# Patient Record
Sex: FEMALE | Race: WHITE
[De-identification: names, ages, dates, MRNs, and addresses within clinical notes are randomized per-mention and may not be internally consistent; named-entity substitution may affect disease eponyms.]

---

## 2017-07-10 ENCOUNTER — HOSPITAL ENCOUNTER (OUTPATIENT)
Dept: HOSPITAL 75 - PREOP | Age: 72
LOS: 1 days | End: 2017-07-11
Attending: SURGERY
Payer: MEDICARE

## 2017-07-10 VITALS — BODY MASS INDEX: 22.55 KG/M2 | WEIGHT: 121 LBS | HEIGHT: 61.25 IN

## 2017-07-13 ENCOUNTER — HOSPITAL ENCOUNTER (OUTPATIENT)
Dept: HOSPITAL 75 - ENDO | Age: 72
Discharge: HOME | End: 2017-07-13
Attending: SURGERY
Payer: MEDICARE

## 2017-07-13 VITALS — BODY MASS INDEX: 22.55 KG/M2 | WEIGHT: 121 LBS | HEIGHT: 61.25 IN

## 2017-07-13 VITALS — DIASTOLIC BLOOD PRESSURE: 86 MMHG | SYSTOLIC BLOOD PRESSURE: 119 MMHG

## 2017-07-13 VITALS — SYSTOLIC BLOOD PRESSURE: 118 MMHG | DIASTOLIC BLOOD PRESSURE: 67 MMHG

## 2017-07-13 VITALS — DIASTOLIC BLOOD PRESSURE: 68 MMHG | SYSTOLIC BLOOD PRESSURE: 121 MMHG

## 2017-07-13 VITALS — SYSTOLIC BLOOD PRESSURE: 119 MMHG | DIASTOLIC BLOOD PRESSURE: 86 MMHG

## 2017-07-13 DIAGNOSIS — K21.9: ICD-10-CM

## 2017-07-13 DIAGNOSIS — R19.5: Primary | ICD-10-CM

## 2017-07-13 DIAGNOSIS — K64.8: ICD-10-CM

## 2017-07-13 DIAGNOSIS — E78.00: ICD-10-CM

## 2017-07-13 DIAGNOSIS — K57.30: ICD-10-CM

## 2017-07-13 DIAGNOSIS — K20.9: ICD-10-CM

## 2017-07-13 DIAGNOSIS — K44.9: ICD-10-CM

## 2017-07-13 DIAGNOSIS — Z80.0: ICD-10-CM

## 2017-07-13 DIAGNOSIS — I10: ICD-10-CM

## 2017-07-13 RX ADMIN — MIDAZOLAM HYDROCHLORIDE PRN MG: 1 INJECTION, SOLUTION INTRAMUSCULAR; INTRAVENOUS at 10:20

## 2017-07-13 RX ADMIN — MIDAZOLAM HYDROCHLORIDE PRN MG: 1 INJECTION, SOLUTION INTRAMUSCULAR; INTRAVENOUS at 10:13

## 2017-07-13 RX ADMIN — MIDAZOLAM HYDROCHLORIDE PRN MG: 1 INJECTION, SOLUTION INTRAMUSCULAR; INTRAVENOUS at 10:23

## 2017-07-13 RX ADMIN — FENTANYL CITRATE PRN MCG: 50 INJECTION, SOLUTION INTRAMUSCULAR; INTRAVENOUS at 10:14

## 2017-07-13 RX ADMIN — FENTANYL CITRATE PRN MCG: 50 INJECTION, SOLUTION INTRAMUSCULAR; INTRAVENOUS at 10:11

## 2017-07-13 RX ADMIN — MIDAZOLAM HYDROCHLORIDE PRN MG: 1 INJECTION, SOLUTION INTRAMUSCULAR; INTRAVENOUS at 10:16

## 2017-07-13 RX ADMIN — MIDAZOLAM HYDROCHLORIDE PRN MG: 1 INJECTION, SOLUTION INTRAMUSCULAR; INTRAVENOUS at 10:10

## 2017-07-13 RX ADMIN — FENTANYL CITRATE PRN MCG: 50 INJECTION, SOLUTION INTRAMUSCULAR; INTRAVENOUS at 10:21

## 2017-07-13 RX ADMIN — FENTANYL CITRATE PRN MCG: 50 INJECTION, SOLUTION INTRAMUSCULAR; INTRAVENOUS at 10:25

## 2017-07-13 NOTE — XMS REPORT
MU2 Ambulatory Summary

 Created on: 2015



Milady Crespo

External Reference #: [id number]

: 1945

Sex: Female



Demographics







 Address  [address]

[place]  [address]

 

 Home Phone  [phone]

 

 Preferred Language  English

 

 Marital Status  

 

 Adventist Affiliation  Unknown

 

 Race  White

 

 Ethnic Group  Not  or 





Author







 Author  Doris Noriega

 

 Organization  [facility]

 

 Address  [address]

[place], [place]  [id number]



 

 Phone  [phone]







Care Team Providers







 Care Team Member Name  Role  Phone

 

 Doris Noriega  PCP  [phone]







Allergies and Adverse Reactions







 Name  Reaction  Notes

 

 NO KNOWN DRUG ALLERGIES      







Plan of Treatment







 Planned Activity  Comments  Planned Date  Planned Time  Plan/Goal

 

 LIPID PANEL     8/15/2012  12:00 AM   

 

 COMPREHEN METABOLIC PANEL     6/10/2013  12:00 AM   

 

 LIPID PANEL     6/10/2013  12:00 AM   

 

 ASSAY THYROID STIM HORMONE     6/10/2013  12:00 AM   

 

 COMPLETE CBC W/AUTO DIFF WBC     6/10/2013  12:00 AM   

 

 COMPREHEN METABOLIC PANEL     2012  12:00 AM   

 

 LIPID PANEL     2012  12:00 AM   

 

 ASSAY THYROID STIM HORMONE     2012  12:00 AM   

 

 COMPLETE CBC W/AUTO DIFF WBC     2012  12:00 AM   

 

 COMPLETE CBC W/AUTO DIFF WBC     2015  12:00 AM   

 

 COMPREHEN METABOLIC PANEL     2015  12:00 AM   

 

 LIPID PANEL     2015  12:00 AM   

 

 ASSAY THYROID STIM HORMONE     2015  12:00 AM   







Medications







 Active 

 

 Name  Start Date  Estimated Completion Date  SIG  Comments

 

 Tetracaine Lollipops Lollipop  2015     Use as directed   

 

 amlodipine oral tablet 5 mg  2015  TAKE ONE TABLET BY MOUTH 
EVERY DAY   

 

 atenolol oral tablet 50 mg  2015  take 1 tablet (50 mg) by oral 
route once daily   

 

 levothyroxine oral tablet 50 mcg  2015  TAKE ONE TABLET BY MOUTH 
EVERY DAY   

 

 loratadine oral tablet 10 mg  2015  TAKE ONE TABLET BY MOUTH 
EVERY DAY   

 

 Diovan Oral tablet 160 mg  2015  2/15/2016  TAKE ONE TABLET BY MOUTH 
EVERY DAY for 90 days   

 

 clorazepate dipotassium oral tablet 7.5 mg  2015  take 1/2 to1 
tablet PO up to three times per day for situational anxiety   









  

 

 Name  Start Date  Expiration Date  SIG  Comments

 

 Prednisone Oral Tablet 20 mg  2011  take 2 tablets by oral 
route daily for 5 days   

 

 calcium carbonate-vitamin D2 Oral tablet 600-125 mg-unit  8/15/2012  2012
  take 2 tablets by oral route daily   

 

 Omega 3 Fish Oil Oral capsule,delayed release(DR/EC) 900-1,400 mg  8/15/2012  9
/  take 1 capsule by oral route daily   

 

 multivitamin Oral tablet  8/15/2012  2012  take 1 tablet by oral route 
daily   

 

 triamcinolone acetonide topical cream 0.1 %  2013  10/7/2013  APPLY A 
THIN LAYER TO THE AFFECTED AREA(S) BY TOPICAL ROUTE TWICE A DAY   

 

 famotidine Oral tablet 20 mg  2014  take 1 tablet (20 mg) by 
oral route 2 times per day   

 

 Plavix Oral tablet 75 mg  2014  take 1 tablet (75 mg) by oral 
route once daily   

 

 amoxicillin oral capsule 500 mg  2014  take 1 capsule (500 mg) 
by oral route 3 times per day for 10 days   

 

 Lipitor Oral Tablet 20 mg  2014  take 1 tablet (20 mg) by oral 
route once daily   

 

 Zithromax Z-Tab oral tablet 250 mg  10/2/2014  10/7/2014  take 2 tablets (500 
mg) by oral route once daily for 1 day then 1 tablet (250 mg) by oral route 
once daily for 4 days   

 

 acyclovir oral tablet 800 mg  10/5/2014  10/12/2014  take 1 tablet (800 mg) by 
oral route 5 times per day for 7 days   

 

 gabapentin oral capsule 300 mg  10/9/2014  2014  take 1 capsule (300 mg) 
by oral route 3 times per day for 14 days   

 

 Carafate oral suspension 100 mg/mL  2014  take 10 milliliters 
(1 gram) by oral route 4 times per day on an empty stomach 1 hour before meals 
and at bedtime for 4 weeks   









 Discontinued 

 

 Name  Start Date  Discontinued Date  SIG  Comments

 

 Lipitor Oral Tablet 40 mg     2009 TAB DAILY   

 

 Ramipril Oral Capsule 5 mg     2009  take 1 capsule (5 mg) by oral route 
once daily   

 

 Lovastatin Oral Tablet 20 mg  2009  take 1 tablet (20 mg) by 
oral route once daily with evening meal   

 

 Propranolol Oral Tablet 20 mg  2010  take 1 tablet by oral 
route 2 times a day   

 

 Fosamax Oral Tablet 70 mg  2010  take 1 tablet (70 mg) by oral 
route once weekly in the morning, at least 30 minutes before the first food, 
beverage, or medication of the day   

 

 Lisinopril Oral Tablet 40 mg  2010  4/10/2011  take 2 tablets by oral 
route daily   

 

 Zoloft Oral Tablet 50 mg  2011  take 1 tablet (50 mg) by oral 
route once daily   

 

 Promethazine Oral Tablet 25 mg  2011  take 1 tablet by oral 
route every 6 hours as needed   

 

 Diovan HCT Oral Tablet 160-12.5 mg  2011  take 1 tablet by oral 
route once daily for 30 days   

 

 Diflucan Oral Tablet 150 mg     2012  take 1 tablet (150 mg) by oral 
route once for 1 day   

 

 Diflucan Oral Tablet 150 mg  2012  8/15/2012  take 1 tablet (150 mg) by 
oral route once   

 

 Vitamin B-12 Oral tablet 1,000 mcg  8/15/2012  2014  take 1 tablet by 
oral route daily   

 

 Premarin Vaginal Cream 0.625 mg/gram  10/29/2012  6/10/2013  use 1 gram daily 
for a week then 1 gram twice a week   

 

 Medrol (Tab) Oral tablets,dose pack 4 mg  2013  6/10/2013  take as 
directed   

 

 aspirin Oral tablet 325 mg  2014  take 1 tablet (325 mg) by 
oral route once daily   

 

 Medrol (Tab) oral tablets,dose pack 4 mg  2014  10/2/2014  take as 
directed   







Problem List







 Description  Status  Onset

 

 Hyperlipidemia  Active   

 

 acid reflux  Active   

 

 Hypertension  Active   

 

 hypothyroid  Active   







Vital Signs







 Date  Time  BP-Sys(mm[Hg]  BP-Morena(mm[Hg])  HR(bpm)  RR(rpm)  Temp  WT  HT  HC  
BMI  BSA  BMI Percentile  O2 Sat(%)

 

 2015  9:05:00 AM  110 mmHg  75 mmHg  85 bpm  16 rpm  96.7 F  144.375 lbs  
61 in     27.28 kg/m2  1.68 m2     99 %

 

 2015  1:05:00 PM  108 mmHg  62 mmHg  78 bpm  18 rpm  96.9 F  142.375 lbs  
61 in     26.9012 kg/m  1.6672 m     100 %

 

 2014  9:31:00 AM  126 mmHg  66 mmHg  79 bpm  18 rpm  98.7 F  149 lbs  61 
in     28.15 kg/m2  1.71 m2     98 %

 

 10/6/2014  9:02:00 AM  110 mmHg  74 mmHg  94 bpm  18 rpm  98.1 F  145.4 lbs  
61 in     27.4728 kg/m  1.6848 m     97 %

 

 10/2/2014  9:14:00 AM  124 mmHg  74 mmHg  79 bpm  18 rpm  98 F  147.25 lbs  61 
in     27.82 kg/m2  1.70 m2     98 %

 

 2014  9:22:00 AM  124 mmHg  76 mmHg  89 bpm  18 rpm  98.1 F  148 lbs  61 
in     27.9641 kg/m  1.6998 m     100 %

 

 2014  8:27:00 AM  118 mmHg  65 mmHg  74 bpm  16 rpm  97.7 F  148 lbs  61 
in     27.96 kg/m2  1.70 m2     99 %

 

 2014  9:48:00 AM  125 mmHg  70 mmHg  93 bpm  18 rpm  96.7 F  156 lbs  61 
in     29.4756 kg/m  1.7451 m     100 %

 

 2013  8:35:00 AM  122 mmHg  74 mmHg  84 bpm  16 rpm  97.9 F  155.375 lbs 
                99 %

 

 6/10/2013  8:30:00 AM  130 mmHg  82 mmHg  79 bpm  16 rpm  97.9 F  161 lbs     
            98 %

 

 2013  8:50:00 AM  124 mmHg  68 mmHg  66 bpm  18 rpm  97.6 F  157.5 lbs  
61 in     29.76 kg/m2  1.75 m2      

 

 2012  1:46:00 PM  120 mmHg  72 mmHg  75 bpm  16 rpm  97.6 F  156.125 lbs
                 98 %

 

 12/10/2012  8:32:00 AM  122 mmHg  82 mmHg  70 bpm  16 rpm  97.3 F  157 lbs    
             99 %

 

 8/15/2012  9:00:00 AM  130 mmHg  76 mmHg  86 bpm  16 rpm  97.4 F  159 lbs     
            100 %

 

 2012  11:02:00 AM  128 mmHg  64 mmHg  66 bpm  18 rpm  97.4 F  162.125 lbs
  61 in     30.63 kg/m2  1.78 m2      

 

 2012  8:45:00 AM  130 mmHg  70 mmHg  82 bpm  16 rpm  98.2 F  160.125 lbs 
                100 %

 

 2/15/2012  9:29:00 AM  122 mmHg  80 mmHg  86 bpm  16 rpm  97.4 F  159.375 lbs  
61 in     30.11 kg/m2  1.76 m2     99 %

 

 2012  9:41:00 AM  132 mmHg  74 mmHg  66 bpm  18 rpm  98.4 F  160.375 lbs  
61 in     30.3023 kg/m  1.7694 m      

 

 2011  10:08:00 AM  134 mmHg  82 mmHg  80 bpm  16 rpm  98 F  160 lbs  61 
in     30.23 kg/m2  1.77 m2     99 %

 

 2011  3:56:00 PM  130 mmHg  80 mmHg                              

 

 2011  8:55:00 AM  130 mmHg  74 mmHg  88 bpm  16 rpm  98.2 F  158.25 lbs  
               98 %

 

 2011  8:54:00 AM  136 mmHg  82 mmHg  102 bpm  16 rpm  98.1 F  153.5 lbs   
              99 %

 

 2011  8:49:00 AM  122 mmHg  88 mmHg  88 bpm  16 rpm  98.6 F  152.25 lbs  
               99 %

 

 2011  10:02:00 AM  140 mmHg  82 mmHg  96 bpm  20 rpm  98.8 F             
       100 %

 

 2011  9:14:00 AM  156 mmHg  98 mmHg  110 bpm  24 rpm  98.5 F  153 lbs    
             100 %

 

 2011  8:50:00 AM  160 mmHg  84 mmHg  100 bpm  16 rpm  98.7 F  155 lbs    
             100 %

 

 2011  1:25:00 PM  152 mmHg  100 mmHg  82 bpm  16 rpm  97.2 F  156.375 lbs 
                100 %

 

 2011  9:55:00 AM  144 mmHg  90 mmHg                              

 

 2010  9:24:00 AM  138 mmHg  84 mmHg                              

 

 2010  8:58:00 AM  160 mmHg  94 mmHg                              

 

 2010  8:33:00 AM  142 mmHg  90 mmHg  100 bpm  16 rpm  98.1 F  158.375 
lbs                  

 

 2010  9:24:00 AM  160 mmHg  102 mmHg  88 bpm  18 rpm  99 F  157.125 lbs  
62 in     28.7382 kg/m  1.7657 m      

 

 3/19/2010  11:01:00 AM  140 mmHg  90 mmHg                              

 

 2009  10:08:00 AM  142 mmHg  86 mmHg  72 bpm  16 rpm  98.1 F  154.125 
lbs  61 in     29.1214 kg/m  1.7346 m      







Social History







 Name  Description  Comments

 

       

 

 Children      

 

 lives alone      

 

 Supervisor      

 

 Tobacco  Never smoker   







History of Procedures







 Date Ordered  Description  Order Status

 

 2011 12:00 AM  COMPREHEN METABOLIC PANEL  Reviewed

 

 2011 12:00 AM  ASSAY THYROID STIM HORMONE  Reviewed

 

 2011 12:00 AM  COMPREHEN METABOLIC PANEL  Reviewed

 

 2011 12:00 AM  LIPID PANEL  Reviewed

 

 2011 12:00 AM  ASSAY THYROID STIM HORMONE  Reviewed

 

 2011 12:00 AM  COMPLETE CBC W/AUTO DIFF WBC  Reviewed

 

 2011 12:00 AM  COMPREHEN METABOLIC PANEL  Reviewed

 

 2011 12:00 AM  COMPREHEN METABOLIC PANEL  Reviewed

 

 2011 12:00 AM  LIPID PANEL  Reviewed

 

 2011 12:00 AM  ASSAY THYROID STIM HORMONE  Reviewed

 

 2010 11:24 AM  NO CHARGE OV  Reviewed

 

 2010 11:26 AM  NO CHARGE OV  Reviewed

 

 2011 12:00 AM  COMPREHEN METABOLIC PANEL  Reviewed

 

 2011 12:00 AM  LIPID PANEL  Reviewed

 

 2011 12:00 AM  ASSAY THYROID STIM HORMONE  Reviewed

 

 2011 12:00 AM  COMPLETE CBC W/AUTO DIFF WBC  Reviewed

 

 2012 12:00 AM  TISSUE EXAM FOR FUNGI  Returned

 

 2012 12:00 AM  SMEAR WET MOUNT SALINE/INK  Returned

 

 2/15/2012 12:00 AM  THER/PROPH/DIAG INJ SC/IM  Reviewed

 

 8/15/2012 12:00 AM  COMPREHEN METABOLIC PANEL  Reviewed

 

 8/15/2012 12:00 AM  ASSAY THYROID STIM HORMONE  Reviewed

 

 8/15/2012 12:00 AM  COMPLETE CBC W/AUTO DIFF WBC  Returned

 

 12/10/2012 12:00 AM  IMMUNIZATION ADMIN  Reviewed

 

 2012 12:00 AM  TRICHOMONAS ASSAY W/OPTIC  Returned

 

 2013 12:00 AM  THER/PROPH/DIAG INJ SC/IM  Reviewed

 

 2013 12:00 AM  COMPREHEN METABOLIC PANEL  Returned

 

 2013 12:00 AM  LIPID PANEL  Returned

 

 2013 12:00 AM  COMPLETE CBC W/AUTO DIFF WBC  Returned

 

 2013 12:00 AM  ASSAY THYROID STIM HORMONE  Returned

 

 6/10/2013 12:00 AM  MAMMOGRAM SCREENING  Returned

 

 6/10/2013 12:00 AM  CYTOPATH C/V MANUAL  Returned

 

 12/10/2013 12:00 AM  LIPID PANEL  Returned

 

 12/10/2013 12:00 AM  ASSAY THYROID STIM HORMONE  Returned

 

 12/10/2013 12:00 AM  COMPLETE CBC W/AUTO DIFF WBC  Returned

 

 12/10/2013 12:00 AM  COMPREHEN METABOLIC PANEL  Returned

 

 2010 12:00 AM  CYTOPATH C/V MANUAL  Reviewed

 

 2010 12:00 AM  SMEAR WET MOUNT SALINE/INK  Reviewed

 

 2010 12:00 AM  LIPID PANEL  Reviewed

 

 2010 12:00 AM  ASSAY THYROID STIM HORMONE  Reviewed

 

 2010 12:00 AM  COMPLETE CBC W/AUTO DIFF WBC  Reviewed

 

 2010 12:00 AM  COMPREHEN METABOLIC PANEL  Reviewed

 

 2010 8:48 AM  Blood Pressure Check-no charge  Reviewed

 

 2010 12:00 AM  Blood Pressure Check-no charge  Reviewed

 

 2014 12:00 AM  METABOLIC PANEL TOTAL CA  Reviewed

 

 2014 12:00 AM  ASSAY THYROID STIM HORMONE  Reviewed

 

 2014 12:00 AM  ASSAY OF FREE THYROXINE  Reviewed

 

 2014 12:00 AM  MAMMOGRAM SCREENING  Returned

 

 2014 12:00 AM  CT BONE DENSITY AXIAL  Returned

 

 2014 12:00 AM  COMPLETE CBC W/AUTO DIFF WBC  Returned

 

 2014 12:00 AM  COMPREHEN METABOLIC PANEL  Returned

 

 2014 12:00 AM  LIPID PANEL  Returned

 

 2014 12:00 AM  ASSAY THYROID STIM HORMONE  Returned

 

 10/20/2010 12:00 AM  IMMUNIZATION ADMIN  Reviewed

 

 10/20/2010 12:00 AM  FLU VACCINE 3 YRS & > IM  Reviewed

 

 2010 12:00 AM  COMPREHEN METABOLIC PANEL  Reviewed

 

 2010 12:00 AM  LIPID PANEL  Reviewed

 

 2010 12:00 AM  ASSAY THYROID STIM HORMONE  Reviewed

 

 2010 12:00 AM  COMPLETE CBC W/AUTO DIFF WBC  Reviewed

 

 2010 12:00 AM  Blood Pressure Check-no charge  Reviewed

 

 10/2/2014 12:00 AM  THER/PROPH/DIAG INJ SC/IM  Reviewed

 

 2014 12:00 AM  COMPLETE CBC W/AUTO DIFF WBC  Returned

 

 2014 12:00 AM  COMPREHEN METABOLIC PANEL  Returned

 

 2014 12:00 AM  LIPID PANEL  Returned

 

 2014 12:00 AM  ASSAY THYROID STIM HORMONE  Returned

 

 2015 12:00 AM  THER/PROPH/DIAG INJ SC/IM  Reviewed

 

 2011 12:00 AM  COMPREHEN METABOLIC PANEL  Reviewed

 

 2011 12:00 AM  LIPID PANEL  Reviewed

 

 2011 12:00 AM  ASSAY THYROID STIM HORMONE  Reviewed

 

 2011 12:00 AM  COMPLETE CBC W/AUTO DIFF WBC  Reviewed

 

 2011 12:00 AM  METABOLIC PANEL TOTAL CA  Reviewed

 

 2011 12:00 AM  COMPREHEN METABOLIC PANEL  Reviewed

 

 2011 12:00 AM  ASSAY THYROID STIM HORMONE  Reviewed

 

 2011 12:00 AM  COMPLETE CBC W/AUTO DIFF WBC  Reviewed

 

 2011 12:00 AM  ASSAY OF LIPASE  Reviewed

 

 2011 12:00 AM  THER/PROPH/DIAG INJ SC/IM  Reviewed

 

 2011 12:00 AM  METABOLIC PANEL TOTAL CA  Reviewed

 

 2011 12:00 AM  THER/PROPH/DIAG INJ SC/IM  Reviewed

 

 2011 12:00 AM  METABOLIC PANEL TOTAL CA  Reviewed

 

 2011 12:00 AM  ASSAY THYROID STIM HORMONE  Reviewed

 

 2011 12:00 AM  THER/PROPH/DIAG INJ SC/IM  Reviewed

 

 2011 12:00 AM  ASSAY OF SERUM SODIUM  Reviewed

 

 2011 12:00 AM  ASSAY OF SERUM SODIUM  Reviewed

 

 2011 12:00 AM  CYTOPATH C/V MANUAL  Reviewed

 

 2011 12:00 AM  ASSAY THYROID STIM HORMONE  Reviewed







Results Summary







 Data and Description  Results

 

 2010 9:25 AM  Colonoscopy-Women and Men over 50 Abnormal Mammogram -Women 
over 40 Declined Pap Smear Declined 

 

 2010 10:41 AM  WET PREP NO TRICHOMONADS SEEN 

 

 2010 8:15 AM  TRIGLYCERIDES 174.0 mg/dLCHOLESTEROL 175.0 mg/dLHDL 58.0 mg/
dLLDL (CALC) 82.0 mg/dLTSH 0.790 uIU/mLGLUCOSE 95.0 mg/dLSODIUM 136.0 mmol/
LPOTASSIUM 4.50 mmol/LCHLORIDE 99.0 mmol/LCO2 26.0 mmol/LBUN 12.0 mg/
dLCREATININE 0.80 mg/dLSGOT/AST 32.0 IU/LSGPT/ALT 33.0 IU/LALK PHOS 103.0 IU/
LTOTAL PROTEIN 7.60 g/dLALBUMIN 4.60 g/dLTOTAL BILI 0.60 mg/dLCALCIUM 9.80 mg/
dLeGFR >60 mL/min/1.73 m2WBC 5.3 RBC 4.13 HGB 13.10 g/dLHCT 39.20 %MCV 95.0 
fLMCH 31.70 pgMCHC 33.40 g/dLRDW CV 12.70 %MPV 9.80 fLPLT 257 %NEUT 57.90 %%
LYMP 26.50 %%MONO 11.60 %%EOS 3.20 %%BASO 0.80 %#NEUT 3.04 #LYMP 1.39 #MONO 
0.61 #EOS 0.17 #BASO 0.04 

 

 12/15/2010 8:30 AM  TRIGLYCERIDES 157.0 mg/dLCHOLESTEROL 163.0 mg/dLHDL 56.0 mg
/dLLDL (CALC) 76.0 mg/dLTSH 0.650 uIU/mLWBC 6.5 RBC 4.25 HGB 13.60 g/dLHCT 
40.70 %MCV 96.0 fLMCH 32.0 pgMCHC 33.40 g/dLRDW CV 12.60 %MPV 9.90 fLPLT 313 %
NEUT 61.80 %%LYMP 26.20 %%MONO 8.30 %%EOS 3.10 %%BASO 0.60 %#NEUT 4.00 #LYMP 
1.70 #MONO 0.54 #EOS 0.20 #BASO 0.04 GLUCOSE 97.0 mg/dLSODIUM 136.0 mmol/
LPOTASSIUM 4.40 mmol/LCHLORIDE 101.0 mmol/LCO2 26.0 mmol/LBUN 10.0 mg/
dLCREATININE 0.80 mg/dLSGOT/AST 31.0 IU/LSGPT/ALT 34.0 IU/LALK PHOS 92.0 IU/
LTOTAL PROTEIN 8.10 g/dLALBUMIN 4.60 g/dLTOTAL BILI 0.40 mg/dLCALCIUM 10.0 mg/
dLeGFR >60 mL/min/1.73 m2

 

 2011 9:45 AM  GLUCOSE 91.0 mg/dLSODIUM 133.0 mmol/LPOTASSIUM 4.40 mmol/
LCHLORIDE 97.0 mmol/LCO2 24.0 mmol/LBUN 9.0 mg/dLCREATININE 0.70 mg/dLCALCIUM 
10.0 mg/dLeGFR >60 mL/min/1.73 m2

 

 2011 10:25 AM  LIPASE 29.0 U/LTSH 0.10 uIU/mLGLUCOSE 115.0 mg/dLSODIUM 
127.0 mmol/LPOTASSIUM 5.30 mmol/LCHLORIDE 93.0 mmol/LCO2 18.0 mmol/LBUN 9.0 mg/
dLCREATININE 0.70 mg/dLSGOT/AST 62.0 IU/LSGPT/ALT 46.0 IU/LALK PHOS 100.0 IU/
LTOTAL PROTEIN 8.60 g/dLALBUMIN 4.90 g/dLTOTAL BILI 0.60 mg/dLCALCIUM 9.90 mg/
dLeGFR >60 mL/min/1.73 m2WBC 6.9 RBC 4.48 HGB 14.40 g/dLHCT 40.90 %MCV 91.0 
fLMCH 32.10 pgMCHC 35.20 g/dLRDW CV 12.50 %MPV 9.30 fLPLT 260 %NEUT 74.90 %%
LYMP 15.10 %%MONO 9.40 %%EOS 0.30 %%BASO 0.30 %#NEUT 5.15 #LYMP 1.04 #MONO 0.65 
#EOS 0.02 #BASO 0.02 

 

 2011 9:07 AM  GLUCOSE 92.0 mg/dLSODIUM 131.0 mmol/LPOTASSIUM 4.20 mmol/
LCHLORIDE 99.0 mmol/LCO2 22.0 mmol/LBUN 9.0 mg/dLCREATININE 0.70 mg/dLCALCIUM 
9.20 mg/dLeGFR >60 mL/min/1.73 m2

 

 2011 11:06 AM  TSH 0.510 uIU/mLGLUCOSE 99.0 mg/dLSODIUM 132.0 mmol/
LPOTASSIUM 3.50 mmol/LCHLORIDE 95.0 mmol/LCO2 23.0 mmol/LBUN 9.0 mg/
dLCREATININE 0.80 mg/dLCALCIUM 10.40 mg/dLeGFR >60 mL/min/1.73 m2

 

 2011 9:45 AM  SODIUM 132.0 mmol/L

 

 2011 9:27 AM  SODIUM 137.0 mmol/L

 

 2011 8:35 AM  TRIGLYCERIDES 114.0 mg/dLCHOLESTEROL 145.0 mg/dLHDL 56.0 mg/
dLLDL (CALC) 66.0 mg/dLGLUCOSE 105.0 mg/dLSODIUM 138.0 mmol/LPOTASSIUM 4.40 mmol
/LCHLORIDE 103.0 mmol/LCO2 25.0 mmol/LBUN 8.0 mg/dLCREATININE 0.70 mg/dLSGOT/
AST 36.0 IU/LSGPT/ALT 38.0 IU/LALK PHOS 103.0 IU/LTOTAL PROTEIN 7.40 g/
dLALBUMIN 4.30 g/dLTOTAL BILI 0.30 mg/dLCALCIUM 9.20 mg/dLeGFR >60 mL/min/1.73 
m2WBC 5.1 RBC 3.76 HGB 12.0 g/dLHCT 36.10 %MCV 96.0 fLMCH 31.90 pgMCHC 33.20 g/
dLRDW CV 13.10 %MPV 9.40 fLPLT 249 %NEUT 60.40 %%LYMP 25.90 %%MONO 8.90 %%EOS 
4.0 %%BASO 0.80 %#NEUT 3.06 #LYMP 1.31 #MONO 0.45 #EOS 0.20 #BASO 0.04 

 

 2011 9:55 AM  TSH 1.070 uIU/mL

 

 2011 8:55 AM  GLUCOSE 102.0 mg/dLSODIUM 135.0 mmol/LPOTASSIUM 4.20 mmol/
LCHLORIDE 102.0 mmol/LCO2 24.0 mmol/LBUN 15.0 mg/dLCREATININE 0.80 mg/dLSGOT/
AST 30.0 IU/LSGPT/ALT 35.0 IU/LALK PHOS 119.0 IU/LTOTAL PROTEIN 7.50 g/
dLALBUMIN 4.30 g/dLTOTAL BILI 0.30 mg/dLCALCIUM 9.20 mg/dLeGFR >60 mL/min/1.73 
m2TSH 1.130 uIU/mL

 

 2011 9:50 AM  GLUCOSE 85.0 mg/dLSODIUM 133.0 mmol/LPOTASSIUM 4.20 mmol/
LCHLORIDE 101.0 mmol/LCO2 21.0 mmol/LBUN 13.0 mg/dLCREATININE 0.80 mg/dLSGOT/
AST 36.0 IU/LSGPT/ALT 36.0 IU/LALK PHOS 109.0 IU/LTOTAL PROTEIN 7.40 g/
dLALBUMIN 4.20 g/dLTOTAL BILI 0.50 mg/dLCALCIUM 9.40 mg/dLeGFR >60 mL/min/1.73 
m2

 

 2011 8:55 AM  GLUCOSE 98.0 mg/dLSODIUM 137.0 mmol/LPOTASSIUM 3.90 mmol/
LCHLORIDE 103.0 mmol/LCO2 25.0 mmol/LBUN 14.0 mg/dLCREATININE 0.70 mg/dLSGOT/
AST 33.0 IU/LSGPT/ALT 36.0 IU/LALK PHOS 111.0 IU/LTOTAL PROTEIN 8.30 g/
dLALBUMIN 4.40 g/dLTOTAL BILI 0.50 mg/dLCALCIUM 9.40 mg/dLeGFR >60 mL/min/1.73 
[id number] 109.0 mg/dLCHOLESTEROL 153.0 mg/dLHDL 54.0 mg/dLLDL (CALC) 77.0 
mg/dLTSH 1.330 uIU/mL

 

 2011 10:17 AM  Colonoscopy-Women and Men over 50 Declined Mammogram -
Women over 40 Normal Pap Smear Negative 

 

 2012 10:33 AM  WET PREP NO TRICH SEEN CLUE CELLS NONE SEEN 

 

 2012 8:45 AM  WBC 5.2 RBC 3.96 HGB 12.70 g/dLHCT 37.80 %MCV 96.0 fLMCH 
32.10 pgMCHC 33.60 g/dLRDW CV 13.30 %MPV 9.70 fLPLT 297 %NEUT 57.70 %%LYMP 
28.50 %%MONO 10.30 %%EOS 2.50 %%BASO 1.0 %#NEUT 3.02 #LYMP 1.49 #MONO 0.54 #EOS 
0.13 #BASO 0.05 GLUCOSE 97.0 mg/dLSODIUM 137.0 mmol/LPOTASSIUM 4.40 mmol/
LCHLORIDE 101.0 mmol/LCO2 23.0 mmol/LBUN 17.0 mg/dLCREATININE 0.80 mg/dLSGOT/
AST 30.0 IU/LSGPT/ALT 33.0 IU/LALK PHOS 95.0 IU/LTOTAL PROTEIN 7.40 g/dLALBUMIN 
4.40 g/dLTOTAL BILI 0.60 mg/dLCALCIUM 9.70 mg/dLeGFR 60 TRIGLYCERIDES 130.0 mg/
dLCHOLESTEROL 157.0 mg/dLHDL 56.0 mg/dLLDL (CALC) 75.0 mg/dLTSH 0.940 uIU/mL

 

 8/15/2012 4:02 PM  WET PREP NO TRICH SEEN CLUE CELLS NONE SEEN 

 

 2012 8:45 AM  WBC 5.1 RBC 3.91 HGB 12.50 g/dLHCT 36.30 %MCV 93.0 fLMCH 
32.0 pgMCHC 34.40 g/dLRDW CV 12.60 %MPV 9.30 fLPLT 244 %NEUT 57.60 %%LYMP 27.50 
%%MONO 9.10 %%EOS 5.0 %%BASO 0.80 %#NEUT 2.91 #LYMP 1.39 #MONO 0.46 #EOS 0.25 #
BASO 0.04 TSH 1.390 uIU/mLTRIGLYCERIDES 154.0 mg/dLCHOLESTEROL 147.0 mg/dLHDL 
45.0 mg/dLLDL (CALC) 71.0 mg/dLGLUCOSE 101.0 mg/dLSODIUM 134.0 mmol/LPOTASSIUM 
4.30 mmol/LCHLORIDE 102.0 mmol/LCO2 23.0 mmol/LBUN 11.0 mg/dLCREATININE 0.80 mg/
dLSGOT/AST 34.0 IU/LSGPT/ALT 38.0 IU/LALK PHOS 104.0 IU/LTOTAL PROTEIN 7.60 g/
dLALBUMIN 4.40 g/dLTOTAL BILI 0.50 mg/dLCALCIUM 9.40 mg/dLeGFR 60 

 

 12/10/2012 8:34 AM  Colonoscopy-Women and Men over 50 Declined Mammogram -
Women over 40 Declined Pap Smear Declined 

 

 2012 5:02 PM  WET PREP NO TRICH SEEN CLUE CELLS NONE SEEN 

 

 2013 8:25 AM  WBC 4.2 RBC 3.96 HGB 12.90 g/dLHCT 37.0 %MCV 93.0 fLMCH 
32.60 pgMCHC 34.90 g/dLRDW CV 12.60 %MPV 9.70 fLPLT 254 %NEUT 54.40 %%LYMP 
30.40 %%MONO 10.80 %%EOS 3.40 %%BASO 1.0 %#NEUT 2.26 #LYMP 1.26 #MONO 0.45 #EOS 
0.14 #BASO 0.04 TSH 1.230 uIU/mLGLUCOSE 94.0 mg/dLSODIUM 136.0 mmol/LPOTASSIUM 
4.30 mmol/LCHLORIDE 99.0 mmol/LCO2 25.0 mmol/LBUN 10.0 mg/dLCREATININE 0.80 mg/
dLSGOT/AST 36.0 IU/LSGPT/ALT 36.0 IU/LALK PHOS 84.0 IU/LTOTAL PROTEIN 7.90 g/
dLALBUMIN 4.40 g/dLTOTAL BILI 0.70 mg/dLCALCIUM 9.80 mg/dLeGFR 60 TRIGLYCERIDES 
122.0 mg/dLCHOLESTEROL 141.0 mg/dLHDL 47.0 mg/dLLDL (CALC) 70.0 mg/dL

 

 6/10/2013 3:52 PM  WET PREP NO TRICH SEEN CLUE CELLS NONE SEEN 

 

 2013 8:45 AM  WBC 5.3 RBC 4.01 HGB 13.0 g/dLHCT 37.60 %MCV 94.0 fLMCH 
32.40 pgMCHC 34.60 g/dLRDW CV 12.50 %MPV 9.40 fLPLT 248 %NEUT 58.70 %%LYMP 
27.80 %%MONO 9.80 %%EOS 2.80 %%BASO 0.90 %#NEUT 3.12 #LYMP 1.48 #MONO 0.52 #EOS 
0.15 #BASO 0.05 TSH 1.570 uIU/mLGLUCOSE 94.0 mg/dLSODIUM 134.0 mmol/LPOTASSIUM 
4.10 mmol/LCHLORIDE 101.0 mmol/LCO2 23.0 mmol/LBUN 11.0 mg/dLCREATININE 0.80 mg/
dLSGOT/AST 41.0 IU/LSGPT/ALT 44.0 IU/LALK PHOS 109.0 IU/LTOTAL PROTEIN 7.90 g/
dLALBUMIN 4.70 g/dLTOTAL BILI 0.80 mg/dLCALCIUM 10.40 mg/dLeGFR >60 mL/min/1.73 
[id number] 163.0 mg/dLCHOLESTEROL 138.0 mg/dLHDL 49.0 mg/dLLDL (CALC) 56.0 
mg/dL

 

 2014 8:58 AM  GLUCOSE 86.0 mg/dLSODIUM 134.0 mmol/LPOTASSIUM 4.20 mmol/
LCHLORIDE 99.0 mmol/LCO2 25.0 mmol/LBUN 14.0 mg/dLCREATININE 0.80 mg/dLCALCIUM 
10.10 mg/dLeGFR >60 mL/min/1.73 m2TSH 1.20 uIU/mL

 

 2014 9:50 AM  WBC 5.7 RBC 4.03 HGB 12.90 g/dLHCT 37.90 %MCV 94.0 fLMCH 
32.0 pgMCHC 34.0 g/dLRDW CV 12.70 %MPV 9.70 fLPLT 292 %NEUT 62.70 %%LYMP 21.80 %
%MONO 11.0 %%EOS 3.40 %%BASO 1.10 %#NEUT 3.55 #LYMP 1.23 #MONO 0.62 #EOS 0.19 #
BASO 0.06 GLUCOSE 98.0 mg/dLSODIUM 135.0 mmol/LPOTASSIUM 4.30 mmol/LCHLORIDE 
102.0 mmol/LCO2 22.0 mmol/LBUN 10.0 mg/dLCREATININE 0.80 mg/dLSGOT/AST 34.0 IU/
LSGPT/ALT 32.0 IU/LALK PHOS 95.0 IU/LTOTAL PROTEIN 7.70 g/dLALBUMIN 4.30 g/
dLTOTAL BILI 0.80 mg/dLCALCIUM 9.10 mg/dLeGFR 60 TRIGLYCERIDES 108.0 mg/
dLCHOLESTEROL 146.0 mg/dLHDL 56.0 mg/dLLDL (CALC) 68.0 mg/dLTSH 0.90 uIU/mL

 

 2014 8:40 AM  WBC 4.4 RBC 4.13 HGB 13.20 g/dLHCT 38.90 %MCV 94.0 fLMCH 
32.0 pgMCHC 33.90 g/dLRDW CV 13.50 %MPV 9.80 fLPLT 279 %NEUT 63.80 %%LYMP 24.60 
%%MONO 9.30 %%EOS 1.60 %%BASO 0.70 %#NEUT 2.80 #LYMP 1.08 #MONO 0.41 #EOS 0.07 #
BASO 0.03 GLUCOSE 96.0 mg/dLSODIUM 136.0 mmol/LPOTASSIUM 4.10 mmol/LCHLORIDE 
99.0 mmol/LCO2 23.0 mmol/LBUN 8.0 mg/dLCREATININE 0.80 mg/dLSGOT/AST 31.0 IU/
LSGPT/ALT 27.0 IU/LALK PHOS 85.0 IU/LTOTAL PROTEIN 7.60 g/dLALBUMIN 4.40 g/
dLTOTAL BILI 0.80 mg/dLCALCIUM 10.20 mg/dLeGFR 60 TRIGLYCERIDES 61.0 mg/
dLCHOLESTEROL 148.0 mg/dLHDL 71.0 mg/dLLDL (CALC) 65.0 mg/dLTSH 0.990 uIU/mL







History Of Immunizations







 Name  Date Admin  Mfg Name  Mfg Code  Trade Name  Lot#  Route  Inj  Vis Given  
Vis Pub  CVX

 

 Influenza  10/20/2010  [employer] pasteur  [employer]  Fluzone  [id number]  Intramuscular  
Left Arm  10/20/2010  2010  999

 

 Influenza  10/28/2011  [employer] paste[employer]  [employer]  Fluzone  [id number]  Intramuscular  
Left Arm  10/28/2011  2011  141

 

 Influenza  10/29/2012  [employer] pasteur  [employer]  Fluzone  [id number]  Intramuscular  
Left Deltoid  10/29/2012  2012  141

 

 Pneumococcal  12/10/2012  [employer]  MSD  Pneumovax 23  [id number]  
Intramuscular  Left Gluteous Medius  12/10/2012  2010  33

 

 Influenza  10/28/2013  [employer] pasteur  [employer]  Fluzone > 3 Years  [id number]  
Intramuscular  Right Deltoid  10/28/2013  2013  141







History of Past Illness







 Name  Date of Onset  Comments

 

 Benign Essential Hypertension  Dec 14 2009 10:10AM   

 

 Hyperlipidemia  Dec 14 2009 10:10AM   

 

 Hypothyroidism, Acquired  Dec 14 2009 10:10AM   

 

 hypothyroid      

 

 Hypertension      

 

 Hyperlipidemia      

 

 acid reflux      

 

 Hypertension, Benign Essential  2010  9:41AM   

 

 Hypertension, Benign Essential  2010 12:53PM   

 

 Routine gynecological examination  May  5 2010  9:28AM   

 

 Hypertension  May  5 2010  9:28AM   

 

 Hyperlipidemia, unspecified  May  5 2010  9:28AM   

 

 Hypothyroidism, Acquired  May  5 2010  9:28AM   

 

 Vulvovaginitis  May  5 2010  9:28AM   

 

 Rhinitis, Allergic  May  5 2010  9:28AM   

 

 Hypertension, Benign Essential  May 19 2010  8:48AM   

 

 Hypertension, Benign Essential  May 25 2010  9:39AM   

 

 Flu  Oct 20 2010 12:01PM   

 

 Essential Hypertension  2010  8:34AM   

 

 Hyperlipidemia  2010  8:34AM   

 

 Gastroesophageal Reflux  2010  8:34AM   

 

 Hypothyroidism, Acquired  2010  8:34AM   

 

 Hypertension, Benign Essential  2010  9:22AM   

 

 Hypertension, Benign Essential  Dec 15 2010  9:07AM   

 

 Essential Hypertension  2011  1:31PM   

 

 Hyperlipidemia  2011  1:31PM   

 

 Gastroesophageal Reflux  2011  1:31PM   

 

 Hypothyroidism, Acquired  2011  1:31PM   

 

 Essential Hypertension  2011  8:51AM   

 

 Hyperlipidemia  2011  8:51AM   

 

 Gastroesophageal Reflux  2011  8:51AM   

 

 Hypothyroidism, Acquired  2011  8:51AM   

 

 Essential Hypertension  2011  9:13AM   

 

 Hyperlipidemia  2011  9:13AM   

 

 Gastroesophageal Reflux  2011  9:13AM   

 

 Hypothyroidism, Acquired  2011  9:13AM   

 

 Nausea With Vomiting  2011  1:18PM   

 

 Hyponatremia  2011  8:46AM   

 

 Nausea  2011  9:13AM   

 

 Hypothyroidism  2011 11:10AM   

 

 Hyponatremia  2011 11:10AM   

 

 Essential Hypertension  2011 10:05AM   

 

 Hyperlipidemia  2011 10:05AM   

 

 Gastroesophageal Reflux  2011 10:05AM   

 

 Nausea  2011 10:05AM   

 

 Hypothyroidism, Acquired  2011 10:05AM   

 

 Hyponatremia  May  6 2011 11:34AM   

 

 Essential Hypertension  May 16 2011  8:50AM   

 

 Hyperlipidemia  May 16 2011  8:50AM   

 

 Gastroesophageal Reflux  May 16 2011  8:50AM   

 

 Nausea  May 16 2011  8:50AM   

 

 Hypothyroidism, Acquired  May 16 2011  8:50AM   

 

 Hyponatremia  May 16 2011  8:50AM   

 

 Routine gynecological examination  2011  8:54AM   

 

 Hypertension  2011  8:54AM   

 

 Hyperlipidemia, unspecified  2011  8:54AM   

 

 Hypothyroidism, Acquired  2011  8:54AM   

 

 Rhinitis, Allergic  2011  8:54AM   

 

 Hypothyroidism  Aug 29 2011 12:37PM   

 

 Hyponatremia  Aug 29 2011 12:37PM   

 

 Essential Hypertension  Sep 12 2011  8:53AM   

 

 Hyperlipidemia  Sep 12 2011  8:53AM   

 

 Gastroesophageal Reflux  Sep 12 2011  8:53AM   

 

 Hypothyroidism, Acquired  Sep 12 2011  8:53AM   

 

 Hyponatremia  Sep 12 2011  8:53AM   

 

 Hypertension  Sep 29 2011  9:41AM   

 

 Hyperlipidemia  Dec  8 2011  8:31AM   

 

 Hypothyroidism  Dec  8 2011  8:31AM   

 

 Hypertension  Dec  8 2011  8:31AM   

 

 Flu  Dec  9 2011 10:02AM   

 

 Essential Hypertension  Dec 13 2011 10:13AM   

 

 Hyperlipidemia  Dec 13 2011 10:13AM   

 

 Gastroesophageal Reflux  Dec 13 2011 10:13AM   

 

 Hypothyroidism, Acquired  Dec 13 2011 10:13AM   

 

 Hyponatremia  Dec 13 2011 10:13AM   

 

 Vaginal Discharge  2012  9:43AM   

 

 Rhinitis, Allergic  Feb 15 2012  9:31AM   

 

 Eustachian Tube Dysfunction  Feb 15 2012  9:31AM   

 

 Pharyngitis, Acute  Feb 15 2012  9:31AM   

 

 Routine gynecological examination  2012  8:48AM   

 

 Hypertension  2012  8:48AM   

 

 Hyperlipidemia, unspecified  2012  8:48AM   

 

 Hypothyroidism, Acquired  2012  8:48AM   

 

 Rhinitis, Allergic  2012  8:48AM   

 

 Candidiasis, Vulvovaginal  2012 11:04AM   

 

 Essential Hypertension  Aug 15 2012  9:02AM   

 

 Hyperlipidemia  Aug 15 2012  9:02AM   

 

 Gastroesophageal Reflux  Aug 15 2012  9:02AM   

 

 Hypothyroidism, Acquired  Aug 15 2012  9:02AM   

 

 Hyponatremia  Aug 15 2012  9:02AM   

 

 Atrophic Vaginitis, Postmenopausal  Aug 15 2012  9:02AM   

 

 Flu  Oct 29 2012  9:24AM   

 

 Essential Hypertension  Dec 10 2012  8:35AM   

 

 Hyperlipidemia  Dec 10 2012  8:35AM   

 

 Gastroesophageal Reflux  Dec 10 2012  8:35AM   

 

 Hypothyroidism, Acquired  Dec 10 2012  8:35AM   

 

 Hyponatremia  Dec 10 2012  8:35AM   

 

 Atrophic Vaginitis, Postmenopausal  Dec 10 2012  8:35AM   

 

 Pneumococcus  Dec 10 2012  2:07PM   

 

 Vaginal Discharge  Dec 20 2012  1:50PM   

 

 Essential Hypertension  Dec 20 2012  1:50PM   

 

 Hyperlipidemia  Dec 20 2012  1:50PM   

 

 Gastroesophageal Reflux  Dec 20 2012  1:50PM   

 

 Hypothyroidism, Acquired  Dec 20 2012  1:50PM   

 

 Atrophic Vaginitis, Postmenopausal  Dec 20 2012  1:50PM   

 

 Upper Respiratory Infections  2013  8:52AM   

 

 Hyperlipidemia  2013  8:21AM   

 

 Hypertension  2013  8:21AM   

 

 Hypothyroidism  2013  8:21AM   

 

 Screening Mammogram  Beto 10 2013  8:45AM   

 

 Routine gynecological examination  Beto 10 2013  8:34AM   

 

 Hypertension  Beto 10 2013  8:34AM   

 

 Hyperlipidemia, unspecified  Beto 10 2013  8:34AM   

 

 Hypothyroidism, Acquired  Beto 10 2013  8:34AM   

 

 Rhinitis, Allergic  Beto 10 2013  8:34AM   

 

 Flu  Oct 28 2013  8:52AM   

 

 Essential Hypertension  Dec  9 2013  8:37AM   

 

 Hyperlipidemia  Dec  9 2013  8:37AM   

 

 Gastroesophageal Reflux  Dec  9 2013  8:37AM   

 

 Hypothyroidism, Acquired  Dec  9 2013  8:37AM   

 

 Atrophic Vaginitis, Postmenopausal  Dec  9 2013  8:37AM   

 

 Hypertension  2014  9:56AM   

 

 Hyperlipidemia  2014  9:56AM   

 

 Hypothyroidism  2014  9:56AM   

 

 Screening Mammogram  2014  8:32AM   

 

 Osteopenia  2014  8:32AM   

 

 Hypertension  2014  8:35AM   

 

 Hypothyroidism, Acquired  2014  8:35AM   

 

 Hyperlipidemia  2014  8:35AM   

 

 Upper Respiratory Infections  2014  9:28AM   

 

 Sinusitis, Acute  Oct  2 2014  9:17AM   

 

 Herpes Zoster  Oct  5 2014  1:44PM   

 

 Vesicular Eruption  Oct  5 2014  1:44PM   

 

 Hypertension  2014  8:18AM   

 

 Hyperlipidemia  2014  8:18AM   

 

 hypothyroid  2014  8:18AM   

 

 Situational anxiety  Dec 20 2014  9:33AM   

 

 GERD (gastroesophageal reflux disease)  Dec 20 2014  9:33AM   

 

 Nausea  Dec 20 2014  9:33AM   

 

 Abdominal Pain, Epigastric  Dec 20 2014  9:33AM   

 

 Hyperlipidemia  Oct  6 2014  9:03AM   

 

 acid reflux  Oct  6 2014  9:03AM   

 

 hypothyroid  Oct  6 2014  9:03AM   

 

 Shingles  Oct  6 2014  9:03AM   

 

 Pharyngitis  2015  1:09PM   

 

 Bronchitis  2015  9:10AM   

 

 Hyperlipidemia  2015  9:10AM   

 

 acid reflux  2015  9:10AM   

 

 hypothyroid  2015  9:10AM   

 

 Hyperlipidemia  2015  8:18AM   

 

 Hypertension  2015  8:18AM   

 

 hypothyroid  2015  8:18AM   







Payers







 Insurance Name  Company Name  Plan Name  Plan Number  Policy Number  Policy 
Group Number  Start Date

 

    Medicare Part B  [employer] [place]     [id number]     N/A

 

    [facility]     [id number]     2009

 

    Medicare Part A  Medicare Part A     [id number]     N/A







History of Encounters







 Visit Date  Visit Type  Provider

 

 2015  Office visit  Doris Noriega MD

 

 2015  Office visit  Prince Noe APRN

 

 2014  Office visit  Anyi Herrera APRN

 

 10/27/2014  Voided  Doris Norieag MD

 

 10/6/2014  Office visit  Doris Noriega MD

 

 10/5/2014  Office visit  Anyi Herrera APRN

 

 10/2/2014  Office visit  Corrine Bay APRN

 

 2014  Office visit  Kassie CHARLES Rigoberto APRN

 

 2014  Office visit  Doris Noriega MD

 

 2014  Office visit  Doris Noriega MD

 

 2013  Office visit  Dee Colindres MD

 

 10/28/2013  Nurse visit  Dee Colindres MD

 

 6/10/2013  Office visit  Dee Colindres MD

 

 2013  Office visit  Corrine Bay APRN

 

 2012  Office visit  Dee Colindres MD

 

 12/10/2012  Office visit  Dee Colindres MD

 

 10/29/2012  Nurse visit  Dee Colindres MD

 

 8/15/2012  Office visit  Dee Colindres MD

 

 2012  Office visit  Corrine Bay APRN

 

 2012  Office visit  Dee Colindres MD

 

 2/15/2012  Office visit  Dee Colindres MD

 

 2012  Office visit  Corrine Bay APRN

 

 2011  Office visit  Dee Colindres MD

 

 10/28/2011  Nurse visit  Shad Nolasco MD

 

 2011  Office visit  Dee Colindres MD

 

 2011  Office visit  Dee Colindres MD

 

 2011  Office visit  Dee Colindres MD

 

 2011  Office visit  Dee Colindres MD

 

 2011  Office visit  Dee Colindres MD

 

 2011  Office visit  Dee Colindres MD

 

 4/10/2011  [facility]  RICKEY Toussaint MD

 

 4/10/2011  [facility]  KHRIS Granados MD

 

 2011  Office visit  RICKEY Toussaint MD

 

 2011  Voided  Fide Castellanos MD

 

 2011  [facility]  iFde Castellanos MD

 

 2011  Office visit  Dee Colindres MD

 

 12/15/2010  Nurse visit  Dee Colindres MD

 

 2010  Office visit  Dee Colindres MD

 

 10/20/2010  Nurse visit  Stephenie PERAZA

 

 2010  Nurse visit  Dee Colindres MD

 

 2010  Nurse visit  Dee Colindres MD

 

 2010  Office visit  Dee Colindres MD

 

 3/19/2010  Voided  Stephenie PERAZA

 

 2010  Nurse visit  Stephenie PERAZA

 

 2010  Nurse visit  Stephenie PERAZA

 

 2010  Nurse visit  Stephenie PERAZA

 

 2009  Office visit  Stephenie PERAZA

 

 10/20/2009  Nurse visit  Stephenie PERAZA

## 2017-07-13 NOTE — XMS REPORT
MU2 Ambulatory Summary

 Created on: 2015



Milady Crespo

External Reference #: [id number]

: 1945

Sex: Female



Demographics







 Address  [address]

[place]  [address]

 

 Home Phone  [phone]

 

 Preferred Language  English

 

 Marital Status  

 

 Sabianist Affiliation  Unknown

 

 Race  White

 

 Ethnic Group  Not  or 





Author







 Author  Doris Noriega

 

 Organization  [facility]

 

 Address  [address]

[place], [place]  [id number]



 

 Phone  [phone]







Care Team Providers







 Care Team Member Name  Role  Phone

 

 Doris Noriega  PCP  [phone]







Allergies and Adverse Reactions







 Name  Reaction  Notes

 

 NO KNOWN DRUG ALLERGIES      







Plan of Treatment







 Planned Activity  Comments  Planned Date  Planned Time  Plan/Goal

 

 COMPLETE CBC W/AUTO DIFF WBC     2015  12:00 AM   

 

 COMPREHEN METABOLIC PANEL     2015  12:00 AM   

 

 LIPID PANEL     2015  12:00 AM   

 

 ASSAY THYROID STIM HORMONE     2015  12:00 AM   

 

 LIPID PANEL     8/15/2012  12:00 AM   

 

 COMPREHEN METABOLIC PANEL     6/10/2013  12:00 AM   

 

 LIPID PANEL     6/10/2013  12:00 AM   

 

 ASSAY THYROID STIM HORMONE     6/10/2013  12:00 AM   

 

 COMPLETE CBC W/AUTO DIFF WBC     6/10/2013  12:00 AM   

 

 COMPREHEN METABOLIC PANEL     2012  12:00 AM   

 

 LIPID PANEL     2012  12:00 AM   

 

 ASSAY THYROID STIM HORMONE     2012  12:00 AM   

 

 COMPLETE CBC W/AUTO DIFF WBC     2012  12:00 AM   

 

 MAMMOGRAM SCREENING     2015  12:00 AM   







Medications







 Active 

 

 Name  Start Date  Estimated Completion Date  SIG  Comments

 

 amlodipine 5 mg oral tablet  2015  TAKE ONE TABLET BY MOUTH 
EVERY DAY   

 

 atenolol 50 mg oral tablet  2015  take 1 tablet (50 mg) by oral 
route once daily   

 

 levothyroxine 50 mcg oral tablet  2015  TAKE ONE TABLET BY MOUTH 
EVERY DAY   

 

 Diovan 160 mg oral tablet  2015  2/15/2016  TAKE ONE TABLET BY MOUTH 
EVERY DAY for 90 days   

 

 loratadine 10 mg oral tablet  2015  12/15/2016  TAKE 1/2 TABLET BY MOUTH 
EVERY DAY IN THE EVENING   

 

 aspirin 81 mg oral tablet,delayed release (DR/EC)        take 1 tablet (81 mg) 
by oral route once daily   

 

 Vitamin D3 1,000 unit oral capsule        take 1 capsule by oral route daily   

 

 famotidine 20 mg oral tablet  2015     TAKE ONE TABLET BY MOUTH TWICE 
DAILY   









  

 

 Name  Start Date  Expiration Date  SIG  Comments

 

 prednisone 20 mg oral tablet  2011  take 2 tablets by oral 
route daily for 5 days   

 

 calcium carbonate-vitamin D2 600-125 mg-unit oral tablet  8/15/2012  2012
  take 2 tablets by oral route daily   

 

 [place] 3 Fish Oil 900-1,400 mg oral capsule,delayed release(DR/EC)  8/15/2012  9
/  take 1 capsule by oral route daily   

 

 multivitamin Oral tablet  8/15/2012  2012  take 1 tablet by oral route 
daily   

 

 triamcinolone acetonide 0.1 % topical cream  2013  10/7/2013  APPLY A 
THIN LAYER TO THE AFFECTED AREA(S) BY TOPICAL ROUTE TWICE A DAY   

 

 famotidine 20 mg oral tablet  2014  take 1 tablet (20 mg) by 
oral route 2 times per day   

 

 Plavix 75 mg oral tablet  2014  take 1 tablet (75 mg) by oral 
route once daily   

 

 amoxicillin 500 mg oral capsule  2014  take 1 capsule (500 mg) 
by oral route 3 times per day for 10 days   

 

 Lipitor 20 mg oral tablet  2014  take 1 tablet (20 mg) by oral 
route once daily   

 

 Zithromax Z-Tab 250 mg oral tablet  10/2/2014  10/7/2014  take 2 tablets (500 
mg) by oral route once daily for 1 day then 1 tablet (250 mg) by oral route 
once daily for 4 days   

 

 acyclovir 800 mg oral tablet  10/5/2014  10/12/2014  take 1 tablet (800 mg) by 
oral route 5 times per day for 7 days   

 

 gabapentin 300 mg oral capsule  10/9/2014  2014  take 1 capsule (300 mg) 
by oral route 3 times per day for 14 days   

 

 Carafate 100 mg/mL oral suspension  2014  take 10 milliliters 
(1 gram) by oral route 4 times per day on an empty stomach 1 hour before meals 
and at bedtime for 4 weeks   

 

 triamcinolone acetonide 0.1 % topical cream  2015  apply a thin 
layer to the affected area(s) by topical route 2 times per day for 14 days   

 

 clorazepate dipotassium 7.5 mg oral tablet  2015  take 1/2 to1 
tablet PO up to three times per day for situational anxiety   

 

 fluconazole 150 mg oral tablet  2015  take 1 tablet (150 mg) 
by oral route once for 1 day   









 Discontinued 

 

 Name  Start Date  Discontinued Date  SIG  Comments

 

 Lipitor 40 mg oral tablet     2009  1/2 TAB DAILY   

 

 ramipril 5 mg oral capsule     2009  take 1 capsule (5 mg) by oral route 
once daily   

 

 lovastatin 20 mg oral tablet  2009  take 1 tablet (20 mg) by 
oral route once daily with evening meal   

 

 propranolol 20 mg oral tablet  2010  take 1 tablet by oral 
route 2 times a day   

 

 Fosamax 70 mg oral tablet  2010  take 1 tablet (70 mg) by oral 
route once weekly in the morning, at least 30 minutes before the first food, 
beverage, or medication of the day   

 

 lisinopril 40 mg oral tablet  2010  4/10/2011  take 2 tablets by oral 
route daily   

 

 Zoloft 50 mg oral tablet  2011  take 1 tablet (50 mg) by oral 
route once daily   

 

 promethazine 25 mg oral tablet  2011  take 1 tablet by oral 
route every 6 hours as needed   

 

 Diovan -12.5 mg oral tablet  2011  take 1 tablet by oral 
route once daily for 30 days   

 

 Diflucan 150 mg oral tablet     2012  take 1 tablet (150 mg) by oral 
route once for 1 day   

 

 Diflucan 150 mg oral tablet  2012  8/15/2012  take 1 tablet (150 mg) by 
oral route once   

 

 Vitamin B-12 1,000 mcg oral tablet  8/15/2012  2014  take 1 tablet by 
oral route daily   

 

 Premarin 0.625 mg/gram vaginal cream  10/29/2012  6/10/2013  use 1 gram daily 
for a week then 1 gram twice a week   

 

 Medrol (Tab) 4 mg oral tablets,dose pack  2013  6/10/2013  take as 
directed   

 

 aspirin 325 mg oral tablet  2014  take 1 tablet (325 mg) by 
oral route once daily   

 

 Medrol (Tab) 4 mg oral tablets,dose pack  2014  10/2/2014  take as 
directed   

 

 Tetracaine Lollipops Lollipop  2015  Use as directed   







Problem List







 Description  Status  Onset

 

 Hyperlipidemia  Active   

 

 acid reflux  Active   

 

 Hypertension  Active   

 

 hypothyroid  Active   







Vital Signs







 Date  Time  BP-Sys(mm[Hg]  BP-Morena(mm[Hg])  HR(bpm)  RR(rpm)  Temp  WT  HT  HC  
BMI  BSA  BMI Percentile  O2 Sat(%)

 

 2015  9:33:00 AM  120 mmHg  68 mmHg  73 bpm     98.7 F  144.375 lbs  61 
in     27.28 kg/m2  1.68 m2     99 %

 

 2015  9:05:00 AM  110 mmHg  75 mmHg  85 bpm  16 rpm  96.7 F  144.375 lbs  
61 in     27.2791 kg/m  1.6788 m     99 %

 

 2015  1:05:00 PM  108 mmHg  62 mmHg  78 bpm  18 rpm  96.9 F  142.375 lbs  
61 in     26.90 kg/m2  1.67 m2     100 %

 

 2014  9:31:00 AM  126 mmHg  66 mmHg  79 bpm  18 rpm  98.7 F  149 lbs  61 
in     28.153 kg/m  1.7055 m     98 %

 

 10/6/2014  9:02:00 AM  110 mmHg  74 mmHg  94 bpm  18 rpm  98.1 F  145.4 lbs  
61 in     27.47 kg/m2  1.68 m2     97 %

 

 10/2/2014  9:14:00 AM  124 mmHg  74 mmHg  79 bpm  18 rpm  98 F  147.25 lbs  61 
in     27.8224 kg/m  1.6955 m     98 %

 

 2014  9:22:00 AM  124 mmHg  76 mmHg  89 bpm  18 rpm  98.1 F  148 lbs  61 
in     27.96 kg/m2  1.70 m2     100 %

 

 2014  8:27:00 AM  118 mmHg  65 mmHg  74 bpm  16 rpm  97.7 F  148 lbs  61 
in     27.9641 kg/m  1.6998 m     99 %

 

 2014  9:48:00 AM  125 mmHg  70 mmHg  93 bpm  18 rpm  96.7 F  156 lbs  61 
in     29.48 kg/m2  1.75 m2     100 %

 

 2013  8:35:00 AM  122 mmHg  74 mmHg  84 bpm  16 rpm  97.9 F  155.375 lbs 
                99 %

 

 6/10/2013  8:30:00 AM  130 mmHg  82 mmHg  79 bpm  16 rpm  97.9 F  161 lbs     
            98 %

 

 2013  8:50:00 AM  124 mmHg  68 mmHg  66 bpm  18 rpm  97.6 F  157.5 lbs  
61 in     29.7591 kg/m  1.7535 m      

 

 2012  1:46:00 PM  120 mmHg  72 mmHg  75 bpm  16 rpm  97.6 F  156.125 lbs
                 98 %

 

 12/10/2012  8:32:00 AM  122 mmHg  82 mmHg  70 bpm  16 rpm  97.3 F  157 lbs    
             99 %

 

 8/15/2012  9:00:00 AM  130 mmHg  76 mmHg  86 bpm  16 rpm  97.4 F  159 lbs     
            100 %

 

 2012  11:02:00 AM  128 mmHg  64 mmHg  66 bpm  18 rpm  97.4 F  162.125 lbs
  61 in     30.6329 kg/m  1.7791 m      

 

 2012  8:45:00 AM  130 mmHg  70 mmHg  82 bpm  16 rpm  98.2 F  160.125 lbs 
                100 %

 

 2/15/2012  9:29:00 AM  122 mmHg  80 mmHg  86 bpm  16 rpm  97.4 F  159.375 lbs  
61 in     30.1133 kg/m  1.7639 m     99 %

 

 2012  9:41:00 AM  132 mmHg  74 mmHg  66 bpm  18 rpm  98.4 F  160.375 lbs  
61 in     30.30 kg/m2  1.77 m2      

 

 2011  10:08:00 AM  134 mmHg  82 mmHg  80 bpm  16 rpm  98 F  160 lbs  61 
in     30.2314 kg/m  1.7674 m     99 %

 

 2011  3:56:00 PM  130 mmHg  80 mmHg                              

 

 2011  8:55:00 AM  130 mmHg  74 mmHg  88 bpm  16 rpm  98.2 F  158.25 lbs  
               98 %

 

 2011  8:54:00 AM  136 mmHg  82 mmHg  102 bpm  16 rpm  98.1 F  153.5 lbs   
              99 %

 

 2011  8:49:00 AM  122 mmHg  88 mmHg  88 bpm  16 rpm  98.6 F  152.25 lbs  
               99 %

 

 2011  10:02:00 AM  140 mmHg  82 mmHg  96 bpm  20 rpm  98.8 F             
       100 %

 

 2011  9:14:00 AM  156 mmHg  98 mmHg  110 bpm  24 rpm  98.5 F  153 lbs    
             100 %

 

 2011  8:50:00 AM  160 mmHg  84 mmHg  100 bpm  16 rpm  98.7 F  155 lbs    
             100 %

 

 2011  1:25:00 PM  152 mmHg  100 mmHg  82 bpm  16 rpm  97.2 F  156.375 lbs 
                100 %

 

 2011  9:55:00 AM  144 mmHg  90 mmHg                              

 

 2010  9:24:00 AM  138 mmHg  84 mmHg                              

 

 2010  8:58:00 AM  160 mmHg  94 mmHg                              

 

 2010  8:33:00 AM  142 mmHg  90 mmHg  100 bpm  16 rpm  98.1 F  158.375 
lbs                  

 

 2010  9:24:00 AM  160 mmHg  102 mmHg  88 bpm  18 rpm  99 F  157.125 lbs  
62 in     28.74 kg/m2  1.77 m2      

 

 3/19/2010  11:01:00 AM  140 mmHg  90 mmHg                              

 

 2009  10:08:00 AM  142 mmHg  86 mmHg  72 bpm  16 rpm  98.1 F  154.125 
lbs  61 in     29.12 kg/m2  1.73 m2      







Social History







 Name  Description  Comments

 

       

 

 Children      

 

 lives alone      

 

 Supervisor      

 

 Tobacco  Never smoker   







History of Procedures







 Date Ordered  Description  Order Status

 

 2011 12:00 AM  COMPREHEN METABOLIC PANEL  Reviewed

 

 2011 12:00 AM  ASSAY THYROID STIM HORMONE  Reviewed

 

 2011 12:00 AM  COMPREHEN METABOLIC PANEL  Reviewed

 

 2011 12:00 AM  LIPID PANEL  Reviewed

 

 2011 12:00 AM  ASSAY THYROID STIM HORMONE  Reviewed

 

 2011 12:00 AM  COMPLETE CBC W/AUTO DIFF WBC  Reviewed

 

 2011 12:00 AM  COMPREHEN METABOLIC PANEL  Reviewed

 

 2011 12:00 AM  COMPREHEN METABOLIC PANEL  Reviewed

 

 2011 12:00 AM  LIPID PANEL  Reviewed

 

 2011 12:00 AM  ASSAY THYROID STIM HORMONE  Reviewed

 

 10/28/2011 12:00 AM  ***Immunization administration, Medicare flu  Reviewed

 

 10/28/2011 12:00 AM  Fluzone ** MEDICARE Only **  Reviewed

 

 2010 11:24 AM  NO CHARGE OV  Reviewed

 

 2010 11:26 AM  NO CHARGE OV  Reviewed

 

 2011 12:00 AM  COMPREHEN METABOLIC PANEL  Reviewed

 

 2011 12:00 AM  LIPID PANEL  Reviewed

 

 2011 12:00 AM  ASSAY THYROID STIM HORMONE  Reviewed

 

 2011 12:00 AM  COMPLETE CBC W/AUTO DIFF WBC  Reviewed

 

 2011 12:00 AM  Wrist Support  Reviewed

 

 2012 12:00 AM  TISSUE EXAM FOR FUNGI  Returned

 

 2012 12:00 AM  SMEAR WET MOUNT SALINE/INK  Returned

 

 2/15/2012 12:00 AM  THER/PROPH/DIAG INJ SC/IM  Reviewed

 

 2/15/2012 12:00 AM  Bicillin CR [facility]DC#[id number][facility]  Reviewed

 

 2/15/2012 12:00 AM  Depo-Medrol 40 mg NDC#0191613849  Reviewed

 

 8/15/2012 12:00 AM  COMPREHEN METABOLIC PANEL  Reviewed

 

 8/15/2012 12:00 AM  ASSAY THYROID STIM HORMONE  Reviewed

 

 8/15/2012 12:00 AM  COMPLETE CBC W/AUTO DIFF WBC  Returned

 

 8/15/2012 12:00 AM  Wrist Support  Reviewed

 

 10/29/2012 12:00 AM  Flu Injection 3 Years And Above NDC# [address]-58  [facility]  
Reviewed

 

 12/10/2012 12:00 AM  IMMUNIZATION ADMIN  Reviewed

 

 12/10/2012 12:00 AM  Pneumovax Injection - [facility]  Reviewed

 

 2012 12:00 AM  TRICHOMONAS ASSAY W/OPTIC  Returned

 

 2013 12:00 AM  THER/PROPH/DIAG INJ SC/IM  Reviewed

 

 2013 12:00 AM  Bicillin CR, 1.2 million units NDC# 57964-873-25  Reviewed

 

 2013 12:00 AM  COMPREHEN METABOLIC PANEL  Returned

 

 2013 12:00 AM  LIPID PANEL  Returned

 

 2013 12:00 AM  COMPLETE CBC W/AUTO DIFF WBC  Returned

 

 2013 12:00 AM  ASSAY THYROID STIM HORMONE  Returned

 

 6/10/2013 12:00 AM  MAMMOGRAM SCREENING  Returned

 

 6/10/2013 12:00 AM  CYTOPATH C/V MANUAL  Returned

 

 12/10/2013 12:00 AM  LIPID PANEL  Returned

 

 12/10/2013 12:00 AM  ASSAY THYROID STIM HORMONE  Returned

 

 12/10/2013 12:00 AM  COMPLETE CBC W/AUTO DIFF WBC  Returned

 

 12/10/2013 12:00 AM  COMPREHEN METABOLIC PANEL  Returned

 

 2010 12:00 AM  CYTOPATH C/V MANUAL  Reviewed

 

 2010 12:00 AM  SMEAR WET [place] SALINE/INK  Reviewed

 

 2010 12:00 AM  LIPID PANEL  Reviewed

 

 2010 12:00 AM  ASSAY THYROID STIM HORMONE  Reviewed

 

 2010 12:00 AM  COMPLETE CBC W/AUTO DIFF WBC  Reviewed

 

 2010 12:00 AM  COMPREHEN METABOLIC PANEL  Reviewed

 

 10/28/2013 12:00 AM  Flu Injection 3 Years And Above NDC# [address]-85  [facility]  
Reviewed

 

 2010 8:48 AM  Blood Pressure Check-no charge  Reviewed

 

 2010 12:00 AM  Blood Pressure Check-no charge  Reviewed

 

 2014 12:00 AM  METABOLIC PANEL TOTAL CA  Reviewed

 

 2014 12:00 AM  ASSAY THYROID STIM HORMONE  Reviewed

 

 2014 12:00 AM  ASSAY OF FREE THYROXINE  Reviewed

 

 2014 12:00 AM  MAMMOGRAM SCREENING  Returned

 

 2014 12:00 AM  CT BONE DENSITY AXIAL  Returned

 

 2014 12:00 AM  COMPLETE CBC W/AUTO DIFF WBC  Returned

 

 2014 12:00 AM  COMPREHEN METABOLIC PANEL  Returned

 

 2014 12:00 AM  LIPID PANEL  Returned

 

 2014 12:00 AM  ASSAY THYROID STIM HORMONE  Returned

 

 10/20/2010 12:00 AM  IMMUNIZATION ADMIN  Reviewed

 

 10/20/2010 12:00 AM  FLU VACCINE 3 YRS & > IM  Reviewed

 

 2010 12:00 AM  COMPREHEN METABOLIC PANEL  Reviewed

 

 2010 12:00 AM  LIPID PANEL  Reviewed

 

 2010 12:00 AM  ASSAY THYROID STIM HORMONE  Reviewed

 

 2010 12:00 AM  COMPLETE CBC W/AUTO DIFF WBC  Reviewed

 

 2010 12:00 AM  Blood Pressure Check-no charge  Reviewed

 

 10/2/2014 12:00 AM  THER/PROPH/DIAG INJ SC/IM  Reviewed

 

 10/2/2014 12:00 AM  Kenalog, Per 10 Mg NDC#5586-4995-26  Reviewed

 

 2014 12:00 AM  COMPLETE CBC W/AUTO DIFF WBC  Returned

 

 2014 12:00 AM  COMPREHEN METABOLIC PANEL  Returned

 

 2014 12:00 AM  LIPID PANEL  Returned

 

 2014 12:00 AM  ASSAY THYROID STIM HORMONE  Returned

 

 2015 12:00 AM  Rocephin 1 gram NDC#9795-3647-20  Reviewed

 

 2015 12:00 AM  THER/PROPH/DIAG INJ SC/IM  Reviewed

 

 2011 12:00 AM  COMPREHEN METABOLIC PANEL  Reviewed

 

 2011 12:00 AM  LIPID PANEL  Reviewed

 

 2011 12:00 AM  ASSAY THYROID STIM HORMONE  Reviewed

 

 2011 12:00 AM  COMPLETE CBC W/AUTO DIFF WBC  Reviewed

 

 2011 12:00 AM  METABOLIC PANEL TOTAL CA  Reviewed

 

 2011 12:00 AM  COMPREHEN METABOLIC PANEL  Reviewed

 

 2011 12:00 AM  ASSAY THYROID STIM HORMONE  Reviewed

 

 2011 12:00 AM  COMPLETE CBC W/AUTO DIFF WBC  Reviewed

 

 2011 12:00 AM  ASSAY OF LIPASE  Reviewed

 

 2011 12:00 AM  THER/PROPH/DIAG INJ SC/IM  Reviewed

 

 2011 12:00 AM  Phenergan 50 Mg Im  Bernadine  Reviewed

 

 2011 12:00 AM  METABOLIC PANEL TOTAL CA  Reviewed

 

 2011 12:00 AM  THER/PROPH/DIAG INJ SC/IM  Reviewed

 

 2011 12:00 AM  Phenergan 25 Mg Im  Bernadine  Reviewed

 

 2011 12:00 AM  METABOLIC PANEL TOTAL CA  Reviewed

 

 2011 12:00 AM  ASSAY THYROID STIM HORMONE  Reviewed

 

 2011 12:00 AM  THER/PROPH/DIAG INJ SC/IM  Reviewed

 

 2011 12:00 AM  Phenergan 50 Mg Im  Bernadine  Reviewed

 

 2011 12:00 AM  ASSAY OF SERUM SODIUM  Reviewed

 

 2011 12:00 AM  ASSAY OF SERUM SODIUM  Reviewed

 

 2011 12:00 AM  CYTOPATH C/V MANUAL  Reviewed

 

 2011 12:00 AM  ASSAY THYROID STIM HORMONE  Reviewed

 

 2015 12:00 AM  COMPLETE CBC W/AUTO DIFF WBC  Returned

 

 2015 12:00 AM  COMPREHEN METABOLIC PANEL  Returned

 

 2015 12:00 AM  LIPID PANEL  Returned

 

 2015 12:00 AM  ASSAY THYROID STIM HORMONE  Returned

 

 2015 12:00 AM  MAMMOGRAM SCREENING  Returned

 

 2015 12:00 AM  CYTOPATH TBS C/V MANUAL  Returned







Results Summary







 Data and Description  Results

 

 2010 9:25 AM  Colonoscopy-Women and Men over 50 Abnormal Mammogram -Women 
over 40 Declined Pap Smear Declined 

 

 2010 10:41 AM  WET PREP NO TRICHOMONADS SEEN 

 

 2010 8:15 AM  TRIGLYCERIDES 174.0 mg/dLCHOLESTEROL 175.0 mg/dLHDL 58.0 mg/
dLLDL (CALC) 82.0 mg/dLTSH 0.790 uIU/mLGLUCOSE 95.0 mg/dLSODIUM 136.0 mmol/
LPOTASSIUM 4.50 mmol/LCHLORIDE 99.0 mmol/LCO2 26.0 mmol/LBUN 12.0 mg/
dLCREATININE 0.80 mg/dLSGOT/AST 32.0 IU/LSGPT/ALT 33.0 IU/LALK PHOS 103.0 IU/
LTOTAL PROTEIN 7.60 g/dLALBUMIN 4.60 g/dLTOTAL BILI 0.60 mg/dLCALCIUM 9.80 mg/
dLeGFR >60 mL/min/1.73 m2WBC 5.3 RBC 4.13 HGB 13.10 g/dLHCT 39.20 %MCV 95.0 
fLMCH 31.70 pgMCHC 33.40 g/dLRDW CV 12.70 %MPV 9.80 fLPLT 257 %NEUT 57.90 %%
LYMP 26.50 %%MONO 11.60 %%EOS 3.20 %%BASO 0.80 %#NEUT 3.04 #LYMP 1.39 #MONO 
0.61 #EOS 0.17 #BASO 0.04 

 

 12/15/2010 8:30 AM  TRIGLYCERIDES 157.0 mg/dLCHOLESTEROL 163.0 mg/dLHDL 56.0 mg
/dLLDL (CALC) 76.0 mg/dLTSH 0.650 uIU/mLWBC 6.5 RBC 4.25 HGB 13.60 g/dLHCT 
40.70 %MCV 96.0 fLMCH 32.0 pgMCHC 33.40 g/dLRDW CV 12.60 %MPV 9.90 fLPLT 313 %
NEUT 61.80 %%LYMP 26.20 %%MONO 8.30 %%EOS 3.10 %%BASO 0.60 %#NEUT 4.00 #LYMP 
1.70 #MONO 0.54 #EOS 0.20 #BASO 0.04 GLUCOSE 97.0 mg/dLSODIUM 136.0 mmol/
LPOTASSIUM 4.40 mmol/LCHLORIDE 101.0 mmol/LCO2 26.0 mmol/LBUN 10.0 mg/
dLCREATININE 0.80 mg/dLSGOT/AST 31.0 IU/LSGPT/ALT 34.0 IU/LALK PHOS 92.0 IU/
LTOTAL PROTEIN 8.10 g/dLALBUMIN 4.60 g/dLTOTAL BILI 0.40 mg/dLCALCIUM 10.0 mg/
dLeGFR >60 mL/min/1.73 m2

 

 2011 9:45 AM  GLUCOSE 91.0 mg/dLSODIUM 133.0 mmol/LPOTASSIUM 4.40 mmol/
LCHLORIDE 97.0 mmol/LCO2 24.0 mmol/LBUN 9.0 mg/dLCREATININE 0.70 mg/dLCALCIUM 
10.0 mg/dLeGFR >60 mL/min/1.73 m2

 

 2011 10:25 AM  LIPASE 29.0 U/LTSH 0.10 uIU/mLGLUCOSE 115.0 mg/dLSODIUM 
127.0 mmol/LPOTASSIUM 5.30 mmol/LCHLORIDE 93.0 mmol/LCO2 18.0 mmol/LBUN 9.0 mg/
dLCREATININE 0.70 mg/dLSGOT/AST 62.0 IU/LSGPT/ALT 46.0 IU/LALK PHOS 100.0 IU/
LTOTAL PROTEIN 8.60 g/dLALBUMIN 4.90 g/dLTOTAL BILI 0.60 mg/dLCALCIUM 9.90 mg/
dLeGFR >60 mL/min/1.73 m2WBC 6.9 RBC 4.48 HGB 14.40 g/dLHCT 40.90 %MCV 91.0 
fLMCH 32.10 pgMCHC 35.20 g/dLRDW CV 12.50 %MPV 9.30 fLPLT 260 %NEUT 74.90 %%
LYMP 15.10 %%MONO 9.40 %%EOS 0.30 %%BASO 0.30 %#NEUT 5.15 #LYMP 1.04 #MONO 0.65 
#EOS 0.02 #BASO 0.02 

 

 2011 9:07 AM  GLUCOSE 92.0 mg/dLSODIUM 131.0 mmol/LPOTASSIUM 4.20 mmol/
LCHLORIDE 99.0 mmol/LCO2 22.0 mmol/LBUN 9.0 mg/dLCREATININE 0.70 mg/dLCALCIUM 
9.20 mg/dLeGFR >60 mL/min/1.73 m2

 

 2011 11:06 AM  TSH 0.510 uIU/mLGLUCOSE 99.0 mg/dLSODIUM 132.0 mmol/
LPOTASSIUM 3.50 mmol/LCHLORIDE 95.0 mmol/LCO2 23.0 mmol/LBUN 9.0 mg/
dLCREATININE 0.80 mg/dLCALCIUM 10.40 mg/dLeGFR >60 mL/min/1.73 m2

 

 2011 9:45 AM  SODIUM 132.0 mmol/L

 

 2011 9:27 AM  SODIUM 137.0 mmol/L

 

 2011 8:35 AM  TRIGLYCERIDES 114.0 mg/dLCHOLESTEROL 145.0 mg/dLHDL 56.0 mg/
dLLDL (CALC) 66.0 mg/dLGLUCOSE 105.0 mg/dLSODIUM 138.0 mmol/LPOTASSIUM 4.40 mmol
/LCHLORIDE 103.0 mmol/LCO2 25.0 mmol/LBUN 8.0 mg/dLCREATININE 0.70 mg/dLSGOT/
AST 36.0 IU/LSGPT/ALT 38.0 IU/LALK PHOS 103.0 IU/LTOTAL PROTEIN 7.40 g/
dLALBUMIN 4.30 g/dLTOTAL BILI 0.30 mg/dLCALCIUM 9.20 mg/dLeGFR >60 mL/min/1.73 
m2WBC 5.1 RBC 3.76 HGB 12.0 g/dLHCT 36.10 %MCV 96.0 fLMCH 31.90 pgMCHC 33.20 g/
dLRDW CV 13.10 %MPV 9.40 fLPLT 249 %NEUT 60.40 %%LYMP 25.90 %%MONO 8.90 %%EOS 
4.0 %%BASO 0.80 %#NEUT 3.06 #LYMP 1.31 #MONO 0.45 #EOS 0.20 #BASO 0.04 

 

 2011 9:55 AM  TSH 1.070 uIU/mL

 

 2011 8:55 AM  GLUCOSE 102.0 mg/dLSODIUM 135.0 mmol/LPOTASSIUM 4.20 mmol/
LCHLORIDE 102.0 mmol/LCO2 24.0 mmol/LBUN 15.0 mg/dLCREATININE 0.80 mg/dLSGOT/
AST 30.0 IU/LSGPT/ALT 35.0 IU/LALK PHOS 119.0 IU/LTOTAL PROTEIN 7.50 g/
dLALBUMIN 4.30 g/dLTOTAL BILI 0.30 mg/dLCALCIUM 9.20 mg/dLeGFR >60 mL/min/1.73 
m2TSH 1.130 uIU/mL

 

 2011 9:50 AM  GLUCOSE 85.0 mg/dLSODIUM 133.0 mmol/LPOTASSIUM 4.20 mmol/
LCHLORIDE 101.0 mmol/LCO2 21.0 mmol/LBUN 13.0 mg/dLCREATININE 0.80 mg/dLSGOT/
AST 36.0 IU/LSGPT/ALT 36.0 IU/LALK PHOS 109.0 IU/LTOTAL PROTEIN 7.40 g/
dLALBUMIN 4.20 g/dLTOTAL BILI 0.50 mg/dLCALCIUM 9.40 mg/dLeGFR >60 mL/min/1.73 
m2

 

 2011 8:55 AM  GLUCOSE 98.0 mg/dLSODIUM 137.0 mmol/LPOTASSIUM 3.90 mmol/
LCHLORIDE 103.0 mmol/LCO2 25.0 mmol/LBUN 14.0 mg/dLCREATININE 0.70 mg/dLSGOT/
AST 33.0 IU/LSGPT/ALT 36.0 IU/LALK PHOS 111.0 IU/LTOTAL PROTEIN 8.30 g/
dLALBUMIN 4.40 g/dLTOTAL BILI 0.50 mg/dLCALCIUM 9.40 mg/dLeGFR >60 mL/min/1.73 
[id number] 109.0 mg/dLCHOLESTEROL 153.0 mg/dLHDL 54.0 mg/dLLDL (CALC) 77.0 
mg/dLTSH 1.330 uIU/mL

 

 2011 10:17 AM  Colonoscopy-Women and Men over 50 Declined Mammogram -
Women over 40 Normal Pap Smear Negative 

 

 2012 10:33 AM  WET PREP NO TRICH SEEN CLUE CELLS NONE SEEN 

 

 2012 8:45 AM  WBC 5.2 RBC 3.96 HGB 12.70 g/dLHCT 37.80 %MCV 96.0 fLMCH 
32.10 pgMCHC 33.60 g/dLRDW CV 13.30 %MPV 9.70 fLPLT 297 %NEUT 57.70 %%LYMP 
28.50 %%MONO 10.30 %%EOS 2.50 %%BASO 1.0 %#NEUT 3.02 #LYMP 1.49 #MONO 0.54 #EOS 
0.13 #BASO 0.05 GLUCOSE 97.0 mg/dLSODIUM 137.0 mmol/LPOTASSIUM 4.40 mmol/
LCHLORIDE 101.0 mmol/LCO2 23.0 mmol/LBUN 17.0 mg/dLCREATININE 0.80 mg/dLSGOT/
AST 30.0 IU/LSGPT/ALT 33.0 IU/LALK PHOS 95.0 IU/LTOTAL PROTEIN 7.40 g/dLALBUMIN 
4.40 g/dLTOTAL BILI 0.60 mg/dLCALCIUM 9.70 mg/dLeGFR 60 TRIGLYCERIDES 130.0 mg/
dLCHOLESTEROL 157.0 mg/dLHDL 56.0 mg/dLLDL (CALC) 75.0 mg/dLTSH 0.940 uIU/mL

 

 8/15/2012 4:02 PM  WET PREP NO TRICH SEEN CLUE CELLS NONE SEEN 

 

 2012 8:45 AM  WBC 5.1 RBC 3.91 HGB 12.50 g/dLHCT 36.30 %MCV 93.0 fLMCH 
32.0 pgMCHC 34.40 g/dLRDW CV 12.60 %MPV 9.30 fLPLT 244 %NEUT 57.60 %%LYMP 27.50 
%%MONO 9.10 %%EOS 5.0 %%BASO 0.80 %#NEUT 2.91 #LYMP 1.39 #MONO 0.46 #EOS 0.25 #
BASO 0.04 TSH 1.390 uIU/mLTRIGLYCERIDES 154.0 mg/dLCHOLESTEROL 147.0 mg/dLHDL 
45.0 mg/dLLDL (CALC) 71.0 mg/dLGLUCOSE 101.0 mg/dLSODIUM 134.0 mmol/LPOTASSIUM 
4.30 mmol/LCHLORIDE 102.0 mmol/LCO2 23.0 mmol/LBUN 11.0 mg/dLCREATININE 0.80 mg/
dLSGOT/AST 34.0 IU/LSGPT/ALT 38.0 IU/LALK PHOS 104.0 IU/LTOTAL PROTEIN 7.60 g/
dLALBUMIN 4.40 g/dLTOTAL BILI 0.50 mg/dLCALCIUM 9.40 mg/dLeGFR 60 

 

 12/10/2012 8:34 AM  Colonoscopy-Women and Men over 50 Declined Mammogram -
Women over 40 Declined Pap Smear Declined 

 

 2012 5:02 PM  WET PREP NO TRICH SEEN CLUE CELLS NONE SEEN 

 

 2013 8:25 AM  WBC 4.2 RBC 3.96 HGB 12.90 g/dLHCT 37.0 %MCV 93.0 fLMCH 
32.60 pgMCHC 34.90 g/dLRDW CV 12.60 %MPV 9.70 fLPLT 254 %NEUT 54.40 %%LYMP 
30.40 %%MONO 10.80 %%EOS 3.40 %%BASO 1.0 %#NEUT 2.26 #LYMP 1.26 #MONO 0.45 #EOS 
0.14 #BASO 0.04 TSH 1.230 uIU/mLGLUCOSE 94.0 mg/dLSODIUM 136.0 mmol/LPOTASSIUM 
4.30 mmol/LCHLORIDE 99.0 mmol/LCO2 25.0 mmol/LBUN 10.0 mg/dLCREATININE 0.80 mg/
dLSGOT/AST 36.0 IU/LSGPT/ALT 36.0 IU/LALK PHOS 84.0 IU/LTOTAL PROTEIN 7.90 g/
dLALBUMIN 4.40 g/dLTOTAL BILI 0.70 mg/dLCALCIUM 9.80 mg/dLeGFR 60 TRIGLYCERIDES 
122.0 mg/dLCHOLESTEROL 141.0 mg/dLHDL 47.0 mg/dLLDL (CALC) 70.0 mg/dL

 

 6/10/2013 3:52 PM  WET PREP NO TRICH SEEN CLUE CELLS NONE SEEN 

 

 2013 8:45 AM  WBC 5.3 RBC 4.01 HGB 13.0 g/dLHCT 37.60 %MCV 94.0 fLMCH 
32.40 pgMCHC 34.60 g/dLRDW CV 12.50 %MPV 9.40 fLPLT 248 %NEUT 58.70 %%LYMP 
27.80 %%MONO 9.80 %%EOS 2.80 %%BASO 0.90 %#NEUT 3.12 #LYMP 1.48 #MONO 0.52 #EOS 
0.15 #BASO 0.05 TSH 1.570 uIU/mLGLUCOSE 94.0 mg/dLSODIUM 134.0 mmol/LPOTASSIUM 
4.10 mmol/LCHLORIDE 101.0 mmol/LCO2 23.0 mmol/LBUN 11.0 mg/dLCREATININE 0.80 mg/
dLSGOT/AST 41.0 IU/LSGPT/ALT 44.0 IU/LALK PHOS 109.0 IU/LTOTAL PROTEIN 7.90 g/
dLALBUMIN 4.70 g/dLTOTAL BILI 0.80 mg/dLCALCIUM 10.40 mg/dLeGFR >60 mL/min/1.73 
[id number] 163.0 mg/dLCHOLESTEROL 138.0 mg/dLHDL 49.0 mg/dLLDL (CALC) 56.0 
mg/dL

 

 2014 8:58 AM  GLUCOSE 86.0 mg/dLSODIUM 134.0 mmol/LPOTASSIUM 4.20 mmol/
LCHLORIDE 99.0 mmol/LCO2 25.0 mmol/LBUN 14.0 mg/dLCREATININE 0.80 mg/dLCALCIUM 
10.10 mg/dLeGFR >60 mL/min/1.73 m2TSH 1.20 uIU/mL

 

 2014 9:50 AM  WBC 5.7 RBC 4.03 HGB 12.90 g/dLHCT 37.90 %MCV 94.0 fLMCH 
32.0 pgMCHC 34.0 g/dLRDW CV 12.70 %MPV 9.70 fLPLT 292 %NEUT 62.70 %%LYMP 21.80 %
%MONO 11.0 %%EOS 3.40 %%BASO 1.10 %#NEUT 3.55 #LYMP 1.23 #MONO 0.62 #EOS 0.19 #
BASO 0.06 GLUCOSE 98.0 mg/dLSODIUM 135.0 mmol/LPOTASSIUM 4.30 mmol/LCHLORIDE 
102.0 mmol/LCO2 22.0 mmol/LBUN 10.0 mg/dLCREATININE 0.80 mg/dLSGOT/AST 34.0 IU/
LSGPT/ALT 32.0 IU/LALK PHOS 95.0 IU/LTOTAL PROTEIN 7.70 g/dLALBUMIN 4.30 g/
dLTOTAL BILI 0.80 mg/dLCALCIUM 9.10 mg/dLeGFR 60 TRIGLYCERIDES 108.0 mg/
dLCHOLESTEROL 146.0 mg/dLHDL 56.0 mg/dLLDL (CALC) 68.0 mg/dLTSH 0.90 uIU/mL

 

 2014 8:40 AM  WBC 4.4 RBC 4.13 HGB 13.20 g/dLHCT 38.90 %MCV 94.0 fLMCH 
32.0 pgMCHC 33.90 g/dLRDW CV 13.50 %MPV 9.80 fLPLT 279 %NEUT 63.80 %%LYMP 24.60 
%%MONO 9.30 %%EOS 1.60 %%BASO 0.70 %#NEUT 2.80 #LYMP 1.08 #MONO 0.41 #EOS 0.07 #
BASO 0.03 GLUCOSE 96.0 mg/dLSODIUM 136.0 mmol/LPOTASSIUM 4.10 mmol/LCHLORIDE 
99.0 mmol/LCO2 23.0 mmol/LBUN 8.0 mg/dLCREATININE 0.80 mg/dLSGOT/AST 31.0 IU/
LSGPT/ALT 27.0 IU/LALK PHOS 85.0 IU/LTOTAL PROTEIN 7.60 g/dLALBUMIN 4.40 g/
dLTOTAL BILI 0.80 mg/dLCALCIUM 10.20 mg/dLeGFR 60 TRIGLYCERIDES 61.0 mg/
dLCHOLESTEROL 148.0 mg/dLHDL 71.0 mg/dLLDL (CALC) 65.0 mg/dLTSH 0.990 uIU/mL

 

 2015 8:32 AM  WBC 4.8 RBC 3.98 HGB 12.70 g/dLHCT 37.30 %MCV 94.0 fLMCH 
31.90 pgMCHC 34.0 g/dLRDW CV 12.70 %MPV 9.50 fLPLT 270 %NEUT 57.30 %%LYMP 25.0 %
%MONO 13.0 %%EOS 3.60 %%BASO 1.10 %#NEUT 2.73 #LYMP 1.19 #MONO 0.62 #EOS 0.17 #
BASO 0.05 TSH 0.640 uIU/mLGLUCOSE 90.0 mg/dLSODIUM 136.0 mmol/LPOTASSIUM 4.0 
mmol/LCHLORIDE 101.0 mmol/LCO2 24.0 mmol/LBUN 10.0 mg/dLCREATININE 0.80 mg/
dLSGOT/AST 34.0 IU/LSGPT/ALT 32.0 IU/LALK PHOS 92.0 IU/LTOTAL PROTEIN 7.50 g/
dLALBUMIN 4.40 g/dLTOTAL BILI 0.70 mg/dLCALCIUM 9.50 mg/dLeGFR >60 mL/min/1.73 
[id number] 107.0 mg/dLCHOLESTEROL 138.0 mg/dLHDL 58.0 mg/dLLDL (CALC) 59.0 
mg/dL







History Of Immunizations







 Name  Date Admin  Mfg Name  Mfg Code  Trade Name  Lot#  Route  Inj  Vis Given  
Vis Pub  CVX

 

 Influenza  10/20/2010  [employer] pasteur  [employer]  Fluzone  [id number]  Intramuscular  
Left Arm  10/20/2010  2010  999

 

 Influenza  10/28/2011  [employer] pasteur  [employer]  Fluzone  [id number]  Intramuscular  
Left Arm  10/28/2011  2011  141

 

 Influenza  10/29/2012  [employer] pasteur  [employer]  Fluzone  [id number]  Intramuscular  
Left Deltoid  10/29/2012  2012  141

 

 Pneumococcal  12/10/2012  [employer]  MSD  Pneumovax 23  [id number]  
Intramuscular  Left Gluteous Medius  12/10/2012  2010  33

 

 Influenza  10/28/2013  [employer]ofi pasteur  PMC  Fluzone > 3 Years  [id number]  
Intramuscular  Right Deltoid  10/28/2013  2013  141

 

 Pneumococcal  2015  Not Entered  NE  Not Entered     Not Entered  Not 
Entered  2015  999

 

 Influenza  2015  Not Entered  NE  Not Entered     Not Entered  Not Entered
  2015  141







History of Past Illness







 Name  Date of Onset  Comments

 

 Benign Essential Hypertension  Dec 14 2009 10:10AM   

 

 Hyperlipidemia  Dec 14 2009 10:10AM   

 

 Hypothyroidism, Acquired  Dec 14 2009 10:10AM   

 

 hypothyroid      

 

 Hypertension      

 

 Hyperlipidemia      

 

 acid reflux      

 

 Hypertension, Benign Essential  2010  9:41AM   

 

 Hypertension, Benign Essential  2010 12:53PM   

 

 Routine gynecological examination  May  5 2010  9:28AM   

 

 Hypertension  May  5 2010  9:28AM   

 

 Hyperlipidemia, unspecified  May  5 2010  9:28AM   

 

 Hypothyroidism, Acquired  May  5 2010  9:28AM   

 

 Vulvovaginitis  May  5 2010  9:28AM   

 

 Rhinitis, Allergic  May  5 2010  9:28AM   

 

 Hypertension, Benign Essential  May 19 2010  8:48AM   

 

 Hypertension, Benign Essential  May 25 2010  9:39AM   

 

 Flu  Oct 20 2010 12:01PM   

 

 Essential Hypertension  2010  8:34AM   

 

 Hyperlipidemia  2010  8:34AM   

 

 Gastroesophageal Reflux  2010  8:34AM   

 

 Hypothyroidism, Acquired  2010  8:34AM   

 

 Hypertension, Benign Essential  2010  9:22AM   

 

 Hypertension, Benign Essential  Dec 15 2010  9:07AM   

 

 Essential Hypertension  2011  1:31PM   

 

 Hyperlipidemia  2011  1:31PM   

 

 Gastroesophageal Reflux  2011  1:31PM   

 

 Hypothyroidism, Acquired  2011  1:31PM   

 

 Essential Hypertension  2011  8:51AM   

 

 Hyperlipidemia  2011  8:51AM   

 

 Gastroesophageal Reflux  2011  8:51AM   

 

 Hypothyroidism, Acquired  2011  8:51AM   

 

 Essential Hypertension  2011  9:13AM   

 

 Hyperlipidemia  2011  9:13AM   

 

 Gastroesophageal Reflux  2011  9:13AM   

 

 Hypothyroidism, Acquired  2011  9:13AM   

 

 Nausea With Vomiting  2011  1:18PM   

 

 Hyponatremia  2011  8:46AM   

 

 Nausea  2011  9:13AM   

 

 Hypothyroidism  2011 11:10AM   

 

 Hyponatremia  2011 11:10AM   

 

 Essential Hypertension  2011 10:05AM   

 

 Hyperlipidemia  2011 10:05AM   

 

 Gastroesophageal Reflux  2011 10:05AM   

 

 Nausea  2011 10:05AM   

 

 Hypothyroidism, Acquired  2011 10:05AM   

 

 Hyponatremia  May  6 2011 11:34AM   

 

 Essential Hypertension  May 16 2011  8:50AM   

 

 Hyperlipidemia  May 16 2011  8:50AM   

 

 Gastroesophageal Reflux  May 16 2011  8:50AM   

 

 Nausea  May 16 2011  8:50AM   

 

 Hypothyroidism, Acquired  May 16 2011  8:50AM   

 

 Hyponatremia  May 16 2011  8:50AM   

 

 Routine gynecological examination  2011  8:54AM   

 

 Hypertension  2011  8:54AM   

 

 Hyperlipidemia, unspecified  2011  8:54AM   

 

 Hypothyroidism, Acquired  2011  8:54AM   

 

 Rhinitis, Allergic  2011  8:54AM   

 

 Hypothyroidism  Aug 29 2011 12:37PM   

 

 Hyponatremia  Aug 29 2011 12:37PM   

 

 Essential Hypertension  Sep 12 2011  8:53AM   

 

 Hyperlipidemia  Sep 12 2011  8:53AM   

 

 Gastroesophageal Reflux  Sep 12 2011  8:53AM   

 

 Hypothyroidism, Acquired  Sep 12 2011  8:53AM   

 

 Hyponatremia  Sep 12 2011  8:53AM   

 

 Hypertension  Sep 29 2011  9:41AM   

 

 Hyperlipidemia  Dec  8 2011  8:31AM   

 

 Hypothyroidism  Dec  8 2011  8:31AM   

 

 Hypertension  Dec  8 2011  8:31AM   

 

 Flu  Dec  9 2011 10:02AM   

 

 Essential Hypertension  Dec 13 2011 10:13AM   

 

 Hyperlipidemia  Dec 13 2011 10:13AM   

 

 Gastroesophageal Reflux  Dec 13 2011 10:13AM   

 

 Hypothyroidism, Acquired  Dec 13 2011 10:13AM   

 

 Hyponatremia  Dec 13 2011 10:13AM   

 

 Vaginal Discharge  2012  9:43AM   

 

 Rhinitis, Allergic  Feb 15 2012  9:31AM   

 

 Eustachian Tube Dysfunction  Feb 15 2012  9:31AM   

 

 Pharyngitis, Acute  Feb 15 2012  9:31AM   

 

 Routine gynecological examination  2012  8:48AM   

 

 Hypertension  2012  8:48AM   

 

 Hyperlipidemia, unspecified  2012  8:48AM   

 

 Hypothyroidism, Acquired  2012  8:48AM   

 

 Rhinitis, Allergic  2012  8:48AM   

 

 Candidiasis, Vulvovaginal  2012 11:04AM   

 

 Essential Hypertension  Aug 15 2012  9:02AM   

 

 Hyperlipidemia  Aug 15 2012  9:02AM   

 

 Gastroesophageal Reflux  Aug 15 2012  9:02AM   

 

 Hypothyroidism, Acquired  Aug 15 2012  9:02AM   

 

 Hyponatremia  Aug 15 2012  9:02AM   

 

 Atrophic Vaginitis, Postmenopausal  Aug 15 2012  9:02AM   

 

 Flu  Oct 29 2012  9:24AM   

 

 Essential Hypertension  Dec 10 2012  8:35AM   

 

 Hyperlipidemia  Dec 10 2012  8:35AM   

 

 Gastroesophageal Reflux  Dec 10 2012  8:35AM   

 

 Hypothyroidism, Acquired  Dec 10 2012  8:35AM   

 

 Hyponatremia  Dec 10 2012  8:35AM   

 

 Atrophic Vaginitis, Postmenopausal  Dec 10 2012  8:35AM   

 

 Pneumococcus  Dec 10 2012  2:07PM   

 

 Vaginal Discharge  Dec 20 2012  1:50PM   

 

 Essential Hypertension  Dec 20 2012  1:50PM   

 

 Hyperlipidemia  Dec 20 2012  1:50PM   

 

 Gastroesophageal Reflux  Dec 20 2012  1:50PM   

 

 Hypothyroidism, Acquired  Dec 20 2012  1:50PM   

 

 Atrophic Vaginitis, Postmenopausal  Dec 20 2012  1:50PM   

 

 Upper Respiratory Infections  2013  8:52AM   

 

 Hyperlipidemia  2013  8:21AM   

 

 Hypertension  2013  8:21AM   

 

 Hypothyroidism  2013  8:21AM   

 

 Screening Mammogram  Beto 10 2013  8:45AM   

 

 Routine gynecological examination  Beto 10 2013  8:34AM   

 

 Hypertension  Beto 10 2013  8:34AM   

 

 Hyperlipidemia, unspecified  Beto 10 2013  8:34AM   

 

 Hypothyroidism, Acquired  Beto 10 2013  8:34AM   

 

 Rhinitis, Allergic  Beto 10 2013  8:34AM   

 

 Flu  Oct 28 2013  8:52AM   

 

 Essential Hypertension  Dec  9 2013  8:37AM   

 

 Hyperlipidemia  Dec  9 2013  8:37AM   

 

 Gastroesophageal Reflux  Dec  9 2013  8:37AM   

 

 Hypothyroidism, Acquired  Dec  9 2013  8:37AM   

 

 Atrophic Vaginitis, Postmenopausal  Dec  9 2013  8:37AM   

 

 Hypertension  2014  9:56AM   

 

 Hyperlipidemia  2014  9:56AM   

 

 Hypothyroidism  2014  9:56AM   

 

 Screening Mammogram  2014  8:32AM   

 

 Osteopenia  2014  8:32AM   

 

 Hypertension  2014  8:35AM   

 

 Hypothyroidism, Acquired  2014  8:35AM   

 

 Hyperlipidemia  2014  8:35AM   

 

 Upper Respiratory Infections  2014  9:28AM   

 

 Sinusitis, Acute  Oct  2 2014  9:17AM   

 

 Herpes Zoster  Oct  5 2014  1:44PM   

 

 Vesicular Eruption  Oct  5 2014  1:44PM   

 

 Hypertension  2014  8:18AM   

 

 Hyperlipidemia  2014  8:18AM   

 

 hypothyroid  2014  8:18AM   

 

 Situational anxiety  Dec 20 2014  9:33AM   

 

 GERD (gastroesophageal reflux disease)  Dec 20 2014  9:33AM   

 

 Nausea  Dec 20 2014  9:33AM   

 

 Abdominal Pain, Epigastric  Dec 20 2014  9:33AM   

 

 Hyperlipidemia  Oct  6 2014  9:03AM   

 

 acid reflux  Oct  6 2014  9:03AM   

 

 hypothyroid  Oct  6 2014  9:03AM   

 

 Shingles  Oct  6 2014  9:03AM   

 

 Pharyngitis  2015  1:09PM   

 

 Bronchitis  2015  9:10AM   

 

 Hyperlipidemia  2015  9:10AM   

 

 acid reflux  2015  9:10AM   

 

 hypothyroid  2015  9:10AM   

 

 Hyperlipidemia  2015  8:18AM   

 

 Hypertension  2015  8:18AM   

 

 hypothyroid  2015  8:18AM   

 

 Screening Mammogram  2015 11:43AM   

 

 Medicare Annual Pap Q 2 years  2015  9:36AM   

 

 Screening Mammogram  2015  9:36AM   

 

 Candidiasis of female genitalia  2015  9:36AM   

 

 Hypertension  2015 11:34AM   

 

 Hyperlipidemia, unspecified  2015 11:34AM   

 

 Hypothyroidism, Acquired  2015 11:34AM   







Payers







 Insurance Name  Company Name  Plan Name  Plan Number  Policy Number  Policy 
Group Number  Start Date

 

    Medicare Part A  Medicare Part A     [id number]     N/A

 

    [facility]     [id number]     2009

 

    Medicare Part B  [place]     [id number]     N/A

 

    Medicare Part A  Medicare P A - Preventive     [id number]     N/A







History of Encounters







 Visit Date  Visit Type  Provider

 

 2015  Office visit  Doris Noriega MD

 

 2015  Office visit  Doris Noriega MD

 

 2015  Office visit  Prince Noe APRN

 

 2014  Office visit  Anyi Herrera APRN

 

 10/27/2014  Voided  Doris Noriega MD

 

 10/6/2014  Office visit  Doris Noriega MD

 

 10/5/2014  Office visit  Anyi Herrera APRN

 

 10/2/2014  Office visit  Corrine Bay APRN

 

 2014  Office visit  Kassie Franklin APRN

 

 2014  Office visit  Doris Noriega MD

 

 2014  Office visit  Doris Noriega MD

 

 2013  Office visit  Dee Colindres MD

 

 10/28/2013  Nurse visit  Dee Colindres MD

 

 6/10/2013  Office visit  Dee Colindres MD

 

 2013  Office visit  Corrine Bay APRN

 

 2012  Office visit  Dee Colindres MD

 

 12/10/2012  Office visit  Dee Colindres MD

 

 10/29/2012  Nurse visit  Dee Colindres MD

 

 8/15/2012  Office visit  Dee Colindres MD

 

 2012  Office visit  Corrine Bay APRN

 

 2012  Office visit  Dee Colindres MD

 

 2/15/2012  Office visit  Dee Colindres MD

 

 2012  Office visit  Corrine Bay APRN

 

 2011  Office visit  Dee Colindres MD

 

 10/28/2011  Nurse visit  Shad Nolasco MD

 

 2011  Office visit  Dee Colindres MD

 

 2011  Office visit  Dee Colindres MD

 

 2011  Office visit  Dee Colindres MD

 

 2011  Office visit  Dee Colindres MD

 

 2011  Office visit  Dee Colindres MD

 

 2011  Office visit  Dee Colindres MD

 

 4/10/2011  Hospital  KHRIS Granados MD

 

 4/10/2011  Hospital  RICKEY Toussaint MD

 

 2011  Office visit  RICKEY Toussaint MD

 

 2011  [facility]  Fide Castellanos MD

 

 2011  Voided  Fide Castellanos MD

 

 2011  Office visit  Dee Colindres MD

 

 12/15/2010  Nurse visit  Dee Colindres MD

 

 2010  Office visit  Dee Colindres MD

 

 10/20/2010  Nurse visit  Stephenie PERAZA

 

 2010  Nurse visit  Dee Colindres MD

 

 2010  Nurse visit  Dee Colindres MD

 

 2010  Office visit  Dee Colindres MD

 

 3/19/2010  Voided  Stephenie Kay PA

 

 2010  Nurse visit  Stepheine PERAZA

 

 2010  Nurse visit  Stephenie PERAZA

 

 2010  Nurse visit  Stephenie PERAZA

 

 2009  Office visit  Stephenie PERAZA

 

 10/20/2009  Nurse visit  Stephenie Kay PA

## 2017-07-13 NOTE — DISCHARGE INST-SIMPLE/STANDARD
Discharge Inst-Standard


Discharge Medications


New, Converted or Re-Newed RX:  Other





Patient Instructions/Follow Up


Plan of Care/Instructions/FU:  


please schedule a gallbladder ultrasound as an outpatient


Activity as Tolerated:  Yes


Discharge Diet:  No Restrictions











FANG JOHNSON MD Jul 13, 2017 10:47 am

## 2017-07-13 NOTE — XMS REPORT
MU2 Ambulatory Summary

 Created on: 2016



Milady Crespo

External Reference #: [id number]

: 1945

Sex: Female



Demographics







 Address  [address]

[place]  [address]

 

 Home Phone  [phone]

 

 Preferred Language  English

 

 Marital Status  

 

 Denominational Affiliation  Unknown

 

 Race  White

 

 Ethnic Group  Not  or 





Author







 Author  Doris Noriega

 

 Organization  [facility]

 

 Address  [address]

[place], [place]  [id number]



 

 Phone  [phone]







Care Team Providers







 Care Team Member Name  Role  Phone

 

 Doris Noriega  PCP  [phone]







Allergies and Adverse Reactions







 Name  Reaction  Notes

 

 NO KNOWN DRUG ALLERGIES      







Plan of Treatment







 Planned Activity  Comments  Planned Date  Planned Time  Plan/Goal

 

 DXA BONE DENSITY AXIAL     2016  12:00 AM   

 

 COMPLETE CBC W/AUTO DIFF WBC     2016  12:00 AM   

 

 COMPREHEN METABOLIC PANEL     2016  12:00 AM   

 

 VITAMIN D 25 HYDROXY     2016  12:00 AM   

 

 LIPID PANEL     8/15/2012  12:00 AM   

 

 COMPREHEN METABOLIC PANEL     6/10/2013  12:00 AM   

 

 LIPID PANEL     6/10/2013  12:00 AM   

 

 ASSAY THYROID STIM HORMONE     6/10/2013  12:00 AM   

 

 COMPLETE CBC W/AUTO DIFF WBC     6/10/2013  12:00 AM   

 

 COMPREHEN METABOLIC PANEL     2012  12:00 AM   

 

 LIPID PANEL     2012  12:00 AM   

 

 ASSAY THYROID STIM HORMONE     2012  12:00 AM   

 

 COMPLETE CBC W/AUTO DIFF WBC     2012  12:00 AM   

 

 MAMMOGRAM SCREENING     2015  12:00 AM   







Medications







 Active 

 

 Name  Start Date  Estimated Completion Date  SIG  Comments

 

 loratadine 10 mg oral tablet  2015  12/15/2016  TAKE 1/2 TABLET BY MOUTH 
EVERY DAY IN THE EVENING   

 

 aspirin 81 mg oral tablet,delayed release (DR/EC)        take 1 tablet (81 mg) 
by oral route once daily   

 

 Vitamin D3 1,000 unit oral capsule        take 1 capsule by oral route daily   

 

 famotidine 20 mg oral tablet  2015     TAKE ONE TABLET BY MOUTH TWICE 
DAILY   

 

 valsartan 160 mg oral tablet  2016     TAKE ONE TABLET BY MOUTH ONCE 
DAILY   

 

 amlodipine 5 mg oral tablet  2016     TAKE ONE TABLET BY MOUTH ONCE DAILY
   

 

 Allergy Relief (loratadine) 10 mg oral tablet  2016     TAKE ONE TABLET 
BY MOUTH ONCE DAILY   

 

 triamcinolone acetonide 0.1 % topical cream  3/14/2016     APPLY A THIN LAYER 
OF CREAM EXTERNALLY TO AFFECTED AREA TWICE DAILY FOR 14 DAYS   

 

 atenolol 50 mg oral tablet  2016  3/30/2017  take 1 tablet (50 mg) by oral 
route once daily   

 

 levothyroxine 50 mcg oral tablet  2016     TAKE ONE TABLET BY MOUTH ONCE 
DAILY   

 

 amlodipine 5 mg oral tablet  2016     TAKE ONE TABLET BY MOUTH ONCE DAILY
   

 

 Allergy Relief (loratadine) 10 mg oral tablet  2016     TAKE ONE TABLET 
BY MOUTH ONCE DAILY   

 

 valsartan 160 mg oral tablet  2016     TAKE ONE TABLET BY MOUTH ONCE 
DAILY   









  

 

 Name  Start Date  Expiration Date  SIG  Comments

 

 prednisone 20 mg oral tablet  2011  take 2 tablets by oral 
route daily for 5 days   

 

 calcium carbonate-vitamin D2 600-125 mg-unit oral tablet  8/15/2012  2012
  take 2 tablets by oral route daily   

 

 [place] 3 Fish Oil 900-1,400 mg oral capsule,delayed release(DR/EC)  8/15/2012  9
/  take 1 capsule by oral route daily   

 

 multivitamin Oral tablet  8/15/2012  2012  take 1 tablet by oral route 
daily   

 

 triamcinolone acetonide 0.1 % topical cream  2013  10/7/2013  APPLY A 
THIN LAYER TO THE AFFECTED AREA(S) BY TOPICAL ROUTE TWICE A DAY   

 

 famotidine 20 mg oral tablet  2014  take 1 tablet (20 mg) by 
oral route 2 times per day   

 

 Plavix 75 mg oral tablet  2014  take 1 tablet (75 mg) by oral 
route once daily   

 

 amoxicillin 500 mg oral capsule  2014  take 1 capsule (500 mg) 
by oral route 3 times per day for 10 days   

 

 Lipitor 20 mg oral tablet  2014  take 1 tablet (20 mg) by oral 
route once daily   

 

 Zithromax Z-Tab 250 mg oral tablet  10/2/2014  10/7/2014  take 2 tablets (500 
mg) by oral route once daily for 1 day then 1 tablet (250 mg) by oral route 
once daily for 4 days   

 

 acyclovir 800 mg oral tablet  10/5/2014  10/12/2014  take 1 tablet (800 mg) by 
oral route 5 times per day for 7 days   

 

 gabapentin 300 mg oral capsule  10/9/2014  2014  take 1 capsule (300 mg) 
by oral route 3 times per day for 14 days   

 

 Carafate 100 mg/mL oral suspension  2014  take 10 milliliters 
(1 gram) by oral route 4 times per day on an empty stomach 1 hour before meals 
and at bedtime for 4 weeks   

 

 amlodipine 5 mg oral tablet  2015  TAKE ONE TABLET BY MOUTH 
EVERY DAY   

 

 levothyroxine 50 mcg oral tablet  2015  TAKE ONE TABLET BY MOUTH 
EVERY DAY   

 

 Diovan 160 mg oral tablet  2015  2/15/2016  TAKE ONE TABLET BY MOUTH 
EVERY DAY for 90 days   

 

 triamcinolone acetonide 0.1 % topical cream  2015  apply a thin 
layer to the affected area(s) by topical route 2 times per day for 14 days   

 

 fluconazole 150 mg oral tablet  2015  take 1 tablet (150 mg) 
by oral route once for 1 day   

 

 clorazepate dipotassium 7.5 mg oral tablet  3/14/2016  2016  take 1/2 to1 
tablet PO up to three times per day for situational anxiety   









 Discontinued 

 

 Name  Start Date  Discontinued Date  SIG  Comments

 

 Lipitor 40 mg oral tablet     2009 TAB DAILY   

 

 ramipril 5 mg oral capsule     2009  take 1 capsule (5 mg) by oral route 
once daily   

 

 lovastatin 20 mg oral tablet  2009  take 1 tablet (20 mg) by 
oral route once daily with evening meal   

 

 propranolol 20 mg oral tablet  2010  take 1 tablet by oral 
route 2 times a day   

 

 Fosamax 70 mg oral tablet  2010  take 1 tablet (70 mg) by oral 
route once weekly in the morning, at least 30 minutes before the first food, 
beverage, or medication of the day   

 

 lisinopril 40 mg oral tablet  2010  4/10/2011  take 2 tablets by oral 
route daily   

 

 Zoloft 50 mg oral tablet  2011  take 1 tablet (50 mg) by oral 
route once daily   

 

 promethazine 25 mg oral tablet  2011  take 1 tablet by oral 
route every 6 hours as needed   

 

 Diovan -12.5 mg oral tablet  2011  take 1 tablet by oral 
route once daily for 30 days   

 

 Diflucan 150 mg oral tablet     2012  take 1 tablet (150 mg) by oral 
route once for 1 day   

 

 Diflucan 150 mg oral tablet  2012  8/15/2012  take 1 tablet (150 mg) by 
oral route once   

 

 Vitamin B-12 1,000 mcg oral tablet  8/15/2012  2014  take 1 tablet by 
oral route daily   

 

 Premarin 0.625 mg/gram vaginal cream  10/29/2012  6/10/2013  use 1 gram daily 
for a week then 1 gram twice a week   

 

 Medrol (Tab) 4 mg oral tablets,dose pack  2013  6/10/2013  take as 
directed   

 

 aspirin 325 mg oral tablet  2014  take 1 tablet (325 mg) by 
oral route once daily   

 

 Medrol (Tab) 4 mg oral tablets,dose pack  2014  10/2/2014  take as 
directed   

 

 Tetracaine Lollipops Lollipop  2015  Use as directed   







Problem List







 Description  Status  Onset

 

 Hyperlipidemia  Active   

 

 acid reflux  Active   

 

 Hypertension  Active   

 

 hypothyroid  Active   







Vital Signs







 Date  Time  BP-Sys(mm[Hg]  BP-Morena(mm[Hg])  HR(bpm)  RR(rpm)  Temp  WT  HT  HC  
BMI  BSA  BMI Percentile  O2 Sat(%)

 

 2016  8:29:00 AM  122 mmHg  70 mmHg  72 bpm  16 rpm  97.8 F  137.125 lbs  
61 in     25.91 kg/m2  1.64 m2     100 %

 

 2015  9:33:00 AM  120 mmHg  68 mmHg  73 bpm     98.7 F  144.375 lbs  61 
in     27.2791 kg/m  1.6788 m     99 %

 

 2015  9:05:00 AM  110 mmHg  75 mmHg  85 bpm  16 rpm  96.7 F  144.375 lbs  
61 in     27.28 kg/m2  1.68 m2     99 %

 

 2015  1:05:00 PM  108 mmHg  62 mmHg  78 bpm  18 rpm  96.9 F  142.375 lbs  
61 in     26.9012 kg/m  1.6672 m     100 %

 

 2014  9:31:00 AM  126 mmHg  66 mmHg  79 bpm  18 rpm  98.7 F  149 lbs  61 
in     28.15 kg/m2  1.71 m2     98 %

 

 10/6/2014  9:02:00 AM  110 mmHg  74 mmHg  94 bpm  18 rpm  98.1 F  145.4 lbs  
61 in     27.4728 kg/m  1.6848 m     97 %

 

 10/2/2014  9:14:00 AM  124 mmHg  74 mmHg  79 bpm  18 rpm  98 F  147.25 lbs  61 
in     27.82 kg/m2  1.70 m2     98 %

 

 2014  9:22:00 AM  124 mmHg  76 mmHg  89 bpm  18 rpm  98.1 F  148 lbs  61 
in     27.9641 kg/m  1.6998 m     100 %

 

 2014  8:27:00 AM  118 mmHg  65 mmHg  74 bpm  16 rpm  97.7 F  148 lbs  61 
in     27.96 kg/m2  1.70 m2     99 %

 

 2014  9:48:00 AM  125 mmHg  70 mmHg  93 bpm  18 rpm  96.7 F  156 lbs  61 
in     29.4756 kg/m  1.7451 m     100 %

 

 2013  8:35:00 AM  122 mmHg  74 mmHg  84 bpm  16 rpm  97.9 F  155.375 lbs 
                99 %

 

 6/10/2013  8:30:00 AM  130 mmHg  82 mmHg  79 bpm  16 rpm  97.9 F  161 lbs     
            98 %

 

 2013  8:50:00 AM  124 mmHg  68 mmHg  66 bpm  18 rpm  97.6 F  157.5 lbs  
61 in     29.76 kg/m2  1.75 m2      

 

 2012  1:46:00 PM  120 mmHg  72 mmHg  75 bpm  16 rpm  97.6 F  156.125 lbs
                 98 %

 

 12/10/2012  8:32:00 AM  122 mmHg  82 mmHg  70 bpm  16 rpm  97.3 F  157 lbs    
             99 %

 

 8/15/2012  9:00:00 AM  130 mmHg  76 mmHg  86 bpm  16 rpm  97.4 F  159 lbs     
            100 %

 

 2012  11:02:00 AM  128 mmHg  64 mmHg  66 bpm  18 rpm  97.4 F  162.125 lbs
  61 in     30.63 kg/m2  1.78 m2      

 

 2012  8:45:00 AM  130 mmHg  70 mmHg  82 bpm  16 rpm  98.2 F  160.125 lbs 
                100 %

 

 2/15/2012  9:29:00 AM  122 mmHg  80 mmHg  86 bpm  16 rpm  97.4 F  159.375 lbs  
61 in     30.11 kg/m2  1.76 m2     99 %

 

 2012  9:41:00 AM  132 mmHg  74 mmHg  66 bpm  18 rpm  98.4 F  160.375 lbs  
61 in     30.3023 kg/m  1.7694 m      

 

 2011  10:08:00 AM  134 mmHg  82 mmHg  80 bpm  16 rpm  98 F  160 lbs  61 
in     30.23 kg/m2  1.77 m2     99 %

 

 2011  3:56:00 PM  130 mmHg  80 mmHg                              

 

 2011  8:55:00 AM  130 mmHg  74 mmHg  88 bpm  16 rpm  98.2 F  158.25 lbs  
               98 %

 

 2011  8:54:00 AM  136 mmHg  82 mmHg  102 bpm  16 rpm  98.1 F  153.5 lbs   
              99 %

 

 2011  8:49:00 AM  122 mmHg  88 mmHg  88 bpm  16 rpm  98.6 F  152.25 lbs  
               99 %

 

 2011  10:02:00 AM  140 mmHg  82 mmHg  96 bpm  20 rpm  98.8 F             
       100 %

 

 2011  9:14:00 AM  156 mmHg  98 mmHg  110 bpm  24 rpm  98.5 F  153 lbs    
             100 %

 

 2011  8:50:00 AM  160 mmHg  84 mmHg  100 bpm  16 rpm  98.7 F  155 lbs    
             100 %

 

 2011  1:25:00 PM  152 mmHg  100 mmHg  82 bpm  16 rpm  97.2 F  156.375 lbs 
                100 %

 

 2011  9:55:00 AM  144 mmHg  90 mmHg                              

 

 2010  9:24:00 AM  138 mmHg  84 mmHg                              

 

 2010  8:58:00 AM  160 mmHg  94 mmHg                              

 

 2010  8:33:00 AM  142 mmHg  90 mmHg  100 bpm  16 rpm  98.1 F  158.375 
lbs                  

 

 2010  9:24:00 AM  160 mmHg  102 mmHg  88 bpm  18 rpm  99 F  157.125 lbs  
62 in     28.7382 kg/m  1.7657 m      

 

 3/19/2010  11:01:00 AM  140 mmHg  90 mmHg                              

 

 2009  10:08:00 AM  142 mmHg  86 mmHg  72 bpm  16 rpm  98.1 F  154.125 
lbs  61 in     29.1214 kg/m  1.7346 m      







Social History







 Name  Description  Comments

 

       

 

 Children      

 

 lives alone      

 

 Supervisor      

 

 Tobacco  Never smoker   







History of Procedures







 Date Ordered  Description  Order Status

 

 2011 12:00 AM  COMPREHEN METABOLIC PANEL  Reviewed

 

 2011 12:00 AM  ASSAY THYROID STIM HORMONE  Reviewed

 

 2011 12:00 AM  COMPREHEN METABOLIC PANEL  Reviewed

 

 2011 12:00 AM  LIPID PANEL  Reviewed

 

 2011 12:00 AM  ASSAY THYROID STIM HORMONE  Reviewed

 

 2011 12:00 AM  COMPLETE CBC W/AUTO DIFF WBC  Reviewed

 

 2015 12:00 AM  COMPLETE CBC W/AUTO DIFF WBC  Returned

 

 2015 12:00 AM  COMPREHEN METABOLIC PANEL  Returned

 

 2015 12:00 AM  LIPID PANEL  Returned

 

 2015 12:00 AM  ASSAY THYROID STIM HORMONE  Returned

 

 2011 12:00 AM  COMPREHEN METABOLIC PANEL  Reviewed

 

 2011 12:00 AM  COMPREHEN METABOLIC PANEL  Reviewed

 

 2011 12:00 AM  LIPID PANEL  Reviewed

 

 2011 12:00 AM  ASSAY THYROID STIM HORMONE  Reviewed

 

 10/28/2011 12:00 AM  ***Immunization administration, Medicare flu  Reviewed

 

 10/28/2011 12:00 AM  Fluzone ** MEDICARE Only **  Reviewed

 

 2010 11:24 AM  NO CHARGE OV  Reviewed

 

 2010 11:26 AM  NO CHARGE OV  Reviewed

 

 2011 12:00 AM  COMPREHEN METABOLIC PANEL  Reviewed

 

 2011 12:00 AM  LIPID PANEL  Reviewed

 

 2011 12:00 AM  ASSAY THYROID STIM HORMONE  Reviewed

 

 2011 12:00 AM  COMPLETE CBC W/AUTO DIFF WBC  Reviewed

 

 2011 12:00 AM  Wrist Support  Reviewed

 

 2016 12:00 AM  TETANUS VACCINE IM  Reviewed

 

 2016 12:00 AM  HEP B VACC ADULT 3 DOSE IM  Reviewed

 

 2012 12:00 AM  TISSUE EXAM FOR FUNGI  Returned

 

 2012 12:00 AM  SMEAR WET MOUNT SALINE/INK  Returned

 

 2/15/2012 12:00 AM  THER/PROPH/DIAG INJ SC/IM  Reviewed

 

 2/15/2012 12:00 AM  Bicillin CR [facility]76293697[facility]  Reviewed

 

 2/15/2012 12:00 AM  Depo-Medrol 40 mg NDC#4038408331  Reviewed

 

 8/15/2012 12:00 AM  COMPREHEN METABOLIC PANEL  Reviewed

 

 8/15/2012 12:00 AM  ASSAY THYROID STIM HORMONE  Reviewed

 

 8/15/2012 12:00 AM  COMPLETE CBC W/AUTO DIFF WBC  Returned

 

 8/15/2012 12:00 AM  Wrist Support  Reviewed

 

 10/29/2012 12:00 AM  Flu Injection 3 Years And Above NDC# [address]-14  [facility]  
Reviewed

 

 12/10/2012 12:00 AM  IMMUNIZATION ADMIN  Reviewed

 

 12/10/2012 12:00 AM  Pneumovax Injection - [facility]  Reviewed

 

 2012 12:00 AM  TRICHOMONAS ASSAY W/OPTIC  Returned

 

 2013 12:00 AM  THER/PROPH/DIAG INJ SC/IM  Reviewed

 

 2013 12:00 AM  Bicillin CR, 1.2 million units NDC# 56915-895-87  Reviewed

 

 2013 12:00 AM  COMPREHEN METABOLIC PANEL  Returned

 

 2013 12:00 AM  LIPID PANEL  Returned

 

 2013 12:00 AM  COMPLETE CBC W/AUTO DIFF WBC  Returned

 

 2013 12:00 AM  ASSAY THYROID STIM HORMONE  Returned

 

 6/10/2013 12:00 AM  MAMMOGRAM SCREENING  Returned

 

 6/10/2013 12:00 AM  CYTOPATH C/V MANUAL  Returned

 

 12/10/2013 12:00 AM  LIPID PANEL  Returned

 

 12/10/2013 12:00 AM  ASSAY THYROID STIM HORMONE  Returned

 

 12/10/2013 12:00 AM  COMPLETE CBC W/AUTO DIFF WBC  Returned

 

 12/10/2013 12:00 AM  COMPREHEN METABOLIC PANEL  Returned

 

 2010 12:00 AM  CYTOPATH C/V MANUAL  Reviewed

 

 2010 12:00 AM  SMEAR WET MOUNT SALINE/INK  Reviewed

 

 2010 12:00 AM  LIPID PANEL  Reviewed

 

 2010 12:00 AM  ASSAY THYROID STIM HORMONE  Reviewed

 

 2010 12:00 AM  COMPLETE CBC W/AUTO DIFF WBC  Reviewed

 

 2010 12:00 AM  COMPREHEN METABOLIC PANEL  Reviewed

 

 10/28/2013 12:00 AM  Flu Injection 3 Years And Above NDC# [address]-45  [facility]  
Reviewed

 

 2010 8:48 AM  Blood Pressure Check-no charge  Reviewed

 

 2010 12:00 AM  Blood Pressure Check-no charge  Reviewed

 

 2014 12:00 AM  METABOLIC PANEL TOTAL CA  Reviewed

 

 2014 12:00 AM  ASSAY THYROID STIM HORMONE  Reviewed

 

 2014 12:00 AM  ASSAY OF FREE THYROXINE  Reviewed

 

 2014 12:00 AM  MAMMOGRAM SCREENING  Returned

 

 2014 12:00 AM  CT BONE DENSITY AXIAL  Returned

 

 2014 12:00 AM  COMPLETE CBC W/AUTO DIFF WBC  Returned

 

 2014 12:00 AM  COMPREHEN METABOLIC PANEL  Returned

 

 2014 12:00 AM  LIPID PANEL  Returned

 

 2014 12:00 AM  ASSAY THYROID STIM HORMONE  Returned

 

 10/20/2010 12:00 AM  IMMUNIZATION ADMIN  Reviewed

 

 10/20/2010 12:00 AM  FLU VACCINE 3 YRS & > IM  Reviewed

 

 2010 12:00 AM  COMPREHEN METABOLIC PANEL  Reviewed

 

 2010 12:00 AM  LIPID PANEL  Reviewed

 

 2010 12:00 AM  ASSAY THYROID STIM HORMONE  Reviewed

 

 2010 12:00 AM  COMPLETE CBC W/AUTO DIFF WBC  Reviewed

 

 2010 12:00 AM  Blood Pressure Check-no charge  Reviewed

 

 10/2/2014 12:00 AM  THER/PROPH/DIAG INJ SC/IM  Reviewed

 

 10/2/2014 12:00 AM  Kenalog, Per 10 Mg NDC#3512-8970-21  Reviewed

 

 2014 12:00 AM  COMPLETE CBC W/AUTO DIFF WBC  Returned

 

 2014 12:00 AM  COMPREHEN METABOLIC PANEL  Returned

 

 2014 12:00 AM  LIPID PANEL  Returned

 

 2014 12:00 AM  ASSAY THYROID STIM HORMONE  Returned

 

 2015 12:00 AM  Rocephin 1 gram NDC#6215-9813-60  Reviewed

 

 2015 12:00 AM  THER/PROPH/DIAG INJ SC/IM  Reviewed

 

 2011 12:00 AM  COMPREHEN METABOLIC PANEL  Reviewed

 

 2011 12:00 AM  LIPID PANEL  Reviewed

 

 2011 12:00 AM  ASSAY THYROID STIM HORMONE  Reviewed

 

 2011 12:00 AM  COMPLETE CBC W/AUTO DIFF WBC  Reviewed

 

 2011 12:00 AM  METABOLIC PANEL TOTAL CA  Reviewed

 

 2011 12:00 AM  COMPREHEN METABOLIC PANEL  Reviewed

 

 2011 12:00 AM  ASSAY THYROID STIM HORMONE  Reviewed

 

 2011 12:00 AM  COMPLETE CBC W/AUTO DIFF WBC  Reviewed

 

 2011 12:00 AM  ASSAY OF LIPASE  Reviewed

 

 2011 12:00 AM  THER/PROPH/DIAG INJ SC/IM  Reviewed

 

 2011 12:00 AM  Phenergan 50 Mg Im  Bernadine  Reviewed

 

 2011 12:00 AM  METABOLIC PANEL TOTAL CA  Reviewed

 

 2011 12:00 AM  THER/PROPH/DIAG INJ SC/IM  Reviewed

 

 2011 12:00 AM  Phenergan 25 Mg Im  Bernadine  Reviewed

 

 2011 12:00 AM  METABOLIC PANEL TOTAL CA  Reviewed

 

 2011 12:00 AM  ASSAY THYROID STIM HORMONE  Reviewed

 

 2011 12:00 AM  THER/PROPH/DIAG INJ SC/IM  Reviewed

 

 2011 12:00 AM  Phenergan 50 Mg Im  Bernadine  Reviewed

 

 2011 12:00 AM  ASSAY OF SERUM SODIUM  Reviewed

 

 2011 12:00 AM  ASSAY OF SERUM SODIUM  Reviewed

 

 2011 12:00 AM  CYTOPATH C/V MANUAL  Reviewed

 

 2011 12:00 AM  ASSAY THYROID STIM HORMONE  Reviewed

 

 2015 12:00 AM  COMPLETE CBC W/AUTO DIFF WBC  Returned

 

 2015 12:00 AM  COMPREHEN METABOLIC PANEL  Returned

 

 2015 12:00 AM  LIPID PANEL  Returned

 

 2015 12:00 AM  ASSAY THYROID STIM HORMONE  Returned

 

 2015 12:00 AM  MAMMOGRAM SCREENING  Returned

 

 2015 12:00 AM  CYTOPATH TBS C/V MANUAL  Returned







Results Summary







 Data and Description  Results

 

 2010 9:25 AM  Colonoscopy-Women and Men over 50 Abnormal Mammogram -Women 
over 40 Declined Pap Smear Declined 

 

 2010 10:41 AM  WET PREP NO TRICHOMONADS SEEN 

 

 2010 8:15 AM  TRIGLYCERIDES 174.0 mg/dLCHOLESTEROL 175.0 mg/dLHDL 58.0 mg/
dLLDL (CALC) 82.0 mg/dLTSH 0.790 uIU/mLGLUCOSE 95.0 mg/dLSODIUM 136.0 mmol/
LPOTASSIUM 4.50 mmol/LCHLORIDE 99.0 mmol/LCO2 26.0 mmol/LBUN 12.0 mg/
dLCREATININE 0.80 mg/dLSGOT/AST 32.0 IU/LSGPT/ALT 33.0 IU/LALK PHOS 103.0 IU/
LTOTAL PROTEIN 7.60 g/dLALBUMIN 4.60 g/dLTOTAL BILI 0.60 mg/dLCALCIUM 9.80 mg/
dLeGFR >60 mL/min/1.73 m2WBC 5.3 RBC 4.13 HGB 13.10 g/dLHCT 39.20 %MCV 95.0 
fLMCH 31.70 pgMCHC 33.40 g/dLRDW CV 12.70 %MPV 9.80 fLPLT 257 %NEUT 57.90 %%
LYMP 26.50 %%MONO 11.60 %%EOS 3.20 %%BASO 0.80 %#NEUT 3.04 #LYMP 1.39 #MONO 
0.61 #EOS 0.17 #BASO 0.04 

 

 12/15/2010 8:30 AM  TRIGLYCERIDES 157.0 mg/dLCHOLESTEROL 163.0 mg/dLHDL 56.0 mg
/dLLDL (CALC) 76.0 mg/dLTSH 0.650 uIU/mLWBC 6.5 RBC 4.25 HGB 13.60 g/dLHCT 
40.70 %MCV 96.0 fLMCH 32.0 pgMCHC 33.40 g/dLRDW CV 12.60 %MPV 9.90 fLPLT 313 %
NEUT 61.80 %%LYMP 26.20 %%MONO 8.30 %%EOS 3.10 %%BASO 0.60 %#NEUT 4.00 #LYMP 
1.70 #MONO 0.54 #EOS 0.20 #BASO 0.04 GLUCOSE 97.0 mg/dLSODIUM 136.0 mmol/
LPOTASSIUM 4.40 mmol/LCHLORIDE 101.0 mmol/LCO2 26.0 mmol/LBUN 10.0 mg/
dLCREATININE 0.80 mg/dLSGOT/AST 31.0 IU/LSGPT/ALT 34.0 IU/LALK PHOS 92.0 IU/
LTOTAL PROTEIN 8.10 g/dLALBUMIN 4.60 g/dLTOTAL BILI 0.40 mg/dLCALCIUM 10.0 mg/
dLeGFR >60 mL/min/1.73 m2

 

 2011 9:45 AM  GLUCOSE 91.0 mg/dLSODIUM 133.0 mmol/LPOTASSIUM 4.40 mmol/
LCHLORIDE 97.0 mmol/LCO2 24.0 mmol/LBUN 9.0 mg/dLCREATININE 0.70 mg/dLCALCIUM 
10.0 mg/dLeGFR >60 mL/min/1.73 m2

 

 2011 10:25 AM  LIPASE 29.0 U/LTSH 0.10 uIU/mLGLUCOSE 115.0 mg/dLSODIUM 
127.0 mmol/LPOTASSIUM 5.30 mmol/LCHLORIDE 93.0 mmol/LCO2 18.0 mmol/LBUN 9.0 mg/
dLCREATININE 0.70 mg/dLSGOT/AST 62.0 IU/LSGPT/ALT 46.0 IU/LALK PHOS 100.0 IU/
LTOTAL PROTEIN 8.60 g/dLALBUMIN 4.90 g/dLTOTAL BILI 0.60 mg/dLCALCIUM 9.90 mg/
dLeGFR >60 mL/min/1.73 m2WBC 6.9 RBC 4.48 HGB 14.40 g/dLHCT 40.90 %MCV 91.0 
fLMCH 32.10 pgMCHC 35.20 g/dLRDW CV 12.50 %MPV 9.30 fLPLT 260 %NEUT 74.90 %%
LYMP 15.10 %%MONO 9.40 %%EOS 0.30 %%BASO 0.30 %#NEUT 5.15 #LYMP 1.04 #MONO 0.65 
#EOS 0.02 #BASO 0.02 

 

 2011 9:07 AM  GLUCOSE 92.0 mg/dLSODIUM 131.0 mmol/LPOTASSIUM 4.20 mmol/
LCHLORIDE 99.0 mmol/LCO2 22.0 mmol/LBUN 9.0 mg/dLCREATININE 0.70 mg/dLCALCIUM 
9.20 mg/dLeGFR >60 mL/min/1.73 m2

 

 2011 11:06 AM  TSH 0.510 uIU/mLGLUCOSE 99.0 mg/dLSODIUM 132.0 mmol/
LPOTASSIUM 3.50 mmol/LCHLORIDE 95.0 mmol/LCO2 23.0 mmol/LBUN 9.0 mg/
dLCREATININE 0.80 mg/dLCALCIUM 10.40 mg/dLeGFR >60 mL/min/1.73 m2

 

 2011 9:45 AM  SODIUM 132.0 mmol/L

 

 2011 9:27 AM  SODIUM 137.0 mmol/L

 

 2011 8:35 AM  TRIGLYCERIDES 114.0 mg/dLCHOLESTEROL 145.0 mg/dLHDL 56.0 mg/
dLLDL (CALC) 66.0 mg/dLGLUCOSE 105.0 mg/dLSODIUM 138.0 mmol/LPOTASSIUM 4.40 mmol
/LCHLORIDE 103.0 mmol/LCO2 25.0 mmol/LBUN 8.0 mg/dLCREATININE 0.70 mg/dLSGOT/
AST 36.0 IU/LSGPT/ALT 38.0 IU/LALK PHOS 103.0 IU/LTOTAL PROTEIN 7.40 g/
dLALBUMIN 4.30 g/dLTOTAL BILI 0.30 mg/dLCALCIUM 9.20 mg/dLeGFR >60 mL/min/1.73 
m2WBC 5.1 RBC 3.76 HGB 12.0 g/dLHCT 36.10 %MCV 96.0 fLMCH 31.90 pgMCHC 33.20 g/
dLRDW CV 13.10 %MPV 9.40 fLPLT 249 %NEUT 60.40 %%LYMP 25.90 %%MONO 8.90 %%EOS 
4.0 %%BASO 0.80 %#NEUT 3.06 #LYMP 1.31 #MONO 0.45 #EOS 0.20 #BASO 0.04 

 

 2011 9:55 AM  TSH 1.070 uIU/mL

 

 2011 8:55 AM  GLUCOSE 102.0 mg/dLSODIUM 135.0 mmol/LPOTASSIUM 4.20 mmol/
LCHLORIDE 102.0 mmol/LCO2 24.0 mmol/LBUN 15.0 mg/dLCREATININE 0.80 mg/dLSGOT/
AST 30.0 IU/LSGPT/ALT 35.0 IU/LALK PHOS 119.0 IU/LTOTAL PROTEIN 7.50 g/
dLALBUMIN 4.30 g/dLTOTAL BILI 0.30 mg/dLCALCIUM 9.20 mg/dLeGFR >60 mL/min/1.73 
m2TSH 1.130 uIU/mL

 

 2011 9:50 AM  GLUCOSE 85.0 mg/dLSODIUM 133.0 mmol/LPOTASSIUM 4.20 mmol/
LCHLORIDE 101.0 mmol/LCO2 21.0 mmol/LBUN 13.0 mg/dLCREATININE 0.80 mg/dLSGOT/
AST 36.0 IU/LSGPT/ALT 36.0 IU/LALK PHOS 109.0 IU/LTOTAL PROTEIN 7.40 g/
dLALBUMIN 4.20 g/dLTOTAL BILI 0.50 mg/dLCALCIUM 9.40 mg/dLeGFR >60 mL/min/1.73 
m2

 

 2011 8:55 AM  GLUCOSE 98.0 mg/dLSODIUM 137.0 mmol/LPOTASSIUM 3.90 mmol/
LCHLORIDE 103.0 mmol/LCO2 25.0 mmol/LBUN 14.0 mg/dLCREATININE 0.70 mg/dLSGOT/
AST 33.0 IU/LSGPT/ALT 36.0 IU/LALK PHOS 111.0 IU/LTOTAL PROTEIN 8.30 g/
dLALBUMIN 4.40 g/dLTOTAL BILI 0.50 mg/dLCALCIUM 9.40 mg/dLeGFR >60 mL/min/1.73 
[id number] 109.0 mg/dLCHOLESTEROL 153.0 mg/dLHDL 54.0 mg/dLLDL (CALC) 77.0 
mg/dLTSH 1.330 uIU/mL

 

 2011 10:17 AM  Colonoscopy-Women and Men over 50 Declined Mammogram -
Women over 40 Normal Pap Smear Negative 

 

 2012 10:33 AM  WET PREP NO TRICH SEEN CLUE CELLS NONE SEEN 

 

 2012 8:45 AM  WBC 5.2 RBC 3.96 HGB 12.70 g/dLHCT 37.80 %MCV 96.0 fLMCH 
32.10 pgMCHC 33.60 g/dLRDW CV 13.30 %MPV 9.70 fLPLT 297 %NEUT 57.70 %%LYMP 
28.50 %%MONO 10.30 %%EOS 2.50 %%BASO 1.0 %#NEUT 3.02 #LYMP 1.49 #MONO 0.54 #EOS 
0.13 #BASO 0.05 GLUCOSE 97.0 mg/dLSODIUM 137.0 mmol/LPOTASSIUM 4.40 mmol/
LCHLORIDE 101.0 mmol/LCO2 23.0 mmol/LBUN 17.0 mg/dLCREATININE 0.80 mg/dLSGOT/
AST 30.0 IU/LSGPT/ALT 33.0 IU/LALK PHOS 95.0 IU/LTOTAL PROTEIN 7.40 g/dLALBUMIN 
4.40 g/dLTOTAL BILI 0.60 mg/dLCALCIUM 9.70 mg/dLeGFR 60 TRIGLYCERIDES 130.0 mg/
dLCHOLESTEROL 157.0 mg/dLHDL 56.0 mg/dLLDL (CALC) 75.0 mg/dLTSH 0.940 uIU/mL

 

 8/15/2012 4:02 PM  WET PREP NO TRICH SEEN CLUE CELLS NONE SEEN 

 

 2012 8:45 AM  WBC 5.1 RBC 3.91 HGB 12.50 g/dLHCT 36.30 %MCV 93.0 fLMCH 
32.0 pgMCHC 34.40 g/dLRDW CV 12.60 %MPV 9.30 fLPLT 244 %NEUT 57.60 %%LYMP 27.50 
%%MONO 9.10 %%EOS 5.0 %%BASO 0.80 %#NEUT 2.91 #LYMP 1.39 #MONO 0.46 #EOS 0.25 #
BASO 0.04 TSH 1.390 uIU/mLTRIGLYCERIDES 154.0 mg/dLCHOLESTEROL 147.0 mg/dLHDL 
45.0 mg/dLLDL (CALC) 71.0 mg/dLGLUCOSE 101.0 mg/dLSODIUM 134.0 mmol/LPOTASSIUM 
4.30 mmol/LCHLORIDE 102.0 mmol/LCO2 23.0 mmol/LBUN 11.0 mg/dLCREATININE 0.80 mg/
dLSGOT/AST 34.0 IU/LSGPT/ALT 38.0 IU/LALK PHOS 104.0 IU/LTOTAL PROTEIN 7.60 g/
dLALBUMIN 4.40 g/dLTOTAL BILI 0.50 mg/dLCALCIUM 9.40 mg/dLeGFR 60 

 

 12/10/2012 8:34 AM  Colonoscopy-Women and Men over 50 Declined Mammogram -
Women over 40 Declined Pap Smear Declined 

 

 2012 5:02 PM  WET PREP NO TRICH SEEN CLUE CELLS NONE SEEN 

 

 2013 8:25 AM  WBC 4.2 RBC 3.96 HGB 12.90 g/dLHCT 37.0 %MCV 93.0 fLMCH 
32.60 pgMCHC 34.90 g/dLRDW CV 12.60 %MPV 9.70 fLPLT 254 %NEUT 54.40 %%LYMP 
30.40 %%MONO 10.80 %%EOS 3.40 %%BASO 1.0 %#NEUT 2.26 #LYMP 1.26 #MONO 0.45 #EOS 
0.14 #BASO 0.04 TSH 1.230 uIU/mLGLUCOSE 94.0 mg/dLSODIUM 136.0 mmol/LPOTASSIUM 
4.30 mmol/LCHLORIDE 99.0 mmol/LCO2 25.0 mmol/LBUN 10.0 mg/dLCREATININE 0.80 mg/
dLSGOT/AST 36.0 IU/LSGPT/ALT 36.0 IU/LALK PHOS 84.0 IU/LTOTAL PROTEIN 7.90 g/
dLALBUMIN 4.40 g/dLTOTAL BILI 0.70 mg/dLCALCIUM 9.80 mg/dLeGFR 60 TRIGLYCERIDES 
122.0 mg/dLCHOLESTEROL 141.0 mg/dLHDL 47.0 mg/dLLDL (CALC) 70.0 mg/dL

 

 6/10/2013 3:52 PM  WET PREP NO TRICH SEEN CLUE CELLS NONE SEEN 

 

 2013 8:45 AM  WBC 5.3 RBC 4.01 HGB 13.0 g/dLHCT 37.60 %MCV 94.0 fLMCH 
32.40 pgMCHC 34.60 g/dLRDW CV 12.50 %MPV 9.40 fLPLT 248 %NEUT 58.70 %%LYMP 
27.80 %%MONO 9.80 %%EOS 2.80 %%BASO 0.90 %#NEUT 3.12 #LYMP 1.48 #MONO 0.52 #EOS 
0.15 #BASO 0.05 TSH 1.570 uIU/mLGLUCOSE 94.0 mg/dLSODIUM 134.0 mmol/LPOTASSIUM 
4.10 mmol/LCHLORIDE 101.0 mmol/LCO2 23.0 mmol/LBUN 11.0 mg/dLCREATININE 0.80 mg/
dLSGOT/AST 41.0 IU/LSGPT/ALT 44.0 IU/LALK PHOS 109.0 IU/LTOTAL PROTEIN 7.90 g/
dLALBUMIN 4.70 g/dLTOTAL BILI 0.80 mg/dLCALCIUM 10.40 mg/dLeGFR >60 mL/min/1.73 
[id number] 163.0 mg/dLCHOLESTEROL 138.0 mg/dLHDL 49.0 mg/dLLDL (CALC) 56.0 
mg/dL

 

 2014 8:58 AM  GLUCOSE 86.0 mg/dLSODIUM 134.0 mmol/LPOTASSIUM 4.20 mmol/
LCHLORIDE 99.0 mmol/LCO2 25.0 mmol/LBUN 14.0 mg/dLCREATININE 0.80 mg/dLCALCIUM 
10.10 mg/dLeGFR >60 mL/min/1.73 m2TSH 1.20 uIU/mL

 

 2014 9:50 AM  WBC 5.7 RBC 4.03 HGB 12.90 g/dLHCT 37.90 %MCV 94.0 fLMCH 
32.0 pgMCHC 34.0 g/dLRDW CV 12.70 %MPV 9.70 fLPLT 292 %NEUT 62.70 %%LYMP 21.80 %
%MONO 11.0 %%EOS 3.40 %%BASO 1.10 %#NEUT 3.55 #LYMP 1.23 #MONO 0.62 #EOS 0.19 #
BASO 0.06 GLUCOSE 98.0 mg/dLSODIUM 135.0 mmol/LPOTASSIUM 4.30 mmol/LCHLORIDE 
102.0 mmol/LCO2 22.0 mmol/LBUN 10.0 mg/dLCREATININE 0.80 mg/dLSGOT/AST 34.0 IU/
LSGPT/ALT 32.0 IU/LALK PHOS 95.0 IU/LTOTAL PROTEIN 7.70 g/dLALBUMIN 4.30 g/
dLTOTAL BILI 0.80 mg/dLCALCIUM 9.10 mg/dLeGFR 60 TRIGLYCERIDES 108.0 mg/
dLCHOLESTEROL 146.0 mg/dLHDL 56.0 mg/dLLDL (CALC) 68.0 mg/dLTSH 0.90 uIU/mL

 

 2014 8:40 AM  WBC 4.4 RBC 4.13 HGB 13.20 g/dLHCT 38.90 %MCV 94.0 fLMCH 
32.0 pgMCHC 33.90 g/dLRDW CV 13.50 %MPV 9.80 fLPLT 279 %NEUT 63.80 %%LYMP 24.60 
%%MONO 9.30 %%EOS 1.60 %%BASO 0.70 %#NEUT 2.80 #LYMP 1.08 #MONO 0.41 #EOS 0.07 #
BASO 0.03 GLUCOSE 96.0 mg/dLSODIUM 136.0 mmol/LPOTASSIUM 4.10 mmol/LCHLORIDE 
99.0 mmol/LCO2 23.0 mmol/LBUN 8.0 mg/dLCREATININE 0.80 mg/dLSGOT/AST 31.0 IU/
LSGPT/ALT 27.0 IU/LALK PHOS 85.0 IU/LTOTAL PROTEIN 7.60 g/dLALBUMIN 4.40 g/
dLTOTAL BILI 0.80 mg/dLCALCIUM 10.20 mg/dLeGFR 60 TRIGLYCERIDES 61.0 mg/
dLCHOLESTEROL 148.0 mg/dLHDL 71.0 mg/dLLDL (CALC) 65.0 mg/dLTSH 0.990 uIU/mL

 

 2015 8:32 AM  WBC 4.8 RBC 3.98 HGB 12.70 g/dLHCT 37.30 %MCV 94.0 fLMCH 
31.90 pgMCHC 34.0 g/dLRDW CV 12.70 %MPV 9.50 fLPLT 270 %NEUT 57.30 %%LYMP 25.0 %
%MONO 13.0 %%EOS 3.60 %%BASO 1.10 %#NEUT 2.73 #LYMP 1.19 #MONO 0.62 #EOS 0.17 #
BASO 0.05 TSH 0.640 uIU/mLGLUCOSE 90.0 mg/dLSODIUM 136.0 mmol/LPOTASSIUM 4.0 
mmol/LCHLORIDE 101.0 mmol/LCO2 24.0 mmol/LBUN 10.0 mg/dLCREATININE 0.80 mg/
dLSGOT/AST 34.0 IU/LSGPT/ALT 32.0 IU/LALK PHOS 92.0 IU/LTOTAL PROTEIN 7.50 g/
dLALBUMIN 4.40 g/dLTOTAL BILI 0.70 mg/dLCALCIUM 9.50 mg/dLeGFR >60 mL/min/1.73 
[id number] 107.0 mg/dLCHOLESTEROL 138.0 mg/dLHDL 58.0 mg/dLLDL (CALC) 59.0 
mg/dL

 

 2015 8:31 AM  WBC 4.6 RBC 3.97 HGB 12.90 g/dLHCT 38.0 %MCV 96.0 fLMCH 
32.50 pgMCHC 33.90 g/dLRDW CV 12.60 %MPV 9.0 fLPLT 248 %NEUT 61.0 %%LYMP 24.70 %
%MONO 10.20 %%EOS 3.0 %%BASO 1.10 %#NEUT 2.82 #LYMP 1.14 #MONO 0.47 #EOS 0.14 #
BASO 0.05 TRIGLYCERIDES 105.0 mg/dLCHOLESTEROL 141.0 mg/dLHDL 58.0 mg/dLLDL (
CALC) 62.0 mg/dLGLUCOSE 93.0 mg/dLSODIUM 136.0 mmol/LPOTASSIUM 4.10 mmol/
LCHLORIDE 101.0 mmol/LCO2 25.0 mmol/LBUN 9.0 mg/dLCREATININE 0.80 mg/dLSGOT/AST 
32.0 IU/LSGPT/ALT 28.0 IU/LALK PHOS 95.0 IU/LTOTAL PROTEIN 7.30 g/dLALBUMIN 
4.50 g/dLTOTAL BILI 0.80 mg/dLCALCIUM 9.70 mg/dLeGFR >60 mL/min/1.73mTSH 1.10 
uIU/mL







History Of Immunizations







 Name  Date Admin  Mfg Name  Mfg Code  Trade Name  Lot#  Route  Inj  Vis Given  
Vis Pub  CVX

 

 Influenza  10/20/2010  [employer] pasteur  [employer]  Fluzone  [id number]  Intramuscular  
Left Arm  10/20/2010  2010  999

 

 Influenza  10/28/2011  [employer] pasteur  [employer]  Fluzone  [id number]  Intramuscular  
Left Arm  10/28/2011  2011  141

 

 Influenza  10/29/2012  sanofi pasteur  [employer]  Fluzone  [id number]  Intramuscular  
Left Deltoid  10/29/2012  2012  141

 

 X  12/10/2012  [employer]  MSD  Pneumovax 23  [id number]  Intramuscular  
Left Gluteous Medius  12/10/2012  2010  33

 

 Influenza  10/28/2013  sanofi pasteur  [employer]  Fluzone > 3 Years  [id number]  
Intramuscular  Right Deltoid  10/28/2013  2013  141

 

 X  9/2/2015  Not Entered  NE  Prevnar 13     Not Entered  Not Entered  1  109

 

 Influenza  2015  Not Entered  NE  Not Entered     Not Entered  Not Entered
  2015  141

 

 Zostavax  2012  Not Entered  NE  Not Entered     Not Entered  Not 
Entered  1  121

 

 Tdap  2012  Not Entered  NE  Not Entered     Not Entered  Not Entered  1  115







History of Past Illness







 Name  Date of Onset  Comments

 

 Benign Essential Hypertension  Dec 14 2009 10:10AM   

 

 Hyperlipidemia  Dec 14 2009 10:10AM   

 

 Hypothyroidism, Acquired  Dec 14 2009 10:10AM   

 

 hypothyroid      

 

 Hypertension      

 

 Hyperlipidemia      

 

 acid reflux      

 

 Hypertension, Benign Essential  2010  9:41AM   

 

 Hypertension, Benign Essential  2010 12:53PM   

 

 Routine gynecological examination  May  5 2010  9:28AM   

 

 Hypertension  May  5 2010  9:28AM   

 

 Hyperlipidemia, unspecified  May  5 2010  9:28AM   

 

 Hypothyroidism, Acquired  May  5 2010  9:28AM   

 

 Vulvovaginitis  May  5 2010  9:28AM   

 

 Rhinitis, Allergic  May  5 2010  9:28AM   

 

 Hypertension, Benign Essential  May 19 2010  8:48AM   

 

 Hypertension, Benign Essential  May 25 2010  9:39AM   

 

 Flu  Oct 20 2010 12:01PM   

 

 Essential Hypertension  2010  8:34AM   

 

 Hyperlipidemia  2010  8:34AM   

 

 Gastroesophageal Reflux  2010  8:34AM   

 

 Hypothyroidism, Acquired  2010  8:34AM   

 

 Hypertension, Benign Essential  2010  9:22AM   

 

 Hypertension, Benign Essential  Dec 15 2010  9:07AM   

 

 Essential Hypertension  2011  1:31PM   

 

 Hyperlipidemia  2011  1:31PM   

 

 Gastroesophageal Reflux  2011  1:31PM   

 

 Hypothyroidism, Acquired  2011  1:31PM   

 

 Essential Hypertension  2011  8:51AM   

 

 Hyperlipidemia  2011  8:51AM   

 

 Gastroesophageal Reflux  2011  8:51AM   

 

 Hypothyroidism, Acquired  2011  8:51AM   

 

 Essential Hypertension  2011  9:13AM   

 

 Hyperlipidemia  2011  9:13AM   

 

 Gastroesophageal Reflux  2011  9:13AM   

 

 Hypothyroidism, Acquired  2011  9:13AM   

 

 Nausea With Vomiting  2011  1:18PM   

 

 Hyponatremia  2011  8:46AM   

 

 Nausea  2011  9:13AM   

 

 Hypothyroidism  2011 11:10AM   

 

 Hyponatremia  2011 11:10AM   

 

 Essential Hypertension  2011 10:05AM   

 

 Hyperlipidemia  2011 10:05AM   

 

 Gastroesophageal Reflux  2011 10:05AM   

 

 Nausea  2011 10:05AM   

 

 Hypothyroidism, Acquired  2011 10:05AM   

 

 Hyponatremia  May  6 2011 11:34AM   

 

 Essential Hypertension  May 16 2011  8:50AM   

 

 Hyperlipidemia  May 16 2011  8:50AM   

 

 Gastroesophageal Reflux  May 16 2011  8:50AM   

 

 Nausea  May 16 2011  8:50AM   

 

 Hypothyroidism, Acquired  May 16 2011  8:50AM   

 

 Hyponatremia  May 16 2011  8:50AM   

 

 Routine gynecological examination  2011  8:54AM   

 

 Hypertension  2011  8:54AM   

 

 Hyperlipidemia, unspecified  2011  8:54AM   

 

 Hypothyroidism, Acquired  2011  8:54AM   

 

 Rhinitis, Allergic  2011  8:54AM   

 

 Hypothyroidism  Aug 29 2011 12:37PM   

 

 Hyponatremia  Aug 29 2011 12:37PM   

 

 Essential Hypertension  Sep 12 2011  8:53AM   

 

 Hyperlipidemia  Sep 12 2011  8:53AM   

 

 Gastroesophageal Reflux  Sep 12 2011  8:53AM   

 

 Hypothyroidism, Acquired  Sep 12 2011  8:53AM   

 

 Hyponatremia  Sep 12 2011  8:53AM   

 

 Hypertension  Sep 29 2011  9:41AM   

 

 Hyperlipidemia  Dec  8 2011  8:31AM   

 

 Hypothyroidism  Dec  8 2011  8:31AM   

 

 Hypertension  Dec  8 2011  8:31AM   

 

 Flu  Dec  9 2011 10:02AM   

 

 Essential Hypertension  Dec 13 2011 10:13AM   

 

 Hyperlipidemia  Dec 13 2011 10:13AM   

 

 Gastroesophageal Reflux  Dec 13 2011 10:13AM   

 

 Hypothyroidism, Acquired  Dec 13 2011 10:13AM   

 

 Hyponatremia  Dec 13 2011 10:13AM   

 

 Vaginal Discharge  2012  9:43AM   

 

 Rhinitis, Allergic  Feb 15 2012  9:31AM   

 

 Eustachian Tube Dysfunction  Feb 15 2012  9:31AM   

 

 Pharyngitis, Acute  Feb 15 2012  9:31AM   

 

 Routine gynecological examination  2012  8:48AM   

 

 Hypertension  2012  8:48AM   

 

 Hyperlipidemia, unspecified  2012  8:48AM   

 

 Hypothyroidism, Acquired  2012  8:48AM   

 

 Rhinitis, Allergic  2012  8:48AM   

 

 Candidiasis, Vulvovaginal  2012 11:04AM   

 

 Essential Hypertension  Aug 15 2012  9:02AM   

 

 Hyperlipidemia  Aug 15 2012  9:02AM   

 

 Gastroesophageal Reflux  Aug 15 2012  9:02AM   

 

 Hypothyroidism, Acquired  Aug 15 2012  9:02AM   

 

 Hyponatremia  Aug 15 2012  9:02AM   

 

 Atrophic Vaginitis, Postmenopausal  Aug 15 2012  9:02AM   

 

 Flu  Oct 29 2012  9:24AM   

 

 Essential Hypertension  Dec 10 2012  8:35AM   

 

 Hyperlipidemia  Dec 10 2012  8:35AM   

 

 Gastroesophageal Reflux  Dec 10 2012  8:35AM   

 

 Hypothyroidism, Acquired  Dec 10 2012  8:35AM   

 

 Hyponatremia  Dec 10 2012  8:35AM   

 

 Atrophic Vaginitis, Postmenopausal  Dec 10 2012  8:35AM   

 

 Pneumococcus  Dec 10 2012  2:07PM   

 

 Vaginal Discharge  Dec 20 2012  1:50PM   

 

 Essential Hypertension  Dec 20 2012  1:50PM   

 

 Hyperlipidemia  Dec 20 2012  1:50PM   

 

 Gastroesophageal Reflux  Dec 20 2012  1:50PM   

 

 Hypothyroidism, Acquired  Dec 20 2012  1:50PM   

 

 Atrophic Vaginitis, Postmenopausal  Dec 20 2012  1:50PM   

 

 Upper Respiratory Infections  2013  8:52AM   

 

 Hyperlipidemia  2013  8:21AM   

 

 Hypertension  2013  8:21AM   

 

 Hypothyroidism  2013  8:21AM   

 

 Screening Mammogram  Beto 10 2013  8:45AM   

 

 Routine gynecological examination  Beto 10 2013  8:34AM   

 

 Hypertension  Beto 10 2013  8:34AM   

 

 Hyperlipidemia, unspecified  Beto 10 2013  8:34AM   

 

 Hypothyroidism, Acquired  Beto 10 2013  8:34AM   

 

 Rhinitis, Allergic  Beto 10 2013  8:34AM   

 

 Flu  Oct 28 2013  8:52AM   

 

 Essential Hypertension  Dec  9 2013  8:37AM   

 

 Hyperlipidemia  Dec  9 2013  8:37AM   

 

 Gastroesophageal Reflux  Dec  9 2013  8:37AM   

 

 Hypothyroidism, Acquired  Dec  9 2013  8:37AM   

 

 Atrophic Vaginitis, Postmenopausal  Dec  9 2013  8:37AM   

 

 Hypertension  2014  9:56AM   

 

 Hyperlipidemia  2014  9:56AM   

 

 Hypothyroidism  2014  9:56AM   

 

 Screening Mammogram  2014  8:32AM   

 

 Osteopenia  2014  8:32AM   

 

 Hypertension  2014  8:35AM   

 

 Hypothyroidism, Acquired  2014  8:35AM   

 

 Hyperlipidemia  2014  8:35AM   

 

 Upper Respiratory Infections  2014  9:28AM   

 

 Sinusitis, Acute  Oct  2 2014  9:17AM   

 

 Herpes Zoster  Oct  5 2014  1:44PM   

 

 Vesicular Eruption  Oct  5 2014  1:44PM   

 

 Hypertension  2014  8:18AM   

 

 Hyperlipidemia  2014  8:18AM   

 

 hypothyroid  2014  8:18AM   

 

 Situational anxiety  Dec 20 2014  9:33AM   

 

 GERD (gastroesophageal reflux disease)  Dec 20 2014  9:33AM   

 

 Nausea  Dec 20 2014  9:33AM   

 

 Abdominal Pain, Epigastric  Dec 20 2014  9:33AM   

 

 Hyperlipidemia  Oct  6 2014  9:03AM   

 

 acid reflux  Oct  6 2014  9:03AM   

 

 hypothyroid  Oct  6 2014  9:03AM   

 

 Shingles  Oct  6 2014  9:03AM   

 

 Pharyngitis  2015  1:09PM   

 

 Bronchitis  2015  9:10AM   

 

 Hyperlipidemia  2015  9:10AM   

 

 acid reflux  2015  9:10AM   

 

 hypothyroid  2015  9:10AM   

 

 Hyperlipidemia  2015  8:18AM   

 

 Hypertension  2015  8:18AM   

 

 hypothyroid  2015  8:18AM   

 

 Screening Mammogram  2015 11:43AM   

 

 Medicare Annual Pap Q 2 years  2015  9:36AM   

 

 Screening Mammogram  2015  9:36AM   

 

 Candidiasis of female genitalia  2015  9:36AM   

 

 Hypertension  2015 11:34AM   

 

 Hyperlipidemia, unspecified  2015 11:34AM   

 

 Hypothyroidism, Acquired  2015 11:34AM   

 

 Osteopenia  2016  8:41AM   

 

 Encounter for screening mammogram for breast cancer  2016  8:41AM   

 

 Hypertension  2016  8:34AM   

 

 Lymphedema  2016  8:34AM   

 

 Hyperlipidemia  2016  8:34AM   

 

 hypothyroid  2016  8:34AM   

 

 HEP B  2016  9:13AM   







Payers







 Insurance Name  Company Name  Plan Name  Plan Number  Policy Number  Policy 
Group Number  Start Date

 

    Medicare Part A  Medicare RHC     [id number]     N/A

 

    [employer]  [facility]     [id number]     2009

 

    Medicare Part B  [place]     [id number]     N/A

 

    Medicare Part A  Medicare Part A     [id number]     N/A

 

    Medicare Part A  Medicare P A - Preventive     [id number]     N/A

 

    Medicare Part A  Medicare - Lab/Xray     [id number]     N/A







History of Encounters







 Visit Date  Visit Type  Provider

 

 2016  Office visit  Doris Noriega MD

 

 2015  Office visit  Doris Noriega MD

 

 2015  Office visit  Doris Noriega MD

 

 2015  Office visit  Prince Noe APRN

 

 2014  Office visit  Anyi Herrera APRN

 

 10/27/2014  Voided  Doris Noriega MD

 

 10/6/2014  Office visit  Doris Noriega MD

 

 10/5/2014  Office visit  Anyi Herrera APRN

 

 10/2/2014  Office visit   

 

 10/2/2014  Office visit  Corrine Bay APRN

 

 2014  Office visit  Kassie Franklin APRN

 

 2014  Office visit  Doris Noriega MD

 

 2014  Office visit  Doris Noriega MD

 

 2013  Office visit  Dee Colindres MD

 

 10/28/2013  Nurse visit  Dee Colindres MD

 

 6/10/2013  Office visit  Dee Colindres MD

 

 2013  Office visit  Corrine Bay APRN

 

 2012  Office visit  Dee Colindres MD

 

 12/10/2012  Office visit  Dee Colindres MD

 

 10/29/2012  Nurse visit  Dee Colindres MD

 

 8/15/2012  Office visit  Dee Colindres MD

 

 2012  Office visit  Corrine Bay APRN

 

 2012  Office visit  Dee Colindres MD

 

 2/15/2012  Office visit  Dee Colindres MD

 

 2012  Office visit  Corrine Bay APRN

 

 2011  Office visit  Dee Colindres MD

 

 10/28/2011  Nurse visit  Shad Nolasco MD

 

 2011  Office visit  Dee Colindres MD

 

 2011  Office visit  Dee Colindres MD

 

 2011  Office visit  Dee Colindres MD

 

 2011  Office visit  Dee Colindres MD

 

 2011  Office visit  Dee Colindres MD

 

 2011  Office visit  Dee Colindres MD

 

 4/10/2011  [facility]  KHRIS Granados MD

 

 4/10/2011  [facility]  RICKEY Toussaint MD

 

 2011  Office visit  RICKEY Toussaint MD

 

 2011  [facility]  Fide Castellanos MD

 

 2011  Voided  Fide Castellanos MD

 

 2011  Office visit  Dee Colindres MD

 

 12/15/2010  Nurse visit  Dee Colindres MD

 

 2010  Office visit  Dee Colindres MD

 

 10/20/2010  Nurse visit  Stephenie PERAZA

 

 2010  Nurse visit  Dee Colindres MD

 

 2010  Nurse visit  Dee Colindres MD

 

 2010  Office visit  Dee Colindres MD

 

 3/19/2010  Voided  Stephenie Kay PA

 

 2010  Nurse visit  Stephenie PERAZA

 

 2010  Nurse visit  Stephenie PERAZA

 

 2010  Nurse visit  Stephenie PERAZA

 

 2009  Office visit  Stephenie PERAZA

 

 10/20/2009  Nurse visit  Stephenie Kay PA

## 2017-07-13 NOTE — XMS REPORT
MU2 Ambulatory Summary

 Created on: 2017



Milady Crespo

External Reference #: [id number]

: 1945

Sex: Female



Demographics







 Address  [address]

[place]  [address]

 

 Home Phone  [phone]

 

 Preferred Language  English

 

 Marital Status  

 

 Christian Affiliation  Unknown

 

 Race  White

 

 Ethnic Group  Not  or 





Author







 Author  Doris Noriega

 

 Organization  [facility]

 

 Address  [address]

[place], [place]  [id number]



 

 Phone  [phone]







Care Team Providers







 Care Team Member Name  Role  Phone

 

 Doris Noriega  PCP  [phone]

 

 Doris Noriega  PreferredProvider  [phone]







Allergies and Adverse Reactions







 Name  Reaction  Notes

 

 NO KNOWN DRUG ALLERGIES      







Plan of Treatment







 Planned Activity  Comments  Planned Date  Planned Time  Plan/Goal

 

 Complete blood count (CBC) with differential count     2016  12:00 AM   

 

 Comprehensive metabolic panel     2016  12:00 AM   

 

 VITAMIN D (25 HYDROXY)     2016  12:00 AM   

 

 Lipid panel (total cholesterol, lipoproteins, HDL, triglycerides)     8/15/
2012  12:00 AM   

 

 Comprehensive Metabolic Panel     6/10/2013  12:00 AM   

 

 Lipid panel (total cholesterol, lipoproteins, HDL, triglycerides)     6/10/
2013  12:00 AM   

 

 Thyroid stimulating hormone (TSH)     6/10/2013  12:00 AM   

 

 CBC (automated H&H, platelets, WBC and automated differential)     6/10/2013  
12:00 AM   

 

 Comprehensive Metabolic Panel     2012  12:00 AM   

 

 Lipid panel (total cholesterol, lipoproteins, HDL, triglycerides)     2012  12:00 AM   

 

 Thyroid stimulating hormone (TSH)     2012  12:00 AM   

 

 CBC (automated H&H, platelets, WBC and automated differential)     2012  
12:00 AM   

 

 Screening mammography, bilateral     2015  12:00 AM   







Medications







 Active 

 

 Name  Start Date  Estimated Completion Date  SIG  Comments

 

 aspirin 81 mg oral tablet,delayed release (DR/EC)        take 1 tablet (81 mg) 
by oral route once daily   

 

 Vitamin D3 1,000 unit oral capsule        take 1 capsule by oral route daily   

 

 famotidine 20 mg oral tablet  2015     TAKE ONE TABLET BY MOUTH TWICE 
DAILY   

 

 valsartan 160 mg oral tablet  2016     TAKE ONE TABLET BY MOUTH ONCE 
DAILY   

 

 amlodipine 5 mg oral tablet  2016     TAKE ONE TABLET BY MOUTH ONCE DAILY
   

 

 Allergy Relief (loratadine) 10 mg oral tablet  2016     TAKE ONE TABLET 
BY MOUTH ONCE DAILY   

 

 triamcinolone acetonide 0.1 % topical cream  3/14/2016     APPLY A THIN LAYER 
OF CREAM EXTERNALLY TO AFFECTED AREA TWICE DAILY FOR 14 DAYS   

 

 amlodipine 5 mg oral tablet  2016     TAKE ONE TABLET BY MOUTH ONCE DAILY
   

 

 Allergy Relief (loratadine) 10 mg oral tablet  2016     TAKE ONE TABLET 
BY MOUTH ONCE DAILY   

 

 valsartan 160 mg oral tablet  2016     TAKE ONE TABLET BY MOUTH ONCE 
DAILY   

 

 pantoprazole 40 mg oral tablet,delayed release (DR/EC)  10/13/2016  10/8/2017  
take 1 tablet (40 mg) by oral route once daily for 90 days   

 

 amlodipine 5 mg oral tablet  10/13/2016  10/8/2017  TAKE ONE TABLET BY MOUTH 
ONCE DAILY for 90 days   

 

 atenolol 50 mg oral tablet  10/13/2016  10/8/2017  take 1 tablet (50 mg) by 
oral route once daily   

 

 levothyroxine 50 mcg oral tablet  10/13/2016  10/8/2017  TAKE ONE TABLET BY 
MOUTH ONCE DAILY for 90 days   

 

 Lipitor 20 mg oral tablet  10/13/2016  2018  take 1 tablet (20 mg) by oral 
route once daily   

 

 loratadine 10 mg oral tablet  10/13/2016  10/8/2017  TAKE 1 TABLET BY MOUTH 
EVERY DAY IN THE EVENING   

 

 Plavix 75 mg oral tablet  10/13/2016  2018  take 1 tablet (75 mg) by oral 
route once daily   

 

 valsartan 160 mg oral tablet  10/13/2016  10/8/2017  TAKE ONE TABLET BY MOUTH 
ONCE DAILY for 90 days   

 

 Zofran (as hydrochloride) 4 mg oral tablet  2016     take 1 tablet by 
oral route daily as needed for 14 days   

 

 Zofran (as hydrochloride) 4 mg oral tablet  2017     take 1 tablet by oral 
route daily as needed for 14 days   

 

 Zofran (as hydrochloride) 4 mg oral tablet  2017     take 1 tablet by oral 
route daily as needed for 14 days   

 

 Zofran (as hydrochloride) 4 mg oral tablet  2017     take 1 tablet by 
oral route daily as needed for 14 days   

 

 clorazepate dipotassium 7.5 mg oral tablet  3/20/2017  2017  take 1/2 to1 
tablet PO up to three times per day for situational anxiety   









  

 

 Name  Start Date  Expiration Date  SIG  Comments

 

 prednisone 20 mg oral tablet  2011  take 2 tablets by oral 
route daily for 5 days   

 

 calcium carbonate-vitamin D2 600-125 mg-unit oral tablet  8/15/2012  2012
  take 2 tablets by oral route daily   

 

 [place] 3 Fish Oil 900-1,400 mg oral capsule,delayed release(DR/EC)  8/15/2012  9
/  take 1 capsule by oral route daily   

 

 multivitamin Oral tablet  8/15/2012  2012  take 1 tablet by oral route 
daily   

 

 triamcinolone acetonide 0.1 % topical cream  2013  10/7/2013  APPLY A 
THIN LAYER TO THE AFFECTED AREA(S) BY TOPICAL ROUTE TWICE A DAY   

 

 famotidine 20 mg oral tablet  2014  take 1 tablet (20 mg) by 
oral route 2 times per day   

 

 amoxicillin 500 mg oral capsule  2014  take 1 capsule (500 mg) 
by oral route 3 times per day for 10 days   

 

 Zithromax Z-Tab 250 mg oral tablet  10/2/2014  10/7/2014  take 2 tablets (500 
mg) by oral route once daily for 1 day then 1 tablet (250 mg) by oral route 
once daily for 4 days   

 

 acyclovir 800 mg oral tablet  10/5/2014  10/12/2014  take 1 tablet (800 mg) by 
oral route 5 times per day for 7 days   

 

 gabapentin 300 mg oral capsule  10/9/2014  2014  take 1 capsule (300 mg) 
by oral route 3 times per day for 14 days   

 

 Carafate 100 mg/mL oral suspension  2014  take 10 milliliters 
(1 gram) by oral route 4 times per day on an empty stomach 1 hour before meals 
and at bedtime for 4 weeks   

 

 amlodipine 5 mg oral tablet  2015  TAKE ONE TABLET BY MOUTH 
EVERY DAY   

 

 levothyroxine 50 mcg oral tablet  2015  TAKE ONE TABLET BY MOUTH 
EVERY DAY   

 

 Diovan 160 mg oral tablet  2015  2/15/2016  TAKE ONE TABLET BY MOUTH 
EVERY DAY for 90 days   

 

 triamcinolone acetonide 0.1 % topical cream  2015  apply a thin 
layer to the affected area(s) by topical route 2 times per day for 14 days   

 

 fluconazole 150 mg oral tablet  2015  take 1 tablet (150 mg) 
by oral route once for 1 day   

 

 Zofran (as hydrochloride) 4 mg oral tablet  10/13/2016  10/27/2016  take 1 
tablet by oral route daily as needed for 14 days   









 Discontinued 

 

 Name  Start Date  Discontinued Date  SIG  Comments

 

 Lipitor 40 mg oral tablet     2009  12 TAB DAILY   

 

 ramipril 5 mg oral capsule     2009  take 1 capsule (5 mg) by oral route 
once daily   

 

 lovastatin 20 mg oral tablet  2009  take 1 tablet (20 mg) by 
oral route once daily with evening meal   

 

 propranolol 20 mg oral tablet  2010  take 1 tablet by oral 
route 2 times a day   

 

 Fosamax 70 mg oral tablet  2010  take 1 tablet (70 mg) by oral 
route once weekly in the morning, at least 30 minutes before the first food, 
beverage, or medication of the day   

 

 lisinopril 40 mg oral tablet  2010  4/10/2011  take 2 tablets by oral 
route daily   

 

 Zoloft 50 mg oral tablet  2011  take 1 tablet (50 mg) by oral 
route once daily   

 

 promethazine 25 mg oral tablet  2011  take 1 tablet by oral 
route every 6 hours as needed   

 

 Diovan -12.5 mg oral tablet  2011  take 1 tablet by oral 
route once daily for 30 days   

 

 Diflucan 150 mg oral tablet     2012  take 1 tablet (150 mg) by oral 
route once for 1 day   

 

 Diflucan 150 mg oral tablet  2012  8/15/2012  take 1 tablet (150 mg) by 
oral route once   

 

 Vitamin B-12 1,000 mcg oral tablet  8/15/2012  2014  take 1 tablet by 
oral route daily   

 

 Premarin 0.625 mg/gram vaginal cream  10/29/2012  6/10/2013  use 1 gram daily 
for a week then 1 gram twice a week   

 

 Medrol (Tab) 4 mg oral tablets,dose pack  2013  6/10/2013  take as 
directed   

 

 aspirin 325 mg oral tablet  2014  take 1 tablet (325 mg) by 
oral route once daily   

 

 Medrol (Tab) 4 mg oral tablets,dose pack  2014  10/2/2014  take as 
directed   

 

 Tetracaine Lollipops Lollipop  2015  Use as directed   

 

 Zoloft 50 mg oral tablet  2016  take 1 tablet (50 mg) by oral 
route once daily for 90 days   

 

 Zoloft 50 mg oral tablet  2016  take 1 tablet (50 mg) by oral 
route once daily for 90 days  Pt refuses to take...







Problem List







 Description  Status  Onset

 

 Hyperlipidemia  Active   

 

 acid reflux  Active   

 

 Hypertension  Active   

 

 hypothyroid  Active   







Vital Signs







 Date  Time  BP-Sys(mm[Hg]  BP-Morena(mm[Hg])  HR(bpm)  RR(rpm)  Temp  WT  HT  HC  
BMI  BSA  BMI Percentile  O2 Sat(%)

 

 10/13/2016  2:26:00 PM  128 mmHg  78 mmHg  77 bpm  16 rpm  98.4 F  139.25 lbs  
61 in     26.31 kg/m2  1.65 m2     100 %

 

 2016  8:29:00 AM  122 mmHg  70 mmHg  72 bpm  16 rpm  97.8 F  137.125 lbs  
61 in     25.9093 kg/m  1.6361 m     100 %

 

 2015  9:33:00 AM  120 mmHg  68 mmHg  73 bpm     98.7 F  144.375 lbs  61 
in     27.28 kg/m2  1.68 m2     99 %

 

 2015  9:05:00 AM  110 mmHg  75 mmHg  85 bpm  16 rpm  96.7 F  144.375 lbs  
61 in     27.2791 kg/m  1.6788 m     99 %

 

 2015  1:05:00 PM  108 mmHg  62 mmHg  78 bpm  18 rpm  96.9 F  142.375 lbs  
61 in     26.90 kg/m2  1.67 m2     100 %

 

 2014  9:31:00 AM  126 mmHg  66 mmHg  79 bpm  18 rpm  98.7 F  149 lbs  61 
in     28.153 kg/m  1.7055 m     98 %

 

 10/6/2014  9:02:00 AM  110 mmHg  74 mmHg  94 bpm  18 rpm  98.1 F  145.4 lbs  
61 in     27.47 kg/m2  1.68 m2     97 %

 

 10/2/2014  9:14:00 AM  124 mmHg  74 mmHg  79 bpm  18 rpm  98 F  147.25 lbs  61 
in     27.8224 kg/m  1.6955 m     98 %

 

 2014  9:22:00 AM  124 mmHg  76 mmHg  89 bpm  18 rpm  98.1 F  148 lbs  61 
in     27.96 kg/m2  1.70 m2     100 %

 

 2014  8:27:00 AM  118 mmHg  65 mmHg  74 bpm  16 rpm  97.7 F  148 lbs  61 
in     27.9641 kg/m  1.6998 m     99 %

 

 2014  9:48:00 AM  125 mmHg  70 mmHg  93 bpm  18 rpm  96.7 F  156 lbs  61 
in     29.48 kg/m2  1.75 m2     100 %

 

 2013  8:35:00 AM  122 mmHg  74 mmHg  84 bpm  16 rpm  97.9 F  155.375 lbs 
                99 %

 

 6/10/2013  8:30:00 AM  130 mmHg  82 mmHg  79 bpm  16 rpm  97.9 F  161 lbs     
            98 %

 

 2013  8:50:00 AM  124 mmHg  68 mmHg  66 bpm  18 rpm  97.6 F  157.5 lbs  
61 in     29.7591 kg/m  1.7535 m      

 

 2012  1:46:00 PM  120 mmHg  72 mmHg  75 bpm  16 rpm  97.6 F  156.125 lbs
                 98 %

 

 12/10/2012  8:32:00 AM  122 mmHg  82 mmHg  70 bpm  16 rpm  97.3 F  157 lbs    
             99 %

 

 8/15/2012  9:00:00 AM  130 mmHg  76 mmHg  86 bpm  16 rpm  97.4 F  159 lbs     
            100 %

 

 2012  11:02:00 AM  128 mmHg  64 mmHg  66 bpm  18 rpm  97.4 F  162.125 lbs
  61 in     30.6329 kg/m  1.7791 m      

 

 2012  8:45:00 AM  130 mmHg  70 mmHg  82 bpm  16 rpm  98.2 F  160.125 lbs 
                100 %

 

 2/15/2012  9:29:00 AM  122 mmHg  80 mmHg  86 bpm  16 rpm  97.4 F  159.375 lbs  
61 in     30.1133 kg/m  1.7639 m     99 %

 

 2012  9:41:00 AM  132 mmHg  74 mmHg  66 bpm  18 rpm  98.4 F  160.375 lbs  
61 in     30.30 kg/m2  1.77 m2      

 

 2011  10:08:00 AM  134 mmHg  82 mmHg  80 bpm  16 rpm  98 F  160 lbs  61 
in     30.2314 kg/m  1.7674 m     99 %

 

 2011  3:56:00 PM  130 mmHg  80 mmHg                              

 

 2011  8:55:00 AM  130 mmHg  74 mmHg  88 bpm  16 rpm  98.2 F  158.25 lbs  
               98 %

 

 2011  8:54:00 AM  136 mmHg  82 mmHg  102 bpm  16 rpm  98.1 F  153.5 lbs   
              99 %

 

 2011  8:49:00 AM  122 mmHg  88 mmHg  88 bpm  16 rpm  98.6 F  152.25 lbs  
               99 %

 

 2011  10:02:00 AM  140 mmHg  82 mmHg  96 bpm  20 rpm  98.8 F             
       100 %

 

 2011  9:14:00 AM  156 mmHg  98 mmHg  110 bpm  24 rpm  98.5 F  153 lbs    
             100 %

 

 2011  8:50:00 AM  160 mmHg  84 mmHg  100 bpm  16 rpm  98.7 F  155 lbs    
             100 %

 

 2011  1:25:00 PM  152 mmHg  100 mmHg  82 bpm  16 rpm  97.2 F  156.375 lbs 
                100 %

 

 2011  9:55:00 AM  144 mmHg  90 mmHg                              

 

 2010  9:24:00 AM  138 mmHg  84 mmHg                              

 

 2010  8:58:00 AM  160 mmHg  94 mmHg                              

 

 2010  8:33:00 AM  142 mmHg  90 mmHg  100 bpm  16 rpm  98.1 F  158.375 
lbs                  

 

 2010  9:24:00 AM  160 mmHg  102 mmHg  88 bpm  18 rpm  99 F  157.125 lbs  
62 in     28.74 kg/m2  1.7657 m      

 

 3/19/2010  11:01:00 AM  140 mmHg  90 mmHg                              

 

 2009  10:08:00 AM  142 mmHg  86 mmHg  72 bpm  16 rpm  98.1 F  154.125 
lbs  61 in     29.12 kg/m2  1.73 m2      







Social History







 Name  Description  Comments

 

       

 

 Children      

 

 lives alone      

 

 Supervisor      

 

 Tobacco  Never smoker   







History of Procedures







 Date Ordered  Description  Order Status

 

 2011 12:00 AM  COMPREHEN METABOLIC PANEL  Reviewed

 

 2011 12:00 AM  ASSAY THYROID STIM HORMONE  Reviewed

 

 2011 12:00 AM  COMPREHEN METABOLIC PANEL  Reviewed

 

 2011 12:00 AM  LIPID PANEL  Reviewed

 

 2011 12:00 AM  ASSAY THYROID STIM HORMONE  Reviewed

 

 2011 12:00 AM  COMPLETE CBC W/AUTO DIFF WBC  Reviewed

 

 2015 12:00 AM  COMPLETE CBC W/AUTO DIFF WBC  Reviewed

 

 2015 12:00 AM  COMPREHEN METABOLIC PANEL  Reviewed

 

 2015 12:00 AM  LIPID PANEL  Reviewed

 

 2015 12:00 AM  ASSAY THYROID STIM HORMONE  Reviewed

 

 2011 12:00 AM  COMPREHEN METABOLIC PANEL  Reviewed

 

 2011 12:00 AM  COMPREHEN METABOLIC PANEL  Reviewed

 

 2011 12:00 AM  LIPID PANEL  Reviewed

 

 2011 12:00 AM  ASSAY THYROID STIM HORMONE  Reviewed

 

 10/28/2011 12:00 AM  ***Immunization administration, Medicare flu  Reviewed

 

 10/28/2011 12:00 AM  Fluzone ** MEDICARE Only **  Reviewed

 

 2010 11:24 AM  NO CHARGE OV  Reviewed

 

 2010 11:26 AM  NO CHARGE OV  Reviewed

 

 2011 12:00 AM  COMPREHEN METABOLIC PANEL  Reviewed

 

 2011 12:00 AM  LIPID PANEL  Reviewed

 

 2011 12:00 AM  ASSAY THYROID STIM HORMONE  Reviewed

 

 2011 12:00 AM  COMPLETE CBC W/AUTO DIFF WBC  Reviewed

 

 2011 12:00 AM  Wrist Support  Reviewed

 

 2016 12:00 AM  Screening mammography, bilateral  Reviewed

 

 2016 12:00 AM  DXA BONE DENSITY AXIAL  Returned

 

 2016 12:00 AM  TETANUS VACCINE IM  Reviewed

 

 2016 12:00 AM  HEP B VACC ADULT 3 DOSE IM  Reviewed

 

 2012 12:00 AM  TISSUE EXAM FOR FUNGI  Reviewed

 

 2012 12:00 AM  SMEAR WET MOUNT SALINE/INK  Reviewed

 

 2016 12:00 AM  TETANUS VACCINE IM  Reviewed

 

 2016 12:00 AM  HEP B VACC ADULT 3 DOSE IM  Reviewed

 

 2/15/2012 12:00 AM  THER/PROPH/DIAG INJ SC/IM  Reviewed

 

 2/15/2012 12:00 AM  Bicillin CR [facility]48499471[facility]  Reviewed

 

 2/15/2012 12:00 AM  Depo-Medrol 40 mg NDC#0171337932  Reviewed

 

 10/13/2016 12:00 AM  COMPLETE CBC W/AUTO DIFF WBC  Returned

 

 10/13/2016 12:00 AM  COMPREHEN METABOLIC PANEL  Returned

 

 10/13/2016 12:00 AM  ASSAY THYROID STIM HORMONE  Returned

 

 10/13/2016 12:00 AM  LIPID PANEL  Returned

 

 2016 12:00 AM  TETANUS VACCINE IM  Reviewed

 

 2016 12:00 AM  HEP B VACC ADULT 3 DOSE IM  Reviewed

 

 8/15/2012 12:00 AM  COMPREHEN METABOLIC PANEL  Reviewed

 

 8/15/2012 12:00 AM  ASSAY THYROID STIM HORMONE  Reviewed

 

 8/15/2012 12:00 AM  COMPLETE CBC W/AUTO DIFF WBC  Reviewed

 

 8/15/2012 12:00 AM  Wrist Support  Reviewed

 

 10/29/2012 12:00 AM  Flu Injection 3 Years And Above NDC# [address]-91  [facility]  
Reviewed

 

 12/10/2012 12:00 AM  IMMUNIZATION ADMIN  Reviewed

 

 12/10/2012 12:00 AM  Pneumovax Injection - [facility]  Reviewed

 

 2012 12:00 AM  TRICHOMONAS ASSAY W/OPTIC  Reviewed

 

 2013 12:00 AM  THER/PROPH/DIAG INJ SC/IM  Reviewed

 

 2013 12:00 AM  Bicillin CR, 1.2 million units NDC# 57843-018-49  Reviewed

 

 2013 12:00 AM  COMPREHEN METABOLIC PANEL  Reviewed

 

 2013 12:00 AM  LIPID PANEL  Reviewed

 

 2013 12:00 AM  COMPLETE CBC W/AUTO DIFF WBC  Reviewed

 

 2013 12:00 AM  ASSAY THYROID STIM HORMONE  Reviewed

 

 6/10/2013 12:00 AM  MAMMOGRAM SCREENING  Reviewed

 

 6/10/2013 12:00 AM  CYTOPATH C/V MANUAL  Reviewed

 

 12/10/2013 12:00 AM  LIPID PANEL  Reviewed

 

 12/10/2013 12:00 AM  ASSAY THYROID STIM HORMONE  Reviewed

 

 12/10/2013 12:00 AM  COMPLETE CBC W/AUTO DIFF WBC  Reviewed

 

 12/10/2013 12:00 AM  COMPREHEN METABOLIC PANEL  Reviewed

 

 2010 12:00 AM  CYTOPATH C/V MANUAL  Reviewed

 

 2010 12:00 AM  SMEAR WET MOUNT SALINE/INK  Reviewed

 

 2010 12:00 AM  LIPID PANEL  Reviewed

 

 2010 12:00 AM  ASSAY THYROID STIM HORMONE  Reviewed

 

 2010 12:00 AM  COMPLETE CBC W/AUTO DIFF WBC  Reviewed

 

 2010 12:00 AM  COMPREHEN METABOLIC PANEL  Reviewed

 

 10/28/2013 12:00 AM  Flu Injection 3 Years And Above NDC# [address]-99  [facility]  
Reviewed

 

 2010 8:48 AM  Blood Pressure Check-no charge  Reviewed

 

 2010 12:00 AM  Blood Pressure Check-no charge  Reviewed

 

 2014 12:00 AM  METABOLIC PANEL TOTAL CA  Reviewed

 

 2014 12:00 AM  ASSAY THYROID STIM HORMONE  Reviewed

 

 2014 12:00 AM  ASSAY OF FREE THYROXINE  Reviewed

 

 2014 12:00 AM  MAMMOGRAM SCREENING  Reviewed

 

 2014 12:00 AM  CT BONE DENSITY AXIAL  Reviewed

 

 2014 12:00 AM  COMPLETE CBC W/AUTO DIFF WBC  Reviewed

 

 2014 12:00 AM  COMPREHEN METABOLIC PANEL  Reviewed

 

 2014 12:00 AM  LIPID PANEL  Reviewed

 

 2014 12:00 AM  ASSAY THYROID STIM HORMONE  Reviewed

 

 10/20/2010 12:00 AM  IMMUNIZATION ADMIN  Reviewed

 

 10/20/2010 12:00 AM  FLU VACCINE 3 YRS & > IM  Reviewed

 

 2010 12:00 AM  COMPREHEN METABOLIC PANEL  Reviewed

 

 2010 12:00 AM  LIPID PANEL  Reviewed

 

 2010 12:00 AM  ASSAY THYROID STIM HORMONE  Reviewed

 

 2010 12:00 AM  COMPLETE CBC W/AUTO DIFF WBC  Reviewed

 

 2010 12:00 AM  Blood Pressure Check-no charge  Reviewed

 

 10/2/2014 12:00 AM  THER/PROPH/DIAG INJ SC/IM  Reviewed

 

 10/2/2014 12:00 AM  Kenalog, Per 10 Mg NDC#9341-7804-17  Reviewed

 

 2014 12:00 AM  COMPLETE CBC W/AUTO DIFF WBC  Reviewed

 

 2014 12:00 AM  COMPREHEN METABOLIC PANEL  Reviewed

 

 2014 12:00 AM  LIPID PANEL  Reviewed

 

 2014 12:00 AM  ASSAY THYROID STIM HORMONE  Reviewed

 

 2015 12:00 AM  Rocephin 1 gram NDC#4265-8624-80  Reviewed

 

 2015 12:00 AM  THER/PROPH/DIAG INJ SC/IM  Reviewed

 

 2011 12:00 AM  COMPREHEN METABOLIC PANEL  Reviewed

 

 2011 12:00 AM  LIPID PANEL  Reviewed

 

 2011 12:00 AM  ASSAY THYROID STIM HORMONE  Reviewed

 

 2011 12:00 AM  COMPLETE CBC W/AUTO DIFF WBC  Reviewed

 

 2011 12:00 AM  METABOLIC PANEL TOTAL CA  Reviewed

 

 2011 12:00 AM  COMPREHEN METABOLIC PANEL  Reviewed

 

 2011 12:00 AM  ASSAY THYROID STIM HORMONE  Reviewed

 

 2011 12:00 AM  COMPLETE CBC W/AUTO DIFF WBC  Reviewed

 

 2011 12:00 AM  ASSAY OF LIPASE  Reviewed

 

 2011 12:00 AM  THER/PROPH/DIAG INJ SC/IM  Reviewed

 

 2011 12:00 AM  Phenergan 50 Mg Im  Bernadine  Reviewed

 

 2011 12:00 AM  METABOLIC PANEL TOTAL CA  Reviewed

 

 2011 12:00 AM  THER/PROPH/DIAG INJ SC/IM  Reviewed

 

 2011 12:00 AM  Phenergan 25 Mg Im  Bernadine  Reviewed

 

 2011 12:00 AM  METABOLIC PANEL TOTAL CA  Reviewed

 

 2011 12:00 AM  ASSAY THYROID STIM HORMONE  Reviewed

 

 2011 12:00 AM  THER/PROPH/DIAG INJ SC/IM  Reviewed

 

 2011 12:00 AM  Phenergan 50 Mg Im  Bernadine  Reviewed

 

 2011 12:00 AM  ASSAY OF SERUM SODIUM  Reviewed

 

 2011 12:00 AM  ASSAY OF SERUM SODIUM  Reviewed

 

 2011 12:00 AM  CYTOPATH C/V MANUAL  Reviewed

 

 2011 12:00 AM  ASSAY THYROID STIM HORMONE  Reviewed

 

 2015 12:00 AM  COMPLETE CBC W/AUTO DIFF WBC  Reviewed

 

 2015 12:00 AM  COMPREHEN METABOLIC PANEL  Reviewed

 

 2015 12:00 AM  LIPID PANEL  Reviewed

 

 2015 12:00 AM  ASSAY THYROID STIM HORMONE  Reviewed

 

 2015 12:00 AM  MAMMOGRAM SCREENING  Reviewed

 

 2015 12:00 AM  CYTOPATH TBS C/V MANUAL  Reviewed







Results Summary







 Data and Description  Results

 

 2010 9:25 AM  Colonoscopy-Women and Men over 50 Abnormal Mammogram -Women 
over 40 Declined Pap Smear Declined 

 

 2010 10:41 AM  WET PREP NO TRICHOMONADS SEEN  No  Few 

 

 2010 8:15 AM  TRIGLYCERIDES 174.0 mg/dLCHOLESTEROL 175.0 mg/dLHDL 58.0 mg/
dLTOT CHOL/HDL 3.0 LDL (CALC) 82.0 mg/dLTSH 0.790 uIU/mLGLUCOSE 95.0 mg/
dLSODIUM 136.0 mmol/LPOTASSIUM 4.50 mmol/LCHLORIDE 99.0 mmol/LCO2 26.0 mmol/
LBUN 12.0 mg/dLCREATININE 0.80 mg/dLSGOT/AST 32.0 IU/LSGPT/ALT 33.0 IU/LALK 
PHOS 103.0 IU/LTOTAL PROTEIN 7.60 g/dLALBUMIN 4.60 g/dLTOTAL BILI 0.60 mg/
dLCALCIUM 9.80 mg/dLAGE 64 GFR NonAA 72 GFR AA 87 eGFR >60 mL/min/1.73 m2eGFR AA
* >60 WBC 5.3 RBC 4.13 HGB 13.10 g/dLHCT 39.20 %MCV 95.0 fLMCH 31.70 pgMCHC 
33.40 g/dLRDW SD 44 RDW CV 12.70 %MPV 9.80 fLPLT 257 NRBC# 0.00 NRBC% 0.0 %NEUT 
57.90 %%LYMP 26.50 %%MONO 11.60 %%EOS 3.20 %%BASO 0.80 %#NEUT 3.04 #LYMP 1.39 #
MONO 0.61 #EOS 0.17 #BASO 0.04 MANUAL DIFF NOT IND 

 

 12/15/2010 8:30 AM  TRIGLYCERIDES 157.0 mg/dLCHOLESTEROL 163.0 mg/dLHDL 56.0 mg
/dLTOT CHOL/HDL 2.9 LDL (CALC) 76.0 mg/dLTSH 0.650 uIU/mLWBC 6.5 RBC 4.25 HGB 
13.60 g/dLHCT 40.70 %MCV 96.0 fLMCH 32.0 pgMCHC 33.40 g/dLRDW SD 44 RDW CV 
12.60 %MPV 9.90 fLPLT 313 NRBC# 0.00 NRBC% 0.0 %NEUT 61.80 %%LYMP 26.20 %%MONO 
8.30 %%EOS 3.10 %%BASO 0.60 %#NEUT 4.00 #LYMP 1.70 #MONO 0.54 #EOS 0.20 #BASO 
0.04 MANUAL DIFF NOT IND GLUCOSE 97.0 mg/dLSODIUM 136.0 mmol/LPOTASSIUM 4.40 
mmol/LCHLORIDE 101.0 mmol/LCO2 26.0 mmol/LBUN 10.0 mg/dLCREATININE 0.80 mg/
dLSGOT/AST 31.0 IU/LSGPT/ALT 34.0 IU/LALK PHOS 92.0 IU/LTOTAL PROTEIN 8.10 g/
dLALBUMIN 4.60 g/dLTOTAL BILI 0.40 mg/dLCALCIUM 10.0 mg/dLAGE 65 GFR NonAA 72 
GFR AA 87 eGFR >60 mL/min/1.73 m2eGFR AA* >60 

 

 2011 9:45 AM  GLUCOSE 91.0 mg/dLSODIUM 133.0 mmol/LPOTASSIUM 4.40 mmol/
LCHLORIDE 97.0 mmol/LCO2 24.0 mmol/LBUN 9.0 mg/dLCREATININE 0.70 mg/dLCALCIUM 
10.0 mg/dLAGE 65 GFR NonAA 84 GFR  eGFR >60 mL/min/1.73 m2eGFR AA* >60 

 

 2011 10:25 AM  LIPASE 29.0 U/LTSH 0.10 uIU/mLGLUCOSE 115.0 mg/dLSODIUM 
127.0 mmol/LPOTASSIUM 5.30 mmol/LCHLORIDE 93.0 mmol/LCO2 18.0 mmol/LBUN 9.0 mg/
dLCREATININE 0.70 mg/dLSGOT/AST 62.0 IU/LSGPT/ALT 46.0 IU/LALK PHOS 100.0 IU/
LTOTAL PROTEIN 8.60 g/dLALBUMIN 4.90 g/dLTOTAL BILI 0.60 mg/dLCALCIUM 9.90 mg/
dLAGE 65 GFR NonAA 84 GFR  eGFR >60 mL/min/1.73 m2eGFR AA* >60 WBC 6.9 
RBC 4.48 HGB 14.40 g/dLHCT 40.90 %MCV 91.0 fLMCH 32.10 pgMCHC 35.20 g/dLRDW SD 
41 RDW CV 12.50 %MPV 9.30 fLPLT 260 NRBC# 0.00 NRBC% 0.0 %NEUT 74.90 %%LYMP 
15.10 %%MONO 9.40 %%EOS 0.30 %%BASO 0.30 %#NEUT 5.15 #LYMP 1.04 #MONO 0.65 #EOS 
0.02 #BASO 0.02 MANUAL DIFF NOT IND 

 

 2011 9:07 AM  GLUCOSE 92.0 mg/dLSODIUM 131.0 mmol/LPOTASSIUM 4.20 mmol/
LCHLORIDE 99.0 mmol/LCO2 22.0 mmol/LBUN 9.0 mg/dLCREATININE 0.70 mg/dLCALCIUM 
9.20 mg/dLAGE 65 GFR NonAA 84 GFR  eGFR >60 mL/min/1.73 m2eGFR AA* >60 

 

 2011 11:06 AM  TSH 0.510 uIU/mLGLUCOSE 99.0 mg/dLSODIUM 132.0 mmol/
LPOTASSIUM 3.50 mmol/LCHLORIDE 95.0 mmol/LCO2 23.0 mmol/LBUN 9.0 mg/
dLCREATININE 0.80 mg/dLCALCIUM 10.40 mg/dLAGE 65 GFR NonAA 72 GFR AA 87 eGFR >
60 mL/min/1.73 m2eGFR AA* >60 

 

 2011 9:45 AM  SODIUM 132.0 mmol/L

 

 2011 9:27 AM  SODIUM 137.0 mmol/L

 

 2011 8:35 AM  TRIGLYCERIDES 114.0 mg/dLCHOLESTEROL 145.0 mg/dLHDL 56.0 mg/
dLTOT CHOL/HDL 2.6 LDL (CALC) 66.0 mg/dLGLUCOSE 105.0 mg/dLSODIUM 138.0 mmol/
LPOTASSIUM 4.40 mmol/LCHLORIDE 103.0 mmol/LCO2 25.0 mmol/LBUN 8.0 mg/
dLCREATININE 0.70 mg/dLSGOT/AST 36.0 IU/LSGPT/ALT 38.0 IU/LALK PHOS 103.0 IU/
LTOTAL PROTEIN 7.40 g/dLALBUMIN 4.30 g/dLTOTAL BILI 0.30 mg/dLCALCIUM 9.20 mg/
dLAGE 65 GFR NonAA 84 GFR  eGFR >60 mL/min/1.73 m2eGFR AA* >60 WBC 5.1 
RBC 3.76 HGB 12.0 g/dLHCT 36.10 %MCV 96.0 fLMCH 31.90 pgMCHC 33.20 g/dLRDW SD 
46 RDW CV 13.10 %MPV 9.40 fLPLT 249 NRBC# 0.00 NRBC% 0.0 %NEUT 60.40 %%LYMP 
25.90 %%MONO 8.90 %%EOS 4.0 %%BASO 0.80 %#NEUT 3.06 #LYMP 1.31 #MONO 0.45 #EOS 
0.20 #BASO 0.04 MANUAL DIFF NOT IND 

 

 2011 9:55 AM  TSH 1.070 uIU/mL

 

 2011 8:55 AM  GLUCOSE 102.0 mg/dLSODIUM 135.0 mmol/LPOTASSIUM 4.20 mmol/
LCHLORIDE 102.0 mmol/LCO2 24.0 mmol/LBUN 15.0 mg/dLCREATININE 0.80 mg/dLSGOT/
AST 30.0 IU/LSGPT/ALT 35.0 IU/LALK PHOS 119.0 IU/LTOTAL PROTEIN 7.50 g/
dLALBUMIN 4.30 g/dLTOTAL BILI 0.30 mg/dLCALCIUM 9.20 mg/dLAGE 65 GFR NonAA 72 
GFR AA 88 eGFR >60 mL/min/1.73 m2eGFR AA* >60 TSH 1.130 uIU/mL

 

 2011 9:50 AM  GLUCOSE 85.0 mg/dLSODIUM 133.0 mmol/LPOTASSIUM 4.20 mmol/
LCHLORIDE 101.0 mmol/LCO2 21.0 mmol/LBUN 13.0 mg/dLCREATININE 0.80 mg/dLSGOT/
AST 36.0 IU/LSGPT/ALT 36.0 IU/LALK PHOS 109.0 IU/LTOTAL PROTEIN 7.40 g/
dLALBUMIN 4.20 g/dLTOTAL BILI 0.50 mg/dLCALCIUM 9.40 mg/dLAGE 65 GFR NonAA 72 
GFR AA 87 eGFR >60 mL/min/1.73 m2eGFR AA* >60 

 

 2011 8:55 AM  GLUCOSE 98.0 mg/dLSODIUM 137.0 mmol/LPOTASSIUM 3.90 mmol/
LCHLORIDE 103.0 mmol/LCO2 25.0 mmol/LBUN 14.0 mg/dLCREATININE 0.70 mg/dLSGOT/
AST 33.0 IU/LSGPT/ALT 36.0 IU/LALK PHOS 111.0 IU/LTOTAL PROTEIN 8.30 g/
dLALBUMIN 4.40 g/dLTOTAL BILI 0.50 mg/dLCALCIUM 9.40 mg/dLAGE 66 GFR NonAA 84 
GFR  eGFR >60 mL/min/1.73 m2eGFR AA* >60 TRIGLYCERIDES 109.0 mg/
dLCHOLESTEROL 153.0 mg/dLHDL 54.0 mg/dLTOT CHOL/HDL 2.8 LDL (CALC) 77.0 mg/
dLTSH 1.330 uIU/mL

 

 2011 10:17 AM  Colonoscopy-Women and Men over 50 Declined Mammogram -
Women over 40 Normal Pap Smear Negative 

 

 2012 10:33 AM  WET PREP NO TRICH SEEN CLUE CELLS NONE SEEN 

 

 2012 8:45 AM  WBC 5.2 RBC 3.96 HGB 12.70 g/dLHCT 37.80 %MCV 96.0 fLMCH 
32.10 pgMCHC 33.60 g/dLRDW SD 46 RDW CV 13.30 %MPV 9.70 fLPLT 297 NRBC# 0.00 
NRBC% 0.0 %NEUT 57.70 %%LYMP 28.50 %%MONO 10.30 %%EOS 2.50 %%BASO 1.0 %#NEUT 
3.02 #LYMP 1.49 #MONO 0.54 #EOS 0.13 #BASO 0.05 MANUAL DIFF NOT IND GLUCOSE 
97.0 mg/dLSODIUM 137.0 mmol/LPOTASSIUM 4.40 mmol/LCHLORIDE 101.0 mmol/LCO2 23.0 
mmol/LBUN 17.0 mg/dLCREATININE 0.80 mg/dLSGOT/AST 30.0 IU/LSGPT/ALT 33.0 IU/
LALK PHOS 95.0 IU/LTOTAL PROTEIN 7.40 g/dLALBUMIN 4.40 g/dLTOTAL BILI 0.60 mg/
dLCALCIUM 9.70 mg/dLAGE 66 GFR NonAA 72 GFR AA 87 eGFR 60 eGFR AA* 60 
TRIGLYCERIDES 130.0 mg/dLCHOLESTEROL 157.0 mg/dLHDL 56.0 mg/dLTOT CHOL/HDL 2.8 
LDL (CALC) 75.0 mg/dLTSH 0.940 uIU/mL

 

 8/15/2012 4:02 PM  WET PREP NO TRICH SEEN CLUE CELLS NONE SEEN 

 

 2012 8:45 AM  WBC 5.1 RBC 3.91 HGB 12.50 g/dLHCT 36.30 %MCV 93.0 fLMCH 
32.0 pgMCHC 34.40 g/dLRDW SD 43 RDW CV 12.60 %MPV 9.30 fLPLT 244 NRBC# 0.00 NRBC
% 0.0 %NEUT 57.60 %%LYMP 27.50 %%MONO 9.10 %%EOS 5.0 %%BASO 0.80 %#NEUT 2.91 #
LYMP 1.39 #MONO 0.46 #EOS 0.25 #BASO 0.04 MANUAL DIFF NOT IND TSH 1.390 uIU/
mLTRIGLYCERIDES 154.0 mg/dLCHOLESTEROL 147.0 mg/dLHDL 45.0 mg/dLTOT CHOL/HDL 
3.3 LDL (CALC) 71.0 mg/dLGLUCOSE 101.0 mg/dLSODIUM 134.0 mmol/LPOTASSIUM 4.30 
mmol/LCHLORIDE 102.0 mmol/LCO2 23.0 mmol/LBUN 11.0 mg/dLCREATININE 0.80 mg/
dLSGOT/AST 34.0 IU/LSGPT/ALT 38.0 IU/LALK PHOS 104.0 IU/LTOTAL PROTEIN 7.60 g/
dLALBUMIN 4.40 g/dLTOTAL BILI 0.50 mg/dLCALCIUM 9.40 mg/dLAGE 67 GFR NonAA 72 
GFR AA 87 eGFR 60 eGFR AA* 60 

 

 12/10/2012 8:34 AM  Colonoscopy-Women and Men over 50 Declined Mammogram -
Women over 40 Declined Pap Smear Declined 

 

 2012 5:02 PM  WET PREP NO TRICH SEEN CLUE CELLS NONE SEEN 

 

 2013 8:25 AM  WBC 4.2 RBC 3.96 HGB 12.90 g/dLHCT 37.0 %MCV 93.0 fLMCH 
32.60 pgMCHC 34.90 g/dLRDW SD 43 RDW CV 12.60 %MPV 9.70 fLPLT 254 NRBC# 0.00 
NRBC% 0.0 %NEUT 54.40 %%LYMP 30.40 %%MONO 10.80 %%EOS 3.40 %%BASO 1.0 %#NEUT 
2.26 #LYMP 1.26 #MONO 0.45 #EOS 0.14 #BASO 0.04 MANUAL DIFF NOT IND TSH 1.230 
uIU/mLGLUCOSE 94.0 mg/dLSODIUM 136.0 mmol/LPOTASSIUM 4.30 mmol/LCHLORIDE 99.0 
mmol/LCO2 25.0 mmol/LBUN 10.0 mg/dLCREATININE 0.80 mg/dLSGOT/AST 36.0 IU/LSGPT/
ALT 36.0 IU/LALK PHOS 84.0 IU/LTOTAL PROTEIN 7.90 g/dLALBUMIN 4.40 g/dLTOTAL 
BILI 0.70 mg/dLCALCIUM 9.80 mg/dLAGE 67 GFR NonAA 72 GFR AA 87 eGFR 60 eGFR AA* 
60 TRIGLYCERIDES 122.0 mg/dLCHOLESTEROL 141.0 mg/dLHDL 47.0 mg/dLTOT CHOL/HDL 
3.0 LDL (CALC) 70.0 mg/dL

 

 6/10/2013 3:52 PM  WET PREP NO TRICH SEEN CLUE CELLS NONE SEEN 

 

 2013 8:45 AM  WBC 5.3 RBC 4.01 HGB 13.0 g/dLHCT 37.60 %MCV 94.0 fLMCH 
32.40 pgMCHC 34.60 g/dLRDW SD 43 RDW CV 12.50 %MPV 9.40 fLPLT 248 NRBC# 0.00 
NRBC% 0.0 %NEUT 58.70 %%LYMP 27.80 %%MONO 9.80 %%EOS 2.80 %%BASO 0.90 %#NEUT 
3.12 #LYMP 1.48 #MONO 0.52 #EOS 0.15 #BASO 0.05 MANUAL DIFF NOT IND TSH 1.570 
uIU/mLGLUCOSE 94.0 mg/dLSODIUM 134.0 mmol/LPOTASSIUM 4.10 mmol/LCHLORIDE 101.0 
mmol/LCO2 23.0 mmol/LBUN 11.0 mg/dLCREATININE 0.80 mg/dLSGOT/AST 41.0 IU/LSGPT/
ALT 44.0 IU/LALK PHOS 109.0 IU/LTOTAL PROTEIN 7.90 g/dLALBUMIN 4.70 g/dLTOTAL 
BILI 0.80 mg/dLCALCIUM 10.40 mg/dLAGE 68 GFR NonAA 72 GFR AA 87 eGFR >60 mL/min/
1.73 m2eGFR AA* >60 TRIGLYCERIDES 163.0 mg/dLCHOLESTEROL 138.0 mg/dLHDL 49.0 mg/
dLTOT CHOL/HDL 2.8 LDL (CALC) 56.0 mg/dL

 

 2014 8:58 AM  GLUCOSE 86.0 mg/dLSODIUM 134.0 mmol/LPOTASSIUM 4.20 mmol/
LCHLORIDE 99.0 mmol/LCO2 25.0 mmol/LBUN 14.0 mg/dLCREATININE 0.80 mg/dLCALCIUM 
10.10 mg/dLAGE 68 GFR NonAA 72 GFR AA 87 eGFR >60 mL/min/1.73 m2eGFR AA* >60 
FREE T4 1.18 TSH 1.20 uIU/mL

 

 2014 9:50 AM  WBC 5.7 RBC 4.03 HGB 12.90 g/dLHCT 37.90 %MCV 94.0 fLMCH 
32.0 pgMCHC 34.0 g/dLRDW SD 44 RDW CV 12.70 %MPV 9.70 fLPLT 292 NRBC# 0.00 NRBC
% 0.0 %NEUT 62.70 %%LYMP 21.80 %%MONO 11.0 %%EOS 3.40 %%BASO 1.10 %#NEUT 3.55 #
LYMP 1.23 #MONO 0.62 #EOS 0.19 #BASO 0.06 MANUAL DIFF NOT IND GLUCOSE 98.0 mg/
dLSODIUM 135.0 mmol/LPOTASSIUM 4.30 mmol/LCHLORIDE 102.0 mmol/LCO2 22.0 mmol/
LBUN 10.0 mg/dLCREATININE 0.80 mg/dLSGOT/AST 34.0 IU/LSGPT/ALT 32.0 IU/LALK 
PHOS 95.0 IU/LTOTAL PROTEIN 7.70 g/dLALBUMIN 4.30 g/dLTOTAL BILI 0.80 mg/
dLCALCIUM 9.10 mg/dLAGE 68 GFR NonAA 72 GFR AA 87 eGFR 60 eGFR AA* 60 
TRIGLYCERIDES 108.0 mg/dLCHOLESTEROL 146.0 mg/dLHDL 56.0 mg/dLTOT CHOL/HDL 2.6 
LDL (CALC) 68.0 mg/dLTSH 0.90 uIU/mL

 

 2014 8:40 AM  WBC 4.4 RBC 4.13 HGB 13.20 g/dLHCT 38.90 %MCV 94.0 fLMCH 
32.0 pgMCHC 33.90 g/dLRDW SD 47 RDW CV 13.50 %MPV 9.80 fLPLT 279 NRBC# 0.00 NRBC
% 0.0 %NEUT 63.80 %%LYMP 24.60 %%MONO 9.30 %%EOS 1.60 %%BASO 0.70 %#NEUT 2.80 #
LYMP 1.08 #MONO 0.41 #EOS 0.07 #BASO 0.03 MANUAL DIFF NOT IND GLUCOSE 96.0 mg/
dLSODIUM 136.0 mmol/LPOTASSIUM 4.10 mmol/LCHLORIDE 99.0 mmol/LCO2 23.0 mmol/
LBUN 8.0 mg/dLCREATININE 0.80 mg/dLSGOT/AST 31.0 IU/LSGPT/ALT 27.0 IU/LALK PHOS 
85.0 IU/LTOTAL PROTEIN 7.60 g/dLALBUMIN 4.40 g/dLTOTAL BILI 0.80 mg/dLCALCIUM 
10.20 mg/dLAGE 69 GFR NonAA 71 GFR AA 86 eGFR 60 eGFR AA* 60 TRIGLYCERIDES 61.0 
mg/dLCHOLESTEROL 148.0 mg/dLHDL 71.0 mg/dLTOT CHOL/HDL 2.1 LDL (CALC) 65.0 mg/
dLTSH 0.990 uIU/mL

 

 2015 8:32 AM  WBC 4.8 RBC 3.98 HGB 12.70 g/dLHCT 37.30 %MCV 94.0 fLMCH 
31.90 pgMCHC 34.0 g/dLRDW SD 43 RDW CV 12.70 %MPV 9.50 fLPLT 270 NRBC# 0.00 NRBC
% 0.0 %NEUT 57.30 %%LYMP 25.0 %%MONO 13.0 %%EOS 3.60 %%BASO 1.10 %#NEUT 2.73 #
LYMP 1.19 #MONO 0.62 #EOS 0.17 #BASO 0.05 MANUAL DIFF NOT IND TSH 0.640 uIU/
mLGLUCOSE 90.0 mg/dLSODIUM 136.0 mmol/LPOTASSIUM 4.0 mmol/LCHLORIDE 101.0 mmol/
LCO2 24.0 mmol/LBUN 10.0 mg/dLCREATININE 0.80 mg/dLSGOT/AST 34.0 IU/LSGPT/ALT 
32.0 IU/LALK PHOS 92.0 IU/LTOTAL PROTEIN 7.50 g/dLALBUMIN 4.40 g/dLTOTAL BILI 
0.70 mg/dLCALCIUM 9.50 mg/dLAGE 69 GFR NonAA 71 GFR AA 86 eGFR >60 mL/min/1.73 
m2eGFR AA* >60 TRIGLYCERIDES 107.0 mg/dLCHOLESTEROL 138.0 mg/dLHDL 58.0 mg/
dLTOT CHOL/HDL 2.4 LDL (CALC) 59.0 mg/dL

 

 2015 8:31 AM  WBC 4.6 RBC 3.97 HGB 12.90 g/dLHCT 38.0 %MCV 96.0 fLMCH 
32.50 pgMCHC 33.90 g/dLRDW SD 44 RDW CV 12.60 %MPV 9.0 fLPLT 248 NRBC# 0.00 NRBC
% 0.0 %NEUT 61.0 %%LYMP 24.70 %%MONO 10.20 %%EOS 3.0 %%BASO 1.10 %#NEUT 2.82 #
LYMP 1.14 #MONO 0.47 #EOS 0.14 #BASO 0.05 MANUAL DIFF NOT IND TRIGLYCERIDES 
105.0 mg/dLCHOLESTEROL 141.0 mg/dLHDL 58.0 mg/dLTOT CHOL/HDL 2.4 LDL (CALC) 
62.0 mg/dLGLUCOSE 93.0 mg/dLSODIUM 136.0 mmol/LPOTASSIUM 4.10 mmol/LCHLORIDE 
101.0 mmol/LCO2 25.0 mmol/LBUN 9.0 mg/dLCREATININE 0.80 mg/dLSGOT/AST 32.0 IU/
LSGPT/ALT 28.0 IU/LALK PHOS 95.0 IU/LTOTAL PROTEIN 7.30 g/dLALBUMIN 4.50 g/
dLTOTAL BILI 0.80 mg/dLCALCIUM 9.70 mg/dLAGE 70 GFR NonAA 71 GFR AA 86 eGFR >60 
mL/min/1.73meGFR AA* >60 TSH 1.10 uIU/mL

 

 2016 8:25 AM  TSH 0.70 uIU/mLTRIGLYCERIDES 85.0 mg/dLCHOLESTEROL 166.0 mg
/dLHDL 74.0 mg/dLTOT CHOL/HDL 2.2 LDL (CALC) 75.0 mg/dLGLUCOSE 96.0 mg/dLSODIUM 
136.0 mmol/LPOTASSIUM 4.0 mmol/LCHLORIDE 100.0 mmol/LCO2 24.0 mmol/LBUN 11.0 mg/
dLCREATININE 0.80 mg/dLSGOT/AST 34.0 IU/LSGPT/ALT 24.0 IU/LALK PHOS 98.0 IU/
LTOTAL PROTEIN 7.60 g/dLALBUMIN 4.40 g/dLTOTAL BILI 0.80 mg/dLCALCIUM 9.70 mg/
dLAGE 71 GFR NonAA 71 GFR AA 86 eGFR >60 mL/min/1.73meGFR AA* >60 WBC 5.9 RBC 
4.00 HGB 12.80 g/dLHCT 38.0 %MCV 95.0 fLMCH 32.0 pgMCHC 33.70 g/dLRDW SD 43 RDW 
CV 12.20 %MPV 9.30 fLPLT 272 NRBC# 0.00 NRBC% 0.0 %NEUT 73.90 %%LYMP 15.60 %%
MONO 7.80 %%EOS 1.70 %%BASO 0.70 %#NEUT 4.35 #LYMP 0.92 #MONO 0.46 #EOS 0.10 #
BASO 0.04 MANUAL DIFF NOT IND 







History Of Immunizations







 Name  Date Admin  Mfg Name  Mfg Code  Trade Name  Lot#  Route  Inj  Vis Given  
Vis Pub  CVX

 

 Influenza  10/20/2010  [employer]of[employer] pasteur  [facility]  Fluzone  [id number]  Intramuscular  
Left Arm  10/20/2010  2010  999

 

 Influenza  10/28/2011  [employer] pasteur  [employer]  Fluzone  [id number]  Intramuscular  
Left Arm  10/28/2011  2011  141

 

 Influenza  10/29/2012  sanofi pasteur  [employer]  Fluzone  [id number]  Intramuscular  
Left Deltoid  10/29/2012  2012  141

 

 X  12/10/2012  [employer]  MSD  Pneumovax 23  [id number]  Intramuscular  
Left Gluteous Medius  12/10/2012  2010  33

 

 Influenza  10/28/2013  sanofi pasteur  [facility]  Fluzone > 3 Years  [id number]  
Intramuscular  Right Deltoid  10/28/2013  2013  141

 

 X  9/2/2015  Not Entered  NE  Prevnar 13     Not Entered  Not Entered  1  109

 

 Influenza  2015  Not Entered  NE  Not Entered     Not Entered  Not Entered
  2015  141

 

 HepB Adult  2016  [employer]  MSD  Recombivax Adult  [id number]  Not 
Entered  Left Deltoid  2016  43

 

 HepB Adult  2016  [employer]  MSD  Recombivax Adult  [id number]  
Intramuscular  Right Deltoid  2016  43

 

 Zostavax  2012  Not Entered  NE  Not Entered     Not Entered  Not 
Entered  1  121

 

 Tdap  2012  Not Entered  NE  Not Entered     Not Entered  Not Entered  1  115

 

 Influenza  10/13/2016  Not Entered  NE  Fluzone High-Dose     Intramuscular  
Left Arm  1  141

 

 HepB Adult  2016  [employer]  MSD  Recombivax Adult  [id number]  
Intramuscular  Right Deltoid  2016  43







History of Past Illness







 Name  Date of Onset  Comments

 

 Benign Essential Hypertension  Dec 14 2009 10:10AM   

 

 Hyperlipidemia  Dec 14 2009 10:10AM   

 

 Hypothyroidism, Acquired  Dec 14 2009 10:10AM   

 

 hypothyroid      

 

 Hypertension      

 

 Hyperlipidemia      

 

 acid reflux      

 

 Hypertension, Benign Essential  2010  9:41AM   

 

 Hypertension, Benign Essential  2010 12:53PM   

 

 Routine gynecological examination  May  5 2010  9:28AM   

 

 Hypertension  May  5 2010  9:28AM   

 

 Hyperlipidemia, unspecified  May  5 2010  9:28AM   

 

 Hypothyroidism, Acquired  May  5 2010  9:28AM   

 

 Vulvovaginitis  May  5 2010  9:28AM   

 

 Rhinitis, Allergic  May  5 2010  9:28AM   

 

 Hypertension, Benign Essential  May 19 2010  8:48AM   

 

 Hypertension, Benign Essential  May 25 2010  9:39AM   

 

 Flu  Oct 20 2010 12:01PM   

 

 Essential Hypertension  2010  8:34AM   

 

 Hyperlipidemia  2010  8:34AM   

 

 Gastroesophageal Reflux  2010  8:34AM   

 

 Hypothyroidism, Acquired  2010  8:34AM   

 

 Hypertension, Benign Essential  2010  9:22AM   

 

 Hypertension, Benign Essential  Dec 15 2010  9:07AM   

 

 Essential Hypertension  2011  1:31PM   

 

 Hyperlipidemia  2011  1:31PM   

 

 Gastroesophageal Reflux  2011  1:31PM   

 

 Hypothyroidism, Acquired  2011  1:31PM   

 

 Essential Hypertension  2011  8:51AM   

 

 Hyperlipidemia  2011  8:51AM   

 

 Gastroesophageal Reflux  2011  8:51AM   

 

 Hypothyroidism, Acquired  2011  8:51AM   

 

 Essential Hypertension  2011  9:13AM   

 

 Hyperlipidemia  2011  9:13AM   

 

 Gastroesophageal Reflux  2011  9:13AM   

 

 Hypothyroidism, Acquired  2011  9:13AM   

 

 Nausea With Vomiting  2011  1:18PM   

 

 Hyponatremia  2011  8:46AM   

 

 Nausea  2011  9:13AM   

 

 Hypothyroidism  2011 11:10AM   

 

 Hyponatremia  2011 11:10AM   

 

 Essential Hypertension  2011 10:05AM   

 

 Hyperlipidemia  2011 10:05AM   

 

 Gastroesophageal Reflux  2011 10:05AM   

 

 Nausea  2011 10:05AM   

 

 Hypothyroidism, Acquired  2011 10:05AM   

 

 Hyponatremia  May  6 2011 11:34AM   

 

 Essential Hypertension  May 16 2011  8:50AM   

 

 Hyperlipidemia  May 16 2011  8:50AM   

 

 Gastroesophageal Reflux  May 16 2011  8:50AM   

 

 Nausea  May 16 2011  8:50AM   

 

 Hypothyroidism, Acquired  May 16 2011  8:50AM   

 

 Hyponatremia  May 16 2011  8:50AM   

 

 Routine gynecological examination  2011  8:54AM   

 

 Hypertension  2011  8:54AM   

 

 Hyperlipidemia, unspecified  2011  8:54AM   

 

 Hypothyroidism, Acquired  2011  8:54AM   

 

 Rhinitis, Allergic  2011  8:54AM   

 

 Hypothyroidism  Aug 29 2011 12:37PM   

 

 Hyponatremia  Aug 29 2011 12:37PM   

 

 Essential Hypertension  Sep 12 2011  8:53AM   

 

 Hyperlipidemia  Sep 12 2011  8:53AM   

 

 Gastroesophageal Reflux  Sep 12 2011  8:53AM   

 

 Hypothyroidism, Acquired  Sep 12 2011  8:53AM   

 

 Hyponatremia  Sep 12 2011  8:53AM   

 

 Hypertension  Sep 29 2011  9:41AM   

 

 Hyperlipidemia  Dec  8 2011  8:31AM   

 

 Hypothyroidism  Dec  8 2011  8:31AM   

 

 Hypertension  Dec  8 2011  8:31AM   

 

 Flu  Dec  9 2011 10:02AM   

 

 Essential Hypertension  Dec 13 2011 10:13AM   

 

 Hyperlipidemia  Dec 13 2011 10:13AM   

 

 Gastroesophageal Reflux  Dec 13 2011 10:13AM   

 

 Hypothyroidism, Acquired  Dec 13 2011 10:13AM   

 

 Hyponatremia  Dec 13 2011 10:13AM   

 

 Vaginal Discharge  2012  9:43AM   

 

 Rhinitis, Allergic  Feb 15 2012  9:31AM   

 

 Eustachian Tube Dysfunction  Feb 15 2012  9:31AM   

 

 Pharyngitis, Acute  Feb 15 2012  9:31AM   

 

 Routine gynecological examination  2012  8:48AM   

 

 Hypertension  2012  8:48AM   

 

 Hyperlipidemia, unspecified  2012  8:48AM   

 

 Hypothyroidism, Acquired  2012  8:48AM   

 

 Rhinitis, Allergic  2012  8:48AM   

 

 Candidiasis, Vulvovaginal  2012 11:04AM   

 

 Essential Hypertension  Aug 15 2012  9:02AM   

 

 Hyperlipidemia  Aug 15 2012  9:02AM   

 

 Gastroesophageal Reflux  Aug 15 2012  9:02AM   

 

 Hypothyroidism, Acquired  Aug 15 2012  9:02AM   

 

 Hyponatremia  Aug 15 2012  9:02AM   

 

 Atrophic Vaginitis, Postmenopausal  Aug 15 2012  9:02AM   

 

 Flu  Oct 29 2012  9:24AM   

 

 Essential Hypertension  Dec 10 2012  8:35AM   

 

 Hyperlipidemia  Dec 10 2012  8:35AM   

 

 Gastroesophageal Reflux  Dec 10 2012  8:35AM   

 

 Hypothyroidism, Acquired  Dec 10 2012  8:35AM   

 

 Hyponatremia  Dec 10 2012  8:35AM   

 

 Atrophic Vaginitis, Postmenopausal  Dec 10 2012  8:35AM   

 

 Pneumococcus  Dec 10 2012  2:07PM   

 

 Vaginal Discharge  Dec 20 2012  1:50PM   

 

 Essential Hypertension  Dec 20 2012  1:50PM   

 

 Hyperlipidemia  Dec 20 2012  1:50PM   

 

 Gastroesophageal Reflux  Dec 20 2012  1:50PM   

 

 Hypothyroidism, Acquired  Dec 20 2012  1:50PM   

 

 Atrophic Vaginitis, Postmenopausal  Dec 20 2012  1:50PM   

 

 Upper Respiratory Infections  2013  8:52AM   

 

 Hyperlipidemia  2013  8:21AM   

 

 Hypertension  2013  8:21AM   

 

 Hypothyroidism  2013  8:21AM   

 

 Screening Mammogram  Beto 10 2013  8:45AM   

 

 Routine gynecological examination  Beto 10 2013  8:34AM   

 

 Hypertension  Beto 10 2013  8:34AM   

 

 Hyperlipidemia, unspecified  Beto 10 2013  8:34AM   

 

 Hypothyroidism, Acquired  Beto 10 2013  8:34AM   

 

 Rhinitis, Allergic  Beto 10 2013  8:34AM   

 

 Flu  Oct 28 2013  8:52AM   

 

 Essential Hypertension  Dec  9 2013  8:37AM   

 

 Hyperlipidemia  Dec  9 2013  8:37AM   

 

 Gastroesophageal Reflux  Dec  9 2013  8:37AM   

 

 Hypothyroidism, Acquired  Dec  9 2013  8:37AM   

 

 Atrophic Vaginitis, Postmenopausal  Dec  9 2013  8:37AM   

 

 Hypertension  2014  9:56AM   

 

 Hyperlipidemia  2014  9:56AM   

 

 Hypothyroidism  2014  9:56AM   

 

 Screening Mammogram  2014  8:32AM   

 

 Osteopenia  2014  8:32AM   

 

 Hypertension  2014  8:35AM   

 

 Hypothyroidism, Acquired  2014  8:35AM   

 

 Hyperlipidemia  2014  8:35AM   

 

 Upper Respiratory Infections  2014  9:28AM   

 

 Sinusitis, Acute  Oct  2 2014  9:17AM   

 

 Herpes Zoster  Oct  5 2014  1:44PM   

 

 Vesicular Eruption  Oct  5 2014  1:44PM   

 

 Hypertension  2014  8:18AM   

 

 Hyperlipidemia  2014  8:18AM   

 

 hypothyroid  2014  8:18AM   

 

 Situational anxiety  Dec 20 2014  9:33AM   

 

 GERD (gastroesophageal reflux disease)  Dec 20 2014  9:33AM   

 

 Nausea  Dec 20 2014  9:33AM   

 

 Abdominal Pain, Epigastric  Dec 20 2014  9:33AM   

 

 Hyperlipidemia  Oct  6 2014  9:03AM   

 

 acid reflux  Oct  6 2014  9:03AM   

 

 hypothyroid  Oct  6 2014  9:03AM   

 

 Shingles  Oct  6 2014  9:03AM   

 

 Pharyngitis  2015  1:09PM   

 

 Bronchitis  2015  9:10AM   

 

 Hyperlipidemia  2015  9:10AM   

 

 acid reflux  2015  9:10AM   

 

 hypothyroid  2015  9:10AM   

 

 Hyperlipidemia  2015  8:18AM   

 

 Hypertension  2015  8:18AM   

 

 hypothyroid  2015  8:18AM   

 

 Screening Mammogram  2015 11:43AM   

 

 Medicare Annual Pap Q 2 years  2015  9:36AM   

 

 Screening Mammogram  2015  9:36AM   

 

 Candidiasis of female genitalia  2015  9:36AM   

 

 Hypertension  2015 11:34AM   

 

 Hyperlipidemia, unspecified  2015 11:34AM   

 

 Hypothyroidism, Acquired  2015 11:34AM   

 

 Osteopenia  2016  8:41AM   

 

 Encounter for screening mammogram for breast cancer  2016  8:41AM   

 

 Hypertension  2016  8:34AM   

 

 Lymphedema  2016  8:34AM   

 

 Hyperlipidemia  2016  8:34AM   

 

 hypothyroid  2016  8:34AM   

 

 HEP B  2016  9:13AM   

 

 HEP B  2016  8:52AM   

 

 Acid Reflux  2016  8:34AM   

 

 History of acute gastritis  Oct 13 2016  2:30PM   

 

 Anxiety as acute reaction to exceptional stress  Oct 13 2016  2:30PM   

 

 HEP B  Dec 29 2016  8:42AM   







Payers







 Insurance Name  Company Name  Plan Name  Plan Number  Policy Number  Policy 
Group Number  Start Date

 

    Medicare RHC Medicare RHC     [id number]     N/A

 

    [id number]     2009

 

    Medicare Part B  [place] [place]     [id number]     N/A

 

    Medicare Part A  Medicare Part A     [id number]     N/A

 

    Medicare Part A  [employer]are P [employer] - Preventive     [id number]     N/A

 

    Medicare Part A  Medicare - Lab/Xray     [id number]     N/A







History of Encounters







 Visit Date  Visit Type  Provider

 

 2016  Nurse visit  Doris Noriega MD

 

 10/13/2016  Office visit  Doris Noriega MD

 

 2016  Nurse visit  Doris Noriega MD

 

 2016  Office visit  Doris Noriega MD

 

 2015  Office visit  Doris Noriega MD

 

 2015  Office visit  Doris Noriega MD

 

 2015  Office visit  Prince Noe APRN

 

 2014  Office visit  Anyi Herrera APRN

 

 10/27/2014  Voided  Doris Noriega MD

 

 10/6/2014  Office visit  Doris Noriega MD

 

 10/5/2014  Office visit  Anyi Herrera APRN

 

 10/2/2014  Office visit   

 

 10/2/2014  Office visit  Corrine Bay APRN

 

 2014  Office visit  Kassie Franklin APRN

 

 2014  Office visit  Doris Noriega MD

 

 2014  Office visit  Doris Noriega MD

 

 2013  Office visit  Dee Colindres MD

 

 10/28/2013  Nurse visit  Dee Colindres MD

 

 6/10/2013  Office visit  Dee Colindres MD

 

 2013  Office visit  Corrine Bay APRN

 

 2012  Office visit  Dee Colindres MD

 

 12/10/2012  Office visit  Dee Colindres MD

 

 10/29/2012  Nurse visit  Dee Colindres MD

 

 8/15/2012  Office visit  Dee Colindres MD

 

 2012  Office visit  Corrine Bay APRN

 

 2012  Office visit  Dee Colindres MD

 

 2/15/2012  Office visit  Dee Colindres MD

 

 2012  Office visit  Corrine Bay APRN

 

 2011  Office visit  Dee Colindres MD

 

 10/28/2011  Nurse visit  Shad Nolasco MD

 

 2011  Office visit  Dee Colindres MD

 

 2011  Office visit  Dee Colindres MD

 

 2011  Office visit  Dee Colindres MD

 

 2011  Office visit  Dee Colindres MD

 

 2011  Office visit  Dee Colindres MD

 

 2011  Office visit  Dee Colindres MD

 

 4/10/2011  Hospital  KHRIS Granados MD

 

 4/10/2011  Hospital  RICKEY Toussaint MD

 

 2011  Office visit  RICKEY Toussaint MD

 

 2011  [facility]  Fide Castellanos MD

 

 2011  Voided  Fide Castellanos MD

 

 2011  Office visit  Dee Colindres MD

 

 12/15/2010  Nurse visit  Dee Colindres MD

 

 2010  Office visit  Dee Colindres MD

 

 10/20/2010  Nurse visit  Stephenie PERAZA

 

 2010  Nurse visit  Dee Colindres MD

 

 2010  Nurse visit  Dee Colindres MD

 

 2010  Office visit  Dee Colindres MD

 

 3/19/2010  Voided  Stephenie Kay PA

 

 2010  Nurse visit  Stephenie PERAZA

 

 2010  Nurse visit  Stephenie PERAZA

 

 2010  Nurse visit  Stephenie PERAZA

 

 2009  Office visit  Stephenie PERAZA

 

 10/20/2009  Nurse visit  Stephenie Kay PA

## 2017-07-13 NOTE — XMS REPORT
MU2 Ambulatory Summary

 Created on: 2017



Milady Crespo

External Reference #: [id number]

: 1945

Sex: Female



Demographics







 Address  [address]

[place]  [address]

 

 Home Phone  [phone]

 

 Preferred Language  English

 

 Marital Status  

 

 Moravian Affiliation  Unknown

 

 Race  White

 

 Ethnic Group  Not  or 





Author







 Prince Linder

 

 [facility]

 

 Address  [address]

[place], [place]  [id number]



 

 Phone  [phone]







Care Team Providers







 Care Team Member Name  Role  Phone

 

 Prince Noe  PCP  [phone]

 

 BerthaemilieDoris  PreferredProvider  [phone]







Allergies and Adverse Reactions







 Name  Reaction  Notes

 

 NO KNOWN DRUG ALLERGIES      







Plan of Treatment







 Planned Activity  Comments  Planned Date  Planned Time  Plan/Goal

 

 Complete blood count (CBC) with differential count     2016  12:00 AM   

 

 Comprehensive metabolic panel     2016  12:00 AM   

 

 VITAMIN D (25 HYDROXY)     2016  12:00 AM   

 

 Lipid panel (total cholesterol, lipoproteins, HDL, triglycerides)     8/15/
2012  12:00 AM   

 

 Comprehensive Metabolic Panel     6/10/2013  12:00 AM   

 

 Lipid panel (total cholesterol, lipoproteins, HDL, triglycerides)     6/10/
2013  12:00 AM   

 

 Thyroid stimulating hormone (TSH)     6/10/2013  12:00 AM   

 

 CBC (automated H&H, platelets, WBC and automated differential)     6/10/2013  
12:00 AM   

 

 Comprehensive Metabolic Panel     2012  12:00 AM   

 

 Lipid panel (total cholesterol, lipoproteins, HDL, triglycerides)     2012  12:00 AM   

 

 Thyroid stimulating hormone (TSH)     2012  12:00 AM   

 

 CBC (automated H&H, platelets, WBC and automated differential)     2012  
12:00 AM   

 

 Screening mammography, bilateral     2015  12:00 AM   







Medications







 Active 

 

 Name  Start Date  Estimated Completion Date  SIG  Comments

 

 aspirin 81 mg oral tablet,delayed release (DR/EC)        take 1 tablet (81 mg) 
by oral route once daily   

 

 Vitamin D3 1,000 unit oral capsule        take 1 capsule by oral route daily   

 

 famotidine 20 mg oral tablet  2015     TAKE ONE TABLET BY MOUTH TWICE 
DAILY   

 

 valsartan 160 mg oral tablet  2016     TAKE ONE TABLET BY MOUTH ONCE 
DAILY   

 

 amlodipine 5 mg oral tablet  2016     TAKE ONE TABLET BY MOUTH ONCE DAILY
   

 

 Allergy Relief (loratadine) 10 mg oral tablet  2016     TAKE ONE TABLET 
BY MOUTH ONCE DAILY   

 

 amlodipine 5 mg oral tablet  2016     TAKE ONE TABLET BY MOUTH ONCE DAILY
   

 

 Allergy Relief (loratadine) 10 mg oral tablet  2016     TAKE ONE TABLET 
BY MOUTH ONCE DAILY   

 

 valsartan 160 mg oral tablet  2016     TAKE ONE TABLET BY MOUTH ONCE 
DAILY   

 

 pantoprazole 40 mg oral tablet,delayed release (DR/EC)  10/13/2016  10/8/2017  
take 1 tablet (40 mg) by oral route once daily for 90 days   

 

 amlodipine 5 mg oral tablet  10/13/2016  10/8/2017  TAKE ONE TABLET BY MOUTH 
ONCE DAILY for 90 days   

 

 atenolol 50 mg oral tablet  10/13/2016  10/8/2017  take 1 tablet (50 mg) by 
oral route once daily   

 

 levothyroxine 50 mcg oral tablet  10/13/2016  10/8/2017  TAKE ONE TABLET BY 
MOUTH ONCE DAILY for 90 days   

 

 Lipitor 20 mg oral tablet  10/13/2016  2018  take 1 tablet (20 mg) by oral 
route once daily   

 

 Plavix 75 mg oral tablet  10/13/2016  2018  take 1 tablet (75 mg) by oral 
route once daily   

 

 valsartan 160 mg oral tablet  10/13/2016  10/8/2017  TAKE ONE TABLET BY MOUTH 
ONCE DAILY for 90 days   

 

 Zofran (as hydrochloride) 4 mg oral tablet  2016     take 1 tablet by 
oral route daily as needed for 14 days   

 

 Zofran (as hydrochloride) 4 mg oral tablet  2017     take 1 tablet by oral 
route daily as needed for 14 days   

 

 Zofran (as hydrochloride) 4 mg oral tablet  2017     take 1 tablet by oral 
route daily as needed for 14 days   

 

 Zofran (as hydrochloride) 4 mg oral tablet  2017     take 1 tablet by 
oral route daily as needed for 14 days   

 

 Zofran (as hydrochloride) 4 mg oral tablet  2017     take 1 tablet by 
oral route daily as needed for 14 days   

 

 Lexapro 10 mg oral tablet  2017  take 1 tablet (10 mg) by oral 
route once daily for 90 days   

 

 clorazepate dipotassium 7.5 mg oral tablet  2017  take 1/2 to1 
tablet PO up to three times per day for situational anxiety   

 

 triamcinolone acetonide 0.1 % topical cream  2017     APPLY A THIN LAYER 
OF CREAM EXTERNALLY TO AFFECTED AREA TWICE DAILY FOR 14 DAYS   

 

 ondansetron 4 mg oral tablet,disintegrating  2017     dissolve  1 tablet 
by oral route every 8 hours as needed   

 

 loratadine 10 mg oral tablet  2017  TAKE 1 TABLET BY MOUTH 
EVERY DAY IN THE EVENING   

 

 triamcinolone acetonide 0.1 % topical cream  2017     APPLY  CREAM 
EXTERNALLY TO AFFECTED AREA TWICE DAILY FOR 14 DAYS   

 

 EQ LORATADINE 10MG  TABS  2017     TAKE ONE TABLET BY MOUTH ONCE DAILY   









  

 

 Name  Start Date  Expiration Date  SIG  Comments

 

 prednisone 20 mg oral tablet  2011  take 2 tablets by oral 
route daily for 5 days   

 

 calcium carbonate-vitamin D2 600-125 mg-unit oral tablet  8/15/2012  2012
  take 2 tablets by oral route daily   

 

 [place] 3 Fish Oil 900-1,400 mg oral capsule,delayed release(DR/EC)  8/15/2012  9
/  take 1 capsule by oral route daily   

 

 multivitamin Oral tablet  8/15/2012  2012  take 1 tablet by oral route 
daily   

 

 triamcinolone acetonide 0.1 % topical cream  2013  10/7/2013  APPLY A 
THIN LAYER TO THE AFFECTED AREA(S) BY TOPICAL ROUTE TWICE A DAY   

 

 famotidine 20 mg oral tablet  2014  take 1 tablet (20 mg) by 
oral route 2 times per day   

 

 amoxicillin 500 mg oral capsule  2014  take 1 capsule (500 mg) 
by oral route 3 times per day for 10 days   

 

 Zithromax Z-Tab 250 mg oral tablet  10/2/2014  10/7/2014  take 2 tablets (500 
mg) by oral route once daily for 1 day then 1 tablet (250 mg) by oral route 
once daily for 4 days   

 

 acyclovir 800 mg oral tablet  10/5/2014  10/12/2014  take 1 tablet (800 mg) by 
oral route 5 times per day for 7 days   

 

 gabapentin 300 mg oral capsule  10/9/2014  2014  take 1 capsule (300 mg) 
by oral route 3 times per day for 14 days   

 

 Carafate 100 mg/mL oral suspension  2014  take 10 milliliters 
(1 gram) by oral route 4 times per day on an empty stomach 1 hour before meals 
and at bedtime for 4 weeks   

 

 amlodipine 5 mg oral tablet  2015  TAKE ONE TABLET BY MOUTH 
EVERY DAY   

 

 levothyroxine 50 mcg oral tablet  2015  TAKE ONE TABLET BY MOUTH 
EVERY DAY   

 

 Diovan 160 mg oral tablet  2015  2/15/2016  TAKE ONE TABLET BY MOUTH 
EVERY DAY for 90 days   

 

 triamcinolone acetonide 0.1 % topical cream  2015  apply a thin 
layer to the affected area(s) by topical route 2 times per day for 14 days   

 

 fluconazole 150 mg oral tablet  2015  take 1 tablet (150 mg) 
by oral route once for 1 day   

 

 Zofran (as hydrochloride) 4 mg oral tablet  10/13/2016  10/27/2016  take 1 
tablet by oral route daily as needed for 14 days   

 

 Zofran (as hydrochloride) 4 mg oral tablet  2017     take 1 tablet by 
oral route daily as needed for 14 days   









 Discontinued 

 

 Name  Start Date  Discontinued Date  SIG  Comments

 

 Lipitor 40 mg oral tablet     2009 TAB DAILY   

 

 ramipril 5 mg oral capsule     2009  take 1 capsule (5 mg) by oral route 
once daily   

 

 lovastatin 20 mg oral tablet  2009  take 1 tablet (20 mg) by 
oral route once daily with evening meal   

 

 propranolol 20 mg oral tablet  2010  take 1 tablet by oral 
route 2 times a day   

 

 Fosamax 70 mg oral tablet  2010  take 1 tablet (70 mg) by oral 
route once weekly in the morning, at least 30 minutes before the first food, 
beverage, or medication of the day   

 

 lisinopril 40 mg oral tablet  2010  4/10/2011  take 2 tablets by oral 
route daily   

 

 Zoloft 50 mg oral tablet  2011  take 1 tablet (50 mg) by oral 
route once daily   

 

 promethazine 25 mg oral tablet  2011  take 1 tablet by oral 
route every 6 hours as needed   

 

 Diovan -12.5 mg oral tablet  2011  take 1 tablet by oral 
route once daily for 30 days   

 

 Diflucan 150 mg oral tablet     2012  take 1 tablet (150 mg) by oral 
route once for 1 day   

 

 Diflucan 150 mg oral tablet  2012  8/15/2012  take 1 tablet (150 mg) by 
oral route once   

 

 Vitamin B-12 1,000 mcg oral tablet  8/15/2012  2014  take 1 tablet by 
oral route daily   

 

 Premarin 0.625 mg/gram vaginal cream  10/29/2012  6/10/2013  use 1 gram daily 
for a week then 1 gram twice a week   

 

 Medrol (Tab) 4 mg oral tablets,dose pack  2013  6/10/2013  take as 
directed   

 

 aspirin 325 mg oral tablet  2014  take 1 tablet (325 mg) by 
oral route once daily   

 

 Medrol (Tab) 4 mg oral tablets,dose pack  2014  10/2/2014  take as 
directed   

 

 Tetracaine Lollipops Lollipop  2015  Use as directed   

 

 Zoloft 50 mg oral tablet  2016  take 1 tablet (50 mg) by oral 
route once daily for 90 days   

 

 Zoloft 50 mg oral tablet  2016  take 1 tablet (50 mg) by oral 
route once daily for 90 days  Pt refuses to take...







Problem List







 Description  Status  Onset

 

 Hyperlipidemia  Active   

 

 acid reflux  Active   

 

 Hypertension  Active   

 

 hypothyroid  Active   







Vital Signs







 Date  Time  BP-Sys(mm[Hg]  BP-Morena(mm[Hg])  HR(bpm)  RR(rpm)  Temp  WT  HT  HC  
BMI  BSA  BMI Percentile  O2 Sat(%)

 

 2017  8:22:00 AM  118 mmHg  78 mmHg  62 bpm  18 rpm  97.5 F  129.125 lbs  
61 in     24.40 kg/m2  1.59 m2     100 %

 

 10/13/2016  2:26:00 PM  128 mmHg  78 mmHg  77 bpm  16 rpm  98.4 F  139.25 lbs  
61 in     26.3108 kg/m  1.6488 m     100 %

 

 2016  8:29:00 AM  122 mmHg  70 mmHg  72 bpm  16 rpm  97.8 F  137.125 lbs  
61 in     25.91 kg/m2  1.64 m2     100 %

 

 2015  9:33:00 AM  120 mmHg  68 mmHg  73 bpm     98.7 F  144.375 lbs  61 
in     27.2791 kg/m  1.6788 m     99 %

 

 2015  9:05:00 AM  110 mmHg  75 mmHg  85 bpm  16 rpm  96.7 F  144.375 lbs  
61 in     27.28 kg/m2  1.68 m2     99 %

 

 2015  1:05:00 PM  108 mmHg  62 mmHg  78 bpm  18 rpm  96.9 F  142.375 lbs  
61 in     26.9012 kg/m  1.6672 m     100 %

 

 2014  9:31:00 AM  126 mmHg  66 mmHg  79 bpm  18 rpm  98.7 F  149 lbs  61 
in     28.15 kg/m2  1.71 m2     98 %

 

 10/6/2014  9:02:00 AM  110 mmHg  74 mmHg  94 bpm  18 rpm  98.1 F  145.4 lbs  
61 in     27.4728 kg/m  1.6848 m     97 %

 

 10/2/2014  9:14:00 AM  124 mmHg  74 mmHg  79 bpm  18 rpm  98 F  147.25 lbs  61 
in     27.82 kg/m2  1.70 m2     98 %

 

 2014  9:22:00 AM  124 mmHg  76 mmHg  89 bpm  18 rpm  98.1 F  148 lbs  61 
in     27.9641 kg/m  1.6998 m     100 %

 

 2014  8:27:00 AM  118 mmHg  65 mmHg  74 bpm  16 rpm  97.7 F  148 lbs  61 
in     27.96 kg/m2  1.70 m2     99 %

 

 2014  9:48:00 AM  125 mmHg  70 mmHg  93 bpm  18 rpm  96.7 F  156 lbs  61 
in     29.4756 kg/m  1.7451 m     100 %

 

 2013  8:35:00 AM  122 mmHg  74 mmHg  84 bpm  16 rpm  97.9 F  155.375 lbs 
                99 %

 

 6/10/2013  8:30:00 AM  130 mmHg  82 mmHg  79 bpm  16 rpm  97.9 F  161 lbs     
            98 %

 

 2013  8:50:00 AM  124 mmHg  68 mmHg  66 bpm  18 rpm  97.6 F  157.5 lbs  
61 in     29.76 kg/m2  1.75 m2      

 

 2012  1:46:00 PM  120 mmHg  72 mmHg  75 bpm  16 rpm  97.6 F  156.125 lbs
                 98 %

 

 12/10/2012  8:32:00 AM  122 mmHg  82 mmHg  70 bpm  16 rpm  97.3 F  157 lbs    
             99 %

 

 8/15/2012  9:00:00 AM  130 mmHg  76 mmHg  86 bpm  16 rpm  97.4 F  159 lbs     
            100 %

 

 2012  11:02:00 AM  128 mmHg  64 mmHg  66 bpm  18 rpm  97.4 F  162.125 lbs
  61 in     30.63 kg/m2  1.78 m2      

 

 2012  8:45:00 AM  130 mmHg  70 mmHg  82 bpm  16 rpm  98.2 F  160.125 lbs 
                100 %

 

 2/15/2012  9:29:00 AM  122 mmHg  80 mmHg  86 bpm  16 rpm  97.4 F  159.375 lbs  
61 in     30.11 kg/m2  1.76 m2     99 %

 

 2012  9:41:00 AM  132 mmHg  74 mmHg  66 bpm  18 rpm  98.4 F  160.375 lbs  
61 in     30.3023 kg/m  1.7694 m      

 

 2011  10:08:00 AM  134 mmHg  82 mmHg  80 bpm  16 rpm  98 F  160 lbs  61 
in     30.23 kg/m2  1.77 m2     99 %

 

 2011  3:56:00 PM  130 mmHg  80 mmHg                              

 

 2011  8:55:00 AM  130 mmHg  74 mmHg  88 bpm  16 rpm  98.2 F  158.25 lbs  
               98 %

 

 2011  8:54:00 AM  136 mmHg  82 mmHg  102 bpm  16 rpm  98.1 F  153.5 lbs   
              99 %

 

 2011  8:49:00 AM  122 mmHg  88 mmHg  88 bpm  16 rpm  98.6 F  152.25 lbs  
               99 %

 

 2011  10:02:00 AM  140 mmHg  82 mmHg  96 bpm  20 rpm  98.8 F             
       100 %

 

 2011  9:14:00 AM  156 mmHg  98 mmHg  110 bpm  24 rpm  98.5 F  153 lbs    
             100 %

 

 2011  8:50:00 AM  160 mmHg  84 mmHg  100 bpm  16 rpm  98.7 F  155 lbs    
             100 %

 

 2011  1:25:00 PM  152 mmHg  100 mmHg  82 bpm  16 rpm  97.2 F  156.375 lbs 
                100 %

 

 2011  9:55:00 AM  144 mmHg  90 mmHg                              

 

 2010  9:24:00 AM  138 mmHg  84 mmHg                              

 

 2010  8:58:00 AM  160 mmHg  94 mmHg                              

 

 2010  8:33:00 AM  142 mmHg  90 mmHg  100 bpm  16 rpm  98.1 F  158.375 
lbs                  

 

 2010  9:24:00 AM  160 mmHg  102 mmHg  88 bpm  18 rpm  99 F  157.125 lbs  
62 in     28.7382 kg/m  1.7657 m      

 

 3/19/2010  11:01:00 AM  140 mmHg  90 mmHg                              

 

 2009  10:08:00 AM  142 mmHg  86 mmHg  72 bpm  16 rpm  98.1 F  154.125 
lbs  61 in     29.1214 kg/m  1.73 m2      







Social History







 Name  Description  Comments

 

       

 

 Children      

 

 lives alone      

 

 Supervisor      

 

 Tobacco  Never smoker   







History of Procedures







 Date Ordered  Description  Order Status

 

 2011 12:00 AM  COMPREHEN METABOLIC PANEL  Reviewed

 

 2011 12:00 AM  ASSAY THYROID STIM HORMONE  Reviewed

 

 2011 12:00 AM  COMPREHEN METABOLIC PANEL  Reviewed

 

 2011 12:00 AM  LIPID PANEL  Reviewed

 

 2011 12:00 AM  ASSAY THYROID STIM HORMONE  Reviewed

 

 2011 12:00 AM  COMPLETE CBC W/AUTO DIFF WBC  Reviewed

 

 2015 12:00 AM  COMPLETE CBC W/AUTO DIFF WBC  Reviewed

 

 2015 12:00 AM  COMPREHEN METABOLIC PANEL  Reviewed

 

 2015 12:00 AM  LIPID PANEL  Reviewed

 

 2015 12:00 AM  ASSAY THYROID STIM HORMONE  Reviewed

 

 2011 12:00 AM  COMPREHEN METABOLIC PANEL  Reviewed

 

 2011 12:00 AM  COMPREHEN METABOLIC PANEL  Reviewed

 

 2011 12:00 AM  LIPID PANEL  Reviewed

 

 2011 12:00 AM  ASSAY THYROID STIM HORMONE  Reviewed

 

 10/28/2011 12:00 AM  ***Immunization administration, Medicare flu  Reviewed

 

 10/28/2011 12:00 AM  Fluzone ** MEDICARE Only **  Reviewed

 

 2010 11:24 AM  NO CHARGE OV  Reviewed

 

 2010 11:26 AM  NO CHARGE OV  Reviewed

 

 2011 12:00 AM  COMPREHEN METABOLIC PANEL  Reviewed

 

 2011 12:00 AM  LIPID PANEL  Reviewed

 

 2011 12:00 AM  ASSAY THYROID STIM HORMONE  Reviewed

 

 2011 12:00 AM  COMPLETE CBC W/AUTO DIFF WBC  Reviewed

 

 2011 12:00 AM  Wrist Support  Reviewed

 

 2016 12:00 AM  Screening mammography, bilateral  Reviewed

 

 2016 12:00 AM  DXA BONE DENSITY AXIAL  Returned

 

 2016 12:00 AM  TETANUS VACCINE IM  Reviewed

 

 2016 12:00 AM  HEP B VACC ADULT 3 DOSE IM  Reviewed

 

 2012 12:00 AM  TISSUE EXAM FOR FUNGI  Reviewed

 

 2012 12:00 AM  SMEAR WET MOUNT SALINE/INK  Reviewed

 

 2016 12:00 AM  TETANUS VACCINE IM  Reviewed

 

 2016 12:00 AM  HEP B VACC ADULT 3 DOSE IM  Reviewed

 

 2/15/2012 12:00 AM  THER/PROPH/DIAG INJ SC/IM  Reviewed

 

 2/15/2012 12:00 AM  Bicillin CR [facility]88380905040[facility]  Reviewed

 

 2/15/2012 12:00 AM  Depo-Medrol 40 mg NDC#8383115524  Reviewed

 

 10/13/2016 12:00 AM  COMPLETE CBC W/AUTO DIFF WBC  Returned

 

 10/13/2016 12:00 AM  COMPREHEN METABOLIC PANEL  Returned

 

 10/13/2016 12:00 AM  ASSAY THYROID STIM HORMONE  Returned

 

 10/13/2016 12:00 AM  LIPID PANEL  Returned

 

 2016 12:00 AM  TETANUS VACCINE IM  Reviewed

 

 2016 12:00 AM  HEP B VACC ADULT 3 DOSE IM  Reviewed

 

 8/15/2012 12:00 AM  COMPREHEN METABOLIC PANEL  Reviewed

 

 8/15/2012 12:00 AM  ASSAY THYROID STIM HORMONE  Reviewed

 

 8/15/2012 12:00 AM  COMPLETE CBC W/AUTO DIFF WBC  Reviewed

 

 8/15/2012 12:00 AM  Wrist Support  Reviewed

 

 10/29/2012 12:00 AM  Flu Injection 3 Years And Above NDC# [address]-35  [facility]  
Reviewed

 

 12/10/2012 12:00 AM  IMMUNIZATION ADMIN  Reviewed

 

 12/10/2012 12:00 AM  Pneumovax Injection - [facility]  Reviewed

 

 2012 12:00 AM  TRICHOMONAS ASSAY W/OPTIC  Reviewed

 

 2013 12:00 AM  THER/PROPH/DIAG INJ SC/IM  Reviewed

 

 2013 12:00 AM  Bicillin CR, 1.2 million units NDC# 12172-724-86  Reviewed

 

 2013 12:00 AM  COMPREHEN METABOLIC PANEL  Reviewed

 

 2013 12:00 AM  LIPID PANEL  Reviewed

 

 2013 12:00 AM  COMPLETE CBC W/AUTO DIFF WBC  Reviewed

 

 2013 12:00 AM  ASSAY THYROID STIM HORMONE  Reviewed

 

 6/10/2013 12:00 AM  MAMMOGRAM SCREENING  Reviewed

 

 6/10/2013 12:00 AM  CYTOPATH C/V MANUAL  Reviewed

 

 12/10/2013 12:00 AM  LIPID PANEL  Reviewed

 

 12/10/2013 12:00 AM  ASSAY THYROID STIM HORMONE  Reviewed

 

 12/10/2013 12:00 AM  COMPLETE CBC W/AUTO DIFF WBC  Reviewed

 

 12/10/2013 12:00 AM  COMPREHEN METABOLIC PANEL  Reviewed

 

 2010 12:00 AM  CYTOPATH C/V MANUAL  Reviewed

 

 2010 12:00 AM  SMEAR WET MOUNT SALINE/INK  Reviewed

 

 2010 12:00 AM  LIPID PANEL  Reviewed

 

 2010 12:00 AM  ASSAY THYROID STIM HORMONE  Reviewed

 

 2010 12:00 AM  COMPLETE CBC W/AUTO DIFF WBC  Reviewed

 

 2010 12:00 AM  COMPREHEN METABOLIC PANEL  Reviewed

 

 10/28/2013 12:00 AM  Flu Injection 3 Years And Above NDC# [address]-58  [facility]  
Reviewed

 

 2010 8:48 AM  Blood Pressure Check-no charge  Reviewed

 

 2010 12:00 AM  Blood Pressure Check-no charge  Reviewed

 

 2014 12:00 AM  METABOLIC PANEL TOTAL CA  Reviewed

 

 2014 12:00 AM  ASSAY THYROID STIM HORMONE  Reviewed

 

 2014 12:00 AM  ASSAY OF FREE THYROXINE  Reviewed

 

 2014 12:00 AM  MAMMOGRAM SCREENING  Reviewed

 

 2014 12:00 AM  CT BONE DENSITY AXIAL  Reviewed

 

 2014 12:00 AM  COMPLETE CBC W/AUTO DIFF WBC  Reviewed

 

 2014 12:00 AM  COMPREHEN METABOLIC PANEL  Reviewed

 

 2014 12:00 AM  LIPID PANEL  Reviewed

 

 2014 12:00 AM  ASSAY THYROID STIM HORMONE  Reviewed

 

 10/20/2010 12:00 AM  IMMUNIZATION ADMIN  Reviewed

 

 10/20/2010 12:00 AM  FLU VACCINE 3 YRS & > IM  Reviewed

 

 2010 12:00 AM  COMPREHEN METABOLIC PANEL  Reviewed

 

 2010 12:00 AM  LIPID PANEL  Reviewed

 

 2010 12:00 AM  ASSAY THYROID STIM HORMONE  Reviewed

 

 2010 12:00 AM  COMPLETE CBC W/AUTO DIFF WBC  Reviewed

 

 2010 12:00 AM  Blood Pressure Check-no charge  Reviewed

 

 10/2/2014 12:00 AM  THER/PROPH/DIAG INJ SC/IM  Reviewed

 

 10/2/2014 12:00 AM  Kenalog, Per 10 Mg NDC#4015-2039-75  Reviewed

 

 2014 12:00 AM  COMPLETE CBC W/AUTO DIFF WBC  Reviewed

 

 2014 12:00 AM  COMPREHEN METABOLIC PANEL  Reviewed

 

 2014 12:00 AM  LIPID PANEL  Reviewed

 

 2014 12:00 AM  ASSAY THYROID STIM HORMONE  Reviewed

 

 2015 12:00 AM  Rocephin 1 gram NDC#4821-1853-76  Reviewed

 

 2015 12:00 AM  THER/PROPH/DIAG INJ SC/IM  Reviewed

 

 2011 12:00 AM  COMPREHEN METABOLIC PANEL  Reviewed

 

 2011 12:00 AM  LIPID PANEL  Reviewed

 

 2011 12:00 AM  ASSAY THYROID STIM HORMONE  Reviewed

 

 2011 12:00 AM  COMPLETE CBC W/AUTO DIFF WBC  Reviewed

 

 2011 12:00 AM  METABOLIC PANEL TOTAL CA  Reviewed

 

 2011 12:00 AM  COMPREHEN METABOLIC PANEL  Reviewed

 

 2011 12:00 AM  ASSAY THYROID STIM HORMONE  Reviewed

 

 2011 12:00 AM  COMPLETE CBC W/AUTO DIFF WBC  Reviewed

 

 2011 12:00 AM  ASSAY OF LIPASE  Reviewed

 

 2011 12:00 AM  THER/PROPH/DIAG INJ SC/IM  Reviewed

 

 2011 12:00 AM  Phenergan 50 Mg Im  Bernadine  Reviewed

 

 2011 12:00 AM  METABOLIC PANEL TOTAL CA  Reviewed

 

 2011 12:00 AM  THER/PROPH/DIAG INJ SC/IM  Reviewed

 

 2011 12:00 AM  Phenergan 25 Mg Im  Bernadine  Reviewed

 

 2011 12:00 AM  METABOLIC PANEL TOTAL CA  Reviewed

 

 2011 12:00 AM  ASSAY THYROID STIM HORMONE  Reviewed

 

 2011 12:00 AM  THER/PROPH/DIAG INJ SC/IM  Reviewed

 

 2011 12:00 AM  Phenergan 50 Mg Im  Bernadine  Reviewed

 

 2011 12:00 AM  ASSAY OF SERUM SODIUM  Reviewed

 

 2011 12:00 AM  ASSAY OF SERUM SODIUM  Reviewed

 

 2011 12:00 AM  CYTOPATH C/V MANUAL  Reviewed

 

 2011 12:00 AM  ASSAY THYROID STIM HORMONE  Reviewed

 

 2015 12:00 AM  COMPLETE CBC W/AUTO DIFF WBC  Reviewed

 

 2015 12:00 AM  COMPREHEN METABOLIC PANEL  Reviewed

 

 2015 12:00 AM  LIPID PANEL  Reviewed

 

 2015 12:00 AM  ASSAY THYROID STIM HORMONE  Reviewed

 

 2015 12:00 AM  MAMMOGRAM SCREENING  Reviewed

 

 2015 12:00 AM  CYTOPATH TBS C/V MANUAL  Reviewed







Results Summary







 Date and Description  Results

 

 2010 9:25 AM  Colonoscopy-Women and Men over 50 Abnormal Mammogram -Women 
over 40 Declined Pap Smear Declined 

 

 2010 10:41 AM  WET PREP NO TRICHOMONADS SEEN  No  Few 

 

 2010 8:15 AM  TRIGLYCERIDES 174.0 mg/dLCHOLESTEROL 175.0 mg/dLHDL 58.0 mg/
dLTOT CHOL/HDL 3.0 LDL (CALC) 82.0 mg/dLTSH 0.790 uIU/mLGLUCOSE 95.0 mg/
dLSODIUM 136.0 mmol/LPOTASSIUM 4.50 mmol/LCHLORIDE 99.0 mmol/LCO2 26.0 mmol/
LBUN 12.0 mg/dLCREATININE 0.80 mg/dLSGOT/AST 32.0 IU/LSGPT/ALT 33.0 IU/LALK 
PHOS 103.0 IU/LTOTAL PROTEIN 7.60 g/dLALBUMIN 4.60 g/dLTOTAL BILI 0.60 mg/
dLCALCIUM 9.80 mg/dLAGE 64 GFR NonAA 72 GFR AA 87 eGFR >60 mL/min/1.73 m2eGFR AA
* >60 WBC 5.3 RBC 4.13 HGB 13.10 g/dLHCT 39.20 %MCV 95.0 fLMCH 31.70 pgMCHC 
33.40 g/dLRDW SD 44 RDW CV 12.70 %MPV 9.80 fLPLT 257 NRBC# 0.00 NRBC% 0.0 %NEUT 
57.90 %%LYMP 26.50 %%MONO 11.60 %%EOS 3.20 %%BASO 0.80 %#NEUT 3.04 #LYMP 1.39 #
MONO 0.61 #EOS 0.17 #BASO 0.04 MANUAL DIFF NOT IND 

 

 12/15/2010 8:30 AM  TRIGLYCERIDES 157.0 mg/dLCHOLESTEROL 163.0 mg/dLHDL 56.0 mg
/dLTOT CHOL/HDL 2.9 LDL (CALC) 76.0 mg/dLTSH 0.650 uIU/mLWBC 6.5 RBC 4.25 HGB 
13.60 g/dLHCT 40.70 %MCV 96.0 fLMCH 32.0 pgMCHC 33.40 g/dLRDW SD 44 RDW CV 
12.60 %MPV 9.90 fLPLT 313 NRBC# 0.00 NRBC% 0.0 %NEUT 61.80 %%LYMP 26.20 %%MONO 
8.30 %%EOS 3.10 %%BASO 0.60 %#NEUT 4.00 #LYMP 1.70 #MONO 0.54 #EOS 0.20 #BASO 
0.04 MANUAL DIFF NOT IND GLUCOSE 97.0 mg/dLSODIUM 136.0 mmol/LPOTASSIUM 4.40 
mmol/LCHLORIDE 101.0 mmol/LCO2 26.0 mmol/LBUN 10.0 mg/dLCREATININE 0.80 mg/
dLSGOT/AST 31.0 IU/LSGPT/ALT 34.0 IU/LALK PHOS 92.0 IU/LTOTAL PROTEIN 8.10 g/
dLALBUMIN 4.60 g/dLTOTAL BILI 0.40 mg/dLCALCIUM 10.0 mg/dLAGE 65 GFR NonAA 72 
GFR AA 87 eGFR >60 mL/min/1.73 m2eGFR AA* >60 

 

 2011 9:45 AM  GLUCOSE 91.0 mg/dLSODIUM 133.0 mmol/LPOTASSIUM 4.40 mmol/
LCHLORIDE 97.0 mmol/LCO2 24.0 mmol/LBUN 9.0 mg/dLCREATININE 0.70 mg/dLCALCIUM 
10.0 mg/dLAGE 65 GFR NonAA 84 GFR  eGFR >60 mL/min/1.73 m2eGFR AA* >60 

 

 2011 10:25 AM  LIPASE 29.0 U/LTSH 0.10 uIU/mLGLUCOSE 115.0 mg/dLSODIUM 
127.0 mmol/LPOTASSIUM 5.30 mmol/LCHLORIDE 93.0 mmol/LCO2 18.0 mmol/LBUN 9.0 mg/
dLCREATININE 0.70 mg/dLSGOT/AST 62.0 IU/LSGPT/ALT 46.0 IU/LALK PHOS 100.0 IU/
LTOTAL PROTEIN 8.60 g/dLALBUMIN 4.90 g/dLTOTAL BILI 0.60 mg/dLCALCIUM 9.90 mg/
dLAGE 65 GFR NonAA 84 GFR  eGFR >60 mL/min/1.73 m2eGFR AA* >60 WBC 6.9 
RBC 4.48 HGB 14.40 g/dLHCT 40.90 %MCV 91.0 fLMCH 32.10 pgMCHC 35.20 g/dLRDW SD 
41 RDW CV 12.50 %MPV 9.30 fLPLT 260 NRBC# 0.00 NRBC% 0.0 %NEUT 74.90 %%LYMP 
15.10 %%MONO 9.40 %%EOS 0.30 %%BASO 0.30 %#NEUT 5.15 #LYMP 1.04 #MONO 0.65 #EOS 
0.02 #BASO 0.02 MANUAL DIFF NOT IND 

 

 2011 9:07 AM  GLUCOSE 92.0 mg/dLSODIUM 131.0 mmol/LPOTASSIUM 4.20 mmol/
LCHLORIDE 99.0 mmol/LCO2 22.0 mmol/LBUN 9.0 mg/dLCREATININE 0.70 mg/dLCALCIUM 
9.20 mg/dLAGE 65 GFR NonAA 84 GFR  eGFR >60 mL/min/1.73 m2eGFR AA* >60 

 

 2011 11:06 AM  TSH 0.510 uIU/mLGLUCOSE 99.0 mg/dLSODIUM 132.0 mmol/
LPOTASSIUM 3.50 mmol/LCHLORIDE 95.0 mmol/LCO2 23.0 mmol/LBUN 9.0 mg/
dLCREATININE 0.80 mg/dLCALCIUM 10.40 mg/dLAGE 65 GFR NonAA 72 GFR AA 87 eGFR >
60 mL/min/1.73 m2eGFR AA* >60 

 

 2011 9:45 AM  SODIUM 132.0 mmol/L

 

 2011 9:27 AM  SODIUM 137.0 mmol/L

 

 2011 8:35 AM  TRIGLYCERIDES 114.0 mg/dLCHOLESTEROL 145.0 mg/dLHDL 56.0 mg/
dLTOT CHOL/HDL 2.6 LDL (CALC) 66.0 mg/dLGLUCOSE 105.0 mg/dLSODIUM 138.0 mmol/
LPOTASSIUM 4.40 mmol/LCHLORIDE 103.0 mmol/LCO2 25.0 mmol/LBUN 8.0 mg/
dLCREATININE 0.70 mg/dLSGOT/AST 36.0 IU/LSGPT/ALT 38.0 IU/LALK PHOS 103.0 IU/
LTOTAL PROTEIN 7.40 g/dLALBUMIN 4.30 g/dLTOTAL BILI 0.30 mg/dLCALCIUM 9.20 mg/
dLAGE 65 GFR NonAA 84 GFR  eGFR >60 mL/min/1.73 m2eGFR AA* >60 WBC 5.1 
RBC 3.76 HGB 12.0 g/dLHCT 36.10 %MCV 96.0 fLMCH 31.90 pgMCHC 33.20 g/dLRDW SD 
46 RDW CV 13.10 %MPV 9.40 fLPLT 249 NRBC# 0.00 NRBC% 0.0 %NEUT 60.40 %%LYMP 
25.90 %%MONO 8.90 %%EOS 4.0 %%BASO 0.80 %#NEUT 3.06 #LYMP 1.31 #MONO 0.45 #EOS 
0.20 #BASO 0.04 MANUAL DIFF NOT IND 

 

 2011 9:55 AM  TSH 1.070 uIU/mL

 

 2011 8:55 AM  GLUCOSE 102.0 mg/dLSODIUM 135.0 mmol/LPOTASSIUM 4.20 mmol/
LCHLORIDE 102.0 mmol/LCO2 24.0 mmol/LBUN 15.0 mg/dLCREATININE 0.80 mg/dLSGOT/
AST 30.0 IU/LSGPT/ALT 35.0 IU/LALK PHOS 119.0 IU/LTOTAL PROTEIN 7.50 g/
dLALBUMIN 4.30 g/dLTOTAL BILI 0.30 mg/dLCALCIUM 9.20 mg/dLAGE 65 GFR NonAA 72 
GFR AA 87 eGFR >60 mL/min/1.73 m2eGFR AA* >60 TSH 1.130 uIU/mL

 

 2011 9:50 AM  GLUCOSE 85.0 mg/dLSODIUM 133.0 mmol/LPOTASSIUM 4.20 mmol/
LCHLORIDE 101.0 mmol/LCO2 21.0 mmol/LBUN 13.0 mg/dLCREATININE 0.80 mg/dLSGOT/
AST 36.0 IU/LSGPT/ALT 36.0 IU/LALK PHOS 109.0 IU/LTOTAL PROTEIN 7.40 g/
dLALBUMIN 4.20 g/dLTOTAL BILI 0.50 mg/dLCALCIUM 9.40 mg/dLAGE 65 GFR NonAA 72 
GFR AA 87 eGFR >60 mL/min/1.73 m2eGFR AA* >60 

 

 2011 8:55 AM  GLUCOSE 98.0 mg/dLSODIUM 137.0 mmol/LPOTASSIUM 3.90 mmol/
LCHLORIDE 103.0 mmol/LCO2 25.0 mmol/LBUN 14.0 mg/dLCREATININE 0.70 mg/dLSGOT/
AST 33.0 IU/LSGPT/ALT 36.0 IU/LALK PHOS 111.0 IU/LTOTAL PROTEIN 8.30 g/
dLALBUMIN 4.40 g/dLTOTAL BILI 0.50 mg/dLCALCIUM 9.40 mg/dLAGE 66 GFR NonAA 84 
GFR  eGFR >60 mL/min/1.73 m2eGFR AA* >60 TRIGLYCERIDES 109.0 mg/
dLCHOLESTEROL 153.0 mg/dLHDL 54.0 mg/dLTOT CHOL/HDL 2.8 LDL (CALC) 77.0 mg/
dLTSH 1.330 uIU/mL

 

 2011 10:17 AM  Colonoscopy-Women and Men over 50 Declined Mammogram -
Women over 40 Normal Pap Smear Negative 

 

 2012 10:33 AM  WET PREP NO TRICH SEEN CLUE CELLS NONE SEEN 

 

 2012 8:45 AM  WBC 5.2 RBC 3.96 HGB 12.70 g/dLHCT 37.80 %MCV 96.0 fLMCH 
32.10 pgMCHC 33.60 g/dLRDW SD 46 RDW CV 13.30 %MPV 9.70 fLPLT 297 NRBC# 0.00 
NRBC% 0.0 %NEUT 57.70 %%LYMP 28.50 %%MONO 10.30 %%EOS 2.50 %%BASO 1.0 %#NEUT 
3.02 #LYMP 1.49 #MONO 0.54 #EOS 0.13 #BASO 0.05 MANUAL DIFF NOT IND GLUCOSE 
97.0 mg/dLSODIUM 137.0 mmol/LPOTASSIUM 4.40 mmol/LCHLORIDE 101.0 mmol/LCO2 23.0 
mmol/LBUN 17.0 mg/dLCREATININE 0.80 mg/dLSGOT/AST 30.0 IU/LSGPT/ALT 33.0 IU/
LALK PHOS 95.0 IU/LTOTAL PROTEIN 7.40 g/dLALBUMIN 4.40 g/dLTOTAL BILI 0.60 mg/
dLCALCIUM 9.70 mg/dLAGE 66 GFR NonAA 72 GFR AA 87 eGFR 60 eGFR AA* 60 
TRIGLYCERIDES 130.0 mg/dLCHOLESTEROL 157.0 mg/dLHDL 56.0 mg/dLTOT CHOL/HDL 2.8 
LDL (CALC) 75.0 mg/dLTSH 0.940 uIU/mL

 

 8/15/2012 4:02 PM  WET PREP NO TRICH SEEN CLUE CELLS NONE SEEN 

 

 2012 8:45 AM  WBC 5.1 RBC 3.91 HGB 12.50 g/dLHCT 36.30 %MCV 93.0 fLMCH 
32.0 pgMCHC 34.40 g/dLRDW SD 43 RDW CV 12.60 %MPV 9.30 fLPLT 244 NRBC# 0.00 NRBC
% 0.0 %NEUT 57.60 %%LYMP 27.50 %%MONO 9.10 %%EOS 5.0 %%BASO 0.80 %#NEUT 2.91 #
LYMP 1.39 #MONO 0.46 #EOS 0.25 #BASO 0.04 MANUAL DIFF NOT IND TSH 1.390 uIU/
mLTRIGLYCERIDES 154.0 mg/dLCHOLESTEROL 147.0 mg/dLHDL 45.0 mg/dLTOT CHOL/HDL 
3.3 LDL (CALC) 71.0 mg/dLGLUCOSE 101.0 mg/dLSODIUM 134.0 mmol/LPOTASSIUM 4.30 
mmol/LCHLORIDE 102.0 mmol/LCO2 23.0 mmol/LBUN 11.0 mg/dLCREATININE 0.80 mg/
dLSGOT/AST 34.0 IU/LSGPT/ALT 38.0 IU/LALK PHOS 104.0 IU/LTOTAL PROTEIN 7.60 g/
dLALBUMIN 4.40 g/dLTOTAL BILI 0.50 mg/dLCALCIUM 9.40 mg/dLAGE 67 GFR NonAA 72 
GFR AA 87 eGFR 60 eGFR AA* 60 

 

 12/10/2012 8:34 AM  Colonoscopy-Women and Men over 50 Declined Mammogram -
Women over 40 Declined Pap Smear Declined 

 

 2012 5:02 PM  WET PREP NO TRICH SEEN CLUE CELLS NONE SEEN 

 

 2013 8:25 AM  WBC 4.2 RBC 3.96 HGB 12.90 g/dLHCT 37.0 %MCV 93.0 fLMCH 
32.60 pgMCHC 34.90 g/dLRDW SD 43 RDW CV 12.60 %MPV 9.70 fLPLT 254 NRBC# 0.00 
NRBC% 0.0 %NEUT 54.40 %%LYMP 30.40 %%MONO 10.80 %%EOS 3.40 %%BASO 1.0 %#NEUT 
2.26 #LYMP 1.26 #MONO 0.45 #EOS 0.14 #BASO 0.04 MANUAL DIFF NOT IND TSH 1.230 
uIU/mLGLUCOSE 94.0 mg/dLSODIUM 136.0 mmol/LPOTASSIUM 4.30 mmol/LCHLORIDE 99.0 
mmol/LCO2 25.0 mmol/LBUN 10.0 mg/dLCREATININE 0.80 mg/dLSGOT/AST 36.0 IU/LSGPT/
ALT 36.0 IU/LALK PHOS 84.0 IU/LTOTAL PROTEIN 7.90 g/dLALBUMIN 4.40 g/dLTOTAL 
BILI 0.70 mg/dLCALCIUM 9.80 mg/dLAGE 67 GFR NonAA 72 GFR AA 87 eGFR 60 eGFR AA* 
60 TRIGLYCERIDES 122.0 mg/dLCHOLESTEROL 141.0 mg/dLHDL 47.0 mg/dLTOT CHOL/HDL 
3.0 LDL (CALC) 70.0 mg/dL

 

 6/10/2013 3:52 PM  WET PREP NO TRICH SEEN CLUE CELLS NONE SEEN 

 

 2013 8:45 AM  WBC 5.3 RBC 4.01 HGB 13.0 g/dLHCT 37.60 %MCV 94.0 fLMCH 
32.40 pgMCHC 34.60 g/dLRDW SD 43 RDW CV 12.50 %MPV 9.40 fLPLT 248 NRBC# 0.00 
NRBC% 0.0 %NEUT 58.70 %%LYMP 27.80 %%MONO 9.80 %%EOS 2.80 %%BASO 0.90 %#NEUT 
3.12 #LYMP 1.48 #MONO 0.52 #EOS 0.15 #BASO 0.05 MANUAL DIFF NOT IND TSH 1.570 
uIU/mLGLUCOSE 94.0 mg/dLSODIUM 134.0 mmol/LPOTASSIUM 4.10 mmol/LCHLORIDE 101.0 
mmol/LCO2 23.0 mmol/LBUN 11.0 mg/dLCREATININE 0.80 mg/dLSGOT/AST 41.0 IU/LSGPT/
ALT 44.0 IU/LALK PHOS 109.0 IU/LTOTAL PROTEIN 7.90 g/dLALBUMIN 4.70 g/dLTOTAL 
BILI 0.80 mg/dLCALCIUM 10.40 mg/dLAGE 68 GFR NonAA 72 GFR AA 87 eGFR >60 mL/min/
1.73 m2eGFR AA* >60 TRIGLYCERIDES 163.0 mg/dLCHOLESTEROL 138.0 mg/dLHDL 49.0 mg/
dLTOT CHOL/HDL 2.8 LDL (CALC) 56.0 mg/dL

 

 2014 8:58 AM  GLUCOSE 86.0 mg/dLSODIUM 134.0 mmol/LPOTASSIUM 4.20 mmol/
LCHLORIDE 99.0 mmol/LCO2 25.0 mmol/LBUN 14.0 mg/dLCREATININE 0.80 mg/dLCALCIUM 
10.10 mg/dLAGE 68 GFR NonAA 72 GFR AA 87 eGFR >60 mL/min/1.73 m2eGFR AA* >60 
FREE T4 1.18 TSH 1.20 uIU/mL

 

 2014 9:50 AM  WBC 5.7 RBC 4.03 HGB 12.90 g/dLHCT 37.90 %MCV 94.0 fLMCH 
32.0 pgMCHC 34.0 g/dLRDW SD 44 RDW CV 12.70 %MPV 9.70 fLPLT 292 NRBC# 0.00 NRBC
% 0.0 %NEUT 62.70 %%LYMP 21.80 %%MONO 11.0 %%EOS 3.40 %%BASO 1.10 %#NEUT 3.55 #
LYMP 1.23 #MONO 0.62 #EOS 0.19 #BASO 0.06 MANUAL DIFF NOT IND GLUCOSE 98.0 mg/
dLSODIUM 135.0 mmol/LPOTASSIUM 4.30 mmol/LCHLORIDE 102.0 mmol/LCO2 22.0 mmol/
LBUN 10.0 mg/dLCREATININE 0.80 mg/dLSGOT/AST 34.0 IU/LSGPT/ALT 32.0 IU/LALK 
PHOS 95.0 IU/LTOTAL PROTEIN 7.70 g/dLALBUMIN 4.30 g/dLTOTAL BILI 0.80 mg/
dLCALCIUM 9.10 mg/dLAGE 68 GFR NonAA 72 GFR AA 87 eGFR 60 eGFR AA* 60 
TRIGLYCERIDES 108.0 mg/dLCHOLESTEROL 146.0 mg/dLHDL 56.0 mg/dLTOT CHOL/HDL 2.6 
LDL (CALC) 68.0 mg/dLTSH 0.90 uIU/mL

 

 2014 8:40 AM  WBC 4.4 RBC 4.13 HGB 13.20 g/dLHCT 38.90 %MCV 94.0 fLMCH 
32.0 pgMCHC 33.90 g/dLRDW SD 47 RDW CV 13.50 %MPV 9.80 fLPLT 279 NRBC# 0.00 NRBC
% 0.0 %NEUT 63.80 %%LYMP 24.60 %%MONO 9.30 %%EOS 1.60 %%BASO 0.70 %#NEUT 2.80 #
LYMP 1.08 #MONO 0.41 #EOS 0.07 #BASO 0.03 MANUAL DIFF NOT IND GLUCOSE 96.0 mg/
dLSODIUM 136.0 mmol/LPOTASSIUM 4.10 mmol/LCHLORIDE 99.0 mmol/LCO2 23.0 mmol/
LBUN 8.0 mg/dLCREATININE 0.80 mg/dLSGOT/AST 31.0 IU/LSGPT/ALT 27.0 IU/LALK PHOS 
85.0 IU/LTOTAL PROTEIN 7.60 g/dLALBUMIN 4.40 g/dLTOTAL BILI 0.80 mg/dLCALCIUM 
10.20 mg/dLAGE 69 GFR NonAA 71 GFR AA 86 eGFR 60 eGFR AA* 60 TRIGLYCERIDES 61.0 
mg/dLCHOLESTEROL 148.0 mg/dLHDL 71.0 mg/dLTOT CHOL/HDL 2.1 LDL (CALC) 65.0 mg/
dLTSH 0.990 uIU/mL

 

 2015 8:32 AM  WBC 4.8 RBC 3.98 HGB 12.70 g/dLHCT 37.30 %MCV 94.0 fLMCH 
31.90 pgMCHC 34.0 g/dLRDW SD 43 RDW CV 12.70 %MPV 9.50 fLPLT 270 NRBC# 0.00 NRBC
% 0.0 %NEUT 57.30 %%LYMP 25.0 %%MONO 13.0 %%EOS 3.60 %%BASO 1.10 %#NEUT 2.73 #
LYMP 1.19 #MONO 0.62 #EOS 0.17 #BASO 0.05 MANUAL DIFF NOT IND TSH 0.640 uIU/
mLGLUCOSE 90.0 mg/dLSODIUM 136.0 mmol/LPOTASSIUM 4.0 mmol/LCHLORIDE 101.0 mmol/
LCO2 24.0 mmol/LBUN 10.0 mg/dLCREATININE 0.80 mg/dLSGOT/AST 34.0 IU/LSGPT/ALT 
32.0 IU/LALK PHOS 92.0 IU/LTOTAL PROTEIN 7.50 g/dLALBUMIN 4.40 g/dLTOTAL BILI 
0.70 mg/dLCALCIUM 9.50 mg/dLAGE 69 GFR NonAA 71 GFR AA 86 eGFR >60 mL/min/1.73 
m2eGFR AA* >60 TRIGLYCERIDES 107.0 mg/dLCHOLESTEROL 138.0 mg/dLHDL 58.0 mg/
dLTOT CHOL/HDL 2.4 LDL (CALC) 59.0 mg/dL

 

 2015 8:31 AM  WBC 4.6 RBC 3.97 HGB 12.90 g/dLHCT 38.0 %MCV 96.0 [facility] 
32.50 pgHC 33.90 g/dLRDW SD 44 RDW CV 12.60 %MPV 9.0 fLPLT 248 NRBC# 0.00 NRBC
% 0.0 %NEUT 61.0 %%LYMP 24.70 %%MONO 10.20 %%EOS 3.0 %%BASO 1.10 %#NEUT 2.82 #
LYMP 1.14 #MONO 0.47 #EOS 0.14 #BASO 0.05 MANUAL DIFF NOT IND TRIGLYCERIDES 
105.0 mg/dLCHOLESTEROL 141.0 mg/dLHDL 58.0 mg/dLTOT CHOL/HDL 2.4 LDL (CALC) 
62.0 mg/dLGLUCOSE 93.0 mg/dLSODIUM 136.0 mmol/LPOTASSIUM 4.10 mmol/LCHLORIDE 
101.0 mmol/LCO2 25.0 mmol/LBUN 9.0 mg/dLCREATININE 0.80 mg/dLSGOT/AST 32.0 IU/
LSGPT/ALT 28.0 IU/LALK PHOS 95.0 IU/LTOTAL PROTEIN 7.30 g/dLALBUMIN 4.50 g/
dLTOTAL BILI 0.80 mg/dLCALCIUM 9.70 mg/dLAGE 70 GFR NonAA 71 GFR AA 86 eGFR >60 
mL/min/1.73meGFR AA* >60 TSH 1.10 uIU/mL

 

 2016 8:25 AM  TSH 0.70 uIU/mLTRIGLYCERIDES 85.0 mg/dLCHOLESTEROL 166.0 mg
/dLHDL 74.0 mg/dLTOT CHOL/HDL 2.2 LDL (CALC) 75.0 mg/dLGLUCOSE 96.0 mg/dLSODIUM 
136.0 mmol/LPOTASSIUM 4.0 mmol/LCHLORIDE 100.0 mmol/LCO2 24.0 mmol/LBUN 11.0 mg/
dLCREATININE 0.80 mg/dLSGOT/AST 34.0 IU/LSGPT/ALT 24.0 IU/LALK PHOS 98.0 IU/
LTOTAL PROTEIN 7.60 g/dLALBUMIN 4.40 g/dLTOTAL BILI 0.80 mg/dLCALCIUM 9.70 mg/
dLAGE 71 GFR NonAA 71 GFR AA 86 eGFR >60 mL/min/1.73meGFR AA* >60 WBC 5.9 RBC 
4.00 HGB 12.80 g/dLHCT 38.0 %MCV 95.0 fLMCH 32.0 pgMCHC 33.70 g/dLRDW SD 43 RDW 
CV 12.20 %MPV 9.30 fLPLT 272 NRBC# 0.00 NRBC% 0.0 %NEUT 73.90 %%LYMP 15.60 %%
MONO 7.80 %%EOS 1.70 %%BASO 0.70 %#NEUT 4.35 #LYMP 0.92 #MONO 0.46 #EOS 0.10 #
BASO 0.04 MANUAL DIFF NOT IND 







History Of Immunizations







 Name  Date Admin  Mfg Name  Mfg Code  Trade Name  Lot#  Route  Inj  Vis Given  
Vis Pub  CVX

 

 Influenza  10/20/2010  [employer]of[employer] pasteur  [facility]  Fluzone  [id number]  Intramuscular  
Left Arm  10/20/2010  2010  999

 

 Influenza  10/28/2011  [employer] pasteur  [employer]  Fluzone  [id number]  Intramuscular  
Left Arm  10/28/2011  2011  141

 

 Influenza  10/29/2012  sanofi pasteur  [facility]  Fluzone  [id number]  Intramuscular  
Left Deltoid  10/29/2012  2012  141

 

 X  12/10/2012  [employer]  MSD  Pneumovax 23  [id number]  Intramuscular  
Left Gluteous Medius  12/10/2012  2010  33

 

 Influenza  10/28/2013  sanofi pasteur  [facility]  Fluzone > 3 Years  [id number]  
Intramuscular  Right Deltoid  10/28/2013  2013  141

 

 X  9/2/2015  Not Entered  NE  Prevnar 13     Not Entered  Not Entered  1  109

 

 Influenza  2015  Not Entered  NE  Not Entered     Not Entered  Not Entered
  2015  141

 

 HepB Adult  2016  [employer]  MSD  Recombivax Adult  [id number]  Not 
Entered  Left Deltoid  2016  43

 

 HepB Adult  2016  [employer]  MSD  Recombivax Adult  [id number]  
Intramuscular  Right Deltoid  2016  43

 

 Zostavax  2012  Not Entered  NE  Not Entered     Not Entered  Not 
Entered  1  121

 

 Tdap  2012  Not Entered  NE  Not Entered     Not Entered  Not Entered  1  115

 

 Influenza  10/13/2016  Not Entered  NE  Fluzone High-Dose     Intramuscular  
Left Arm  1  141

 

 HepB Adult  2016  [employer]  MSD  Recombivax Adult  [id number]  
Intramuscular  Right Deltoid  2016  43







History of Past Illness







 Name  Date of Onset  Comments

 

 Benign Essential Hypertension  Dec 14 2009 10:10AM   

 

 Hyperlipidemia  Dec 14 2009 10:10AM   

 

 Hypothyroidism, Acquired  Dec 14 2009 10:10AM   

 

 hypothyroid      

 

 Hypertension      

 

 Hyperlipidemia      

 

 acid reflux      

 

 Hypertension, Benign Essential  2010  9:41AM   

 

 Hypertension, Benign Essential  2010 12:53PM   

 

 Routine gynecological examination  May  5 2010  9:28AM   

 

 Hypertension  May  5 2010  9:28AM   

 

 Hyperlipidemia, unspecified  May  5 2010  9:28AM   

 

 Hypothyroidism, Acquired  May  5 2010  9:28AM   

 

 Vulvovaginitis  May  5 2010  9:28AM   

 

 Rhinitis, Allergic  May  5 2010  9:28AM   

 

 Hypertension, Benign Essential  May 19 2010  8:48AM   

 

 Hypertension, Benign Essential  May 25 2010  9:39AM   

 

 Flu  Oct 20 2010 12:01PM   

 

 Essential Hypertension  2010  8:34AM   

 

 Hyperlipidemia  2010  8:34AM   

 

 Gastroesophageal Reflux  2010  8:34AM   

 

 Hypothyroidism, Acquired  2010  8:34AM   

 

 Hypertension, Benign Essential  2010  9:22AM   

 

 Hypertension, Benign Essential  Dec 15 2010  9:07AM   

 

 Essential Hypertension  2011  1:31PM   

 

 Hyperlipidemia  2011  1:31PM   

 

 Gastroesophageal Reflux  2011  1:31PM   

 

 Hypothyroidism, Acquired  2011  1:31PM   

 

 Essential Hypertension  2011  8:51AM   

 

 Hyperlipidemia  2011  8:51AM   

 

 Gastroesophageal Reflux  2011  8:51AM   

 

 Hypothyroidism, Acquired  2011  8:51AM   

 

 Essential Hypertension  2011  9:13AM   

 

 Hyperlipidemia  2011  9:13AM   

 

 Gastroesophageal Reflux  2011  9:13AM   

 

 Hypothyroidism, Acquired  2011  9:13AM   

 

 Nausea With Vomiting  2011  1:18PM   

 

 Hyponatremia  2011  8:46AM   

 

 Nausea  2011  9:13AM   

 

 Hypothyroidism  2011 11:10AM   

 

 Hyponatremia  2011 11:10AM   

 

 Essential Hypertension  2011 10:05AM   

 

 Hyperlipidemia  2011 10:05AM   

 

 Gastroesophageal Reflux  2011 10:05AM   

 

 Nausea  2011 10:05AM   

 

 Hypothyroidism, Acquired  2011 10:05AM   

 

 Hyponatremia  May  6 2011 11:34AM   

 

 Essential Hypertension  May 16 2011  8:50AM   

 

 Hyperlipidemia  May 16 2011  8:50AM   

 

 Gastroesophageal Reflux  May 16 2011  8:50AM   

 

 Nausea  May 16 2011  8:50AM   

 

 Hypothyroidism, Acquired  May 16 2011  8:50AM   

 

 Hyponatremia  May 16 2011  8:50AM   

 

 Routine gynecological examination  2011  8:54AM   

 

 Hypertension  2011  8:54AM   

 

 Hyperlipidemia, unspecified  2011  8:54AM   

 

 Hypothyroidism, Acquired  2011  8:54AM   

 

 Rhinitis, Allergic  2011  8:54AM   

 

 Hypothyroidism  Aug 29 2011 12:37PM   

 

 Hyponatremia  Aug 29 2011 12:37PM   

 

 Essential Hypertension  Sep 12 2011  8:53AM   

 

 Hyperlipidemia  Sep 12 2011  8:53AM   

 

 Gastroesophageal Reflux  Sep 12 2011  8:53AM   

 

 Hypothyroidism, Acquired  Sep 12 2011  8:53AM   

 

 Hyponatremia  Sep 12 2011  8:53AM   

 

 Hypertension  Sep 29 2011  9:41AM   

 

 Hyperlipidemia  Dec  8 2011  8:31AM   

 

 Hypothyroidism  Dec  8 2011  8:31AM   

 

 Hypertension  Dec  8 2011  8:31AM   

 

 Flu  Dec  9 2011 10:02AM   

 

 Essential Hypertension  Dec 13 2011 10:13AM   

 

 Hyperlipidemia  Dec 13 2011 10:13AM   

 

 Gastroesophageal Reflux  Dec 13 2011 10:13AM   

 

 Hypothyroidism, Acquired  Dec 13 2011 10:13AM   

 

 Hyponatremia  Dec 13 2011 10:13AM   

 

 Vaginal Discharge  2012  9:43AM   

 

 Rhinitis, Allergic  Feb 15 2012  9:31AM   

 

 Eustachian Tube Dysfunction  Feb 15 2012  9:31AM   

 

 Pharyngitis, Acute  Feb 15 2012  9:31AM   

 

 Routine gynecological examination  2012  8:48AM   

 

 Hypertension  2012  8:48AM   

 

 Hyperlipidemia, unspecified  2012  8:48AM   

 

 Hypothyroidism, Acquired  2012  8:48AM   

 

 Rhinitis, Allergic  2012  8:48AM   

 

 Candidiasis, Vulvovaginal  2012 11:04AM   

 

 Essential Hypertension  Aug 15 2012  9:02AM   

 

 Hyperlipidemia  Aug 15 2012  9:02AM   

 

 Gastroesophageal Reflux  Aug 15 2012  9:02AM   

 

 Hypothyroidism, Acquired  Aug 15 2012  9:02AM   

 

 Hyponatremia  Aug 15 2012  9:02AM   

 

 Atrophic Vaginitis, Postmenopausal  Aug 15 2012  9:02AM   

 

 Flu  Oct 29 2012  9:24AM   

 

 Essential Hypertension  Dec 10 2012  8:35AM   

 

 Hyperlipidemia  Dec 10 2012  8:35AM   

 

 Gastroesophageal Reflux  Dec 10 2012  8:35AM   

 

 Hypothyroidism, Acquired  Dec 10 2012  8:35AM   

 

 Hyponatremia  Dec 10 2012  8:35AM   

 

 Atrophic Vaginitis, Postmenopausal  Dec 10 2012  8:35AM   

 

 Pneumococcus  Dec 10 2012  2:07PM   

 

 Vaginal Discharge  Dec 20 2012  1:50PM   

 

 Essential Hypertension  Dec 20 2012  1:50PM   

 

 Hyperlipidemia  Dec 20 2012  1:50PM   

 

 Gastroesophageal Reflux  Dec 20 2012  1:50PM   

 

 Hypothyroidism, Acquired  Dec 20 2012  1:50PM   

 

 Atrophic Vaginitis, Postmenopausal  Dec 20 2012  1:50PM   

 

 Upper Respiratory Infections  2013  8:52AM   

 

 Hyperlipidemia  2013  8:21AM   

 

 Hypertension  2013  8:21AM   

 

 Hypothyroidism  2013  8:21AM   

 

 Screening Mammogram  Beto 10 2013  8:45AM   

 

 Routine gynecological examination  Beto 10 2013  8:34AM   

 

 Hypertension  Beto 10 2013  8:34AM   

 

 Hyperlipidemia, unspecified  Beto 10 2013  8:34AM   

 

 Hypothyroidism, Acquired  Beto 10 2013  8:34AM   

 

 Rhinitis, Allergic  Beto 10 2013  8:34AM   

 

 Flu  Oct 28 2013  8:52AM   

 

 Essential Hypertension  Dec  9 2013  8:37AM   

 

 Hyperlipidemia  Dec  9 2013  8:37AM   

 

 Gastroesophageal Reflux  Dec  9 2013  8:37AM   

 

 Hypothyroidism, Acquired  Dec  9 2013  8:37AM   

 

 Atrophic Vaginitis, Postmenopausal  Dec  9 2013  8:37AM   

 

 Hypertension  2014  9:56AM   

 

 Hyperlipidemia  2014  9:56AM   

 

 Hypothyroidism  2014  9:56AM   

 

 Screening Mammogram  2014  8:32AM   

 

 Osteopenia  2014  8:32AM   

 

 Hypertension  2014  8:35AM   

 

 Hypothyroidism, Acquired  2014  8:35AM   

 

 Hyperlipidemia  2014  8:35AM   

 

 Upper Respiratory Infections  2014  9:28AM   

 

 Sinusitis, Acute  Oct  2 2014  9:17AM   

 

 Herpes Zoster  Oct  5 2014  1:44PM   

 

 Vesicular Eruption  Oct  5 2014  1:44PM   

 

 Hypertension  2014  8:18AM   

 

 Hyperlipidemia  2014  8:18AM   

 

 hypothyroid  2014  8:18AM   

 

 Situational anxiety  Dec 20 2014  9:33AM   

 

 GERD (gastroesophageal reflux disease)  Dec 20 2014  9:33AM   

 

 Nausea  Dec 20 2014  9:33AM   

 

 Abdominal Pain, Epigastric  Dec 20 2014  9:33AM   

 

 Hyperlipidemia  Oct  6 2014  9:03AM   

 

 acid reflux  Oct  6 2014  9:03AM   

 

 hypothyroid  Oct  6 2014  9:03AM   

 

 Shingles  Oct  6 2014  9:03AM   

 

 Pharyngitis  2015  1:09PM   

 

 Bronchitis  2015  9:10AM   

 

 Hyperlipidemia  2015  9:10AM   

 

 acid reflux  2015  9:10AM   

 

 hypothyroid  2015  9:10AM   

 

 Hyperlipidemia  2015  8:18AM   

 

 Hypertension  2015  8:18AM   

 

 hypothyroid  2015  8:18AM   

 

 Screening Mammogram  2015 11:43AM   

 

 Medicare Annual Pap Q 2 years  2015  9:36AM   

 

 Screening Mammogram  2015  9:36AM   

 

 Candidiasis of female genitalia  2015  9:36AM   

 

 Hypertension  2015 11:34AM   

 

 Hyperlipidemia, unspecified  2015 11:34AM   

 

 Hypothyroidism, Acquired  2015 11:34AM   

 

 Osteopenia  2016  8:41AM   

 

 Encounter for screening mammogram for breast cancer  2016  8:41AM   

 

 Hypertension  2016  8:34AM   

 

 Lymphedema  2016  8:34AM   

 

 Hyperlipidemia  2016  8:34AM   

 

 hypothyroid  2016  8:34AM   

 

 HEP B  2016  9:13AM   

 

 HEP B  2016  8:52AM   

 

 Acid Reflux  2016  8:34AM   

 

 History of acute gastritis  Oct 13 2016  2:30PM   

 

 Anxiety as acute reaction to exceptional stress  Oct 13 2016  2:30PM   

 

 HEP B  Dec 29 2016  8:42AM   

 

 Caregiver stress syndrome  May 26 2017  8:28AM   

 

 Anxiety  May 26 2017  8:28AM   







Payers







 Insurance Name  Company Name  Plan Name  Plan Number  Policy Number  Policy 
Group Number  Start Date

 

    Medicare RHC  Medicare RHC     [id number]     N/A

 

    BCBS  [employer] [facility]     [id number]     2009

 

    Medicare Part B  [place]ic[place]     [id number]     N/A

 

    Medicare Part A  Medicare Part A     [id number]     N/A

 

    Medicare Part A  [employer] P [employer] - Preventive     [id number]     N/A

 

    Medicare Part A  Medicare - Lab/Xray     [id number]     N/A







History of Encounters







 Visit Date  Visit Type  Provider

 

 2017  Office visit  Prince Noe APRN

 

 2016  Nurse visit  Doris Noriega MD

 

 10/13/2016  Office visit  Doris Noriega MD

 

 2016  Nurse visit  Doris Noriega MD

 

 2016  Office visit  Doris Noriega MD

 

 2015  Office visit  Doirs Noriega MD

 

 2015  Office visit  Doris Noriega MD

 

 2015  Office visit  Prince Noe APRN

 

 2014  Office visit  Anyi Herrera APRN

 

 10/27/2014  Voided  Doris Noriega MD

 

 10/6/2014  Office visit  Doris Noriega MD

 

 10/5/2014  Office visit  Anyi Herrera APRN

 

 10/2/2014  Office visit   

 

 10/2/2014  Office visit  Corrine Bay APRN

 

 2014  Office visit  Kassie Franklin APRN

 

 2014  Office visit  Doris Noriega MD

 

 2014  Office visit  Doris Norigea MD

 

 2013  Office visit  Dee Colindres MD

 

 10/28/2013  Nurse visit  Dee Colindres MD

 

 6/10/2013  Office visit  Dee Colindres MD

 

 2013  Office visit  Corrine Bay APRN

 

 2012  Office visit  Dee Colindres MD

 

 12/10/2012  Office visit  Dee Colindres MD

 

 10/29/2012  Nurse visit  Dee Colindres MD

 

 8/15/2012  Office visit  Dee Colindres MD

 

 2012  Office visit  Corrine Bay APRN

 

 2012  Office visit  Dee Colindres MD

 

 2/15/2012  Office visit  Dee Colindres MD

 

 2012  Office visit  Corrine Bay APRN

 

 2011  Office visit  Dee Colindres MD

 

 10/28/2011  Nurse visit  Shad Nolasco MD

 

 2011  Office visit  Dee Colindres MD

 

 2011  Office visit  Dee Colindres MD

 

 2011  Office visit  Dee Colindres MD

 

 2011  Office visit  Dee Colindres MD

 

 2011  Office visit  Dee Colindres MD

 

 2011  Office visit  Dee Colindres MD

 

 4/10/2011  [facility]  KHRIS Granados MD

 

 4/10/2011  Hospital  RICKEY Toussaint MD

 

 2011  Office visit  RICKEY Toussaint MD

 

 2011  [facility]  Fide Castellanos MD

 

 2011  Voided  Fide Castellanos MD

 

 2011  Office visit  Dee Colindres MD

 

 12/15/2010  Nurse visit  Dee Colindres MD

 

 2010  Office visit  Dee Colindres MD

 

 10/20/2010  Nurse visit  Stephenie PERAZA

 

 2010  Nurse visit  Dee Colindres MD

 

 2010  Nurse visit  Dee Colindres MD

 

 2010  Office visit  Dee Colindres MD

 

 3/19/2010  Voided  Stephenie Kay PA

 

 2010  Nurse visit  Stephenie PERAZA

 

 2010  Nurse visit  Stephenie PERAZA

 

 2010  Nurse visit  Stephenie PERAZA

 

 2009  Office visit  Stephenie PERAZA

 

 10/20/2009  Nurse visit  Stephenie Kay PA

## 2017-07-13 NOTE — XMS REPORT
MU2 Ambulatory Summary

 Created on: 2016



Milady Crespo

External Reference #: [id number]

: 1945

Sex: Female



Demographics







 Address  [address]

[place]  [address]

 

 Home Phone  [phone]

 

 Preferred Language  English

 

 Marital Status  

 

 Taoism Affiliation  Unknown

 

 Race  White

 

 Ethnic Group  Not  or 





Author







 Author  Doris Noriega

 

 Organization  [facility]

 

 Address  [address]

[place], [place]  [id number]



 

 Phone  [phone]







Care Team Providers







 Care Team Member Name  Role  Phone

 

 Doris Noriega  PCP  [phone]







Allergies and Adverse Reactions







 Name  Reaction  Notes

 

 NO KNOWN DRUG ALLERGIES      







Plan of Treatment







 Planned Activity  Comments  Planned Date  Planned Time  Plan/Goal

 

 DXA BONE DENSITY AXIAL     2016  12:00 AM   

 

 COMPLETE CBC W/AUTO DIFF WBC     2016  12:00 AM   

 

 COMPREHEN METABOLIC PANEL     2016  12:00 AM   

 

 VITAMIN D 25 HYDROXY     2016  12:00 AM   

 

 LIPID PANEL     8/15/2012  12:00 AM   

 

 COMPREHEN METABOLIC PANEL     6/10/2013  12:00 AM   

 

 LIPID PANEL     6/10/2013  12:00 AM   

 

 ASSAY THYROID STIM HORMONE     6/10/2013  12:00 AM   

 

 COMPLETE CBC W/AUTO DIFF WBC     6/10/2013  12:00 AM   

 

 COMPREHEN METABOLIC PANEL     2012  12:00 AM   

 

 LIPID PANEL     2012  12:00 AM   

 

 ASSAY THYROID STIM HORMONE     2012  12:00 AM   

 

 COMPLETE CBC W/AUTO DIFF WBC     2012  12:00 AM   

 

 MAMMOGRAM SCREENING     2015  12:00 AM   







Medications







 Active 

 

 Name  Start Date  Estimated Completion Date  SIG  Comments

 

 loratadine 10 mg oral tablet  2015  12/15/2016  TAKE 1/2 TABLET BY MOUTH 
EVERY DAY IN THE EVENING   

 

 aspirin 81 mg oral tablet,delayed release (DR/EC)        take 1 tablet (81 mg) 
by oral route once daily   

 

 Vitamin D3 1,000 unit oral capsule        take 1 capsule by oral route daily   

 

 famotidine 20 mg oral tablet  2015     TAKE ONE TABLET BY MOUTH TWICE 
DAILY   

 

 valsartan 160 mg oral tablet  2016     TAKE ONE TABLET BY MOUTH ONCE 
DAILY   

 

 amlodipine 5 mg oral tablet  2016     TAKE ONE TABLET BY MOUTH ONCE DAILY
   

 

 Allergy Relief (loratadine) 10 mg oral tablet  2016     TAKE ONE TABLET 
BY MOUTH ONCE DAILY   

 

 triamcinolone acetonide 0.1 % topical cream  3/14/2016     APPLY A THIN LAYER 
OF CREAM EXTERNALLY TO AFFECTED AREA TWICE DAILY FOR 14 DAYS   

 

 atenolol 50 mg oral tablet  2016  3/30/2017  take 1 tablet (50 mg) by oral 
route once daily   

 

 levothyroxine 50 mcg oral tablet  2016     TAKE ONE TABLET BY MOUTH ONCE 
DAILY   

 

 amlodipine 5 mg oral tablet  2016     TAKE ONE TABLET BY MOUTH ONCE DAILY
   

 

 Allergy Relief (loratadine) 10 mg oral tablet  2016     TAKE ONE TABLET 
BY MOUTH ONCE DAILY   

 

 valsartan 160 mg oral tablet  2016     TAKE ONE TABLET BY MOUTH ONCE 
DAILY   









  

 

 Name  Start Date  Expiration Date  SIG  Comments

 

 prednisone 20 mg oral tablet  2011  take 2 tablets by oral 
route daily for 5 days   

 

 calcium carbonate-vitamin D2 600-125 mg-unit oral tablet  8/15/2012  2012
  take 2 tablets by oral route daily   

 

 [place] 3 Fish Oil 900-1,400 mg oral capsule,delayed release(DR/EC)  8/15/2012  9
/  take 1 capsule by oral route daily   

 

 multivitamin Oral tablet  8/15/2012  2012  take 1 tablet by oral route 
daily   

 

 triamcinolone acetonide 0.1 % topical cream  2013  10/7/2013  APPLY A 
THIN LAYER TO THE AFFECTED AREA(S) BY TOPICAL ROUTE TWICE A DAY   

 

 famotidine 20 mg oral tablet  2014  take 1 tablet (20 mg) by 
oral route 2 times per day   

 

 Plavix 75 mg oral tablet  2014  take 1 tablet (75 mg) by oral 
route once daily   

 

 amoxicillin 500 mg oral capsule  2014  take 1 capsule (500 mg) 
by oral route 3 times per day for 10 days   

 

 Lipitor 20 mg oral tablet  2014  take 1 tablet (20 mg) by oral 
route once daily   

 

 Zithromax Z-Tab 250 mg oral tablet  10/2/2014  10/7/2014  take 2 tablets (500 
mg) by oral route once daily for 1 day then 1 tablet (250 mg) by oral route 
once daily for 4 days   

 

 acyclovir 800 mg oral tablet  10/5/2014  10/12/2014  take 1 tablet (800 mg) by 
oral route 5 times per day for 7 days   

 

 gabapentin 300 mg oral capsule  10/9/2014  2014  take 1 capsule (300 mg) 
by oral route 3 times per day for 14 days   

 

 Carafate 100 mg/mL oral suspension  2014  take 10 milliliters 
(1 gram) by oral route 4 times per day on an empty stomach 1 hour before meals 
and at bedtime for 4 weeks   

 

 amlodipine 5 mg oral tablet  2015  TAKE ONE TABLET BY MOUTH 
EVERY DAY   

 

 levothyroxine 50 mcg oral tablet  2015  TAKE ONE TABLET BY MOUTH 
EVERY DAY   

 

 Diovan 160 mg oral tablet  2015  2/15/2016  TAKE ONE TABLET BY MOUTH 
EVERY DAY for 90 days   

 

 triamcinolone acetonide 0.1 % topical cream  2015  apply a thin 
layer to the affected area(s) by topical route 2 times per day for 14 days   

 

 fluconazole 150 mg oral tablet  2015  take 1 tablet (150 mg) 
by oral route once for 1 day   

 

 clorazepate dipotassium 7.5 mg oral tablet  3/14/2016  2016  take 1/2 to1 
tablet PO up to three times per day for situational anxiety   









 Discontinued 

 

 Name  Start Date  Discontinued Date  SIG  Comments

 

 Lipitor 40 mg oral tablet     2009 TAB DAILY   

 

 ramipril 5 mg oral capsule     2009  take 1 capsule (5 mg) by oral route 
once daily   

 

 lovastatin 20 mg oral tablet  2009  take 1 tablet (20 mg) by 
oral route once daily with evening meal   

 

 propranolol 20 mg oral tablet  2010  take 1 tablet by oral 
route 2 times a day   

 

 Fosamax 70 mg oral tablet  2010  take 1 tablet (70 mg) by oral 
route once weekly in the morning, at least 30 minutes before the first food, 
beverage, or medication of the day   

 

 lisinopril 40 mg oral tablet  2010  4/10/2011  take 2 tablets by oral 
route daily   

 

 Zoloft 50 mg oral tablet  2011  take 1 tablet (50 mg) by oral 
route once daily   

 

 promethazine 25 mg oral tablet  2011  take 1 tablet by oral 
route every 6 hours as needed   

 

 Diovan -12.5 mg oral tablet  2011  take 1 tablet by oral 
route once daily for 30 days   

 

 Diflucan 150 mg oral tablet     2012  take 1 tablet (150 mg) by oral 
route once for 1 day   

 

 Diflucan 150 mg oral tablet  2012  8/15/2012  take 1 tablet (150 mg) by 
oral route once   

 

 Vitamin B-12 1,000 mcg oral tablet  8/15/2012  2014  take 1 tablet by 
oral route daily   

 

 Premarin 0.625 mg/gram vaginal cream  10/29/2012  6/10/2013  use 1 gram daily 
for a week then 1 gram twice a week   

 

 Medrol (Tab) 4 mg oral tablets,dose pack  2013  6/10/2013  take as 
directed   

 

 aspirin 325 mg oral tablet  2014  take 1 tablet (325 mg) by 
oral route once daily   

 

 Medrol (Tab) 4 mg oral tablets,dose pack  2014  10/2/2014  take as 
directed   

 

 Tetracaine Lollipops Lollipop  2015  Use as directed   







Problem List







 Description  Status  Onset

 

 Hyperlipidemia  Active   

 

 acid reflux  Active   

 

 Hypertension  Active   

 

 hypothyroid  Active   







Vital Signs







 Date  Time  BP-Sys(mm[Hg]  BP-Morena(mm[Hg])  HR(bpm)  RR(rpm)  Temp  WT  HT  HC  
BMI  BSA  BMI Percentile  O2 Sat(%)

 

 2016  8:29:00 AM  122 mmHg  70 mmHg  72 bpm  16 rpm  97.8 F  137.125 lbs  
61 in     25.91 kg/m2  1.64 m2     100 %

 

 2015  9:33:00 AM  120 mmHg  68 mmHg  73 bpm     98.7 F  144.375 lbs  61 
in     27.2791 kg/m  1.6788 m     99 %

 

 2015  9:05:00 AM  110 mmHg  75 mmHg  85 bpm  16 rpm  96.7 F  144.375 lbs  
61 in     27.28 kg/m2  1.68 m2     99 %

 

 2015  1:05:00 PM  108 mmHg  62 mmHg  78 bpm  18 rpm  96.9 F  142.375 lbs  
61 in     26.9012 kg/m  1.6672 m     100 %

 

 2014  9:31:00 AM  126 mmHg  66 mmHg  79 bpm  18 rpm  98.7 F  149 lbs  61 
in     28.15 kg/m2  1.71 m2     98 %

 

 10/6/2014  9:02:00 AM  110 mmHg  74 mmHg  94 bpm  18 rpm  98.1 F  145.4 lbs  
61 in     27.4728 kg/m  1.6848 m     97 %

 

 10/2/2014  9:14:00 AM  124 mmHg  74 mmHg  79 bpm  18 rpm  98 F  147.25 lbs  61 
in     27.82 kg/m2  1.70 m2     98 %

 

 2014  9:22:00 AM  124 mmHg  76 mmHg  89 bpm  18 rpm  98.1 F  148 lbs  61 
in     27.9641 kg/m  1.6998 m     100 %

 

 2014  8:27:00 AM  118 mmHg  65 mmHg  74 bpm  16 rpm  97.7 F  148 lbs  61 
in     27.96 kg/m2  1.70 m2     99 %

 

 2014  9:48:00 AM  125 mmHg  70 mmHg  93 bpm  18 rpm  96.7 F  156 lbs  61 
in     29.4756 kg/m  1.7451 m     100 %

 

 2013  8:35:00 AM  122 mmHg  74 mmHg  84 bpm  16 rpm  97.9 F  155.375 lbs 
                99 %

 

 6/10/2013  8:30:00 AM  130 mmHg  82 mmHg  79 bpm  16 rpm  97.9 F  161 lbs     
            98 %

 

 2013  8:50:00 AM  124 mmHg  68 mmHg  66 bpm  18 rpm  97.6 F  157.5 lbs  
61 in     29.76 kg/m2  1.75 m2      

 

 2012  1:46:00 PM  120 mmHg  72 mmHg  75 bpm  16 rpm  97.6 F  156.125 lbs
                 98 %

 

 12/10/2012  8:32:00 AM  122 mmHg  82 mmHg  70 bpm  16 rpm  97.3 F  157 lbs    
             99 %

 

 8/15/2012  9:00:00 AM  130 mmHg  76 mmHg  86 bpm  16 rpm  97.4 F  159 lbs     
            100 %

 

 2012  11:02:00 AM  128 mmHg  64 mmHg  66 bpm  18 rpm  97.4 F  162.125 lbs
  61 in     30.63 kg/m2  1.78 m2      

 

 2012  8:45:00 AM  130 mmHg  70 mmHg  82 bpm  16 rpm  98.2 F  160.125 lbs 
                100 %

 

 2/15/2012  9:29:00 AM  122 mmHg  80 mmHg  86 bpm  16 rpm  97.4 F  159.375 lbs  
61 in     30.11 kg/m2  1.76 m2     99 %

 

 2012  9:41:00 AM  132 mmHg  74 mmHg  66 bpm  18 rpm  98.4 F  160.375 lbs  
61 in     30.3023 kg/m  1.7694 m      

 

 2011  10:08:00 AM  134 mmHg  82 mmHg  80 bpm  16 rpm  98 F  160 lbs  61 
in     30.23 kg/m2  1.77 m2     99 %

 

 2011  3:56:00 PM  130 mmHg  80 mmHg                              

 

 2011  8:55:00 AM  130 mmHg  74 mmHg  88 bpm  16 rpm  98.2 F  158.25 lbs  
               98 %

 

 2011  8:54:00 AM  136 mmHg  82 mmHg  102 bpm  16 rpm  98.1 F  153.5 lbs   
              99 %

 

 2011  8:49:00 AM  122 mmHg  88 mmHg  88 bpm  16 rpm  98.6 F  152.25 lbs  
               99 %

 

 2011  10:02:00 AM  140 mmHg  82 mmHg  96 bpm  20 rpm  98.8 F             
       100 %

 

 2011  9:14:00 AM  156 mmHg  98 mmHg  110 bpm  24 rpm  98.5 F  153 lbs    
             100 %

 

 2011  8:50:00 AM  160 mmHg  84 mmHg  100 bpm  16 rpm  98.7 F  155 lbs    
             100 %

 

 2011  1:25:00 PM  152 mmHg  100 mmHg  82 bpm  16 rpm  97.2 F  156.375 lbs 
                100 %

 

 2011  9:55:00 AM  144 mmHg  90 mmHg                              

 

 2010  9:24:00 AM  138 mmHg  84 mmHg                              

 

 2010  8:58:00 AM  160 mmHg  94 mmHg                              

 

 2010  8:33:00 AM  142 mmHg  90 mmHg  100 bpm  16 rpm  98.1 F  158.375 
lbs                  

 

 2010  9:24:00 AM  160 mmHg  102 mmHg  88 bpm  18 rpm  99 F  157.125 lbs  
62 in     28.7382 kg/m  1.7657 m      

 

 3/19/2010  11:01:00 AM  140 mmHg  90 mmHg                              

 

 2009  10:08:00 AM  142 mmHg  86 mmHg  72 bpm  16 rpm  98.1 F  154.125 
lbs  61 in     29.1214 kg/m  1.7346 m      







Social History







 Name  Description  Comments

 

       

 

 Children      

 

 lives alone      

 

 Supervisor      

 

 Tobacco  Never smoker   







History of Procedures







 Date Ordered  Description  Order Status

 

 2011 12:00 AM  COMPREHEN METABOLIC PANEL  Reviewed

 

 2011 12:00 AM  ASSAY THYROID STIM HORMONE  Reviewed

 

 2011 12:00 AM  COMPREHEN METABOLIC PANEL  Reviewed

 

 2011 12:00 AM  LIPID PANEL  Reviewed

 

 2011 12:00 AM  ASSAY THYROID STIM HORMONE  Reviewed

 

 2011 12:00 AM  COMPLETE CBC W/AUTO DIFF WBC  Reviewed

 

 2015 12:00 AM  COMPLETE CBC W/AUTO DIFF WBC  Returned

 

 2015 12:00 AM  COMPREHEN METABOLIC PANEL  Returned

 

 2015 12:00 AM  LIPID PANEL  Returned

 

 2015 12:00 AM  ASSAY THYROID STIM HORMONE  Returned

 

 2011 12:00 AM  COMPREHEN METABOLIC PANEL  Reviewed

 

 2011 12:00 AM  COMPREHEN METABOLIC PANEL  Reviewed

 

 2011 12:00 AM  LIPID PANEL  Reviewed

 

 2011 12:00 AM  ASSAY THYROID STIM HORMONE  Reviewed

 

 10/28/2011 12:00 AM  ***Immunization administration, Medicare flu  Reviewed

 

 10/28/2011 12:00 AM  Fluzone ** MEDICARE Only **  Reviewed

 

 2010 11:24 AM  NO CHARGE OV  Reviewed

 

 2010 11:26 AM  NO CHARGE OV  Reviewed

 

 2011 12:00 AM  COMPREHEN METABOLIC PANEL  Reviewed

 

 2011 12:00 AM  LIPID PANEL  Reviewed

 

 2011 12:00 AM  ASSAY THYROID STIM HORMONE  Reviewed

 

 2011 12:00 AM  COMPLETE CBC W/AUTO DIFF WBC  Reviewed

 

 2011 12:00 AM  Wrist Support  Reviewed

 

 2016 12:00 AM  TETANUS VACCINE IM  Reviewed

 

 2016 12:00 AM  IM ADMIN 1ST/ONLY COMPONENT  Reviewed

 

 2016 12:00 AM  HEPB VACC ILL PAT 3 DOSE IM  Reviewed

 

 2016 12:00 AM  HEPB VACC ILL PAT 4 DOSE IM  Reviewed

 

 2016 12:00 AM  HEPB VACC PED/ADOL 3 DOSE IM  Reviewed

 

 2012 12:00 AM  TISSUE EXAM FOR FUNGI  Returned

 

 2012 12:00 AM  SMEAR WET MOUNT SALINE/INK  Returned

 

 2/15/2012 12:00 AM  THER/PROPH/DIAG INJ SC/IM  Reviewed

 

 2/15/2012 12:00 AM  Bicillin CR [facility]8041[facility]  Reviewed

 

 2/15/2012 12:00 AM  Depo-Medrol 40 mg NDC#8884827425  Reviewed

 

 8/15/2012 12:00 AM  COMPREHEN METABOLIC PANEL  Reviewed

 

 8/15/2012 12:00 AM  ASSAY THYROID STIM HORMONE  Reviewed

 

 8/15/2012 12:00 AM  COMPLETE CBC W/AUTO DIFF WBC  Returned

 

 8/15/2012 12:00 AM  Wrist Support  Reviewed

 

 10/29/2012 12:00 AM  Flu Injection 3 Years And Above NDC# [address]-69  [facility]  
Reviewed

 

 12/10/2012 12:00 AM  IMMUNIZATION ADMIN  Reviewed

 

 12/10/2012 12:00 AM  Pneumovax Injection - [facility]  Reviewed

 

 2012 12:00 AM  TRICHOMONAS ASSAY W/OPTIC  Returned

 

 2013 12:00 AM  THER/PROPH/DIAG INJ SC/IM  Reviewed

 

 2013 12:00 AM  Bicillin CR, 1.2 million units NDC# 47782-754-76  Reviewed

 

 2013 12:00 AM  COMPREHEN METABOLIC PANEL  Returned

 

 2013 12:00 AM  LIPID PANEL  Returned

 

 2013 12:00 AM  COMPLETE CBC W/AUTO DIFF WBC  Returned

 

 2013 12:00 AM  ASSAY THYROID STIM HORMONE  Returned

 

 6/10/2013 12:00 AM  MAMMOGRAM SCREENING  Returned

 

 6/10/2013 12:00 AM  CYTOPATH C/V MANUAL  Returned

 

 12/10/2013 12:00 AM  LIPID PANEL  Returned

 

 12/10/2013 12:00 AM  ASSAY THYROID STIM HORMONE  Returned

 

 12/10/2013 12:00 AM  COMPLETE CBC W/AUTO DIFF WBC  Returned

 

 12/10/2013 12:00 AM  COMPREHEN METABOLIC PANEL  Returned

 

 2010 12:00 AM  CYTOPATH C/V MANUAL  Reviewed

 

 2010 12:00 AM  SMEAR WET [place] SALINE/INK  Reviewed

 

 2010 12:00 AM  LIPID PANEL  Reviewed

 

 2010 12:00 AM  ASSAY THYROID STIM HORMONE  Reviewed

 

 2010 12:00 AM  COMPLETE CBC W/AUTO DIFF WBC  Reviewed

 

 2010 12:00 AM  COMPREHEN METABOLIC PANEL  Reviewed

 

 10/28/2013 12:00 AM  Flu Injection 3 Years And Above NDC# [address]-86  [facility]  
Reviewed

 

 2010 8:48 AM  Blood Pressure Check-no charge  Reviewed

 

 2010 12:00 AM  Blood Pressure Check-no charge  Reviewed

 

 2014 12:00 AM  METABOLIC PANEL TOTAL CA  Reviewed

 

 2014 12:00 AM  ASSAY THYROID STIM HORMONE  Reviewed

 

 2014 12:00 AM  ASSAY OF FREE THYROXINE  Reviewed

 

 2014 12:00 AM  MAMMOGRAM SCREENING  Returned

 

 2014 12:00 AM  CT BONE DENSITY AXIAL  Returned

 

 2014 12:00 AM  COMPLETE CBC W/AUTO DIFF WBC  Returned

 

 2014 12:00 AM  COMPREHEN METABOLIC PANEL  Returned

 

 2014 12:00 AM  LIPID PANEL  Returned

 

 2014 12:00 AM  ASSAY THYROID STIM HORMONE  Returned

 

 10/20/2010 12:00 AM  IMMUNIZATION ADMIN  Reviewed

 

 10/20/2010 12:00 AM  FLU VACCINE 3 YRS & > IM  Reviewed

 

 2010 12:00 AM  COMPREHEN METABOLIC PANEL  Reviewed

 

 2010 12:00 AM  LIPID PANEL  Reviewed

 

 2010 12:00 AM  ASSAY THYROID STIM HORMONE  Reviewed

 

 2010 12:00 AM  COMPLETE CBC W/AUTO DIFF WBC  Reviewed

 

 2010 12:00 AM  Blood Pressure Check-no charge  Reviewed

 

 10/2/2014 12:00 AM  THER/PROPH/DIAG INJ SC/IM  Reviewed

 

 10/2/2014 12:00 AM  Kenalog, Per 10 Mg NDC#9731-9760-20  Reviewed

 

 2014 12:00 AM  COMPLETE CBC W/AUTO DIFF WBC  Returned

 

 2014 12:00 AM  COMPREHEN METABOLIC PANEL  Returned

 

 2014 12:00 AM  LIPID PANEL  Returned

 

 2014 12:00 AM  ASSAY THYROID STIM HORMONE  Returned

 

 2015 12:00 AM  Rocephin 1 gram NDC#4901-9853-46  Reviewed

 

 2015 12:00 AM  THER/PROPH/DIAG INJ SC/IM  Reviewed

 

 2011 12:00 AM  COMPREHEN METABOLIC PANEL  Reviewed

 

 2011 12:00 AM  LIPID PANEL  Reviewed

 

 2011 12:00 AM  ASSAY THYROID STIM HORMONE  Reviewed

 

 2011 12:00 AM  COMPLETE CBC W/AUTO DIFF WBC  Reviewed

 

 2011 12:00 AM  METABOLIC PANEL TOTAL CA  Reviewed

 

 2011 12:00 AM  COMPREHEN METABOLIC PANEL  Reviewed

 

 2011 12:00 AM  ASSAY THYROID STIM HORMONE  Reviewed

 

 2011 12:00 AM  COMPLETE CBC W/AUTO DIFF WBC  Reviewed

 

 2011 12:00 AM  ASSAY OF LIPASE  Reviewed

 

 2011 12:00 AM  THER/PROPH/DIAG INJ SC/IM  Reviewed

 

 2011 12:00 AM  Phenergan 50 Mg Im  Bernadine  Reviewed

 

 2011 12:00 AM  METABOLIC PANEL TOTAL CA  Reviewed

 

 2011 12:00 AM  THER/PROPH/DIAG INJ SC/IM  Reviewed

 

 2011 12:00 AM  Phenergan 25 Mg Im  Bernadine  Reviewed

 

 2011 12:00 AM  METABOLIC PANEL TOTAL CA  Reviewed

 

 2011 12:00 AM  ASSAY THYROID STIM HORMONE  Reviewed

 

 2011 12:00 AM  THER/PROPH/DIAG INJ SC/IM  Reviewed

 

 2011 12:00 AM  Phenergan 50 Mg Im  Bernadine  Reviewed

 

 2011 12:00 AM  ASSAY OF SERUM SODIUM  Reviewed

 

 2011 12:00 AM  ASSAY OF SERUM SODIUM  Reviewed

 

 2011 12:00 AM  CYTOPATH C/V MANUAL  Reviewed

 

 2011 12:00 AM  ASSAY THYROID STIM HORMONE  Reviewed

 

 2015 12:00 AM  COMPLETE CBC W/AUTO DIFF WBC  Returned

 

 2015 12:00 AM  COMPREHEN METABOLIC PANEL  Returned

 

 2015 12:00 AM  LIPID PANEL  Returned

 

 2015 12:00 AM  ASSAY THYROID STIM HORMONE  Returned

 

 2015 12:00 AM  MAMMOGRAM SCREENING  Returned

 

 2015 12:00 AM  CYTOPATH TBS C/V MANUAL  Returned







Results Summary







 Data and Description  Results

 

 2010 9:25 AM  Colonoscopy-Women and Men over 50 Abnormal Mammogram -Women 
over 40 Declined Pap Smear Declined 

 

 2010 10:41 AM  WET PREP NO TRICHOMONADS SEEN 

 

 2010 8:15 AM  TRIGLYCERIDES 174.0 mg/dLCHOLESTEROL 175.0 mg/dLHDL 58.0 mg/
dLLDL (CALC) 82.0 mg/dLTSH 0.790 uIU/mLGLUCOSE 95.0 mg/dLSODIUM 136.0 mmol/
LPOTASSIUM 4.50 mmol/LCHLORIDE 99.0 mmol/LCO2 26.0 mmol/LBUN 12.0 mg/
dLCREATININE 0.80 mg/dLSGOT/AST 32.0 IU/LSGPT/ALT 33.0 IU/LALK PHOS 103.0 IU/
LTOTAL PROTEIN 7.60 g/dLALBUMIN 4.60 g/dLTOTAL BILI 0.60 mg/dLCALCIUM 9.80 mg/
dLeGFR >60 mL/min/1.73 m2WBC 5.3 RBC 4.13 HGB 13.10 g/dLHCT 39.20 %MCV 95.0 
fLMCH 31.70 pgMCHC 33.40 g/dLRDW CV 12.70 %MPV 9.80 fLPLT 257 %NEUT 57.90 %%
LYMP 26.50 %%MONO 11.60 %%EOS 3.20 %%BASO 0.80 %#NEUT 3.04 #LYMP 1.39 #MONO 
0.61 #EOS 0.17 #BASO 0.04 

 

 12/15/2010 8:30 AM  TRIGLYCERIDES 157.0 mg/dLCHOLESTEROL 163.0 mg/dLHDL 56.0 mg
/dLLDL (CALC) 76.0 mg/dLTSH 0.650 uIU/mLWBC 6.5 RBC 4.25 HGB 13.60 g/dLHCT 
40.70 %MCV 96.0 fLMCH 32.0 pgMCHC 33.40 g/dLRDW CV 12.60 %MPV 9.90 fLPLT 313 %
NEUT 61.80 %%LYMP 26.20 %%MONO 8.30 %%EOS 3.10 %%BASO 0.60 %#NEUT 4.00 #LYMP 
1.70 #MONO 0.54 #EOS 0.20 #BASO 0.04 GLUCOSE 97.0 mg/dLSODIUM 136.0 mmol/
LPOTASSIUM 4.40 mmol/LCHLORIDE 101.0 mmol/LCO2 26.0 mmol/LBUN 10.0 mg/
dLCREATININE 0.80 mg/dLSGOT/AST 31.0 IU/LSGPT/ALT 34.0 IU/LALK PHOS 92.0 IU/
LTOTAL PROTEIN 8.10 g/dLALBUMIN 4.60 g/dLTOTAL BILI 0.40 mg/dLCALCIUM 10.0 mg/
dLeGFR >60 mL/min/1.73 m2

 

 2011 9:45 AM  GLUCOSE 91.0 mg/dLSODIUM 133.0 mmol/LPOTASSIUM 4.40 mmol/
LCHLORIDE 97.0 mmol/LCO2 24.0 mmol/LBUN 9.0 mg/dLCREATININE 0.70 mg/dLCALCIUM 
10.0 mg/dLeGFR >60 mL/min/1.73 m2

 

 2011 10:25 AM  LIPASE 29.0 U/LTSH 0.10 uIU/mLGLUCOSE 115.0 mg/dLSODIUM 
127.0 mmol/LPOTASSIUM 5.30 mmol/LCHLORIDE 93.0 mmol/LCO2 18.0 mmol/LBUN 9.0 mg/
dLCREATININE 0.70 mg/dLSGOT/AST 62.0 IU/LSGPT/ALT 46.0 IU/LALK PHOS 100.0 IU/
LTOTAL PROTEIN 8.60 g/dLALBUMIN 4.90 g/dLTOTAL BILI 0.60 mg/dLCALCIUM 9.90 mg/
dLeGFR >60 mL/min/1.73 m2WBC 6.9 RBC 4.48 HGB 14.40 g/dLHCT 40.90 %MCV 91.0 
fLMCH 32.10 pgMCHC 35.20 g/dLRDW CV 12.50 %MPV 9.30 fLPLT 260 %NEUT 74.90 %%
LYMP 15.10 %%MONO 9.40 %%EOS 0.30 %%BASO 0.30 %#NEUT 5.15 #LYMP 1.04 #MONO 0.65 
#EOS 0.02 #BASO 0.02 

 

 2011 9:07 AM  GLUCOSE 92.0 mg/dLSODIUM 131.0 mmol/LPOTASSIUM 4.20 mmol/
LCHLORIDE 99.0 mmol/LCO2 22.0 mmol/LBUN 9.0 mg/dLCREATININE 0.70 mg/dLCALCIUM 
9.20 mg/dLeGFR >60 mL/min/1.73 m2

 

 2011 11:06 AM  TSH 0.510 uIU/mLGLUCOSE 99.0 mg/dLSODIUM 132.0 mmol/
LPOTASSIUM 3.50 mmol/LCHLORIDE 95.0 mmol/LCO2 23.0 mmol/LBUN 9.0 mg/
dLCREATININE 0.80 mg/dLCALCIUM 10.40 mg/dLeGFR >60 mL/min/1.73 m2

 

 2011 9:45 AM  SODIUM 132.0 mmol/L

 

 2011 9:27 AM  SODIUM 137.0 mmol/L

 

 2011 8:35 AM  TRIGLYCERIDES 114.0 mg/dLCHOLESTEROL 145.0 mg/dLHDL 56.0 mg/
dLLDL (CALC) 66.0 mg/dLGLUCOSE 105.0 mg/dLSODIUM 138.0 mmol/LPOTASSIUM 4.40 mmol
/LCHLORIDE 103.0 mmol/LCO2 25.0 mmol/LBUN 8.0 mg/dLCREATININE 0.70 mg/dLSGOT/
AST 36.0 IU/LSGPT/ALT 38.0 IU/LALK PHOS 103.0 IU/LTOTAL PROTEIN 7.40 g/
dLALBUMIN 4.30 g/dLTOTAL BILI 0.30 mg/dLCALCIUM 9.20 mg/dLeGFR >60 mL/min/1.73 
m2WBC 5.1 RBC 3.76 HGB 12.0 g/dLHCT 36.10 %MCV 96.0 fLMCH 31.90 pgMCHC 33.20 g/
dLRDW CV 13.10 %MPV 9.40 fLPLT 249 %NEUT 60.40 %%LYMP 25.90 %%MONO 8.90 %%EOS 
4.0 %%BASO 0.80 %#NEUT 3.06 #LYMP 1.31 #MONO 0.45 #EOS 0.20 #BASO 0.04 

 

 2011 9:55 AM  TSH 1.070 uIU/mL

 

 2011 8:55 AM  GLUCOSE 102.0 mg/dLSODIUM 135.0 mmol/LPOTASSIUM 4.20 mmol/
LCHLORIDE 102.0 mmol/LCO2 24.0 mmol/LBUN 15.0 mg/dLCREATININE 0.80 mg/dLSGOT/
AST 30.0 IU/LSGPT/ALT 35.0 IU/LALK PHOS 119.0 IU/LTOTAL PROTEIN 7.50 g/
dLALBUMIN 4.30 g/dLTOTAL BILI 0.30 mg/dLCALCIUM 9.20 mg/dLeGFR >60 mL/min/1.73 
m2TSH 1.130 uIU/mL

 

 2011 9:50 AM  GLUCOSE 85.0 mg/dLSODIUM 133.0 mmol/LPOTASSIUM 4.20 mmol/
LCHLORIDE 101.0 mmol/LCO2 21.0 mmol/LBUN 13.0 mg/dLCREATININE 0.80 mg/dLSGOT/
AST 36.0 IU/LSGPT/ALT 36.0 IU/LALK PHOS 109.0 IU/LTOTAL PROTEIN 7.40 g/
dLALBUMIN 4.20 g/dLTOTAL BILI 0.50 mg/dLCALCIUM 9.40 mg/dLeGFR >60 mL/min/1.73 
m2

 

 2011 8:55 AM  GLUCOSE 98.0 mg/dLSODIUM 137.0 mmol/LPOTASSIUM 3.90 mmol/
LCHLORIDE 103.0 mmol/LCO2 25.0 mmol/LBUN 14.0 mg/dLCREATININE 0.70 mg/dLSGOT/
AST 33.0 IU/LSGPT/ALT 36.0 IU/LALK PHOS 111.0 IU/LTOTAL PROTEIN 8.30 g/
dLALBUMIN 4.40 g/dLTOTAL BILI 0.50 mg/dLCALCIUM 9.40 mg/dLeGFR >60 mL/min/1.73 
[id number] 109.0 mg/dLCHOLESTEROL 153.0 mg/dLHDL 54.0 mg/dLLDL (CALC) 77.0 
mg/dLTSH 1.330 uIU/mL

 

 2011 10:17 AM  Colonoscopy-Women and Men over 50 Declined Mammogram -
Women over 40 Normal Pap Smear Negative 

 

 2012 10:33 AM  WET PREP NO TRICH SEEN CLUE CELLS NONE SEEN 

 

 2012 8:45 AM  WBC 5.2 RBC 3.96 HGB 12.70 g/dLHCT 37.80 %MCV 96.0 fLMCH 
32.10 pgMCHC 33.60 g/dLRDW CV 13.30 %MPV 9.70 fLPLT 297 %NEUT 57.70 %%LYMP 
28.50 %%MONO 10.30 %%EOS 2.50 %%BASO 1.0 %#NEUT 3.02 #LYMP 1.49 #MONO 0.54 #EOS 
0.13 #BASO 0.05 GLUCOSE 97.0 mg/dLSODIUM 137.0 mmol/LPOTASSIUM 4.40 mmol/
LCHLORIDE 101.0 mmol/LCO2 23.0 mmol/LBUN 17.0 mg/dLCREATININE 0.80 mg/dLSGOT/
AST 30.0 IU/LSGPT/ALT 33.0 IU/LALK PHOS 95.0 IU/LTOTAL PROTEIN 7.40 g/dLALBUMIN 
4.40 g/dLTOTAL BILI 0.60 mg/dLCALCIUM 9.70 mg/dLeGFR 60 TRIGLYCERIDES 130.0 mg/
dLCHOLESTEROL 157.0 mg/dLHDL 56.0 mg/dLLDL (CALC) 75.0 mg/dLTSH 0.940 uIU/mL

 

 8/15/2012 4:02 PM  WET PREP NO TRICH SEEN CLUE CELLS NONE SEEN 

 

 2012 8:45 AM  WBC 5.1 RBC 3.91 HGB 12.50 g/dLHCT 36.30 %MCV 93.0 fLMCH 
32.0 pgMCHC 34.40 g/dLRDW CV 12.60 %MPV 9.30 fLPLT 244 %NEUT 57.60 %%LYMP 27.50 
%%MONO 9.10 %%EOS 5.0 %%BASO 0.80 %#NEUT 2.91 #LYMP 1.39 #MONO 0.46 #EOS 0.25 #
BASO 0.04 TSH 1.390 uIU/mLTRIGLYCERIDES 154.0 mg/dLCHOLESTEROL 147.0 mg/dLHDL 
45.0 mg/dLLDL (CALC) 71.0 mg/dLGLUCOSE 101.0 mg/dLSODIUM 134.0 mmol/LPOTASSIUM 
4.30 mmol/LCHLORIDE 102.0 mmol/LCO2 23.0 mmol/LBUN 11.0 mg/dLCREATININE 0.80 mg/
dLSGOT/AST 34.0 IU/LSGPT/ALT 38.0 IU/LALK PHOS 104.0 IU/LTOTAL PROTEIN 7.60 g/
dLALBUMIN 4.40 g/dLTOTAL BILI 0.50 mg/dLCALCIUM 9.40 mg/dLeGFR 60 

 

 12/10/2012 8:34 AM  Colonoscopy-Women and Men over 50 Declined Mammogram -
Women over 40 Declined Pap Smear Declined 

 

 2012 5:02 PM  WET PREP NO TRICH SEEN CLUE CELLS NONE SEEN 

 

 2013 8:25 AM  WBC 4.2 RBC 3.96 HGB 12.90 g/dLHCT 37.0 %MCV 93.0 fLMCH 
32.60 pgMCHC 34.90 g/dLRDW CV 12.60 %MPV 9.70 fLPLT 254 %NEUT 54.40 %%LYMP 
30.40 %%MONO 10.80 %%EOS 3.40 %%BASO 1.0 %#NEUT 2.26 #LYMP 1.26 #MONO 0.45 #EOS 
0.14 #BASO 0.04 TSH 1.230 uIU/mLGLUCOSE 94.0 mg/dLSODIUM 136.0 mmol/LPOTASSIUM 
4.30 mmol/LCHLORIDE 99.0 mmol/LCO2 25.0 mmol/LBUN 10.0 mg/dLCREATININE 0.80 mg/
dLSGOT/AST 36.0 IU/LSGPT/ALT 36.0 IU/LALK PHOS 84.0 IU/LTOTAL PROTEIN 7.90 g/
dLALBUMIN 4.40 g/dLTOTAL BILI 0.70 mg/dLCALCIUM 9.80 mg/dLeGFR 60 TRIGLYCERIDES 
122.0 mg/dLCHOLESTEROL 141.0 mg/dLHDL 47.0 mg/dLLDL (CALC) 70.0 mg/dL

 

 6/10/2013 3:52 PM  WET PREP NO TRICH SEEN CLUE CELLS NONE SEEN 

 

 2013 8:45 AM  WBC 5.3 RBC 4.01 HGB 13.0 g/dLHCT 37.60 %MCV 94.0 fLMCH 
32.40 pgMCHC 34.60 g/dLRDW CV 12.50 %MPV 9.40 fLPLT 248 %NEUT 58.70 %%LYMP 
27.80 %%MONO 9.80 %%EOS 2.80 %%BASO 0.90 %#NEUT 3.12 #LYMP 1.48 #MONO 0.52 #EOS 
0.15 #BASO 0.05 TSH 1.570 uIU/mLGLUCOSE 94.0 mg/dLSODIUM 134.0 mmol/LPOTASSIUM 
4.10 mmol/LCHLORIDE 101.0 mmol/LCO2 23.0 mmol/LBUN 11.0 mg/dLCREATININE 0.80 mg/
dLSGOT/AST 41.0 IU/LSGPT/ALT 44.0 IU/LALK PHOS 109.0 IU/LTOTAL PROTEIN 7.90 g/
dLALBUMIN 4.70 g/dLTOTAL BILI 0.80 mg/dLCALCIUM 10.40 mg/dLeGFR >60 mL/min/1.73 
[id number] 163.0 mg/dLCHOLESTEROL 138.0 mg/dLHDL 49.0 mg/dLLDL (CALC) 56.0 
mg/dL

 

 2014 8:58 AM  GLUCOSE 86.0 mg/dLSODIUM 134.0 mmol/LPOTASSIUM 4.20 mmol/
LCHLORIDE 99.0 mmol/LCO2 25.0 mmol/LBUN 14.0 mg/dLCREATININE 0.80 mg/dLCALCIUM 
10.10 mg/dLeGFR >60 mL/min/1.73 m2TSH 1.20 uIU/mL

 

 2014 9:50 AM  WBC 5.7 RBC 4.03 HGB 12.90 g/dLHCT 37.90 %MCV 94.0 fLMCH 
32.0 pgMCHC 34.0 g/dLRDW CV 12.70 %MPV 9.70 fLPLT 292 %NEUT 62.70 %%LYMP 21.80 %
%MONO 11.0 %%EOS 3.40 %%BASO 1.10 %#NEUT 3.55 #LYMP 1.23 #MONO 0.62 #EOS 0.19 #
BASO 0.06 GLUCOSE 98.0 mg/dLSODIUM 135.0 mmol/LPOTASSIUM 4.30 mmol/LCHLORIDE 
102.0 mmol/LCO2 22.0 mmol/LBUN 10.0 mg/dLCREATININE 0.80 mg/dLSGOT/AST 34.0 IU/
LSGPT/ALT 32.0 IU/LALK PHOS 95.0 IU/LTOTAL PROTEIN 7.70 g/dLALBUMIN 4.30 g/
dLTOTAL BILI 0.80 mg/dLCALCIUM 9.10 mg/dLeGFR 60 TRIGLYCERIDES 108.0 mg/
dLCHOLESTEROL 146.0 mg/dLHDL 56.0 mg/dLLDL (CALC) 68.0 mg/dLTSH 0.90 uIU/mL

 

 2014 8:40 AM  WBC 4.4 RBC 4.13 HGB 13.20 g/dLHCT 38.90 %MCV 94.0 fLMCH 
32.0 pgMCHC 33.90 g/dLRDW CV 13.50 %MPV 9.80 fLPLT 279 %NEUT 63.80 %%LYMP 24.60 
%%MONO 9.30 %%EOS 1.60 %%BASO 0.70 %#NEUT 2.80 #LYMP 1.08 #MONO 0.41 #EOS 0.07 #
BASO 0.03 GLUCOSE 96.0 mg/dLSODIUM 136.0 mmol/LPOTASSIUM 4.10 mmol/LCHLORIDE 
99.0 mmol/LCO2 23.0 mmol/LBUN 8.0 mg/dLCREATININE 0.80 mg/dLSGOT/AST 31.0 IU/
LSGPT/ALT 27.0 IU/LALK PHOS 85.0 IU/LTOTAL PROTEIN 7.60 g/dLALBUMIN 4.40 g/
dLTOTAL BILI 0.80 mg/dLCALCIUM 10.20 mg/dLeGFR 60 TRIGLYCERIDES 61.0 mg/
dLCHOLESTEROL 148.0 mg/dLHDL 71.0 mg/dLLDL (CALC) 65.0 mg/dLTSH 0.990 uIU/mL

 

 2015 8:32 AM  WBC 4.8 RBC 3.98 HGB 12.70 g/dLHCT 37.30 %MCV 94.0 [facility] 
31.90 pgHC 34.0 g/dLRDW CV 12.70 %MPV 9.50 fLPLT 270 %NEUT 57.30 %%LYMP 25.0 %
%MONO 13.0 %%EOS 3.60 %%BASO 1.10 %#NEUT 2.73 #LYMP 1.19 #MONO 0.62 #EOS 0.17 #
BASO 0.05 TSH 0.640 uIU/mLGLUCOSE 90.0 mg/dLSODIUM 136.0 mmol/LPOTASSIUM 4.0 
mmol/LCHLORIDE 101.0 mmol/LCO2 24.0 mmol/LBUN 10.0 mg/dLCREATININE 0.80 mg/
dLSGOT/AST 34.0 IU/LSGPT/ALT 32.0 IU/LALK PHOS 92.0 IU/LTOTAL PROTEIN 7.50 g/
dLALBUMIN 4.40 g/dLTOTAL BILI 0.70 mg/dLCALCIUM 9.50 mg/dLeGFR >60 mL/min/1.73 
[id number] 107.0 mg/dLCHOLESTEROL 138.0 mg/dLHDL 58.0 mg/dLLDL (CALC) 59.0 
mg/dL

 

 2015 8:31 AM  WBC 4.6 RBC 3.97 HGB 12.90 g/dLHCT 38.0 %MCV 96.0 fLMCH 
32.50 pgMCHC 33.90 g/dLRDW CV 12.60 %MPV 9.0 fLPLT 248 %NEUT 61.0 %%LYMP 24.70 %
%MONO 10.20 %%EOS 3.0 %%BASO 1.10 %#NEUT 2.82 #LYMP 1.14 #MONO 0.47 #EOS 0.14 #
BASO 0.05 TRIGLYCERIDES 105.0 mg/dLCHOLESTEROL 141.0 mg/dLHDL 58.0 mg/dLLDL (
CALC) 62.0 mg/dLGLUCOSE 93.0 mg/dLSODIUM 136.0 mmol/LPOTASSIUM 4.10 mmol/
LCHLORIDE 101.0 mmol/LCO2 25.0 mmol/LBUN 9.0 mg/dLCREATININE 0.80 mg/dLSGOT/AST 
32.0 IU/LSGPT/ALT 28.0 IU/LALK PHOS 95.0 IU/LTOTAL PROTEIN 7.30 g/dLALBUMIN 
4.50 g/dLTOTAL BILI 0.80 mg/dLCALCIUM 9.70 mg/dLeGFR >60 mL/min/1.73mTSH 1.10 
uIU/mL







History Of Immunizations







 Name  Date Admin  Mfg Name  Mfg Code  Trade Name  Lot#  Route  Inj  Vis Given  
Vis Pub  CVX

 

 Influenza  10/20/2010  [employer] pasteur  [employer]  Fluzone  [id number]  Intramuscular  
Left Arm  10/20/2010  2010  999

 

 Influenza  10/28/2011  [employer] pasteur  [employer]  Fluzone  [id number]  Intramuscular  
Left Arm  10/28/2011  2011  141

 

 Influenza  10/29/2012  sanofi pasteur  [employer]  Fluzone  [id number]  Intramuscular  
Left Deltoid  10/29/2012  2012  141

 

 X  12/10/2012  [employer]  MSD  Pneumovax 23  [id number]  Intramuscular  
Left Gluteous Medius  12/10/2012  2010  33

 

 Influenza  10/28/2013  sanofi pasteur  [employer]  Fluzone > 3 Years  [id number]  
Intramuscular  Right Deltoid  10/28/2013  2013  141

 

 X  9/2/2015  Not Entered  NE  Prevnar 13     Not Entered  Not Entered  1  109

 

 Influenza  2015  Not Entered  NE  Not Entered     Not Entered  Not Entered
  2015  141







History of Past Illness







 Name  Date of Onset  Comments

 

 Benign Essential Hypertension  Dec 14 2009 10:10AM   

 

 Hyperlipidemia  Dec 14 2009 10:10AM   

 

 Hypothyroidism, Acquired  Dec 14 2009 10:10AM   

 

 hypothyroid      

 

 Hypertension      

 

 Hyperlipidemia      

 

 acid reflux      

 

 Hypertension, Benign Essential  2010  9:41AM   

 

 Hypertension, Benign Essential  2010 12:53PM   

 

 Routine gynecological examination  May  5 2010  9:28AM   

 

 Hypertension  May  5 2010  9:28AM   

 

 Hyperlipidemia, unspecified  May  5 2010  9:28AM   

 

 Hypothyroidism, Acquired  May  5 2010  9:28AM   

 

 Vulvovaginitis  May  5 2010  9:28AM   

 

 Rhinitis, Allergic  May  5 2010  9:28AM   

 

 Hypertension, Benign Essential  May 19 2010  8:48AM   

 

 Hypertension, Benign Essential  May 25 2010  9:39AM   

 

 Flu  Oct 20 2010 12:01PM   

 

 Essential Hypertension  2010  8:34AM   

 

 Hyperlipidemia  2010  8:34AM   

 

 Gastroesophageal Reflux  2010  8:34AM   

 

 Hypothyroidism, Acquired  2010  8:34AM   

 

 Hypertension, Benign Essential  2010  9:22AM   

 

 Hypertension, Benign Essential  Dec 15 2010  9:07AM   

 

 Essential Hypertension  2011  1:31PM   

 

 Hyperlipidemia  2011  1:31PM   

 

 Gastroesophageal Reflux  2011  1:31PM   

 

 Hypothyroidism, Acquired  2011  1:31PM   

 

 Essential Hypertension  2011  8:51AM   

 

 Hyperlipidemia  2011  8:51AM   

 

 Gastroesophageal Reflux  2011  8:51AM   

 

 Hypothyroidism, Acquired  2011  8:51AM   

 

 Essential Hypertension  2011  9:13AM   

 

 Hyperlipidemia  2011  9:13AM   

 

 Gastroesophageal Reflux  2011  9:13AM   

 

 Hypothyroidism, Acquired  2011  9:13AM   

 

 Nausea With Vomiting  2011  1:18PM   

 

 Hyponatremia  2011  8:46AM   

 

 Nausea  2011  9:13AM   

 

 Hypothyroidism  2011 11:10AM   

 

 Hyponatremia  2011 11:10AM   

 

 Essential Hypertension  2011 10:05AM   

 

 Hyperlipidemia  2011 10:05AM   

 

 Gastroesophageal Reflux  2011 10:05AM   

 

 Nausea  2011 10:05AM   

 

 Hypothyroidism, Acquired  2011 10:05AM   

 

 Hyponatremia  May  6 2011 11:34AM   

 

 Essential Hypertension  May 16 2011  8:50AM   

 

 Hyperlipidemia  May 16 2011  8:50AM   

 

 Gastroesophageal Reflux  May 16 2011  8:50AM   

 

 Nausea  May 16 2011  8:50AM   

 

 Hypothyroidism, Acquired  May 16 2011  8:50AM   

 

 Hyponatremia  May 16 2011  8:50AM   

 

 Routine gynecological examination  2011  8:54AM   

 

 Hypertension  2011  8:54AM   

 

 Hyperlipidemia, unspecified  2011  8:54AM   

 

 Hypothyroidism, Acquired  2011  8:54AM   

 

 Rhinitis, Allergic  2011  8:54AM   

 

 Hypothyroidism  Aug 29 2011 12:37PM   

 

 Hyponatremia  Aug 29 2011 12:37PM   

 

 Essential Hypertension  Sep 12 2011  8:53AM   

 

 Hyperlipidemia  Sep 12 2011  8:53AM   

 

 Gastroesophageal Reflux  Sep 12 2011  8:53AM   

 

 Hypothyroidism, Acquired  Sep 12 2011  8:53AM   

 

 Hyponatremia  Sep 12 2011  8:53AM   

 

 Hypertension  Sep 29 2011  9:41AM   

 

 Hyperlipidemia  Dec  8 2011  8:31AM   

 

 Hypothyroidism  Dec  8 2011  8:31AM   

 

 Hypertension  Dec  8 2011  8:31AM   

 

 Flu  Dec  9 2011 10:02AM   

 

 Essential Hypertension  Dec 13 2011 10:13AM   

 

 Hyperlipidemia  Dec 13 2011 10:13AM   

 

 Gastroesophageal Reflux  Dec 13 2011 10:13AM   

 

 Hypothyroidism, Acquired  Dec 13 2011 10:13AM   

 

 Hyponatremia  Dec 13 2011 10:13AM   

 

 Vaginal Discharge  2012  9:43AM   

 

 Rhinitis, Allergic  Feb 15 2012  9:31AM   

 

 Eustachian Tube Dysfunction  Feb 15 2012  9:31AM   

 

 Pharyngitis, Acute  Feb 15 2012  9:31AM   

 

 Routine gynecological examination  2012  8:48AM   

 

 Hypertension  2012  8:48AM   

 

 Hyperlipidemia, unspecified  2012  8:48AM   

 

 Hypothyroidism, Acquired  2012  8:48AM   

 

 Rhinitis, Allergic  2012  8:48AM   

 

 Candidiasis, Vulvovaginal  2012 11:04AM   

 

 Essential Hypertension  Aug 15 2012  9:02AM   

 

 Hyperlipidemia  Aug 15 2012  9:02AM   

 

 Gastroesophageal Reflux  Aug 15 2012  9:02AM   

 

 Hypothyroidism, Acquired  Aug 15 2012  9:02AM   

 

 Hyponatremia  Aug 15 2012  9:02AM   

 

 Atrophic Vaginitis, Postmenopausal  Aug 15 2012  9:02AM   

 

 Flu  Oct 29 2012  9:24AM   

 

 Essential Hypertension  Dec 10 2012  8:35AM   

 

 Hyperlipidemia  Dec 10 2012  8:35AM   

 

 Gastroesophageal Reflux  Dec 10 2012  8:35AM   

 

 Hypothyroidism, Acquired  Dec 10 2012  8:35AM   

 

 Hyponatremia  Dec 10 2012  8:35AM   

 

 Atrophic Vaginitis, Postmenopausal  Dec 10 2012  8:35AM   

 

 Pneumococcus  Dec 10 2012  2:07PM   

 

 Vaginal Discharge  Dec 20 2012  1:50PM   

 

 Essential Hypertension  Dec 20 2012  1:50PM   

 

 Hyperlipidemia  Dec 20 2012  1:50PM   

 

 Gastroesophageal Reflux  Dec 20 2012  1:50PM   

 

 Hypothyroidism, Acquired  Dec 20 2012  1:50PM   

 

 Atrophic Vaginitis, Postmenopausal  Dec 20 2012  1:50PM   

 

 Upper Respiratory Infections  2013  8:52AM   

 

 Hyperlipidemia  2013  8:21AM   

 

 Hypertension  2013  8:21AM   

 

 Hypothyroidism  2013  8:21AM   

 

 Screening Mammogram  Beto 10 2013  8:45AM   

 

 Routine gynecological examination  Beto 10 2013  8:34AM   

 

 Hypertension  Beto 10 2013  8:34AM   

 

 Hyperlipidemia, unspecified  Beto 10 2013  8:34AM   

 

 Hypothyroidism, Acquired  Beto 10 2013  8:34AM   

 

 Rhinitis, Allergic  Beto 10 2013  8:34AM   

 

 Flu  Oct 28 2013  8:52AM   

 

 Essential Hypertension  Dec  9 2013  8:37AM   

 

 Hyperlipidemia  Dec  9 2013  8:37AM   

 

 Gastroesophageal Reflux  Dec  9 2013  8:37AM   

 

 Hypothyroidism, Acquired  Dec  9 2013  8:37AM   

 

 Atrophic Vaginitis, Postmenopausal  Dec  9 2013  8:37AM   

 

 Hypertension  2014  9:56AM   

 

 Hyperlipidemia  2014  9:56AM   

 

 Hypothyroidism  2014  9:56AM   

 

 Screening Mammogram  2014  8:32AM   

 

 Osteopenia  2014  8:32AM   

 

 Hypertension  2014  8:35AM   

 

 Hypothyroidism, Acquired  2014  8:35AM   

 

 Hyperlipidemia  2014  8:35AM   

 

 Upper Respiratory Infections  2014  9:28AM   

 

 Sinusitis, Acute  Oct  2 2014  9:17AM   

 

 Herpes Zoster  Oct  5 2014  1:44PM   

 

 Vesicular Eruption  Oct  5 2014  1:44PM   

 

 Hypertension  2014  8:18AM   

 

 Hyperlipidemia  2014  8:18AM   

 

 hypothyroid  2014  8:18AM   

 

 Situational anxiety  Dec 20 2014  9:33AM   

 

 GERD (gastroesophageal reflux disease)  Dec 20 2014  9:33AM   

 

 Nausea  Dec 20 2014  9:33AM   

 

 Abdominal Pain, Epigastric  Dec 20 2014  9:33AM   

 

 Hyperlipidemia  Oct  6 2014  9:03AM   

 

 acid reflux  Oct  6 2014  9:03AM   

 

 hypothyroid  Oct  6 2014  9:03AM   

 

 Shingles  Oct  6 2014  9:03AM   

 

 Pharyngitis  2015  1:09PM   

 

 Bronchitis  2015  9:10AM   

 

 Hyperlipidemia  2015  9:10AM   

 

 acid reflux  2015  9:10AM   

 

 hypothyroid  2015  9:10AM   

 

 Hyperlipidemia  2015  8:18AM   

 

 Hypertension  2015  8:18AM   

 

 hypothyroid  2015  8:18AM   

 

 Screening Mammogram  2015 11:43AM   

 

 Medicare Annual Pap Q 2 years  2015  9:36AM   

 

 Screening Mammogram  2015  9:36AM   

 

 Candidiasis of female genitalia  2015  9:36AM   

 

 Hypertension  2015 11:34AM   

 

 Hyperlipidemia, unspecified  2015 11:34AM   

 

 Hypothyroidism, Acquired  2015 11:34AM   

 

 Osteopenia  2016  8:41AM   

 

 Encounter for screening mammogram for breast cancer  2016  8:41AM   

 

 Hypertension  2016  8:34AM   

 

 Lymphedema  2016  8:34AM   

 

 Hyperlipidemia  2016  8:34AM   

 

 hypothyroid  2016  8:34AM   

 

 HEP B  2016  9:13AM   







Payers







 Insurance Name  Company Name  Plan Name  Plan Number  Policy Number  Policy 
Group Number  Start Date

 

    Medicare Part A  Medicare RHC     [id number]     N/A

 

    [employer]  [facility] [facility]     [id number]     2009

 

    Medicare Part B  [place]ic[place]     [id number]     N/A

 

    Medicare Part A  Medicare Part A     [id number]     N/A

 

    Medicare Part A  Medicare P A - Preventive     [id number]     N/A

 

    Medicare Part A  Medicare - Lab/Xray     [id number]     N/A







History of Encounters







 Visit Date  Visit Type  Provider

 

 2016  Office visit   

 

 2016  Office visit   

 

 2016  Office visit   

 

 2016  Office visit  Doris Noriega MD

 

 2015  Office visit  Doris Noriega MD

 

 2015  Office visit  Doris Noriega MD

 

 2015  Office visit  Prince Noe APRN

 

 2014  Office visit  Anyi Herrera APRN

 

 10/27/2014  Voided  Doris Noriega MD

 

 10/6/2014  Office visit  Doris Noriega MD

 

 10/5/2014  Office visit  Anyi Herrera APRN

 

 10/2/2014  Office visit   

 

 10/2/2014  Office visit  Corrine Bay APRN

 

 2014  Office visit  Kassie Franklin APRN

 

 2014  Office visit  Doris Noriega MD

 

 2014  Office visit  Doris Noriega MD

 

 2013  Office visit  Dee Colindres MD

 

 10/28/2013  Nurse visit  Dee Coilndres MD

 

 6/10/2013  Office visit  Dee Colindres MD

 

 2013  Office visit  Corrine Bay APRN

 

 2012  Office visit  Dee Colindres MD

 

 12/10/2012  Office visit  Dee Colindres MD

 

 10/29/2012  Nurse visit  Dee Colindres MD

 

 8/15/2012  Office visit  Dee Colindres MD

 

 2012  Office visit  Corrine Bay APRN

 

 2012  Office visit  Dee Colindres MD

 

 2/15/2012  Office visit  Dee Colindres MD

 

 2012  Office visit  Corrine Bay APRN

 

 2011  Office visit  Dee Colindres MD

 

 10/28/2011  Nurse visit  Shad Nolasco MD

 

 2011  Office visit  Dee Colindres MD

 

 2011  Office visit  Dee Colindres MD

 

 2011  Office visit  Dee Colindres MD

 

 2011  Office visit  Dee Colindres MD

 

 2011  Office visit  Dee Colindres MD

 

 2011  Office visit  Dee Colindres MD

 

 4/10/2011  [facility]  KHRIS Granados MD

 

 4/10/2011  [facility]  RICKEY Toussaint MD

 

 2011  Office visit  RICKEY Toussaint MD

 

 2011  [facility]  Fide Castlelanos MD

 

 2011  Voided  Fide Castellanos MD

 

 2011  Office visit  Dee Colindres MD

 

 12/15/2010  Nurse visit  Dee Colindres MD

 

 2010  Office visit  Dee Colindres MD

 

 10/20/2010  Nurse visit  Stephenie PERAZA

 

 2010  Nurse visit  Dee Colindres MD

 

 2010  Nurse visit  Dee Colindres MD

 

 2010  Office visit  Dee Colindres MD

 

 3/19/2010  Voided  Stephenie Kay PA

 

 2010  Nurse visit  Stephenie PERAZA

 

 2010  Nurse visit  Stephenie PERAZA

 

 2010  Nurse visit  Stephenie PERAZA

 

 2009  Office visit  Stephenie PERAZA

 

 10/20/2009  Nurse visit  Stephenie Kay PA

## 2017-07-13 NOTE — XMS REPORT
MU2 Ambulatory Summary

 Created on: 2015



Milady Crespo

External Reference #: [id number]

: 1945

Sex: Female



Demographics







 Address  [address]

[place]  [address]

 

 Home Phone  [phone]

 

 Preferred Language  English

 

 Marital Status  

 

 [employer] Affiliation  Unknown

 

 Race  White

 

 Ethnic Group  Not  or 





Author







 Author  Doris Noriega

 

 Organization  [facility]

 

 Address  [address]

[place], [place]  [id number]



 

 Phone  [phone]







Care Team Providers







 Care Team Member Name  Role  Phone

 

 Doris Noriega  PCP  [phone]







Allergies and Adverse Reactions







 Name  Reaction  Notes

 

 NO KNOWN DRUG ALLERGIES      







Plan of Treatment







 Planned Activity  Comments  Planned Date  Planned Time  Plan/Goal

 

 LIPID PANEL     8/15/2012  12:00 AM   

 

 COMPREHEN METABOLIC PANEL     6/10/2013  12:00 AM   

 

 LIPID PANEL     6/10/2013  12:00 AM   

 

 ASSAY THYROID STIM HORMONE     6/10/2013  12:00 AM   

 

 COMPLETE CBC W/AUTO DIFF WBC     6/10/2013  12:00 AM   

 

 COMPREHEN METABOLIC PANEL     2012  12:00 AM   

 

 LIPID PANEL     2012  12:00 AM   

 

 ASSAY THYROID STIM HORMONE     2012  12:00 AM   

 

 COMPLETE CBC W/AUTO DIFF WBC     2012  12:00 AM   

 

 CYTOPATH TBS C/V MANUAL     2015  12:00 AM   

 

 MAMMOGRAM SCREENING     2015  12:00 AM   







Medications







 Active 

 

 Name  Start Date  Estimated Completion Date  SIG  Comments

 

 amlodipine oral tablet 5 mg  2015  TAKE ONE TABLET BY MOUTH 
EVERY DAY   

 

 atenolol oral tablet 50 mg  2015  take 1 tablet (50 mg) by oral 
route once daily   

 

 levothyroxine oral tablet 50 mcg  2015  TAKE ONE TABLET BY MOUTH 
EVERY DAY   

 

 Diovan Oral tablet 160 mg  2015  2/15/2016  TAKE ONE TABLET BY MOUTH 
EVERY DAY for 90 days   

 

 loratadine oral tablet 10 mg  2015  12/15/2016  TAKE 1/2 TABLET BY MOUTH 
EVERY DAY IN THE EVENING   

 

 aspirin oral tablet,delayed release (DR/EC) 81 mg        take 1 tablet (81 mg) 
by oral route once daily   

 

 Vitamin D3 oral capsule 1,000 unit        take 1 capsule by oral route daily   

 

 triamcinolone acetonide topical cream 0.1 %  2015  apply a thin 
layer to the affected area(s) by topical route 2 times per day for 14 days   

 

 clorazepate dipotassium oral tablet 7.5 mg  2015  take 1/2 to1 
tablet PO up to three times per day for situational anxiety   









  

 

 Name  Start Date  Expiration Date  SIG  Comments

 

 Prednisone Oral Tablet 20 mg  2011  take 2 tablets by oral 
route daily for 5 days   

 

 calcium carbonate-vitamin D2 Oral tablet 600-125 mg-unit  8/15/2012  2012
  take 2 tablets by oral route daily   

 

 Omega 3 Fish Oil Oral capsule,delayed release(DR/EC) 900-1,400 mg  8/15/2012  9
/  take 1 capsule by oral route daily   

 

 multivitamin Oral tablet  8/15/2012  2012  take 1 tablet by oral route 
daily   

 

 triamcinolone acetonide topical cream 0.1 %  2013  10/7/2013  APPLY A 
THIN LAYER TO THE AFFECTED AREA(S) BY TOPICAL ROUTE TWICE A DAY   

 

 famotidine Oral tablet 20 mg  2014  take 1 tablet (20 mg) by 
oral route 2 times per day   

 

 Plavix Oral tablet 75 mg  2014  take 1 tablet (75 mg) by oral 
route once daily   

 

 amoxicillin oral capsule 500 mg  2014  take 1 capsule (500 mg) 
by oral route 3 times per day for 10 days   

 

 Lipitor Oral Tablet 20 mg  2014  take 1 tablet (20 mg) by oral 
route once daily   

 

 Zithromax Z-Tab oral tablet 250 mg  10/2/2014  10/7/2014  take 2 tablets (500 
mg) by oral route once daily for 1 day then 1 tablet (250 mg) by oral route 
once daily for 4 days   

 

 acyclovir oral tablet 800 mg  10/5/2014  10/12/2014  take 1 tablet (800 mg) by 
oral route 5 times per day for 7 days   

 

 gabapentin oral capsule 300 mg  10/9/2014  2014  take 1 capsule (300 mg) 
by oral route 3 times per day for 14 days   

 

 Carafate oral suspension 100 mg/mL  2014  take 10 milliliters 
(1 gram) by oral route 4 times per day on an empty stomach 1 hour before meals 
and at bedtime for 4 weeks   

 

 fluconazole oral tablet 150 mg  2015  take 1 tablet (150 mg) 
by oral route once for 1 day   









 Discontinued 

 

 Name  Start Date  Discontinued Date  SIG  Comments

 

 Lipitor Oral Tablet 40 mg     2009  1/2 TAB DAILY   

 

 Ramipril Oral Capsule 5 mg     2009  take 1 capsule (5 mg) by oral route 
once daily   

 

 Lovastatin Oral Tablet 20 mg  2009  take 1 tablet (20 mg) by 
oral route once daily with evening meal   

 

 Propranolol Oral Tablet 20 mg  2010  take 1 tablet by oral 
route 2 times a day   

 

 Fosamax Oral Tablet 70 mg  2010  take 1 tablet (70 mg) by oral 
route once weekly in the morning, at least 30 minutes before the first food, 
beverage, or medication of the day   

 

 Lisinopril Oral Tablet 40 mg  2010  4/10/2011  take 2 tablets by oral 
route daily   

 

 Zoloft Oral Tablet 50 mg  2011  take 1 tablet (50 mg) by oral 
route once daily   

 

 Promethazine Oral Tablet 25 mg  2011  take 1 tablet by oral 
route every 6 hours as needed   

 

 Diovan HCT Oral Tablet 160-12.5 mg  2011  take 1 tablet by oral 
route once daily for 30 days   

 

 Diflucan Oral Tablet 150 mg     2012  take 1 tablet (150 mg) by oral 
route once for 1 day   

 

 Diflucan Oral Tablet 150 mg  2012  8/15/2012  take 1 tablet (150 mg) by 
oral route once   

 

 Vitamin B-12 Oral tablet 1,000 mcg  8/15/2012  2014  take 1 tablet by 
oral route daily   

 

 Premarin Vaginal Cream 0.625 mg/gram  10/29/2012  6/10/2013  use 1 gram daily 
for a week then 1 gram twice a week   

 

 Medrol (Tab) Oral tablets,dose pack 4 mg  2013  6/10/2013  take as 
directed   

 

 aspirin Oral tablet 325 mg  2014  take 1 tablet (325 mg) by 
oral route once daily   

 

 Medrol (Tab) oral tablets,dose pack 4 mg  2014  10/2/2014  take as 
directed   

 

 Tetracaine Lollipops Lollipop  2015  Use as directed   







Problem List







 Description  Status  Onset

 

 Hyperlipidemia  Active   

 

 acid reflux  Active   

 

 Hypertension  Active   

 

 hypothyroid  Active   







Vital Signs







 Date  Time  BP-Sys(mm[Hg]  BP-Morena(mm[Hg])  HR(bpm)  RR(rpm)  Temp  WT  HT  HC  
BMI  BSA  BMI Percentile  O2 Sat(%)

 

 2015  9:33:00 AM  120 mmHg  68 mmHg  73 bpm     98.7 F  144.375 lbs  61 
in     27.28 kg/m2  1.68 m2     99 %

 

 2015  9:05:00 AM  110 mmHg  75 mmHg  85 bpm  16 rpm  96.7 F  144.375 lbs  
61 in     27.2791 kg/m  1.6788 m     99 %

 

 2015  1:05:00 PM  108 mmHg  62 mmHg  78 bpm  18 rpm  96.9 F  142.375 lbs  
61 in     26.90 kg/m2  1.67 m2     100 %

 

 2014  9:31:00 AM  126 mmHg  66 mmHg  79 bpm  18 rpm  98.7 F  149 lbs  61 
in     28.153 kg/m  1.7055 m     98 %

 

 10/6/2014  9:02:00 AM  110 mmHg  74 mmHg  94 bpm  18 rpm  98.1 F  145.4 lbs  
61 in     27.47 kg/m2  1.68 m2     97 %

 

 10/2/2014  9:14:00 AM  124 mmHg  74 mmHg  79 bpm  18 rpm  98 F  147.25 lbs  61 
in     27.8224 kg/m  1.6955 m     98 %

 

 2014  9:22:00 AM  124 mmHg  76 mmHg  89 bpm  18 rpm  98.1 F  148 lbs  61 
in     27.96 kg/m2  1.70 m2     100 %

 

 2014  8:27:00 AM  118 mmHg  65 mmHg  74 bpm  16 rpm  97.7 F  148 lbs  61 
in     27.9641 kg/m  1.6998 m     99 %

 

 2014  9:48:00 AM  125 mmHg  70 mmHg  93 bpm  18 rpm  96.7 F  156 lbs  61 
in     29.48 kg/m2  1.75 m2     100 %

 

 2013  8:35:00 AM  122 mmHg  74 mmHg  84 bpm  16 rpm  97.9 F  155.375 lbs 
                99 %

 

 6/10/2013  8:30:00 AM  130 mmHg  82 mmHg  79 bpm  16 rpm  97.9 F  161 lbs     
            98 %

 

 2013  8:50:00 AM  124 mmHg  68 mmHg  66 bpm  18 rpm  97.6 F  157.5 lbs  
61 in     29.7591 kg/m  1.7535 m      

 

 2012  1:46:00 PM  120 mmHg  72 mmHg  75 bpm  16 rpm  97.6 F  156.125 lbs
                 98 %

 

 12/10/2012  8:32:00 AM  122 mmHg  82 mmHg  70 bpm  16 rpm  97.3 F  157 lbs    
             99 %

 

 8/15/2012  9:00:00 AM  130 mmHg  76 mmHg  86 bpm  16 rpm  97.4 F  159 lbs     
            100 %

 

 2012  11:02:00 AM  128 mmHg  64 mmHg  66 bpm  18 rpm  97.4 F  162.125 lbs
  61 in     30.6329 kg/m  1.7791 m      

 

 2012  8:45:00 AM  130 mmHg  70 mmHg  82 bpm  16 rpm  98.2 F  160.125 lbs 
                100 %

 

 2/15/2012  9:29:00 AM  122 mmHg  80 mmHg  86 bpm  16 rpm  97.4 F  159.375 lbs  
61 in     30.1133 kg/m  1.7639 m     99 %

 

 2012  9:41:00 AM  132 mmHg  74 mmHg  66 bpm  18 rpm  98.4 F  160.375 lbs  
61 in     30.30 kg/m2  1.77 m2      

 

 2011  10:08:00 AM  134 mmHg  82 mmHg  80 bpm  16 rpm  98 F  160 lbs  61 
in     30.2314 kg/m  1.7674 m     99 %

 

 2011  3:56:00 PM  130 mmHg  80 mmHg                              

 

 2011  8:55:00 AM  130 mmHg  74 mmHg  88 bpm  16 rpm  98.2 F  158.25 lbs  
               98 %

 

 2011  8:54:00 AM  136 mmHg  82 mmHg  102 bpm  16 rpm  98.1 F  153.5 lbs   
              99 %

 

 2011  8:49:00 AM  122 mmHg  88 mmHg  88 bpm  16 rpm  98.6 F  152.25 lbs  
               99 %

 

 2011  10:02:00 AM  140 mmHg  82 mmHg  96 bpm  20 rpm  98.8 F             
       100 %

 

 2011  9:14:00 AM  156 mmHg  98 mmHg  110 bpm  24 rpm  98.5 F  153 lbs    
             100 %

 

 2011  8:50:00 AM  160 mmHg  84 mmHg  100 bpm  16 rpm  98.7 F  155 lbs    
             100 %

 

 2011  1:25:00 PM  152 mmHg  100 mmHg  82 bpm  16 rpm  97.2 F  156.375 lbs 
                100 %

 

 2011  9:55:00 AM  144 mmHg  90 mmHg                              

 

 2010  9:24:00 AM  138 mmHg  84 mmHg                              

 

 2010  8:58:00 AM  160 mmHg  94 mmHg                              

 

 2010  8:33:00 AM  142 mmHg  90 mmHg  100 bpm  16 rpm  98.1 F  158.375 
lbs                  

 

 2010  9:24:00 AM  160 mmHg  102 mmHg  88 bpm  18 rpm  99 F  157.125 lbs  
62 in     28.74 kg/m2  1.77 m2      

 

 3/19/2010  11:01:00 AM  140 mmHg  90 mmHg                              

 

 2009  10:08:00 AM  142 mmHg  86 mmHg  72 bpm  16 rpm  98.1 F  154.125 
lbs  61 in     29.12 kg/m2  1.73 m2      







Social History







 Name  Description  Comments

 

       

 

 Children      

 

 lives alone      

 

 Supervisor      

 

 Tobacco  Never smoker   







History of Procedures







 Date Ordered  Description  Order Status

 

 2011 12:00 AM  COMPREHEN METABOLIC PANEL  Reviewed

 

 2011 12:00 AM  ASSAY THYROID STIM HORMONE  Reviewed

 

 2011 12:00 AM  COMPREHEN METABOLIC PANEL  Reviewed

 

 2011 12:00 AM  LIPID PANEL  Reviewed

 

 2011 12:00 AM  ASSAY THYROID STIM HORMONE  Reviewed

 

 2011 12:00 AM  COMPLETE CBC W/AUTO DIFF WBC  Reviewed

 

 2011 12:00 AM  COMPREHEN METABOLIC PANEL  Reviewed

 

 2011 12:00 AM  COMPREHEN METABOLIC PANEL  Reviewed

 

 2011 12:00 AM  LIPID PANEL  Reviewed

 

 2011 12:00 AM  ASSAY THYROID STIM HORMONE  Reviewed

 

 2010 11:24 AM  NO CHARGE OV  Reviewed

 

 2010 11:26 AM  NO CHARGE OV  Reviewed

 

 2011 12:00 AM  COMPREHEN METABOLIC PANEL  Reviewed

 

 2011 12:00 AM  LIPID PANEL  Reviewed

 

 2011 12:00 AM  ASSAY THYROID STIM HORMONE  Reviewed

 

 2011 12:00 AM  COMPLETE CBC W/AUTO DIFF WBC  Reviewed

 

 2012 12:00 AM  TISSUE EXAM FOR FUNGI  Returned

 

 2012 12:00 AM  SMEAR WET MOUNT SALINE/INK  Returned

 

 2/15/2012 12:00 AM  THER/PROPH/DIAG INJ SC/IM  Reviewed

 

 8/15/2012 12:00 AM  COMPREHEN METABOLIC PANEL  Reviewed

 

 8/15/2012 12:00 AM  ASSAY THYROID STIM HORMONE  Reviewed

 

 8/15/2012 12:00 AM  COMPLETE CBC W/AUTO DIFF WBC  Returned

 

 12/10/2012 12:00 AM  IMMUNIZATION ADMIN  Reviewed

 

 2012 12:00 AM  TRICHOMONAS ASSAY W/OPTIC  Returned

 

 2013 12:00 AM  THER/PROPH/DIAG INJ SC/IM  Reviewed

 

 2013 12:00 AM  COMPREHEN METABOLIC PANEL  Returned

 

 2013 12:00 AM  LIPID PANEL  Returned

 

 2013 12:00 AM  COMPLETE CBC W/AUTO DIFF WBC  Returned

 

 2013 12:00 AM  ASSAY THYROID STIM HORMONE  Returned

 

 6/10/2013 12:00 AM  MAMMOGRAM SCREENING  Returned

 

 6/10/2013 12:00 AM  CYTOPATH C/V MANUAL  Returned

 

 12/10/2013 12:00 AM  LIPID PANEL  Returned

 

 12/10/2013 12:00 AM  ASSAY THYROID STIM HORMONE  Returned

 

 12/10/2013 12:00 AM  COMPLETE CBC W/AUTO DIFF WBC  Returned

 

 12/10/2013 12:00 AM  COMPREHEN METABOLIC PANEL  Returned

 

 2010 12:00 AM  CYTOPATH C/V MANUAL  Reviewed

 

 2010 12:00 AM  SMEAR WET MOUNT SALINE/INK  Reviewed

 

 2010 12:00 AM  LIPID PANEL  Reviewed

 

 2010 12:00 AM  ASSAY THYROID STIM HORMONE  Reviewed

 

 2010 12:00 AM  COMPLETE CBC W/AUTO DIFF WBC  Reviewed

 

 2010 12:00 AM  COMPREHEN METABOLIC PANEL  Reviewed

 

 2010 8:48 AM  Blood Pressure Check-no charge  Reviewed

 

 2010 12:00 AM  Blood Pressure Check-no charge  Reviewed

 

 2014 12:00 AM  METABOLIC PANEL TOTAL CA  Reviewed

 

 2014 12:00 AM  ASSAY THYROID STIM HORMONE  Reviewed

 

 2014 12:00 AM  ASSAY OF FREE THYROXINE  Reviewed

 

 2014 12:00 AM  MAMMOGRAM SCREENING  Returned

 

 2014 12:00 AM  CT BONE DENSITY AXIAL  Returned

 

 2014 12:00 AM  COMPLETE CBC W/AUTO DIFF WBC  Returned

 

 2014 12:00 AM  COMPREHEN METABOLIC PANEL  Returned

 

 2014 12:00 AM  LIPID PANEL  Returned

 

 2014 12:00 AM  ASSAY THYROID STIM HORMONE  Returned

 

 10/20/2010 12:00 AM  IMMUNIZATION ADMIN  Reviewed

 

 10/20/2010 12:00 AM  FLU VACCINE 3 YRS & > IM  Reviewed

 

 2010 12:00 AM  COMPREHEN METABOLIC PANEL  Reviewed

 

 2010 12:00 AM  LIPID PANEL  Reviewed

 

 2010 12:00 AM  ASSAY THYROID STIM HORMONE  Reviewed

 

 2010 12:00 AM  COMPLETE CBC W/AUTO DIFF WBC  Reviewed

 

 2010 12:00 AM  Blood Pressure Check-no charge  Reviewed

 

 10/2/2014 12:00 AM  THER/PROPH/DIAG INJ SC/IM  Reviewed

 

 2014 12:00 AM  COMPLETE CBC W/AUTO DIFF WBC  Returned

 

 2014 12:00 AM  COMPREHEN METABOLIC PANEL  Returned

 

 2014 12:00 AM  LIPID PANEL  Returned

 

 2014 12:00 AM  ASSAY THYROID STIM HORMONE  Returned

 

 2015 12:00 AM  THER/PROPH/DIAG INJ SC/IM  Reviewed

 

 2011 12:00 AM  COMPREHEN METABOLIC PANEL  Reviewed

 

 2011 12:00 AM  LIPID PANEL  Reviewed

 

 2011 12:00 AM  ASSAY THYROID STIM HORMONE  Reviewed

 

 2011 12:00 AM  COMPLETE CBC W/AUTO DIFF WBC  Reviewed

 

 2011 12:00 AM  METABOLIC PANEL TOTAL CA  Reviewed

 

 2011 12:00 AM  COMPREHEN METABOLIC PANEL  Reviewed

 

 2011 12:00 AM  ASSAY THYROID STIM HORMONE  Reviewed

 

 2011 12:00 AM  COMPLETE CBC W/AUTO DIFF WBC  Reviewed

 

 2011 12:00 AM  ASSAY OF LIPASE  Reviewed

 

 2011 12:00 AM  THER/PROPH/DIAG INJ SC/IM  Reviewed

 

 2011 12:00 AM  METABOLIC PANEL TOTAL CA  Reviewed

 

 2011 12:00 AM  THER/PROPH/DIAG INJ SC/IM  Reviewed

 

 2011 12:00 AM  METABOLIC PANEL TOTAL CA  Reviewed

 

 2011 12:00 AM  ASSAY THYROID STIM HORMONE  Reviewed

 

 2011 12:00 AM  THER/PROPH/DIAG INJ SC/IM  Reviewed

 

 2011 12:00 AM  ASSAY OF SERUM SODIUM  Reviewed

 

 2011 12:00 AM  ASSAY OF SERUM SODIUM  Reviewed

 

 2011 12:00 AM  CYTOPATH C/V MANUAL  Reviewed

 

 2011 12:00 AM  ASSAY THYROID STIM HORMONE  Reviewed

 

 2015 12:00 AM  COMPLETE CBC W/AUTO DIFF WBC  Returned

 

 2015 12:00 AM  COMPREHEN METABOLIC PANEL  Returned

 

 2015 12:00 AM  LIPID PANEL  Returned

 

 2015 12:00 AM  ASSAY THYROID STIM HORMONE  Returned







Results Summary







 Data and Description  Results

 

 2010 9:25 AM  Colonoscopy-Women and Men over 50 Abnormal Mammogram -Women 
over 40 Declined Pap Smear Declined 

 

 2010 10:41 AM  WET PREP NO TRICHOMONADS SEEN 

 

 2010 8:15 AM  TRIGLYCERIDES 174.0 mg/dLCHOLESTEROL 175.0 mg/dLHDL 58.0 mg/
dLLDL (CALC) 82.0 mg/dLTSH 0.790 uIU/mLGLUCOSE 95.0 mg/dLSODIUM 136.0 mmol/
LPOTASSIUM 4.50 mmol/LCHLORIDE 99.0 mmol/LCO2 26.0 mmol/LBUN 12.0 mg/
dLCREATININE 0.80 mg/dLSGOT/AST 32.0 IU/LSGPT/ALT 33.0 IU/LALK PHOS 103.0 IU/
LTOTAL PROTEIN 7.60 g/dLALBUMIN 4.60 g/dLTOTAL BILI 0.60 mg/dLCALCIUM 9.80 mg/
dLeGFR >60 mL/min/1.73 m2WBC 5.3 RBC 4.13 HGB 13.10 g/dLHCT 39.20 %MCV 95.0 
fLMCH 31.70 pgMCHC 33.40 g/dLRDW CV 12.70 %MPV 9.80 fLPLT 257 %NEUT 57.90 %%
LYMP 26.50 %%MONO 11.60 %%EOS 3.20 %%BASO 0.80 %#NEUT 3.04 #LYMP 1.39 #MONO 
0.61 #EOS 0.17 #BASO 0.04 

 

 12/15/2010 8:30 AM  TRIGLYCERIDES 157.0 mg/dLCHOLESTEROL 163.0 mg/dLHDL 56.0 mg
/dLLDL (CALC) 76.0 mg/dLTSH 0.650 uIU/mLWBC 6.5 RBC 4.25 HGB 13.60 g/dLHCT 
40.70 %MCV 96.0 fLMCH 32.0 pgMCHC 33.40 g/dLRDW CV 12.60 %MPV 9.90 fLPLT 313 %
NEUT 61.80 %%LYMP 26.20 %%MONO 8.30 %%EOS 3.10 %%BASO 0.60 %#NEUT 4.00 #LYMP 
1.70 #MONO 0.54 #EOS 0.20 #BASO 0.04 GLUCOSE 97.0 mg/dLSODIUM 136.0 mmol/
LPOTASSIUM 4.40 mmol/LCHLORIDE 101.0 mmol/LCO2 26.0 mmol/LBUN 10.0 mg/
dLCREATININE 0.80 mg/dLSGOT/AST 31.0 IU/LSGPT/ALT 34.0 IU/LALK PHOS 92.0 IU/
LTOTAL PROTEIN 8.10 g/dLALBUMIN 4.60 g/dLTOTAL BILI 0.40 mg/dLCALCIUM 10.0 mg/
dLeGFR >60 mL/min/1.73 m2

 

 2011 9:45 AM  GLUCOSE 91.0 mg/dLSODIUM 133.0 mmol/LPOTASSIUM 4.40 mmol/
LCHLORIDE 97.0 mmol/LCO2 24.0 mmol/LBUN 9.0 mg/dLCREATININE 0.70 mg/dLCALCIUM 
10.0 mg/dLeGFR >60 mL/min/1.73 m2

 

 2011 10:25 AM  LIPASE 29.0 U/LTSH 0.10 uIU/mLGLUCOSE 115.0 mg/dLSODIUM 
127.0 mmol/LPOTASSIUM 5.30 mmol/LCHLORIDE 93.0 mmol/LCO2 18.0 mmol/LBUN 9.0 mg/
dLCREATININE 0.70 mg/dLSGOT/AST 62.0 IU/LSGPT/ALT 46.0 IU/LALK PHOS 100.0 IU/
LTOTAL PROTEIN 8.60 g/dLALBUMIN 4.90 g/dLTOTAL BILI 0.60 mg/dLCALCIUM 9.90 mg/
dLeGFR >60 mL/min/1.73 m2WBC 6.9 RBC 4.48 HGB 14.40 g/dLHCT 40.90 %MCV 91.0 
fLMCH 32.10 pgMCHC 35.20 g/dLRDW CV 12.50 %MPV 9.30 fLPLT 260 %NEUT 74.90 %%
LYMP 15.10 %%MONO 9.40 %%EOS 0.30 %%BASO 0.30 %#NEUT 5.15 #LYMP 1.04 #MONO 0.65 
#EOS 0.02 #BASO 0.02 

 

 2011 9:07 AM  GLUCOSE 92.0 mg/dLSODIUM 131.0 mmol/LPOTASSIUM 4.20 mmol/
LCHLORIDE 99.0 mmol/LCO2 22.0 mmol/LBUN 9.0 mg/dLCREATININE 0.70 mg/dLCALCIUM 
9.20 mg/dLeGFR >60 mL/min/1.73 m2

 

 2011 11:06 AM  TSH 0.510 uIU/mLGLUCOSE 99.0 mg/dLSODIUM 132.0 mmol/
LPOTASSIUM 3.50 mmol/LCHLORIDE 95.0 mmol/LCO2 23.0 mmol/LBUN 9.0 mg/
dLCREATININE 0.80 mg/dLCALCIUM 10.40 mg/dLeGFR >60 mL/min/1.73 m2

 

 2011 9:45 AM  SODIUM 132.0 mmol/L

 

 2011 9:27 AM  SODIUM 137.0 mmol/L

 

 2011 8:35 AM  TRIGLYCERIDES 114.0 mg/dLCHOLESTEROL 145.0 mg/dLHDL 56.0 mg/
dLLDL (CALC) 66.0 mg/dLGLUCOSE 105.0 mg/dLSODIUM 138.0 mmol/LPOTASSIUM 4.40 mmol
/LCHLORIDE 103.0 mmol/LCO2 25.0 mmol/LBUN 8.0 mg/dLCREATININE 0.70 mg/dLSGOT/
AST 36.0 IU/LSGPT/ALT 38.0 IU/LALK PHOS 103.0 IU/LTOTAL PROTEIN 7.40 g/
dLALBUMIN 4.30 g/dLTOTAL BILI 0.30 mg/dLCALCIUM 9.20 mg/dLeGFR >60 mL/min/1.73 
m2WBC 5.1 RBC 3.76 HGB 12.0 g/dLHCT 36.10 %MCV 96.0 fLMCH 31.90 pgMCHC 33.20 g/
dLRDW CV 13.10 %MPV 9.40 fLPLT 249 %NEUT 60.40 %%LYMP 25.90 %%MONO 8.90 %%EOS 
4.0 %%BASO 0.80 %#NEUT 3.06 #LYMP 1.31 #MONO 0.45 #EOS 0.20 #BASO 0.04 

 

 2011 9:55 AM  TSH 1.070 uIU/mL

 

 2011 8:55 AM  GLUCOSE 102.0 mg/dLSODIUM 135.0 mmol/LPOTASSIUM 4.20 mmol/
LCHLORIDE 102.0 mmol/LCO2 24.0 mmol/LBUN 15.0 mg/dLCREATININE 0.80 mg/dLSGOT/
AST 30.0 IU/LSGPT/ALT 35.0 IU/LALK PHOS 119.0 IU/LTOTAL PROTEIN 7.50 g/
dLALBUMIN 4.30 g/dLTOTAL BILI 0.30 mg/dLCALCIUM 9.20 mg/dLeGFR >60 mL/min/1.73 
m2TSH 1.130 uIU/mL

 

 2011 9:50 AM  GLUCOSE 85.0 mg/dLSODIUM 133.0 mmol/LPOTASSIUM 4.20 mmol/
LCHLORIDE 101.0 mmol/LCO2 21.0 mmol/LBUN 13.0 mg/dLCREATININE 0.80 mg/dLSGOT/
AST 36.0 IU/LSGPT/ALT 36.0 IU/LALK PHOS 109.0 IU/LTOTAL PROTEIN 7.40 g/
dLALBUMIN 4.20 g/dLTOTAL BILI 0.50 mg/dLCALCIUM 9.40 mg/dLeGFR >60 mL/min/1.73 
m2

 

 2011 8:55 AM  GLUCOSE 98.0 mg/dLSODIUM 137.0 mmol/LPOTASSIUM 3.90 mmol/
LCHLORIDE 103.0 mmol/LCO2 25.0 mmol/LBUN 14.0 mg/dLCREATININE 0.70 mg/dLSGOT/
AST 33.0 IU/LSGPT/ALT 36.0 IU/LALK PHOS 111.0 IU/LTOTAL PROTEIN 8.30 g/
dLALBUMIN 4.40 g/dLTOTAL BILI 0.50 mg/dLCALCIUM 9.40 mg/dLeGFR >60 mL/min/1.73 
[id number] 109.0 mg/dLCHOLESTEROL 153.0 mg/dLHDL 54.0 mg/dLLDL (CALC) 77.0 
mg/dLTSH 1.330 uIU/mL

 

 2011 10:17 AM  Colonoscopy-Women and Men over 50 Declined Mammogram -
Women over 40 Normal Pap Smear Negative 

 

 2012 10:33 AM  WET PREP NO TRICH SEEN CLUE CELLS NONE SEEN 

 

 2012 8:45 AM  WBC 5.2 RBC 3.96 HGB 12.70 g/dLHCT 37.80 %MCV 96.0 fLMCH 
32.10 pgMCHC 33.60 g/dLRDW CV 13.30 %MPV 9.70 fLPLT 297 %NEUT 57.70 %%LYMP 
28.50 %%MONO 10.30 %%EOS 2.50 %%BASO 1.0 %#NEUT 3.02 #LYMP 1.49 #MONO 0.54 #EOS 
0.13 #BASO 0.05 GLUCOSE 97.0 mg/dLSODIUM 137.0 mmol/LPOTASSIUM 4.40 mmol/
LCHLORIDE 101.0 mmol/LCO2 23.0 mmol/LBUN 17.0 mg/dLCREATININE 0.80 mg/dLSGOT/
AST 30.0 IU/LSGPT/ALT 33.0 IU/LALK PHOS 95.0 IU/LTOTAL PROTEIN 7.40 g/dLALBUMIN 
4.40 g/dLTOTAL BILI 0.60 mg/dLCALCIUM 9.70 mg/dLeGFR 60 TRIGLYCERIDES 130.0 mg/
dLCHOLESTEROL 157.0 mg/dLHDL 56.0 mg/dLLDL (CALC) 75.0 mg/dLTSH 0.940 uIU/mL

 

 8/15/2012 4:02 PM  WET PREP NO TRICH SEEN CLUE CELLS NONE SEEN 

 

 2012 8:45 AM  WBC 5.1 RBC 3.91 HGB 12.50 g/dLHCT 36.30 %MCV 93.0 fLMCH 
32.0 pgMCHC 34.40 g/dLRDW CV 12.60 %MPV 9.30 fLPLT 244 %NEUT 57.60 %%LYMP 27.50 
%%MONO 9.10 %%EOS 5.0 %%BASO 0.80 %#NEUT 2.91 #LYMP 1.39 #MONO 0.46 #EOS 0.25 #
BASO 0.04 TSH 1.390 uIU/mLTRIGLYCERIDES 154.0 mg/dLCHOLESTEROL 147.0 mg/dLHDL 
45.0 mg/dLLDL (CALC) 71.0 mg/dLGLUCOSE 101.0 mg/dLSODIUM 134.0 mmol/LPOTASSIUM 
4.30 mmol/LCHLORIDE 102.0 mmol/LCO2 23.0 mmol/LBUN 11.0 mg/dLCREATININE 0.80 mg/
dLSGOT/AST 34.0 IU/LSGPT/ALT 38.0 IU/LALK PHOS 104.0 IU/LTOTAL PROTEIN 7.60 g/
dLALBUMIN 4.40 g/dLTOTAL BILI 0.50 mg/dLCALCIUM 9.40 mg/dLeGFR 60 

 

 12/10/2012 8:34 AM  Colonoscopy-Women and Men over 50 Declined Mammogram -
Women over 40 Declined Pap Smear Declined 

 

 2012 5:02 PM  WET PREP NO TRICH SEEN CLUE CELLS NONE SEEN 

 

 2013 8:25 AM  WBC 4.2 RBC 3.96 HGB 12.90 g/dLHCT 37.0 %MCV 93.0 fLMCH 
32.60 pgMCHC 34.90 g/dLRDW CV 12.60 %MPV 9.70 fLPLT 254 %NEUT 54.40 %%LYMP 
30.40 %%MONO 10.80 %%EOS 3.40 %%BASO 1.0 %#NEUT 2.26 #LYMP 1.26 #MONO 0.45 #EOS 
0.14 #BASO 0.04 TSH 1.230 uIU/mLGLUCOSE 94.0 mg/dLSODIUM 136.0 mmol/LPOTASSIUM 
4.30 mmol/LCHLORIDE 99.0 mmol/LCO2 25.0 mmol/LBUN 10.0 mg/dLCREATININE 0.80 mg/
dLSGOT/AST 36.0 IU/LSGPT/ALT 36.0 IU/LALK PHOS 84.0 IU/LTOTAL PROTEIN 7.90 g/
dLALBUMIN 4.40 g/dLTOTAL BILI 0.70 mg/dLCALCIUM 9.80 mg/dLeGFR 60 TRIGLYCERIDES 
122.0 mg/dLCHOLESTEROL 141.0 mg/dLHDL 47.0 mg/dLLDL (CALC) 70.0 mg/dL

 

 6/10/2013 3:52 PM  WET PREP NO TRICH SEEN CLUE CELLS NONE SEEN 

 

 2013 8:45 AM  WBC 5.3 RBC 4.01 HGB 13.0 g/dLHCT 37.60 %MCV 94.0 fLMCH 
32.40 pgMCHC 34.60 g/dLRDW CV 12.50 %MPV 9.40 fLPLT 248 %NEUT 58.70 %%LYMP 
27.80 %%MONO 9.80 %%EOS 2.80 %%BASO 0.90 %#NEUT 3.12 #LYMP 1.48 #MONO 0.52 #EOS 
0.15 #BASO 0.05 TSH 1.570 uIU/mLGLUCOSE 94.0 mg/dLSODIUM 134.0 mmol/LPOTASSIUM 
4.10 mmol/LCHLORIDE 101.0 mmol/LCO2 23.0 mmol/LBUN 11.0 mg/dLCREATININE 0.80 mg/
dLSGOT/AST 41.0 IU/LSGPT/ALT 44.0 IU/LALK PHOS 109.0 IU/LTOTAL PROTEIN 7.90 g/
dLALBUMIN 4.70 g/dLTOTAL BILI 0.80 mg/dLCALCIUM 10.40 mg/dLeGFR >60 mL/min/1.73 
[id number] 163.0 mg/dLCHOLESTEROL 138.0 mg/dLHDL 49.0 mg/dLLDL (CALC) 56.0 
mg/dL

 

 2014 8:58 AM  GLUCOSE 86.0 mg/dLSODIUM 134.0 mmol/LPOTASSIUM 4.20 mmol/
LCHLORIDE 99.0 mmol/LCO2 25.0 mmol/LBUN 14.0 mg/dLCREATININE 0.80 mg/dLCALCIUM 
10.10 mg/dLeGFR >60 mL/min/1.73 m2TSH 1.20 uIU/mL

 

 2014 9:50 AM  WBC 5.7 RBC 4.03 HGB 12.90 g/dLHCT 37.90 %MCV 94.0 fLMCH 
32.0 pgMCHC 34.0 g/dLRDW CV 12.70 %MPV 9.70 fLPLT 292 %NEUT 62.70 %%LYMP 21.80 %
%MONO 11.0 %%EOS 3.40 %%BASO 1.10 %#NEUT 3.55 #LYMP 1.23 #MONO 0.62 #EOS 0.19 #
BASO 0.06 GLUCOSE 98.0 mg/dLSODIUM 135.0 mmol/LPOTASSIUM 4.30 mmol/LCHLORIDE 
102.0 mmol/LCO2 22.0 mmol/LBUN 10.0 mg/dLCREATININE 0.80 mg/dLSGOT/AST 34.0 IU/
LSGPT/ALT 32.0 IU/LALK PHOS 95.0 IU/LTOTAL PROTEIN 7.70 g/dLALBUMIN 4.30 g/
dLTOTAL BILI 0.80 mg/dLCALCIUM 9.10 mg/dLeGFR 60 TRIGLYCERIDES 108.0 mg/
dLCHOLESTEROL 146.0 mg/dLHDL 56.0 mg/dLLDL (CALC) 68.0 mg/dLTSH 0.90 uIU/mL

 

 2014 8:40 AM  WBC 4.4 RBC 4.13 HGB 13.20 g/dLHCT 38.90 %MCV 94.0 fLMCH 
32.0 pgMCHC 33.90 g/dLRDW CV 13.50 %MPV 9.80 fLPLT 279 %NEUT 63.80 %%LYMP 24.60 
%%MONO 9.30 %%EOS 1.60 %%BASO 0.70 %#NEUT 2.80 #LYMP 1.08 #MONO 0.41 #EOS 0.07 #
BASO 0.03 GLUCOSE 96.0 mg/dLSODIUM 136.0 mmol/LPOTASSIUM 4.10 mmol/LCHLORIDE 
99.0 mmol/LCO2 23.0 mmol/LBUN 8.0 mg/dLCREATININE 0.80 mg/dLSGOT/AST 31.0 IU/
LSGPT/ALT 27.0 IU/LALK PHOS 85.0 IU/LTOTAL PROTEIN 7.60 g/dLALBUMIN 4.40 g/
dLTOTAL BILI 0.80 mg/dLCALCIUM 10.20 mg/dLeGFR 60 TRIGLYCERIDES 61.0 mg/
dLCHOLESTEROL 148.0 mg/dLHDL 71.0 mg/dLLDL (CALC) 65.0 mg/dLTSH 0.990 uIU/mL

 

 2015 8:32 AM  WBC 4.8 RBC 3.98 HGB 12.70 g/dLHCT 37.30 %MCV 94.0 fLMCH 
31.90 pgMCHC 34.0 g/dLRDW CV 12.70 %MPV 9.50 fLPLT 270 %NEUT 57.30 %%LYMP 25.0 %
%MONO 13.0 %%EOS 3.60 %%BASO 1.10 %#NEUT 2.73 #LYMP 1.19 #MONO 0.62 #EOS 0.17 #
BASO 0.05 TSH 0.640 uIU/mLGLUCOSE 90.0 mg/dLSODIUM 136.0 mmol/LPOTASSIUM 4.0 
mmol/LCHLORIDE 101.0 mmol/LCO2 24.0 mmol/LBUN 10.0 mg/dLCREATININE 0.80 mg/
dLSGOT/AST 34.0 IU/LSGPT/ALT 32.0 IU/LALK PHOS 92.0 IU/LTOTAL PROTEIN 7.50 g/
dLALBUMIN 4.40 g/dLTOTAL BILI 0.70 mg/dLCALCIUM 9.50 mg/dLeGFR >60 mL/min/1.73 
[id number] 107.0 mg/dLCHOLESTEROL 138.0 mg/dLHDL 58.0 mg/dLLDL (CALC) 59.0 
mg/dL







History Of Immunizations







 Name  Date Admin  Mfg Name  Mfg Code  Trade Name  Lot#  Route  Inj  Vis Given  
Vis Pub  CVX

 

 Influenza  10/20/2010  [employer]of[employer] pasteur  [employer]  Fluzone  [id number]  Intramuscular  
Left Arm  10/20/2010  2010  999

 

 Influenza  10/28/2011  sanof[employer] pasteur  [employer]  Fluzone  [id number]  Intramuscular  
Left Arm  10/28/2011  2011  141

 

 Influenza  10/29/2012  sanofi pasteur  [employer]  Fluzone  [id number]  Intramuscular  
Left Deltoid  10/29/2012  2012  141

 

 Pneumococcal  12/10/2012  [employer]  MSD  Pneumovax 23  [id number]  
Intramuscular  Left Gluteous Medius  12/10/2012  2010  33

 

 Influenza  10/28/2013  sanofi pasteur  [employer]  Fluzone > 3 Years  [id number]  
Intramuscular  Right Deltoid  10/28/2013  2013  141







History of Past Illness







 Name  Date of Onset  Comments

 

 Benign Essential Hypertension  Dec 14 2009 10:10AM   

 

 Hyperlipidemia  Dec 14 2009 10:10AM   

 

 Hypothyroidism, Acquired  Dec 14 2009 10:10AM   

 

 hypothyroid      

 

 Hypertension      

 

 Hyperlipidemia      

 

 acid reflux      

 

 Hypertension, Benign Essential  2010  9:41AM   

 

 Hypertension, Benign Essential  2010 12:53PM   

 

 Routine gynecological examination  May  5 2010  9:28AM   

 

 Hypertension  May  5 2010  9:28AM   

 

 Hyperlipidemia, unspecified  May  5 2010  9:28AM   

 

 Hypothyroidism, Acquired  May  5 2010  9:28AM   

 

 Vulvovaginitis  May  5 2010  9:28AM   

 

 Rhinitis, Allergic  May  5 2010  9:28AM   

 

 Hypertension, Benign Essential  May 19 2010  8:48AM   

 

 Hypertension, Benign Essential  May 25 2010  9:39AM   

 

 Flu  Oct 20 2010 12:01PM   

 

 Essential Hypertension  2010  8:34AM   

 

 Hyperlipidemia  2010  8:34AM   

 

 Gastroesophageal Reflux  2010  8:34AM   

 

 Hypothyroidism, Acquired  2010  8:34AM   

 

 Hypertension, Benign Essential  2010  9:22AM   

 

 Hypertension, Benign Essential  Dec 15 2010  9:07AM   

 

 Essential Hypertension  2011  1:31PM   

 

 Hyperlipidemia  2011  1:31PM   

 

 Gastroesophageal Reflux  2011  1:31PM   

 

 Hypothyroidism, Acquired  2011  1:31PM   

 

 Essential Hypertension  2011  8:51AM   

 

 Hyperlipidemia  2011  8:51AM   

 

 Gastroesophageal Reflux  2011  8:51AM   

 

 Hypothyroidism, Acquired  2011  8:51AM   

 

 Essential Hypertension  2011  9:13AM   

 

 Hyperlipidemia  2011  9:13AM   

 

 Gastroesophageal Reflux  2011  9:13AM   

 

 Hypothyroidism, Acquired  2011  9:13AM   

 

 Nausea With Vomiting  2011  1:18PM   

 

 Hyponatremia  2011  8:46AM   

 

 Nausea  2011  9:13AM   

 

 Hypothyroidism  2011 11:10AM   

 

 Hyponatremia  2011 11:10AM   

 

 Essential Hypertension  2011 10:05AM   

 

 Hyperlipidemia  2011 10:05AM   

 

 Gastroesophageal Reflux  2011 10:05AM   

 

 Nausea  2011 10:05AM   

 

 Hypothyroidism, Acquired  2011 10:05AM   

 

 Hyponatremia  May  6 2011 11:34AM   

 

 Essential Hypertension  May 16 2011  8:50AM   

 

 Hyperlipidemia  May 16 2011  8:50AM   

 

 Gastroesophageal Reflux  May 16 2011  8:50AM   

 

 Nausea  May 16 2011  8:50AM   

 

 Hypothyroidism, Acquired  May 16 2011  8:50AM   

 

 Hyponatremia  May 16 2011  8:50AM   

 

 Routine gynecological examination  2011  8:54AM   

 

 Hypertension  2011  8:54AM   

 

 Hyperlipidemia, unspecified  2011  8:54AM   

 

 Hypothyroidism, Acquired  2011  8:54AM   

 

 Rhinitis, Allergic  2011  8:54AM   

 

 Hypothyroidism  Aug 29 2011 12:37PM   

 

 Hyponatremia  Aug 29 2011 12:37PM   

 

 Essential Hypertension  Sep 12 2011  8:53AM   

 

 Hyperlipidemia  Sep 12 2011  8:53AM   

 

 Gastroesophageal Reflux  Sep 12 2011  8:53AM   

 

 Hypothyroidism, Acquired  Sep 12 2011  8:53AM   

 

 Hyponatremia  Sep 12 2011  8:53AM   

 

 Hypertension  Sep 29 2011  9:41AM   

 

 Hyperlipidemia  Dec  8 2011  8:31AM   

 

 Hypothyroidism  Dec  8 2011  8:31AM   

 

 Hypertension  Dec  8 2011  8:31AM   

 

 Flu  Dec  9 2011 10:02AM   

 

 Essential Hypertension  Dec 13 2011 10:13AM   

 

 Hyperlipidemia  Dec 13 2011 10:13AM   

 

 Gastroesophageal Reflux  Dec 13 2011 10:13AM   

 

 Hypothyroidism, Acquired  Dec 13 2011 10:13AM   

 

 Hyponatremia  Dec 13 2011 10:13AM   

 

 Vaginal Discharge  2012  9:43AM   

 

 Rhinitis, Allergic  Feb 15 2012  9:31AM   

 

 Eustachian Tube Dysfunction  Feb 15 2012  9:31AM   

 

 Pharyngitis, Acute  Feb 15 2012  9:31AM   

 

 Routine gynecological examination  2012  8:48AM   

 

 Hypertension  2012  8:48AM   

 

 Hyperlipidemia, unspecified  2012  8:48AM   

 

 Hypothyroidism, Acquired  2012  8:48AM   

 

 Rhinitis, Allergic  2012  8:48AM   

 

 Candidiasis, Vulvovaginal  2012 11:04AM   

 

 Essential Hypertension  Aug 15 2012  9:02AM   

 

 Hyperlipidemia  Aug 15 2012  9:02AM   

 

 Gastroesophageal Reflux  Aug 15 2012  9:02AM   

 

 Hypothyroidism, Acquired  Aug 15 2012  9:02AM   

 

 Hyponatremia  Aug 15 2012  9:02AM   

 

 Atrophic Vaginitis, Postmenopausal  Aug 15 2012  9:02AM   

 

 Flu  Oct 29 2012  9:24AM   

 

 Essential Hypertension  Dec 10 2012  8:35AM   

 

 Hyperlipidemia  Dec 10 2012  8:35AM   

 

 Gastroesophageal Reflux  Dec 10 2012  8:35AM   

 

 Hypothyroidism, Acquired  Dec 10 2012  8:35AM   

 

 Hyponatremia  Dec 10 2012  8:35AM   

 

 Atrophic Vaginitis, Postmenopausal  Dec 10 2012  8:35AM   

 

 Pneumococcus  Dec 10 2012  2:07PM   

 

 Vaginal Discharge  Dec 20 2012  1:50PM   

 

 Essential Hypertension  Dec 20 2012  1:50PM   

 

 Hyperlipidemia  Dec 20 2012  1:50PM   

 

 Gastroesophageal Reflux  Dec 20 2012  1:50PM   

 

 Hypothyroidism, Acquired  Dec 20 2012  1:50PM   

 

 Atrophic Vaginitis, Postmenopausal  Dec 20 2012  1:50PM   

 

 Upper Respiratory Infections  2013  8:52AM   

 

 Hyperlipidemia  2013  8:21AM   

 

 Hypertension  2013  8:21AM   

 

 Hypothyroidism  2013  8:21AM   

 

 Screening Mammogram  Beto 10 2013  8:45AM   

 

 Routine gynecological examination  Beto 10 2013  8:34AM   

 

 Hypertension  Beto 10 2013  8:34AM   

 

 Hyperlipidemia, unspecified  Beto 10 2013  8:34AM   

 

 Hypothyroidism, Acquired  Beto 10 2013  8:34AM   

 

 Rhinitis, Allergic  Beto 10 2013  8:34AM   

 

 Flu  Oct 28 2013  8:52AM   

 

 Essential Hypertension  Dec  9 2013  8:37AM   

 

 Hyperlipidemia  Dec  9 2013  8:37AM   

 

 Gastroesophageal Reflux  Dec  9 2013  8:37AM   

 

 Hypothyroidism, Acquired  Dec  9 2013  8:37AM   

 

 Atrophic Vaginitis, Postmenopausal  Dec  9 2013  8:37AM   

 

 Hypertension  2014  9:56AM   

 

 Hyperlipidemia  2014  9:56AM   

 

 Hypothyroidism  2014  9:56AM   

 

 Screening Mammogram  2014  8:32AM   

 

 Osteopenia  2014  8:32AM   

 

 Hypertension  2014  8:35AM   

 

 Hypothyroidism, Acquired  2014  8:35AM   

 

 Hyperlipidemia  2014  8:35AM   

 

 Upper Respiratory Infections  2014  9:28AM   

 

 Sinusitis, Acute  Oct  2 2014  9:17AM   

 

 Herpes Zoster  Oct  5 2014  1:44PM   

 

 Vesicular Eruption  Oct  5 2014  1:44PM   

 

 Hypertension  2014  8:18AM   

 

 Hyperlipidemia  2014  8:18AM   

 

 hypothyroid  2014  8:18AM   

 

 Situational anxiety  Dec 20 2014  9:33AM   

 

 GERD (gastroesophageal reflux disease)  Dec 20 2014  9:33AM   

 

 Nausea  Dec 20 2014  9:33AM   

 

 Abdominal Pain, Epigastric  Dec 20 2014  9:33AM   

 

 Hyperlipidemia  Oct  6 2014  9:03AM   

 

 acid reflux  Oct  6 2014  9:03AM   

 

 hypothyroid  Oct  6 2014  9:03AM   

 

 Shingles  Oct  6 2014  9:03AM   

 

 Pharyngitis  2015  1:09PM   

 

 Bronchitis  2015  9:10AM   

 

 Hyperlipidemia  2015  9:10AM   

 

 acid reflux  2015  9:10AM   

 

 hypothyroid  2015  9:10AM   

 

 Hyperlipidemia  2015  8:18AM   

 

 Hypertension  2015  8:18AM   

 

 hypothyroid  2015  8:18AM   

 

 Screening Mammogram  2015 11:43AM   







Payers







 Insurance Name  Company Name  Plan Name  Plan Number  Policy Number  Policy 
Group Number  Start Date

 

    Medicare Part A  Medicare Part A     [id number]     N/A

 

    [facility]     [id number]     2009

 

    Medicare Part B  [employer] [place]     [id number]     N/A







History of Encounters







 Visit Date  Visit Type  Provider

 

 2015  Office visit  Doris Noriega MD

 

 2015  Office visit  Doris Noriega MD

 

 2015  Office visit  Prince Noe APRN

 

 2014  Office visit  Anyi Herrera APRN

 

 10/27/2014  Voided  Doris Noriega MD

 

 10/6/2014  Office visit  Doris Noriega MD

 

 10/5/2014  Office visit  Anyi Herrera APRN

 

 10/2/2014  Office visit  Corrine Bay APRN

 

 2014  Office visit  Kassie Franklin APRN

 

 2014  Office visit  Doris Noriega MD

 

 2014  Office visit  Doris Noriega MD

 

 2013  Office visit  Dee Colindres MD

 

 10/28/2013  Nurse visit  Dee Colindres MD

 

 6/10/2013  Office visit  Dee Colindres MD

 

 2013  Office visit  Corrine Bay APRN

 

 2012  Office visit  Dee Colindres MD

 

 12/10/2012  Office visit  Dee Colindres MD

 

 10/29/2012  Nurse visit  Dee Colindres MD

 

 8/15/2012  Office visit  Dee Colindres MD

 

 2012  Office visit  Corrine Bay APRN

 

 2012  Office visit  Dee Colindres MD

 

 2/15/2012  Office visit  Dee Colindres MD

 

 2012  Office visit  Corrine Bay APRN

 

 2011  Office visit  Dee Colindres MD

 

 10/28/2011  Nurse visit  Shad Nolasco MD

 

 2011  Office visit  Dee Colindres MD

 

 2011  Office visit  Dee Colindres MD

 

 2011  Office visit  Dee Colindres MD

 

 2011  Office visit  Dee Colindres MD

 

 2011  Office visit  Dee Colindres MD

 

 2011  Office visit  Dee Colindres MD

 

 4/10/2011  [facility]  RICKEY Toussaint MD

 

 4/10/2011  [facility]  KHRIS Granados MD

 

 2011  Office visit  RICKEY Toussaint MD

 

 2011  Voided  Fide Castellanos MD

 

 2011  [facility]  Fide Castellanos MD

 

 2011  Office visit  Dee Colindres MD

 

 12/15/2010  Nurse visit  Dee Colindres MD

 

 2010  Office visit  Dee Colindres MD

 

 10/20/2010  Nurse visit  Stephenie PERAZA

 

 2010  Nurse visit  Dee Colindres MD

 

 2010  Nurse visit  Dee Colindres MD

 

 2010  Office visit  Dee Colindres MD

 

 3/19/2010  Voided  Stephenie Kay PA

 

 2010  Nurse visit  Stephenie PERAZA

 

 2010  Nurse visit  Stephenie PERAZA

 

 2010  Nurse visit  Stephenie PERAZA

 

 2009  Office visit  Stephenie PERAZA

 

 10/20/2009  Nurse visit  Stephenie Kay PA

## 2017-07-13 NOTE — XMS REPORT
MU2 Ambulatory Summary

 Created on: 2017



Milady Crespo

External Reference #: [id number]

: 1945

Sex: Female



Demographics







 Address  [address]

[place]  [address]

 

 Home Phone  [phone]

 

 Preferred Language  English

 

 Marital Status  

 

 Orthodoxy Affiliation  Unknown

 

 Race  White

 

 Ethnic Group  Not  or 





Author







 Prince Linder

 

 [facility]

 

 Address  [address]

[place], [place]  [id number]



 

 Phone  [phone]







Care Team Providers







 Care Team Member Name  Role  Phone

 

 Prince Noe  PCP  [phone]

 

 BerthaemilieDoris  PreferredProvider  [phone]







Allergies and Adverse Reactions







 Name  Reaction  Notes

 

 NO KNOWN DRUG ALLERGIES      







Plan of Treatment







 Planned Activity  Comments  Planned Date  Planned Time  Plan/Goal

 

 Complete blood count (CBC) with differential count     2016  12:00 AM   

 

 Comprehensive metabolic panel     2016  12:00 AM   

 

 VITAMIN D (25 HYDROXY)     2016  12:00 AM   

 

 Lipid panel (total cholesterol, lipoproteins, HDL, triglycerides)     8/15/
2012  12:00 AM   

 

 Comprehensive Metabolic Panel     6/10/2013  12:00 AM   

 

 Lipid panel (total cholesterol, lipoproteins, HDL, triglycerides)     6/10/
2013  12:00 AM   

 

 Thyroid stimulating hormone (TSH)     6/10/2013  12:00 AM   

 

 CBC (automated H&H, platelets, WBC and automated differential)     6/10/2013  
12:00 AM   

 

 Comprehensive Metabolic Panel     2012  12:00 AM   

 

 Lipid panel (total cholesterol, lipoproteins, HDL, triglycerides)     2012  12:00 AM   

 

 Thyroid stimulating hormone (TSH)     2012  12:00 AM   

 

 CBC (automated H&H, platelets, WBC and automated differential)     2012  
12:00 AM   

 

 Screening mammography, bilateral     2015  12:00 AM   







Medications







 Active 

 

 Name  Start Date  Estimated Completion Date  SIG  Comments

 

 aspirin 81 mg oral tablet,delayed release (DR/EC)        take 1 tablet (81 mg) 
by oral route once daily   

 

 Vitamin D3 1,000 unit oral capsule        take 1 capsule by oral route daily   

 

 famotidine 20 mg oral tablet  2015     TAKE ONE TABLET BY MOUTH TWICE 
DAILY   

 

 valsartan 160 mg oral tablet  2016     TAKE ONE TABLET BY MOUTH ONCE 
DAILY   

 

 amlodipine 5 mg oral tablet  2016     TAKE ONE TABLET BY MOUTH ONCE DAILY
   

 

 Allergy Relief (loratadine) 10 mg oral tablet  2016     TAKE ONE TABLET 
BY MOUTH ONCE DAILY   

 

 triamcinolone acetonide 0.1 % topical cream  3/14/2016     APPLY A THIN LAYER 
OF CREAM EXTERNALLY TO AFFECTED AREA TWICE DAILY FOR 14 DAYS   

 

 amlodipine 5 mg oral tablet  2016     TAKE ONE TABLET BY MOUTH ONCE DAILY
   

 

 Allergy Relief (loratadine) 10 mg oral tablet  2016     TAKE ONE TABLET 
BY MOUTH ONCE DAILY   

 

 valsartan 160 mg oral tablet  2016     TAKE ONE TABLET BY MOUTH ONCE 
DAILY   

 

 pantoprazole 40 mg oral tablet,delayed release (DR/EC)  10/13/2016  10/8/2017  
take 1 tablet (40 mg) by oral route once daily for 90 days   

 

 amlodipine 5 mg oral tablet  10/13/2016  10/8/2017  TAKE ONE TABLET BY MOUTH 
ONCE DAILY for 90 days   

 

 atenolol 50 mg oral tablet  10/13/2016  10/8/2017  take 1 tablet (50 mg) by 
oral route once daily   

 

 levothyroxine 50 mcg oral tablet  10/13/2016  10/8/2017  TAKE ONE TABLET BY 
MOUTH ONCE DAILY for 90 days   

 

 Lipitor 20 mg oral tablet  10/13/2016  2018  take 1 tablet (20 mg) by oral 
route once daily   

 

 loratadine 10 mg oral tablet  10/13/2016  10/8/2017  TAKE 1 TABLET BY MOUTH 
EVERY DAY IN THE EVENING   

 

 Plavix 75 mg oral tablet  10/13/2016  2018  take 1 tablet (75 mg) by oral 
route once daily   

 

 valsartan 160 mg oral tablet  10/13/2016  10/8/2017  TAKE ONE TABLET BY MOUTH 
ONCE DAILY for 90 days   

 

 Zofran (as hydrochloride) 4 mg oral tablet  2016     take 1 tablet by 
oral route daily as needed for 14 days   

 

 Zofran (as hydrochloride) 4 mg oral tablet  2017     take 1 tablet by oral 
route daily as needed for 14 days   

 

 Zofran (as hydrochloride) 4 mg oral tablet  2017     take 1 tablet by oral 
route daily as needed for 14 days   

 

 Zofran (as hydrochloride) 4 mg oral tablet  2017     take 1 tablet by 
oral route daily as needed for 14 days   

 

 Zofran (as hydrochloride) 4 mg oral tablet  2017     take 1 tablet by 
oral route daily as needed for 14 days   

 

 Lexapro 10 mg oral tablet  2017  take 1 tablet (10 mg) by oral 
route once daily for 90 days   

 

 clorazepate dipotassium 7.5 mg oral tablet  2017  take 1/2 to1 
tablet PO up to three times per day for situational anxiety   









  

 

 Name  Start Date  Expiration Date  SIG  Comments

 

 prednisone 20 mg oral tablet  2011  take 2 tablets by oral 
route daily for 5 days   

 

 calcium carbonate-vitamin D2 600-125 mg-unit oral tablet  8/15/2012  2012
  take 2 tablets by oral route daily   

 

 Jordan 3 Fish Oil 900-1,400 mg oral capsule,delayed release(DR/EC)  8/15/2012  9
/  take 1 capsule by oral route daily   

 

 multivitamin Oral tablet  8/15/2012  2012  take 1 tablet by oral route 
daily   

 

 triamcinolone acetonide 0.1 % topical cream  2013  10/7/2013  APPLY A 
THIN LAYER TO THE AFFECTED AREA(S) BY TOPICAL ROUTE TWICE A DAY   

 

 famotidine 20 mg oral tablet  2014  take 1 tablet (20 mg) by 
oral route 2 times per day   

 

 amoxicillin 500 mg oral capsule  2014  take 1 capsule (500 mg) 
by oral route 3 times per day for 10 days   

 

 Zithromax Z-Tab 250 mg oral tablet  10/2/2014  10/7/2014  take 2 tablets (500 
mg) by oral route once daily for 1 day then 1 tablet (250 mg) by oral route 
once daily for 4 days   

 

 acyclovir 800 mg oral tablet  10/5/2014  10/12/2014  take 1 tablet (800 mg) by 
oral route 5 times per day for 7 days   

 

 gabapentin 300 mg oral capsule  10/9/2014  2014  take 1 capsule (300 mg) 
by oral route 3 times per day for 14 days   

 

 Carafate 100 mg/mL oral suspension  2014  take 10 milliliters 
(1 gram) by oral route 4 times per day on an empty stomach 1 hour before meals 
and at bedtime for 4 weeks   

 

 amlodipine 5 mg oral tablet  2015  TAKE ONE TABLET BY MOUTH 
EVERY DAY   

 

 levothyroxine 50 mcg oral tablet  2015  TAKE ONE TABLET BY MOUTH 
EVERY DAY   

 

 Diovan 160 mg oral tablet  2015  2/15/2016  TAKE ONE TABLET BY MOUTH 
EVERY DAY for 90 days   

 

 triamcinolone acetonide 0.1 % topical cream  2015  apply a thin 
layer to the affected area(s) by topical route 2 times per day for 14 days   

 

 fluconazole 150 mg oral tablet  2015  take 1 tablet (150 mg) 
by oral route once for 1 day   

 

 Zofran (as hydrochloride) 4 mg oral tablet  10/13/2016  10/27/2016  take 1 
tablet by oral route daily as needed for 14 days   

 

 Zofran (as hydrochloride) 4 mg oral tablet  2017     take 1 tablet by 
oral route daily as needed for 14 days   









 Discontinued 

 

 Name  Start Date  Discontinued Date  SIG  Comments

 

 Lipitor 40 mg oral tablet     2009  1/2 TAB DAILY   

 

 ramipril 5 mg oral capsule     2009  take 1 capsule (5 mg) by oral route 
once daily   

 

 lovastatin 20 mg oral tablet  2009  take 1 tablet (20 mg) by 
oral route once daily with evening meal   

 

 propranolol 20 mg oral tablet  2010  take 1 tablet by oral 
route 2 times a day   

 

 Fosamax 70 mg oral tablet  2010  take 1 tablet (70 mg) by oral 
route once weekly in the morning, at least 30 minutes before the first food, 
beverage, or medication of the day   

 

 lisinopril 40 mg oral tablet  2010  4/10/2011  take 2 tablets by oral 
route daily   

 

 Zoloft 50 mg oral tablet  2011  take 1 tablet (50 mg) by oral 
route once daily   

 

 promethazine 25 mg oral tablet  2011  take 1 tablet by oral 
route every 6 hours as needed   

 

 Diovan -12.5 mg oral tablet  2011  take 1 tablet by oral 
route once daily for 30 days   

 

 Diflucan 150 mg oral tablet     2012  take 1 tablet (150 mg) by oral 
route once for 1 day   

 

 Diflucan 150 mg oral tablet  2012  8/15/2012  take 1 tablet (150 mg) by 
oral route once   

 

 Vitamin B-12 1,000 mcg oral tablet  8/15/2012  2014  take 1 tablet by 
oral route daily   

 

 Premarin 0.625 mg/gram vaginal cream  10/29/2012  6/10/2013  use 1 gram daily 
for a week then 1 gram twice a week   

 

 Medrol (Tab) 4 mg oral tablets,dose pack  2013  6/10/2013  take as 
directed   

 

 aspirin 325 mg oral tablet  2014  take 1 tablet (325 mg) by 
oral route once daily   

 

 Medrol (Tab) 4 mg oral tablets,dose pack  2014  10/2/2014  take as 
directed   

 

 Tetracaine Lollipops Lollipop  2015  Use as directed   

 

 Zoloft 50 mg oral tablet  2016  take 1 tablet (50 mg) by oral 
route once daily for 90 days   

 

 Zoloft 50 mg oral tablet  2016  take 1 tablet (50 mg) by oral 
route once daily for 90 days  Pt refuses to take...







Problem List







 Description  Status  Onset

 

 Hyperlipidemia  Active   

 

 acid reflux  Active   

 

 Hypertension  Active   

 

 hypothyroid  Active   







Vital Signs







 Date  Time  BP-Sys(mm[Hg]  BP-Morena(mm[Hg])  HR(bpm)  RR(rpm)  Temp  WT  HT  HC  
BMI  BSA  BMI Percentile  O2 Sat(%)

 

 2017  8:22:00 AM  118 mmHg  78 mmHg  62 bpm  18 rpm  97.5 F  129.125 lbs  
61 in     24.40 kg/m2  1.59 m2     100 %

 

 10/13/2016  2:26:00 PM  128 mmHg  78 mmHg  77 bpm  16 rpm  98.4 F  139.25 lbs  
61 in     26.3108 kg/m  1.6488 m     100 %

 

 2016  8:29:00 AM  122 mmHg  70 mmHg  72 bpm  16 rpm  97.8 F  137.125 lbs  
61 in     25.91 kg/m2  1.64 m2     100 %

 

 2015  9:33:00 AM  120 mmHg  68 mmHg  73 bpm     98.7 F  144.375 lbs  61 
in     27.2791 kg/m  1.6788 m     99 %

 

 2015  9:05:00 AM  110 mmHg  75 mmHg  85 bpm  16 rpm  96.7 F  144.375 lbs  
61 in     27.28 kg/m2  1.68 m2     99 %

 

 2015  1:05:00 PM  108 mmHg  62 mmHg  78 bpm  18 rpm  96.9 F  142.375 lbs  
61 in     26.9012 kg/m  1.6672 m     100 %

 

 2014  9:31:00 AM  126 mmHg  66 mmHg  79 bpm  18 rpm  98.7 F  149 lbs  61 
in     28.15 kg/m2  1.71 m2     98 %

 

 10/6/2014  9:02:00 AM  110 mmHg  74 mmHg  94 bpm  18 rpm  98.1 F  145.4 lbs  
61 in     27.4728 kg/m  1.6848 m     97 %

 

 10/2/2014  9:14:00 AM  124 mmHg  74 mmHg  79 bpm  18 rpm  98 F  147.25 lbs  61 
in     27.82 kg/m2  1.70 m2     98 %

 

 2014  9:22:00 AM  124 mmHg  76 mmHg  89 bpm  18 rpm  98.1 F  148 lbs  61 
in     27.9641 kg/m  1.6998 m     100 %

 

 2014  8:27:00 AM  118 mmHg  65 mmHg  74 bpm  16 rpm  97.7 F  148 lbs  61 
in     27.96 kg/m2  1.70 m2     99 %

 

 2014  9:48:00 AM  125 mmHg  70 mmHg  93 bpm  18 rpm  96.7 F  156 lbs  61 
in     29.4756 kg/m  1.7451 m     100 %

 

 2013  8:35:00 AM  122 mmHg  74 mmHg  84 bpm  16 rpm  97.9 F  155.375 lbs 
                99 %

 

 6/10/2013  8:30:00 AM  130 mmHg  82 mmHg  79 bpm  16 rpm  97.9 F  161 lbs     
            98 %

 

 2013  8:50:00 AM  124 mmHg  68 mmHg  66 bpm  18 rpm  97.6 F  157.5 lbs  
61 in     29.76 kg/m2  1.75 m2      

 

 2012  1:46:00 PM  120 mmHg  72 mmHg  75 bpm  16 rpm  97.6 F  156.125 lbs
                 98 %

 

 12/10/2012  8:32:00 AM  122 mmHg  82 mmHg  70 bpm  16 rpm  97.3 F  157 lbs    
             99 %

 

 8/15/2012  9:00:00 AM  130 mmHg  76 mmHg  86 bpm  16 rpm  97.4 F  159 lbs     
            100 %

 

 2012  11:02:00 AM  128 mmHg  64 mmHg  66 bpm  18 rpm  97.4 F  162.125 lbs
  61 in     30.63 kg/m2  1.78 m2      

 

 2012  8:45:00 AM  130 mmHg  70 mmHg  82 bpm  16 rpm  98.2 F  160.125 lbs 
                100 %

 

 2/15/2012  9:29:00 AM  122 mmHg  80 mmHg  86 bpm  16 rpm  97.4 F  159.375 lbs  
61 in     30.11 kg/m2  1.76 m2     99 %

 

 2012  9:41:00 AM  132 mmHg  74 mmHg  66 bpm  18 rpm  98.4 F  160.375 lbs  
61 in     30.3023 kg/m  1.7694 m      

 

 2011  10:08:00 AM  134 mmHg  82 mmHg  80 bpm  16 rpm  98 F  160 lbs  61 
in     30.23 kg/m2  1.77 m2     99 %

 

 2011  3:56:00 PM  130 mmHg  80 mmHg                              

 

 2011  8:55:00 AM  130 mmHg  74 mmHg  88 bpm  16 rpm  98.2 F  158.25 lbs  
               98 %

 

 2011  8:54:00 AM  136 mmHg  82 mmHg  102 bpm  16 rpm  98.1 F  153.5 lbs   
              99 %

 

 2011  8:49:00 AM  122 mmHg  88 mmHg  88 bpm  16 rpm  98.6 F  152.25 lbs  
               99 %

 

 2011  10:02:00 AM  140 mmHg  82 mmHg  96 bpm  20 rpm  98.8 F             
       100 %

 

 2011  9:14:00 AM  156 mmHg  98 mmHg  110 bpm  24 rpm  98.5 F  153 lbs    
             100 %

 

 2011  8:50:00 AM  160 mmHg  84 mmHg  100 bpm  16 rpm  98.7 F  155 lbs    
             100 %

 

 2011  1:25:00 PM  152 mmHg  100 mmHg  82 bpm  16 rpm  97.2 F  156.375 lbs 
                100 %

 

 2011  9:55:00 AM  144 mmHg  90 mmHg                              

 

 2010  9:24:00 AM  138 mmHg  84 mmHg                              

 

 2010  8:58:00 AM  160 mmHg  94 mmHg                              

 

 2010  8:33:00 AM  142 mmHg  90 mmHg  100 bpm  16 rpm  98.1 F  158.375 
lbs                  

 

 2010  9:24:00 AM  160 mmHg  102 mmHg  88 bpm  18 rpm  99 F  157.125 lbs  
62 in     28.7382 kg/m  1.7657 m      

 

 3/19/2010  11:01:00 AM  140 mmHg  90 mmHg                              

 

 2009  10:08:00 AM  142 mmHg  86 mmHg  72 bpm  16 rpm  98.1 F  154.125 
lbs  61 in     29.1214 kg/m  1.73 m2      







Social History







 Name  Description  Comments

 

       

 

 Children      

 

 lives alone      

 

 Supervisor      

 

 Tobacco  Never smoker   







History of Procedures







 Date Ordered  Description  Order Status

 

 2011 12:00 AM  COMPREHEN METABOLIC PANEL  Reviewed

 

 2011 12:00 AM  ASSAY THYROID STIM HORMONE  Reviewed

 

 2011 12:00 AM  COMPREHEN METABOLIC PANEL  Reviewed

 

 2011 12:00 AM  LIPID PANEL  Reviewed

 

 2011 12:00 AM  ASSAY THYROID STIM HORMONE  Reviewed

 

 2011 12:00 AM  COMPLETE CBC W/AUTO DIFF WBC  Reviewed

 

 2015 12:00 AM  COMPLETE CBC W/AUTO DIFF WBC  Reviewed

 

 2015 12:00 AM  COMPREHEN METABOLIC PANEL  Reviewed

 

 2015 12:00 AM  LIPID PANEL  Reviewed

 

 2015 12:00 AM  ASSAY THYROID STIM HORMONE  Reviewed

 

 2011 12:00 AM  COMPREHEN METABOLIC PANEL  Reviewed

 

 2011 12:00 AM  COMPREHEN METABOLIC PANEL  Reviewed

 

 2011 12:00 AM  LIPID PANEL  Reviewed

 

 2011 12:00 AM  ASSAY THYROID STIM HORMONE  Reviewed

 

 10/28/2011 12:00 AM  ***Immunization administration, Medicare flu  Reviewed

 

 10/28/2011 12:00 AM  Fluzone ** MEDICARE Only **  Reviewed

 

 2010 11:24 AM  NO CHARGE OV  Reviewed

 

 2010 11:26 AM  NO CHARGE OV  Reviewed

 

 2011 12:00 AM  COMPREHEN METABOLIC PANEL  Reviewed

 

 2011 12:00 AM  LIPID PANEL  Reviewed

 

 2011 12:00 AM  ASSAY THYROID STIM HORMONE  Reviewed

 

 2011 12:00 AM  COMPLETE CBC W/AUTO DIFF WBC  Reviewed

 

 2011 12:00 AM  Wrist Support  Reviewed

 

 2016 12:00 AM  Screening mammography, bilateral  Reviewed

 

 2016 12:00 AM  DXA BONE DENSITY AXIAL  Returned

 

 2016 12:00 AM  TETANUS VACCINE IM  Reviewed

 

 2016 12:00 AM  HEP B VACC ADULT 3 DOSE IM  Reviewed

 

 2012 12:00 AM  TISSUE EXAM FOR FUNGI  Reviewed

 

 2012 12:00 AM  SMEAR WET MOUNT SALINE/INK  Reviewed

 

 2016 12:00 AM  TETANUS VACCINE IM  Reviewed

 

 2016 12:00 AM  HEP B VACC ADULT 3 DOSE IM  Reviewed

 

 2/15/2012 12:00 AM  THER/PROPH/DIAG INJ SC/IM  Reviewed

 

 2/15/2012 12:00 AM  Bicillin CR [facility]#32858439531[facility]  Reviewed

 

 2/15/2012 12:00 AM  Depo-Medrol 40 mg NDC#2769408270  Reviewed

 

 10/13/2016 12:00 AM  COMPLETE CBC W/AUTO DIFF WBC  Returned

 

 10/13/2016 12:00 AM  COMPREHEN METABOLIC PANEL  Returned

 

 10/13/2016 12:00 AM  ASSAY THYROID STIM HORMONE  Returned

 

 10/13/2016 12:00 AM  LIPID PANEL  Returned

 

 2016 12:00 AM  TETANUS VACCINE IM  Reviewed

 

 2016 12:00 AM  HEP B VACC ADULT 3 DOSE IM  Reviewed

 

 8/15/2012 12:00 AM  COMPREHEN METABOLIC PANEL  Reviewed

 

 8/15/2012 12:00 AM  ASSAY THYROID STIM HORMONE  Reviewed

 

 8/15/2012 12:00 AM  COMPLETE CBC W/AUTO DIFF WBC  Reviewed

 

 8/15/2012 12:00 AM  Wrist Support  Reviewed

 

 10/29/2012 12:00 AM  Flu Injection 3 Years And Above NDC# [address]-98  [facility]  
Reviewed

 

 12/10/2012 12:00 AM  IMMUNIZATION ADMIN  Reviewed

 

 12/10/2012 12:00 AM  Pneumovax Injection - [facility]  Reviewed

 

 2012 12:00 AM  TRICHOMONAS ASSAY W/OPTIC  Reviewed

 

 2013 12:00 AM  THER/PROPH/DIAG INJ SC/IM  Reviewed

 

 2013 12:00 AM  Bicillin CR, 1.2 million units NDC# 43792-624-55  Reviewed

 

 2013 12:00 AM  COMPREHEN METABOLIC PANEL  Reviewed

 

 2013 12:00 AM  LIPID PANEL  Reviewed

 

 2013 12:00 AM  COMPLETE CBC W/AUTO DIFF WBC  Reviewed

 

 2013 12:00 AM  ASSAY THYROID STIM HORMONE  Reviewed

 

 6/10/2013 12:00 AM  MAMMOGRAM SCREENING  Reviewed

 

 6/10/2013 12:00 AM  CYTOPATH C/V MANUAL  Reviewed

 

 12/10/2013 12:00 AM  LIPID PANEL  Reviewed

 

 12/10/2013 12:00 AM  ASSAY THYROID STIM HORMONE  Reviewed

 

 12/10/2013 12:00 AM  COMPLETE CBC W/AUTO DIFF WBC  Reviewed

 

 12/10/2013 12:00 AM  COMPREHEN METABOLIC PANEL  Reviewed

 

 2010 12:00 AM  CYTOPATH C/V MANUAL  Reviewed

 

 2010 12:00 AM  SMEAR WET MOUNT SALINE/INK  Reviewed

 

 2010 12:00 AM  LIPID PANEL  Reviewed

 

 2010 12:00 AM  ASSAY THYROID STIM HORMONE  Reviewed

 

 2010 12:00 AM  COMPLETE CBC W/AUTO DIFF WBC  Reviewed

 

 2010 12:00 AM  COMPREHEN METABOLIC PANEL  Reviewed

 

 10/28/2013 12:00 AM  Flu Injection 3 Years And Above NDC# [address]-44  [facility]  
Reviewed

 

 2010 8:48 AM  Blood Pressure Check-no charge  Reviewed

 

 2010 12:00 AM  Blood Pressure Check-no charge  Reviewed

 

 2014 12:00 AM  METABOLIC PANEL TOTAL CA  Reviewed

 

 2014 12:00 AM  ASSAY THYROID STIM HORMONE  Reviewed

 

 2014 12:00 AM  ASSAY OF FREE THYROXINE  Reviewed

 

 2014 12:00 AM  MAMMOGRAM SCREENING  Reviewed

 

 2014 12:00 AM  CT BONE DENSITY AXIAL  Reviewed

 

 2014 12:00 AM  COMPLETE CBC W/AUTO DIFF WBC  Reviewed

 

 2014 12:00 AM  COMPREHEN METABOLIC PANEL  Reviewed

 

 2014 12:00 AM  LIPID PANEL  Reviewed

 

 2014 12:00 AM  ASSAY THYROID STIM HORMONE  Reviewed

 

 10/20/2010 12:00 AM  IMMUNIZATION ADMIN  Reviewed

 

 10/20/2010 12:00 AM  FLU VACCINE 3 YRS & > IM  Reviewed

 

 2010 12:00 AM  COMPREHEN METABOLIC PANEL  Reviewed

 

 2010 12:00 AM  LIPID PANEL  Reviewed

 

 2010 12:00 AM  ASSAY THYROID STIM HORMONE  Reviewed

 

 2010 12:00 AM  COMPLETE CBC W/AUTO DIFF WBC  Reviewed

 

 2010 12:00 AM  Blood Pressure Check-no charge  Reviewed

 

 10/2/2014 12:00 AM  THER/PROPH/DIAG INJ SC/IM  Reviewed

 

 10/2/2014 12:00 AM  Kenalog, Per 10 Mg NDC#5541-8928-10  Reviewed

 

 2014 12:00 AM  COMPLETE CBC W/AUTO DIFF WBC  Reviewed

 

 2014 12:00 AM  COMPREHEN METABOLIC PANEL  Reviewed

 

 2014 12:00 AM  LIPID PANEL  Reviewed

 

 2014 12:00 AM  ASSAY THYROID STIM HORMONE  Reviewed

 

 2015 12:00 AM  Rocephin 1 gram NDC#2407-7412-96  Reviewed

 

 2015 12:00 AM  THER/PROPH/DIAG INJ SC/IM  Reviewed

 

 2011 12:00 AM  COMPREHEN METABOLIC PANEL  Reviewed

 

 2011 12:00 AM  LIPID PANEL  Reviewed

 

 2011 12:00 AM  ASSAY THYROID STIM HORMONE  Reviewed

 

 2011 12:00 AM  COMPLETE CBC W/AUTO DIFF WBC  Reviewed

 

 2011 12:00 AM  METABOLIC PANEL TOTAL CA  Reviewed

 

 2011 12:00 AM  COMPREHEN METABOLIC PANEL  Reviewed

 

 2011 12:00 AM  ASSAY THYROID STIM HORMONE  Reviewed

 

 2011 12:00 AM  COMPLETE CBC W/AUTO DIFF WBC  Reviewed

 

 2011 12:00 AM  ASSAY OF LIPASE  Reviewed

 

 2011 12:00 AM  THER/PROPH/DIAG INJ SC/IM  Reviewed

 

 2011 12:00 AM  Phenergan 50 Mg Im  Bernadine  Reviewed

 

 2011 12:00 AM  METABOLIC PANEL TOTAL CA  Reviewed

 

 2011 12:00 AM  THER/PROPH/DIAG INJ SC/IM  Reviewed

 

 2011 12:00 AM  Phenergan 25 Mg Im  Bernadine  Reviewed

 

 2011 12:00 AM  METABOLIC PANEL TOTAL CA  Reviewed

 

 2011 12:00 AM  ASSAY THYROID STIM HORMONE  Reviewed

 

 2011 12:00 AM  THER/PROPH/DIAG INJ SC/IM  Reviewed

 

 2011 12:00 AM  Phenergan 50 Mg Im  Bernadine  Reviewed

 

 2011 12:00 AM  ASSAY OF SERUM SODIUM  Reviewed

 

 2011 12:00 AM  ASSAY OF SERUM SODIUM  Reviewed

 

 2011 12:00 AM  CYTOPATH C/V MANUAL  Reviewed

 

 2011 12:00 AM  ASSAY THYROID STIM HORMONE  Reviewed

 

 2015 12:00 AM  COMPLETE CBC W/AUTO DIFF WBC  Reviewed

 

 2015 12:00 AM  COMPREHEN METABOLIC PANEL  Reviewed

 

 2015 12:00 AM  LIPID PANEL  Reviewed

 

 2015 12:00 AM  ASSAY THYROID STIM HORMONE  Reviewed

 

 2015 12:00 AM  MAMMOGRAM SCREENING  Reviewed

 

 2015 12:00 AM  CYTOPATH TBS C/V MANUAL  Reviewed







Results Summary







 Date and Description  Results

 

 2010 9:25 AM  Colonoscopy-Women and Men over 50 Abnormal Mammogram -Women 
over 40 Declined Pap Smear Declined 

 

 2010 10:41 AM  WET PREP NO TRICHOMONADS SEEN  No  Few 

 

 2010 8:15 AM  TRIGLYCERIDES 174.0 mg/dLCHOLESTEROL 175.0 mg/dLHDL 58.0 mg/
dLTOT CHOL/HDL 3.0 LDL (CALC) 82.0 mg/dLTSH 0.790 uIU/mLGLUCOSE 95.0 mg/
dLSODIUM 136.0 mmol/LPOTASSIUM 4.50 mmol/LCHLORIDE 99.0 mmol/LCO2 26.0 mmol/
LBUN 12.0 mg/dLCREATININE 0.80 mg/dLSGOT/AST 32.0 IU/LSGPT/ALT 33.0 IU/LALK 
PHOS 103.0 IU/LTOTAL PROTEIN 7.60 g/dLALBUMIN 4.60 g/dLTOTAL BILI 0.60 mg/
dLCALCIUM 9.80 mg/dLAGE 64 GFR NonAA 72 GFR AA 87 eGFR >60 mL/min/1.73 m2eGFR AA
* >60 WBC 5.3 RBC 4.13 HGB 13.10 g/dLHCT 39.20 %MCV 95.0 fLMCH 31.70 pgMCHC 
33.40 g/dLRDW SD 44 RDW CV 12.70 %MPV 9.80 fLPLT 257 NRBC# 0.00 NRBC% 0.0 %NEUT 
57.90 %%LYMP 26.50 %%MONO 11.60 %%EOS 3.20 %%BASO 0.80 %#NEUT 3.04 #LYMP 1.39 #
MONO 0.61 #EOS 0.17 #BASO 0.04 MANUAL DIFF NOT IND 

 

 12/15/2010 8:30 AM  TRIGLYCERIDES 157.0 mg/dLCHOLESTEROL 163.0 mg/dLHDL 56.0 mg
/dLTOT CHOL/HDL 2.9 LDL (CALC) 76.0 mg/dLTSH 0.650 uIU/mLWBC 6.5 RBC 4.25 HGB 
13.60 g/dLHCT 40.70 %MCV 96.0 fLMCH 32.0 pgMCHC 33.40 g/dLRDW SD 44 RDW CV 
12.60 %MPV 9.90 fLPLT 313 NRBC# 0.00 NRBC% 0.0 %NEUT 61.80 %%LYMP 26.20 %%MONO 
8.30 %%EOS 3.10 %%BASO 0.60 %#NEUT 4.00 #LYMP 1.70 #MONO 0.54 #EOS 0.20 #BASO 
0.04 MANUAL DIFF NOT IND GLUCOSE 97.0 mg/dLSODIUM 136.0 mmol/LPOTASSIUM 4.40 
mmol/LCHLORIDE 101.0 mmol/LCO2 26.0 mmol/LBUN 10.0 mg/dLCREATININE 0.80 mg/
dLSGOT/AST 31.0 IU/LSGPT/ALT 34.0 IU/LALK PHOS 92.0 IU/LTOTAL PROTEIN 8.10 g/
dLALBUMIN 4.60 g/dLTOTAL BILI 0.40 mg/dLCALCIUM 10.0 mg/dLAGE 65 GFR NonAA 72 
GFR AA 87 eGFR >60 mL/min/1.73 m2eGFR AA* >60 

 

 2011 9:45 AM  GLUCOSE 91.0 mg/dLSODIUM 133.0 mmol/LPOTASSIUM 4.40 mmol/
LCHLORIDE 97.0 mmol/LCO2 24.0 mmol/LBUN 9.0 mg/dLCREATININE 0.70 mg/dLCALCIUM 
10.0 mg/dLAGE 65 GFR NonAA 84 GFR  eGFR >60 mL/min/1.73 m2eGFR AA* >60 

 

 2011 10:25 AM  LIPASE 29.0 U/LTSH 0.10 uIU/mLGLUCOSE 115.0 mg/dLSODIUM 
127.0 mmol/LPOTASSIUM 5.30 mmol/LCHLORIDE 93.0 mmol/LCO2 18.0 mmol/LBUN 9.0 mg/
dLCREATININE 0.70 mg/dLSGOT/AST 62.0 IU/LSGPT/ALT 46.0 IU/LALK PHOS 100.0 IU/
LTOTAL PROTEIN 8.60 g/dLALBUMIN 4.90 g/dLTOTAL BILI 0.60 mg/dLCALCIUM 9.90 mg/
dLAGE 65 GFR NonAA 84 GFR  eGFR >60 mL/min/1.73 m2eGFR AA* >60 WBC 6.9 
RBC 4.48 HGB 14.40 g/dLHCT 40.90 %MCV 91.0 fLMCH 32.10 pgMCHC 35.20 g/dLRDW SD 
41 RDW CV 12.50 %MPV 9.30 fLPLT 260 NRBC# 0.00 NRBC% 0.0 %NEUT 74.90 %%LYMP 
15.10 %%MONO 9.40 %%EOS 0.30 %%BASO 0.30 %#NEUT 5.15 #LYMP 1.04 #MONO 0.65 #EOS 
0.02 #BASO 0.02 MANUAL DIFF NOT IND 

 

 2011 9:07 AM  GLUCOSE 92.0 mg/dLSODIUM 131.0 mmol/LPOTASSIUM 4.20 mmol/
LCHLORIDE 99.0 mmol/LCO2 22.0 mmol/LBUN 9.0 mg/dLCREATININE 0.70 mg/dLCALCIUM 
9.20 mg/dLAGE 65 GFR NonAA 84 GFR  eGFR >60 mL/min/1.73 m2eGFR AA* >60 

 

 2011 11:06 AM  TSH 0.510 uIU/mLGLUCOSE 99.0 mg/dLSODIUM 132.0 mmol/
LPOTASSIUM 3.50 mmol/LCHLORIDE 95.0 mmol/LCO2 23.0 mmol/LBUN 9.0 mg/
dLCREATININE 0.80 mg/dLCALCIUM 10.40 mg/dLAGE 65 GFR NonAA 72 GFR AA 87 eGFR >
60 mL/min/1.73 m2eGFR AA* >60 

 

 2011 9:45 AM  SODIUM 132.0 mmol/L

 

 2011 9:27 AM  SODIUM 137.0 mmol/L

 

 2011 8:35 AM  TRIGLYCERIDES 114.0 mg/dLCHOLESTEROL 145.0 mg/dLHDL 56.0 mg/
dLTOT CHOL/HDL 2.6 LDL (CALC) 66.0 mg/dLGLUCOSE 105.0 mg/dLSODIUM 138.0 mmol/
LPOTASSIUM 4.40 mmol/LCHLORIDE 103.0 mmol/LCO2 25.0 mmol/LBUN 8.0 mg/
dLCREATININE 0.70 mg/dLSGOT/AST 36.0 IU/LSGPT/ALT 38.0 IU/LALK PHOS 103.0 IU/
LTOTAL PROTEIN 7.40 g/dLALBUMIN 4.30 g/dLTOTAL BILI 0.30 mg/dLCALCIUM 9.20 mg/
dLAGE 65 GFR NonAA 84 GFR  eGFR >60 mL/min/1.73 m2eGFR AA* >60 WBC 5.1 
RBC 3.76 HGB 12.0 g/dLHCT 36.10 %MCV 96.0 fLMCH 31.90 pgMCHC 33.20 g/dLRDW SD 
46 RDW CV 13.10 %MPV 9.40 fLPLT 249 NRBC# 0.00 NRBC% 0.0 %NEUT 60.40 %%LYMP 
25.90 %%MONO 8.90 %%EOS 4.0 %%BASO 0.80 %#NEUT 3.06 #LYMP 1.31 #MONO 0.45 #EOS 
0.20 #BASO 0.04 MANUAL DIFF NOT IND 

 

 2011 9:55 AM  TSH 1.070 uIU/mL

 

 2011 8:55 AM  GLUCOSE 102.0 mg/dLSODIUM 135.0 mmol/LPOTASSIUM 4.20 mmol/
LCHLORIDE 102.0 mmol/LCO2 24.0 mmol/LBUN 15.0 mg/dLCREATININE 0.80 mg/dLSGOT/
AST 30.0 IU/LSGPT/ALT 35.0 IU/LALK PHOS 119.0 IU/LTOTAL PROTEIN 7.50 g/
dLALBUMIN 4.30 g/dLTOTAL BILI 0.30 mg/dLCALCIUM 9.20 mg/dLAGE 65 GFR NonAA 72 
GFR AA 87 eGFR >60 mL/min/1.73 m2eGFR AA* >60 TSH 1.130 uIU/mL

 

 2011 9:50 AM  GLUCOSE 85.0 mg/dLSODIUM 133.0 mmol/LPOTASSIUM 4.20 mmol/
LCHLORIDE 101.0 mmol/LCO2 21.0 mmol/LBUN 13.0 mg/dLCREATININE 0.80 mg/dLSGOT/
AST 36.0 IU/LSGPT/ALT 36.0 IU/LALK PHOS 109.0 IU/LTOTAL PROTEIN 7.40 g/
dLALBUMIN 4.20 g/dLTOTAL BILI 0.50 mg/dLCALCIUM 9.40 mg/dLAGE 65 GFR NonAA 72 
GFR AA 87 eGFR >60 mL/min/1.73 m2eGFR AA* >60 

 

 2011 8:55 AM  GLUCOSE 98.0 mg/dLSODIUM 137.0 mmol/LPOTASSIUM 3.90 mmol/
LCHLORIDE 103.0 mmol/LCO2 25.0 mmol/LBUN 14.0 mg/dLCREATININE 0.70 mg/dLSGOT/
AST 33.0 IU/LSGPT/ALT 36.0 IU/LALK PHOS 111.0 IU/LTOTAL PROTEIN 8.30 g/
dLALBUMIN 4.40 g/dLTOTAL BILI 0.50 mg/dLCALCIUM 9.40 mg/dLAGE 66 GFR NonAA 84 
GFR  eGFR >60 mL/min/1.73 m2eGFR AA* >60 TRIGLYCERIDES 109.0 mg/
dLCHOLESTEROL 153.0 mg/dLHDL 54.0 mg/dLTOT CHOL/HDL 2.8 LDL (CALC) 77.0 mg/
dLTSH 1.330 uIU/mL

 

 2011 10:17 AM  Colonoscopy-Women and Men over 50 Declined Mammogram -
Women over 40 Normal Pap Smear Negative 

 

 2012 10:33 AM  WET PREP NO TRICH SEEN CLUE CELLS NONE SEEN 

 

 2012 8:45 AM  WBC 5.2 RBC 3.96 HGB 12.70 g/dLHCT 37.80 %MCV 96.0 fLMCH 
32.10 pgMCHC 33.60 g/dLRDW SD 46 RDW CV 13.30 %MPV 9.70 fLPLT 297 NRBC# 0.00 
NRBC% 0.0 %NEUT 57.70 %%LYMP 28.50 %%MONO 10.30 %%EOS 2.50 %%BASO 1.0 %#NEUT 
3.02 #LYMP 1.49 #MONO 0.54 #EOS 0.13 #BASO 0.05 MANUAL DIFF NOT IND GLUCOSE 
97.0 mg/dLSODIUM 137.0 mmol/LPOTASSIUM 4.40 mmol/LCHLORIDE 101.0 mmol/LCO2 23.0 
mmol/LBUN 17.0 mg/dLCREATININE 0.80 mg/dLSGOT/AST 30.0 IU/LSGPT/ALT 33.0 IU/
LALK PHOS 95.0 IU/LTOTAL PROTEIN 7.40 g/dLALBUMIN 4.40 g/dLTOTAL BILI 0.60 mg/
dLCALCIUM 9.70 mg/dLAGE 66 GFR NonAA 72 GFR AA 87 eGFR 60 eGFR AA* 60 
TRIGLYCERIDES 130.0 mg/dLCHOLESTEROL 157.0 mg/dLHDL 56.0 mg/dLTOT CHOL/HDL 2.8 
LDL (CALC) 75.0 mg/dLTSH 0.940 uIU/mL

 

 8/15/2012 4:02 PM  WET PREP NO TRICH SEEN CLUE CELLS NONE SEEN 

 

 2012 8:45 AM  WBC 5.1 RBC 3.91 HGB 12.50 g/dLHCT 36.30 %MCV 93.0 fLMCH 
32.0 pgMCHC 34.40 g/dLRDW SD 43 RDW CV 12.60 %MPV 9.30 fLPLT 244 NRBC# 0.00 NRBC
% 0.0 %NEUT 57.60 %%LYMP 27.50 %%MONO 9.10 %%EOS 5.0 %%BASO 0.80 %#NEUT 2.91 #
LYMP 1.39 #MONO 0.46 #EOS 0.25 #BASO 0.04 MANUAL DIFF NOT IND TSH 1.390 uIU/
mLTRIGLYCERIDES 154.0 mg/dLCHOLESTEROL 147.0 mg/dLHDL 45.0 mg/dLTOT CHOL/HDL 
3.3 LDL (CALC) 71.0 mg/dLGLUCOSE 101.0 mg/dLSODIUM 134.0 mmol/LPOTASSIUM 4.30 
mmol/LCHLORIDE 102.0 mmol/LCO2 23.0 mmol/LBUN 11.0 mg/dLCREATININE 0.80 mg/
dLSGOT/AST 34.0 IU/LSGPT/ALT 38.0 IU/LALK PHOS 104.0 IU/LTOTAL PROTEIN 7.60 g/
dLALBUMIN 4.40 g/dLTOTAL BILI 0.50 mg/dLCALCIUM 9.40 mg/dLAGE 67 GFR NonAA 72 
GFR AA 87 eGFR 60 eGFR AA* 60 

 

 12/10/2012 8:34 AM  Colonoscopy-Women and Men over 50 Declined Mammogram -
Women over 40 Declined Pap Smear Declined 

 

 2012 5:02 PM  WET PREP NO TRICH SEEN CLUE CELLS NONE SEEN 

 

 2013 8:25 AM  WBC 4.2 RBC 3.96 HGB 12.90 g/dLHCT 37.0 %MCV 93.0 fLMCH 
32.60 pgMCHC 34.90 g/dLRDW SD 43 RDW CV 12.60 %MPV 9.70 fLPLT 254 NRBC# 0.00 
NRBC% 0.0 %NEUT 54.40 %%LYMP 30.40 %%MONO 10.80 %%EOS 3.40 %%BASO 1.0 %#NEUT 
2.26 #LYMP 1.26 #MONO 0.45 #EOS 0.14 #BASO 0.04 MANUAL DIFF NOT IND TSH 1.230 
uIU/mLGLUCOSE 94.0 mg/dLSODIUM 136.0 mmol/LPOTASSIUM 4.30 mmol/LCHLORIDE 99.0 
mmol/LCO2 25.0 mmol/LBUN 10.0 mg/dLCREATININE 0.80 mg/dLSGOT/AST 36.0 IU/LSGPT/
ALT 36.0 IU/LALK PHOS 84.0 IU/LTOTAL PROTEIN 7.90 g/dLALBUMIN 4.40 g/dLTOTAL 
BILI 0.70 mg/dLCALCIUM 9.80 mg/dLAGE 67 GFR NonAA 72 GFR AA 87 eGFR 60 eGFR AA* 
60 TRIGLYCERIDES 122.0 mg/dLCHOLESTEROL 141.0 mg/dLHDL 47.0 mg/dLTOT CHOL/HDL 
3.0 LDL (CALC) 70.0 mg/dL

 

 6/10/2013 3:52 PM  WET PREP NO TRICH SEEN CLUE CELLS NONE SEEN 

 

 2013 8:45 AM  WBC 5.3 RBC 4.01 HGB 13.0 g/dLHCT 37.60 %MCV 94.0 fLMCH 
32.40 pgMCHC 34.60 g/dLRDW SD 43 RDW CV 12.50 %MPV 9.40 fLPLT 248 NRBC# 0.00 
NRBC% 0.0 %NEUT 58.70 %%LYMP 27.80 %%MONO 9.80 %%EOS 2.80 %%BASO 0.90 %#NEUT 
3.12 #LYMP 1.48 #MONO 0.52 #EOS 0.15 #BASO 0.05 MANUAL DIFF NOT IND TSH 1.570 
uIU/mLGLUCOSE 94.0 mg/dLSODIUM 134.0 mmol/LPOTASSIUM 4.10 mmol/LCHLORIDE 101.0 
mmol/LCO2 23.0 mmol/LBUN 11.0 mg/dLCREATININE 0.80 mg/dLSGOT/AST 41.0 IU/LSGPT/
ALT 44.0 IU/LALK PHOS 109.0 IU/LTOTAL PROTEIN 7.90 g/dLALBUMIN 4.70 g/dLTOTAL 
BILI 0.80 mg/dLCALCIUM 10.40 mg/dLAGE 68 GFR NonAA 72 GFR AA 87 eGFR >60 mL/min/
1.73 m2eGFR AA* >60 TRIGLYCERIDES 163.0 mg/dLCHOLESTEROL 138.0 mg/dLHDL 49.0 mg/
dLTOT CHOL/HDL 2.8 LDL (CALC) 56.0 mg/dL

 

 2014 8:58 AM  GLUCOSE 86.0 mg/dLSODIUM 134.0 mmol/LPOTASSIUM 4.20 mmol/
LCHLORIDE 99.0 mmol/LCO2 25.0 mmol/LBUN 14.0 mg/dLCREATININE 0.80 mg/dLCALCIUM 
10.10 mg/dLAGE 68 GFR NonAA 72 GFR AA 87 eGFR >60 mL/min/1.73 m2eGFR AA* >60 
FREE T4 1.18 TSH 1.20 uIU/mL

 

 2014 9:50 AM  WBC 5.7 RBC 4.03 HGB 12.90 g/dLHCT 37.90 %MCV 94.0 fLMCH 
32.0 pgMCHC 34.0 g/dLRDW SD 44 RDW CV 12.70 %MPV 9.70 fLPLT 292 NRBC# 0.00 NRBC
% 0.0 %NEUT 62.70 %%LYMP 21.80 %%MONO 11.0 %%EOS 3.40 %%BASO 1.10 %#NEUT 3.55 #
LYMP 1.23 #MONO 0.62 #EOS 0.19 #BASO 0.06 MANUAL DIFF NOT IND GLUCOSE 98.0 mg/
dLSODIUM 135.0 mmol/LPOTASSIUM 4.30 mmol/LCHLORIDE 102.0 mmol/LCO2 22.0 mmol/
LBUN 10.0 mg/dLCREATININE 0.80 mg/dLSGOT/AST 34.0 IU/LSGPT/ALT 32.0 IU/LALK 
PHOS 95.0 IU/LTOTAL PROTEIN 7.70 g/dLALBUMIN 4.30 g/dLTOTAL BILI 0.80 mg/
dLCALCIUM 9.10 mg/dLAGE 68 GFR NonAA 72 GFR AA 87 eGFR 60 eGFR AA* 60 
TRIGLYCERIDES 108.0 mg/dLCHOLESTEROL 146.0 mg/dLHDL 56.0 mg/dLTOT CHOL/HDL 2.6 
LDL (CALC) 68.0 mg/dLTSH 0.90 uIU/mL

 

 2014 8:40 AM  WBC 4.4 RBC 4.13 HGB 13.20 g/dLHCT 38.90 %MCV 94.0 fLMCH 
32.0 pgMCHC 33.90 g/dLRDW SD 47 RDW CV 13.50 %MPV 9.80 fLPLT 279 NRBC# 0.00 NRBC
% 0.0 %NEUT 63.80 %%LYMP 24.60 %%MONO 9.30 %%EOS 1.60 %%BASO 0.70 %#NEUT 2.80 #
LYMP 1.08 #MONO 0.41 #EOS 0.07 #BASO 0.03 MANUAL DIFF NOT IND GLUCOSE 96.0 mg/
dLSODIUM 136.0 mmol/LPOTASSIUM 4.10 mmol/LCHLORIDE 99.0 mmol/LCO2 23.0 mmol/
LBUN 8.0 mg/dLCREATININE 0.80 mg/dLSGOT/AST 31.0 IU/LSGPT/ALT 27.0 IU/LALK PHOS 
85.0 IU/LTOTAL PROTEIN 7.60 g/dLALBUMIN 4.40 g/dLTOTAL BILI 0.80 mg/dLCALCIUM 
10.20 mg/dLAGE 69 GFR NonAA 71 GFR AA 86 eGFR 60 eGFR AA* 60 TRIGLYCERIDES 61.0 
mg/dLCHOLESTEROL 148.0 mg/dLHDL 71.0 mg/dLTOT CHOL/HDL 2.1 LDL (CALC) 65.0 mg/
dLTSH 0.990 uIU/mL

 

 2015 8:32 AM  WBC 4.8 RBC 3.98 HGB 12.70 g/dLHCT 37.30 %MCV 94.0 fLMCH 
31.90 pgMCHC 34.0 g/dLRDW SD 43 RDW CV 12.70 %MPV 9.50 fLPLT 270 NRBC# 0.00 NRBC
% 0.0 %NEUT 57.30 %%LYMP 25.0 %%MONO 13.0 %%EOS 3.60 %%BASO 1.10 %#NEUT 2.73 #
LYMP 1.19 #MONO 0.62 #EOS 0.17 #BASO 0.05 MANUAL DIFF NOT IND TSH 0.640 uIU/
mLGLUCOSE 90.0 mg/dLSODIUM 136.0 mmol/LPOTASSIUM 4.0 mmol/LCHLORIDE 101.0 mmol/
LCO2 24.0 mmol/LBUN 10.0 mg/dLCREATININE 0.80 mg/dLSGOT/AST 34.0 IU/LSGPT/ALT 
32.0 IU/LALK PHOS 92.0 IU/LTOTAL PROTEIN 7.50 g/dLALBUMIN 4.40 g/dLTOTAL BILI 
0.70 mg/dLCALCIUM 9.50 mg/dLAGE 69 GFR NonAA 71 GFR AA 86 eGFR >60 mL/min/1.73 
m2eGFR AA* >60 TRIGLYCERIDES 107.0 mg/dLCHOLESTEROL 138.0 mg/dLHDL 58.0 mg/
dLTOT CHOL/HDL 2.4 LDL (CALC) 59.0 mg/dL

 

 2015 8:31 AM  WBC 4.6 RBC 3.97 HGB 12.90 g/dLHCT 38.0 %MCV 96.0 fLMCH 
32.50 pgMCHC 33.90 g/dLRDW SD 44 RDW CV 12.60 %MPV 9.0 fLPLT 248 NRBC# 0.00 NRBC
% 0.0 %NEUT 61.0 %%LYMP 24.70 %%MONO 10.20 %%EOS 3.0 %%BASO 1.10 %#NEUT 2.82 #
LYMP 1.14 #MONO 0.47 #EOS 0.14 #BASO 0.05 MANUAL DIFF NOT IND TRIGLYCERIDES 
105.0 mg/dLCHOLESTEROL 141.0 mg/dLHDL 58.0 mg/dLTOT CHOL/HDL 2.4 LDL (CALC) 
62.0 mg/dLGLUCOSE 93.0 mg/dLSODIUM 136.0 mmol/LPOTASSIUM 4.10 mmol/LCHLORIDE 
101.0 mmol/LCO2 25.0 mmol/LBUN 9.0 mg/dLCREATININE 0.80 mg/dLSGOT/AST 32.0 IU/
LSGPT/ALT 28.0 IU/LALK PHOS 95.0 IU/LTOTAL PROTEIN 7.30 g/dLALBUMIN 4.50 g/
dLTOTAL BILI 0.80 mg/dLCALCIUM 9.70 mg/dLAGE 70 GFR NonAA 71 GFR AA 86 eGFR >60 
mL/min/1.73meGFR AA* >60 TSH 1.10 uIU/mL

 

 2016 8:25 AM  TSH 0.70 uIU/mLTRIGLYCERIDES 85.0 mg/dLCHOLESTEROL 166.0 mg
/dLHDL 74.0 mg/dLTOT CHOL/HDL 2.2 LDL (CALC) 75.0 mg/dLGLUCOSE 96.0 mg/dLSODIUM 
136.0 mmol/LPOTASSIUM 4.0 mmol/LCHLORIDE 100.0 mmol/LCO2 24.0 mmol/LBUN 11.0 mg/
dLCREATININE 0.80 mg/dLSGOT/AST 34.0 IU/LSGPT/ALT 24.0 IU/LALK PHOS 98.0 IU/
LTOTAL PROTEIN 7.60 g/dLALBUMIN 4.40 g/dLTOTAL BILI 0.80 mg/dLCALCIUM 9.70 mg/
dLAGE 71 GFR NonAA 71 GFR AA 86 eGFR >60 mL/min/1.73meGFR AA* >60 WBC 5.9 RBC 
4.00 HGB 12.80 g/dLHCT 38.0 %MCV 95.0 fLMCH 32.0 pgMCHC 33.70 g/dLRDW SD 43 RDW 
CV 12.20 %MPV 9.30 fLPLT 272 NRBC# 0.00 NRBC% 0.0 %NEUT 73.90 %%LYMP 15.60 %%
MONO 7.80 %%EOS 1.70 %%BASO 0.70 %#NEUT 4.35 #LYMP 0.92 #MONO 0.46 #EOS 0.10 #
BASO 0.04 MANUAL DIFF NOT IND 







History Of Immunizations







 Name  Date Admin  Mfg Name  Mfg Code  Trade Name  Lot#  Route  Inj  Vis Given  
Vis Pub  CVX

 

 Influenza  10/20/2010  [employer]of[employer] pasteur  [facility]  Fluzone  [id number]  Intramuscular  
Left Arm  10/20/2010  2010  999

 

 Influenza  10/28/2011  [employer] pasteur  [employer]  Fluzone  [id number]  Intramuscular  
Left Arm  10/28/2011  2011  141

 

 Influenza  10/29/2012  sanofi pasteur  PMC  Fluzone  [id number]  Intramuscular  
Left Deltoid  10/29/2012  2012  141

 

 X  12/10/2012  [employer]  MSD  Pneumovax 23  [id number]  Intramuscular  
Left Gluteous Medius  12/10/2012  2010  33

 

 Influenza  10/28/2013  sanofi pasteur  PMC  Fluzone > 3 Years  [id number]  
Intramuscular  Right Deltoid  10/28/2013  2013  141

 

 X  9/2/2015  Not Entered  NE  Prevnar 13     Not Entered  Not Entered  1  109

 

 Influenza  2015  Not Entered  NE  Not Entered     Not Entered  Not Entered
  2015  141

 

 HepB Adult  2016  [employer]  MSD  Recombivax Adult  [id number]  Not 
Entered  Left Deltoid  2016  43

 

 HepB Adult  2016  [employer]  MSD  Recombivax Adult  [id number]  
Intramuscular  Right Deltoid  2016  43

 

 Zostavax  2012  Not Entered  NE  Not Entered     Not Entered  Not 
Entered  1  121

 

 Tdap  2012  Not Entered  NE  Not Entered     Not Entered  Not Entered  1  115

 

 Influenza  10/13/2016  Not Entered  NE  Fluzone High-Dose     Intramuscular  
Left Arm  1  141

 

 HepB Adult  2016  [employer]  MSD  Recombivax Adult  [id number]  
Intramuscular  Right Deltoid  2016  43







History of Past Illness







 Name  Date of Onset  Comments

 

 Benign Essential Hypertension  Dec 14 2009 10:10AM   

 

 Hyperlipidemia  Dec 14 2009 10:10AM   

 

 Hypothyroidism, Acquired  Dec 14 2009 10:10AM   

 

 hypothyroid      

 

 Hypertension      

 

 Hyperlipidemia      

 

 acid reflux      

 

 Hypertension, Benign Essential  2010  9:41AM   

 

 Hypertension, Benign Essential  2010 12:53PM   

 

 Routine gynecological examination  May  5 2010  9:28AM   

 

 Hypertension  May  5 2010  9:28AM   

 

 Hyperlipidemia, unspecified  May  5 2010  9:28AM   

 

 Hypothyroidism, Acquired  May  5 2010  9:28AM   

 

 Vulvovaginitis  May  5 2010  9:28AM   

 

 Rhinitis, Allergic  May  5 2010  9:28AM   

 

 Hypertension, Benign Essential  May 19 2010  8:48AM   

 

 Hypertension, Benign Essential  May 25 2010  9:39AM   

 

 Flu  Oct 20 2010 12:01PM   

 

 Essential Hypertension  2010  8:34AM   

 

 Hyperlipidemia  2010  8:34AM   

 

 Gastroesophageal Reflux  2010  8:34AM   

 

 Hypothyroidism, Acquired  2010  8:34AM   

 

 Hypertension, Benign Essential  2010  9:22AM   

 

 Hypertension, Benign Essential  Dec 15 2010  9:07AM   

 

 Essential Hypertension  2011  1:31PM   

 

 Hyperlipidemia  2011  1:31PM   

 

 Gastroesophageal Reflux  2011  1:31PM   

 

 Hypothyroidism, Acquired  2011  1:31PM   

 

 Essential Hypertension  2011  8:51AM   

 

 Hyperlipidemia  2011  8:51AM   

 

 Gastroesophageal Reflux  2011  8:51AM   

 

 Hypothyroidism, Acquired  2011  8:51AM   

 

 Essential Hypertension  2011  9:13AM   

 

 Hyperlipidemia  2011  9:13AM   

 

 Gastroesophageal Reflux  2011  9:13AM   

 

 Hypothyroidism, Acquired  2011  9:13AM   

 

 Nausea With Vomiting  2011  1:18PM   

 

 Hyponatremia  2011  8:46AM   

 

 Nausea  2011  9:13AM   

 

 Hypothyroidism  2011 11:10AM   

 

 Hyponatremia  2011 11:10AM   

 

 Essential Hypertension  2011 10:05AM   

 

 Hyperlipidemia  2011 10:05AM   

 

 Gastroesophageal Reflux  2011 10:05AM   

 

 Nausea  2011 10:05AM   

 

 Hypothyroidism, Acquired  2011 10:05AM   

 

 Hyponatremia  May  6 2011 11:34AM   

 

 Essential Hypertension  May 16 2011  8:50AM   

 

 Hyperlipidemia  May 16 2011  8:50AM   

 

 Gastroesophageal Reflux  May 16 2011  8:50AM   

 

 Nausea  May 16 2011  8:50AM   

 

 Hypothyroidism, Acquired  May 16 2011  8:50AM   

 

 Hyponatremia  May 16 2011  8:50AM   

 

 Routine gynecological examination  2011  8:54AM   

 

 Hypertension  2011  8:54AM   

 

 Hyperlipidemia, unspecified  2011  8:54AM   

 

 Hypothyroidism, Acquired  2011  8:54AM   

 

 Rhinitis, Allergic  2011  8:54AM   

 

 Hypothyroidism  Aug 29 2011 12:37PM   

 

 Hyponatremia  Aug 29 2011 12:37PM   

 

 Essential Hypertension  Sep 12 2011  8:53AM   

 

 Hyperlipidemia  Sep 12 2011  8:53AM   

 

 Gastroesophageal Reflux  Sep 12 2011  8:53AM   

 

 Hypothyroidism, Acquired  Sep 12 2011  8:53AM   

 

 Hyponatremia  Sep 12 2011  8:53AM   

 

 Hypertension  Sep 29 2011  9:41AM   

 

 Hyperlipidemia  Dec  8 2011  8:31AM   

 

 Hypothyroidism  Dec  8 2011  8:31AM   

 

 Hypertension  Dec  8 2011  8:31AM   

 

 Flu  Dec  9 2011 10:02AM   

 

 Essential Hypertension  Dec 13 2011 10:13AM   

 

 Hyperlipidemia  Dec 13 2011 10:13AM   

 

 Gastroesophageal Reflux  Dec 13 2011 10:13AM   

 

 Hypothyroidism, Acquired  Dec 13 2011 10:13AM   

 

 Hyponatremia  Dec 13 2011 10:13AM   

 

 Vaginal Discharge  2012  9:43AM   

 

 Rhinitis, Allergic  Feb 15 2012  9:31AM   

 

 Eustachian Tube Dysfunction  Feb 15 2012  9:31AM   

 

 Pharyngitis, Acute  Feb 15 2012  9:31AM   

 

 Routine gynecological examination  2012  8:48AM   

 

 Hypertension  2012  8:48AM   

 

 Hyperlipidemia, unspecified  2012  8:48AM   

 

 Hypothyroidism, Acquired  2012  8:48AM   

 

 Rhinitis, Allergic  2012  8:48AM   

 

 Candidiasis, Vulvovaginal  2012 11:04AM   

 

 Essential Hypertension  Aug 15 2012  9:02AM   

 

 Hyperlipidemia  Aug 15 2012  9:02AM   

 

 Gastroesophageal Reflux  Aug 15 2012  9:02AM   

 

 Hypothyroidism, Acquired  Aug 15 2012  9:02AM   

 

 Hyponatremia  Aug 15 2012  9:02AM   

 

 Atrophic Vaginitis, Postmenopausal  Aug 15 2012  9:02AM   

 

 Flu  Oct 29 2012  9:24AM   

 

 Essential Hypertension  Dec 10 2012  8:35AM   

 

 Hyperlipidemia  Dec 10 2012  8:35AM   

 

 Gastroesophageal Reflux  Dec 10 2012  8:35AM   

 

 Hypothyroidism, Acquired  Dec 10 2012  8:35AM   

 

 Hyponatremia  Dec 10 2012  8:35AM   

 

 Atrophic Vaginitis, Postmenopausal  Dec 10 2012  8:35AM   

 

 Pneumococcus  Dec 10 2012  2:07PM   

 

 Vaginal Discharge  Dec 20 2012  1:50PM   

 

 Essential Hypertension  Dec 20 2012  1:50PM   

 

 Hyperlipidemia  Dec 20 2012  1:50PM   

 

 Gastroesophageal Reflux  Dec 20 2012  1:50PM   

 

 Hypothyroidism, Acquired  Dec 20 2012  1:50PM   

 

 Atrophic Vaginitis, Postmenopausal  Dec 20 2012  1:50PM   

 

 Upper Respiratory Infections  2013  8:52AM   

 

 Hyperlipidemia  2013  8:21AM   

 

 Hypertension  2013  8:21AM   

 

 Hypothyroidism  2013  8:21AM   

 

 Screening Mammogram  Beto 10 2013  8:45AM   

 

 Routine gynecological examination  Beto 10 2013  8:34AM   

 

 Hypertension  Beto 10 2013  8:34AM   

 

 Hyperlipidemia, unspecified  Beto 10 2013  8:34AM   

 

 Hypothyroidism, Acquired  Beto 10 2013  8:34AM   

 

 Rhinitis, Allergic  Beto 10 2013  8:34AM   

 

 Flu  Oct 28 2013  8:52AM   

 

 Essential Hypertension  Dec  9 2013  8:37AM   

 

 Hyperlipidemia  Dec  9 2013  8:37AM   

 

 Gastroesophageal Reflux  Dec  9 2013  8:37AM   

 

 Hypothyroidism, Acquired  Dec  9 2013  8:37AM   

 

 Atrophic Vaginitis, Postmenopausal  Dec  9 2013  8:37AM   

 

 Hypertension  2014  9:56AM   

 

 Hyperlipidemia  2014  9:56AM   

 

 Hypothyroidism  2014  9:56AM   

 

 Screening Mammogram  2014  8:32AM   

 

 Osteopenia  2014  8:32AM   

 

 Hypertension  2014  8:35AM   

 

 Hypothyroidism, Acquired  2014  8:35AM   

 

 Hyperlipidemia  2014  8:35AM   

 

 Upper Respiratory Infections  2014  9:28AM   

 

 Sinusitis, Acute  Oct  2 2014  9:17AM   

 

 Herpes Zoster  Oct  5 2014  1:44PM   

 

 Vesicular Eruption  Oct  5 2014  1:44PM   

 

 Hypertension  2014  8:18AM   

 

 Hyperlipidemia  2014  8:18AM   

 

 hypothyroid  2014  8:18AM   

 

 Situational anxiety  Dec 20 2014  9:33AM   

 

 GERD (gastroesophageal reflux disease)  Dec 20 2014  9:33AM   

 

 Nausea  Dec 20 2014  9:33AM   

 

 Abdominal Pain, Epigastric  Dec 20 2014  9:33AM   

 

 Hyperlipidemia  Oct  6 2014  9:03AM   

 

 acid reflux  Oct  6 2014  9:03AM   

 

 hypothyroid  Oct  6 2014  9:03AM   

 

 Shingles  Oct  6 2014  9:03AM   

 

 Pharyngitis  2015  1:09PM   

 

 Bronchitis  2015  9:10AM   

 

 Hyperlipidemia  2015  9:10AM   

 

 acid reflux  2015  9:10AM   

 

 hypothyroid  2015  9:10AM   

 

 Hyperlipidemia  2015  8:18AM   

 

 Hypertension  2015  8:18AM   

 

 hypothyroid  2015  8:18AM   

 

 Screening Mammogram  2015 11:43AM   

 

 Medicare Annual Pap Q 2 years  2015  9:36AM   

 

 Screening Mammogram  2015  9:36AM   

 

 Candidiasis of female genitalia  2015  9:36AM   

 

 Hypertension  2015 11:34AM   

 

 Hyperlipidemia, unspecified  2015 11:34AM   

 

 Hypothyroidism, Acquired  2015 11:34AM   

 

 Osteopenia  2016  8:41AM   

 

 Encounter for screening mammogram for breast cancer  2016  8:41AM   

 

 Hypertension  2016  8:34AM   

 

 Lymphedema  2016  8:34AM   

 

 Hyperlipidemia  2016  8:34AM   

 

 hypothyroid  2016  8:34AM   

 

 HEP B  2016  9:13AM   

 

 HEP B  2016  8:52AM   

 

 Acid Reflux  2016  8:34AM   

 

 History of acute gastritis  Oct 13 2016  2:30PM   

 

 Anxiety as acute reaction to exceptional stress  Oct 13 2016  2:30PM   

 

 HEP B  Dec 29 2016  8:42AM   







Payers







 Insurance Name  Company Name  Plan Name  Plan Number  Policy Number  Policy 
Group Number  Start Date

 

    Medicare RHC Medicare RHC     [id number]     N/A

 

    [facility]     [id number]     2009

 

    Medicare Part B  [employer] [place]     [id number]     N/A

 

    Medicare Part A  Medicare Part A     [id number]     N/A

 

    Medicare Part A  [employer] - Preventive     [id number]     N/A

 

    Medicare Part A  Medicare - Lab/Xray     [id number]     N/A







History of Encounters







 Visit Date  Visit Type  Provider

 

 2017  Office visit  rPince Noe APRN

 

 2016  Nurse visit  Doris Noriega MD

 

 10/13/2016  Office visit  Doris Noriega MD

 

 2016  Nurse visit  Doris Noriega MD

 

 2016  Office visit  Doris Noriega MD

 

 2015  Office visit  Doris Noriega MD

 

 2015  Office visit  Doris Noriega MD

 

 2015  Office visit  Prince Noe APRN

 

 2014  Office visit  Anyi Herrera APRN

 

 10/27/2014  Voided  Doris Noriega MD

 

 10/6/2014  Office visit  Doris Noriega MD

 

 10/5/2014  Office visit  Anyi Herrera APRN

 

 10/2/2014  Office visit   

 

 10/2/2014  Office visit  Corrine Bay APRN

 

 2014  Office visit  Kassie Franklin APRN

 

 2014  Office visit  Doris Noriega MD

 

 2014  Office visit  Doris Noriega MD

 

 2013  Office visit  Dee Colindres MD

 

 10/28/2013  Nurse visit  Dee Coilndres MD

 

 6/10/2013  Office visit  Dee Colindres MD

 

 2013  Office visit  Corrine Bay APRN

 

 2012  Office visit  Dee Colindres MD

 

 12/10/2012  Office visit  Dee Colindres MD

 

 10/29/2012  Nurse visit  Dee Colindres MD

 

 8/15/2012  Office visit  Dee Colindres MD

 

 2012  Office visit  Corrine Bay APRN

 

 2012  Office visit  Dee Colindres MD

 

 2/15/2012  Office visit  Dee Colindres MD

 

 2012  Office visit  Corrine Bay APRN

 

 2011  Office visit  Dee Colindres MD

 

 10/28/2011  Nurse visit  Shad Nolasco MD

 

 2011  Office visit  Dee Colindres MD

 

 2011  Office visit  Dee Colindres MD

 

 2011  Office visit  Dee Colindres MD

 

 2011  Office visit  Dee Colindres MD

 

 2011  Office visit  Dee Colindres MD

 

 2011  Office visit  Dee Colindres MD

 

 4/10/2011  [facility]  KHRIS Granados MD

 

 4/10/2011  [facility]  RICKEY Toussaint MD

 

 2011  Office visit  RICKEY Toussaint MD

 

 2011  [facility]  Fide Castellanos MD

 

 2011  Voided  Fide Castellanos MD

 

 2011  Office visit  Dee Colindres MD

 

 12/15/2010  Nurse visit  Dee Colindres MD

 

 2010  Office visit  Dee Colindres MD

 

 10/20/2010  Nurse visit  Stephenie PERAZA

 

 2010  Nurse visit  Dee Colindres MD

 

 2010  Nurse visit  Dee Colindres MD

 

 2010  Office visit  Dee Colindres MD

 

 3/19/2010  Voided  Stephenie PERAZA

 

 2010  Nurse visit  Stephenie PERAZA

 

 2010  Nurse visit  Stephenie PERAZA

 

 2010  Nurse visit  Stephenie PERAZA

 

 2009  Office visit  Stephenie PERAZA

 

 10/20/2009  Nurse visit  Stephenie Kay PA

## 2017-07-13 NOTE — ENDO PROCEDURE RECORD
Endo Procedure Report


Date of Procedure


Jul 13, 2017


Surgeon (s)


FANG JOHNSON MD





Post Procedure/Op Diagnosis





EGD: Grade 1 esophagitis with a short hiatal hernia.   Petechia along the


proximal stomach





Colonoscopy:  Internal hemorrhoids.  Sigmoid diverticulae





Procedure Performed





EGD





colonoscopy to cecum





Description of Procedure


Anesthesia Type:  Conscious Sedation


Specimen(s) collected/removed


none


Description of the Procedure


Indication for the procedures:


      


this lady came in for an upper endoscopy to evaluate symptoms of 

gastroesophageal reflux and colonoscopy on the basis of heme positive stools.  

No addition, she reported a family history of colon cancer and polyps





Informed consent was obtained after reviewing the procedures in detail





Description of procedures:





EGD: She was placed in left lateral decubitus position and her vital signs were 

monitored.  Conscious sedation was achieved using Versed and fentanyl.  The 

flexible gastroscope was introduced down the esophagus, past the stomach, into 

the proximal duodenum





Findings





Esophagus; short hiatal hernia with grade 1 esophagitis.





Stomach;  petechia along the proximal stomach without any ulceration.  There 

was no active bleeding or oozing.





Duodenum; normal.





She tolerated the procedure well and was turned around in preparation for 

colonoscopy





Impression; symptoms of reflux disease.  Grade 1 esophagitis.   We'll continue 

proton pump inhibitor therapy.





Colonoscopy; examination of the perianal area revealed external hemorrhoids.  

In addition, there was perianal excoriation of unknown etiology.  There is no 

concern about any squamous cell carcinoma.  Digital examination was otherwise 

unremarkable.  The colonoscope was then introduced into the rectum and advanced 

all the way up to the cecum.   It was then withdrawn slowly and the mucosa 

examined in a systematic fashion.





Findings; 1. Internal Hemorrhoids





2. Sigmoid diverticulosis





She tolerated the procedure well and was taken to the nursing area in a stable 

condition





Impression; heme positive stools.  Positive family history.  No polyps.  

Recommend screening colonoscopy in 5 years.


Copies To:   REDD Lyle XAVIER M MD Jul 13, 2017 10:45 am

## 2017-07-13 NOTE — XMS REPORT
MU2 Ambulatory Summary

 Created on: 2016



Milady Crespo

External Reference #: [id number]

: 1945

Sex: Female



Demographics







 Address  [address]

[place]  [address]

 

 Home Phone  [phone]

 

 Preferred Language  English

 

 Marital Status  

 

 Adventist Affiliation  Unknown

 

 Race  White

 

 Ethnic Group  Not  or 





Author







 Author  Doris Noriega

 

 Organization  [facility]

 

 Address  [address]

[place], [place]  [id number]



 

 Phone  [phone]







Care Team Providers







 Care Team Member Name  Role  Phone

 

 Doris Noriega  PCP  [phone]







Allergies and Adverse Reactions







 Name  Reaction  Notes

 

 NO KNOWN DRUG ALLERGIES      







Plan of Treatment







 Planned Activity  Comments  Planned Date  Planned Time  Plan/Goal

 

 DXA BONE DENSITY AXIAL     2016  12:00 AM   

 

 LIPID PANEL     8/15/2012  12:00 AM   

 

 COMPREHEN METABOLIC PANEL     6/10/2013  12:00 AM   

 

 LIPID PANEL     6/10/2013  12:00 AM   

 

 ASSAY THYROID STIM HORMONE     6/10/2013  12:00 AM   

 

 COMPLETE CBC W/AUTO DIFF WBC     6/10/2013  12:00 AM   

 

 COMPREHEN METABOLIC PANEL     2012  12:00 AM   

 

 LIPID PANEL     2012  12:00 AM   

 

 ASSAY THYROID STIM HORMONE     2012  12:00 AM   

 

 COMPLETE CBC W/AUTO DIFF WBC     2012  12:00 AM   

 

 MAMMOGRAM SCREENING     2015  12:00 AM   







Medications







 Active 

 

 Name  Start Date  Estimated Completion Date  SIG  Comments

 

 loratadine 10 mg oral tablet  2015  12/15/2016  TAKE 1/2 TABLET BY MOUTH 
EVERY DAY IN THE EVENING   

 

 aspirin 81 mg oral tablet,delayed release (DR/EC)        take 1 tablet (81 mg) 
by oral route once daily   

 

 Vitamin D3 1,000 unit oral capsule        take 1 capsule by oral route daily   

 

 famotidine 20 mg oral tablet  2015     TAKE ONE TABLET BY MOUTH TWICE 
DAILY   

 

 valsartan 160 mg oral tablet  2016     TAKE ONE TABLET BY MOUTH ONCE 
DAILY   

 

 amlodipine 5 mg oral tablet  2016     TAKE ONE TABLET BY MOUTH ONCE DAILY
   

 

 Allergy Relief (loratadine) 10 mg oral tablet  2016     TAKE ONE TABLET 
BY MOUTH ONCE DAILY   

 

 triamcinolone acetonide 0.1 % topical cream  3/14/2016     APPLY A THIN LAYER 
OF CREAM EXTERNALLY TO AFFECTED AREA TWICE DAILY FOR 14 DAYS   

 

 atenolol 50 mg oral tablet  2016  3/30/2017  take 1 tablet (50 mg) by oral 
route once daily   

 

 levothyroxine 50 mcg oral tablet  2016     TAKE ONE TABLET BY MOUTH ONCE 
DAILY   

 

 amlodipine 5 mg oral tablet  2016     TAKE ONE TABLET BY MOUTH ONCE DAILY
   

 

 Allergy Relief (loratadine) 10 mg oral tablet  2016     TAKE ONE TABLET 
BY MOUTH ONCE DAILY   

 

 valsartan 160 mg oral tablet  2016     TAKE ONE TABLET BY MOUTH ONCE 
DAILY   









  

 

 Name  Start Date  Expiration Date  SIG  Comments

 

 prednisone 20 mg oral tablet  2011  take 2 tablets by oral 
route daily for 5 days   

 

 calcium carbonate-vitamin D2 600-125 mg-unit oral tablet  8/15/2012  2012
  take 2 tablets by oral route daily   

 

 [place] 3 Fish Oil 900-1,400 mg oral capsule,delayed release(DR/EC)  8/15/2012  9
/  take 1 capsule by oral route daily   

 

 multivitamin Oral tablet  8/15/2012  2012  take 1 tablet by oral route 
daily   

 

 triamcinolone acetonide 0.1 % topical cream  2013  10/7/2013  APPLY A 
THIN LAYER TO THE AFFECTED AREA(S) BY TOPICAL ROUTE TWICE A DAY   

 

 famotidine 20 mg oral tablet  2014  take 1 tablet (20 mg) by 
oral route 2 times per day   

 

 Plavix 75 mg oral tablet  2014  take 1 tablet (75 mg) by oral 
route once daily   

 

 amoxicillin 500 mg oral capsule  2014  take 1 capsule (500 mg) 
by oral route 3 times per day for 10 days   

 

 Lipitor 20 mg oral tablet  2014  take 1 tablet (20 mg) by oral 
route once daily   

 

 Zithromax Z-Tab 250 mg oral tablet  10/2/2014  10/7/2014  take 2 tablets (500 
mg) by oral route once daily for 1 day then 1 tablet (250 mg) by oral route 
once daily for 4 days   

 

 acyclovir 800 mg oral tablet  10/5/2014  10/12/2014  take 1 tablet (800 mg) by 
oral route 5 times per day for 7 days   

 

 gabapentin 300 mg oral capsule  10/9/2014  2014  take 1 capsule (300 mg) 
by oral route 3 times per day for 14 days   

 

 Carafate 100 mg/mL oral suspension  2014  take 10 milliliters 
(1 gram) by oral route 4 times per day on an empty stomach 1 hour before meals 
and at bedtime for 4 weeks   

 

 amlodipine 5 mg oral tablet  2015  TAKE ONE TABLET BY MOUTH 
EVERY DAY   

 

 levothyroxine 50 mcg oral tablet  2015  TAKE ONE TABLET BY MOUTH 
EVERY DAY   

 

 Diovan 160 mg oral tablet  2015  2/15/2016  TAKE ONE TABLET BY MOUTH 
EVERY DAY for 90 days   

 

 triamcinolone acetonide 0.1 % topical cream  2015  apply a thin 
layer to the affected area(s) by topical route 2 times per day for 14 days   

 

 fluconazole 150 mg oral tablet  2015  take 1 tablet (150 mg) 
by oral route once for 1 day   

 

 clorazepate dipotassium 7.5 mg oral tablet  3/14/2016  2016  take 1/2 to1 
tablet PO up to three times per day for situational anxiety   









 Discontinued 

 

 Name  Start Date  Discontinued Date  SIG  Comments

 

 Lipitor 40 mg oral tablet     2009 TAB DAILY   

 

 ramipril 5 mg oral capsule     2009  take 1 capsule (5 mg) by oral route 
once daily   

 

 lovastatin 20 mg oral tablet  2009  take 1 tablet (20 mg) by 
oral route once daily with evening meal   

 

 propranolol 20 mg oral tablet  2010  take 1 tablet by oral 
route 2 times a day   

 

 Fosamax 70 mg oral tablet  2010  take 1 tablet (70 mg) by oral 
route once weekly in the morning, at least 30 minutes before the first food, 
beverage, or medication of the day   

 

 lisinopril 40 mg oral tablet  2010  4/10/2011  take 2 tablets by oral 
route daily   

 

 Zoloft 50 mg oral tablet  2011  take 1 tablet (50 mg) by oral 
route once daily   

 

 promethazine 25 mg oral tablet  2011  take 1 tablet by oral 
route every 6 hours as needed   

 

 Diovan -12.5 mg oral tablet  2011  take 1 tablet by oral 
route once daily for 30 days   

 

 Diflucan 150 mg oral tablet     2012  take 1 tablet (150 mg) by oral 
route once for 1 day   

 

 Diflucan 150 mg oral tablet  2012  8/15/2012  take 1 tablet (150 mg) by 
oral route once   

 

 Vitamin B-12 1,000 mcg oral tablet  8/15/2012  2014  take 1 tablet by 
oral route daily   

 

 Premarin 0.625 mg/gram vaginal cream  10/29/2012  6/10/2013  use 1 gram daily 
for a week then 1 gram twice a week   

 

 Medrol (Tab) 4 mg oral tablets,dose pack  2013  6/10/2013  take as 
directed   

 

 aspirin 325 mg oral tablet  2014  take 1 tablet (325 mg) by 
oral route once daily   

 

 Medrol (Tab) 4 mg oral tablets,dose pack  2014  10/2/2014  take as 
directed   

 

 Tetracaine Lollipops Lollipop  2015  Use as directed   







Problem List







 Description  Status  Onset

 

 Hyperlipidemia  Active   

 

 acid reflux  Active   

 

 Hypertension  Active   

 

 hypothyroid  Active   







Vital Signs







 Date  Time  BP-Sys(mm[Hg]  BP-Morena(mm[Hg])  HR(bpm)  RR(rpm)  Temp  WT  HT  HC  
BMI  BSA  BMI Percentile  O2 Sat(%)

 

 2016  8:29:00 AM  122 mmHg  70 mmHg  72 bpm  16 rpm  97.8 F  137.125 lbs  
61 in     25.91 kg/m2  1.64 m2     100 %

 

 2015  9:33:00 AM  120 mmHg  68 mmHg  73 bpm     98.7 F  144.375 lbs  61 
in     27.2791 kg/m  1.6788 m     99 %

 

 2015  9:05:00 AM  110 mmHg  75 mmHg  85 bpm  16 rpm  96.7 F  144.375 lbs  
61 in     27.28 kg/m2  1.68 m2     99 %

 

 2015  1:05:00 PM  108 mmHg  62 mmHg  78 bpm  18 rpm  96.9 F  142.375 lbs  
61 in     26.9012 kg/m  1.6672 m     100 %

 

 2014  9:31:00 AM  126 mmHg  66 mmHg  79 bpm  18 rpm  98.7 F  149 lbs  61 
in     28.15 kg/m2  1.71 m2     98 %

 

 10/6/2014  9:02:00 AM  110 mmHg  74 mmHg  94 bpm  18 rpm  98.1 F  145.4 lbs  
61 in     27.4728 kg/m  1.6848 m     97 %

 

 10/2/2014  9:14:00 AM  124 mmHg  74 mmHg  79 bpm  18 rpm  98 F  147.25 lbs  61 
in     27.82 kg/m2  1.70 m2     98 %

 

 2014  9:22:00 AM  124 mmHg  76 mmHg  89 bpm  18 rpm  98.1 F  148 lbs  61 
in     27.9641 kg/m  1.6998 m     100 %

 

 2014  8:27:00 AM  118 mmHg  65 mmHg  74 bpm  16 rpm  97.7 F  148 lbs  61 
in     27.96 kg/m2  1.70 m2     99 %

 

 2014  9:48:00 AM  125 mmHg  70 mmHg  93 bpm  18 rpm  96.7 F  156 lbs  61 
in     29.4756 kg/m  1.7451 m     100 %

 

 2013  8:35:00 AM  122 mmHg  74 mmHg  84 bpm  16 rpm  97.9 F  155.375 lbs 
                99 %

 

 6/10/2013  8:30:00 AM  130 mmHg  82 mmHg  79 bpm  16 rpm  97.9 F  161 lbs     
            98 %

 

 2013  8:50:00 AM  124 mmHg  68 mmHg  66 bpm  18 rpm  97.6 F  157.5 lbs  
61 in     29.76 kg/m2  1.75 m2      

 

 2012  1:46:00 PM  120 mmHg  72 mmHg  75 bpm  16 rpm  97.6 F  156.125 lbs
                 98 %

 

 12/10/2012  8:32:00 AM  122 mmHg  82 mmHg  70 bpm  16 rpm  97.3 F  157 lbs    
             99 %

 

 8/15/2012  9:00:00 AM  130 mmHg  76 mmHg  86 bpm  16 rpm  97.4 F  159 lbs     
            100 %

 

 2012  11:02:00 AM  128 mmHg  64 mmHg  66 bpm  18 rpm  97.4 F  162.125 lbs
  61 in     30.63 kg/m2  1.78 m2      

 

 2012  8:45:00 AM  130 mmHg  70 mmHg  82 bpm  16 rpm  98.2 F  160.125 lbs 
                100 %

 

 2/15/2012  9:29:00 AM  122 mmHg  80 mmHg  86 bpm  16 rpm  97.4 F  159.375 lbs  
61 in     30.11 kg/m2  1.76 m2     99 %

 

 2012  9:41:00 AM  132 mmHg  74 mmHg  66 bpm  18 rpm  98.4 F  160.375 lbs  
61 in     30.3023 kg/m  1.7694 m      

 

 2011  10:08:00 AM  134 mmHg  82 mmHg  80 bpm  16 rpm  98 F  160 lbs  61 
in     30.23 kg/m2  1.77 m2     99 %

 

 2011  3:56:00 PM  130 mmHg  80 mmHg                              

 

 2011  8:55:00 AM  130 mmHg  74 mmHg  88 bpm  16 rpm  98.2 F  158.25 lbs  
               98 %

 

 2011  8:54:00 AM  136 mmHg  82 mmHg  102 bpm  16 rpm  98.1 F  153.5 lbs   
              99 %

 

 2011  8:49:00 AM  122 mmHg  88 mmHg  88 bpm  16 rpm  98.6 F  152.25 lbs  
               99 %

 

 2011  10:02:00 AM  140 mmHg  82 mmHg  96 bpm  20 rpm  98.8 F             
       100 %

 

 2011  9:14:00 AM  156 mmHg  98 mmHg  110 bpm  24 rpm  98.5 F  153 lbs    
             100 %

 

 2011  8:50:00 AM  160 mmHg  84 mmHg  100 bpm  16 rpm  98.7 F  155 lbs    
             100 %

 

 2011  1:25:00 PM  152 mmHg  100 mmHg  82 bpm  16 rpm  97.2 F  156.375 lbs 
                100 %

 

 2011  9:55:00 AM  144 mmHg  90 mmHg                              

 

 2010  9:24:00 AM  138 mmHg  84 mmHg                              

 

 2010  8:58:00 AM  160 mmHg  94 mmHg                              

 

 2010  8:33:00 AM  142 mmHg  90 mmHg  100 bpm  16 rpm  98.1 F  158.375 
lbs                  

 

 2010  9:24:00 AM  160 mmHg  102 mmHg  88 bpm  18 rpm  99 F  157.125 lbs  
62 in     28.7382 kg/m  1.7657 m      

 

 3/19/2010  11:01:00 AM  140 mmHg  90 mmHg                              

 

 2009  10:08:00 AM  142 mmHg  86 mmHg  72 bpm  16 rpm  98.1 F  154.125 
lbs  61 in     29.1214 kg/m  1.7346 m      







Social History







 Name  Description  Comments

 

       

 

 Children      

 

 lives alone      

 

 Supervisor      

 

 Tobacco  Never smoker   







History of Procedures







 Date Ordered  Description  Order Status

 

 2011 12:00 AM  COMPREHEN METABOLIC PANEL  Reviewed

 

 2011 12:00 AM  ASSAY THYROID STIM HORMONE  Reviewed

 

 2011 12:00 AM  COMPREHEN METABOLIC PANEL  Reviewed

 

 2011 12:00 AM  LIPID PANEL  Reviewed

 

 2011 12:00 AM  ASSAY THYROID STIM HORMONE  Reviewed

 

 2011 12:00 AM  COMPLETE CBC W/AUTO DIFF WBC  Reviewed

 

 2015 12:00 AM  COMPLETE CBC W/AUTO DIFF WBC  Returned

 

 2015 12:00 AM  COMPREHEN METABOLIC PANEL  Returned

 

 2015 12:00 AM  LIPID PANEL  Returned

 

 2015 12:00 AM  ASSAY THYROID STIM HORMONE  Returned

 

 2011 12:00 AM  COMPREHEN METABOLIC PANEL  Reviewed

 

 2011 12:00 AM  COMPREHEN METABOLIC PANEL  Reviewed

 

 2011 12:00 AM  LIPID PANEL  Reviewed

 

 2011 12:00 AM  ASSAY THYROID STIM HORMONE  Reviewed

 

 10/28/2011 12:00 AM  ***Immunization administration, Medicare flu  Reviewed

 

 10/28/2011 12:00 AM  Fluzone ** MEDICARE Only **  Reviewed

 

 2010 11:24 AM  NO CHARGE OV  Reviewed

 

 2010 11:26 AM  NO CHARGE OV  Reviewed

 

 2011 12:00 AM  COMPREHEN METABOLIC PANEL  Reviewed

 

 2011 12:00 AM  LIPID PANEL  Reviewed

 

 2011 12:00 AM  ASSAY THYROID STIM HORMONE  Reviewed

 

 2011 12:00 AM  COMPLETE CBC W/AUTO DIFF WBC  Reviewed

 

 2011 12:00 AM  Wrist Support  Reviewed

 

 2012 12:00 AM  TISSUE EXAM FOR FUNGI  Returned

 

 2012 12:00 AM  SMEAR WET MOUNT SALINE/INK  Returned

 

 2/15/2012 12:00 AM  THER/PROPH/DIAG INJ SC/IM  Reviewed

 

 2/15/2012 12:00 AM  Bicillin CR [facility]52947079823[facility]  Reviewed

 

 2/15/2012 12:00 AM  Depo-Medrol 40 mg NDC#2907772221  Reviewed

 

 8/15/2012 12:00 AM  COMPREHEN METABOLIC PANEL  Reviewed

 

 8/15/2012 12:00 AM  ASSAY THYROID STIM HORMONE  Reviewed

 

 8/15/2012 12:00 AM  COMPLETE CBC W/AUTO DIFF WBC  Returned

 

 8/15/2012 12:00 AM  Wrist Support  Reviewed

 

 10/29/2012 12:00 AM  Flu Injection 3 Years And Above NDC# [address]-99  [facility]  
Reviewed

 

 12/10/2012 12:00 AM  IMMUNIZATION ADMIN  Reviewed

 

 12/10/2012 12:00 AM  Pneumovax Injection - [facility]  Reviewed

 

 2012 12:00 AM  TRICHOMONAS ASSAY W/OPTIC  Returned

 

 2013 12:00 AM  THER/PROPH/DIAG INJ SC/IM  Reviewed

 

 2013 12:00 AM  Bicillin CR, 1.2 million units NDC# 32590-120-32  Reviewed

 

 2013 12:00 AM  COMPREHEN METABOLIC PANEL  Returned

 

 2013 12:00 AM  LIPID PANEL  Returned

 

 2013 12:00 AM  COMPLETE CBC W/AUTO DIFF WBC  Returned

 

 2013 12:00 AM  ASSAY THYROID STIM HORMONE  Returned

 

 6/10/2013 12:00 AM  MAMMOGRAM SCREENING  Returned

 

 6/10/2013 12:00 AM  CYTOPATH C/V MANUAL  Returned

 

 12/10/2013 12:00 AM  LIPID PANEL  Returned

 

 12/10/2013 12:00 AM  ASSAY THYROID STIM HORMONE  Returned

 

 12/10/2013 12:00 AM  COMPLETE CBC W/AUTO DIFF WBC  Returned

 

 12/10/2013 12:00 AM  COMPREHEN METABOLIC PANEL  Returned

 

 2010 12:00 AM  CYTOPATH C/V MANUAL  Reviewed

 

 2010 12:00 AM  SMEAR WET [place] SALINE/INK  Reviewed

 

 2010 12:00 AM  LIPID PANEL  Reviewed

 

 2010 12:00 AM  ASSAY THYROID STIM HORMONE  Reviewed

 

 2010 12:00 AM  COMPLETE CBC W/AUTO DIFF WBC  Reviewed

 

 2010 12:00 AM  COMPREHEN METABOLIC PANEL  Reviewed

 

 10/28/2013 12:00 AM  Flu Injection 3 Years And Above NDC# [address]-26  [facility]  
Reviewed

 

 2010 8:48 AM  Blood Pressure Check-no charge  Reviewed

 

 2010 12:00 AM  Blood Pressure Check-no charge  Reviewed

 

 2014 12:00 AM  METABOLIC PANEL TOTAL CA  Reviewed

 

 2014 12:00 AM  ASSAY THYROID STIM HORMONE  Reviewed

 

 2014 12:00 AM  ASSAY OF FREE THYROXINE  Reviewed

 

 2014 12:00 AM  MAMMOGRAM SCREENING  Returned

 

 2014 12:00 AM  CT BONE DENSITY AXIAL  Returned

 

 2014 12:00 AM  COMPLETE CBC W/AUTO DIFF WBC  Returned

 

 2014 12:00 AM  COMPREHEN METABOLIC PANEL  Returned

 

 2014 12:00 AM  LIPID PANEL  Returned

 

 2014 12:00 AM  ASSAY THYROID STIM HORMONE  Returned

 

 10/20/2010 12:00 AM  IMMUNIZATION ADMIN  Reviewed

 

 10/20/2010 12:00 AM  FLU VACCINE 3 YRS & > IM  Reviewed

 

 2010 12:00 AM  COMPREHEN METABOLIC PANEL  Reviewed

 

 2010 12:00 AM  LIPID PANEL  Reviewed

 

 2010 12:00 AM  ASSAY THYROID STIM HORMONE  Reviewed

 

 2010 12:00 AM  COMPLETE CBC W/AUTO DIFF WBC  Reviewed

 

 2010 12:00 AM  Blood Pressure Check-no charge  Reviewed

 

 10/2/2014 12:00 AM  THER/PROPH/DIAG INJ SC/IM  Reviewed

 

 10/2/2014 12:00 AM  Kenalog, Per 10 Mg NDC#2414-4441-80  Reviewed

 

 2014 12:00 AM  COMPLETE CBC W/AUTO DIFF WBC  Returned

 

 2014 12:00 AM  COMPREHEN METABOLIC PANEL  Returned

 

 2014 12:00 AM  LIPID PANEL  Returned

 

 2014 12:00 AM  ASSAY THYROID STIM HORMONE  Returned

 

 2015 12:00 AM  Rocephin 1 gram NDC#1097-5200-29  Reviewed

 

 2015 12:00 AM  THER/PROPH/DIAG INJ SC/IM  Reviewed

 

 2011 12:00 AM  COMPREHEN METABOLIC PANEL  Reviewed

 

 2011 12:00 AM  LIPID PANEL  Reviewed

 

 2011 12:00 AM  ASSAY THYROID STIM HORMONE  Reviewed

 

 2011 12:00 AM  COMPLETE CBC W/AUTO DIFF WBC  Reviewed

 

 2011 12:00 AM  METABOLIC PANEL TOTAL CA  Reviewed

 

 2011 12:00 AM  COMPREHEN METABOLIC PANEL  Reviewed

 

 2011 12:00 AM  ASSAY THYROID STIM HORMONE  Reviewed

 

 2011 12:00 AM  COMPLETE CBC W/AUTO DIFF WBC  Reviewed

 

 2011 12:00 AM  ASSAY OF LIPASE  Reviewed

 

 2011 12:00 AM  THER/PROPH/DIAG INJ SC/IM  Reviewed

 

 2011 12:00 AM  Phenergan 50 Mg Im  Bernadine  Reviewed

 

 2011 12:00 AM  METABOLIC PANEL TOTAL CA  Reviewed

 

 2011 12:00 AM  THER/PROPH/DIAG INJ SC/IM  Reviewed

 

 2011 12:00 AM  Phenergan 25 Mg Im  Bernadine  Reviewed

 

 2011 12:00 AM  METABOLIC PANEL TOTAL CA  Reviewed

 

 2011 12:00 AM  ASSAY THYROID STIM HORMONE  Reviewed

 

 2011 12:00 AM  THER/PROPH/DIAG INJ SC/IM  Reviewed

 

 2011 12:00 AM  Phenergan 50 Mg Im  Bernadine  Reviewed

 

 2011 12:00 AM  ASSAY OF SERUM SODIUM  Reviewed

 

 2011 12:00 AM  ASSAY OF SERUM SODIUM  Reviewed

 

 2011 12:00 AM  CYTOPATH C/V MANUAL  Reviewed

 

 2011 12:00 AM  ASSAY THYROID STIM HORMONE  Reviewed

 

 2015 12:00 AM  COMPLETE CBC W/AUTO DIFF WBC  Returned

 

 2015 12:00 AM  COMPREHEN METABOLIC PANEL  Returned

 

 2015 12:00 AM  LIPID PANEL  Returned

 

 2015 12:00 AM  ASSAY THYROID STIM HORMONE  Returned

 

 2015 12:00 AM  MAMMOGRAM SCREENING  Returned

 

 2015 12:00 AM  CYTOPATH TBS C/V MANUAL  Returned







Results Summary







 Data and Description  Results

 

 2010 9:25 AM  Colonoscopy-Women and Men over 50 Abnormal Mammogram -Women 
over 40 Declined Pap Smear Declined 

 

 2010 10:41 AM  WET PREP NO TRICHOMONADS SEEN 

 

 2010 8:15 AM  TRIGLYCERIDES 174.0 mg/dLCHOLESTEROL 175.0 mg/dLHDL 58.0 mg/
dLLDL (CALC) 82.0 mg/dLTSH 0.790 uIU/mLGLUCOSE 95.0 mg/dLSODIUM 136.0 mmol/
LPOTASSIUM 4.50 mmol/LCHLORIDE 99.0 mmol/LCO2 26.0 mmol/LBUN 12.0 mg/
dLCREATININE 0.80 mg/dLSGOT/AST 32.0 IU/LSGPT/ALT 33.0 IU/LALK PHOS 103.0 IU/
LTOTAL PROTEIN 7.60 g/dLALBUMIN 4.60 g/dLTOTAL BILI 0.60 mg/dLCALCIUM 9.80 mg/
dLeGFR >60 mL/min/1.73 m2WBC 5.3 RBC 4.13 HGB 13.10 g/dLHCT 39.20 %MCV 95.0 
fLMCH 31.70 pgMCHC 33.40 g/dLRDW CV 12.70 %MPV 9.80 fLPLT 257 %NEUT 57.90 %%
LYMP 26.50 %%MONO 11.60 %%EOS 3.20 %%BASO 0.80 %#NEUT 3.04 #LYMP 1.39 #MONO 
0.61 #EOS 0.17 #BASO 0.04 

 

 12/15/2010 8:30 AM  TRIGLYCERIDES 157.0 mg/dLCHOLESTEROL 163.0 mg/dLHDL 56.0 mg
/dLLDL (CALC) 76.0 mg/dLTSH 0.650 uIU/mLWBC 6.5 RBC 4.25 HGB 13.60 g/dLHCT 
40.70 %MCV 96.0 fLMCH 32.0 pgMCHC 33.40 g/dLRDW CV 12.60 %MPV 9.90 fLPLT 313 %
NEUT 61.80 %%LYMP 26.20 %%MONO 8.30 %%EOS 3.10 %%BASO 0.60 %#NEUT 4.00 #LYMP 
1.70 #MONO 0.54 #EOS 0.20 #BASO 0.04 GLUCOSE 97.0 mg/dLSODIUM 136.0 mmol/
LPOTASSIUM 4.40 mmol/LCHLORIDE 101.0 mmol/LCO2 26.0 mmol/LBUN 10.0 mg/
dLCREATININE 0.80 mg/dLSGOT/AST 31.0 IU/LSGPT/ALT 34.0 IU/LALK PHOS 92.0 IU/
LTOTAL PROTEIN 8.10 g/dLALBUMIN 4.60 g/dLTOTAL BILI 0.40 mg/dLCALCIUM 10.0 mg/
dLeGFR >60 mL/min/1.73 m2

 

 2011 9:45 AM  GLUCOSE 91.0 mg/dLSODIUM 133.0 mmol/LPOTASSIUM 4.40 mmol/
LCHLORIDE 97.0 mmol/LCO2 24.0 mmol/LBUN 9.0 mg/dLCREATININE 0.70 mg/dLCALCIUM 
10.0 mg/dLeGFR >60 mL/min/1.73 m2

 

 2011 10:25 AM  LIPASE 29.0 U/LTSH 0.10 uIU/mLGLUCOSE 115.0 mg/dLSODIUM 
127.0 mmol/LPOTASSIUM 5.30 mmol/LCHLORIDE 93.0 mmol/LCO2 18.0 mmol/LBUN 9.0 mg/
dLCREATININE 0.70 mg/dLSGOT/AST 62.0 IU/LSGPT/ALT 46.0 IU/LALK PHOS 100.0 IU/
LTOTAL PROTEIN 8.60 g/dLALBUMIN 4.90 g/dLTOTAL BILI 0.60 mg/dLCALCIUM 9.90 mg/
dLeGFR >60 mL/min/1.73 m2WBC 6.9 RBC 4.48 HGB 14.40 g/dLHCT 40.90 %MCV 91.0 
fLMCH 32.10 pgMCHC 35.20 g/dLRDW CV 12.50 %MPV 9.30 fLPLT 260 %NEUT 74.90 %%
LYMP 15.10 %%MONO 9.40 %%EOS 0.30 %%BASO 0.30 %#NEUT 5.15 #LYMP 1.04 #MONO 0.65 
#EOS 0.02 #BASO 0.02 

 

 2011 9:07 AM  GLUCOSE 92.0 mg/dLSODIUM 131.0 mmol/LPOTASSIUM 4.20 mmol/
LCHLORIDE 99.0 mmol/LCO2 22.0 mmol/LBUN 9.0 mg/dLCREATININE 0.70 mg/dLCALCIUM 
9.20 mg/dLeGFR >60 mL/min/1.73 m2

 

 2011 11:06 AM  TSH 0.510 uIU/mLGLUCOSE 99.0 mg/dLSODIUM 132.0 mmol/
LPOTASSIUM 3.50 mmol/LCHLORIDE 95.0 mmol/LCO2 23.0 mmol/LBUN 9.0 mg/
dLCREATININE 0.80 mg/dLCALCIUM 10.40 mg/dLeGFR >60 mL/min/1.73 m2

 

 2011 9:45 AM  SODIUM 132.0 mmol/L

 

 2011 9:27 AM  SODIUM 137.0 mmol/L

 

 2011 8:35 AM  TRIGLYCERIDES 114.0 mg/dLCHOLESTEROL 145.0 mg/dLHDL 56.0 mg/
dLLDL (CALC) 66.0 mg/dLGLUCOSE 105.0 mg/dLSODIUM 138.0 mmol/LPOTASSIUM 4.40 mmol
/LCHLORIDE 103.0 mmol/LCO2 25.0 mmol/LBUN 8.0 mg/dLCREATININE 0.70 mg/dLSGOT/
AST 36.0 IU/LSGPT/ALT 38.0 IU/LALK PHOS 103.0 IU/LTOTAL PROTEIN 7.40 g/
dLALBUMIN 4.30 g/dLTOTAL BILI 0.30 mg/dLCALCIUM 9.20 mg/dLeGFR >60 mL/min/1.73 
m2WBC 5.1 RBC 3.76 HGB 12.0 g/dLHCT 36.10 %MCV 96.0 fLMCH 31.90 pgMCHC 33.20 g/
dLRDW CV 13.10 %MPV 9.40 fLPLT 249 %NEUT 60.40 %%LYMP 25.90 %%MONO 8.90 %%EOS 
4.0 %%BASO 0.80 %#NEUT 3.06 #LYMP 1.31 #MONO 0.45 #EOS 0.20 #BASO 0.04 

 

 2011 9:55 AM  TSH 1.070 uIU/mL

 

 2011 8:55 AM  GLUCOSE 102.0 mg/dLSODIUM 135.0 mmol/LPOTASSIUM 4.20 mmol/
LCHLORIDE 102.0 mmol/LCO2 24.0 mmol/LBUN 15.0 mg/dLCREATININE 0.80 mg/dLSGOT/
AST 30.0 IU/LSGPT/ALT 35.0 IU/LALK PHOS 119.0 IU/LTOTAL PROTEIN 7.50 g/
dLALBUMIN 4.30 g/dLTOTAL BILI 0.30 mg/dLCALCIUM 9.20 mg/dLeGFR >60 mL/min/1.73 
m2TSH 1.130 uIU/mL

 

 2011 9:50 AM  GLUCOSE 85.0 mg/dLSODIUM 133.0 mmol/LPOTASSIUM 4.20 mmol/
LCHLORIDE 101.0 mmol/LCO2 21.0 mmol/LBUN 13.0 mg/dLCREATININE 0.80 mg/dLSGOT/
AST 36.0 IU/LSGPT/ALT 36.0 IU/LALK PHOS 109.0 IU/LTOTAL PROTEIN 7.40 g/
dLALBUMIN 4.20 g/dLTOTAL BILI 0.50 mg/dLCALCIUM 9.40 mg/dLeGFR >60 mL/min/1.73 
m2

 

 2011 8:55 AM  GLUCOSE 98.0 mg/dLSODIUM 137.0 mmol/LPOTASSIUM 3.90 mmol/
LCHLORIDE 103.0 mmol/LCO2 25.0 mmol/LBUN 14.0 mg/dLCREATININE 0.70 mg/dLSGOT/
AST 33.0 IU/LSGPT/ALT 36.0 IU/LALK PHOS 111.0 IU/LTOTAL PROTEIN 8.30 g/
dLALBUMIN 4.40 g/dLTOTAL BILI 0.50 mg/dLCALCIUM 9.40 mg/dLeGFR >60 mL/min/1.73 
[id number] 109.0 mg/dLCHOLESTEROL 153.0 mg/dLHDL 54.0 mg/dLLDL (CALC) 77.0 
mg/dLTSH 1.330 uIU/mL

 

 2011 10:17 AM  Colonoscopy-Women and Men over 50 Declined Mammogram -
Women over 40 Normal Pap Smear Negative 

 

 2012 10:33 AM  WET PREP NO TRICH SEEN CLUE CELLS NONE SEEN 

 

 2012 8:45 AM  WBC 5.2 RBC 3.96 HGB 12.70 g/dLHCT 37.80 %MCV 96.0 fLMCH 
32.10 pgMCHC 33.60 g/dLRDW CV 13.30 %MPV 9.70 fLPLT 297 %NEUT 57.70 %%LYMP 
28.50 %%MONO 10.30 %%EOS 2.50 %%BASO 1.0 %#NEUT 3.02 #LYMP 1.49 #MONO 0.54 #EOS 
0.13 #BASO 0.05 GLUCOSE 97.0 mg/dLSODIUM 137.0 mmol/LPOTASSIUM 4.40 mmol/
LCHLORIDE 101.0 mmol/LCO2 23.0 mmol/LBUN 17.0 mg/dLCREATININE 0.80 mg/dLSGOT/
AST 30.0 IU/LSGPT/ALT 33.0 IU/LALK PHOS 95.0 IU/LTOTAL PROTEIN 7.40 g/dLALBUMIN 
4.40 g/dLTOTAL BILI 0.60 mg/dLCALCIUM 9.70 mg/dLeGFR 60 TRIGLYCERIDES 130.0 mg/
dLCHOLESTEROL 157.0 mg/dLHDL 56.0 mg/dLLDL (CALC) 75.0 mg/dLTSH 0.940 uIU/mL

 

 8/15/2012 4:02 PM  WET PREP NO TRICH SEEN CLUE CELLS NONE SEEN 

 

 2012 8:45 AM  WBC 5.1 RBC 3.91 HGB 12.50 g/dLHCT 36.30 %MCV 93.0 fLMCH 
32.0 pgMCHC 34.40 g/dLRDW CV 12.60 %MPV 9.30 fLPLT 244 %NEUT 57.60 %%LYMP 27.50 
%%MONO 9.10 %%EOS 5.0 %%BASO 0.80 %#NEUT 2.91 #LYMP 1.39 #MONO 0.46 #EOS 0.25 #
BASO 0.04 TSH 1.390 uIU/mLTRIGLYCERIDES 154.0 mg/dLCHOLESTEROL 147.0 mg/dLHDL 
45.0 mg/dLLDL (CALC) 71.0 mg/dLGLUCOSE 101.0 mg/dLSODIUM 134.0 mmol/LPOTASSIUM 
4.30 mmol/LCHLORIDE 102.0 mmol/LCO2 23.0 mmol/LBUN 11.0 mg/dLCREATININE 0.80 mg/
dLSGOT/AST 34.0 IU/LSGPT/ALT 38.0 IU/LALK PHOS 104.0 IU/LTOTAL PROTEIN 7.60 g/
dLALBUMIN 4.40 g/dLTOTAL BILI 0.50 mg/dLCALCIUM 9.40 mg/dLeGFR 60 

 

 12/10/2012 8:34 AM  Colonoscopy-Women and Men over 50 Declined Mammogram -
Women over 40 Declined Pap Smear Declined 

 

 2012 5:02 PM  WET PREP NO TRICH SEEN CLUE CELLS NONE SEEN 

 

 2013 8:25 AM  WBC 4.2 RBC 3.96 HGB 12.90 g/dLHCT 37.0 %MCV 93.0 fLMCH 
32.60 pgMCHC 34.90 g/dLRDW CV 12.60 %MPV 9.70 fLPLT 254 %NEUT 54.40 %%LYMP 
30.40 %%MONO 10.80 %%EOS 3.40 %%BASO 1.0 %#NEUT 2.26 #LYMP 1.26 #MONO 0.45 #EOS 
0.14 #BASO 0.04 TSH 1.230 uIU/mLGLUCOSE 94.0 mg/dLSODIUM 136.0 mmol/LPOTASSIUM 
4.30 mmol/LCHLORIDE 99.0 mmol/LCO2 25.0 mmol/LBUN 10.0 mg/dLCREATININE 0.80 mg/
dLSGOT/AST 36.0 IU/LSGPT/ALT 36.0 IU/LALK PHOS 84.0 IU/LTOTAL PROTEIN 7.90 g/
dLALBUMIN 4.40 g/dLTOTAL BILI 0.70 mg/dLCALCIUM 9.80 mg/dLeGFR 60 TRIGLYCERIDES 
122.0 mg/dLCHOLESTEROL 141.0 mg/dLHDL 47.0 mg/dLLDL (CALC) 70.0 mg/dL

 

 6/10/2013 3:52 PM  WET PREP NO TRICH SEEN CLUE CELLS NONE SEEN 

 

 2013 8:45 AM  WBC 5.3 RBC 4.01 HGB 13.0 g/dLHCT 37.60 %MCV 94.0 fLMCH 
32.40 pgMCHC 34.60 g/dLRDW CV 12.50 %MPV 9.40 fLPLT 248 %NEUT 58.70 %%LYMP 
27.80 %%MONO 9.80 %%EOS 2.80 %%BASO 0.90 %#NEUT 3.12 #LYMP 1.48 #MONO 0.52 #EOS 
0.15 #BASO 0.05 TSH 1.570 uIU/mLGLUCOSE 94.0 mg/dLSODIUM 134.0 mmol/LPOTASSIUM 
4.10 mmol/LCHLORIDE 101.0 mmol/LCO2 23.0 mmol/LBUN 11.0 mg/dLCREATININE 0.80 mg/
dLSGOT/AST 41.0 IU/LSGPT/ALT 44.0 IU/LALK PHOS 109.0 IU/LTOTAL PROTEIN 7.90 g/
dLALBUMIN 4.70 g/dLTOTAL BILI 0.80 mg/dLCALCIUM 10.40 mg/dLeGFR >60 mL/min/1.73 
[id number] 163.0 mg/dLCHOLESTEROL 138.0 mg/dLHDL 49.0 mg/dLLDL (CALC) 56.0 
mg/dL

 

 2014 8:58 AM  GLUCOSE 86.0 mg/dLSODIUM 134.0 mmol/LPOTASSIUM 4.20 mmol/
LCHLORIDE 99.0 mmol/LCO2 25.0 mmol/LBUN 14.0 mg/dLCREATININE 0.80 mg/dLCALCIUM 
10.10 mg/dLeGFR >60 mL/min/1.73 m2TSH 1.20 uIU/mL

 

 2014 9:50 AM  WBC 5.7 RBC 4.03 HGB 12.90 g/dLHCT 37.90 %MCV 94.0 fLMCH 
32.0 pgMCHC 34.0 g/dLRDW CV 12.70 %MPV 9.70 fLPLT 292 %NEUT 62.70 %%LYMP 21.80 %
%MONO 11.0 %%EOS 3.40 %%BASO 1.10 %#NEUT 3.55 #LYMP 1.23 #MONO 0.62 #EOS 0.19 #
BASO 0.06 GLUCOSE 98.0 mg/dLSODIUM 135.0 mmol/LPOTASSIUM 4.30 mmol/LCHLORIDE 
102.0 mmol/LCO2 22.0 mmol/LBUN 10.0 mg/dLCREATININE 0.80 mg/dLSGOT/AST 34.0 IU/
LSGPT/ALT 32.0 IU/LALK PHOS 95.0 IU/LTOTAL PROTEIN 7.70 g/dLALBUMIN 4.30 g/
dLTOTAL BILI 0.80 mg/dLCALCIUM 9.10 mg/dLeGFR 60 TRIGLYCERIDES 108.0 mg/
dLCHOLESTEROL 146.0 mg/dLHDL 56.0 mg/dLLDL (CALC) 68.0 mg/dLTSH 0.90 uIU/mL

 

 2014 8:40 AM  WBC 4.4 RBC 4.13 HGB 13.20 g/dLHCT 38.90 %MCV 94.0 fLMCH 
32.0 pgMCHC 33.90 g/dLRDW CV 13.50 %MPV 9.80 fLPLT 279 %NEUT 63.80 %%LYMP 24.60 
%%MONO 9.30 %%EOS 1.60 %%BASO 0.70 %#NEUT 2.80 #LYMP 1.08 #MONO 0.41 #EOS 0.07 #
BASO 0.03 GLUCOSE 96.0 mg/dLSODIUM 136.0 mmol/LPOTASSIUM 4.10 mmol/LCHLORIDE 
99.0 mmol/LCO2 23.0 mmol/LBUN 8.0 mg/dLCREATININE 0.80 mg/dLSGOT/AST 31.0 IU/
LSGPT/ALT 27.0 IU/LALK PHOS 85.0 IU/LTOTAL PROTEIN 7.60 g/dLALBUMIN 4.40 g/
dLTOTAL BILI 0.80 mg/dLCALCIUM 10.20 mg/dLeGFR 60 TRIGLYCERIDES 61.0 mg/
dLCHOLESTEROL 148.0 mg/dLHDL 71.0 mg/dLLDL (CALC) 65.0 mg/dLTSH 0.990 uIU/mL

 

 2015 8:32 AM  WBC 4.8 RBC 3.98 HGB 12.70 g/dLHCT 37.30 %MCV 94.0 fLMCH 
31.90 pgMCHC 34.0 g/dLRDW CV 12.70 %MPV 9.50 fLPLT 270 %NEUT 57.30 %%LYMP 25.0 %
%MONO 13.0 %%EOS 3.60 %%BASO 1.10 %#NEUT 2.73 #LYMP 1.19 #MONO 0.62 #EOS 0.17 #
BASO 0.05 TSH 0.640 uIU/mLGLUCOSE 90.0 mg/dLSODIUM 136.0 mmol/LPOTASSIUM 4.0 
mmol/LCHLORIDE 101.0 mmol/LCO2 24.0 mmol/LBUN 10.0 mg/dLCREATININE 0.80 mg/
dLSGOT/AST 34.0 IU/LSGPT/ALT 32.0 IU/LALK PHOS 92.0 IU/LTOTAL PROTEIN 7.50 g/
dLALBUMIN 4.40 g/dLTOTAL BILI 0.70 mg/dLCALCIUM 9.50 mg/dLeGFR >60 mL/min/1.73 
[id number] 107.0 mg/dLCHOLESTEROL 138.0 mg/dLHDL 58.0 mg/dLLDL (CALC) 59.0 
mg/dL

 

 2015 8:31 AM  WBC 4.6 RBC 3.97 HGB 12.90 g/dLHCT 38.0 %MCV 96.0 fLMCH 
32.50 pgMCHC 33.90 g/dLRDW CV 12.60 %MPV 9.0 fLPLT 248 %NEUT 61.0 %%LYMP 24.70 %
%MONO 10.20 %%EOS 3.0 %%BASO 1.10 %#NEUT 2.82 #LYMP 1.14 #MONO 0.47 #EOS 0.14 #
BASO 0.05 TRIGLYCERIDES 105.0 mg/dLCHOLESTEROL 141.0 mg/dLHDL 58.0 mg/dLLDL (
CALC) 62.0 mg/dLGLUCOSE 93.0 mg/dLSODIUM 136.0 mmol/LPOTASSIUM 4.10 mmol/
LCHLORIDE 101.0 mmol/LCO2 25.0 mmol/LBUN 9.0 mg/dLCREATININE 0.80 mg/dLSGOT/AST 
32.0 IU/LSGPT/ALT 28.0 IU/LALK PHOS 95.0 IU/LTOTAL PROTEIN 7.30 g/dLALBUMIN 
4.50 g/dLTOTAL BILI 0.80 mg/dLCALCIUM 9.70 mg/dLeGFR >60 mL/min/1.73mTSH 1.10 
uIU/mL







History Of Immunizations







 Name  Date Admin  Mfg Name  Mfg Code  Trade Name  Lot#  Route  Inj  Vis Given  
Vis Pub  CVX

 

 Influenza  10/20/2010  [employer] pasteur  [employer]  Fluzone  [id number]  Intramuscular  
Left Arm  10/20/2010  2010  999

 

 Influenza  10/28/2011  [employer] pasteur  [employer]  Fluzone  [id number]  Intramuscular  
Left Arm  10/28/2011  2011  141

 

 Influenza  10/29/2012  sanofi pasteur  [employer]  Fluzone  [id number]  Intramuscular  
Left Deltoid  10/29/2012  2012  141

 

 X  12/10/2012  [employer]  MSD  Pneumovax 23  [id number]  Intramuscular  
Left Gluteous Medius  12/10/2012  2010  33

 

 Influenza  10/28/2013  sanofi pasteur  [employer]  Fluzone > 3 Years  [id number]  
Intramuscular  Right Deltoid  10/28/2013  2013  141

 

 X  9/2/2015  Not Entered  NE  Prevnar 13     Not Entered  Not Entered  1  109

 

 Influenza  2015  Not Entered  NE  Not Entered     Not Entered  Not Entered
  2015  141







History of Past Illness







 Name  Date of Onset  Comments

 

 Benign Essential Hypertension  Dec 14 2009 10:10AM   

 

 Hyperlipidemia  Dec 14 2009 10:10AM   

 

 Hypothyroidism, Acquired  Dec 14 2009 10:10AM   

 

 hypothyroid      

 

 Hypertension      

 

 Hyperlipidemia      

 

 acid reflux      

 

 Hypertension, Benign Essential  2010  9:41AM   

 

 Hypertension, Benign Essential  2010 12:53PM   

 

 Routine gynecological examination  May  5 2010  9:28AM   

 

 Hypertension  May  5 2010  9:28AM   

 

 Hyperlipidemia, unspecified  May  5 2010  9:28AM   

 

 Hypothyroidism, Acquired  May  5 2010  9:28AM   

 

 Vulvovaginitis  May  5 2010  9:28AM   

 

 Rhinitis, Allergic  May  5 2010  9:28AM   

 

 Hypertension, Benign Essential  May 19 2010  8:48AM   

 

 Hypertension, Benign Essential  May 25 2010  9:39AM   

 

 Flu  Oct 20 2010 12:01PM   

 

 Essential Hypertension  2010  8:34AM   

 

 Hyperlipidemia  2010  8:34AM   

 

 Gastroesophageal Reflux  2010  8:34AM   

 

 Hypothyroidism, Acquired  2010  8:34AM   

 

 Hypertension, Benign Essential  2010  9:22AM   

 

 Hypertension, Benign Essential  Dec 15 2010  9:07AM   

 

 Essential Hypertension  2011  1:31PM   

 

 Hyperlipidemia  2011  1:31PM   

 

 Gastroesophageal Reflux  2011  1:31PM   

 

 Hypothyroidism, Acquired  2011  1:31PM   

 

 Essential Hypertension  2011  8:51AM   

 

 Hyperlipidemia  2011  8:51AM   

 

 Gastroesophageal Reflux  2011  8:51AM   

 

 Hypothyroidism, Acquired  2011  8:51AM   

 

 Essential Hypertension  2011  9:13AM   

 

 Hyperlipidemia  2011  9:13AM   

 

 Gastroesophageal Reflux  2011  9:13AM   

 

 Hypothyroidism, Acquired  2011  9:13AM   

 

 Nausea With Vomiting  2011  1:18PM   

 

 Hyponatremia  2011  8:46AM   

 

 Nausea  2011  9:13AM   

 

 Hypothyroidism  2011 11:10AM   

 

 Hyponatremia  2011 11:10AM   

 

 Essential Hypertension  2011 10:05AM   

 

 Hyperlipidemia  2011 10:05AM   

 

 Gastroesophageal Reflux  2011 10:05AM   

 

 Nausea  2011 10:05AM   

 

 Hypothyroidism, Acquired  2011 10:05AM   

 

 Hyponatremia  May  6 2011 11:34AM   

 

 Essential Hypertension  May 16 2011  8:50AM   

 

 Hyperlipidemia  May 16 2011  8:50AM   

 

 Gastroesophageal Reflux  May 16 2011  8:50AM   

 

 Nausea  May 16 2011  8:50AM   

 

 Hypothyroidism, Acquired  May 16 2011  8:50AM   

 

 Hyponatremia  May 16 2011  8:50AM   

 

 Routine gynecological examination  2011  8:54AM   

 

 Hypertension  2011  8:54AM   

 

 Hyperlipidemia, unspecified  2011  8:54AM   

 

 Hypothyroidism, Acquired  2011  8:54AM   

 

 Rhinitis, Allergic  2011  8:54AM   

 

 Hypothyroidism  Aug 29 2011 12:37PM   

 

 Hyponatremia  Aug 29 2011 12:37PM   

 

 Essential Hypertension  Sep 12 2011  8:53AM   

 

 Hyperlipidemia  Sep 12 2011  8:53AM   

 

 Gastroesophageal Reflux  Sep 12 2011  8:53AM   

 

 Hypothyroidism, Acquired  Sep 12 2011  8:53AM   

 

 Hyponatremia  Sep 12 2011  8:53AM   

 

 Hypertension  Sep 29 2011  9:41AM   

 

 Hyperlipidemia  Dec  8 2011  8:31AM   

 

 Hypothyroidism  Dec  8 2011  8:31AM   

 

 Hypertension  Dec  8 2011  8:31AM   

 

 Flu  Dec  9 2011 10:02AM   

 

 Essential Hypertension  Dec 13 2011 10:13AM   

 

 Hyperlipidemia  Dec 13 2011 10:13AM   

 

 Gastroesophageal Reflux  Dec 13 2011 10:13AM   

 

 Hypothyroidism, Acquired  Dec 13 2011 10:13AM   

 

 Hyponatremia  Dec 13 2011 10:13AM   

 

 Vaginal Discharge  2012  9:43AM   

 

 Rhinitis, Allergic  Feb 15 2012  9:31AM   

 

 Eustachian Tube Dysfunction  Feb 15 2012  9:31AM   

 

 Pharyngitis, Acute  Feb 15 2012  9:31AM   

 

 Routine gynecological examination  2012  8:48AM   

 

 Hypertension  2012  8:48AM   

 

 Hyperlipidemia, unspecified  2012  8:48AM   

 

 Hypothyroidism, Acquired  2012  8:48AM   

 

 Rhinitis, Allergic  2012  8:48AM   

 

 Candidiasis, Vulvovaginal  2012 11:04AM   

 

 Essential Hypertension  Aug 15 2012  9:02AM   

 

 Hyperlipidemia  Aug 15 2012  9:02AM   

 

 Gastroesophageal Reflux  Aug 15 2012  9:02AM   

 

 Hypothyroidism, Acquired  Aug 15 2012  9:02AM   

 

 Hyponatremia  Aug 15 2012  9:02AM   

 

 Atrophic Vaginitis, Postmenopausal  Aug 15 2012  9:02AM   

 

 Flu  Oct 29 2012  9:24AM   

 

 Essential Hypertension  Dec 10 2012  8:35AM   

 

 Hyperlipidemia  Dec 10 2012  8:35AM   

 

 Gastroesophageal Reflux  Dec 10 2012  8:35AM   

 

 Hypothyroidism, Acquired  Dec 10 2012  8:35AM   

 

 Hyponatremia  Dec 10 2012  8:35AM   

 

 Atrophic Vaginitis, Postmenopausal  Dec 10 2012  8:35AM   

 

 Pneumococcus  Dec 10 2012  2:07PM   

 

 Vaginal Discharge  Dec 20 2012  1:50PM   

 

 Essential Hypertension  Dec 20 2012  1:50PM   

 

 Hyperlipidemia  Dec 20 2012  1:50PM   

 

 Gastroesophageal Reflux  Dec 20 2012  1:50PM   

 

 Hypothyroidism, Acquired  Dec 20 2012  1:50PM   

 

 Atrophic Vaginitis, Postmenopausal  Dec 20 2012  1:50PM   

 

 Upper Respiratory Infections  2013  8:52AM   

 

 Hyperlipidemia  2013  8:21AM   

 

 Hypertension  2013  8:21AM   

 

 Hypothyroidism  2013  8:21AM   

 

 Screening Mammogram  Beto 10 2013  8:45AM   

 

 Routine gynecological examination  Beto 10 2013  8:34AM   

 

 Hypertension  Beto 10 2013  8:34AM   

 

 Hyperlipidemia, unspecified  Beto 10 2013  8:34AM   

 

 Hypothyroidism, Acquired  Beto 10 2013  8:34AM   

 

 Rhinitis, Allergic  Beto 10 2013  8:34AM   

 

 Flu  Oct 28 2013  8:52AM   

 

 Essential Hypertension  Dec  9 2013  8:37AM   

 

 Hyperlipidemia  Dec  9 2013  8:37AM   

 

 Gastroesophageal Reflux  Dec  9 2013  8:37AM   

 

 Hypothyroidism, Acquired  Dec  9 2013  8:37AM   

 

 Atrophic Vaginitis, Postmenopausal  Dec  9 2013  8:37AM   

 

 Hypertension  2014  9:56AM   

 

 Hyperlipidemia  2014  9:56AM   

 

 Hypothyroidism  2014  9:56AM   

 

 Screening Mammogram  2014  8:32AM   

 

 Osteopenia  2014  8:32AM   

 

 Hypertension  2014  8:35AM   

 

 Hypothyroidism, Acquired  2014  8:35AM   

 

 Hyperlipidemia  2014  8:35AM   

 

 Upper Respiratory Infections  2014  9:28AM   

 

 Sinusitis, Acute  Oct  2 2014  9:17AM   

 

 Herpes Zoster  Oct  5 2014  1:44PM   

 

 Vesicular Eruption  Oct  5 2014  1:44PM   

 

 Hypertension  2014  8:18AM   

 

 Hyperlipidemia  2014  8:18AM   

 

 hypothyroid  2014  8:18AM   

 

 Situational anxiety  Dec 20 2014  9:33AM   

 

 GERD (gastroesophageal reflux disease)  Dec 20 2014  9:33AM   

 

 Nausea  Dec 20 2014  9:33AM   

 

 Abdominal Pain, Epigastric  Dec 20 2014  9:33AM   

 

 Hyperlipidemia  Oct  6 2014  9:03AM   

 

 acid reflux  Oct  6 2014  9:03AM   

 

 hypothyroid  Oct  6 2014  9:03AM   

 

 Shingles  Oct  6 2014  9:03AM   

 

 Pharyngitis  2015  1:09PM   

 

 Bronchitis  2015  9:10AM   

 

 Hyperlipidemia  2015  9:10AM   

 

 acid reflux  2015  9:10AM   

 

 hypothyroid  2015  9:10AM   

 

 Hyperlipidemia  2015  8:18AM   

 

 Hypertension  2015  8:18AM   

 

 hypothyroid  2015  8:18AM   

 

 Screening Mammogram  2015 11:43AM   

 

 Medicare Annual Pap Q 2 years  2015  9:36AM   

 

 Screening Mammogram  2015  9:36AM   

 

 Candidiasis of female genitalia  2015  9:36AM   

 

 Hypertension  2015 11:34AM   

 

 Hyperlipidemia, unspecified  2015 11:34AM   

 

 Hypothyroidism, Acquired  2015 11:34AM   

 

 Osteopenia  2016  8:41AM   

 

 Encounter for screening mammogram for breast cancer  2016  8:41AM   







Payers







 Insurance Name  Company Name  Plan Name  Plan Number  Policy Number  Policy 
Group Number  Start Date

 

    Medicare Part A  Medicare RHC     [id number]     N/A

 

    [employer]  [facility]     [id number]     2009

 

    Medicare Part B  [place]     [id number]     N/A

 

    Medicare Part A  Medicare Part A     [id number]     N/A

 

    Medicare Part A  Medicare P A - Preventive     [id number]     N/A

 

    Medicare Part A  Medicare - Lab/Xray     [id number]     N/A







History of Encounters







 Visit Date  Visit Type  Provider

 

 2016  Office visit  Doris Noriega MD

 

 2015  Office visit  Doris Noriega MD

 

 2015  Office visit  Doris Noriega MD

 

 2015  Office visit  Prince Noe APRN

 

 2014  Office visit  Anyi Herrera APRN

 

 10/27/2014  Voided  Doris Noriega MD

 

 10/6/2014  Office visit  Doris Noriega MD

 

 10/5/2014  Office visit  Anyi Herrera APRN

 

 10/2/2014  Office visit   

 

 10/2/2014  Office visit  Corrine Bay APRN

 

 2014  Office visit  Kassie Franklin APRN

 

 2014  Office visit  Doris Noriega MD

 

 2014  Office visit  Doris Noriega MD

 

 2013  Office visit  Dee Colindres MD

 

 10/28/2013  Nurse visit  Dee Colindres MD

 

 6/10/2013  Office visit  Dee Colindres MD

 

 2013  Office visit  Corrine Bay APRN

 

 2012  Office visit  Dee Colindres MD

 

 12/10/2012  Office visit  Dee Colindres MD

 

 10/29/2012  Nurse visit  Dee Colindres MD

 

 8/15/2012  Office visit  Dee Colindres MD

 

 2012  Office visit  Corrine Bay APRN

 

 2012  Office visit  Dee Colindres MD

 

 2/15/2012  Office visit  Dee Colindres MD

 

 2012  Office visit  Corrine Bay APRN

 

 2011  Office visit  Dee Colindres MD

 

 10/28/2011  Nurse visit  Shad Nolasco MD

 

 2011  Office visit  Dee Colindres MD

 

 2011  Office visit  Dee Colindres MD

 

 2011  Office visit  Dee Colindres MD

 

 2011  Office visit  Dee Colindres MD

 

 2011  Office visit  Dee Colindres MD

 

 2011  Office visit  Dee Colindres MD

 

 4/10/2011  [facility]  KHRIS Granados MD

 

 4/10/2011  [facility]  RICKEY Toussaint MD

 

 2011  Office visit  RICKEY Toussaint MD

 

 2011  [facility]  Fide Castellanos MD

 

 2011  Voided  Fide Castellanos MD

 

 2011  Office visit  Dee Colindres MD

 

 12/15/2010  Nurse visit  Dee Colindres MD

 

 2010  Office visit  Dee Colindres MD

 

 10/20/2010  Nurse visit  Stephenie PERAZA

 

 2010  Nurse visit  Dee Colindres MD

 

 2010  Nurse visit  Dee Colindres MD

 

 2010  Office visit  Dee Colindres MD

 

 3/19/2010  Voided  Stephenie PERAZA

 

 2010  Nurse visit  Stephenie PERAZA

 

 2010  Nurse visit  Stephenie PERAZA

 

 2010  Nurse visit  Stephenie PERAZA

 

 2009  Office visit  Stephenie PERAZA

 

 10/20/2009  Nurse visit  Stephenie Kay PA

## 2017-07-13 NOTE — XMS REPORT
MU2 Ambulatory Summary

 Created on: 2016



Milady Crespo

External Reference #: [id number]

: 1945

Sex: Female



Demographics







 Address  [address]

[place]  [address]

 

 Home Phone  [phone]

 

 Preferred Language  English

 

 Marital Status  

 

 Pentecostalism Affiliation  Unknown

 

 Race  White

 

 Ethnic Group  Not  or 





Author







 Author  Doris Noriega

 

 Organization  [facility]

 

 Address  [address]

[place], [place]  [id number]



 

 Phone  [phone]







Care Team Providers







 Care Team Member Name  Role  Phone

 

 Doris Noriega  PCP  [phone]







Allergies and Adverse Reactions







 Name  Reaction  Notes

 

 NO KNOWN DRUG ALLERGIES      







Plan of Treatment







 Planned Activity  Comments  Planned Date  Planned Time  Plan/Goal

 

 COMPLETE CBC W/AUTO DIFF WBC     2016  12:00 AM   

 

 COMPREHEN METABOLIC PANEL     2016  12:00 AM   

 

 VITAMIN D 25 HYDROXY     2016  12:00 AM   

 

 LIPID PANEL     8/15/2012  12:00 AM   

 

 COMPREHEN METABOLIC PANEL     6/10/2013  12:00 AM   

 

 LIPID PANEL     6/10/2013  12:00 AM   

 

 ASSAY THYROID STIM HORMONE     6/10/2013  12:00 AM   

 

 COMPLETE CBC W/AUTO DIFF WBC     6/10/2013  12:00 AM   

 

 COMPREHEN METABOLIC PANEL     2012  12:00 AM   

 

 LIPID PANEL     2012  12:00 AM   

 

 ASSAY THYROID STIM HORMONE     2012  12:00 AM   

 

 COMPLETE CBC W/AUTO DIFF WBC     2012  12:00 AM   

 

 MAMMOGRAM SCREENING     2015  12:00 AM   







Medications







 Active 

 

 Name  Start Date  Estimated Completion Date  SIG  Comments

 

 loratadine 10 mg oral tablet  2015  12/15/2016  TAKE 1/2 TABLET BY MOUTH 
EVERY DAY IN THE EVENING   

 

 aspirin 81 mg oral tablet,delayed release (DR/EC)        take 1 tablet (81 mg) 
by oral route once daily   

 

 Vitamin D3 1,000 unit oral capsule        take 1 capsule by oral route daily   

 

 famotidine 20 mg oral tablet  2015     TAKE ONE TABLET BY MOUTH TWICE 
DAILY   

 

 valsartan 160 mg oral tablet  2016     TAKE ONE TABLET BY MOUTH ONCE 
DAILY   

 

 amlodipine 5 mg oral tablet  2016     TAKE ONE TABLET BY MOUTH ONCE DAILY
   

 

 Allergy Relief (loratadine) 10 mg oral tablet  2016     TAKE ONE TABLET 
BY MOUTH ONCE DAILY   

 

 triamcinolone acetonide 0.1 % topical cream  3/14/2016     APPLY A THIN LAYER 
OF CREAM EXTERNALLY TO AFFECTED AREA TWICE DAILY FOR 14 DAYS   

 

 atenolol 50 mg oral tablet  2016  3/30/2017  take 1 tablet (50 mg) by oral 
route once daily   

 

 amlodipine 5 mg oral tablet  2016     TAKE ONE TABLET BY MOUTH ONCE DAILY
   

 

 Allergy Relief (loratadine) 10 mg oral tablet  2016     TAKE ONE TABLET 
BY MOUTH ONCE DAILY   

 

 valsartan 160 mg oral tablet  2016     TAKE ONE TABLET BY MOUTH ONCE 
DAILY   

 

 clorazepate dipotassium 7.5 mg oral tablet  2016  take 1/2 to1 
tablet PO up to three times per day for situational anxiety   

 

 levothyroxine 50 mcg oral tablet  2016     TAKE ONE TABLET BY MOUTH ONCE 
DAILY   









  

 

 Name  Start Date  Expiration Date  SIG  Comments

 

 prednisone 20 mg oral tablet  2011  take 2 tablets by oral 
route daily for 5 days   

 

 calcium carbonate-vitamin D2 600-125 mg-unit oral tablet  8/15/2012  2012
  take 2 tablets by oral route daily   

 

 Gurley 3 Fish Oil 900-1,400 mg oral capsule,delayed release(DR/EC)  8/15/2012  9
/  take 1 capsule by oral route daily   

 

 multivitamin Oral tablet  8/15/2012  2012  take 1 tablet by oral route 
daily   

 

 triamcinolone acetonide 0.1 % topical cream  2013  10/7/2013  APPLY A 
THIN LAYER TO THE AFFECTED AREA(S) BY TOPICAL ROUTE TWICE A DAY   

 

 famotidine 20 mg oral tablet  2014  take 1 tablet (20 mg) by 
oral route 2 times per day   

 

 Plavix 75 mg oral tablet  2014  take 1 tablet (75 mg) by oral 
route once daily   

 

 amoxicillin 500 mg oral capsule  2014  take 1 capsule (500 mg) 
by oral route 3 times per day for 10 days   

 

 Lipitor 20 mg oral tablet  2014  take 1 tablet (20 mg) by oral 
route once daily   

 

 Zithromax Z-Tab 250 mg oral tablet  10/2/2014  10/7/2014  take 2 tablets (500 
mg) by oral route once daily for 1 day then 1 tablet (250 mg) by oral route 
once daily for 4 days   

 

 acyclovir 800 mg oral tablet  10/5/2014  10/12/2014  take 1 tablet (800 mg) by 
oral route 5 times per day for 7 days   

 

 gabapentin 300 mg oral capsule  10/9/2014  2014  take 1 capsule (300 mg) 
by oral route 3 times per day for 14 days   

 

 Carafate 100 mg/mL oral suspension  2014  take 10 milliliters 
(1 gram) by oral route 4 times per day on an empty stomach 1 hour before meals 
and at bedtime for 4 weeks   

 

 amlodipine 5 mg oral tablet  2015  TAKE ONE TABLET BY MOUTH 
EVERY DAY   

 

 levothyroxine 50 mcg oral tablet  2015  TAKE ONE TABLET BY MOUTH 
EVERY DAY   

 

 Diovan 160 mg oral tablet  2015  2/15/2016  TAKE ONE TABLET BY MOUTH 
EVERY DAY for 90 days   

 

 triamcinolone acetonide 0.1 % topical cream  2015  apply a thin 
layer to the affected area(s) by topical route 2 times per day for 14 days   

 

 fluconazole 150 mg oral tablet  2015  take 1 tablet (150 mg) 
by oral route once for 1 day   









 Discontinued 

 

 Name  Start Date  Discontinued Date  SIG  Comments

 

 Lipitor 40 mg oral tablet     2009  1 TAB DAILY   

 

 ramipril 5 mg oral capsule     2009  take 1 capsule (5 mg) by oral route 
once daily   

 

 lovastatin 20 mg oral tablet  2009  take 1 tablet (20 mg) by 
oral route once daily with evening meal   

 

 propranolol 20 mg oral tablet  2010  take 1 tablet by oral 
route 2 times a day   

 

 Fosamax 70 mg oral tablet  2010  take 1 tablet (70 mg) by oral 
route once weekly in the morning, at least 30 minutes before the first food, 
beverage, or medication of the day   

 

 lisinopril 40 mg oral tablet  2010  4/10/2011  take 2 tablets by oral 
route daily   

 

 Zoloft 50 mg oral tablet  2011  take 1 tablet (50 mg) by oral 
route once daily   

 

 promethazine 25 mg oral tablet  2011  take 1 tablet by oral 
route every 6 hours as needed   

 

 Diovan -12.5 mg oral tablet  2011  take 1 tablet by oral 
route once daily for 30 days   

 

 Diflucan 150 mg oral tablet     2012  take 1 tablet (150 mg) by oral 
route once for 1 day   

 

 Diflucan 150 mg oral tablet  2012  8/15/2012  take 1 tablet (150 mg) by 
oral route once   

 

 Vitamin B-12 1,000 mcg oral tablet  8/15/2012  2014  take 1 tablet by 
oral route daily   

 

 Premarin 0.625 mg/gram vaginal cream  10/29/2012  6/10/2013  use 1 gram daily 
for a week then 1 gram twice a week   

 

 Medrol (Tab) 4 mg oral tablets,dose pack  2013  6/10/2013  take as 
directed   

 

 aspirin 325 mg oral tablet  2014  take 1 tablet (325 mg) by 
oral route once daily   

 

 Medrol (Tab) 4 mg oral tablets,dose pack  2014  10/2/2014  take as 
directed   

 

 Tetracaine Lollipops Lollipop  2015  Use as directed   

 

 Zoloft 50 mg oral tablet  2016  take 1 tablet (50 mg) by oral 
route once daily for 90 days   

 

 Zoloft 50 mg oral tablet  2016  take 1 tablet (50 mg) by oral 
route once daily for 90 days  Pt refuses to take...







Problem List







 Description  Status  Onset

 

 Hyperlipidemia  Active   

 

 acid reflux  Active   

 

 Hypertension  Active   

 

 hypothyroid  Active   







Vital Signs







 Date  Time  BP-Sys(mm[Hg]  BP-Morena(mm[Hg])  HR(bpm)  RR(rpm)  Temp  WT  HT  HC  
BMI  BSA  BMI Percentile  O2 Sat(%)

 

 2016  8:29:00 AM  122 mmHg  70 mmHg  72 bpm  16 rpm  97.8 F  137.125 lbs  
61 in     25.91 kg/m2  1.64 m2     100 %

 

 2015  9:33:00 AM  120 mmHg  68 mmHg  73 bpm     98.7 F  144.375 lbs  61 
in     27.2791 kg/m  1.6788 m     99 %

 

 2015  9:05:00 AM  110 mmHg  75 mmHg  85 bpm  16 rpm  96.7 F  144.375 lbs  
61 in     27.28 kg/m2  1.68 m2     99 %

 

 2015  1:05:00 PM  108 mmHg  62 mmHg  78 bpm  18 rpm  96.9 F  142.375 lbs  
61 in     26.9012 kg/m  1.6672 m     100 %

 

 2014  9:31:00 AM  126 mmHg  66 mmHg  79 bpm  18 rpm  98.7 F  149 lbs  61 
in     28.15 kg/m2  1.71 m2     98 %

 

 10/6/2014  9:02:00 AM  110 mmHg  74 mmHg  94 bpm  18 rpm  98.1 F  145.4 lbs  
61 in     27.4728 kg/m  1.6848 m     97 %

 

 10/2/2014  9:14:00 AM  124 mmHg  74 mmHg  79 bpm  18 rpm  98 F  147.25 lbs  61 
in     27.82 kg/m2  1.70 m2     98 %

 

 2014  9:22:00 AM  124 mmHg  76 mmHg  89 bpm  18 rpm  98.1 F  148 lbs  61 
in     27.9641 kg/m  1.6998 m     100 %

 

 2014  8:27:00 AM  118 mmHg  65 mmHg  74 bpm  16 rpm  97.7 F  148 lbs  61 
in     27.96 kg/m2  1.70 m2     99 %

 

 2014  9:48:00 AM  125 mmHg  70 mmHg  93 bpm  18 rpm  96.7 F  156 lbs  61 
in     29.4756 kg/m  1.7451 m     100 %

 

 2013  8:35:00 AM  122 mmHg  74 mmHg  84 bpm  16 rpm  97.9 F  155.375 lbs 
                99 %

 

 6/10/2013  8:30:00 AM  130 mmHg  82 mmHg  79 bpm  16 rpm  97.9 F  161 lbs     
            98 %

 

 2013  8:50:00 AM  124 mmHg  68 mmHg  66 bpm  18 rpm  97.6 F  157.5 lbs  
61 in     29.76 kg/m2  1.75 m2      

 

 2012  1:46:00 PM  120 mmHg  72 mmHg  75 bpm  16 rpm  97.6 F  156.125 lbs
                 98 %

 

 12/10/2012  8:32:00 AM  122 mmHg  82 mmHg  70 bpm  16 rpm  97.3 F  157 lbs    
             99 %

 

 8/15/2012  9:00:00 AM  130 mmHg  76 mmHg  86 bpm  16 rpm  97.4 F  159 lbs     
            100 %

 

 2012  11:02:00 AM  128 mmHg  64 mmHg  66 bpm  18 rpm  97.4 F  162.125 lbs
  61 in     30.63 kg/m2  1.78 m2      

 

 2012  8:45:00 AM  130 mmHg  70 mmHg  82 bpm  16 rpm  98.2 F  160.125 lbs 
                100 %

 

 2/15/2012  9:29:00 AM  122 mmHg  80 mmHg  86 bpm  16 rpm  97.4 F  159.375 lbs  
61 in     30.11 kg/m2  1.76 m2     99 %

 

 2012  9:41:00 AM  132 mmHg  74 mmHg  66 bpm  18 rpm  98.4 F  160.375 lbs  
61 in     30.3023 kg/m  1.7694 m      

 

 2011  10:08:00 AM  134 mmHg  82 mmHg  80 bpm  16 rpm  98 F  160 lbs  61 
in     30.23 kg/m2  1.77 m2     99 %

 

 2011  3:56:00 PM  130 mmHg  80 mmHg                              

 

 2011  8:55:00 AM  130 mmHg  74 mmHg  88 bpm  16 rpm  98.2 F  158.25 lbs  
               98 %

 

 2011  8:54:00 AM  136 mmHg  82 mmHg  102 bpm  16 rpm  98.1 F  153.5 lbs   
              99 %

 

 2011  8:49:00 AM  122 mmHg  88 mmHg  88 bpm  16 rpm  98.6 F  152.25 lbs  
               99 %

 

 2011  10:02:00 AM  140 mmHg  82 mmHg  96 bpm  20 rpm  98.8 F             
       100 %

 

 2011  9:14:00 AM  156 mmHg  98 mmHg  110 bpm  24 rpm  98.5 F  153 lbs    
             100 %

 

 2011  8:50:00 AM  160 mmHg  84 mmHg  100 bpm  16 rpm  98.7 F  155 lbs    
             100 %

 

 2011  1:25:00 PM  152 mmHg  100 mmHg  82 bpm  16 rpm  97.2 F  156.375 lbs 
                100 %

 

 2011  9:55:00 AM  144 mmHg  90 mmHg                              

 

 2010  9:24:00 AM  138 mmHg  84 mmHg                              

 

 2010  8:58:00 AM  160 mmHg  94 mmHg                              

 

 2010  8:33:00 AM  142 mmHg  90 mmHg  100 bpm  16 rpm  98.1 F  158.375 
lbs                  

 

 2010  9:24:00 AM  160 mmHg  102 mmHg  88 bpm  18 rpm  99 F  157.125 lbs  
62 in     28.7382 kg/m  1.7657 m      

 

 3/19/2010  11:01:00 AM  140 mmHg  90 mmHg                              

 

 2009  10:08:00 AM  142 mmHg  86 mmHg  72 bpm  16 rpm  98.1 F  154.125 
lbs  61 in     29.1214 kg/m  1.7346 m      







Social History







 Name  Description  Comments

 

       

 

 Children      

 

 lives alone      

 

 Supervisor      

 

 Tobacco  Never smoker   







History of Procedures







 Date Ordered  Description  Order Status

 

 2011 12:00 AM  COMPREHEN METABOLIC PANEL  Reviewed

 

 2011 12:00 AM  ASSAY THYROID STIM HORMONE  Reviewed

 

 2011 12:00 AM  COMPREHEN METABOLIC PANEL  Reviewed

 

 2011 12:00 AM  LIPID PANEL  Reviewed

 

 2011 12:00 AM  ASSAY THYROID STIM HORMONE  Reviewed

 

 2011 12:00 AM  COMPLETE CBC W/AUTO DIFF WBC  Reviewed

 

 2015 12:00 AM  COMPLETE CBC W/AUTO DIFF WBC  Returned

 

 2015 12:00 AM  COMPREHEN METABOLIC PANEL  Returned

 

 2015 12:00 AM  LIPID PANEL  Returned

 

 2015 12:00 AM  ASSAY THYROID STIM HORMONE  Returned

 

 2011 12:00 AM  COMPREHEN METABOLIC PANEL  Reviewed

 

 2011 12:00 AM  COMPREHEN METABOLIC PANEL  Reviewed

 

 2011 12:00 AM  LIPID PANEL  Reviewed

 

 2011 12:00 AM  ASSAY THYROID STIM HORMONE  Reviewed

 

 10/28/2011 12:00 AM  ***Immunization administration, Medicare flu  Reviewed

 

 10/28/2011 12:00 AM  Fluzone ** MEDICARE Only **  Reviewed

 

 2010 11:24 AM  NO CHARGE OV  Reviewed

 

 2010 11:26 AM  NO CHARGE OV  Reviewed

 

 2011 12:00 AM  COMPREHEN METABOLIC PANEL  Reviewed

 

 2011 12:00 AM  LIPID PANEL  Reviewed

 

 2011 12:00 AM  ASSAY THYROID STIM HORMONE  Reviewed

 

 2011 12:00 AM  COMPLETE CBC W/AUTO DIFF WBC  Reviewed

 

 2011 12:00 AM  Wrist Support  Reviewed

 

 2016 12:00 AM  Screening mammography, bilateral  Returned

 

 2016 12:00 AM  DXA BONE DENSITY AXIAL  Returned

 

 2016 12:00 AM  TETANUS VACCINE IM  Reviewed

 

 2016 12:00 AM  HEP B VACC ADULT 3 DOSE IM  Reviewed

 

 2012 12:00 AM  TISSUE EXAM FOR FUNGI  Returned

 

 2012 12:00 AM  SMEAR WET MOUNT SALINE/INK  Returned

 

 2016 12:00 AM  TETANUS VACCINE IM  Reviewed

 

 2016 12:00 AM  HEP B VACC ADULT 3 DOSE IM  Reviewed

 

 2/15/2012 12:00 AM  THER/PROPH/DIAG INJ SC/IM  Reviewed

 

 2/15/2012 12:00 AM  Bicillin CR [facility]#[id number][id number]  Reviewed

 

 2/15/2012 12:00 AM  Depo-Medrol 40 mg NDC#9308408499  Reviewed

 

 8/15/2012 12:00 AM  COMPREHEN METABOLIC PANEL  Reviewed

 

 8/15/2012 12:00 AM  ASSAY THYROID STIM HORMONE  Reviewed

 

 8/15/2012 12:00 AM  COMPLETE CBC W/AUTO DIFF WBC  Returned

 

 8/15/2012 12:00 AM  Wrist Support  Reviewed

 

 10/29/2012 12:00 AM  Flu Injection 3 Years And Above NDC# [address]-44  [facility]  
Reviewed

 

 12/10/2012 12:00 AM  IMMUNIZATION ADMIN  Reviewed

 

 12/10/2012 12:00 AM  Pneumovax Injection - [facility]  Reviewed

 

 2012 12:00 AM  TRICHOMONAS ASSAY W/OPTIC  Returned

 

 2013 12:00 AM  THER/PROPH/DIAG INJ SC/IM  Reviewed

 

 2013 12:00 AM  Bicillin CR, 1.2 million units NDC# 95145-925-81  Reviewed

 

 2013 12:00 AM  COMPREHEN METABOLIC PANEL  Returned

 

 2013 12:00 AM  LIPID PANEL  Returned

 

 2013 12:00 AM  COMPLETE CBC W/AUTO DIFF WBC  Returned

 

 2013 12:00 AM  ASSAY THYROID STIM HORMONE  Returned

 

 6/10/2013 12:00 AM  MAMMOGRAM SCREENING  Returned

 

 6/10/2013 12:00 AM  CYTOPATH C/V MANUAL  Returned

 

 12/10/2013 12:00 AM  LIPID PANEL  Returned

 

 12/10/2013 12:00 AM  ASSAY THYROID STIM HORMONE  Returned

 

 12/10/2013 12:00 AM  COMPLETE CBC W/AUTO DIFF WBC  Returned

 

 12/10/2013 12:00 AM  COMPREHEN METABOLIC PANEL  Returned

 

 2010 12:00 AM  CYTOPATH C/V MANUAL  Reviewed

 

 2010 12:00 AM  SMEAR WET MOUNT SALINE/INK  Reviewed

 

 2010 12:00 AM  LIPID PANEL  Reviewed

 

 2010 12:00 AM  ASSAY THYROID STIM HORMONE  Reviewed

 

 2010 12:00 AM  COMPLETE CBC W/AUTO DIFF WBC  Reviewed

 

 2010 12:00 AM  COMPREHEN METABOLIC PANEL  Reviewed

 

 10/28/2013 12:00 AM  Flu Injection 3 Years And Above NDC# [address]-77  [facility]  
Reviewed

 

 2010 8:48 AM  Blood Pressure Check-no charge  Reviewed

 

 2010 12:00 AM  Blood Pressure Check-no charge  Reviewed

 

 2014 12:00 AM  METABOLIC PANEL TOTAL CA  Reviewed

 

 2014 12:00 AM  ASSAY THYROID STIM HORMONE  Reviewed

 

 2014 12:00 AM  ASSAY OF FREE THYROXINE  Reviewed

 

 2014 12:00 AM  MAMMOGRAM SCREENING  Returned

 

 2014 12:00 AM  CT BONE DENSITY AXIAL  Returned

 

 2014 12:00 AM  COMPLETE CBC W/AUTO DIFF WBC  Returned

 

 2014 12:00 AM  COMPREHEN METABOLIC PANEL  Returned

 

 2014 12:00 AM  LIPID PANEL  Returned

 

 2014 12:00 AM  ASSAY THYROID STIM HORMONE  Returned

 

 10/20/2010 12:00 AM  IMMUNIZATION ADMIN  Reviewed

 

 10/20/2010 12:00 AM  FLU VACCINE 3 YRS & > IM  Reviewed

 

 2010 12:00 AM  COMPREHEN METABOLIC PANEL  Reviewed

 

 2010 12:00 AM  LIPID PANEL  Reviewed

 

 2010 12:00 AM  ASSAY THYROID STIM HORMONE  Reviewed

 

 2010 12:00 AM  COMPLETE CBC W/AUTO DIFF WBC  Reviewed

 

 2010 12:00 AM  Blood Pressure Check-no charge  Reviewed

 

 10/2/2014 12:00 AM  THER/PROPH/DIAG INJ SC/IM  Reviewed

 

 10/2/2014 12:00 AM  Kenalog, Per 10 Mg NDC#4789-6302-45  Reviewed

 

 2014 12:00 AM  COMPLETE CBC W/AUTO DIFF WBC  Returned

 

 2014 12:00 AM  COMPREHEN METABOLIC PANEL  Returned

 

 2014 12:00 AM  LIPID PANEL  Returned

 

 2014 12:00 AM  ASSAY THYROID STIM HORMONE  Returned

 

 2015 12:00 AM  Rocephin 1 gram NDC#8647-9415-81  Reviewed

 

 2015 12:00 AM  THER/PROPH/DIAG INJ SC/IM  Reviewed

 

 2011 12:00 AM  COMPREHEN METABOLIC PANEL  Reviewed

 

 2011 12:00 AM  LIPID PANEL  Reviewed

 

 2011 12:00 AM  ASSAY THYROID STIM HORMONE  Reviewed

 

 2011 12:00 AM  COMPLETE CBC W/AUTO DIFF WBC  Reviewed

 

 2011 12:00 AM  METABOLIC PANEL TOTAL CA  Reviewed

 

 2011 12:00 AM  COMPREHEN METABOLIC PANEL  Reviewed

 

 2011 12:00 AM  ASSAY THYROID STIM HORMONE  Reviewed

 

 2011 12:00 AM  COMPLETE CBC W/AUTO DIFF WBC  Reviewed

 

 2011 12:00 AM  ASSAY OF LIPASE  Reviewed

 

 2011 12:00 AM  THER/PROPH/DIAG INJ SC/IM  Reviewed

 

 2011 12:00 AM  Phenergan 50 Mg Im  Bernadine  Reviewed

 

 2011 12:00 AM  METABOLIC PANEL TOTAL CA  Reviewed

 

 2011 12:00 AM  THER/PROPH/DIAG INJ SC/IM  Reviewed

 

 2011 12:00 AM  Phenergan 25 Mg Im  Bernadine  Reviewed

 

 2011 12:00 AM  METABOLIC PANEL TOTAL CA  Reviewed

 

 2011 12:00 AM  ASSAY THYROID STIM HORMONE  Reviewed

 

 2011 12:00 AM  THER/PROPH/DIAG INJ SC/IM  Reviewed

 

 2011 12:00 AM  Phenergan 50 Mg Im  Bernadine  Reviewed

 

 2011 12:00 AM  ASSAY OF SERUM SODIUM  Reviewed

 

 2011 12:00 AM  ASSAY OF SERUM SODIUM  Reviewed

 

 2011 12:00 AM  CYTOPATH C/V MANUAL  Reviewed

 

 2011 12:00 AM  ASSAY THYROID STIM HORMONE  Reviewed

 

 2015 12:00 AM  COMPLETE CBC W/AUTO DIFF WBC  Returned

 

 2015 12:00 AM  COMPREHEN METABOLIC PANEL  Returned

 

 2015 12:00 AM  LIPID PANEL  Returned

 

 2015 12:00 AM  ASSAY THYROID STIM HORMONE  Returned

 

 2015 12:00 AM  MAMMOGRAM SCREENING  Returned

 

 2015 12:00 AM  CYTOPATH TBS C/V MANUAL  Returned







Results Summary







 Data and Description  Results

 

 2010 9:25 AM  Colonoscopy-Women and Men over 50 Abnormal Mammogram -Women 
over 40 Declined Pap Smear Declined 

 

 2010 10:41 AM  WET PREP NO TRICHOMONADS SEEN 

 

 2010 8:15 AM  TRIGLYCERIDES 174.0 mg/dLCHOLESTEROL 175.0 mg/dLHDL 58.0 mg/
dLLDL (CALC) 82.0 mg/dLTSH 0.790 uIU/mLGLUCOSE 95.0 mg/dLSODIUM 136.0 mmol/
LPOTASSIUM 4.50 mmol/LCHLORIDE 99.0 mmol/LCO2 26.0 mmol/LBUN 12.0 mg/
dLCREATININE 0.80 mg/dLSGOT/AST 32.0 IU/LSGPT/ALT 33.0 IU/LALK PHOS 103.0 IU/
LTOTAL PROTEIN 7.60 g/dLALBUMIN 4.60 g/dLTOTAL BILI 0.60 mg/dLCALCIUM 9.80 mg/
dLeGFR >60 mL/min/1.73 m2WBC 5.3 RBC 4.13 HGB 13.10 g/dLHCT 39.20 %MCV 95.0 
fLMCH 31.70 pgMCHC 33.40 g/dLRDW CV 12.70 %MPV 9.80 fLPLT 257 %NEUT 57.90 %%
LYMP 26.50 %%MONO 11.60 %%EOS 3.20 %%BASO 0.80 %#NEUT 3.04 #LYMP 1.39 #MONO 
0.61 #EOS 0.17 #BASO 0.04 

 

 12/15/2010 8:30 AM  TRIGLYCERIDES 157.0 mg/dLCHOLESTEROL 163.0 mg/dLHDL 56.0 mg
/dLLDL (CALC) 76.0 mg/dLTSH 0.650 uIU/mLWBC 6.5 RBC 4.25 HGB 13.60 g/dLHCT 
40.70 %MCV 96.0 fLMCH 32.0 pgMCHC 33.40 g/dLRDW CV 12.60 %MPV 9.90 fLPLT 313 %
NEUT 61.80 %%LYMP 26.20 %%MONO 8.30 %%EOS 3.10 %%BASO 0.60 %#NEUT 4.00 #LYMP 
1.70 #MONO 0.54 #EOS 0.20 #BASO 0.04 GLUCOSE 97.0 mg/dLSODIUM 136.0 mmol/
LPOTASSIUM 4.40 mmol/LCHLORIDE 101.0 mmol/LCO2 26.0 mmol/LBUN 10.0 mg/
dLCREATININE 0.80 mg/dLSGOT/AST 31.0 IU/LSGPT/ALT 34.0 IU/LALK PHOS 92.0 IU/
LTOTAL PROTEIN 8.10 g/dLALBUMIN 4.60 g/dLTOTAL BILI 0.40 mg/dLCALCIUM 10.0 mg/
dLeGFR >60 mL/min/1.73 m2

 

 2011 9:45 AM  GLUCOSE 91.0 mg/dLSODIUM 133.0 mmol/LPOTASSIUM 4.40 mmol/
LCHLORIDE 97.0 mmol/LCO2 24.0 mmol/LBUN 9.0 mg/dLCREATININE 0.70 mg/dLCALCIUM 
10.0 mg/dLeGFR >60 mL/min/1.73 m2

 

 2011 10:25 AM  LIPASE 29.0 U/LTSH 0.10 uIU/mLGLUCOSE 115.0 mg/dLSODIUM 
127.0 mmol/LPOTASSIUM 5.30 mmol/LCHLORIDE 93.0 mmol/LCO2 18.0 mmol/LBUN 9.0 mg/
dLCREATININE 0.70 mg/dLSGOT/AST 62.0 IU/LSGPT/ALT 46.0 IU/LALK PHOS 100.0 IU/
LTOTAL PROTEIN 8.60 g/dLALBUMIN 4.90 g/dLTOTAL BILI 0.60 mg/dLCALCIUM 9.90 mg/
dLeGFR >60 mL/min/1.73 m2WBC 6.9 RBC 4.48 HGB 14.40 g/dLHCT 40.90 %MCV 91.0 
fLMCH 32.10 pgMCHC 35.20 g/dLRDW CV 12.50 %MPV 9.30 fLPLT 260 %NEUT 74.90 %%
LYMP 15.10 %%MONO 9.40 %%EOS 0.30 %%BASO 0.30 %#NEUT 5.15 #LYMP 1.04 #MONO 0.65 
#EOS 0.02 #BASO 0.02 

 

 2011 9:07 AM  GLUCOSE 92.0 mg/dLSODIUM 131.0 mmol/LPOTASSIUM 4.20 mmol/
LCHLORIDE 99.0 mmol/LCO2 22.0 mmol/LBUN 9.0 mg/dLCREATININE 0.70 mg/dLCALCIUM 
9.20 mg/dLeGFR >60 mL/min/1.73 m2

 

 2011 11:06 AM  TSH 0.510 uIU/mLGLUCOSE 99.0 mg/dLSODIUM 132.0 mmol/
LPOTASSIUM 3.50 mmol/LCHLORIDE 95.0 mmol/LCO2 23.0 mmol/LBUN 9.0 mg/
dLCREATININE 0.80 mg/dLCALCIUM 10.40 mg/dLeGFR >60 mL/min/1.73 m2

 

 2011 9:45 AM  SODIUM 132.0 mmol/L

 

 2011 9:27 AM  SODIUM 137.0 mmol/L

 

 2011 8:35 AM  TRIGLYCERIDES 114.0 mg/dLCHOLESTEROL 145.0 mg/dLHDL 56.0 mg/
dLLDL (CALC) 66.0 mg/dLGLUCOSE 105.0 mg/dLSODIUM 138.0 mmol/LPOTASSIUM 4.40 mmol
/LCHLORIDE 103.0 mmol/LCO2 25.0 mmol/LBUN 8.0 mg/dLCREATININE 0.70 mg/dLSGOT/
AST 36.0 IU/LSGPT/ALT 38.0 IU/LALK PHOS 103.0 IU/LTOTAL PROTEIN 7.40 g/
dLALBUMIN 4.30 g/dLTOTAL BILI 0.30 mg/dLCALCIUM 9.20 mg/dLeGFR >60 mL/min/1.73 
m2WBC 5.1 RBC 3.76 HGB 12.0 g/dLHCT 36.10 %MCV 96.0 fLMCH 31.90 pgMCHC 33.20 g/
dLRDW CV 13.10 %MPV 9.40 fLPLT 249 %NEUT 60.40 %%LYMP 25.90 %%MONO 8.90 %%EOS 
4.0 %%BASO 0.80 %#NEUT 3.06 #LYMP 1.31 #MONO 0.45 #EOS 0.20 #BASO 0.04 

 

 2011 9:55 AM  TSH 1.070 uIU/mL

 

 2011 8:55 AM  GLUCOSE 102.0 mg/dLSODIUM 135.0 mmol/LPOTASSIUM 4.20 mmol/
LCHLORIDE 102.0 mmol/LCO2 24.0 mmol/LBUN 15.0 mg/dLCREATININE 0.80 mg/dLSGOT/
AST 30.0 IU/LSGPT/ALT 35.0 IU/LALK PHOS 119.0 IU/LTOTAL PROTEIN 7.50 g/
dLALBUMIN 4.30 g/dLTOTAL BILI 0.30 mg/dLCALCIUM 9.20 mg/dLeGFR >60 mL/min/1.73 
m2TSH 1.130 uIU/mL

 

 2011 9:50 AM  GLUCOSE 85.0 mg/dLSODIUM 133.0 mmol/LPOTASSIUM 4.20 mmol/
LCHLORIDE 101.0 mmol/LCO2 21.0 mmol/LBUN 13.0 mg/dLCREATININE 0.80 mg/dLSGOT/
AST 36.0 IU/LSGPT/ALT 36.0 IU/LALK PHOS 109.0 IU/LTOTAL PROTEIN 7.40 g/
dLALBUMIN 4.20 g/dLTOTAL BILI 0.50 mg/dLCALCIUM 9.40 mg/dLeGFR >60 mL/min/1.73 
m2

 

 2011 8:55 AM  GLUCOSE 98.0 mg/dLSODIUM 137.0 mmol/LPOTASSIUM 3.90 mmol/
LCHLORIDE 103.0 mmol/LCO2 25.0 mmol/LBUN 14.0 mg/dLCREATININE 0.70 mg/dLSGOT/
AST 33.0 IU/LSGPT/ALT 36.0 IU/LALK PHOS 111.0 IU/LTOTAL PROTEIN 8.30 g/
dLALBUMIN 4.40 g/dLTOTAL BILI 0.50 mg/dLCALCIUM 9.40 mg/dLeGFR >60 mL/min/1.73 
[id number] 109.0 mg/dLCHOLESTEROL 153.0 mg/dLHDL 54.0 mg/dLLDL (CALC) 77.0 
mg/dLTSH 1.330 uIU/mL

 

 2011 10:17 AM  Colonoscopy-Women and Men over 50 Declined Mammogram -
Women over 40 Normal Pap Smear Negative 

 

 2012 10:33 AM  WET PREP NO TRICH SEEN CLUE CELLS NONE SEEN 

 

 2012 8:45 AM  WBC 5.2 RBC 3.96 HGB 12.70 g/dLHCT 37.80 %MCV 96.0 fLMCH 
32.10 pgMCHC 33.60 g/dLRDW CV 13.30 %MPV 9.70 fLPLT 297 %NEUT 57.70 %%LYMP 
28.50 %%MONO 10.30 %%EOS 2.50 %%BASO 1.0 %#NEUT 3.02 #LYMP 1.49 #MONO 0.54 #EOS 
0.13 #BASO 0.05 GLUCOSE 97.0 mg/dLSODIUM 137.0 mmol/LPOTASSIUM 4.40 mmol/
LCHLORIDE 101.0 mmol/LCO2 23.0 mmol/LBUN 17.0 mg/dLCREATININE 0.80 mg/dLSGOT/
AST 30.0 IU/LSGPT/ALT 33.0 IU/LALK PHOS 95.0 IU/LTOTAL PROTEIN 7.40 g/dLALBUMIN 
4.40 g/dLTOTAL BILI 0.60 mg/dLCALCIUM 9.70 mg/dLeGFR 60 TRIGLYCERIDES 130.0 mg/
dLCHOLESTEROL 157.0 mg/dLHDL 56.0 mg/dLLDL (CALC) 75.0 mg/dLTSH 0.940 uIU/mL

 

 8/15/2012 4:02 PM  WET PREP NO TRICH SEEN CLUE CELLS NONE SEEN 

 

 2012 8:45 AM  WBC 5.1 RBC 3.91 HGB 12.50 g/dLHCT 36.30 %MCV 93.0 fLMCH 
32.0 pgMCHC 34.40 g/dLRDW CV 12.60 %MPV 9.30 fLPLT 244 %NEUT 57.60 %%LYMP 27.50 
%%MONO 9.10 %%EOS 5.0 %%BASO 0.80 %#NEUT 2.91 #LYMP 1.39 #MONO 0.46 #EOS 0.25 #
BASO 0.04 TSH 1.390 uIU/mLTRIGLYCERIDES 154.0 mg/dLCHOLESTEROL 147.0 mg/dLHDL 
45.0 mg/dLLDL (CALC) 71.0 mg/dLGLUCOSE 101.0 mg/dLSODIUM 134.0 mmol/LPOTASSIUM 
4.30 mmol/LCHLORIDE 102.0 mmol/LCO2 23.0 mmol/LBUN 11.0 mg/dLCREATININE 0.80 mg/
dLSGOT/AST 34.0 IU/LSGPT/ALT 38.0 IU/LALK PHOS 104.0 IU/LTOTAL PROTEIN 7.60 g/
dLALBUMIN 4.40 g/dLTOTAL BILI 0.50 mg/dLCALCIUM 9.40 mg/dLeGFR 60 

 

 12/10/2012 8:34 AM  Colonoscopy-Women and Men over 50 Declined Mammogram -
Women over 40 Declined Pap Smear Declined 

 

 2012 5:02 PM  WET PREP NO TRICH SEEN CLUE CELLS NONE SEEN 

 

 2013 8:25 AM  WBC 4.2 RBC 3.96 HGB 12.90 g/dLHCT 37.0 %MCV 93.0 fLMCH 
32.60 pgMCHC 34.90 g/dLRDW CV 12.60 %MPV 9.70 fLPLT 254 %NEUT 54.40 %%LYMP 
30.40 %%MONO 10.80 %%EOS 3.40 %%BASO 1.0 %#NEUT 2.26 #LYMP 1.26 #MONO 0.45 #EOS 
0.14 #BASO 0.04 TSH 1.230 uIU/mLGLUCOSE 94.0 mg/dLSODIUM 136.0 mmol/LPOTASSIUM 
4.30 mmol/LCHLORIDE 99.0 mmol/LCO2 25.0 mmol/LBUN 10.0 mg/dLCREATININE 0.80 mg/
dLSGOT/AST 36.0 IU/LSGPT/ALT 36.0 IU/LALK PHOS 84.0 IU/LTOTAL PROTEIN 7.90 g/
dLALBUMIN 4.40 g/dLTOTAL BILI 0.70 mg/dLCALCIUM 9.80 mg/dLeGFR 60 TRIGLYCERIDES 
122.0 mg/dLCHOLESTEROL 141.0 mg/dLHDL 47.0 mg/dLLDL (CALC) 70.0 mg/dL

 

 6/10/2013 3:52 PM  WET PREP NO TRICH SEEN CLUE CELLS NONE SEEN 

 

 2013 8:45 AM  WBC 5.3 RBC 4.01 HGB 13.0 g/dLHCT 37.60 %MCV 94.0 fLMCH 
32.40 pgMCHC 34.60 g/dLRDW CV 12.50 %MPV 9.40 fLPLT 248 %NEUT 58.70 %%LYMP 
27.80 %%MONO 9.80 %%EOS 2.80 %%BASO 0.90 %#NEUT 3.12 #LYMP 1.48 #MONO 0.52 #EOS 
0.15 #BASO 0.05 TSH 1.570 uIU/mLGLUCOSE 94.0 mg/dLSODIUM 134.0 mmol/LPOTASSIUM 
4.10 mmol/LCHLORIDE 101.0 mmol/LCO2 23.0 mmol/LBUN 11.0 mg/dLCREATININE 0.80 mg/
dLSGOT/AST 41.0 IU/LSGPT/ALT 44.0 IU/LALK PHOS 109.0 IU/LTOTAL PROTEIN 7.90 g/
dLALBUMIN 4.70 g/dLTOTAL BILI 0.80 mg/dLCALCIUM 10.40 mg/dLeGFR >60 mL/min/1.73 
[id number] 163.0 mg/dLCHOLESTEROL 138.0 mg/dLHDL 49.0 mg/dLLDL (CALC) 56.0 
mg/dL

 

 2014 8:58 AM  GLUCOSE 86.0 mg/dLSODIUM 134.0 mmol/LPOTASSIUM 4.20 mmol/
LCHLORIDE 99.0 mmol/LCO2 25.0 mmol/LBUN 14.0 mg/dLCREATININE 0.80 mg/dLCALCIUM 
10.10 mg/dLeGFR >60 mL/min/1.73 m2TSH 1.20 uIU/mL

 

 2014 9:50 AM  WBC 5.7 RBC 4.03 HGB 12.90 g/dLHCT 37.90 %MCV 94.0 fLMCH 
32.0 pgMCHC 34.0 g/dLRDW CV 12.70 %MPV 9.70 fLPLT 292 %NEUT 62.70 %%LYMP 21.80 %
%MONO 11.0 %%EOS 3.40 %%BASO 1.10 %#NEUT 3.55 #LYMP 1.23 #MONO 0.62 #EOS 0.19 #
BASO 0.06 GLUCOSE 98.0 mg/dLSODIUM 135.0 mmol/LPOTASSIUM 4.30 mmol/LCHLORIDE 
102.0 mmol/LCO2 22.0 mmol/LBUN 10.0 mg/dLCREATININE 0.80 mg/dLSGOT/AST 34.0 IU/
LSGPT/ALT 32.0 IU/LALK PHOS 95.0 IU/LTOTAL PROTEIN 7.70 g/dLALBUMIN 4.30 g/
dLTOTAL BILI 0.80 mg/dLCALCIUM 9.10 mg/dLeGFR 60 TRIGLYCERIDES 108.0 mg/
dLCHOLESTEROL 146.0 mg/dLHDL 56.0 mg/dLLDL (CALC) 68.0 mg/dLTSH 0.90 uIU/mL

 

 2014 8:40 AM  WBC 4.4 RBC 4.13 HGB 13.20 g/dLHCT 38.90 %MCV 94.0 fLMCH 
32.0 pgMCHC 33.90 g/dLRDW CV 13.50 %MPV 9.80 fLPLT 279 %NEUT 63.80 %%LYMP 24.60 
%%MONO 9.30 %%EOS 1.60 %%BASO 0.70 %#NEUT 2.80 #LYMP 1.08 #MONO 0.41 #EOS 0.07 #
BASO 0.03 GLUCOSE 96.0 mg/dLSODIUM 136.0 mmol/LPOTASSIUM 4.10 mmol/LCHLORIDE 
99.0 mmol/LCO2 23.0 mmol/LBUN 8.0 mg/dLCREATININE 0.80 mg/dLSGOT/AST 31.0 IU/
LSGPT/ALT 27.0 IU/LALK PHOS 85.0 IU/LTOTAL PROTEIN 7.60 g/dLALBUMIN 4.40 g/
dLTOTAL BILI 0.80 mg/dLCALCIUM 10.20 mg/dLeGFR 60 TRIGLYCERIDES 61.0 mg/
dLCHOLESTEROL 148.0 mg/dLHDL 71.0 mg/dLLDL (CALC) 65.0 mg/dLTSH 0.990 uIU/mL

 

 2015 8:32 AM  WBC 4.8 RBC 3.98 HGB 12.70 g/dLHCT 37.30 %MCV 94.0 fLMCH 
31.90 pgMCHC 34.0 g/dLRDW CV 12.70 %MPV 9.50 fLPLT 270 %NEUT 57.30 %%LYMP 25.0 %
%MONO 13.0 %%EOS 3.60 %%BASO 1.10 %#NEUT 2.73 #LYMP 1.19 #MONO 0.62 #EOS 0.17 #
BASO 0.05 TSH 0.640 uIU/mLGLUCOSE 90.0 mg/dLSODIUM 136.0 mmol/LPOTASSIUM 4.0 
mmol/LCHLORIDE 101.0 mmol/LCO2 24.0 mmol/LBUN 10.0 mg/dLCREATININE 0.80 mg/
dLSGOT/AST 34.0 IU/LSGPT/ALT 32.0 IU/LALK PHOS 92.0 IU/LTOTAL PROTEIN 7.50 g/
dLALBUMIN 4.40 g/dLTOTAL BILI 0.70 mg/dLCALCIUM 9.50 mg/dLeGFR >60 mL/min/1.73 
[id number] 107.0 mg/dLCHOLESTEROL 138.0 mg/dLHDL 58.0 mg/dLLDL (CALC) 59.0 
mg/dL

 

 2015 8:31 AM  WBC 4.6 RBC 3.97 HGB 12.90 g/dLHCT 38.0 %MCV 96.0 fLMCH 
32.50 pgMCHC 33.90 g/dLRDW CV 12.60 %MPV 9.0 fLPLT 248 %NEUT 61.0 %%LYMP 24.70 %
%MONO 10.20 %%EOS 3.0 %%BASO 1.10 %#NEUT 2.82 #LYMP 1.14 #MONO 0.47 #EOS 0.14 #
BASO 0.05 TRIGLYCERIDES 105.0 mg/dLCHOLESTEROL 141.0 mg/dLHDL 58.0 mg/dLLDL (
CALC) 62.0 mg/dLGLUCOSE 93.0 mg/dLSODIUM 136.0 mmol/LPOTASSIUM 4.10 mmol/
LCHLORIDE 101.0 mmol/LCO2 25.0 mmol/LBUN 9.0 mg/dLCREATININE 0.80 mg/dLSGOT/AST 
32.0 IU/LSGPT/ALT 28.0 IU/LALK PHOS 95.0 IU/LTOTAL PROTEIN 7.30 g/dLALBUMIN 
4.50 g/dLTOTAL BILI 0.80 mg/dLCALCIUM 9.70 mg/dLeGFR >60 mL/min/1.73mTSH 1.10 
uIU/mL







History Of Immunizations







 Name  Date Admin  Mfg Name  Mfg Code  Trade Name  Lot#  Route  Inj  Vis Given  
Vis Pub  CVX

 

 Influenza  10/20/2010  [employer] pasteur  [employer]  Fluzone  [id number]  Intramuscular  
Left Arm  10/20/2010  2010  999

 

 Influenza  10/28/2011  [employer] pasteur  [employer]  Fluzone  [id number]  Intramuscular  
Left Arm  10/28/2011  2011  141

 

 Influenza  10/29/2012  sanofi pasteur  [employer]  Fluzone  [id number]  Intramuscular  
Left Deltoid  10/29/2012  2012  141

 

 X  12/10/2012  [employer]  MSD  Pneumovax 23  [id number]  Intramuscular  
Left Gluteous Medius  12/10/2012  2010  33

 

 Influenza  10/28/2013  sanofi pasteur  [employer]  Fluzone > 3 Years  [id number]  
Intramuscular  Right Deltoid  10/28/2013  2013  141

 

 X  9/2/2015  Not Entered  NE  Prevnar 13     Not Entered  Not Entered  1  109

 

 Influenza  2015  Not Entered  NE  Not Entered     Not Entered  Not Entered
  2015  141

 

 Zostavax  2012  Not Entered  NE  Not Entered     Not Entered  Not 
Entered  1  121

 

 Tdap  2012  Not Entered  NE  Not Entered     Not Entered  Not Entered  1  115

 

 HepB Adult  2016  [employer]  MSD  Recombivax Adult  [id number]  
Intramuscular  Left Deltoid  2016  43







History of Past Illness







 Name  Date of Onset  Comments

 

 Benign Essential Hypertension  Dec 14 2009 10:10AM   

 

 Hyperlipidemia  Dec 14 2009 10:10AM   

 

 Hypothyroidism, Acquired  Dec 14 2009 10:10AM   

 

 hypothyroid      

 

 Hypertension      

 

 Hyperlipidemia      

 

 acid reflux      

 

 Hypertension, Benign Essential  2010  9:41AM   

 

 Hypertension, Benign Essential  2010 12:53PM   

 

 Routine gynecological examination  May  5 2010  9:28AM   

 

 Hypertension  May  5 2010  9:28AM   

 

 Hyperlipidemia, unspecified  May  5 2010  9:28AM   

 

 Hypothyroidism, Acquired  May  5 2010  9:28AM   

 

 Vulvovaginitis  May  5 2010  9:28AM   

 

 Rhinitis, Allergic  May  5 2010  9:28AM   

 

 Hypertension, Benign Essential  May 19 2010  8:48AM   

 

 Hypertension, Benign Essential  May 25 2010  9:39AM   

 

 Flu  Oct 20 2010 12:01PM   

 

 Essential Hypertension  2010  8:34AM   

 

 Hyperlipidemia  2010  8:34AM   

 

 Gastroesophageal Reflux  2010  8:34AM   

 

 Hypothyroidism, Acquired  2010  8:34AM   

 

 Hypertension, Benign Essential  2010  9:22AM   

 

 Hypertension, Benign Essential  Dec 15 2010  9:07AM   

 

 Essential Hypertension  2011  1:31PM   

 

 Hyperlipidemia  2011  1:31PM   

 

 Gastroesophageal Reflux  2011  1:31PM   

 

 Hypothyroidism, Acquired  2011  1:31PM   

 

 Essential Hypertension  2011  8:51AM   

 

 Hyperlipidemia  2011  8:51AM   

 

 Gastroesophageal Reflux  2011  8:51AM   

 

 Hypothyroidism, Acquired  2011  8:51AM   

 

 Essential Hypertension  2011  9:13AM   

 

 Hyperlipidemia  2011  9:13AM   

 

 Gastroesophageal Reflux  2011  9:13AM   

 

 Hypothyroidism, Acquired  2011  9:13AM   

 

 Nausea With Vomiting  2011  1:18PM   

 

 Hyponatremia  2011  8:46AM   

 

 Nausea  2011  9:13AM   

 

 Hypothyroidism  2011 11:10AM   

 

 Hyponatremia  2011 11:10AM   

 

 Essential Hypertension  2011 10:05AM   

 

 Hyperlipidemia  2011 10:05AM   

 

 Gastroesophageal Reflux  2011 10:05AM   

 

 Nausea  2011 10:05AM   

 

 Hypothyroidism, Acquired  2011 10:05AM   

 

 Hyponatremia  May  6 2011 11:34AM   

 

 Essential Hypertension  May 16 2011  8:50AM   

 

 Hyperlipidemia  May 16 2011  8:50AM   

 

 Gastroesophageal Reflux  May 16 2011  8:50AM   

 

 Nausea  May 16 2011  8:50AM   

 

 Hypothyroidism, Acquired  May 16 2011  8:50AM   

 

 Hyponatremia  May 16 2011  8:50AM   

 

 Routine gynecological examination  2011  8:54AM   

 

 Hypertension  2011  8:54AM   

 

 Hyperlipidemia, unspecified  2011  8:54AM   

 

 Hypothyroidism, Acquired  2011  8:54AM   

 

 Rhinitis, Allergic  2011  8:54AM   

 

 Hypothyroidism  Aug 29 2011 12:37PM   

 

 Hyponatremia  Aug 29 2011 12:37PM   

 

 Essential Hypertension  Sep 12 2011  8:53AM   

 

 Hyperlipidemia  Sep 12 2011  8:53AM   

 

 Gastroesophageal Reflux  Sep 12 2011  8:53AM   

 

 Hypothyroidism, Acquired  Sep 12 2011  8:53AM   

 

 Hyponatremia  Sep 12 2011  8:53AM   

 

 Hypertension  Sep 29 2011  9:41AM   

 

 Hyperlipidemia  Dec  8 2011  8:31AM   

 

 Hypothyroidism  Dec  8 2011  8:31AM   

 

 Hypertension  Dec  8 2011  8:31AM   

 

 Flu  Dec  9 2011 10:02AM   

 

 Essential Hypertension  Dec 13 2011 10:13AM   

 

 Hyperlipidemia  Dec 13 2011 10:13AM   

 

 Gastroesophageal Reflux  Dec 13 2011 10:13AM   

 

 Hypothyroidism, Acquired  Dec 13 2011 10:13AM   

 

 Hyponatremia  Dec 13 2011 10:13AM   

 

 Vaginal Discharge  2012  9:43AM   

 

 Rhinitis, Allergic  Feb 15 2012  9:31AM   

 

 Eustachian Tube Dysfunction  Feb 15 2012  9:31AM   

 

 Pharyngitis, Acute  Feb 15 2012  9:31AM   

 

 Routine gynecological examination  2012  8:48AM   

 

 Hypertension  2012  8:48AM   

 

 Hyperlipidemia, unspecified  2012  8:48AM   

 

 Hypothyroidism, Acquired  2012  8:48AM   

 

 Rhinitis, Allergic  2012  8:48AM   

 

 Candidiasis, Vulvovaginal  2012 11:04AM   

 

 Essential Hypertension  Aug 15 2012  9:02AM   

 

 Hyperlipidemia  Aug 15 2012  9:02AM   

 

 Gastroesophageal Reflux  Aug 15 2012  9:02AM   

 

 Hypothyroidism, Acquired  Aug 15 2012  9:02AM   

 

 Hyponatremia  Aug 15 2012  9:02AM   

 

 Atrophic Vaginitis, Postmenopausal  Aug 15 2012  9:02AM   

 

 Flu  Oct 29 2012  9:24AM   

 

 Essential Hypertension  Dec 10 2012  8:35AM   

 

 Hyperlipidemia  Dec 10 2012  8:35AM   

 

 Gastroesophageal Reflux  Dec 10 2012  8:35AM   

 

 Hypothyroidism, Acquired  Dec 10 2012  8:35AM   

 

 Hyponatremia  Dec 10 2012  8:35AM   

 

 Atrophic Vaginitis, Postmenopausal  Dec 10 2012  8:35AM   

 

 Pneumococcus  Dec 10 2012  2:07PM   

 

 Vaginal Discharge  Dec 20 2012  1:50PM   

 

 Essential Hypertension  Dec 20 2012  1:50PM   

 

 Hyperlipidemia  Dec 20 2012  1:50PM   

 

 Gastroesophageal Reflux  Dec 20 2012  1:50PM   

 

 Hypothyroidism, Acquired  Dec 20 2012  1:50PM   

 

 Atrophic Vaginitis, Postmenopausal  Dec 20 2012  1:50PM   

 

 Upper Respiratory Infections  2013  8:52AM   

 

 Hyperlipidemia  2013  8:21AM   

 

 Hypertension  2013  8:21AM   

 

 Hypothyroidism  2013  8:21AM   

 

 Screening Mammogram  Beto 10 2013  8:45AM   

 

 Routine gynecological examination  Beto 10 2013  8:34AM   

 

 Hypertension  Beto 10 2013  8:34AM   

 

 Hyperlipidemia, unspecified  Beto 10 2013  8:34AM   

 

 Hypothyroidism, Acquired  Beto 10 2013  8:34AM   

 

 Rhinitis, Allergic  Beto 10 2013  8:34AM   

 

 Flu  Oct 28 2013  8:52AM   

 

 Essential Hypertension  Dec  9 2013  8:37AM   

 

 Hyperlipidemia  Dec  9 2013  8:37AM   

 

 Gastroesophageal Reflux  Dec  9 2013  8:37AM   

 

 Hypothyroidism, Acquired  Dec  9 2013  8:37AM   

 

 Atrophic Vaginitis, Postmenopausal  Dec  9 2013  8:37AM   

 

 Hypertension  2014  9:56AM   

 

 Hyperlipidemia  2014  9:56AM   

 

 Hypothyroidism  2014  9:56AM   

 

 Screening Mammogram  2014  8:32AM   

 

 Osteopenia  2014  8:32AM   

 

 Hypertension  2014  8:35AM   

 

 Hypothyroidism, Acquired  2014  8:35AM   

 

 Hyperlipidemia  2014  8:35AM   

 

 Upper Respiratory Infections  2014  9:28AM   

 

 Sinusitis, Acute  Oct  2 2014  9:17AM   

 

 Herpes Zoster  Oct  5 2014  1:44PM   

 

 Vesicular Eruption  Oct  5 2014  1:44PM   

 

 Hypertension  2014  8:18AM   

 

 Hyperlipidemia  2014  8:18AM   

 

 hypothyroid  2014  8:18AM   

 

 Situational anxiety  Dec 20 2014  9:33AM   

 

 GERD (gastroesophageal reflux disease)  Dec 20 2014  9:33AM   

 

 Nausea  Dec 20 2014  9:33AM   

 

 Abdominal Pain, Epigastric  Dec 20 2014  9:33AM   

 

 Hyperlipidemia  Oct  6 2014  9:03AM   

 

 acid reflux  Oct  6 2014  9:03AM   

 

 hypothyroid  Oct  6 2014  9:03AM   

 

 Shingles  Oct  6 2014  9:03AM   

 

 Pharyngitis  2015  1:09PM   

 

 Bronchitis  2015  9:10AM   

 

 Hyperlipidemia  2015  9:10AM   

 

 acid reflux  2015  9:10AM   

 

 hypothyroid  2015  9:10AM   

 

 Hyperlipidemia  2015  8:18AM   

 

 Hypertension  2015  8:18AM   

 

 hypothyroid  2015  8:18AM   

 

 Screening Mammogram  2015 11:43AM   

 

 Medicare Annual Pap Q 2 years  2015  9:36AM   

 

 Screening Mammogram  2015  9:36AM   

 

 Candidiasis of female genitalia  2015  9:36AM   

 

 Hypertension  2015 11:34AM   

 

 Hyperlipidemia, unspecified  2015 11:34AM   

 

 Hypothyroidism, Acquired  2015 11:34AM   

 

 Osteopenia  2016  8:41AM   

 

 Encounter for screening mammogram for breast cancer  2016  8:41AM   

 

 Hypertension  2016  8:34AM   

 

 Lymphedema  2016  8:34AM   

 

 Hyperlipidemia  2016  8:34AM   

 

 hypothyroid  2016  8:34AM   

 

 HEP B  2016  9:13AM   

 

 HEP B  2016  8:52AM   







Payers







 Insurance Name  Company Name  Plan Name  Plan Number  Policy Number  Policy 
Group Number  Start Date

 

    Medicare Part A  Medicare [facility]     [id number]     N/A

 

    [employer]  [facility]     [id number]     2009

 

    Medicare Part B  [place]     [id number]     N/A

 

    Medicare Part A  Medicare Part A     [id number]     N/A

 

    Medicare Part A  Medicare P A - Preventive     [id number]     N/A

 

    Medicare Part A  Medicare - Lab/Xray     [id number]     N/A







History of Encounters







 Visit Date  Visit Type  Provider

 

 2016  Nurse visit  Doris Noriega MD

 

 2016  Office visit  Doris Noriega MD

 

 2015  Office visit  Doris Noriega MD

 

 2015  Office visit  Doris Noriega MD

 

 2015  Office visit  Prince Noe APRN

 

 2014  Office visit  Anyi Herrera APRN

 

 10/27/2014  Voided  Doris Noriega MD

 

 10/6/2014  Office visit  Doris Noriega MD

 

 10/5/2014  Office visit  Anyi Herrera APRN

 

 10/2/2014  Office visit   

 

 10/2/2014  Office visit  Corrine Phu APRN

 

 2014  Office visit  Kassie GILBERTBryce Franklin APRN

 

 2014  Office visit  Doris Noriega MD

 

 2014  Office visit  Doris Noriega MD

 

 2013  Office visit  Dee Colindres MD

 

 10/28/2013  Nurse visit  Dee Colindres MD

 

 6/10/2013  Office visit  Dee Colindres MD

 

 2013  Office visit  Corrine Bay APRN

 

 2012  Office visit  Dee Colindres MD

 

 12/10/2012  Office visit  Dee Colindres MD

 

 10/29/2012  Nurse visit  Dee Colindres MD

 

 8/15/2012  Office visit  Dee Colindres MD

 

 2012  Office visit  Corrine Bay APRN

 

 2012  Office visit  Dee Colindres MD

 

 2/15/2012  Office visit  Dee Colindres MD

 

 2012  Office visit  Corrine Bay APRN

 

 2011  Office visit  Dee Colindres MD

 

 10/28/2011  Nurse visit  Shad Nolasco MD

 

 2011  Office visit  Dee Colindres MD

 

 2011  Office visit  Dee Colindres MD

 

 2011  Office visit  Dee Colindres MD

 

 2011  Office visit  Dee Colindres MD

 

 2011  Office visit  Dee Colindres MD

 

 2011  Office visit  Dee Colindres MD

 

 4/10/2011  [facility]  KHRIS Granados MD

 

 4/10/2011  [facility]  RICKEY Toussaint MD

 

 2011  Office visit  RICKEY Toussaint MD

 

 2011  [facility]  Fide Castellanos MD

 

 2011  Voided  Fide Castellanos MD

 

 2011  Office visit  Dee Colindres MD

 

 12/15/2010  Nurse visit  Dee Colindres MD

 

 2010  Office visit  Dee Colindres MD

 

 10/20/2010  Nurse visit  Stephenie PERAZA

 

 2010  Nurse visit  Dee Colindres MD

 

 2010  Nurse visit  Dee Colindres MD

 

 2010  Office visit  Dee Colindres MD

 

 3/19/2010  Voided  Stephenie PERAZA

 

 2010  Nurse visit  Stephenie PERAZA

 

 2010  Nurse visit  Stephenie PERAZA

 

 2010  Nurse visit  Stephenie PERAZA

 

 2009  Office visit  Stephenie PERAZA

 

 10/20/2009  Nurse visit  Stephenie PERAZA

## 2017-07-13 NOTE — XMS REPORT
MU2 Ambulatory Summary

 Created on: 10/13/2016



Milady Crespo

External Reference #: [id number]

: 1945

Sex: Female



Demographics







 Address  [address]

[place]  [address]

 

 Home Phone  [phone]

 

 Preferred Language  English

 

 Marital Status  

 

 Buddhism Affiliation  Unknown

 

 Race  White

 

 Ethnic Group  Not  or 





Author







 Author  Doris Noriega

 

 Organization  [facility]

 

 Address  [address]

[place], [place]  [id number]



 

 Phone  [phone]







Care Team Providers







 Care Team Member Name  Role  Phone

 

 Doris Noriega  PCP  [phone]







Allergies and Adverse Reactions







 Name  Reaction  Notes

 

 NO KNOWN DRUG ALLERGIES      







Plan of Treatment







 Planned Activity  Comments  Planned Date  Planned Time  Plan/Goal

 

 COMPLETE CBC W/AUTO DIFF WBC     2016  12:00 AM   

 

 COMPREHEN METABOLIC PANEL     2016  12:00 AM   

 

 VITAMIN D 25 HYDROXY     2016  12:00 AM   

 

 COMPLETE CBC W/AUTO DIFF WBC     10/13/2016  12:00 AM   

 

 COMPREHEN METABOLIC PANEL     10/13/2016  12:00 AM   

 

 ASSAY THYROID STIM HORMONE     10/13/2016  12:00 AM   

 

 LIPID PANEL     10/13/2016  12:00 AM   

 

 LIPID PANEL     8/15/2012  12:00 AM   

 

 COMPREHEN METABOLIC PANEL     6/10/2013  12:00 AM   

 

 LIPID PANEL     6/10/2013  12:00 AM   

 

 ASSAY THYROID STIM HORMONE     6/10/2013  12:00 AM   

 

 COMPLETE CBC W/AUTO DIFF WBC     6/10/2013  12:00 AM   

 

 COMPREHEN METABOLIC PANEL     2012  12:00 AM   

 

 LIPID PANEL     2012  12:00 AM   

 

 ASSAY THYROID STIM HORMONE     2012  12:00 AM   

 

 COMPLETE CBC W/AUTO DIFF WBC     2012  12:00 AM   

 

 MAMMOGRAM SCREENING     2015  12:00 AM   







Medications







 Active 

 

 Name  Start Date  Estimated Completion Date  SIG  Comments

 

 aspirin 81 mg oral tablet,delayed release (DR/EC)        take 1 tablet (81 mg) 
by oral route once daily   

 

 Vitamin D3 1,000 unit oral capsule        take 1 capsule by oral route daily   

 

 famotidine 20 mg oral tablet  2015     TAKE ONE TABLET BY MOUTH TWICE 
DAILY   

 

 valsartan 160 mg oral tablet  2016     TAKE ONE TABLET BY MOUTH ONCE 
DAILY   

 

 amlodipine 5 mg oral tablet  2016     TAKE ONE TABLET BY MOUTH ONCE DAILY
   

 

 Allergy Relief (loratadine) 10 mg oral tablet  2016     TAKE ONE TABLET 
BY MOUTH ONCE DAILY   

 

 triamcinolone acetonide 0.1 % topical cream  3/14/2016     APPLY A THIN LAYER 
OF CREAM EXTERNALLY TO AFFECTED AREA TWICE DAILY FOR 14 DAYS   

 

 amlodipine 5 mg oral tablet  2016     TAKE ONE TABLET BY MOUTH ONCE DAILY
   

 

 Allergy Relief (loratadine) 10 mg oral tablet  2016     TAKE ONE TABLET 
BY MOUTH ONCE DAILY   

 

 valsartan 160 mg oral tablet  2016     TAKE ONE TABLET BY MOUTH ONCE 
DAILY   

 

 pantoprazole 40 mg oral tablet,delayed release (DR/EC)  10/13/2016  10/8/2017  
take 1 tablet (40 mg) by oral route once daily for 90 days   

 

 amlodipine 5 mg oral tablet  10/13/2016  10/8/2017  TAKE ONE TABLET BY MOUTH 
ONCE DAILY for 90 days   

 

 atenolol 50 mg oral tablet  10/13/2016  10/8/2017  take 1 tablet (50 mg) by 
oral route once daily   

 

 clorazepate dipotassium 7.5 mg oral tablet  10/13/2016  2016  take 1/2 
to1 tablet PO up to three times per day for situational anxiety   

 

 levothyroxine 50 mcg oral tablet  10/13/2016  10/8/2017  TAKE ONE TABLET BY 
MOUTH ONCE DAILY for 90 days   

 

 Lipitor 20 mg oral tablet  10/13/2016  2018  take 1 tablet (20 mg) by oral 
route once daily   

 

 loratadine 10 mg oral tablet  10/13/2016  10/8/2017  TAKE 1 TABLET BY MOUTH 
EVERY DAY IN THE EVENING   

 

 Plavix 75 mg oral tablet  10/13/2016  2018  take 1 tablet (75 mg) by oral 
route once daily   

 

 valsartan 160 mg oral tablet  10/13/2016  10/8/2017  TAKE ONE TABLET BY MOUTH 
ONCE DAILY for 90 days   

 

 Zofran (as hydrochloride) 4 mg oral tablet  10/13/2016  10/27/2016  take 1 
tablet by oral route daily as needed for 14 days   









  

 

 Name  Start Date  Expiration Date  SIG  Comments

 

 prednisone 20 mg oral tablet  2011  take 2 tablets by oral 
route daily for 5 days   

 

 calcium carbonate-vitamin D2 600-125 mg-unit oral tablet  8/15/2012  2012
  take 2 tablets by oral route daily   

 

 [place] 3 Fish Oil 900-1,400 mg oral capsule,delayed release(DR/EC)  8/15/2012  9
/  take 1 capsule by oral route daily   

 

 multivitamin Oral tablet  8/15/2012  2012  take 1 tablet by oral route 
daily   

 

 triamcinolone acetonide 0.1 % topical cream  2013  10/7/2013  APPLY A 
THIN LAYER TO THE AFFECTED AREA(S) BY TOPICAL ROUTE TWICE A DAY   

 

 famotidine 20 mg oral tablet  2014  take 1 tablet (20 mg) by 
oral route 2 times per day   

 

 amoxicillin 500 mg oral capsule  2014  take 1 capsule (500 mg) 
by oral route 3 times per day for 10 days   

 

 Zithromax Z-Tab 250 mg oral tablet  10/2/2014  10/7/2014  take 2 tablets (500 
mg) by oral route once daily for 1 day then 1 tablet (250 mg) by oral route 
once daily for 4 days   

 

 acyclovir 800 mg oral tablet  10/5/2014  10/12/2014  take 1 tablet (800 mg) by 
oral route 5 times per day for 7 days   

 

 gabapentin 300 mg oral capsule  10/9/2014  2014  take 1 capsule (300 mg) 
by oral route 3 times per day for 14 days   

 

 Carafate 100 mg/mL oral suspension  2014  take 10 milliliters 
(1 gram) by oral route 4 times per day on an empty stomach 1 hour before meals 
and at bedtime for 4 weeks   

 

 amlodipine 5 mg oral tablet  2015  TAKE ONE TABLET BY MOUTH 
EVERY DAY   

 

 levothyroxine 50 mcg oral tablet  2015  TAKE ONE TABLET BY MOUTH 
EVERY DAY   

 

 Diovan 160 mg oral tablet  2015  2/15/2016  TAKE ONE TABLET BY MOUTH 
EVERY DAY for 90 days   

 

 triamcinolone acetonide 0.1 % topical cream  2015  apply a thin 
layer to the affected area(s) by topical route 2 times per day for 14 days   

 

 fluconazole 150 mg oral tablet  2015  take 1 tablet (150 mg) 
by oral route once for 1 day   









 Discontinued 

 

 Name  Start Date  Discontinued Date  SIG  Comments

 

 Lipitor 40 mg oral tablet     2009  1/2 TAB DAILY   

 

 ramipril 5 mg oral capsule     2009  take 1 capsule (5 mg) by oral route 
once daily   

 

 lovastatin 20 mg oral tablet  2009  take 1 tablet (20 mg) by 
oral route once daily with evening meal   

 

 propranolol 20 mg oral tablet  2010  take 1 tablet by oral 
route 2 times a day   

 

 Fosamax 70 mg oral tablet  2010  take 1 tablet (70 mg) by oral 
route once weekly in the morning, at least 30 minutes before the first food, 
beverage, or medication of the day   

 

 lisinopril 40 mg oral tablet  2010  4/10/2011  take 2 tablets by oral 
route daily   

 

 Zoloft 50 mg oral tablet  2011  take 1 tablet (50 mg) by oral 
route once daily   

 

 promethazine 25 mg oral tablet  2011  take 1 tablet by oral 
route every 6 hours as needed   

 

 Diovan -12.5 mg oral tablet  2011  take 1 tablet by oral 
route once daily for 30 days   

 

 Diflucan 150 mg oral tablet     2012  take 1 tablet (150 mg) by oral 
route once for 1 day   

 

 Diflucan 150 mg oral tablet  2012  8/15/2012  take 1 tablet (150 mg) by 
oral route once   

 

 Vitamin B-12 1,000 mcg oral tablet  8/15/2012  2014  take 1 tablet by 
oral route daily   

 

 Premarin 0.625 mg/gram vaginal cream  10/29/2012  6/10/2013  use 1 gram daily 
for a week then 1 gram twice a week   

 

 Medrol (Tab) 4 mg oral tablets,dose pack  2013  6/10/2013  take as 
directed   

 

 aspirin 325 mg oral tablet  2014  take 1 tablet (325 mg) by 
oral route once daily   

 

 Medrol (Tab) 4 mg oral tablets,dose pack  2014  10/2/2014  take as 
directed   

 

 Tetracaine Lollipops Lollipop  2015  Use as directed   

 

 Zoloft 50 mg oral tablet  2016  take 1 tablet (50 mg) by oral 
route once daily for 90 days   

 

 Zoloft 50 mg oral tablet  2016  take 1 tablet (50 mg) by oral 
route once daily for 90 days  Pt refuses to take...







Problem List







 Description  Status  Onset

 

 Hyperlipidemia  Active   

 

 acid reflux  Active   

 

 Hypertension  Active   

 

 hypothyroid  Active   







Vital Signs







 Date  Time  BP-Sys(mm[Hg]  BP-Morena(mm[Hg])  HR(bpm)  RR(rpm)  Temp  WT  HT  HC  
BMI  BSA  BMI Percentile  O2 Sat(%)

 

 10/13/2016  2:26:00 PM  128 mmHg  78 mmHg  77 bpm  16 rpm  98.4 F  139.25 lbs  
61 in     26.31 kg/m2  1.65 m2     100 %

 

 2016  8:29:00 AM  122 mmHg  70 mmHg  72 bpm  16 rpm  97.8 F  137.125 lbs  
61 in     25.9093 kg/m  1.6361 m     100 %

 

 2015  9:33:00 AM  120 mmHg  68 mmHg  73 bpm     98.7 F  144.375 lbs  61 
in     27.28 kg/m2  1.68 m2     99 %

 

 2015  9:05:00 AM  110 mmHg  75 mmHg  85 bpm  16 rpm  96.7 F  144.375 lbs  
61 in     27.2791 kg/m  1.6788 m     99 %

 

 2015  1:05:00 PM  108 mmHg  62 mmHg  78 bpm  18 rpm  96.9 F  142.375 lbs  
61 in     26.90 kg/m2  1.67 m2     100 %

 

 2014  9:31:00 AM  126 mmHg  66 mmHg  79 bpm  18 rpm  98.7 F  149 lbs  61 
in     28.153 kg/m  1.7055 m     98 %

 

 10/6/2014  9:02:00 AM  110 mmHg  74 mmHg  94 bpm  18 rpm  98.1 F  145.4 lbs  
61 in     27.47 kg/m2  1.68 m2     97 %

 

 10/2/2014  9:14:00 AM  124 mmHg  74 mmHg  79 bpm  18 rpm  98 F  147.25 lbs  61 
in     27.8224 kg/m  1.6955 m     98 %

 

 2014  9:22:00 AM  124 mmHg  76 mmHg  89 bpm  18 rpm  98.1 F  148 lbs  61 
in     27.96 kg/m2  1.70 m2     100 %

 

 2014  8:27:00 AM  118 mmHg  65 mmHg  74 bpm  16 rpm  97.7 F  148 lbs  61 
in     27.9641 kg/m  1.6998 m     99 %

 

 2014  9:48:00 AM  125 mmHg  70 mmHg  93 bpm  18 rpm  96.7 F  156 lbs  61 
in     29.48 kg/m2  1.75 m2     100 %

 

 2013  8:35:00 AM  122 mmHg  74 mmHg  84 bpm  16 rpm  97.9 F  155.375 lbs 
                99 %

 

 6/10/2013  8:30:00 AM  130 mmHg  82 mmHg  79 bpm  16 rpm  97.9 F  161 lbs     
            98 %

 

 2013  8:50:00 AM  124 mmHg  68 mmHg  66 bpm  18 rpm  97.6 F  157.5 lbs  
61 in     29.7591 kg/m  1.7535 m      

 

 2012  1:46:00 PM  120 mmHg  72 mmHg  75 bpm  16 rpm  97.6 F  156.125 lbs
                 98 %

 

 12/10/2012  8:32:00 AM  122 mmHg  82 mmHg  70 bpm  16 rpm  97.3 F  157 lbs    
             99 %

 

 8/15/2012  9:00:00 AM  130 mmHg  76 mmHg  86 bpm  16 rpm  97.4 F  159 lbs     
            100 %

 

 2012  11:02:00 AM  128 mmHg  64 mmHg  66 bpm  18 rpm  97.4 F  162.125 lbs
  61 in     30.6329 kg/m  1.7791 m      

 

 2012  8:45:00 AM  130 mmHg  70 mmHg  82 bpm  16 rpm  98.2 F  160.125 lbs 
                100 %

 

 2/15/2012  9:29:00 AM  122 mmHg  80 mmHg  86 bpm  16 rpm  97.4 F  159.375 lbs  
61 in     30.1133 kg/m  1.7639 m     99 %

 

 2012  9:41:00 AM  132 mmHg  74 mmHg  66 bpm  18 rpm  98.4 F  160.375 lbs  
61 in     30.30 kg/m2  1.77 m2      

 

 2011  10:08:00 AM  134 mmHg  82 mmHg  80 bpm  16 rpm  98 F  160 lbs  61 
in     30.2314 kg/m  1.7674 m     99 %

 

 2011  3:56:00 PM  130 mmHg  80 mmHg                              

 

 2011  8:55:00 AM  130 mmHg  74 mmHg  88 bpm  16 rpm  98.2 F  158.25 lbs  
               98 %

 

 2011  8:54:00 AM  136 mmHg  82 mmHg  102 bpm  16 rpm  98.1 F  153.5 lbs   
              99 %

 

 2011  8:49:00 AM  122 mmHg  88 mmHg  88 bpm  16 rpm  98.6 F  152.25 lbs  
               99 %

 

 2011  10:02:00 AM  140 mmHg  82 mmHg  96 bpm  20 rpm  98.8 F             
       100 %

 

 2011  9:14:00 AM  156 mmHg  98 mmHg  110 bpm  24 rpm  98.5 F  153 lbs    
             100 %

 

 2011  8:50:00 AM  160 mmHg  84 mmHg  100 bpm  16 rpm  98.7 F  155 lbs    
             100 %

 

 2011  1:25:00 PM  152 mmHg  100 mmHg  82 bpm  16 rpm  97.2 F  156.375 lbs 
                100 %

 

 2011  9:55:00 AM  144 mmHg  90 mmHg                              

 

 2010  9:24:00 AM  138 mmHg  84 mmHg                              

 

 2010  8:58:00 AM  160 mmHg  94 mmHg                              

 

 2010  8:33:00 AM  142 mmHg  90 mmHg  100 bpm  16 rpm  98.1 F  158.375 
lbs                  

 

 2010  9:24:00 AM  160 mmHg  102 mmHg  88 bpm  18 rpm  99 F  157.125 lbs  
62 in     28.74 kg/m2  1.7657 m      

 

 3/19/2010  11:01:00 AM  140 mmHg  90 mmHg                              

 

 2009  10:08:00 AM  142 mmHg  86 mmHg  72 bpm  16 rpm  98.1 F  154.125 
lbs  61 in     29.12 kg/m2  1.73 m2      







Social History







 Name  Description  Comments

 

       

 

 Children      

 

 lives alone      

 

 Supervisor      

 

 Tobacco  Never smoker   







History of Procedures







 Date Ordered  Description  Order Status

 

 2011 12:00 AM  COMPREHEN METABOLIC PANEL  Reviewed

 

 2011 12:00 AM  ASSAY THYROID STIM HORMONE  Reviewed

 

 2011 12:00 AM  COMPREHEN METABOLIC PANEL  Reviewed

 

 2011 12:00 AM  LIPID PANEL  Reviewed

 

 2011 12:00 AM  ASSAY THYROID STIM HORMONE  Reviewed

 

 2011 12:00 AM  COMPLETE CBC W/AUTO DIFF WBC  Reviewed

 

 2015 12:00 AM  COMPLETE CBC W/AUTO DIFF WBC  Returned

 

 2015 12:00 AM  COMPREHEN METABOLIC PANEL  Returned

 

 2015 12:00 AM  LIPID PANEL  Returned

 

 2015 12:00 AM  ASSAY THYROID STIM HORMONE  Returned

 

 2011 12:00 AM  COMPREHEN METABOLIC PANEL  Reviewed

 

 2011 12:00 AM  COMPREHEN METABOLIC PANEL  Reviewed

 

 2011 12:00 AM  LIPID PANEL  Reviewed

 

 2011 12:00 AM  ASSAY THYROID STIM HORMONE  Reviewed

 

 10/28/2011 12:00 AM  ***Immunization administration, Medicare flu  Reviewed

 

 10/28/2011 12:00 AM  Fluzone ** MEDICARE Only **  Reviewed

 

 2010 11:24 AM  NO CHARGE OV  Reviewed

 

 2010 11:26 AM  NO CHARGE OV  Reviewed

 

 2011 12:00 AM  COMPREHEN METABOLIC PANEL  Reviewed

 

 2011 12:00 AM  LIPID PANEL  Reviewed

 

 2011 12:00 AM  ASSAY THYROID STIM HORMONE  Reviewed

 

 2011 12:00 AM  COMPLETE CBC W/AUTO DIFF WBC  Reviewed

 

 2011 12:00 AM  Wrist Support  Reviewed

 

 2016 12:00 AM  Screening mammography, bilateral  Returned

 

 2016 12:00 AM  DXA BONE DENSITY AXIAL  Returned

 

 2016 12:00 AM  TETANUS VACCINE IM  Reviewed

 

 2016 12:00 AM  HEP B VACC ADULT 3 DOSE IM  Reviewed

 

 2012 12:00 AM  TISSUE EXAM FOR FUNGI  Returned

 

 2012 12:00 AM  SMEAR WET MOUNT SALINE/INK  Returned

 

 2016 12:00 AM  TETANUS VACCINE IM  Reviewed

 

 2016 12:00 AM  HEP B VACC ADULT 3 DOSE IM  Reviewed

 

 2/15/2012 12:00 AM  THER/PROPH/DIAG INJ SC/IM  Reviewed

 

 2/15/2012 12:00 AM  Bicillin CR [facility]807089442[facility]  Reviewed

 

 2/15/2012 12:00 AM  Depo-Medrol 40 mg NDC#6266717291  Reviewed

 

 8/15/2012 12:00 AM  COMPREHEN METABOLIC PANEL  Reviewed

 

 8/15/2012 12:00 AM  ASSAY THYROID STIM HORMONE  Reviewed

 

 8/15/2012 12:00 AM  COMPLETE CBC W/AUTO DIFF WBC  Returned

 

 8/15/2012 12:00 AM  Wrist Support  Reviewed

 

 10/29/2012 12:00 AM  Flu Injection 3 Years And Above NDC# [address]-25  [facility]  
Reviewed

 

 12/10/2012 12:00 AM  IMMUNIZATION ADMIN  Reviewed

 

 12/10/2012 12:00 AM  Pneumovax Injection - [facility]  Reviewed

 

 2012 12:00 AM  TRICHOMONAS ASSAY W/OPTIC  Returned

 

 2013 12:00 AM  THER/PROPH/DIAG INJ SC/IM  Reviewed

 

 2013 12:00 AM  Bicillin CR, 1.2 million units NDC# 35793-885-34  Reviewed

 

 2013 12:00 AM  COMPREHEN METABOLIC PANEL  Returned

 

 2013 12:00 AM  LIPID PANEL  Returned

 

 2013 12:00 AM  COMPLETE CBC W/AUTO DIFF WBC  Returned

 

 2013 12:00 AM  ASSAY THYROID STIM HORMONE  Returned

 

 6/10/2013 12:00 AM  MAMMOGRAM SCREENING  Returned

 

 6/10/2013 12:00 AM  CYTOPATH C/V MANUAL  Returned

 

 12/10/2013 12:00 AM  LIPID PANEL  Returned

 

 12/10/2013 12:00 AM  ASSAY THYROID STIM HORMONE  Returned

 

 12/10/2013 12:00 AM  COMPLETE CBC W/AUTO DIFF WBC  Returned

 

 12/10/2013 12:00 AM  COMPREHEN METABOLIC PANEL  Returned

 

 2010 12:00 AM  CYTOPATH C/V MANUAL  Reviewed

 

 2010 12:00 AM  SMEAR WET MOUNT SALINE/INK  Reviewed

 

 2010 12:00 AM  LIPID PANEL  Reviewed

 

 2010 12:00 AM  ASSAY THYROID STIM HORMONE  Reviewed

 

 2010 12:00 AM  COMPLETE CBC W/AUTO DIFF WBC  Reviewed

 

 2010 12:00 AM  COMPREHEN METABOLIC PANEL  Reviewed

 

 10/28/2013 12:00 AM  Flu Injection 3 Years And Above NDC# [address]-71  [facility]  
Reviewed

 

 2010 8:48 AM  Blood Pressure Check-no charge  Reviewed

 

 2010 12:00 AM  Blood Pressure Check-no charge  Reviewed

 

 2014 12:00 AM  METABOLIC PANEL TOTAL CA  Reviewed

 

 2014 12:00 AM  ASSAY THYROID STIM HORMONE  Reviewed

 

 2014 12:00 AM  ASSAY OF FREE THYROXINE  Reviewed

 

 2014 12:00 AM  MAMMOGRAM SCREENING  Returned

 

 2014 12:00 AM  CT BONE DENSITY AXIAL  Returned

 

 2014 12:00 AM  COMPLETE CBC W/AUTO DIFF WBC  Returned

 

 2014 12:00 AM  COMPREHEN METABOLIC PANEL  Returned

 

 2014 12:00 AM  LIPID PANEL  Returned

 

 2014 12:00 AM  ASSAY THYROID STIM HORMONE  Returned

 

 10/20/2010 12:00 AM  IMMUNIZATION ADMIN  Reviewed

 

 10/20/2010 12:00 AM  FLU VACCINE 3 YRS & > IM  Reviewed

 

 2010 12:00 AM  COMPREHEN METABOLIC PANEL  Reviewed

 

 2010 12:00 AM  LIPID PANEL  Reviewed

 

 2010 12:00 AM  ASSAY THYROID STIM HORMONE  Reviewed

 

 2010 12:00 AM  COMPLETE CBC W/AUTO DIFF WBC  Reviewed

 

 2010 12:00 AM  Blood Pressure Check-no charge  Reviewed

 

 10/2/2014 12:00 AM  THER/PROPH/DIAG INJ SC/IM  Reviewed

 

 10/2/2014 12:00 AM  Kenalog, Per 10 Mg NDC#0689-3859-26  Reviewed

 

 2014 12:00 AM  COMPLETE CBC W/AUTO DIFF WBC  Returned

 

 2014 12:00 AM  COMPREHEN METABOLIC PANEL  Returned

 

 2014 12:00 AM  LIPID PANEL  Returned

 

 2014 12:00 AM  ASSAY THYROID STIM HORMONE  Returned

 

 2015 12:00 AM  Rocephin 1 gram NDC#1733-7036-64  Reviewed

 

 2015 12:00 AM  THER/PROPH/DIAG INJ SC/IM  Reviewed

 

 2011 12:00 AM  COMPREHEN METABOLIC PANEL  Reviewed

 

 2011 12:00 AM  LIPID PANEL  Reviewed

 

 2011 12:00 AM  ASSAY THYROID STIM HORMONE  Reviewed

 

 2011 12:00 AM  COMPLETE CBC W/AUTO DIFF WBC  Reviewed

 

 2011 12:00 AM  METABOLIC PANEL TOTAL CA  Reviewed

 

 2011 12:00 AM  COMPREHEN METABOLIC PANEL  Reviewed

 

 2011 12:00 AM  ASSAY THYROID STIM HORMONE  Reviewed

 

 2011 12:00 AM  COMPLETE CBC W/AUTO DIFF WBC  Reviewed

 

 2011 12:00 AM  ASSAY OF LIPASE  Reviewed

 

 2011 12:00 AM  THER/PROPH/DIAG INJ SC/IM  Reviewed

 

 2011 12:00 AM  Phenergan 50 Mg Im  Bernadine  Reviewed

 

 2011 12:00 AM  METABOLIC PANEL TOTAL CA  Reviewed

 

 2011 12:00 AM  THER/PROPH/DIAG INJ SC/IM  Reviewed

 

 2011 12:00 AM  Phenergan 25 Mg Im  Bernadine  Reviewed

 

 2011 12:00 AM  METABOLIC PANEL TOTAL CA  Reviewed

 

 2011 12:00 AM  ASSAY THYROID STIM HORMONE  Reviewed

 

 2011 12:00 AM  THER/PROPH/DIAG INJ SC/IM  Reviewed

 

 2011 12:00 AM  Phenergan 50 Mg Im  Bernadine  Reviewed

 

 2011 12:00 AM  ASSAY OF SERUM SODIUM  Reviewed

 

 2011 12:00 AM  ASSAY OF SERUM SODIUM  Reviewed

 

 2011 12:00 AM  CYTOPATH C/V MANUAL  Reviewed

 

 2011 12:00 AM  ASSAY THYROID STIM HORMONE  Reviewed

 

 2015 12:00 AM  COMPLETE CBC W/AUTO DIFF WBC  Returned

 

 2015 12:00 AM  COMPREHEN METABOLIC PANEL  Returned

 

 2015 12:00 AM  LIPID PANEL  Returned

 

 2015 12:00 AM  ASSAY THYROID STIM HORMONE  Returned

 

 2015 12:00 AM  MAMMOGRAM SCREENING  Returned

 

 2015 12:00 AM  CYTOPATH TBS C/V MANUAL  Returned







Results Summary







 Data and Description  Results

 

 2010 9:25 AM  Colonoscopy-Women and Men over 50 Abnormal Mammogram -Women 
over 40 Declined Pap Smear Declined 

 

 2010 10:41 AM  WET PREP NO TRICHOMONADS SEEN 

 

 2010 8:15 AM  TRIGLYCERIDES 174.0 mg/dLCHOLESTEROL 175.0 mg/dLHDL 58.0 mg/
dLLDL (CALC) 82.0 mg/dLTSH 0.790 uIU/mLGLUCOSE 95.0 mg/dLSODIUM 136.0 mmol/
LPOTASSIUM 4.50 mmol/LCHLORIDE 99.0 mmol/LCO2 26.0 mmol/LBUN 12.0 mg/
dLCREATININE 0.80 mg/dLSGOT/AST 32.0 IU/LSGPT/ALT 33.0 IU/LALK PHOS 103.0 IU/
LTOTAL PROTEIN 7.60 g/dLALBUMIN 4.60 g/dLTOTAL BILI 0.60 mg/dLCALCIUM 9.80 mg/
dLeGFR >60 mL/min/1.73 m2WBC 5.3 RBC 4.13 HGB 13.10 g/dLHCT 39.20 %MCV 95.0 
fLMCH 31.70 pgMCHC 33.40 g/dLRDW CV 12.70 %MPV 9.80 fLPLT 257 %NEUT 57.90 %%
LYMP 26.50 %%MONO 11.60 %%EOS 3.20 %%BASO 0.80 %#NEUT 3.04 #LYMP 1.39 #MONO 
0.61 #EOS 0.17 #BASO 0.04 

 

 12/15/2010 8:30 AM  TRIGLYCERIDES 157.0 mg/dLCHOLESTEROL 163.0 mg/dLHDL 56.0 mg
/dLLDL (CALC) 76.0 mg/dLTSH 0.650 uIU/mLWBC 6.5 RBC 4.25 HGB 13.60 g/dLHCT 
40.70 %MCV 96.0 fLMCH 32.0 pgMCHC 33.40 g/dLRDW CV 12.60 %MPV 9.90 fLPLT 313 %
NEUT 61.80 %%LYMP 26.20 %%MONO 8.30 %%EOS 3.10 %%BASO 0.60 %#NEUT 4.00 #LYMP 
1.70 #MONO 0.54 #EOS 0.20 #BASO 0.04 GLUCOSE 97.0 mg/dLSODIUM 136.0 mmol/
LPOTASSIUM 4.40 mmol/LCHLORIDE 101.0 mmol/LCO2 26.0 mmol/LBUN 10.0 mg/
dLCREATININE 0.80 mg/dLSGOT/AST 31.0 IU/LSGPT/ALT 34.0 IU/LALK PHOS 92.0 IU/
LTOTAL PROTEIN 8.10 g/dLALBUMIN 4.60 g/dLTOTAL BILI 0.40 mg/dLCALCIUM 10.0 mg/
dLeGFR >60 mL/min/1.73 m2

 

 2011 9:45 AM  GLUCOSE 91.0 mg/dLSODIUM 133.0 mmol/LPOTASSIUM 4.40 mmol/
LCHLORIDE 97.0 mmol/LCO2 24.0 mmol/LBUN 9.0 mg/dLCREATININE 0.70 mg/dLCALCIUM 
10.0 mg/dLeGFR >60 mL/min/1.73 m2

 

 2011 10:25 AM  LIPASE 29.0 U/LTSH 0.10 uIU/mLGLUCOSE 115.0 mg/dLSODIUM 
127.0 mmol/LPOTASSIUM 5.30 mmol/LCHLORIDE 93.0 mmol/LCO2 18.0 mmol/LBUN 9.0 mg/
dLCREATININE 0.70 mg/dLSGOT/AST 62.0 IU/LSGPT/ALT 46.0 IU/LALK PHOS 100.0 IU/
LTOTAL PROTEIN 8.60 g/dLALBUMIN 4.90 g/dLTOTAL BILI 0.60 mg/dLCALCIUM 9.90 mg/
dLeGFR >60 mL/min/1.73 m2WBC 6.9 RBC 4.48 HGB 14.40 g/dLHCT 40.90 %MCV 91.0 
fLMCH 32.10 pgMCHC 35.20 g/dLRDW CV 12.50 %MPV 9.30 fLPLT 260 %NEUT 74.90 %%
LYMP 15.10 %%MONO 9.40 %%EOS 0.30 %%BASO 0.30 %#NEUT 5.15 #LYMP 1.04 #MONO 0.65 
#EOS 0.02 #BASO 0.02 

 

 2011 9:07 AM  GLUCOSE 92.0 mg/dLSODIUM 131.0 mmol/LPOTASSIUM 4.20 mmol/
LCHLORIDE 99.0 mmol/LCO2 22.0 mmol/LBUN 9.0 mg/dLCREATININE 0.70 mg/dLCALCIUM 
9.20 mg/dLeGFR >60 mL/min/1.73 m2

 

 2011 11:06 AM  TSH 0.510 uIU/mLGLUCOSE 99.0 mg/dLSODIUM 132.0 mmol/
LPOTASSIUM 3.50 mmol/LCHLORIDE 95.0 mmol/LCO2 23.0 mmol/LBUN 9.0 mg/
dLCREATININE 0.80 mg/dLCALCIUM 10.40 mg/dLeGFR >60 mL/min/1.73 m2

 

 2011 9:45 AM  SODIUM 132.0 mmol/L

 

 2011 9:27 AM  SODIUM 137.0 mmol/L

 

 2011 8:35 AM  TRIGLYCERIDES 114.0 mg/dLCHOLESTEROL 145.0 mg/dLHDL 56.0 mg/
dLLDL (CALC) 66.0 mg/dLGLUCOSE 105.0 mg/dLSODIUM 138.0 mmol/LPOTASSIUM 4.40 mmol
/LCHLORIDE 103.0 mmol/LCO2 25.0 mmol/LBUN 8.0 mg/dLCREATININE 0.70 mg/dLSGOT/
AST 36.0 IU/LSGPT/ALT 38.0 IU/LALK PHOS 103.0 IU/LTOTAL PROTEIN 7.40 g/
dLALBUMIN 4.30 g/dLTOTAL BILI 0.30 mg/dLCALCIUM 9.20 mg/dLeGFR >60 mL/min/1.73 
m2WBC 5.1 RBC 3.76 HGB 12.0 g/dLHCT 36.10 %MCV 96.0 fLMCH 31.90 pgMCHC 33.20 g/
dLRDW CV 13.10 %MPV 9.40 fLPLT 249 %NEUT 60.40 %%LYMP 25.90 %%MONO 8.90 %%EOS 
4.0 %%BASO 0.80 %#NEUT 3.06 #LYMP 1.31 #MONO 0.45 #EOS 0.20 #BASO 0.04 

 

 2011 9:55 AM  TSH 1.070 uIU/mL

 

 2011 8:55 AM  GLUCOSE 102.0 mg/dLSODIUM 135.0 mmol/LPOTASSIUM 4.20 mmol/
LCHLORIDE 102.0 mmol/LCO2 24.0 mmol/LBUN 15.0 mg/dLCREATININE 0.80 mg/dLSGOT/
AST 30.0 IU/LSGPT/ALT 35.0 IU/LALK PHOS 119.0 IU/LTOTAL PROTEIN 7.50 g/
dLALBUMIN 4.30 g/dLTOTAL BILI 0.30 mg/dLCALCIUM 9.20 mg/dLeGFR >60 mL/min/1.73 
m2TSH 1.130 uIU/mL

 

 2011 9:50 AM  GLUCOSE 85.0 mg/dLSODIUM 133.0 mmol/LPOTASSIUM 4.20 mmol/
LCHLORIDE 101.0 mmol/LCO2 21.0 mmol/LBUN 13.0 mg/dLCREATININE 0.80 mg/dLSGOT/
AST 36.0 IU/LSGPT/ALT 36.0 IU/LALK PHOS 109.0 IU/LTOTAL PROTEIN 7.40 g/
dLALBUMIN 4.20 g/dLTOTAL BILI 0.50 mg/dLCALCIUM 9.40 mg/dLeGFR >60 mL/min/1.73 
m2

 

 2011 8:55 AM  GLUCOSE 98.0 mg/dLSODIUM 137.0 mmol/LPOTASSIUM 3.90 mmol/
LCHLORIDE 103.0 mmol/LCO2 25.0 mmol/LBUN 14.0 mg/dLCREATININE 0.70 mg/dLSGOT/
AST 33.0 IU/LSGPT/ALT 36.0 IU/LALK PHOS 111.0 IU/LTOTAL PROTEIN 8.30 g/
dLALBUMIN 4.40 g/dLTOTAL BILI 0.50 mg/dLCALCIUM 9.40 mg/dLeGFR >60 mL/min/1.73 
[id number] 109.0 mg/dLCHOLESTEROL 153.0 mg/dLHDL 54.0 mg/dLLDL (CALC) 77.0 
mg/dLTSH 1.330 uIU/mL

 

 2011 10:17 AM  Colonoscopy-Women and Men over 50 Declined Mammogram -
Women over 40 Normal Pap Smear Negative 

 

 2012 10:33 AM  WET PREP NO TRICH SEEN CLUE CELLS NONE SEEN 

 

 2012 8:45 AM  WBC 5.2 RBC 3.96 HGB 12.70 g/dLHCT 37.80 %MCV 96.0 fLMCH 
32.10 pgMCHC 33.60 g/dLRDW CV 13.30 %MPV 9.70 fLPLT 297 %NEUT 57.70 %%LYMP 
28.50 %%MONO 10.30 %%EOS 2.50 %%BASO 1.0 %#NEUT 3.02 #LYMP 1.49 #MONO 0.54 #EOS 
0.13 #BASO 0.05 GLUCOSE 97.0 mg/dLSODIUM 137.0 mmol/LPOTASSIUM 4.40 mmol/
LCHLORIDE 101.0 mmol/LCO2 23.0 mmol/LBUN 17.0 mg/dLCREATININE 0.80 mg/dLSGOT/
AST 30.0 IU/LSGPT/ALT 33.0 IU/LALK PHOS 95.0 IU/LTOTAL PROTEIN 7.40 g/dLALBUMIN 
4.40 g/dLTOTAL BILI 0.60 mg/dLCALCIUM 9.70 mg/dLeGFR 60 TRIGLYCERIDES 130.0 mg/
dLCHOLESTEROL 157.0 mg/dLHDL 56.0 mg/dLLDL (CALC) 75.0 mg/dLTSH 0.940 uIU/mL

 

 8/15/2012 4:02 PM  WET PREP NO TRICH SEEN CLUE CELLS NONE SEEN 

 

 2012 8:45 AM  WBC 5.1 RBC 3.91 HGB 12.50 g/dLHCT 36.30 %MCV 93.0 fLMCH 
32.0 pgMCHC 34.40 g/dLRDW CV 12.60 %MPV 9.30 fLPLT 244 %NEUT 57.60 %%LYMP 27.50 
%%MONO 9.10 %%EOS 5.0 %%BASO 0.80 %#NEUT 2.91 #LYMP 1.39 #MONO 0.46 #EOS 0.25 #
BASO 0.04 TSH 1.390 uIU/mLTRIGLYCERIDES 154.0 mg/dLCHOLESTEROL 147.0 mg/dLHDL 
45.0 mg/dLLDL (CALC) 71.0 mg/dLGLUCOSE 101.0 mg/dLSODIUM 134.0 mmol/LPOTASSIUM 
4.30 mmol/LCHLORIDE 102.0 mmol/LCO2 23.0 mmol/LBUN 11.0 mg/dLCREATININE 0.80 mg/
dLSGOT/AST 34.0 IU/LSGPT/ALT 38.0 IU/LALK PHOS 104.0 IU/LTOTAL PROTEIN 7.60 g/
dLALBUMIN 4.40 g/dLTOTAL BILI 0.50 mg/dLCALCIUM 9.40 mg/dLeGFR 60 

 

 12/10/2012 8:34 AM  Colonoscopy-Women and Men over 50 Declined Mammogram -
Women over 40 Declined Pap Smear Declined 

 

 2012 5:02 PM  WET PREP NO TRICH SEEN CLUE CELLS NONE SEEN 

 

 2013 8:25 AM  WBC 4.2 RBC 3.96 HGB 12.90 g/dLHCT 37.0 %MCV 93.0 fLMCH 
32.60 pgMCHC 34.90 g/dLRDW CV 12.60 %MPV 9.70 fLPLT 254 %NEUT 54.40 %%LYMP 
30.40 %%MONO 10.80 %%EOS 3.40 %%BASO 1.0 %#NEUT 2.26 #LYMP 1.26 #MONO 0.45 #EOS 
0.14 #BASO 0.04 TSH 1.230 uIU/mLGLUCOSE 94.0 mg/dLSODIUM 136.0 mmol/LPOTASSIUM 
4.30 mmol/LCHLORIDE 99.0 mmol/LCO2 25.0 mmol/LBUN 10.0 mg/dLCREATININE 0.80 mg/
dLSGOT/AST 36.0 IU/LSGPT/ALT 36.0 IU/LALK PHOS 84.0 IU/LTOTAL PROTEIN 7.90 g/
dLALBUMIN 4.40 g/dLTOTAL BILI 0.70 mg/dLCALCIUM 9.80 mg/dLeGFR 60 TRIGLYCERIDES 
122.0 mg/dLCHOLESTEROL 141.0 mg/dLHDL 47.0 mg/dLLDL (CALC) 70.0 mg/dL

 

 6/10/2013 3:52 PM  WET PREP NO TRICH SEEN CLUE CELLS NONE SEEN 

 

 2013 8:45 AM  WBC 5.3 RBC 4.01 HGB 13.0 g/dLHCT 37.60 %MCV 94.0 fLMCH 
32.40 pgMCHC 34.60 g/dLRDW CV 12.50 %MPV 9.40 fLPLT 248 %NEUT 58.70 %%LYMP 
27.80 %%MONO 9.80 %%EOS 2.80 %%BASO 0.90 %#NEUT 3.12 #LYMP 1.48 #MONO 0.52 #EOS 
0.15 #BASO 0.05 TSH 1.570 uIU/mLGLUCOSE 94.0 mg/dLSODIUM 134.0 mmol/LPOTASSIUM 
4.10 mmol/LCHLORIDE 101.0 mmol/LCO2 23.0 mmol/LBUN 11.0 mg/dLCREATININE 0.80 mg/
dLSGOT/AST 41.0 IU/LSGPT/ALT 44.0 IU/LALK PHOS 109.0 IU/LTOTAL PROTEIN 7.90 g/
dLALBUMIN 4.70 g/dLTOTAL BILI 0.80 mg/dLCALCIUM 10.40 mg/dLeGFR >60 mL/min/1.73 
[id number] 163.0 mg/dLCHOLESTEROL 138.0 mg/dLHDL 49.0 mg/dLLDL (CALC) 56.0 
mg/dL

 

 2014 8:58 AM  GLUCOSE 86.0 mg/dLSODIUM 134.0 mmol/LPOTASSIUM 4.20 mmol/
LCHLORIDE 99.0 mmol/LCO2 25.0 mmol/LBUN 14.0 mg/dLCREATININE 0.80 mg/dLCALCIUM 
10.10 mg/dLeGFR >60 mL/min/1.73 m2TSH 1.20 uIU/mL

 

 2014 9:50 AM  WBC 5.7 RBC 4.03 HGB 12.90 g/dLHCT 37.90 %MCV 94.0 fLMCH 
32.0 pgMCHC 34.0 g/dLRDW CV 12.70 %MPV 9.70 fLPLT 292 %NEUT 62.70 %%LYMP 21.80 %
%MONO 11.0 %%EOS 3.40 %%BASO 1.10 %#NEUT 3.55 #LYMP 1.23 #MONO 0.62 #EOS 0.19 #
BASO 0.06 GLUCOSE 98.0 mg/dLSODIUM 135.0 mmol/LPOTASSIUM 4.30 mmol/LCHLORIDE 
102.0 mmol/LCO2 22.0 mmol/LBUN 10.0 mg/dLCREATININE 0.80 mg/dLSGOT/AST 34.0 IU/
LSGPT/ALT 32.0 IU/LALK PHOS 95.0 IU/LTOTAL PROTEIN 7.70 g/dLALBUMIN 4.30 g/
dLTOTAL BILI 0.80 mg/dLCALCIUM 9.10 mg/dLeGFR 60 TRIGLYCERIDES 108.0 mg/
dLCHOLESTEROL 146.0 mg/dLHDL 56.0 mg/dLLDL (CALC) 68.0 mg/dLTSH 0.90 uIU/mL

 

 2014 8:40 AM  WBC 4.4 RBC 4.13 HGB 13.20 g/dLHCT 38.90 %MCV 94.0 fLMCH 
32.0 pgMCHC 33.90 g/dLRDW CV 13.50 %MPV 9.80 fLPLT 279 %NEUT 63.80 %%LYMP 24.60 
%%MONO 9.30 %%EOS 1.60 %%BASO 0.70 %#NEUT 2.80 #LYMP 1.08 #MONO 0.41 #EOS 0.07 #
BASO 0.03 GLUCOSE 96.0 mg/dLSODIUM 136.0 mmol/LPOTASSIUM 4.10 mmol/LCHLORIDE 
99.0 mmol/LCO2 23.0 mmol/LBUN 8.0 mg/dLCREATININE 0.80 mg/dLSGOT/AST 31.0 IU/
LSGPT/ALT 27.0 IU/LALK PHOS 85.0 IU/LTOTAL PROTEIN 7.60 g/dLALBUMIN 4.40 g/
dLTOTAL BILI 0.80 mg/dLCALCIUM 10.20 mg/dLeGFR 60 TRIGLYCERIDES 61.0 mg/
dLCHOLESTEROL 148.0 mg/dLHDL 71.0 mg/dLLDL (CALC) 65.0 mg/dLTSH 0.990 uIU/mL

 

 2015 8:32 AM  WBC 4.8 RBC 3.98 HGB 12.70 g/dLHCT 37.30 %MCV 94.0 fLMCH 
31.90 pgMCHC 34.0 g/dLRDW CV 12.70 %MPV 9.50 fLPLT 270 %NEUT 57.30 %%LYMP 25.0 %
%MONO 13.0 %%EOS 3.60 %%BASO 1.10 %#NEUT 2.73 #LYMP 1.19 #MONO 0.62 #EOS 0.17 #
BASO 0.05 TSH 0.640 uIU/mLGLUCOSE 90.0 mg/dLSODIUM 136.0 mmol/LPOTASSIUM 4.0 
mmol/LCHLORIDE 101.0 mmol/LCO2 24.0 mmol/LBUN 10.0 mg/dLCREATININE 0.80 mg/
dLSGOT/AST 34.0 IU/LSGPT/ALT 32.0 IU/LALK PHOS 92.0 IU/LTOTAL PROTEIN 7.50 g/
dLALBUMIN 4.40 g/dLTOTAL BILI 0.70 mg/dLCALCIUM 9.50 mg/dLeGFR >60 mL/min/1.73 
[id number] 107.0 mg/dLCHOLESTEROL 138.0 mg/dLHDL 58.0 mg/dLLDL (CALC) 59.0 
mg/dL

 

 2015 8:31 AM  WBC 4.6 RBC 3.97 HGB 12.90 g/dLHCT 38.0 %MCV 96.0 fLMCH 
32.50 pgMCHC 33.90 g/dLRDW CV 12.60 %MPV 9.0 fLPLT 248 %NEUT 61.0 %%LYMP 24.70 %
%MONO 10.20 %%EOS 3.0 %%BASO 1.10 %#NEUT 2.82 #LYMP 1.14 #MONO 0.47 #EOS 0.14 #
BASO 0.05 TRIGLYCERIDES 105.0 mg/dLCHOLESTEROL 141.0 mg/dLHDL 58.0 mg/dLLDL (
CALC) 62.0 mg/dLGLUCOSE 93.0 mg/dLSODIUM 136.0 mmol/LPOTASSIUM 4.10 mmol/
LCHLORIDE 101.0 mmol/LCO2 25.0 mmol/LBUN 9.0 mg/dLCREATININE 0.80 mg/dLSGOT/AST 
32.0 IU/LSGPT/ALT 28.0 IU/LALK PHOS 95.0 IU/LTOTAL PROTEIN 7.30 g/dLALBUMIN 
4.50 g/dLTOTAL BILI 0.80 mg/dLCALCIUM 9.70 mg/dLeGFR >60 mL/min/1.73mTSH 1.10 
uIU/mL







History Of Immunizations







 Name  Date Admin  Mfg Name  Mfg Code  Trade Name  Lot#  Route  Inj  Vis Given  
Vis Pub  CVX

 

 Influenza  10/20/2010  [employer]of[employer] pasteur  [employer]  Fluzone  [id number]  Intramuscular  
Left Arm  10/20/2010  2010  999

 

 Influenza  10/28/2011  [employer]of[employer] pasteur  [employer]  Fluzone  [id number]  Intramuscular  
Left Arm  10/28/2011  2011  141

 

 Influenza  10/29/2012  sanofi pasteur  [employer]  Fluzone  [id number]  Intramuscular  
Left Deltoid  10/29/2012  2012  141

 

 X  12/10/2012  [employer]  MSD  Pneumovax 23  [id number]  Intramuscular  
Left Gluteous Medius  12/10/2012  2010  33

 

 Influenza  10/28/2013  sanofi pasteur  [employer]  Fluzone > 3 Years  [id number]  
Intramuscular  Right Deltoid  10/28/2013  2013  141

 

 X  9/2/2015  Not Entered  NE  Prevnar 13     Not Entered  Not Entered  1  109

 

 Influenza  2015  Not Entered  NE  Not Entered     Not Entered  Not Entered
  2015  141

 

 HepB Adult  2016  [employer]  MSD  Recombivax Adult  [id number]  Not 
Entered  Left Deltoid  2016  43

 

 HepB Adult  2016  [employer]  MSD  Recombivax Adult  [id number]  
Intramuscular  Right Deltoid  2016  43

 

 Zostavax  2012  Not Entered  NE  Not Entered     Not Entered  Not 
Entered  1  121

 

 Tdap  2012  Not Entered  NE  Not Entered     Not Entered  Not Entered  1  115







History of Past Illness







 Name  Date of Onset  Comments

 

 Benign Essential Hypertension  Dec 14 2009 10:10AM   

 

 Hyperlipidemia  Dec 14 2009 10:10AM   

 

 Hypothyroidism, Acquired  Dec 14 2009 10:10AM   

 

 hypothyroid      

 

 Hypertension      

 

 Hyperlipidemia      

 

 acid reflux      

 

 Hypertension, Benign Essential  2010  9:41AM   

 

 Hypertension, Benign Essential  2010 12:53PM   

 

 Routine gynecological examination  May  5 2010  9:28AM   

 

 Hypertension  May  5 2010  9:28AM   

 

 Hyperlipidemia, unspecified  May  5 2010  9:28AM   

 

 Hypothyroidism, Acquired  May  5 2010  9:28AM   

 

 Vulvovaginitis  May  5 2010  9:28AM   

 

 Rhinitis, Allergic  May  5 2010  9:28AM   

 

 Hypertension, Benign Essential  May 19 2010  8:48AM   

 

 Hypertension, Benign Essential  May 25 2010  9:39AM   

 

 Flu  Oct 20 2010 12:01PM   

 

 Essential Hypertension  2010  8:34AM   

 

 Hyperlipidemia  2010  8:34AM   

 

 Gastroesophageal Reflux  2010  8:34AM   

 

 Hypothyroidism, Acquired  2010  8:34AM   

 

 Hypertension, Benign Essential  2010  9:22AM   

 

 Hypertension, Benign Essential  Dec 15 2010  9:07AM   

 

 Essential Hypertension  2011  1:31PM   

 

 Hyperlipidemia  2011  1:31PM   

 

 Gastroesophageal Reflux  2011  1:31PM   

 

 Hypothyroidism, Acquired  2011  1:31PM   

 

 Essential Hypertension  2011  8:51AM   

 

 Hyperlipidemia  2011  8:51AM   

 

 Gastroesophageal Reflux  2011  8:51AM   

 

 Hypothyroidism, Acquired  2011  8:51AM   

 

 Essential Hypertension  2011  9:13AM   

 

 Hyperlipidemia  2011  9:13AM   

 

 Gastroesophageal Reflux  2011  9:13AM   

 

 Hypothyroidism, Acquired  2011  9:13AM   

 

 Nausea With Vomiting  2011  1:18PM   

 

 Hyponatremia  2011  8:46AM   

 

 Nausea  2011  9:13AM   

 

 Hypothyroidism  2011 11:10AM   

 

 Hyponatremia  2011 11:10AM   

 

 Essential Hypertension  2011 10:05AM   

 

 Hyperlipidemia  2011 10:05AM   

 

 Gastroesophageal Reflux  2011 10:05AM   

 

 Nausea  2011 10:05AM   

 

 Hypothyroidism, Acquired  2011 10:05AM   

 

 Hyponatremia  May  6 2011 11:34AM   

 

 Essential Hypertension  May 16 2011  8:50AM   

 

 Hyperlipidemia  May 16 2011  8:50AM   

 

 Gastroesophageal Reflux  May 16 2011  8:50AM   

 

 Nausea  May 16 2011  8:50AM   

 

 Hypothyroidism, Acquired  May 16 2011  8:50AM   

 

 Hyponatremia  May 16 2011  8:50AM   

 

 Routine gynecological examination  2011  8:54AM   

 

 Hypertension  2011  8:54AM   

 

 Hyperlipidemia, unspecified  2011  8:54AM   

 

 Hypothyroidism, Acquired  2011  8:54AM   

 

 Rhinitis, Allergic  2011  8:54AM   

 

 Hypothyroidism  Aug 29 2011 12:37PM   

 

 Hyponatremia  Aug 29 2011 12:37PM   

 

 Essential Hypertension  Sep 12 2011  8:53AM   

 

 Hyperlipidemia  Sep 12 2011  8:53AM   

 

 Gastroesophageal Reflux  Sep 12 2011  8:53AM   

 

 Hypothyroidism, Acquired  Sep 12 2011  8:53AM   

 

 Hyponatremia  Sep 12 2011  8:53AM   

 

 Hypertension  Sep 29 2011  9:41AM   

 

 Hyperlipidemia  Dec  8 2011  8:31AM   

 

 Hypothyroidism  Dec  8 2011  8:31AM   

 

 Hypertension  Dec  8 2011  8:31AM   

 

 Flu  Dec  9 2011 10:02AM   

 

 Essential Hypertension  Dec 13 2011 10:13AM   

 

 Hyperlipidemia  Dec 13 2011 10:13AM   

 

 Gastroesophageal Reflux  Dec 13 2011 10:13AM   

 

 Hypothyroidism, Acquired  Dec 13 2011 10:13AM   

 

 Hyponatremia  Dec 13 2011 10:13AM   

 

 Vaginal Discharge  2012  9:43AM   

 

 Rhinitis, Allergic  Feb 15 2012  9:31AM   

 

 Eustachian Tube Dysfunction  Feb 15 2012  9:31AM   

 

 Pharyngitis, Acute  Feb 15 2012  9:31AM   

 

 Routine gynecological examination  2012  8:48AM   

 

 Hypertension  2012  8:48AM   

 

 Hyperlipidemia, unspecified  2012  8:48AM   

 

 Hypothyroidism, Acquired  2012  8:48AM   

 

 Rhinitis, Allergic  2012  8:48AM   

 

 Candidiasis, Vulvovaginal  2012 11:04AM   

 

 Essential Hypertension  Aug 15 2012  9:02AM   

 

 Hyperlipidemia  Aug 15 2012  9:02AM   

 

 Gastroesophageal Reflux  Aug 15 2012  9:02AM   

 

 Hypothyroidism, Acquired  Aug 15 2012  9:02AM   

 

 Hyponatremia  Aug 15 2012  9:02AM   

 

 Atrophic Vaginitis, Postmenopausal  Aug 15 2012  9:02AM   

 

 Flu  Oct 29 2012  9:24AM   

 

 Essential Hypertension  Dec 10 2012  8:35AM   

 

 Hyperlipidemia  Dec 10 2012  8:35AM   

 

 Gastroesophageal Reflux  Dec 10 2012  8:35AM   

 

 Hypothyroidism, Acquired  Dec 10 2012  8:35AM   

 

 Hyponatremia  Dec 10 2012  8:35AM   

 

 Atrophic Vaginitis, Postmenopausal  Dec 10 2012  8:35AM   

 

 Pneumococcus  Dec 10 2012  2:07PM   

 

 Vaginal Discharge  Dec 20 2012  1:50PM   

 

 Essential Hypertension  Dec 20 2012  1:50PM   

 

 Hyperlipidemia  Dec 20 2012  1:50PM   

 

 Gastroesophageal Reflux  Dec 20 2012  1:50PM   

 

 Hypothyroidism, Acquired  Dec 20 2012  1:50PM   

 

 Atrophic Vaginitis, Postmenopausal  Dec 20 2012  1:50PM   

 

 Upper Respiratory Infections  2013  8:52AM   

 

 Hyperlipidemia  2013  8:21AM   

 

 Hypertension  2013  8:21AM   

 

 Hypothyroidism  2013  8:21AM   

 

 Screening Mammogram  Beto 10 2013  8:45AM   

 

 Routine gynecological examination  Beto 10 2013  8:34AM   

 

 Hypertension  Beto 10 2013  8:34AM   

 

 Hyperlipidemia, unspecified  Beto 10 2013  8:34AM   

 

 Hypothyroidism, Acquired  Beto 10 2013  8:34AM   

 

 Rhinitis, Allergic  Beto 10 2013  8:34AM   

 

 Flu  Oct 28 2013  8:52AM   

 

 Essential Hypertension  Dec  9 2013  8:37AM   

 

 Hyperlipidemia  Dec  9 2013  8:37AM   

 

 Gastroesophageal Reflux  Dec  9 2013  8:37AM   

 

 Hypothyroidism, Acquired  Dec  9 2013  8:37AM   

 

 Atrophic Vaginitis, Postmenopausal  Dec  9 2013  8:37AM   

 

 Hypertension  2014  9:56AM   

 

 Hyperlipidemia  2014  9:56AM   

 

 Hypothyroidism  2014  9:56AM   

 

 Screening Mammogram  2014  8:32AM   

 

 Osteopenia  2014  8:32AM   

 

 Hypertension  2014  8:35AM   

 

 Hypothyroidism, Acquired  2014  8:35AM   

 

 Hyperlipidemia  2014  8:35AM   

 

 Upper Respiratory Infections  2014  9:28AM   

 

 Sinusitis, Acute  Oct  2 2014  9:17AM   

 

 Herpes Zoster  Oct  5 2014  1:44PM   

 

 Vesicular Eruption  Oct  5 2014  1:44PM   

 

 Hypertension  2014  8:18AM   

 

 Hyperlipidemia  2014  8:18AM   

 

 hypothyroid  2014  8:18AM   

 

 Situational anxiety  Dec 20 2014  9:33AM   

 

 GERD (gastroesophageal reflux disease)  Dec 20 2014  9:33AM   

 

 Nausea  Dec 20 2014  9:33AM   

 

 Abdominal Pain, Epigastric  Dec 20 2014  9:33AM   

 

 Hyperlipidemia  Oct  6 2014  9:03AM   

 

 acid reflux  Oct  6 2014  9:03AM   

 

 hypothyroid  Oct  6 2014  9:03AM   

 

 Shingles  Oct  6 2014  9:03AM   

 

 Pharyngitis  2015  1:09PM   

 

 Bronchitis  2015  9:10AM   

 

 Hyperlipidemia  2015  9:10AM   

 

 acid reflux  2015  9:10AM   

 

 hypothyroid  2015  9:10AM   

 

 Hyperlipidemia  2015  8:18AM   

 

 Hypertension  2015  8:18AM   

 

 hypothyroid  2015  8:18AM   

 

 Screening Mammogram  2015 11:43AM   

 

 Medicare Annual Pap Q 2 years  2015  9:36AM   

 

 Screening Mammogram  2015  9:36AM   

 

 Candidiasis of female genitalia  2015  9:36AM   

 

 Hypertension  2015 11:34AM   

 

 Hyperlipidemia, unspecified  2015 11:34AM   

 

 Hypothyroidism, Acquired  2015 11:34AM   

 

 Osteopenia  2016  8:41AM   

 

 Encounter for screening mammogram for breast cancer  2016  8:41AM   

 

 Hypertension  2016  8:34AM   

 

 Lymphedema  2016  8:34AM   

 

 Hyperlipidemia  2016  8:34AM   

 

 hypothyroid  2016  8:34AM   

 

 HEP B  2016  9:13AM   

 

 HEP B  2016  8:52AM   

 

 Acid Reflux  2016  8:34AM   

 

 History of acute gastritis  Oct 13 2016  2:30PM   

 

 Anxiety as acute reaction to exceptional stress  Oct 13 2016  2:30PM   







Payers







 Insurance Name  Company Name  Plan Name  Plan Number  Policy Number  Policy 
Group Number  Start Date

 

    Medicare Part A  Medicare RHC     [id number]     N/A

 

    BCBS  [facility]     [id number]     2009

 

    Medicare Part B  Medic[place]     [id number]     N/A

 

    Medicare Part A  Medicare Part A     [id number]     N/A

 

    Medicare Part A  ZZZMedicare P A - Preventive     [id number]     N/A

 

    Medicare Part A  Medicare - Lab/Xray     [id number]     N/A







History of Encounters







 Visit Date  Visit Type  Provider

 

 10/13/2016  Office visit  Doris Noriega MD

 

 2016  Nurse visit  Doris Noriega MD

 

 2016  Office visit  Doris Noriega MD

 

 2015  Office visit  Doris Noriega MD

 

 2015  Office visit  Doris Noriega MD

 

 2015  Office visit  Prince Noe APRN

 

 2014  Office visit  Anyi Herrera APRN

 

 10/27/2014  Voided  Doris Noriega MD

 

 10/6/2014  Office visit  Doris Noriega MD

 

 10/5/2014  Office visit  Anyi Herrera APRN

 

 10/2/2014  Office visit   

 

 10/2/2014  Office visit  Corrine Bay APRN

 

 2014  Office visit  Kassie Franklin APRN

 

 2014  Office visit  Doris Noriega MD

 

 2014  Office visit  Doris Noriega MD

 

 2013  Office visit  Dee Colindres MD

 

 10/28/2013  Nurse visit  Dee Colindres MD

 

 6/10/2013  Office visit  Dee Colindres MD

 

 2013  Office visit  Corrine Bay APRN

 

 2012  Office visit  Dee Colindres MD

 

 12/10/2012  Office visit  Dee Colindres MD

 

 10/29/2012  Nurse visit  Dee Colindres MD

 

 8/15/2012  Office visit  Dee Colindres MD

 

 2012  Office visit  Corrine Bay APRN

 

 2012  Office visit  Dee Colindres MD

 

 2/15/2012  Office visit  eDe Colindres MD

 

 2012  Office visit  Corrine Bay APRN

 

 2011  Office visit  Dee Colindres MD

 

 10/28/2011  Nurse visit  Shad Nolasco MD

 

 2011  Office visit  Dee Colindres MD

 

 2011  Office visit  Dee Colindres MD

 

 2011  Office visit  Dee Colindres MD

 

 2011  Office visit  Dee Colindres MD

 

 2011  Office visit  Dee Colindres MD

 

 2011  Office visit  Dee Colindres MD

 

 4/10/2011  [facility]  KHRIS Granados MD

 

 4/10/2011  [facility]  RICKEY Toussaint MD

 

 2011  Office visit  RICKEY Toussaint MD

 

 2011  [facility]  Fide Castellanos MD

 

 2011  Voided  Fide Castellanos MD

 

 2011  Office visit  Dee Colindres MD

 

 12/15/2010  Nurse visit  Dee Colindres MD

 

 2010  Office visit  Dee Colindres MD

 

 10/20/2010  Nurse visit  Stephenie PERAZA

 

 2010  Nurse visit  Dee Colindres MD

 

 2010  Nurse visit  Dee Colindres MD

 

 2010  Office visit  Dee Colindres MD

 

 3/19/2010  Voided  Stephenie Kay PA

 

 2010  Nurse visit  Stephenie PERAZA

 

 2010  Nurse visit  Stephenie PERAZA

 

 2010  Nurse visit  Stephenie PERAZA

 

 2009  Office visit  Stephenie PERAZA

 

 10/20/2009  Nurse visit  Stephenie Kay PA

## 2017-07-13 NOTE — CONSCIOUS SEDATION/ASA
Conscious Sedation Pre-Proced


Time Reviewed:  08:54


ASA Class:  2











Airway Mallampati Classification: ([place] appropriate class) I.  II.  III,  IV


 


Lungs 


 


Heart 


 


 ASA score


 


 ASA 1: a normal healthy patient


 


 ASA 2:  a patient with a mild systemic disease (mid diabetes, controlled 

hypertension, obesity 


 


 ASA 3:  a patient with a severe systemic disease that limits activity  (angina

, COPD, prior Myocardial infarction)


 


 ASA 4:  a patient with an incapacitating disease that is a constant threat to 

life (CHF, renal failure)


 


 ASA 5:  a moribund patient not expected to survive 24 hrs.  (ruptured aneurysm)


 


 ASA 6:  a declared brain dead patient whose organs are being harvested.


 


 For emergent operations, add the letter E after the classification








Grade 1


Sedation Plan:  Discussed options with patient/fam


Note


The patient is an appropriate candidate to undergo the planned procedure, 

sedation, and anesthesia.





The patient immediately re-assessed prior to indication.











FANG JOHNSON MD Jul 13, 2017 8:54 am

## 2017-07-13 NOTE — XMS REPORT
MU2 Ambulatory Summary

 Created on: 2015



Milady Crespo

External Reference #: [id number]

: 1945

Sex: Female



Demographics







 Address  [address]

[place]  [address]

 

 Home Phone  [phone]

 

 Preferred Language  English

 

 Marital Status  

 

 Jew Affiliation  Unknown

 

 Race  White

 

 Ethnic Group  Not  or 





Author







 Author  Doris Noriega

 

 Organization  [facility]

 

 Address  [address]

[place], [place]  [id number]



 

 Phone  [phone]







Care Team Providers







 Care Team Member Name  Role  Phone

 

 Doris Noriega  PCP  [phone]







Allergies and Adverse Reactions







 Name  Reaction  Notes

 

 NO KNOWN DRUG ALLERGIES      







Plan of Treatment







 Planned Activity  Comments  Planned Date  Planned Time  Plan/Goal

 

 LIPID PANEL     8/15/2012  12:00 AM   

 

 COMPREHEN METABOLIC PANEL     6/10/2013  12:00 AM   

 

 LIPID PANEL     6/10/2013  12:00 AM   

 

 ASSAY THYROID STIM HORMONE     6/10/2013  12:00 AM   

 

 COMPLETE CBC W/AUTO DIFF WBC     6/10/2013  12:00 AM   

 

 COMPREHEN METABOLIC PANEL     2012  12:00 AM   

 

 LIPID PANEL     2012  12:00 AM   

 

 ASSAY THYROID STIM HORMONE     2012  12:00 AM   

 

 COMPLETE CBC W/AUTO DIFF WBC     2012  12:00 AM   

 

 CYTOPATH TBS C/V MANUAL     2015  12:00 AM   

 

 MAMMOGRAM SCREENING     2015  12:00 AM   







Medications







 Active 

 

 Name  Start Date  Estimated Completion Date  SIG  Comments

 

 amlodipine oral tablet 5 mg  2015  TAKE ONE TABLET BY MOUTH 
EVERY DAY   

 

 atenolol oral tablet 50 mg  2015  take 1 tablet (50 mg) by oral 
route once daily   

 

 levothyroxine oral tablet 50 mcg  2015  TAKE ONE TABLET BY MOUTH 
EVERY DAY   

 

 Diovan Oral tablet 160 mg  2015  2/15/2016  TAKE ONE TABLET BY MOUTH 
EVERY DAY for 90 days   

 

 loratadine oral tablet 10 mg  2015  12/15/2016  TAKE 1/2 TABLET BY MOUTH 
EVERY DAY IN THE EVENING   

 

 aspirin oral tablet,delayed release (DR/EC) 81 mg        take 1 tablet (81 mg) 
by oral route once daily   

 

 Vitamin D3 oral capsule 1,000 unit        take 1 capsule by oral route daily   

 

 triamcinolone acetonide topical cream 0.1 %  2015  apply a thin 
layer to the affected area(s) by topical route 2 times per day for 14 days   

 

 clorazepate dipotassium oral tablet 7.5 mg  2015  take 1/2 to1 
tablet PO up to three times per day for situational anxiety   









  

 

 Name  Start Date  Expiration Date  SIG  Comments

 

 Prednisone Oral Tablet 20 mg  2011  take 2 tablets by oral 
route daily for 5 days   

 

 calcium carbonate-vitamin D2 Oral tablet 600-125 mg-unit  8/15/2012  2012
  take 2 tablets by oral route daily   

 

 Omega 3 Fish Oil Oral capsule,delayed release(DR/EC) 900-1,400 mg  8/15/2012  9
/  take 1 capsule by oral route daily   

 

 multivitamin Oral tablet  8/15/2012  2012  take 1 tablet by oral route 
daily   

 

 triamcinolone acetonide topical cream 0.1 %  2013  10/7/2013  APPLY A 
THIN LAYER TO THE AFFECTED AREA(S) BY TOPICAL ROUTE TWICE A DAY   

 

 famotidine Oral tablet 20 mg  2014  take 1 tablet (20 mg) by 
oral route 2 times per day   

 

 Plavix Oral tablet 75 mg  2014  take 1 tablet (75 mg) by oral 
route once daily   

 

 amoxicillin oral capsule 500 mg  2014  take 1 capsule (500 mg) 
by oral route 3 times per day for 10 days   

 

 Lipitor Oral Tablet 20 mg  2014  take 1 tablet (20 mg) by oral 
route once daily   

 

 Zithromax Z-Tab oral tablet 250 mg  10/2/2014  10/7/2014  take 2 tablets (500 
mg) by oral route once daily for 1 day then 1 tablet (250 mg) by oral route 
once daily for 4 days   

 

 acyclovir oral tablet 800 mg  10/5/2014  10/12/2014  take 1 tablet (800 mg) by 
oral route 5 times per day for 7 days   

 

 gabapentin oral capsule 300 mg  10/9/2014  2014  take 1 capsule (300 mg) 
by oral route 3 times per day for 14 days   

 

 Carafate oral suspension 100 mg/mL  2014  take 10 milliliters 
(1 gram) by oral route 4 times per day on an empty stomach 1 hour before meals 
and at bedtime for 4 weeks   

 

 fluconazole oral tablet 150 mg  2015  take 1 tablet (150 mg) 
by oral route once for 1 day   









 Discontinued 

 

 Name  Start Date  Discontinued Date  SIG  Comments

 

 Lipitor Oral Tablet 40 mg     2009  1/2 TAB DAILY   

 

 Ramipril Oral Capsule 5 mg     2009  take 1 capsule (5 mg) by oral route 
once daily   

 

 Lovastatin Oral Tablet 20 mg  2009  take 1 tablet (20 mg) by 
oral route once daily with evening meal   

 

 Propranolol Oral Tablet 20 mg  2010  take 1 tablet by oral 
route 2 times a day   

 

 Fosamax Oral Tablet 70 mg  2010  take 1 tablet (70 mg) by oral 
route once weekly in the morning, at least 30 minutes before the first food, 
beverage, or medication of the day   

 

 Lisinopril Oral Tablet 40 mg  2010  4/10/2011  take 2 tablets by oral 
route daily   

 

 Zoloft Oral Tablet 50 mg  2011  take 1 tablet (50 mg) by oral 
route once daily   

 

 Promethazine Oral Tablet 25 mg  2011  take 1 tablet by oral 
route every 6 hours as needed   

 

 Diovan HCT Oral Tablet 160-12.5 mg  2011  take 1 tablet by oral 
route once daily for 30 days   

 

 Diflucan Oral Tablet 150 mg     2012  take 1 tablet (150 mg) by oral 
route once for 1 day   

 

 Diflucan Oral Tablet 150 mg  2012  8/15/2012  take 1 tablet (150 mg) by 
oral route once   

 

 Vitamin B-12 Oral tablet 1,000 mcg  8/15/2012  2014  take 1 tablet by 
oral route daily   

 

 Premarin Vaginal Cream 0.625 mg/gram  10/29/2012  6/10/2013  use 1 gram daily 
for a week then 1 gram twice a week   

 

 Medrol (Tab) Oral tablets,dose pack 4 mg  2013  6/10/2013  take as 
directed   

 

 aspirin Oral tablet 325 mg  2014  take 1 tablet (325 mg) by 
oral route once daily   

 

 Medrol (Tab) oral tablets,dose pack 4 mg  2014  10/2/2014  take as 
directed   

 

 Tetracaine Lollipops Lollipop  2015  Use as directed   







Problem List







 Description  Status  Onset

 

 Hyperlipidemia  Active   

 

 acid reflux  Active   

 

 Hypertension  Active   

 

 hypothyroid  Active   







Vital Signs







 Date  Time  BP-Sys(mm[Hg]  BP-Morena(mm[Hg])  HR(bpm)  RR(rpm)  Temp  WT  HT  HC  
BMI  BSA  BMI Percentile  O2 Sat(%)

 

 2015  9:33:00 AM  120 mmHg  68 mmHg  73 bpm     98.7 F  144.375 lbs  61 
in     27.28 kg/m2  1.68 m2     99 %

 

 2015  9:05:00 AM  110 mmHg  75 mmHg  85 bpm  16 rpm  96.7 F  144.375 lbs  
61 in     27.2791 kg/m  1.6788 m     99 %

 

 2015  1:05:00 PM  108 mmHg  62 mmHg  78 bpm  18 rpm  96.9 F  142.375 lbs  
61 in     26.90 kg/m2  1.67 m2     100 %

 

 2014  9:31:00 AM  126 mmHg  66 mmHg  79 bpm  18 rpm  98.7 F  149 lbs  61 
in     28.153 kg/m  1.7055 m     98 %

 

 10/6/2014  9:02:00 AM  110 mmHg  74 mmHg  94 bpm  18 rpm  98.1 F  145.4 lbs  
61 in     27.47 kg/m2  1.68 m2     97 %

 

 10/2/2014  9:14:00 AM  124 mmHg  74 mmHg  79 bpm  18 rpm  98 F  147.25 lbs  61 
in     27.8224 kg/m  1.6955 m     98 %

 

 2014  9:22:00 AM  124 mmHg  76 mmHg  89 bpm  18 rpm  98.1 F  148 lbs  61 
in     27.96 kg/m2  1.70 m2     100 %

 

 2014  8:27:00 AM  118 mmHg  65 mmHg  74 bpm  16 rpm  97.7 F  148 lbs  61 
in     27.9641 kg/m  1.6998 m     99 %

 

 2014  9:48:00 AM  125 mmHg  70 mmHg  93 bpm  18 rpm  96.7 F  156 lbs  61 
in     29.48 kg/m2  1.75 m2     100 %

 

 2013  8:35:00 AM  122 mmHg  74 mmHg  84 bpm  16 rpm  97.9 F  155.375 lbs 
                99 %

 

 6/10/2013  8:30:00 AM  130 mmHg  82 mmHg  79 bpm  16 rpm  97.9 F  161 lbs     
            98 %

 

 2013  8:50:00 AM  124 mmHg  68 mmHg  66 bpm  18 rpm  97.6 F  157.5 lbs  
61 in     29.7591 kg/m  1.7535 m      

 

 2012  1:46:00 PM  120 mmHg  72 mmHg  75 bpm  16 rpm  97.6 F  156.125 lbs
                 98 %

 

 12/10/2012  8:32:00 AM  122 mmHg  82 mmHg  70 bpm  16 rpm  97.3 F  157 lbs    
             99 %

 

 8/15/2012  9:00:00 AM  130 mmHg  76 mmHg  86 bpm  16 rpm  97.4 F  159 lbs     
            100 %

 

 2012  11:02:00 AM  128 mmHg  64 mmHg  66 bpm  18 rpm  97.4 F  162.125 lbs
  61 in     30.6329 kg/m  1.7791 m      

 

 2012  8:45:00 AM  130 mmHg  70 mmHg  82 bpm  16 rpm  98.2 F  160.125 lbs 
                100 %

 

 2/15/2012  9:29:00 AM  122 mmHg  80 mmHg  86 bpm  16 rpm  97.4 F  159.375 lbs  
61 in     30.1133 kg/m  1.7639 m     99 %

 

 2012  9:41:00 AM  132 mmHg  74 mmHg  66 bpm  18 rpm  98.4 F  160.375 lbs  
61 in     30.30 kg/m2  1.77 m2      

 

 2011  10:08:00 AM  134 mmHg  82 mmHg  80 bpm  16 rpm  98 F  160 lbs  61 
in     30.2314 kg/m  1.7674 m     99 %

 

 2011  3:56:00 PM  130 mmHg  80 mmHg                              

 

 2011  8:55:00 AM  130 mmHg  74 mmHg  88 bpm  16 rpm  98.2 F  158.25 lbs  
               98 %

 

 2011  8:54:00 AM  136 mmHg  82 mmHg  102 bpm  16 rpm  98.1 F  153.5 lbs   
              99 %

 

 2011  8:49:00 AM  122 mmHg  88 mmHg  88 bpm  16 rpm  98.6 F  152.25 lbs  
               99 %

 

 2011  10:02:00 AM  140 mmHg  82 mmHg  96 bpm  20 rpm  98.8 F             
       100 %

 

 2011  9:14:00 AM  156 mmHg  98 mmHg  110 bpm  24 rpm  98.5 F  153 lbs    
             100 %

 

 2011  8:50:00 AM  160 mmHg  84 mmHg  100 bpm  16 rpm  98.7 F  155 lbs    
             100 %

 

 2011  1:25:00 PM  152 mmHg  100 mmHg  82 bpm  16 rpm  97.2 F  156.375 lbs 
                100 %

 

 2011  9:55:00 AM  144 mmHg  90 mmHg                              

 

 2010  9:24:00 AM  138 mmHg  84 mmHg                              

 

 2010  8:58:00 AM  160 mmHg  94 mmHg                              

 

 2010  8:33:00 AM  142 mmHg  90 mmHg  100 bpm  16 rpm  98.1 F  158.375 
lbs                  

 

 2010  9:24:00 AM  160 mmHg  102 mmHg  88 bpm  18 rpm  99 F  157.125 lbs  
62 in     28.74 kg/m2  1.77 m2      

 

 3/19/2010  11:01:00 AM  140 mmHg  90 mmHg                              

 

 2009  10:08:00 AM  142 mmHg  86 mmHg  72 bpm  16 rpm  98.1 F  154.125 
lbs  61 in     29.12 kg/m2  1.73 m2      







Social History







 Name  Description  Comments

 

       

 

 Children      

 

 lives alone      

 

 Supervisor      

 

 Tobacco  Never smoker   







History of Procedures







 Date Ordered  Description  Order Status

 

 2011 12:00 AM  COMPREHEN METABOLIC PANEL  Reviewed

 

 2011 12:00 AM  ASSAY THYROID STIM HORMONE  Reviewed

 

 2011 12:00 AM  COMPREHEN METABOLIC PANEL  Reviewed

 

 2011 12:00 AM  LIPID PANEL  Reviewed

 

 2011 12:00 AM  ASSAY THYROID STIM HORMONE  Reviewed

 

 2011 12:00 AM  COMPLETE CBC W/AUTO DIFF WBC  Reviewed

 

 2011 12:00 AM  COMPREHEN METABOLIC PANEL  Reviewed

 

 2011 12:00 AM  COMPREHEN METABOLIC PANEL  Reviewed

 

 2011 12:00 AM  LIPID PANEL  Reviewed

 

 2011 12:00 AM  ASSAY THYROID STIM HORMONE  Reviewed

 

 2010 11:24 AM  NO CHARGE OV  Reviewed

 

 2010 11:26 AM  NO CHARGE OV  Reviewed

 

 2011 12:00 AM  COMPREHEN METABOLIC PANEL  Reviewed

 

 2011 12:00 AM  LIPID PANEL  Reviewed

 

 2011 12:00 AM  ASSAY THYROID STIM HORMONE  Reviewed

 

 2011 12:00 AM  COMPLETE CBC W/AUTO DIFF WBC  Reviewed

 

 2012 12:00 AM  TISSUE EXAM FOR FUNGI  Returned

 

 2012 12:00 AM  SMEAR WET MOUNT SALINE/INK  Returned

 

 2/15/2012 12:00 AM  THER/PROPH/DIAG INJ SC/IM  Reviewed

 

 8/15/2012 12:00 AM  COMPREHEN METABOLIC PANEL  Reviewed

 

 8/15/2012 12:00 AM  ASSAY THYROID STIM HORMONE  Reviewed

 

 8/15/2012 12:00 AM  COMPLETE CBC W/AUTO DIFF WBC  Returned

 

 12/10/2012 12:00 AM  IMMUNIZATION ADMIN  Reviewed

 

 2012 12:00 AM  TRICHOMONAS ASSAY W/OPTIC  Returned

 

 2013 12:00 AM  THER/PROPH/DIAG INJ SC/IM  Reviewed

 

 2013 12:00 AM  COMPREHEN METABOLIC PANEL  Returned

 

 2013 12:00 AM  LIPID PANEL  Returned

 

 2013 12:00 AM  COMPLETE CBC W/AUTO DIFF WBC  Returned

 

 2013 12:00 AM  ASSAY THYROID STIM HORMONE  Returned

 

 6/10/2013 12:00 AM  MAMMOGRAM SCREENING  Returned

 

 6/10/2013 12:00 AM  CYTOPATH C/V MANUAL  Returned

 

 12/10/2013 12:00 AM  LIPID PANEL  Returned

 

 12/10/2013 12:00 AM  ASSAY THYROID STIM HORMONE  Returned

 

 12/10/2013 12:00 AM  COMPLETE CBC W/AUTO DIFF WBC  Returned

 

 12/10/2013 12:00 AM  COMPREHEN METABOLIC PANEL  Returned

 

 2010 12:00 AM  CYTOPATH C/V MANUAL  Reviewed

 

 2010 12:00 AM  SMEAR WET MOUNT SALINE/INK  Reviewed

 

 2010 12:00 AM  LIPID PANEL  Reviewed

 

 2010 12:00 AM  ASSAY THYROID STIM HORMONE  Reviewed

 

 2010 12:00 AM  COMPLETE CBC W/AUTO DIFF WBC  Reviewed

 

 2010 12:00 AM  COMPREHEN METABOLIC PANEL  Reviewed

 

 2010 8:48 AM  Blood Pressure Check-no charge  Reviewed

 

 2010 12:00 AM  Blood Pressure Check-no charge  Reviewed

 

 2014 12:00 AM  METABOLIC PANEL TOTAL CA  Reviewed

 

 2014 12:00 AM  ASSAY THYROID STIM HORMONE  Reviewed

 

 2014 12:00 AM  ASSAY OF FREE THYROXINE  Reviewed

 

 2014 12:00 AM  MAMMOGRAM SCREENING  Returned

 

 2014 12:00 AM  CT BONE DENSITY AXIAL  Returned

 

 2014 12:00 AM  COMPLETE CBC W/AUTO DIFF WBC  Returned

 

 2014 12:00 AM  COMPREHEN METABOLIC PANEL  Returned

 

 2014 12:00 AM  LIPID PANEL  Returned

 

 2014 12:00 AM  ASSAY THYROID STIM HORMONE  Returned

 

 10/20/2010 12:00 AM  IMMUNIZATION ADMIN  Reviewed

 

 10/20/2010 12:00 AM  FLU VACCINE 3 YRS & > IM  Reviewed

 

 2010 12:00 AM  COMPREHEN METABOLIC PANEL  Reviewed

 

 2010 12:00 AM  LIPID PANEL  Reviewed

 

 2010 12:00 AM  ASSAY THYROID STIM HORMONE  Reviewed

 

 2010 12:00 AM  COMPLETE CBC W/AUTO DIFF WBC  Reviewed

 

 2010 12:00 AM  Blood Pressure Check-no charge  Reviewed

 

 10/2/2014 12:00 AM  THER/PROPH/DIAG INJ SC/IM  Reviewed

 

 2014 12:00 AM  COMPLETE CBC W/AUTO DIFF WBC  Returned

 

 2014 12:00 AM  COMPREHEN METABOLIC PANEL  Returned

 

 2014 12:00 AM  LIPID PANEL  Returned

 

 2014 12:00 AM  ASSAY THYROID STIM HORMONE  Returned

 

 2015 12:00 AM  THER/PROPH/DIAG INJ SC/IM  Reviewed

 

 2011 12:00 AM  COMPREHEN METABOLIC PANEL  Reviewed

 

 2011 12:00 AM  LIPID PANEL  Reviewed

 

 2011 12:00 AM  ASSAY THYROID STIM HORMONE  Reviewed

 

 2011 12:00 AM  COMPLETE CBC W/AUTO DIFF WBC  Reviewed

 

 2011 12:00 AM  METABOLIC PANEL TOTAL CA  Reviewed

 

 2011 12:00 AM  COMPREHEN METABOLIC PANEL  Reviewed

 

 2011 12:00 AM  ASSAY THYROID STIM HORMONE  Reviewed

 

 2011 12:00 AM  COMPLETE CBC W/AUTO DIFF WBC  Reviewed

 

 2011 12:00 AM  ASSAY OF LIPASE  Reviewed

 

 2011 12:00 AM  THER/PROPH/DIAG INJ SC/IM  Reviewed

 

 2011 12:00 AM  METABOLIC PANEL TOTAL CA  Reviewed

 

 2011 12:00 AM  THER/PROPH/DIAG INJ SC/IM  Reviewed

 

 2011 12:00 AM  METABOLIC PANEL TOTAL CA  Reviewed

 

 2011 12:00 AM  ASSAY THYROID STIM HORMONE  Reviewed

 

 2011 12:00 AM  THER/PROPH/DIAG INJ SC/IM  Reviewed

 

 2011 12:00 AM  ASSAY OF SERUM SODIUM  Reviewed

 

 2011 12:00 AM  ASSAY OF SERUM SODIUM  Reviewed

 

 2011 12:00 AM  CYTOPATH C/V MANUAL  Reviewed

 

 2011 12:00 AM  ASSAY THYROID STIM HORMONE  Reviewed

 

 2015 12:00 AM  COMPLETE CBC W/AUTO DIFF WBC  Returned

 

 2015 12:00 AM  COMPREHEN METABOLIC PANEL  Returned

 

 2015 12:00 AM  LIPID PANEL  Returned

 

 2015 12:00 AM  ASSAY THYROID STIM HORMONE  Returned







Results Summary







 Data and Description  Results

 

 2010 9:25 AM  Colonoscopy-Women and Men over 50 Abnormal Mammogram -Women 
over 40 Declined Pap Smear Declined 

 

 2010 10:41 AM  WET PREP NO TRICHOMONADS SEEN 

 

 2010 8:15 AM  TRIGLYCERIDES 174.0 mg/dLCHOLESTEROL 175.0 mg/dLHDL 58.0 mg/
dLLDL (CALC) 82.0 mg/dLTSH 0.790 uIU/mLGLUCOSE 95.0 mg/dLSODIUM 136.0 mmol/
LPOTASSIUM 4.50 mmol/LCHLORIDE 99.0 mmol/LCO2 26.0 mmol/LBUN 12.0 mg/
dLCREATININE 0.80 mg/dLSGOT/AST 32.0 IU/LSGPT/ALT 33.0 IU/LALK PHOS 103.0 IU/
LTOTAL PROTEIN 7.60 g/dLALBUMIN 4.60 g/dLTOTAL BILI 0.60 mg/dLCALCIUM 9.80 mg/
dLeGFR >60 mL/min/1.73 m2WBC 5.3 RBC 4.13 HGB 13.10 g/dLHCT 39.20 %MCV 95.0 
fLMCH 31.70 pgMCHC 33.40 g/dLRDW CV 12.70 %MPV 9.80 fLPLT 257 %NEUT 57.90 %%
LYMP 26.50 %%MONO 11.60 %%EOS 3.20 %%BASO 0.80 %#NEUT 3.04 #LYMP 1.39 #MONO 
0.61 #EOS 0.17 #BASO 0.04 

 

 12/15/2010 8:30 AM  TRIGLYCERIDES 157.0 mg/dLCHOLESTEROL 163.0 mg/dLHDL 56.0 mg
/dLLDL (CALC) 76.0 mg/dLTSH 0.650 uIU/mLWBC 6.5 RBC 4.25 HGB 13.60 g/dLHCT 
40.70 %MCV 96.0 fLMCH 32.0 pgMCHC 33.40 g/dLRDW CV 12.60 %MPV 9.90 fLPLT 313 %
NEUT 61.80 %%LYMP 26.20 %%MONO 8.30 %%EOS 3.10 %%BASO 0.60 %#NEUT 4.00 #LYMP 
1.70 #MONO 0.54 #EOS 0.20 #BASO 0.04 GLUCOSE 97.0 mg/dLSODIUM 136.0 mmol/
LPOTASSIUM 4.40 mmol/LCHLORIDE 101.0 mmol/LCO2 26.0 mmol/LBUN 10.0 mg/
dLCREATININE 0.80 mg/dLSGOT/AST 31.0 IU/LSGPT/ALT 34.0 IU/LALK PHOS 92.0 IU/
LTOTAL PROTEIN 8.10 g/dLALBUMIN 4.60 g/dLTOTAL BILI 0.40 mg/dLCALCIUM 10.0 mg/
dLeGFR >60 mL/min/1.73 m2

 

 2011 9:45 AM  GLUCOSE 91.0 mg/dLSODIUM 133.0 mmol/LPOTASSIUM 4.40 mmol/
LCHLORIDE 97.0 mmol/LCO2 24.0 mmol/LBUN 9.0 mg/dLCREATININE 0.70 mg/dLCALCIUM 
10.0 mg/dLeGFR >60 mL/min/1.73 m2

 

 2011 10:25 AM  LIPASE 29.0 U/LTSH 0.10 uIU/mLGLUCOSE 115.0 mg/dLSODIUM 
127.0 mmol/LPOTASSIUM 5.30 mmol/LCHLORIDE 93.0 mmol/LCO2 18.0 mmol/LBUN 9.0 mg/
dLCREATININE 0.70 mg/dLSGOT/AST 62.0 IU/LSGPT/ALT 46.0 IU/LALK PHOS 100.0 IU/
LTOTAL PROTEIN 8.60 g/dLALBUMIN 4.90 g/dLTOTAL BILI 0.60 mg/dLCALCIUM 9.90 mg/
dLeGFR >60 mL/min/1.73 m2WBC 6.9 RBC 4.48 HGB 14.40 g/dLHCT 40.90 %MCV 91.0 
fLMCH 32.10 pgMCHC 35.20 g/dLRDW CV 12.50 %MPV 9.30 fLPLT 260 %NEUT 74.90 %%
LYMP 15.10 %%MONO 9.40 %%EOS 0.30 %%BASO 0.30 %#NEUT 5.15 #LYMP 1.04 #MONO 0.65 
#EOS 0.02 #BASO 0.02 

 

 2011 9:07 AM  GLUCOSE 92.0 mg/dLSODIUM 131.0 mmol/LPOTASSIUM 4.20 mmol/
LCHLORIDE 99.0 mmol/LCO2 22.0 mmol/LBUN 9.0 mg/dLCREATININE 0.70 mg/dLCALCIUM 
9.20 mg/dLeGFR >60 mL/min/1.73 m2

 

 2011 11:06 AM  TSH 0.510 uIU/mLGLUCOSE 99.0 mg/dLSODIUM 132.0 mmol/
LPOTASSIUM 3.50 mmol/LCHLORIDE 95.0 mmol/LCO2 23.0 mmol/LBUN 9.0 mg/
dLCREATININE 0.80 mg/dLCALCIUM 10.40 mg/dLeGFR >60 mL/min/1.73 m2

 

 2011 9:45 AM  SODIUM 132.0 mmol/L

 

 2011 9:27 AM  SODIUM 137.0 mmol/L

 

 2011 8:35 AM  TRIGLYCERIDES 114.0 mg/dLCHOLESTEROL 145.0 mg/dLHDL 56.0 mg/
dLLDL (CALC) 66.0 mg/dLGLUCOSE 105.0 mg/dLSODIUM 138.0 mmol/LPOTASSIUM 4.40 mmol
/LCHLORIDE 103.0 mmol/LCO2 25.0 mmol/LBUN 8.0 mg/dLCREATININE 0.70 mg/dLSGOT/
AST 36.0 IU/LSGPT/ALT 38.0 IU/LALK PHOS 103.0 IU/LTOTAL PROTEIN 7.40 g/
dLALBUMIN 4.30 g/dLTOTAL BILI 0.30 mg/dLCALCIUM 9.20 mg/dLeGFR >60 mL/min/1.73 
m2WBC 5.1 RBC 3.76 HGB 12.0 g/dLHCT 36.10 %MCV 96.0 fLMCH 31.90 pgMCHC 33.20 g/
dLRDW CV 13.10 %MPV 9.40 fLPLT 249 %NEUT 60.40 %%LYMP 25.90 %%MONO 8.90 %%EOS 
4.0 %%BASO 0.80 %#NEUT 3.06 #LYMP 1.31 #MONO 0.45 #EOS 0.20 #BASO 0.04 

 

 2011 9:55 AM  TSH 1.070 uIU/mL

 

 2011 8:55 AM  GLUCOSE 102.0 mg/dLSODIUM 135.0 mmol/LPOTASSIUM 4.20 mmol/
LCHLORIDE 102.0 mmol/LCO2 24.0 mmol/LBUN 15.0 mg/dLCREATININE 0.80 mg/dLSGOT/
AST 30.0 IU/LSGPT/ALT 35.0 IU/LALK PHOS 119.0 IU/LTOTAL PROTEIN 7.50 g/
dLALBUMIN 4.30 g/dLTOTAL BILI 0.30 mg/dLCALCIUM 9.20 mg/dLeGFR >60 mL/min/1.73 
m2TSH 1.130 uIU/mL

 

 2011 9:50 AM  GLUCOSE 85.0 mg/dLSODIUM 133.0 mmol/LPOTASSIUM 4.20 mmol/
LCHLORIDE 101.0 mmol/LCO2 21.0 mmol/LBUN 13.0 mg/dLCREATININE 0.80 mg/dLSGOT/
AST 36.0 IU/LSGPT/ALT 36.0 IU/LALK PHOS 109.0 IU/LTOTAL PROTEIN 7.40 g/
dLALBUMIN 4.20 g/dLTOTAL BILI 0.50 mg/dLCALCIUM 9.40 mg/dLeGFR >60 mL/min/1.73 
m2

 

 2011 8:55 AM  GLUCOSE 98.0 mg/dLSODIUM 137.0 mmol/LPOTASSIUM 3.90 mmol/
LCHLORIDE 103.0 mmol/LCO2 25.0 mmol/LBUN 14.0 mg/dLCREATININE 0.70 mg/dLSGOT/
AST 33.0 IU/LSGPT/ALT 36.0 IU/LALK PHOS 111.0 IU/LTOTAL PROTEIN 8.30 g/
dLALBUMIN 4.40 g/dLTOTAL BILI 0.50 mg/dLCALCIUM 9.40 mg/dLeGFR >60 mL/min/1.73 
[id number] 109.0 mg/dLCHOLESTEROL 153.0 mg/dLHDL 54.0 mg/dLLDL (CALC) 77.0 
mg/dLTSH 1.330 uIU/mL

 

 2011 10:17 AM  Colonoscopy-Women and Men over 50 Declined Mammogram -
Women over 40 Normal Pap Smear Negative 

 

 2012 10:33 AM  WET PREP NO TRICH SEEN CLUE CELLS NONE SEEN 

 

 2012 8:45 AM  WBC 5.2 RBC 3.96 HGB 12.70 g/dLHCT 37.80 %MCV 96.0 fLMCH 
32.10 pgMCHC 33.60 g/dLRDW CV 13.30 %MPV 9.70 fLPLT 297 %NEUT 57.70 %%LYMP 
28.50 %%MONO 10.30 %%EOS 2.50 %%BASO 1.0 %#NEUT 3.02 #LYMP 1.49 #MONO 0.54 #EOS 
0.13 #BASO 0.05 GLUCOSE 97.0 mg/dLSODIUM 137.0 mmol/LPOTASSIUM 4.40 mmol/
LCHLORIDE 101.0 mmol/LCO2 23.0 mmol/LBUN 17.0 mg/dLCREATININE 0.80 mg/dLSGOT/
AST 30.0 IU/LSGPT/ALT 33.0 IU/LALK PHOS 95.0 IU/LTOTAL PROTEIN 7.40 g/dLALBUMIN 
4.40 g/dLTOTAL BILI 0.60 mg/dLCALCIUM 9.70 mg/dLeGFR 60 TRIGLYCERIDES 130.0 mg/
dLCHOLESTEROL 157.0 mg/dLHDL 56.0 mg/dLLDL (CALC) 75.0 mg/dLTSH 0.940 uIU/mL

 

 8/15/2012 4:02 PM  WET PREP NO TRICH SEEN CLUE CELLS NONE SEEN 

 

 2012 8:45 AM  WBC 5.1 RBC 3.91 HGB 12.50 g/dLHCT 36.30 %MCV 93.0 fLMCH 
32.0 pgMCHC 34.40 g/dLRDW CV 12.60 %MPV 9.30 fLPLT 244 %NEUT 57.60 %%LYMP 27.50 
%%MONO 9.10 %%EOS 5.0 %%BASO 0.80 %#NEUT 2.91 #LYMP 1.39 #MONO 0.46 #EOS 0.25 #
BASO 0.04 TSH 1.390 uIU/mLTRIGLYCERIDES 154.0 mg/dLCHOLESTEROL 147.0 mg/dLHDL 
45.0 mg/dLLDL (CALC) 71.0 mg/dLGLUCOSE 101.0 mg/dLSODIUM 134.0 mmol/LPOTASSIUM 
4.30 mmol/LCHLORIDE 102.0 mmol/LCO2 23.0 mmol/LBUN 11.0 mg/dLCREATININE 0.80 mg/
dLSGOT/AST 34.0 IU/LSGPT/ALT 38.0 IU/LALK PHOS 104.0 IU/LTOTAL PROTEIN 7.60 g/
dLALBUMIN 4.40 g/dLTOTAL BILI 0.50 mg/dLCALCIUM 9.40 mg/dLeGFR 60 

 

 12/10/2012 8:34 AM  Colonoscopy-Women and Men over 50 Declined Mammogram -
Women over 40 Declined Pap Smear Declined 

 

 2012 5:02 PM  WET PREP NO TRICH SEEN CLUE CELLS NONE SEEN 

 

 2013 8:25 AM  WBC 4.2 RBC 3.96 HGB 12.90 g/dLHCT 37.0 %MCV 93.0 fLMCH 
32.60 pgMCHC 34.90 g/dLRDW CV 12.60 %MPV 9.70 fLPLT 254 %NEUT 54.40 %%LYMP 
30.40 %%MONO 10.80 %%EOS 3.40 %%BASO 1.0 %#NEUT 2.26 #LYMP 1.26 #MONO 0.45 #EOS 
0.14 #BASO 0.04 TSH 1.230 uIU/mLGLUCOSE 94.0 mg/dLSODIUM 136.0 mmol/LPOTASSIUM 
4.30 mmol/LCHLORIDE 99.0 mmol/LCO2 25.0 mmol/LBUN 10.0 mg/dLCREATININE 0.80 mg/
dLSGOT/AST 36.0 IU/LSGPT/ALT 36.0 IU/LALK PHOS 84.0 IU/LTOTAL PROTEIN 7.90 g/
dLALBUMIN 4.40 g/dLTOTAL BILI 0.70 mg/dLCALCIUM 9.80 mg/dLeGFR 60 TRIGLYCERIDES 
122.0 mg/dLCHOLESTEROL 141.0 mg/dLHDL 47.0 mg/dLLDL (CALC) 70.0 mg/dL

 

 6/10/2013 3:52 PM  WET PREP NO TRICH SEEN CLUE CELLS NONE SEEN 

 

 2013 8:45 AM  WBC 5.3 RBC 4.01 HGB 13.0 g/dLHCT 37.60 %MCV 94.0 fLMCH 
32.40 pgMCHC 34.60 g/dLRDW CV 12.50 %MPV 9.40 fLPLT 248 %NEUT 58.70 %%LYMP 
27.80 %%MONO 9.80 %%EOS 2.80 %%BASO 0.90 %#NEUT 3.12 #LYMP 1.48 #MONO 0.52 #EOS 
0.15 #BASO 0.05 TSH 1.570 uIU/mLGLUCOSE 94.0 mg/dLSODIUM 134.0 mmol/LPOTASSIUM 
4.10 mmol/LCHLORIDE 101.0 mmol/LCO2 23.0 mmol/LBUN 11.0 mg/dLCREATININE 0.80 mg/
dLSGOT/AST 41.0 IU/LSGPT/ALT 44.0 IU/LALK PHOS 109.0 IU/LTOTAL PROTEIN 7.90 g/
dLALBUMIN 4.70 g/dLTOTAL BILI 0.80 mg/dLCALCIUM 10.40 mg/dLeGFR >60 mL/min/1.73 
[id number] 163.0 mg/dLCHOLESTEROL 138.0 mg/dLHDL 49.0 mg/dLLDL (CALC) 56.0 
mg/dL

 

 2014 8:58 AM  GLUCOSE 86.0 mg/dLSODIUM 134.0 mmol/LPOTASSIUM 4.20 mmol/
LCHLORIDE 99.0 mmol/LCO2 25.0 mmol/LBUN 14.0 mg/dLCREATININE 0.80 mg/dLCALCIUM 
10.10 mg/dLeGFR >60 mL/min/1.73 m2TSH 1.20 uIU/mL

 

 2014 9:50 AM  WBC 5.7 RBC 4.03 HGB 12.90 g/dLHCT 37.90 %MCV 94.0 fLMCH 
32.0 pgMCHC 34.0 g/dLRDW CV 12.70 %MPV 9.70 fLPLT 292 %NEUT 62.70 %%LYMP 21.80 %
%MONO 11.0 %%EOS 3.40 %%BASO 1.10 %#NEUT 3.55 #LYMP 1.23 #MONO 0.62 #EOS 0.19 #
BASO 0.06 GLUCOSE 98.0 mg/dLSODIUM 135.0 mmol/LPOTASSIUM 4.30 mmol/LCHLORIDE 
102.0 mmol/LCO2 22.0 mmol/LBUN 10.0 mg/dLCREATININE 0.80 mg/dLSGOT/AST 34.0 IU/
LSGPT/ALT 32.0 IU/LALK PHOS 95.0 IU/LTOTAL PROTEIN 7.70 g/dLALBUMIN 4.30 g/
dLTOTAL BILI 0.80 mg/dLCALCIUM 9.10 mg/dLeGFR 60 TRIGLYCERIDES 108.0 mg/
dLCHOLESTEROL 146.0 mg/dLHDL 56.0 mg/dLLDL (CALC) 68.0 mg/dLTSH 0.90 uIU/mL

 

 2014 8:40 AM  WBC 4.4 RBC 4.13 HGB 13.20 g/dLHCT 38.90 %MCV 94.0 fLMCH 
32.0 pgMCHC 33.90 g/dLRDW CV 13.50 %MPV 9.80 fLPLT 279 %NEUT 63.80 %%LYMP 24.60 
%%MONO 9.30 %%EOS 1.60 %%BASO 0.70 %#NEUT 2.80 #LYMP 1.08 #MONO 0.41 #EOS 0.07 #
BASO 0.03 GLUCOSE 96.0 mg/dLSODIUM 136.0 mmol/LPOTASSIUM 4.10 mmol/LCHLORIDE 
99.0 mmol/LCO2 23.0 mmol/LBUN 8.0 mg/dLCREATININE 0.80 mg/dLSGOT/AST 31.0 IU/
LSGPT/ALT 27.0 IU/LALK PHOS 85.0 IU/LTOTAL PROTEIN 7.60 g/dLALBUMIN 4.40 g/
dLTOTAL BILI 0.80 mg/dLCALCIUM 10.20 mg/dLeGFR 60 TRIGLYCERIDES 61.0 mg/
dLCHOLESTEROL 148.0 mg/dLHDL 71.0 mg/dLLDL (CALC) 65.0 mg/dLTSH 0.990 uIU/mL

 

 2015 8:32 AM  WBC 4.8 RBC 3.98 HGB 12.70 g/dLHCT 37.30 %MCV 94.0 fLMCH 
31.90 pgMCHC 34.0 g/dLRDW CV 12.70 %MPV 9.50 fLPLT 270 %NEUT 57.30 %%LYMP 25.0 %
%MONO 13.0 %%EOS 3.60 %%BASO 1.10 %#NEUT 2.73 #LYMP 1.19 #MONO 0.62 #EOS 0.17 #
BASO 0.05 TSH 0.640 uIU/mLGLUCOSE 90.0 mg/dLSODIUM 136.0 mmol/LPOTASSIUM 4.0 
mmol/LCHLORIDE 101.0 mmol/LCO2 24.0 mmol/LBUN 10.0 mg/dLCREATININE 0.80 mg/
dLSGOT/AST 34.0 IU/LSGPT/ALT 32.0 IU/LALK PHOS 92.0 IU/LTOTAL PROTEIN 7.50 g/
dLALBUMIN 4.40 g/dLTOTAL BILI 0.70 mg/dLCALCIUM 9.50 mg/dLeGFR >60 mL/min/1.73 
[id number] 107.0 mg/dLCHOLESTEROL 138.0 mg/dLHDL 58.0 mg/dLLDL (CALC) 59.0 
mg/dL







History Of Immunizations







 Name  Date Admin  Mfg Name  Mfg Code  Trade Name  Lot#  Route  Inj  Vis Given  
Vis Pub  CVX

 

 Influenza  10/20/2010  sanof[employer] pasteur  [employer]  Fluzone  [id number]  Intramuscular  
Left Arm  10/20/2010  2010  999

 

 Influenza  10/28/2011  sanof[employer] pasteur  [employer]  Fluzone  [id number]  Intramuscular  
Left Arm  10/28/2011  2011  141

 

 Influenza  10/29/2012  sanofi pasteur  [employer]  Fluzone  [id number]  Intramuscular  
Left Deltoid  10/29/2012  2012  141

 

 Pneumococcal  12/10/2012  [employer]  MSD  Pneumovax 23  [id number]  
Intramuscular  Left Gluteous Medius  12/10/2012  2010  33

 

 Influenza  10/28/2013  sanofi pasteur  [employer]  Fluzone > 3 Years  [id number]  
Intramuscular  Right Deltoid  10/28/2013  2013  141







History of Past Illness







 Name  Date of Onset  Comments

 

 Benign Essential Hypertension  Dec 14 2009 10:10AM   

 

 Hyperlipidemia  Dec 14 2009 10:10AM   

 

 Hypothyroidism, Acquired  Dec 14 2009 10:10AM   

 

 hypothyroid      

 

 Hypertension      

 

 Hyperlipidemia      

 

 acid reflux      

 

 Hypertension, Benign Essential  2010  9:41AM   

 

 Hypertension, Benign Essential  2010 12:53PM   

 

 Routine gynecological examination  May  5 2010  9:28AM   

 

 Hypertension  May  5 2010  9:28AM   

 

 Hyperlipidemia, unspecified  May  5 2010  9:28AM   

 

 Hypothyroidism, Acquired  May  5 2010  9:28AM   

 

 Vulvovaginitis  May  5 2010  9:28AM   

 

 Rhinitis, Allergic  May  5 2010  9:28AM   

 

 Hypertension, Benign Essential  May 19 2010  8:48AM   

 

 Hypertension, Benign Essential  May 25 2010  9:39AM   

 

 Flu  Oct 20 2010 12:01PM   

 

 Essential Hypertension  2010  8:34AM   

 

 Hyperlipidemia  2010  8:34AM   

 

 Gastroesophageal Reflux  2010  8:34AM   

 

 Hypothyroidism, Acquired  2010  8:34AM   

 

 Hypertension, Benign Essential  2010  9:22AM   

 

 Hypertension, Benign Essential  Dec 15 2010  9:07AM   

 

 Essential Hypertension  2011  1:31PM   

 

 Hyperlipidemia  2011  1:31PM   

 

 Gastroesophageal Reflux  2011  1:31PM   

 

 Hypothyroidism, Acquired  2011  1:31PM   

 

 Essential Hypertension  2011  8:51AM   

 

 Hyperlipidemia  2011  8:51AM   

 

 Gastroesophageal Reflux  2011  8:51AM   

 

 Hypothyroidism, Acquired  2011  8:51AM   

 

 Essential Hypertension  2011  9:13AM   

 

 Hyperlipidemia  2011  9:13AM   

 

 Gastroesophageal Reflux  2011  9:13AM   

 

 Hypothyroidism, Acquired  2011  9:13AM   

 

 Nausea With Vomiting  2011  1:18PM   

 

 Hyponatremia  2011  8:46AM   

 

 Nausea  2011  9:13AM   

 

 Hypothyroidism  2011 11:10AM   

 

 Hyponatremia  2011 11:10AM   

 

 Essential Hypertension  2011 10:05AM   

 

 Hyperlipidemia  2011 10:05AM   

 

 Gastroesophageal Reflux  2011 10:05AM   

 

 Nausea  2011 10:05AM   

 

 Hypothyroidism, Acquired  2011 10:05AM   

 

 Hyponatremia  May  6 2011 11:34AM   

 

 Essential Hypertension  May 16 2011  8:50AM   

 

 Hyperlipidemia  May 16 2011  8:50AM   

 

 Gastroesophageal Reflux  May 16 2011  8:50AM   

 

 Nausea  May 16 2011  8:50AM   

 

 Hypothyroidism, Acquired  May 16 2011  8:50AM   

 

 Hyponatremia  May 16 2011  8:50AM   

 

 Routine gynecological examination  2011  8:54AM   

 

 Hypertension  2011  8:54AM   

 

 Hyperlipidemia, unspecified  2011  8:54AM   

 

 Hypothyroidism, Acquired  2011  8:54AM   

 

 Rhinitis, Allergic  2011  8:54AM   

 

 Hypothyroidism  Aug 29 2011 12:37PM   

 

 Hyponatremia  Aug 29 2011 12:37PM   

 

 Essential Hypertension  Sep 12 2011  8:53AM   

 

 Hyperlipidemia  Sep 12 2011  8:53AM   

 

 Gastroesophageal Reflux  Sep 12 2011  8:53AM   

 

 Hypothyroidism, Acquired  Sep 12 2011  8:53AM   

 

 Hyponatremia  Sep 12 2011  8:53AM   

 

 Hypertension  Sep 29 2011  9:41AM   

 

 Hyperlipidemia  Dec  8 2011  8:31AM   

 

 Hypothyroidism  Dec  8 2011  8:31AM   

 

 Hypertension  Dec  8 2011  8:31AM   

 

 Flu  Dec  9 2011 10:02AM   

 

 Essential Hypertension  Dec 13 2011 10:13AM   

 

 Hyperlipidemia  Dec 13 2011 10:13AM   

 

 Gastroesophageal Reflux  Dec 13 2011 10:13AM   

 

 Hypothyroidism, Acquired  Dec 13 2011 10:13AM   

 

 Hyponatremia  Dec 13 2011 10:13AM   

 

 Vaginal Discharge  2012  9:43AM   

 

 Rhinitis, Allergic  Feb 15 2012  9:31AM   

 

 Eustachian Tube Dysfunction  Feb 15 2012  9:31AM   

 

 Pharyngitis, Acute  Feb 15 2012  9:31AM   

 

 Routine gynecological examination  2012  8:48AM   

 

 Hypertension  2012  8:48AM   

 

 Hyperlipidemia, unspecified  2012  8:48AM   

 

 Hypothyroidism, Acquired  2012  8:48AM   

 

 Rhinitis, Allergic  2012  8:48AM   

 

 Candidiasis, Vulvovaginal  2012 11:04AM   

 

 Essential Hypertension  Aug 15 2012  9:02AM   

 

 Hyperlipidemia  Aug 15 2012  9:02AM   

 

 Gastroesophageal Reflux  Aug 15 2012  9:02AM   

 

 Hypothyroidism, Acquired  Aug 15 2012  9:02AM   

 

 Hyponatremia  Aug 15 2012  9:02AM   

 

 Atrophic Vaginitis, Postmenopausal  Aug 15 2012  9:02AM   

 

 Flu  Oct 29 2012  9:24AM   

 

 Essential Hypertension  Dec 10 2012  8:35AM   

 

 Hyperlipidemia  Dec 10 2012  8:35AM   

 

 Gastroesophageal Reflux  Dec 10 2012  8:35AM   

 

 Hypothyroidism, Acquired  Dec 10 2012  8:35AM   

 

 Hyponatremia  Dec 10 2012  8:35AM   

 

 Atrophic Vaginitis, Postmenopausal  Dec 10 2012  8:35AM   

 

 Pneumococcus  Dec 10 2012  2:07PM   

 

 Vaginal Discharge  Dec 20 2012  1:50PM   

 

 Essential Hypertension  Dec 20 2012  1:50PM   

 

 Hyperlipidemia  Dec 20 2012  1:50PM   

 

 Gastroesophageal Reflux  Dec 20 2012  1:50PM   

 

 Hypothyroidism, Acquired  Dec 20 2012  1:50PM   

 

 Atrophic Vaginitis, Postmenopausal  Dec 20 2012  1:50PM   

 

 Upper Respiratory Infections  2013  8:52AM   

 

 Hyperlipidemia  2013  8:21AM   

 

 Hypertension  2013  8:21AM   

 

 Hypothyroidism  2013  8:21AM   

 

 Screening Mammogram  Beto 10 2013  8:45AM   

 

 Routine gynecological examination  Beto 10 2013  8:34AM   

 

 Hypertension  Beto 10 2013  8:34AM   

 

 Hyperlipidemia, unspecified  Beto 10 2013  8:34AM   

 

 Hypothyroidism, Acquired  Beto 10 2013  8:34AM   

 

 Rhinitis, Allergic  Beto 10 2013  8:34AM   

 

 Flu  Oct 28 2013  8:52AM   

 

 Essential Hypertension  Dec  9 2013  8:37AM   

 

 Hyperlipidemia  Dec  9 2013  8:37AM   

 

 Gastroesophageal Reflux  Dec  9 2013  8:37AM   

 

 Hypothyroidism, Acquired  Dec  9 2013  8:37AM   

 

 Atrophic Vaginitis, Postmenopausal  Dec  9 2013  8:37AM   

 

 Hypertension  2014  9:56AM   

 

 Hyperlipidemia  2014  9:56AM   

 

 Hypothyroidism  2014  9:56AM   

 

 Screening Mammogram  2014  8:32AM   

 

 Osteopenia  2014  8:32AM   

 

 Hypertension  2014  8:35AM   

 

 Hypothyroidism, Acquired  2014  8:35AM   

 

 Hyperlipidemia  2014  8:35AM   

 

 Upper Respiratory Infections  2014  9:28AM   

 

 Sinusitis, Acute  Oct  2 2014  9:17AM   

 

 Herpes Zoster  Oct  5 2014  1:44PM   

 

 Vesicular Eruption  Oct  5 2014  1:44PM   

 

 Hypertension  2014  8:18AM   

 

 Hyperlipidemia  2014  8:18AM   

 

 hypothyroid  2014  8:18AM   

 

 Situational anxiety  Dec 20 2014  9:33AM   

 

 GERD (gastroesophageal reflux disease)  Dec 20 2014  9:33AM   

 

 Nausea  Dec 20 2014  9:33AM   

 

 Abdominal Pain, Epigastric  Dec 20 2014  9:33AM   

 

 Hyperlipidemia  Oct  6 2014  9:03AM   

 

 acid reflux  Oct  6 2014  9:03AM   

 

 hypothyroid  Oct  6 2014  9:03AM   

 

 Shingles  Oct  6 2014  9:03AM   

 

 Pharyngitis  2015  1:09PM   

 

 Bronchitis  2015  9:10AM   

 

 Hyperlipidemia  2015  9:10AM   

 

 acid reflux  2015  9:10AM   

 

 hypothyroid  2015  9:10AM   

 

 Hyperlipidemia  2015  8:18AM   

 

 Hypertension  2015  8:18AM   

 

 hypothyroid  2015  8:18AM   

 

 Screening Mammogram  2015 11:43AM   







Payers







 Insurance Name  Company Name  Plan Name  Plan Number  Policy Number  Policy 
Group Number  Start Date

 

    Medicare Part A  Medicare Part A     [id number]     N/A

 

    [facility]     [id number]     2009

 

    Medicare Part B  [employer] [place]     [id number]     N/A







History of Encounters







 Visit Date  Visit Type  Provider

 

 2015  Office visit  Doris Noriega MD

 

 2015  Office visit  Doris Noriega MD

 

 2015  Office visit  Prince Noe APRN

 

 2014  Office visit  Anyi Herrera APRN

 

 10/27/2014  Voided  Doris Noriega MD

 

 10/6/2014  Office visit  Doris Noriega MD

 

 10/5/2014  Office visit  Anyi Herrera APRN

 

 10/2/2014  Office visit  Corrine Bay APRN

 

 2014  Office visit  Kassie Franklin APRN

 

 2014  Office visit  Doris Noriega MD

 

 2014  Office visit  Doris Noriega MD

 

 2013  Office visit  Dee Colindres MD

 

 10/28/2013  Nurse visit  Dee Colindres MD

 

 6/10/2013  Office visit  Dee Colindres MD

 

 2013  Office visit  Corrine Bay APRN

 

 2012  Office visit  Dee Colindres MD

 

 12/10/2012  Office visit  Dee Colindres MD

 

 10/29/2012  Nurse visit  Dee Colindres MD

 

 8/15/2012  Office visit  Dee Colindres MD

 

 2012  Office visit  Corrine Bay APRN

 

 2012  Office visit  Dee Colindres MD

 

 2/15/2012  Office visit  Dee Colindres MD

 

 2012  Office visit  Corrine Bay APRN

 

 2011  Office visit  Dee Colindres MD

 

 10/28/2011  Nurse visit  Shad Nolasco MD

 

 2011  Office visit  Dee Colindres MD

 

 2011  Office visit  Dee Colindres MD

 

 2011  Office visit  Dee Colindres MD

 

 2011  Office visit  Dee Colindres MD

 

 2011  Office visit  Dee Colindres MD

 

 2011  Office visit  Dee Colindres MD

 

 4/10/2011  [facility]  RICKEY Toussaint MD

 

 4/10/2011  [facility]  KHRIS Granados MD

 

 2011  Office visit  RICKEY Toussaint MD

 

 2011  Voided  Fide Castellanos MD

 

 2011  [facility]  Fide Castellanos MD

 

 2011  Office visit  Dee Colindres MD

 

 12/15/2010  Nurse visit  Dee Colindres MD

 

 2010  Office visit  Dee Colindres MD

 

 10/20/2010  Nurse visit  Stephenie PERAZA

 

 2010  Nurse visit  Dee Colindres MD

 

 2010  Nurse visit  Dee Colindres MD

 

 2010  Office visit  Dee Colindres MD

 

 3/19/2010  Voided  Stephenie Kay PA

 

 2010  Nurse visit  Stephenie PERAZA

 

 2010  Nurse visit  Stephenie PERAZA

 

 2010  Nurse visit  Stephenie PERAZA

 

 2009  Office visit  Stephenie PERAZA

 

 10/20/2009  Nurse visit  Stephenie Kay PA

## 2017-07-13 NOTE — XMS REPORT
MU2 Ambulatory Summary

 Created on: 2017



Milady Crespo

External Reference #: [id number]

: 1945

Sex: Female



Demographics







 Address  [address]

[place]  [address]

 

 Home Phone  [phone]

 

 Preferred Language  English

 

 Marital Status  

 

 Latter-day Affiliation  Unknown

 

 Race  White

 

 Ethnic Group  Not  or 





Author







 Author  Doris Noriega

 

 Organization  [facility]

 

 Address  [address]

[place], [place]  [id number]



 

 Phone  [phone]







Care Team Providers







 Care Team Member Name  Role  Phone

 

 Doris Noriega  PCP  [phone]

 

 Doris Noriega  PreferredProvider  [phone]







Allergies and Adverse Reactions







 Name  Reaction  Notes

 

 NO KNOWN DRUG ALLERGIES      







Plan of Treatment







 Planned Activity  Comments  Planned Date  Planned Time  Plan/Goal

 

 Complete blood count (CBC) with differential count     2016  12:00 AM   

 

 Comprehensive metabolic panel     2016  12:00 AM   

 

 VITAMIN D (25 HYDROXY)     2016  12:00 AM   

 

 Lipid panel (total cholesterol, lipoproteins, HDL, triglycerides)     8/15/
2012  12:00 AM   

 

 Comprehensive Metabolic Panel     6/10/2013  12:00 AM   

 

 Lipid panel (total cholesterol, lipoproteins, HDL, triglycerides)     6/10/
2013  12:00 AM   

 

 Thyroid stimulating hormone (TSH)     6/10/2013  12:00 AM   

 

 CBC (automated H&H, platelets, WBC and automated differential)     6/10/2013  
12:00 AM   

 

 Comprehensive Metabolic Panel     2012  12:00 AM   

 

 Lipid panel (total cholesterol, lipoproteins, HDL, triglycerides)     2012  12:00 AM   

 

 Thyroid stimulating hormone (TSH)     2012  12:00 AM   

 

 CBC (automated H&H, platelets, WBC and automated differential)     2012  
12:00 AM   

 

 Screening mammography, bilateral     2015  12:00 AM   







Medications







 Active 

 

 Name  Start Date  Estimated Completion Date  SIG  Comments

 

 aspirin 81 mg oral tablet,delayed release (DR/EC)        take 1 tablet (81 mg) 
by oral route once daily   

 

 Vitamin D3 1,000 unit oral capsule        take 1 capsule by oral route daily   

 

 famotidine 20 mg oral tablet  2015     TAKE ONE TABLET BY MOUTH TWICE 
DAILY   

 

 valsartan 160 mg oral tablet  2016     TAKE ONE TABLET BY MOUTH ONCE 
DAILY   

 

 amlodipine 5 mg oral tablet  2016     TAKE ONE TABLET BY MOUTH ONCE DAILY
   

 

 Allergy Relief (loratadine) 10 mg oral tablet  2016     TAKE ONE TABLET 
BY MOUTH ONCE DAILY   

 

 amlodipine 5 mg oral tablet  2016     TAKE ONE TABLET BY MOUTH ONCE DAILY
   

 

 Allergy Relief (loratadine) 10 mg oral tablet  2016     TAKE ONE TABLET 
BY MOUTH ONCE DAILY   

 

 valsartan 160 mg oral tablet  2016     TAKE ONE TABLET BY MOUTH ONCE 
DAILY   

 

 pantoprazole 40 mg oral tablet,delayed release (DR/EC)  10/13/2016  10/8/2017  
take 1 tablet (40 mg) by oral route once daily for 90 days   

 

 amlodipine 5 mg oral tablet  10/13/2016  10/8/2017  TAKE ONE TABLET BY MOUTH 
ONCE DAILY for 90 days   

 

 atenolol 50 mg oral tablet  10/13/2016  10/8/2017  take 1 tablet (50 mg) by 
oral route once daily   

 

 levothyroxine 50 mcg oral tablet  10/13/2016  10/8/2017  TAKE ONE TABLET BY 
MOUTH ONCE DAILY for 90 days   

 

 Lipitor 20 mg oral tablet  10/13/2016  2018  take 1 tablet (20 mg) by oral 
route once daily   

 

 Plavix 75 mg oral tablet  10/13/2016  2018  take 1 tablet (75 mg) by oral 
route once daily   

 

 valsartan 160 mg oral tablet  10/13/2016  10/8/2017  TAKE ONE TABLET BY MOUTH 
ONCE DAILY for 90 days   

 

 Zofran (as hydrochloride) 4 mg oral tablet  2016     take 1 tablet by 
oral route daily as needed for 14 days   

 

 Zofran (as hydrochloride) 4 mg oral tablet  2017     take 1 tablet by oral 
route daily as needed for 14 days   

 

 Zofran (as hydrochloride) 4 mg oral tablet  2017     take 1 tablet by oral 
route daily as needed for 14 days   

 

 Zofran (as hydrochloride) 4 mg oral tablet  2017     take 1 tablet by 
oral route daily as needed for 14 days   

 

 Zofran (as hydrochloride) 4 mg oral tablet  2017     take 1 tablet by 
oral route daily as needed for 14 days   

 

 Lexapro 10 mg oral tablet  2017  take 1 tablet (10 mg) by oral 
route once daily for 90 days   

 

 triamcinolone acetonide 0.1 % topical cream  2017     APPLY A THIN LAYER 
OF CREAM EXTERNALLY TO AFFECTED AREA TWICE DAILY FOR 14 DAYS   

 

 ondansetron 4 mg oral tablet,disintegrating  2017     dissolve  1 tablet 
by oral route every 8 hours as needed   

 

 loratadine 10 mg oral tablet  2017  TAKE 1 TABLET BY MOUTH 
EVERY DAY IN THE EVENING   

 

 triamcinolone acetonide 0.1 % topical cream  2017     APPLY  CREAM 
EXTERNALLY TO AFFECTED AREA TWICE DAILY FOR 14 DAYS   

 

 EQ LORATADINE 10MG  TABS  2017     TAKE ONE TABLET BY MOUTH ONCE DAILY   

 

 clorazepate dipotassium 7.5 mg oral tablet  6/15/2017  7/15/2017  take 1/2 to1 
tablet PO up to three times per day for situational anxiety   









  

 

 Name  Start Date  Expiration Date  SIG  Comments

 

 prednisone 20 mg oral tablet  2011  take 2 tablets by oral 
route daily for 5 days   

 

 calcium carbonate-vitamin D2 600-125 mg-unit oral tablet  8/15/2012  2012
  take 2 tablets by oral route daily   

 

 Aurelia 3 Fish Oil 900-1,400 mg oral capsule,delayed release(DR/EC)  8/15/2012  9
/  take 1 capsule by oral route daily   

 

 multivitamin Oral tablet  8/15/2012  2012  take 1 tablet by oral route 
daily   

 

 triamcinolone acetonide 0.1 % topical cream  2013  10/7/2013  APPLY A 
THIN LAYER TO THE AFFECTED AREA(S) BY TOPICAL ROUTE TWICE A DAY   

 

 famotidine 20 mg oral tablet  2014  take 1 tablet (20 mg) by 
oral route 2 times per day   

 

 amoxicillin 500 mg oral capsule  2014  take 1 capsule (500 mg) 
by oral route 3 times per day for 10 days   

 

 Zithromax Z-Tab 250 mg oral tablet  10/2/2014  10/7/2014  take 2 tablets (500 
mg) by oral route once daily for 1 day then 1 tablet (250 mg) by oral route 
once daily for 4 days   

 

 acyclovir 800 mg oral tablet  10/5/2014  10/12/2014  take 1 tablet (800 mg) by 
oral route 5 times per day for 7 days   

 

 gabapentin 300 mg oral capsule  10/9/2014  2014  take 1 capsule (300 mg) 
by oral route 3 times per day for 14 days   

 

 Carafate 100 mg/mL oral suspension  2014  take 10 milliliters 
(1 gram) by oral route 4 times per day on an empty stomach 1 hour before meals 
and at bedtime for 4 weeks   

 

 amlodipine 5 mg oral tablet  2015  TAKE ONE TABLET BY MOUTH 
EVERY DAY   

 

 levothyroxine 50 mcg oral tablet  2015  TAKE ONE TABLET BY MOUTH 
EVERY DAY   

 

 Diovan 160 mg oral tablet  2015  2/15/2016  TAKE ONE TABLET BY MOUTH 
EVERY DAY for 90 days   

 

 triamcinolone acetonide 0.1 % topical cream  2015  apply a thin 
layer to the affected area(s) by topical route 2 times per day for 14 days   

 

 fluconazole 150 mg oral tablet  2015  take 1 tablet (150 mg) 
by oral route once for 1 day   

 

 Zofran (as hydrochloride) 4 mg oral tablet  10/13/2016  10/27/2016  take 1 
tablet by oral route daily as needed for 14 days   

 

 Zofran (as hydrochloride) 4 mg oral tablet  2017     take 1 tablet by 
oral route daily as needed for 14 days   









 Discontinued 

 

 Name  Start Date  Discontinued Date  SIG  Comments

 

 Lipitor 40 mg oral tablet     2009 TAB DAILY   

 

 ramipril 5 mg oral capsule     2009  take 1 capsule (5 mg) by oral route 
once daily   

 

 lovastatin 20 mg oral tablet  2009  take 1 tablet (20 mg) by 
oral route once daily with evening meal   

 

 propranolol 20 mg oral tablet  2010  take 1 tablet by oral 
route 2 times a day   

 

 Fosamax 70 mg oral tablet  2010  take 1 tablet (70 mg) by oral 
route once weekly in the morning, at least 30 minutes before the first food, 
beverage, or medication of the day   

 

 lisinopril 40 mg oral tablet  2010  4/10/2011  take 2 tablets by oral 
route daily   

 

 Zoloft 50 mg oral tablet  2011  take 1 tablet (50 mg) by oral 
route once daily   

 

 promethazine 25 mg oral tablet  2011  take 1 tablet by oral 
route every 6 hours as needed   

 

 Diovan -12.5 mg oral tablet  2011  take 1 tablet by oral 
route once daily for 30 days   

 

 Diflucan 150 mg oral tablet     2012  take 1 tablet (150 mg) by oral 
route once for 1 day   

 

 Diflucan 150 mg oral tablet  2012  8/15/2012  take 1 tablet (150 mg) by 
oral route once   

 

 Vitamin B-12 1,000 mcg oral tablet  8/15/2012  2014  take 1 tablet by 
oral route daily   

 

 Premarin 0.625 mg/gram vaginal cream  10/29/2012  6/10/2013  use 1 gram daily 
for a week then 1 gram twice a week   

 

 Medrol (Tab) 4 mg oral tablets,dose pack  2013  6/10/2013  take as 
directed   

 

 aspirin 325 mg oral tablet  2014  take 1 tablet (325 mg) by 
oral route once daily   

 

 Medrol (Tab) 4 mg oral tablets,dose pack  2014  10/2/2014  take as 
directed   

 

 Tetracaine Lollipops Lollipop  2015  Use as directed   

 

 Zoloft 50 mg oral tablet  2016  take 1 tablet (50 mg) by oral 
route once daily for 90 days   

 

 Zoloft 50 mg oral tablet  2016  take 1 tablet (50 mg) by oral 
route once daily for 90 days  Pt refuses to take...







Problem List







 Description  Status  Onset

 

 Hyperlipidemia  Active   

 

 acid reflux  Active   

 

 Hypertension  Active   

 

 hypothyroid  Active   







Vital Signs







 Date  Time  BP-Sys(mm[Hg]  BP-Morena(mm[Hg])  HR(bpm)  RR(rpm)  Temp  WT  HT  HC  
BMI  BSA  BMI Percentile  O2 Sat(%)

 

 2017  11:30:00 AM  128 mmHg  78 mmHg  69 bpm  16 rpm  98.8 F  129.375 lbs  
61 in     24.44 kg/m2  1.59 m2     98 %

 

 2017  8:22:00 AM  118 mmHg  78 mmHg  62 bpm  18 rpm  97.5 F  129.125 lbs  
61 in     24.3977 kg/m  1.5877 m     100 %

 

 10/13/2016  2:26:00 PM  128 mmHg  78 mmHg  77 bpm  16 rpm  98.4 F  139.25 lbs  
61 in     26.31 kg/m2  1.65 m2     100 %

 

 2016  8:29:00 AM  122 mmHg  70 mmHg  72 bpm  16 rpm  97.8 F  137.125 lbs  
61 in     25.9093 kg/m  1.6361 m     100 %

 

 2015  9:33:00 AM  120 mmHg  68 mmHg  73 bpm     98.7 F  144.375 lbs  61 
in     27.28 kg/m2  1.68 m2     99 %

 

 2015  9:05:00 AM  110 mmHg  75 mmHg  85 bpm  16 rpm  96.7 F  144.375 lbs  
61 in     27.2791 kg/m  1.6788 m     99 %

 

 2015  1:05:00 PM  108 mmHg  62 mmHg  78 bpm  18 rpm  96.9 F  142.375 lbs  
61 in     26.90 kg/m2  1.67 m2     100 %

 

 2014  9:31:00 AM  126 mmHg  66 mmHg  79 bpm  18 rpm  98.7 F  149 lbs  61 
in     28.153 kg/m  1.7055 m     98 %

 

 10/6/2014  9:02:00 AM  110 mmHg  74 mmHg  94 bpm  18 rpm  98.1 F  145.4 lbs  
61 in     27.47 kg/m2  1.68 m2     97 %

 

 10/2/2014  9:14:00 AM  124 mmHg  74 mmHg  79 bpm  18 rpm  98 F  147.25 lbs  61 
in     27.8224 kg/m  1.6955 m     98 %

 

 2014  9:22:00 AM  124 mmHg  76 mmHg  89 bpm  18 rpm  98.1 F  148 lbs  61 
in     27.96 kg/m2  1.70 m2     100 %

 

 2014  8:27:00 AM  118 mmHg  65 mmHg  74 bpm  16 rpm  97.7 F  148 lbs  61 
in     27.9641 kg/m  1.6998 m     99 %

 

 2014  9:48:00 AM  125 mmHg  70 mmHg  93 bpm  18 rpm  96.7 F  156 lbs  61 
in     29.48 kg/m2  1.75 m2     100 %

 

 2013  8:35:00 AM  122 mmHg  74 mmHg  84 bpm  16 rpm  97.9 F  155.375 lbs 
                99 %

 

 6/10/2013  8:30:00 AM  130 mmHg  82 mmHg  79 bpm  16 rpm  97.9 F  161 lbs     
            98 %

 

 2013  8:50:00 AM  124 mmHg  68 mmHg  66 bpm  18 rpm  97.6 F  157.5 lbs  
61 in     29.76 kg/m2  1.7535 m      

 

 2012  1:46:00 PM  120 mmHg  72 mmHg  75 bpm  16 rpm  97.6 F  156.125 lbs
                 98 %

 

 12/10/2012  8:32:00 AM  122 mmHg  82 mmHg  70 bpm  16 rpm  97.3 F  157 lbs    
             99 %

 

 8/15/2012  9:00:00 AM  130 mmHg  76 mmHg  86 bpm  16 rpm  97.4 F  159 lbs     
            100 %

 

 2012  11:02:00 AM  128 mmHg  64 mmHg  66 bpm  18 rpm  97.4 F  162.125 lbs
  61 in     30.63 kg/m2  1.7791 m      

 

 2012  8:45:00 AM  130 mmHg  70 mmHg  82 bpm  16 rpm  98.2 F  160.125 lbs 
                100 %

 

 2/15/2012  9:29:00 AM  122 mmHg  80 mmHg  86 bpm  16 rpm  97.4 F  159.375 lbs  
61 in     30.11 kg/m2  1.7639 m     99 %

 

 2012  9:41:00 AM  132 mmHg  74 mmHg  66 bpm  18 rpm  98.4 F  160.375 lbs  
61 in     30.3023 kg/m  1.77 m2      

 

 2011  10:08:00 AM  134 mmHg  82 mmHg  80 bpm  16 rpm  98 F  160 lbs  61 
in     30.23 kg/m2  1.7674 m     99 %

 

 2011  3:56:00 PM  130 mmHg  80 mmHg                              

 

 2011  8:55:00 AM  130 mmHg  74 mmHg  88 bpm  16 rpm  98.2 F  158.25 lbs  
               98 %

 

 2011  8:54:00 AM  136 mmHg  82 mmHg  102 bpm  16 rpm  98.1 F  153.5 lbs   
              99 %

 

 2011  8:49:00 AM  122 mmHg  88 mmHg  88 bpm  16 rpm  98.6 F  152.25 lbs  
               99 %

 

 2011  10:02:00 AM  140 mmHg  82 mmHg  96 bpm  20 rpm  98.8 F             
       100 %

 

 2011  9:14:00 AM  156 mmHg  98 mmHg  110 bpm  24 rpm  98.5 F  153 lbs    
             100 %

 

 2011  8:50:00 AM  160 mmHg  84 mmHg  100 bpm  16 rpm  98.7 F  155 lbs    
             100 %

 

 2011  1:25:00 PM  152 mmHg  100 mmHg  82 bpm  16 rpm  97.2 F  156.375 lbs 
                100 %

 

 2011  9:55:00 AM  144 mmHg  90 mmHg                              

 

 2010  9:24:00 AM  138 mmHg  84 mmHg                              

 

 2010  8:58:00 AM  160 mmHg  94 mmHg                              

 

 2010  8:33:00 AM  142 mmHg  90 mmHg  100 bpm  16 rpm  98.1 F  158.375 
lbs                  

 

 2010  9:24:00 AM  160 mmHg  102 mmHg  88 bpm  18 rpm  99 F  157.125 lbs  
62 in     28.7382 kg/m  1.7657 m      

 

 3/19/2010  11:01:00 AM  140 mmHg  90 mmHg                              

 

 2009  10:08:00 AM  142 mmHg  86 mmHg  72 bpm  16 rpm  98.1 F  154.125 
lbs  61 in     29.1214 kg/m  1.73 m2      







Social History







 Name  Description  Comments

 

       

 

 Children      

 

 lives alone      

 

 Supervisor      

 

 Tobacco  Never smoker   







History of Procedures







 Date Ordered  Description  Order Status

 

 2011 12:00 AM  COMPREHEN METABOLIC PANEL  Reviewed

 

 2011 12:00 AM  ASSAY THYROID STIM HORMONE  Reviewed

 

 2011 12:00 AM  COMPREHEN METABOLIC PANEL  Reviewed

 

 2011 12:00 AM  LIPID PANEL  Reviewed

 

 2011 12:00 AM  ASSAY THYROID STIM HORMONE  Reviewed

 

 2011 12:00 AM  COMPLETE CBC W/AUTO DIFF WBC  Reviewed

 

 2015 12:00 AM  COMPLETE CBC W/AUTO DIFF WBC  Reviewed

 

 2015 12:00 AM  COMPREHEN METABOLIC PANEL  Reviewed

 

 2015 12:00 AM  LIPID PANEL  Reviewed

 

 2015 12:00 AM  ASSAY THYROID STIM HORMONE  Reviewed

 

 2011 12:00 AM  COMPREHEN METABOLIC PANEL  Reviewed

 

 2011 12:00 AM  COMPREHEN METABOLIC PANEL  Reviewed

 

 2011 12:00 AM  LIPID PANEL  Reviewed

 

 2011 12:00 AM  ASSAY THYROID STIM HORMONE  Reviewed

 

 10/28/2011 12:00 AM  ***Immunization administration, Medicare flu  Reviewed

 

 10/28/2011 12:00 AM  Fluzone ** MEDICARE Only **  Reviewed

 

 2010 11:24 AM  NO CHARGE OV  Reviewed

 

 2010 11:26 AM  NO CHARGE OV  Reviewed

 

 2011 12:00 AM  COMPREHEN METABOLIC PANEL  Reviewed

 

 2011 12:00 AM  LIPID PANEL  Reviewed

 

 2011 12:00 AM  ASSAY THYROID STIM HORMONE  Reviewed

 

 2011 12:00 AM  COMPLETE CBC W/AUTO DIFF WBC  Reviewed

 

 2011 12:00 AM  Wrist Support  Reviewed

 

 2016 12:00 AM  Screening mammography, bilateral  Reviewed

 

 2016 12:00 AM  DXA BONE DENSITY AXIAL  Returned

 

 2016 12:00 AM  TETANUS VACCINE IM  Reviewed

 

 2016 12:00 AM  HEP B VACC ADULT 3 DOSE IM  Reviewed

 

 2012 12:00 AM  TISSUE EXAM FOR FUNGI  Reviewed

 

 2012 12:00 AM  SMEAR WET MOUNT SALINE/INK  Reviewed

 

 2016 12:00 AM  TETANUS VACCINE IM  Reviewed

 

 2016 12:00 AM  HEP B VACC ADULT 3 DOSE IM  Reviewed

 

 2/15/2012 12:00 AM  THER/PROPH/DIAG INJ SC/IM  Reviewed

 

 2/15/2012 12:00 AM  Bicillin CR [facility]04523198128[facility]  Reviewed

 

 2/15/2012 12:00 AM  Depo-Medrol 40 mg NDC#0347102495  Reviewed

 

 10/13/2016 12:00 AM  COMPLETE CBC W/AUTO DIFF WBC  Returned

 

 10/13/2016 12:00 AM  COMPREHEN METABOLIC PANEL  Returned

 

 10/13/2016 12:00 AM  ASSAY THYROID STIM HORMONE  Returned

 

 10/13/2016 12:00 AM  LIPID PANEL  Returned

 

 2016 12:00 AM  TETANUS VACCINE IM  Reviewed

 

 2016 12:00 AM  HEP B VACC ADULT 3 DOSE IM  Reviewed

 

 2017 12:00 AM  ASSAY OF IRON  Returned

 

 2017 12:00 AM  IRON BINDING TEST  Returned

 

 2017 12:00 AM  ASSAY OF TRANSFERRIN  Returned

 

 2017 12:00 AM  OCCULT BLD FECES 1-3 TESTS  Returned

 

 2017 12:00 AM  COMPLETE CBC AUTOMATED  Returned

 

 8/15/2012 12:00 AM  COMPREHEN METABOLIC PANEL  Reviewed

 

 8/15/2012 12:00 AM  ASSAY THYROID STIM HORMONE  Reviewed

 

 8/15/2012 12:00 AM  COMPLETE CBC W/AUTO DIFF WBC  Reviewed

 

 8/15/2012 12:00 AM  Wrist Support  Reviewed

 

 10/29/2012 12:00 AM  Flu Injection 3 Years And Above NDC# [address]-98  [facility]  
Reviewed

 

 12/10/2012 12:00 AM  IMMUNIZATION ADMIN  Reviewed

 

 12/10/2012 12:00 AM  Pneumovax Injection - [facility]  Reviewed

 

 2012 12:00 AM  TRICHOMONAS ASSAY W/OPTIC  Reviewed

 

 2013 12:00 AM  THER/PROPH/DIAG INJ SC/IM  Reviewed

 

 2013 12:00 AM  Bicillin CR, 1.2 million units NDC# 07010-182-25  Reviewed

 

 2013 12:00 AM  COMPREHEN METABOLIC PANEL  Reviewed

 

 2013 12:00 AM  LIPID PANEL  Reviewed

 

 2013 12:00 AM  COMPLETE CBC W/AUTO DIFF WBC  Reviewed

 

 2013 12:00 AM  ASSAY THYROID STIM HORMONE  Reviewed

 

 6/10/2013 12:00 AM  MAMMOGRAM SCREENING  Reviewed

 

 6/10/2013 12:00 AM  CYTOPATH C/V MANUAL  Reviewed

 

 12/10/2013 12:00 AM  LIPID PANEL  Reviewed

 

 12/10/2013 12:00 AM  ASSAY THYROID STIM HORMONE  Reviewed

 

 12/10/2013 12:00 AM  COMPLETE CBC W/AUTO DIFF WBC  Reviewed

 

 12/10/2013 12:00 AM  COMPREHEN METABOLIC PANEL  Reviewed

 

 2010 12:00 AM  CYTOPATH C/V MANUAL  Reviewed

 

 2010 12:00 AM  SMEAR WET MOUNT SALINE/INK  Reviewed

 

 2010 12:00 AM  LIPID PANEL  Reviewed

 

 2010 12:00 AM  ASSAY THYROID STIM HORMONE  Reviewed

 

 2010 12:00 AM  COMPLETE CBC W/AUTO DIFF WBC  Reviewed

 

 2010 12:00 AM  COMPREHEN METABOLIC PANEL  Reviewed

 

 10/28/2013 12:00 AM  Flu Injection 3 Years And Above NDC# [address]-46  [facility]  
Reviewed

 

 2010 8:48 AM  Blood Pressure Check-no charge  Reviewed

 

 2010 12:00 AM  Blood Pressure Check-no charge  Reviewed

 

 2014 12:00 AM  METABOLIC PANEL TOTAL CA  Reviewed

 

 2014 12:00 AM  ASSAY THYROID STIM HORMONE  Reviewed

 

 2014 12:00 AM  ASSAY OF FREE THYROXINE  Reviewed

 

 2014 12:00 AM  MAMMOGRAM SCREENING  Reviewed

 

 2014 12:00 AM  CT BONE DENSITY AXIAL  Reviewed

 

 2014 12:00 AM  COMPLETE CBC W/AUTO DIFF WBC  Reviewed

 

 2014 12:00 AM  COMPREHEN METABOLIC PANEL  Reviewed

 

 2014 12:00 AM  LIPID PANEL  Reviewed

 

 2014 12:00 AM  ASSAY THYROID STIM HORMONE  Reviewed

 

 10/20/2010 12:00 AM  IMMUNIZATION ADMIN  Reviewed

 

 10/20/2010 12:00 AM  FLU VACCINE 3 YRS & > IM  Reviewed

 

 2010 12:00 AM  COMPREHEN METABOLIC PANEL  Reviewed

 

 2010 12:00 AM  LIPID PANEL  Reviewed

 

 2010 12:00 AM  ASSAY THYROID STIM HORMONE  Reviewed

 

 2010 12:00 AM  COMPLETE CBC W/AUTO DIFF WBC  Reviewed

 

 2010 12:00 AM  Blood Pressure Check-no charge  Reviewed

 

 10/2/2014 12:00 AM  THER/PROPH/DIAG INJ SC/IM  Reviewed

 

 10/2/2014 12:00 AM  Kenalog, Per 10 Mg NDC#6122-8403-56  Reviewed

 

 2014 12:00 AM  COMPLETE CBC W/AUTO DIFF WBC  Reviewed

 

 2014 12:00 AM  COMPREHEN METABOLIC PANEL  Reviewed

 

 2014 12:00 AM  LIPID PANEL  Reviewed

 

 2014 12:00 AM  ASSAY THYROID STIM HORMONE  Reviewed

 

 2015 12:00 AM  Rocephin 1 gram NDC#4762-2451-23  Reviewed

 

 2015 12:00 AM  THER/PROPH/DIAG INJ SC/IM  Reviewed

 

 2011 12:00 AM  COMPREHEN METABOLIC PANEL  Reviewed

 

 2011 12:00 AM  LIPID PANEL  Reviewed

 

 2011 12:00 AM  ASSAY THYROID STIM HORMONE  Reviewed

 

 2011 12:00 AM  COMPLETE CBC W/AUTO DIFF WBC  Reviewed

 

 2011 12:00 AM  METABOLIC PANEL TOTAL CA  Reviewed

 

 2011 12:00 AM  COMPREHEN METABOLIC PANEL  Reviewed

 

 2011 12:00 AM  ASSAY THYROID STIM HORMONE  Reviewed

 

 2011 12:00 AM  COMPLETE CBC W/AUTO DIFF WBC  Reviewed

 

 2011 12:00 AM  ASSAY OF LIPASE  Reviewed

 

 2011 12:00 AM  THER/PROPH/DIAG INJ SC/IM  Reviewed

 

 2011 12:00 AM  Phenergan 50 Mg Im  Bernadine  Reviewed

 

 2011 12:00 AM  METABOLIC PANEL TOTAL CA  Reviewed

 

 2011 12:00 AM  THER/PROPH/DIAG INJ SC/IM  Reviewed

 

 2011 12:00 AM  Phenergan 25 Mg Im  Bernadine  Reviewed

 

 2011 12:00 AM  METABOLIC PANEL TOTAL CA  Reviewed

 

 2011 12:00 AM  ASSAY THYROID STIM HORMONE  Reviewed

 

 2011 12:00 AM  THER/PROPH/DIAG INJ SC/IM  Reviewed

 

 2011 12:00 AM  Phenergan 50 Mg Im  Bernadine  Reviewed

 

 2011 12:00 AM  ASSAY OF SERUM SODIUM  Reviewed

 

 2011 12:00 AM  ASSAY OF SERUM SODIUM  Reviewed

 

 2011 12:00 AM  CYTOPATH C/V MANUAL  Reviewed

 

 2011 12:00 AM  ASSAY THYROID STIM HORMONE  Reviewed

 

 2015 12:00 AM  COMPLETE CBC W/AUTO DIFF WBC  Reviewed

 

 2015 12:00 AM  COMPREHEN METABOLIC PANEL  Reviewed

 

 2015 12:00 AM  LIPID PANEL  Reviewed

 

 2015 12:00 AM  ASSAY THYROID STIM HORMONE  Reviewed

 

 2015 12:00 AM  MAMMOGRAM SCREENING  Reviewed

 

 2015 12:00 AM  CYTOPATH TBS C/V MANUAL  Reviewed







Results Summary







 Date and Description  Results

 

 2010 9:25 AM  Colonoscopy-Women and Men over 50 Abnormal Mammogram -Women 
over 40 Declined Pap Smear Declined 

 

 2010 10:41 AM  WET PREP NO TRICHOMONADS SEEN  No  Few 

 

 2010 8:15 AM  TRIGLYCERIDES 174.0 mg/dLCHOLESTEROL 175.0 mg/dLHDL 58.0 mg/
dLTOT CHOL/HDL 3.0 LDL (CALC) 82.0 mg/dLTSH 0.790 uIU/mLGLUCOSE 95.0 mg/
dLSODIUM 136.0 mmol/LPOTASSIUM 4.50 mmol/LCHLORIDE 99.0 mmol/LCO2 26.0 mmol/
LBUN 12.0 mg/dLCREATININE 0.80 mg/dLSGOT/AST 32.0 IU/LSGPT/ALT 33.0 IU/LALK 
PHOS 103.0 IU/LTOTAL PROTEIN 7.60 g/dLALBUMIN 4.60 g/dLTOTAL BILI 0.60 mg/
dLCALCIUM 9.80 mg/dLAGE 64 GFR NonAA 72 GFR AA 87 eGFR >60 mL/min/1.73 m2eGFR AA
* >60 WBC 5.3 RBC 4.13 HGB 13.10 g/dLHCT 39.20 %MCV 95.0 fLMCH 31.70 pgMCHC 
33.40 g/dLRDW SD 44 RDW CV 12.70 %MPV 9.80 fLPLT 257 NRBC# 0.00 NRBC% 0.0 %NEUT 
57.90 %%LYMP 26.50 %%MONO 11.60 %%EOS 3.20 %%BASO 0.80 %#NEUT 3.04 #LYMP 1.39 #
MONO 0.61 #EOS 0.17 #BASO 0.04 MANUAL DIFF NOT IND 

 

 12/15/2010 8:30 AM  TRIGLYCERIDES 157.0 mg/dLCHOLESTEROL 163.0 mg/dLHDL 56.0 mg
/dLTOT CHOL/HDL 2.9 LDL (CALC) 76.0 mg/dLTSH 0.650 uIU/mLWBC 6.5 RBC 4.25 HGB 
13.60 g/dLHCT 40.70 %MCV 96.0 fLMCH 32.0 pgMCHC 33.40 g/dLRDW SD 44 RDW CV 
12.60 %MPV 9.90 fLPLT 313 NRBC# 0.00 NRBC% 0.0 %NEUT 61.80 %%LYMP 26.20 %%MONO 
8.30 %%EOS 3.10 %%BASO 0.60 %#NEUT 4.00 #LYMP 1.70 #MONO 0.54 #EOS 0.20 #BASO 
0.04 MANUAL DIFF NOT IND GLUCOSE 97.0 mg/dLSODIUM 136.0 mmol/LPOTASSIUM 4.40 
mmol/LCHLORIDE 101.0 mmol/LCO2 26.0 mmol/LBUN 10.0 mg/dLCREATININE 0.80 mg/
dLSGOT/AST 31.0 IU/LSGPT/ALT 34.0 IU/LALK PHOS 92.0 IU/LTOTAL PROTEIN 8.10 g/
dLALBUMIN 4.60 g/dLTOTAL BILI 0.40 mg/dLCALCIUM 10.0 mg/dLAGE 65 GFR NonAA 72 
GFR AA 87 eGFR >60 mL/min/1.73 m2eGFR AA* >60 

 

 2011 9:45 AM  GLUCOSE 91.0 mg/dLSODIUM 133.0 mmol/LPOTASSIUM 4.40 mmol/
LCHLORIDE 97.0 mmol/LCO2 24.0 mmol/LBUN 9.0 mg/dLCREATININE 0.70 mg/dLCALCIUM 
10.0 mg/dLAGE 65 GFR NonAA 84 GFR  eGFR >60 mL/min/1.73 m2eGFR AA* >60 

 

 2011 10:25 AM  LIPASE 29.0 U/LTSH 0.10 uIU/mLGLUCOSE 115.0 mg/dLSODIUM 
127.0 mmol/LPOTASSIUM 5.30 mmol/LCHLORIDE 93.0 mmol/LCO2 18.0 mmol/LBUN 9.0 mg/
dLCREATININE 0.70 mg/dLSGOT/AST 62.0 IU/LSGPT/ALT 46.0 IU/LALK PHOS 100.0 IU/
LTOTAL PROTEIN 8.60 g/dLALBUMIN 4.90 g/dLTOTAL BILI 0.60 mg/dLCALCIUM 9.90 mg/
dLAGE 65 GFR NonAA 84 GFR  eGFR >60 mL/min/1.73 m2eGFR AA* >60 WBC 6.9 
RBC 4.48 HGB 14.40 g/dLHCT 40.90 %MCV 91.0 fLMCH 32.10 pgMCHC 35.20 g/dLRDW SD 
41 RDW CV 12.50 %MPV 9.30 fLPLT 260 NRBC# 0.00 NRBC% 0.0 %NEUT 74.90 %%LYMP 
15.10 %%MONO 9.40 %%EOS 0.30 %%BASO 0.30 %#NEUT 5.15 #LYMP 1.04 #MONO 0.65 #EOS 
0.02 #BASO 0.02 MANUAL DIFF NOT IND 

 

 2011 9:07 AM  GLUCOSE 92.0 mg/dLSODIUM 131.0 mmol/LPOTASSIUM 4.20 mmol/
LCHLORIDE 99.0 mmol/LCO2 22.0 mmol/LBUN 9.0 mg/dLCREATININE 0.70 mg/dLCALCIUM 
9.20 mg/dLAGE 65 GFR NonAA 84 GFR  eGFR >60 mL/min/1.73 m2eGFR AA* >60 

 

 2011 11:06 AM  TSH 0.510 uIU/mLGLUCOSE 99.0 mg/dLSODIUM 132.0 mmol/
LPOTASSIUM 3.50 mmol/LCHLORIDE 95.0 mmol/LCO2 23.0 mmol/LBUN 9.0 mg/
dLCREATININE 0.80 mg/dLCALCIUM 10.40 mg/dLAGE 65 GFR NonAA 72 GFR AA 87 eGFR >
60 mL/min/1.73 m2eGFR AA* >60 

 

 2011 9:45 AM  SODIUM 132.0 mmol/L

 

 2011 9:27 AM  SODIUM 137.0 mmol/L

 

 2011 8:35 AM  TRIGLYCERIDES 114.0 mg/dLCHOLESTEROL 145.0 mg/dLHDL 56.0 mg/
dLTOT CHOL/HDL 2.6 LDL (CALC) 66.0 mg/dLGLUCOSE 105.0 mg/dLSODIUM 138.0 mmol/
LPOTASSIUM 4.40 mmol/LCHLORIDE 103.0 mmol/LCO2 25.0 mmol/LBUN 8.0 mg/
dLCREATININE 0.70 mg/dLSGOT/AST 36.0 IU/LSGPT/ALT 38.0 IU/LALK PHOS 103.0 IU/
LTOTAL PROTEIN 7.40 g/dLALBUMIN 4.30 g/dLTOTAL BILI 0.30 mg/dLCALCIUM 9.20 mg/
dLAGE 65 GFR NonAA 84 GFR  eGFR >60 mL/min/1.73 m2eGFR AA* >60 WBC 5.1 
RBC 3.76 HGB 12.0 g/dLHCT 36.10 %MCV 96.0 fLMCH 31.90 pgMCHC 33.20 g/dLRDW SD 
46 RDW CV 13.10 %MPV 9.40 fLPLT 249 NRBC# 0.00 NRBC% 0.0 %NEUT 60.40 %%LYMP 
25.90 %%MONO 8.90 %%EOS 4.0 %%BASO 0.80 %#NEUT 3.06 #LYMP 1.31 #MONO 0.45 #EOS 
0.20 #BASO 0.04 MANUAL DIFF NOT IND 

 

 2011 9:55 AM  TSH 1.070 uIU/mL

 

 2011 8:55 AM  GLUCOSE 102.0 mg/dLSODIUM 135.0 mmol/LPOTASSIUM 4.20 mmol/
LCHLORIDE 102.0 mmol/LCO2 24.0 mmol/LBUN 15.0 mg/dLCREATININE 0.80 mg/dLSGOT/
AST 30.0 IU/LSGPT/ALT 35.0 IU/LALK PHOS 119.0 IU/LTOTAL PROTEIN 7.50 g/
dLALBUMIN 4.30 g/dLTOTAL BILI 0.30 mg/dLCALCIUM 9.20 mg/dLAGE 65 GFR NonAA 72 
GFR AA 87 eGFR >60 mL/min/1.73 m2eGFR AA* >60 TSH 1.130 uIU/mL

 

 2011 9:50 AM  GLUCOSE 85.0 mg/dLSODIUM 133.0 mmol/LPOTASSIUM 4.20 mmol/
LCHLORIDE 101.0 mmol/LCO2 21.0 mmol/LBUN 13.0 mg/dLCREATININE 0.80 mg/dLSGOT/
AST 36.0 IU/LSGPT/ALT 36.0 IU/LALK PHOS 109.0 IU/LTOTAL PROTEIN 7.40 g/
dLALBUMIN 4.20 g/dLTOTAL BILI 0.50 mg/dLCALCIUM 9.40 mg/dLAGE 65 GFR NonAA 72 
GFR AA 87 eGFR >60 mL/min/1.73 m2eGFR AA* >60 

 

 2011 8:55 AM  GLUCOSE 98.0 mg/dLSODIUM 137.0 mmol/LPOTASSIUM 3.90 mmol/
LCHLORIDE 103.0 mmol/LCO2 25.0 mmol/LBUN 14.0 mg/dLCREATININE 0.70 mg/dLSGOT/
AST 33.0 IU/LSGPT/ALT 36.0 IU/LALK PHOS 111.0 IU/LTOTAL PROTEIN 8.30 g/
dLALBUMIN 4.40 g/dLTOTAL BILI 0.50 mg/dLCALCIUM 9.40 mg/dLAGE 66 GFR NonAA 84 
GFR  eGFR >60 mL/min/1.73 m2eGFR AA* >60 TRIGLYCERIDES 109.0 mg/
dLCHOLESTEROL 153.0 mg/dLHDL 54.0 mg/dLTOT CHOL/HDL 2.8 LDL (CALC) 77.0 mg/
dLTSH 1.330 uIU/mL

 

 2011 10:17 AM  Colonoscopy-Women and Men over 50 Declined Mammogram -
Women over 40 Normal Pap Smear Negative 

 

 2012 10:33 AM  WET PREP NO TRICH SEEN CLUE CELLS NONE SEEN 

 

 2012 8:45 AM  WBC 5.2 RBC 3.96 HGB 12.70 g/dLHCT 37.80 %MCV 96.0 fLMCH 
32.10 pgMCHC 33.60 g/dLRDW SD 46 RDW CV 13.30 %MPV 9.70 fLPLT 297 NRBC# 0.00 
NRBC% 0.0 %NEUT 57.70 %%LYMP 28.50 %%MONO 10.30 %%EOS 2.50 %%BASO 1.0 %#NEUT 
3.02 #LYMP 1.49 #MONO 0.54 #EOS 0.13 #BASO 0.05 MANUAL DIFF NOT IND GLUCOSE 
97.0 mg/dLSODIUM 137.0 mmol/LPOTASSIUM 4.40 mmol/LCHLORIDE 101.0 mmol/LCO2 23.0 
mmol/LBUN 17.0 mg/dLCREATININE 0.80 mg/dLSGOT/AST 30.0 IU/LSGPT/ALT 33.0 IU/
LALK PHOS 95.0 IU/LTOTAL PROTEIN 7.40 g/dLALBUMIN 4.40 g/dLTOTAL BILI 0.60 mg/
dLCALCIUM 9.70 mg/dLAGE 66 GFR NonAA 72 GFR AA 87 eGFR 60 eGFR AA* 60 
TRIGLYCERIDES 130.0 mg/dLCHOLESTEROL 157.0 mg/dLHDL 56.0 mg/dLTOT CHOL/HDL 2.8 
LDL (CALC) 75.0 mg/dLTSH 0.940 uIU/mL

 

 8/15/2012 4:02 PM  WET PREP NO TRICH SEEN CLUE CELLS NONE SEEN 

 

 2012 8:45 AM  WBC 5.1 RBC 3.91 HGB 12.50 g/dLHCT 36.30 %MCV 93.0 fLMCH 
32.0 pgMCHC 34.40 g/dLRDW SD 43 RDW CV 12.60 %MPV 9.30 fLPLT 244 NRBC# 0.00 NRBC
% 0.0 %NEUT 57.60 %%LYMP 27.50 %%MONO 9.10 %%EOS 5.0 %%BASO 0.80 %#NEUT 2.91 #
LYMP 1.39 #MONO 0.46 #EOS 0.25 #BASO 0.04 MANUAL DIFF NOT IND TSH 1.390 uIU/
mLTRIGLYCERIDES 154.0 mg/dLCHOLESTEROL 147.0 mg/dLHDL 45.0 mg/dLTOT CHOL/HDL 
3.3 LDL (CALC) 71.0 mg/dLGLUCOSE 101.0 mg/dLSODIUM 134.0 mmol/LPOTASSIUM 4.30 
mmol/LCHLORIDE 102.0 mmol/LCO2 23.0 mmol/LBUN 11.0 mg/dLCREATININE 0.80 mg/
dLSGOT/AST 34.0 IU/LSGPT/ALT 38.0 IU/LALK PHOS 104.0 IU/LTOTAL PROTEIN 7.60 g/
dLALBUMIN 4.40 g/dLTOTAL BILI 0.50 mg/dLCALCIUM 9.40 mg/dLAGE 67 GFR NonAA 72 
GFR AA 87 eGFR 60 eGFR AA* 60 

 

 12/10/2012 8:34 AM  Colonoscopy-Women and Men over 50 Declined Mammogram -
Women over 40 Declined Pap Smear Declined 

 

 2012 5:02 PM  WET PREP NO TRICH SEEN CLUE CELLS NONE SEEN 

 

 2013 8:25 AM  WBC 4.2 RBC 3.96 HGB 12.90 g/dLHCT 37.0 %MCV 93.0 fLMCH 
32.60 pgMCHC 34.90 g/dLRDW SD 43 RDW CV 12.60 %MPV 9.70 fLPLT 254 NRBC# 0.00 
NRBC% 0.0 %NEUT 54.40 %%LYMP 30.40 %%MONO 10.80 %%EOS 3.40 %%BASO 1.0 %#NEUT 
2.26 #LYMP 1.26 #MONO 0.45 #EOS 0.14 #BASO 0.04 MANUAL DIFF NOT IND TSH 1.230 
uIU/mLGLUCOSE 94.0 mg/dLSODIUM 136.0 mmol/LPOTASSIUM 4.30 mmol/LCHLORIDE 99.0 
mmol/LCO2 25.0 mmol/LBUN 10.0 mg/dLCREATININE 0.80 mg/dLSGOT/AST 36.0 IU/LSGPT/
ALT 36.0 IU/LALK PHOS 84.0 IU/LTOTAL PROTEIN 7.90 g/dLALBUMIN 4.40 g/dLTOTAL 
BILI 0.70 mg/dLCALCIUM 9.80 mg/dLAGE 67 GFR NonAA 72 GFR AA 87 eGFR 60 eGFR AA* 
61 TRIGLYCERIDES 122.0 mg/dLCHOLESTEROL 141.0 mg/dLHDL 47.0 mg/dLTOT CHOL/HDL 
3.0 LDL (CALC) 70.0 mg/dL

 

 6/10/2013 3:52 PM  WET PREP NO TRICH SEEN CLUE CELLS NONE SEEN 

 

 2013 8:45 AM  WBC 5.3 RBC 4.01 HGB 13.0 g/dLHCT 37.60 %MCV 94.0 fLMCH 
32.40 pgMCHC 34.60 g/dLRDW SD 43 RDW CV 12.50 %MPV 9.40 fLPLT 248 NRBC# 0.00 
NRBC% 0.0 %NEUT 58.70 %%LYMP 27.80 %%MONO 9.80 %%EOS 2.80 %%BASO 0.90 %#NEUT 
3.12 #LYMP 1.48 #MONO 0.52 #EOS 0.15 #BASO 0.05 MANUAL DIFF NOT IND TSH 1.570 
uIU/mLGLUCOSE 94.0 mg/dLSODIUM 134.0 mmol/LPOTASSIUM 4.10 mmol/LCHLORIDE 101.0 
mmol/LCO2 23.0 mmol/LBUN 11.0 mg/dLCREATININE 0.80 mg/dLSGOT/AST 41.0 IU/LSGPT/
ALT 44.0 IU/LALK PHOS 109.0 IU/LTOTAL PROTEIN 7.90 g/dLALBUMIN 4.70 g/dLTOTAL 
BILI 0.80 mg/dLCALCIUM 10.40 mg/dLAGE 68 GFR NonAA 72 GFR AA 87 eGFR >60 mL/min/
1.73 m2eGFR AA* >60 TRIGLYCERIDES 163.0 mg/dLCHOLESTEROL 138.0 mg/dLHDL 49.0 mg/
dLTOT CHOL/HDL 2.8 LDL (CALC) 56.0 mg/dL

 

 2014 8:58 AM  GLUCOSE 86.0 mg/dLSODIUM 134.0 mmol/LPOTASSIUM 4.20 mmol/
LCHLORIDE 99.0 mmol/LCO2 25.0 mmol/LBUN 14.0 mg/dLCREATININE 0.80 mg/dLCALCIUM 
10.10 mg/dLAGE 68 GFR NonAA 72 GFR AA 87 eGFR >60 mL/min/1.73 m2eGFR AA* >60 
FREE T4 1.18 TSH 1.20 uIU/mL

 

 2014 9:50 AM  WBC 5.7 RBC 4.03 HGB 12.90 g/dLHCT 37.90 %MCV 94.0 fLMCH 
32.0 pgMCHC 34.0 g/dLRDW SD 44 RDW CV 12.70 %MPV 9.70 fLPLT 292 NRBC# 0.00 NRBC
% 0.0 %NEUT 62.70 %%LYMP 21.80 %%MONO 11.0 %%EOS 3.40 %%BASO 1.10 %#NEUT 3.55 #
LYMP 1.23 #MONO 0.62 #EOS 0.19 #BASO 0.06 MANUAL DIFF NOT IND GLUCOSE 98.0 mg/
dLSODIUM 135.0 mmol/LPOTASSIUM 4.30 mmol/LCHLORIDE 102.0 mmol/LCO2 22.0 mmol/
LBUN 10.0 mg/dLCREATININE 0.80 mg/dLSGOT/AST 34.0 IU/LSGPT/ALT 32.0 IU/LALK 
PHOS 95.0 IU/LTOTAL PROTEIN 7.70 g/dLALBUMIN 4.30 g/dLTOTAL BILI 0.80 mg/
dLCALCIUM 9.10 mg/dLAGE 68 GFR NonAA 72 GFR AA 87 eGFR 60 eGFR AA* 60 
TRIGLYCERIDES 108.0 mg/dLCHOLESTEROL 146.0 mg/dLHDL 56.0 mg/dLTOT CHOL/HDL 2.6 
LDL (CALC) 68.0 mg/dLTSH 0.90 uIU/mL

 

 2014 8:40 AM  WBC 4.4 RBC 4.13 HGB 13.20 g/dLHCT 38.90 %MCV 94.0 fLMCH 
32.0 pgMCHC 33.90 g/dLRDW SD 47 RDW CV 13.50 %MPV 9.80 fLPLT 279 NRBC# 0.00 NRBC
% 0.0 %NEUT 63.80 %%LYMP 24.60 %%MONO 9.30 %%EOS 1.60 %%BASO 0.70 %#NEUT 2.80 #
LYMP 1.08 #MONO 0.41 #EOS 0.07 #BASO 0.03 MANUAL DIFF NOT IND GLUCOSE 96.0 mg/
dLSODIUM 136.0 mmol/LPOTASSIUM 4.10 mmol/LCHLORIDE 99.0 mmol/LCO2 23.0 mmol/
LBUN 8.0 mg/dLCREATININE 0.80 mg/dLSGOT/AST 31.0 IU/LSGPT/ALT 27.0 IU/LALK PHOS 
85.0 IU/LTOTAL PROTEIN 7.60 g/dLALBUMIN 4.40 g/dLTOTAL BILI 0.80 mg/dLCALCIUM 
10.20 mg/dLAGE 69 GFR NonAA 71 GFR AA 86 eGFR 60 eGFR AA* 60 TRIGLYCERIDES 61.0 
mg/dLCHOLESTEROL 148.0 mg/dLHDL 71.0 mg/dLTOT CHOL/HDL 2.1 LDL (CALC) 65.0 mg/
dLTSH 0.990 uIU/mL

 

 2015 8:32 AM  WBC 4.8 RBC 3.98 HGB 12.70 g/dLHCT 37.30 %MCV 94.0 fLMCH 
31.90 pgMCHC 34.0 g/dLRDW SD 43 RDW CV 12.70 %MPV 9.50 fLPLT 270 NRBC# 0.00 NRBC
% 0.0 %NEUT 57.30 %%LYMP 25.0 %%MONO 13.0 %%EOS 3.60 %%BASO 1.10 %#NEUT 2.73 #
LYMP 1.19 #MONO 0.62 #EOS 0.17 #BASO 0.05 MANUAL DIFF NOT IND TSH 0.640 uIU/
mLGLUCOSE 90.0 mg/dLSODIUM 136.0 mmol/LPOTASSIUM 4.0 mmol/LCHLORIDE 101.0 mmol/
LCO2 24.0 mmol/LBUN 10.0 mg/dLCREATININE 0.80 mg/dLSGOT/AST 34.0 IU/LSGPT/ALT 
32.0 IU/LALK PHOS 92.0 IU/LTOTAL PROTEIN 7.50 g/dLALBUMIN 4.40 g/dLTOTAL BILI 
0.70 mg/dLCALCIUM 9.50 mg/dLAGE 69 GFR NonAA 71 GFR AA 86 eGFR >60 mL/min/1.73 
m2eGFR AA* >60 TRIGLYCERIDES 107.0 mg/dLCHOLESTEROL 138.0 mg/dLHDL 58.0 mg/
dLTOT CHOL/HDL 2.4 LDL (CALC) 59.0 mg/dL

 

 2015 8:31 AM  WBC 4.6 RBC 3.97 HGB 12.90 g/dLHCT 38.0 %MCV 96.0 fLMCH 
32.50 pgMCHC 33.90 g/dLRDW SD 44 RDW CV 12.60 %MPV 9.0 fLPLT 248 NRBC# 0.00 NRBC
% 0.0 %NEUT 61.0 %%LYMP 24.70 %%MONO 10.20 %%EOS 3.0 %%BASO 1.10 %#NEUT 2.82 #
LYMP 1.14 #MONO 0.47 #EOS 0.14 #BASO 0.05 MANUAL DIFF NOT IND TRIGLYCERIDES 
105.0 mg/dLCHOLESTEROL 141.0 mg/dLHDL 58.0 mg/dLTOT CHOL/HDL 2.4 LDL (CALC) 
62.0 mg/dLGLUCOSE 93.0 mg/dLSODIUM 136.0 mmol/LPOTASSIUM 4.10 mmol/LCHLORIDE 
101.0 mmol/LCO2 25.0 mmol/LBUN 9.0 mg/dLCREATININE 0.80 mg/dLSGOT/AST 32.0 IU/
LSGPT/ALT 28.0 IU/LALK PHOS 95.0 IU/LTOTAL PROTEIN 7.30 g/dLALBUMIN 4.50 g/
dLTOTAL BILI 0.80 mg/dLCALCIUM 9.70 mg/dLAGE 70 GFR NonAA 71 GFR AA 86 eGFR >60 
mL/min/1.73meGFR AA* >60 TSH 1.10 uIU/mL

 

 2016 8:25 AM  TSH 0.70 uIU/mLTRIGLYCERIDES 85.0 mg/dLCHOLESTEROL 166.0 mg
/dLHDL 74.0 mg/dLTOT CHOL/HDL 2.2 LDL (CALC) 75.0 mg/dLGLUCOSE 96.0 mg/dLSODIUM 
136.0 mmol/LPOTASSIUM 4.0 mmol/LCHLORIDE 100.0 mmol/LCO2 24.0 mmol/LBUN 11.0 mg/
dLCREATININE 0.80 mg/dLSGOT/AST 34.0 IU/LSGPT/ALT 24.0 IU/LALK PHOS 98.0 IU/
LTOTAL PROTEIN 7.60 g/dLALBUMIN 4.40 g/dLTOTAL BILI 0.80 mg/dLCALCIUM 9.70 mg/
dLAGE 71 GFR NonAA 71 GFR AA 86 eGFR >60 mL/min/1.73meGFR AA* >60 WBC 5.9 RBC 
4.00 HGB 12.80 g/dLHCT 38.0 %MCV 95.0 fLMCH 32.0 pgMCHC 33.70 g/dLRDW SD 43 RDW 
CV 12.20 %MPV 9.30 fLPLT 272 NRBC# 0.00 NRBC% 0.0 %NEUT 73.90 %%LYMP 15.60 %%
MONO 7.80 %%EOS 1.70 %%BASO 0.70 %#NEUT 4.35 #LYMP 0.92 #MONO 0.46 #EOS 0.10 #
BASO 0.04 MANUAL DIFF NOT IND 

 

 2017 4:05 PM  IRON TOTAL 31.0 ug/dLTransferrin 256.0 mg/dLTIBC Calculation 
320 %Saturation Calc 10 WBC 6.2 RBC 3.56 HGB 11.60 g/dLHCT 33.40 %MCV 94.0 
fLMCH 32.60 pgMCHC 34.70 g/dLRDW SD 42 RDW CV 12.10 %MPV 9.50 fLPLT 260 NRBC# 
0.00 NRBC% 0.0 

 

 2017 8:49 AM  OCC BLD STOOL POSITIVE 







History Of Immunizations







 Name  Date Admin  Mfg Name  Mfg Code  Trade Name  Lot#  Route  Inj  Vis Given  
Vis Pub  CVX

 

 Influenza  10/20/2010  [employer] pasteur  [facility]  Fluzone  [id number]  Intramuscular  
Left Arm  10/20/2010  2010  999

 

 Influenza  10/28/2011  [employer] pasteur  [facility]  Fluzone  [id number]  Intramuscular  
Left Arm  10/28/2011  2011  141

 

 Influenza  10/29/2012  [employer]of[employer] pasteur  [facility]  Fluzone  [id number]  Intramuscular  
Left Deltoid  10/29/2012  2012  141

 

 X  12/10/2012  [employer]  MSD  Pneumovax 23  [id number]  Intramuscular  
Left Gluteous Medius  12/10/2012  2010  33

 

 Influenza  10/28/2013  [employer]ofi pasteur  [facility]  Fluzone > 3 Years  [id number]  
Intramuscular  Right Deltoid  10/28/2013  2013  141

 

 X  9/2/2015  Not Entered  NE  Prevnar 13     Not Entered  Not Entered  1  109

 

 Influenza  2015  Not Entered  NE  Not Entered     Not Entered  Not Entered
  2015  141

 

 HepB Adult  2016  [employer]  MSD  Recombivax Adult  [id number]  Not 
Entered  Left Deltoid  2016  43

 

 HepB Adult  2016  [employer]  MSD  Recombivax Adult  [id number]  
Intramuscular  Right Deltoid  2016  43

 

 Zostavax  2012  Not Entered  NE  Not Entered     Not Entered  Not 
Entered  1  121

 

 Tdap  2012  Not Entered  NE  Not Entered     Not Entered  Not Entered  1  115

 

 Influenza  10/13/2016  Not Entered  NE  Fluzone High-Dose     Intramuscular  
Left Arm  1  141

 

 HepB Adult  2016  [employer]  MSD  Recombivax Adult  [id number]  
Intramuscular  Right Deltoid  2016  43







History of Past Illness







 Name  Date of Onset  Comments

 

 Benign Essential Hypertension  Dec 14 2009 10:10AM   

 

 Hyperlipidemia  Dec 14 2009 10:10AM   

 

 Hypothyroidism, Acquired  Dec 14 2009 10:10AM   

 

 hypothyroid      

 

 Hypertension      

 

 Hyperlipidemia      

 

 acid reflux      

 

 Hypertension, Benign Essential  2010  9:41AM   

 

 Hypertension, Benign Essential  2010 12:53PM   

 

 Routine gynecological examination  May  5 2010  9:28AM   

 

 Hypertension  May  5 2010  9:28AM   

 

 Hyperlipidemia, unspecified  May  5 2010  9:28AM   

 

 Hypothyroidism, Acquired  May  5 2010  9:28AM   

 

 Vulvovaginitis  May  5 2010  9:28AM   

 

 Rhinitis, Allergic  May  5 2010  9:28AM   

 

 Hypertension, Benign Essential  May 19 2010  8:48AM   

 

 Hypertension, Benign Essential  May 25 2010  9:39AM   

 

 Flu  Oct 20 2010 12:01PM   

 

 Essential Hypertension  2010  8:34AM   

 

 Hyperlipidemia  2010  8:34AM   

 

 Gastroesophageal Reflux  2010  8:34AM   

 

 Hypothyroidism, Acquired  2010  8:34AM   

 

 Hypertension, Benign Essential  2010  9:22AM   

 

 Hypertension, Benign Essential  Dec 15 2010  9:07AM   

 

 Essential Hypertension  2011  1:31PM   

 

 Hyperlipidemia  2011  1:31PM   

 

 Gastroesophageal Reflux  2011  1:31PM   

 

 Hypothyroidism, Acquired  2011  1:31PM   

 

 Essential Hypertension  2011  8:51AM   

 

 Hyperlipidemia  2011  8:51AM   

 

 Gastroesophageal Reflux  2011  8:51AM   

 

 Hypothyroidism, Acquired  2011  8:51AM   

 

 Essential Hypertension  2011  9:13AM   

 

 Hyperlipidemia  2011  9:13AM   

 

 Gastroesophageal Reflux  2011  9:13AM   

 

 Hypothyroidism, Acquired  2011  9:13AM   

 

 Nausea With Vomiting  2011  1:18PM   

 

 Hyponatremia  2011  8:46AM   

 

 Nausea  2011  9:13AM   

 

 Hypothyroidism  2011 11:10AM   

 

 Hyponatremia  2011 11:10AM   

 

 Essential Hypertension  2011 10:05AM   

 

 Hyperlipidemia  2011 10:05AM   

 

 Gastroesophageal Reflux  2011 10:05AM   

 

 Nausea  2011 10:05AM   

 

 Hypothyroidism, Acquired  2011 10:05AM   

 

 Hyponatremia  May  6 2011 11:34AM   

 

 Essential Hypertension  May 16 2011  8:50AM   

 

 Hyperlipidemia  May 16 2011  8:50AM   

 

 Gastroesophageal Reflux  May 16 2011  8:50AM   

 

 Nausea  May 16 2011  8:50AM   

 

 Hypothyroidism, Acquired  May 16 2011  8:50AM   

 

 Hyponatremia  May 16 2011  8:50AM   

 

 Routine gynecological examination  2011  8:54AM   

 

 Hypertension  2011  8:54AM   

 

 Hyperlipidemia, unspecified  2011  8:54AM   

 

 Hypothyroidism, Acquired  2011  8:54AM   

 

 Rhinitis, Allergic  2011  8:54AM   

 

 Hypothyroidism  Aug 29 2011 12:37PM   

 

 Hyponatremia  Aug 29 2011 12:37PM   

 

 Essential Hypertension  Sep 12 2011  8:53AM   

 

 Hyperlipidemia  Sep 12 2011  8:53AM   

 

 Gastroesophageal Reflux  Sep 12 2011  8:53AM   

 

 Hypothyroidism, Acquired  Sep 12 2011  8:53AM   

 

 Hyponatremia  Sep 12 2011  8:53AM   

 

 Hypertension  Sep 29 2011  9:41AM   

 

 Hyperlipidemia  Dec  8 2011  8:31AM   

 

 Hypothyroidism  Dec  8 2011  8:31AM   

 

 Hypertension  Dec  8 2011  8:31AM   

 

 Flu  Dec  9 2011 10:02AM   

 

 Essential Hypertension  Dec 13 2011 10:13AM   

 

 Hyperlipidemia  Dec 13 2011 10:13AM   

 

 Gastroesophageal Reflux  Dec 13 2011 10:13AM   

 

 Hypothyroidism, Acquired  Dec 13 2011 10:13AM   

 

 Hyponatremia  Dec 13 2011 10:13AM   

 

 Vaginal Discharge  2012  9:43AM   

 

 Rhinitis, Allergic  Feb 15 2012  9:31AM   

 

 Eustachian Tube Dysfunction  Feb 15 2012  9:31AM   

 

 Pharyngitis, Acute  Feb 15 2012  9:31AM   

 

 Routine gynecological examination  2012  8:48AM   

 

 Hypertension  2012  8:48AM   

 

 Hyperlipidemia, unspecified  2012  8:48AM   

 

 Hypothyroidism, Acquired  2012  8:48AM   

 

 Rhinitis, Allergic  2012  8:48AM   

 

 Candidiasis, Vulvovaginal  2012 11:04AM   

 

 Essential Hypertension  Aug 15 2012  9:02AM   

 

 Hyperlipidemia  Aug 15 2012  9:02AM   

 

 Gastroesophageal Reflux  Aug 15 2012  9:02AM   

 

 Hypothyroidism, Acquired  Aug 15 2012  9:02AM   

 

 Hyponatremia  Aug 15 2012  9:02AM   

 

 Atrophic Vaginitis, Postmenopausal  Aug 15 2012  9:02AM   

 

 Flu  Oct 29 2012  9:24AM   

 

 Essential Hypertension  Dec 10 2012  8:35AM   

 

 Hyperlipidemia  Dec 10 2012  8:35AM   

 

 Gastroesophageal Reflux  Dec 10 2012  8:35AM   

 

 Hypothyroidism, Acquired  Dec 10 2012  8:35AM   

 

 Hyponatremia  Dec 10 2012  8:35AM   

 

 Atrophic Vaginitis, Postmenopausal  Dec 10 2012  8:35AM   

 

 Pneumococcus  Dec 10 2012  2:07PM   

 

 Vaginal Discharge  Dec 20 2012  1:50PM   

 

 Essential Hypertension  Dec 20 2012  1:50PM   

 

 Hyperlipidemia  Dec 20 2012  1:50PM   

 

 Gastroesophageal Reflux  Dec 20 2012  1:50PM   

 

 Hypothyroidism, Acquired  Dec 20 2012  1:50PM   

 

 Atrophic Vaginitis, Postmenopausal  Dec 20 2012  1:50PM   

 

 Upper Respiratory Infections  2013  8:52AM   

 

 Hyperlipidemia  2013  8:21AM   

 

 Hypertension  2013  8:21AM   

 

 Hypothyroidism  2013  8:21AM   

 

 Screening Mammogram  Beto 10 2013  8:45AM   

 

 Routine gynecological examination  Beto 10 2013  8:34AM   

 

 Hypertension  Beto 10 2013  8:34AM   

 

 Hyperlipidemia, unspecified  Beto 10 2013  8:34AM   

 

 Hypothyroidism, Acquired  Beto 10 2013  8:34AM   

 

 Rhinitis, Allergic  Beto 10 2013  8:34AM   

 

 Flu  Oct 28 2013  8:52AM   

 

 Essential Hypertension  Dec  9 2013  8:37AM   

 

 Hyperlipidemia  Dec  9 2013  8:37AM   

 

 Gastroesophageal Reflux  Dec  9 2013  8:37AM   

 

 Hypothyroidism, Acquired  Dec  9 2013  8:37AM   

 

 Atrophic Vaginitis, Postmenopausal  Dec  9 2013  8:37AM   

 

 Hypertension  2014  9:56AM   

 

 Hyperlipidemia  2014  9:56AM   

 

 Hypothyroidism  2014  9:56AM   

 

 Screening Mammogram  2014  8:32AM   

 

 Osteopenia  2014  8:32AM   

 

 Hypertension  2014  8:35AM   

 

 Hypothyroidism, Acquired  2014  8:35AM   

 

 Hyperlipidemia  2014  8:35AM   

 

 Upper Respiratory Infections  2014  9:28AM   

 

 Sinusitis, Acute  Oct  2 2014  9:17AM   

 

 Herpes Zoster  Oct  5 2014  1:44PM   

 

 Vesicular Eruption  Oct  5 2014  1:44PM   

 

 Hypertension  2014  8:18AM   

 

 Hyperlipidemia  2014  8:18AM   

 

 hypothyroid  2014  8:18AM   

 

 Situational anxiety  Dec 20 2014  9:33AM   

 

 GERD (gastroesophageal reflux disease)  Dec 20 2014  9:33AM   

 

 Nausea  Dec 20 2014  9:33AM   

 

 Abdominal Pain, Epigastric  Dec 20 2014  9:33AM   

 

 Hyperlipidemia  Oct  6 2014  9:03AM   

 

 acid reflux  Oct  6 2014  9:03AM   

 

 hypothyroid  Oct  6 2014  9:03AM   

 

 Shingles  Oct  6 2014  9:03AM   

 

 Pharyngitis  2015  1:09PM   

 

 Bronchitis  2015  9:10AM   

 

 Hyperlipidemia  2015  9:10AM   

 

 acid reflux  2015  9:10AM   

 

 hypothyroid  2015  9:10AM   

 

 Hyperlipidemia  2015  8:18AM   

 

 Hypertension  2015  8:18AM   

 

 hypothyroid  2015  8:18AM   

 

 Screening Mammogram  2015 11:43AM   

 

 Medicare Annual Pap Q 2 years  2015  9:36AM   

 

 Screening Mammogram  2015  9:36AM   

 

 Candidiasis of female genitalia  2015  9:36AM   

 

 Hypertension  2015 11:34AM   

 

 Hyperlipidemia, unspecified  2015 11:34AM   

 

 Hypothyroidism, Acquired  2015 11:34AM   

 

 Osteopenia  2016  8:41AM   

 

 Encounter for screening mammogram for breast cancer  2016  8:41AM   

 

 Hypertension  2016  8:34AM   

 

 Lymphedema  2016  8:34AM   

 

 Hyperlipidemia  2016  8:34AM   

 

 hypothyroid  2016  8:34AM   

 

 HEP B  2016  9:13AM   

 

 HEP B  2016  8:52AM   

 

 Acid Reflux  2016  8:34AM   

 

 History of acute gastritis  Oct 13 2016  2:30PM   

 

 Anxiety as acute reaction to exceptional stress  Oct 13 2016  2:30PM   

 

 HEP B  Dec 29 2016  8:42AM   

 

 Caregiver stress syndrome  May 26 2017  8:28AM   

 

 Anxiety  May 26 2017  8:28AM   

 

 Blood in stool  2017  2:54PM   

 

 Upper GI bleed  2017 11:34AM   







Payers







 Insurance Name  Company Name  Plan Name  Plan Number  Policy Number  Policy 
Group Number  Start Date

 

    Medicare [facility]  Medicare [facility]     [id number]     N/A

 

    BC  [facility]     [id number]     2009

 

    Medicare Part B  [facility] [facility] [place]     [id number]     N/A

 

    Medicare Part A  Medicare Part A     [id number]     N/A

 

    Medicare Part A  [employer] - Preventive     [id number]     N/A

 

    Medicare Part A  Medicare - Lab/Xray     [id number]     N/A







History of Encounters







 Visit Date  Visit Type  Provider

 

 2017  Office visit  Doris Noriega MD

 

 2017  Office visit  Prince PIERRE

 

 2016  Nurse visit  Doris Noriega MD

 

 10/13/2016  Office visit  Doris Noriega MD

 

 2016  Nurse visit  Doris Noriega MD

 

 2016  Office visit  Doris Noriega MD

 

 2015  Office visit  Doris Noriega MD

 

 2015  Office visit  Doris Noriega MD

 

 2015  Office visit  Prince Noe APRN

 

 2014  Office visit  Anyi Herrera APRN

 

 10/27/2014  Voided  Doris Noriega MD

 

 10/6/2014  Office visit  Doris Noriega MD

 

 10/5/2014  Office visit  Anyi Herrera APRN

 

 10/2/2014  Office visit   

 

 10/2/2014  Office visit  Corrine Bay APRN

 

 2014  Office visit  Kassie Franklin APRN

 

 2014  Office visit  Doris Noriega MD

 

 2014  Office visit  Doris Noriega MD

 

 2013  Office visit  Dee Colindres MD

 

 10/28/2013  Nurse visit  Dee Colindres MD

 

 6/10/2013  Office visit  Dee Colindres MD

 

 2013  Office visit  Corrine Bay APRN

 

 2012  Office visit  Dee Colindres MD

 

 12/10/2012  Office visit  Dee Colindres MD

 

 10/29/2012  Nurse visit  Dee Colindres MD

 

 8/15/2012  Office visit  Dee Colindres MD

 

 2012  Office visit  Corrine Bay APRN

 

 2012  Office visit  Dee Colindres MD

 

 2/15/2012  Office visit  Dee Colindres MD

 

 2012  Office visit  Corrine Bay APRN

 

 2011  Office visit  Dee Colindres MD

 

 10/28/2011  Nurse visit  Shad Nolasco MD

 

 2011  Office visit  Dee Colindres MD

 

 2011  Office visit  Dee Colindres MD

 

 2011  Office visit  Dee Colindres MD

 

 2011  Office visit  Dee Colindres MD

 

 2011  Office visit  Dee Colindres MD

 

 2011  Office visit  Dee Colindres MD

 

 4/10/2011  Hospital  KHRIS Granados MD

 

 4/10/2011  Hospital  RICKEY Toussaint MD

 

 2011  Office visit  RICKEY Toussaint MD

 

 2011  [facility]  Fide Castellanos MD

 

 2011  Voided  Fide Castellanos MD

 

 2011  Office visit  Dee Colindres MD

 

 12/15/2010  Nurse visit  Dee Colindres MD

 

 2010  Office visit  Dee Colindres MD

 

 10/20/2010  Nurse visit  Stephenie PERAZA

 

 2010  Nurse visit  Dee Colindres MD

 

 2010  Nurse visit  Dee Colindres MD

 

 2010  Office visit  Dee Colindres MD

 

 3/19/2010  Voided  Stephenie Kay PA

 

 2010  Nurse visit  Stephenie PERAZA

 

 2010  Nurse visit  Stephenie PERAZA

 

 2010  Nurse visit  Stephenie PERAZA

 

 2009  Office visit  Stephenie PERAZA

 

 10/20/2009  Nurse visit  Stephenie Kay PA

## 2017-07-13 NOTE — HISTORY & PHYSICIAL
History of Present Illness


History of Present Illness


Reason for visit/HPI


for upper endoscopy to evaluate symptoms of reflux and colonoscopy to 

investigate heme-positive stools.   Family history of colon cancer.


Date of Admission





Date Seen by Provider:  Jul 13, 2017


Time Seen by Provider:  08:52


I consulted on this patient on


7/13/17


 08:52


Attending Physician


Fang Johnson MD


Admitting Physician


Doris Noriega MD


Consult








Allergies and Home Medications


Allergies


Coded Allergies:  


     No Known Drug Allergies (Unverified , 7/11/17)





Home Medications


Amlodipine Besylate 5 Mg Tablet, 5 MG PO DAILY, (Reported)


Levothyroxine Sodium 50 Mcg Tablet, 50 MCG PO DAILY, (Reported)


Pantoprazole Sodium 40 Mg Tablet.dr, 40 MG PO DAILY, (Reported)


Valsartan 160 Mg Tablet, 160 MG PO DAILY, (Reported)





Past Medical-Social-Family Hx


Patient Social History


Alcohol Use:  Denies Use


Recreational Drug Use:  No


Smoking Status:  Never a Smoker


Recent Foreign Travel:  No


Contact w/other who traveled:  No


Recent Hopitalizations:  No


Recent Infectious Disease Expo:  No





Immunizations Up To Date


Tetanus Booster (TDap):  Unknown


Date of Pneumonia Vaccine:  Oct 10, 2016


Date of Influenza Vaccine:  Oct 10, 2016





Seasonal Allergies


Seasonal Allergies:  No





Surgeries


HX Surgeries:  Yes


Surgeries:  Transurethral Resection, Tubal Ligation





Respiratory


Hx Respiratory Disorders:  No





Cardiovascular


Hx Cardiovascular Disorders:  Yes (STENT)


Cardiac Disorders:  High Cholesterol, Hypertension





Neurological


Hx Neurological Disorders:  No





Reproductive System


Hx Reproductive Disorders:  No


Sexually Transmitted Disease:  No


HIV/AIDS:  No





Genitourinary


Hx Genitourinary Disorders:  No





Gastrointestinal


Hx Gastrointestinal Disorders:  Yes (BLOOD IN STOOLS)


Gastrointestinal Disorders:  Gastroesophageal Reflux, Chronic Constipation





Musculoskeletal


Hx Musculoskeletal Disorders:  Yes


Musculoskeletal Disorders:  Arthritis





Endocrine


Hx Endocrine Disorders:  Yes





HEENT


HX ENT Disorders:  Yes (GLASSES)


Loss of Vision:  Bilateral


Hearing Impairment:  Denies





Cancer


Hx Cancer:  No





Psychosocial


Hx Psychiatric Problems:  Yes


Behavioral Health Disorders:  Anxiety





Integumentary


HX Skin/Integumentary Disorder:  No





Blood Transfusions


Hx Blood Disorders:  No


Adverse Reaction to a Blood Tr:  No





Constitutional:  no symptoms reported


EENTM:  no symptoms reported


Respiratory:  no symptoms reported


Cardiovascular:  no symptoms reported


Gastrointestinal:  diarrhea, heartburn


Genitourinary:  no symptoms reported


Musculoskeletal:  no symptoms reported


Skin:  no symptoms reported


Psychiatric/Neurological:  Anxiety





Physical Exam


Vital Signs





Vital Sign - Last 12Hours








 7/13/17





 08:39


 


Temp 98.4


 


Pulse 80


 


Resp 18


 


B/P (MAP) 121/68


 


Pulse Ox 98


 


O2 Delivery Room Air





Capillary Refill :


General Appearance:  No Apparent Distress


HEENT:  Normal ENT Inspection


Neck:  Normal Inspection


Respiratory:  Lungs Clear


Cardiovascular:  Regular Rate, Rhythm


Gastrointestinal:  Non Tender, Soft


Rectal:  Deferred


Neurologic/Psychiatric:  Alert, Oriented x3


Skin:  Warm/Dry


Lymphatic:  No Adenopathy





Assessment/Plan


Assessment and Plan


lady with symptoms of gastroesophageal reflux requiring upper endoscopy.  Heme-

positive stools with a family history of colon cancer for colonoscopy.  Details 

of the procedure including the differential diagnosis of polyps and 

diverticulosis and iatrogenic complications of post polypectomy bleeding, 

perforation etc. discussed in detail.  Seems to understand and is willing to 

proceed with the scheduled procedures.


Problems:  











FANG JOHNSON MD Jul 13, 2017 8:54 am

## 2017-07-13 NOTE — XMS REPORT
MU2 Ambulatory Summary

 Created on: 2017



Milady Crespo

External Reference #: [id number]

: 1945

Sex: Female



Demographics







 Address  [address]

[place]  [address]

 

 Home Phone  [phone]

 

 Preferred Language  English

 

 Marital Status  

 

 Amish Affiliation  Unknown

 

 Race  White

 

 Ethnic Group  Not  or 





Author







 Prince Linder

 

 [facility]

 

 Address  [address]

[place], [place]  [id number]



 

 Phone  [phone]







Care Team Providers







 Care Team Member Name  Role  Phone

 

 Prince Noe  PCP  [phone]

 

 Doris Noriega  PreferredProvider  [phone]







Allergies and Adverse Reactions







 Name  Reaction  Notes

 

 NO KNOWN DRUG ALLERGIES      







Plan of Treatment







 Planned Activity  Comments  Planned Date  Planned Time  Plan/Goal

 

 Complete blood count (CBC) with differential count     2016  12:00 AM   

 

 Comprehensive metabolic panel     2016  12:00 AM   

 

 VITAMIN D (25 HYDROXY)     2016  12:00 AM   

 

 IRON PANEL     2017  12:00 AM   

 

 IRON PANEL     2017  12:00 AM   

 

 IRON PANEL     2017  12:00 AM   

 

 Fecal occult blood test     2017  12:00 AM   

 

 HEMOGRAM .     2017  12:00 AM   

 

 Lipid panel (total cholesterol, lipoproteins, HDL, triglycerides)     8/15/
2012  12:00 AM   

 

 Comprehensive Metabolic Panel     6/10/2013  12:00 AM   

 

 Lipid panel (total cholesterol, lipoproteins, HDL, triglycerides)     6/10/
2013  12:00 AM   

 

 Thyroid stimulating hormone (TSH)     6/10/2013  12:00 AM   

 

 CBC (automated H&H, platelets, WBC and automated differential)     6/10/2013  
12:00 AM   

 

 Comprehensive Metabolic Panel     2012  12:00 AM   

 

 Lipid panel (total cholesterol, lipoproteins, HDL, triglycerides)     2012  12:00 AM   

 

 Thyroid stimulating hormone (TSH)     2012  12:00 AM   

 

 CBC (automated H&H, platelets, WBC and automated differential)     2012  
12:00 AM   

 

 Screening mammography, bilateral     2015  12:00 AM   







Medications







 Active 

 

 Name  Start Date  Estimated Completion Date  SIG  Comments

 

 aspirin 81 mg oral tablet,delayed release (DR/EC)        take 1 tablet (81 mg) 
by oral route once daily   

 

 Vitamin D3 1,000 unit oral capsule        take 1 capsule by oral route daily   

 

 famotidine 20 mg oral tablet  2015     TAKE ONE TABLET BY MOUTH TWICE 
DAILY   

 

 valsartan 160 mg oral tablet  2016     TAKE ONE TABLET BY MOUTH ONCE 
DAILY   

 

 amlodipine 5 mg oral tablet  2016     TAKE ONE TABLET BY MOUTH ONCE DAILY
   

 

 Allergy Relief (loratadine) 10 mg oral tablet  2016     TAKE ONE TABLET 
BY MOUTH ONCE DAILY   

 

 amlodipine 5 mg oral tablet  2016     TAKE ONE TABLET BY MOUTH ONCE DAILY
   

 

 Allergy Relief (loratadine) 10 mg oral tablet  2016     TAKE ONE TABLET 
BY MOUTH ONCE DAILY   

 

 valsartan 160 mg oral tablet  2016     TAKE ONE TABLET BY MOUTH ONCE 
DAILY   

 

 pantoprazole 40 mg oral tablet,delayed release (DR/EC)  10/13/2016  10/8/2017  
take 1 tablet (40 mg) by oral route once daily for 90 days   

 

 amlodipine 5 mg oral tablet  10/13/2016  10/8/2017  TAKE ONE TABLET BY MOUTH 
ONCE DAILY for 90 days   

 

 atenolol 50 mg oral tablet  10/13/2016  10/8/2017  take 1 tablet (50 mg) by 
oral route once daily   

 

 levothyroxine 50 mcg oral tablet  10/13/2016  10/8/2017  TAKE ONE TABLET BY 
MOUTH ONCE DAILY for 90 days   

 

 Lipitor 20 mg oral tablet  10/13/2016  2018  take 1 tablet (20 mg) by oral 
route once daily   

 

 Plavix 75 mg oral tablet  10/13/2016  2018  take 1 tablet (75 mg) by oral 
route once daily   

 

 valsartan 160 mg oral tablet  10/13/2016  10/8/2017  TAKE ONE TABLET BY MOUTH 
ONCE DAILY for 90 days   

 

 Zofran (as hydrochloride) 4 mg oral tablet  2016     take 1 tablet by 
oral route daily as needed for 14 days   

 

 Zofran (as hydrochloride) 4 mg oral tablet  2017     take 1 tablet by oral 
route daily as needed for 14 days   

 

 Zofran (as hydrochloride) 4 mg oral tablet  2017     take 1 tablet by oral 
route daily as needed for 14 days   

 

 Zofran (as hydrochloride) 4 mg oral tablet  2017     take 1 tablet by 
oral route daily as needed for 14 days   

 

 Zofran (as hydrochloride) 4 mg oral tablet  2017     take 1 tablet by 
oral route daily as needed for 14 days   

 

 Lexapro 10 mg oral tablet  2017  take 1 tablet (10 mg) by oral 
route once daily for 90 days   

 

 clorazepate dipotassium 7.5 mg oral tablet  2017  take 1/2 to1 
tablet PO up to three times per day for situational anxiety   

 

 triamcinolone acetonide 0.1 % topical cream  2017     APPLY A THIN LAYER 
OF CREAM EXTERNALLY TO AFFECTED AREA TWICE DAILY FOR 14 DAYS   

 

 ondansetron 4 mg oral tablet,disintegrating  2017     dissolve  1 tablet 
by oral route every 8 hours as needed   

 

 loratadine 10 mg oral tablet  2017  TAKE 1 TABLET BY MOUTH 
EVERY DAY IN THE EVENING   

 

 triamcinolone acetonide 0.1 % topical cream  2017     APPLY  CREAM 
EXTERNALLY TO AFFECTED AREA TWICE DAILY FOR 14 DAYS   

 

 EQ LORATADINE 10MG  TABS  2017     TAKE ONE TABLET BY MOUTH ONCE DAILY   









  

 

 Name  Start Date  Expiration Date  SIG  Comments

 

 prednisone 20 mg oral tablet  2011  take 2 tablets by oral 
route daily for 5 days   

 

 calcium carbonate-vitamin D2 600-125 mg-unit oral tablet  8/15/2012  2012
  take 2 tablets by oral route daily   

 

 Madison 3 Fish Oil 900-1,400 mg oral capsule,delayed release(DR/EC)  8/15/2012  9
/  take 1 capsule by oral route daily   

 

 multivitamin Oral tablet  8/15/2012  2012  take 1 tablet by oral route 
daily   

 

 triamcinolone acetonide 0.1 % topical cream  2013  10/7/2013  APPLY A 
THIN LAYER TO THE AFFECTED AREA(S) BY TOPICAL ROUTE TWICE A DAY   

 

 famotidine 20 mg oral tablet  2014  take 1 tablet (20 mg) by 
oral route 2 times per day   

 

 amoxicillin 500 mg oral capsule  2014  take 1 capsule (500 mg) 
by oral route 3 times per day for 10 days   

 

 Zithromax Z-Tab 250 mg oral tablet  10/2/2014  10/7/2014  take 2 tablets (500 
mg) by oral route once daily for 1 day then 1 tablet (250 mg) by oral route 
once daily for 4 days   

 

 acyclovir 800 mg oral tablet  10/5/2014  10/12/2014  take 1 tablet (800 mg) by 
oral route 5 times per day for 7 days   

 

 gabapentin 300 mg oral capsule  10/9/2014  2014  take 1 capsule (300 mg) 
by oral route 3 times per day for 14 days   

 

 Carafate 100 mg/mL oral suspension  2014  take 10 milliliters 
(1 gram) by oral route 4 times per day on an empty stomach 1 hour before meals 
and at bedtime for 4 weeks   

 

 amlodipine 5 mg oral tablet  2015  TAKE ONE TABLET BY MOUTH 
EVERY DAY   

 

 levothyroxine 50 mcg oral tablet  2015  TAKE ONE TABLET BY MOUTH 
EVERY DAY   

 

 Diovan 160 mg oral tablet  2015  2/15/2016  TAKE ONE TABLET BY MOUTH 
EVERY DAY for 90 days   

 

 triamcinolone acetonide 0.1 % topical cream  2015  apply a thin 
layer to the affected area(s) by topical route 2 times per day for 14 days   

 

 fluconazole 150 mg oral tablet  2015  take 1 tablet (150 mg) 
by oral route once for 1 day   

 

 Zofran (as hydrochloride) 4 mg oral tablet  10/13/2016  10/27/2016  take 1 
tablet by oral route daily as needed for 14 days   

 

 Zofran (as hydrochloride) 4 mg oral tablet  2017     take 1 tablet by 
oral route daily as needed for 14 days   









 Discontinued 

 

 Name  Start Date  Discontinued Date  SIG  Comments

 

 Lipitor 40 mg oral tablet     2009 TAB DAILY   

 

 ramipril 5 mg oral capsule     2009  take 1 capsule (5 mg) by oral route 
once daily   

 

 lovastatin 20 mg oral tablet  2009  take 1 tablet (20 mg) by 
oral route once daily with evening meal   

 

 propranolol 20 mg oral tablet  2010  take 1 tablet by oral 
route 2 times a day   

 

 Fosamax 70 mg oral tablet  2010  take 1 tablet (70 mg) by oral 
route once weekly in the morning, at least 30 minutes before the first food, 
beverage, or medication of the day   

 

 lisinopril 40 mg oral tablet  2010  4/10/2011  take 2 tablets by oral 
route daily   

 

 Zoloft 50 mg oral tablet  2011  take 1 tablet (50 mg) by oral 
route once daily   

 

 promethazine 25 mg oral tablet  2011  take 1 tablet by oral 
route every 6 hours as needed   

 

 Diovan -12.5 mg oral tablet  2011  take 1 tablet by oral 
route once daily for 30 days   

 

 Diflucan 150 mg oral tablet     2012  take 1 tablet (150 mg) by oral 
route once for 1 day   

 

 Diflucan 150 mg oral tablet  2012  8/15/2012  take 1 tablet (150 mg) by 
oral route once   

 

 Vitamin B-12 1,000 mcg oral tablet  8/15/2012  2014  take 1 tablet by 
oral route daily   

 

 Premarin 0.625 mg/gram vaginal cream  10/29/2012  6/10/2013  use 1 gram daily 
for a week then 1 gram twice a week   

 

 Medrol (Tab) 4 mg oral tablets,dose pack  2013  6/10/2013  take as 
directed   

 

 aspirin 325 mg oral tablet  2014  take 1 tablet (325 mg) by 
oral route once daily   

 

 Medrol (Tab) 4 mg oral tablets,dose pack  2014  10/2/2014  take as 
directed   

 

 Tetracaine Lollipops Lollipop  2015  Use as directed   

 

 Zoloft 50 mg oral tablet  2016  take 1 tablet (50 mg) by oral 
route once daily for 90 days   

 

 Zoloft 50 mg oral tablet  2016  take 1 tablet (50 mg) by oral 
route once daily for 90 days  Pt refuses to take...







Problem List







 Description  Status  Onset

 

 Hyperlipidemia  Active   

 

 acid reflux  Active   

 

 Hypertension  Active   

 

 hypothyroid  Active   







Vital Signs







 Date  Time  BP-Sys(mm[Hg]  BP-Morena(mm[Hg])  HR(bpm)  RR(rpm)  Temp  WT  HT  HC  
BMI  BSA  BMI Percentile  O2 Sat(%)

 

 2017  8:22:00 AM  118 mmHg  78 mmHg  62 bpm  18 rpm  97.5 F  129.125 lbs  
61 in     24.40 kg/m2  1.59 m2     100 %

 

 10/13/2016  2:26:00 PM  128 mmHg  78 mmHg  77 bpm  16 rpm  98.4 F  139.25 lbs  
61 in     26.3108 kg/m  1.6488 m     100 %

 

 2016  8:29:00 AM  122 mmHg  70 mmHg  72 bpm  16 rpm  97.8 F  137.125 lbs  
61 in     25.91 kg/m2  1.64 m2     100 %

 

 2015  9:33:00 AM  120 mmHg  68 mmHg  73 bpm     98.7 F  144.375 lbs  61 
in     27.2791 kg/m  1.6788 m     99 %

 

 2015  9:05:00 AM  110 mmHg  75 mmHg  85 bpm  16 rpm  96.7 F  144.375 lbs  
61 in     27.28 kg/m2  1.68 m2     99 %

 

 2015  1:05:00 PM  108 mmHg  62 mmHg  78 bpm  18 rpm  96.9 F  142.375 lbs  
61 in     26.9012 kg/m  1.6672 m     100 %

 

 2014  9:31:00 AM  126 mmHg  66 mmHg  79 bpm  18 rpm  98.7 F  149 lbs  61 
in     28.15 kg/m2  1.71 m2     98 %

 

 10/6/2014  9:02:00 AM  110 mmHg  74 mmHg  94 bpm  18 rpm  98.1 F  145.4 lbs  
61 in     27.4728 kg/m  1.6848 m     97 %

 

 10/2/2014  9:14:00 AM  124 mmHg  74 mmHg  79 bpm  18 rpm  98 F  147.25 lbs  61 
in     27.82 kg/m2  1.70 m2     98 %

 

 2014  9:22:00 AM  124 mmHg  76 mmHg  89 bpm  18 rpm  98.1 F  148 lbs  61 
in     27.9641 kg/m  1.6998 m     100 %

 

 2014  8:27:00 AM  118 mmHg  65 mmHg  74 bpm  16 rpm  97.7 F  148 lbs  61 
in     27.96 kg/m2  1.70 m2     99 %

 

 2014  9:48:00 AM  125 mmHg  70 mmHg  93 bpm  18 rpm  96.7 F  156 lbs  61 
in     29.4756 kg/m  1.7451 m     100 %

 

 2013  8:35:00 AM  122 mmHg  74 mmHg  84 bpm  16 rpm  97.9 F  155.375 lbs 
                99 %

 

 6/10/2013  8:30:00 AM  130 mmHg  82 mmHg  79 bpm  16 rpm  97.9 F  161 lbs     
            98 %

 

 2013  8:50:00 AM  124 mmHg  68 mmHg  66 bpm  18 rpm  97.6 F  157.5 lbs  
61 in     29.76 kg/m2  1.75 m2      

 

 2012  1:46:00 PM  120 mmHg  72 mmHg  75 bpm  16 rpm  97.6 F  156.125 lbs
                 98 %

 

 12/10/2012  8:32:00 AM  122 mmHg  82 mmHg  70 bpm  16 rpm  97.3 F  157 lbs    
             99 %

 

 8/15/2012  9:00:00 AM  130 mmHg  76 mmHg  86 bpm  16 rpm  97.4 F  159 lbs     
            100 %

 

 2012  11:02:00 AM  128 mmHg  64 mmHg  66 bpm  18 rpm  97.4 F  162.125 lbs
  61 in     30.63 kg/m2  1.78 m2      

 

 2012  8:45:00 AM  130 mmHg  70 mmHg  82 bpm  16 rpm  98.2 F  160.125 lbs 
                100 %

 

 2/15/2012  9:29:00 AM  122 mmHg  80 mmHg  86 bpm  16 rpm  97.4 F  159.375 lbs  
61 in     30.11 kg/m2  1.76 m2     99 %

 

 2012  9:41:00 AM  132 mmHg  74 mmHg  66 bpm  18 rpm  98.4 F  160.375 lbs  
61 in     30.3023 kg/m  1.7694 m      

 

 2011  10:08:00 AM  134 mmHg  82 mmHg  80 bpm  16 rpm  98 F  160 lbs  61 
in     30.23 kg/m2  1.77 m2     99 %

 

 2011  3:56:00 PM  130 mmHg  80 mmHg                              

 

 2011  8:55:00 AM  130 mmHg  74 mmHg  88 bpm  16 rpm  98.2 F  158.25 lbs  
               98 %

 

 2011  8:54:00 AM  136 mmHg  82 mmHg  102 bpm  16 rpm  98.1 F  153.5 lbs   
              99 %

 

 2011  8:49:00 AM  122 mmHg  88 mmHg  88 bpm  16 rpm  98.6 F  152.25 lbs  
               99 %

 

 2011  10:02:00 AM  140 mmHg  82 mmHg  96 bpm  20 rpm  98.8 F             
       100 %

 

 2011  9:14:00 AM  156 mmHg  98 mmHg  110 bpm  24 rpm  98.5 F  153 lbs    
             100 %

 

 2011  8:50:00 AM  160 mmHg  84 mmHg  100 bpm  16 rpm  98.7 F  155 lbs    
             100 %

 

 2011  1:25:00 PM  152 mmHg  100 mmHg  82 bpm  16 rpm  97.2 F  156.375 lbs 
                100 %

 

 2011  9:55:00 AM  144 mmHg  90 mmHg                              

 

 2010  9:24:00 AM  138 mmHg  84 mmHg                              

 

 2010  8:58:00 AM  160 mmHg  94 mmHg                              

 

 2010  8:33:00 AM  142 mmHg  90 mmHg  100 bpm  16 rpm  98.1 F  158.375 
lbs                  

 

 2010  9:24:00 AM  160 mmHg  102 mmHg  88 bpm  18 rpm  99 F  157.125 lbs  
62 in     28.7382 kg/m  1.7657 m      

 

 3/19/2010  11:01:00 AM  140 mmHg  90 mmHg                              

 

 2009  10:08:00 AM  142 mmHg  86 mmHg  72 bpm  16 rpm  98.1 F  154.125 
lbs  61 in     29.1214 kg/m  1.73 m2      







Social History







 Name  Description  Comments

 

       

 

 Children      

 

 lives alone      

 

 Supervisor      

 

 Tobacco  Never smoker   







History of Procedures







 Date Ordered  Description  Order Status

 

 2011 12:00 AM  COMPREHEN METABOLIC PANEL  Reviewed

 

 2011 12:00 AM  ASSAY THYROID STIM HORMONE  Reviewed

 

 2011 12:00 AM  COMPREHEN METABOLIC PANEL  Reviewed

 

 2011 12:00 AM  LIPID PANEL  Reviewed

 

 2011 12:00 AM  ASSAY THYROID STIM HORMONE  Reviewed

 

 2011 12:00 AM  COMPLETE CBC W/AUTO DIFF WBC  Reviewed

 

 2015 12:00 AM  COMPLETE CBC W/AUTO DIFF WBC  Reviewed

 

 2015 12:00 AM  COMPREHEN METABOLIC PANEL  Reviewed

 

 2015 12:00 AM  LIPID PANEL  Reviewed

 

 2015 12:00 AM  ASSAY THYROID STIM HORMONE  Reviewed

 

 2011 12:00 AM  COMPREHEN METABOLIC PANEL  Reviewed

 

 2011 12:00 AM  COMPREHEN METABOLIC PANEL  Reviewed

 

 2011 12:00 AM  LIPID PANEL  Reviewed

 

 2011 12:00 AM  ASSAY THYROID STIM HORMONE  Reviewed

 

 10/28/2011 12:00 AM  ***Immunization administration, Medicare flu  Reviewed

 

 10/28/2011 12:00 AM  Fluzone ** MEDICARE Only **  Reviewed

 

 2010 11:24 AM  NO CHARGE OV  Reviewed

 

 2010 11:26 AM  NO CHARGE OV  Reviewed

 

 2011 12:00 AM  COMPREHEN METABOLIC PANEL  Reviewed

 

 2011 12:00 AM  LIPID PANEL  Reviewed

 

 2011 12:00 AM  ASSAY THYROID STIM HORMONE  Reviewed

 

 2011 12:00 AM  COMPLETE CBC W/AUTO DIFF WBC  Reviewed

 

 2011 12:00 AM  Wrist Support  Reviewed

 

 2016 12:00 AM  Screening mammography, bilateral  Reviewed

 

 2016 12:00 AM  DXA BONE DENSITY AXIAL  Returned

 

 2016 12:00 AM  TETANUS VACCINE IM  Reviewed

 

 2016 12:00 AM  HEP B VACC ADULT 3 DOSE IM  Reviewed

 

 2012 12:00 AM  TISSUE EXAM FOR FUNGI  Reviewed

 

 2012 12:00 AM  SMEAR WET MOUNT SALINE/INK  Reviewed

 

 2016 12:00 AM  TETANUS VACCINE IM  Reviewed

 

 2016 12:00 AM  HEP B VACC ADULT 3 DOSE IM  Reviewed

 

 2/15/2012 12:00 AM  THER/PROPH/DIAG INJ SC/IM  Reviewed

 

 2/15/2012 12:00 AM  Bicillin CR [facility]#50581501698[facility]  Reviewed

 

 2/15/2012 12:00 AM  Depo-Medrol 40 mg NDC#8778376215  Reviewed

 

 10/13/2016 12:00 AM  COMPLETE CBC W/AUTO DIFF WBC  Returned

 

 10/13/2016 12:00 AM  COMPREHEN METABOLIC PANEL  Returned

 

 10/13/2016 12:00 AM  ASSAY THYROID STIM HORMONE  Returned

 

 10/13/2016 12:00 AM  LIPID PANEL  Returned

 

 2016 12:00 AM  TETANUS VACCINE IM  Reviewed

 

 2016 12:00 AM  HEP B VACC ADULT 3 DOSE IM  Reviewed

 

 8/15/2012 12:00 AM  COMPREHEN METABOLIC PANEL  Reviewed

 

 8/15/2012 12:00 AM  ASSAY THYROID STIM HORMONE  Reviewed

 

 8/15/2012 12:00 AM  COMPLETE CBC W/AUTO DIFF WBC  Reviewed

 

 8/15/2012 12:00 AM  Wrist Support  Reviewed

 

 10/29/2012 12:00 AM  Flu Injection 3 Years And Above NDC# [address]-34  [facility]  
Reviewed

 

 12/10/2012 12:00 AM  IMMUNIZATION ADMIN  Reviewed

 

 12/10/2012 12:00 AM  Pneumovax Injection - [facility]  Reviewed

 

 2012 12:00 AM  TRICHOMONAS ASSAY W/OPTIC  Reviewed

 

 2013 12:00 AM  THER/PROPH/DIAG INJ SC/IM  Reviewed

 

 2013 12:00 AM  Bicillin CR, 1.2 million units NDC# 32744-713-72  Reviewed

 

 2013 12:00 AM  COMPREHEN METABOLIC PANEL  Reviewed

 

 2013 12:00 AM  LIPID PANEL  Reviewed

 

 2013 12:00 AM  COMPLETE CBC W/AUTO DIFF WBC  Reviewed

 

 2013 12:00 AM  ASSAY THYROID STIM HORMONE  Reviewed

 

 6/10/2013 12:00 AM  MAMMOGRAM SCREENING  Reviewed

 

 6/10/2013 12:00 AM  CYTOPATH C/V MANUAL  Reviewed

 

 12/10/2013 12:00 AM  LIPID PANEL  Reviewed

 

 12/10/2013 12:00 AM  ASSAY THYROID STIM HORMONE  Reviewed

 

 12/10/2013 12:00 AM  COMPLETE CBC W/AUTO DIFF WBC  Reviewed

 

 12/10/2013 12:00 AM  COMPREHEN METABOLIC PANEL  Reviewed

 

 2010 12:00 AM  CYTOPATH C/V MANUAL  Reviewed

 

 2010 12:00 AM  SMEAR WET MOUNT SALINE/INK  Reviewed

 

 2010 12:00 AM  LIPID PANEL  Reviewed

 

 2010 12:00 AM  ASSAY THYROID STIM HORMONE  Reviewed

 

 2010 12:00 AM  COMPLETE CBC W/AUTO DIFF WBC  Reviewed

 

 2010 12:00 AM  COMPREHEN METABOLIC PANEL  Reviewed

 

 10/28/2013 12:00 AM  Flu Injection 3 Years And Above NDC# [address]-78  C  
Reviewed

 

 2010 8:48 AM  Blood Pressure Check-no charge  Reviewed

 

 2010 12:00 AM  Blood Pressure Check-no charge  Reviewed

 

 2014 12:00 AM  METABOLIC PANEL TOTAL CA  Reviewed

 

 2014 12:00 AM  ASSAY THYROID STIM HORMONE  Reviewed

 

 2014 12:00 AM  ASSAY OF FREE THYROXINE  Reviewed

 

 2014 12:00 AM  MAMMOGRAM SCREENING  Reviewed

 

 2014 12:00 AM  CT BONE DENSITY AXIAL  Reviewed

 

 2014 12:00 AM  COMPLETE CBC W/AUTO DIFF WBC  Reviewed

 

 2014 12:00 AM  COMPREHEN METABOLIC PANEL  Reviewed

 

 2014 12:00 AM  LIPID PANEL  Reviewed

 

 2014 12:00 AM  ASSAY THYROID STIM HORMONE  Reviewed

 

 10/20/2010 12:00 AM  IMMUNIZATION ADMIN  Reviewed

 

 10/20/2010 12:00 AM  FLU VACCINE 3 YRS & > IM  Reviewed

 

 2010 12:00 AM  COMPREHEN METABOLIC PANEL  Reviewed

 

 2010 12:00 AM  LIPID PANEL  Reviewed

 

 2010 12:00 AM  ASSAY THYROID STIM HORMONE  Reviewed

 

 2010 12:00 AM  COMPLETE CBC W/AUTO DIFF WBC  Reviewed

 

 2010 12:00 AM  Blood Pressure Check-no charge  Reviewed

 

 10/2/2014 12:00 AM  THER/PROPH/DIAG INJ SC/IM  Reviewed

 

 10/2/2014 12:00 AM  Kenalog, Per 10 Mg NDC#6840-2984-01  Reviewed

 

 2014 12:00 AM  COMPLETE CBC W/AUTO DIFF WBC  Reviewed

 

 2014 12:00 AM  COMPREHEN METABOLIC PANEL  Reviewed

 

 2014 12:00 AM  LIPID PANEL  Reviewed

 

 2014 12:00 AM  ASSAY THYROID STIM HORMONE  Reviewed

 

 2015 12:00 AM  Rocephin 1 gram NDC#7475-9678-55  Reviewed

 

 2015 12:00 AM  THER/PROPH/DIAG INJ SC/IM  Reviewed

 

 2011 12:00 AM  COMPREHEN METABOLIC PANEL  Reviewed

 

 2011 12:00 AM  LIPID PANEL  Reviewed

 

 2011 12:00 AM  ASSAY THYROID STIM HORMONE  Reviewed

 

 2011 12:00 AM  COMPLETE CBC W/AUTO DIFF WBC  Reviewed

 

 2011 12:00 AM  METABOLIC PANEL TOTAL CA  Reviewed

 

 2011 12:00 AM  COMPREHEN METABOLIC PANEL  Reviewed

 

 2011 12:00 AM  ASSAY THYROID STIM HORMONE  Reviewed

 

 2011 12:00 AM  COMPLETE CBC W/AUTO DIFF WBC  Reviewed

 

 2011 12:00 AM  ASSAY OF LIPASE  Reviewed

 

 2011 12:00 AM  THER/PROPH/DIAG INJ SC/IM  Reviewed

 

 2011 12:00 AM  Phenergan 50 Mg Im  Bernadine  Reviewed

 

 2011 12:00 AM  METABOLIC PANEL TOTAL CA  Reviewed

 

 2011 12:00 AM  THER/PROPH/DIAG INJ SC/IM  Reviewed

 

 2011 12:00 AM  Phenergan 25 Mg Im  Bernadine  Reviewed

 

 2011 12:00 AM  METABOLIC PANEL TOTAL CA  Reviewed

 

 2011 12:00 AM  ASSAY THYROID STIM HORMONE  Reviewed

 

 2011 12:00 AM  THER/PROPH/DIAG INJ SC/IM  Reviewed

 

 2011 12:00 AM  Phenergan 50 Mg Im  Bernadine  Reviewed

 

 2011 12:00 AM  ASSAY OF SERUM SODIUM  Reviewed

 

 2011 12:00 AM  ASSAY OF SERUM SODIUM  Reviewed

 

 2011 12:00 AM  CYTOPATH C/V MANUAL  Reviewed

 

 2011 12:00 AM  ASSAY THYROID STIM HORMONE  Reviewed

 

 2015 12:00 AM  COMPLETE CBC W/AUTO DIFF WBC  Reviewed

 

 2015 12:00 AM  COMPREHEN METABOLIC PANEL  Reviewed

 

 2015 12:00 AM  LIPID PANEL  Reviewed

 

 2015 12:00 AM  ASSAY THYROID STIM HORMONE  Reviewed

 

 2015 12:00 AM  MAMMOGRAM SCREENING  Reviewed

 

 2015 12:00 AM  CYTOPATH TBS C/V MANUAL  Reviewed







Results Summary







 Date and Description  Results

 

 2010 9:25 AM  Colonoscopy-Women and Men over 50 Abnormal Mammogram -Women 
over 40 Declined Pap Smear Declined 

 

 2010 10:41 AM  WET PREP NO TRICHOMONADS SEEN  No  Few 

 

 2010 8:15 AM  TRIGLYCERIDES 174.0 mg/dLCHOLESTEROL 175.0 mg/dLHDL 58.0 mg/
dLTOT CHOL/HDL 3.0 LDL (CALC) 82.0 mg/dLTSH 0.790 uIU/mLGLUCOSE 95.0 mg/
dLSODIUM 136.0 mmol/LPOTASSIUM 4.50 mmol/LCHLORIDE 99.0 mmol/LCO2 26.0 mmol/
LBUN 12.0 mg/dLCREATININE 0.80 mg/dLSGOT/AST 32.0 IU/LSGPT/ALT 33.0 IU/LALK 
PHOS 103.0 IU/LTOTAL PROTEIN 7.60 g/dLALBUMIN 4.60 g/dLTOTAL BILI 0.60 mg/
dLCALCIUM 9.80 mg/dLAGE 64 GFR NonAA 72 GFR AA 87 eGFR >60 mL/min/1.73 m2eGFR AA
* >60 WBC 5.3 RBC 4.13 HGB 13.10 g/dLHCT 39.20 %MCV 95.0 fLMCH 31.70 pgMCHC 
33.40 g/dLRDW SD 44 RDW CV 12.70 %MPV 9.80 fLPLT 257 NRBC# 0.00 NRBC% 0.0 %NEUT 
57.90 %%LYMP 26.50 %%MONO 11.60 %%EOS 3.20 %%BASO 0.80 %#NEUT 3.04 #LYMP 1.39 #
MONO 0.61 #EOS 0.17 #BASO 0.04 MANUAL DIFF NOT IND 

 

 12/15/2010 8:30 AM  TRIGLYCERIDES 157.0 mg/dLCHOLESTEROL 163.0 mg/dLHDL 56.0 mg
/dLTOT CHOL/HDL 2.9 LDL (CALC) 76.0 mg/dLTSH 0.650 uIU/mLWBC 6.5 RBC 4.25 HGB 
13.60 g/dLHCT 40.70 %MCV 96.0 fLMCH 32.0 pgMCHC 33.40 g/dLRDW SD 44 RDW CV 
12.60 %MPV 9.90 fLPLT 313 NRBC# 0.00 NRBC% 0.0 %NEUT 61.80 %%LYMP 26.20 %%MONO 
8.30 %%EOS 3.10 %%BASO 0.60 %#NEUT 4.00 #LYMP 1.70 #MONO 0.54 #EOS 0.20 #BASO 
0.04 MANUAL DIFF NOT IND GLUCOSE 97.0 mg/dLSODIUM 136.0 mmol/LPOTASSIUM 4.40 
mmol/LCHLORIDE 101.0 mmol/LCO2 26.0 mmol/LBUN 10.0 mg/dLCREATININE 0.80 mg/
dLSGOT/AST 31.0 IU/LSGPT/ALT 34.0 IU/LALK PHOS 92.0 IU/LTOTAL PROTEIN 8.10 g/
dLALBUMIN 4.60 g/dLTOTAL BILI 0.40 mg/dLCALCIUM 10.0 mg/dLAGE 65 GFR NonAA 72 
GFR AA 87 eGFR >60 mL/min/1.73 m2eGFR AA* >60 

 

 2011 9:45 AM  GLUCOSE 91.0 mg/dLSODIUM 133.0 mmol/LPOTASSIUM 4.40 mmol/
LCHLORIDE 97.0 mmol/LCO2 24.0 mmol/LBUN 9.0 mg/dLCREATININE 0.70 mg/dLCALCIUM 
10.0 mg/dLAGE 65 GFR NonAA 84 GFR  eGFR >60 mL/min/1.73 m2eGFR AA* >60 

 

 2011 10:25 AM  LIPASE 29.0 U/LTSH 0.10 uIU/mLGLUCOSE 115.0 mg/dLSODIUM 
127.0 mmol/LPOTASSIUM 5.30 mmol/LCHLORIDE 93.0 mmol/LCO2 18.0 mmol/LBUN 9.0 mg/
dLCREATININE 0.70 mg/dLSGOT/AST 62.0 IU/LSGPT/ALT 46.0 IU/LALK PHOS 100.0 IU/
LTOTAL PROTEIN 8.60 g/dLALBUMIN 4.90 g/dLTOTAL BILI 0.60 mg/dLCALCIUM 9.90 mg/
dLAGE 65 GFR NonAA 84 GFR  eGFR >60 mL/min/1.73 m2eGFR AA* >60 WBC 6.9 
RBC 4.48 HGB 14.40 g/dLHCT 40.90 %MCV 91.0 fLMCH 32.10 pgMCHC 35.20 g/dLRDW SD 
41 RDW CV 12.50 %MPV 9.30 fLPLT 260 NRBC# 0.00 NRBC% 0.0 %NEUT 74.90 %%LYMP 
15.10 %%MONO 9.40 %%EOS 0.30 %%BASO 0.30 %#NEUT 5.15 #LYMP 1.04 #MONO 0.65 #EOS 
0.02 #BASO 0.02 MANUAL DIFF NOT IND 

 

 2011 9:07 AM  GLUCOSE 92.0 mg/dLSODIUM 131.0 mmol/LPOTASSIUM 4.20 mmol/
LCHLORIDE 99.0 mmol/LCO2 22.0 mmol/LBUN 9.0 mg/dLCREATININE 0.70 mg/dLCALCIUM 
9.20 mg/dLAGE 65 GFR NonAA 84 GFR  eGFR >60 mL/min/1.73 m2eGFR AA* >60 

 

 2011 11:06 AM  TSH 0.510 uIU/mLGLUCOSE 99.0 mg/dLSODIUM 132.0 mmol/
LPOTASSIUM 3.50 mmol/LCHLORIDE 95.0 mmol/LCO2 23.0 mmol/LBUN 9.0 mg/
dLCREATININE 0.80 mg/dLCALCIUM 10.40 mg/dLAGE 65 GFR NonAA 72 GFR AA 87 eGFR >
60 mL/min/1.73 m2eGFR AA* >60 

 

 2011 9:45 AM  SODIUM 132.0 mmol/L

 

 2011 9:27 AM  SODIUM 137.0 mmol/L

 

 2011 8:35 AM  TRIGLYCERIDES 114.0 mg/dLCHOLESTEROL 145.0 mg/dLHDL 56.0 mg/
dLTOT CHOL/HDL 2.6 LDL (CALC) 66.0 mg/dLGLUCOSE 105.0 mg/dLSODIUM 138.0 mmol/
LPOTASSIUM 4.40 mmol/LCHLORIDE 103.0 mmol/LCO2 25.0 mmol/LBUN 8.0 mg/
dLCREATININE 0.70 mg/dLSGOT/AST 36.0 IU/LSGPT/ALT 38.0 IU/LALK PHOS 103.0 IU/
LTOTAL PROTEIN 7.40 g/dLALBUMIN 4.30 g/dLTOTAL BILI 0.30 mg/dLCALCIUM 9.20 mg/
dLAGE 65 GFR NonAA 84 GFR  eGFR >60 mL/min/1.73 m2eGFR AA* >60 WBC 5.1 
RBC 3.76 HGB 12.0 g/dLHCT 36.10 %MCV 96.0 fLMCH 31.90 pgMCHC 33.20 g/dLRDW SD 
46 RDW CV 13.10 %MPV 9.40 fLPLT 249 NRBC# 0.00 NRBC% 0.0 %NEUT 60.40 %%LYMP 
25.90 %%MONO 8.90 %%EOS 4.0 %%BASO 0.80 %#NEUT 3.06 #LYMP 1.31 #MONO 0.45 #EOS 
0.20 #BASO 0.04 MANUAL DIFF NOT IND 

 

 2011 9:55 AM  TSH 1.070 uIU/mL

 

 2011 8:55 AM  GLUCOSE 102.0 mg/dLSODIUM 135.0 mmol/LPOTASSIUM 4.20 mmol/
LCHLORIDE 102.0 mmol/LCO2 24.0 mmol/LBUN 15.0 mg/dLCREATININE 0.80 mg/dLSGOT/
AST 30.0 IU/LSGPT/ALT 35.0 IU/LALK PHOS 119.0 IU/LTOTAL PROTEIN 7.50 g/
dLALBUMIN 4.30 g/dLTOTAL BILI 0.30 mg/dLCALCIUM 9.20 mg/dLAGE 65 GFR NonAA 72 
GFR AA 87 eGFR >60 mL/min/1.73 m2eGFR AA* >60 TSH 1.130 uIU/mL

 

 2011 9:50 AM  GLUCOSE 85.0 mg/dLSODIUM 133.0 mmol/LPOTASSIUM 4.20 mmol/
LCHLORIDE 101.0 mmol/LCO2 21.0 mmol/LBUN 13.0 mg/dLCREATININE 0.80 mg/dLSGOT/
AST 36.0 IU/LSGPT/ALT 36.0 IU/LALK PHOS 109.0 IU/LTOTAL PROTEIN 7.40 g/
dLALBUMIN 4.20 g/dLTOTAL BILI 0.50 mg/dLCALCIUM 9.40 mg/dLAGE 65 GFR NonAA 72 
GFR AA 87 eGFR >60 mL/min/1.73 m2eGFR AA* >60 

 

 2011 8:55 AM  GLUCOSE 98.0 mg/dLSODIUM 137.0 mmol/LPOTASSIUM 3.90 mmol/
LCHLORIDE 103.0 mmol/LCO2 25.0 mmol/LBUN 14.0 mg/dLCREATININE 0.70 mg/dLSGOT/
AST 33.0 IU/LSGPT/ALT 36.0 IU/LALK PHOS 111.0 IU/LTOTAL PROTEIN 8.30 g/
dLALBUMIN 4.40 g/dLTOTAL BILI 0.50 mg/dLCALCIUM 9.40 mg/dLAGE 66 GFR NonAA 84 
GFR  eGFR >60 mL/min/1.73 m2eGFR AA* >60 TRIGLYCERIDES 109.0 mg/
dLCHOLESTEROL 153.0 mg/dLHDL 54.0 mg/dLTOT CHOL/HDL 2.8 LDL (CALC) 77.0 mg/
dLTSH 1.330 uIU/mL

 

 2011 10:17 AM  Colonoscopy-Women and Men over 50 Declined Mammogram -
Women over 40 Normal Pap Smear Negative 

 

 2012 10:33 AM  WET PREP NO TRICH SEEN CLUE CELLS NONE SEEN 

 

 2012 8:45 AM  WBC 5.2 RBC 3.96 HGB 12.70 g/dLHCT 37.80 %MCV 96.0 fLMCH 
32.10 pgMCHC 33.60 g/dLRDW SD 46 RDW CV 13.30 %MPV 9.70 fLPLT 297 NRBC# 0.00 
NRBC% 0.0 %NEUT 57.70 %%LYMP 28.50 %%MONO 10.30 %%EOS 2.50 %%BASO 1.0 %#NEUT 
3.02 #LYMP 1.49 #MONO 0.54 #EOS 0.13 #BASO 0.05 MANUAL DIFF NOT IND GLUCOSE 
97.0 mg/dLSODIUM 137.0 mmol/LPOTASSIUM 4.40 mmol/LCHLORIDE 101.0 mmol/LCO2 23.0 
mmol/LBUN 17.0 mg/dLCREATININE 0.80 mg/dLSGOT/AST 30.0 IU/LSGPT/ALT 33.0 IU/
LALK PHOS 95.0 IU/LTOTAL PROTEIN 7.40 g/dLALBUMIN 4.40 g/dLTOTAL BILI 0.60 mg/
dLCALCIUM 9.70 mg/dLAGE 66 GFR NonAA 72 GFR AA 87 eGFR 60 eGFR AA* 60 
TRIGLYCERIDES 130.0 mg/dLCHOLESTEROL 157.0 mg/dLHDL 56.0 mg/dLTOT CHOL/HDL 2.8 
LDL (CALC) 75.0 mg/dLTSH 0.940 uIU/mL

 

 8/15/2012 4:02 PM  WET PREP NO TRICH SEEN CLUE CELLS NONE SEEN 

 

 2012 8:45 AM  WBC 5.1 RBC 3.91 HGB 12.50 g/dLHCT 36.30 %MCV 93.0 fLMCH 
32.0 pgMCHC 34.40 g/dLRDW SD 43 RDW CV 12.60 %MPV 9.30 fLPLT 244 NRBC# 0.00 NRBC
% 0.0 %NEUT 57.60 %%LYMP 27.50 %%MONO 9.10 %%EOS 5.0 %%BASO 0.80 %#NEUT 2.91 #
LYMP 1.39 #MONO 0.46 #EOS 0.25 #BASO 0.04 MANUAL DIFF NOT IND TSH 1.390 uIU/
mLTRIGLYCERIDES 154.0 mg/dLCHOLESTEROL 147.0 mg/dLHDL 45.0 mg/dLTOT CHOL/HDL 
3.3 LDL (CALC) 71.0 mg/dLGLUCOSE 101.0 mg/dLSODIUM 134.0 mmol/LPOTASSIUM 4.30 
mmol/LCHLORIDE 102.0 mmol/LCO2 23.0 mmol/LBUN 11.0 mg/dLCREATININE 0.80 mg/
dLSGOT/AST 34.0 IU/LSGPT/ALT 38.0 IU/LALK PHOS 104.0 IU/LTOTAL PROTEIN 7.60 g/
dLALBUMIN 4.40 g/dLTOTAL BILI 0.50 mg/dLCALCIUM 9.40 mg/dLAGE 67 GFR NonAA 72 
GFR AA 87 eGFR 60 eGFR AA* 60 

 

 12/10/2012 8:34 AM  Colonoscopy-Women and Men over 50 Declined Mammogram -
Women over 40 Declined Pap Smear Declined 

 

 2012 5:02 PM  WET PREP NO TRICH SEEN CLUE CELLS NONE SEEN 

 

 2013 8:25 AM  WBC 4.2 RBC 3.96 HGB 12.90 g/dLHCT 37.0 %MCV 93.0 fLMCH 
32.60 pgMCHC 34.90 g/dLRDW SD 43 RDW CV 12.60 %MPV 9.70 fLPLT 254 NRBC# 0.00 
NRBC% 0.0 %NEUT 54.40 %%LYMP 30.40 %%MONO 10.80 %%EOS 3.40 %%BASO 1.0 %#NEUT 
2.26 #LYMP 1.26 #MONO 0.45 #EOS 0.14 #BASO 0.04 MANUAL DIFF NOT IND TSH 1.230 
uIU/mLGLUCOSE 94.0 mg/dLSODIUM 136.0 mmol/LPOTASSIUM 4.30 mmol/LCHLORIDE 99.0 
mmol/LCO2 25.0 mmol/LBUN 10.0 mg/dLCREATININE 0.80 mg/dLSGOT/AST 36.0 IU/LSGPT/
ALT 36.0 IU/LALK PHOS 84.0 IU/LTOTAL PROTEIN 7.90 g/dLALBUMIN 4.40 g/dLTOTAL 
BILI 0.70 mg/dLCALCIUM 9.80 mg/dLAGE 67 GFR NonAA 72 GFR AA 87 eGFR 60 eGFR AA* 
60 TRIGLYCERIDES 122.0 mg/dLCHOLESTEROL 141.0 mg/dLHDL 47.0 mg/dLTOT CHOL/HDL 
3.0 LDL (CALC) 70.0 mg/dL

 

 6/10/2013 3:52 PM  WET PREP NO TRICH SEEN CLUE CELLS NONE SEEN 

 

 2013 8:45 AM  WBC 5.3 RBC 4.01 HGB 13.0 g/dLHCT 37.60 %MCV 94.0 fLMCH 
32.40 pgMCHC 34.60 g/dLRDW SD 43 RDW CV 12.50 %MPV 9.40 fLPLT 248 NRBC# 0.00 
NRBC% 0.0 %NEUT 58.70 %%LYMP 27.80 %%MONO 9.80 %%EOS 2.80 %%BASO 0.90 %#NEUT 
3.12 #LYMP 1.48 #MONO 0.52 #EOS 0.15 #BASO 0.05 MANUAL DIFF NOT IND TSH 1.570 
uIU/mLGLUCOSE 94.0 mg/dLSODIUM 134.0 mmol/LPOTASSIUM 4.10 mmol/LCHLORIDE 101.0 
mmol/LCO2 23.0 mmol/LBUN 11.0 mg/dLCREATININE 0.80 mg/dLSGOT/AST 41.0 IU/LSGPT/
ALT 44.0 IU/LALK PHOS 109.0 IU/LTOTAL PROTEIN 7.90 g/dLALBUMIN 4.70 g/dLTOTAL 
BILI 0.80 mg/dLCALCIUM 10.40 mg/dLAGE 68 GFR NonAA 72 GFR AA 87 eGFR >60 mL/min/
1.73 m2eGFR AA* >60 TRIGLYCERIDES 163.0 mg/dLCHOLESTEROL 138.0 mg/dLHDL 49.0 mg/
dLTOT CHOL/HDL 2.8 LDL (CALC) 56.0 mg/dL

 

 2014 8:58 AM  GLUCOSE 86.0 mg/dLSODIUM 134.0 mmol/LPOTASSIUM 4.20 mmol/
LCHLORIDE 99.0 mmol/LCO2 25.0 mmol/LBUN 14.0 mg/dLCREATININE 0.80 mg/dLCALCIUM 
10.10 mg/dLAGE 68 GFR NonAA 72 GFR AA 87 eGFR >60 mL/min/1.73 m2eGFR AA* >60 
FREE T4 1.18 TSH 1.20 uIU/mL

 

 2014 9:50 AM  WBC 5.7 RBC 4.03 HGB 12.90 g/dLHCT 37.90 %MCV 94.0 fLMCH 
32.0 pgMCHC 34.0 g/dLRDW SD 44 RDW CV 12.70 %MPV 9.70 fLPLT 292 NRBC# 0.00 NRBC
% 0.0 %NEUT 62.70 %%LYMP 21.80 %%MONO 11.0 %%EOS 3.40 %%BASO 1.10 %#NEUT 3.55 #
LYMP 1.23 #MONO 0.62 #EOS 0.19 #BASO 0.06 MANUAL DIFF NOT IND GLUCOSE 98.0 mg/
dLSODIUM 135.0 mmol/LPOTASSIUM 4.30 mmol/LCHLORIDE 102.0 mmol/LCO2 22.0 mmol/
LBUN 10.0 mg/dLCREATININE 0.80 mg/dLSGOT/AST 34.0 IU/LSGPT/ALT 32.0 IU/LALK 
PHOS 95.0 IU/LTOTAL PROTEIN 7.70 g/dLALBUMIN 4.30 g/dLTOTAL BILI 0.80 mg/
dLCALCIUM 9.10 mg/dLAGE 68 GFR NonAA 72 GFR AA 87 eGFR 60 eGFR AA* 60 
TRIGLYCERIDES 108.0 mg/dLCHOLESTEROL 146.0 mg/dLHDL 56.0 mg/dLTOT CHOL/HDL 2.6 
LDL (CALC) 68.0 mg/dLTSH 0.90 uIU/mL

 

 2014 8:40 AM  WBC 4.4 RBC 4.13 HGB 13.20 g/dLHCT 38.90 %MCV 94.0 fLMCH 
32.0 pgMCHC 33.90 g/dLRDW SD 47 RDW CV 13.50 %MPV 9.80 fLPLT 279 NRBC# 0.00 NRBC
% 0.0 %NEUT 63.80 %%LYMP 24.60 %%MONO 9.30 %%EOS 1.60 %%BASO 0.70 %#NEUT 2.80 #
LYMP 1.08 #MONO 0.41 #EOS 0.07 #BASO 0.03 MANUAL DIFF NOT IND GLUCOSE 96.0 mg/
dLSODIUM 136.0 mmol/LPOTASSIUM 4.10 mmol/LCHLORIDE 99.0 mmol/LCO2 23.0 mmol/
LBUN 8.0 mg/dLCREATININE 0.80 mg/dLSGOT/AST 31.0 IU/LSGPT/ALT 27.0 IU/LALK PHOS 
85.0 IU/LTOTAL PROTEIN 7.60 g/dLALBUMIN 4.40 g/dLTOTAL BILI 0.80 mg/dLCALCIUM 
10.20 mg/dLAGE 69 GFR NonAA 71 GFR AA 86 eGFR 60 eGFR AA* 60 TRIGLYCERIDES 61.0 
mg/dLCHOLESTEROL 148.0 mg/dLHDL 71.0 mg/dLTOT CHOL/HDL 2.1 LDL (CALC) 65.0 mg/
dLTSH 0.990 uIU/mL

 

 2015 8:32 AM  WBC 4.8 RBC 3.98 HGB 12.70 g/dLHCT 37.30 %MCV 94.0 fLMCH 
31.90 pgMCHC 34.0 g/dLRDW SD 43 RDW CV 12.70 %MPV 9.50 fLPLT 270 NRBC# 0.00 NRBC
% 0.0 %NEUT 57.30 %%LYMP 25.0 %%MONO 13.0 %%EOS 3.60 %%BASO 1.10 %#NEUT 2.73 #
LYMP 1.19 #MONO 0.62 #EOS 0.17 #BASO 0.05 MANUAL DIFF NOT IND TSH 0.640 uIU/
mLGLUCOSE 90.0 mg/dLSODIUM 136.0 mmol/LPOTASSIUM 4.0 mmol/LCHLORIDE 101.0 mmol/
LCO2 24.0 mmol/LBUN 10.0 mg/dLCREATININE 0.80 mg/dLSGOT/AST 34.0 IU/LSGPT/ALT 
32.0 IU/LALK PHOS 92.0 IU/LTOTAL PROTEIN 7.50 g/dLALBUMIN 4.40 g/dLTOTAL BILI 
0.70 mg/dLCALCIUM 9.50 mg/dLAGE 69 GFR NonAA 71 GFR AA 86 eGFR >60 mL/min/1.73 
m2eGFR AA* >60 TRIGLYCERIDES 107.0 mg/dLCHOLESTEROL 138.0 mg/dLHDL 58.0 mg/
dLTOT CHOL/HDL 2.4 LDL (CALC) 59.0 mg/dL

 

 2015 8:31 AM  WBC 4.6 RBC 3.97 HGB 12.90 g/dLHCT 38.0 %MCV 96.0 fLMCH 
32.50 pgMCHC 33.90 g/dLRDW SD 44 RDW CV 12.60 %MPV 9.0 fLPLT 248 NRBC# 0.00 NRBC
% 0.0 %NEUT 61.0 %%LYMP 24.70 %%MONO 10.20 %%EOS 3.0 %%BASO 1.10 %#NEUT 2.82 #
LYMP 1.14 #MONO 0.47 #EOS 0.14 #BASO 0.05 MANUAL DIFF NOT IND TRIGLYCERIDES 
105.0 mg/dLCHOLESTEROL 141.0 mg/dLHDL 58.0 mg/dLTOT CHOL/HDL 2.4 LDL (CALC) 
62.0 mg/dLGLUCOSE 93.0 mg/dLSODIUM 136.0 mmol/LPOTASSIUM 4.10 mmol/LCHLORIDE 
101.0 mmol/LCO2 25.0 mmol/LBUN 9.0 mg/dLCREATININE 0.80 mg/dLSGOT/AST 32.0 IU/
LSGPT/ALT 28.0 IU/LALK PHOS 95.0 IU/LTOTAL PROTEIN 7.30 g/dLALBUMIN 4.50 g/
dLTOTAL BILI 0.80 mg/dLCALCIUM 9.70 mg/dLAGE 70 GFR NonAA 71 GFR AA 86 eGFR >60 
mL/min/1.73meGFR AA* >60 TSH 1.10 uIU/mL

 

 2016 8:25 AM  TSH 0.70 uIU/mLTRIGLYCERIDES 85.0 mg/dLCHOLESTEROL 166.0 mg
/dLHDL 74.0 mg/dLTOT CHOL/HDL 2.2 LDL (CALC) 75.0 mg/dLGLUCOSE 96.0 mg/dLSODIUM 
136.0 mmol/LPOTASSIUM 4.0 mmol/LCHLORIDE 100.0 mmol/LCO2 24.0 mmol/LBUN 11.0 mg/
dLCREATININE 0.80 mg/dLSGOT/AST 34.0 IU/LSGPT/ALT 24.0 IU/LALK PHOS 98.0 IU/
LTOTAL PROTEIN 7.60 g/dLALBUMIN 4.40 g/dLTOTAL BILI 0.80 mg/dLCALCIUM 9.70 mg/
dLAGE 71 GFR NonAA 71 GFR AA 86 eGFR >60 mL/min/1.73meGFR AA* >60 WBC 5.9 RBC 
4.00 HGB 12.80 g/dLHCT 38.0 %MCV 95.0 fLMCH 32.0 pgMCHC 33.70 g/dLRDW SD 43 RDW 
CV 12.20 %MPV 9.30 fLPLT 272 NRBC# 0.00 NRBC% 0.0 %NEUT 73.90 %%LYMP 15.60 %%
MONO 7.80 %%EOS 1.70 %%BASO 0.70 %#NEUT 4.35 #LYMP 0.92 #MONO 0.46 #EOS 0.10 #
BASO 0.04 MANUAL DIFF NOT IND 







History Of Immunizations







 Name  Date Admin  Mfg Name  Mfg Code  Trade Name  Lot#  Route  Inj  Vis Given  
Vis Pub  CVX

 

 Influenza  10/20/2010  [employer]of[employer] pasteur  [facility]  Fluzone  [id number]  Intramuscular  
Left Arm  10/20/2010  2010  999

 

 Influenza  10/28/2011  [employer] pasteur  [employer]  Fluzone  [id number]  Intramuscular  
Left Arm  10/28/2011  2011  141

 

 Influenza  10/29/2012  sanofi pasteur  [employer]  Fluzone  [id number]  Intramuscular  
Left Deltoid  10/29/2012  2012  141

 

 X  12/10/2012  [employer]  MSD  Pneumovax 23  [id number]  Intramuscular  
Left Gluteous Medius  12/10/2012  2010  33

 

 Influenza  10/28/2013  sanofi pasteur  [facility]  Fluzone > 3 Years  [id number]  
Intramuscular  Right Deltoid  10/28/2013  2013  141

 

 X  9/2/2015  Not Entered  NE  Prevnar 13     Not Entered  Not Entered  1  109

 

 Influenza  2015  Not Entered  NE  Not Entered     Not Entered  Not Entered
  2015  141

 

 HepB Adult  2016  [employer]  MSD  Recombivax Adult  [id number]  Not 
Entered  Left Deltoid  2016  43

 

 HepB Adult  2016  [employer]  MSD  Recombivax Adult  [id number]  
Intramuscular  Right Deltoid  2016  43

 

 Zostavax  2012  Not Entered  NE  Not Entered     Not Entered  Not 
Entered  1  121

 

 Tdap  2012  Not Entered  NE  Not Entered     Not Entered  Not Entered  1  115

 

 Influenza  10/13/2016  Not Entered  NE  Fluzone High-Dose     Intramuscular  
Left Arm  1  141

 

 HepB Adult  2016  [employer]  MSD  Recombivax Adult  [id number]  
Intramuscular  Right Deltoid  2016  43







History of Past Illness







 Name  Date of Onset  Comments

 

 Benign Essential Hypertension  Dec 14 2009 10:10AM   

 

 Hyperlipidemia  Dec 14 2009 10:10AM   

 

 Hypothyroidism, Acquired  Dec 14 2009 10:10AM   

 

 hypothyroid      

 

 Hypertension      

 

 Hyperlipidemia      

 

 acid reflux      

 

 Hypertension, Benign Essential  2010  9:41AM   

 

 Hypertension, Benign Essential  2010 12:53PM   

 

 Routine gynecological examination  May  5 2010  9:28AM   

 

 Hypertension  May  5 2010  9:28AM   

 

 Hyperlipidemia, unspecified  May  5 2010  9:28AM   

 

 Hypothyroidism, Acquired  May  5 2010  9:28AM   

 

 Vulvovaginitis  May  5 2010  9:28AM   

 

 Rhinitis, Allergic  May  5 2010  9:28AM   

 

 Hypertension, Benign Essential  May 19 2010  8:48AM   

 

 Hypertension, Benign Essential  May 25 2010  9:39AM   

 

 Flu  Oct 20 2010 12:01PM   

 

 Essential Hypertension  2010  8:34AM   

 

 Hyperlipidemia  2010  8:34AM   

 

 Gastroesophageal Reflux  2010  8:34AM   

 

 Hypothyroidism, Acquired  2010  8:34AM   

 

 Hypertension, Benign Essential  2010  9:22AM   

 

 Hypertension, Benign Essential  Dec 15 2010  9:07AM   

 

 Essential Hypertension  2011  1:31PM   

 

 Hyperlipidemia  2011  1:31PM   

 

 Gastroesophageal Reflux  2011  1:31PM   

 

 Hypothyroidism, Acquired  2011  1:31PM   

 

 Essential Hypertension  2011  8:51AM   

 

 Hyperlipidemia  2011  8:51AM   

 

 Gastroesophageal Reflux  2011  8:51AM   

 

 Hypothyroidism, Acquired  2011  8:51AM   

 

 Essential Hypertension  2011  9:13AM   

 

 Hyperlipidemia  2011  9:13AM   

 

 Gastroesophageal Reflux  2011  9:13AM   

 

 Hypothyroidism, Acquired  2011  9:13AM   

 

 Nausea With Vomiting  2011  1:18PM   

 

 Hyponatremia  2011  8:46AM   

 

 Nausea  2011  9:13AM   

 

 Hypothyroidism  2011 11:10AM   

 

 Hyponatremia  2011 11:10AM   

 

 Essential Hypertension  2011 10:05AM   

 

 Hyperlipidemia  2011 10:05AM   

 

 Gastroesophageal Reflux  2011 10:05AM   

 

 Nausea  2011 10:05AM   

 

 Hypothyroidism, Acquired  2011 10:05AM   

 

 Hyponatremia  May  6 2011 11:34AM   

 

 Essential Hypertension  May 16 2011  8:50AM   

 

 Hyperlipidemia  May 16 2011  8:50AM   

 

 Gastroesophageal Reflux  May 16 2011  8:50AM   

 

 Nausea  May 16 2011  8:50AM   

 

 Hypothyroidism, Acquired  May 16 2011  8:50AM   

 

 Hyponatremia  May 16 2011  8:50AM   

 

 Routine gynecological examination  2011  8:54AM   

 

 Hypertension  2011  8:54AM   

 

 Hyperlipidemia, unspecified  2011  8:54AM   

 

 Hypothyroidism, Acquired  2011  8:54AM   

 

 Rhinitis, Allergic  2011  8:54AM   

 

 Hypothyroidism  Aug 29 2011 12:37PM   

 

 Hyponatremia  Aug 29 2011 12:37PM   

 

 Essential Hypertension  Sep 12 2011  8:53AM   

 

 Hyperlipidemia  Sep 12 2011  8:53AM   

 

 Gastroesophageal Reflux  Sep 12 2011  8:53AM   

 

 Hypothyroidism, Acquired  Sep 12 2011  8:53AM   

 

 Hyponatremia  Sep 12 2011  8:53AM   

 

 Hypertension  Sep 29 2011  9:41AM   

 

 Hyperlipidemia  Dec  8 2011  8:31AM   

 

 Hypothyroidism  Dec  8 2011  8:31AM   

 

 Hypertension  Dec  8 2011  8:31AM   

 

 Flu  Dec  9 2011 10:02AM   

 

 Essential Hypertension  Dec 13 2011 10:13AM   

 

 Hyperlipidemia  Dec 13 2011 10:13AM   

 

 Gastroesophageal Reflux  Dec 13 2011 10:13AM   

 

 Hypothyroidism, Acquired  Dec 13 2011 10:13AM   

 

 Hyponatremia  Dec 13 2011 10:13AM   

 

 Vaginal Discharge  2012  9:43AM   

 

 Rhinitis, Allergic  Feb 15 2012  9:31AM   

 

 Eustachian Tube Dysfunction  Feb 15 2012  9:31AM   

 

 Pharyngitis, Acute  Feb 15 2012  9:31AM   

 

 Routine gynecological examination  2012  8:48AM   

 

 Hypertension  2012  8:48AM   

 

 Hyperlipidemia, unspecified  2012  8:48AM   

 

 Hypothyroidism, Acquired  2012  8:48AM   

 

 Rhinitis, Allergic  2012  8:48AM   

 

 Candidiasis, Vulvovaginal  2012 11:04AM   

 

 Essential Hypertension  Aug 15 2012  9:02AM   

 

 Hyperlipidemia  Aug 15 2012  9:02AM   

 

 Gastroesophageal Reflux  Aug 15 2012  9:02AM   

 

 Hypothyroidism, Acquired  Aug 15 2012  9:02AM   

 

 Hyponatremia  Aug 15 2012  9:02AM   

 

 Atrophic Vaginitis, Postmenopausal  Aug 15 2012  9:02AM   

 

 Flu  Oct 29 2012  9:24AM   

 

 Essential Hypertension  Dec 10 2012  8:35AM   

 

 Hyperlipidemia  Dec 10 2012  8:35AM   

 

 Gastroesophageal Reflux  Dec 10 2012  8:35AM   

 

 Hypothyroidism, Acquired  Dec 10 2012  8:35AM   

 

 Hyponatremia  Dec 10 2012  8:35AM   

 

 Atrophic Vaginitis, Postmenopausal  Dec 10 2012  8:35AM   

 

 Pneumococcus  Dec 10 2012  2:07PM   

 

 Vaginal Discharge  Dec 20 2012  1:50PM   

 

 Essential Hypertension  Dec 20 2012  1:50PM   

 

 Hyperlipidemia  Dec 20 2012  1:50PM   

 

 Gastroesophageal Reflux  Dec 20 2012  1:50PM   

 

 Hypothyroidism, Acquired  Dec 20 2012  1:50PM   

 

 Atrophic Vaginitis, Postmenopausal  Dec 20 2012  1:50PM   

 

 Upper Respiratory Infections  2013  8:52AM   

 

 Hyperlipidemia  2013  8:21AM   

 

 Hypertension  2013  8:21AM   

 

 Hypothyroidism  2013  8:21AM   

 

 Screening Mammogram  Beto 10 2013  8:45AM   

 

 Routine gynecological examination  Beto 10 2013  8:34AM   

 

 Hypertension  Beto 10 2013  8:34AM   

 

 Hyperlipidemia, unspecified  Beto 10 2013  8:34AM   

 

 Hypothyroidism, Acquired  Beto 10 2013  8:34AM   

 

 Rhinitis, Allergic  Beto 10 2013  8:34AM   

 

 Flu  Oct 28 2013  8:52AM   

 

 Essential Hypertension  Dec  9 2013  8:37AM   

 

 Hyperlipidemia  Dec  9 2013  8:37AM   

 

 Gastroesophageal Reflux  Dec  9 2013  8:37AM   

 

 Hypothyroidism, Acquired  Dec  9 2013  8:37AM   

 

 Atrophic Vaginitis, Postmenopausal  Dec  9 2013  8:37AM   

 

 Hypertension  2014  9:56AM   

 

 Hyperlipidemia  2014  9:56AM   

 

 Hypothyroidism  2014  9:56AM   

 

 Screening Mammogram  2014  8:32AM   

 

 Osteopenia  2014  8:32AM   

 

 Hypertension  2014  8:35AM   

 

 Hypothyroidism, Acquired  2014  8:35AM   

 

 Hyperlipidemia  2014  8:35AM   

 

 Upper Respiratory Infections  2014  9:28AM   

 

 Sinusitis, Acute  Oct  2 2014  9:17AM   

 

 Herpes Zoster  Oct  5 2014  1:44PM   

 

 Vesicular Eruption  Oct  5 2014  1:44PM   

 

 Hypertension  2014  8:18AM   

 

 Hyperlipidemia  2014  8:18AM   

 

 hypothyroid  2014  8:18AM   

 

 Situational anxiety  Dec 20 2014  9:33AM   

 

 GERD (gastroesophageal reflux disease)  Dec 20 2014  9:33AM   

 

 Nausea  Dec 20 2014  9:33AM   

 

 Abdominal Pain, Epigastric  Dec 20 2014  9:33AM   

 

 Hyperlipidemia  Oct  6 2014  9:03AM   

 

 acid reflux  Oct  6 2014  9:03AM   

 

 hypothyroid  Oct  6 2014  9:03AM   

 

 Shingles  Oct  6 2014  9:03AM   

 

 Pharyngitis  2015  1:09PM   

 

 Bronchitis  2015  9:10AM   

 

 Hyperlipidemia  2015  9:10AM   

 

 acid reflux  2015  9:10AM   

 

 hypothyroid  2015  9:10AM   

 

 Hyperlipidemia  2015  8:18AM   

 

 Hypertension  2015  8:18AM   

 

 hypothyroid  2015  8:18AM   

 

 Screening Mammogram  2015 11:43AM   

 

 Medicare Annual Pap Q 2 years  2015  9:36AM   

 

 Screening Mammogram  2015  9:36AM   

 

 Candidiasis of female genitalia  2015  9:36AM   

 

 Hypertension  2015 11:34AM   

 

 Hyperlipidemia, unspecified  2015 11:34AM   

 

 Hypothyroidism, Acquired  2015 11:34AM   

 

 Osteopenia  2016  8:41AM   

 

 Encounter for screening mammogram for breast cancer  2016  8:41AM   

 

 Hypertension  2016  8:34AM   

 

 Lymphedema  2016  8:34AM   

 

 Hyperlipidemia  2016  8:34AM   

 

 hypothyroid  2016  8:34AM   

 

 HEP B  2016  9:13AM   

 

 HEP B  2016  8:52AM   

 

 Acid Reflux  2016  8:34AM   

 

 History of acute gastritis  Oct 13 2016  2:30PM   

 

 Anxiety as acute reaction to exceptional stress  Oct 13 2016  2:30PM   

 

 HEP B  Dec 29 2016  8:42AM   

 

 Caregiver stress syndrome  May 26 2017  8:28AM   

 

 Anxiety  May 26 2017  8:28AM   

 

 Blood in stool  2017  2:54PM   







Payers







 Insurance Name  Company Name  Plan Name  Plan Number  Policy Number  Policy 
Group Number  Start Date

 

    Medicare RHC  Medicare RHC     [id number]     N/A

 

    BCBS  [employer] [facility]     [id number]     2009

 

    Medicare Part B  [employer] [place]     [id number]     N/A

 

    Medicare Part A  Medicare Part A     [id number]     N/A

 

    Medicare Part A  [employer] - Preventive     [id number]     N/A

 

    Medicare Part A  Medicare - Lab/Xray     [id number]     N/A







History of Encounters







 Visit Date  Visit Type  Provider

 

 2017  Office visit  Prince Noe APRN

 

 2016  Nurse visit  Doris Noriega MD

 

 10/13/2016  Office visit  Doris Noriega MD

 

 2016  Nurse visit  Doris Noriega MD

 

 2016  Office visit  Doris Noriega MD

 

 2015  Office visit  Doris Noriega MD

 

 2015  Office visit  Doris Noriega MD

 

 2015  Office visit  Prince Noe APRN

 

 2014  Office visit  Anyi Herrera APRN

 

 10/27/2014  Voided  Doris Noriega MD

 

 10/6/2014  Office visit  Doris Noriega MD

 

 10/5/2014  Office visit  Anyi Herrera APRN

 

 10/2/2014  Office visit   

 

 10/2/2014  Office visit  Corrine Bay APRN

 

 2014  Office visit  Kassie Franklin APRN

 

 2014  Office visit  Doris Noriega MD

 

 2014  Office visit  Doris Noriega MD

 

 2013  Office visit  Dee Colindres MD

 

 10/28/2013  Nurse visit  Dee Colindres MD

 

 6/10/2013  Office visit  Dee Colindres MD

 

 2013  Office visit  Corrine Bay APRN

 

 2012  Office visit  Dee Colindres MD

 

 12/10/2012  Office visit  Dee Colindres MD

 

 10/29/2012  Nurse visit  Dee Colindres MD

 

 8/15/2012  Office visit  Dee Colindres MD

 

 2012  Office visit  Corrine Bay APRN

 

 2012  Office visit  Dee Colindres MD

 

 2/15/2012  Office visit  Dee Colindres MD

 

 2012  Office visit  Corrine Bay APRN

 

 2011  Office visit  Dee Colindres MD

 

 10/28/2011  Nurse visit  Shad Nolasco MD

 

 2011  Office visit  Dee Colindres MD

 

 2011  Office visit  Dee Colindres MD

 

 2011  Office visit  Dee Colindres MD

 

 2011  Office visit  Dee Colindres MD

 

 2011  Office visit  Dee Colindres MD

 

 2011  Office visit  Dee Colindres MD

 

 4/10/2011  [facility]  KHRIS Granados MD

 

 4/10/2011  [facility]  RICKEY Toussaint MD

 

 2011  Office visit  RICKEY Toussaint MD

 

 2011  [facility]  Fide Castellanos MD

 

 2011  Voided  Fide Castellanos MD

 

 2011  Office visit  Dee Colindres MD

 

 12/15/2010  Nurse visit  Dee Colindres MD

 

 2010  Office visit  Dee Colindres MD

 

 10/20/2010  Nurse visit  Stephenie PERAZA

 

 2010  Nurse visit  Dee Colindres MD

 

 2010  Nurse visit  Dee Colindres MD

 

 2010  Office visit  Dee Colindres MD

 

 3/19/2010  Voided  Stephenie Kay PA

 

 2010  Nurse visit  Stephenie PERAZA

 

 2010  Nurse visit  Stephenie PERAZA

 

 2010  Nurse visit  Stephenie PERAZA

 

 2009  Office visit  Stephenie PERAZA

 

 10/20/2009  Nurse visit  Stephenie Kay PA

## 2017-07-13 NOTE — XMS REPORT
MU2 Ambulatory Summary

 Created on: 2016



Milady Crespo

External Reference #: [id number]

: 1945

Sex: Female



Demographics







 Address  [address]

[place]  [address]

 

 Home Phone  [phone]

 

 Preferred Language  English

 

 Marital Status  

 

 Gnosticist Affiliation  Unknown

 

 Race  White

 

 Ethnic Group  Not  or 





Author







 Author  Doris Noriega

 

 Organization  [facility]

 

 Address  [address]

[place], [place]  [id number]



 

 Phone  [phone]







Care Team Providers







 Care Team Member Name  Role  Phone

 

 Doris Noriega  PCP  [phone]







Allergies and Adverse Reactions







 Name  Reaction  Notes

 

 NO KNOWN DRUG ALLERGIES      







Plan of Treatment







 Planned Activity  Comments  Planned Date  Planned Time  Plan/Goal

 

 COMPLETE CBC W/AUTO DIFF WBC     2016  12:00 AM   

 

 COMPREHEN METABOLIC PANEL     2016  12:00 AM   

 

 VITAMIN D 25 HYDROXY     2016  12:00 AM   

 

 LIPID PANEL     8/15/2012  12:00 AM   

 

 COMPREHEN METABOLIC PANEL     6/10/2013  12:00 AM   

 

 LIPID PANEL     6/10/2013  12:00 AM   

 

 ASSAY THYROID STIM HORMONE     6/10/2013  12:00 AM   

 

 COMPLETE CBC W/AUTO DIFF WBC     6/10/2013  12:00 AM   

 

 COMPREHEN METABOLIC PANEL     2012  12:00 AM   

 

 LIPID PANEL     2012  12:00 AM   

 

 ASSAY THYROID STIM HORMONE     2012  12:00 AM   

 

 COMPLETE CBC W/AUTO DIFF WBC     2012  12:00 AM   

 

 MAMMOGRAM SCREENING     2015  12:00 AM   







Medications







 Active 

 

 Name  Start Date  Estimated Completion Date  SIG  Comments

 

 loratadine 10 mg oral tablet  2015  12/15/2016  TAKE 1/2 TABLET BY MOUTH 
EVERY DAY IN THE EVENING   

 

 aspirin 81 mg oral tablet,delayed release (DR/EC)        take 1 tablet (81 mg) 
by oral route once daily   

 

 Vitamin D3 1,000 unit oral capsule        take 1 capsule by oral route daily   

 

 famotidine 20 mg oral tablet  2015     TAKE ONE TABLET BY MOUTH TWICE 
DAILY   

 

 valsartan 160 mg oral tablet  2016     TAKE ONE TABLET BY MOUTH ONCE 
DAILY   

 

 amlodipine 5 mg oral tablet  2016     TAKE ONE TABLET BY MOUTH ONCE DAILY
   

 

 Allergy Relief (loratadine) 10 mg oral tablet  2016     TAKE ONE TABLET 
BY MOUTH ONCE DAILY   

 

 triamcinolone acetonide 0.1 % topical cream  3/14/2016     APPLY A THIN LAYER 
OF CREAM EXTERNALLY TO AFFECTED AREA TWICE DAILY FOR 14 DAYS   

 

 atenolol 50 mg oral tablet  2016  3/30/2017  take 1 tablet (50 mg) by oral 
route once daily   

 

 amlodipine 5 mg oral tablet  2016     TAKE ONE TABLET BY MOUTH ONCE DAILY
   

 

 Allergy Relief (loratadine) 10 mg oral tablet  2016     TAKE ONE TABLET 
BY MOUTH ONCE DAILY   

 

 valsartan 160 mg oral tablet  2016     TAKE ONE TABLET BY MOUTH ONCE 
DAILY   

 

 clorazepate dipotassium 7.5 mg oral tablet  2016  take 1/2 to1 
tablet PO up to three times per day for situational anxiety   

 

 levothyroxine 50 mcg oral tablet  2016     TAKE ONE TABLET BY MOUTH ONCE 
DAILY   









  

 

 Name  Start Date  Expiration Date  SIG  Comments

 

 prednisone 20 mg oral tablet  2011  take 2 tablets by oral 
route daily for 5 days   

 

 calcium carbonate-vitamin D2 600-125 mg-unit oral tablet  8/15/2012  2012
  take 2 tablets by oral route daily   

 

 Acton 3 Fish Oil 900-1,400 mg oral capsule,delayed release(DR/EC)  8/15/2012  9
/  take 1 capsule by oral route daily   

 

 multivitamin Oral tablet  8/15/2012  2012  take 1 tablet by oral route 
daily   

 

 triamcinolone acetonide 0.1 % topical cream  2013  10/7/2013  APPLY A 
THIN LAYER TO THE AFFECTED AREA(S) BY TOPICAL ROUTE TWICE A DAY   

 

 famotidine 20 mg oral tablet  2014  take 1 tablet (20 mg) by 
oral route 2 times per day   

 

 Plavix 75 mg oral tablet  2014  take 1 tablet (75 mg) by oral 
route once daily   

 

 amoxicillin 500 mg oral capsule  2014  take 1 capsule (500 mg) 
by oral route 3 times per day for 10 days   

 

 Lipitor 20 mg oral tablet  2014  take 1 tablet (20 mg) by oral 
route once daily   

 

 Zithromax Z-Tab 250 mg oral tablet  10/2/2014  10/7/2014  take 2 tablets (500 
mg) by oral route once daily for 1 day then 1 tablet (250 mg) by oral route 
once daily for 4 days   

 

 acyclovir 800 mg oral tablet  10/5/2014  10/12/2014  take 1 tablet (800 mg) by 
oral route 5 times per day for 7 days   

 

 gabapentin 300 mg oral capsule  10/9/2014  2014  take 1 capsule (300 mg) 
by oral route 3 times per day for 14 days   

 

 Carafate 100 mg/mL oral suspension  2014  take 10 milliliters 
(1 gram) by oral route 4 times per day on an empty stomach 1 hour before meals 
and at bedtime for 4 weeks   

 

 amlodipine 5 mg oral tablet  2015  TAKE ONE TABLET BY MOUTH 
EVERY DAY   

 

 levothyroxine 50 mcg oral tablet  2015  TAKE ONE TABLET BY MOUTH 
EVERY DAY   

 

 Diovan 160 mg oral tablet  2015  2/15/2016  TAKE ONE TABLET BY MOUTH 
EVERY DAY for 90 days   

 

 triamcinolone acetonide 0.1 % topical cream  2015  apply a thin 
layer to the affected area(s) by topical route 2 times per day for 14 days   

 

 fluconazole 150 mg oral tablet  2015  take 1 tablet (150 mg) 
by oral route once for 1 day   









 Discontinued 

 

 Name  Start Date  Discontinued Date  SIG  Comments

 

 Lipitor 40 mg oral tablet     2009  1 TAB DAILY   

 

 ramipril 5 mg oral capsule     2009  take 1 capsule (5 mg) by oral route 
once daily   

 

 lovastatin 20 mg oral tablet  2009  take 1 tablet (20 mg) by 
oral route once daily with evening meal   

 

 propranolol 20 mg oral tablet  2010  take 1 tablet by oral 
route 2 times a day   

 

 Fosamax 70 mg oral tablet  2010  take 1 tablet (70 mg) by oral 
route once weekly in the morning, at least 30 minutes before the first food, 
beverage, or medication of the day   

 

 lisinopril 40 mg oral tablet  2010  4/10/2011  take 2 tablets by oral 
route daily   

 

 Zoloft 50 mg oral tablet  2011  take 1 tablet (50 mg) by oral 
route once daily   

 

 promethazine 25 mg oral tablet  2011  take 1 tablet by oral 
route every 6 hours as needed   

 

 Diovan -12.5 mg oral tablet  2011  take 1 tablet by oral 
route once daily for 30 days   

 

 Diflucan 150 mg oral tablet     2012  take 1 tablet (150 mg) by oral 
route once for 1 day   

 

 Diflucan 150 mg oral tablet  2012  8/15/2012  take 1 tablet (150 mg) by 
oral route once   

 

 Vitamin B-12 1,000 mcg oral tablet  8/15/2012  2014  take 1 tablet by 
oral route daily   

 

 Premarin 0.625 mg/gram vaginal cream  10/29/2012  6/10/2013  use 1 gram daily 
for a week then 1 gram twice a week   

 

 Medrol (Tab) 4 mg oral tablets,dose pack  2013  6/10/2013  take as 
directed   

 

 aspirin 325 mg oral tablet  2014  take 1 tablet (325 mg) by 
oral route once daily   

 

 Medrol (Tab) 4 mg oral tablets,dose pack  2014  10/2/2014  take as 
directed   

 

 Tetracaine Lollipops Lollipop  2015  Use as directed   

 

 Zoloft 50 mg oral tablet  2016  take 1 tablet (50 mg) by oral 
route once daily for 90 days   

 

 Zoloft 50 mg oral tablet  2016  take 1 tablet (50 mg) by oral 
route once daily for 90 days  Pt refuses to take...







Problem List







 Description  Status  Onset

 

 Hyperlipidemia  Active   

 

 acid reflux  Active   

 

 Hypertension  Active   

 

 hypothyroid  Active   







Vital Signs







 Date  Time  BP-Sys(mm[Hg]  BP-Morena(mm[Hg])  HR(bpm)  RR(rpm)  Temp  WT  HT  HC  
BMI  BSA  BMI Percentile  O2 Sat(%)

 

 2016  8:29:00 AM  122 mmHg  70 mmHg  72 bpm  16 rpm  97.8 F  137.125 lbs  
61 in     25.91 kg/m2  1.64 m2     100 %

 

 2015  9:33:00 AM  120 mmHg  68 mmHg  73 bpm     98.7 F  144.375 lbs  61 
in     27.2791 kg/m  1.6788 m     99 %

 

 2015  9:05:00 AM  110 mmHg  75 mmHg  85 bpm  16 rpm  96.7 F  144.375 lbs  
61 in     27.28 kg/m2  1.68 m2     99 %

 

 2015  1:05:00 PM  108 mmHg  62 mmHg  78 bpm  18 rpm  96.9 F  142.375 lbs  
61 in     26.9012 kg/m  1.6672 m     100 %

 

 2014  9:31:00 AM  126 mmHg  66 mmHg  79 bpm  18 rpm  98.7 F  149 lbs  61 
in     28.15 kg/m2  1.71 m2     98 %

 

 10/6/2014  9:02:00 AM  110 mmHg  74 mmHg  94 bpm  18 rpm  98.1 F  145.4 lbs  
61 in     27.4728 kg/m  1.6848 m     97 %

 

 10/2/2014  9:14:00 AM  124 mmHg  74 mmHg  79 bpm  18 rpm  98 F  147.25 lbs  61 
in     27.82 kg/m2  1.70 m2     98 %

 

 2014  9:22:00 AM  124 mmHg  76 mmHg  89 bpm  18 rpm  98.1 F  148 lbs  61 
in     27.9641 kg/m  1.6998 m     100 %

 

 2014  8:27:00 AM  118 mmHg  65 mmHg  74 bpm  16 rpm  97.7 F  148 lbs  61 
in     27.96 kg/m2  1.70 m2     99 %

 

 2014  9:48:00 AM  125 mmHg  70 mmHg  93 bpm  18 rpm  96.7 F  156 lbs  61 
in     29.4756 kg/m  1.7451 m     100 %

 

 2013  8:35:00 AM  122 mmHg  74 mmHg  84 bpm  16 rpm  97.9 F  155.375 lbs 
                99 %

 

 6/10/2013  8:30:00 AM  130 mmHg  82 mmHg  79 bpm  16 rpm  97.9 F  161 lbs     
            98 %

 

 2013  8:50:00 AM  124 mmHg  68 mmHg  66 bpm  18 rpm  97.6 F  157.5 lbs  
61 in     29.76 kg/m2  1.75 m2      

 

 2012  1:46:00 PM  120 mmHg  72 mmHg  75 bpm  16 rpm  97.6 F  156.125 lbs
                 98 %

 

 12/10/2012  8:32:00 AM  122 mmHg  82 mmHg  70 bpm  16 rpm  97.3 F  157 lbs    
             99 %

 

 8/15/2012  9:00:00 AM  130 mmHg  76 mmHg  86 bpm  16 rpm  97.4 F  159 lbs     
            100 %

 

 2012  11:02:00 AM  128 mmHg  64 mmHg  66 bpm  18 rpm  97.4 F  162.125 lbs
  61 in     30.63 kg/m2  1.78 m2      

 

 2012  8:45:00 AM  130 mmHg  70 mmHg  82 bpm  16 rpm  98.2 F  160.125 lbs 
                100 %

 

 2/15/2012  9:29:00 AM  122 mmHg  80 mmHg  86 bpm  16 rpm  97.4 F  159.375 lbs  
61 in     30.11 kg/m2  1.76 m2     99 %

 

 2012  9:41:00 AM  132 mmHg  74 mmHg  66 bpm  18 rpm  98.4 F  160.375 lbs  
61 in     30.3023 kg/m  1.7694 m      

 

 2011  10:08:00 AM  134 mmHg  82 mmHg  80 bpm  16 rpm  98 F  160 lbs  61 
in     30.23 kg/m2  1.77 m2     99 %

 

 2011  3:56:00 PM  130 mmHg  80 mmHg                              

 

 2011  8:55:00 AM  130 mmHg  74 mmHg  88 bpm  16 rpm  98.2 F  158.25 lbs  
               98 %

 

 2011  8:54:00 AM  136 mmHg  82 mmHg  102 bpm  16 rpm  98.1 F  153.5 lbs   
              99 %

 

 2011  8:49:00 AM  122 mmHg  88 mmHg  88 bpm  16 rpm  98.6 F  152.25 lbs  
               99 %

 

 2011  10:02:00 AM  140 mmHg  82 mmHg  96 bpm  20 rpm  98.8 F             
       100 %

 

 2011  9:14:00 AM  156 mmHg  98 mmHg  110 bpm  24 rpm  98.5 F  153 lbs    
             100 %

 

 2011  8:50:00 AM  160 mmHg  84 mmHg  100 bpm  16 rpm  98.7 F  155 lbs    
             100 %

 

 2011  1:25:00 PM  152 mmHg  100 mmHg  82 bpm  16 rpm  97.2 F  156.375 lbs 
                100 %

 

 2011  9:55:00 AM  144 mmHg  90 mmHg                              

 

 2010  9:24:00 AM  138 mmHg  84 mmHg                              

 

 2010  8:58:00 AM  160 mmHg  94 mmHg                              

 

 2010  8:33:00 AM  142 mmHg  90 mmHg  100 bpm  16 rpm  98.1 F  158.375 
lbs                  

 

 2010  9:24:00 AM  160 mmHg  102 mmHg  88 bpm  18 rpm  99 F  157.125 lbs  
62 in     28.7382 kg/m  1.7657 m      

 

 3/19/2010  11:01:00 AM  140 mmHg  90 mmHg                              

 

 2009  10:08:00 AM  142 mmHg  86 mmHg  72 bpm  16 rpm  98.1 F  154.125 
lbs  61 in     29.1214 kg/m  1.7346 m      







Social History







 Name  Description  Comments

 

       

 

 Children      

 

 lives alone      

 

 Supervisor      

 

 Tobacco  Never smoker   







History of Procedures







 Date Ordered  Description  Order Status

 

 2011 12:00 AM  COMPREHEN METABOLIC PANEL  Reviewed

 

 2011 12:00 AM  ASSAY THYROID STIM HORMONE  Reviewed

 

 2011 12:00 AM  COMPREHEN METABOLIC PANEL  Reviewed

 

 2011 12:00 AM  LIPID PANEL  Reviewed

 

 2011 12:00 AM  ASSAY THYROID STIM HORMONE  Reviewed

 

 2011 12:00 AM  COMPLETE CBC W/AUTO DIFF WBC  Reviewed

 

 2015 12:00 AM  COMPLETE CBC W/AUTO DIFF WBC  Returned

 

 2015 12:00 AM  COMPREHEN METABOLIC PANEL  Returned

 

 2015 12:00 AM  LIPID PANEL  Returned

 

 2015 12:00 AM  ASSAY THYROID STIM HORMONE  Returned

 

 2011 12:00 AM  COMPREHEN METABOLIC PANEL  Reviewed

 

 2011 12:00 AM  COMPREHEN METABOLIC PANEL  Reviewed

 

 2011 12:00 AM  LIPID PANEL  Reviewed

 

 2011 12:00 AM  ASSAY THYROID STIM HORMONE  Reviewed

 

 10/28/2011 12:00 AM  ***Immunization administration, Medicare flu  Reviewed

 

 10/28/2011 12:00 AM  Fluzone ** MEDICARE Only **  Reviewed

 

 2010 11:24 AM  NO CHARGE OV  Reviewed

 

 2010 11:26 AM  NO CHARGE OV  Reviewed

 

 2011 12:00 AM  COMPREHEN METABOLIC PANEL  Reviewed

 

 2011 12:00 AM  LIPID PANEL  Reviewed

 

 2011 12:00 AM  ASSAY THYROID STIM HORMONE  Reviewed

 

 2011 12:00 AM  COMPLETE CBC W/AUTO DIFF WBC  Reviewed

 

 2011 12:00 AM  Wrist Support  Reviewed

 

 2016 12:00 AM  Screening mammography, bilateral  Returned

 

 2016 12:00 AM  DXA BONE DENSITY AXIAL  Returned

 

 2016 12:00 AM  TETANUS VACCINE IM  Reviewed

 

 2016 12:00 AM  HEP B VACC ADULT 3 DOSE IM  Reviewed

 

 2012 12:00 AM  TISSUE EXAM FOR FUNGI  Returned

 

 2012 12:00 AM  SMEAR WET MOUNT SALINE/INK  Returned

 

 2016 12:00 AM  TETANUS VACCINE IM  Reviewed

 

 2016 12:00 AM  HEP B VACC ADULT 3 DOSE IM  Reviewed

 

 2/15/2012 12:00 AM  THER/PROPH/DIAG INJ SC/IM  Reviewed

 

 2/15/2012 12:00 AM  Bicillin CR [facility]89573741822[facility]  Reviewed

 

 2/15/2012 12:00 AM  Depo-Medrol 40 mg NDC#8841091356  Reviewed

 

 8/15/2012 12:00 AM  COMPREHEN METABOLIC PANEL  Reviewed

 

 8/15/2012 12:00 AM  ASSAY THYROID STIM HORMONE  Reviewed

 

 8/15/2012 12:00 AM  COMPLETE CBC W/AUTO DIFF WBC  Returned

 

 8/15/2012 12:00 AM  Wrist Support  Reviewed

 

 10/29/2012 12:00 AM  Flu Injection 3 Years And Above NDC# [address]-42  [facility]  
Reviewed

 

 12/10/2012 12:00 AM  IMMUNIZATION ADMIN  Reviewed

 

 12/10/2012 12:00 AM  Pneumovax Injection - [facility]  Reviewed

 

 2012 12:00 AM  TRICHOMONAS ASSAY W/OPTIC  Returned

 

 2013 12:00 AM  THER/PROPH/DIAG INJ SC/IM  Reviewed

 

 2013 12:00 AM  Bicillin CR, 1.2 million units NDC# 74276-519-62  Reviewed

 

 2013 12:00 AM  COMPREHEN METABOLIC PANEL  Returned

 

 2013 12:00 AM  LIPID PANEL  Returned

 

 2013 12:00 AM  COMPLETE CBC W/AUTO DIFF WBC  Returned

 

 2013 12:00 AM  ASSAY THYROID STIM HORMONE  Returned

 

 6/10/2013 12:00 AM  MAMMOGRAM SCREENING  Returned

 

 6/10/2013 12:00 AM  CYTOPATH C/V MANUAL  Returned

 

 12/10/2013 12:00 AM  LIPID PANEL  Returned

 

 12/10/2013 12:00 AM  ASSAY THYROID STIM HORMONE  Returned

 

 12/10/2013 12:00 AM  COMPLETE CBC W/AUTO DIFF WBC  Returned

 

 12/10/2013 12:00 AM  COMPREHEN METABOLIC PANEL  Returned

 

 2010 12:00 AM  CYTOPATH C/V MANUAL  Reviewed

 

 2010 12:00 AM  SMEAR WET MOUNT SALINE/INK  Reviewed

 

 2010 12:00 AM  LIPID PANEL  Reviewed

 

 2010 12:00 AM  ASSAY THYROID STIM HORMONE  Reviewed

 

 2010 12:00 AM  COMPLETE CBC W/AUTO DIFF WBC  Reviewed

 

 2010 12:00 AM  COMPREHEN METABOLIC PANEL  Reviewed

 

 10/28/2013 12:00 AM  Flu Injection 3 Years And Above NDC# [address]-08  [facility]  
Reviewed

 

 2010 8:48 AM  Blood Pressure Check-no charge  Reviewed

 

 2010 12:00 AM  Blood Pressure Check-no charge  Reviewed

 

 2014 12:00 AM  METABOLIC PANEL TOTAL CA  Reviewed

 

 2014 12:00 AM  ASSAY THYROID STIM HORMONE  Reviewed

 

 2014 12:00 AM  ASSAY OF FREE THYROXINE  Reviewed

 

 2014 12:00 AM  MAMMOGRAM SCREENING  Returned

 

 2014 12:00 AM  CT BONE DENSITY AXIAL  Returned

 

 2014 12:00 AM  COMPLETE CBC W/AUTO DIFF WBC  Returned

 

 2014 12:00 AM  COMPREHEN METABOLIC PANEL  Returned

 

 2014 12:00 AM  LIPID PANEL  Returned

 

 2014 12:00 AM  ASSAY THYROID STIM HORMONE  Returned

 

 10/20/2010 12:00 AM  IMMUNIZATION ADMIN  Reviewed

 

 10/20/2010 12:00 AM  FLU VACCINE 3 YRS & > IM  Reviewed

 

 2010 12:00 AM  COMPREHEN METABOLIC PANEL  Reviewed

 

 2010 12:00 AM  LIPID PANEL  Reviewed

 

 2010 12:00 AM  ASSAY THYROID STIM HORMONE  Reviewed

 

 2010 12:00 AM  COMPLETE CBC W/AUTO DIFF WBC  Reviewed

 

 2010 12:00 AM  Blood Pressure Check-no charge  Reviewed

 

 10/2/2014 12:00 AM  THER/PROPH/DIAG INJ SC/IM  Reviewed

 

 10/2/2014 12:00 AM  Kenalog, Per 10 Mg NDC#6494-7954-16  Reviewed

 

 2014 12:00 AM  COMPLETE CBC W/AUTO DIFF WBC  Returned

 

 2014 12:00 AM  COMPREHEN METABOLIC PANEL  Returned

 

 2014 12:00 AM  LIPID PANEL  Returned

 

 2014 12:00 AM  ASSAY THYROID STIM HORMONE  Returned

 

 2015 12:00 AM  Rocephin 1 gram NDC#7642-2799-28  Reviewed

 

 2015 12:00 AM  THER/PROPH/DIAG INJ SC/IM  Reviewed

 

 2011 12:00 AM  COMPREHEN METABOLIC PANEL  Reviewed

 

 2011 12:00 AM  LIPID PANEL  Reviewed

 

 2011 12:00 AM  ASSAY THYROID STIM HORMONE  Reviewed

 

 2011 12:00 AM  COMPLETE CBC W/AUTO DIFF WBC  Reviewed

 

 2011 12:00 AM  METABOLIC PANEL TOTAL CA  Reviewed

 

 2011 12:00 AM  COMPREHEN METABOLIC PANEL  Reviewed

 

 2011 12:00 AM  ASSAY THYROID STIM HORMONE  Reviewed

 

 2011 12:00 AM  COMPLETE CBC W/AUTO DIFF WBC  Reviewed

 

 2011 12:00 AM  ASSAY OF LIPASE  Reviewed

 

 2011 12:00 AM  THER/PROPH/DIAG INJ SC/IM  Reviewed

 

 2011 12:00 AM  Phenergan 50 Mg Im  Bernadine  Reviewed

 

 2011 12:00 AM  METABOLIC PANEL TOTAL CA  Reviewed

 

 2011 12:00 AM  THER/PROPH/DIAG INJ SC/IM  Reviewed

 

 2011 12:00 AM  Phenergan 25 Mg Im  Bernadine  Reviewed

 

 2011 12:00 AM  METABOLIC PANEL TOTAL CA  Reviewed

 

 2011 12:00 AM  ASSAY THYROID STIM HORMONE  Reviewed

 

 2011 12:00 AM  THER/PROPH/DIAG INJ SC/IM  Reviewed

 

 2011 12:00 AM  Phenergan 50 Mg Im  Bernadine  Reviewed

 

 2011 12:00 AM  ASSAY OF SERUM SODIUM  Reviewed

 

 2011 12:00 AM  ASSAY OF SERUM SODIUM  Reviewed

 

 2011 12:00 AM  CYTOPATH C/V MANUAL  Reviewed

 

 2011 12:00 AM  ASSAY THYROID STIM HORMONE  Reviewed

 

 2015 12:00 AM  COMPLETE CBC W/AUTO DIFF WBC  Returned

 

 2015 12:00 AM  COMPREHEN METABOLIC PANEL  Returned

 

 2015 12:00 AM  LIPID PANEL  Returned

 

 2015 12:00 AM  ASSAY THYROID STIM HORMONE  Returned

 

 2015 12:00 AM  MAMMOGRAM SCREENING  Returned

 

 2015 12:00 AM  CYTOPATH TBS C/V MANUAL  Returned







Results Summary







 Data and Description  Results

 

 2010 9:25 AM  Colonoscopy-Women and Men over 50 Abnormal Mammogram -Women 
over 40 Declined Pap Smear Declined 

 

 2010 10:41 AM  WET PREP NO TRICHOMONADS SEEN 

 

 2010 8:15 AM  TRIGLYCERIDES 174.0 mg/dLCHOLESTEROL 175.0 mg/dLHDL 58.0 mg/
dLLDL (CALC) 82.0 mg/dLTSH 0.790 uIU/mLGLUCOSE 95.0 mg/dLSODIUM 136.0 mmol/
LPOTASSIUM 4.50 mmol/LCHLORIDE 99.0 mmol/LCO2 26.0 mmol/LBUN 12.0 mg/
dLCREATININE 0.80 mg/dLSGOT/AST 32.0 IU/LSGPT/ALT 33.0 IU/LALK PHOS 103.0 IU/
LTOTAL PROTEIN 7.60 g/dLALBUMIN 4.60 g/dLTOTAL BILI 0.60 mg/dLCALCIUM 9.80 mg/
dLeGFR >60 mL/min/1.73 m2WBC 5.3 RBC 4.13 HGB 13.10 g/dLHCT 39.20 %MCV 95.0 
fLMCH 31.70 pgMCHC 33.40 g/dLRDW CV 12.70 %MPV 9.80 fLPLT 257 %NEUT 57.90 %%
LYMP 26.50 %%MONO 11.60 %%EOS 3.20 %%BASO 0.80 %#NEUT 3.04 #LYMP 1.39 #MONO 
0.61 #EOS 0.17 #BASO 0.04 

 

 12/15/2010 8:30 AM  TRIGLYCERIDES 157.0 mg/dLCHOLESTEROL 163.0 mg/dLHDL 56.0 mg
/dLLDL (CALC) 76.0 mg/dLTSH 0.650 uIU/mLWBC 6.5 RBC 4.25 HGB 13.60 g/dLHCT 
40.70 %MCV 96.0 fLMCH 32.0 pgMCHC 33.40 g/dLRDW CV 12.60 %MPV 9.90 fLPLT 313 %
NEUT 61.80 %%LYMP 26.20 %%MONO 8.30 %%EOS 3.10 %%BASO 0.60 %#NEUT 4.00 #LYMP 
1.70 #MONO 0.54 #EOS 0.20 #BASO 0.04 GLUCOSE 97.0 mg/dLSODIUM 136.0 mmol/
LPOTASSIUM 4.40 mmol/LCHLORIDE 101.0 mmol/LCO2 26.0 mmol/LBUN 10.0 mg/
dLCREATININE 0.80 mg/dLSGOT/AST 31.0 IU/LSGPT/ALT 34.0 IU/LALK PHOS 92.0 IU/
LTOTAL PROTEIN 8.10 g/dLALBUMIN 4.60 g/dLTOTAL BILI 0.40 mg/dLCALCIUM 10.0 mg/
dLeGFR >60 mL/min/1.73 m2

 

 2011 9:45 AM  GLUCOSE 91.0 mg/dLSODIUM 133.0 mmol/LPOTASSIUM 4.40 mmol/
LCHLORIDE 97.0 mmol/LCO2 24.0 mmol/LBUN 9.0 mg/dLCREATININE 0.70 mg/dLCALCIUM 
10.0 mg/dLeGFR >60 mL/min/1.73 m2

 

 2011 10:25 AM  LIPASE 29.0 U/LTSH 0.10 uIU/mLGLUCOSE 115.0 mg/dLSODIUM 
127.0 mmol/LPOTASSIUM 5.30 mmol/LCHLORIDE 93.0 mmol/LCO2 18.0 mmol/LBUN 9.0 mg/
dLCREATININE 0.70 mg/dLSGOT/AST 62.0 IU/LSGPT/ALT 46.0 IU/LALK PHOS 100.0 IU/
LTOTAL PROTEIN 8.60 g/dLALBUMIN 4.90 g/dLTOTAL BILI 0.60 mg/dLCALCIUM 9.90 mg/
dLeGFR >60 mL/min/1.73 m2WBC 6.9 RBC 4.48 HGB 14.40 g/dLHCT 40.90 %MCV 91.0 
fLMCH 32.10 pgMCHC 35.20 g/dLRDW CV 12.50 %MPV 9.30 fLPLT 260 %NEUT 74.90 %%
LYMP 15.10 %%MONO 9.40 %%EOS 0.30 %%BASO 0.30 %#NEUT 5.15 #LYMP 1.04 #MONO 0.65 
#EOS 0.02 #BASO 0.02 

 

 2011 9:07 AM  GLUCOSE 92.0 mg/dLSODIUM 131.0 mmol/LPOTASSIUM 4.20 mmol/
LCHLORIDE 99.0 mmol/LCO2 22.0 mmol/LBUN 9.0 mg/dLCREATININE 0.70 mg/dLCALCIUM 
9.20 mg/dLeGFR >60 mL/min/1.73 m2

 

 2011 11:06 AM  TSH 0.510 uIU/mLGLUCOSE 99.0 mg/dLSODIUM 132.0 mmol/
LPOTASSIUM 3.50 mmol/LCHLORIDE 95.0 mmol/LCO2 23.0 mmol/LBUN 9.0 mg/
dLCREATININE 0.80 mg/dLCALCIUM 10.40 mg/dLeGFR >60 mL/min/1.73 m2

 

 2011 9:45 AM  SODIUM 132.0 mmol/L

 

 2011 9:27 AM  SODIUM 137.0 mmol/L

 

 2011 8:35 AM  TRIGLYCERIDES 114.0 mg/dLCHOLESTEROL 145.0 mg/dLHDL 56.0 mg/
dLLDL (CALC) 66.0 mg/dLGLUCOSE 105.0 mg/dLSODIUM 138.0 mmol/LPOTASSIUM 4.40 mmol
/LCHLORIDE 103.0 mmol/LCO2 25.0 mmol/LBUN 8.0 mg/dLCREATININE 0.70 mg/dLSGOT/
AST 36.0 IU/LSGPT/ALT 38.0 IU/LALK PHOS 103.0 IU/LTOTAL PROTEIN 7.40 g/
dLALBUMIN 4.30 g/dLTOTAL BILI 0.30 mg/dLCALCIUM 9.20 mg/dLeGFR >60 mL/min/1.73 
m2WBC 5.1 RBC 3.76 HGB 12.0 g/dLHCT 36.10 %MCV 96.0 fLMCH 31.90 pgMCHC 33.20 g/
dLRDW CV 13.10 %MPV 9.40 fLPLT 249 %NEUT 60.40 %%LYMP 25.90 %%MONO 8.90 %%EOS 
4.0 %%BASO 0.80 %#NEUT 3.06 #LYMP 1.31 #MONO 0.45 #EOS 0.20 #BASO 0.04 

 

 2011 9:55 AM  TSH 1.070 uIU/mL

 

 2011 8:55 AM  GLUCOSE 102.0 mg/dLSODIUM 135.0 mmol/LPOTASSIUM 4.20 mmol/
LCHLORIDE 102.0 mmol/LCO2 24.0 mmol/LBUN 15.0 mg/dLCREATININE 0.80 mg/dLSGOT/
AST 30.0 IU/LSGPT/ALT 35.0 IU/LALK PHOS 119.0 IU/LTOTAL PROTEIN 7.50 g/
dLALBUMIN 4.30 g/dLTOTAL BILI 0.30 mg/dLCALCIUM 9.20 mg/dLeGFR >60 mL/min/1.73 
m2TSH 1.130 uIU/mL

 

 2011 9:50 AM  GLUCOSE 85.0 mg/dLSODIUM 133.0 mmol/LPOTASSIUM 4.20 mmol/
LCHLORIDE 101.0 mmol/LCO2 21.0 mmol/LBUN 13.0 mg/dLCREATININE 0.80 mg/dLSGOT/
AST 36.0 IU/LSGPT/ALT 36.0 IU/LALK PHOS 109.0 IU/LTOTAL PROTEIN 7.40 g/
dLALBUMIN 4.20 g/dLTOTAL BILI 0.50 mg/dLCALCIUM 9.40 mg/dLeGFR >60 mL/min/1.73 
m2

 

 2011 8:55 AM  GLUCOSE 98.0 mg/dLSODIUM 137.0 mmol/LPOTASSIUM 3.90 mmol/
LCHLORIDE 103.0 mmol/LCO2 25.0 mmol/LBUN 14.0 mg/dLCREATININE 0.70 mg/dLSGOT/
AST 33.0 IU/LSGPT/ALT 36.0 IU/LALK PHOS 111.0 IU/LTOTAL PROTEIN 8.30 g/
dLALBUMIN 4.40 g/dLTOTAL BILI 0.50 mg/dLCALCIUM 9.40 mg/dLeGFR >60 mL/min/1.73 
[id number] 109.0 mg/dLCHOLESTEROL 153.0 mg/dLHDL 54.0 mg/dLLDL (CALC) 77.0 
mg/dLTSH 1.330 uIU/mL

 

 2011 10:17 AM  Colonoscopy-Women and Men over 50 Declined Mammogram -
Women over 40 Normal Pap Smear Negative 

 

 2012 10:33 AM  WET PREP NO TRICH SEEN CLUE CELLS NONE SEEN 

 

 2012 8:45 AM  WBC 5.2 RBC 3.96 HGB 12.70 g/dLHCT 37.80 %MCV 96.0 fLMCH 
32.10 pgMCHC 33.60 g/dLRDW CV 13.30 %MPV 9.70 fLPLT 297 %NEUT 57.70 %%LYMP 
28.50 %%MONO 10.30 %%EOS 2.50 %%BASO 1.0 %#NEUT 3.02 #LYMP 1.49 #MONO 0.54 #EOS 
0.13 #BASO 0.05 GLUCOSE 97.0 mg/dLSODIUM 137.0 mmol/LPOTASSIUM 4.40 mmol/
LCHLORIDE 101.0 mmol/LCO2 23.0 mmol/LBUN 17.0 mg/dLCREATININE 0.80 mg/dLSGOT/
AST 30.0 IU/LSGPT/ALT 33.0 IU/LALK PHOS 95.0 IU/LTOTAL PROTEIN 7.40 g/dLALBUMIN 
4.40 g/dLTOTAL BILI 0.60 mg/dLCALCIUM 9.70 mg/dLeGFR 60 TRIGLYCERIDES 130.0 mg/
dLCHOLESTEROL 157.0 mg/dLHDL 56.0 mg/dLLDL (CALC) 75.0 mg/dLTSH 0.940 uIU/mL

 

 8/15/2012 4:02 PM  WET PREP NO TRICH SEEN CLUE CELLS NONE SEEN 

 

 2012 8:45 AM  WBC 5.1 RBC 3.91 HGB 12.50 g/dLHCT 36.30 %MCV 93.0 fLMCH 
32.0 pgMCHC 34.40 g/dLRDW CV 12.60 %MPV 9.30 fLPLT 244 %NEUT 57.60 %%LYMP 27.50 
%%MONO 9.10 %%EOS 5.0 %%BASO 0.80 %#NEUT 2.91 #LYMP 1.39 #MONO 0.46 #EOS 0.25 #
BASO 0.04 TSH 1.390 uIU/mLTRIGLYCERIDES 154.0 mg/dLCHOLESTEROL 147.0 mg/dLHDL 
45.0 mg/dLLDL (CALC) 71.0 mg/dLGLUCOSE 101.0 mg/dLSODIUM 134.0 mmol/LPOTASSIUM 
4.30 mmol/LCHLORIDE 102.0 mmol/LCO2 23.0 mmol/LBUN 11.0 mg/dLCREATININE 0.80 mg/
dLSGOT/AST 34.0 IU/LSGPT/ALT 38.0 IU/LALK PHOS 104.0 IU/LTOTAL PROTEIN 7.60 g/
dLALBUMIN 4.40 g/dLTOTAL BILI 0.50 mg/dLCALCIUM 9.40 mg/dLeGFR 60 

 

 12/10/2012 8:34 AM  Colonoscopy-Women and Men over 50 Declined Mammogram -
Women over 40 Declined Pap Smear Declined 

 

 2012 5:02 PM  WET PREP NO TRICH SEEN CLUE CELLS NONE SEEN 

 

 2013 8:25 AM  WBC 4.2 RBC 3.96 HGB 12.90 g/dLHCT 37.0 %MCV 93.0 fLMCH 
32.60 pgMCHC 34.90 g/dLRDW CV 12.60 %MPV 9.70 fLPLT 254 %NEUT 54.40 %%LYMP 
30.40 %%MONO 10.80 %%EOS 3.40 %%BASO 1.0 %#NEUT 2.26 #LYMP 1.26 #MONO 0.45 #EOS 
0.14 #BASO 0.04 TSH 1.230 uIU/mLGLUCOSE 94.0 mg/dLSODIUM 136.0 mmol/LPOTASSIUM 
4.30 mmol/LCHLORIDE 99.0 mmol/LCO2 25.0 mmol/LBUN 10.0 mg/dLCREATININE 0.80 mg/
dLSGOT/AST 36.0 IU/LSGPT/ALT 36.0 IU/LALK PHOS 84.0 IU/LTOTAL PROTEIN 7.90 g/
dLALBUMIN 4.40 g/dLTOTAL BILI 0.70 mg/dLCALCIUM 9.80 mg/dLeGFR 60 TRIGLYCERIDES 
122.0 mg/dLCHOLESTEROL 141.0 mg/dLHDL 47.0 mg/dLLDL (CALC) 70.0 mg/dL

 

 6/10/2013 3:52 PM  WET PREP NO TRICH SEEN CLUE CELLS NONE SEEN 

 

 2013 8:45 AM  WBC 5.3 RBC 4.01 HGB 13.0 g/dLHCT 37.60 %MCV 94.0 fLMCH 
32.40 pgMCHC 34.60 g/dLRDW CV 12.50 %MPV 9.40 fLPLT 248 %NEUT 58.70 %%LYMP 
27.80 %%MONO 9.80 %%EOS 2.80 %%BASO 0.90 %#NEUT 3.12 #LYMP 1.48 #MONO 0.52 #EOS 
0.15 #BASO 0.05 TSH 1.570 uIU/mLGLUCOSE 94.0 mg/dLSODIUM 134.0 mmol/LPOTASSIUM 
4.10 mmol/LCHLORIDE 101.0 mmol/LCO2 23.0 mmol/LBUN 11.0 mg/dLCREATININE 0.80 mg/
dLSGOT/AST 41.0 IU/LSGPT/ALT 44.0 IU/LALK PHOS 109.0 IU/LTOTAL PROTEIN 7.90 g/
dLALBUMIN 4.70 g/dLTOTAL BILI 0.80 mg/dLCALCIUM 10.40 mg/dLeGFR >60 mL/min/1.73 
[id number] 163.0 mg/dLCHOLESTEROL 138.0 mg/dLHDL 49.0 mg/dLLDL (CALC) 56.0 
mg/dL

 

 2014 8:58 AM  GLUCOSE 86.0 mg/dLSODIUM 134.0 mmol/LPOTASSIUM 4.20 mmol/
LCHLORIDE 99.0 mmol/LCO2 25.0 mmol/LBUN 14.0 mg/dLCREATININE 0.80 mg/dLCALCIUM 
10.10 mg/dLeGFR >60 mL/min/1.73 m2TSH 1.20 uIU/mL

 

 2014 9:50 AM  WBC 5.7 RBC 4.03 HGB 12.90 g/dLHCT 37.90 %MCV 94.0 fLMCH 
32.0 pgMCHC 34.0 g/dLRDW CV 12.70 %MPV 9.70 fLPLT 292 %NEUT 62.70 %%LYMP 21.80 %
%MONO 11.0 %%EOS 3.40 %%BASO 1.10 %#NEUT 3.55 #LYMP 1.23 #MONO 0.62 #EOS 0.19 #
BASO 0.06 GLUCOSE 98.0 mg/dLSODIUM 135.0 mmol/LPOTASSIUM 4.30 mmol/LCHLORIDE 
102.0 mmol/LCO2 22.0 mmol/LBUN 10.0 mg/dLCREATININE 0.80 mg/dLSGOT/AST 34.0 IU/
LSGPT/ALT 32.0 IU/LALK PHOS 95.0 IU/LTOTAL PROTEIN 7.70 g/dLALBUMIN 4.30 g/
dLTOTAL BILI 0.80 mg/dLCALCIUM 9.10 mg/dLeGFR 60 TRIGLYCERIDES 108.0 mg/
dLCHOLESTEROL 146.0 mg/dLHDL 56.0 mg/dLLDL (CALC) 68.0 mg/dLTSH 0.90 uIU/mL

 

 2014 8:40 AM  WBC 4.4 RBC 4.13 HGB 13.20 g/dLHCT 38.90 %MCV 94.0 fLMCH 
32.0 pgMCHC 33.90 g/dLRDW CV 13.50 %MPV 9.80 fLPLT 279 %NEUT 63.80 %%LYMP 24.60 
%%MONO 9.30 %%EOS 1.60 %%BASO 0.70 %#NEUT 2.80 #LYMP 1.08 #MONO 0.41 #EOS 0.07 #
BASO 0.03 GLUCOSE 96.0 mg/dLSODIUM 136.0 mmol/LPOTASSIUM 4.10 mmol/LCHLORIDE 
99.0 mmol/LCO2 23.0 mmol/LBUN 8.0 mg/dLCREATININE 0.80 mg/dLSGOT/AST 31.0 IU/
LSGPT/ALT 27.0 IU/LALK PHOS 85.0 IU/LTOTAL PROTEIN 7.60 g/dLALBUMIN 4.40 g/
dLTOTAL BILI 0.80 mg/dLCALCIUM 10.20 mg/dLeGFR 60 TRIGLYCERIDES 61.0 mg/
dLCHOLESTEROL 148.0 mg/dLHDL 71.0 mg/dLLDL (CALC) 65.0 mg/dLTSH 0.990 uIU/mL

 

 2015 8:32 AM  WBC 4.8 RBC 3.98 HGB 12.70 g/dLHCT 37.30 %MCV 94.0 fLMCH 
31.90 pgMCHC 34.0 g/dLRDW CV 12.70 %MPV 9.50 fLPLT 270 %NEUT 57.30 %%LYMP 25.0 %
%MONO 13.0 %%EOS 3.60 %%BASO 1.10 %#NEUT 2.73 #LYMP 1.19 #MONO 0.62 #EOS 0.17 #
BASO 0.05 TSH 0.640 uIU/mLGLUCOSE 90.0 mg/dLSODIUM 136.0 mmol/LPOTASSIUM 4.0 
mmol/LCHLORIDE 101.0 mmol/LCO2 24.0 mmol/LBUN 10.0 mg/dLCREATININE 0.80 mg/
dLSGOT/AST 34.0 IU/LSGPT/ALT 32.0 IU/LALK PHOS 92.0 IU/LTOTAL PROTEIN 7.50 g/
dLALBUMIN 4.40 g/dLTOTAL BILI 0.70 mg/dLCALCIUM 9.50 mg/dLeGFR >60 mL/min/1.73 
[id number] 107.0 mg/dLCHOLESTEROL 138.0 mg/dLHDL 58.0 mg/dLLDL (CALC) 59.0 
mg/dL

 

 2015 8:31 AM  WBC 4.6 RBC 3.97 HGB 12.90 g/dLHCT 38.0 %MCV 96.0 fLMCH 
32.50 pgMCHC 33.90 g/dLRDW CV 12.60 %MPV 9.0 fLPLT 248 %NEUT 61.0 %%LYMP 24.70 %
%MONO 10.20 %%EOS 3.0 %%BASO 1.10 %#NEUT 2.82 #LYMP 1.14 #MONO 0.47 #EOS 0.14 #
BASO 0.05 TRIGLYCERIDES 105.0 mg/dLCHOLESTEROL 141.0 mg/dLHDL 58.0 mg/dLLDL (
CALC) 62.0 mg/dLGLUCOSE 93.0 mg/dLSODIUM 136.0 mmol/LPOTASSIUM 4.10 mmol/
LCHLORIDE 101.0 mmol/LCO2 25.0 mmol/LBUN 9.0 mg/dLCREATININE 0.80 mg/dLSGOT/AST 
32.0 IU/LSGPT/ALT 28.0 IU/LALK PHOS 95.0 IU/LTOTAL PROTEIN 7.30 g/dLALBUMIN 
4.50 g/dLTOTAL BILI 0.80 mg/dLCALCIUM 9.70 mg/dLeGFR >60 mL/min/1.73mTSH 1.10 
uIU/mL







History Of Immunizations







 Name  Date Admin  Mfg Name  Mfg Code  Trade Name  Lot#  Route  Inj  Vis Given  
Vis Pub  CVX

 

 Influenza  10/20/2010  [employer] pasteur  [employer]  Fluzone  [id number]  Intramuscular  
Left Arm  10/20/2010  2010  999

 

 Influenza  10/28/2011  [employer] pasteur  [employer]  Fluzone  [id number]  Intramuscular  
Left Arm  10/28/2011  2011  141

 

 Influenza  10/29/2012  sanofi pasteur  [employer]  Fluzone  [id number]  Intramuscular  
Left Deltoid  10/29/2012  2012  141

 

 X  12/10/2012  [employer]  MSD  Pneumovax 23  [id number]  Intramuscular  
Left Gluteous Medius  12/10/2012  2010  33

 

 Influenza  10/28/2013  sanofi pasteur  [employer]  Fluzone > 3 Years  [id number]  
Intramuscular  Right Deltoid  10/28/2013  2013  141

 

 X  9/2/2015  Not Entered  NE  Prevnar 13     Not Entered  Not Entered  1  109

 

 Influenza  2015  Not Entered  NE  Not Entered     Not Entered  Not Entered
  2015  141

 

 Zostavax  2012  Not Entered  NE  Not Entered     Not Entered  Not 
Entered  1  121

 

 Tdap  2012  Not Entered  NE  Not Entered     Not Entered  Not Entered  1  115

 

 HepB Adult  2016  [employer]  MSD  Recombivax Adult  [id number]  
Intramuscular  Left Deltoid  2016  43







History of Past Illness







 Name  Date of Onset  Comments

 

 Benign Essential Hypertension  Dec 14 2009 10:10AM   

 

 Hyperlipidemia  Dec 14 2009 10:10AM   

 

 Hypothyroidism, Acquired  Dec 14 2009 10:10AM   

 

 hypothyroid      

 

 Hypertension      

 

 Hyperlipidemia      

 

 acid reflux      

 

 Hypertension, Benign Essential  2010  9:41AM   

 

 Hypertension, Benign Essential  2010 12:53PM   

 

 Routine gynecological examination  May  5 2010  9:28AM   

 

 Hypertension  May  5 2010  9:28AM   

 

 Hyperlipidemia, unspecified  May  5 2010  9:28AM   

 

 Hypothyroidism, Acquired  May  5 2010  9:28AM   

 

 Vulvovaginitis  May  5 2010  9:28AM   

 

 Rhinitis, Allergic  May  5 2010  9:28AM   

 

 Hypertension, Benign Essential  May 19 2010  8:48AM   

 

 Hypertension, Benign Essential  May 25 2010  9:39AM   

 

 Flu  Oct 20 2010 12:01PM   

 

 Essential Hypertension  2010  8:34AM   

 

 Hyperlipidemia  2010  8:34AM   

 

 Gastroesophageal Reflux  2010  8:34AM   

 

 Hypothyroidism, Acquired  2010  8:34AM   

 

 Hypertension, Benign Essential  2010  9:22AM   

 

 Hypertension, Benign Essential  Dec 15 2010  9:07AM   

 

 Essential Hypertension  2011  1:31PM   

 

 Hyperlipidemia  2011  1:31PM   

 

 Gastroesophageal Reflux  2011  1:31PM   

 

 Hypothyroidism, Acquired  2011  1:31PM   

 

 Essential Hypertension  2011  8:51AM   

 

 Hyperlipidemia  2011  8:51AM   

 

 Gastroesophageal Reflux  2011  8:51AM   

 

 Hypothyroidism, Acquired  2011  8:51AM   

 

 Essential Hypertension  2011  9:13AM   

 

 Hyperlipidemia  2011  9:13AM   

 

 Gastroesophageal Reflux  2011  9:13AM   

 

 Hypothyroidism, Acquired  2011  9:13AM   

 

 Nausea With Vomiting  2011  1:18PM   

 

 Hyponatremia  2011  8:46AM   

 

 Nausea  2011  9:13AM   

 

 Hypothyroidism  2011 11:10AM   

 

 Hyponatremia  2011 11:10AM   

 

 Essential Hypertension  2011 10:05AM   

 

 Hyperlipidemia  2011 10:05AM   

 

 Gastroesophageal Reflux  2011 10:05AM   

 

 Nausea  2011 10:05AM   

 

 Hypothyroidism, Acquired  2011 10:05AM   

 

 Hyponatremia  May  6 2011 11:34AM   

 

 Essential Hypertension  May 16 2011  8:50AM   

 

 Hyperlipidemia  May 16 2011  8:50AM   

 

 Gastroesophageal Reflux  May 16 2011  8:50AM   

 

 Nausea  May 16 2011  8:50AM   

 

 Hypothyroidism, Acquired  May 16 2011  8:50AM   

 

 Hyponatremia  May 16 2011  8:50AM   

 

 Routine gynecological examination  2011  8:54AM   

 

 Hypertension  2011  8:54AM   

 

 Hyperlipidemia, unspecified  2011  8:54AM   

 

 Hypothyroidism, Acquired  2011  8:54AM   

 

 Rhinitis, Allergic  2011  8:54AM   

 

 Hypothyroidism  Aug 29 2011 12:37PM   

 

 Hyponatremia  Aug 29 2011 12:37PM   

 

 Essential Hypertension  Sep 12 2011  8:53AM   

 

 Hyperlipidemia  Sep 12 2011  8:53AM   

 

 Gastroesophageal Reflux  Sep 12 2011  8:53AM   

 

 Hypothyroidism, Acquired  Sep 12 2011  8:53AM   

 

 Hyponatremia  Sep 12 2011  8:53AM   

 

 Hypertension  Sep 29 2011  9:41AM   

 

 Hyperlipidemia  Dec  8 2011  8:31AM   

 

 Hypothyroidism  Dec  8 2011  8:31AM   

 

 Hypertension  Dec  8 2011  8:31AM   

 

 Flu  Dec  9 2011 10:02AM   

 

 Essential Hypertension  Dec 13 2011 10:13AM   

 

 Hyperlipidemia  Dec 13 2011 10:13AM   

 

 Gastroesophageal Reflux  Dec 13 2011 10:13AM   

 

 Hypothyroidism, Acquired  Dec 13 2011 10:13AM   

 

 Hyponatremia  Dec 13 2011 10:13AM   

 

 Vaginal Discharge  2012  9:43AM   

 

 Rhinitis, Allergic  Feb 15 2012  9:31AM   

 

 Eustachian Tube Dysfunction  Feb 15 2012  9:31AM   

 

 Pharyngitis, Acute  Feb 15 2012  9:31AM   

 

 Routine gynecological examination  2012  8:48AM   

 

 Hypertension  2012  8:48AM   

 

 Hyperlipidemia, unspecified  2012  8:48AM   

 

 Hypothyroidism, Acquired  2012  8:48AM   

 

 Rhinitis, Allergic  2012  8:48AM   

 

 Candidiasis, Vulvovaginal  2012 11:04AM   

 

 Essential Hypertension  Aug 15 2012  9:02AM   

 

 Hyperlipidemia  Aug 15 2012  9:02AM   

 

 Gastroesophageal Reflux  Aug 15 2012  9:02AM   

 

 Hypothyroidism, Acquired  Aug 15 2012  9:02AM   

 

 Hyponatremia  Aug 15 2012  9:02AM   

 

 Atrophic Vaginitis, Postmenopausal  Aug 15 2012  9:02AM   

 

 Flu  Oct 29 2012  9:24AM   

 

 Essential Hypertension  Dec 10 2012  8:35AM   

 

 Hyperlipidemia  Dec 10 2012  8:35AM   

 

 Gastroesophageal Reflux  Dec 10 2012  8:35AM   

 

 Hypothyroidism, Acquired  Dec 10 2012  8:35AM   

 

 Hyponatremia  Dec 10 2012  8:35AM   

 

 Atrophic Vaginitis, Postmenopausal  Dec 10 2012  8:35AM   

 

 Pneumococcus  Dec 10 2012  2:07PM   

 

 Vaginal Discharge  Dec 20 2012  1:50PM   

 

 Essential Hypertension  Dec 20 2012  1:50PM   

 

 Hyperlipidemia  Dec 20 2012  1:50PM   

 

 Gastroesophageal Reflux  Dec 20 2012  1:50PM   

 

 Hypothyroidism, Acquired  Dec 20 2012  1:50PM   

 

 Atrophic Vaginitis, Postmenopausal  Dec 20 2012  1:50PM   

 

 Upper Respiratory Infections  2013  8:52AM   

 

 Hyperlipidemia  2013  8:21AM   

 

 Hypertension  2013  8:21AM   

 

 Hypothyroidism  2013  8:21AM   

 

 Screening Mammogram  Beto 10 2013  8:45AM   

 

 Routine gynecological examination  Ebto 10 2013  8:34AM   

 

 Hypertension  Beto 10 2013  8:34AM   

 

 Hyperlipidemia, unspecified  Beto 10 2013  8:34AM   

 

 Hypothyroidism, Acquired  Beto 10 2013  8:34AM   

 

 Rhinitis, Allergic  Beto 10 2013  8:34AM   

 

 Flu  Oct 28 2013  8:52AM   

 

 Essential Hypertension  Dec  9 2013  8:37AM   

 

 Hyperlipidemia  Dec  9 2013  8:37AM   

 

 Gastroesophageal Reflux  Dec  9 2013  8:37AM   

 

 Hypothyroidism, Acquired  Dec  9 2013  8:37AM   

 

 Atrophic Vaginitis, Postmenopausal  Dec  9 2013  8:37AM   

 

 Hypertension  2014  9:56AM   

 

 Hyperlipidemia  2014  9:56AM   

 

 Hypothyroidism  2014  9:56AM   

 

 Screening Mammogram  2014  8:32AM   

 

 Osteopenia  2014  8:32AM   

 

 Hypertension  2014  8:35AM   

 

 Hypothyroidism, Acquired  2014  8:35AM   

 

 Hyperlipidemia  2014  8:35AM   

 

 Upper Respiratory Infections  2014  9:28AM   

 

 Sinusitis, Acute  Oct  2 2014  9:17AM   

 

 Herpes Zoster  Oct  5 2014  1:44PM   

 

 Vesicular Eruption  Oct  5 2014  1:44PM   

 

 Hypertension  2014  8:18AM   

 

 Hyperlipidemia  2014  8:18AM   

 

 hypothyroid  2014  8:18AM   

 

 Situational anxiety  Dec 20 2014  9:33AM   

 

 GERD (gastroesophageal reflux disease)  Dec 20 2014  9:33AM   

 

 Nausea  Dec 20 2014  9:33AM   

 

 Abdominal Pain, Epigastric  Dec 20 2014  9:33AM   

 

 Hyperlipidemia  Oct  6 2014  9:03AM   

 

 acid reflux  Oct  6 2014  9:03AM   

 

 hypothyroid  Oct  6 2014  9:03AM   

 

 Shingles  Oct  6 2014  9:03AM   

 

 Pharyngitis  2015  1:09PM   

 

 Bronchitis  2015  9:10AM   

 

 Hyperlipidemia  2015  9:10AM   

 

 acid reflux  2015  9:10AM   

 

 hypothyroid  2015  9:10AM   

 

 Hyperlipidemia  2015  8:18AM   

 

 Hypertension  2015  8:18AM   

 

 hypothyroid  2015  8:18AM   

 

 Screening Mammogram  2015 11:43AM   

 

 Medicare Annual Pap Q 2 years  2015  9:36AM   

 

 Screening Mammogram  2015  9:36AM   

 

 Candidiasis of female genitalia  2015  9:36AM   

 

 Hypertension  2015 11:34AM   

 

 Hyperlipidemia, unspecified  2015 11:34AM   

 

 Hypothyroidism, Acquired  2015 11:34AM   

 

 Osteopenia  2016  8:41AM   

 

 Encounter for screening mammogram for breast cancer  2016  8:41AM   

 

 Hypertension  2016  8:34AM   

 

 Lymphedema  2016  8:34AM   

 

 Hyperlipidemia  2016  8:34AM   

 

 hypothyroid  2016  8:34AM   

 

 HEP B  2016  9:13AM   

 

 HEP B  2016  8:52AM   

 

 Acid Reflux  2016  8:34AM   







Payers







 Insurance Name  Company Name  Plan Name  Plan Number  Policy Number  Policy 
Group Number  Start Date

 

    Medicare Part A  Medicare [facility]     [id number]     N/A

 

    [employer]  [facility]     [id number]     2009

 

    Medicare Part B  [facility] [place]     [id number]     N/A

 

    Medicare Part A  Medicare Part A     [id number]     N/A

 

    Medicare Part A  Medicare P A - Preventive     [id number]     N/A

 

    Medicare Part A  Medicare - Lab/Xray     [id number]     N/A







History of Encounters







 Visit Date  Visit Type  Provider

 

 2016  Nurse visit  Doris Noriega MD

 

 2016  Office visit  Doris Noriega MD

 

 2015  Office visit  Doris Noriega MD

 

 2015  Office visit  Doris Noriega MD

 

 2015  Office visit  Prince Noe APRN

 

 2014  Office visit  Anyi Herrera APRN

 

 10/27/2014  Voided  Doris Noriega MD

 

 10/6/2014  Office visit  Doris Noriega MD

 

 10/5/2014  Office visit  Anyi Herrera APRN

 

 10/2/2014  Office visit   

 

 10/2/2014  Office visit  Corrine Bay APRN

 

 2014  Office visit  Kassie Franklin APRN

 

 2014  Office visit  Doris Noriega MD

 

 2014  Office visit  Doris Noriega MD

 

 2013  Office visit  Dee Colindres MD

 

 10/28/2013  Nurse visit  Dee Colindres MD

 

 6/10/2013  Office visit  Dee Colindres MD

 

 2013  Office visit  Corrine Bay APRN

 

 2012  Office visit  Dee Colindres MD

 

 12/10/2012  Office visit  Dee Colindres MD

 

 10/29/2012  Nurse visit  Dee Colindres MD

 

 8/15/2012  Office visit  Dee Colindres MD

 

 2012  Office visit  Corrine Bay APRN

 

 2012  Office visit  Dee Colindres MD

 

 2/15/2012  Office visit  Dee Colindres MD

 

 2012  Office visit  Corrine Bay APRN

 

 2011  Office visit  Dee Colindres MD

 

 10/28/2011  Nurse visit  Shad Nolasco MD

 

 2011  Office visit  Dee Colindres MD

 

 2011  Office visit  Dee Colindres MD

 

 2011  Office visit  Dee Colindres MD

 

 2011  Office visit  Dee Colindres MD

 

 2011  Office visit  Dee Colindres MD

 

 2011  Office visit  Dee Colindres MD

 

 4/10/2011  [facility]  KHRIS Granados MD

 

 4/10/2011  [facility]  RIKCEY Toussaint MD

 

 2011  Office visit  RICKEY Toussaint MD

 

 2011  Hospital  Fide Castellanos MD

 

 2011  Voided  Fide Castellanos MD

 

 2011  Office visit  Dee Colindres MD

 

 12/15/2010  Nurse visit  Dee Colindres MD

 

 2010  Office visit  Dee Colindres MD

 

 10/20/2010  Nurse visit  Stephenie PERAZA

 

 2010  Nurse visit  Dee Colindres MD

 

 2010  Nurse visit  Dee Colindres MD

 

 2010  Office visit  Dee Colindres MD

 

 3/19/2010  Voided  Stephenie PERAZA

 

 2010  Nurse visit  Stephenie PERAZA

 

 2010  Nurse visit  Stephenie PERAZA

 

 2010  Nurse visit  Stephenie PERAZA

 

 2009  Office visit  Stephenie PERAZA

 

 10/20/2009  Nurse visit  Stephenie Kay PA

## 2017-07-13 NOTE — XMS REPORT
MU2 Ambulatory Summary

 Created on: 2015



Milady Crespo

External Reference #: [id number]

: 1945

Sex: Female



Demographics







 Address  [address]

[place]  [address]

 

 Home Phone  [phone]

 

 Preferred Language  English

 

 Marital Status  

 

 Restoration Affiliation  Unknown

 

 Race  White

 

 Ethnic Group  Not  or 





Author







 Author  Doris Noriega

 

 Organization  [facility]

 

 Address  [address]

[place], [place]  [id number]



 

 Phone  [phone]







Care Team Providers







 Care Team Member Name  Role  Phone

 

 Doris Noriega  PCP  [phone]







Allergies and Adverse Reactions







 Name  Reaction  Notes

 

 NO KNOWN DRUG ALLERGIES      







Plan of Treatment







 Planned Activity  Comments  Planned Date  Planned Time  Plan/Goal

 

 LIPID PANEL     8/15/2012  12:00 AM   

 

 COMPREHEN METABOLIC PANEL     6/10/2013  12:00 AM   

 

 LIPID PANEL     6/10/2013  12:00 AM   

 

 ASSAY THYROID STIM HORMONE     6/10/2013  12:00 AM   

 

 COMPLETE CBC W/AUTO DIFF WBC     6/10/2013  12:00 AM   

 

 COMPREHEN METABOLIC PANEL     2012  12:00 AM   

 

 LIPID PANEL     2012  12:00 AM   

 

 ASSAY THYROID STIM HORMONE     2012  12:00 AM   

 

 COMPLETE CBC W/AUTO DIFF WBC     2012  12:00 AM   

 

 MAMMOGRAM SCREENING     2015  12:00 AM   







Medications







 Active 

 

 Name  Start Date  Estimated Completion Date  SIG  Comments

 

 amlodipine oral tablet 5 mg  2015  TAKE ONE TABLET BY MOUTH 
EVERY DAY   

 

 atenolol oral tablet 50 mg  2015  take 1 tablet (50 mg) by oral 
route once daily   

 

 levothyroxine oral tablet 50 mcg  2015  TAKE ONE TABLET BY MOUTH 
EVERY DAY   

 

 Diovan Oral tablet 160 mg  2015  2/15/2016  TAKE ONE TABLET BY MOUTH 
EVERY DAY for 90 days   

 

 loratadine oral tablet 10 mg  2015  12/15/2016  TAKE 1/2 TABLET BY MOUTH 
EVERY DAY IN THE EVENING   

 

 aspirin oral tablet,delayed release (DR/EC) 81 mg        take 1 tablet (81 mg) 
by oral route once daily   

 

 Vitamin D3 oral capsule 1,000 unit        take 1 capsule by oral route daily   

 

 clorazepate dipotassium oral tablet 7.5 mg  2015  take 1/2 to1 
tablet PO up to three times per day for situational anxiety   









  

 

 Name  Start Date  Expiration Date  SIG  Comments

 

 Prednisone Oral Tablet 20 mg  2011  take 2 tablets by oral 
route daily for 5 days   

 

 calcium carbonate-vitamin D2 Oral tablet 600-125 mg-unit  8/15/2012  2012
  take 2 tablets by oral route daily   

 

 Omega 3 Fish Oil Oral capsule,delayed release(DR/EC) 900-1,400 mg  8/15/2012  9
/  take 1 capsule by oral route daily   

 

 multivitamin Oral tablet  8/15/2012  2012  take 1 tablet by oral route 
daily   

 

 triamcinolone acetonide topical cream 0.1 %  2013  10/7/2013  APPLY A 
THIN LAYER TO THE AFFECTED AREA(S) BY TOPICAL ROUTE TWICE A DAY   

 

 famotidine Oral tablet 20 mg  2014  take 1 tablet (20 mg) by 
oral route 2 times per day   

 

 Plavix Oral tablet 75 mg  2014  take 1 tablet (75 mg) by oral 
route once daily   

 

 amoxicillin oral capsule 500 mg  2014  take 1 capsule (500 mg) 
by oral route 3 times per day for 10 days   

 

 Lipitor Oral Tablet 20 mg  2014  take 1 tablet (20 mg) by oral 
route once daily   

 

 Zithromax Z-Tab oral tablet 250 mg  10/2/2014  10/7/2014  take 2 tablets (500 
mg) by oral route once daily for 1 day then 1 tablet (250 mg) by oral route 
once daily for 4 days   

 

 acyclovir oral tablet 800 mg  10/5/2014  10/12/2014  take 1 tablet (800 mg) by 
oral route 5 times per day for 7 days   

 

 gabapentin oral capsule 300 mg  10/9/2014  2014  take 1 capsule (300 mg) 
by oral route 3 times per day for 14 days   

 

 Carafate oral suspension 100 mg/mL  2014  take 10 milliliters 
(1 gram) by oral route 4 times per day on an empty stomach 1 hour before meals 
and at bedtime for 4 weeks   

 

 triamcinolone acetonide topical cream 0.1 %  2015  apply a thin 
layer to the affected area(s) by topical route 2 times per day for 14 days   

 

 fluconazole oral tablet 150 mg  2015  take 1 tablet (150 mg) 
by oral route once for 1 day   









 Discontinued 

 

 Name  Start Date  Discontinued Date  SIG  Comments

 

 Lipitor Oral Tablet 40 mg     2009  12 TAB DAILY   

 

 Ramipril Oral Capsule 5 mg     2009  take 1 capsule (5 mg) by oral route 
once daily   

 

 Lovastatin Oral Tablet 20 mg  2009  take 1 tablet (20 mg) by 
oral route once daily with evening meal   

 

 Propranolol Oral Tablet 20 mg  2010  take 1 tablet by oral 
route 2 times a day   

 

 Fosamax Oral Tablet 70 mg  2010  take 1 tablet (70 mg) by oral 
route once weekly in the morning, at least 30 minutes before the first food, 
beverage, or medication of the day   

 

 Lisinopril Oral Tablet 40 mg  2010  4/10/2011  take 2 tablets by oral 
route daily   

 

 Zoloft Oral Tablet 50 mg  2011  take 1 tablet (50 mg) by oral 
route once daily   

 

 Promethazine Oral Tablet 25 mg  2011  take 1 tablet by oral 
route every 6 hours as needed   

 

 Diovan HCT Oral Tablet 160-12.5 mg  2011  take 1 tablet by oral 
route once daily for 30 days   

 

 Diflucan Oral Tablet 150 mg     2012  take 1 tablet (150 mg) by oral 
route once for 1 day   

 

 Diflucan Oral Tablet 150 mg  2012  8/15/2012  take 1 tablet (150 mg) by 
oral route once   

 

 Vitamin B-12 Oral tablet 1,000 mcg  8/15/2012  2014  take 1 tablet by 
oral route daily   

 

 Premarin Vaginal Cream 0.625 mg/gram  10/29/2012  6/10/2013  use 1 gram daily 
for a week then 1 gram twice a week   

 

 Medrol (Tab) Oral tablets,dose pack 4 mg  2013  6/10/2013  take as 
directed   

 

 aspirin Oral tablet 325 mg  2014  take 1 tablet (325 mg) by 
oral route once daily   

 

 Medrol (Tab) oral tablets,dose pack 4 mg  2014  10/2/2014  take as 
directed   

 

 Tetracaine Lollipops Lollipop  2015  Use as directed   







Problem List







 Description  Status  Onset

 

 Hyperlipidemia  Active   

 

 acid reflux  Active   

 

 Hypertension  Active   

 

 hypothyroid  Active   







Vital Signs







 Date  Time  BP-Sys(mm[Hg]  BP-Morena(mm[Hg])  HR(bpm)  RR(rpm)  Temp  WT  HT  HC  
BMI  BSA  BMI Percentile  O2 Sat(%)

 

 2015  9:33:00 AM  120 mmHg  68 mmHg  73 bpm     98.7 F  144.375 lbs  61 
in     27.28 kg/m2  1.68 m2     99 %

 

 2015  9:05:00 AM  110 mmHg  75 mmHg  85 bpm  16 rpm  96.7 F  144.375 lbs  
61 in     27.2791 kg/m  1.6788 m     99 %

 

 2015  1:05:00 PM  108 mmHg  62 mmHg  78 bpm  18 rpm  96.9 F  142.375 lbs  
61 in     26.90 kg/m2  1.67 m2     100 %

 

 2014  9:31:00 AM  126 mmHg  66 mmHg  79 bpm  18 rpm  98.7 F  149 lbs  61 
in     28.153 kg/m  1.7055 m     98 %

 

 10/6/2014  9:02:00 AM  110 mmHg  74 mmHg  94 bpm  18 rpm  98.1 F  145.4 lbs  
61 in     27.47 kg/m2  1.68 m2     97 %

 

 10/2/2014  9:14:00 AM  124 mmHg  74 mmHg  79 bpm  18 rpm  98 F  147.25 lbs  61 
in     27.8224 kg/m  1.6955 m     98 %

 

 2014  9:22:00 AM  124 mmHg  76 mmHg  89 bpm  18 rpm  98.1 F  148 lbs  61 
in     27.96 kg/m2  1.70 m2     100 %

 

 2014  8:27:00 AM  118 mmHg  65 mmHg  74 bpm  16 rpm  97.7 F  148 lbs  61 
in     27.9641 kg/m  1.6998 m     99 %

 

 2014  9:48:00 AM  125 mmHg  70 mmHg  93 bpm  18 rpm  96.7 F  156 lbs  61 
in     29.48 kg/m2  1.75 m2     100 %

 

 2013  8:35:00 AM  122 mmHg  74 mmHg  84 bpm  16 rpm  97.9 F  155.375 lbs 
                99 %

 

 6/10/2013  8:30:00 AM  130 mmHg  82 mmHg  79 bpm  16 rpm  97.9 F  161 lbs     
            98 %

 

 2013  8:50:00 AM  124 mmHg  68 mmHg  66 bpm  18 rpm  97.6 F  157.5 lbs  
61 in     29.7591 kg/m  1.7535 m      

 

 2012  1:46:00 PM  120 mmHg  72 mmHg  75 bpm  16 rpm  97.6 F  156.125 lbs
                 98 %

 

 12/10/2012  8:32:00 AM  122 mmHg  82 mmHg  70 bpm  16 rpm  97.3 F  157 lbs    
             99 %

 

 8/15/2012  9:00:00 AM  130 mmHg  76 mmHg  86 bpm  16 rpm  97.4 F  159 lbs     
            100 %

 

 2012  11:02:00 AM  128 mmHg  64 mmHg  66 bpm  18 rpm  97.4 F  162.125 lbs
  61 in     30.6329 kg/m  1.7791 m      

 

 2012  8:45:00 AM  130 mmHg  70 mmHg  82 bpm  16 rpm  98.2 F  160.125 lbs 
                100 %

 

 2/15/2012  9:29:00 AM  122 mmHg  80 mmHg  86 bpm  16 rpm  97.4 F  159.375 lbs  
61 in     30.1133 kg/m  1.7639 m     99 %

 

 2012  9:41:00 AM  132 mmHg  74 mmHg  66 bpm  18 rpm  98.4 F  160.375 lbs  
61 in     30.30 kg/m2  1.77 m2      

 

 2011  10:08:00 AM  134 mmHg  82 mmHg  80 bpm  16 rpm  98 F  160 lbs  61 
in     30.2314 kg/m  1.7674 m     99 %

 

 2011  3:56:00 PM  130 mmHg  80 mmHg                              

 

 2011  8:55:00 AM  130 mmHg  74 mmHg  88 bpm  16 rpm  98.2 F  158.25 lbs  
               98 %

 

 2011  8:54:00 AM  136 mmHg  82 mmHg  102 bpm  16 rpm  98.1 F  153.5 lbs   
              99 %

 

 2011  8:49:00 AM  122 mmHg  88 mmHg  88 bpm  16 rpm  98.6 F  152.25 lbs  
               99 %

 

 2011  10:02:00 AM  140 mmHg  82 mmHg  96 bpm  20 rpm  98.8 F             
       100 %

 

 2011  9:14:00 AM  156 mmHg  98 mmHg  110 bpm  24 rpm  98.5 F  153 lbs    
             100 %

 

 2011  8:50:00 AM  160 mmHg  84 mmHg  100 bpm  16 rpm  98.7 F  155 lbs    
             100 %

 

 2011  1:25:00 PM  152 mmHg  100 mmHg  82 bpm  16 rpm  97.2 F  156.375 lbs 
                100 %

 

 2011  9:55:00 AM  144 mmHg  90 mmHg                              

 

 2010  9:24:00 AM  138 mmHg  84 mmHg                              

 

 2010  8:58:00 AM  160 mmHg  94 mmHg                              

 

 2010  8:33:00 AM  142 mmHg  90 mmHg  100 bpm  16 rpm  98.1 F  158.375 
lbs                  

 

 2010  9:24:00 AM  160 mmHg  102 mmHg  88 bpm  18 rpm  99 F  157.125 lbs  
62 in     28.74 kg/m2  1.77 m2      

 

 3/19/2010  11:01:00 AM  140 mmHg  90 mmHg                              

 

 2009  10:08:00 AM  142 mmHg  86 mmHg  72 bpm  16 rpm  98.1 F  154.125 
lbs  61 in     29.12 kg/m2  1.73 m2      







Social History







 Name  Description  Comments

 

       

 

 Children      

 

 lives alone      

 

 Supervisor      

 

 Tobacco  Never smoker   







History of Procedures







 Date Ordered  Description  Order Status

 

 2011 12:00 AM  COMPREHEN METABOLIC PANEL  Reviewed

 

 2011 12:00 AM  ASSAY THYROID STIM HORMONE  Reviewed

 

 2011 12:00 AM  COMPREHEN METABOLIC PANEL  Reviewed

 

 2011 12:00 AM  LIPID PANEL  Reviewed

 

 2011 12:00 AM  ASSAY THYROID STIM HORMONE  Reviewed

 

 2011 12:00 AM  COMPLETE CBC W/AUTO DIFF WBC  Reviewed

 

 2011 12:00 AM  COMPREHEN METABOLIC PANEL  Reviewed

 

 2011 12:00 AM  COMPREHEN METABOLIC PANEL  Reviewed

 

 2011 12:00 AM  LIPID PANEL  Reviewed

 

 2011 12:00 AM  ASSAY THYROID STIM HORMONE  Reviewed

 

 10/28/2011 12:00 AM  ***Immunization administration, Medicare flu  Reviewed

 

 10/28/2011 12:00 AM  Fluzone ** MEDICARE Only **  Reviewed

 

 2010 11:24 AM  NO CHARGE OV  Reviewed

 

 2010 11:26 AM  NO CHARGE OV  Reviewed

 

 2011 12:00 AM  COMPREHEN METABOLIC PANEL  Reviewed

 

 2011 12:00 AM  LIPID PANEL  Reviewed

 

 2011 12:00 AM  ASSAY THYROID STIM HORMONE  Reviewed

 

 2011 12:00 AM  COMPLETE CBC W/AUTO DIFF WBC  Reviewed

 

 2011 12:00 AM  Wrist Support  Reviewed

 

 2012 12:00 AM  TISSUE EXAM FOR FUNGI  Returned

 

 2012 12:00 AM  SMEAR WET MOUNT SALINE/INK  Returned

 

 2/15/2012 12:00 AM  THER/PROPH/DIAG INJ SC/IM  Reviewed

 

 2/15/2012 12:00 AM  Bicillin CR [facility]1892837808[facility]  Reviewed

 

 2/15/2012 12:00 AM  Depo-Medrol 40 mg NDC#3851880727  Reviewed

 

 8/15/2012 12:00 AM  COMPREHEN METABOLIC PANEL  Reviewed

 

 8/15/2012 12:00 AM  ASSAY THYROID STIM HORMONE  Reviewed

 

 8/15/2012 12:00 AM  COMPLETE CBC W/AUTO DIFF WBC  Returned

 

 8/15/2012 12:00 AM  Wrist Support  Reviewed

 

 10/29/2012 12:00 AM  Flu Injection 3 Years And Above NDC# [address]-81  [facility]  
Reviewed

 

 6/10/2013 12:00 AM  COMPREHEN METABOLIC PANEL  Ordered

 

 6/10/2013 12:00 AM  LIPID PANEL  Ordered

 

 6/10/2013 12:00 AM  ASSAY THYROID STIM HORMONE  Ordered

 

 6/10/2013 12:00 AM  COMPLETE CBC W/AUTO DIFF WBC  Ordered

 

 12/10/2012 12:00 AM  IMMUNIZATION ADMIN  Reviewed

 

 12/10/2012 12:00 AM  Pneumovax Injection - [facility]  Reviewed

 

 2012 12:00 AM  TRICHOMONAS ASSAY W/OPTIC  Returned

 

 2012 12:00 AM  COMPREHEN METABOLIC PANEL  Ordered

 

 2012 12:00 AM  LIPID PANEL  Ordered

 

 2012 12:00 AM  ASSAY THYROID STIM HORMONE  Ordered

 

 2012 12:00 AM  COMPLETE CBC W/AUTO DIFF WBC  Ordered

 

 2013 12:00 AM  THER/PROPH/DIAG INJ SC/IM  Reviewed

 

 2013 12:00 AM  Bicillin CR, 1.2 million units NDC# 48816-789-90  Reviewed

 

 2013 12:00 AM  COMPREHEN METABOLIC PANEL  Returned

 

 2013 12:00 AM  LIPID PANEL  Returned

 

 2013 12:00 AM  COMPLETE CBC W/AUTO DIFF WBC  Returned

 

 2013 12:00 AM  ASSAY THYROID STIM HORMONE  Returned

 

 6/10/2013 12:00 AM  MAMMOGRAM SCREENING  Returned

 

 6/10/2013 12:00 AM  CYTOPATH C/V MANUAL  Returned

 

 12/10/2013 12:00 AM  LIPID PANEL  Returned

 

 12/10/2013 12:00 AM  ASSAY THYROID STIM HORMONE  Returned

 

 12/10/2013 12:00 AM  COMPLETE CBC W/AUTO DIFF WBC  Returned

 

 12/10/2013 12:00 AM  COMPREHEN METABOLIC PANEL  Returned

 

 6/10/2013 12:00 AM  Cancer Screen/Pelvic & Breast Exam  Ordered

 

 6/10/2013 12:00 AM  Pap Specimen Handling - Medicare  Ordered

 

 2010 12:00 AM  CYTOPATH C/V MANUAL  Reviewed

 

 2010 12:00 AM  SMEAR WET MOUNT SALINE/INK  Reviewed

 

 2010 12:00 AM  LIPID PANEL  Reviewed

 

 2010 12:00 AM  ASSAY THYROID STIM HORMONE  Reviewed

 

 2010 12:00 AM  COMPLETE CBC W/AUTO DIFF WBC  Reviewed

 

 2010 12:00 AM  COMPREHEN METABOLIC PANEL  Reviewed

 

 10/28/2013 12:00 AM  Flu Injection 3 Years And Above NDC# [address]-94  [facility]  
Reviewed

 

 2010 8:48 AM  Blood Pressure Check-no charge  Reviewed

 

 2010 12:00 AM  Blood Pressure Check-no charge  Reviewed

 

 2014 12:00 AM  METABOLIC PANEL TOTAL CA  Reviewed

 

 2014 12:00 AM  ASSAY THYROID STIM HORMONE  Reviewed

 

 2014 12:00 AM  ASSAY OF FREE THYROXINE  Reviewed

 

 2014 12:00 AM  MAMMOGRAM SCREENING  Returned

 

 2014 12:00 AM  CT BONE DENSITY AXIAL  Returned

 

 2014 12:00 AM  COMPLETE CBC W/AUTO DIFF WBC  Returned

 

 2014 12:00 AM  COMPREHEN METABOLIC PANEL  Returned

 

 2014 12:00 AM  LIPID PANEL  Returned

 

 2014 12:00 AM  ASSAY THYROID STIM HORMONE  Returned

 

 10/20/2010 12:00 AM  IMMUNIZATION ADMIN  Reviewed

 

 10/20/2010 12:00 AM  FLU VACCINE 3 YRS & > IM  Reviewed

 

 2010 12:00 AM  COMPREHEN METABOLIC PANEL  Reviewed

 

 2010 12:00 AM  LIPID PANEL  Reviewed

 

 2010 12:00 AM  ASSAY THYROID STIM HORMONE  Reviewed

 

 2010 12:00 AM  COMPLETE CBC W/AUTO DIFF WBC  Reviewed

 

 2010 12:00 AM  Blood Pressure Check-no charge  Reviewed

 

 10/2/2014 12:00 AM  THER/PROPH/DIAG INJ SC/IM  Reviewed

 

 10/2/2014 12:00 AM  Kenalog, Per 10 Mg NDC#5785-5457-86  Reviewed

 

 2014 12:00 AM  COMPLETE CBC W/AUTO DIFF WBC  Returned

 

 2014 12:00 AM  COMPREHEN METABOLIC PANEL  Returned

 

 2014 12:00 AM  LIPID PANEL  Returned

 

 2014 12:00 AM  ASSAY THYROID STIM HORMONE  Returned

 

 2015 12:00 AM  Rocephin 1 gram NDC#9056-7319-11  Reviewed

 

 2015 12:00 AM  THER/PROPH/DIAG INJ SC/IM  Reviewed

 

 2011 12:00 AM  COMPREHEN METABOLIC PANEL  Reviewed

 

 2011 12:00 AM  LIPID PANEL  Reviewed

 

 2011 12:00 AM  ASSAY THYROID STIM HORMONE  Reviewed

 

 2011 12:00 AM  COMPLETE CBC W/AUTO DIFF WBC  Reviewed

 

 2011 12:00 AM  METABOLIC PANEL TOTAL CA  Reviewed

 

 2011 12:00 AM  COMPREHEN METABOLIC PANEL  Reviewed

 

 2011 12:00 AM  ASSAY THYROID STIM HORMONE  Reviewed

 

 2011 12:00 AM  COMPLETE CBC W/AUTO DIFF WBC  Reviewed

 

 2011 12:00 AM  ASSAY OF LIPASE  Reviewed

 

 2011 12:00 AM  THER/PROPH/DIAG INJ SC/IM  Reviewed

 

 2011 12:00 AM  Phenergan 50 Mg Im  Bernadine  Reviewed

 

 2011 12:00 AM  METABOLIC PANEL TOTAL CA  Reviewed

 

 2011 12:00 AM  THER/PROPH/DIAG INJ SC/IM  Reviewed

 

 2011 12:00 AM  Phenergan 25 Mg Im  Bernadine  Reviewed

 

 2011 12:00 AM  METABOLIC PANEL TOTAL CA  Reviewed

 

 2011 12:00 AM  ASSAY THYROID STIM HORMONE  Reviewed

 

 2011 12:00 AM  THER/PROPH/DIAG INJ SC/IM  Reviewed

 

 2011 12:00 AM  Phenergan 50 Mg Im  Bernadine  Reviewed

 

 2011 12:00 AM  ASSAY OF SERUM SODIUM  Reviewed

 

 2011 12:00 AM  ASSAY OF SERUM SODIUM  Reviewed

 

 2011 12:00 AM  CYTOPATH C/V MANUAL  Reviewed

 

 2011 12:00 AM  ASSAY THYROID STIM HORMONE  Reviewed

 

 2015 12:00 AM  COMPLETE CBC W/AUTO DIFF WBC  Returned

 

 2015 12:00 AM  COMPREHEN METABOLIC PANEL  Returned

 

 2015 12:00 AM  LIPID PANEL  Returned

 

 2015 12:00 AM  ASSAY THYROID STIM HORMONE  Returned

 

 2015 12:00 AM  MAMMOGRAM SCREENING  Returned

 

 2015 12:00 AM  MAMMOGRAM SCREENING  Ordered

 

 2015 12:00 AM  CYTOPATH TBS C/V MANUAL  Returned

 

 2015 12:00 AM  Pap Specimen Handling - Medicare  Ordered







Results Summary







 Data and Description  Results

 

 2010 9:25 AM  Colonoscopy-Women and Men over 50 Abnormal Mammogram -Women 
over 40 Declined Pap Smear Declined 

 

 2010 10:41 AM  WET PREP NO TRICHOMONADS SEEN 

 

 2010 8:15 AM  TRIGLYCERIDES 174.0 mg/dLCHOLESTEROL 175.0 mg/dLHDL 58.0 mg/
dLLDL (CALC) 82.0 mg/dLTSH 0.790 uIU/mLGLUCOSE 95.0 mg/dLSODIUM 136.0 mmol/
LPOTASSIUM 4.50 mmol/LCHLORIDE 99.0 mmol/LCO2 26.0 mmol/LBUN 12.0 mg/
dLCREATININE 0.80 mg/dLSGOT/AST 32.0 IU/LSGPT/ALT 33.0 IU/LALK PHOS 103.0 IU/
LTOTAL PROTEIN 7.60 g/dLALBUMIN 4.60 g/dLTOTAL BILI 0.60 mg/dLCALCIUM 9.80 mg/
dLeGFR >60 mL/min/1.73 m2WBC 5.3 RBC 4.13 HGB 13.10 g/dLHCT 39.20 %MCV 95.0 
fLMCH 31.70 pgMCHC 33.40 g/dLRDW CV 12.70 %MPV 9.80 fLPLT 257 %NEUT 57.90 %%
LYMP 26.50 %%MONO 11.60 %%EOS 3.20 %%BASO 0.80 %#NEUT 3.04 #LYMP 1.39 #MONO 
0.61 #EOS 0.17 #BASO 0.04 

 

 12/15/2010 8:30 AM  TRIGLYCERIDES 157.0 mg/dLCHOLESTEROL 163.0 mg/dLHDL 56.0 mg
/dLLDL (CALC) 76.0 mg/dLTSH 0.650 uIU/mLWBC 6.5 RBC 4.25 HGB 13.60 g/dLHCT 
40.70 %MCV 96.0 fLMCH 32.0 pgMCHC 33.40 g/dLRDW CV 12.60 %MPV 9.90 fLPLT 313 %
NEUT 61.80 %%LYMP 26.20 %%MONO 8.30 %%EOS 3.10 %%BASO 0.60 %#NEUT 4.00 #LYMP 
1.70 #MONO 0.54 #EOS 0.20 #BASO 0.04 GLUCOSE 97.0 mg/dLSODIUM 136.0 mmol/
LPOTASSIUM 4.40 mmol/LCHLORIDE 101.0 mmol/LCO2 26.0 mmol/LBUN 10.0 mg/
dLCREATININE 0.80 mg/dLSGOT/AST 31.0 IU/LSGPT/ALT 34.0 IU/LALK PHOS 92.0 IU/
LTOTAL PROTEIN 8.10 g/dLALBUMIN 4.60 g/dLTOTAL BILI 0.40 mg/dLCALCIUM 10.0 mg/
dLeGFR >60 mL/min/1.73 m2

 

 2011 9:45 AM  GLUCOSE 91.0 mg/dLSODIUM 133.0 mmol/LPOTASSIUM 4.40 mmol/
LCHLORIDE 97.0 mmol/LCO2 24.0 mmol/LBUN 9.0 mg/dLCREATININE 0.70 mg/dLCALCIUM 
10.0 mg/dLeGFR >60 mL/min/1.73 m2

 

 2011 10:25 AM  LIPASE 29.0 U/LTSH 0.10 uIU/mLGLUCOSE 115.0 mg/dLSODIUM 
127.0 mmol/LPOTASSIUM 5.30 mmol/LCHLORIDE 93.0 mmol/LCO2 18.0 mmol/LBUN 9.0 mg/
dLCREATININE 0.70 mg/dLSGOT/AST 62.0 IU/LSGPT/ALT 46.0 IU/LALK PHOS 100.0 IU/
LTOTAL PROTEIN 8.60 g/dLALBUMIN 4.90 g/dLTOTAL BILI 0.60 mg/dLCALCIUM 9.90 mg/
dLeGFR >60 mL/min/1.73 m2WBC 6.9 RBC 4.48 HGB 14.40 g/dLHCT 40.90 %MCV 91.0 
fLMCH 32.10 pgMCHC 35.20 g/dLRDW CV 12.50 %MPV 9.30 fLPLT 260 %NEUT 74.90 %%
LYMP 15.10 %%MONO 9.40 %%EOS 0.30 %%BASO 0.30 %#NEUT 5.15 #LYMP 1.04 #MONO 0.65 
#EOS 0.02 #BASO 0.02 

 

 2011 9:07 AM  GLUCOSE 92.0 mg/dLSODIUM 131.0 mmol/LPOTASSIUM 4.20 mmol/
LCHLORIDE 99.0 mmol/LCO2 22.0 mmol/LBUN 9.0 mg/dLCREATININE 0.70 mg/dLCALCIUM 
9.20 mg/dLeGFR >60 mL/min/1.73 m2

 

 2011 11:06 AM  TSH 0.510 uIU/mLGLUCOSE 99.0 mg/dLSODIUM 132.0 mmol/
LPOTASSIUM 3.50 mmol/LCHLORIDE 95.0 mmol/LCO2 23.0 mmol/LBUN 9.0 mg/
dLCREATININE 0.80 mg/dLCALCIUM 10.40 mg/dLeGFR >60 mL/min/1.73 m2

 

 2011 9:45 AM  SODIUM 132.0 mmol/L

 

 2011 9:27 AM  SODIUM 137.0 mmol/L

 

 2011 8:35 AM  TRIGLYCERIDES 114.0 mg/dLCHOLESTEROL 145.0 mg/dLHDL 56.0 mg/
dLLDL (CALC) 66.0 mg/dLGLUCOSE 105.0 mg/dLSODIUM 138.0 mmol/LPOTASSIUM 4.40 mmol
/LCHLORIDE 103.0 mmol/LCO2 25.0 mmol/LBUN 8.0 mg/dLCREATININE 0.70 mg/dLSGOT/
AST 36.0 IU/LSGPT/ALT 38.0 IU/LALK PHOS 103.0 IU/LTOTAL PROTEIN 7.40 g/
dLALBUMIN 4.30 g/dLTOTAL BILI 0.30 mg/dLCALCIUM 9.20 mg/dLeGFR >60 mL/min/1.73 
m2WBC 5.1 RBC 3.76 HGB 12.0 g/dLHCT 36.10 %MCV 96.0 fLMCH 31.90 pgMCHC 33.20 g/
dLRDW CV 13.10 %MPV 9.40 fLPLT 249 %NEUT 60.40 %%LYMP 25.90 %%MONO 8.90 %%EOS 
4.0 %%BASO 0.80 %#NEUT 3.06 #LYMP 1.31 #MONO 0.45 #EOS 0.20 #BASO 0.04 

 

 2011 9:55 AM  TSH 1.070 uIU/mL

 

 2011 8:55 AM  GLUCOSE 102.0 mg/dLSODIUM 135.0 mmol/LPOTASSIUM 4.20 mmol/
LCHLORIDE 102.0 mmol/LCO2 24.0 mmol/LBUN 15.0 mg/dLCREATININE 0.80 mg/dLSGOT/
AST 30.0 IU/LSGPT/ALT 35.0 IU/LALK PHOS 119.0 IU/LTOTAL PROTEIN 7.50 g/
dLALBUMIN 4.30 g/dLTOTAL BILI 0.30 mg/dLCALCIUM 9.20 mg/dLeGFR >60 mL/min/1.73 
m2TSH 1.130 uIU/mL

 

 2011 9:50 AM  GLUCOSE 85.0 mg/dLSODIUM 133.0 mmol/LPOTASSIUM 4.20 mmol/
LCHLORIDE 101.0 mmol/LCO2 21.0 mmol/LBUN 13.0 mg/dLCREATININE 0.80 mg/dLSGOT/
AST 36.0 IU/LSGPT/ALT 36.0 IU/LALK PHOS 109.0 IU/LTOTAL PROTEIN 7.40 g/
dLALBUMIN 4.20 g/dLTOTAL BILI 0.50 mg/dLCALCIUM 9.40 mg/dLeGFR >60 mL/min/1.73 
m2

 

 2011 8:55 AM  GLUCOSE 98.0 mg/dLSODIUM 137.0 mmol/LPOTASSIUM 3.90 mmol/
LCHLORIDE 103.0 mmol/LCO2 25.0 mmol/LBUN 14.0 mg/dLCREATININE 0.70 mg/dLSGOT/
AST 33.0 IU/LSGPT/ALT 36.0 IU/LALK PHOS 111.0 IU/LTOTAL PROTEIN 8.30 g/
dLALBUMIN 4.40 g/dLTOTAL BILI 0.50 mg/dLCALCIUM 9.40 mg/dLeGFR >60 mL/min/1.73 
[id number] 109.0 mg/dLCHOLESTEROL 153.0 mg/dLHDL 54.0 mg/dLLDL (CALC) 77.0 
mg/dLTSH 1.330 uIU/mL

 

 2011 10:17 AM  Colonoscopy-Women and Men over 50 Declined Mammogram -
Women over 40 Normal Pap Smear Negative 

 

 2012 10:33 AM  WET PREP NO TRICH SEEN CLUE CELLS NONE SEEN 

 

 2012 8:45 AM  WBC 5.2 RBC 3.96 HGB 12.70 g/dLHCT 37.80 %MCV 96.0 fLMCH 
32.10 pgMCHC 33.60 g/dLRDW CV 13.30 %MPV 9.70 fLPLT 297 %NEUT 57.70 %%LYMP 
28.50 %%MONO 10.30 %%EOS 2.50 %%BASO 1.0 %#NEUT 3.02 #LYMP 1.49 #MONO 0.54 #EOS 
0.13 #BASO 0.05 GLUCOSE 97.0 mg/dLSODIUM 137.0 mmol/LPOTASSIUM 4.40 mmol/
LCHLORIDE 101.0 mmol/LCO2 23.0 mmol/LBUN 17.0 mg/dLCREATININE 0.80 mg/dLSGOT/
AST 30.0 IU/LSGPT/ALT 33.0 IU/LALK PHOS 95.0 IU/LTOTAL PROTEIN 7.40 g/dLALBUMIN 
4.40 g/dLTOTAL BILI 0.60 mg/dLCALCIUM 9.70 mg/dLeGFR 60 TRIGLYCERIDES 130.0 mg/
dLCHOLESTEROL 157.0 mg/dLHDL 56.0 mg/dLLDL (CALC) 75.0 mg/dLTSH 0.940 uIU/mL

 

 8/15/2012 4:02 PM  WET PREP NO TRICH SEEN CLUE CELLS NONE SEEN 

 

 2012 8:45 AM  WBC 5.1 RBC 3.91 HGB 12.50 g/dLHCT 36.30 %MCV 93.0 fLMCH 
32.0 pgMCHC 34.40 g/dLRDW CV 12.60 %MPV 9.30 fLPLT 244 %NEUT 57.60 %%LYMP 27.50 
%%MONO 9.10 %%EOS 5.0 %%BASO 0.80 %#NEUT 2.91 #LYMP 1.39 #MONO 0.46 #EOS 0.25 #
BASO 0.04 TSH 1.390 uIU/mLTRIGLYCERIDES 154.0 mg/dLCHOLESTEROL 147.0 mg/dLHDL 
45.0 mg/dLLDL (CALC) 71.0 mg/dLGLUCOSE 101.0 mg/dLSODIUM 134.0 mmol/LPOTASSIUM 
4.30 mmol/LCHLORIDE 102.0 mmol/LCO2 23.0 mmol/LBUN 11.0 mg/dLCREATININE 0.80 mg/
dLSGOT/AST 34.0 IU/LSGPT/ALT 38.0 IU/LALK PHOS 104.0 IU/LTOTAL PROTEIN 7.60 g/
dLALBUMIN 4.40 g/dLTOTAL BILI 0.50 mg/dLCALCIUM 9.40 mg/dLeGFR 60 

 

 12/10/2012 8:34 AM  Colonoscopy-Women and Men over 50 Declined Mammogram -
Women over 40 Declined Pap Smear Declined 

 

 2012 5:02 PM  WET PREP NO TRICH SEEN CLUE CELLS NONE SEEN 

 

 2013 8:25 AM  WBC 4.2 RBC 3.96 HGB 12.90 g/dLHCT 37.0 %MCV 93.0 fLMCH 
32.60 pgMCHC 34.90 g/dLRDW CV 12.60 %MPV 9.70 fLPLT 254 %NEUT 54.40 %%LYMP 
30.40 %%MONO 10.80 %%EOS 3.40 %%BASO 1.0 %#NEUT 2.26 #LYMP 1.26 #MONO 0.45 #EOS 
0.14 #BASO 0.04 TSH 1.230 uIU/mLGLUCOSE 94.0 mg/dLSODIUM 136.0 mmol/LPOTASSIUM 
4.30 mmol/LCHLORIDE 99.0 mmol/LCO2 25.0 mmol/LBUN 10.0 mg/dLCREATININE 0.80 mg/
dLSGOT/AST 36.0 IU/LSGPT/ALT 36.0 IU/LALK PHOS 84.0 IU/LTOTAL PROTEIN 7.90 g/
dLALBUMIN 4.40 g/dLTOTAL BILI 0.70 mg/dLCALCIUM 9.80 mg/dLeGFR 60 TRIGLYCERIDES 
122.0 mg/dLCHOLESTEROL 141.0 mg/dLHDL 47.0 mg/dLLDL (CALC) 70.0 mg/dL

 

 6/10/2013 3:52 PM  WET PREP NO TRICH SEEN CLUE CELLS NONE SEEN 

 

 2013 8:45 AM  WBC 5.3 RBC 4.01 HGB 13.0 g/dLHCT 37.60 %MCV 94.0 fLMCH 
32.40 pgMCHC 34.60 g/dLRDW CV 12.50 %MPV 9.40 fLPLT 248 %NEUT 58.70 %%LYMP 
27.80 %%MONO 9.80 %%EOS 2.80 %%BASO 0.90 %#NEUT 3.12 #LYMP 1.48 #MONO 0.52 #EOS 
0.15 #BASO 0.05 TSH 1.570 uIU/mLGLUCOSE 94.0 mg/dLSODIUM 134.0 mmol/LPOTASSIUM 
4.10 mmol/LCHLORIDE 101.0 mmol/LCO2 23.0 mmol/LBUN 11.0 mg/dLCREATININE 0.80 mg/
dLSGOT/AST 41.0 IU/LSGPT/ALT 44.0 IU/LALK PHOS 109.0 IU/LTOTAL PROTEIN 7.90 g/
dLALBUMIN 4.70 g/dLTOTAL BILI 0.80 mg/dLCALCIUM 10.40 mg/dLeGFR >60 mL/min/1.73 
[id number] 163.0 mg/dLCHOLESTEROL 138.0 mg/dLHDL 49.0 mg/dLLDL (CALC) 56.0 
mg/dL

 

 2014 8:58 AM  GLUCOSE 86.0 mg/dLSODIUM 134.0 mmol/LPOTASSIUM 4.20 mmol/
LCHLORIDE 99.0 mmol/LCO2 25.0 mmol/LBUN 14.0 mg/dLCREATININE 0.80 mg/dLCALCIUM 
10.10 mg/dLeGFR >60 mL/min/1.73 m2TSH 1.20 uIU/mL

 

 2014 9:50 AM  WBC 5.7 RBC 4.03 HGB 12.90 g/dLHCT 37.90 %MCV 94.0 fLMCH 
32.0 pgMCHC 34.0 g/dLRDW CV 12.70 %MPV 9.70 fLPLT 292 %NEUT 62.70 %%LYMP 21.80 %
%MONO 11.0 %%EOS 3.40 %%BASO 1.10 %#NEUT 3.55 #LYMP 1.23 #MONO 0.62 #EOS 0.19 #
BASO 0.06 GLUCOSE 98.0 mg/dLSODIUM 135.0 mmol/LPOTASSIUM 4.30 mmol/LCHLORIDE 
102.0 mmol/LCO2 22.0 mmol/LBUN 10.0 mg/dLCREATININE 0.80 mg/dLSGOT/AST 34.0 IU/
LSGPT/ALT 32.0 IU/LALK PHOS 95.0 IU/LTOTAL PROTEIN 7.70 g/dLALBUMIN 4.30 g/
dLTOTAL BILI 0.80 mg/dLCALCIUM 9.10 mg/dLeGFR 60 TRIGLYCERIDES 108.0 mg/
dLCHOLESTEROL 146.0 mg/dLHDL 56.0 mg/dLLDL (CALC) 68.0 mg/dLTSH 0.90 uIU/mL

 

 2014 8:40 AM  WBC 4.4 RBC 4.13 HGB 13.20 g/dLHCT 38.90 %MCV 94.0 fLMCH 
32.0 pgMCHC 33.90 g/dLRDW CV 13.50 %MPV 9.80 fLPLT 279 %NEUT 63.80 %%LYMP 24.60 
%%MONO 9.30 %%EOS 1.60 %%BASO 0.70 %#NEUT 2.80 #LYMP 1.08 #MONO 0.41 #EOS 0.07 #
BASO 0.03 GLUCOSE 96.0 mg/dLSODIUM 136.0 mmol/LPOTASSIUM 4.10 mmol/LCHLORIDE 
99.0 mmol/LCO2 23.0 mmol/LBUN 8.0 mg/dLCREATININE 0.80 mg/dLSGOT/AST 31.0 IU/
LSGPT/ALT 27.0 IU/LALK PHOS 85.0 IU/LTOTAL PROTEIN 7.60 g/dLALBUMIN 4.40 g/
dLTOTAL BILI 0.80 mg/dLCALCIUM 10.20 mg/dLeGFR 60 TRIGLYCERIDES 61.0 mg/
dLCHOLESTEROL 148.0 mg/dLHDL 71.0 mg/dLLDL (CALC) 65.0 mg/dLTSH 0.990 uIU/mL

 

 2015 8:32 AM  WBC 4.8 RBC 3.98 HGB 12.70 g/dLHCT 37.30 %MCV 94.0 fLMCH 
31.90 pgMCHC 34.0 g/dLRDW CV 12.70 %MPV 9.50 fLPLT 270 %NEUT 57.30 %%LYMP 25.0 %
%MONO 13.0 %%EOS 3.60 %%BASO 1.10 %#NEUT 2.73 #LYMP 1.19 #MONO 0.62 #EOS 0.17 #
BASO 0.05 TSH 0.640 uIU/mLGLUCOSE 90.0 mg/dLSODIUM 136.0 mmol/LPOTASSIUM 4.0 
mmol/LCHLORIDE 101.0 mmol/LCO2 24.0 mmol/LBUN 10.0 mg/dLCREATININE 0.80 mg/
dLSGOT/AST 34.0 IU/LSGPT/ALT 32.0 IU/LALK PHOS 92.0 IU/LTOTAL PROTEIN 7.50 g/
dLALBUMIN 4.40 g/dLTOTAL BILI 0.70 mg/dLCALCIUM 9.50 mg/dLeGFR >60 mL/min/1.73 
[id number] 107.0 mg/dLCHOLESTEROL 138.0 mg/dLHDL 58.0 mg/dLLDL (CALC) 59.0 
mg/dL







History Of Immunizations







 Name  Date Admin  Mfg Name  Mfg Code  Trade Name  Lot#  Route  Inj  Vis Given  
Vis Pub  CVX

 

 Influenza  10/20/2010  [employer] pasteur  [employer]  Fluzone  [id number]  Intramuscular  
Left Arm  10/20/2010  2010  999

 

 Influenza  10/28/2011  [employer] pasteur  [employer]  Fluzone  [id number]  Intramuscular  
Left Arm  10/28/2011  2011  141

 

 Influenza  10/29/2012  sanofi pasteur  [employer]  Fluzone  [id number]  Intramuscular  
Left Deltoid  10/29/2012  2012  141

 

 Pneumococcal  12/10/2012  [employer]  MSD  Pneumovax 23  [id number]  
Intramuscular  Left Gluteous Medius  12/10/2012  2010  33

 

 Influenza  10/28/2013  sanofi pasteur  [employer]  Fluzone > 3 Years  [id number]  
Intramuscular  Right Deltoid  10/28/2013  2013  141







History of Past Illness







 Name  Date of Onset  Comments

 

 Benign Essential Hypertension  Dec 14 2009 10:10AM   

 

 Hyperlipidemia  Dec 14 2009 10:10AM   

 

 Hypothyroidism, Acquired  Dec 14 2009 10:10AM   

 

 hypothyroid      

 

 Hypertension      

 

 Hyperlipidemia      

 

 acid reflux      

 

 Hypertension, Benign Essential  2010  9:41AM   

 

 Hypertension, Benign Essential  2010 12:53PM   

 

 Routine gynecological examination  May  5 2010  9:28AM   

 

 Hypertension  May  5 2010  9:28AM   

 

 Hyperlipidemia, unspecified  May  5 2010  9:28AM   

 

 Hypothyroidism, Acquired  May  5 2010  9:28AM   

 

 Vulvovaginitis  May  5 2010  9:28AM   

 

 Rhinitis, Allergic  May  5 2010  9:28AM   

 

 Hypertension, Benign Essential  May 19 2010  8:48AM   

 

 Hypertension, Benign Essential  May 25 2010  9:39AM   

 

 Flu  Oct 20 2010 12:01PM   

 

 Essential Hypertension  2010  8:34AM   

 

 Hyperlipidemia  2010  8:34AM   

 

 Gastroesophageal Reflux  2010  8:34AM   

 

 Hypothyroidism, Acquired  2010  8:34AM   

 

 Hypertension, Benign Essential  2010  9:22AM   

 

 Hypertension, Benign Essential  Dec 15 2010  9:07AM   

 

 Essential Hypertension  2011  1:31PM   

 

 Hyperlipidemia  2011  1:31PM   

 

 Gastroesophageal Reflux  2011  1:31PM   

 

 Hypothyroidism, Acquired  2011  1:31PM   

 

 Essential Hypertension  2011  8:51AM   

 

 Hyperlipidemia  2011  8:51AM   

 

 Gastroesophageal Reflux  2011  8:51AM   

 

 Hypothyroidism, Acquired  2011  8:51AM   

 

 Essential Hypertension  2011  9:13AM   

 

 Hyperlipidemia  2011  9:13AM   

 

 Gastroesophageal Reflux  2011  9:13AM   

 

 Hypothyroidism, Acquired  2011  9:13AM   

 

 Nausea With Vomiting  2011  1:18PM   

 

 Hyponatremia  2011  8:46AM   

 

 Nausea  2011  9:13AM   

 

 Hypothyroidism  2011 11:10AM   

 

 Hyponatremia  2011 11:10AM   

 

 Essential Hypertension  2011 10:05AM   

 

 Hyperlipidemia  2011 10:05AM   

 

 Gastroesophageal Reflux  2011 10:05AM   

 

 Nausea  2011 10:05AM   

 

 Hypothyroidism, Acquired  2011 10:05AM   

 

 Hyponatremia  May  6 2011 11:34AM   

 

 Essential Hypertension  May 16 2011  8:50AM   

 

 Hyperlipidemia  May 16 2011  8:50AM   

 

 Gastroesophageal Reflux  May 16 2011  8:50AM   

 

 Nausea  May 16 2011  8:50AM   

 

 Hypothyroidism, Acquired  May 16 2011  8:50AM   

 

 Hyponatremia  May 16 2011  8:50AM   

 

 Routine gynecological examination  2011  8:54AM   

 

 Hypertension  2011  8:54AM   

 

 Hyperlipidemia, unspecified  2011  8:54AM   

 

 Hypothyroidism, Acquired  2011  8:54AM   

 

 Rhinitis, Allergic  2011  8:54AM   

 

 Hypothyroidism  Aug 29 2011 12:37PM   

 

 Hyponatremia  Aug 29 2011 12:37PM   

 

 Essential Hypertension  Sep 12 2011  8:53AM   

 

 Hyperlipidemia  Sep 12 2011  8:53AM   

 

 Gastroesophageal Reflux  Sep 12 2011  8:53AM   

 

 Hypothyroidism, Acquired  Sep 12 2011  8:53AM   

 

 Hyponatremia  Sep 12 2011  8:53AM   

 

 Hypertension  Sep 29 2011  9:41AM   

 

 Hyperlipidemia  Dec  8 2011  8:31AM   

 

 Hypothyroidism  Dec  8 2011  8:31AM   

 

 Hypertension  Dec  8 2011  8:31AM   

 

 Flu  Dec  9 2011 10:02AM   

 

 Essential Hypertension  Dec 13 2011 10:13AM   

 

 Hyperlipidemia  Dec 13 2011 10:13AM   

 

 Gastroesophageal Reflux  Dec 13 2011 10:13AM   

 

 Hypothyroidism, Acquired  Dec 13 2011 10:13AM   

 

 Hyponatremia  Dec 13 2011 10:13AM   

 

 Vaginal Discharge  2012  9:43AM   

 

 Rhinitis, Allergic  Feb 15 2012  9:31AM   

 

 Eustachian Tube Dysfunction  Feb 15 2012  9:31AM   

 

 Pharyngitis, Acute  Feb 15 2012  9:31AM   

 

 Routine gynecological examination  2012  8:48AM   

 

 Hypertension  2012  8:48AM   

 

 Hyperlipidemia, unspecified  2012  8:48AM   

 

 Hypothyroidism, Acquired  2012  8:48AM   

 

 Rhinitis, Allergic  2012  8:48AM   

 

 Candidiasis, Vulvovaginal  2012 11:04AM   

 

 Essential Hypertension  Aug 15 2012  9:02AM   

 

 Hyperlipidemia  Aug 15 2012  9:02AM   

 

 Gastroesophageal Reflux  Aug 15 2012  9:02AM   

 

 Hypothyroidism, Acquired  Aug 15 2012  9:02AM   

 

 Hyponatremia  Aug 15 2012  9:02AM   

 

 Atrophic Vaginitis, Postmenopausal  Aug 15 2012  9:02AM   

 

 Flu  Oct 29 2012  9:24AM   

 

 Essential Hypertension  Dec 10 2012  8:35AM   

 

 Hyperlipidemia  Dec 10 2012  8:35AM   

 

 Gastroesophageal Reflux  Dec 10 2012  8:35AM   

 

 Hypothyroidism, Acquired  Dec 10 2012  8:35AM   

 

 Hyponatremia  Dec 10 2012  8:35AM   

 

 Atrophic Vaginitis, Postmenopausal  Dec 10 2012  8:35AM   

 

 Pneumococcus  Dec 10 2012  2:07PM   

 

 Vaginal Discharge  Dec 20 2012  1:50PM   

 

 Essential Hypertension  Dec 20 2012  1:50PM   

 

 Hyperlipidemia  Dec 20 2012  1:50PM   

 

 Gastroesophageal Reflux  Dec 20 2012  1:50PM   

 

 Hypothyroidism, Acquired  Dec 20 2012  1:50PM   

 

 Atrophic Vaginitis, Postmenopausal  Dec 20 2012  1:50PM   

 

 Upper Respiratory Infections  2013  8:52AM   

 

 Hyperlipidemia  2013  8:21AM   

 

 Hypertension  2013  8:21AM   

 

 Hypothyroidism  2013  8:21AM   

 

 Screening Mammogram  Beto 10 2013  8:45AM   

 

 Routine gynecological examination  Beto 10 2013  8:34AM   

 

 Hypertension  Beto 10 2013  8:34AM   

 

 Hyperlipidemia, unspecified  Beto 10 2013  8:34AM   

 

 Hypothyroidism, Acquired  Beto 10 2013  8:34AM   

 

 Rhinitis, Allergic  Beto 10 2013  8:34AM   

 

 Flu  Oct 28 2013  8:52AM   

 

 Essential Hypertension  Dec  9 2013  8:37AM   

 

 Hyperlipidemia  Dec  9 2013  8:37AM   

 

 Gastroesophageal Reflux  Dec  9 2013  8:37AM   

 

 Hypothyroidism, Acquired  Dec  9 2013  8:37AM   

 

 Atrophic Vaginitis, Postmenopausal  Dec  9 2013  8:37AM   

 

 Hypertension  2014  9:56AM   

 

 Hyperlipidemia  2014  9:56AM   

 

 Hypothyroidism  2014  9:56AM   

 

 Screening Mammogram  2014  8:32AM   

 

 Osteopenia  2014  8:32AM   

 

 Hypertension  2014  8:35AM   

 

 Hypothyroidism, Acquired  2014  8:35AM   

 

 Hyperlipidemia  2014  8:35AM   

 

 Upper Respiratory Infections  2014  9:28AM   

 

 Sinusitis, Acute  Oct  2 2014  9:17AM   

 

 Herpes Zoster  Oct  5 2014  1:44PM   

 

 Vesicular Eruption  Oct  5 2014  1:44PM   

 

 Hypertension  2014  8:18AM   

 

 Hyperlipidemia  2014  8:18AM   

 

 hypothyroid  2014  8:18AM   

 

 Situational anxiety  Dec 20 2014  9:33AM   

 

 GERD (gastroesophageal reflux disease)  Dec 20 2014  9:33AM   

 

 Nausea  Dec 20 2014  9:33AM   

 

 Abdominal Pain, Epigastric  Dec 20 2014  9:33AM   

 

 Hyperlipidemia  Oct  6 2014  9:03AM   

 

 acid reflux  Oct  6 2014  9:03AM   

 

 hypothyroid  Oct  6 2014  9:03AM   

 

 Shingles  Oct  6 2014  9:03AM   

 

 Pharyngitis  2015  1:09PM   

 

 Bronchitis  2015  9:10AM   

 

 Hyperlipidemia  2015  9:10AM   

 

 acid reflux  2015  9:10AM   

 

 hypothyroid  2015  9:10AM   

 

 Hyperlipidemia  2015  8:18AM   

 

 Hypertension  2015  8:18AM   

 

 hypothyroid  2015  8:18AM   

 

 Screening Mammogram  2015 11:43AM   

 

 Medicare Annual Pap Q 2 years  2015  9:36AM   

 

 Screening Mammogram  2015  9:36AM   

 

 Candidiasis of female genitalia  2015  9:36AM   







Payers







 Insurance Name  Company Name  Plan Name  Plan Number  Policy Number  Policy 
Group Number  Start Date

 

    Medicare Part A  Medicare Part A     [id number]     N/A

 

    [employer]  [facility]     [id number]     2009

 

    Medicare Part B  [employer] [place]     [id number]     N/A

 

    Medicare Part A  Medicare P A - Preventive     [id number]     N/A







History of Encounters







 Visit Date  Visit Type  Provider

 

 2015  Office visit  Doris Noriega MD

 

 2015  Office visit  Doris Noriega MD

 

 2015  Office visit  Prince Noe APRN

 

 2014  Office visit  Anyi Herrera APRN

 

 10/27/2014  Voided  Doris Noriega MD

 

 10/6/2014  Office visit  Doris Noriega MD

 

 10/5/2014  Office visit  Anyi Herrera APRN

 

 10/2/2014  Office visit  Corrine Bay APRN

 

 2014  Office visit  Kassie NBryce Franklin APRN

 

 2014  Office visit  Doris Noriega MD

 

 2014  Office visit  Doris Noriega MD

 

 2013  Office visit  Dee Colindres MD

 

 10/28/2013  Nurse visit  Dee Colindres MD

 

 6/10/2013  Office visit  Dee Colindres MD

 

 2013  Office visit  Corrine Bay APRN

 

 2012  Office visit  Dee Colindres MD

 

 12/10/2012  Office visit  Dee Colindres MD

 

 10/29/2012  Nurse visit  Dee Colindres MD

 

 8/15/2012  Office visit  Dee Colindres MD

 

 2012  Office visit  Corrine Bay APRN

 

 2012  Office visit  Dee Colindres MD

 

 2/15/2012  Office visit  Dee Colindres MD

 

 2012  Office visit  Corrine Bay APRN

 

 2011  Office visit  Dee Colindres MD

 

 10/28/2011  Nurse visit  Shad Nolasco MD

 

 2011  Office visit  Dee Colindres MD

 

 2011  Office visit  Dee Colindres MD

 

 2011  Office visit  Dee Colindres MD

 

 2011  Office visit  Dee Colindres MD

 

 2011  Office visit  Dee Colindres MD

 

 2011  Office visit  Dee Colindres MD

 

 4/10/2011  Hospital  RICKEY Toussaint MD

 

 4/10/2011  [facility]  KHRIS Granados MD

 

 2011  Office visit  RICKEY Toussaint MD

 

 2011  Voided  Fide Castellanos MD

 

 2011  Hospital  Fide Castellanos MD

 

 2011  Office visit  Dee Colindres MD

 

 12/15/2010  Nurse visit  Dee Colindres MD

 

 2010  Office visit  Dee Colindres MD

 

 10/20/2010  Nurse visit  Stephenie PERAZA

 

 2010  Nurse visit  Dee Colindres MD

 

 2010  Nurse visit  Dee Colindres MD

 

 2010  Office visit  Dee Colindres MD

 

 3/19/2010  Voided  Stephenie Kay PA

 

 2010  Nurse visit  Stephenie PERAZA

 

 2010  Nurse visit  Stephenie PERAZA

 

 2010  Nurse visit  Stephenie PERAZA

 

 2009  Office visit  Stephenie PERAZA

 

 10/20/2009  Nurse visit  Stephenie Kay PA

## 2017-07-18 ENCOUNTER — HOSPITAL ENCOUNTER (OUTPATIENT)
Dept: HOSPITAL 75 - RAD | Age: 72
End: 2017-07-18
Attending: SURGERY
Payer: MEDICARE

## 2017-07-18 DIAGNOSIS — R93.2: Primary | ICD-10-CM

## 2017-07-18 DIAGNOSIS — R10.11: ICD-10-CM

## 2017-07-18 PROCEDURE — 76705 ECHO EXAM OF ABDOMEN: CPT

## 2017-07-18 NOTE — DIAGNOSTIC IMAGING REPORT
PROCEDURE: US Gallbladder.



TECHNIQUE: Multiple real-time grayscale images were obtained over

the right upper quadrant in various projections.



INDICATION:  Right upper quadrant pain.



FINDINGS:

The pancreas is obscured by bowel gas. The liver has slightly

coarse echotexture with no focal lesion. Hepatopetal flow in the

portal vein is seen. The CBD is not seen probably obscured by

bowel gas. There is no fluid collection in the upper right

abdomen. The right kidney is 9.6 cm in length with no

hydronephrosis or focal lesion. The gallbladder demonstrates no

stones or wall thickening. No pericholecystic fluid. Sonographic

Elizondo sign is negative.



IMPRESSION: Coarse echotexture to the liver with no focal mass.

Correlate for possibility of hepatitis. No gallstones.



Dictated by: 



  Dictated on workstation # [id number]

## 2018-10-18 ENCOUNTER — HOSPITAL ENCOUNTER (OUTPATIENT)
Dept: HOSPITAL 75 - PREOP | Age: 73
Discharge: HOME | End: 2018-10-18
Attending: SURGERY
Payer: MEDICARE

## 2018-10-18 VITALS — HEIGHT: 61.25 IN | WEIGHT: 139 LBS | BODY MASS INDEX: 25.91 KG/M2

## 2018-10-18 DIAGNOSIS — Z01.818: Primary | ICD-10-CM

## 2018-10-22 ENCOUNTER — HOSPITAL ENCOUNTER (OUTPATIENT)
Dept: HOSPITAL 75 - ENDO | Age: 73
Discharge: HOME | End: 2018-10-22
Attending: SURGERY
Payer: MEDICARE

## 2018-10-22 VITALS — SYSTOLIC BLOOD PRESSURE: 136 MMHG | DIASTOLIC BLOOD PRESSURE: 78 MMHG

## 2018-10-22 VITALS — HEIGHT: 61.25 IN | BODY MASS INDEX: 25.91 KG/M2 | WEIGHT: 139 LBS

## 2018-10-22 VITALS — SYSTOLIC BLOOD PRESSURE: 143 MMHG | DIASTOLIC BLOOD PRESSURE: 90 MMHG

## 2018-10-22 VITALS — DIASTOLIC BLOOD PRESSURE: 58 MMHG | SYSTOLIC BLOOD PRESSURE: 110 MMHG

## 2018-10-22 DIAGNOSIS — F41.9: ICD-10-CM

## 2018-10-22 DIAGNOSIS — Z79.899: ICD-10-CM

## 2018-10-22 DIAGNOSIS — K57.30: ICD-10-CM

## 2018-10-22 DIAGNOSIS — R11.0: ICD-10-CM

## 2018-10-22 DIAGNOSIS — K21.9: ICD-10-CM

## 2018-10-22 DIAGNOSIS — K44.9: Primary | ICD-10-CM

## 2018-10-22 DIAGNOSIS — E78.00: ICD-10-CM

## 2018-10-22 DIAGNOSIS — R10.13: ICD-10-CM

## 2018-10-22 DIAGNOSIS — I25.10: ICD-10-CM

## 2018-10-22 DIAGNOSIS — I10: ICD-10-CM

## 2018-10-22 NOTE — XMS REPORT
MU2 Ambulatory Summary

 Created on: 2018



Milady Crespo

External Reference #: [id number]

: 1945

Sex: Female



Demographics







 Address  [address]

[place]  [address]

 

 Home Phone  [phone]

 

 Preferred Language  English

 

 Marital Status  

 

 Synagogue Affiliation  Unknown

 

 Race  White

 

 Ethnic Group  Not  or 





Author







 Author  Doris Noriega

 

 Organization  [facility]

 

 Address  [address]

[place], [place]  [id number]



 

 Phone  [phone]







Care Team Providers







 Care Team Member Name  Role  Phone

 

 Doris Noriega  PCP  [phone]

 

 Doris Noriega  PreferredProvider  [phone]







Allergies and Adverse Reactions







 Name  Reaction  Notes

 

 NO KNOWN DRUG ALLERGIES      







Plan of Treatment







 Planned Activity  Comments  Planned Date  Planned Time  Plan/Goal

 

 Complete blood count (CBC) with differential count     2016  12:00 AM   

 

 Comprehensive metabolic panel     2016  12:00 AM   

 

 VITAMIN D (25 HYDROXY)     2016  12:00 AM   

 

 Lipid panel (total cholesterol, lipoproteins, HDL, triglycerides)     8/15/
2012  12:00 AM   

 

 Pap smear     2017  12:00 AM   

 

 Comprehensive Metabolic Panel     6/10/2013  12:00 AM   

 

 Lipid panel (total cholesterol, lipoproteins, HDL, triglycerides)     6/10/
2013  12:00 AM   

 

 Thyroid stimulating hormone (TSH)     6/10/2013  12:00 AM   

 

 CBC (automated H&H, platelets, WBC and automated differential)     6/10/2013  
12:00 AM   

 

 Comprehensive Metabolic Panel     2012  12:00 AM   

 

 Lipid panel (total cholesterol, lipoproteins, HDL, triglycerides)     2012  12:00 AM   

 

 Thyroid stimulating hormone (TSH)     2012  12:00 AM   

 

 CBC (automated H&H, platelets, WBC and automated differential)     2012  
12:00 AM   

 

 Screening mammography, bilateral     2015  12:00 AM   







Medications







 Active 

 

 Name  Start Date  Estimated Completion Date  SIG  Comments

 

 aspirin 81 mg oral tablet,delayed release (DR/EC)        take 1 tablet (81 mg) 
by oral route once daily   

 

 Vitamin D3 1,000 unit oral capsule        take 1 capsule by oral route daily   

 

 famotidine 20 mg oral tablet  2015     TAKE ONE TABLET BY MOUTH TWICE 
DAILY   

 

 valsartan 160 mg oral tablet  2016     TAKE ONE TABLET BY MOUTH ONCE 
DAILY   

 

 amlodipine 5 mg oral tablet  2016     TAKE ONE TABLET BY MOUTH ONCE DAILY
   

 

 amlodipine 5 mg oral tablet  2016     TAKE ONE TABLET BY MOUTH ONCE DAILY
   

 

 valsartan 160 mg oral tablet  2016     TAKE ONE TABLET BY MOUTH ONCE 
DAILY   

 

 Zofran (as hydrochloride) 4 mg oral tablet  2016     take 1 tablet by 
oral route daily as needed for 14 days   

 

 Zofran (as hydrochloride) 4 mg oral tablet  2017     take 1 tablet by oral 
route daily as needed for 14 days   

 

 Zofran (as hydrochloride) 4 mg oral tablet  2017     take 1 tablet by oral 
route daily as needed for 14 days   

 

 Zofran (as hydrochloride) 4 mg oral tablet  2017     take 1 tablet by 
oral route daily as needed for 14 days   

 

 Zofran (as hydrochloride) 4 mg oral tablet  2017     take 1 tablet by 
oral route daily as needed for 14 days   

 

 EQ LORATADINE 10MG  TABS  2017     TAKE ONE TABLET BY MOUTH ONCE DAILY   

 

 Lexapro 10 mg oral tablet  2017     take 1 tablet (10 mg) by oral route 
once daily for 90 days   

 

 pantoprazole 40 mg oral tablet,delayed release (DR/EC)  10/9/2017     take 1 
tablet (40 mg) by oral route once daily for 90 days   

 

 mirtazapine 15 mg oral tablet  2018     TAKE ONE-HALF TABLET BY MOUTH 
ONCE DAILY AT BEDTIME   

 

 clorazepate dipotassium 7.5 mg oral tablet  2018  take 1/2 to1 
tablet PO up to three times per day for situational anxiety   









  

 

 Name  Start Date  Expiration Date  SIG  Comments

 

 prednisone 20 mg oral tablet  2011  take 2 tablets by oral 
route daily for 5 days   

 

 calcium carbonate-vitamin D2 600-125 mg-unit oral tablet  8/15/2012  2012
  take 2 tablets by oral route daily   

 

 [place] 3 Fish Oil 900-1,400 mg oral capsule,delayed release(DR/EC)  8/15/2012  9
/  take 1 capsule by oral route daily   

 

 multivitamin Oral tablet  8/15/2012  2012  take 1 tablet by oral route 
daily   

 

 triamcinolone acetonide 0.1 % topical cream  2013  10/7/2013  APPLY A 
THIN LAYER TO THE AFFECTED AREA(S) BY TOPICAL ROUTE TWICE A DAY   

 

 famotidine 20 mg oral tablet  2014  take 1 tablet (20 mg) by 
oral route 2 times per day   

 

 amoxicillin 500 mg oral capsule  2014  take 1 capsule (500 mg) 
by oral route 3 times per day for 10 days   

 

 Zithromax Z-Tab 250 mg oral tablet  10/2/2014  10/7/2014  take 2 tablets (500 
mg) by oral route once daily for 1 day then 1 tablet (250 mg) by oral route 
once daily for 4 days   

 

 acyclovir 800 mg oral tablet  10/5/2014  10/12/2014  take 1 tablet (800 mg) by 
oral route 5 times per day for 7 days   

 

 gabapentin 300 mg oral capsule  10/9/2014  2014  take 1 capsule (300 mg) 
by oral route 3 times per day for 14 days   

 

 Carafate 100 mg/mL oral suspension  2014  take 10 milliliters 
(1 gram) by oral route 4 times per day on an empty stomach 1 hour before meals 
and at bedtime for 4 weeks   

 

 amlodipine 5 mg oral tablet  2015  TAKE ONE TABLET BY MOUTH 
EVERY DAY   

 

 levothyroxine 50 mcg oral tablet  2015  TAKE ONE TABLET BY MOUTH 
EVERY DAY   

 

 Diovan 160 mg oral tablet  2015  2/15/2016  TAKE ONE TABLET BY MOUTH 
EVERY DAY for 90 days   

 

 triamcinolone acetonide 0.1 % topical cream  2015  apply a thin 
layer to the affected area(s) by topical route 2 times per day for 14 days   

 

 fluconazole 150 mg oral tablet  2015  take 1 tablet (150 mg) 
by oral route once for 1 day   

 

 Lipitor 20 mg oral tablet  10/13/2016  2018  take 1 tablet (20 mg) by oral 
route once daily   

 

 Plavix 75 mg oral tablet  10/13/2016  2018  take 1 tablet (75 mg) by oral 
route once daily   

 

 Zofran (as hydrochloride) 4 mg oral tablet  10/13/2016  10/27/2016  take 1 
tablet by oral route daily as needed for 14 days   

 

 Zofran (as hydrochloride) 4 mg oral tablet  2017     take 1 tablet by 
oral route daily as needed for 14 days   

 

 levothyroxine 50 mcg oral tablet  10/9/2017  10/9/2017  TAKE ONE TABLET BY 
MOUTH ONCE DAILY   

 

 ondansetron 4 mg oral tablet,disintegrating  10/10/2017  2017  dissolve  
1 tablet by oral route every 8 hours as needed for 30 days   

 

 valsartan 160 mg oral tablet  2017  TAKE ONE TABLET BY MOUTH 
ONCE DAILY   

 

 amlodipine 5 mg oral tablet  2017  TAKE ONE TABLET BY MOUTH 
ONCE DAILY   

 

 atenolol 50 mg oral tablet  2018  TAKE ONE TABLET BY MOUTH 
ONCE DAILY   

 

 EQ LORATADINE 10MG  TABS  2018  TAKE ONE TABLET BY MOUTH ONCE 
DAILY IN THE EVENING   

 

 mirtazapine 15 mg oral tablet  2018  TAKE ONE-HALF TABLET BY 
MOUTH ONCE DAILY AT BEDTIME   









 Discontinued 

 

 Name  Start Date  Discontinued Date  SIG  Comments

 

 Lipitor 40 mg oral tablet     2009 TAB DAILY   

 

 ramipril 5 mg oral capsule     2009  take 1 capsule (5 mg) by oral route 
once daily   

 

 lovastatin 20 mg oral tablet  2009  take 1 tablet (20 mg) by 
oral route once daily with evening meal   

 

 propranolol 20 mg oral tablet  2010  take 1 tablet by oral 
route 2 times a day   

 

 Fosamax 70 mg oral tablet  2010  take 1 tablet (70 mg) by oral 
route once weekly in the morning, at least 30 minutes before the first food, 
beverage, or medication of the day   

 

 lisinopril 40 mg oral tablet  2010  4/10/2011  take 2 tablets by oral 
route daily   

 

 Zoloft 50 mg oral tablet  2011  take 1 tablet (50 mg) by oral 
route once daily   

 

 promethazine 25 mg oral tablet  2011  take 1 tablet by oral 
route every 6 hours as needed   

 

 Diovan -12.5 mg oral tablet  2011  take 1 tablet by oral 
route once daily for 30 days   

 

 Diflucan 150 mg oral tablet     2012  take 1 tablet (150 mg) by oral 
route once for 1 day   

 

 Diflucan 150 mg oral tablet  2012  8/15/2012  take 1 tablet (150 mg) by 
oral route once   

 

 Vitamin B-12 1,000 mcg oral tablet  8/15/2012  2014  take 1 tablet by 
oral route daily   

 

 Premarin 0.625 mg/gram vaginal cream  10/29/2012  6/10/2013  use 1 gram daily 
for a week then 1 gram twice a week   

 

 Medrol (Tab) 4 mg oral tablets,dose pack  2013  6/10/2013  take as 
directed   

 

 aspirin 325 mg oral tablet  2014  take 1 tablet (325 mg) by 
oral route once daily   

 

 Medrol (Tab) 4 mg oral tablets,dose pack  2014  10/2/2014  take as 
directed   

 

 Tetracaine Lollipops Lollipop  2015  Use as directed   

 

 Allergy Relief (loratadine) 10 mg oral tablet  2016     TAKE ONE TABLET 
BY MOUTH ONCE DAILY   

 

 Allergy Relief (loratadine) 10 mg oral tablet  2017  TAKE ONE 
TABLET BY MOUTH ONCE DAILY   

 

 Zoloft 50 mg oral tablet  2016  take 1 tablet (50 mg) by oral 
route once daily for 90 days  Pt refuses to take...

 

 triamcinolone acetonide 0.1 % topical cream  2017     APPLY A THIN LAYER 
OF CREAM EXTERNALLY TO AFFECTED AREA TWICE DAILY FOR 14 DAYS   

 

 loratadine 10 mg oral tablet  2017  TAKE 1 TABLET BY MOUTH 
EVERY DAY IN THE EVENING   

 

 triamcinolone acetonide 0.1 % topical cream  2017  APPLY  
CREAM EXTERNALLY TO AFFECTED AREA TWICE DAILY FOR 14 DAYS   

 

 Augmentin 875-125 mg oral tablet  2018  3/6/2018  take 1 tablet by oral 
route every 12 hours for 7 days   

 

 benzonatate 100 mg oral capsule  2018  3/6/2018  take 1 capsule (100 mg) 
by oral route 3 times per day as needed for cough   







Problem List







 Description  Status  Onset

 

 Hyperlipidemia  Active   

 

 acid reflux  Active   

 

 Hypertension  Active   

 

 hypothyroid  Active   







Vital Signs







 Date  Time  BP-Sys(mm[Hg]  BP-Morena(mm[Hg])  HR(bpm)  RR(rpm)  Temp  WT  HT  HC  
BMI  BSA  BMI Percentile  O2 Sat(%)

 

 2018  8:39:00 AM  116 mmHg  68 mmHg  68 bpm  18 rpm  97.9 F  142 lbs  61 
in     26.8304 kg/m  1.665 m      

 

 3/6/2018  8:26:00 AM  126 mmHg  76 mmHg  63 bpm  16 rpm  98 F  134.125 lbs  61 
in     25.34 kg/m2  1.62 m2     100 %

 

 2018  11:55:00 AM  126 mmHg  80 mmHg  80 bpm  18 rpm  98 F  132 lbs  61 
in     24.9409 kg/m  1.6053 m     100 %

 

 2017  8:26:00 AM  122 mmHg  76 mmHg  63 bpm  16 rpm  98.4 F  129.25 lbs  
61 in     24.42 kg/m2  1.59 m2     99 %

 

 10/18/2017  9:01:00 AM  126 mmHg  80 mmHg  73 bpm  16 rpm  97.9 F  122.375 lbs
  61 in     23.1223 kg/m  1.5456 m     100 %

 

 10/10/2017  1:52:00 PM  118 mmHg  72 mmHg  74 bpm  16 rpm  98.1 F  121 lbs  61 
in     22.86 kg/m2  1.54 m2     99 %

 

 2017  8:40:00 AM  118 mmHg  68 mmHg  63 bpm  16 rpm  98.1 F  123.125 lbs  
61 in     23.264 kg/m  1.5504 m     100 %

 

 2017  8:57:00 AM  116 mmHg  62 mmHg  71 bpm  16 rpm  99.8 F  121.5 lbs  
61 in     22.96 kg/m2  1.54 m2     98 %

 

 2017  11:30:00 AM  128 mmHg  78 mmHg  69 bpm  16 rpm  98.8 F  129.375 lbs  
61 in     24.4449 kg/m  1.5892 m     98 %

 

 2017  8:22:00 AM  118 mmHg  78 mmHg  62 bpm  18 rpm  97.5 F  129.125 lbs  
61 in     24.40 kg/m2  1.59 m2     100 %

 

 10/13/2016  2:26:00 PM  128 mmHg  78 mmHg  77 bpm  16 rpm  98.4 F  139.25 lbs  
61 in     26.3108 kg/m  1.6488 m     100 %

 

 2016  8:29:00 AM  122 mmHg  70 mmHg  72 bpm  16 rpm  97.8 F  137.125 lbs  
61 in     25.91 kg/m2  1.64 m2     100 %

 

 2015  9:33:00 AM  120 mmHg  68 mmHg  73 bpm     98.7 F  144.375 lbs  61 
in     27.2791 kg/m  1.6788 m     99 %

 

 2015  9:05:00 AM  110 mmHg  75 mmHg  85 bpm  16 rpm  96.7 F  144.375 lbs  
61 in     27.28 kg/m2  1.68 m2     99 %

 

 2015  1:05:00 PM  108 mmHg  62 mmHg  78 bpm  18 rpm  96.9 F  142.375 lbs  
61 in     26.9012 kg/m  1.6672 m     100 %

 

 2014  9:31:00 AM  126 mmHg  66 mmHg  79 bpm  18 rpm  98.7 F  149 lbs  61 
in     28.15 kg/m2  1.71 m2     98 %

 

 10/6/2014  9:02:00 AM  110 mmHg  74 mmHg  94 bpm  18 rpm  98.1 F  145.4 lbs  
61 in     27.4728 kg/m  1.6848 m     97 %

 

 10/2/2014  9:14:00 AM  124 mmHg  74 mmHg  79 bpm  18 rpm  98 F  147.25 lbs  61 
in     27.82 kg/m2  1.70 m2     98 %

 

 2014  9:22:00 AM  124 mmHg  76 mmHg  89 bpm  18 rpm  98.1 F  148 lbs  61 
in     27.9641 kg/m  1.6998 m     100 %

 

 2014  8:27:00 AM  118 mmHg  65 mmHg  74 bpm  16 rpm  97.7 F  148 lbs  61 
in     27.96 kg/m2  1.70 m2     99 %

 

 2014  9:48:00 AM  125 mmHg  70 mmHg  93 bpm  18 rpm  96.7 F  156 lbs  61 
in     29.4756 kg/m  1.7451 m     100 %

 

 2013  8:35:00 AM  122 mmHg  74 mmHg  84 bpm  16 rpm  97.9 F  155.375 lbs 
                99 %

 

 6/10/2013  8:30:00 AM  130 mmHg  82 mmHg  79 bpm  16 rpm  97.9 F  161 lbs     
            98 %

 

 2013  8:50:00 AM  124 mmHg  68 mmHg  66 bpm  18 rpm  97.6 F  157.5 lbs  
61 in     29.7591 kg/m  1.7535 m      

 

 2012  1:46:00 PM  120 mmHg  72 mmHg  75 bpm  16 rpm  97.6 F  156.125 lbs
                 98 %

 

 12/10/2012  8:32:00 AM  122 mmHg  82 mmHg  70 bpm  16 rpm  97.3 F  157 lbs    
             99 %

 

 8/15/2012  9:00:00 AM  130 mmHg  76 mmHg  86 bpm  16 rpm  97.4 F  159 lbs     
            100 %

 

 2012  11:02:00 AM  128 mmHg  64 mmHg  66 bpm  18 rpm  97.4 F  162.125 lbs
  61 in     30.6329 kg/m  1.7791 m      

 

 2012  8:45:00 AM  130 mmHg  70 mmHg  82 bpm  16 rpm  98.2 F  160.125 lbs 
                100 %

 

 2/15/2012  9:29:00 AM  122 mmHg  80 mmHg  86 bpm  16 rpm  97.4 F  159.375 lbs  
61 in     30.1133 kg/m  1.7639 m     99 %

 

 2012  9:41:00 AM  132 mmHg  74 mmHg  66 bpm  18 rpm  98.4 F  160.375 lbs  
61 in     30.30 kg/m2  1.77 m2      

 

 2011  10:08:00 AM  134 mmHg  82 mmHg  80 bpm  16 rpm  98 F  160 lbs  61 
in     30.2314 kg/m  1.7674 m     99 %

 

 2011  3:56:00 PM  130 mmHg  80 mmHg                              

 

 2011  8:55:00 AM  130 mmHg  74 mmHg  88 bpm  16 rpm  98.2 F  158.25 lbs  
               98 %

 

 2011  8:54:00 AM  136 mmHg  82 mmHg  102 bpm  16 rpm  98.1 F  153.5 lbs   
              99 %

 

 2011  8:49:00 AM  122 mmHg  88 mmHg  88 bpm  16 rpm  98.6 F  152.25 lbs  
               99 %

 

 2011  10:02:00 AM  140 mmHg  82 mmHg  96 bpm  20 rpm  98.8 F             
       100 %

 

 2011  9:14:00 AM  156 mmHg  98 mmHg  110 bpm  24 rpm  98.5 F  153 lbs    
             100 %

 

 2011  8:50:00 AM  160 mmHg  84 mmHg  100 bpm  16 rpm  98.7 F  155 lbs    
             100 %

 

 2011  1:25:00 PM  152 mmHg  100 mmHg  82 bpm  16 rpm  97.2 F  156.375 lbs 
                100 %

 

 2011  9:55:00 AM  144 mmHg  90 mmHg                              

 

 2010  9:24:00 AM  138 mmHg  84 mmHg                              

 

 2010  8:58:00 AM  160 mmHg  94 mmHg                              

 

 2010  8:33:00 AM  142 mmHg  90 mmHg  100 bpm  16 rpm  98.1 F  158.375 
lbs                  

 

 2010  9:24:00 AM  160 mmHg  102 mmHg  88 bpm  18 rpm  99 F  157.125 lbs  
62 in     28.7382 kg/m  1.7657 m      

 

 3/19/2010  11:01:00 AM  140 mmHg  90 mmHg                              

 

 2009  10:08:00 AM  142 mmHg  86 mmHg  72 bpm  16 rpm  98.1 F  154.125 
lbs  61 in     29.1214 kg/m  1.7346 m      







Social History







 Name  Description  Comments

 

       

 

 Children      

 

 lives alone      

 

 Supervisor      

 

 Tobacco  Never smoker   







History of Procedures







 Date Ordered  Description  Order Status

 

 2011 12:00 AM  COMPREHEN METABOLIC PANEL  Reviewed

 

 2011 12:00 AM  ASSAY THYROID STIM HORMONE  Reviewed

 

 2011 12:00 AM  COMPREHEN METABOLIC PANEL  Reviewed

 

 2011 12:00 AM  LIPID PANEL  Reviewed

 

 2011 12:00 AM  ASSAY THYROID STIM HORMONE  Reviewed

 

 2011 12:00 AM  COMPLETE CBC W/AUTO DIFF WBC  Reviewed

 

 2015 12:00 AM  COMPLETE CBC W/AUTO DIFF WBC  Reviewed

 

 2015 12:00 AM  COMPREHEN METABOLIC PANEL  Reviewed

 

 2015 12:00 AM  LIPID PANEL  Reviewed

 

 2015 12:00 AM  ASSAY THYROID STIM HORMONE  Reviewed

 

 2011 12:00 AM  COMPREHEN METABOLIC PANEL  Reviewed

 

 2011 12:00 AM  COMPREHEN METABOLIC PANEL  Reviewed

 

 2011 12:00 AM  LIPID PANEL  Reviewed

 

 2011 12:00 AM  ASSAY THYROID STIM HORMONE  Reviewed

 

 10/28/2011 12:00 AM  ***Immunization administration, Medicare flu  Reviewed

 

 10/28/2011 12:00 AM  Fluzone ** MEDICARE Only **  Reviewed

 

 2010 11:24 AM  NO CHARGE OV  Reviewed

 

 2010 11:26 AM  NO CHARGE OV  Reviewed

 

 2011 12:00 AM  COMPREHEN METABOLIC PANEL  Reviewed

 

 2011 12:00 AM  LIPID PANEL  Reviewed

 

 2011 12:00 AM  ASSAY THYROID STIM HORMONE  Reviewed

 

 2011 12:00 AM  COMPLETE CBC W/AUTO DIFF WBC  Reviewed

 

 2011 12:00 AM  Wrist Support  Reviewed

 

 2016 12:00 AM  Screening mammography, bilateral  Reviewed

 

 2016 12:00 AM  DXA BONE DENSITY AXIAL  Returned

 

 2016 12:00 AM  TETANUS VACCINE IM  Reviewed

 

 2016 12:00 AM  HEP B VACC ADULT 3 DOSE IM  Reviewed

 

 2012 12:00 AM  TISSUE EXAM FOR FUNGI  Reviewed

 

 2012 12:00 AM  SMEAR WET MOUNT SALINE/INK  Reviewed

 

 2016 12:00 AM  TETANUS VACCINE IM  Reviewed

 

 2016 12:00 AM  HEP B VACC ADULT 3 DOSE IM  Reviewed

 

 2/15/2012 12:00 AM  THER/PROPH/DIAG INJ SC/IM  Reviewed

 

 2/15/2012 12:00 AM  Bicillin CR [facility]#39907108139[facility]  Reviewed

 

 2/15/2012 12:00 AM  Depo-Medrol 40 mg NDC#1008907095  Reviewed

 

 10/13/2016 12:00 AM  COMPLETE CBC W/AUTO DIFF WBC  Returned

 

 10/13/2016 12:00 AM  COMPREHEN METABOLIC PANEL  Returned

 

 10/13/2016 12:00 AM  ASSAY THYROID STIM HORMONE  Returned

 

 10/13/2016 12:00 AM  LIPID PANEL  Returned

 

 2016 12:00 AM  TETANUS VACCINE IM  Reviewed

 

 2016 12:00 AM  HEP B VACC ADULT 3 DOSE IM  Reviewed

 

 2017 12:00 AM  ASSAY OF IRON  Returned

 

 2017 12:00 AM  IRON BINDING TEST  Returned

 

 2017 12:00 AM  ASSAY OF TRANSFERRIN  Returned

 

 2017 12:00 AM  OCCULT BLD FECES 1-3 TESTS  Returned

 

 2017 12:00 AM  COMPLETE CBC AUTOMATED  Returned

 

 8/15/2012 12:00 AM  COMPREHEN METABOLIC PANEL  Reviewed

 

 8/15/2012 12:00 AM  ASSAY THYROID STIM HORMONE  Reviewed

 

 8/15/2012 12:00 AM  COMPLETE CBC W/AUTO DIFF WBC  Reviewed

 

 8/15/2012 12:00 AM  Wrist Support  Reviewed

 

 2017 12:00 AM  METABOLIC PANEL TOTAL CA  Returned

 

 2017 12:00 AM  METABOLIC PANEL TOTAL CA  Returned

 

 2017 12:00 AM  Screening mammography, bilateral  Returned

 

 10/29/2012 12:00 AM  Flu Injection 3 Years And Above NDC# [address]-69  [facility]  
Reviewed

 

 12/10/2012 12:00 AM  IMMUNIZATION ADMIN  Reviewed

 

 12/10/2012 12:00 AM  Pneumovax Injection - [facility]  Reviewed

 

 2018 12:00 AM  THER/PROPH/DIAG INJ SC/IM  Reviewed

 

 2018 12:00 AM  Decadron 4mg Injection  Reviewed

 

 2018 12:00 AM  Depo-Medrol 40mg Injection  Reviewed

 

 2012 12:00 AM  TRICHOMONAS ASSAY W/OPTIC  Reviewed

 

 2018 12:00 AM  COMPLETE CBC W/AUTO DIFF WBC  Returned

 

 2018 12:00 AM  COMPREHEN METABOLIC PANEL  Returned

 

 2018 12:00 AM  LIPID PANEL  Returned

 

 2018 12:00 AM  ASSAY THYROID STIM HORMONE  Returned

 

 2013 12:00 AM  THER/PROPH/DIAG INJ SC/IM  Reviewed

 

 2013 12:00 AM  Bicillin CR, 1.2 million units NDC# 91699-754-05  Reviewed

 

 2013 12:00 AM  COMPREHEN METABOLIC PANEL  Reviewed

 

 2013 12:00 AM  LIPID PANEL  Reviewed

 

 2013 12:00 AM  COMPLETE CBC W/AUTO DIFF WBC  Reviewed

 

 2013 12:00 AM  ASSAY THYROID STIM HORMONE  Reviewed

 

 6/10/2013 12:00 AM  MAMMOGRAM SCREENING  Reviewed

 

 6/10/2013 12:00 AM  CYTOPATH C/V MANUAL  Reviewed

 

 12/10/2013 12:00 AM  LIPID PANEL  Reviewed

 

 12/10/2013 12:00 AM  ASSAY THYROID STIM HORMONE  Reviewed

 

 12/10/2013 12:00 AM  COMPLETE CBC W/AUTO DIFF WBC  Reviewed

 

 12/10/2013 12:00 AM  COMPREHEN METABOLIC PANEL  Reviewed

 

 2010 12:00 AM  CYTOPATH C/V MANUAL  Reviewed

 

 2010 12:00 AM  SMEAR WET MOUNT SALINE/INK  Reviewed

 

 2010 12:00 AM  LIPID PANEL  Reviewed

 

 2010 12:00 AM  ASSAY THYROID STIM HORMONE  Reviewed

 

 2010 12:00 AM  COMPLETE CBC W/AUTO DIFF WBC  Reviewed

 

 2010 12:00 AM  COMPREHEN METABOLIC PANEL  Reviewed

 

 10/28/2013 12:00 AM  Flu Injection 3 Years And Above NDC# [address]-48  [facility]  
Reviewed

 

 2010 8:48 AM  Blood Pressure Check-no charge  Reviewed

 

 2010 12:00 AM  Blood Pressure Check-no charge  Reviewed

 

 2014 12:00 AM  METABOLIC PANEL TOTAL CA  Reviewed

 

 2014 12:00 AM  ASSAY THYROID STIM HORMONE  Reviewed

 

 2014 12:00 AM  ASSAY OF FREE THYROXINE  Reviewed

 

 2014 12:00 AM  MAMMOGRAM SCREENING  Reviewed

 

 2014 12:00 AM  CT BONE DENSITY AXIAL  Reviewed

 

 2014 12:00 AM  COMPLETE CBC W/AUTO DIFF WBC  Reviewed

 

 2014 12:00 AM  COMPREHEN METABOLIC PANEL  Reviewed

 

 2014 12:00 AM  LIPID PANEL  Reviewed

 

 2014 12:00 AM  ASSAY THYROID STIM HORMONE  Reviewed

 

 10/20/2010 12:00 AM  IMMUNIZATION ADMIN  Reviewed

 

 10/20/2010 12:00 AM  FLU VACCINE 3 YRS & > IM  Reviewed

 

 2010 12:00 AM  COMPREHEN METABOLIC PANEL  Reviewed

 

 2010 12:00 AM  LIPID PANEL  Reviewed

 

 2010 12:00 AM  ASSAY THYROID STIM HORMONE  Reviewed

 

 2010 12:00 AM  COMPLETE CBC W/AUTO DIFF WBC  Reviewed

 

 2010 12:00 AM  Blood Pressure Check-no charge  Reviewed

 

 10/2/2014 12:00 AM  THER/PROPH/DIAG INJ SC/IM  Reviewed

 

 10/2/2014 12:00 AM  Kenalog, Per 10 Mg NDC#1104-6011-42  Reviewed

 

 2014 12:00 AM  COMPLETE CBC W/AUTO DIFF WBC  Reviewed

 

 2014 12:00 AM  COMPREHEN METABOLIC PANEL  Reviewed

 

 2014 12:00 AM  LIPID PANEL  Reviewed

 

 2014 12:00 AM  ASSAY THYROID STIM HORMONE  Reviewed

 

 2015 12:00 AM  Rocephin 1 gram NDC#4921-7752-75  Reviewed

 

 2015 12:00 AM  THER/PROPH/DIAG INJ SC/IM  Reviewed

 

 2011 12:00 AM  COMPREHEN METABOLIC PANEL  Reviewed

 

 2011 12:00 AM  LIPID PANEL  Reviewed

 

 2011 12:00 AM  ASSAY THYROID STIM HORMONE  Reviewed

 

 2011 12:00 AM  COMPLETE CBC W/AUTO DIFF WBC  Reviewed

 

 2011 12:00 AM  METABOLIC PANEL TOTAL CA  Reviewed

 

 2011 12:00 AM  COMPREHEN METABOLIC PANEL  Reviewed

 

 2011 12:00 AM  ASSAY THYROID STIM HORMONE  Reviewed

 

 2011 12:00 AM  COMPLETE CBC W/AUTO DIFF WBC  Reviewed

 

 2011 12:00 AM  ASSAY OF LIPASE  Reviewed

 

 2011 12:00 AM  THER/PROPH/DIAG INJ SC/IM  Reviewed

 

 2011 12:00 AM  Phenergan 50 Mg Im  [place]ad[place]  Reviewed

 

 2011 12:00 AM  METABOLIC PANEL TOTAL CA  Reviewed

 

 2011 12:00 AM  THER/PROPH/DIAG INJ SC/IM  Reviewed

 

 2011 12:00 AM  Phenergan 25 Mg Im  [place]adine  Reviewed

 

 2011 12:00 AM  METABOLIC PANEL TOTAL CA  Reviewed

 

 2011 12:00 AM  ASSAY THYROID STIM HORMONE  Reviewed

 

 2011 12:00 AM  THER/PROPH/DIAG INJ SC/IM  Reviewed

 

 2011 12:00 AM  Phenergan 50 Mg Im  Bernadine  Reviewed

 

 2011 12:00 AM  ASSAY OF SERUM SODIUM  Reviewed

 

 2011 12:00 AM  ASSAY OF SERUM SODIUM  Reviewed

 

 2011 12:00 AM  CYTOPATH C/V MANUAL  Reviewed

 

 2011 12:00 AM  ASSAY THYROID STIM HORMONE  Reviewed

 

 2015 12:00 AM  COMPLETE CBC W/AUTO DIFF WBC  Reviewed

 

 2015 12:00 AM  COMPREHEN METABOLIC PANEL  Reviewed

 

 2015 12:00 AM  LIPID PANEL  Reviewed

 

 2015 12:00 AM  ASSAY THYROID STIM HORMONE  Reviewed

 

 2015 12:00 AM  MAMMOGRAM SCREENING  Reviewed

 

 2015 12:00 AM  CYTOPATH TBS C/V MANUAL  Reviewed







Results Summary







 Date and Description  Results

 

 2010 9:25 AM  Colonoscopy-Women and Men over 50 Abnormal Mammogram -Women 
over 40 Declined Pap Smear Declined 

 

 2010 10:41 AM  WET PREP NO TRICHOMONADS SEEN  No  Few 

 

 2010 8:15 AM  TRIGLYCERIDES 174.0 mg/dLCHOLESTEROL 175.0 mg/dLHDL 58.0 mg/
dLTOT CHOL/HDL 3.0 LDL (CALC) 82.0 mg/dLTSH 0.790 uIU/mLGLUCOSE 95.0 mg/
dLSODIUM 136.0 mmol/LPOTASSIUM 4.50 mmol/LCHLORIDE 99.0 mmol/LCO2 26.0 mmol/
LBUN 12.0 mg/dLCREATININE 0.80 mg/dLSGOT/AST 32.0 IU/LSGPT/ALT 33.0 IU/LALK 
PHOS 103.0 IU/LTOTAL PROTEIN 7.60 g/dLALBUMIN 4.60 g/dLTOTAL BILI 0.60 mg/
dLCALCIUM 9.80 mg/dLAGE 64 GFR NonAA 72 GFR AA 87 eGFR >60 mL/min/1.73 m2eGFR AA
* >60 WBC 5.3 RBC 4.13 HGB 13.10 g/dLHCT 39.20 %MCV 95.0 fLMCH 31.70 pgMCHC 
33.40 g/dLRDW SD 44 RDW CV 12.70 %MPV 9.80 fLPLT 257 NRBC# 0.00 NRBC% 0.0 %NEUT 
57.90 %%LYMP 26.50 %%MONO 11.60 %%EOS 3.20 %%BASO 0.80 %#NEUT 3.04 #LYMP 1.39 #
MONO 0.61 #EOS 0.17 #BASO 0.04 MANUAL DIFF NOT IND 

 

 2011 9:45 AM  GLUCOSE 91.0 mg/dLSODIUM 133.0 mmol/LPOTASSIUM 4.40 mmol/
LCHLORIDE 97.0 mmol/LCO2 24.0 mmol/LBUN 9.0 mg/dLCREATININE 0.70 mg/dLCALCIUM 
10.0 mg/dLAGE 65 GFR NonAA 84 GFR  eGFR >60 mL/min/1.73 m2eGFR AA* >60 

 

 2011 10:25 AM  LIPASE 29.0 U/LTSH 0.10 uIU/mLGLUCOSE 115.0 mg/dLSODIUM 
127.0 mmol/LPOTASSIUM 5.30 mmol/LCHLORIDE 93.0 mmol/LCO2 18.0 mmol/LBUN 9.0 mg/
dLCREATININE 0.70 mg/dLSGOT/AST 62.0 IU/LSGPT/ALT 46.0 IU/LALK PHOS 100.0 IU/
LTOTAL PROTEIN 8.60 g/dLALBUMIN 4.90 g/dLTOTAL BILI 0.60 mg/dLCALCIUM 9.90 mg/
dLAGE 65 GFR NonAA 84 GFR  eGFR >60 mL/min/1.73 m2eGFR AA* >60 WBC 6.9 
RBC 4.48 HGB 14.40 g/dLHCT 40.90 %MCV 91.0 fLMCH 32.10 pgMCHC 35.20 g/dLRDW SD 
41 RDW CV 12.50 %MPV 9.30 fLPLT 260 NRBC# 0.00 NRBC% 0.0 %NEUT 74.90 %%LYMP 
15.10 %%MONO 9.40 %%EOS 0.30 %%BASO 0.30 %#NEUT 5.15 #LYMP 1.04 #MONO 0.65 #EOS 
0.02 #BASO 0.02 MANUAL DIFF NOT IND 

 

 2011 9:07 AM  GLUCOSE 92.0 mg/dLSODIUM 131.0 mmol/LPOTASSIUM 4.20 mmol/
LCHLORIDE 99.0 mmol/LCO2 22.0 mmol/LBUN 9.0 mg/dLCREATININE 0.70 mg/dLCALCIUM 
9.20 mg/dLAGE 65 GFR NonAA 84 GFR  eGFR >60 mL/min/1.73 m2eGFR AA* >60 

 

 2011 11:06 AM  TSH 0.510 uIU/mLGLUCOSE 99.0 mg/dLSODIUM 132.0 mmol/
LPOTASSIUM 3.50 mmol/LCHLORIDE 95.0 mmol/LCO2 23.0 mmol/LBUN 9.0 mg/
dLCREATININE 0.80 mg/dLCALCIUM 10.40 mg/dLAGE 65 GFR NonAA 72 GFR AA 87 eGFR >
60 mL/min/1.73 m2eGFR AA* >60 

 

 2011 9:45 AM  SODIUM 132.0 mmol/L

 

 2011 9:27 AM  SODIUM 137.0 mmol/L

 

 2011 9:55 AM  TSH 1.070 uIU/mL

 

 2011 8:55 AM  GLUCOSE 102.0 mg/dLSODIUM 135.0 mmol/LPOTASSIUM 4.20 mmol/
LCHLORIDE 102.0 mmol/LCO2 24.0 mmol/LBUN 15.0 mg/dLCREATININE 0.80 mg/dLSGOT/
AST 30.0 IU/LSGPT/ALT 35.0 IU/LALK PHOS 119.0 IU/LTOTAL PROTEIN 7.50 g/
dLALBUMIN 4.30 g/dLTOTAL BILI 0.30 mg/dLCALCIUM 9.20 mg/dLAGE 65 GFR NonAA 72 
GFR AA 87 eGFR >60 mL/min/1.73 m2eGFR AA* >60 TSH 1.130 uIU/mL

 

 2011 8:55 AM  GLUCOSE 98.0 mg/dLSODIUM 137.0 mmol/LPOTASSIUM 3.90 mmol/
LCHLORIDE 103.0 mmol/LCO2 25.0 mmol/LBUN 14.0 mg/dLCREATININE 0.70 mg/dLSGOT/
AST 33.0 IU/LSGPT/ALT 36.0 IU/LALK PHOS 111.0 IU/LTOTAL PROTEIN 8.30 g/
dLALBUMIN 4.40 g/dLTOTAL BILI 0.50 mg/dLCALCIUM 9.40 mg/dLAGE 66 GFR NonAA 84 
GFR  eGFR >60 mL/min/1.73 m2eGFR AA* >60 TRIGLYCERIDES 109.0 mg/
dLCHOLESTEROL 153.0 mg/dLHDL 54.0 mg/dLTOT CHOL/HDL 2.8 LDL (CALC) 77.0 mg/
dLTSH 1.330 uIU/mL

 

 2011 10:17 AM  Colonoscopy-Women and Men over 50 Declined Mammogram -
Women over 40 Normal Pap Smear Negative 

 

 2012 10:33 AM  WET PREP NO TRICH SEEN CLUE CELLS NONE SEEN 

 

 2012 8:45 AM  WBC 5.2 RBC 3.96 HGB 12.70 g/dLHCT 37.80 %MCV 96.0 fLMCH 
32.10 pgMCHC 33.60 g/dLRDW SD 46 RDW CV 13.30 %MPV 9.70 fLPLT 297 NRBC# 0.00 
NRBC% 0.0 %NEUT 57.70 %%LYMP 28.50 %%MONO 10.30 %%EOS 2.50 %%BASO 1.0 %#NEUT 
3.02 #LYMP 1.49 #MONO 0.54 #EOS 0.13 #BASO 0.05 MANUAL DIFF NOT IND GLUCOSE 
97.0 mg/dLSODIUM 137.0 mmol/LPOTASSIUM 4.40 mmol/LCHLORIDE 101.0 mmol/LCO2 23.0 
mmol/LBUN 17.0 mg/dLCREATININE 0.80 mg/dLSGOT/AST 30.0 IU/LSGPT/ALT 33.0 IU/
LALK PHOS 95.0 IU/LTOTAL PROTEIN 7.40 g/dLALBUMIN 4.40 g/dLTOTAL BILI 0.60 mg/
dLCALCIUM 9.70 mg/dLAGE 66 GFR NonAA 72 GFR AA 87 eGFR 60 eGFR AA* 60 
TRIGLYCERIDES 130.0 mg/dLCHOLESTEROL 157.0 mg/dLHDL 56.0 mg/dLTOT CHOL/HDL 2.8 
LDL (CALC) 75.0 mg/dLTSH 0.940 uIU/mL

 

 2012 8:45 AM  WBC 5.1 RBC 3.91 HGB 12.50 g/dLHCT 36.30 %MCV 93.0 fLMCH 
32.0 pgMCHC 34.40 g/dLRDW SD 43 RDW CV 12.60 %MPV 9.30 fLPLT 244 NRBC# 0.00 NRBC
% 0.0 %NEUT 57.60 %%LYMP 27.50 %%MONO 9.10 %%EOS 5.0 %%BASO 0.80 %#NEUT 2.91 #
LYMP 1.39 #MONO 0.46 #EOS 0.25 #BASO 0.04 MANUAL DIFF NOT IND 

 

 12/10/2012 8:34 AM  Colonoscopy-Women and Men over 50 Declined Mammogram -
Women over 40 Declined Pap Smear Declined 

 

 2012 5:02 PM  WET PREP NO TRICH SEEN CLUE CELLS NONE SEEN 

 

 2013 8:25 AM  WBC 4.2 RBC 3.96 HGB 12.90 g/dLHCT 37.0 %MCV 93.0 fLMCH 
32.60 pgMCHC 34.90 g/dLRDW SD 43 RDW CV 12.60 %MPV 9.70 fLPLT 254 NRBC# 0.00 
NRBC% 0.0 %NEUT 54.40 %%LYMP 30.40 %%MONO 10.80 %%EOS 3.40 %%BASO 1.0 %#NEUT 
2.26 #LYMP 1.26 #MONO 0.45 #EOS 0.14 #BASO 0.04 MANUAL DIFF NOT IND TSH 1.230 
uIU/mLGLUCOSE 94.0 mg/dLSODIUM 136.0 mmol/LPOTASSIUM 4.30 mmol/LCHLORIDE 99.0 
mmol/LCO2 25.0 mmol/LBUN 10.0 mg/dLCREATININE 0.80 mg/dLSGOT/AST 36.0 IU/LSGPT/
ALT 36.0 IU/LALK PHOS 84.0 IU/LTOTAL PROTEIN 7.90 g/dLALBUMIN 4.40 g/dLTOTAL 
BILI 0.70 mg/dLCALCIUM 9.80 mg/dLAGE 67 GFR NonAA 72 GFR AA 87 eGFR 60 eGFR AA* 
60 TRIGLYCERIDES 122.0 mg/dLCHOLESTEROL 141.0 mg/dLHDL 47.0 mg/dLTOT CHOL/HDL 
3.0 LDL (CALC) 70.0 mg/dL

 

 2013 8:45 AM  WBC 5.3 RBC 4.01 HGB 13.0 g/dLHCT 37.60 %MCV 94.0 fLMCH 
32.40 pgMCHC 34.60 g/dLRDW SD 43 RDW CV 12.50 %MPV 9.40 fLPLT 248 NRBC# 0.00 
NRBC% 0.0 %NEUT 58.70 %%LYMP 27.80 %%MONO 9.80 %%EOS 2.80 %%BASO 0.90 %#NEUT 
3.12 #LYMP 1.48 #MONO 0.52 #EOS 0.15 #BASO 0.05 MANUAL DIFF NOT IND TSH 1.570 
uIU/mLGLUCOSE 94.0 mg/dLSODIUM 134.0 mmol/LPOTASSIUM 4.10 mmol/LCHLORIDE 101.0 
mmol/LCO2 23.0 mmol/LBUN 11.0 mg/dLCREATININE 0.80 mg/dLSGOT/AST 41.0 IU/LSGPT/
ALT 44.0 IU/LALK PHOS 109.0 IU/LTOTAL PROTEIN 7.90 g/dLALBUMIN 4.70 g/dLTOTAL 
BILI 0.80 mg/dLCALCIUM 10.40 mg/dLAGE 68 GFR NonAA 72 GFR AA 87 eGFR >60 mL/min/
1.73 m2eGFR AA* >60 TRIGLYCERIDES 163.0 mg/dLCHOLESTEROL 138.0 mg/dLHDL 49.0 mg/
dLTOT CHOL/HDL 2.8 LDL (CALC) 56.0 mg/dL

 

 2014 8:58 AM  GLUCOSE 86.0 mg/dLSODIUM 134.0 mmol/LPOTASSIUM 4.20 mmol/
LCHLORIDE 99.0 mmol/LCO2 25.0 mmol/LBUN 14.0 mg/dLCREATININE 0.80 mg/dLCALCIUM 
10.10 mg/dLAGE 68 GFR NonAA 72 GFR AA 87 eGFR >60 mL/min/1.73 m2eGFR AA* >60 
FREE T4 1.18 TSH 1.20 uIU/mL

 

 2014 9:50 AM  WBC 5.7 RBC 4.03 HGB 12.90 g/dLHCT 37.90 %MCV 94.0 fLMCH 
32.0 pgMCHC 34.0 g/dLRDW SD 44 RDW CV 12.70 %MPV 9.70 fLPLT 292 NRBC# 0.00 NRBC
% 0.0 %NEUT 62.70 %%LYMP 21.80 %%MONO 11.0 %%EOS 3.40 %%BASO 1.10 %#NEUT 3.55 #
LYMP 1.23 #MONO 0.62 #EOS 0.19 #BASO 0.06 MANUAL DIFF NOT IND GLUCOSE 98.0 mg/
dLSODIUM 135.0 mmol/LPOTASSIUM 4.30 mmol/LCHLORIDE 102.0 mmol/LCO2 22.0 mmol/
LBUN 10.0 mg/dLCREATININE 0.80 mg/dLSGOT/AST 34.0 IU/LSGPT/ALT 32.0 IU/LALK 
PHOS 95.0 IU/LTOTAL PROTEIN 7.70 g/dLALBUMIN 4.30 g/dLTOTAL BILI 0.80 mg/
dLCALCIUM 9.10 mg/dLAGE 68 GFR NonAA 72 GFR AA 87 eGFR 60 eGFR AA* 60 
TRIGLYCERIDES 108.0 mg/dLCHOLESTEROL 146.0 mg/dLHDL 56.0 mg/dLTOT CHOL/HDL 2.6 
LDL (CALC) 68.0 mg/dLTSH 0.90 uIU/mL

 

 2014 8:40 AM  WBC 4.4 RBC 4.13 HGB 13.20 g/dLHCT 38.90 %MCV 94.0 fLMCH 
32.0 pgMCHC 33.90 g/dLRDW SD 47 RDW CV 13.50 %MPV 9.80 fLPLT 279 NRBC# 0.00 NRBC
% 0.0 %NEUT 63.80 %%LYMP 24.60 %%MONO 9.30 %%EOS 1.60 %%BASO 0.70 %#NEUT 2.80 #
LYMP 1.08 #MONO 0.41 #EOS 0.07 #BASO 0.03 MANUAL DIFF NOT IND GLUCOSE 96.0 mg/
dLSODIUM 136.0 mmol/LPOTASSIUM 4.10 mmol/LCHLORIDE 99.0 mmol/LCO2 23.0 mmol/
LBUN 8.0 mg/dLCREATININE 0.80 mg/dLSGOT/AST 31.0 IU/LSGPT/ALT 27.0 IU/LALK PHOS 
85.0 IU/LTOTAL PROTEIN 7.60 g/dLALBUMIN 4.40 g/dLTOTAL BILI 0.80 mg/dLCALCIUM 
10.20 mg/dLAGE 69 GFR NonAA 71 GFR AA 86 eGFR 60 eGFR AA* 60 TRIGLYCERIDES 61.0 
mg/dLCHOLESTEROL 148.0 mg/dLHDL 71.0 mg/dLTOT CHOL/HDL 2.1 LDL (CALC) 65.0 mg/
dLTSH 0.990 uIU/mL

 

 2015 8:32 AM  WBC 4.8 RBC 3.98 HGB 12.70 g/dLHCT 37.30 %MCV 94.0 fLMCH 
31.90 pgMCHC 34.0 g/dLRDW SD 43 RDW CV 12.70 %MPV 9.50 fLPLT 270 NRBC# 0.00 NRBC
% 0.0 %NEUT 57.30 %%LYMP 25.0 %%MONO 13.0 %%EOS 3.60 %%BASO 1.10 %#NEUT 2.73 #
LYMP 1.19 #MONO 0.62 #EOS 0.17 #BASO 0.05 MANUAL DIFF NOT IND TSH 0.640 uIU/
mLGLUCOSE 90.0 mg/dLSODIUM 136.0 mmol/LPOTASSIUM 4.0 mmol/LCHLORIDE 101.0 mmol/
LCO2 24.0 mmol/LBUN 10.0 mg/dLCREATININE 0.80 mg/dLSGOT/AST 34.0 IU/LSGPT/ALT 
32.0 IU/LALK PHOS 92.0 IU/LTOTAL PROTEIN 7.50 g/dLALBUMIN 4.40 g/dLTOTAL BILI 
0.70 mg/dLCALCIUM 9.50 mg/dLAGE 69 GFR NonAA 71 GFR AA 86 eGFR >60 mL/min/1.73 
m2eGFR AA* >60 TRIGLYCERIDES 107.0 mg/dLCHOLESTEROL 138.0 mg/dLHDL 58.0 mg/
dLTOT CHOL/HDL 2.4 LDL (CALC) 59.0 mg/dL

 

 2015 8:31 AM  WBC 4.6 RBC 3.97 HGB 12.90 g/dLHCT 38.0 %MCV 96.0 fLMCH 
32.50 pgMCHC 33.90 g/dLRDW SD 44 RDW CV 12.60 %MPV 9.0 fLPLT 248 NRBC# 0.00 NRBC
% 0.0 %NEUT 61.0 %%LYMP 24.70 %%MONO 10.20 %%EOS 3.0 %%BASO 1.10 %#NEUT 2.82 #
LYMP 1.14 #MONO 0.47 #EOS 0.14 #BASO 0.05 MANUAL DIFF NOT IND TRIGLYCERIDES 
105.0 mg/dLCHOLESTEROL 141.0 mg/dLHDL 58.0 mg/dLTOT CHOL/HDL 2.4 LDL (CALC) 
62.0 mg/dLGLUCOSE 93.0 mg/dLSODIUM 136.0 mmol/LPOTASSIUM 4.10 mmol/LCHLORIDE 
101.0 mmol/LCO2 25.0 mmol/LBUN 9.0 mg/dLCREATININE 0.80 mg/dLSGOT/AST 32.0 IU/
LSGPT/ALT 28.0 IU/LALK PHOS 95.0 IU/LTOTAL PROTEIN 7.30 g/dLALBUMIN 4.50 g/
dLTOTAL BILI 0.80 mg/dLCALCIUM 9.70 mg/dLAGE 70 GFR NonAA 71 GFR AA 86 eGFR >60 
mL/min/1.73meGFR AA* >60 TSH 1.10 uIU/mL







History Of Immunizations







 Name  Date Admin  Mfg Name  Mfg Code  Trade Name  Lot#  Route  Inj  Vis Given  
Vis Pub  CVX

 

 Influenza  10/20/2010  [employer] pasteur  [employer]  FLUZONE  [id number]  Intramuscular  
Left Arm  10/20/2010  2010  999

 

 Influenza  10/28/2011  [employer] pasteur  [employer]  FLUZONE  [id number]  Intramuscular  
Left Arm  10/28/2011  2011  141

 

 Influenza  10/29/2012  [employer] pasteur  [employer]  FLUZONE  [id number]  Intramuscular  
Left Deltoid  10/29/2012  2012  141

 

 X  12/10/2012  [employer]  MSD  PNEUMOVAX 23  [id number]  Intramuscular  
Left Gluteous Medius  12/10/2012  2010  33

 

 Influenza  10/28/2013  [employer]ofi pasteur  [employer]  Fluzone > 3 Years  [id number]  
Intramuscular  Right Deltoid  10/28/2013  2013  141

 

 X  9/2/2015  Not Entered  NE  PREVNAR 13     Not Entered  Not Entered  1  109

 

 Influenza  2015  Not Entered  NE  Not Entered     Not Entered  Not Entered
  2015  141

 

 HepB Adult  2016  [employer]  MSD  RECOMBIVAX-ADULT  [id number]  Not 
Entered  Left Deltoid  2016  43

 

 HepB Adult  2016  [employer]  MSD  RECOMBIVAX-ADULT  [id number]  
Intramuscular  Right Deltoid  2016  43

 

 Zostavax  2012  Not Entered  NE  Not Entered     Not Entered  Not 
Entered  1  121

 

 Tdap  2012  Not Entered  NE  Not Entered     Not Entered  Not Entered  1  115

 

 Influenza  10/13/2016  Not Entered  NE  FLUZONE-HIGH DOSE     Intramuscular  
Left Arm  1  141

 

 HepB Adult  2016  [employer]  MSD  RECOMBIVAX-ADULT  [id number]  
Intramuscular  Right Deltoid  2016  43

 

 Influenza  10/30/2017  Not Entered  NE  Fluarix, quadrivalent, preservative 
free     Intramuscular  Left Arm  2017  150







History of Past Illness







 Name  Date of Onset  Comments

 

 Benign Essential Hypertension  Dec 14 2009 10:10AM   

 

 Hyperlipidemia  Dec 14 2009 10:10AM   

 

 Hypothyroidism, Acquired  Dec 14 2009 10:10AM   

 

 hypothyroid      

 

 Hypertension      

 

 Hyperlipidemia      

 

 acid reflux      

 

 Hypertension, Benign Essential  2010  9:41AM   

 

 Hypertension, Benign Essential  2010 12:53PM   

 

 Routine gynecological examination  May  5 2010  9:28AM   

 

 Hypertension  May  5 2010  9:28AM   

 

 Hyperlipidemia, unspecified  May  5 2010  9:28AM   

 

 Hypothyroidism, Acquired  May  5 2010  9:28AM   

 

 Vulvovaginitis  May  5 2010  9:28AM   

 

 Rhinitis, Allergic  May  5 2010  9:28AM   

 

 Hypertension, Benign Essential  May 19 2010  8:48AM   

 

 Hypertension, Benign Essential  May 25 2010  9:39AM   

 

 Flu  Oct 20 2010 12:01PM   

 

 Essential Hypertension  2010  8:34AM   

 

 Hyperlipidemia  2010  8:34AM   

 

 Gastroesophageal Reflux  2010  8:34AM   

 

 Hypothyroidism, Acquired  2010  8:34AM   

 

 Hypertension, Benign Essential  2010  9:22AM   

 

 Hypertension, Benign Essential  Dec 15 2010  9:07AM   

 

 Essential Hypertension  2011  1:31PM   

 

 Hyperlipidemia  2011  1:31PM   

 

 Gastroesophageal Reflux  2011  1:31PM   

 

 Hypothyroidism, Acquired  2011  1:31PM   

 

 Essential Hypertension  2011  8:51AM   

 

 Hyperlipidemia  2011  8:51AM   

 

 Gastroesophageal Reflux  2011  8:51AM   

 

 Hypothyroidism, Acquired  2011  8:51AM   

 

 Essential Hypertension  2011  9:13AM   

 

 Hyperlipidemia  2011  9:13AM   

 

 Gastroesophageal Reflux  2011  9:13AM   

 

 Hypothyroidism, Acquired  2011  9:13AM   

 

 Nausea With Vomiting  2011  1:18PM   

 

 Hyponatremia  2011  8:46AM   

 

 Nausea  2011  9:13AM   

 

 Hypothyroidism  2011 11:10AM   

 

 Hyponatremia  2011 11:10AM   

 

 Essential Hypertension  2011 10:05AM   

 

 Hyperlipidemia  2011 10:05AM   

 

 Gastroesophageal Reflux  2011 10:05AM   

 

 Nausea  2011 10:05AM   

 

 Hypothyroidism, Acquired  2011 10:05AM   

 

 Hyponatremia  May  6 2011 11:34AM   

 

 Essential Hypertension  May 16 2011  8:50AM   

 

 Hyperlipidemia  May 16 2011  8:50AM   

 

 Gastroesophageal Reflux  May 16 2011  8:50AM   

 

 Nausea  May 16 2011  8:50AM   

 

 Hypothyroidism, Acquired  May 16 2011  8:50AM   

 

 Hyponatremia  May 16 2011  8:50AM   

 

 Routine gynecological examination  2011  8:54AM   

 

 Hypertension  2011  8:54AM   

 

 Hyperlipidemia, unspecified  2011  8:54AM   

 

 Hypothyroidism, Acquired  2011  8:54AM   

 

 Rhinitis, Allergic  2011  8:54AM   

 

 Hypothyroidism  Aug 29 2011 12:37PM   

 

 Hyponatremia  Aug 29 2011 12:37PM   

 

 Essential Hypertension  Sep 12 2011  8:53AM   

 

 Hyperlipidemia  Sep 12 2011  8:53AM   

 

 Gastroesophageal Reflux  Sep 12 2011  8:53AM   

 

 Hypothyroidism, Acquired  Sep 12 2011  8:53AM   

 

 Hyponatremia  Sep 12 2011  8:53AM   

 

 Hypertension  Sep 29 2011  9:41AM   

 

 Hyperlipidemia  Dec  8 2011  8:31AM   

 

 Hypothyroidism  Dec  8 2011  8:31AM   

 

 Hypertension  Dec  8 2011  8:31AM   

 

 Flu  Dec  9 2011 10:02AM   

 

 Essential Hypertension  Dec 13 2011 10:13AM   

 

 Hyperlipidemia  Dec 13 2011 10:13AM   

 

 Gastroesophageal Reflux  Dec 13 2011 10:13AM   

 

 Hypothyroidism, Acquired  Dec 13 2011 10:13AM   

 

 Hyponatremia  Dec 13 2011 10:13AM   

 

 Vaginal Discharge  2012  9:43AM   

 

 Rhinitis, Allergic  Feb 15 2012  9:31AM   

 

 Eustachian Tube Dysfunction  Feb 15 2012  9:31AM   

 

 Pharyngitis, Acute  Feb 15 2012  9:31AM   

 

 Routine gynecological examination  2012  8:48AM   

 

 Hypertension  2012  8:48AM   

 

 Hyperlipidemia, unspecified  2012  8:48AM   

 

 Hypothyroidism, Acquired  2012  8:48AM   

 

 Rhinitis, Allergic  2012  8:48AM   

 

 Candidiasis, Vulvovaginal  2012 11:04AM   

 

 Essential Hypertension  Aug 15 2012  9:02AM   

 

 Hyperlipidemia  Aug 15 2012  9:02AM   

 

 Gastroesophageal Reflux  Aug 15 2012  9:02AM   

 

 Hypothyroidism, Acquired  Aug 15 2012  9:02AM   

 

 Hyponatremia  Aug 15 2012  9:02AM   

 

 Atrophic Vaginitis, Postmenopausal  Aug 15 2012  9:02AM   

 

 Flu  Oct 29 2012  9:24AM   

 

 Essential Hypertension  Dec 10 2012  8:35AM   

 

 Hyperlipidemia  Dec 10 2012  8:35AM   

 

 Gastroesophageal Reflux  Dec 10 2012  8:35AM   

 

 Hypothyroidism, Acquired  Dec 10 2012  8:35AM   

 

 Hyponatremia  Dec 10 2012  8:35AM   

 

 Atrophic Vaginitis, Postmenopausal  Dec 10 2012  8:35AM   

 

 Pneumococcus  Dec 10 2012  2:07PM   

 

 Vaginal Discharge  Dec 20 2012  1:50PM   

 

 Essential Hypertension  Dec 20 2012  1:50PM   

 

 Hyperlipidemia  Dec 20 2012  1:50PM   

 

 Gastroesophageal Reflux  Dec 20 2012  1:50PM   

 

 Hypothyroidism, Acquired  Dec 20 2012  1:50PM   

 

 Atrophic Vaginitis, Postmenopausal  Dec 20 2012  1:50PM   

 

 Upper Respiratory Infections  2013  8:52AM   

 

 Hyperlipidemia  2013  8:21AM   

 

 Hypertension  2013  8:21AM   

 

 Hypothyroidism  2013  8:21AM   

 

 Screening Mammogram  Beto 10 2013  8:45AM   

 

 Routine gynecological examination  Beto 10 2013  8:34AM   

 

 Hypertension  Beto 10 2013  8:34AM   

 

 Hyperlipidemia, unspecified  Beto 10 2013  8:34AM   

 

 Hypothyroidism, Acquired  Beto 10 2013  8:34AM   

 

 Rhinitis, Allergic  Beto 10 2013  8:34AM   

 

 Flu  Oct 28 2013  8:52AM   

 

 Essential Hypertension  Dec  9 2013  8:37AM   

 

 Hyperlipidemia  Dec  9 2013  8:37AM   

 

 Gastroesophageal Reflux  Dec  9 2013  8:37AM   

 

 Hypothyroidism, Acquired  Dec  9 2013  8:37AM   

 

 Atrophic Vaginitis, Postmenopausal  Dec  9 2013  8:37AM   

 

 Hypertension  2014  9:56AM   

 

 Hyperlipidemia  2014  9:56AM   

 

 Hypothyroidism  2014  9:56AM   

 

 Screening Mammogram  2014  8:32AM   

 

 Osteopenia  2014  8:32AM   

 

 Hypertension  2014  8:35AM   

 

 Hypothyroidism, Acquired  2014  8:35AM   

 

 Hyperlipidemia  2014  8:35AM   

 

 Upper Respiratory Infections  2014  9:28AM   

 

 Sinusitis, Acute  Oct  2 2014  9:17AM   

 

 Herpes Zoster  Oct  5 2014  1:44PM   

 

 Vesicular Eruption  Oct  5 2014  1:44PM   

 

 Hypertension  2014  8:18AM   

 

 Hyperlipidemia  2014  8:18AM   

 

 hypothyroid  2014  8:18AM   

 

 Situational anxiety  Dec 20 2014  9:33AM   

 

 GERD (gastroesophageal reflux disease)  Dec 20 2014  9:33AM   

 

 Nausea  Dec 20 2014  9:33AM   

 

 Abdominal Pain, Epigastric  Dec 20 2014  9:33AM   

 

 Hyperlipidemia  Oct  6 2014  9:03AM   

 

 acid reflux  Oct  6 2014  9:03AM   

 

 hypothyroid  Oct  6 2014  9:03AM   

 

 Shingles  Oct  6 2014  9:03AM   

 

 Pharyngitis  2015  1:09PM   

 

 Bronchitis  2015  9:10AM   

 

 Hyperlipidemia  2015  9:10AM   

 

 acid reflux  2015  9:10AM   

 

 hypothyroid  2015  9:10AM   

 

 Hyperlipidemia  2015  8:18AM   

 

 Hypertension  2015  8:18AM   

 

 hypothyroid  2015  8:18AM   

 

 Screening Mammogram  2015 11:43AM   

 

 Medicare Annual Pap Q 2 years  2015  9:36AM   

 

 Screening Mammogram  2015  9:36AM   

 

 Candidiasis of female genitalia  2015  9:36AM   

 

 Hypertension  2015 11:34AM   

 

 Hyperlipidemia, unspecified  2015 11:34AM   

 

 Hypothyroidism, Acquired  2015 11:34AM   

 

 Osteopenia  2016  8:41AM   

 

 Encounter for screening mammogram for breast cancer  2016  8:41AM   

 

 Hypertension  2016  8:34AM   

 

 Lymphedema  2016  8:34AM   

 

 Hyperlipidemia  2016  8:34AM   

 

 hypothyroid  2016  8:34AM   

 

 HEP B  2016  9:13AM   

 

 HEP B  2016  8:52AM   

 

 Acid Reflux  2016  8:34AM   

 

 History of acute gastritis  Oct 13 2016  2:30PM   

 

 Anxiety as acute reaction to exceptional stress  Oct 13 2016  2:30PM   

 

 HEP B  Dec 29 2016  8:42AM   

 

 Caregiver stress syndrome  May 26 2017  8:28AM   

 

 Anxiety  May 26 2017  8:28AM   

 

 Blood in stool  2017  2:54PM   

 

 Upper GI bleed  2017 11:34AM   

 

 Hyponatremia  2017  9:03AM   

 

 Hyponatremia  2017  8:43AM   

 

 Medicare Annual Pap Q 2 years  2017  9:03AM   

 

 Nausea  2017  9:03AM   

 

 Uterine enlargement  2017  9:03AM   

 

 Encounter for screening mammogram for breast cancer  2017  4:56PM   

 

 Panic disorder [episodic paroxysmal anxiety]  Oct 10 2017  2:02PM   

 

 Acute stress reaction  Oct 10 2017  2:02PM   

 

 Caregiver stress syndrome  Oct 10 2017  2:02PM   

 

 Stress reaction causing mixed disturbance of emotion and conduct  Oct 18 2017  
9:05AM   

 

 Ankle strain, left, initial encounter  Dec 19 2017  8:29AM   

 

 Excoriation of ear canal, left, initial encounter  Dec 19 2017  8:29AM   

 

 Generalized anxiety disorder  Dec 19 2017  8:29AM   

 

 Grief reaction  Dec 19 2017  8:29AM   

 

 Acute non-recurrent frontal sinusitis  2018 11:58AM   

 

 Cough  2018 11:58AM   

 

 Hypertension  2018  8:10AM   

 

 Hyperlipidemia, unspecified  2018  8:10AM   

 

 Hypothyroidism, Acquired  2018  8:10AM   

 

 Situational anxiety  Mar  6 2018  8:30AM   

 

 Nausea  Mar  6 2018  8:30AM   

 

 LESLIE (generalized anxiety disorder)  May 22 2018  8:47AM   

 

 Caregiver stress syndrome  May 22 2018  8:47AM   







Payers







 Insurance Name  Company Name  Plan Name  Plan Number  Policy Number  Policy 
Group Number  Start Date

 

    Medicare RH  Medicare RHC     [id number]     N/A

 

    [employer]  [facility]     [id number]     2009

 

    Medicare Part B  [place]ic[place]     [id number]     N/A

 

    Medicare Part A  Medicare Part A     [id number]     N/A

 

    Medicare Part A  [employer] - Preventive     [id number]     N/A

 

    Medicare Part A  Medicare - Lab/Xray     [id number]     N/A







History of Encounters







 Visit Date  Visit Type  Provider

 

 2018  Office visit  Doris Noriega MD

 

 3/6/2018  Office visit  Doris Noriega MD

 

 2018  Office visit  Deedee Carter APRN

 

 2017  Office visit  Doris Noriega MD

 

 10/18/2017  Office visit  Doris Noriega MD

 

 10/10/2017  Office visit  Doris Noriega MD

 

 2017  Office visit  Doris Noriega MD

 

 2017  Office visit  Doris Noriega MD

 

 2017  Office visit  Doris Noriega MD

 

 2017  Office visit  Prince Noe APRN

 

 2016  Nurse visit  Doris Noriega MD

 

 10/13/2016  Office visit  Doris Noriega MD

 

 2016  Nurse visit  Doris Noriega MD

 

 2016  Office visit  Doris Noriega MD

 

 2015  Office visit  Doris Noriega MD

 

 2015  Office visit  Doris Noriega MD

 

 2015  Office visit  Prince Noe APRN

 

 2014  Office visit  Anyi Herrera APRN

 

 10/27/2014  Voided  Doris Noriega MD

 

 10/6/2014  Office visit  Doris Noriega MD

 

 10/5/2014  Office visit  Anyi Herrera APRN

 

 10/2/2014  Office visit   

 

 10/2/2014  Office visit  Corrine Bay APRN

 

 2014  Office visit  Kassie Franklin APRN

 

 2014  Office visit  Doris Noriega MD

 

 2014  Office visit  Doris Noriega MD

 

 2013  Office visit  Dee Colindres MD

 

 10/28/2013  Nurse visit  Dee Colindres MD

 

 6/10/2013  Office visit  Dee Colindres MD

 

 2013  Office visit  Corrine Bay APRN

 

 2012  Office visit  Dee Colindres MD

 

 12/10/2012  Office visit  Dee Colindres MD

 

 10/29/2012  Nurse visit  Dee Colindres MD

 

 8/15/2012  Office visit  Dee Colindres MD

 

 2012  Office visit  Corrine Bay APRN

 

 2012  Office visit  Dee Colindres MD

 

 2/15/2012  Office visit  Dee Colindres MD

 

 2012  Office visit  Corrine Bay APRN

 

 2011  Office visit  Dee Colindres MD

 

 10/28/2011  Nurse visit  Shad Nolasco MD

 

 2011  Office visit  Dee Colindres MD

 

 2011  Office visit  Dee Colindres MD

 

 2011  Office visit  Dee Colindres MD

 

 2011  Office visit  Dee Colindres MD

 

 2011  Office visit  Dee Colindres MD

 

 2011  Office visit  Dee Colindres MD

 

 4/10/2011  Hospital  KHRIS Reeves MD

 

 4/10/2011  Hospital  RICKEY Toussaint MD

 

 2011  Office visit  RICKEY Toussaint MD

 

 2011  [facility]  Fide Castellanos MD

 

 2011  Voided  Fide Castellanos MD

 

 2011  Office visit  Dee Colindres MD

 

 12/15/2010  Nurse visit  Dee Colindres MD

 

 2010  Office visit  Dee Colindres MD

 

 10/20/2010  Nurse visit  Stephenie PERAZA

 

 2010  Nurse visit  Dee Colindres MD

 

 2010  Nurse visit  Dee Colindres MD

 

 2010  Office visit  Dee Colindres MD

 

 3/19/2010  Voided  Stephenie Kay PA

 

 2010  Nurse visit  Stephenie PERAZA

 

 2010  Nurse visit  Stephenie PERAZA

 

 2010  Nurse visit  Stephenie PERAZA

 

 2009  Office visit  Stephenie PERAZA

 

 10/20/2009  Nurse visit  Stephenie Kay PA

## 2018-10-22 NOTE — XMS REPORT
MU2 Ambulatory Summary

 Created on: 10/12/2017



Mialdy Crespo

External Reference #: [id number]

: 1945

Sex: Female



Demographics







 Address  [address]

[place]  [address]

 

 Home Phone  [phone]

 

 Preferred Language  English

 

 Marital Status  

 

 Worship Affiliation  Unknown

 

 Race  White

 

 Ethnic Group  Not  or 





Author







 Author  Doris Noriega

 

 Organization  [facility]

 

 Address  [address]

[place], [place]  [id number]



 

 Phone  [phone]







Care Team Providers







 Care Team Member Name  Role  Phone

 

 Doris Noriega  PCP  [phone]

 

 Doris Noriega  PreferredProvider  [phone]







Allergies and Adverse Reactions







 Name  Reaction  Notes

 

 NO KNOWN DRUG ALLERGIES      







Plan of Treatment







 Planned Activity  Comments  Planned Date  Planned Time  Plan/Goal

 

 Complete blood count (CBC) with differential count     2016  12:00 AM   

 

 Comprehensive metabolic panel     2016  12:00 AM   

 

 VITAMIN D (25 HYDROXY)     2016  12:00 AM   

 

 Lipid panel (total cholesterol, lipoproteins, HDL, triglycerides)     8/15/
2012  12:00 AM   

 

 Pap smear     2017  12:00 AM   

 

 Basic metabolic panel     2017  12:00 AM   

 

 Comprehensive Metabolic Panel     6/10/2013  12:00 AM   

 

 Lipid panel (total cholesterol, lipoproteins, HDL, triglycerides)     6/10/
2013  12:00 AM   

 

 Thyroid stimulating hormone (TSH)     6/10/2013  12:00 AM   

 

 CBC (automated H&H, platelets, WBC and automated differential)     6/10/2013  
12:00 AM   

 

 Comprehensive Metabolic Panel     2012  12:00 AM   

 

 Lipid panel (total cholesterol, lipoproteins, HDL, triglycerides)     2012  12:00 AM   

 

 Thyroid stimulating hormone (TSH)     2012  12:00 AM   

 

 CBC (automated H&H, platelets, WBC and automated differential)     2012  
12:00 AM   

 

 Screening mammography, bilateral     2015  12:00 AM   







Medications







 Active 

 

 Name  Start Date  Estimated Completion Date  SIG  Comments

 

 aspirin 81 mg oral tablet,delayed release (DR/EC)        take 1 tablet (81 mg) 
by oral route once daily   

 

 Vitamin D3 1,000 unit oral capsule        take 1 capsule by oral route daily   

 

 famotidine 20 mg oral tablet  2015     TAKE ONE TABLET BY MOUTH TWICE 
DAILY   

 

 valsartan 160 mg oral tablet  2016     TAKE ONE TABLET BY MOUTH ONCE 
DAILY   

 

 amlodipine 5 mg oral tablet  2016     TAKE ONE TABLET BY MOUTH ONCE DAILY
   

 

 Allergy Relief (loratadine) 10 mg oral tablet  2016     TAKE ONE TABLET 
BY MOUTH ONCE DAILY   

 

 amlodipine 5 mg oral tablet  2016     TAKE ONE TABLET BY MOUTH ONCE DAILY
   

 

 Allergy Relief (loratadine) 10 mg oral tablet  2016     TAKE ONE TABLET 
BY MOUTH ONCE DAILY   

 

 valsartan 160 mg oral tablet  2016     TAKE ONE TABLET BY MOUTH ONCE 
DAILY   

 

 Lipitor 20 mg oral tablet  10/13/2016  2018  take 1 tablet (20 mg) by oral 
route once daily   

 

 Plavix 75 mg oral tablet  10/13/2016  2018  take 1 tablet (75 mg) by oral 
route once daily   

 

 Zofran (as hydrochloride) 4 mg oral tablet  2016     take 1 tablet by 
oral route daily as needed for 14 days   

 

 Zofran (as hydrochloride) 4 mg oral tablet  2017     take 1 tablet by oral 
route daily as needed for 14 days   

 

 Zofran (as hydrochloride) 4 mg oral tablet  2017     take 1 tablet by oral 
route daily as needed for 14 days   

 

 Zofran (as hydrochloride) 4 mg oral tablet  2017     take 1 tablet by 
oral route daily as needed for 14 days   

 

 Zofran (as hydrochloride) 4 mg oral tablet  2017     take 1 tablet by 
oral route daily as needed for 14 days   

 

 triamcinolone acetonide 0.1 % topical cream  2017     APPLY A THIN LAYER 
OF CREAM EXTERNALLY TO AFFECTED AREA TWICE DAILY FOR 14 DAYS   

 

 loratadine 10 mg oral tablet  2017  TAKE 1 TABLET BY MOUTH 
EVERY DAY IN THE EVENING   

 

 triamcinolone acetonide 0.1 % topical cream  2017     APPLY  CREAM 
EXTERNALLY TO AFFECTED AREA TWICE DAILY FOR 14 DAYS   

 

 EQ LORATADINE 10MG  TABS  2017     TAKE ONE TABLET BY MOUTH ONCE DAILY   

 

 Lexapro 10 mg oral tablet  2017     take 1 tablet (10 mg) by oral route 
once daily for 90 days   

 

 pantoprazole 40 mg oral tablet,delayed release (DR/EC)  10/9/2017     take 1 
tablet (40 mg) by oral route once daily for 90 days   

 

 mirtazapine 15 mg oral tablet  10/10/2017  2017  take 0.5 tablet by oral 
route once a day (at bedtime) for 30 days   

 

 clorazepate dipotassium 7.5 mg oral tablet  10/10/2017  2017  take 1/2 
to1 tablet PO up to three times per day for situational anxiety   

 

 ondansetron 4 mg oral tablet,disintegrating  10/10/2017  2017  dissolve  
1 tablet by oral route every 8 hours as needed for 30 days   









  

 

 Name  Start Date  Expiration Date  SIG  Comments

 

 prednisone 20 mg oral tablet  2011  take 2 tablets by oral 
route daily for 5 days   

 

 calcium carbonate-vitamin D2 600-125 mg-unit oral tablet  8/15/2012  2012
  take 2 tablets by oral route daily   

 

 [place] 3 Fish Oil 900-1,400 mg oral capsule,delayed release(DR/EC)  8/15/2012  9
/  take 1 capsule by oral route daily   

 

 multivitamin Oral tablet  8/15/2012  2012  take 1 tablet by oral route 
daily   

 

 triamcinolone acetonide 0.1 % topical cream  2013  10/7/2013  APPLY A 
THIN LAYER TO THE AFFECTED AREA(S) BY TOPICAL ROUTE TWICE A DAY   

 

 famotidine 20 mg oral tablet  2014  take 1 tablet (20 mg) by 
oral route 2 times per day   

 

 amoxicillin 500 mg oral capsule  2014  take 1 capsule (500 mg) 
by oral route 3 times per day for 10 days   

 

 Zithromax Z-Tab 250 mg oral tablet  10/2/2014  10/7/2014  take 2 tablets (500 
mg) by oral route once daily for 1 day then 1 tablet (250 mg) by oral route 
once daily for 4 days   

 

 acyclovir 800 mg oral tablet  10/5/2014  10/12/2014  take 1 tablet (800 mg) by 
oral route 5 times per day for 7 days   

 

 gabapentin 300 mg oral capsule  10/9/2014  2014  take 1 capsule (300 mg) 
by oral route 3 times per day for 14 days   

 

 Carafate 100 mg/mL oral suspension  2014  take 10 milliliters 
(1 gram) by oral route 4 times per day on an empty stomach 1 hour before meals 
and at bedtime for 4 weeks   

 

 amlodipine 5 mg oral tablet  2015  TAKE ONE TABLET BY MOUTH 
EVERY DAY   

 

 levothyroxine 50 mcg oral tablet  2015  TAKE ONE TABLET BY MOUTH 
EVERY DAY   

 

 Diovan 160 mg oral tablet  2015  2/15/2016  TAKE ONE TABLET BY MOUTH 
EVERY DAY for 90 days   

 

 triamcinolone acetonide 0.1 % topical cream  2015  apply a thin 
layer to the affected area(s) by topical route 2 times per day for 14 days   

 

 fluconazole 150 mg oral tablet  2015  take 1 tablet (150 mg) 
by oral route once for 1 day   

 

 amlodipine 5 mg oral tablet  10/13/2016  10/8/2017  TAKE ONE TABLET BY MOUTH 
ONCE DAILY for 90 days   

 

 atenolol 50 mg oral tablet  10/13/2016  10/8/2017  take 1 tablet (50 mg) by 
oral route once daily   

 

 valsartan 160 mg oral tablet  10/13/2016  10/8/2017  TAKE ONE TABLET BY MOUTH 
ONCE DAILY for 90 days   

 

 Zofran (as hydrochloride) 4 mg oral tablet  10/13/2016  10/27/2016  take 1 
tablet by oral route daily as needed for 14 days   

 

 Zofran (as hydrochloride) 4 mg oral tablet  2017     take 1 tablet by 
oral route daily as needed for 14 days   

 

 levothyroxine 50 mcg oral tablet  10/9/2017  10/9/2017  TAKE ONE TABLET BY 
MOUTH ONCE DAILY   









 Discontinued 

 

 Name  Start Date  Discontinued Date  SIG  Comments

 

 Lipitor 40 mg oral tablet     2009 TAB DAILY   

 

 ramipril 5 mg oral capsule     2009  take 1 capsule (5 mg) by oral route 
once daily   

 

 lovastatin 20 mg oral tablet  2009  take 1 tablet (20 mg) by 
oral route once daily with evening meal   

 

 propranolol 20 mg oral tablet  2010  take 1 tablet by oral 
route 2 times a day   

 

 Fosamax 70 mg oral tablet  2010  take 1 tablet (70 mg) by oral 
route once weekly in the morning, at least 30 minutes before the first food, 
beverage, or medication of the day   

 

 lisinopril 40 mg oral tablet  2010  4/10/2011  take 2 tablets by oral 
route daily   

 

 Zoloft 50 mg oral tablet  2011  take 1 tablet (50 mg) by oral 
route once daily   

 

 promethazine 25 mg oral tablet  2011  take 1 tablet by oral 
route every 6 hours as needed   

 

 Diovan -12.5 mg oral tablet  2011  take 1 tablet by oral 
route once daily for 30 days   

 

 Diflucan 150 mg oral tablet     2012  take 1 tablet (150 mg) by oral 
route once for 1 day   

 

 Diflucan 150 mg oral tablet  2012  8/15/2012  take 1 tablet (150 mg) by 
oral route once   

 

 Vitamin B-12 1,000 mcg oral tablet  8/15/2012  2014  take 1 tablet by 
oral route daily   

 

 Premarin 0.625 mg/gram vaginal cream  10/29/2012  6/10/2013  use 1 gram daily 
for a week then 1 gram twice a week   

 

 Medrol (Tab) 4 mg oral tablets,dose pack  2013  6/10/2013  take as 
directed   

 

 aspirin 325 mg oral tablet  2014  take 1 tablet (325 mg) by 
oral route once daily   

 

 Medrol (Tab) 4 mg oral tablets,dose pack  2014  10/2/2014  take as 
directed   

 

 Tetracaine Lollipops Lollipop  2015  Use as directed   

 

 Zoloft 50 mg oral tablet  2016  take 1 tablet (50 mg) by oral 
route once daily for 90 days   

 

 Zoloft 50 mg oral tablet  2016  take 1 tablet (50 mg) by oral 
route once daily for 90 days  Pt refuses to take...







Problem List







 Description  Status  Onset

 

 Hyperlipidemia  Active   

 

 acid reflux  Active   

 

 Hypertension  Active   

 

 hypothyroid  Active   







Vital Signs







 Date  Time  BP-Sys(mm[Hg]  BP-Morena(mm[Hg])  HR(bpm)  RR(rpm)  Temp  WT  HT  HC  
BMI  BSA  BMI Percentile  O2 Sat(%)

 

 10/10/2017  1:52:00 PM  118 mmHg  72 mmHg  74 bpm  16 rpm  98.1 F  121 lbs  61 
in     22.86 kg/m2  1.54 m2     99 %

 

 2017  8:40:00 AM  118 mmHg  68 mmHg  63 bpm  16 rpm  98.1 F  123.125 lbs  
61 in     23.264 kg/m  1.5504 m     100 %

 

 2017  8:57:00 AM  116 mmHg  62 mmHg  71 bpm  16 rpm  99.8 F  121.5 lbs  
61 in     22.96 kg/m2  1.54 m2     98 %

 

 2017  11:30:00 AM  128 mmHg  78 mmHg  69 bpm  16 rpm  98.8 F  129.375 lbs  
61 in     24.4449 kg/m  1.5892 m     98 %

 

 2017  8:22:00 AM  118 mmHg  78 mmHg  62 bpm  18 rpm  97.5 F  129.125 lbs  
61 in     24.40 kg/m2  1.59 m2     100 %

 

 10/13/2016  2:26:00 PM  128 mmHg  78 mmHg  77 bpm  16 rpm  98.4 F  139.25 lbs  
61 in     26.3108 kg/m  1.6488 m     100 %

 

 2016  8:29:00 AM  122 mmHg  70 mmHg  72 bpm  16 rpm  97.8 F  137.125 lbs  
61 in     25.91 kg/m2  1.64 m2     100 %

 

 2015  9:33:00 AM  120 mmHg  68 mmHg  73 bpm     98.7 F  144.375 lbs  61 
in     27.2791 kg/m  1.6788 m     99 %

 

 2015  9:05:00 AM  110 mmHg  75 mmHg  85 bpm  16 rpm  96.7 F  144.375 lbs  
61 in     27.28 kg/m2  1.68 m2     99 %

 

 2015  1:05:00 PM  108 mmHg  62 mmHg  78 bpm  18 rpm  96.9 F  142.375 lbs  
61 in     26.9012 kg/m  1.6672 m     100 %

 

 2014  9:31:00 AM  126 mmHg  66 mmHg  79 bpm  18 rpm  98.7 F  149 lbs  61 
in     28.15 kg/m2  1.71 m2     98 %

 

 10/6/2014  9:02:00 AM  110 mmHg  74 mmHg  94 bpm  18 rpm  98.1 F  145.4 lbs  
61 in     27.4728 kg/m  1.6848 m     97 %

 

 10/2/2014  9:14:00 AM  124 mmHg  74 mmHg  79 bpm  18 rpm  98 F  147.25 lbs  61 
in     27.82 kg/m2  1.70 m2     98 %

 

 2014  9:22:00 AM  124 mmHg  76 mmHg  89 bpm  18 rpm  98.1 F  148 lbs  61 
in     27.9641 kg/m  1.6998 m     100 %

 

 2014  8:27:00 AM  118 mmHg  65 mmHg  74 bpm  16 rpm  97.7 F  148 lbs  61 
in     27.96 kg/m2  1.70 m2     99 %

 

 2014  9:48:00 AM  125 mmHg  70 mmHg  93 bpm  18 rpm  96.7 F  156 lbs  61 
in     29.4756 kg/m  1.7451 m     100 %

 

 2013  8:35:00 AM  122 mmHg  74 mmHg  84 bpm  16 rpm  97.9 F  155.375 lbs 
                99 %

 

 6/10/2013  8:30:00 AM  130 mmHg  82 mmHg  79 bpm  16 rpm  97.9 F  161 lbs     
            98 %

 

 2013  8:50:00 AM  124 mmHg  68 mmHg  66 bpm  18 rpm  97.6 F  157.5 lbs  
61 in     29.76 kg/m2  1.75 m2      

 

 2012  1:46:00 PM  120 mmHg  72 mmHg  75 bpm  16 rpm  97.6 F  156.125 lbs
                 98 %

 

 12/10/2012  8:32:00 AM  122 mmHg  82 mmHg  70 bpm  16 rpm  97.3 F  157 lbs    
             99 %

 

 8/15/2012  9:00:00 AM  130 mmHg  76 mmHg  86 bpm  16 rpm  97.4 F  159 lbs     
            100 %

 

 2012  11:02:00 AM  128 mmHg  64 mmHg  66 bpm  18 rpm  97.4 F  162.125 lbs
  61 in     30.63 kg/m2  1.78 m2      

 

 2012  8:45:00 AM  130 mmHg  70 mmHg  82 bpm  16 rpm  98.2 F  160.125 lbs 
                100 %

 

 2/15/2012  9:29:00 AM  122 mmHg  80 mmHg  86 bpm  16 rpm  97.4 F  159.375 lbs  
61 in     30.11 kg/m2  1.76 m2     99 %

 

 2012  9:41:00 AM  132 mmHg  74 mmHg  66 bpm  18 rpm  98.4 F  160.375 lbs  
61 in     30.3023 kg/m  1.7694 m      

 

 2011  10:08:00 AM  134 mmHg  82 mmHg  80 bpm  16 rpm  98 F  160 lbs  61 
in     30.23 kg/m2  1.77 m2     99 %

 

 2011  3:56:00 PM  130 mmHg  80 mmHg                              

 

 2011  8:55:00 AM  130 mmHg  74 mmHg  88 bpm  16 rpm  98.2 F  158.25 lbs  
               98 %

 

 2011  8:54:00 AM  136 mmHg  82 mmHg  102 bpm  16 rpm  98.1 F  153.5 lbs   
              99 %

 

 2011  8:49:00 AM  122 mmHg  88 mmHg  88 bpm  16 rpm  98.6 F  152.25 lbs  
               99 %

 

 2011  10:02:00 AM  140 mmHg  82 mmHg  96 bpm  20 rpm  98.8 F             
       100 %

 

 2011  9:14:00 AM  156 mmHg  98 mmHg  110 bpm  24 rpm  98.5 F  153 lbs    
             100 %

 

 2011  8:50:00 AM  160 mmHg  84 mmHg  100 bpm  16 rpm  98.7 F  155 lbs    
             100 %

 

 2011  1:25:00 PM  152 mmHg  100 mmHg  82 bpm  16 rpm  97.2 F  156.375 lbs 
                100 %

 

 2011  9:55:00 AM  144 mmHg  90 mmHg                              

 

 2010  9:24:00 AM  138 mmHg  84 mmHg                              

 

 2010  8:58:00 AM  160 mmHg  94 mmHg                              

 

 2010  8:33:00 AM  142 mmHg  90 mmHg  100 bpm  16 rpm  98.1 F  158.375 
lbs                  

 

 2010  9:24:00 AM  160 mmHg  102 mmHg  88 bpm  18 rpm  99 F  157.125 lbs  
62 in     28.7382 kg/m  1.7657 m      

 

 3/19/2010  11:01:00 AM  140 mmHg  90 mmHg                              

 

 2009  10:08:00 AM  142 mmHg  86 mmHg  72 bpm  16 rpm  98.1 F  154.125 
lbs  61 in     29.12 kg/m2  1.73 m2      







Social History







 Name  Description  Comments

 

       

 

 Children      

 

 lives alone      

 

 Supervisor      

 

 Tobacco  Never smoker   







History of Procedures







 Date Ordered  Description  Order Status

 

 2011 12:00 AM  COMPREHEN METABOLIC PANEL  Reviewed

 

 2011 12:00 AM  ASSAY THYROID STIM HORMONE  Reviewed

 

 2011 12:00 AM  COMPREHEN METABOLIC PANEL  Reviewed

 

 2011 12:00 AM  LIPID PANEL  Reviewed

 

 2011 12:00 AM  ASSAY THYROID STIM HORMONE  Reviewed

 

 2011 12:00 AM  COMPLETE CBC W/AUTO DIFF WBC  Reviewed

 

 2015 12:00 AM  COMPLETE CBC W/AUTO DIFF WBC  Reviewed

 

 2015 12:00 AM  COMPREHEN METABOLIC PANEL  Reviewed

 

 2015 12:00 AM  LIPID PANEL  Reviewed

 

 2015 12:00 AM  ASSAY THYROID STIM HORMONE  Reviewed

 

 2011 12:00 AM  COMPREHEN METABOLIC PANEL  Reviewed

 

 2011 12:00 AM  COMPREHEN METABOLIC PANEL  Reviewed

 

 2011 12:00 AM  LIPID PANEL  Reviewed

 

 2011 12:00 AM  ASSAY THYROID STIM HORMONE  Reviewed

 

 10/28/2011 12:00 AM  ***Immunization administration, Medicare flu  Reviewed

 

 10/28/2011 12:00 AM  Fluzone ** MEDICARE Only **  Reviewed

 

 2010 11:24 AM  NO CHARGE OV  Reviewed

 

 2010 11:26 AM  NO CHARGE OV  Reviewed

 

 2011 12:00 AM  COMPREHEN METABOLIC PANEL  Reviewed

 

 2011 12:00 AM  LIPID PANEL  Reviewed

 

 2011 12:00 AM  ASSAY THYROID STIM HORMONE  Reviewed

 

 2011 12:00 AM  COMPLETE CBC W/AUTO DIFF WBC  Reviewed

 

 2011 12:00 AM  Wrist Support  Reviewed

 

 2016 12:00 AM  Screening mammography, bilateral  Reviewed

 

 2016 12:00 AM  DXA BONE DENSITY AXIAL  Returned

 

 2016 12:00 AM  TETANUS VACCINE IM  Reviewed

 

 2016 12:00 AM  HEP B VACC ADULT 3 DOSE IM  Reviewed

 

 2012 12:00 AM  TISSUE EXAM FOR FUNGI  Reviewed

 

 2012 12:00 AM  SMEAR WET MOUNT SALINE/INK  Reviewed

 

 2016 12:00 AM  TETANUS VACCINE IM  Reviewed

 

 2016 12:00 AM  HEP B VACC ADULT 3 DOSE IM  Reviewed

 

 2/15/2012 12:00 AM  THER/PROPH/DIAG INJ SC/IM  Reviewed

 

 2/15/2012 12:00 AM  Bicillin CR NDC#85745107097-[facility]  Reviewed

 

 2/15/2012 12:00 AM  Depo-Medrol 40 mg NDC#8601407368  Reviewed

 

 10/13/2016 12:00 AM  COMPLETE CBC W/AUTO DIFF WBC  Returned

 

 10/13/2016 12:00 AM  COMPREHEN METABOLIC PANEL  Returned

 

 10/13/2016 12:00 AM  ASSAY THYROID STIM HORMONE  Returned

 

 10/13/2016 12:00 AM  LIPID PANEL  Returned

 

 2016 12:00 AM  TETANUS VACCINE IM  Reviewed

 

 2016 12:00 AM  HEP B VACC ADULT 3 DOSE IM  Reviewed

 

 2017 12:00 AM  ASSAY OF IRON  Returned

 

 2017 12:00 AM  IRON BINDING TEST  Returned

 

 2017 12:00 AM  ASSAY OF TRANSFERRIN  Returned

 

 2017 12:00 AM  OCCULT BLD FECES 1-3 TESTS  Returned

 

 2017 12:00 AM  COMPLETE CBC AUTOMATED  Returned

 

 8/15/2012 12:00 AM  COMPREHEN METABOLIC PANEL  Reviewed

 

 8/15/2012 12:00 AM  ASSAY THYROID STIM HORMONE  Reviewed

 

 8/15/2012 12:00 AM  COMPLETE CBC W/AUTO DIFF WBC  Reviewed

 

 8/15/2012 12:00 AM  Wrist Support  Reviewed

 

 2017 12:00 AM  METABOLIC PANEL TOTAL CA  Returned

 

 2017 12:00 AM  Screening mammography, bilateral  Returned

 

 10/29/2012 12:00 AM  Flu Injection 3 Years And Above NDC# [address]-65  [facility]  
Reviewed

 

 12/10/2012 12:00 AM  IMMUNIZATION ADMIN  Reviewed

 

 12/10/2012 12:00 AM  Pneumovax Injection - [facility]  Reviewed

 

 2012 12:00 AM  TRICHOMONAS ASSAY W/OPTIC  Reviewed

 

 2013 12:00 AM  THER/PROPH/DIAG INJ SC/IM  Reviewed

 

 2013 12:00 AM  Bicillin CR, 1.2 million units NDC# 90008-769-22  Reviewed

 

 2013 12:00 AM  COMPREHEN METABOLIC PANEL  Reviewed

 

 2013 12:00 AM  LIPID PANEL  Reviewed

 

 2013 12:00 AM  COMPLETE CBC W/AUTO DIFF WBC  Reviewed

 

 2013 12:00 AM  ASSAY THYROID STIM HORMONE  Reviewed

 

 6/10/2013 12:00 AM  MAMMOGRAM SCREENING  Reviewed

 

 6/10/2013 12:00 AM  CYTOPATH C/V MANUAL  Reviewed

 

 12/10/2013 12:00 AM  LIPID PANEL  Reviewed

 

 12/10/2013 12:00 AM  ASSAY THYROID STIM HORMONE  Reviewed

 

 12/10/2013 12:00 AM  COMPLETE CBC W/AUTO DIFF WBC  Reviewed

 

 12/10/2013 12:00 AM  COMPREHEN METABOLIC PANEL  Reviewed

 

 2010 12:00 AM  CYTOPATH C/V MANUAL  Reviewed

 

 2010 12:00 AM  SMEAR WET MOUNT SALINE/INK  Reviewed

 

 2010 12:00 AM  LIPID PANEL  Reviewed

 

 2010 12:00 AM  ASSAY THYROID STIM HORMONE  Reviewed

 

 2010 12:00 AM  COMPLETE CBC W/AUTO DIFF WBC  Reviewed

 

 2010 12:00 AM  COMPREHEN METABOLIC PANEL  Reviewed

 

 10/28/2013 12:00 AM  Flu Injection 3 Years And Above NDC# [address]-46  [facility]  
Reviewed

 

 2010 8:48 AM  Blood Pressure Check-no charge  Reviewed

 

 2010 12:00 AM  Blood Pressure Check-no charge  Reviewed

 

 2014 12:00 AM  METABOLIC PANEL TOTAL CA  Reviewed

 

 2014 12:00 AM  ASSAY THYROID STIM HORMONE  Reviewed

 

 2014 12:00 AM  ASSAY OF FREE THYROXINE  Reviewed

 

 2014 12:00 AM  MAMMOGRAM SCREENING  Reviewed

 

 2014 12:00 AM  CT BONE DENSITY AXIAL  Reviewed

 

 2014 12:00 AM  COMPLETE CBC W/AUTO DIFF WBC  Reviewed

 

 2014 12:00 AM  COMPREHEN METABOLIC PANEL  Reviewed

 

 2014 12:00 AM  LIPID PANEL  Reviewed

 

 2014 12:00 AM  ASSAY THYROID STIM HORMONE  Reviewed

 

 10/20/2010 12:00 AM  IMMUNIZATION ADMIN  Reviewed

 

 10/20/2010 12:00 AM  FLU VACCINE 3 YRS & > IM  Reviewed

 

 2010 12:00 AM  COMPREHEN METABOLIC PANEL  Reviewed

 

 2010 12:00 AM  LIPID PANEL  Reviewed

 

 2010 12:00 AM  ASSAY THYROID STIM HORMONE  Reviewed

 

 2010 12:00 AM  COMPLETE CBC W/AUTO DIFF WBC  Reviewed

 

 2010 12:00 AM  Blood Pressure Check-no charge  Reviewed

 

 10/2/2014 12:00 AM  THER/PROPH/DIAG INJ SC/IM  Reviewed

 

 10/2/2014 12:00 AM  Kenalog, Per 10 Mg NDC#9085-3917-72  Reviewed

 

 2014 12:00 AM  COMPLETE CBC W/AUTO DIFF WBC  Reviewed

 

 2014 12:00 AM  COMPREHEN METABOLIC PANEL  Reviewed

 

 2014 12:00 AM  LIPID PANEL  Reviewed

 

 2014 12:00 AM  ASSAY THYROID STIM HORMONE  Reviewed

 

 2015 12:00 AM  Rocephin 1 gram NDC#9409-1754-70  Reviewed

 

 2015 12:00 AM  THER/PROPH/DIAG INJ SC/IM  Reviewed

 

 2011 12:00 AM  COMPREHEN METABOLIC PANEL  Reviewed

 

 2011 12:00 AM  LIPID PANEL  Reviewed

 

 2011 12:00 AM  ASSAY THYROID STIM HORMONE  Reviewed

 

 2011 12:00 AM  COMPLETE CBC W/AUTO DIFF WBC  Reviewed

 

 2011 12:00 AM  METABOLIC PANEL TOTAL CA  Reviewed

 

 2011 12:00 AM  COMPREHEN METABOLIC PANEL  Reviewed

 

 2011 12:00 AM  ASSAY THYROID STIM HORMONE  Reviewed

 

 2011 12:00 AM  COMPLETE CBC W/AUTO DIFF WBC  Reviewed

 

 2011 12:00 AM  ASSAY OF LIPASE  Reviewed

 

 2011 12:00 AM  THER/PROPH/DIAG INJ SC/IM  Reviewed

 

 2011 12:00 AM  Phenergan 50 Mg Im  Bernadine  Reviewed

 

 2011 12:00 AM  METABOLIC PANEL TOTAL CA  Reviewed

 

 2011 12:00 AM  THER/PROPH/DIAG INJ SC/IM  Reviewed

 

 2011 12:00 AM  Phenergan 25 Mg Im  Bernadine  Reviewed

 

 2011 12:00 AM  METABOLIC PANEL TOTAL CA  Reviewed

 

 2011 12:00 AM  ASSAY THYROID STIM HORMONE  Reviewed

 

 2011 12:00 AM  THER/PROPH/DIAG INJ SC/IM  Reviewed

 

 2011 12:00 AM  Phenergan 50 Mg Im  Bernadine  Reviewed

 

 2011 12:00 AM  ASSAY OF SERUM SODIUM  Reviewed

 

 2011 12:00 AM  ASSAY OF SERUM SODIUM  Reviewed

 

 2011 12:00 AM  CYTOPATH C/V MANUAL  Reviewed

 

 2011 12:00 AM  ASSAY THYROID STIM HORMONE  Reviewed

 

 2015 12:00 AM  COMPLETE CBC W/AUTO DIFF WBC  Reviewed

 

 2015 12:00 AM  COMPREHEN METABOLIC PANEL  Reviewed

 

 2015 12:00 AM  LIPID PANEL  Reviewed

 

 2015 12:00 AM  ASSAY THYROID STIM HORMONE  Reviewed

 

 2015 12:00 AM  MAMMOGRAM SCREENING  Reviewed

 

 2015 12:00 AM  CYTOPATH TBS C/V MANUAL  Reviewed







Results Summary







 Date and Description  Results

 

 2010 10:41 AM  WET PREP NO TRICHOMONADS SEEN  No  Few 

 

 2010 8:15 AM  TRIGLYCERIDES 174.0 mg/dLCHOLESTEROL 175.0 mg/dLHDL 58.0 mg/
dLTOT CHOL/HDL 3.0 LDL (CALC) 82.0 mg/dLTSH 0.790 uIU/mLGLUCOSE 95.0 mg/
dLSODIUM 136.0 mmol/LPOTASSIUM 4.50 mmol/LCHLORIDE 99.0 mmol/LCO2 26.0 mmol/
LBUN 12.0 mg/dLCREATININE 0.80 mg/dLSGOT/AST 32.0 IU/LSGPT/ALT 33.0 IU/LALK 
PHOS 103.0 IU/LTOTAL PROTEIN 7.60 g/dLALBUMIN 4.60 g/dLTOTAL BILI 0.60 mg/
dLCALCIUM 9.80 mg/dLAGE 64 GFR NonAA 72 GFR AA 87 eGFR >60 mL/min/1.73 m2eGFR AA
* >60 WBC 5.3 RBC 4.13 HGB 13.10 g/dLHCT 39.20 %MCV 95.0 fLMCH 31.70 pgMCHC 
33.40 g/dLRDW SD 44 RDW CV 12.70 %MPV 9.80 fLPLT 257 NRBC# 0.00 NRBC% 0.0 %NEUT 
57.90 %%LYMP 26.50 %%MONO 11.60 %%EOS 3.20 %%BASO 0.80 %#NEUT 3.04 #LYMP 1.39 #
MONO 0.61 #EOS 0.17 #BASO 0.04 MANUAL DIFF NOT IND 

 

 2011 9:45 AM  GLUCOSE 91.0 mg/dLSODIUM 133.0 mmol/LPOTASSIUM 4.40 mmol/
LCHLORIDE 97.0 mmol/LCO2 24.0 mmol/LBUN 9.0 mg/dLCREATININE 0.70 mg/dLCALCIUM 
10.0 mg/dLAGE 65 GFR NonAA 84 GFR  eGFR >60 mL/min/1.73 m2eGFR AA* >60 

 

 2011 10:25 AM  LIPASE 29.0 U/LTSH 0.10 uIU/mLGLUCOSE 115.0 mg/dLSODIUM 
127.0 mmol/LPOTASSIUM 5.30 mmol/LCHLORIDE 93.0 mmol/LCO2 18.0 mmol/LBUN 9.0 mg/
dLCREATININE 0.70 mg/dLSGOT/AST 62.0 IU/LSGPT/ALT 46.0 IU/LALK PHOS 100.0 IU/
LTOTAL PROTEIN 8.60 g/dLALBUMIN 4.90 g/dLTOTAL BILI 0.60 mg/dLCALCIUM 9.90 mg/
dLAGE 65 GFR NonAA 84 GFR  eGFR >60 mL/min/1.73 m2eGFR AA* >60 WBC 6.9 
RBC 4.48 HGB 14.40 g/dLHCT 40.90 %MCV 91.0 fLMCH 32.10 pgMCHC 35.20 g/dLRDW SD 
41 RDW CV 12.50 %MPV 9.30 fLPLT 260 NRBC# 0.00 NRBC% 0.0 %NEUT 74.90 %%LYMP 
15.10 %%MONO 9.40 %%EOS 0.30 %%BASO 0.30 %#NEUT 5.15 #LYMP 1.04 #MONO 0.65 #EOS 
0.02 #BASO 0.02 MANUAL DIFF NOT IND 

 

 2011 9:07 AM  GLUCOSE 92.0 mg/dLSODIUM 131.0 mmol/LPOTASSIUM 4.20 mmol/
LCHLORIDE 99.0 mmol/LCO2 22.0 mmol/LBUN 9.0 mg/dLCREATININE 0.70 mg/dLCALCIUM 
9.20 mg/dLAGE 65 GFR NonAA 84 GFR  eGFR >60 mL/min/1.73 m2eGFR AA* >60 

 

 2011 11:06 AM  TSH 0.510 uIU/mLGLUCOSE 99.0 mg/dLSODIUM 132.0 mmol/
LPOTASSIUM 3.50 mmol/LCHLORIDE 95.0 mmol/LCO2 23.0 mmol/LBUN 9.0 mg/
dLCREATININE 0.80 mg/dLCALCIUM 10.40 mg/dLAGE 65 GFR NonAA 72 GFR AA 87 eGFR >
60 mL/min/1.73 m2eGFR AA* >60 

 

 2011 9:45 AM  SODIUM 132.0 mmol/L

 

 2011 9:27 AM  SODIUM 137.0 mmol/L

 

 2011 9:55 AM  TSH 1.070 uIU/mL

 

 2011 8:55 AM  GLUCOSE 102.0 mg/dLSODIUM 135.0 mmol/LPOTASSIUM 4.20 mmol/
LCHLORIDE 102.0 mmol/LCO2 24.0 mmol/LBUN 15.0 mg/dLCREATININE 0.80 mg/dLSGOT/
AST 30.0 IU/LSGPT/ALT 35.0 IU/LALK PHOS 119.0 IU/LTOTAL PROTEIN 7.50 g/
dLALBUMIN 4.30 g/dLTOTAL BILI 0.30 mg/dLCALCIUM 9.20 mg/dLAGE 65 GFR NonAA 72 
GFR AA 87 eGFR >60 mL/min/1.73 m2eGFR AA* >60 TSH 1.130 uIU/mL

 

 2011 8:55 AM  GLUCOSE 98.0 mg/dLSODIUM 137.0 mmol/LPOTASSIUM 3.90 mmol/
LCHLORIDE 103.0 mmol/LCO2 25.0 mmol/LBUN 14.0 mg/dLCREATININE 0.70 mg/dLSGOT/
AST 33.0 IU/LSGPT/ALT 36.0 IU/LALK PHOS 111.0 IU/LTOTAL PROTEIN 8.30 g/
dLALBUMIN 4.40 g/dLTOTAL BILI 0.50 mg/dLCALCIUM 9.40 mg/dLAGE 66 GFR NonAA 84 
GFR  eGFR >60 mL/min/1.73 m2eGFR AA* >60 TRIGLYCERIDES 109.0 mg/
dLCHOLESTEROL 153.0 mg/dLHDL 54.0 mg/dLTOT CHOL/HDL 2.8 LDL (CALC) 77.0 mg/
dLTSH 1.330 uIU/mL

 

 2012 10:33 AM  WET PREP NO TRICH SEEN CLUE CELLS NONE SEEN 

 

 2012 8:45 AM  WBC 5.2 RBC 3.96 HGB 12.70 g/dLHCT 37.80 %MCV 96.0 fLMCH 
32.10 pgMCHC 33.60 g/dLRDW SD 46 RDW CV 13.30 %MPV 9.70 fLPLT 297 NRBC# 0.00 
NRBC% 0.0 %NEUT 57.70 %%LYMP 28.50 %%MONO 10.30 %%EOS 2.50 %%BASO 1.0 %#NEUT 
3.02 #LYMP 1.49 #MONO 0.54 #EOS 0.13 #BASO 0.05 MANUAL DIFF NOT IND GLUCOSE 
97.0 mg/dLSODIUM 137.0 mmol/LPOTASSIUM 4.40 mmol/LCHLORIDE 101.0 mmol/LCO2 23.0 
mmol/LBUN 17.0 mg/dLCREATININE 0.80 mg/dLSGOT/AST 30.0 IU/LSGPT/ALT 33.0 IU/
LALK PHOS 95.0 IU/LTOTAL PROTEIN 7.40 g/dLALBUMIN 4.40 g/dLTOTAL BILI 0.60 mg/
dLCALCIUM 9.70 mg/dLAGE 66 GFR NonAA 72 GFR AA 87 eGFR 60 eGFR AA* 60 
TRIGLYCERIDES 130.0 mg/dLCHOLESTEROL 157.0 mg/dLHDL 56.0 mg/dLTOT CHOL/HDL 2.8 
LDL (CALC) 75.0 mg/dLTSH 0.940 uIU/mL

 

 2012 8:45 AM  WBC 5.1 RBC 3.91 HGB 12.50 g/dLHCT 36.30 %MCV 93.0 fLMCH 
32.0 pgMCHC 34.40 g/dLRDW SD 43 RDW CV 12.60 %MPV 9.30 fLPLT 244 NRBC# 0.00 NRBC
% 0.0 %NEUT 57.60 %%LYMP 27.50 %%MONO 9.10 %%EOS 5.0 %%BASO 0.80 %#NEUT 2.91 #
LYMP 1.39 #MONO 0.46 #EOS 0.25 #BASO 0.04 MANUAL DIFF NOT IND 

 

 2012 5:02 PM  WET PREP NO TRICH SEEN CLUE CELLS NONE SEEN 

 

 2013 8:25 AM  WBC 4.2 RBC 3.96 HGB 12.90 g/dLHCT 37.0 %MCV 93.0 fLMCH 
32.60 pgMCHC 34.90 g/dLRDW SD 43 RDW CV 12.60 %MPV 9.70 fLPLT 254 NRBC# 0.00 
NRBC% 0.0 %NEUT 54.40 %%LYMP 30.40 %%MONO 10.80 %%EOS 3.40 %%BASO 1.0 %#NEUT 
2.26 #LYMP 1.26 #MONO 0.45 #EOS 0.14 #BASO 0.04 MANUAL DIFF NOT IND TSH 1.230 
uIU/mLGLUCOSE 94.0 mg/dLSODIUM 136.0 mmol/LPOTASSIUM 4.30 mmol/LCHLORIDE 99.0 
mmol/LCO2 25.0 mmol/LBUN 10.0 mg/dLCREATININE 0.80 mg/dLSGOT/AST 36.0 IU/LSGPT/
ALT 36.0 IU/LALK PHOS 84.0 IU/LTOTAL PROTEIN 7.90 g/dLALBUMIN 4.40 g/dLTOTAL 
BILI 0.70 mg/dLCALCIUM 9.80 mg/dLAGE 67 GFR NonAA 72 GFR AA 87 eGFR 60 eGFR AA* 
60 TRIGLYCERIDES 122.0 mg/dLCHOLESTEROL 141.0 mg/dLHDL 47.0 mg/dLTOT CHOL/HDL 
3.0 LDL (CALC) 70.0 mg/dL

 

 2013 8:45 AM  WBC 5.3 RBC 4.01 HGB 13.0 g/dLHCT 37.60 %MCV 94.0 fLMCH 
32.40 pgMCHC 34.60 g/dLRDW SD 43 RDW CV 12.50 %MPV 9.40 fLPLT 248 NRBC# 0.00 
NRBC% 0.0 %NEUT 58.70 %%LYMP 27.80 %%MONO 9.80 %%EOS 2.80 %%BASO 0.90 %#NEUT 
3.12 #LYMP 1.48 #MONO 0.52 #EOS 0.15 #BASO 0.05 MANUAL DIFF NOT IND TSH 1.570 
uIU/mLGLUCOSE 94.0 mg/dLSODIUM 134.0 mmol/LPOTASSIUM 4.10 mmol/LCHLORIDE 101.0 
mmol/LCO2 23.0 mmol/LBUN 11.0 mg/dLCREATININE 0.80 mg/dLSGOT/AST 41.0 IU/LSGPT/
ALT 44.0 IU/LALK PHOS 109.0 IU/LTOTAL PROTEIN 7.90 g/dLALBUMIN 4.70 g/dLTOTAL 
BILI 0.80 mg/dLCALCIUM 10.40 mg/dLAGE 68 GFR NonAA 72 GFR AA 87 eGFR >60 mL/min/
1.73 m2eGFR AA* >60 TRIGLYCERIDES 163.0 mg/dLCHOLESTEROL 138.0 mg/dLHDL 49.0 mg/
dLTOT CHOL/HDL 2.8 LDL (CALC) 56.0 mg/dL

 

 2014 8:58 AM  GLUCOSE 86.0 mg/dLSODIUM 134.0 mmol/LPOTASSIUM 4.20 mmol/
LCHLORIDE 99.0 mmol/LCO2 25.0 mmol/LBUN 14.0 mg/dLCREATININE 0.80 mg/dLCALCIUM 
10.10 mg/dLAGE 68 GFR NonAA 72 GFR AA 87 eGFR >60 mL/min/1.73 m2eGFR AA* >60 
FREE T4 1.18 TSH 1.20 uIU/mL

 

 2014 9:50 AM  WBC 5.7 RBC 4.03 HGB 12.90 g/dLHCT 37.90 %MCV 94.0 fLMCH 
32.0 pgMCHC 34.0 g/dLRDW SD 44 RDW CV 12.70 %MPV 9.70 fLPLT 292 NRBC# 0.00 NRBC
% 0.0 %NEUT 62.70 %%LYMP 21.80 %%MONO 11.0 %%EOS 3.40 %%BASO 1.10 %#NEUT 3.55 #
LYMP 1.23 #MONO 0.62 #EOS 0.19 #BASO 0.06 MANUAL DIFF NOT IND GLUCOSE 98.0 mg/
dLSODIUM 135.0 mmol/LPOTASSIUM 4.30 mmol/LCHLORIDE 102.0 mmol/LCO2 22.0 mmol/
LBUN 10.0 mg/dLCREATININE 0.80 mg/dLSGOT/AST 34.0 IU/LSGPT/ALT 32.0 IU/LALK 
PHOS 95.0 IU/LTOTAL PROTEIN 7.70 g/dLALBUMIN 4.30 g/dLTOTAL BILI 0.80 mg/
dLCALCIUM 9.10 mg/dLAGE 68 GFR NonAA 72 GFR AA 87 eGFR 60 eGFR AA* 60 
TRIGLYCERIDES 108.0 mg/dLCHOLESTEROL 146.0 mg/dLHDL 56.0 mg/dLTOT CHOL/HDL 2.6 
LDL (CALC) 68.0 mg/dLTSH 0.90 uIU/mL

 

 2014 8:40 AM  WBC 4.4 RBC 4.13 HGB 13.20 g/dLHCT 38.90 %MCV 94.0 fLMCH 
32.0 pgMCHC 33.90 g/dLRDW SD 47 RDW CV 13.50 %MPV 9.80 fLPLT 279 NRBC# 0.00 NRBC
% 0.0 %NEUT 63.80 %%LYMP 24.60 %%MONO 9.30 %%EOS 1.60 %%BASO 0.70 %#NEUT 2.80 #
LYMP 1.08 #MONO 0.41 #EOS 0.07 #BASO 0.03 MANUAL DIFF NOT IND GLUCOSE 96.0 mg/
dLSODIUM 136.0 mmol/LPOTASSIUM 4.10 mmol/LCHLORIDE 99.0 mmol/LCO2 23.0 mmol/
LBUN 8.0 mg/dLCREATININE 0.80 mg/dLSGOT/AST 31.0 IU/LSGPT/ALT 27.0 IU/LALK PHOS 
85.0 IU/LTOTAL PROTEIN 7.60 g/dLALBUMIN 4.40 g/dLTOTAL BILI 0.80 mg/dLCALCIUM 
10.20 mg/dLAGE 69 GFR NonAA 71 GFR AA 86 eGFR 60 eGFR AA* 60 TRIGLYCERIDES 61.0 
mg/dLCHOLESTEROL 148.0 mg/dLHDL 71.0 mg/dLTOT CHOL/HDL 2.1 LDL (CALC) 65.0 mg/
dLTSH 0.990 uIU/mL

 

 2015 8:32 AM  WBC 4.8 RBC 3.98 HGB 12.70 g/dLHCT 37.30 %MCV 94.0 fLMCH 
31.90 pgMCHC 34.0 g/dLRDW SD 43 RDW CV 12.70 %MPV 9.50 fLPLT 270 NRBC# 0.00 NRBC
% 0.0 %NEUT 57.30 %%LYMP 25.0 %%MONO 13.0 %%EOS 3.60 %%BASO 1.10 %#NEUT 2.73 #
LYMP 1.19 #MONO 0.62 #EOS 0.17 #BASO 0.05 MANUAL DIFF NOT IND TSH 0.640 uIU/
mLGLUCOSE 90.0 mg/dLSODIUM 136.0 mmol/LPOTASSIUM 4.0 mmol/LCHLORIDE 101.0 mmol/
LCO2 24.0 mmol/LBUN 10.0 mg/dLCREATININE 0.80 mg/dLSGOT/AST 34.0 IU/LSGPT/ALT 
32.0 IU/LALK PHOS 92.0 IU/LTOTAL PROTEIN 7.50 g/dLALBUMIN 4.40 g/dLTOTAL BILI 
0.70 mg/dLCALCIUM 9.50 mg/dLAGE 69 GFR NonAA 71 GFR AA 86 eGFR >60 mL/min/1.73 
m2eGFR AA* >60 TRIGLYCERIDES 107.0 mg/dLCHOLESTEROL 138.0 mg/dLHDL 58.0 mg/
dLTOT CHOL/HDL 2.4 LDL (CALC) 59.0 mg/dL

 

 2015 8:31 AM  WBC 4.6 RBC 3.97 HGB 12.90 g/dLHCT 38.0 %MCV 96.0 fLMCH 
32.50 pgMCHC 33.90 g/dLRDW SD 44 RDW CV 12.60 %MPV 9.0 fLPLT 248 NRBC# 0.00 NRBC
% 0.0 %NEUT 61.0 %%LYMP 24.70 %%MONO 10.20 %%EOS 3.0 %%BASO 1.10 %#NEUT 2.82 #
LYMP 1.14 #MONO 0.47 #EOS 0.14 #BASO 0.05 MANUAL DIFF NOT IND TRIGLYCERIDES 
105.0 mg/dLCHOLESTEROL 141.0 mg/dLHDL 58.0 mg/dLTOT CHOL/HDL 2.4 LDL (CALC) 
62.0 mg/dLGLUCOSE 93.0 mg/dLSODIUM 136.0 mmol/LPOTASSIUM 4.10 mmol/LCHLORIDE 
101.0 mmol/LCO2 25.0 mmol/LBUN 9.0 mg/dLCREATININE 0.80 mg/dLSGOT/AST 32.0 IU/
LSGPT/ALT 28.0 IU/LALK PHOS 95.0 IU/LTOTAL PROTEIN 7.30 g/dLALBUMIN 4.50 g/
dLTOTAL BILI 0.80 mg/dLCALCIUM 9.70 mg/dLAGE 70 GFR NonAA 71 GFR AA 86 eGFR >60 
mL/min/1.73meGFR AA* >60 TSH 1.10 uIU/mL







History Of Immunizations







 Name  Date Admin  Mfg Name  Mfg Code  Trade Name  Lot#  Route  Inj  Vis Given  
Vis Pub  CVX

 

 Influenza  10/20/2010  [employer]ofi pasteur  [facility]  Fluzone  [id number]  Intramuscular  
Left Arm  10/20/2010  2010  999

 

 Influenza  10/28/2011  [employer]ofi pasteur  [facility]  Fluzone  [id number]  Intramuscular  
Left Arm  10/28/2011  2011  141

 

 Influenza  10/29/2012  sanofi pasteur  [facility]  Fluzone  [id number]  Intramuscular  
Left Deltoid  10/29/2012  2012  141

 

 X  12/10/2012  [employer]  MSD  Pneumovax 23  [id number]  Intramuscular  
Left Gluteous Medius  12/10/2012  2010  33

 

 Influenza  10/28/2013  sanofi pasteur  [facility]  Fluzone > 3 Years  [id number]  
Intramuscular  Right Deltoid  10/28/2013  2013  141

 

 X  9/2/2015  Not Entered  NE  Prevnar 13     Not Entered  Not Entered  1  109

 

 Influenza  2015  Not Entered  NE  Not Entered     Not Entered  Not Entered
  2015  141

 

 HepB Adult  2016  [employer]  MSD  Recombivax Adult  [id number]  Not 
Entered  Left Deltoid  2016  43

 

 HepB Adult  2016  [employer]  MSD  Recombivax Adult  [id number]  
Intramuscular  Right Deltoid  2016  43

 

 Zostavax  2012  Not Entered  NE  Not Entered     Not Entered  Not 
Entered  1  121

 

 Tdap  2012  Not Entered  NE  Not Entered     Not Entered  Not Entered  1  115

 

 Influenza  10/13/2016  Not Entered  NE  Fluzone High-Dose     Intramuscular  
Left Arm  1  141

 

 HepB Adult  2016  [employer]  MSD  Recombivax Adult  [id number]  
Intramuscular  Right Deltoid  2016  43







History of Past Illness







 Name  Date of Onset  Comments

 

 Benign Essential Hypertension  Dec 14 2009 10:10AM   

 

 Hyperlipidemia  Dec 14 2009 10:10AM   

 

 Hypothyroidism, Acquired  Dec 14 2009 10:10AM   

 

 hypothyroid      

 

 Hypertension      

 

 Hyperlipidemia      

 

 acid reflux      

 

 Hypertension, Benign Essential  2010  9:41AM   

 

 Hypertension, Benign Essential  2010 12:53PM   

 

 Routine gynecological examination  May  5 2010  9:28AM   

 

 Hypertension  May  5 2010  9:28AM   

 

 Hyperlipidemia, unspecified  May  5 2010  9:28AM   

 

 Hypothyroidism, Acquired  May  5 2010  9:28AM   

 

 Vulvovaginitis  May  5 2010  9:28AM   

 

 Rhinitis, Allergic  May  5 2010  9:28AM   

 

 Hypertension, Benign Essential  May 19 2010  8:48AM   

 

 Hypertension, Benign Essential  May 25 2010  9:39AM   

 

 Flu  Oct 20 2010 12:01PM   

 

 Essential Hypertension  2010  8:34AM   

 

 Hyperlipidemia  2010  8:34AM   

 

 Gastroesophageal Reflux  2010  8:34AM   

 

 Hypothyroidism, Acquired  2010  8:34AM   

 

 Hypertension, Benign Essential  2010  9:22AM   

 

 Hypertension, Benign Essential  Dec 15 2010  9:07AM   

 

 Essential Hypertension  2011  1:31PM   

 

 Hyperlipidemia  2011  1:31PM   

 

 Gastroesophageal Reflux  2011  1:31PM   

 

 Hypothyroidism, Acquired  2011  1:31PM   

 

 Essential Hypertension  2011  8:51AM   

 

 Hyperlipidemia  2011  8:51AM   

 

 Gastroesophageal Reflux  2011  8:51AM   

 

 Hypothyroidism, Acquired  2011  8:51AM   

 

 Essential Hypertension  2011  9:13AM   

 

 Hyperlipidemia  2011  9:13AM   

 

 Gastroesophageal Reflux  2011  9:13AM   

 

 Hypothyroidism, Acquired  2011  9:13AM   

 

 Nausea With Vomiting  2011  1:18PM   

 

 Hyponatremia  2011  8:46AM   

 

 Nausea  2011  9:13AM   

 

 Hypothyroidism  2011 11:10AM   

 

 Hyponatremia  2011 11:10AM   

 

 Essential Hypertension  2011 10:05AM   

 

 Hyperlipidemia  2011 10:05AM   

 

 Gastroesophageal Reflux  2011 10:05AM   

 

 Nausea  2011 10:05AM   

 

 Hypothyroidism, Acquired  2011 10:05AM   

 

 Hyponatremia  May  6 2011 11:34AM   

 

 Essential Hypertension  May 16 2011  8:50AM   

 

 Hyperlipidemia  May 16 2011  8:50AM   

 

 Gastroesophageal Reflux  May 16 2011  8:50AM   

 

 Nausea  May 16 2011  8:50AM   

 

 Hypothyroidism, Acquired  May 16 2011  8:50AM   

 

 Hyponatremia  May 16 2011  8:50AM   

 

 Routine gynecological examination  2011  8:54AM   

 

 Hypertension  2011  8:54AM   

 

 Hyperlipidemia, unspecified  2011  8:54AM   

 

 Hypothyroidism, Acquired  2011  8:54AM   

 

 Rhinitis, Allergic  2011  8:54AM   

 

 Hypothyroidism  Aug 29 2011 12:37PM   

 

 Hyponatremia  Aug 29 2011 12:37PM   

 

 Essential Hypertension  Sep 12 2011  8:53AM   

 

 Hyperlipidemia  Sep 12 2011  8:53AM   

 

 Gastroesophageal Reflux  Sep 12 2011  8:53AM   

 

 Hypothyroidism, Acquired  Sep 12 2011  8:53AM   

 

 Hyponatremia  Sep 12 2011  8:53AM   

 

 Hypertension  Sep 29 2011  9:41AM   

 

 Hyperlipidemia  Dec  8 2011  8:31AM   

 

 Hypothyroidism  Dec  8 2011  8:31AM   

 

 Hypertension  Dec  8 2011  8:31AM   

 

 Flu  Dec  9 2011 10:02AM   

 

 Essential Hypertension  Dec 13 2011 10:13AM   

 

 Hyperlipidemia  Dec 13 2011 10:13AM   

 

 Gastroesophageal Reflux  Dec 13 2011 10:13AM   

 

 Hypothyroidism, Acquired  Dec 13 2011 10:13AM   

 

 Hyponatremia  Dec 13 2011 10:13AM   

 

 Vaginal Discharge  2012  9:43AM   

 

 Rhinitis, Allergic  Feb 15 2012  9:31AM   

 

 Eustachian Tube Dysfunction  Feb 15 2012  9:31AM   

 

 Pharyngitis, Acute  Feb 15 2012  9:31AM   

 

 Routine gynecological examination  2012  8:48AM   

 

 Hypertension  2012  8:48AM   

 

 Hyperlipidemia, unspecified  2012  8:48AM   

 

 Hypothyroidism, Acquired  2012  8:48AM   

 

 Rhinitis, Allergic  2012  8:48AM   

 

 Candidiasis, Vulvovaginal  2012 11:04AM   

 

 Essential Hypertension  Aug 15 2012  9:02AM   

 

 Hyperlipidemia  Aug 15 2012  9:02AM   

 

 Gastroesophageal Reflux  Aug 15 2012  9:02AM   

 

 Hypothyroidism, Acquired  Aug 15 2012  9:02AM   

 

 Hyponatremia  Aug 15 2012  9:02AM   

 

 Atrophic Vaginitis, Postmenopausal  Aug 15 2012  9:02AM   

 

 Flu  Oct 29 2012  9:24AM   

 

 Essential Hypertension  Dec 10 2012  8:35AM   

 

 Hyperlipidemia  Dec 10 2012  8:35AM   

 

 Gastroesophageal Reflux  Dec 10 2012  8:35AM   

 

 Hypothyroidism, Acquired  Dec 10 2012  8:35AM   

 

 Hyponatremia  Dec 10 2012  8:35AM   

 

 Atrophic Vaginitis, Postmenopausal  Dec 10 2012  8:35AM   

 

 Pneumococcus  Dec 10 2012  2:07PM   

 

 Vaginal Discharge  Dec 20 2012  1:50PM   

 

 Essential Hypertension  Dec 20 2012  1:50PM   

 

 Hyperlipidemia  Dec 20 2012  1:50PM   

 

 Gastroesophageal Reflux  Dec 20 2012  1:50PM   

 

 Hypothyroidism, Acquired  Dec 20 2012  1:50PM   

 

 Atrophic Vaginitis, Postmenopausal  Dec 20 2012  1:50PM   

 

 Upper Respiratory Infections  2013  8:52AM   

 

 Hyperlipidemia  2013  8:21AM   

 

 Hypertension  2013  8:21AM   

 

 Hypothyroidism  2013  8:21AM   

 

 Screening Mammogram  Beto 10 2013  8:45AM   

 

 Routine gynecological examination  Beto 10 2013  8:34AM   

 

 Hypertension  Beto 10 2013  8:34AM   

 

 Hyperlipidemia, unspecified  Beto 10 2013  8:34AM   

 

 Hypothyroidism, Acquired  Beto 10 2013  8:34AM   

 

 Rhinitis, Allergic  Beto 10 2013  8:34AM   

 

 Flu  Oct 28 2013  8:52AM   

 

 Essential Hypertension  Dec  9 2013  8:37AM   

 

 Hyperlipidemia  Dec  9 2013  8:37AM   

 

 Gastroesophageal Reflux  Dec  9 2013  8:37AM   

 

 Hypothyroidism, Acquired  Dec  9 2013  8:37AM   

 

 Atrophic Vaginitis, Postmenopausal  Dec  9 2013  8:37AM   

 

 Hypertension  2014  9:56AM   

 

 Hyperlipidemia  2014  9:56AM   

 

 Hypothyroidism  2014  9:56AM   

 

 Screening Mammogram  2014  8:32AM   

 

 Osteopenia  2014  8:32AM   

 

 Hypertension  2014  8:35AM   

 

 Hypothyroidism, Acquired  2014  8:35AM   

 

 Hyperlipidemia  2014  8:35AM   

 

 Upper Respiratory Infections  2014  9:28AM   

 

 Sinusitis, Acute  Oct  2 2014  9:17AM   

 

 Herpes Zoster  Oct  5 2014  1:44PM   

 

 Vesicular Eruption  Oct  5 2014  1:44PM   

 

 Hypertension  2014  8:18AM   

 

 Hyperlipidemia  2014  8:18AM   

 

 hypothyroid  2014  8:18AM   

 

 Situational anxiety  Dec 20 2014  9:33AM   

 

 GERD (gastroesophageal reflux disease)  Dec 20 2014  9:33AM   

 

 Nausea  Dec 20 2014  9:33AM   

 

 Abdominal Pain, Epigastric  Dec 20 2014  9:33AM   

 

 Hyperlipidemia  Oct  6 2014  9:03AM   

 

 acid reflux  Oct  6 2014  9:03AM   

 

 hypothyroid  Oct  6 2014  9:03AM   

 

 Shingles  Oct  6 2014  9:03AM   

 

 Pharyngitis  2015  1:09PM   

 

 Bronchitis  2015  9:10AM   

 

 Hyperlipidemia  2015  9:10AM   

 

 acid reflux  2015  9:10AM   

 

 hypothyroid  2015  9:10AM   

 

 Hyperlipidemia  2015  8:18AM   

 

 Hypertension  2015  8:18AM   

 

 hypothyroid  2015  8:18AM   

 

 Screening Mammogram  2015 11:43AM   

 

 Medicare Annual Pap Q 2 years  2015  9:36AM   

 

 Screening Mammogram  2015  9:36AM   

 

 Candidiasis of female genitalia  2015  9:36AM   

 

 Hypertension  2015 11:34AM   

 

 Hyperlipidemia, unspecified  2015 11:34AM   

 

 Hypothyroidism, Acquired  2015 11:34AM   

 

 Osteopenia  2016  8:41AM   

 

 Encounter for screening mammogram for breast cancer  2016  8:41AM   

 

 Hypertension  2016  8:34AM   

 

 Lymphedema  2016  8:34AM   

 

 Hyperlipidemia  2016  8:34AM   

 

 hypothyroid  2016  8:34AM   

 

 HEP B  2016  9:13AM   

 

 HEP B  2016  8:52AM   

 

 Acid Reflux  2016  8:34AM   

 

 History of acute gastritis  Oct 13 2016  2:30PM   

 

 Anxiety as acute reaction to exceptional stress  Oct 13 2016  2:30PM   

 

 HEP B  Dec 29 2016  8:42AM   

 

 Caregiver stress syndrome  May 26 2017  8:28AM   

 

 Anxiety  May 26 2017  8:28AM   

 

 Blood in stool  2017  2:54PM   

 

 Upper GI bleed  2017 11:34AM   

 

 Hyponatremia  2017  9:03AM   

 

 Hyponatremia  2017  8:43AM   

 

 Medicare Annual Pap Q 2 years  2017  9:03AM   

 

 Nausea  2017  9:03AM   

 

 Uterine enlargement  2017  9:03AM   

 

 Encounter for screening mammogram for breast cancer  2017  4:56PM   







Payers







 Insurance Name  Company Name  Plan Name  Plan Number  Policy Number  Policy 
Group Number  Start Date

 

    Medicare RHC Medicare RHC     [id number]     N/A

 

    [employer]  [facility]     [id number]     2009

 

    Medicare Part B  Medicare Rusty Guaman     [id number]     N/A

 

    Medicare Part A  Medicare Part A     [id number]     N/A

 

    Medicare Part A  ZZZMedicare P A - Preventive     [id number]     N/A

 

    Medicare Part A  Medicare - Lab/Xray     [id number]     N/A







History of Encounters







 Visit Date  Visit Type  Provider

 

 10/10/2017  Office visit  Doris Noriega MD

 

 2017  Office visit  Doris Noriega MD

 

 2017  Office visit  Doris Noriega MD

 

 2017  Office visit  Doris Noriega MD

 

 2017  Office visit  Prince Noe APRN

 

 2016  Nurse visit  Doris Noriega MD

 

 10/13/2016  Office visit  Doris Noriega MD

 

 2016  Nurse visit  Doris Noriega MD

 

 2016  Office visit  Doris Noriega MD

 

 2015  Office visit  Doris Noriega MD

 

 2015  Office visit  Doris Noriega MD

 

 2015  Office visit  Prince Noe APRN

 

 2014  Office visit  Anyi Herrera APRN

 

 10/27/2014  Voided  Doris Noriega MD

 

 10/6/2014  Office visit  Doris Noriega MD

 

 10/5/2014  Office visit  Anyi Herrera APRN

 

 10/2/2014  Office visit   

 

 10/2/2014  Office visit  Corrine Bay APRN

 

 2014  Office visit  Kassie Franklin APRN

 

 2014  Office visit  Doris Noriega MD

 

 2014  Office visit  Doris Noriega MD

 

 2013  Office visit  Dee Colindres MD

 

 10/28/2013  Nurse visit  Dee Colindres MD

 

 6/10/2013  Office visit  Dee Colindres MD

 

 2013  Office visit  Corrine Bay APRN

 

 2012  Office visit  Dee Colindres MD

 

 12/10/2012  Office visit  Dee Colindres MD

 

 10/29/2012  Nurse visit  Dee Colindres MD

 

 8/15/2012  Office visit  Dee Colindres MD

 

 2012  Office visit  Corrine Bay APRN

 

 2012  Office visit  Dee Colindres MD

 

 2/15/2012  Office visit  Dee Colindres MD

 

 2012  Office visit  Corrine Bay APRN

 

 2011  Office visit  Dee Colindres MD

 

 10/28/2011  Nurse visit  Shad Nolasco MD

 

 2011  Office visit  Dee Colindres MD

 

 2011  Office visit  Dee Colindres MD

 

 2011  Office visit  Dee Colindres MD

 

 2011  Office visit  Dee Colindres MD

 

 2011  Office visit  Dee Colindres MD

 

 2011  Office visit  Dee Colindres MD

 

 4/10/2011  [facility]  KHRIS Reeves MD

 

 4/10/2011  Hospital  RICKEY Toussaint MD

 

 2011  Office visit  RICKEY Toussaint MD

 

 2011  [facility]  Fide Castellanos MD

 

 2011  Voided  Fide Castellanos MD

 

 2011  Office visit  Dee Colindres MD

 

 12/15/2010  Nurse visit  Dee Colindres MD

 

 2010  Office visit  Dee Colindres MD

 

 10/20/2010  Nurse visit  Stephenie PERAZA

 

 2010  Nurse visit  Dee Colindres MD

 

 2010  Nurse visit  Dee Colindres MD

 

 2010  Office visit  Dee Colindres MD

 

 3/19/2010  Voided  Stephenie PERAZA

 

 2010  Nurse visit  Stephenie PERAZA

 

 2010  Nurse visit  Stephenie PERAZA

 

 2010  Nurse visit  Stephenie PERAZA

 

 2009  Office visit  Stephenie PERAZA

 

 10/20/2009  Nurse visit  Stephenie Kay PA

## 2018-10-22 NOTE — XMS REPORT
MU2 Ambulatory Summary

 Created on: 2017



Milady Crespo

External Reference #: [id number]

: 1945

Sex: Female



Demographics







 Address  [address]

[place]  [address]

 

 Home Phone  [phone]

 

 Preferred Language  English

 

 Marital Status  

 

 Mosque Affiliation  Unknown

 

 Race  White

 

 Ethnic Group  Not  or 





Author







 Author  Doris Noriega

 

 Organization  [facility]

 

 Address  [address]

[place], [place]  [id number]



 

 Phone  [phone]







Care Team Providers







 Care Team Member Name  Role  Phone

 

 Doris Noriega  PCP  [phone]

 

 Doris Noriega  PreferredProvider  [phone]







Allergies and Adverse Reactions







 Name  Reaction  Notes

 

 NO KNOWN DRUG ALLERGIES      







Plan of Treatment







 Planned Activity  Comments  Planned Date  Planned Time  Plan/Goal

 

 Complete blood count (CBC) with differential count     2016  12:00 AM   

 

 Comprehensive metabolic panel     2016  12:00 AM   

 

 VITAMIN D (25 HYDROXY)     2016  12:00 AM   

 

 Lipid panel (total cholesterol, lipoproteins, HDL, triglycerides)     8/15/
2012  12:00 AM   

 

 Basic metabolic panel     2017  12:00 AM   

 

 Pap smear     2017  12:00 AM   

 

 Comprehensive Metabolic Panel     6/10/2013  12:00 AM   

 

 Lipid panel (total cholesterol, lipoproteins, HDL, triglycerides)     6/10/
2013  12:00 AM   

 

 Thyroid stimulating hormone (TSH)     6/10/2013  12:00 AM   

 

 CBC (automated H&H, platelets, WBC and automated differential)     6/10/2013  
12:00 AM   

 

 Comprehensive Metabolic Panel     2012  12:00 AM   

 

 Lipid panel (total cholesterol, lipoproteins, HDL, triglycerides)     2012  12:00 AM   

 

 Thyroid stimulating hormone (TSH)     2012  12:00 AM   

 

 CBC (automated H&H, platelets, WBC and automated differential)     2012  
12:00 AM   

 

 Screening mammography, bilateral     2015  12:00 AM   







Medications







 Active 

 

 Name  Start Date  Estimated Completion Date  SIG  Comments

 

 aspirin 81 mg oral tablet,delayed release (DR/EC)        take 1 tablet (81 mg) 
by oral route once daily   

 

 Vitamin D3 1,000 unit oral capsule        take 1 capsule by oral route daily   

 

 famotidine 20 mg oral tablet  2015     TAKE ONE TABLET BY MOUTH TWICE 
DAILY   

 

 valsartan 160 mg oral tablet  2016     TAKE ONE TABLET BY MOUTH ONCE 
DAILY   

 

 amlodipine 5 mg oral tablet  2016     TAKE ONE TABLET BY MOUTH ONCE DAILY
   

 

 Allergy Relief (loratadine) 10 mg oral tablet  2016     TAKE ONE TABLET 
BY MOUTH ONCE DAILY   

 

 amlodipine 5 mg oral tablet  2016     TAKE ONE TABLET BY MOUTH ONCE DAILY
   

 

 Allergy Relief (loratadine) 10 mg oral tablet  2016     TAKE ONE TABLET 
BY MOUTH ONCE DAILY   

 

 valsartan 160 mg oral tablet  2016     TAKE ONE TABLET BY MOUTH ONCE 
DAILY   

 

 pantoprazole 40 mg oral tablet,delayed release (DR/EC)  10/13/2016  10/8/2017  
take 1 tablet (40 mg) by oral route once daily for 90 days   

 

 amlodipine 5 mg oral tablet  10/13/2016  10/8/2017  TAKE ONE TABLET BY MOUTH 
ONCE DAILY for 90 days   

 

 atenolol 50 mg oral tablet  10/13/2016  10/8/2017  take 1 tablet (50 mg) by 
oral route once daily   

 

 levothyroxine 50 mcg oral tablet  10/13/2016  10/8/2017  TAKE ONE TABLET BY 
MOUTH ONCE DAILY for 90 days   

 

 Lipitor 20 mg oral tablet  10/13/2016  2018  take 1 tablet (20 mg) by oral 
route once daily   

 

 Plavix 75 mg oral tablet  10/13/2016  2018  take 1 tablet (75 mg) by oral 
route once daily   

 

 valsartan 160 mg oral tablet  10/13/2016  10/8/2017  TAKE ONE TABLET BY MOUTH 
ONCE DAILY for 90 days   

 

 Zofran (as hydrochloride) 4 mg oral tablet  2016     take 1 tablet by 
oral route daily as needed for 14 days   

 

 Zofran (as hydrochloride) 4 mg oral tablet  2017     take 1 tablet by oral 
route daily as needed for 14 days   

 

 Zofran (as hydrochloride) 4 mg oral tablet  2017     take 1 tablet by oral 
route daily as needed for 14 days   

 

 Zofran (as hydrochloride) 4 mg oral tablet  2017     take 1 tablet by 
oral route daily as needed for 14 days   

 

 Zofran (as hydrochloride) 4 mg oral tablet  2017     take 1 tablet by 
oral route daily as needed for 14 days   

 

 Lexapro 10 mg oral tablet  2017  take 1 tablet (10 mg) by oral 
route once daily for 90 days   

 

 triamcinolone acetonide 0.1 % topical cream  2017     APPLY A THIN LAYER 
OF CREAM EXTERNALLY TO AFFECTED AREA TWICE DAILY FOR 14 DAYS   

 

 ondansetron 4 mg oral tablet,disintegrating  2017     dissolve  1 tablet 
by oral route every 8 hours as needed   

 

 loratadine 10 mg oral tablet  2017  TAKE 1 TABLET BY MOUTH 
EVERY DAY IN THE EVENING   

 

 triamcinolone acetonide 0.1 % topical cream  2017     APPLY  CREAM 
EXTERNALLY TO AFFECTED AREA TWICE DAILY FOR 14 DAYS   

 

 EQ LORATADINE 10MG  TABS  2017     TAKE ONE TABLET BY MOUTH ONCE DAILY   

 

 clorazepate dipotassium 7.5 mg oral tablet  2017  take 1/2 to1 
tablet PO up to three times per day for situational anxiety   









  

 

 Name  Start Date  Expiration Date  SIG  Comments

 

 prednisone 20 mg oral tablet  2011  take 2 tablets by oral 
route daily for 5 days   

 

 calcium carbonate-vitamin D2 600-125 mg-unit oral tablet  8/15/2012  2012
  take 2 tablets by oral route daily   

 

 [place] 3 Fish Oil 900-1,400 mg oral capsule,delayed release(DR/EC)  8/15/2012  9
/  take 1 capsule by oral route daily   

 

 multivitamin Oral tablet  8/15/2012  2012  take 1 tablet by oral route 
daily   

 

 triamcinolone acetonide 0.1 % topical cream  2013  10/7/2013  APPLY A 
THIN LAYER TO THE AFFECTED AREA(S) BY TOPICAL ROUTE TWICE A DAY   

 

 famotidine 20 mg oral tablet  2014  take 1 tablet (20 mg) by 
oral route 2 times per day   

 

 amoxicillin 500 mg oral capsule  2014  take 1 capsule (500 mg) 
by oral route 3 times per day for 10 days   

 

 Zithromax Z-Tab 250 mg oral tablet  10/2/2014  10/7/2014  take 2 tablets (500 
mg) by oral route once daily for 1 day then 1 tablet (250 mg) by oral route 
once daily for 4 days   

 

 acyclovir 800 mg oral tablet  10/5/2014  10/12/2014  take 1 tablet (800 mg) by 
oral route 5 times per day for 7 days   

 

 gabapentin 300 mg oral capsule  10/9/2014  2014  take 1 capsule (300 mg) 
by oral route 3 times per day for 14 days   

 

 Carafate 100 mg/mL oral suspension  2014  take 10 milliliters 
(1 gram) by oral route 4 times per day on an empty stomach 1 hour before meals 
and at bedtime for 4 weeks   

 

 amlodipine 5 mg oral tablet  2015  TAKE ONE TABLET BY MOUTH 
EVERY DAY   

 

 levothyroxine 50 mcg oral tablet  2015  TAKE ONE TABLET BY MOUTH 
EVERY DAY   

 

 Diovan 160 mg oral tablet  2015  2/15/2016  TAKE ONE TABLET BY MOUTH 
EVERY DAY for 90 days   

 

 triamcinolone acetonide 0.1 % topical cream  2015  apply a thin 
layer to the affected area(s) by topical route 2 times per day for 14 days   

 

 fluconazole 150 mg oral tablet  2015  take 1 tablet (150 mg) 
by oral route once for 1 day   

 

 Zofran (as hydrochloride) 4 mg oral tablet  10/13/2016  10/27/2016  take 1 
tablet by oral route daily as needed for 14 days   

 

 Zofran (as hydrochloride) 4 mg oral tablet  2017     take 1 tablet by 
oral route daily as needed for 14 days   









 Discontinued 

 

 Name  Start Date  Discontinued Date  SIG  Comments

 

 Lipitor 40 mg oral tablet     2009  12 TAB DAILY   

 

 ramipril 5 mg oral capsule     2009  take 1 capsule (5 mg) by oral route 
once daily   

 

 lovastatin 20 mg oral tablet  2009  take 1 tablet (20 mg) by 
oral route once daily with evening meal   

 

 propranolol 20 mg oral tablet  2010  take 1 tablet by oral 
route 2 times a day   

 

 Fosamax 70 mg oral tablet  2010  take 1 tablet (70 mg) by oral 
route once weekly in the morning, at least 30 minutes before the first food, 
beverage, or medication of the day   

 

 lisinopril 40 mg oral tablet  2010  4/10/2011  take 2 tablets by oral 
route daily   

 

 Zoloft 50 mg oral tablet  2011  take 1 tablet (50 mg) by oral 
route once daily   

 

 promethazine 25 mg oral tablet  2011  take 1 tablet by oral 
route every 6 hours as needed   

 

 Diovan -12.5 mg oral tablet  2011  take 1 tablet by oral 
route once daily for 30 days   

 

 Diflucan 150 mg oral tablet     2012  take 1 tablet (150 mg) by oral 
route once for 1 day   

 

 Diflucan 150 mg oral tablet  2012  8/15/2012  take 1 tablet (150 mg) by 
oral route once   

 

 Vitamin B-12 1,000 mcg oral tablet  8/15/2012  2014  take 1 tablet by 
oral route daily   

 

 Premarin 0.625 mg/gram vaginal cream  10/29/2012  6/10/2013  use 1 gram daily 
for a week then 1 gram twice a week   

 

 Medrol (Tab) 4 mg oral tablets,dose pack  2013  6/10/2013  take as 
directed   

 

 aspirin 325 mg oral tablet  2014  take 1 tablet (325 mg) by 
oral route once daily   

 

 Medrol (Tab) 4 mg oral tablets,dose pack  2014  10/2/2014  take as 
directed   

 

 Tetracaine Lollipops Lollipop  2015  Use as directed   

 

 Zoloft 50 mg oral tablet  2016  take 1 tablet (50 mg) by oral 
route once daily for 90 days   

 

 Zoloft 50 mg oral tablet  2016  take 1 tablet (50 mg) by oral 
route once daily for 90 days  Pt refuses to take...







Problem List







 Description  Status  Onset

 

 Hyperlipidemia  Active   

 

 acid reflux  Active   

 

 Hypertension  Active   

 

 hypothyroid  Active   







Vital Signs







 Date  Time  BP-Sys(mm[Hg]  BP-Morena(mm[Hg])  HR(bpm)  RR(rpm)  Temp  WT  HT  HC  
BMI  BSA  BMI Percentile  O2 Sat(%)

 

 2017  8:40:00 AM  118 mmHg  68 mmHg  63 bpm  16 rpm  98.1 F  123.125 lbs  
61 in     23.26 kg/m2  1.55 m2     100 %

 

 2017  8:57:00 AM  116 mmHg  62 mmHg  71 bpm  16 rpm  99.8 F  121.5 lbs  
61 in     22.957 kg/m  1.5401 m     98 %

 

 2017  11:30:00 AM  128 mmHg  78 mmHg  69 bpm  16 rpm  98.8 F  129.375 lbs  
61 in     24.44 kg/m2  1.59 m2     98 %

 

 2017  8:22:00 AM  118 mmHg  78 mmHg  62 bpm  18 rpm  97.5 F  129.125 lbs  
61 in     24.3977 kg/m  1.5877 m     100 %

 

 10/13/2016  2:26:00 PM  128 mmHg  78 mmHg  77 bpm  16 rpm  98.4 F  139.25 lbs  
61 in     26.31 kg/m2  1.65 m2     100 %

 

 2016  8:29:00 AM  122 mmHg  70 mmHg  72 bpm  16 rpm  97.8 F  137.125 lbs  
61 in     25.9093 kg/m  1.6361 m     100 %

 

 2015  9:33:00 AM  120 mmHg  68 mmHg  73 bpm     98.7 F  144.375 lbs  61 
in     27.28 kg/m2  1.68 m2     99 %

 

 2015  9:05:00 AM  110 mmHg  75 mmHg  85 bpm  16 rpm  96.7 F  144.375 lbs  
61 in     27.2791 kg/m  1.6788 m     99 %

 

 2015  1:05:00 PM  108 mmHg  62 mmHg  78 bpm  18 rpm  96.9 F  142.375 lbs  
61 in     26.90 kg/m2  1.67 m2     100 %

 

 2014  9:31:00 AM  126 mmHg  66 mmHg  79 bpm  18 rpm  98.7 F  149 lbs  61 
in     28.153 kg/m  1.7055 m     98 %

 

 10/6/2014  9:02:00 AM  110 mmHg  74 mmHg  94 bpm  18 rpm  98.1 F  145.4 lbs  
61 in     27.47 kg/m2  1.68 m2     97 %

 

 10/2/2014  9:14:00 AM  124 mmHg  74 mmHg  79 bpm  18 rpm  98 F  147.25 lbs  61 
in     27.8224 kg/m  1.6955 m     98 %

 

 2014  9:22:00 AM  124 mmHg  76 mmHg  89 bpm  18 rpm  98.1 F  148 lbs  61 
in     27.96 kg/m2  1.70 m2     100 %

 

 2014  8:27:00 AM  118 mmHg  65 mmHg  74 bpm  16 rpm  97.7 F  148 lbs  61 
in     27.9641 kg/m  1.6998 m     99 %

 

 2014  9:48:00 AM  125 mmHg  70 mmHg  93 bpm  18 rpm  96.7 F  156 lbs  61 
in     29.48 kg/m2  1.75 m2     100 %

 

 2013  8:35:00 AM  122 mmHg  74 mmHg  84 bpm  16 rpm  97.9 F  155.375 lbs 
                99 %

 

 6/10/2013  8:30:00 AM  130 mmHg  82 mmHg  79 bpm  16 rpm  97.9 F  161 lbs     
            98 %

 

 2013  8:50:00 AM  124 mmHg  68 mmHg  66 bpm  18 rpm  97.6 F  157.5 lbs  
61 in     29.7591 kg/m  1.7535 m      

 

 2012  1:46:00 PM  120 mmHg  72 mmHg  75 bpm  16 rpm  97.6 F  156.125 lbs
                 98 %

 

 12/10/2012  8:32:00 AM  122 mmHg  82 mmHg  70 bpm  16 rpm  97.3 F  157 lbs    
             99 %

 

 8/15/2012  9:00:00 AM  130 mmHg  76 mmHg  86 bpm  16 rpm  97.4 F  159 lbs     
            100 %

 

 2012  11:02:00 AM  128 mmHg  64 mmHg  66 bpm  18 rpm  97.4 F  162.125 lbs
  61 in     30.6329 kg/m  1.7791 m      

 

 2012  8:45:00 AM  130 mmHg  70 mmHg  82 bpm  16 rpm  98.2 F  160.125 lbs 
                100 %

 

 2/15/2012  9:29:00 AM  122 mmHg  80 mmHg  86 bpm  16 rpm  97.4 F  159.375 lbs  
61 in     30.1133 kg/m  1.7639 m     99 %

 

 2012  9:41:00 AM  132 mmHg  74 mmHg  66 bpm  18 rpm  98.4 F  160.375 lbs  
61 in     30.30 kg/m2  1.77 m2      

 

 2011  10:08:00 AM  134 mmHg  82 mmHg  80 bpm  16 rpm  98 F  160 lbs  61 
in     30.23 kg/m2  1.7674 m     99 %

 

 2011  3:56:00 PM  130 mmHg  80 mmHg                              

 

 2011  8:55:00 AM  130 mmHg  74 mmHg  88 bpm  16 rpm  98.2 F  158.25 lbs  
               98 %

 

 2011  8:54:00 AM  136 mmHg  82 mmHg  102 bpm  16 rpm  98.1 F  153.5 lbs   
              99 %

 

 2011  8:49:00 AM  122 mmHg  88 mmHg  88 bpm  16 rpm  98.6 F  152.25 lbs  
               99 %

 

 2011  10:02:00 AM  140 mmHg  82 mmHg  96 bpm  20 rpm  98.8 F             
       100 %

 

 2011  9:14:00 AM  156 mmHg  98 mmHg  110 bpm  24 rpm  98.5 F  153 lbs    
             100 %

 

 2011  8:50:00 AM  160 mmHg  84 mmHg  100 bpm  16 rpm  98.7 F  155 lbs    
             100 %

 

 2011  1:25:00 PM  152 mmHg  100 mmHg  82 bpm  16 rpm  97.2 F  156.375 lbs 
                100 %

 

 2011  9:55:00 AM  144 mmHg  90 mmHg                              

 

 2010  9:24:00 AM  138 mmHg  84 mmHg                              

 

 2010  8:58:00 AM  160 mmHg  94 mmHg                              

 

 2010  8:33:00 AM  142 mmHg  90 mmHg  100 bpm  16 rpm  98.1 F  158.375 
lbs                  

 

 2010  9:24:00 AM  160 mmHg  102 mmHg  88 bpm  18 rpm  99 F  157.125 lbs  
62 in     28.7382 kg/m  1.7657 m      

 

 3/19/2010  11:01:00 AM  140 mmHg  90 mmHg                              

 

 2009  10:08:00 AM  142 mmHg  86 mmHg  72 bpm  16 rpm  98.1 F  154.125 
lbs  61 in     29.1214 kg/m  1.73 m2      







Social History







 Name  Description  Comments

 

       

 

 Children      

 

 lives alone      

 

 Supervisor      

 

 Tobacco  Never smoker   







History of Procedures







 Date Ordered  Description  Order Status

 

 2011 12:00 AM  COMPREHEN METABOLIC PANEL  Reviewed

 

 2011 12:00 AM  ASSAY THYROID STIM HORMONE  Reviewed

 

 2011 12:00 AM  COMPREHEN METABOLIC PANEL  Reviewed

 

 2011 12:00 AM  LIPID PANEL  Reviewed

 

 2011 12:00 AM  ASSAY THYROID STIM HORMONE  Reviewed

 

 2011 12:00 AM  COMPLETE CBC W/AUTO DIFF WBC  Reviewed

 

 2015 12:00 AM  COMPLETE CBC W/AUTO DIFF WBC  Reviewed

 

 2015 12:00 AM  COMPREHEN METABOLIC PANEL  Reviewed

 

 2015 12:00 AM  LIPID PANEL  Reviewed

 

 2015 12:00 AM  ASSAY THYROID STIM HORMONE  Reviewed

 

 2011 12:00 AM  COMPREHEN METABOLIC PANEL  Reviewed

 

 2011 12:00 AM  COMPREHEN METABOLIC PANEL  Reviewed

 

 2011 12:00 AM  LIPID PANEL  Reviewed

 

 2011 12:00 AM  ASSAY THYROID STIM HORMONE  Reviewed

 

 10/28/2011 12:00 AM  ***Immunization administration, Medicare flu  Reviewed

 

 10/28/2011 12:00 AM  Fluzone ** MEDICARE Only **  Reviewed

 

 2010 11:24 AM  NO CHARGE OV  Reviewed

 

 2010 11:26 AM  NO CHARGE OV  Reviewed

 

 2011 12:00 AM  COMPREHEN METABOLIC PANEL  Reviewed

 

 2011 12:00 AM  LIPID PANEL  Reviewed

 

 2011 12:00 AM  ASSAY THYROID STIM HORMONE  Reviewed

 

 2011 12:00 AM  COMPLETE CBC W/AUTO DIFF WBC  Reviewed

 

 2011 12:00 AM  Wrist Support  Reviewed

 

 2016 12:00 AM  Screening mammography, bilateral  Reviewed

 

 2016 12:00 AM  DXA BONE DENSITY AXIAL  Returned

 

 2016 12:00 AM  TETANUS VACCINE IM  Reviewed

 

 2016 12:00 AM  HEP B VACC ADULT 3 DOSE IM  Reviewed

 

 2012 12:00 AM  TISSUE EXAM FOR FUNGI  Reviewed

 

 2012 12:00 AM  SMEAR WET MOUNT SALINE/INK  Reviewed

 

 2016 12:00 AM  TETANUS VACCINE IM  Reviewed

 

 2016 12:00 AM  HEP B VACC ADULT 3 DOSE IM  Reviewed

 

 2/15/2012 12:00 AM  THER/PROPH/DIAG INJ SC/IM  Reviewed

 

 2/15/2012 12:00 AM  Bicillin CR [facility]80595155[facility]  Reviewed

 

 2/15/2012 12:00 AM  Depo-Medrol 40 mg NDC#6746517671  Reviewed

 

 10/13/2016 12:00 AM  COMPLETE CBC W/AUTO DIFF WBC  Returned

 

 10/13/2016 12:00 AM  COMPREHEN METABOLIC PANEL  Returned

 

 10/13/2016 12:00 AM  ASSAY THYROID STIM HORMONE  Returned

 

 10/13/2016 12:00 AM  LIPID PANEL  Returned

 

 2016 12:00 AM  TETANUS VACCINE IM  Reviewed

 

 2016 12:00 AM  HEP B VACC ADULT 3 DOSE IM  Reviewed

 

 2017 12:00 AM  ASSAY OF IRON  Returned

 

 2017 12:00 AM  IRON BINDING TEST  Returned

 

 2017 12:00 AM  ASSAY OF TRANSFERRIN  Returned

 

 2017 12:00 AM  OCCULT BLD FECES 1-3 TESTS  Returned

 

 2017 12:00 AM  COMPLETE CBC AUTOMATED  Returned

 

 8/15/2012 12:00 AM  COMPREHEN METABOLIC PANEL  Reviewed

 

 8/15/2012 12:00 AM  ASSAY THYROID STIM HORMONE  Reviewed

 

 8/15/2012 12:00 AM  COMPLETE CBC W/AUTO DIFF WBC  Reviewed

 

 8/15/2012 12:00 AM  Wrist Support  Reviewed

 

 2017 12:00 AM  METABOLIC PANEL TOTAL CA  Returned

 

 10/29/2012 12:00 AM  Flu Injection 3 Years And Above NDC# [address]-57  [facility]  
Reviewed

 

 12/10/2012 12:00 AM  IMMUNIZATION ADMIN  Reviewed

 

 12/10/2012 12:00 AM  Pneumovax Injection - [facility]  Reviewed

 

 2012 12:00 AM  TRICHOMONAS ASSAY W/OPTIC  Reviewed

 

 2013 12:00 AM  THER/PROPH/DIAG INJ SC/IM  Reviewed

 

 2013 12:00 AM  Bicillin CR, 1.2 million units NDC# 46319-359-02  Reviewed

 

 2013 12:00 AM  COMPREHEN METABOLIC PANEL  Reviewed

 

 2013 12:00 AM  LIPID PANEL  Reviewed

 

 2013 12:00 AM  COMPLETE CBC W/AUTO DIFF WBC  Reviewed

 

 2013 12:00 AM  ASSAY THYROID STIM HORMONE  Reviewed

 

 6/10/2013 12:00 AM  MAMMOGRAM SCREENING  Reviewed

 

 6/10/2013 12:00 AM  CYTOPATH C/V MANUAL  Reviewed

 

 12/10/2013 12:00 AM  LIPID PANEL  Reviewed

 

 12/10/2013 12:00 AM  ASSAY THYROID STIM HORMONE  Reviewed

 

 12/10/2013 12:00 AM  COMPLETE CBC W/AUTO DIFF WBC  Reviewed

 

 12/10/2013 12:00 AM  COMPREHEN METABOLIC PANEL  Reviewed

 

 2010 12:00 AM  CYTOPATH C/V MANUAL  Reviewed

 

 2010 12:00 AM  SMEAR WET MOUNT SALINE/INK  Reviewed

 

 2010 12:00 AM  LIPID PANEL  Reviewed

 

 2010 12:00 AM  ASSAY THYROID STIM HORMONE  Reviewed

 

 2010 12:00 AM  COMPLETE CBC W/AUTO DIFF WBC  Reviewed

 

 2010 12:00 AM  COMPREHEN METABOLIC PANEL  Reviewed

 

 10/28/2013 12:00 AM  Flu Injection 3 Years And Above NDC# [address]-47  [facility]  
Reviewed

 

 2010 8:48 AM  Blood Pressure Check-no charge  Reviewed

 

 2010 12:00 AM  Blood Pressure Check-no charge  Reviewed

 

 2014 12:00 AM  METABOLIC PANEL TOTAL CA  Reviewed

 

 2014 12:00 AM  ASSAY THYROID STIM HORMONE  Reviewed

 

 2014 12:00 AM  ASSAY OF FREE THYROXINE  Reviewed

 

 2014 12:00 AM  MAMMOGRAM SCREENING  Reviewed

 

 2014 12:00 AM  CT BONE DENSITY AXIAL  Reviewed

 

 2014 12:00 AM  COMPLETE CBC W/AUTO DIFF WBC  Reviewed

 

 2014 12:00 AM  COMPREHEN METABOLIC PANEL  Reviewed

 

 2014 12:00 AM  LIPID PANEL  Reviewed

 

 2014 12:00 AM  ASSAY THYROID STIM HORMONE  Reviewed

 

 10/20/2010 12:00 AM  IMMUNIZATION ADMIN  Reviewed

 

 10/20/2010 12:00 AM  FLU VACCINE 3 YRS & > IM  Reviewed

 

 2010 12:00 AM  COMPREHEN METABOLIC PANEL  Reviewed

 

 2010 12:00 AM  LIPID PANEL  Reviewed

 

 2010 12:00 AM  ASSAY THYROID STIM HORMONE  Reviewed

 

 2010 12:00 AM  COMPLETE CBC W/AUTO DIFF WBC  Reviewed

 

 2010 12:00 AM  Blood Pressure Check-no charge  Reviewed

 

 10/2/2014 12:00 AM  THER/PROPH/DIAG INJ SC/IM  Reviewed

 

 10/2/2014 12:00 AM  Kenalog, Per 10 Mg NDC#7750-9803-72  Reviewed

 

 2014 12:00 AM  COMPLETE CBC W/AUTO DIFF WBC  Reviewed

 

 2014 12:00 AM  COMPREHEN METABOLIC PANEL  Reviewed

 

 2014 12:00 AM  LIPID PANEL  Reviewed

 

 2014 12:00 AM  ASSAY THYROID STIM HORMONE  Reviewed

 

 2015 12:00 AM  Rocephin 1 gram NDC#4518-4200-58  Reviewed

 

 2015 12:00 AM  THER/PROPH/DIAG INJ SC/IM  Reviewed

 

 2011 12:00 AM  COMPREHEN METABOLIC PANEL  Reviewed

 

 2011 12:00 AM  LIPID PANEL  Reviewed

 

 2011 12:00 AM  ASSAY THYROID STIM HORMONE  Reviewed

 

 2011 12:00 AM  COMPLETE CBC W/AUTO DIFF WBC  Reviewed

 

 2011 12:00 AM  METABOLIC PANEL TOTAL CA  Reviewed

 

 2011 12:00 AM  COMPREHEN METABOLIC PANEL  Reviewed

 

 2011 12:00 AM  ASSAY THYROID STIM HORMONE  Reviewed

 

 2011 12:00 AM  COMPLETE CBC W/AUTO DIFF WBC  Reviewed

 

 2011 12:00 AM  ASSAY OF LIPASE  Reviewed

 

 2011 12:00 AM  THER/PROPH/DIAG INJ SC/IM  Reviewed

 

 2011 12:00 AM  Phenergan 50 Mg Im  Bernadine  Reviewed

 

 2011 12:00 AM  METABOLIC PANEL TOTAL CA  Reviewed

 

 2011 12:00 AM  THER/PROPH/DIAG INJ SC/IM  Reviewed

 

 2011 12:00 AM  Phenergan 25 Mg Im  Bernadine  Reviewed

 

 2011 12:00 AM  METABOLIC PANEL TOTAL CA  Reviewed

 

 2011 12:00 AM  ASSAY THYROID STIM HORMONE  Reviewed

 

 2011 12:00 AM  THER/PROPH/DIAG INJ SC/IM  Reviewed

 

 2011 12:00 AM  Phenergan 50 Mg Im  Bernadine  Reviewed

 

 2011 12:00 AM  ASSAY OF SERUM SODIUM  Reviewed

 

 2011 12:00 AM  ASSAY OF SERUM SODIUM  Reviewed

 

 2011 12:00 AM  CYTOPATH C/V MANUAL  Reviewed

 

 2011 12:00 AM  ASSAY THYROID STIM HORMONE  Reviewed

 

 2015 12:00 AM  COMPLETE CBC W/AUTO DIFF WBC  Reviewed

 

 2015 12:00 AM  COMPREHEN METABOLIC PANEL  Reviewed

 

 2015 12:00 AM  LIPID PANEL  Reviewed

 

 2015 12:00 AM  ASSAY THYROID STIM HORMONE  Reviewed

 

 2015 12:00 AM  MAMMOGRAM SCREENING  Reviewed

 

 2015 12:00 AM  CYTOPATH TBS C/V MANUAL  Reviewed







Results Summary







 Date and Description  Results

 

 2010 9:25 AM  Colonoscopy-Women and Men over 50 Abnormal Mammogram -Women 
over 40 Declined Pap Smear Declined 

 

 2010 10:41 AM  WET PREP NO TRICHOMONADS SEEN  No  Few 

 

 2010 8:15 AM  TRIGLYCERIDES 174.0 mg/dLCHOLESTEROL 175.0 mg/dLHDL 58.0 mg/
dLTOT CHOL/HDL 3.0 LDL (CALC) 82.0 mg/dLTSH 0.790 uIU/mLGLUCOSE 95.0 mg/
dLSODIUM 136.0 mmol/LPOTASSIUM 4.50 mmol/LCHLORIDE 99.0 mmol/LCO2 26.0 mmol/
LBUN 12.0 mg/dLCREATININE 0.80 mg/dLSGOT/AST 32.0 IU/LSGPT/ALT 33.0 IU/LALK 
PHOS 103.0 IU/LTOTAL PROTEIN 7.60 g/dLALBUMIN 4.60 g/dLTOTAL BILI 0.60 mg/
dLCALCIUM 9.80 mg/dLAGE 64 GFR NonAA 72 GFR AA 87 eGFR >60 mL/min/1.73 m2eGFR AA
* >60 WBC 5.3 RBC 4.13 HGB 13.10 g/dLHCT 39.20 %MCV 95.0 fLMCH 31.70 pgMCHC 
33.40 g/dLRDW SD 44 RDW CV 12.70 %MPV 9.80 fLPLT 257 NRBC# 0.00 NRBC% 0.0 %NEUT 
57.90 %%LYMP 26.50 %%MONO 11.60 %%EOS 3.20 %%BASO 0.80 %#NEUT 3.04 #LYMP 1.39 #
MONO 0.61 #EOS 0.17 #BASO 0.04 MANUAL DIFF NOT IND 

 

 12/15/2010 8:30 AM  TRIGLYCERIDES 157.0 mg/dLCHOLESTEROL 163.0 mg/dLHDL 56.0 mg
/dLTOT CHOL/HDL 2.9 LDL (CALC) 76.0 mg/dLTSH 0.650 uIU/mLWBC 6.5 RBC 4.25 HGB 
13.60 g/dLHCT 40.70 %MCV 96.0 fLMCH 32.0 pgMCHC 33.40 g/dLRDW SD 44 RDW CV 
12.60 %MPV 9.90 fLPLT 313 NRBC# 0.00 NRBC% 0.0 %NEUT 61.80 %%LYMP 26.20 %%MONO 
8.30 %%EOS 3.10 %%BASO 0.60 %#NEUT 4.00 #LYMP 1.70 #MONO 0.54 #EOS 0.20 #BASO 
0.04 MANUAL DIFF NOT IND GLUCOSE 97.0 mg/dLSODIUM 136.0 mmol/LPOTASSIUM 4.40 
mmol/LCHLORIDE 101.0 mmol/LCO2 26.0 mmol/LBUN 10.0 mg/dLCREATININE 0.80 mg/
dLSGOT/AST 31.0 IU/LSGPT/ALT 34.0 IU/LALK PHOS 92.0 IU/LTOTAL PROTEIN 8.10 g/
dLALBUMIN 4.60 g/dLTOTAL BILI 0.40 mg/dLCALCIUM 10.0 mg/dLAGE 65 GFR NonAA 72 
GFR AA 87 eGFR >60 mL/min/1.73 m2eGFR AA* >60 

 

 2011 9:45 AM  GLUCOSE 91.0 mg/dLSODIUM 133.0 mmol/LPOTASSIUM 4.40 mmol/
LCHLORIDE 97.0 mmol/LCO2 24.0 mmol/LBUN 9.0 mg/dLCREATININE 0.70 mg/dLCALCIUM 
10.0 mg/dLAGE 65 GFR NonAA 84 GFR  eGFR >60 mL/min/1.73 m2eGFR AA* >60 

 

 2011 10:25 AM  LIPASE 29.0 U/LTSH 0.10 uIU/mLGLUCOSE 115.0 mg/dLSODIUM 
127.0 mmol/LPOTASSIUM 5.30 mmol/LCHLORIDE 93.0 mmol/LCO2 18.0 mmol/LBUN 9.0 mg/
dLCREATININE 0.70 mg/dLSGOT/AST 62.0 IU/LSGPT/ALT 46.0 IU/LALK PHOS 100.0 IU/
LTOTAL PROTEIN 8.60 g/dLALBUMIN 4.90 g/dLTOTAL BILI 0.60 mg/dLCALCIUM 9.90 mg/
dLAGE 65 GFR NonAA 84 GFR  eGFR >60 mL/min/1.73 m2eGFR AA* >60 WBC 6.9 
RBC 4.48 HGB 14.40 g/dLHCT 40.90 %MCV 91.0 fLMCH 32.10 pgMCHC 35.20 g/dLRDW SD 
41 RDW CV 12.50 %MPV 9.30 fLPLT 260 NRBC# 0.00 NRBC% 0.0 %NEUT 74.90 %%LYMP 
15.10 %%MONO 9.40 %%EOS 0.30 %%BASO 0.30 %#NEUT 5.15 #LYMP 1.04 #MONO 0.65 #EOS 
0.02 #BASO 0.02 MANUAL DIFF NOT IND 

 

 2011 9:07 AM  GLUCOSE 92.0 mg/dLSODIUM 131.0 mmol/LPOTASSIUM 4.20 mmol/
LCHLORIDE 99.0 mmol/LCO2 22.0 mmol/LBUN 9.0 mg/dLCREATININE 0.70 mg/dLCALCIUM 
9.20 mg/dLAGE 65 GFR NonAA 84 GFR  eGFR >60 mL/min/1.73 m2eGFR AA* >60 

 

 2011 11:06 AM  TSH 0.510 uIU/mLGLUCOSE 99.0 mg/dLSODIUM 132.0 mmol/
LPOTASSIUM 3.50 mmol/LCHLORIDE 95.0 mmol/LCO2 23.0 mmol/LBUN 9.0 mg/
dLCREATININE 0.80 mg/dLCALCIUM 10.40 mg/dLAGE 65 GFR NonAA 72 GFR AA 87 eGFR >
60 mL/min/1.73 m2eGFR AA* >60 

 

 2011 9:45 AM  SODIUM 132.0 mmol/L

 

 2011 9:27 AM  SODIUM 137.0 mmol/L

 

 2011 8:35 AM  TRIGLYCERIDES 114.0 mg/dLCHOLESTEROL 145.0 mg/dLHDL 56.0 mg/
dLTOT CHOL/HDL 2.6 LDL (CALC) 66.0 mg/dLGLUCOSE 105.0 mg/dLSODIUM 138.0 mmol/
LPOTASSIUM 4.40 mmol/LCHLORIDE 103.0 mmol/LCO2 25.0 mmol/LBUN 8.0 mg/
dLCREATININE 0.70 mg/dLSGOT/AST 36.0 IU/LSGPT/ALT 38.0 IU/LALK PHOS 103.0 IU/
LTOTAL PROTEIN 7.40 g/dLALBUMIN 4.30 g/dLTOTAL BILI 0.30 mg/dLCALCIUM 9.20 mg/
dLAGE 65 GFR NonAA 84 GFR  eGFR >60 mL/min/1.73 m2eGFR AA* >60 WBC 5.1 
RBC 3.76 HGB 12.0 g/dLHCT 36.10 %MCV 96.0 fLMCH 31.90 pgMCHC 33.20 g/dLRDW SD 
46 RDW CV 13.10 %MPV 9.40 fLPLT 249 NRBC# 0.00 NRBC% 0.0 %NEUT 60.40 %%LYMP 
25.90 %%MONO 8.90 %%EOS 4.0 %%BASO 0.80 %#NEUT 3.06 #LYMP 1.31 #MONO 0.45 #EOS 
0.20 #BASO 0.04 MANUAL DIFF NOT IND 

 

 2011 9:55 AM  TSH 1.070 uIU/mL

 

 2011 8:55 AM  GLUCOSE 102.0 mg/dLSODIUM 135.0 mmol/LPOTASSIUM 4.20 mmol/
LCHLORIDE 102.0 mmol/LCO2 24.0 mmol/LBUN 15.0 mg/dLCREATININE 0.80 mg/dLSGOT/
AST 30.0 IU/LSGPT/ALT 35.0 IU/LALK PHOS 119.0 IU/LTOTAL PROTEIN 7.50 g/
dLALBUMIN 4.30 g/dLTOTAL BILI 0.30 mg/dLCALCIUM 9.20 mg/dLAGE 65 GFR NonAA 72 
GFR AA 87 eGFR >60 mL/min/1.73 m2eGFR AA* >60 TSH 1.130 uIU/mL

 

 2011 9:50 AM  GLUCOSE 85.0 mg/dLSODIUM 133.0 mmol/LPOTASSIUM 4.20 mmol/
LCHLORIDE 101.0 mmol/LCO2 21.0 mmol/LBUN 13.0 mg/dLCREATININE 0.80 mg/dLSGOT/
AST 36.0 IU/LSGPT/ALT 36.0 IU/LALK PHOS 109.0 IU/LTOTAL PROTEIN 7.40 g/
dLALBUMIN 4.20 g/dLTOTAL BILI 0.50 mg/dLCALCIUM 9.40 mg/dLAGE 65 GFR NonAA 72 
GFR AA 87 eGFR >60 mL/min/1.73 m2eGFR AA* >60 

 

 2011 8:55 AM  GLUCOSE 98.0 mg/dLSODIUM 137.0 mmol/LPOTASSIUM 3.90 mmol/
LCHLORIDE 103.0 mmol/LCO2 25.0 mmol/LBUN 14.0 mg/dLCREATININE 0.70 mg/dLSGOT/
AST 33.0 IU/LSGPT/ALT 36.0 IU/LALK PHOS 111.0 IU/LTOTAL PROTEIN 8.30 g/
dLALBUMIN 4.40 g/dLTOTAL BILI 0.50 mg/dLCALCIUM 9.40 mg/dLAGE 66 GFR NonAA 84 
GFR  eGFR >60 mL/min/1.73 m2eGFR AA* >60 TRIGLYCERIDES 109.0 mg/
dLCHOLESTEROL 153.0 mg/dLHDL 54.0 mg/dLTOT CHOL/HDL 2.8 LDL (CALC) 77.0 mg/
dLTSH 1.330 uIU/mL

 

 2011 10:17 AM  Colonoscopy-Women and Men over 50 Declined Mammogram -
Women over 40 Normal Pap Smear Negative 

 

 2012 10:33 AM  WET PREP NO TRICH SEEN CLUE CELLS NONE SEEN 

 

 2012 8:45 AM  WBC 5.2 RBC 3.96 HGB 12.70 g/dLHCT 37.80 %MCV 96.0 fLMCH 
32.10 pgMCHC 33.60 g/dLRDW SD 46 RDW CV 13.30 %MPV 9.70 fLPLT 297 NRBC# 0.00 
NRBC% 0.0 %NEUT 57.70 %%LYMP 28.50 %%MONO 10.30 %%EOS 2.50 %%BASO 1.0 %#NEUT 
3.02 #LYMP 1.49 #MONO 0.54 #EOS 0.13 #BASO 0.05 MANUAL DIFF NOT IND GLUCOSE 
97.0 mg/dLSODIUM 137.0 mmol/LPOTASSIUM 4.40 mmol/LCHLORIDE 101.0 mmol/LCO2 23.0 
mmol/LBUN 17.0 mg/dLCREATININE 0.80 mg/dLSGOT/AST 30.0 IU/LSGPT/ALT 33.0 IU/
LALK PHOS 95.0 IU/LTOTAL PROTEIN 7.40 g/dLALBUMIN 4.40 g/dLTOTAL BILI 0.60 mg/
dLCALCIUM 9.70 mg/dLAGE 66 GFR NonAA 72 GFR AA 87 eGFR 60 eGFR AA* 60 
TRIGLYCERIDES 130.0 mg/dLCHOLESTEROL 157.0 mg/dLHDL 56.0 mg/dLTOT CHOL/HDL 2.8 
LDL (CALC) 75.0 mg/dLTSH 0.940 uIU/mL

 

 8/15/2012 4:02 PM  WET PREP NO TRICH SEEN CLUE CELLS NONE SEEN 

 

 2012 8:45 AM  WBC 5.1 RBC 3.91 HGB 12.50 g/dLHCT 36.30 %MCV 93.0 fLMCH 
32.0 pgMCHC 34.40 g/dLRDW SD 43 RDW CV 12.60 %MPV 9.30 fLPLT 244 NRBC# 0.00 NRBC
% 0.0 %NEUT 57.60 %%LYMP 27.50 %%MONO 9.10 %%EOS 5.0 %%BASO 0.80 %#NEUT 2.91 #
LYMP 1.39 #MONO 0.46 #EOS 0.25 #BASO 0.04 MANUAL DIFF NOT IND TSH 1.390 uIU/
mLTRIGLYCERIDES 154.0 mg/dLCHOLESTEROL 147.0 mg/dLHDL 45.0 mg/dLTOT CHOL/HDL 
3.3 LDL (CALC) 71.0 mg/dLGLUCOSE 101.0 mg/dLSODIUM 134.0 mmol/LPOTASSIUM 4.30 
mmol/LCHLORIDE 102.0 mmol/LCO2 23.0 mmol/LBUN 11.0 mg/dLCREATININE 0.80 mg/
dLSGOT/AST 34.0 IU/LSGPT/ALT 38.0 IU/LALK PHOS 104.0 IU/LTOTAL PROTEIN 7.60 g/
dLALBUMIN 4.40 g/dLTOTAL BILI 0.50 mg/dLCALCIUM 9.40 mg/dLAGE 67 GFR NonAA 72 
GFR AA 87 eGFR 60 eGFR AA* 60 

 

 12/10/2012 8:34 AM  Colonoscopy-Women and Men over 50 Declined Mammogram -
Women over 40 Declined Pap Smear Declined 

 

 2012 5:02 PM  WET PREP NO TRICH SEEN CLUE CELLS NONE SEEN 

 

 2013 8:25 AM  WBC 4.2 RBC 3.96 HGB 12.90 g/dLHCT 37.0 %MCV 93.0 fLMCH 
32.60 pgMCHC 34.90 g/dLRDW SD 43 RDW CV 12.60 %MPV 9.70 fLPLT 254 NRBC# 0.00 
NRBC% 0.0 %NEUT 54.40 %%LYMP 30.40 %%MONO 10.80 %%EOS 3.40 %%BASO 1.0 %#NEUT 
2.26 #LYMP 1.26 #MONO 0.45 #EOS 0.14 #BASO 0.04 MANUAL DIFF NOT IND TSH 1.230 
uIU/mLGLUCOSE 94.0 mg/dLSODIUM 136.0 mmol/LPOTASSIUM 4.30 mmol/LCHLORIDE 99.0 
mmol/LCO2 25.0 mmol/LBUN 10.0 mg/dLCREATININE 0.80 mg/dLSGOT/AST 36.0 IU/LSGPT/
ALT 36.0 IU/LALK PHOS 84.0 IU/LTOTAL PROTEIN 7.90 g/dLALBUMIN 4.40 g/dLTOTAL 
BILI 0.70 mg/dLCALCIUM 9.80 mg/dLAGE 67 GFR NonAA 72 GFR AA 87 eGFR 60 eGFR AA* 
60 TRIGLYCERIDES 122.0 mg/dLCHOLESTEROL 141.0 mg/dLHDL 47.0 mg/dLTOT CHOL/HDL 
3.0 LDL (CALC) 70.0 mg/dL

 

 6/10/2013 3:52 PM  WET PREP NO TRICH SEEN CLUE CELLS NONE SEEN 

 

 2013 8:45 AM  WBC 5.3 RBC 4.01 HGB 13.0 g/dLHCT 37.60 %MCV 94.0 fLMCH 
32.40 pgMCHC 34.60 g/dLRDW SD 43 RDW CV 12.50 %MPV 9.40 fLPLT 248 NRBC# 0.00 
NRBC% 0.0 %NEUT 58.70 %%LYMP 27.80 %%MONO 9.80 %%EOS 2.80 %%BASO 0.90 %#NEUT 
3.12 #LYMP 1.48 #MONO 0.52 #EOS 0.15 #BASO 0.05 MANUAL DIFF NOT IND TSH 1.570 
uIU/mLGLUCOSE 94.0 mg/dLSODIUM 134.0 mmol/LPOTASSIUM 4.10 mmol/LCHLORIDE 101.0 
mmol/LCO2 23.0 mmol/LBUN 11.0 mg/dLCREATININE 0.80 mg/dLSGOT/AST 41.0 IU/LSGPT/
ALT 44.0 IU/LALK PHOS 109.0 IU/LTOTAL PROTEIN 7.90 g/dLALBUMIN 4.70 g/dLTOTAL 
BILI 0.80 mg/dLCALCIUM 10.40 mg/dLAGE 68 GFR NonAA 72 GFR AA 87 eGFR >60 mL/min/
1.73 m2eGFR AA* >60 TRIGLYCERIDES 163.0 mg/dLCHOLESTEROL 138.0 mg/dLHDL 49.0 mg/
dLTOT CHOL/HDL 2.8 LDL (CALC) 56.0 mg/dL

 

 2014 8:58 AM  GLUCOSE 86.0 mg/dLSODIUM 134.0 mmol/LPOTASSIUM 4.20 mmol/
LCHLORIDE 99.0 mmol/LCO2 25.0 mmol/LBUN 14.0 mg/dLCREATININE 0.80 mg/dLCALCIUM 
10.10 mg/dLAGE 68 GFR NonAA 72 GFR AA 87 eGFR >60 mL/min/1.73 m2eGFR AA* >60 
FREE T4 1.18 TSH 1.20 uIU/mL

 

 2014 9:50 AM  WBC 5.7 RBC 4.03 HGB 12.90 g/dLHCT 37.90 %MCV 94.0 fLMCH 
32.0 pgMCHC 34.0 g/dLRDW SD 44 RDW CV 12.70 %MPV 9.70 fLPLT 292 NRBC# 0.00 NRBC
% 0.0 %NEUT 62.70 %%LYMP 21.80 %%MONO 11.0 %%EOS 3.40 %%BASO 1.10 %#NEUT 3.55 #
LYMP 1.23 #MONO 0.62 #EOS 0.19 #BASO 0.06 MANUAL DIFF NOT IND GLUCOSE 98.0 mg/
dLSODIUM 135.0 mmol/LPOTASSIUM 4.30 mmol/LCHLORIDE 102.0 mmol/LCO2 22.0 mmol/
LBUN 10.0 mg/dLCREATININE 0.80 mg/dLSGOT/AST 34.0 IU/LSGPT/ALT 32.0 IU/LALK 
PHOS 95.0 IU/LTOTAL PROTEIN 7.70 g/dLALBUMIN 4.30 g/dLTOTAL BILI 0.80 mg/
dLCALCIUM 9.10 mg/dLAGE 68 GFR NonAA 72 GFR AA 87 eGFR 60 eGFR AA* 60 
TRIGLYCERIDES 108.0 mg/dLCHOLESTEROL 146.0 mg/dLHDL 56.0 mg/dLTOT CHOL/HDL 2.6 
LDL (CALC) 68.0 mg/dLTSH 0.90 uIU/mL

 

 2014 8:40 AM  WBC 4.4 RBC 4.13 HGB 13.20 g/dLHCT 38.90 %MCV 94.0 fLMCH 
32.0 pgMCHC 33.90 g/dLRDW SD 47 RDW CV 13.50 %MPV 9.80 fLPLT 279 NRBC# 0.00 NRBC
% 0.0 %NEUT 63.80 %%LYMP 24.60 %%MONO 9.30 %%EOS 1.60 %%BASO 0.70 %#NEUT 2.80 #
LYMP 1.08 #MONO 0.41 #EOS 0.07 #BASO 0.03 MANUAL DIFF NOT IND GLUCOSE 96.0 mg/
dLSODIUM 136.0 mmol/LPOTASSIUM 4.10 mmol/LCHLORIDE 99.0 mmol/LCO2 23.0 mmol/
LBUN 8.0 mg/dLCREATININE 0.80 mg/dLSGOT/AST 31.0 IU/LSGPT/ALT 27.0 IU/LALK PHOS 
85.0 IU/LTOTAL PROTEIN 7.60 g/dLALBUMIN 4.40 g/dLTOTAL BILI 0.80 mg/dLCALCIUM 
10.20 mg/dLAGE 69 GFR NonAA 71 GFR AA 86 eGFR 60 eGFR AA* 60 TRIGLYCERIDES 61.0 
mg/dLCHOLESTEROL 148.0 mg/dLHDL 71.0 mg/dLTOT CHOL/HDL 2.1 LDL (CALC) 65.0 mg/
dLTSH 0.990 uIU/mL

 

 2015 8:32 AM  WBC 4.8 RBC 3.98 HGB 12.70 g/dLHCT 37.30 %MCV 94.0 fLMCH 
31.90 pgMCHC 34.0 g/dLRDW SD 43 RDW CV 12.70 %MPV 9.50 fLPLT 270 NRBC# 0.00 NRBC
% 0.0 %NEUT 57.30 %%LYMP 25.0 %%MONO 13.0 %%EOS 3.60 %%BASO 1.10 %#NEUT 2.73 #
LYMP 1.19 #MONO 0.62 #EOS 0.17 #BASO 0.05 MANUAL DIFF NOT IND TSH 0.640 uIU/
mLGLUCOSE 90.0 mg/dLSODIUM 136.0 mmol/LPOTASSIUM 4.0 mmol/LCHLORIDE 101.0 mmol/
LCO2 24.0 mmol/LBUN 10.0 mg/dLCREATININE 0.80 mg/dLSGOT/AST 34.0 IU/LSGPT/ALT 
32.0 IU/LALK PHOS 92.0 IU/LTOTAL PROTEIN 7.50 g/dLALBUMIN 4.40 g/dLTOTAL BILI 
0.70 mg/dLCALCIUM 9.50 mg/dLAGE 69 GFR NonAA 71 GFR AA 86 eGFR >60 mL/min/1.73 
m2eGFR AA* >60 TRIGLYCERIDES 107.0 mg/dLCHOLESTEROL 138.0 mg/dLHDL 58.0 mg/
dLTOT CHOL/HDL 2.4 LDL (CALC) 59.0 mg/dL

 

 2015 8:31 AM  WBC 4.6 RBC 3.97 HGB 12.90 g/dLHCT 38.0 %MCV 96.0 fLMCH 
32.50 pgMCHC 33.90 g/dLRDW SD 44 RDW CV 12.60 %MPV 9.0 fLPLT 248 NRBC# 0.00 NRBC
% 0.0 %NEUT 61.0 %%LYMP 24.70 %%MONO 10.20 %%EOS 3.0 %%BASO 1.10 %#NEUT 2.82 #
LYMP 1.14 #MONO 0.47 #EOS 0.14 #BASO 0.05 MANUAL DIFF NOT IND TRIGLYCERIDES 
105.0 mg/dLCHOLESTEROL 141.0 mg/dLHDL 58.0 mg/dLTOT CHOL/HDL 2.4 LDL (CALC) 
62.0 mg/dLGLUCOSE 93.0 mg/dLSODIUM 136.0 mmol/LPOTASSIUM 4.10 mmol/LCHLORIDE 
101.0 mmol/LCO2 25.0 mmol/LBUN 9.0 mg/dLCREATININE 0.80 mg/dLSGOT/AST 32.0 IU/
LSGPT/ALT 28.0 IU/LALK PHOS 95.0 IU/LTOTAL PROTEIN 7.30 g/dLALBUMIN 4.50 g/
dLTOTAL BILI 0.80 mg/dLCALCIUM 9.70 mg/dLAGE 70 GFR NonAA 71 GFR AA 86 eGFR >60 
mL/min/1.73meGFR AA* >60 TSH 1.10 uIU/mL

 

 2016 8:25 AM  TSH 0.70 uIU/mLTRIGLYCERIDES 85.0 mg/dLCHOLESTEROL 166.0 mg
/dLHDL 74.0 mg/dLTOT CHOL/HDL 2.2 LDL (CALC) 75.0 mg/dLGLUCOSE 96.0 mg/dLSODIUM 
136.0 mmol/LPOTASSIUM 4.0 mmol/LCHLORIDE 100.0 mmol/LCO2 24.0 mmol/LBUN 11.0 mg/
dLCREATININE 0.80 mg/dLSGOT/AST 34.0 IU/LSGPT/ALT 24.0 IU/LALK PHOS 98.0 IU/
LTOTAL PROTEIN 7.60 g/dLALBUMIN 4.40 g/dLTOTAL BILI 0.80 mg/dLCALCIUM 9.70 mg/
dLAGE 71 GFR NonAA 71 GFR AA 86 eGFR >60 mL/min/1.73meGFR AA* >60 WBC 5.9 RBC 
4.00 HGB 12.80 g/dLHCT 38.0 %MCV 95.0 fLMCH 32.0 pgMCHC 33.70 g/dLRDW SD 43 RDW 
CV 12.20 %MPV 9.30 fLPLT 272 NRBC# 0.00 NRBC% 0.0 %NEUT 73.90 %%LYMP 15.60 %%
MONO 7.80 %%EOS 1.70 %%BASO 0.70 %#NEUT 4.35 #LYMP 0.92 #MONO 0.46 #EOS 0.10 #
BASO 0.04 MANUAL DIFF NOT IND 

 

 2017 4:05 PM  IRON TOTAL 31.0 ug/dLTransferrin 256.0 mg/dLTIBC Calculation 
320 %Saturation Calc 10 WBC 6.2 RBC 3.56 HGB 11.60 g/dLHCT 33.40 %MCV 94.0 
fLMCH 32.60 pgMCHC 34.70 g/dLRDW SD 42 RDW CV 12.10 %MPV 9.50 fLPLT 260 NRBC# 
0.00 NRBC% 0.0 

 

 2017 8:49 AM  OCC BLD STOOL POSITIVE 

 

 7/3/2017 8:15 AM  WBC 6.7 RBC 3.96 HGB 12.70 g/dLHCT 37.30 %MCV 94.0 fLMCH 
32.10 pgMCHC 34.0 g/dLRDW SD 41 RDW CV 11.90 %MPV 9.80 fLPLT 275 NRBC# 0.00 NRBC
% 0.0 %NEUT 76.20 %%LYMP 13.60 %%MONO 8.50 %%EOS 1.0 %%BASO 0.40 %#NEUT 5.07 #
LYMP 0.91 #MONO 0.57 #EOS 0.07 #BASO 0.03 MANUAL DIFF NOT IND TRIGLYCERIDES 
97.0 mg/dLCHOLESTEROL 144.0 mg/dLHDL 60.0 mg/dLTOT CHOL/HDL 2.4 LDL (CALC) 65.0 
mg/dLGLUCOSE 91.0 mg/dLSODIUM 131.0 mmol/LPOTASSIUM 3.70 mmol/LCHLORIDE 96.0 
mmol/LCO2 25.0 mmol/LBUN 10.0 mg/dLCREATININE 0.80 mg/dLSGOT/AST 29.0 IU/LSGPT/
ALT 21.0 IU/LALK PHOS 85.0 IU/LTOTAL PROTEIN 7.60 g/dLALBUMIN 4.40 g/dLTOTAL 
BILI 0.80 mg/dLCALCIUM 9.60 mg/dLAGE 71 GFR NonAA 71 GFR AA 86 eGFR >60 mL/min/
1.73meGFR AA* >60 TSH 0.90 uIU/mL

 

 2017 8:12 AM  GLUCOSE 88.0 mg/dLSODIUM 135.0 mmol/LPOTASSIUM 3.80 mmol/
LCHLORIDE 100.0 mmol/LCO2 26.0 mmol/LBUN 9.0 mg/dLCREATININE 0.80 mg/dLCALCIUM 
9.50 mg/dLAGE 71 GFR NonAA 71 GFR AA 86 eGFR >60 mL/min/1.73meGFR AA* >60 







History Of Immunizations







 Name  Date Admin  Mfg Name  Mfg Code  Trade Name  Lot#  Route  Inj  Vis Given  
Vis Pub  CVX

 

 Influenza  10/20/2010  [employer] pasteur  [facility]  Fluzone  [id number]  Intramuscular  
Left Arm  10/20/2010  2010  999

 

 Influenza  10/28/2011  [employer] pasteur  [facility]  Fluzone  [id number]  Intramuscular  
Left Arm  10/28/2011  2011  141

 

 Influenza  10/29/2012  [employer] pasteur  [facility]  Fluzone  [id number]  Intramuscular  
Left Deltoid  10/29/2012  2012  141

 

 X  12/10/2012  [employer]  MSD  Pneumovax 23  [id number]  Intramuscular  
Left Gluteous Medius  12/10/2012  2010  33

 

 Influenza  10/28/2013  [employer] pasteur  [facility]  Fluzone > 3 Years  [id number]  
Intramuscular  Right Deltoid  10/28/2013  2013  141

 

 X  9/2/2015  Not Entered  NE  Prevnar 13     Not Entered  Not Entered  1  109

 

 Influenza  2015  Not Entered  NE  Not Entered     Not Entered  Not Entered
  2015  141

 

 HepB Adult  2016  [employer]  MSD  Recombivax Adult  [id number]  Not 
Entered  Left Deltoid  2016  43

 

 HepB Adult  2016  [employer]  MSD  Recombivax Adult  [id number]  
Intramuscular  Right Deltoid  2016  43

 

 Zostavax  2012  Not Entered  NE  Not Entered     Not Entered  Not 
Entered  1  121

 

 Tdap  2012  Not Entered  NE  Not Entered     Not Entered  Not Entered  1  115

 

 Influenza  10/13/2016  Not Entered  NE  Fluzone High-Dose     Intramuscular  
Left Arm  1  141

 

 HepB Adult  2016  [employer]  MSD  Recombivax Adult  [id number]  
Intramuscular  Right Deltoid  2016  43







History of Past Illness







 Name  Date of Onset  Comments

 

 Benign Essential Hypertension  Dec 14 2009 10:10AM   

 

 Hyperlipidemia  Dec 14 2009 10:10AM   

 

 Hypothyroidism, Acquired  Dec 14 2009 10:10AM   

 

 hypothyroid      

 

 Hypertension      

 

 Hyperlipidemia      

 

 acid reflux      

 

 Hypertension, Benign Essential  2010  9:41AM   

 

 Hypertension, Benign Essential  2010 12:53PM   

 

 Routine gynecological examination  May  5 2010  9:28AM   

 

 Hypertension  May  5 2010  9:28AM   

 

 Hyperlipidemia, unspecified  May  5 2010  9:28AM   

 

 Hypothyroidism, Acquired  May  5 2010  9:28AM   

 

 Vulvovaginitis  May  5 2010  9:28AM   

 

 Rhinitis, Allergic  May  5 2010  9:28AM   

 

 Hypertension, Benign Essential  May 19 2010  8:48AM   

 

 Hypertension, Benign Essential  May 25 2010  9:39AM   

 

 Flu  Oct 20 2010 12:01PM   

 

 Essential Hypertension  2010  8:34AM   

 

 Hyperlipidemia  2010  8:34AM   

 

 Gastroesophageal Reflux  2010  8:34AM   

 

 Hypothyroidism, Acquired  2010  8:34AM   

 

 Hypertension, Benign Essential  2010  9:22AM   

 

 Hypertension, Benign Essential  Dec 15 2010  9:07AM   

 

 Essential Hypertension  2011  1:31PM   

 

 Hyperlipidemia  2011  1:31PM   

 

 Gastroesophageal Reflux  2011  1:31PM   

 

 Hypothyroidism, Acquired  2011  1:31PM   

 

 Essential Hypertension  2011  8:51AM   

 

 Hyperlipidemia  2011  8:51AM   

 

 Gastroesophageal Reflux  2011  8:51AM   

 

 Hypothyroidism, Acquired  2011  8:51AM   

 

 Essential Hypertension  2011  9:13AM   

 

 Hyperlipidemia  2011  9:13AM   

 

 Gastroesophageal Reflux  2011  9:13AM   

 

 Hypothyroidism, Acquired  2011  9:13AM   

 

 Nausea With Vomiting  2011  1:18PM   

 

 Hyponatremia  2011  8:46AM   

 

 Nausea  2011  9:13AM   

 

 Hypothyroidism  2011 11:10AM   

 

 Hyponatremia  2011 11:10AM   

 

 Essential Hypertension  2011 10:05AM   

 

 Hyperlipidemia  2011 10:05AM   

 

 Gastroesophageal Reflux  2011 10:05AM   

 

 Nausea  2011 10:05AM   

 

 Hypothyroidism, Acquired  2011 10:05AM   

 

 Hyponatremia  May  6 2011 11:34AM   

 

 Essential Hypertension  May 16 2011  8:50AM   

 

 Hyperlipidemia  May 16 2011  8:50AM   

 

 Gastroesophageal Reflux  May 16 2011  8:50AM   

 

 Nausea  May 16 2011  8:50AM   

 

 Hypothyroidism, Acquired  May 16 2011  8:50AM   

 

 Hyponatremia  May 16 2011  8:50AM   

 

 Routine gynecological examination  2011  8:54AM   

 

 Hypertension  2011  8:54AM   

 

 Hyperlipidemia, unspecified  2011  8:54AM   

 

 Hypothyroidism, Acquired  2011  8:54AM   

 

 Rhinitis, Allergic  2011  8:54AM   

 

 Hypothyroidism  Aug 29 2011 12:37PM   

 

 Hyponatremia  Aug 29 2011 12:37PM   

 

 Essential Hypertension  Sep 12 2011  8:53AM   

 

 Hyperlipidemia  Sep 12 2011  8:53AM   

 

 Gastroesophageal Reflux  Sep 12 2011  8:53AM   

 

 Hypothyroidism, Acquired  Sep 12 2011  8:53AM   

 

 Hyponatremia  Sep 12 2011  8:53AM   

 

 Hypertension  Sep 29 2011  9:41AM   

 

 Hyperlipidemia  Dec  8 2011  8:31AM   

 

 Hypothyroidism  Dec  8 2011  8:31AM   

 

 Hypertension  Dec  8 2011  8:31AM   

 

 Flu  Dec  9 2011 10:02AM   

 

 Essential Hypertension  Dec 13 2011 10:13AM   

 

 Hyperlipidemia  Dec 13 2011 10:13AM   

 

 Gastroesophageal Reflux  Dec 13 2011 10:13AM   

 

 Hypothyroidism, Acquired  Dec 13 2011 10:13AM   

 

 Hyponatremia  Dec 13 2011 10:13AM   

 

 Vaginal Discharge  2012  9:43AM   

 

 Rhinitis, Allergic  Feb 15 2012  9:31AM   

 

 Eustachian Tube Dysfunction  Feb 15 2012  9:31AM   

 

 Pharyngitis, Acute  Feb 15 2012  9:31AM   

 

 Routine gynecological examination  2012  8:48AM   

 

 Hypertension  2012  8:48AM   

 

 Hyperlipidemia, unspecified  2012  8:48AM   

 

 Hypothyroidism, Acquired  2012  8:48AM   

 

 Rhinitis, Allergic  2012  8:48AM   

 

 Candidiasis, Vulvovaginal  2012 11:04AM   

 

 Essential Hypertension  Aug 15 2012  9:02AM   

 

 Hyperlipidemia  Aug 15 2012  9:02AM   

 

 Gastroesophageal Reflux  Aug 15 2012  9:02AM   

 

 Hypothyroidism, Acquired  Aug 15 2012  9:02AM   

 

 Hyponatremia  Aug 15 2012  9:02AM   

 

 Atrophic Vaginitis, Postmenopausal  Aug 15 2012  9:02AM   

 

 Flu  Oct 29 2012  9:24AM   

 

 Essential Hypertension  Dec 10 2012  8:35AM   

 

 Hyperlipidemia  Dec 10 2012  8:35AM   

 

 Gastroesophageal Reflux  Dec 10 2012  8:35AM   

 

 Hypothyroidism, Acquired  Dec 10 2012  8:35AM   

 

 Hyponatremia  Dec 10 2012  8:35AM   

 

 Atrophic Vaginitis, Postmenopausal  Dec 10 2012  8:35AM   

 

 Pneumococcus  Dec 10 2012  2:07PM   

 

 Vaginal Discharge  Dec 20 2012  1:50PM   

 

 Essential Hypertension  Dec 20 2012  1:50PM   

 

 Hyperlipidemia  Dec 20 2012  1:50PM   

 

 Gastroesophageal Reflux  Dec 20 2012  1:50PM   

 

 Hypothyroidism, Acquired  Dec 20 2012  1:50PM   

 

 Atrophic Vaginitis, Postmenopausal  Dec 20 2012  1:50PM   

 

 Upper Respiratory Infections  2013  8:52AM   

 

 Hyperlipidemia  2013  8:21AM   

 

 Hypertension  2013  8:21AM   

 

 Hypothyroidism  2013  8:21AM   

 

 Screening Mammogram  Beto 10 2013  8:45AM   

 

 Routine gynecological examination  Beto 10 2013  8:34AM   

 

 Hypertension  Beto 10 2013  8:34AM   

 

 Hyperlipidemia, unspecified  Beto 10 2013  8:34AM   

 

 Hypothyroidism, Acquired  Beto 10 2013  8:34AM   

 

 Rhinitis, Allergic  Beto 10 2013  8:34AM   

 

 Flu  Oct 28 2013  8:52AM   

 

 Essential Hypertension  Dec  9 2013  8:37AM   

 

 Hyperlipidemia  Dec  9 2013  8:37AM   

 

 Gastroesophageal Reflux  Dec  9 2013  8:37AM   

 

 Hypothyroidism, Acquired  Dec  9 2013  8:37AM   

 

 Atrophic Vaginitis, Postmenopausal  Dec  9 2013  8:37AM   

 

 Hypertension  2014  9:56AM   

 

 Hyperlipidemia  2014  9:56AM   

 

 Hypothyroidism  2014  9:56AM   

 

 Screening Mammogram  2014  8:32AM   

 

 Osteopenia  2014  8:32AM   

 

 Hypertension  2014  8:35AM   

 

 Hypothyroidism, Acquired  2014  8:35AM   

 

 Hyperlipidemia  2014  8:35AM   

 

 Upper Respiratory Infections  2014  9:28AM   

 

 Sinusitis, Acute  Oct  2 2014  9:17AM   

 

 Herpes Zoster  Oct  5 2014  1:44PM   

 

 Vesicular Eruption  Oct  5 2014  1:44PM   

 

 Hypertension  2014  8:18AM   

 

 Hyperlipidemia  2014  8:18AM   

 

 hypothyroid  2014  8:18AM   

 

 Situational anxiety  Dec 20 2014  9:33AM   

 

 GERD (gastroesophageal reflux disease)  Dec 20 2014  9:33AM   

 

 Nausea  Dec 20 2014  9:33AM   

 

 Abdominal Pain, Epigastric  Dec 20 2014  9:33AM   

 

 Hyperlipidemia  Oct  6 2014  9:03AM   

 

 acid reflux  Oct  6 2014  9:03AM   

 

 hypothyroid  Oct  6 2014  9:03AM   

 

 Shingles  Oct  6 2014  9:03AM   

 

 Pharyngitis  2015  1:09PM   

 

 Bronchitis  2015  9:10AM   

 

 Hyperlipidemia  2015  9:10AM   

 

 acid reflux  2015  9:10AM   

 

 hypothyroid  2015  9:10AM   

 

 Hyperlipidemia  2015  8:18AM   

 

 Hypertension  2015  8:18AM   

 

 hypothyroid  2015  8:18AM   

 

 Screening Mammogram  2015 11:43AM   

 

 Medicare Annual Pap Q 2 years  2015  9:36AM   

 

 Screening Mammogram  2015  9:36AM   

 

 Candidiasis of female genitalia  2015  9:36AM   

 

 Hypertension  2015 11:34AM   

 

 Hyperlipidemia, unspecified  2015 11:34AM   

 

 Hypothyroidism, Acquired  2015 11:34AM   

 

 Osteopenia  2016  8:41AM   

 

 Encounter for screening mammogram for breast cancer  2016  8:41AM   

 

 Hypertension  2016  8:34AM   

 

 Lymphedema  2016  8:34AM   

 

 Hyperlipidemia  2016  8:34AM   

 

 hypothyroid  2016  8:34AM   

 

 HEP B  2016  9:13AM   

 

 HEP B  2016  8:52AM   

 

 Acid Reflux  2016  8:34AM   

 

 History of acute gastritis  Oct 13 2016  2:30PM   

 

 Anxiety as acute reaction to exceptional stress  Oct 13 2016  2:30PM   

 

 HEP B  Dec 29 2016  8:42AM   

 

 Caregiver stress syndrome  May 26 2017  8:28AM   

 

 Anxiety  May 26 2017  8:28AM   

 

 Blood in stool  2017  2:54PM   

 

 Upper GI bleed  2017 11:34AM   

 

 Hyponatremia  2017  9:03AM   

 

 Hyponatremia  2017  8:43AM   

 

 Medicare Annual Pap Q 2 years  2017  9:03AM   

 

 Nausea  2017  9:03AM   

 

 Uterine enlargement  2017  9:03AM   







Payers







 Insurance Name  Company Name  Plan Name  Plan Number  Policy Number  Policy 
Group Number  Start Date

 

    Medicare [facility]  Medicare [facility]     [id number]     N/A

 

    [facility]     [id number]     2009

 

    Medicare Part B  [employer] [place]     [id number]     N/A

 

    Medicare Part A  Medicare Part A     [id number]     N/A

 

    Medicare Part A  [employer] - Preventive     [id number]     N/A

 

    Medicare Part A  Medicare - Lab/Xray     [id number]     N/A







History of Encounters







 Visit Date  Visit Type  Provider

 

 2017  Office visit  Doris Noriega MD

 

 2017  Office visit  Doris Noriega MD

 

 2017  Office visit  Doris Noriega MD

 

 2017  Office visit  Prince Noe APRN

 

 2016  Nurse visit  Doris Noriega MD

 

 10/13/2016  Office visit  Doris Noriega MD

 

 2016  Nurse visit  Doris Noriega MD

 

 2016  Office visit  Doris Noriega MD

 

 2015  Office visit  Doris Noriega MD

 

 2015  Office visit  Doris Noriega MD

 

 2015  Office visit  Prince Noe APRN

 

 2014  Office visit  Anyi Herrera APRN

 

 10/27/2014  Voided  Doris Noriega MD

 

 10/6/2014  Office visit  Doris Noriega MD

 

 10/5/2014  Office visit  Anyi Herrera APRN

 

 10/2/2014  Office visit   

 

 10/2/2014  Office visit  Corrine Bay APRN

 

 2014  Office visit  Kassie Franklin APRN

 

 2014  Office visit  Doris Noriega MD

 

 2014  Office visit  Doris Noriega MD

 

 2013  Office visit  Dee Colindres MD

 

 10/28/2013  Nurse visit  Dee Colindres MD

 

 6/10/2013  Office visit  Dee Colindres MD

 

 2013  Office visit  Corrine Bay APRN

 

 2012  Office visit  Dee Colindres MD

 

 12/10/2012  Office visit  Dee Colindres MD

 

 10/29/2012  Nurse visit  Dee Colindres MD

 

 8/15/2012  Office visit  Dee Colindres MD

 

 2012  Office visit  Corrine Bay APRN

 

 2012  Office visit  Dee Colindres MD

 

 2/15/2012  Office visit  Dee Colindres MD

 

 2012  Office visit  Corrine Bay APRN

 

 2011  Office visit  Dee Colindres MD

 

 10/28/2011  Nurse visit  Shad Nolasco MD

 

 2011  Office visit  Dee Colindres MD

 

 2011  Office visit  Dee Colindres MD

 

 2011  Office visit  Dee Colindres MD

 

 2011  Office visit  Dee Colindres MD

 

 2011  Office visit  Dee Colindres MD

 

 2011  Office visit  Dee Colindres MD

 

 4/10/2011  [facility]  KHRIS Reeves MD

 

 4/10/2011  [facility]  RICKEY Toussaint MD

 

 2011  Office visit  RICKEY Toussaint MD

 

 2011  [facility]  Fide Castellanos MD

 

 2011  Voided  Fide Castellanos MD

 

 2011  Office visit  Dee Colindres MD

 

 12/15/2010  Nurse visit  Dee Colindres MD

 

 2010  Office visit  Dee Colindres MD

 

 10/20/2010  Nurse visit  Stephenie PERAZA

 

 2010  Nurse visit  Dee Colindres MD

 

 2010  Nurse visit  Dee Colindres MD

 

 2010  Office visit  Dee Colindres MD

 

 3/19/2010  Voided  Stephenie Kay PA

 

 2010  Nurse visit  Stephenie PERAZA

 

 2010  Nurse visit  Stephenie PERAZA

 

 2010  Nurse visit  Stephenie PERAZA

 

 2009  Office visit  Stephenie PERAZA

 

 10/20/2009  Nurse visit  Stephenie Kay PA

## 2018-10-22 NOTE — XMS REPORT
MU2 Ambulatory Summary

 Created on: 2017



Milady Crespo

External Reference #: [id number]

: 1945

Sex: Female



Demographics







 Address  [address]

[place]  [address]

 

 Home Phone  [phone]

 

 Preferred Language  English

 

 Marital Status  

 

 Mandaeism Affiliation  Unknown

 

 Race  White

 

 Ethnic Group  Not  or 





Author







 Author  Doris Noriega

 

 Organization  [facility]

 

 Address  [address]

[place], [place]  [id number]



 

 Phone  [phone]







Care Team Providers







 Care Team Member Name  Role  Phone

 

 Doris Noriega  PCP  [phone]

 

 Doris Noriega  PreferredProvider  [phone]







Allergies and Adverse Reactions







 Name  Reaction  Notes

 

 NO KNOWN DRUG ALLERGIES      







Plan of Treatment







 Planned Activity  Comments  Planned Date  Planned Time  Plan/Goal

 

 Complete blood count (CBC) with differential count     2016  12:00 AM   

 

 Comprehensive metabolic panel     2016  12:00 AM   

 

 VITAMIN D (25 HYDROXY)     2016  12:00 AM   

 

 Lipid panel (total cholesterol, lipoproteins, HDL, triglycerides)     8/15/
2012  12:00 AM   

 

 Pap smear     2017  12:00 AM   

 

 Comprehensive Metabolic Panel     6/10/2013  12:00 AM   

 

 Lipid panel (total cholesterol, lipoproteins, HDL, triglycerides)     6/10/
2013  12:00 AM   

 

 Thyroid stimulating hormone (TSH)     6/10/2013  12:00 AM   

 

 CBC (automated H&H, platelets, WBC and automated differential)     6/10/2013  
12:00 AM   

 

 Comprehensive Metabolic Panel     2012  12:00 AM   

 

 Lipid panel (total cholesterol, lipoproteins, HDL, triglycerides)     2012  12:00 AM   

 

 Thyroid stimulating hormone (TSH)     2012  12:00 AM   

 

 CBC (automated H&H, platelets, WBC and automated differential)     2012  
12:00 AM   

 

 Screening mammography, bilateral     2015  12:00 AM   







Medications







 Active 

 

 Name  Start Date  Estimated Completion Date  SIG  Comments

 

 aspirin 81 mg oral tablet,delayed release (DR/EC)        take 1 tablet (81 mg) 
by oral route once daily   

 

 Vitamin D3 1,000 unit oral capsule        take 1 capsule by oral route daily   

 

 famotidine 20 mg oral tablet  2015     TAKE ONE TABLET BY MOUTH TWICE 
DAILY   

 

 valsartan 160 mg oral tablet  2016     TAKE ONE TABLET BY MOUTH ONCE 
DAILY   

 

 amlodipine 5 mg oral tablet  2016     TAKE ONE TABLET BY MOUTH ONCE DAILY
   

 

 Allergy Relief (loratadine) 10 mg oral tablet  2016     TAKE ONE TABLET 
BY MOUTH ONCE DAILY   

 

 amlodipine 5 mg oral tablet  2016     TAKE ONE TABLET BY MOUTH ONCE DAILY
   

 

 Allergy Relief (loratadine) 10 mg oral tablet  2016     TAKE ONE TABLET 
BY MOUTH ONCE DAILY   

 

 valsartan 160 mg oral tablet  2016     TAKE ONE TABLET BY MOUTH ONCE 
DAILY   

 

 Lipitor 20 mg oral tablet  10/13/2016  2018  take 1 tablet (20 mg) by oral 
route once daily   

 

 Plavix 75 mg oral tablet  10/13/2016  2018  take 1 tablet (75 mg) by oral 
route once daily   

 

 Zofran (as hydrochloride) 4 mg oral tablet  2016     take 1 tablet by 
oral route daily as needed for 14 days   

 

 Zofran (as hydrochloride) 4 mg oral tablet  2017     take 1 tablet by oral 
route daily as needed for 14 days   

 

 Zofran (as hydrochloride) 4 mg oral tablet  2017     take 1 tablet by oral 
route daily as needed for 14 days   

 

 Zofran (as hydrochloride) 4 mg oral tablet  2017     take 1 tablet by 
oral route daily as needed for 14 days   

 

 Zofran (as hydrochloride) 4 mg oral tablet  2017     take 1 tablet by 
oral route daily as needed for 14 days   

 

 triamcinolone acetonide 0.1 % topical cream  2017     APPLY A THIN LAYER 
OF CREAM EXTERNALLY TO AFFECTED AREA TWICE DAILY FOR 14 DAYS   

 

 loratadine 10 mg oral tablet  2017  TAKE 1 TABLET BY MOUTH 
EVERY DAY IN THE EVENING   

 

 triamcinolone acetonide 0.1 % topical cream  2017     APPLY  CREAM 
EXTERNALLY TO AFFECTED AREA TWICE DAILY FOR 14 DAYS   

 

 EQ LORATADINE 10MG  TABS  2017     TAKE ONE TABLET BY MOUTH ONCE DAILY   

 

 Lexapro 10 mg oral tablet  2017     take 1 tablet (10 mg) by oral route 
once daily for 90 days   

 

 pantoprazole 40 mg oral tablet,delayed release (DR/EC)  10/9/2017     take 1 
tablet (40 mg) by oral route once daily for 90 days   

 

 mirtazapine 15 mg oral tablet  10/10/2017  2017  take 0.5 tablet by oral 
route once a day (at bedtime) for 30 days   









  

 

 Name  Start Date  Expiration Date  SIG  Comments

 

 prednisone 20 mg oral tablet  2011  take 2 tablets by oral 
route daily for 5 days   

 

 calcium carbonate-vitamin D2 600-125 mg-unit oral tablet  8/15/2012  2012
  take 2 tablets by oral route daily   

 

 [place] 3 Fish Oil 900-1,400 mg oral capsule,delayed release(DR/EC)  8/15/2012  9
/  take 1 capsule by oral route daily   

 

 multivitamin Oral tablet  8/15/2012  2012  take 1 tablet by oral route 
daily   

 

 triamcinolone acetonide 0.1 % topical cream  2013  10/7/2013  APPLY A 
THIN LAYER TO THE AFFECTED AREA(S) BY TOPICAL ROUTE TWICE A DAY   

 

 famotidine 20 mg oral tablet  2014  take 1 tablet (20 mg) by 
oral route 2 times per day   

 

 amoxicillin 500 mg oral capsule  2014  take 1 capsule (500 mg) 
by oral route 3 times per day for 10 days   

 

 Zithromax Z-Tab 250 mg oral tablet  10/2/2014  10/7/2014  take 2 tablets (500 
mg) by oral route once daily for 1 day then 1 tablet (250 mg) by oral route 
once daily for 4 days   

 

 acyclovir 800 mg oral tablet  10/5/2014  10/12/2014  take 1 tablet (800 mg) by 
oral route 5 times per day for 7 days   

 

 gabapentin 300 mg oral capsule  10/9/2014  2014  take 1 capsule (300 mg) 
by oral route 3 times per day for 14 days   

 

 Carafate 100 mg/mL oral suspension  2014  take 10 milliliters 
(1 gram) by oral route 4 times per day on an empty stomach 1 hour before meals 
and at bedtime for 4 weeks   

 

 amlodipine 5 mg oral tablet  2015  TAKE ONE TABLET BY MOUTH 
EVERY DAY   

 

 levothyroxine 50 mcg oral tablet  2015  TAKE ONE TABLET BY MOUTH 
EVERY DAY   

 

 Diovan 160 mg oral tablet  2015  2/15/2016  TAKE ONE TABLET BY MOUTH 
EVERY DAY for 90 days   

 

 triamcinolone acetonide 0.1 % topical cream  2015  apply a thin 
layer to the affected area(s) by topical route 2 times per day for 14 days   

 

 fluconazole 150 mg oral tablet  2015  take 1 tablet (150 mg) 
by oral route once for 1 day   

 

 atenolol 50 mg oral tablet  10/13/2016  10/8/2017  take 1 tablet (50 mg) by 
oral route once daily   

 

 Zofran (as hydrochloride) 4 mg oral tablet  10/13/2016  10/27/2016  take 1 
tablet by oral route daily as needed for 14 days   

 

 Zofran (as hydrochloride) 4 mg oral tablet  2017     take 1 tablet by 
oral route daily as needed for 14 days   

 

 levothyroxine 50 mcg oral tablet  10/9/2017  10/9/2017  TAKE ONE TABLET BY 
MOUTH ONCE DAILY   

 

 clorazepate dipotassium 7.5 mg oral tablet  10/10/2017  2017  take 1/2 
to1 tablet PO up to three times per day for situational anxiety   

 

 ondansetron 4 mg oral tablet,disintegrating  10/10/2017  2017  dissolve  
1 tablet by oral route every 8 hours as needed for 30 days   

 

 valsartan 160 mg oral tablet  2017  TAKE ONE TABLET BY MOUTH 
ONCE DAILY   

 

 amlodipine 5 mg oral tablet  2017  TAKE ONE TABLET BY MOUTH 
ONCE DAILY   









 Discontinued 

 

 Name  Start Date  Discontinued Date  SIG  Comments

 

 Lipitor 40 mg oral tablet     20092 TAB DAILY   

 

 ramipril 5 mg oral capsule     2009  take 1 capsule (5 mg) by oral route 
once daily   

 

 lovastatin 20 mg oral tablet  2009  take 1 tablet (20 mg) by 
oral route once daily with evening meal   

 

 propranolol 20 mg oral tablet  2010  take 1 tablet by oral 
route 2 times a day   

 

 Fosamax 70 mg oral tablet  2010  take 1 tablet (70 mg) by oral 
route once weekly in the morning, at least 30 minutes before the first food, 
beverage, or medication of the day   

 

 lisinopril 40 mg oral tablet  2010  4/10/2011  take 2 tablets by oral 
route daily   

 

 Zoloft 50 mg oral tablet  2011  take 1 tablet (50 mg) by oral 
route once daily   

 

 promethazine 25 mg oral tablet  2011  take 1 tablet by oral 
route every 6 hours as needed   

 

 Diovan -12.5 mg oral tablet  2011  take 1 tablet by oral 
route once daily for 30 days   

 

 Diflucan 150 mg oral tablet     2012  take 1 tablet (150 mg) by oral 
route once for 1 day   

 

 Diflucan 150 mg oral tablet  2012  8/15/2012  take 1 tablet (150 mg) by 
oral route once   

 

 Vitamin B-12 1,000 mcg oral tablet  8/15/2012  2014  take 1 tablet by 
oral route daily   

 

 Premarin 0.625 mg/gram vaginal cream  10/29/2012  6/10/2013  use 1 gram daily 
for a week then 1 gram twice a week   

 

 Medrol (Tab) 4 mg oral tablets,dose pack  2013  6/10/2013  take as 
directed   

 

 aspirin 325 mg oral tablet  2014  take 1 tablet (325 mg) by 
oral route once daily   

 

 Medrol (Tab) 4 mg oral tablets,dose pack  2014  10/2/2014  take as 
directed   

 

 Tetracaine Lollipops Lollipop  2015  Use as directed   

 

 Zoloft 50 mg oral tablet  2016  take 1 tablet (50 mg) by oral 
route once daily for 90 days   

 

 Zoloft 50 mg oral tablet  2016  take 1 tablet (50 mg) by oral 
route once daily for 90 days  Pt refuses to take...







Problem List







 Description  Status  Onset

 

 Hyperlipidemia  Active   

 

 acid reflux  Active   

 

 Hypertension  Active   

 

 hypothyroid  Active   







Vital Signs







 Date  Time  BP-Sys(mm[Hg]  BP-Morena(mm[Hg])  HR(bpm)  RR(rpm)  Temp  WT  HT  HC  
BMI  BSA  BMI Percentile  O2 Sat(%)

 

 10/18/2017  9:01:00 AM  126 mmHg  80 mmHg  73 bpm  16 rpm  97.9 F  122.375 lbs
  61 in     23.12 kg/m2  1.55 m2     100 %

 

 10/10/2017  1:52:00 PM  118 mmHg  72 mmHg  74 bpm  16 rpm  98.1 F  121 lbs  61 
in     22.8625 kg/m  1.5369 m     99 %

 

 2017  8:40:00 AM  118 mmHg  68 mmHg  63 bpm  16 rpm  98.1 F  123.125 lbs  
61 in     23.26 kg/m2  1.55 m2     100 %

 

 2017  8:57:00 AM  116 mmHg  62 mmHg  71 bpm  16 rpm  99.8 F  121.5 lbs  
61 in     22.957 kg/m  1.5401 m     98 %

 

 2017  11:30:00 AM  128 mmHg  78 mmHg  69 bpm  16 rpm  98.8 F  129.375 lbs  
61 in     24.44 kg/m2  1.59 m2     98 %

 

 2017  8:22:00 AM  118 mmHg  78 mmHg  62 bpm  18 rpm  97.5 F  129.125 lbs  
61 in     24.3977 kg/m  1.5877 m     100 %

 

 10/13/2016  2:26:00 PM  128 mmHg  78 mmHg  77 bpm  16 rpm  98.4 F  139.25 lbs  
61 in     26.31 kg/m2  1.65 m2     100 %

 

 2016  8:29:00 AM  122 mmHg  70 mmHg  72 bpm  16 rpm  97.8 F  137.125 lbs  
61 in     25.9093 kg/m  1.6361 m     100 %

 

 2015  9:33:00 AM  120 mmHg  68 mmHg  73 bpm     98.7 F  144.375 lbs  61 
in     27.28 kg/m2  1.68 m2     99 %

 

 2015  9:05:00 AM  110 mmHg  75 mmHg  85 bpm  16 rpm  96.7 F  144.375 lbs  
61 in     27.2791 kg/m  1.6788 m     99 %

 

 2015  1:05:00 PM  108 mmHg  62 mmHg  78 bpm  18 rpm  96.9 F  142.375 lbs  
61 in     26.90 kg/m2  1.67 m2     100 %

 

 2014  9:31:00 AM  126 mmHg  66 mmHg  79 bpm  18 rpm  98.7 F  149 lbs  61 
in     28.153 kg/m  1.7055 m     98 %

 

 10/6/2014  9:02:00 AM  110 mmHg  74 mmHg  94 bpm  18 rpm  98.1 F  145.4 lbs  
61 in     27.47 kg/m2  1.68 m2     97 %

 

 10/2/2014  9:14:00 AM  124 mmHg  74 mmHg  79 bpm  18 rpm  98 F  147.25 lbs  61 
in     27.8224 kg/m  1.6955 m     98 %

 

 2014  9:22:00 AM  124 mmHg  76 mmHg  89 bpm  18 rpm  98.1 F  148 lbs  61 
in     27.96 kg/m2  1.70 m2     100 %

 

 2014  8:27:00 AM  118 mmHg  65 mmHg  74 bpm  16 rpm  97.7 F  148 lbs  61 
in     27.9641 kg/m  1.6998 m     99 %

 

 2014  9:48:00 AM  125 mmHg  70 mmHg  93 bpm  18 rpm  96.7 F  156 lbs  61 
in     29.48 kg/m2  1.75 m2     100 %

 

 2013  8:35:00 AM  122 mmHg  74 mmHg  84 bpm  16 rpm  97.9 F  155.375 lbs 
                99 %

 

 6/10/2013  8:30:00 AM  130 mmHg  82 mmHg  79 bpm  16 rpm  97.9 F  161 lbs     
            98 %

 

 2013  8:50:00 AM  124 mmHg  68 mmHg  66 bpm  18 rpm  97.6 F  157.5 lbs  
61 in     29.7591 kg/m  1.7535 m      

 

 2012  1:46:00 PM  120 mmHg  72 mmHg  75 bpm  16 rpm  97.6 F  156.125 lbs
                 98 %

 

 12/10/2012  8:32:00 AM  122 mmHg  82 mmHg  70 bpm  16 rpm  97.3 F  157 lbs    
             99 %

 

 8/15/2012  9:00:00 AM  130 mmHg  76 mmHg  86 bpm  16 rpm  97.4 F  159 lbs     
            100 %

 

 2012  11:02:00 AM  128 mmHg  64 mmHg  66 bpm  18 rpm  97.4 F  162.125 lbs
  61 in     30.6329 kg/m  1.7791 m      

 

 2012  8:45:00 AM  130 mmHg  70 mmHg  82 bpm  16 rpm  98.2 F  160.125 lbs 
                100 %

 

 2/15/2012  9:29:00 AM  122 mmHg  80 mmHg  86 bpm  16 rpm  97.4 F  159.375 lbs  
61 in     30.1133 kg/m  1.7639 m     99 %

 

 2012  9:41:00 AM  132 mmHg  74 mmHg  66 bpm  18 rpm  98.4 F  160.375 lbs  
61 in     30.30 kg/m2  1.77 m2      

 

 2011  10:08:00 AM  134 mmHg  82 mmHg  80 bpm  16 rpm  98 F  160 lbs  61 
in     30.2314 kg/m  1.7674 m     99 %

 

 2011  3:56:00 PM  130 mmHg  80 mmHg                              

 

 2011  8:55:00 AM  130 mmHg  74 mmHg  88 bpm  16 rpm  98.2 F  158.25 lbs  
               98 %

 

 2011  8:54:00 AM  136 mmHg  82 mmHg  102 bpm  16 rpm  98.1 F  153.5 lbs   
              99 %

 

 2011  8:49:00 AM  122 mmHg  88 mmHg  88 bpm  16 rpm  98.6 F  152.25 lbs  
               99 %

 

 2011  10:02:00 AM  140 mmHg  82 mmHg  96 bpm  20 rpm  98.8 F             
       100 %

 

 2011  9:14:00 AM  156 mmHg  98 mmHg  110 bpm  24 rpm  98.5 F  153 lbs    
             100 %

 

 2011  8:50:00 AM  160 mmHg  84 mmHg  100 bpm  16 rpm  98.7 F  155 lbs    
             100 %

 

 2011  1:25:00 PM  152 mmHg  100 mmHg  82 bpm  16 rpm  97.2 F  156.375 lbs 
                100 %

 

 2011  9:55:00 AM  144 mmHg  90 mmHg                              

 

 2010  9:24:00 AM  138 mmHg  84 mmHg                              

 

 2010  8:58:00 AM  160 mmHg  94 mmHg                              

 

 2010  8:33:00 AM  142 mmHg  90 mmHg  100 bpm  16 rpm  98.1 F  158.375 
lbs                  

 

 2010  9:24:00 AM  160 mmHg  102 mmHg  88 bpm  18 rpm  99 F  157.125 lbs  
62 in     28.7382 kg/m  1.7657 m      

 

 3/19/2010  11:01:00 AM  140 mmHg  90 mmHg                              

 

 2009  10:08:00 AM  142 mmHg  86 mmHg  72 bpm  16 rpm  98.1 F  154.125 
lbs  61 in     29.12 kg/m2  1.73 m2      







Social History







 Name  Description  Comments

 

       

 

 Children      

 

 lives alone      

 

 Supervisor      

 

 Tobacco  Never smoker   







History of Procedures







 Date Ordered  Description  Order Status

 

 2011 12:00 AM  COMPREHEN METABOLIC PANEL  Reviewed

 

 2011 12:00 AM  ASSAY THYROID STIM HORMONE  Reviewed

 

 2011 12:00 AM  COMPREHEN METABOLIC PANEL  Reviewed

 

 2011 12:00 AM  LIPID PANEL  Reviewed

 

 2011 12:00 AM  ASSAY THYROID STIM HORMONE  Reviewed

 

 2011 12:00 AM  COMPLETE CBC W/AUTO DIFF WBC  Reviewed

 

 2015 12:00 AM  COMPLETE CBC W/AUTO DIFF WBC  Reviewed

 

 2015 12:00 AM  COMPREHEN METABOLIC PANEL  Reviewed

 

 2015 12:00 AM  LIPID PANEL  Reviewed

 

 2015 12:00 AM  ASSAY THYROID STIM HORMONE  Reviewed

 

 2011 12:00 AM  COMPREHEN METABOLIC PANEL  Reviewed

 

 2011 12:00 AM  COMPREHEN METABOLIC PANEL  Reviewed

 

 2011 12:00 AM  LIPID PANEL  Reviewed

 

 2011 12:00 AM  ASSAY THYROID STIM HORMONE  Reviewed

 

 10/28/2011 12:00 AM  ***Immunization administration, Medicare flu  Reviewed

 

 10/28/2011 12:00 AM  Fluzone ** MEDICARE Only **  Reviewed

 

 2010 11:24 AM  NO CHARGE OV  Reviewed

 

 2010 11:26 AM  NO CHARGE OV  Reviewed

 

 2011 12:00 AM  COMPREHEN METABOLIC PANEL  Reviewed

 

 2011 12:00 AM  LIPID PANEL  Reviewed

 

 2011 12:00 AM  ASSAY THYROID STIM HORMONE  Reviewed

 

 2011 12:00 AM  COMPLETE CBC W/AUTO DIFF WBC  Reviewed

 

 2011 12:00 AM  Wrist Support  Reviewed

 

 2016 12:00 AM  Screening mammography, bilateral  Reviewed

 

 2016 12:00 AM  DXA BONE DENSITY AXIAL  Returned

 

 2016 12:00 AM  TETANUS VACCINE IM  Reviewed

 

 2016 12:00 AM  HEP B VACC ADULT 3 DOSE IM  Reviewed

 

 2012 12:00 AM  TISSUE EXAM FOR FUNGI  Reviewed

 

 2012 12:00 AM  SMEAR WET MOUNT SALINE/INK  Reviewed

 

 2016 12:00 AM  TETANUS VACCINE IM  Reviewed

 

 2016 12:00 AM  HEP B VACC ADULT 3 DOSE IM  Reviewed

 

 2/15/2012 12:00 AM  THER/PROPH/DIAG INJ SC/IM  Reviewed

 

 2/15/2012 12:00 AM  Bicillin CR [facility]#57163378112[facility]  Reviewed

 

 2/15/2012 12:00 AM  Depo-Medrol 40 mg NDC#9323075030  Reviewed

 

 10/13/2016 12:00 AM  COMPLETE CBC W/AUTO DIFF WBC  Returned

 

 10/13/2016 12:00 AM  COMPREHEN METABOLIC PANEL  Returned

 

 10/13/2016 12:00 AM  ASSAY THYROID STIM HORMONE  Returned

 

 10/13/2016 12:00 AM  LIPID PANEL  Returned

 

 2016 12:00 AM  TETANUS VACCINE IM  Reviewed

 

 2016 12:00 AM  HEP B VACC ADULT 3 DOSE IM  Reviewed

 

 2017 12:00 AM  ASSAY OF IRON  Returned

 

 2017 12:00 AM  IRON BINDING TEST  Returned

 

 2017 12:00 AM  ASSAY OF TRANSFERRIN  Returned

 

 2017 12:00 AM  OCCULT BLD FECES 1-3 TESTS  Returned

 

 2017 12:00 AM  COMPLETE CBC AUTOMATED  Returned

 

 8/15/2012 12:00 AM  COMPREHEN METABOLIC PANEL  Reviewed

 

 8/15/2012 12:00 AM  ASSAY THYROID STIM HORMONE  Reviewed

 

 8/15/2012 12:00 AM  COMPLETE CBC W/AUTO DIFF WBC  Reviewed

 

 8/15/2012 12:00 AM  Wrist Support  Reviewed

 

 2017 12:00 AM  METABOLIC PANEL TOTAL CA  Returned

 

 2017 12:00 AM  METABOLIC PANEL TOTAL CA  Returned

 

 2017 12:00 AM  Screening mammography, bilateral  Returned

 

 10/29/2012 12:00 AM  Flu Injection 3 Years And Above NDC# [address]-35  C  
Reviewed

 

 12/10/2012 12:00 AM  IMMUNIZATION ADMIN  Reviewed

 

 12/10/2012 12:00 AM  Pneumovax Injection - [facility]  Reviewed

 

 2012 12:00 AM  TRICHOMONAS ASSAY W/OPTIC  Reviewed

 

 2013 12:00 AM  THER/PROPH/DIAG INJ SC/IM  Reviewed

 

 2013 12:00 AM  Bicillin CR, 1.2 million units NDC# 51190-434-24  Reviewed

 

 2013 12:00 AM  COMPREHEN METABOLIC PANEL  Reviewed

 

 2013 12:00 AM  LIPID PANEL  Reviewed

 

 2013 12:00 AM  COMPLETE CBC W/AUTO DIFF WBC  Reviewed

 

 2013 12:00 AM  ASSAY THYROID STIM HORMONE  Reviewed

 

 6/10/2013 12:00 AM  MAMMOGRAM SCREENING  Reviewed

 

 6/10/2013 12:00 AM  CYTOPATH C/V MANUAL  Reviewed

 

 12/10/2013 12:00 AM  LIPID PANEL  Reviewed

 

 12/10/2013 12:00 AM  ASSAY THYROID STIM HORMONE  Reviewed

 

 12/10/2013 12:00 AM  COMPLETE CBC W/AUTO DIFF WBC  Reviewed

 

 12/10/2013 12:00 AM  COMPREHEN METABOLIC PANEL  Reviewed

 

 2010 12:00 AM  CYTOPATH C/V MANUAL  Reviewed

 

 2010 12:00 AM  SMEAR WET MOUNT SALINE/INK  Reviewed

 

 2010 12:00 AM  LIPID PANEL  Reviewed

 

 2010 12:00 AM  ASSAY THYROID STIM HORMONE  Reviewed

 

 2010 12:00 AM  COMPLETE CBC W/AUTO DIFF WBC  Reviewed

 

 2010 12:00 AM  COMPREHEN METABOLIC PANEL  Reviewed

 

 10/28/2013 12:00 AM  Flu Injection 3 Years And Above NDC# [address]-05  [facility]  
Reviewed

 

 2010 8:48 AM  Blood Pressure Check-no charge  Reviewed

 

 2010 12:00 AM  Blood Pressure Check-no charge  Reviewed

 

 2014 12:00 AM  METABOLIC PANEL TOTAL CA  Reviewed

 

 2014 12:00 AM  ASSAY THYROID STIM HORMONE  Reviewed

 

 2014 12:00 AM  ASSAY OF FREE THYROXINE  Reviewed

 

 2014 12:00 AM  MAMMOGRAM SCREENING  Reviewed

 

 2014 12:00 AM  CT BONE DENSITY AXIAL  Reviewed

 

 2014 12:00 AM  COMPLETE CBC W/AUTO DIFF WBC  Reviewed

 

 2014 12:00 AM  COMPREHEN METABOLIC PANEL  Reviewed

 

 2014 12:00 AM  LIPID PANEL  Reviewed

 

 2014 12:00 AM  ASSAY THYROID STIM HORMONE  Reviewed

 

 10/20/2010 12:00 AM  IMMUNIZATION ADMIN  Reviewed

 

 10/20/2010 12:00 AM  FLU VACCINE 3 YRS & > IM  Reviewed

 

 2010 12:00 AM  COMPREHEN METABOLIC PANEL  Reviewed

 

 2010 12:00 AM  LIPID PANEL  Reviewed

 

 2010 12:00 AM  ASSAY THYROID STIM HORMONE  Reviewed

 

 2010 12:00 AM  COMPLETE CBC W/AUTO DIFF WBC  Reviewed

 

 2010 12:00 AM  Blood Pressure Check-no charge  Reviewed

 

 10/2/2014 12:00 AM  THER/PROPH/DIAG INJ SC/IM  Reviewed

 

 10/2/2014 12:00 AM  Kenalog, Per 10 Mg NDC#5671-9177-63  Reviewed

 

 2014 12:00 AM  COMPLETE CBC W/AUTO DIFF WBC  Reviewed

 

 2014 12:00 AM  COMPREHEN METABOLIC PANEL  Reviewed

 

 2014 12:00 AM  LIPID PANEL  Reviewed

 

 2014 12:00 AM  ASSAY THYROID STIM HORMONE  Reviewed

 

 2015 12:00 AM  Rocephin 1 gram NDC#1920-1035-83  Reviewed

 

 2015 12:00 AM  THER/PROPH/DIAG INJ SC/IM  Reviewed

 

 2011 12:00 AM  COMPREHEN METABOLIC PANEL  Reviewed

 

 2011 12:00 AM  LIPID PANEL  Reviewed

 

 2011 12:00 AM  ASSAY THYROID STIM HORMONE  Reviewed

 

 2011 12:00 AM  COMPLETE CBC W/AUTO DIFF WBC  Reviewed

 

 2011 12:00 AM  METABOLIC PANEL TOTAL CA  Reviewed

 

 2011 12:00 AM  COMPREHEN METABOLIC PANEL  Reviewed

 

 2011 12:00 AM  ASSAY THYROID STIM HORMONE  Reviewed

 

 2011 12:00 AM  COMPLETE CBC W/AUTO DIFF WBC  Reviewed

 

 2011 12:00 AM  ASSAY OF LIPASE  Reviewed

 

 2011 12:00 AM  THER/PROPH/DIAG INJ SC/IM  Reviewed

 

 2011 12:00 AM  Phenergan 50 Mg Im  Bernadine  Reviewed

 

 2011 12:00 AM  METABOLIC PANEL TOTAL CA  Reviewed

 

 2011 12:00 AM  THER/PROPH/DIAG INJ SC/IM  Reviewed

 

 2011 12:00 AM  Phenergan 25 Mg Im  Bernadine  Reviewed

 

 2011 12:00 AM  METABOLIC PANEL TOTAL CA  Reviewed

 

 2011 12:00 AM  ASSAY THYROID STIM HORMONE  Reviewed

 

 2011 12:00 AM  THER/PROPH/DIAG INJ SC/IM  Reviewed

 

 2011 12:00 AM  Phenergan 50 Mg Im  Bernadine  Reviewed

 

 2011 12:00 AM  ASSAY OF SERUM SODIUM  Reviewed

 

 2011 12:00 AM  ASSAY OF SERUM SODIUM  Reviewed

 

 2011 12:00 AM  CYTOPATH C/V MANUAL  Reviewed

 

 2011 12:00 AM  ASSAY THYROID STIM HORMONE  Reviewed

 

 2015 12:00 AM  COMPLETE CBC W/AUTO DIFF WBC  Reviewed

 

 2015 12:00 AM  COMPREHEN METABOLIC PANEL  Reviewed

 

 2015 12:00 AM  LIPID PANEL  Reviewed

 

 2015 12:00 AM  ASSAY THYROID STIM HORMONE  Reviewed

 

 2015 12:00 AM  MAMMOGRAM SCREENING  Reviewed

 

 2015 12:00 AM  CYTOPATH TBS C/V MANUAL  Reviewed







Results Summary







 Date and Description  Results

 

 2010 10:41 AM  WET PREP NO TRICHOMONADS SEEN  No  Few 

 

 2010 8:15 AM  TRIGLYCERIDES 174.0 mg/dLCHOLESTEROL 175.0 mg/dLHDL 58.0 mg/
dLTOT CHOL/HDL 3.0 LDL (CALC) 82.0 mg/dLTSH 0.790 uIU/mLGLUCOSE 95.0 mg/
dLSODIUM 136.0 mmol/LPOTASSIUM 4.50 mmol/LCHLORIDE 99.0 mmol/LCO2 26.0 mmol/
LBUN 12.0 mg/dLCREATININE 0.80 mg/dLSGOT/AST 32.0 IU/LSGPT/ALT 33.0 IU/LALK 
PHOS 103.0 IU/LTOTAL PROTEIN 7.60 g/dLALBUMIN 4.60 g/dLTOTAL BILI 0.60 mg/
dLCALCIUM 9.80 mg/dLAGE 64 GFR NonAA 72 GFR AA 87 eGFR >60 mL/min/1.73 m2eGFR AA
* >60 WBC 5.3 RBC 4.13 HGB 13.10 g/dLHCT 39.20 %MCV 95.0 fLMCH 31.70 pgMCHC 
33.40 g/dLRDW SD 44 RDW CV 12.70 %MPV 9.80 fLPLT 257 NRBC# 0.00 NRBC% 0.0 %NEUT 
57.90 %%LYMP 26.50 %%MONO 11.60 %%EOS 3.20 %%BASO 0.80 %#NEUT 3.04 #LYMP 1.39 #
MONO 0.61 #EOS 0.17 #BASO 0.04 MANUAL DIFF NOT IND 

 

 2011 9:45 AM  GLUCOSE 91.0 mg/dLSODIUM 133.0 mmol/LPOTASSIUM 4.40 mmol/
LCHLORIDE 97.0 mmol/LCO2 24.0 mmol/LBUN 9.0 mg/dLCREATININE 0.70 mg/dLCALCIUM 
10.0 mg/dLAGE 65 GFR NonAA 84 GFR  eGFR >60 mL/min/1.73 m2eGFR AA* >60 

 

 2011 10:25 AM  LIPASE 29.0 U/LTSH 0.10 uIU/mLGLUCOSE 115.0 mg/dLSODIUM 
127.0 mmol/LPOTASSIUM 5.30 mmol/LCHLORIDE 93.0 mmol/LCO2 18.0 mmol/LBUN 9.0 mg/
dLCREATININE 0.70 mg/dLSGOT/AST 62.0 IU/LSGPT/ALT 46.0 IU/LALK PHOS 100.0 IU/
LTOTAL PROTEIN 8.60 g/dLALBUMIN 4.90 g/dLTOTAL BILI 0.60 mg/dLCALCIUM 9.90 mg/
dLAGE 65 GFR NonAA 84 GFR  eGFR >60 mL/min/1.73 m2eGFR AA* >60 WBC 6.9 
RBC 4.48 HGB 14.40 g/dLHCT 40.90 %MCV 91.0 fLMCH 32.10 pgMCHC 35.20 g/dLRDW SD 
41 RDW CV 12.50 %MPV 9.30 fLPLT 260 NRBC# 0.00 NRBC% 0.0 %NEUT 74.90 %%LYMP 
15.10 %%MONO 9.40 %%EOS 0.30 %%BASO 0.30 %#NEUT 5.15 #LYMP 1.04 #MONO 0.65 #EOS 
0.02 #BASO 0.02 MANUAL DIFF NOT IND 

 

 2011 9:07 AM  GLUCOSE 92.0 mg/dLSODIUM 131.0 mmol/LPOTASSIUM 4.20 mmol/
LCHLORIDE 99.0 mmol/LCO2 22.0 mmol/LBUN 9.0 mg/dLCREATININE 0.70 mg/dLCALCIUM 
9.20 mg/dLAGE 65 GFR NonAA 84 GFR  eGFR >60 mL/min/1.73 m2eGFR AA* >60 

 

 2011 11:06 AM  TSH 0.510 uIU/mLGLUCOSE 99.0 mg/dLSODIUM 132.0 mmol/
LPOTASSIUM 3.50 mmol/LCHLORIDE 95.0 mmol/LCO2 23.0 mmol/LBUN 9.0 mg/
dLCREATININE 0.80 mg/dLCALCIUM 10.40 mg/dLAGE 65 GFR NonAA 72 GFR AA 87 eGFR >
60 mL/min/1.73 m2eGFR AA* >60 

 

 2011 9:45 AM  SODIUM 132.0 mmol/L

 

 2011 9:27 AM  SODIUM 137.0 mmol/L

 

 2011 9:55 AM  TSH 1.070 uIU/mL

 

 2011 8:55 AM  GLUCOSE 102.0 mg/dLSODIUM 135.0 mmol/LPOTASSIUM 4.20 mmol/
LCHLORIDE 102.0 mmol/LCO2 24.0 mmol/LBUN 15.0 mg/dLCREATININE 0.80 mg/dLSGOT/
AST 30.0 IU/LSGPT/ALT 35.0 IU/LALK PHOS 119.0 IU/LTOTAL PROTEIN 7.50 g/
dLALBUMIN 4.30 g/dLTOTAL BILI 0.30 mg/dLCALCIUM 9.20 mg/dLAGE 65 GFR NonAA 72 
GFR AA 87 eGFR >60 mL/min/1.73 m2eGFR AA* >60 TSH 1.130 uIU/mL

 

 2011 8:55 AM  GLUCOSE 98.0 mg/dLSODIUM 137.0 mmol/LPOTASSIUM 3.90 mmol/
LCHLORIDE 103.0 mmol/LCO2 25.0 mmol/LBUN 14.0 mg/dLCREATININE 0.70 mg/dLSGOT/
AST 33.0 IU/LSGPT/ALT 36.0 IU/LALK PHOS 111.0 IU/LTOTAL PROTEIN 8.30 g/
dLALBUMIN 4.40 g/dLTOTAL BILI 0.50 mg/dLCALCIUM 9.40 mg/dLAGE 66 GFR NonAA 84 
GFR  eGFR >60 mL/min/1.73 m2eGFR AA* >60 TRIGLYCERIDES 109.0 mg/
dLCHOLESTEROL 153.0 mg/dLHDL 54.0 mg/dLTOT CHOL/HDL 2.8 LDL (CALC) 77.0 mg/
dLTSH 1.330 uIU/mL

 

 2012 10:33 AM  WET PREP NO TRICH SEEN CLUE CELLS NONE SEEN 

 

 2012 8:45 AM  WBC 5.2 RBC 3.96 HGB 12.70 g/dLHCT 37.80 %MCV 96.0 fLMCH 
32.10 pgMCHC 33.60 g/dLRDW SD 46 RDW CV 13.30 %MPV 9.70 fLPLT 297 NRBC# 0.00 
NRBC% 0.0 %NEUT 57.70 %%LYMP 28.50 %%MONO 10.30 %%EOS 2.50 %%BASO 1.0 %#NEUT 
3.02 #LYMP 1.49 #MONO 0.54 #EOS 0.13 #BASO 0.05 MANUAL DIFF NOT IND GLUCOSE 
97.0 mg/dLSODIUM 137.0 mmol/LPOTASSIUM 4.40 mmol/LCHLORIDE 101.0 mmol/LCO2 23.0 
mmol/LBUN 17.0 mg/dLCREATININE 0.80 mg/dLSGOT/AST 30.0 IU/LSGPT/ALT 33.0 IU/
LALK PHOS 95.0 IU/LTOTAL PROTEIN 7.40 g/dLALBUMIN 4.40 g/dLTOTAL BILI 0.60 mg/
dLCALCIUM 9.70 mg/dLAGE 66 GFR NonAA 72 GFR AA 87 eGFR 60 eGFR AA* 60 
TRIGLYCERIDES 130.0 mg/dLCHOLESTEROL 157.0 mg/dLHDL 56.0 mg/dLTOT CHOL/HDL 2.8 
LDL (CALC) 75.0 mg/dLTSH 0.940 uIU/mL

 

 2012 8:45 AM  WBC 5.1 RBC 3.91 HGB 12.50 g/dLHCT 36.30 %MCV 93.0 fLMCH 
32.0 pgMCHC 34.40 g/dLRDW SD 43 RDW CV 12.60 %MPV 9.30 fLPLT 244 NRBC# 0.00 NRBC
% 0.0 %NEUT 57.60 %%LYMP 27.50 %%MONO 9.10 %%EOS 5.0 %%BASO 0.80 %#NEUT 2.91 #
LYMP 1.39 #MONO 0.46 #EOS 0.25 #BASO 0.04 MANUAL DIFF NOT IND 

 

 2012 5:02 PM  WET PREP NO TRICH SEEN CLUE CELLS NONE SEEN 

 

 2013 8:25 AM  WBC 4.2 RBC 3.96 HGB 12.90 g/dLHCT 37.0 %MCV 93.0 fLMCH 
32.60 pgMCHC 34.90 g/dLRDW SD 43 RDW CV 12.60 %MPV 9.70 fLPLT 254 NRBC# 0.00 
NRBC% 0.0 %NEUT 54.40 %%LYMP 30.40 %%MONO 10.80 %%EOS 3.40 %%BASO 1.0 %#NEUT 
2.26 #LYMP 1.26 #MONO 0.45 #EOS 0.14 #BASO 0.04 MANUAL DIFF NOT IND TSH 1.230 
uIU/mLGLUCOSE 94.0 mg/dLSODIUM 136.0 mmol/LPOTASSIUM 4.30 mmol/LCHLORIDE 99.0 
mmol/LCO2 25.0 mmol/LBUN 10.0 mg/dLCREATININE 0.80 mg/dLSGOT/AST 36.0 IU/LSGPT/
ALT 36.0 IU/LALK PHOS 84.0 IU/LTOTAL PROTEIN 7.90 g/dLALBUMIN 4.40 g/dLTOTAL 
BILI 0.70 mg/dLCALCIUM 9.80 mg/dLAGE 67 GFR NonAA 72 GFR AA 87 eGFR 60 eGFR AA* 
60 TRIGLYCERIDES 122.0 mg/dLCHOLESTEROL 141.0 mg/dLHDL 47.0 mg/dLTOT CHOL/HDL 
3.0 LDL (CALC) 70.0 mg/dL

 

 2013 8:45 AM  WBC 5.3 RBC 4.01 HGB 13.0 g/dLHCT 37.60 %MCV 94.0 fLMCH 
32.40 pgMCHC 34.60 g/dLRDW SD 43 RDW CV 12.50 %MPV 9.40 fLPLT 248 NRBC# 0.00 
NRBC% 0.0 %NEUT 58.70 %%LYMP 27.80 %%MONO 9.80 %%EOS 2.80 %%BASO 0.90 %#NEUT 
3.12 #LYMP 1.48 #MONO 0.52 #EOS 0.15 #BASO 0.05 MANUAL DIFF NOT IND TSH 1.570 
uIU/mLGLUCOSE 94.0 mg/dLSODIUM 134.0 mmol/LPOTASSIUM 4.10 mmol/LCHLORIDE 101.0 
mmol/LCO2 23.0 mmol/LBUN 11.0 mg/dLCREATININE 0.80 mg/dLSGOT/AST 41.0 IU/LSGPT/
ALT 44.0 IU/LALK PHOS 109.0 IU/LTOTAL PROTEIN 7.90 g/dLALBUMIN 4.70 g/dLTOTAL 
BILI 0.80 mg/dLCALCIUM 10.40 mg/dLAGE 68 GFR NonAA 72 GFR AA 87 eGFR >60 mL/min/
1.73 m2eGFR AA* >60 TRIGLYCERIDES 163.0 mg/dLCHOLESTEROL 138.0 mg/dLHDL 49.0 mg/
dLTOT CHOL/HDL 2.8 LDL (CALC) 56.0 mg/dL

 

 2014 8:58 AM  GLUCOSE 86.0 mg/dLSODIUM 134.0 mmol/LPOTASSIUM 4.20 mmol/
LCHLORIDE 99.0 mmol/LCO2 25.0 mmol/LBUN 14.0 mg/dLCREATININE 0.80 mg/dLCALCIUM 
10.10 mg/dLAGE 68 GFR NonAA 72 GFR AA 87 eGFR >60 mL/min/1.73 m2eGFR AA* >60 
FREE T4 1.18 TSH 1.20 uIU/mL

 

 2014 9:50 AM  WBC 5.7 RBC 4.03 HGB 12.90 g/dLHCT 37.90 %MCV 94.0 fLMCH 
32.0 pgMCHC 34.0 g/dLRDW SD 44 RDW CV 12.70 %MPV 9.70 fLPLT 292 NRBC# 0.00 NRBC
% 0.0 %NEUT 62.70 %%LYMP 21.80 %%MONO 11.0 %%EOS 3.40 %%BASO 1.10 %#NEUT 3.55 #
LYMP 1.23 #MONO 0.62 #EOS 0.19 #BASO 0.06 MANUAL DIFF NOT IND GLUCOSE 98.0 mg/
dLSODIUM 135.0 mmol/LPOTASSIUM 4.30 mmol/LCHLORIDE 102.0 mmol/LCO2 22.0 mmol/
LBUN 10.0 mg/dLCREATININE 0.80 mg/dLSGOT/AST 34.0 IU/LSGPT/ALT 32.0 IU/LALK 
PHOS 95.0 IU/LTOTAL PROTEIN 7.70 g/dLALBUMIN 4.30 g/dLTOTAL BILI 0.80 mg/
dLCALCIUM 9.10 mg/dLAGE 68 GFR NonAA 72 GFR AA 87 eGFR 60 eGFR AA* 60 
TRIGLYCERIDES 108.0 mg/dLCHOLESTEROL 146.0 mg/dLHDL 56.0 mg/dLTOT CHOL/HDL 2.6 
LDL (CALC) 68.0 mg/dLTSH 0.90 uIU/mL

 

 2014 8:40 AM  WBC 4.4 RBC 4.13 HGB 13.20 g/dLHCT 38.90 %MCV 94.0 fLMCH 
32.0 pgMCHC 33.90 g/dLRDW SD 47 RDW CV 13.50 %MPV 9.80 fLPLT 279 NRBC# 0.00 NRBC
% 0.0 %NEUT 63.80 %%LYMP 24.60 %%MONO 9.30 %%EOS 1.60 %%BASO 0.70 %#NEUT 2.80 #
LYMP 1.08 #MONO 0.41 #EOS 0.07 #BASO 0.03 MANUAL DIFF NOT IND GLUCOSE 96.0 mg/
dLSODIUM 136.0 mmol/LPOTASSIUM 4.10 mmol/LCHLORIDE 99.0 mmol/LCO2 23.0 mmol/
LBUN 8.0 mg/dLCREATININE 0.80 mg/dLSGOT/AST 31.0 IU/LSGPT/ALT 27.0 IU/LALK PHOS 
85.0 IU/LTOTAL PROTEIN 7.60 g/dLALBUMIN 4.40 g/dLTOTAL BILI 0.80 mg/dLCALCIUM 
10.20 mg/dLAGE 69 GFR NonAA 71 GFR AA 86 eGFR 60 eGFR AA* 60 TRIGLYCERIDES 61.0 
mg/dLCHOLESTEROL 148.0 mg/dLHDL 71.0 mg/dLTOT CHOL/HDL 2.1 LDL (CALC) 65.0 mg/
dLTSH 0.990 uIU/mL

 

 2015 8:32 AM  WBC 4.8 RBC 3.98 HGB 12.70 g/dLHCT 37.30 %MCV 94.0 fLMCH 
31.90 pgMCHC 34.0 g/dLRDW SD 43 RDW CV 12.70 %MPV 9.50 fLPLT 270 NRBC# 0.00 NRBC
% 0.0 %NEUT 57.30 %%LYMP 25.0 %%MONO 13.0 %%EOS 3.60 %%BASO 1.10 %#NEUT 2.73 #
LYMP 1.19 #MONO 0.62 #EOS 0.17 #BASO 0.05 MANUAL DIFF NOT IND TSH 0.640 uIU/
mLGLUCOSE 90.0 mg/dLSODIUM 136.0 mmol/LPOTASSIUM 4.0 mmol/LCHLORIDE 101.0 mmol/
LCO2 24.0 mmol/LBUN 10.0 mg/dLCREATININE 0.80 mg/dLSGOT/AST 34.0 IU/LSGPT/ALT 
32.0 IU/LALK PHOS 92.0 IU/LTOTAL PROTEIN 7.50 g/dLALBUMIN 4.40 g/dLTOTAL BILI 
0.70 mg/dLCALCIUM 9.50 mg/dLAGE 69 GFR NonAA 71 GFR AA 86 eGFR >60 mL/min/1.73 
m2eGFR AA* >60 TRIGLYCERIDES 107.0 mg/dLCHOLESTEROL 138.0 mg/dLHDL 58.0 mg/
dLTOT CHOL/HDL 2.4 LDL (CALC) 59.0 mg/dL

 

 2015 8:31 AM  WBC 4.6 RBC 3.97 HGB 12.90 g/dLHCT 38.0 %MCV 96.0 fLMCH 
32.50 pgMCHC 33.90 g/dLRDW SD 44 RDW CV 12.60 %MPV 9.0 fLPLT 248 NRBC# 0.00 NRBC
% 0.0 %NEUT 61.0 %%LYMP 24.70 %%MONO 10.20 %%EOS 3.0 %%BASO 1.10 %#NEUT 2.82 #
LYMP 1.14 #MONO 0.47 #EOS 0.14 #BASO 0.05 MANUAL DIFF NOT IND TRIGLYCERIDES 
105.0 mg/dLCHOLESTEROL 141.0 mg/dLHDL 58.0 mg/dLTOT CHOL/HDL 2.4 LDL (CALC) 
62.0 mg/dLGLUCOSE 93.0 mg/dLSODIUM 136.0 mmol/LPOTASSIUM 4.10 mmol/LCHLORIDE 
101.0 mmol/LCO2 25.0 mmol/LBUN 9.0 mg/dLCREATININE 0.80 mg/dLSGOT/AST 32.0 IU/
LSGPT/ALT 28.0 IU/LALK PHOS 95.0 IU/LTOTAL PROTEIN 7.30 g/dLALBUMIN 4.50 g/
dLTOTAL BILI 0.80 mg/dLCALCIUM 9.70 mg/dLAGE 70 GFR NonAA 71 GFR AA 86 eGFR >60 
mL/min/1.73meGFR AA* >60 TSH 1.10 uIU/mL







History Of Immunizations







 Name  Date Admin  Mfg Name  Mfg Code  Trade Name  Lot#  Route  Inj  Vis Given  
Vis Pub  CVX

 

 Influenza  10/20/2010  [employer]of[employer] pasteur  [facility]  Fluzone  [id number]  Intramuscular  
Left Arm  10/20/2010  2010  999

 

 Influenza  10/28/2011  [employer]of[employer] pasteur  [facility]  Fluzone  [id number]  Intramuscular  
Left Arm  10/28/2011  2011  141

 

 Influenza  10/29/2012  sanofi pasteur  [facility]  Fluzone  [id number]  Intramuscular  
Left Deltoid  10/29/2012  2012  141

 

 X  12/10/2012  [employer]  MSD  Pneumovax 23  [id number]  Intramuscular  
Left Gluteous Medius  12/10/2012  2010  33

 

 Influenza  10/28/2013  sanofi pasteur  [facility]  Fluzone > 3 Years  [id number]  
Intramuscular  Right Deltoid  10/28/2013  2013  141

 

 X  9/2/2015  Not Entered  NE  Prevnar 13     Not Entered  Not Entered  1  109

 

 Influenza  2015  Not Entered  NE  Not Entered     Not Entered  Not Entered
  2015  141

 

 HepB Adult  2016  [employer]  MSD  Recombivax Adult  [id number]  Not 
Entered  Left Deltoid  2016  43

 

 HepB Adult  2016  [employer]  MSD  Recombivax Adult  [id number]  
Intramuscular  Right Deltoid  2016  43

 

 Zostavax  2012  Not Entered  NE  Not Entered     Not Entered  Not 
Entered  1  121

 

 Tdap  2012  Not Entered  NE  Not Entered     Not Entered  Not Entered  1  115

 

 Influenza  10/13/2016  Not Entered  NE  Fluzone High-Dose     Intramuscular  
Left Arm  1  141

 

 HepB Adult  2016  [employer]  MSD  Recombivax Adult  [id number]  
Intramuscular  Right Deltoid  2016  43

 

 Influenza  10/30/2017  Not Entered  NE  Fluarix Quadrivalent     Intramuscular
  Left Arm  2017  150







History of Past Illness







 Name  Date of Onset  Comments

 

 Benign Essential Hypertension  Dec 14 2009 10:10AM   

 

 Hyperlipidemia  Dec 14 2009 10:10AM   

 

 Hypothyroidism, Acquired  Dec 14 2009 10:10AM   

 

 hypothyroid      

 

 Hypertension      

 

 Hyperlipidemia      

 

 acid reflux      

 

 Hypertension, Benign Essential  2010  9:41AM   

 

 Hypertension, Benign Essential  2010 12:53PM   

 

 Routine gynecological examination  May  5 2010  9:28AM   

 

 Hypertension  May  5 2010  9:28AM   

 

 Hyperlipidemia, unspecified  May  5 2010  9:28AM   

 

 Hypothyroidism, Acquired  May  5 2010  9:28AM   

 

 Vulvovaginitis  May  5 2010  9:28AM   

 

 Rhinitis, Allergic  May  5 2010  9:28AM   

 

 Hypertension, Benign Essential  May 19 2010  8:48AM   

 

 Hypertension, Benign Essential  May 25 2010  9:39AM   

 

 Flu  Oct 20 2010 12:01PM   

 

 Essential Hypertension  2010  8:34AM   

 

 Hyperlipidemia  2010  8:34AM   

 

 Gastroesophageal Reflux  2010  8:34AM   

 

 Hypothyroidism, Acquired  2010  8:34AM   

 

 Hypertension, Benign Essential  2010  9:22AM   

 

 Hypertension, Benign Essential  Dec 15 2010  9:07AM   

 

 Essential Hypertension  2011  1:31PM   

 

 Hyperlipidemia  2011  1:31PM   

 

 Gastroesophageal Reflux  2011  1:31PM   

 

 Hypothyroidism, Acquired  2011  1:31PM   

 

 Essential Hypertension  2011  8:51AM   

 

 Hyperlipidemia  2011  8:51AM   

 

 Gastroesophageal Reflux  2011  8:51AM   

 

 Hypothyroidism, Acquired  2011  8:51AM   

 

 Essential Hypertension  2011  9:13AM   

 

 Hyperlipidemia  2011  9:13AM   

 

 Gastroesophageal Reflux  2011  9:13AM   

 

 Hypothyroidism, Acquired  2011  9:13AM   

 

 Nausea With Vomiting  2011  1:18PM   

 

 Hyponatremia  2011  8:46AM   

 

 Nausea  2011  9:13AM   

 

 Hypothyroidism  2011 11:10AM   

 

 Hyponatremia  2011 11:10AM   

 

 Essential Hypertension  2011 10:05AM   

 

 Hyperlipidemia  2011 10:05AM   

 

 Gastroesophageal Reflux  2011 10:05AM   

 

 Nausea  2011 10:05AM   

 

 Hypothyroidism, Acquired  2011 10:05AM   

 

 Hyponatremia  May  6 2011 11:34AM   

 

 Essential Hypertension  May 16 2011  8:50AM   

 

 Hyperlipidemia  May 16 2011  8:50AM   

 

 Gastroesophageal Reflux  May 16 2011  8:50AM   

 

 Nausea  May 16 2011  8:50AM   

 

 Hypothyroidism, Acquired  May 16 2011  8:50AM   

 

 Hyponatremia  May 16 2011  8:50AM   

 

 Routine gynecological examination  2011  8:54AM   

 

 Hypertension  2011  8:54AM   

 

 Hyperlipidemia, unspecified  2011  8:54AM   

 

 Hypothyroidism, Acquired  2011  8:54AM   

 

 Rhinitis, Allergic  2011  8:54AM   

 

 Hypothyroidism  Aug 29 2011 12:37PM   

 

 Hyponatremia  Aug 29 2011 12:37PM   

 

 Essential Hypertension  Sep 12 2011  8:53AM   

 

 Hyperlipidemia  Sep 12 2011  8:53AM   

 

 Gastroesophageal Reflux  Sep 12 2011  8:53AM   

 

 Hypothyroidism, Acquired  Sep 12 2011  8:53AM   

 

 Hyponatremia  Sep 12 2011  8:53AM   

 

 Hypertension  Sep 29 2011  9:41AM   

 

 Hyperlipidemia  Dec  8 2011  8:31AM   

 

 Hypothyroidism  Dec  8 2011  8:31AM   

 

 Hypertension  Dec  8 2011  8:31AM   

 

 Flu  Dec  9 2011 10:02AM   

 

 Essential Hypertension  Dec 13 2011 10:13AM   

 

 Hyperlipidemia  Dec 13 2011 10:13AM   

 

 Gastroesophageal Reflux  Dec 13 2011 10:13AM   

 

 Hypothyroidism, Acquired  Dec 13 2011 10:13AM   

 

 Hyponatremia  Dec 13 2011 10:13AM   

 

 Vaginal Discharge  2012  9:43AM   

 

 Rhinitis, Allergic  Feb 15 2012  9:31AM   

 

 Eustachian Tube Dysfunction  Feb 15 2012  9:31AM   

 

 Pharyngitis, Acute  Feb 15 2012  9:31AM   

 

 Routine gynecological examination  2012  8:48AM   

 

 Hypertension  2012  8:48AM   

 

 Hyperlipidemia, unspecified  2012  8:48AM   

 

 Hypothyroidism, Acquired  2012  8:48AM   

 

 Rhinitis, Allergic  2012  8:48AM   

 

 Candidiasis, Vulvovaginal  2012 11:04AM   

 

 Essential Hypertension  Aug 15 2012  9:02AM   

 

 Hyperlipidemia  Aug 15 2012  9:02AM   

 

 Gastroesophageal Reflux  Aug 15 2012  9:02AM   

 

 Hypothyroidism, Acquired  Aug 15 2012  9:02AM   

 

 Hyponatremia  Aug 15 2012  9:02AM   

 

 Atrophic Vaginitis, Postmenopausal  Aug 15 2012  9:02AM   

 

 Flu  Oct 29 2012  9:24AM   

 

 Essential Hypertension  Dec 10 2012  8:35AM   

 

 Hyperlipidemia  Dec 10 2012  8:35AM   

 

 Gastroesophageal Reflux  Dec 10 2012  8:35AM   

 

 Hypothyroidism, Acquired  Dec 10 2012  8:35AM   

 

 Hyponatremia  Dec 10 2012  8:35AM   

 

 Atrophic Vaginitis, Postmenopausal  Dec 10 2012  8:35AM   

 

 Pneumococcus  Dec 10 2012  2:07PM   

 

 Vaginal Discharge  Dec 20 2012  1:50PM   

 

 Essential Hypertension  Dec 20 2012  1:50PM   

 

 Hyperlipidemia  Dec 20 2012  1:50PM   

 

 Gastroesophageal Reflux  Dec 20 2012  1:50PM   

 

 Hypothyroidism, Acquired  Dec 20 2012  1:50PM   

 

 Atrophic Vaginitis, Postmenopausal  Dec 20 2012  1:50PM   

 

 Upper Respiratory Infections  2013  8:52AM   

 

 Hyperlipidemia  2013  8:21AM   

 

 Hypertension  2013  8:21AM   

 

 Hypothyroidism  2013  8:21AM   

 

 Screening Mammogram  Beto 10 2013  8:45AM   

 

 Routine gynecological examination  Beto 10 2013  8:34AM   

 

 Hypertension  Beto 10 2013  8:34AM   

 

 Hyperlipidemia, unspecified  Beto 10 2013  8:34AM   

 

 Hypothyroidism, Acquired  Beto 10 2013  8:34AM   

 

 Rhinitis, Allergic  Beto 10 2013  8:34AM   

 

 Flu  Oct 28 2013  8:52AM   

 

 Essential Hypertension  Dec  9 2013  8:37AM   

 

 Hyperlipidemia  Dec  9 2013  8:37AM   

 

 Gastroesophageal Reflux  Dec  9 2013  8:37AM   

 

 Hypothyroidism, Acquired  Dec  9 2013  8:37AM   

 

 Atrophic Vaginitis, Postmenopausal  Dec  9 2013  8:37AM   

 

 Hypertension  2014  9:56AM   

 

 Hyperlipidemia  2014  9:56AM   

 

 Hypothyroidism  2014  9:56AM   

 

 Screening Mammogram  2014  8:32AM   

 

 Osteopenia  2014  8:32AM   

 

 Hypertension  2014  8:35AM   

 

 Hypothyroidism, Acquired  2014  8:35AM   

 

 Hyperlipidemia  2014  8:35AM   

 

 Upper Respiratory Infections  2014  9:28AM   

 

 Sinusitis, Acute  Oct  2 2014  9:17AM   

 

 Herpes Zoster  Oct  5 2014  1:44PM   

 

 Vesicular Eruption  Oct  5 2014  1:44PM   

 

 Hypertension  2014  8:18AM   

 

 Hyperlipidemia  2014  8:18AM   

 

 hypothyroid  2014  8:18AM   

 

 Situational anxiety  Dec 20 2014  9:33AM   

 

 GERD (gastroesophageal reflux disease)  Dec 20 2014  9:33AM   

 

 Nausea  Dec 20 2014  9:33AM   

 

 Abdominal Pain, Epigastric  Dec 20 2014  9:33AM   

 

 Hyperlipidemia  Oct  6 2014  9:03AM   

 

 acid reflux  Oct  6 2014  9:03AM   

 

 hypothyroid  Oct  6 2014  9:03AM   

 

 Shingles  Oct  6 2014  9:03AM   

 

 Pharyngitis  2015  1:09PM   

 

 Bronchitis  2015  9:10AM   

 

 Hyperlipidemia  2015  9:10AM   

 

 acid reflux  2015  9:10AM   

 

 hypothyroid  2015  9:10AM   

 

 Hyperlipidemia  2015  8:18AM   

 

 Hypertension  2015  8:18AM   

 

 hypothyroid  2015  8:18AM   

 

 Screening Mammogram  2015 11:43AM   

 

 Medicare Annual Pap Q 2 years  2015  9:36AM   

 

 Screening Mammogram  2015  9:36AM   

 

 Candidiasis of female genitalia  2015  9:36AM   

 

 Hypertension  2015 11:34AM   

 

 Hyperlipidemia, unspecified  2015 11:34AM   

 

 Hypothyroidism, Acquired  2015 11:34AM   

 

 Osteopenia  2016  8:41AM   

 

 Encounter for screening mammogram for breast cancer  2016  8:41AM   

 

 Hypertension  2016  8:34AM   

 

 Lymphedema  2016  8:34AM   

 

 Hyperlipidemia  2016  8:34AM   

 

 hypothyroid  2016  8:34AM   

 

 HEP B  2016  9:13AM   

 

 HEP B  2016  8:52AM   

 

 Acid Reflux  2016  8:34AM   

 

 History of acute gastritis  Oct 13 2016  2:30PM   

 

 Anxiety as acute reaction to exceptional stress  Oct 13 2016  2:30PM   

 

 HEP B  Dec 29 2016  8:42AM   

 

 Caregiver stress syndrome  May 26 2017  8:28AM   

 

 Anxiety  May 26 2017  8:28AM   

 

 Blood in stool  2017  2:54PM   

 

 Upper GI bleed  2017 11:34AM   

 

 Hyponatremia  2017  9:03AM   

 

 Hyponatremia  2017  8:43AM   

 

 Medicare Annual Pap Q 2 years  2017  9:03AM   

 

 Nausea  2017  9:03AM   

 

 Uterine enlargement  2017  9:03AM   

 

 Encounter for screening mammogram for breast cancer  2017  4:56PM   

 

 Panic disorder [episodic paroxysmal anxiety]  Oct 10 2017  2:02PM   

 

 Acute stress reaction  Oct 10 2017  2:02PM   

 

 Caregiver stress syndrome  Oct 10 2017  2:02PM   







Payers







 Insurance Name  Company Name  Plan Name  Plan Number  Policy Number  Policy 
Group Number  Start Date

 

    Medicare RHC  Medicare RH     [id number]     N/A

 

    [employer]  [facility]     [id number]     2009

 

    Medicare Part B  [place]ic[place]     [id number]     N/A

 

    Medicare Part A  Medicare Part A     [id number]     N/A

 

    Medicare Part A  [employer]are P [employer] - Preventive     [id number]     N/A

 

    Medicare Part A  Medicare - Lab/Xray     [id number]     N/A







History of Encounters







 Visit Date  Visit Type  Provider

 

 10/18/2017  Office visit  Doris Noriega MD

 

 10/10/2017  Office visit  Doris Noriega MD

 

 2017  Office visit  Doris Noriega MD

 

 2017  Office visit  Doris Noriega MD

 

 2017  Office visit  Doris Noriega MD

 

 2017  Office visit  Prince Noe APRN

 

 2016  Nurse visit  Doris Noriega MD

 

 10/13/2016  Office visit  Doris Noriega MD

 

 2016  Nurse visit  Doris Noriega MD

 

 2016  Office visit  Doris Noriega MD

 

 2015  Office visit  Doris Noriega MD

 

 2015  Office visit  Doris Noriega MD

 

 2015  Office visit  Prince Noe APRN

 

 2014  Office visit  Anyi Herrera APRN

 

 10/27/2014  Voided  Doris Noriega MD

 

 10/6/2014  Office visit  Doris Noriega MD

 

 10/5/2014  Office visit  Anyi Herrera APRN

 

 10/2/2014  Office visit   

 

 10/2/2014  Office visit  Corrine Bay APRN

 

 2014  Office visit  Kassie Franklin APRN

 

 2014  Office visit  Doris Noriega MD

 

 2014  Office visit  Doris Noriega MD

 

 2013  Office visit  Dee Colindres MD

 

 10/28/2013  Nurse visit  Dee Colindres MD

 

 6/10/2013  Office visit  Dee Colindres MD

 

 2013  Office visit  Corrine Bay APRN

 

 2012  Office visit  Dee Colindres MD

 

 12/10/2012  Office visit  Dee Colindres MD

 

 10/29/2012  Nurse visit  Dee Colindres MD

 

 8/15/2012  Office visit  Dee Colindres MD

 

 2012  Office visit  Corrine Bay APRN

 

 2012  Office visit  Dee Colindres MD

 

 2/15/2012  Office visit  Dee Colindres MD

 

 2012  Office visit  Corrine Bay APRN

 

 2011  Office visit  Dee Colindres MD

 

 10/28/2011  Nurse visit  Shad Nolasco MD

 

 2011  Office visit  Dee Colindres MD

 

 2011  Office visit  Dee Colindres MD

 

 2011  Office visit  Dee Colindres MD

 

 2011  Office visit  Dee Colindres MD

 

 2011  Office visit  Dee Colindres MD

 

 2011  Office visit  Dee Colindres MD

 

 4/10/2011  [facility]  KHRIS Reeves MD

 

 4/10/2011  [facility]  RICKEY Toussaint MD

 

 2011  Office visit  RICKEY Toussaint MD

 

 2011  [facility]  Fide Castellanos MD

 

 2011  Voided  Fide Castellanos MD

 

 2011  Office visit  Dee Colindres MD

 

 12/15/2010  Nurse visit  Dee Colindres MD

 

 2010  Office visit  Dee Colindres MD

 

 10/20/2010  Nurse visit  Stephenie PERAZA

 

 2010  Nurse visit  Dee Colindres MD

 

 2010  Nurse visit  Dee Colindres MD

 

 2010  Office visit  Dee Colindres MD

 

 3/19/2010  Voided  Stephenie Kay PA

 

 2010  Nurse visit  Stephenie PERAZA

 

 2010  Nurse visit  Stephenie PERAZA

 

 2010  Nurse visit  Stephenie PERAZA

 

 2009  Office visit  Stephenie PERAZA

 

 10/20/2009  Nurse visit  Stephenie Kay PA

## 2018-10-22 NOTE — XMS REPORT
MU2 Ambulatory Summary

 Created on: 2018



Milady Crespo

External Reference #: [id number]

: 1945

Sex: Female



Demographics







 Address  [address]

[place]  [address]

 

 Home Phone  [phone]

 

 Preferred Language  English

 

 Marital Status  

 

 Mor[employer] Affiliation  Unknown

 

 Race  White

 

 Ethnic Group  Not  or 





Author







 Author  Marvin Boyer

 

 [facility]

 

 Address  [address]

[place], [place]  [id number]



 

 Phone  [phone]







Care Team Providers







 Care Team Member Name  Role  Phone

 

 Marvin Boyer  PCP  [phone]

 

 Ellis Noriegaole  PreferredProvider  [phone]







Allergies and Adverse Reactions







 Name  Reaction  Notes

 

 NO KNOWN DRUG ALLERGIES      







Plan of Treatment







 Planned Activity  Comments  Planned Date  Planned Time  Plan/Goal

 

 Complete blood count (CBC) with differential count     2016  12:00 AM   

 

 Comprehensive metabolic panel     2016  12:00 AM   

 

 VITAMIN D (25 HYDROXY)     2016  12:00 AM   

 

 Lipid panel (total cholesterol, lipoproteins, HDL, triglycerides)     8/15/
2012  12:00 AM   

 

 Pap smear     2017  12:00 AM   

 

 Comprehensive Metabolic Panel     6/10/2013  12:00 AM   

 

 Lipid panel (total cholesterol, lipoproteins, HDL, triglycerides)     6/10/
2013  12:00 AM   

 

 Thyroid stimulating hormone (TSH)     6/10/2013  12:00 AM   

 

 CBC (automated H&H, platelets, WBC and automated differential)     6/10/2013  
12:00 AM   

 

 Comprehensive Metabolic Panel     2012  12:00 AM   

 

 Lipid panel (total cholesterol, lipoproteins, HDL, triglycerides)     2012  12:00 AM   

 

 Thyroid stimulating hormone (TSH)     2012  12:00 AM   

 

 CBC (automated H&H, platelets, WBC and automated differential)     2012  
12:00 AM   

 

 Screening mammography, bilateral     2015  12:00 AM   







Medications







 Active 

 

 Name  Start Date  Estimated Completion Date  SIG  Comments

 

 aspirin 81 mg oral tablet,delayed release (DR/EC)        take 1 tablet (81 mg) 
by oral route once daily   

 

 Vitamin D3 1,000 unit oral capsule        take 1 capsule by oral route daily   

 

 valsartan 160 mg oral tablet  2016     TAKE ONE TABLET BY MOUTH ONCE 
DAILY   

 

 amlodipine 5 mg oral tablet  2016     TAKE ONE TABLET BY MOUTH ONCE DAILY
   

 

 amlodipine 5 mg oral tablet  2016     TAKE ONE TABLET BY MOUTH ONCE DAILY
   

 

 valsartan 160 mg oral tablet  2016     TAKE ONE TABLET BY MOUTH ONCE 
DAILY   

 

 Zofran (as hydrochloride) 4 mg oral tablet  2016     take 1 tablet by 
oral route daily as needed for 14 days   

 

 Zofran (as hydrochloride) 4 mg oral tablet  2017     take 1 tablet by oral 
route daily as needed for 14 days   

 

 Zofran (as hydrochloride) 4 mg oral tablet  2017     take 1 tablet by oral 
route daily as needed for 14 days   

 

 Zofran (as hydrochloride) 4 mg oral tablet  2017     take 1 tablet by 
oral route daily as needed for 14 days   

 

 Zofran (as hydrochloride) 4 mg oral tablet  2017     take 1 tablet by 
oral route daily as needed for 14 days   

 

 EQ LORATADINE 10MG  TABS  2017     TAKE ONE TABLET BY MOUTH ONCE DAILY   

 

 pantoprazole 40 mg oral tablet,delayed release (DR/EC)  10/9/2017     take 1 
tablet (40 mg) by oral route once daily for 90 days   

 

 amlodipine 5 mg oral tablet  2018     TAKE ONE TABLET BY MOUTH ONCE DAILY
   

 

 atenolol 50 mg oral tablet  2018     TAKE ONE TABLET BY MOUTH ONCE DAILY 
  

 

 mirtazapine 15 mg oral tablet  2018     TAKE ONE-HALF TABLET BY MOUTH 
ONCE DAILY AT BEDTIME   

 

 ondansetron 4 mg oral tablet,disintegrating  7/3/2018     dissolve  1 tablet 
by oral route every 8 hours as needed   

 

 losartan 100 mg oral tablet  8/3/2018  2019  take 1 tablet (100 mg) by 
oral route once daily for 90 days   

 

 clorazepate dipotassium 7.5 mg oral tablet  2018  take 1 
tablet PO  three times per day for situational anxiety   

 

 Plavix 75 mg Oral tablet  2018  take 1 tablet (75 mg) by oral 
route once daily   

 

 metoclopramide HCl 10 mg oral tablet  2018     take 1 tablet (10 mg) by 
oral route once daily with supper   









  

 

 Name  Start Date  Expiration Date  SIG  Comments

 

 prednisone 20 mg oral tablet  2011  take 2 tablets by oral 
route daily for 5 days   

 

 calcium carbonate-vitamin D2 600-125 mg-unit oral tablet  8/15/2012  2012
  take 2 tablets by oral route daily   

 

 [place] 3 Fish Oil 900-1,400 mg oral capsule,delayed release(DR/EC)  8/15/2012  9
/  take 1 capsule by oral route daily   

 

 multivitamin Oral tablet  8/15/2012  2012  take 1 tablet by oral route 
daily   

 

 triamcinolone acetonide 0.1 % topical cream  2013  10/7/2013  APPLY A 
THIN LAYER TO THE AFFECTED AREA(S) BY TOPICAL ROUTE TWICE A DAY   

 

 famotidine 20 mg oral tablet  2014  take 1 tablet (20 mg) by 
oral route 2 times per day   

 

 amoxicillin 500 mg oral capsule  2014  take 1 capsule (500 mg) 
by oral route 3 times per day for 10 days   

 

 Zithromax Z-Tab 250 mg oral tablet  10/2/2014  10/7/2014  take 2 tablets (500 
mg) by oral route once daily for 1 day then 1 tablet (250 mg) by oral route 
once daily for 4 days   

 

 acyclovir 800 mg oral tablet  10/5/2014  10/12/2014  take 1 tablet (800 mg) by 
oral route 5 times per day for 7 days   

 

 gabapentin 300 mg oral capsule  10/9/2014  2014  take 1 capsule (300 mg) 
by oral route 3 times per day for 14 days   

 

 Carafate 100 mg/mL oral suspension  2014  take 10 milliliters 
(1 gram) by oral route 4 times per day on an empty stomach 1 hour before meals 
and at bedtime for 4 weeks   

 

 amlodipine 5 mg oral tablet  2015  TAKE ONE TABLET BY MOUTH 
EVERY DAY   

 

 levothyroxine 50 mcg oral tablet  2015  TAKE ONE TABLET BY MOUTH 
EVERY DAY   

 

 Diovan 160 mg oral tablet  2015  2/15/2016  TAKE ONE TABLET BY MOUTH 
EVERY DAY for 90 days   

 

 triamcinolone acetonide 0.1 % topical cream  2015  apply a thin 
layer to the affected area(s) by topical route 2 times per day for 14 days   

 

 fluconazole 150 mg oral tablet  2015  take 1 tablet (150 mg) 
by oral route once for 1 day   

 

 famotidine 20 mg oral tablet  2015     TAKE ONE TABLET BY MOUTH TWICE 
DAILY   

 

 Lipitor 20 mg oral tablet  10/13/2016  2018  take 1 tablet (20 mg) by oral 
route once daily   

 

 Zofran (as hydrochloride) 4 mg oral tablet  10/13/2016  10/27/2016  take 1 
tablet by oral route daily as needed for 14 days   

 

 Zofran (as hydrochloride) 4 mg oral tablet  2017     take 1 tablet by 
oral route daily as needed for 14 days   

 

 levothyroxine 50 mcg oral tablet  10/9/2017  10/9/2017  TAKE ONE TABLET BY 
MOUTH ONCE DAILY   

 

 ondansetron 4 mg oral tablet,disintegrating  10/10/2017  2017  dissolve  
1 tablet by oral route every 8 hours as needed for 30 days   

 

 EQ LORATADINE 10MG  TABS  2018  TAKE ONE TABLET BY MOUTH ONCE 
DAILY IN THE EVENING   

 

 mirtazapine 15 mg oral tablet  2018  TAKE ONE-HALF TABLET BY 
MOUTH ONCE DAILY AT BEDTIME   









 Discontinued 

 

 Name  Start Date  Discontinued Date  SIG  Comments

 

 Lipitor 40 mg oral tablet     2009  1/2 TAB DAILY   

 

 ramipril 5 mg oral capsule     2009  take 1 capsule (5 mg) by oral route 
once daily   

 

 lovastatin 20 mg oral tablet  2009  take 1 tablet (20 mg) by 
oral route once daily with evening meal   

 

 propranolol 20 mg oral tablet  2010  take 1 tablet by oral 
route 2 times a day   

 

 Fosamax 70 mg oral tablet  2010  take 1 tablet (70 mg) by oral 
route once weekly in the morning, at least 30 minutes before the first food, 
beverage, or medication of the day   

 

 lisinopril 40 mg oral tablet  2010  4/10/2011  take 2 tablets by oral 
route daily   

 

 Zoloft 50 mg oral tablet  2011  take 1 tablet (50 mg) by oral 
route once daily   

 

 promethazine 25 mg oral tablet  2011  take 1 tablet by oral 
route every 6 hours as needed   

 

 Diovan -12.5 mg oral tablet  2011  take 1 tablet by oral 
route once daily for 30 days   

 

 Diflucan 150 mg oral tablet     2012  take 1 tablet (150 mg) by oral 
route once for 1 day   

 

 Diflucan 150 mg oral tablet  2012  8/15/2012  take 1 tablet (150 mg) by 
oral route once   

 

 Vitamin B-12 1,000 mcg oral tablet  8/15/2012  2014  take 1 tablet by 
oral route daily   

 

 Premarin 0.625 mg/gram vaginal cream  10/29/2012  6/10/2013  use 1 gram daily 
for a week then 1 gram twice a week   

 

 Medrol (Tab) 4 mg oral tablets,dose pack  2013  6/10/2013  take as 
directed   

 

 aspirin 325 mg oral tablet  2014  take 1 tablet (325 mg) by 
oral route once daily   

 

 Medrol (Tab) 4 mg oral tablets,dose pack  2014  10/2/2014  take as 
directed   

 

 Tetracaine Lollipops Lollipop  2015  Use as directed   

 

 Allergy Relief (loratadine) 10 mg oral tablet  2016     TAKE ONE TABLET 
BY MOUTH ONCE DAILY   

 

 Allergy Relief (loratadine) 10 mg oral tablet  2017  TAKE ONE 
TABLET BY MOUTH ONCE DAILY   

 

 Zoloft 50 mg oral tablet  2016  take 1 tablet (50 mg) by oral 
route once daily for 90 days  Pt refuses to take...

 

 triamcinolone acetonide 0.1 % topical cream  2017     APPLY A THIN LAYER 
OF CREAM EXTERNALLY TO AFFECTED AREA TWICE DAILY FOR 14 DAYS   

 

 loratadine 10 mg oral tablet  2017  TAKE 1 TABLET BY MOUTH 
EVERY DAY IN THE EVENING   

 

 triamcinolone acetonide 0.1 % topical cream  2017  APPLY  
CREAM EXTERNALLY TO AFFECTED AREA TWICE DAILY FOR 14 DAYS   

 

 Lexapro 10 mg oral tablet  2017  take 1 tablet (10 mg) by oral 
route once daily for 90 days   

 

 valsartan 160 mg oral tablet  2017  8/3/2018  TAKE ONE TABLET BY MOUTH 
ONCE DAILY   recall

 

 Augmentin 875-125 mg oral tablet  2018  3/6/2018  take 1 tablet by oral 
route every 12 hours for 7 days   

 

 benzonatate 100 mg oral capsule  2018  3/6/2018  take 1 capsule (100 mg) 
by oral route 3 times per day as needed for cough   







Problem List







 Description  Status  Onset

 

 Hyperlipidemia  Active   

 

 acid reflux  Active   

 

 Hypertension  Active   

 

 hypothyroid  Active   







Vital Signs







 Date  Time  BP-Sys(mm[Hg]  BP-Morena(mm[Hg])  HR(bpm)  RR(rpm)  Temp  WT  HT  HC  
BMI  BSA  BMI Percentile  O2 Sat(%)

 

 2018  8:49:00 AM  134 mmHg  70 mmHg  64 bpm  16 rpm  98.6 F  146 lbs  61 
in     27.5862 kg/m  1.6883 m     98 %

 

 2018  9:04:00 AM  138 mmHg  80 mmHg  64 bpm  18 rpm  96 F  140.125 lbs  
61 in     26.48 kg/m2  1.65 m2     99 %

 

 2018  8:24:00 AM  142 mmHg  70 mmHg  69 bpm  18 rpm  98.2 F  139 lbs  61 
in     26.2636 kg/m  1.6473 m     98 %

 

 2018  8:39:00 AM  116 mmHg  68 mmHg  68 bpm  18 rpm  97.9 F  142 lbs  61 
in     26.83 kg/m2  1.66 m2      

 

 3/6/2018  8:26:00 AM  126 mmHg  76 mmHg  63 bpm  16 rpm  98 F  134.125 lbs  61 
in     25.3424 kg/m  1.6182 m     100 %

 

 2018  11:55:00 AM  126 mmHg  80 mmHg  80 bpm  18 rpm  98 F  132 lbs  61 
in     24.94 kg/m2  1.61 m2     100 %

 

 2017  8:26:00 AM  122 mmHg  76 mmHg  63 bpm  16 rpm  98.4 F  129.25 lbs  
61 in     24.4213 kg/m  1.5885 m     99 %

 

 10/18/2017  9:01:00 AM  126 mmHg  80 mmHg  73 bpm  16 rpm  97.9 F  122.375 lbs
  61 in     23.12 kg/m2  1.55 m2     100 %

 

 10/10/2017  1:52:00 PM  118 mmHg  72 mmHg  74 bpm  16 rpm  98.1 F  121 lbs  61 
in     22.8625 kg/m  1.5369 m     99 %

 

 2017  8:40:00 AM  118 mmHg  68 mmHg  63 bpm  16 rpm  98.1 F  123.125 lbs  
61 in     23.26 kg/m2  1.55 m2     100 %

 

 2017  8:57:00 AM  116 mmHg  62 mmHg  71 bpm  16 rpm  99.8 F  121.5 lbs  
61 in     22.957 kg/m  1.5401 m     98 %

 

 2017  11:30:00 AM  128 mmHg  78 mmHg  69 bpm  16 rpm  98.8 F  129.375 lbs  
61 in     24.44 kg/m2  1.59 m2     98 %

 

 2017  8:22:00 AM  118 mmHg  78 mmHg  62 bpm  18 rpm  97.5 F  129.125 lbs  
61 in     24.3977 kg/m  1.5877 m     100 %

 

 10/13/2016  2:26:00 PM  128 mmHg  78 mmHg  77 bpm  16 rpm  98.4 F  139.25 lbs  
61 in     26.31 kg/m2  1.65 m2     100 %

 

 2016  8:29:00 AM  122 mmHg  70 mmHg  72 bpm  16 rpm  97.8 F  137.125 lbs  
61 in     25.9093 kg/m  1.6361 m     100 %

 

 2015  9:33:00 AM  120 mmHg  68 mmHg  73 bpm     98.7 F  144.375 lbs  61 
in     27.28 kg/m2  1.68 m2     99 %

 

 2015  9:05:00 AM  110 mmHg  75 mmHg  85 bpm  16 rpm  96.7 F  144.375 lbs  
61 in     27.2791 kg/m  1.6788 m     99 %

 

 2015  1:05:00 PM  108 mmHg  62 mmHg  78 bpm  18 rpm  96.9 F  142.375 lbs  
61 in     26.90 kg/m2  1.67 m2     100 %

 

 2014  9:31:00 AM  126 mmHg  66 mmHg  79 bpm  18 rpm  98.7 F  149 lbs  61 
in     28.153 kg/m  1.7055 m     98 %

 

 10/6/2014  9:02:00 AM  110 mmHg  74 mmHg  94 bpm  18 rpm  98.1 F  145.4 lbs  
61 in     27.47 kg/m2  1.68 m2     97 %

 

 10/2/2014  9:14:00 AM  124 mmHg  74 mmHg  79 bpm  18 rpm  98 F  147.25 lbs  61 
in     27.8224 kg/m  1.6955 m     98 %

 

 2014  9:22:00 AM  124 mmHg  76 mmHg  89 bpm  18 rpm  98.1 F  148 lbs  61 
in     27.96 kg/m2  1.70 m2     100 %

 

 2014  8:27:00 AM  118 mmHg  65 mmHg  74 bpm  16 rpm  97.7 F  148 lbs  61 
in     27.9641 kg/m  1.6998 m     99 %

 

 2014  9:48:00 AM  125 mmHg  70 mmHg  93 bpm  18 rpm  96.7 F  156 lbs  61 
in     29.48 kg/m2  1.75 m2     100 %

 

 2013  8:35:00 AM  122 mmHg  74 mmHg  84 bpm  16 rpm  97.9 F  155.375 lbs 
                99 %

 

 6/10/2013  8:30:00 AM  130 mmHg  82 mmHg  79 bpm  16 rpm  97.9 F  161 lbs     
            98 %

 

 2013  8:50:00 AM  124 mmHg  68 mmHg  66 bpm  18 rpm  97.6 F  157.5 lbs  
61 in     29.7591 kg/m  1.7535 m      

 

 2012  1:46:00 PM  120 mmHg  72 mmHg  75 bpm  16 rpm  97.6 F  156.125 lbs
                 98 %

 

 12/10/2012  8:32:00 AM  122 mmHg  82 mmHg  70 bpm  16 rpm  97.3 F  157 lbs    
             99 %

 

 8/15/2012  9:00:00 AM  130 mmHg  76 mmHg  86 bpm  16 rpm  97.4 F  159 lbs     
            100 %

 

 2012  11:02:00 AM  128 mmHg  64 mmHg  66 bpm  18 rpm  97.4 F  162.125 lbs
  61 in     30.6329 kg/m  1.7791 m      

 

 2012  8:45:00 AM  130 mmHg  70 mmHg  82 bpm  16 rpm  98.2 F  160.125 lbs 
                100 %

 

 2/15/2012  9:29:00 AM  122 mmHg  80 mmHg  86 bpm  16 rpm  97.4 F  159.375 lbs  
61 in     30.1133 kg/m  1.7639 m     99 %

 

 2012  9:41:00 AM  132 mmHg  74 mmHg  66 bpm  18 rpm  98.4 F  160.375 lbs  
61 in     30.30 kg/m2  1.77 m2      

 

 2011  10:08:00 AM  134 mmHg  82 mmHg  80 bpm  16 rpm  98 F  160 lbs  61 
in     30.2314 kg/m  1.7674 m     99 %

 

 2011  3:56:00 PM  130 mmHg  80 mmHg                              

 

 2011  8:55:00 AM  130 mmHg  74 mmHg  88 bpm  16 rpm  98.2 F  158.25 lbs  
               98 %

 

 2011  8:54:00 AM  136 mmHg  82 mmHg  102 bpm  16 rpm  98.1 F  153.5 lbs   
              99 %

 

 2011  8:49:00 AM  122 mmHg  88 mmHg  88 bpm  16 rpm  98.6 F  152.25 lbs  
               99 %

 

 2011  10:02:00 AM  140 mmHg  82 mmHg  96 bpm  20 rpm  98.8 F             
       100 %

 

 2011  9:14:00 AM  156 mmHg  98 mmHg  110 bpm  24 rpm  98.5 F  153 lbs    
             100 %

 

 2011  8:50:00 AM  160 mmHg  84 mmHg  100 bpm  16 rpm  98.7 F  155 lbs    
             100 %

 

 2011  1:25:00 PM  152 mmHg  100 mmHg  82 bpm  16 rpm  97.2 F  156.375 lbs 
                100 %

 

 2011  9:55:00 AM  144 mmHg  90 mmHg                              

 

 2010  9:24:00 AM  138 mmHg  84 mmHg                              

 

 2010  8:58:00 AM  160 mmHg  94 mmHg                              

 

 2010  8:33:00 AM  142 mmHg  90 mmHg  100 bpm  16 rpm  98.1 F  158.375 
lbs                  

 

 2010  9:24:00 AM  160 mmHg  102 mmHg  88 bpm  18 rpm  99 F  157.125 lbs  
62 in     28.7382 kg/m  1.7657 m      

 

 3/19/2010  11:01:00 AM  140 mmHg  90 mmHg                              

 

 2009  10:08:00 AM  142 mmHg  86 mmHg  72 bpm  16 rpm  98.1 F  154.125 
lbs  61 in     29.1214 kg/m  1.7346 m      







Social History







 Name  Description  Comments

 

       

 

 Children      

 

 lives alone      

 

 Supervisor      

 

 Tobacco  Never smoker   







History of Procedures







 Date Ordered  Description  Order Status

 

 2011 12:00 AM  COMPREHEN METABOLIC PANEL  Reviewed

 

 2011 12:00 AM  ASSAY THYROID STIM HORMONE  Reviewed

 

 2011 12:00 AM  COMPREHEN METABOLIC PANEL  Reviewed

 

 2011 12:00 AM  LIPID PANEL  Reviewed

 

 2011 12:00 AM  ASSAY THYROID STIM HORMONE  Reviewed

 

 2011 12:00 AM  COMPLETE CBC W/AUTO DIFF WBC  Reviewed

 

 2015 12:00 AM  COMPLETE CBC W/AUTO DIFF WBC  Reviewed

 

 2015 12:00 AM  COMPREHEN METABOLIC PANEL  Reviewed

 

 2015 12:00 AM  LIPID PANEL  Reviewed

 

 2015 12:00 AM  ASSAY THYROID STIM HORMONE  Reviewed

 

 2011 12:00 AM  COMPREHEN METABOLIC PANEL  Reviewed

 

 2011 12:00 AM  COMPREHEN METABOLIC PANEL  Reviewed

 

 2011 12:00 AM  LIPID PANEL  Reviewed

 

 2011 12:00 AM  ASSAY THYROID STIM HORMONE  Reviewed

 

 10/28/2011 12:00 AM  ***Immunization administration, Medicare flu  Reviewed

 

 10/28/2011 12:00 AM  Fluzone ** MEDICARE Only **  Reviewed

 

 2010 11:24 AM  NO CHARGE OV  Reviewed

 

 2010 11:26 AM  NO CHARGE OV  Reviewed

 

 2011 12:00 AM  COMPREHEN METABOLIC PANEL  Reviewed

 

 2011 12:00 AM  LIPID PANEL  Reviewed

 

 2011 12:00 AM  ASSAY THYROID STIM HORMONE  Reviewed

 

 2011 12:00 AM  COMPLETE CBC W/AUTO DIFF WBC  Reviewed

 

 2011 12:00 AM  Wrist Support  Reviewed

 

 2016 12:00 AM  Screening mammography, bilateral  Reviewed

 

 2016 12:00 AM  DXA BONE DENSITY AXIAL  Returned

 

 2016 12:00 AM  TETANUS VACCINE IM  Reviewed

 

 2016 12:00 AM  HEP B VACC ADULT 3 DOSE IM  Reviewed

 

 2012 12:00 AM  TISSUE EXAM FOR FUNGI  Reviewed

 

 2012 12:00 AM  SMEAR WET MOUNT SALINE/INK  Reviewed

 

 2016 12:00 AM  TETANUS VACCINE IM  Reviewed

 

 2016 12:00 AM  HEP B VACC ADULT 3 DOSE IM  Reviewed

 

 2/15/2012 12:00 AM  THER/PROPH/DIAG INJ SC/IM  Reviewed

 

 2/15/2012 12:00 AM  Bicillin CR [facility]93241201827[facility]  Reviewed

 

 2/15/2012 12:00 AM  Depo-Medrol 40 mg NDC#0585215756  Reviewed

 

 10/13/2016 12:00 AM  COMPLETE CBC W/AUTO DIFF WBC  Returned

 

 10/13/2016 12:00 AM  COMPREHEN METABOLIC PANEL  Returned

 

 10/13/2016 12:00 AM  ASSAY THYROID STIM HORMONE  Returned

 

 10/13/2016 12:00 AM  LIPID PANEL  Returned

 

 2016 12:00 AM  TETANUS VACCINE IM  Reviewed

 

 2016 12:00 AM  HEP B VACC ADULT 3 DOSE IM  Reviewed

 

 2017 12:00 AM  ASSAY OF IRON  Returned

 

 2017 12:00 AM  IRON BINDING TEST  Returned

 

 2017 12:00 AM  ASSAY OF TRANSFERRIN  Returned

 

 2017 12:00 AM  OCCULT BLD FECES 1-3 TESTS  Returned

 

 2017 12:00 AM  COMPLETE CBC AUTOMATED  Returned

 

 8/15/2012 12:00 AM  COMPREHEN METABOLIC PANEL  Reviewed

 

 8/15/2012 12:00 AM  ASSAY THYROID STIM HORMONE  Reviewed

 

 8/15/2012 12:00 AM  COMPLETE CBC W/AUTO DIFF WBC  Reviewed

 

 8/15/2012 12:00 AM  Wrist Support  Reviewed

 

 2017 12:00 AM  METABOLIC PANEL TOTAL CA  Returned

 

 2017 12:00 AM  METABOLIC PANEL TOTAL CA  Returned

 

 2017 12:00 AM  Screening mammography, bilateral  Returned

 

 10/29/2012 12:00 AM  Flu Injection 3 Years And Above NDC# [address]-35  [facility]  
Reviewed

 

 12/10/2012 12:00 AM  IMMUNIZATION ADMIN  Reviewed

 

 12/10/2012 12:00 AM  Pneumovax Injection - [facility]  Reviewed

 

 2018 12:00 AM  THER/PROPH/DIAG INJ SC/IM  Reviewed

 

 2018 12:00 AM  Decadron 4mg Injection  Reviewed

 

 2018 12:00 AM  Depo-Medrol 40mg Injection  Reviewed

 

 2012 12:00 AM  TRICHOMONAS ASSAY W/OPTIC  Reviewed

 

 2018 12:00 AM  COMPLETE CBC W/AUTO DIFF WBC  Returned

 

 2018 12:00 AM  COMPREHEN METABOLIC PANEL  Returned

 

 2018 12:00 AM  LIPID PANEL  Returned

 

 2018 12:00 AM  ASSAY THYROID STIM HORMONE  Returned

 

 2013 12:00 AM  THER/PROPH/DIAG INJ SC/IM  Reviewed

 

 2013 12:00 AM  Bicillin CR, 1.2 million units NDC# 99632-563-85  Reviewed

 

 2018 12:00 AM  Mammogram, screening, bilateral  Reviewed

 

 2018 12:00 AM  DXA BONE DENSITY AXIAL  Reviewed

 

 2018 12:00 AM  COMPLETE CBC W/AUTO DIFF WBC  Reviewed

 

 2018 12:00 AM  COMPREHEN METABOLIC PANEL  Reviewed

 

 2018 12:00 AM  LIPID PANEL  Reviewed

 

 2018 12:00 AM  ASSAY THYROID STIM HORMONE  Reviewed

 

 2013 12:00 AM  COMPREHEN METABOLIC PANEL  Reviewed

 

 2013 12:00 AM  LIPID PANEL  Reviewed

 

 2013 12:00 AM  COMPLETE CBC W/AUTO DIFF WBC  Reviewed

 

 2013 12:00 AM  ASSAY THYROID STIM HORMONE  Reviewed

 

 6/10/2013 12:00 AM  MAMMOGRAM SCREENING  Reviewed

 

 6/10/2013 12:00 AM  CYTOPATH C/V MANUAL  Reviewed

 

 12/10/2013 12:00 AM  LIPID PANEL  Reviewed

 

 12/10/2013 12:00 AM  ASSAY THYROID STIM HORMONE  Reviewed

 

 12/10/2013 12:00 AM  COMPLETE CBC W/AUTO DIFF WBC  Reviewed

 

 12/10/2013 12:00 AM  COMPREHEN METABOLIC PANEL  Reviewed

 

 2010 12:00 AM  CYTOPATH C/V MANUAL  Reviewed

 

 2010 12:00 AM  SMEAR WET MOUNT SALINE/INK  Reviewed

 

 2010 12:00 AM  LIPID PANEL  Reviewed

 

 2010 12:00 AM  ASSAY THYROID STIM HORMONE  Reviewed

 

 2010 12:00 AM  COMPLETE CBC W/AUTO DIFF WBC  Reviewed

 

 2010 12:00 AM  COMPREHEN METABOLIC PANEL  Reviewed

 

 10/28/2013 12:00 AM  Flu Injection 3 Years And Above NDC# [address]-46  [facility]  
Reviewed

 

 2010 8:48 AM  Blood Pressure Check-no charge  Reviewed

 

 2010 12:00 AM  Blood Pressure Check-no charge  Reviewed

 

 2014 12:00 AM  METABOLIC PANEL TOTAL CA  Reviewed

 

 2014 12:00 AM  ASSAY THYROID STIM HORMONE  Reviewed

 

 2014 12:00 AM  ASSAY OF FREE THYROXINE  Reviewed

 

 2014 12:00 AM  MAMMOGRAM SCREENING  Reviewed

 

 2014 12:00 AM  CT BONE DENSITY AXIAL  Reviewed

 

 2014 12:00 AM  COMPLETE CBC W/AUTO DIFF WBC  Reviewed

 

 2014 12:00 AM  COMPREHEN METABOLIC PANEL  Reviewed

 

 2014 12:00 AM  LIPID PANEL  Reviewed

 

 2014 12:00 AM  ASSAY THYROID STIM HORMONE  Reviewed

 

 10/20/2010 12:00 AM  IMMUNIZATION ADMIN  Reviewed

 

 10/20/2010 12:00 AM  FLU VACCINE 3 YRS & > IM  Reviewed

 

 2010 12:00 AM  COMPREHEN METABOLIC PANEL  Reviewed

 

 2010 12:00 AM  LIPID PANEL  Reviewed

 

 2010 12:00 AM  ASSAY THYROID STIM HORMONE  Reviewed

 

 2010 12:00 AM  COMPLETE CBC W/AUTO DIFF WBC  Reviewed

 

 2010 12:00 AM  Blood Pressure Check-no charge  Reviewed

 

 10/2/2014 12:00 AM  THER/PROPH/DIAG INJ SC/IM  Reviewed

 

 10/2/2014 12:00 AM  Kenalog, Per 10 Mg NDC#8110-8813-93  Reviewed

 

 2014 12:00 AM  COMPLETE CBC W/AUTO DIFF WBC  Reviewed

 

 2014 12:00 AM  COMPREHEN METABOLIC PANEL  Reviewed

 

 2014 12:00 AM  LIPID PANEL  Reviewed

 

 2014 12:00 AM  ASSAY THYROID STIM HORMONE  Reviewed

 

 2015 12:00 AM  Rocephin 1 gram NDC#7843-2712-59  Reviewed

 

 2015 12:00 AM  THER/PROPH/DIAG INJ SC/IM  Reviewed

 

 2011 12:00 AM  COMPREHEN METABOLIC PANEL  Reviewed

 

 2011 12:00 AM  LIPID PANEL  Reviewed

 

 2011 12:00 AM  ASSAY THYROID STIM HORMONE  Reviewed

 

 2011 12:00 AM  COMPLETE CBC W/AUTO DIFF WBC  Reviewed

 

 2011 12:00 AM  METABOLIC PANEL TOTAL CA  Reviewed

 

 2011 12:00 AM  COMPREHEN METABOLIC PANEL  Reviewed

 

 2011 12:00 AM  ASSAY THYROID STIM HORMONE  Reviewed

 

 2011 12:00 AM  COMPLETE CBC W/AUTO DIFF WBC  Reviewed

 

 2011 12:00 AM  ASSAY OF LIPASE  Reviewed

 

 2011 12:00 AM  THER/PROPH/DIAG INJ SC/IM  Reviewed

 

 2011 12:00 AM  Phenergan 50 Mg Im  Bernadine  Reviewed

 

 2011 12:00 AM  METABOLIC PANEL TOTAL CA  Reviewed

 

 2011 12:00 AM  THER/PROPH/DIAG INJ SC/IM  Reviewed

 

 2011 12:00 AM  Phenergan 25 Mg Im  Bernadine  Reviewed

 

 2011 12:00 AM  METABOLIC PANEL TOTAL CA  Reviewed

 

 2011 12:00 AM  ASSAY THYROID STIM HORMONE  Reviewed

 

 2011 12:00 AM  THER/PROPH/DIAG INJ SC/IM  Reviewed

 

 2011 12:00 AM  Phenergan 50 Mg Im  Bernadine  Reviewed

 

 2011 12:00 AM  ASSAY OF SERUM SODIUM  Reviewed

 

 2011 12:00 AM  ASSAY OF SERUM SODIUM  Reviewed

 

 2011 12:00 AM  CYTOPATH C/V MANUAL  Reviewed

 

 2011 12:00 AM  ASSAY THYROID STIM HORMONE  Reviewed

 

 2015 12:00 AM  COMPLETE CBC W/AUTO DIFF WBC  Reviewed

 

 2015 12:00 AM  COMPREHEN METABOLIC PANEL  Reviewed

 

 2015 12:00 AM  LIPID PANEL  Reviewed

 

 2015 12:00 AM  ASSAY THYROID STIM HORMONE  Reviewed

 

 2015 12:00 AM  MAMMOGRAM SCREENING  Reviewed

 

 2015 12:00 AM  CYTOPATH TBS C/V MANUAL  Reviewed







Results Summary







 Date and Description  Results

 

 2010 9:25 AM  Colonoscopy-Women and Men over 50 Abnormal Mammogram -Women 
over 40 Declined Pap Smear Declined 

 

 2010 10:41 AM  WET PREP NO TRICHOMONADS SEEN  No  Few 

 

 2010 8:15 AM  TRIGLYCERIDES 174.0 mg/dLCHOLESTEROL 175.0 mg/dLHDL 58.0 mg/
dLTOT CHOL/HDL 3.0 LDL (CALC) 82.0 mg/dLTSH 0.790 uIU/mLGLUCOSE 95.0 mg/
dLSODIUM 136.0 mmol/LPOTASSIUM 4.50 mmol/LCHLORIDE 99.0 mmol/LCO2 26.0 mmol/
LBUN 12.0 mg/dLCREATININE 0.80 mg/dLSGOT/AST 32.0 IU/LSGPT/ALT 33.0 IU/LALK 
PHOS 103.0 IU/LTOTAL PROTEIN 7.60 g/dLALBUMIN 4.60 g/dLTOTAL BILI 0.60 mg/
dLCALCIUM 9.80 mg/dLAGE 64 GFR NonAA 72 GFR AA 87 eGFR >60 mL/min/1.73 m2eGFR AA
* >60 WBC 5.3 RBC 4.13 HGB 13.10 g/dLHCT 39.20 %MCV 95.0 fLMCH 31.70 pgMCHC 
33.40 g/dLRDW SD 44 RDW CV 12.70 %MPV 9.80 fLPLT 257 NRBC# 0.00 NRBC% 0.0 %NEUT 
57.90 %%LYMP 26.50 %%MONO 11.60 %%EOS 3.20 %%BASO 0.80 %#NEUT 3.04 #LYMP 1.39 #
MONO 0.61 #EOS 0.17 #BASO 0.04 MANUAL DIFF NOT IND 

 

 2011 9:45 AM  GLUCOSE 91.0 mg/dLSODIUM 133.0 mmol/LPOTASSIUM 4.40 mmol/
LCHLORIDE 97.0 mmol/LCO2 24.0 mmol/LBUN 9.0 mg/dLCREATININE 0.70 mg/dLCALCIUM 
10.0 mg/dLAGE 65 GFR NonAA 84 GFR  eGFR >60 mL/min/1.73 m2eGFR AA* >60 

 

 2011 10:25 AM  LIPASE 29.0 U/LTSH 0.10 uIU/mLGLUCOSE 115.0 mg/dLSODIUM 
127.0 mmol/LPOTASSIUM 5.30 mmol/LCHLORIDE 93.0 mmol/LCO2 18.0 mmol/LBUN 9.0 mg/
dLCREATININE 0.70 mg/dLSGOT/AST 62.0 IU/LSGPT/ALT 46.0 IU/LALK PHOS 100.0 IU/
LTOTAL PROTEIN 8.60 g/dLALBUMIN 4.90 g/dLTOTAL BILI 0.60 mg/dLCALCIUM 9.90 mg/
dLAGE 65 GFR NonAA 84 GFR  eGFR >60 mL/min/1.73 m2eGFR AA* >60 WBC 6.9 
RBC 4.48 HGB 14.40 g/dLHCT 40.90 %MCV 91.0 fLMCH 32.10 pgMCHC 35.20 g/dLRDW SD 
41 RDW CV 12.50 %MPV 9.30 fLPLT 260 NRBC# 0.00 NRBC% 0.0 %NEUT 74.90 %%LYMP 
15.10 %%MONO 9.40 %%EOS 0.30 %%BASO 0.30 %#NEUT 5.15 #LYMP 1.04 #MONO 0.65 #EOS 
0.02 #BASO 0.02 MANUAL DIFF NOT IND 

 

 2011 9:07 AM  GLUCOSE 92.0 mg/dLSODIUM 131.0 mmol/LPOTASSIUM 4.20 mmol/
LCHLORIDE 99.0 mmol/LCO2 22.0 mmol/LBUN 9.0 mg/dLCREATININE 0.70 mg/dLCALCIUM 
9.20 mg/dLAGE 65 GFR NonAA 84 GFR  eGFR >60 mL/min/1.73 m2eGFR AA* >60 

 

 2011 11:06 AM  TSH 0.510 uIU/mLGLUCOSE 99.0 mg/dLSODIUM 132.0 mmol/
LPOTASSIUM 3.50 mmol/LCHLORIDE 95.0 mmol/LCO2 23.0 mmol/LBUN 9.0 mg/
dLCREATININE 0.80 mg/dLCALCIUM 10.40 mg/dLAGE 65 GFR NonAA 72 GFR AA 87 eGFR >
60 mL/min/1.73 m2eGFR AA* >60 

 

 2011 9:45 AM  SODIUM 132.0 mmol/L

 

 2011 9:27 AM  SODIUM 137.0 mmol/L

 

 2011 9:55 AM  TSH 1.070 uIU/mL

 

 2011 8:55 AM  GLUCOSE 102.0 mg/dLSODIUM 135.0 mmol/LPOTASSIUM 4.20 mmol/
LCHLORIDE 102.0 mmol/LCO2 24.0 mmol/LBUN 15.0 mg/dLCREATININE 0.80 mg/dLSGOT/
AST 30.0 IU/LSGPT/ALT 35.0 IU/LALK PHOS 119.0 IU/LTOTAL PROTEIN 7.50 g/
dLALBUMIN 4.30 g/dLTOTAL BILI 0.30 mg/dLCALCIUM 9.20 mg/dLAGE 65 GFR NonAA 72 
GFR AA 87 eGFR >60 mL/min/1.73 m2eGFR AA* >60 TSH 1.130 uIU/mL

 

 2011 8:55 AM  GLUCOSE 98.0 mg/dLSODIUM 137.0 mmol/LPOTASSIUM 3.90 mmol/
LCHLORIDE 103.0 mmol/LCO2 25.0 mmol/LBUN 14.0 mg/dLCREATININE 0.70 mg/dLSGOT/
AST 33.0 IU/LSGPT/ALT 36.0 IU/LALK PHOS 111.0 IU/LTOTAL PROTEIN 8.30 g/
dLALBUMIN 4.40 g/dLTOTAL BILI 0.50 mg/dLCALCIUM 9.40 mg/dLAGE 66 GFR NonAA 84 
GFR  eGFR >60 mL/min/1.73 m2eGFR AA* >60 TRIGLYCERIDES 109.0 mg/
dLCHOLESTEROL 153.0 mg/dLHDL 54.0 mg/dLTOT CHOL/HDL 2.8 LDL (CALC) 77.0 mg/
dLTSH 1.330 uIU/mL

 

 2011 10:17 AM  Colonoscopy-Women and Men over 50 Declined Mammogram -
Women over 40 Normal Pap Smear Negative 

 

 2012 10:33 AM  WET PREP NO TRICH SEEN CLUE CELLS NONE SEEN 

 

 2012 8:45 AM  WBC 5.2 RBC 3.96 HGB 12.70 g/dLHCT 37.80 %MCV 96.0 fLMCH 
32.10 pgMCHC 33.60 g/dLRDW SD 46 RDW CV 13.30 %MPV 9.70 fLPLT 297 NRBC# 0.00 
NRBC% 0.0 %NEUT 57.70 %%LYMP 28.50 %%MONO 10.30 %%EOS 2.50 %%BASO 1.0 %#NEUT 
3.02 #LYMP 1.49 #MONO 0.54 #EOS 0.13 #BASO 0.05 MANUAL DIFF NOT IND GLUCOSE 
97.0 mg/dLSODIUM 137.0 mmol/LPOTASSIUM 4.40 mmol/LCHLORIDE 101.0 mmol/LCO2 23.0 
mmol/LBUN 17.0 mg/dLCREATININE 0.80 mg/dLSGOT/AST 30.0 IU/LSGPT/ALT 33.0 IU/
LALK PHOS 95.0 IU/LTOTAL PROTEIN 7.40 g/dLALBUMIN 4.40 g/dLTOTAL BILI 0.60 mg/
dLCALCIUM 9.70 mg/dLAGE 66 GFR NonAA 72 GFR AA 87 eGFR 60 eGFR AA* 60 
TRIGLYCERIDES 130.0 mg/dLCHOLESTEROL 157.0 mg/dLHDL 56.0 mg/dLTOT CHOL/HDL 2.8 
LDL (CALC) 75.0 mg/dLTSH 0.940 uIU/mL

 

 2012 8:45 AM  WBC 5.1 RBC 3.91 HGB 12.50 g/dLHCT 36.30 %MCV 93.0 fLMCH 
32.0 pgMCHC 34.40 g/dLRDW SD 43 RDW CV 12.60 %MPV 9.30 fLPLT 244 NRBC# 0.00 NRBC
% 0.0 %NEUT 57.60 %%LYMP 27.50 %%MONO 9.10 %%EOS 5.0 %%BASO 0.80 %#NEUT 2.91 #
LYMP 1.39 #MONO 0.46 #EOS 0.25 #BASO 0.04 MANUAL DIFF NOT IND 

 

 12/10/2012 8:34 AM  Colonoscopy-Women and Men over 50 Declined Mammogram -
Women over 40 Declined Pap Smear Declined 

 

 2012 5:02 PM  WET PREP NO TRICH SEEN CLUE CELLS NONE SEEN 

 

 2013 8:25 AM  WBC 4.2 RBC 3.96 HGB 12.90 g/dLHCT 37.0 %MCV 93.0 fLMCH 
32.60 pgMCHC 34.90 g/dLRDW SD 43 RDW CV 12.60 %MPV 9.70 fLPLT 254 NRBC# 0.00 
NRBC% 0.0 %NEUT 54.40 %%LYMP 30.40 %%MONO 10.80 %%EOS 3.40 %%BASO 1.0 %#NEUT 
2.26 #LYMP 1.26 #MONO 0.45 #EOS 0.14 #BASO 0.04 MANUAL DIFF NOT IND TSH 1.230 
uIU/mLGLUCOSE 94.0 mg/dLSODIUM 136.0 mmol/LPOTASSIUM 4.30 mmol/LCHLORIDE 99.0 
mmol/LCO2 25.0 mmol/LBUN 10.0 mg/dLCREATININE 0.80 mg/dLSGOT/AST 36.0 IU/LSGPT/
ALT 36.0 IU/LALK PHOS 84.0 IU/LTOTAL PROTEIN 7.90 g/dLALBUMIN 4.40 g/dLTOTAL 
BILI 0.70 mg/dLCALCIUM 9.80 mg/dLAGE 67 GFR NonAA 72 GFR AA 87 eGFR 60 eGFR AA* 
61 TRIGLYCERIDES 122.0 mg/dLCHOLESTEROL 141.0 mg/dLHDL 47.0 mg/dLTOT CHOL/HDL 
3.0 LDL (CALC) 70.0 mg/dL

 

 2013 8:45 AM  WBC 5.3 RBC 4.01 HGB 13.0 g/dLHCT 37.60 %MCV 94.0 fLMCH 
32.40 pgMCHC 34.60 g/dLRDW SD 43 RDW CV 12.50 %MPV 9.40 fLPLT 248 NRBC# 0.00 
NRBC% 0.0 %NEUT 58.70 %%LYMP 27.80 %%MONO 9.80 %%EOS 2.80 %%BASO 0.90 %#NEUT 
3.12 #LYMP 1.48 #MONO 0.52 #EOS 0.15 #BASO 0.05 MANUAL DIFF NOT IND TSH 1.570 
uIU/mLGLUCOSE 94.0 mg/dLSODIUM 134.0 mmol/LPOTASSIUM 4.10 mmol/LCHLORIDE 101.0 
mmol/LCO2 23.0 mmol/LBUN 11.0 mg/dLCREATININE 0.80 mg/dLSGOT/AST 41.0 IU/LSGPT/
ALT 44.0 IU/LALK PHOS 109.0 IU/LTOTAL PROTEIN 7.90 g/dLALBUMIN 4.70 g/dLTOTAL 
BILI 0.80 mg/dLCALCIUM 10.40 mg/dLAGE 68 GFR NonAA 72 GFR AA 87 eGFR >60 mL/min/
1.73 m2eGFR AA* >60 TRIGLYCERIDES 163.0 mg/dLCHOLESTEROL 138.0 mg/dLHDL 49.0 mg/
dLTOT CHOL/HDL 2.8 LDL (CALC) 56.0 mg/dL

 

 2014 8:58 AM  GLUCOSE 86.0 mg/dLSODIUM 134.0 mmol/LPOTASSIUM 4.20 mmol/
LCHLORIDE 99.0 mmol/LCO2 25.0 mmol/LBUN 14.0 mg/dLCREATININE 0.80 mg/dLCALCIUM 
10.10 mg/dLAGE 68 GFR NonAA 72 GFR AA 87 eGFR >60 mL/min/1.73 m2eGFR AA* >60 
FREE T4 1.18 TSH 1.20 uIU/mL

 

 2014 9:50 AM  WBC 5.7 RBC 4.03 HGB 12.90 g/dLHCT 37.90 %MCV 94.0 fLMCH 
32.0 pgMCHC 34.0 g/dLRDW SD 44 RDW CV 12.70 %MPV 9.70 fLPLT 292 NRBC# 0.00 NRBC
% 0.0 %NEUT 62.70 %%LYMP 21.80 %%MONO 11.0 %%EOS 3.40 %%BASO 1.10 %#NEUT 3.55 #
LYMP 1.23 #MONO 0.62 #EOS 0.19 #BASO 0.06 MANUAL DIFF NOT IND GLUCOSE 98.0 mg/
dLSODIUM 135.0 mmol/LPOTASSIUM 4.30 mmol/LCHLORIDE 102.0 mmol/LCO2 22.0 mmol/
LBUN 10.0 mg/dLCREATININE 0.80 mg/dLSGOT/AST 34.0 IU/LSGPT/ALT 32.0 IU/LALK 
PHOS 95.0 IU/LTOTAL PROTEIN 7.70 g/dLALBUMIN 4.30 g/dLTOTAL BILI 0.80 mg/
dLCALCIUM 9.10 mg/dLAGE 68 GFR NonAA 72 GFR AA 87 eGFR 60 eGFR AA* 60 
TRIGLYCERIDES 108.0 mg/dLCHOLESTEROL 146.0 mg/dLHDL 56.0 mg/dLTOT CHOL/HDL 2.6 
LDL (CALC) 68.0 mg/dLTSH 0.90 uIU/mL

 

 2014 8:40 AM  WBC 4.4 RBC 4.13 HGB 13.20 g/dLHCT 38.90 %MCV 94.0 fLMCH 
32.0 pgMCHC 33.90 g/dLRDW SD 47 RDW CV 13.50 %MPV 9.80 fLPLT 279 NRBC# 0.00 NRBC
% 0.0 %NEUT 63.80 %%LYMP 24.60 %%MONO 9.30 %%EOS 1.60 %%BASO 0.70 %#NEUT 2.80 #
LYMP 1.08 #MONO 0.41 #EOS 0.07 #BASO 0.03 MANUAL DIFF NOT IND GLUCOSE 96.0 mg/
dLSODIUM 136.0 mmol/LPOTASSIUM 4.10 mmol/LCHLORIDE 99.0 mmol/LCO2 23.0 mmol/
LBUN 8.0 mg/dLCREATININE 0.80 mg/dLSGOT/AST 31.0 IU/LSGPT/ALT 27.0 IU/LALK PHOS 
85.0 IU/LTOTAL PROTEIN 7.60 g/dLALBUMIN 4.40 g/dLTOTAL BILI 0.80 mg/dLCALCIUM 
10.20 mg/dLAGE 69 GFR NonAA 71 GFR AA 86 eGFR 60 eGFR AA* 60 TRIGLYCERIDES 61.0 
mg/dLCHOLESTEROL 148.0 mg/dLHDL 71.0 mg/dLTOT CHOL/HDL 2.1 LDL (CALC) 65.0 mg/
dLTSH 0.990 uIU/mL

 

 2015 8:32 AM  WBC 4.8 RBC 3.98 HGB 12.70 g/dLHCT 37.30 %MCV 94.0 fLMCH 
31.90 pgMCHC 34.0 g/dLRDW SD 43 RDW CV 12.70 %MPV 9.50 fLPLT 270 NRBC# 0.00 NRBC
% 0.0 %NEUT 57.30 %%LYMP 25.0 %%MONO 13.0 %%EOS 3.60 %%BASO 1.10 %#NEUT 2.73 #
LYMP 1.19 #MONO 0.62 #EOS 0.17 #BASO 0.05 MANUAL DIFF NOT IND TSH 0.640 uIU/
mLGLUCOSE 90.0 mg/dLSODIUM 136.0 mmol/LPOTASSIUM 4.0 mmol/LCHLORIDE 101.0 mmol/
LCO2 24.0 mmol/LBUN 10.0 mg/dLCREATININE 0.80 mg/dLSGOT/AST 34.0 IU/LSGPT/ALT 
32.0 IU/LALK PHOS 92.0 IU/LTOTAL PROTEIN 7.50 g/dLALBUMIN 4.40 g/dLTOTAL BILI 
0.70 mg/dLCALCIUM 9.50 mg/dLAGE 69 GFR NonAA 71 GFR AA 86 eGFR >60 mL/min/1.73 
m2eGFR AA* >60 TRIGLYCERIDES 107.0 mg/dLCHOLESTEROL 138.0 mg/dLHDL 58.0 mg/
dLTOT CHOL/HDL 2.4 LDL (CALC) 59.0 mg/dL

 

 2015 8:31 AM  WBC 4.6 RBC 3.97 HGB 12.90 g/dLHCT 38.0 %MCV 96.0 fLMCH 
32.50 pgMCHC 33.90 g/dLRDW SD 44 RDW CV 12.60 %MPV 9.0 fLPLT 248 NRBC# 0.00 NRBC
% 0.0 %NEUT 61.0 %%LYMP 24.70 %%MONO 10.20 %%EOS 3.0 %%BASO 1.10 %#NEUT 2.82 #
LYMP 1.14 #MONO 0.47 #EOS 0.14 #BASO 0.05 MANUAL DIFF NOT IND TRIGLYCERIDES 
105.0 mg/dLCHOLESTEROL 141.0 mg/dLHDL 58.0 mg/dLTOT CHOL/HDL 2.4 LDL (CALC) 
62.0 mg/dLGLUCOSE 93.0 mg/dLSODIUM 136.0 mmol/LPOTASSIUM 4.10 mmol/LCHLORIDE 
101.0 mmol/LCO2 25.0 mmol/LBUN 9.0 mg/dLCREATININE 0.80 mg/dLSGOT/AST 32.0 IU/
LSGPT/ALT 28.0 IU/LALK PHOS 95.0 IU/LTOTAL PROTEIN 7.30 g/dLALBUMIN 4.50 g/
dLTOTAL BILI 0.80 mg/dLCALCIUM 9.70 mg/dLAGE 70 GFR NonAA 71 GFR AA 86 eGFR >60 
mL/min/1.73meGFR AA* >60 TSH 1.10 uIU/mL

 

 2018 8:15 AM  TSH 1.03 TRIGLYCERIDES 73 CHOLESTEROL 198 HDL 84 TOT CHOL/
HDL 2.4 LDL (CALC) 99 WBC 4.6 RBC 4.03 HGB 13.1 HCT 38.9 MCV 97 MCH 32.5 MCHC 
33.7 RDW SD 45 RDW CV 12.5 MPV 9.8  NRBC# 0.00 NRBC% 0.0 %NEUT 60.3 %
LYMP 25.5 %MONO 9.6 %EOS 3.1 %BASO 1.3 #NEUT 2.77 #LYMP 1.17 #MONO 0.44 #EOS 
0.14 #BASO 0.06 MANUAL DIFF NOT IND GLUCOSE 108 SODIUM 136 POTASSIUM 3.8 
CHLORIDE 100 CO2 28 BUN 19 CREATININE 0.8 SGOT/AST 32 SGPT/ALT 25 ALK PHOS 107 
TOTAL PROTEIN 8.0 ALBUMIN 4.7 TOTAL BILI 0.4 CALCIUM 9.9 AGE 72 GFR NonAA 71 
GFR AA 86 eGFR 71 eGFR AA* >60 







History Of Immunizations







 Name  Date Admin  Mfg Name  Mfg Code  Trade Name  Lot#  Route  Inj  Vis Given  
Vis Pub  CVX

 

 Influenza  10/20/2010  [employer] pasteur  [facility]  FLUZONE  [id number]  Intramuscular  
Left Arm  10/20/2010  2010  999

 

 Influenza  10/28/2011  [employer] pasteur  [employer]  FLUZONE  [id number]  Intramuscular  
Left Arm  10/28/2011  2011  141

 

 Influenza  10/29/2012  [employer] pasteur  [employer]  FLUZONE  [id number]  Intramuscular  
Left Deltoid  10/29/2012  2012  141

 

 X  12/10/2012  [employer]  MSD  PNEUMOVAX 23  [id number]  Intramuscular  
Left Gluteous Medius  12/10/2012  2010  33

 

 Influenza  10/28/2013  [employer]of[employer] pasteur  [facility]  Fluzone > 3 Years  [id number]  
Intramuscular  Right Deltoid  10/28/2013  2013  141

 

 X  9/2/2015  Not Entered  NE  PREVNAR 13     Not Entered  Not Entered  1  109

 

 Influenza  2015  Not Entered  NE  Not Entered     Not Entered  Not Entered
  2015  141

 

 HepB Adult  2016  [employer]  MSD  RECOMBIVAX-ADULT  [id number]  Not 
Entered  Left Deltoid  2016  43

 

 HepB Adult  2016  [employer]  MSD  RECOMBIVAX-ADULT  [id number]  
Intramuscular  Right Deltoid  2016  43

 

 ZVL  2012  Not Entered  NE  Not Entered     Not Entered  Not Entered  1  121

 

 Tdap  2012  Not Entered  NE  Not Entered     Not Entered  Not Entered  1  115

 

 Influenza  10/13/2016  Not Entered  NE  FLUZONE-HIGH DOSE     Intramuscular  
Left Arm  1  141

 

 HepB Adult  2016  [employer]  MSD  RECOMBIVAX-ADULT  [id number]  
Intramuscular  Right Deltoid  2016  43

 

 Influenza  10/30/2017  Not Entered  NE  Fluarix, quadrivalent, preservative 
free     Intramuscular  Left Arm  2017  150

 

 RZV  2018  [employer]  SKB  SHINGRIX     Intramuscular  Left Arm  1  187







History of Past Illness







 Name  Date of Onset  Comments

 

 Benign Essential Hypertension  Dec 14 2009 10:10AM   

 

 Hyperlipidemia  Dec 14 2009 10:10AM   

 

 Hypothyroidism, Acquired  Dec 14 2009 10:10AM   

 

 hypothyroid      

 

 Hypertension      

 

 Hyperlipidemia      

 

 acid reflux      

 

 Hypertension, Benign Essential  2010  9:41AM   

 

 Hypertension, Benign Essential  2010 12:53PM   

 

 Routine gynecological examination  May  5 2010  9:28AM   

 

 Hypertension  May  5 2010  9:28AM   

 

 Hyperlipidemia, unspecified  May  5 2010  9:28AM   

 

 Hypothyroidism, Acquired  May  5 2010  9:28AM   

 

 Vulvovaginitis  May  5 2010  9:28AM   

 

 Rhinitis, Allergic  May  5 2010  9:28AM   

 

 Hypertension, Benign Essential  May 19 2010  8:48AM   

 

 Hypertension, Benign Essential  May 25 2010  9:39AM   

 

 Flu  Oct 20 2010 12:01PM   

 

 Essential Hypertension  2010  8:34AM   

 

 Hyperlipidemia  2010  8:34AM   

 

 Gastroesophageal Reflux  2010  8:34AM   

 

 Hypothyroidism, Acquired  2010  8:34AM   

 

 Hypertension, Benign Essential  2010  9:22AM   

 

 Hypertension, Benign Essential  Dec 15 2010  9:07AM   

 

 Essential Hypertension  2011  1:31PM   

 

 Hyperlipidemia  2011  1:31PM   

 

 Gastroesophageal Reflux  2011  1:31PM   

 

 Hypothyroidism, Acquired  2011  1:31PM   

 

 Essential Hypertension  2011  8:51AM   

 

 Hyperlipidemia  2011  8:51AM   

 

 Gastroesophageal Reflux  2011  8:51AM   

 

 Hypothyroidism, Acquired  2011  8:51AM   

 

 Essential Hypertension  2011  9:13AM   

 

 Hyperlipidemia  2011  9:13AM   

 

 Gastroesophageal Reflux  2011  9:13AM   

 

 Hypothyroidism, Acquired  2011  9:13AM   

 

 Nausea With Vomiting  2011  1:18PM   

 

 Hyponatremia  2011  8:46AM   

 

 Nausea  2011  9:13AM   

 

 Hypothyroidism  2011 11:10AM   

 

 Hyponatremia  2011 11:10AM   

 

 Essential Hypertension  2011 10:05AM   

 

 Hyperlipidemia  2011 10:05AM   

 

 Gastroesophageal Reflux  2011 10:05AM   

 

 Nausea  2011 10:05AM   

 

 Hypothyroidism, Acquired  2011 10:05AM   

 

 Hyponatremia  May  6 2011 11:34AM   

 

 Essential Hypertension  May 16 2011  8:50AM   

 

 Hyperlipidemia  May 16 2011  8:50AM   

 

 Gastroesophageal Reflux  May 16 2011  8:50AM   

 

 Nausea  May 16 2011  8:50AM   

 

 Hypothyroidism, Acquired  May 16 2011  8:50AM   

 

 Hyponatremia  May 16 2011  8:50AM   

 

 Routine gynecological examination  2011  8:54AM   

 

 Hypertension  2011  8:54AM   

 

 Hyperlipidemia, unspecified  2011  8:54AM   

 

 Hypothyroidism, Acquired  2011  8:54AM   

 

 Rhinitis, Allergic  2011  8:54AM   

 

 Hypothyroidism  Aug 29 2011 12:37PM   

 

 Hyponatremia  Aug 29 2011 12:37PM   

 

 Essential Hypertension  Sep 12 2011  8:53AM   

 

 Hyperlipidemia  Sep 12 2011  8:53AM   

 

 Gastroesophageal Reflux  Sep 12 2011  8:53AM   

 

 Hypothyroidism, Acquired  Sep 12 2011  8:53AM   

 

 Hyponatremia  Sep 12 2011  8:53AM   

 

 Hypertension  Sep 29 2011  9:41AM   

 

 Hyperlipidemia  Dec  8 2011  8:31AM   

 

 Hypothyroidism  Dec  8 2011  8:31AM   

 

 Hypertension  Dec  8 2011  8:31AM   

 

 Flu  Dec  9 2011 10:02AM   

 

 Essential Hypertension  Dec 13 2011 10:13AM   

 

 Hyperlipidemia  Dec 13 2011 10:13AM   

 

 Gastroesophageal Reflux  Dec 13 2011 10:13AM   

 

 Hypothyroidism, Acquired  Dec 13 2011 10:13AM   

 

 Hyponatremia  Dec 13 2011 10:13AM   

 

 Vaginal Discharge  2012  9:43AM   

 

 Rhinitis, Allergic  Feb 15 2012  9:31AM   

 

 Eustachian Tube Dysfunction  Feb 15 2012  9:31AM   

 

 Pharyngitis, Acute  Feb 15 2012  9:31AM   

 

 Routine gynecological examination  2012  8:48AM   

 

 Hypertension  2012  8:48AM   

 

 Hyperlipidemia, unspecified  2012  8:48AM   

 

 Hypothyroidism, Acquired  2012  8:48AM   

 

 Rhinitis, Allergic  2012  8:48AM   

 

 Candidiasis, Vulvovaginal  2012 11:04AM   

 

 Essential Hypertension  Aug 15 2012  9:02AM   

 

 Hyperlipidemia  Aug 15 2012  9:02AM   

 

 Gastroesophageal Reflux  Aug 15 2012  9:02AM   

 

 Hypothyroidism, Acquired  Aug 15 2012  9:02AM   

 

 Hyponatremia  Aug 15 2012  9:02AM   

 

 Atrophic Vaginitis, Postmenopausal  Aug 15 2012  9:02AM   

 

 Flu  Oct 29 2012  9:24AM   

 

 Essential Hypertension  Dec 10 2012  8:35AM   

 

 Hyperlipidemia  Dec 10 2012  8:35AM   

 

 Gastroesophageal Reflux  Dec 10 2012  8:35AM   

 

 Hypothyroidism, Acquired  Dec 10 2012  8:35AM   

 

 Hyponatremia  Dec 10 2012  8:35AM   

 

 Atrophic Vaginitis, Postmenopausal  Dec 10 2012  8:35AM   

 

 Pneumococcus  Dec 10 2012  2:07PM   

 

 Vaginal Discharge  Dec 20 2012  1:50PM   

 

 Essential Hypertension  Dec 20 2012  1:50PM   

 

 Hyperlipidemia  Dec 20 2012  1:50PM   

 

 Gastroesophageal Reflux  Dec 20 2012  1:50PM   

 

 Hypothyroidism, Acquired  Dec 20 2012  1:50PM   

 

 Atrophic Vaginitis, Postmenopausal  Dec 20 2012  1:50PM   

 

 Upper Respiratory Infections  2013  8:52AM   

 

 Hyperlipidemia  2013  8:21AM   

 

 Hypertension  2013  8:21AM   

 

 Hypothyroidism  2013  8:21AM   

 

 Screening Mammogram  Beto 10 2013  8:45AM   

 

 Routine gynecological examination  Beto 10 2013  8:34AM   

 

 Hypertension  Beto 10 2013  8:34AM   

 

 Hyperlipidemia, unspecified  Beto 10 2013  8:34AM   

 

 Hypothyroidism, Acquired  Beto 10 2013  8:34AM   

 

 Rhinitis, Allergic  Beto 10 2013  8:34AM   

 

 Flu  Oct 28 2013  8:52AM   

 

 Essential Hypertension  Dec  9 2013  8:37AM   

 

 Hyperlipidemia  Dec  9 2013  8:37AM   

 

 Gastroesophageal Reflux  Dec  9 2013  8:37AM   

 

 Hypothyroidism, Acquired  Dec  9 2013  8:37AM   

 

 Atrophic Vaginitis, Postmenopausal  Dec  9 2013  8:37AM   

 

 Hypertension  2014  9:56AM   

 

 Hyperlipidemia  2014  9:56AM   

 

 Hypothyroidism  2014  9:56AM   

 

 Screening Mammogram  2014  8:32AM   

 

 Osteopenia  2014  8:32AM   

 

 Hypertension  2014  8:35AM   

 

 Hypothyroidism, Acquired  2014  8:35AM   

 

 Hyperlipidemia  2014  8:35AM   

 

 Upper Respiratory Infections  2014  9:28AM   

 

 Sinusitis, Acute  Oct  2 2014  9:17AM   

 

 Herpes Zoster  Oct  5 2014  1:44PM   

 

 Vesicular Eruption  Oct  5 2014  1:44PM   

 

 Hypertension  2014  8:18AM   

 

 Hyperlipidemia  2014  8:18AM   

 

 hypothyroid  2014  8:18AM   

 

 Situational anxiety  Dec 20 2014  9:33AM   

 

 GERD (gastroesophageal reflux disease)  Dec 20 2014  9:33AM   

 

 Nausea  Dec 20 2014  9:33AM   

 

 Abdominal Pain, Epigastric  Dec 20 2014  9:33AM   

 

 Hyperlipidemia  Oct  6 2014  9:03AM   

 

 acid reflux  Oct  6 2014  9:03AM   

 

 hypothyroid  Oct  6 2014  9:03AM   

 

 Shingles  Oct  6 2014  9:03AM   

 

 Pharyngitis  2015  1:09PM   

 

 Bronchitis  2015  9:10AM   

 

 Hyperlipidemia  2015  9:10AM   

 

 acid reflux  2015  9:10AM   

 

 hypothyroid  2015  9:10AM   

 

 Hyperlipidemia  2015  8:18AM   

 

 Hypertension  2015  8:18AM   

 

 hypothyroid  2015  8:18AM   

 

 Screening Mammogram  2015 11:43AM   

 

 Medicare Annual Pap Q 2 years  2015  9:36AM   

 

 Screening Mammogram  2015  9:36AM   

 

 Candidiasis of female genitalia  2015  9:36AM   

 

 Hypertension  2015 11:34AM   

 

 Hyperlipidemia, unspecified  2015 11:34AM   

 

 Hypothyroidism, Acquired  2015 11:34AM   

 

 Osteopenia  2016  8:41AM   

 

 Encounter for screening mammogram for breast cancer  2016  8:41AM   

 

 Hypertension  2016  8:34AM   

 

 Lymphedema  2016  8:34AM   

 

 Hyperlipidemia  2016  8:34AM   

 

 hypothyroid  2016  8:34AM   

 

 HEP B  2016  9:13AM   

 

 HEP B  2016  8:52AM   

 

 Acid Reflux  2016  8:34AM   

 

 History of acute gastritis  Oct 13 2016  2:30PM   

 

 Anxiety as acute reaction to exceptional stress  Oct 13 2016  2:30PM   

 

 HEP B  Dec 29 2016  8:42AM   

 

 Caregiver stress syndrome  May 26 2017  8:28AM   

 

 Anxiety  May 26 2017  8:28AM   

 

 Blood in stool  2017  2:54PM   

 

 Upper GI bleed  2017 11:34AM   

 

 Hyponatremia  2017  9:03AM   

 

 Hyponatremia  2017  8:43AM   

 

 Medicare Annual Pap Q 2 years  2017  9:03AM   

 

 Nausea  2017  9:03AM   

 

 Uterine enlargement  2017  9:03AM   

 

 Encounter for screening mammogram for breast cancer  2017  4:56PM   

 

 Panic disorder [episodic paroxysmal anxiety]  Oct 10 2017  2:02PM   

 

 Acute stress reaction  Oct 10 2017  2:02PM   

 

 Caregiver stress syndrome  Oct 10 2017  2:02PM   

 

 Stress reaction causing mixed disturbance of emotion and conduct  Oct 18 2017  
9:05AM   

 

 Ankle strain, left, initial encounter  Dec 19 2017  8:29AM   

 

 Excoriation of ear canal, left, initial encounter  Dec 19 2017  8:29AM   

 

 Generalized anxiety disorder  Dec 19 2017  8:29AM   

 

 Grief reaction  Dec 19 2017  8:29AM   

 

 Acute non-recurrent frontal sinusitis  2018 11:58AM   

 

 Cough  2018 11:58AM   

 

 Hypertension  2018  8:10AM   

 

 Hyperlipidemia, unspecified  2018  8:10AM   

 

 Hypothyroidism, Acquired  2018  8:10AM   

 

 Situational anxiety  Mar  6 2018  8:30AM   

 

 Nausea  Mar  6 2018  8:30AM   

 

 LESLIE (generalized anxiety disorder)  May 22 2018  8:47AM   

 

 Caregiver stress syndrome  May 22 2018  8:47AM   

 

 Situational anxiety  May 29 2018  8:26AM   

 

 Osteopenia  May 29 2018  8:26AM   

 

 Visit for screening mammogram  May 29 2018  8:26AM   

 

 Anxiety Disorder  2018  9:09AM   

 

 Hypertension  Aug 20 2018  8:05AM   

 

 Hyperlipidemia, unspecified  Aug 20 2018  8:05AM   

 

 Hypothyroidism, Acquired  Aug 20 2018  8:05AM   

 

 Irritable Bowel Syndrome  Aug 31 2018  8:50AM   

 

 Anxiety Disorder  Aug 31 2018  8:50AM   

 

 acid reflux  Aug 31 2018  8:50AM   

 

 Hypertension  Aug 31 2018  8:50AM   







Payers







 Insurance Name  Company Name  Plan Name  Plan Number  Policy Number  Policy 
Group Number  Start Date

 

    Medicare RHC Medicare RHC     [id number]     N/A

 

    [facility]     [id number]     2009

 

    Medicare Part B  [employer] [place]     [id number]     N/A

 

    Medicare Part A  Medicare Part A     [id number]     N/A

 

    Medicare Part A  ZZZMedicare P A - Preventive     [id number]     N/A

 

    Medicare Part A  Medicare - Lab/Xray     [id number]     N/A

 

    Medicare RHC Medicare RHC     [id number]     N/A







History of Encounters







 Visit Date  Visit Type  Provider

 

 2018  Office visit  Marvin Boyer DO

 

 2018  Office visit  Marvin Boyer DO

 

 2018  Office visit  Marvin Boyer DO

 

 2018  Office visit  Doris Noriega MD

 

 3/6/2018  Office visit  Doris Noriega MD

 

 2018  Office visit  Deedee Carter APRN

 

 2017  Office visit  Doris Noriega MD

 

 10/18/2017  Office visit  Doris Noriega MD

 

 10/10/2017  Office visit  Doris Noriega MD

 

 2017  Office visit  Doris Noriega MD

 

 2017  Office visit  Doris Noriega MD

 

 2017  Office visit  Doris Noriega MD

 

 2017  Office visit  Prince Noe APRN

 

 2016  Nurse visit  Doris Noriega MD

 

 10/13/2016  Office visit  Doris Noriega MD

 

 2016  Nurse visit  Doris Noriega MD

 

 2016  Office visit  Doris Noriega MD

 

 2015  Office visit  Doris Noriega MD

 

 2015  Office visit  Doris Noriega MD

 

 2015  Office visit  Prince Noe APRN

 

 2014  Office visit  Anyi Herrera APRN

 

 10/27/2014  Voided  Doris Noriega MD

 

 10/6/2014  Office visit  Doris Noriega MD

 

 10/5/2014  Office visit  Anyi Herrera APRN

 

 10/2/2014  Office visit   

 

 10/2/2014  Office visit  Corrine Bay APRN

 

 2014  Office visit  Kassie Franklin APRN

 

 2014  Office visit  Doris Noriega MD

 

 2014  Office visit  Doris Noriega MD

 

 2013  Office visit  Dee Colindres MD

 

 10/28/2013  Nurse visit  Dee Colindres MD

 

 6/10/2013  Office visit  Dee Colindres MD

 

 2013  Office visit  Corrine Bay APRN

 

 2012  Office visit  Dee Colindres MD

 

 12/10/2012  Office visit  Dee Colindres MD

 

 10/29/2012  Nurse visit  Dee Colindres MD

 

 8/15/2012  Office visit  Dee Colindres MD

 

 2012  Office visit  Corrine Bay APRN

 

 2012  Office visit  Dee Colindres MD

 

 2/15/2012  Office visit  Dee Colindres MD

 

 2012  Office visit  Corrine Bay APRN

 

 2011  Office visit  Dee Colindres MD

 

 10/28/2011  Nurse visit  Shad Nolasco MD

 

 2011  Office visit  Dee Colindres MD

 

 2011  Office visit  Dee Colindres MD

 

 2011  Office visit  Dee Colindres MD

 

 2011  Office visit  Dee Colindres MD

 

 2011  Office visit  Dee Colindres MD

 

 2011  Office visit  Dee Colindres MD

 

 4/10/2011  [facility]  KHRIS Reeves MD

 

 4/10/2011  [facility]  RICKEY Toussaint MD

 

 2011  Office visit  RICKEY Toussaint MD

 

 2011  [facility]  Fide Castellanos MD

 

 2011  Voided  Fide Castellanos MD

 

 2011  Office visit  Dee Colindres MD

 

 12/15/2010  Nurse visit  Dee Colindres MD

 

 2010  Office visit  Dee Colindres MD

 

 10/20/2010  Nurse visit  Stephenie PERAZA

 

 2010  Nurse visit  Dee Colindres MD

 

 2010  Nurse visit  Dee Colindres MD

 

 2010  Office visit  Dee Colindres MD

 

 3/19/2010  Voided  Stephenie PERAZA

 

 2010  Nurse visit  Stephenie PERAZA

 

 2010  Nurse visit  Stephenie PERAZA

 

 2010  Nurse visit  Stephenie PERAZA

 

 2009  Office visit  Stephenie PERAZA

 

 10/20/2009  Nurse visit  Stephenie PERAZA

## 2018-10-22 NOTE — HISTORY & PHYSICIAL
History of Present Illness


History of Present Illness


Reason for visit/HPI


to undergo an upper endoscopy to investigate epigastric discomfort and nausea.


Date of Admission


10/22/18


Date Seen by a Provider:  Oct 22, 2018


Time Seen by a Provider:  09:02


I consulted on this patient on


10/22/18


 09:02


Attending Physician


Fang Johnson MD


Admitting Physician


No,Local Physician


Consult








Allergies and Home Medications


Allergies


Coded Allergies:  


     No Known Drug Allergies (Unverified , 10/18/18)





Home Medications


Amlodipine Besylate 5 Mg Tablet, 5 MG PO DAILY, (Reported)


Atenolol 50 Mg Tablet, 50 MG PO DAILY, (Reported)


Atorvastatin Calcium 20 Mg Tablet, 20 MG PO DAILY, (Reported)


Ca Carbonate/Vitamin D3/Vit K 1 Each Tab.chew, 2 EACH PO DAILY, (Reported)


Clopidogrel Bisulfate 75 Mg Tablet, 75 MG PO DAILY, (Reported)


Dicyclomine HCl 20 Mg Tablet, 20 MG PO TID, (Reported)


Escitalopram Oxalate 10 Mg Tablet, 10 MG PO DAILY, (Reported)


Gluc/Manuel-MSM#2/C/D3/Pilo/Born 1 Each Tablet, 2 EACH PO DAILY, (Reported)


Levothyroxine Sodium 50 Mcg Tablet, 50 MCG PO DAILY, (Reported)


Losartan Potassium 100 Mg Tablet, 100 MG PO DAILY, (Reported)


Mirtazapine 15 Mg Tablet, 15 MG PO HS, (Reported)


Multivitamin 1 Each Capsule, 1 EACH PO DAILY, (Reported)


Ondansetron 4 Mg Tab.rapdis, 4 MG PO DAILY, (Reported)


Pantoprazole Sodium 40 Mg Tablet.dr, 40 MG PO BID, (Reported)


Prochlorperazine Maleate 10 Mg Tablet, 10 MG PO Q8H PRN for NAUSEA/VOMITING-1ST 

LINE, (Reported)





Patient Home Medication List


Home Medication List Reviewed:  Yes





Past Medical-Social-Family Hx


Patient Social History


Marrital Status:  


Employed/Student:  employed


Alcohol Use:  Denies Use


Recreational Drug Use:  No


Smoking Status:  Never a Smoker


Recent Foreign Travel:  No


Contact w/other who traveled:  No


Recent Hopitalizations:  No





Immunizations Up To Date


Tetanus Booster (TDap):  Unknown


Date of Pneumonia Vaccine:  Oct 10, 2016


Date of Influenza Vaccine:  Oct 10, 2017





Seasonal Allergies


Seasonal Allergies:  No





Surgeries


Yes


Tubal Ligation





Respiratory


No


Currently Using CPAP:  No





Cardiovascular


Yes


Coronary Artery Disease, High Cholesterol, Hypertension





Neurological


No





Reproductive System


Hx Reproductive Disorders:  No


Sexually Transmitted Disease:  No


HIV/AIDS:  No


GYN History:  Tubal Ligation





Gastrointestinal


Yes


Gastroesophageal Reflux, Diverticulosis, Hiatal Hernia





Musculoskeletal


Yes


Arthritis





HEENT


Loss of Vision:  Bilateral


Hearing Impairment:  Denies





Psychosocial


History of Psychiatric Problem:  Yes


Behavioral Health Disorders:  Anxiety





Blood Transfusions


Adverse Reaction to a Blood Tr:  No





Review of Systems


Constitutional:  no symptoms reported


Respiratory:  no symptoms reported


Cardiovascular:  no symptoms reported


Gastrointestinal:  see HPI


Genitourinary:  no symptoms reported


Musculoskeletal:  no symptoms reported


Skin:  no symptoms reported


Psychiatric/Neurological:  No Symptoms Reported





Physical Exam


Vital Signs





Vital Signs - First Documented








 10/22/18





 08:25


 


Temp 98.2


 


Pulse 72


 


Resp 20


 


B/P (MAP) 143/90 (107)


 


Pulse Ox 98


 


O2 Delivery Room Air





Capillary Refill :


Height, Weight, BMI


Height: 5'1.25"


Weight: 139lbs. 0.0oz. 63.294270la; 26.1 BMI


Method:


General Appearance:  Anxious


Neck:  Normal Inspection


Respiratory:  Lungs Clear


Cardiovascular:  Regular Rate, Rhythm


Gastrointestinal:  Non Tender, Soft


Neurologic/Psychiatric:  Alert, Oriented x3


Skin:  Warm/Dry





Assessment/Plan


Assessment and Plan


lady with epigastric discomfort and nausea.  For upper endoscopy





Admission Diagnosis


Admission Status:  Other (Outpt Proc)











FANG JOHNSON MD Oct 22, 2018 09:04

## 2018-10-22 NOTE — XMS REPORT
MU2 Ambulatory Summary

 Created on: 2018



Milady Crespo

External Reference #: [id number]

: 1945

Sex: Female



Demographics







 Address  [address]

[place]  [address]

 

 Home Phone  [phone]

 

 Preferred Language  English

 

 Marital Status  

 

 Restoration Affiliation  Unknown

 

 Race  White

 

 Ethnic Group  Not  or 





Author







 Author  Marvin Boyer

 

 [facility]

 

 Address  [address]

[place], [place]  [id number]



 

 Phone  [phone]







Care Team Providers







 Care Team Member Name  Role  Phone

 

 Marvin Boyer  PCP  [phone]

 

 BerthaDoris phan  PreferredProvider  [phone]







Allergies and Adverse Reactions







 Name  Reaction  Notes

 

 NO KNOWN DRUG ALLERGIES      







Plan of Treatment







 Planned Activity  Comments  Planned Date  Planned Time  Plan/Goal

 

 Complete blood count (CBC) with differential count     2016  12:00 AM   

 

 Comprehensive metabolic panel     2016  12:00 AM   

 

 VITAMIN D (25 HYDROXY)     2016  12:00 AM   

 

 Lipid panel (total cholesterol, lipoproteins, HDL, triglycerides)     8/15/
2012  12:00 AM   

 

 Pap smear     2017  12:00 AM   

 

 Comprehensive Metabolic Panel     6/10/2013  12:00 AM   

 

 Lipid panel (total cholesterol, lipoproteins, HDL, triglycerides)     6/10/
2013  12:00 AM   

 

 Thyroid stimulating hormone (TSH)     6/10/2013  12:00 AM   

 

 CBC (automated H&H, platelets, WBC and automated differential)     6/10/2013  
12:00 AM   

 

 Comprehensive Metabolic Panel     2012  12:00 AM   

 

 Lipid panel (total cholesterol, lipoproteins, HDL, triglycerides)     2012  12:00 AM   

 

 Thyroid stimulating hormone (TSH)     2012  12:00 AM   

 

 CBC (automated H&H, platelets, WBC and automated differential)     2012  
12:00 AM   

 

 CBC with Auto     2018  12:00 AM   

 

 CMP (comprehensive metabolic panel)     2018  12:00 AM   

 

 Lipid profile     2018  12:00 AM   

 

 Thyroid stimulating hormone (TSH)     2018  12:00 AM   

 

 Screening mammography, bilateral     2015  12:00 AM   







Medications







 Active 

 

 Name  Start Date  Estimated Completion Date  SIG  Comments

 

 aspirin 81 mg oral tablet,delayed release (DR/EC)        take 1 tablet (81 mg) 
by oral route once daily   

 

 Vitamin D3 1,000 unit oral capsule        take 1 capsule by oral route daily   

 

 valsartan 160 mg oral tablet  2016     TAKE ONE TABLET BY MOUTH ONCE 
DAILY   

 

 amlodipine 5 mg oral tablet  2016     TAKE ONE TABLET BY MOUTH ONCE DAILY
   

 

 amlodipine 5 mg oral tablet  2016     TAKE ONE TABLET BY MOUTH ONCE DAILY
   

 

 valsartan 160 mg oral tablet  2016     TAKE ONE TABLET BY MOUTH ONCE 
DAILY   

 

 Zofran (as hydrochloride) 4 mg oral tablet  2016     take 1 tablet by 
oral route daily as needed for 14 days   

 

 Zofran (as hydrochloride) 4 mg oral tablet  2017     take 1 tablet by oral 
route daily as needed for 14 days   

 

 Zofran (as hydrochloride) 4 mg oral tablet  2017     take 1 tablet by oral 
route daily as needed for 14 days   

 

 Zofran (as hydrochloride) 4 mg oral tablet  2017     take 1 tablet by 
oral route daily as needed for 14 days   

 

 Zofran (as hydrochloride) 4 mg oral tablet  2017     take 1 tablet by 
oral route daily as needed for 14 days   

 

 EQ LORATADINE 10MG  TABS  2017     TAKE ONE TABLET BY MOUTH ONCE DAILY   

 

 pantoprazole 40 mg oral tablet,delayed release (DR/EC)  10/9/2017     take 1 
tablet (40 mg) by oral route once daily for 90 days   

 

 amlodipine 5 mg oral tablet  2018     TAKE ONE TABLET BY MOUTH ONCE DAILY
   

 

 atenolol 50 mg oral tablet  2018     TAKE ONE TABLET BY MOUTH ONCE DAILY 
  

 

 mirtazapine 15 mg oral tablet  2018     TAKE ONE-HALF TABLET BY MOUTH 
ONCE DAILY AT BEDTIME   

 

 ondansetron 4 mg oral tablet,disintegrating  7/3/2018     dissolve  1 tablet 
by oral route every 8 hours as needed   

 

 losartan 100 mg oral tablet  8/3/2018  2019  take 1 tablet (100 mg) by 
oral route once daily for 90 days   









  

 

 Name  Start Date  Expiration Date  SIG  Comments

 

 prednisone 20 mg oral tablet  2011  take 2 tablets by oral 
route daily for 5 days   

 

 calcium carbonate-vitamin D2 600-125 mg-unit oral tablet  8/15/2012  2012
  take 2 tablets by oral route daily   

 

 [place] 3 Fish Oil 900-1,400 mg oral capsule,delayed release(DR/EC)  8/15/2012  9
/  take 1 capsule by oral route daily   

 

 multivitamin Oral tablet  8/15/2012  2012  take 1 tablet by oral route 
daily   

 

 triamcinolone acetonide 0.1 % topical cream  2013  10/7/2013  APPLY A 
THIN LAYER TO THE AFFECTED AREA(S) BY TOPICAL ROUTE TWICE A DAY   

 

 famotidine 20 mg oral tablet  2014  take 1 tablet (20 mg) by 
oral route 2 times per day   

 

 amoxicillin 500 mg oral capsule  2014  take 1 capsule (500 mg) 
by oral route 3 times per day for 10 days   

 

 Zithromax Z-Tab 250 mg oral tablet  10/2/2014  10/7/2014  take 2 tablets (500 
mg) by oral route once daily for 1 day then 1 tablet (250 mg) by oral route 
once daily for 4 days   

 

 acyclovir 800 mg oral tablet  10/5/2014  10/12/2014  take 1 tablet (800 mg) by 
oral route 5 times per day for 7 days   

 

 gabapentin 300 mg oral capsule  10/9/2014  2014  take 1 capsule (300 mg) 
by oral route 3 times per day for 14 days   

 

 Carafate 100 mg/mL oral suspension  2014  take 10 milliliters 
(1 gram) by oral route 4 times per day on an empty stomach 1 hour before meals 
and at bedtime for 4 weeks   

 

 amlodipine 5 mg oral tablet  2015  TAKE ONE TABLET BY MOUTH 
EVERY DAY   

 

 levothyroxine 50 mcg oral tablet  2015  TAKE ONE TABLET BY MOUTH 
EVERY DAY   

 

 Diovan 160 mg oral tablet  2015  2/15/2016  TAKE ONE TABLET BY MOUTH 
EVERY DAY for 90 days   

 

 triamcinolone acetonide 0.1 % topical cream  2015  apply a thin 
layer to the affected area(s) by topical route 2 times per day for 14 days   

 

 fluconazole 150 mg oral tablet  2015  take 1 tablet (150 mg) 
by oral route once for 1 day   

 

 famotidine 20 mg oral tablet  2015     TAKE ONE TABLET BY MOUTH TWICE 
DAILY   

 

 Lipitor 20 mg oral tablet  10/13/2016  2018  take 1 tablet (20 mg) by oral 
route once daily   

 

 Plavix 75 mg oral tablet  10/13/2016  2018  take 1 tablet (75 mg) by oral 
route once daily   

 

 Zofran (as hydrochloride) 4 mg oral tablet  10/13/2016  10/27/2016  take 1 
tablet by oral route daily as needed for 14 days   

 

 Zofran (as hydrochloride) 4 mg oral tablet  2017     take 1 tablet by 
oral route daily as needed for 14 days   

 

 levothyroxine 50 mcg oral tablet  10/9/2017  10/9/2017  TAKE ONE TABLET BY 
MOUTH ONCE DAILY   

 

 ondansetron 4 mg oral tablet,disintegrating  10/10/2017  2017  dissolve  
1 tablet by oral route every 8 hours as needed for 30 days   

 

 EQ LORATADINE 10MG  TABS  2018  TAKE ONE TABLET BY MOUTH ONCE 
DAILY IN THE EVENING   

 

 mirtazapine 15 mg oral tablet  2018  TAKE ONE-HALF TABLET BY 
MOUTH ONCE DAILY AT BEDTIME   

 

 clorazepate dipotassium 7.5 mg oral tablet  2018  take 1 
tablet PO  three times per day for situational anxiety   









 Discontinued 

 

 Name  Start Date  Discontinued Date  SIG  Comments

 

 Lipitor 40 mg oral tablet     2009  1/2 TAB DAILY   

 

 ramipril 5 mg oral capsule     2009  take 1 capsule (5 mg) by oral route 
once daily   

 

 lovastatin 20 mg oral tablet  2009  take 1 tablet (20 mg) by 
oral route once daily with evening meal   

 

 propranolol 20 mg oral tablet  2010  take 1 tablet by oral 
route 2 times a day   

 

 Fosamax 70 mg oral tablet  2010  take 1 tablet (70 mg) by oral 
route once weekly in the morning, at least 30 minutes before the first food, 
beverage, or medication of the day   

 

 lisinopril 40 mg oral tablet  2010  4/10/2011  take 2 tablets by oral 
route daily   

 

 Zoloft 50 mg oral tablet  2011  take 1 tablet (50 mg) by oral 
route once daily   

 

 promethazine 25 mg oral tablet  2011  take 1 tablet by oral 
route every 6 hours as needed   

 

 Diovan -12.5 mg oral tablet  2011  take 1 tablet by oral 
route once daily for 30 days   

 

 Diflucan 150 mg oral tablet     2012  take 1 tablet (150 mg) by oral 
route once for 1 day   

 

 Diflucan 150 mg oral tablet  2012  8/15/2012  take 1 tablet (150 mg) by 
oral route once   

 

 Vitamin B-12 1,000 mcg oral tablet  8/15/2012  2014  take 1 tablet by 
oral route daily   

 

 Premarin 0.625 mg/gram vaginal cream  10/29/2012  6/10/2013  use 1 gram daily 
for a week then 1 gram twice a week   

 

 Medrol (Tab) 4 mg oral tablets,dose pack  2013  6/10/2013  take as 
directed   

 

 aspirin 325 mg oral tablet  2014  take 1 tablet (325 mg) by 
oral route once daily   

 

 Medrol (Tab) 4 mg oral tablets,dose pack  2014  10/2/2014  take as 
directed   

 

 Tetracaine Lollipops Lollipop  2015  Use as directed   

 

 Allergy Relief (loratadine) 10 mg oral tablet  2016     TAKE ONE TABLET 
BY MOUTH ONCE DAILY   

 

 Allergy Relief (loratadine) 10 mg oral tablet  2017  TAKE ONE 
TABLET BY MOUTH ONCE DAILY   

 

 Zoloft 50 mg oral tablet  2016  take 1 tablet (50 mg) by oral 
route once daily for 90 days  Pt refuses to take...

 

 triamcinolone acetonide 0.1 % topical cream  2017     APPLY A THIN LAYER 
OF CREAM EXTERNALLY TO AFFECTED AREA TWICE DAILY FOR 14 DAYS   

 

 loratadine 10 mg oral tablet  2017  TAKE 1 TABLET BY MOUTH 
EVERY DAY IN THE EVENING   

 

 triamcinolone acetonide 0.1 % topical cream  2017  APPLY  
CREAM EXTERNALLY TO AFFECTED AREA TWICE DAILY FOR 14 DAYS   

 

 Lexapro 10 mg oral tablet  2017  take 1 tablet (10 mg) by oral 
route once daily for 90 days   

 

 valsartan 160 mg oral tablet  2017  8/3/2018  TAKE ONE TABLET BY MOUTH 
ONCE DAILY   recall

 

 Augmentin 875-125 mg oral tablet  2018  3/6/2018  take 1 tablet by oral 
route every 12 hours for 7 days   

 

 benzonatate 100 mg oral capsule  2018  3/6/2018  take 1 capsule (100 mg) 
by oral route 3 times per day as needed for cough   







Problem List







 Description  Status  Onset

 

 Hyperlipidemia  Active   

 

 acid reflux  Active   

 

 Hypertension  Active   

 

 hypothyroid  Active   







Vital Signs







 Date  Time  BP-Sys(mm[Hg]  BP-Morena(mm[Hg])  HR(bpm)  RR(rpm)  Temp  WT  HT  HC  
BMI  BSA  BMI Percentile  O2 Sat(%)

 

 2018  9:04:00 AM  138 mmHg  80 mmHg  64 bpm  18 rpm  96 F  140.125 lbs  
61 in     26.4761 kg/m  1.654 m     99 %

 

 2018  8:24:00 AM  142 mmHg  70 mmHg  69 bpm  18 rpm  98.2 F  139 lbs  61 
in     26.26 kg/m2  1.65 m2     98 %

 

 2018  8:39:00 AM  116 mmHg  68 mmHg  68 bpm  18 rpm  97.9 F  142 lbs  61 
in     26.8304 kg/m  1.665 m      

 

 3/6/2018  8:26:00 AM  126 mmHg  76 mmHg  63 bpm  16 rpm  98 F  134.125 lbs  61 
in     25.34 kg/m2  1.62 m2     100 %

 

 2018  11:55:00 AM  126 mmHg  80 mmHg  80 bpm  18 rpm  98 F  132 lbs  61 
in     24.9409 kg/m  1.6053 m     100 %

 

 2017  8:26:00 AM  122 mmHg  76 mmHg  63 bpm  16 rpm  98.4 F  129.25 lbs  
61 in     24.42 kg/m2  1.59 m2     99 %

 

 10/18/2017  9:01:00 AM  126 mmHg  80 mmHg  73 bpm  16 rpm  97.9 F  122.375 lbs
  61 in     23.12 kg/m2  1.55 m2     100 %

 

 10/10/2017  1:52:00 PM  118 mmHg  72 mmHg  74 bpm  16 rpm  98.1 F  121 lbs  61 
in     22.8625 kg/m  1.5369 m     99 %

 

 2017  8:40:00 AM  118 mmHg  68 mmHg  63 bpm  16 rpm  98.1 F  123.125 lbs  
61 in     23.26 kg/m2  1.55 m2     100 %

 

 2017  8:57:00 AM  116 mmHg  62 mmHg  71 bpm  16 rpm  99.8 F  121.5 lbs  
61 in     22.957 kg/m  1.5401 m     98 %

 

 2017  11:30:00 AM  128 mmHg  78 mmHg  69 bpm  16 rpm  98.8 F  129.375 lbs  
61 in     24.44 kg/m2  1.59 m2     98 %

 

 2017  8:22:00 AM  118 mmHg  78 mmHg  62 bpm  18 rpm  97.5 F  129.125 lbs  
61 in     24.3977 kg/m  1.5877 m     100 %

 

 10/13/2016  2:26:00 PM  128 mmHg  78 mmHg  77 bpm  16 rpm  98.4 F  139.25 lbs  
61 in     26.31 kg/m2  1.65 m2     100 %

 

 2016  8:29:00 AM  122 mmHg  70 mmHg  72 bpm  16 rpm  97.8 F  137.125 lbs  
61 in     25.9093 kg/m  1.6361 m     100 %

 

 2015  9:33:00 AM  120 mmHg  68 mmHg  73 bpm     98.7 F  144.375 lbs  61 
in     27.28 kg/m2  1.68 m2     99 %

 

 2015  9:05:00 AM  110 mmHg  75 mmHg  85 bpm  16 rpm  96.7 F  144.375 lbs  
61 in     27.2791 kg/m  1.6788 m     99 %

 

 2015  1:05:00 PM  108 mmHg  62 mmHg  78 bpm  18 rpm  96.9 F  142.375 lbs  
61 in     26.90 kg/m2  1.67 m2     100 %

 

 2014  9:31:00 AM  126 mmHg  66 mmHg  79 bpm  18 rpm  98.7 F  149 lbs  61 
in     28.153 kg/m  1.7055 m     98 %

 

 10/6/2014  9:02:00 AM  110 mmHg  74 mmHg  94 bpm  18 rpm  98.1 F  145.4 lbs  
61 in     27.47 kg/m2  1.68 m2     97 %

 

 10/2/2014  9:14:00 AM  124 mmHg  74 mmHg  79 bpm  18 rpm  98 F  147.25 lbs  61 
in     27.8224 kg/m  1.6955 m     98 %

 

 2014  9:22:00 AM  124 mmHg  76 mmHg  89 bpm  18 rpm  98.1 F  148 lbs  61 
in     27.96 kg/m2  1.70 m2     100 %

 

 2014  8:27:00 AM  118 mmHg  65 mmHg  74 bpm  16 rpm  97.7 F  148 lbs  61 
in     27.9641 kg/m  1.6998 m     99 %

 

 2014  9:48:00 AM  125 mmHg  70 mmHg  93 bpm  18 rpm  96.7 F  156 lbs  61 
in     29.48 kg/m2  1.75 m2     100 %

 

 2013  8:35:00 AM  122 mmHg  74 mmHg  84 bpm  16 rpm  97.9 F  155.375 lbs 
                99 %

 

 6/10/2013  8:30:00 AM  130 mmHg  82 mmHg  79 bpm  16 rpm  97.9 F  161 lbs     
            98 %

 

 2013  8:50:00 AM  124 mmHg  68 mmHg  66 bpm  18 rpm  97.6 F  157.5 lbs  
61 in     29.7591 kg/m  1.7535 m      

 

 2012  1:46:00 PM  120 mmHg  72 mmHg  75 bpm  16 rpm  97.6 F  156.125 lbs
                 98 %

 

 12/10/2012  8:32:00 AM  122 mmHg  82 mmHg  70 bpm  16 rpm  97.3 F  157 lbs    
             99 %

 

 8/15/2012  9:00:00 AM  130 mmHg  76 mmHg  86 bpm  16 rpm  97.4 F  159 lbs     
            100 %

 

 2012  11:02:00 AM  128 mmHg  64 mmHg  66 bpm  18 rpm  97.4 F  162.125 lbs
  61 in     30.6329 kg/m  1.7791 m      

 

 2012  8:45:00 AM  130 mmHg  70 mmHg  82 bpm  16 rpm  98.2 F  160.125 lbs 
                100 %

 

 2/15/2012  9:29:00 AM  122 mmHg  80 mmHg  86 bpm  16 rpm  97.4 F  159.375 lbs  
61 in     30.1133 kg/m  1.7639 m     99 %

 

 2012  9:41:00 AM  132 mmHg  74 mmHg  66 bpm  18 rpm  98.4 F  160.375 lbs  
61 in     30.30 kg/m2  1.77 m2      

 

 2011  10:08:00 AM  134 mmHg  82 mmHg  80 bpm  16 rpm  98 F  160 lbs  61 
in     30.2314 kg/m  1.7674 m     99 %

 

 2011  3:56:00 PM  130 mmHg  80 mmHg                              

 

 2011  8:55:00 AM  130 mmHg  74 mmHg  88 bpm  16 rpm  98.2 F  158.25 lbs  
               98 %

 

 2011  8:54:00 AM  136 mmHg  82 mmHg  102 bpm  16 rpm  98.1 F  153.5 lbs   
              99 %

 

 2011  8:49:00 AM  122 mmHg  88 mmHg  88 bpm  16 rpm  98.6 F  152.25 lbs  
               99 %

 

 2011  10:02:00 AM  140 mmHg  82 mmHg  96 bpm  20 rpm  98.8 F             
       100 %

 

 2011  9:14:00 AM  156 mmHg  98 mmHg  110 bpm  24 rpm  98.5 F  153 lbs    
             100 %

 

 2011  8:50:00 AM  160 mmHg  84 mmHg  100 bpm  16 rpm  98.7 F  155 lbs    
             100 %

 

 2011  1:25:00 PM  152 mmHg  100 mmHg  82 bpm  16 rpm  97.2 F  156.375 lbs 
                100 %

 

 2011  9:55:00 AM  144 mmHg  90 mmHg                              

 

 2010  9:24:00 AM  138 mmHg  84 mmHg                              

 

 2010  8:58:00 AM  160 mmHg  94 mmHg                              

 

 2010  8:33:00 AM  142 mmHg  90 mmHg  100 bpm  16 rpm  98.1 F  158.375 
lbs                  

 

 2010  9:24:00 AM  160 mmHg  102 mmHg  88 bpm  18 rpm  99 F  157.125 lbs  
62 in     28.7382 kg/m  1.7657 m      

 

 3/19/2010  11:01:00 AM  140 mmHg  90 mmHg                              

 

 2009  10:08:00 AM  142 mmHg  86 mmHg  72 bpm  16 rpm  98.1 F  154.125 
lbs  61 in     29.1214 kg/m  1.7346 m      







Social History







 Name  Description  Comments

 

       

 

 Children      

 

 lives alone      

 

 Supervisor      

 

 Tobacco  Never smoker   







History of Procedures







 Date Ordered  Description  Order Status

 

 2011 12:00 AM  COMPREHEN METABOLIC PANEL  Reviewed

 

 2011 12:00 AM  ASSAY THYROID STIM HORMONE  Reviewed

 

 2011 12:00 AM  COMPREHEN METABOLIC PANEL  Reviewed

 

 2011 12:00 AM  LIPID PANEL  Reviewed

 

 2011 12:00 AM  ASSAY THYROID STIM HORMONE  Reviewed

 

 2011 12:00 AM  COMPLETE CBC W/AUTO DIFF WBC  Reviewed

 

 2015 12:00 AM  COMPLETE CBC W/AUTO DIFF WBC  Reviewed

 

 2015 12:00 AM  COMPREHEN METABOLIC PANEL  Reviewed

 

 2015 12:00 AM  LIPID PANEL  Reviewed

 

 2015 12:00 AM  ASSAY THYROID STIM HORMONE  Reviewed

 

 2011 12:00 AM  COMPREHEN METABOLIC PANEL  Reviewed

 

 2011 12:00 AM  COMPREHEN METABOLIC PANEL  Reviewed

 

 2011 12:00 AM  LIPID PANEL  Reviewed

 

 2011 12:00 AM  ASSAY THYROID STIM HORMONE  Reviewed

 

 10/28/2011 12:00 AM  ***Immunization administration, Medicare flu  Reviewed

 

 10/28/2011 12:00 AM  Fluzone ** MEDICARE Only **  Reviewed

 

 2010 11:24 AM  NO CHARGE OV  Reviewed

 

 2010 11:26 AM  NO CHARGE OV  Reviewed

 

 2011 12:00 AM  COMPREHEN METABOLIC PANEL  Reviewed

 

 2011 12:00 AM  LIPID PANEL  Reviewed

 

 2011 12:00 AM  ASSAY THYROID STIM HORMONE  Reviewed

 

 2011 12:00 AM  COMPLETE CBC W/AUTO DIFF WBC  Reviewed

 

 2011 12:00 AM  Wrist Support  Reviewed

 

 2016 12:00 AM  Screening mammography, bilateral  Reviewed

 

 2016 12:00 AM  DXA BONE DENSITY AXIAL  Returned

 

 2016 12:00 AM  TETANUS VACCINE IM  Reviewed

 

 2016 12:00 AM  HEP B VACC ADULT 3 DOSE IM  Reviewed

 

 2012 12:00 AM  TISSUE EXAM FOR FUNGI  Reviewed

 

 2012 12:00 AM  SMEAR WET MOUNT SALINE/INK  Reviewed

 

 2016 12:00 AM  TETANUS VACCINE IM  Reviewed

 

 2016 12:00 AM  HEP B VACC ADULT 3 DOSE IM  Reviewed

 

 2/15/2012 12:00 AM  THER/PROPH/DIAG INJ SC/IM  Reviewed

 

 2/15/2012 12:00 AM  Bicillin CR [facility]#69370777360[facility]  Reviewed

 

 2/15/2012 12:00 AM  Depo-Medrol 40 mg NDC#8951194483  Reviewed

 

 10/13/2016 12:00 AM  COMPLETE CBC W/AUTO DIFF WBC  Returned

 

 10/13/2016 12:00 AM  COMPREHEN METABOLIC PANEL  Returned

 

 10/13/2016 12:00 AM  ASSAY THYROID STIM HORMONE  Returned

 

 10/13/2016 12:00 AM  LIPID PANEL  Returned

 

 2016 12:00 AM  TETANUS VACCINE IM  Reviewed

 

 2016 12:00 AM  HEP B VACC ADULT 3 DOSE IM  Reviewed

 

 2017 12:00 AM  ASSAY OF IRON  Returned

 

 2017 12:00 AM  IRON BINDING TEST  Returned

 

 2017 12:00 AM  ASSAY OF TRANSFERRIN  Returned

 

 2017 12:00 AM  OCCULT BLD FECES 1-3 TESTS  Returned

 

 2017 12:00 AM  COMPLETE CBC AUTOMATED  Returned

 

 8/15/2012 12:00 AM  COMPREHEN METABOLIC PANEL  Reviewed

 

 8/15/2012 12:00 AM  ASSAY THYROID STIM HORMONE  Reviewed

 

 8/15/2012 12:00 AM  COMPLETE CBC W/AUTO DIFF WBC  Reviewed

 

 8/15/2012 12:00 AM  Wrist Support  Reviewed

 

 2017 12:00 AM  METABOLIC PANEL TOTAL CA  Returned

 

 2017 12:00 AM  METABOLIC PANEL TOTAL CA  Returned

 

 2017 12:00 AM  Screening mammography, bilateral  Returned

 

 10/29/2012 12:00 AM  Flu Injection 3 Years And Above NDC# [address]-03  [facility]  
Reviewed

 

 12/10/2012 12:00 AM  IMMUNIZATION ADMIN  Reviewed

 

 12/10/2012 12:00 AM  Pneumovax Injection - [facility]  Reviewed

 

 2018 12:00 AM  THER/PROPH/DIAG INJ SC/IM  Reviewed

 

 2018 12:00 AM  Decadron 4mg Injection  Reviewed

 

 2018 12:00 AM  Depo-Medrol 40mg Injection  Reviewed

 

 2012 12:00 AM  TRICHOMONAS ASSAY W/OPTIC  Reviewed

 

 2018 12:00 AM  COMPLETE CBC W/AUTO DIFF WBC  Returned

 

 2018 12:00 AM  COMPREHEN METABOLIC PANEL  Returned

 

 2018 12:00 AM  LIPID PANEL  Returned

 

 2018 12:00 AM  ASSAY THYROID STIM HORMONE  Returned

 

 2013 12:00 AM  THER/PROPH/DIAG INJ SC/IM  Reviewed

 

 2013 12:00 AM  Bicillin CR, 1.2 million units NDC# 14699-995-33  Reviewed

 

 2018 12:00 AM  Mammogram, screening, bilateral  Reviewed

 

 2018 12:00 AM  DXA BONE DENSITY AXIAL  Reviewed

 

 2013 12:00 AM  COMPREHEN METABOLIC PANEL  Reviewed

 

 2013 12:00 AM  LIPID PANEL  Reviewed

 

 2013 12:00 AM  COMPLETE CBC W/AUTO DIFF WBC  Reviewed

 

 2013 12:00 AM  ASSAY THYROID STIM HORMONE  Reviewed

 

 6/10/2013 12:00 AM  MAMMOGRAM SCREENING  Reviewed

 

 6/10/2013 12:00 AM  CYTOPATH C/V MANUAL  Reviewed

 

 12/10/2013 12:00 AM  LIPID PANEL  Reviewed

 

 12/10/2013 12:00 AM  ASSAY THYROID STIM HORMONE  Reviewed

 

 12/10/2013 12:00 AM  COMPLETE CBC W/AUTO DIFF WBC  Reviewed

 

 12/10/2013 12:00 AM  COMPREHEN METABOLIC PANEL  Reviewed

 

 2010 12:00 AM  CYTOPATH C/V MANUAL  Reviewed

 

 2010 12:00 AM  SMEAR WET MOUNT SALINE/INK  Reviewed

 

 2010 12:00 AM  LIPID PANEL  Reviewed

 

 2010 12:00 AM  ASSAY THYROID STIM HORMONE  Reviewed

 

 2010 12:00 AM  COMPLETE CBC W/AUTO DIFF WBC  Reviewed

 

 2010 12:00 AM  COMPREHEN METABOLIC PANEL  Reviewed

 

 10/28/2013 12:00 AM  Flu Injection 3 Years And Above NDC# [address]-98  [facility]  
Reviewed

 

 2010 8:48 AM  Blood Pressure Check-no charge  Reviewed

 

 2010 12:00 AM  Blood Pressure Check-no charge  Reviewed

 

 2014 12:00 AM  METABOLIC PANEL TOTAL CA  Reviewed

 

 2014 12:00 AM  ASSAY THYROID STIM HORMONE  Reviewed

 

 2014 12:00 AM  ASSAY OF FREE THYROXINE  Reviewed

 

 2014 12:00 AM  MAMMOGRAM SCREENING  Reviewed

 

 2014 12:00 AM  CT BONE DENSITY AXIAL  Reviewed

 

 2014 12:00 AM  COMPLETE CBC W/AUTO DIFF WBC  Reviewed

 

 2014 12:00 AM  COMPREHEN METABOLIC PANEL  Reviewed

 

 2014 12:00 AM  LIPID PANEL  Reviewed

 

 2014 12:00 AM  ASSAY THYROID STIM HORMONE  Reviewed

 

 10/20/2010 12:00 AM  IMMUNIZATION ADMIN  Reviewed

 

 10/20/2010 12:00 AM  FLU VACCINE 3 YRS & > IM  Reviewed

 

 2010 12:00 AM  COMPREHEN METABOLIC PANEL  Reviewed

 

 2010 12:00 AM  LIPID PANEL  Reviewed

 

 2010 12:00 AM  ASSAY THYROID STIM HORMONE  Reviewed

 

 2010 12:00 AM  COMPLETE CBC W/AUTO DIFF WBC  Reviewed

 

 2010 12:00 AM  Blood Pressure Check-no charge  Reviewed

 

 10/2/2014 12:00 AM  THER/PROPH/DIAG INJ SC/IM  Reviewed

 

 10/2/2014 12:00 AM  Kenalog, Per 10 Mg NDC#6535-4603-99  Reviewed

 

 2014 12:00 AM  COMPLETE CBC W/AUTO DIFF WBC  Reviewed

 

 2014 12:00 AM  COMPREHEN METABOLIC PANEL  Reviewed

 

 2014 12:00 AM  LIPID PANEL  Reviewed

 

 2014 12:00 AM  ASSAY THYROID STIM HORMONE  Reviewed

 

 2015 12:00 AM  Rocephin 1 gram NDC#9900-8272-68  Reviewed

 

 2015 12:00 AM  THER/PROPH/DIAG INJ SC/IM  Reviewed

 

 2011 12:00 AM  COMPREHEN METABOLIC PANEL  Reviewed

 

 2011 12:00 AM  LIPID PANEL  Reviewed

 

 2011 12:00 AM  ASSAY THYROID STIM HORMONE  Reviewed

 

 2011 12:00 AM  COMPLETE CBC W/AUTO DIFF WBC  Reviewed

 

 2011 12:00 AM  METABOLIC PANEL TOTAL CA  Reviewed

 

 2011 12:00 AM  COMPREHEN METABOLIC PANEL  Reviewed

 

 2011 12:00 AM  ASSAY THYROID STIM HORMONE  Reviewed

 

 2011 12:00 AM  COMPLETE CBC W/AUTO DIFF WBC  Reviewed

 

 2011 12:00 AM  ASSAY OF LIPASE  Reviewed

 

 2011 12:00 AM  THER/PROPH/DIAG INJ SC/IM  Reviewed

 

 2011 12:00 AM  Phenergan 50 Mg Im  Bernadine  Reviewed

 

 2011 12:00 AM  METABOLIC PANEL TOTAL CA  Reviewed

 

 2011 12:00 AM  THER/PROPH/DIAG INJ SC/IM  Reviewed

 

 2011 12:00 AM  Phenergan 25 Mg Im  Bernadine  Reviewed

 

 2011 12:00 AM  METABOLIC PANEL TOTAL CA  Reviewed

 

 2011 12:00 AM  ASSAY THYROID STIM HORMONE  Reviewed

 

 2011 12:00 AM  THER/PROPH/DIAG INJ SC/IM  Reviewed

 

 2011 12:00 AM  Phenergan 50 Mg Im  Bernadine  Reviewed

 

 2011 12:00 AM  ASSAY OF SERUM SODIUM  Reviewed

 

 2011 12:00 AM  ASSAY OF SERUM SODIUM  Reviewed

 

 2011 12:00 AM  CYTOPATH C/V MANUAL  Reviewed

 

 2011 12:00 AM  ASSAY THYROID STIM HORMONE  Reviewed

 

 2015 12:00 AM  COMPLETE CBC W/AUTO DIFF WBC  Reviewed

 

 2015 12:00 AM  COMPREHEN METABOLIC PANEL  Reviewed

 

 2015 12:00 AM  LIPID PANEL  Reviewed

 

 2015 12:00 AM  ASSAY THYROID STIM HORMONE  Reviewed

 

 2015 12:00 AM  MAMMOGRAM SCREENING  Reviewed

 

 2015 12:00 AM  CYTOPATH TBS C/V MANUAL  Reviewed







Results Summary







 Date and Description  Results

 

 2010 9:25 AM  Colonoscopy-Women and Men over 50 Abnormal Mammogram -Women 
over 40 Declined Pap Smear Declined 

 

 2010 10:41 AM  WET PREP NO TRICHOMONADS SEEN  No  Few 

 

 2010 8:15 AM  TRIGLYCERIDES 174.0 mg/dLCHOLESTEROL 175.0 mg/dLHDL 58.0 mg/
dLTOT CHOL/HDL 3.0 LDL (CALC) 82.0 mg/dLTSH 0.790 uIU/mLGLUCOSE 95.0 mg/
dLSODIUM 136.0 mmol/LPOTASSIUM 4.50 mmol/LCHLORIDE 99.0 mmol/LCO2 26.0 mmol/
LBUN 12.0 mg/dLCREATININE 0.80 mg/dLSGOT/AST 32.0 IU/LSGPT/ALT 33.0 IU/LALK 
PHOS 103.0 IU/LTOTAL PROTEIN 7.60 g/dLALBUMIN 4.60 g/dLTOTAL BILI 0.60 mg/
dLCALCIUM 9.80 mg/dLAGE 64 GFR NonAA 72 GFR AA 87 eGFR >60 mL/min/1.73 m2eGFR AA
* >60 WBC 5.3 RBC 4.13 HGB 13.10 g/dLHCT 39.20 %MCV 95.0 fLMCH 31.70 pgMCHC 
33.40 g/dLRDW SD 44 RDW CV 12.70 %MPV 9.80 fLPLT 257 NRBC# 0.00 NRBC% 0.0 %NEUT 
57.90 %%LYMP 26.50 %%MONO 11.60 %%EOS 3.20 %%BASO 0.80 %#NEUT 3.04 #LYMP 1.39 #
MONO 0.61 #EOS 0.17 #BASO 0.04 MANUAL DIFF NOT IND 

 

 2011 9:45 AM  GLUCOSE 91.0 mg/dLSODIUM 133.0 mmol/LPOTASSIUM 4.40 mmol/
LCHLORIDE 97.0 mmol/LCO2 24.0 mmol/LBUN 9.0 mg/dLCREATININE 0.70 mg/dLCALCIUM 
10.0 mg/dLAGE 65 GFR NonAA 84 GFR  eGFR >60 mL/min/1.73 m2eGFR AA* >60 

 

 2011 10:25 AM  LIPASE 29.0 U/LTSH 0.10 uIU/mLGLUCOSE 115.0 mg/dLSODIUM 
127.0 mmol/LPOTASSIUM 5.30 mmol/LCHLORIDE 93.0 mmol/LCO2 18.0 mmol/LBUN 9.0 mg/
dLCREATININE 0.70 mg/dLSGOT/AST 62.0 IU/LSGPT/ALT 46.0 IU/LALK PHOS 100.0 IU/
LTOTAL PROTEIN 8.60 g/dLALBUMIN 4.90 g/dLTOTAL BILI 0.60 mg/dLCALCIUM 9.90 mg/
dLAGE 65 GFR NonAA 84 GFR  eGFR >60 mL/min/1.73 m2eGFR AA* >60 WBC 6.9 
RBC 4.48 HGB 14.40 g/dLHCT 40.90 %MCV 91.0 fLMCH 32.10 pgMCHC 35.20 g/dLRDW SD 
41 RDW CV 12.50 %MPV 9.30 fLPLT 260 NRBC# 0.00 NRBC% 0.0 %NEUT 74.90 %%LYMP 
15.10 %%MONO 9.40 %%EOS 0.30 %%BASO 0.30 %#NEUT 5.15 #LYMP 1.04 #MONO 0.65 #EOS 
0.02 #BASO 0.02 MANUAL DIFF NOT IND 

 

 2011 9:07 AM  GLUCOSE 92.0 mg/dLSODIUM 131.0 mmol/LPOTASSIUM 4.20 mmol/
LCHLORIDE 99.0 mmol/LCO2 22.0 mmol/LBUN 9.0 mg/dLCREATININE 0.70 mg/dLCALCIUM 
9.20 mg/dLAGE 65 GFR NonAA 84 GFR  eGFR >60 mL/min/1.73 m2eGFR AA* >60 

 

 2011 11:06 AM  TSH 0.510 uIU/mLGLUCOSE 99.0 mg/dLSODIUM 132.0 mmol/
LPOTASSIUM 3.50 mmol/LCHLORIDE 95.0 mmol/LCO2 23.0 mmol/LBUN 9.0 mg/
dLCREATININE 0.80 mg/dLCALCIUM 10.40 mg/dLAGE 65 GFR NonAA 72 GFR AA 87 eGFR >
60 mL/min/1.73 m2eGFR AA* >60 

 

 2011 9:45 AM  SODIUM 132.0 mmol/L

 

 2011 9:27 AM  SODIUM 137.0 mmol/L

 

 2011 9:55 AM  TSH 1.070 uIU/mL

 

 2011 8:55 AM  GLUCOSE 102.0 mg/dLSODIUM 135.0 mmol/LPOTASSIUM 4.20 mmol/
LCHLORIDE 102.0 mmol/LCO2 24.0 mmol/LBUN 15.0 mg/dLCREATININE 0.80 mg/dLSGOT/
AST 30.0 IU/LSGPT/ALT 35.0 IU/LALK PHOS 119.0 IU/LTOTAL PROTEIN 7.50 g/
dLALBUMIN 4.30 g/dLTOTAL BILI 0.30 mg/dLCALCIUM 9.20 mg/dLAGE 65 GFR NonAA 72 
GFR AA 87 eGFR >60 mL/min/1.73 m2eGFR AA* >60 TSH 1.130 uIU/mL

 

 2011 8:55 AM  GLUCOSE 98.0 mg/dLSODIUM 137.0 mmol/LPOTASSIUM 3.90 mmol/
LCHLORIDE 103.0 mmol/LCO2 25.0 mmol/LBUN 14.0 mg/dLCREATININE 0.70 mg/dLSGOT/
AST 33.0 IU/LSGPT/ALT 36.0 IU/LALK PHOS 111.0 IU/LTOTAL PROTEIN 8.30 g/
dLALBUMIN 4.40 g/dLTOTAL BILI 0.50 mg/dLCALCIUM 9.40 mg/dLAGE 66 GFR NonAA 84 
GFR  eGFR >60 mL/min/1.73 m2eGFR AA* >60 TRIGLYCERIDES 109.0 mg/
dLCHOLESTEROL 153.0 mg/dLHDL 54.0 mg/dLTOT CHOL/HDL 2.8 LDL (CALC) 77.0 mg/
dLTSH 1.330 uIU/mL

 

 2011 10:17 AM  Colonoscopy-Women and Men over 50 Declined Mammogram -
Women over 40 Normal Pap Smear Negative 

 

 2012 10:33 AM  WET PREP NO TRICH SEEN CLUE CELLS NONE SEEN 

 

 2012 8:45 AM  WBC 5.2 RBC 3.96 HGB 12.70 g/dLHCT 37.80 %MCV 96.0 fLMCH 
32.10 pgMCHC 33.60 g/dLRDW SD 46 RDW CV 13.30 %MPV 9.70 fLPLT 297 NRBC# 0.00 
NRBC% 0.0 %NEUT 57.70 %%LYMP 28.50 %%MONO 10.30 %%EOS 2.50 %%BASO 1.0 %#NEUT 
3.02 #LYMP 1.49 #MONO 0.54 #EOS 0.13 #BASO 0.05 MANUAL DIFF NOT IND GLUCOSE 
97.0 mg/dLSODIUM 137.0 mmol/LPOTASSIUM 4.40 mmol/LCHLORIDE 101.0 mmol/LCO2 23.0 
mmol/LBUN 17.0 mg/dLCREATININE 0.80 mg/dLSGOT/AST 30.0 IU/LSGPT/ALT 33.0 IU/
LALK PHOS 95.0 IU/LTOTAL PROTEIN 7.40 g/dLALBUMIN 4.40 g/dLTOTAL BILI 0.60 mg/
dLCALCIUM 9.70 mg/dLAGE 66 GFR NonAA 72 GFR AA 87 eGFR 60 eGFR AA* 60 
TRIGLYCERIDES 130.0 mg/dLCHOLESTEROL 157.0 mg/dLHDL 56.0 mg/dLTOT CHOL/HDL 2.8 
LDL (CALC) 75.0 mg/dLTSH 0.940 uIU/mL

 

 2012 8:45 AM  WBC 5.1 RBC 3.91 HGB 12.50 g/dLHCT 36.30 %MCV 93.0 fLMCH 
32.0 pgMCHC 34.40 g/dLRDW SD 43 RDW CV 12.60 %MPV 9.30 fLPLT 244 NRBC# 0.00 NRBC
% 0.0 %NEUT 57.60 %%LYMP 27.50 %%MONO 9.10 %%EOS 5.0 %%BASO 0.80 %#NEUT 2.91 #
LYMP 1.39 #MONO 0.46 #EOS 0.25 #BASO 0.04 MANUAL DIFF NOT IND 

 

 12/10/2012 8:34 AM  Colonoscopy-Women and Men over 50 Declined Mammogram -
Women over 40 Declined Pap Smear Declined 

 

 2012 5:02 PM  WET PREP NO TRICH SEEN CLUE CELLS NONE SEEN 

 

 2013 8:25 AM  WBC 4.2 RBC 3.96 HGB 12.90 g/dLHCT 37.0 %MCV 93.0 fLMCH 
32.60 pgMCHC 34.90 g/dLRDW SD 43 RDW CV 12.60 %MPV 9.70 fLPLT 254 NRBC# 0.00 
NRBC% 0.0 %NEUT 54.40 %%LYMP 30.40 %%MONO 10.80 %%EOS 3.40 %%BASO 1.0 %#NEUT 
2.26 #LYMP 1.26 #MONO 0.45 #EOS 0.14 #BASO 0.04 MANUAL DIFF NOT IND TSH 1.230 
uIU/mLGLUCOSE 94.0 mg/dLSODIUM 136.0 mmol/LPOTASSIUM 4.30 mmol/LCHLORIDE 99.0 
mmol/LCO2 25.0 mmol/LBUN 10.0 mg/dLCREATININE 0.80 mg/dLSGOT/AST 36.0 IU/LSGPT/
ALT 36.0 IU/LALK PHOS 84.0 IU/LTOTAL PROTEIN 7.90 g/dLALBUMIN 4.40 g/dLTOTAL 
BILI 0.70 mg/dLCALCIUM 9.80 mg/dLAGE 67 GFR NonAA 72 GFR AA 87 eGFR 60 eGFR AA* 
60 TRIGLYCERIDES 122.0 mg/dLCHOLESTEROL 141.0 mg/dLHDL 47.0 mg/dLTOT CHOL/HDL 
3.0 LDL (CALC) 70.0 mg/dL

 

 2013 8:45 AM  WBC 5.3 RBC 4.01 HGB 13.0 g/dLHCT 37.60 %MCV 94.0 fLMCH 
32.40 pgMCHC 34.60 g/dLRDW SD 43 RDW CV 12.50 %MPV 9.40 fLPLT 248 NRBC# 0.00 
NRBC% 0.0 %NEUT 58.70 %%LYMP 27.80 %%MONO 9.80 %%EOS 2.80 %%BASO 0.90 %#NEUT 
3.12 #LYMP 1.48 #MONO 0.52 #EOS 0.15 #BASO 0.05 MANUAL DIFF NOT IND TSH 1.570 
uIU/mLGLUCOSE 94.0 mg/dLSODIUM 134.0 mmol/LPOTASSIUM 4.10 mmol/LCHLORIDE 101.0 
mmol/LCO2 23.0 mmol/LBUN 11.0 mg/dLCREATININE 0.80 mg/dLSGOT/AST 41.0 IU/LSGPT/
ALT 44.0 IU/LALK PHOS 109.0 IU/LTOTAL PROTEIN 7.90 g/dLALBUMIN 4.70 g/dLTOTAL 
BILI 0.80 mg/dLCALCIUM 10.40 mg/dLAGE 68 GFR NonAA 72 GFR AA 87 eGFR >60 mL/min/
1.73 m2eGFR AA* >60 TRIGLYCERIDES 163.0 mg/dLCHOLESTEROL 138.0 mg/dLHDL 49.0 mg/
dLTOT CHOL/HDL 2.8 LDL (CALC) 56.0 mg/dL

 

 2014 8:58 AM  GLUCOSE 86.0 mg/dLSODIUM 134.0 mmol/LPOTASSIUM 4.20 mmol/
LCHLORIDE 99.0 mmol/LCO2 25.0 mmol/LBUN 14.0 mg/dLCREATININE 0.80 mg/dLCALCIUM 
10.10 mg/dLAGE 68 GFR NonAA 72 GFR AA 87 eGFR >60 mL/min/1.73 m2eGFR AA* >60 
FREE T4 1.18 TSH 1.20 uIU/mL

 

 2014 9:50 AM  WBC 5.7 RBC 4.03 HGB 12.90 g/dLHCT 37.90 %MCV 94.0 fLMCH 
32.0 pgMCHC 34.0 g/dLRDW SD 44 RDW CV 12.70 %MPV 9.70 fLPLT 292 NRBC# 0.00 NRBC
% 0.0 %NEUT 62.70 %%LYMP 21.80 %%MONO 11.0 %%EOS 3.40 %%BASO 1.10 %#NEUT 3.55 #
LYMP 1.23 #MONO 0.62 #EOS 0.19 #BASO 0.06 MANUAL DIFF NOT IND GLUCOSE 98.0 mg/
dLSODIUM 135.0 mmol/LPOTASSIUM 4.30 mmol/LCHLORIDE 102.0 mmol/LCO2 22.0 mmol/
LBUN 10.0 mg/dLCREATININE 0.80 mg/dLSGOT/AST 34.0 IU/LSGPT/ALT 32.0 IU/LALK 
PHOS 95.0 IU/LTOTAL PROTEIN 7.70 g/dLALBUMIN 4.30 g/dLTOTAL BILI 0.80 mg/
dLCALCIUM 9.10 mg/dLAGE 68 GFR NonAA 72 GFR AA 87 eGFR 60 eGFR AA* 60 
TRIGLYCERIDES 108.0 mg/dLCHOLESTEROL 146.0 mg/dLHDL 56.0 mg/dLTOT CHOL/HDL 2.6 
LDL (CALC) 68.0 mg/dLTSH 0.90 uIU/mL

 

 2014 8:40 AM  WBC 4.4 RBC 4.13 HGB 13.20 g/dLHCT 38.90 %MCV 94.0 fLMCH 
32.0 pgMCHC 33.90 g/dLRDW SD 47 RDW CV 13.50 %MPV 9.80 fLPLT 279 NRBC# 0.00 NRBC
% 0.0 %NEUT 63.80 %%LYMP 24.60 %%MONO 9.30 %%EOS 1.60 %%BASO 0.70 %#NEUT 2.80 #
LYMP 1.08 #MONO 0.41 #EOS 0.07 #BASO 0.03 MANUAL DIFF NOT IND GLUCOSE 96.0 mg/
dLSODIUM 136.0 mmol/LPOTASSIUM 4.10 mmol/LCHLORIDE 99.0 mmol/LCO2 23.0 mmol/
LBUN 8.0 mg/dLCREATININE 0.80 mg/dLSGOT/AST 31.0 IU/LSGPT/ALT 27.0 IU/LALK PHOS 
85.0 IU/LTOTAL PROTEIN 7.60 g/dLALBUMIN 4.40 g/dLTOTAL BILI 0.80 mg/dLCALCIUM 
10.20 mg/dLAGE 69 GFR NonAA 71 GFR AA 86 eGFR 60 eGFR AA* 60 TRIGLYCERIDES 61.0 
mg/dLCHOLESTEROL 148.0 mg/dLHDL 71.0 mg/dLTOT CHOL/HDL 2.1 LDL (CALC) 65.0 mg/
dLTSH 0.990 uIU/mL

 

 2015 8:32 AM  WBC 4.8 RBC 3.98 HGB 12.70 g/dLHCT 37.30 %MCV 94.0 fLMCH 
31.90 pgMCHC 34.0 g/dLRDW SD 43 RDW CV 12.70 %MPV 9.50 fLPLT 270 NRBC# 0.00 NRBC
% 0.0 %NEUT 57.30 %%LYMP 25.0 %%MONO 13.0 %%EOS 3.60 %%BASO 1.10 %#NEUT 2.73 #
LYMP 1.19 #MONO 0.62 #EOS 0.17 #BASO 0.05 MANUAL DIFF NOT IND TSH 0.640 uIU/
mLGLUCOSE 90.0 mg/dLSODIUM 136.0 mmol/LPOTASSIUM 4.0 mmol/LCHLORIDE 101.0 mmol/
LCO2 24.0 mmol/LBUN 10.0 mg/dLCREATININE 0.80 mg/dLSGOT/AST 34.0 IU/LSGPT/ALT 
32.0 IU/LALK PHOS 92.0 IU/LTOTAL PROTEIN 7.50 g/dLALBUMIN 4.40 g/dLTOTAL BILI 
0.70 mg/dLCALCIUM 9.50 mg/dLAGE 69 GFR NonAA 71 GFR AA 86 eGFR >60 mL/min/1.73 
m2eGFR AA* >60 TRIGLYCERIDES 107.0 mg/dLCHOLESTEROL 138.0 mg/dLHDL 58.0 mg/
dLTOT CHOL/HDL 2.4 LDL (CALC) 59.0 mg/dL

 

 2015 8:31 AM  WBC 4.6 RBC 3.97 HGB 12.90 g/dLHCT 38.0 %MCV 96.0 fLMCH 
32.50 pgMCHC 33.90 g/dLRDW SD 44 RDW CV 12.60 %MPV 9.0 fLPLT 248 NRBC# 0.00 NRBC
% 0.0 %NEUT 61.0 %%LYMP 24.70 %%MONO 10.20 %%EOS 3.0 %%BASO 1.10 %#NEUT 2.82 #
LYMP 1.14 #MONO 0.47 #EOS 0.14 #BASO 0.05 MANUAL DIFF NOT IND TRIGLYCERIDES 
105.0 mg/dLCHOLESTEROL 141.0 mg/dLHDL 58.0 mg/dLTOT CHOL/HDL 2.4 LDL (CALC) 
62.0 mg/dLGLUCOSE 93.0 mg/dLSODIUM 136.0 mmol/LPOTASSIUM 4.10 mmol/LCHLORIDE 
101.0 mmol/LCO2 25.0 mmol/LBUN 9.0 mg/dLCREATININE 0.80 mg/dLSGOT/AST 32.0 IU/
LSGPT/ALT 28.0 IU/LALK PHOS 95.0 IU/LTOTAL PROTEIN 7.30 g/dLALBUMIN 4.50 g/
dLTOTAL BILI 0.80 mg/dLCALCIUM 9.70 mg/dLAGE 70 GFR NonAA 71 GFR AA 86 eGFR >60 
mL/min/1.73meGFR AA* >60 TSH 1.10 uIU/mL







History Of Immunizations







 Name  Date Admin  Mfg Name  Mfg Code  Trade Name  Lot#  Route  Inj  Vis Given  
Vis Pub  CVX

 

 Influenza  10/20/2010  [employer] pasteur  [employer]  FLUZONE  [id number]  Intramuscular  
Left Arm  10/20/2010  2010  999

 

 Influenza  10/28/2011  [employer] pasteur  [employer]  FLUZONE  [id number]  Intramuscular  
Left Arm  10/28/2011  2011  141

 

 Influenza  10/29/2012  [employer] pasteur  [employer]  FLUZONE  [id number]  Intramuscular  
Left Deltoid  10/29/2012  2012  141

 

 X  12/10/2012  [employer]  MSD  PNEUMOVAX 23  [id number]  Intramuscular  
Left Gluteous Medius  12/10/2012  2010  33

 

 Influenza  10/28/2013  [employer] pasteur  [employer]  Fluzone > 3 Years  [id number]  
Intramuscular  Right Deltoid  10/28/2013  2013  141

 

 X  9/2/2015  Not Entered  NE  PREVNAR 13     Not Entered  Not Entered  1  109

 

 Influenza  2015  Not Entered  NE  Not Entered     Not Entered  Not Entered
  2015  141

 

 HepB Adult  2016  [employer]  MSD  RECOMBIVAX-ADULT  [id number]  Not 
Entered  Left Deltoid  2016  43

 

 HepB Adult  2016  [employer]  MSD  RECOMBIVAX-ADULT  [id number]  
Intramuscular  Right Deltoid  2016  43

 

 ZVL  2012  Not Entered  NE  Not Entered     Not Entered  Not Entered  1  121

 

 Tdap  2012  Not Entered  NE  Not Entered     Not Entered  Not Entered  1  115

 

 Influenza  10/13/2016  Not Entered  NE  FLUZONE-HIGH DOSE     Intramuscular  
Left Arm  1  141

 

 HepB Adult  2016  [employer]  MSD  RECOMBIVAX-ADULT  [id number]  
Intramuscular  Right Deltoid  2016  43

 

 Influenza  10/30/2017  Not Entered  NE  Fluarix, quadrivalent, preservative 
free     Intramuscular  Left Arm  2017  150







History of Past Illness







 Name  Date of Onset  Comments

 

 Benign Essential Hypertension  Dec 14 2009 10:10AM   

 

 Hyperlipidemia  Dec 14 2009 10:10AM   

 

 Hypothyroidism, Acquired  Dec 14 2009 10:10AM   

 

 hypothyroid      

 

 Hypertension      

 

 Hyperlipidemia      

 

 acid reflux      

 

 Hypertension, Benign Essential  2010  9:41AM   

 

 Hypertension, Benign Essential  2010 12:53PM   

 

 Routine gynecological examination  May  5 2010  9:28AM   

 

 Hypertension  May  5 2010  9:28AM   

 

 Hyperlipidemia, unspecified  May  5 2010  9:28AM   

 

 Hypothyroidism, Acquired  May  5 2010  9:28AM   

 

 Vulvovaginitis  May  5 2010  9:28AM   

 

 Rhinitis, Allergic  May  5 2010  9:28AM   

 

 Hypertension, Benign Essential  May 19 2010  8:48AM   

 

 Hypertension, Benign Essential  May 25 2010  9:39AM   

 

 Flu  Oct 20 2010 12:01PM   

 

 Essential Hypertension  2010  8:34AM   

 

 Hyperlipidemia  2010  8:34AM   

 

 Gastroesophageal Reflux  2010  8:34AM   

 

 Hypothyroidism, Acquired  2010  8:34AM   

 

 Hypertension, Benign Essential  2010  9:22AM   

 

 Hypertension, Benign Essential  Dec 15 2010  9:07AM   

 

 Essential Hypertension  2011  1:31PM   

 

 Hyperlipidemia  2011  1:31PM   

 

 Gastroesophageal Reflux  2011  1:31PM   

 

 Hypothyroidism, Acquired  2011  1:31PM   

 

 Essential Hypertension  2011  8:51AM   

 

 Hyperlipidemia  2011  8:51AM   

 

 Gastroesophageal Reflux  2011  8:51AM   

 

 Hypothyroidism, Acquired  2011  8:51AM   

 

 Essential Hypertension  2011  9:13AM   

 

 Hyperlipidemia  2011  9:13AM   

 

 Gastroesophageal Reflux  2011  9:13AM   

 

 Hypothyroidism, Acquired  2011  9:13AM   

 

 Nausea With Vomiting  2011  1:18PM   

 

 Hyponatremia  2011  8:46AM   

 

 Nausea  2011  9:13AM   

 

 Hypothyroidism  2011 11:10AM   

 

 Hyponatremia  2011 11:10AM   

 

 Essential Hypertension  2011 10:05AM   

 

 Hyperlipidemia  2011 10:05AM   

 

 Gastroesophageal Reflux  2011 10:05AM   

 

 Nausea  2011 10:05AM   

 

 Hypothyroidism, Acquired  2011 10:05AM   

 

 Hyponatremia  May  6 2011 11:34AM   

 

 Essential Hypertension  May 16 2011  8:50AM   

 

 Hyperlipidemia  May 16 2011  8:50AM   

 

 Gastroesophageal Reflux  May 16 2011  8:50AM   

 

 Nausea  May 16 2011  8:50AM   

 

 Hypothyroidism, Acquired  May 16 2011  8:50AM   

 

 Hyponatremia  May 16 2011  8:50AM   

 

 Routine gynecological examination  2011  8:54AM   

 

 Hypertension  2011  8:54AM   

 

 Hyperlipidemia, unspecified  2011  8:54AM   

 

 Hypothyroidism, Acquired  2011  8:54AM   

 

 Rhinitis, Allergic  2011  8:54AM   

 

 Hypothyroidism  Aug 29 2011 12:37PM   

 

 Hyponatremia  Aug 29 2011 12:37PM   

 

 Essential Hypertension  Sep 12 2011  8:53AM   

 

 Hyperlipidemia  Sep 12 2011  8:53AM   

 

 Gastroesophageal Reflux  Sep 12 2011  8:53AM   

 

 Hypothyroidism, Acquired  Sep 12 2011  8:53AM   

 

 Hyponatremia  Sep 12 2011  8:53AM   

 

 Hypertension  Sep 29 2011  9:41AM   

 

 Hyperlipidemia  Dec  8 2011  8:31AM   

 

 Hypothyroidism  Dec  8 2011  8:31AM   

 

 Hypertension  Dec  8 2011  8:31AM   

 

 Flu  Dec  9 2011 10:02AM   

 

 Essential Hypertension  Dec 13 2011 10:13AM   

 

 Hyperlipidemia  Dec 13 2011 10:13AM   

 

 Gastroesophageal Reflux  Dec 13 2011 10:13AM   

 

 Hypothyroidism, Acquired  Dec 13 2011 10:13AM   

 

 Hyponatremia  Dec 13 2011 10:13AM   

 

 Vaginal Discharge  2012  9:43AM   

 

 Rhinitis, Allergic  Feb 15 2012  9:31AM   

 

 Eustachian Tube Dysfunction  Feb 15 2012  9:31AM   

 

 Pharyngitis, Acute  Feb 15 2012  9:31AM   

 

 Routine gynecological examination  2012  8:48AM   

 

 Hypertension  2012  8:48AM   

 

 Hyperlipidemia, unspecified  2012  8:48AM   

 

 Hypothyroidism, Acquired  2012  8:48AM   

 

 Rhinitis, Allergic  2012  8:48AM   

 

 Candidiasis, Vulvovaginal  2012 11:04AM   

 

 Essential Hypertension  Aug 15 2012  9:02AM   

 

 Hyperlipidemia  Aug 15 2012  9:02AM   

 

 Gastroesophageal Reflux  Aug 15 2012  9:02AM   

 

 Hypothyroidism, Acquired  Aug 15 2012  9:02AM   

 

 Hyponatremia  Aug 15 2012  9:02AM   

 

 Atrophic Vaginitis, Postmenopausal  Aug 15 2012  9:02AM   

 

 Flu  Oct 29 2012  9:24AM   

 

 Essential Hypertension  Dec 10 2012  8:35AM   

 

 Hyperlipidemia  Dec 10 2012  8:35AM   

 

 Gastroesophageal Reflux  Dec 10 2012  8:35AM   

 

 Hypothyroidism, Acquired  Dec 10 2012  8:35AM   

 

 Hyponatremia  Dec 10 2012  8:35AM   

 

 Atrophic Vaginitis, Postmenopausal  Dec 10 2012  8:35AM   

 

 Pneumococcus  Dec 10 2012  2:07PM   

 

 Vaginal Discharge  Dec 20 2012  1:50PM   

 

 Essential Hypertension  Dec 20 2012  1:50PM   

 

 Hyperlipidemia  Dec 20 2012  1:50PM   

 

 Gastroesophageal Reflux  Dec 20 2012  1:50PM   

 

 Hypothyroidism, Acquired  Dec 20 2012  1:50PM   

 

 Atrophic Vaginitis, Postmenopausal  Dec 20 2012  1:50PM   

 

 Upper Respiratory Infections  2013  8:52AM   

 

 Hyperlipidemia  2013  8:21AM   

 

 Hypertension  2013  8:21AM   

 

 Hypothyroidism  2013  8:21AM   

 

 Screening Mammogram  Beto 10 2013  8:45AM   

 

 Routine gynecological examination  Beto 10 2013  8:34AM   

 

 Hypertension  Beto 10 2013  8:34AM   

 

 Hyperlipidemia, unspecified  Beto 10 2013  8:34AM   

 

 Hypothyroidism, Acquired  Beto 10 2013  8:34AM   

 

 Rhinitis, Allergic  Beto 10 2013  8:34AM   

 

 Flu  Oct 28 2013  8:52AM   

 

 Essential Hypertension  Dec  9 2013  8:37AM   

 

 Hyperlipidemia  Dec  9 2013  8:37AM   

 

 Gastroesophageal Reflux  Dec  9 2013  8:37AM   

 

 Hypothyroidism, Acquired  Dec  9 2013  8:37AM   

 

 Atrophic Vaginitis, Postmenopausal  Dec  9 2013  8:37AM   

 

 Hypertension  2014  9:56AM   

 

 Hyperlipidemia  2014  9:56AM   

 

 Hypothyroidism  2014  9:56AM   

 

 Screening Mammogram  2014  8:32AM   

 

 Osteopenia  2014  8:32AM   

 

 Hypertension  2014  8:35AM   

 

 Hypothyroidism, Acquired  2014  8:35AM   

 

 Hyperlipidemia  2014  8:35AM   

 

 Upper Respiratory Infections  2014  9:28AM   

 

 Sinusitis, Acute  Oct  2 2014  9:17AM   

 

 Herpes Zoster  Oct  5 2014  1:44PM   

 

 Vesicular Eruption  Oct  5 2014  1:44PM   

 

 Hypertension  2014  8:18AM   

 

 Hyperlipidemia  2014  8:18AM   

 

 hypothyroid  2014  8:18AM   

 

 Situational anxiety  Dec 20 2014  9:33AM   

 

 GERD (gastroesophageal reflux disease)  Dec 20 2014  9:33AM   

 

 Nausea  Dec 20 2014  9:33AM   

 

 Abdominal Pain, Epigastric  Dec 20 2014  9:33AM   

 

 Hyperlipidemia  Oct  6 2014  9:03AM   

 

 acid reflux  Oct  6 2014  9:03AM   

 

 hypothyroid  Oct  6 2014  9:03AM   

 

 Shingles  Oct  6 2014  9:03AM   

 

 Pharyngitis  2015  1:09PM   

 

 Bronchitis  2015  9:10AM   

 

 Hyperlipidemia  2015  9:10AM   

 

 acid reflux  2015  9:10AM   

 

 hypothyroid  2015  9:10AM   

 

 Hyperlipidemia  2015  8:18AM   

 

 Hypertension  2015  8:18AM   

 

 hypothyroid  2015  8:18AM   

 

 Screening Mammogram  2015 11:43AM   

 

 Medicare Annual Pap Q 2 years  2015  9:36AM   

 

 Screening Mammogram  2015  9:36AM   

 

 Candidiasis of female genitalia  2015  9:36AM   

 

 Hypertension  2015 11:34AM   

 

 Hyperlipidemia, unspecified  2015 11:34AM   

 

 Hypothyroidism, Acquired  2015 11:34AM   

 

 Osteopenia  2016  8:41AM   

 

 Encounter for screening mammogram for breast cancer  2016  8:41AM   

 

 Hypertension  2016  8:34AM   

 

 Lymphedema  2016  8:34AM   

 

 Hyperlipidemia  2016  8:34AM   

 

 hypothyroid  2016  8:34AM   

 

 HEP B  2016  9:13AM   

 

 HEP B  2016  8:52AM   

 

 Acid Reflux  2016  8:34AM   

 

 History of acute gastritis  Oct 13 2016  2:30PM   

 

 Anxiety as acute reaction to exceptional stress  Oct 13 2016  2:30PM   

 

 HEP B  Dec 29 2016  8:42AM   

 

 Caregiver stress syndrome  May 26 2017  8:28AM   

 

 Anxiety  May 26 2017  8:28AM   

 

 Blood in stool  2017  2:54PM   

 

 Upper GI bleed  2017 11:34AM   

 

 Hyponatremia  2017  9:03AM   

 

 Hyponatremia  2017  8:43AM   

 

 Medicare Annual Pap Q 2 years  2017  9:03AM   

 

 Nausea  2017  9:03AM   

 

 Uterine enlargement  2017  9:03AM   

 

 Encounter for screening mammogram for breast cancer  2017  4:56PM   

 

 Panic disorder [episodic paroxysmal anxiety]  Oct 10 2017  2:02PM   

 

 Acute stress reaction  Oct 10 2017  2:02PM   

 

 Caregiver stress syndrome  Oct 10 2017  2:02PM   

 

 Stress reaction causing mixed disturbance of emotion and conduct  Oct 18 2017  
9:05AM   

 

 Ankle strain, left, initial encounter  Dec 19 2017  8:29AM   

 

 Excoriation of ear canal, left, initial encounter  Dec 19 2017  8:29AM   

 

 Generalized anxiety disorder  Dec 19 2017  8:29AM   

 

 Grief reaction  Dec 19 2017  8:29AM   

 

 Acute non-recurrent frontal sinusitis  2018 11:58AM   

 

 Cough  2018 11:58AM   

 

 Hypertension  2018  8:10AM   

 

 Hyperlipidemia, unspecified  2018  8:10AM   

 

 Hypothyroidism, Acquired  2018  8:10AM   

 

 Situational anxiety  Mar  6 2018  8:30AM   

 

 Nausea  Mar  6 2018  8:30AM   

 

 LESLIE (generalized anxiety disorder)  May 22 2018  8:47AM   

 

 Caregiver stress syndrome  May 22 2018  8:47AM   

 

 Situational anxiety  May 29 2018  8:26AM   

 

 Osteopenia  May 29 2018  8:26AM   

 

 Visit for screening mammogram  May 29 2018  8:26AM   

 

 Anxiety Disorder  2018  9:09AM   

 

 Hypertension  Aug 20 2018  8:05AM   

 

 Hyperlipidemia, unspecified  Aug 20 2018  8:05AM   

 

 Hypothyroidism, Acquired  Aug 20 2018  8:05AM   







Payers







 Insurance Name  Company Name  Plan Name  Plan Number  Policy Number  Policy 
Group Number  Start Date

 

    Medicare RHC Medicare RHC     [id number]     N/A

 

    [employer]BS  Bcbs Rusty Guaman     [id number]     2009

 

    Medicare Part B  Medicare Rusty Guaman     [id number]     N/A

 

    Medicare Part A  Medicare Part A     [id number]     N/A

 

    Medicare Part A  ZZZMedicare P A - Preventive     [id number]     N/A

 

    Medicare Part A  Medicare - Lab/Xray     [id number]     N/A







History of Encounters







 Visit Date  Visit Type  Provider

 

 2018  Office visit  Marvin Boyer DO

 

 2018  Office visit  Marvin Boyer DO

 

 2018  Office visit  Doris Noriega MD

 

 3/6/2018  Office visit  Doris Noriega MD

 

 2018  Office visit  Deedee Carter APRN

 

 2017  Office visit  Doris Noriega MD

 

 10/18/2017  Office visit  Doris Noriega MD

 

 10/10/2017  Office visit  Doris Noriega MD

 

 2017  Office visit  Doris Noriega MD

 

 2017  Office visit  Doris Noriega MD

 

 2017  Office visit  Doris Noriega MD

 

 2017  Office visit  Prince Noe APRN

 

 2016  Nurse visit  Doris Noriega MD

 

 10/13/2016  Office visit  Doris Noriega MD

 

 2016  Nurse visit  Doris Noriega MD

 

 2016  Office visit  Doris Noriega MD

 

 2015  Office visit  Doris Noriega MD

 

 2015  Office visit  Doris Noriega MD

 

 2015  Office visit  Prince Noe APRN

 

 2014  Office visit  Anyi Herrera APRN

 

 10/27/2014  Voided  Doris Noriega MD

 

 10/6/2014  Office visit  Doris Noriega MD

 

 10/5/2014  Office visit  Anyi Herrera APRN

 

 10/2/2014  Office visit   

 

 10/2/2014  Office visit  Corrine Bay APRN

 

 2014  Office visit  Kassie Franklin APRN

 

 2014  Office visit  Doris Noriega MD

 

 2014  Office visit  Doris Noriega MD

 

 2013  Office visit  Dee Colindres MD

 

 10/28/2013  Nurse visit  Dee Colindres MD

 

 6/10/2013  Office visit  Dee Colindres MD

 

 2013  Office visit  Corrine Bay APRN

 

 2012  Office visit  Dee Colindres MD

 

 12/10/2012  Office visit  Dee Colindres MD

 

 10/29/2012  Nurse visit  Dee Colindres MD

 

 8/15/2012  Office visit  Dee Colindres MD

 

 2012  Office visit  Corrine Bay APRN

 

 2012  Office visit  Dee Colindres MD

 

 2/15/2012  Office visit  Dee Colindres MD

 

 2012  Office visit  Corrine Phu APRN

 

 2011  Office visit  Dee Colindres MD

 

 10/28/2011  Nurse visit  Shad Nolasco MD

 

 2011  Office visit  Dee Colindres MD

 

 2011  Office visit  Dee Colindres MD

 

 2011  Office visit  Dee Colindres MD

 

 2011  Office visit  Dee Colindres MD

 

 2011  Office visit  Dee Colindres MD

 

 2011  Office visit  Dee Colindres MD

 

 4/10/2011  [facility]  KHRIS Reeves MD

 

 4/10/2011  [facility]  RICKEY Toussaint MD

 

 2011  Office visit  RICKEY Toussaint MD

 

 2011  [facility]  Fide Castellanos MD

 

 2011  Voided  Fide Castellanos MD

 

 2011  Office visit  Dee Colindres MD

 

 12/15/2010  Nurse visit  Dee Colindres MD

 

 2010  Office visit  Dee Colindres MD

 

 10/20/2010  Nurse visit  Stephenie PERAZA

 

 2010  Nurse visit  Dee Colindres MD

 

 2010  Nurse visit  Dee Colindres MD

 

 2010  Office visit  Dee Colindres MD

 

 3/19/2010  Voided  Stephenie Kay PA

 

 2010  Nurse visit  Stephenie PERAZA

 

 2010  Nurse visit  Stephenie PERAZA

 

 2010  Nurse visit  Stephenie PERAZA

 

 2009  Office visit  Stephenie PERAZA

 

 10/20/2009  Nurse visit  Stephenie Kay PA

## 2018-10-22 NOTE — XMS REPORT
MU2 Ambulatory Summary

 Created on: 2018



Milady Crespo

External Reference #: [id number]

: 1945

Sex: Female



Demographics







 Address  [address]

[place]  [address]

 

 Home Phone  [phone]

 

 Preferred Language  English

 

 Marital Status  

 

 Presybeterian Affiliation  Unknown

 

 Race  White

 

 Ethnic Group  Not  or 





Author







 Author  Marvin Boyer

 

 [facility]

 

 Address  [address]

[place], [place]  [id number]



 

 Phone  [phone]







Care Team Providers







 Care Team Member Name  Role  Phone

 

 Marvin Boyer  PCP  [phone]

 

 BerthaemilieEllisDoris  PreferredProvider  [phone]







Allergies and Adverse Reactions







 Name  Reaction  Notes

 

 NO KNOWN DRUG ALLERGIES      







Plan of Treatment







 Planned Activity  Comments  Planned Date  Planned Time  Plan/Goal

 

 Complete blood count (CBC) with differential count     2016  12:00 AM   

 

 Comprehensive metabolic panel     2016  12:00 AM   

 

 VITAMIN D (25 HYDROXY)     2016  12:00 AM   

 

 Lipid panel (total cholesterol, lipoproteins, HDL, triglycerides)     8/15/
2012  12:00 AM   

 

 Pap smear     2017  12:00 AM   

 

 Comprehensive Metabolic Panel     6/10/2013  12:00 AM   

 

 Lipid panel (total cholesterol, lipoproteins, HDL, triglycerides)     6/10/
2013  12:00 AM   

 

 Thyroid stimulating hormone (TSH)     6/10/2013  12:00 AM   

 

 CBC (automated H&H, platelets, WBC and automated differential)     6/10/2013  
12:00 AM   

 

 Comprehensive Metabolic Panel     2012  12:00 AM   

 

 Lipid panel (total cholesterol, lipoproteins, HDL, triglycerides)     2012  12:00 AM   

 

 Thyroid stimulating hormone (TSH)     2012  12:00 AM   

 

 CBC (automated H&H, platelets, WBC and automated differential)     2012  
12:00 AM   

 

 Screening mammography, bilateral     2015  12:00 AM   







Medications







 Active 

 

 Name  Start Date  Estimated Completion Date  SIG  Comments

 

 aspirin 81 mg oral tablet,delayed release (DR/EC)        take 1 tablet (81 mg) 
by oral route once daily   

 

 Vitamin D3 1,000 unit oral capsule        take 1 capsule by oral route daily   

 

 valsartan 160 mg oral tablet  2016     TAKE ONE TABLET BY MOUTH ONCE 
DAILY   

 

 amlodipine 5 mg oral tablet  2016     TAKE ONE TABLET BY MOUTH ONCE DAILY
   

 

 amlodipine 5 mg oral tablet  2016     TAKE ONE TABLET BY MOUTH ONCE DAILY
   

 

 valsartan 160 mg oral tablet  2016     TAKE ONE TABLET BY MOUTH ONCE 
DAILY   

 

 Zofran (as hydrochloride) 4 mg oral tablet  2016     take 1 tablet by 
oral route daily as needed for 14 days   

 

 Zofran (as hydrochloride) 4 mg oral tablet  2017     take 1 tablet by oral 
route daily as needed for 14 days   

 

 Zofran (as hydrochloride) 4 mg oral tablet  2017     take 1 tablet by oral 
route daily as needed for 14 days   

 

 Zofran (as hydrochloride) 4 mg oral tablet  2017     take 1 tablet by 
oral route daily as needed for 14 days   

 

 Zofran (as hydrochloride) 4 mg oral tablet  2017     take 1 tablet by 
oral route daily as needed for 14 days   

 

 EQ LORATADINE 10MG  TABS  2017     TAKE ONE TABLET BY MOUTH ONCE DAILY   

 

 pantoprazole 40 mg oral tablet,delayed release (DR/EC)  10/9/2017     take 1 
tablet (40 mg) by oral route once daily for 90 days   

 

 amlodipine 5 mg oral tablet  2018     TAKE ONE TABLET BY MOUTH ONCE DAILY
   

 

 atenolol 50 mg oral tablet  2018     TAKE ONE TABLET BY MOUTH ONCE DAILY 
  

 

 mirtazapine 15 mg oral tablet  2018     TAKE ONE-HALF TABLET BY MOUTH 
ONCE DAILY AT BEDTIME   

 

 ondansetron 4 mg oral tablet,disintegrating  7/3/2018     dissolve  1 tablet 
by oral route every 8 hours as needed   

 

 losartan 100 mg oral tablet  8/3/2018  2019  take 1 tablet (100 mg) by 
oral route once daily for 90 days   

 

 Plavix 75 mg Oral tablet  2018  take 1 tablet (75 mg) by oral 
route once daily   

 

 metoclopramide HCl 10 mg oral tablet  2018     take 1 tablet (10 mg) by 
oral route once daily with supper   

 

 Lexapro 10 mg oral tablet  2018  take 1 tablet (10 mg) by oral 
route once daily for 90 days   









  

 

 Name  Start Date  Expiration Date  SIG  Comments

 

 prednisone 20 mg oral tablet  2011  take 2 tablets by oral 
route daily for 5 days   

 

 calcium carbonate-vitamin D2 600-125 mg-unit oral tablet  8/15/2012  2012
  take 2 tablets by oral route daily   

 

 [place] 3 Fish Oil 900-1,400 mg oral capsule,delayed release(DR/EC)  8/15/2012  9
/  take 1 capsule by oral route daily   

 

 multivitamin Oral tablet  8/15/2012  2012  take 1 tablet by oral route 
daily   

 

 triamcinolone acetonide 0.1 % topical cream  2013  10/7/2013  APPLY A 
THIN LAYER TO THE AFFECTED AREA(S) BY TOPICAL ROUTE TWICE A DAY   

 

 famotidine 20 mg oral tablet  2014  take 1 tablet (20 mg) by 
oral route 2 times per day   

 

 amoxicillin 500 mg oral capsule  2014  take 1 capsule (500 mg) 
by oral route 3 times per day for 10 days   

 

 Zithromax Z-Tab 250 mg oral tablet  10/2/2014  10/7/2014  take 2 tablets (500 
mg) by oral route once daily for 1 day then 1 tablet (250 mg) by oral route 
once daily for 4 days   

 

 acyclovir 800 mg oral tablet  10/5/2014  10/12/2014  take 1 tablet (800 mg) by 
oral route 5 times per day for 7 days   

 

 gabapentin 300 mg oral capsule  10/9/2014  2014  take 1 capsule (300 mg) 
by oral route 3 times per day for 14 days   

 

 Carafate 100 mg/mL oral suspension  2014  take 10 milliliters 
(1 gram) by oral route 4 times per day on an empty stomach 1 hour before meals 
and at bedtime for 4 weeks   

 

 amlodipine 5 mg oral tablet  2015  TAKE ONE TABLET BY MOUTH 
EVERY DAY   

 

 levothyroxine 50 mcg oral tablet  2015  TAKE ONE TABLET BY MOUTH 
EVERY DAY   

 

 Diovan 160 mg oral tablet  2015  2/15/2016  TAKE ONE TABLET BY MOUTH 
EVERY DAY for 90 days   

 

 triamcinolone acetonide 0.1 % topical cream  2015  apply a thin 
layer to the affected area(s) by topical route 2 times per day for 14 days   

 

 fluconazole 150 mg oral tablet  2015  take 1 tablet (150 mg) 
by oral route once for 1 day   

 

 famotidine 20 mg oral tablet  2015     TAKE ONE TABLET BY MOUTH TWICE 
DAILY   

 

 Lipitor 20 mg oral tablet  10/13/2016  2018  take 1 tablet (20 mg) by oral 
route once daily   

 

 Zofran (as hydrochloride) 4 mg oral tablet  10/13/2016  10/27/2016  take 1 
tablet by oral route daily as needed for 14 days   

 

 Zofran (as hydrochloride) 4 mg oral tablet  2017     take 1 tablet by 
oral route daily as needed for 14 days   

 

 levothyroxine 50 mcg oral tablet  10/9/2017  10/9/2017  TAKE ONE TABLET BY 
MOUTH ONCE DAILY   

 

 ondansetron 4 mg oral tablet,disintegrating  10/10/2017  2017  dissolve  
1 tablet by oral route every 8 hours as needed for 30 days   

 

 EQ LORATADINE 10MG  TABS  2018  TAKE ONE TABLET BY MOUTH ONCE 
DAILY IN THE EVENING   

 

 mirtazapine 15 mg oral tablet  2018  TAKE ONE-HALF TABLET BY 
MOUTH ONCE DAILY AT BEDTIME   









 Discontinued 

 

 Name  Start Date  Discontinued Date  SIG  Comments

 

 Lipitor 40 mg oral tablet     2009  12 TAB DAILY   

 

 ramipril 5 mg oral capsule     2009  take 1 capsule (5 mg) by oral route 
once daily   

 

 lovastatin 20 mg oral tablet  2009  take 1 tablet (20 mg) by 
oral route once daily with evening meal   

 

 propranolol 20 mg oral tablet  2010  take 1 tablet by oral 
route 2 times a day   

 

 Fosamax 70 mg oral tablet  2010  take 1 tablet (70 mg) by oral 
route once weekly in the morning, at least 30 minutes before the first food, 
beverage, or medication of the day   

 

 lisinopril 40 mg oral tablet  2010  4/10/2011  take 2 tablets by oral 
route daily   

 

 Zoloft 50 mg oral tablet  2011  take 1 tablet (50 mg) by oral 
route once daily   

 

 promethazine 25 mg oral tablet  2011  take 1 tablet by oral 
route every 6 hours as needed   

 

 Diovan -12.5 mg oral tablet  2011  take 1 tablet by oral 
route once daily for 30 days   

 

 Diflucan 150 mg oral tablet     2012  take 1 tablet (150 mg) by oral 
route once for 1 day   

 

 Diflucan 150 mg oral tablet  2012  8/15/2012  take 1 tablet (150 mg) by 
oral route once   

 

 Vitamin B-12 1,000 mcg oral tablet  8/15/2012  2014  take 1 tablet by 
oral route daily   

 

 Premarin 0.625 mg/gram vaginal cream  10/29/2012  6/10/2013  use 1 gram daily 
for a week then 1 gram twice a week   

 

 Medrol (Tab) 4 mg oral tablets,dose pack  2013  6/10/2013  take as 
directed   

 

 aspirin 325 mg oral tablet  2014  take 1 tablet (325 mg) by 
oral route once daily   

 

 Medrol (Tab) 4 mg oral tablets,dose pack  2014  10/2/2014  take as 
directed   

 

 Tetracaine Lollipops Lollipop  2015  Use as directed   

 

 Allergy Relief (loratadine) 10 mg oral tablet  2016     TAKE ONE TABLET 
BY MOUTH ONCE DAILY   

 

 Allergy Relief (loratadine) 10 mg oral tablet  2017  TAKE ONE 
TABLET BY MOUTH ONCE DAILY   

 

 Zoloft 50 mg oral tablet  2016  take 1 tablet (50 mg) by oral 
route once daily for 90 days  Pt refuses to take...

 

 triamcinolone acetonide 0.1 % topical cream  2017     APPLY A THIN LAYER 
OF CREAM EXTERNALLY TO AFFECTED AREA TWICE DAILY FOR 14 DAYS   

 

 loratadine 10 mg oral tablet  2017  TAKE 1 TABLET BY MOUTH 
EVERY DAY IN THE EVENING   

 

 triamcinolone acetonide 0.1 % topical cream  2017  APPLY  
CREAM EXTERNALLY TO AFFECTED AREA TWICE DAILY FOR 14 DAYS   

 

 Lexapro 10 mg oral tablet  2017  take 1 tablet (10 mg) by oral 
route once daily for 90 days   

 

 valsartan 160 mg oral tablet  2017  8/3/2018  TAKE ONE TABLET BY MOUTH 
ONCE DAILY   recall

 

 Augmentin 875-125 mg oral tablet  2018  3/6/2018  take 1 tablet by oral 
route every 12 hours for 7 days   

 

 benzonatate 100 mg oral capsule  2018  3/6/2018  take 1 capsule (100 mg) 
by oral route 3 times per day as needed for cough   

 

 clorazepate dipotassium 7.5 mg oral tablet  2018  take 1 
tablet PO  three times per day for situational anxiety  No longer taking







Problem List







 Description  Status  Onset

 

 Hyperlipidemia  Active   

 

 acid reflux  Active   

 

 Hypertension  Active   

 

 hypothyroid  Active   







Vital Signs







 Date  Time  BP-Sys(mm[Hg]  BP-Morena(mm[Hg])  HR(bpm)  RR(rpm)  Temp  WT  HT  HC  
BMI  BSA  BMI Percentile  O2 Sat(%)

 

 2018  8:49:00 AM  134 mmHg  70 mmHg  64 bpm  16 rpm  98.6 F  146 lbs  61 
in     27.5862 kg/m  1.6883 m     98 %

 

 2018  9:04:00 AM  138 mmHg  80 mmHg  64 bpm  18 rpm  96 F  140.125 lbs  
61 in     26.48 kg/m2  1.65 m2     99 %

 

 2018  8:24:00 AM  142 mmHg  70 mmHg  69 bpm  18 rpm  98.2 F  139 lbs  61 
in     26.2636 kg/m  1.6473 m     98 %

 

 2018  8:39:00 AM  116 mmHg  68 mmHg  68 bpm  18 rpm  97.9 F  142 lbs  61 
in     26.83 kg/m2  1.66 m2      

 

 3/6/2018  8:26:00 AM  126 mmHg  76 mmHg  63 bpm  16 rpm  98 F  134.125 lbs  61 
in     25.3424 kg/m  1.6182 m     100 %

 

 2018  11:55:00 AM  126 mmHg  80 mmHg  80 bpm  18 rpm  98 F  132 lbs  61 
in     24.94 kg/m2  1.61 m2     100 %

 

 2017  8:26:00 AM  122 mmHg  76 mmHg  63 bpm  16 rpm  98.4 F  129.25 lbs  
61 in     24.4213 kg/m  1.5885 m     99 %

 

 10/18/2017  9:01:00 AM  126 mmHg  80 mmHg  73 bpm  16 rpm  97.9 F  122.375 lbs
  61 in     23.12 kg/m2  1.55 m2     100 %

 

 10/10/2017  1:52:00 PM  118 mmHg  72 mmHg  74 bpm  16 rpm  98.1 F  121 lbs  61 
in     22.86 kg/m2  1.54 m2     99 %

 

 2017  8:40:00 AM  118 mmHg  68 mmHg  63 bpm  16 rpm  98.1 F  123.125 lbs  
61 in     23.264 kg/m  1.5504 m     100 %

 

 2017  8:57:00 AM  116 mmHg  62 mmHg  71 bpm  16 rpm  99.8 F  121.5 lbs  
61 in     22.96 kg/m2  1.54 m2     98 %

 

 2017  11:30:00 AM  128 mmHg  78 mmHg  69 bpm  16 rpm  98.8 F  129.375 lbs  
61 in     24.4449 kg/m  1.5892 m     98 %

 

 2017  8:22:00 AM  118 mmHg  78 mmHg  62 bpm  18 rpm  97.5 F  129.125 lbs  
61 in     24.40 kg/m2  1.59 m2     100 %

 

 10/13/2016  2:26:00 PM  128 mmHg  78 mmHg  77 bpm  16 rpm  98.4 F  139.25 lbs  
61 in     26.3108 kg/m  1.6488 m     100 %

 

 2016  8:29:00 AM  122 mmHg  70 mmHg  72 bpm  16 rpm  97.8 F  137.125 lbs  
61 in     25.91 kg/m2  1.64 m2     100 %

 

 2015  9:33:00 AM  120 mmHg  68 mmHg  73 bpm     98.7 F  144.375 lbs  61 
in     27.2791 kg/m  1.6788 m     99 %

 

 2015  9:05:00 AM  110 mmHg  75 mmHg  85 bpm  16 rpm  96.7 F  144.375 lbs  
61 in     27.28 kg/m2  1.68 m2     99 %

 

 2015  1:05:00 PM  108 mmHg  62 mmHg  78 bpm  18 rpm  96.9 F  142.375 lbs  
61 in     26.9012 kg/m  1.6672 m     100 %

 

 2014  9:31:00 AM  126 mmHg  66 mmHg  79 bpm  18 rpm  98.7 F  149 lbs  61 
in     28.15 kg/m2  1.71 m2     98 %

 

 10/6/2014  9:02:00 AM  110 mmHg  74 mmHg  94 bpm  18 rpm  98.1 F  145.4 lbs  
61 in     27.4728 kg/m  1.6848 m     97 %

 

 10/2/2014  9:14:00 AM  124 mmHg  74 mmHg  79 bpm  18 rpm  98 F  147.25 lbs  61 
in     27.82 kg/m2  1.70 m2     98 %

 

 2014  9:22:00 AM  124 mmHg  76 mmHg  89 bpm  18 rpm  98.1 F  148 lbs  61 
in     27.9641 kg/m  1.6998 m     100 %

 

 2014  8:27:00 AM  118 mmHg  65 mmHg  74 bpm  16 rpm  97.7 F  148 lbs  61 
in     27.96 kg/m2  1.70 m2     99 %

 

 2014  9:48:00 AM  125 mmHg  70 mmHg  93 bpm  18 rpm  96.7 F  156 lbs  61 
in     29.4756 kg/m  1.7451 m     100 %

 

 2013  8:35:00 AM  122 mmHg  74 mmHg  84 bpm  16 rpm  97.9 F  155.375 lbs 
                99 %

 

 6/10/2013  8:30:00 AM  130 mmHg  82 mmHg  79 bpm  16 rpm  97.9 F  161 lbs     
            98 %

 

 2013  8:50:00 AM  124 mmHg  68 mmHg  66 bpm  18 rpm  97.6 F  157.5 lbs  
61 in     29.7591 kg/m  1.7535 m      

 

 2012  1:46:00 PM  120 mmHg  72 mmHg  75 bpm  16 rpm  97.6 F  156.125 lbs
                 98 %

 

 12/10/2012  8:32:00 AM  122 mmHg  82 mmHg  70 bpm  16 rpm  97.3 F  157 lbs    
             99 %

 

 8/15/2012  9:00:00 AM  130 mmHg  76 mmHg  86 bpm  16 rpm  97.4 F  159 lbs     
            100 %

 

 2012  11:02:00 AM  128 mmHg  64 mmHg  66 bpm  18 rpm  97.4 F  162.125 lbs
  61 in     30.6329 kg/m  1.7791 m      

 

 2012  8:45:00 AM  130 mmHg  70 mmHg  82 bpm  16 rpm  98.2 F  160.125 lbs 
                100 %

 

 2/15/2012  9:29:00 AM  122 mmHg  80 mmHg  86 bpm  16 rpm  97.4 F  159.375 lbs  
61 in     30.1133 kg/m  1.7639 m     99 %

 

 2012  9:41:00 AM  132 mmHg  74 mmHg  66 bpm  18 rpm  98.4 F  160.375 lbs  
61 in     30.30 kg/m2  1.77 m2      

 

 2011  10:08:00 AM  134 mmHg  82 mmHg  80 bpm  16 rpm  98 F  160 lbs  61 
in     30.2314 kg/m  1.7674 m     99 %

 

 2011  3:56:00 PM  130 mmHg  80 mmHg                              

 

 2011  8:55:00 AM  130 mmHg  74 mmHg  88 bpm  16 rpm  98.2 F  158.25 lbs  
               98 %

 

 2011  8:54:00 AM  136 mmHg  82 mmHg  102 bpm  16 rpm  98.1 F  153.5 lbs   
              99 %

 

 2011  8:49:00 AM  122 mmHg  88 mmHg  88 bpm  16 rpm  98.6 F  152.25 lbs  
               99 %

 

 2011  10:02:00 AM  140 mmHg  82 mmHg  96 bpm  20 rpm  98.8 F             
       100 %

 

 2011  9:14:00 AM  156 mmHg  98 mmHg  110 bpm  24 rpm  98.5 F  153 lbs    
             100 %

 

 2011  8:50:00 AM  160 mmHg  84 mmHg  100 bpm  16 rpm  98.7 F  155 lbs    
             100 %

 

 2011  1:25:00 PM  152 mmHg  100 mmHg  82 bpm  16 rpm  97.2 F  156.375 lbs 
                100 %

 

 2011  9:55:00 AM  144 mmHg  90 mmHg                              

 

 2010  9:24:00 AM  138 mmHg  84 mmHg                              

 

 2010  8:58:00 AM  160 mmHg  94 mmHg                              

 

 2010  8:33:00 AM  142 mmHg  90 mmHg  100 bpm  16 rpm  98.1 F  158.375 
lbs                  

 

 2010  9:24:00 AM  160 mmHg  102 mmHg  88 bpm  18 rpm  99 F  157.125 lbs  
62 in     28.7382 kg/m  1.7657 m      

 

 3/19/2010  11:01:00 AM  140 mmHg  90 mmHg                              

 

 2009  10:08:00 AM  142 mmHg  86 mmHg  72 bpm  16 rpm  98.1 F  154.125 
lbs  61 in     29.1214 kg/m  1.7346 m      







Social History







 Name  Description  Comments

 

       

 

 Children      

 

 lives alone      

 

 Supervisor      

 

 Tobacco  Never smoker   







History of Procedures







 Date Ordered  Description  Order Status

 

 2011 12:00 AM  COMPREHEN METABOLIC PANEL  Reviewed

 

 2011 12:00 AM  ASSAY THYROID STIM HORMONE  Reviewed

 

 2011 12:00 AM  COMPREHEN METABOLIC PANEL  Reviewed

 

 2011 12:00 AM  LIPID PANEL  Reviewed

 

 2011 12:00 AM  ASSAY THYROID STIM HORMONE  Reviewed

 

 2011 12:00 AM  COMPLETE CBC W/AUTO DIFF WBC  Reviewed

 

 2015 12:00 AM  COMPLETE CBC W/AUTO DIFF WBC  Reviewed

 

 2015 12:00 AM  COMPREHEN METABOLIC PANEL  Reviewed

 

 2015 12:00 AM  LIPID PANEL  Reviewed

 

 2015 12:00 AM  ASSAY THYROID STIM HORMONE  Reviewed

 

 2011 12:00 AM  COMPREHEN METABOLIC PANEL  Reviewed

 

 2011 12:00 AM  COMPREHEN METABOLIC PANEL  Reviewed

 

 2011 12:00 AM  LIPID PANEL  Reviewed

 

 2011 12:00 AM  ASSAY THYROID STIM HORMONE  Reviewed

 

 10/28/2011 12:00 AM  ***Immunization administration, Medicare flu  Reviewed

 

 10/28/2011 12:00 AM  Fluzone ** MEDICARE Only **  Reviewed

 

 2010 11:24 AM  NO CHARGE OV  Reviewed

 

 2010 11:26 AM  NO CHARGE OV  Reviewed

 

 2011 12:00 AM  COMPREHEN METABOLIC PANEL  Reviewed

 

 2011 12:00 AM  LIPID PANEL  Reviewed

 

 2011 12:00 AM  ASSAY THYROID STIM HORMONE  Reviewed

 

 2011 12:00 AM  COMPLETE CBC W/AUTO DIFF WBC  Reviewed

 

 2011 12:00 AM  Wrist Support  Reviewed

 

 2016 12:00 AM  Screening mammography, bilateral  Reviewed

 

 2016 12:00 AM  DXA BONE DENSITY AXIAL  Returned

 

 2016 12:00 AM  TETANUS VACCINE IM  Reviewed

 

 2016 12:00 AM  HEP B VACC ADULT 3 DOSE IM  Reviewed

 

 2012 12:00 AM  TISSUE EXAM FOR FUNGI  Reviewed

 

 2012 12:00 AM  SMEAR WET MOUNT SALINE/INK  Reviewed

 

 2016 12:00 AM  TETANUS VACCINE IM  Reviewed

 

 2016 12:00 AM  HEP B VACC ADULT 3 DOSE IM  Reviewed

 

 2/15/2012 12:00 AM  THER/PROPH/DIAG INJ SC/IM  Reviewed

 

 2/15/2012 12:00 AM  Bicillin CR [facility]#66441400287[facility]  Reviewed

 

 2/15/2012 12:00 AM  Depo-Medrol 40 mg NDC#5730810547  Reviewed

 

 10/13/2016 12:00 AM  COMPLETE CBC W/AUTO DIFF WBC  Returned

 

 10/13/2016 12:00 AM  COMPREHEN METABOLIC PANEL  Returned

 

 10/13/2016 12:00 AM  ASSAY THYROID STIM HORMONE  Returned

 

 10/13/2016 12:00 AM  LIPID PANEL  Returned

 

 2016 12:00 AM  TETANUS VACCINE IM  Reviewed

 

 2016 12:00 AM  HEP B VACC ADULT 3 DOSE IM  Reviewed

 

 2017 12:00 AM  ASSAY OF IRON  Returned

 

 2017 12:00 AM  IRON BINDING TEST  Returned

 

 2017 12:00 AM  ASSAY OF TRANSFERRIN  Returned

 

 2017 12:00 AM  OCCULT BLD FECES 1-3 TESTS  Returned

 

 2017 12:00 AM  COMPLETE CBC AUTOMATED  Returned

 

 8/15/2012 12:00 AM  COMPREHEN METABOLIC PANEL  Reviewed

 

 8/15/2012 12:00 AM  ASSAY THYROID STIM HORMONE  Reviewed

 

 8/15/2012 12:00 AM  COMPLETE CBC W/AUTO DIFF WBC  Reviewed

 

 8/15/2012 12:00 AM  Wrist Support  Reviewed

 

 2017 12:00 AM  METABOLIC PANEL TOTAL CA  Returned

 

 2017 12:00 AM  METABOLIC PANEL TOTAL CA  Returned

 

 2017 12:00 AM  Screening mammography, bilateral  Returned

 

 10/29/2012 12:00 AM  Flu Injection 3 Years And Above NDC# [address]-47  [facility]  
Reviewed

 

 12/10/2012 12:00 AM  IMMUNIZATION ADMIN  Reviewed

 

 12/10/2012 12:00 AM  Pneumovax Injection - [facility]  Reviewed

 

 2018 12:00 AM  THER/PROPH/DIAG INJ SC/IM  Reviewed

 

 2018 12:00 AM  Decadron 4mg Injection  Reviewed

 

 2018 12:00 AM  Depo-Medrol 40mg Injection  Reviewed

 

 2012 12:00 AM  TRICHOMONAS ASSAY W/OPTIC  Reviewed

 

 2018 12:00 AM  COMPLETE CBC W/AUTO DIFF WBC  Returned

 

 2018 12:00 AM  COMPREHEN METABOLIC PANEL  Returned

 

 2018 12:00 AM  LIPID PANEL  Returned

 

 2018 12:00 AM  ASSAY THYROID STIM HORMONE  Returned

 

 2013 12:00 AM  THER/PROPH/DIAG INJ SC/IM  Reviewed

 

 2013 12:00 AM  Bicillin CR, 1.2 million units NDC# 46704-043-07  Reviewed

 

 2018 12:00 AM  Mammogram, screening, bilateral  Reviewed

 

 2018 12:00 AM  DXA BONE DENSITY AXIAL  Reviewed

 

 2018 12:00 AM  COMPLETE CBC W/AUTO DIFF WBC  Reviewed

 

 2018 12:00 AM  COMPREHEN METABOLIC PANEL  Reviewed

 

 2018 12:00 AM  LIPID PANEL  Reviewed

 

 2018 12:00 AM  ASSAY THYROID STIM HORMONE  Reviewed

 

 2013 12:00 AM  COMPREHEN METABOLIC PANEL  Reviewed

 

 2013 12:00 AM  LIPID PANEL  Reviewed

 

 2013 12:00 AM  COMPLETE CBC W/AUTO DIFF WBC  Reviewed

 

 2013 12:00 AM  ASSAY THYROID STIM HORMONE  Reviewed

 

 6/10/2013 12:00 AM  MAMMOGRAM SCREENING  Reviewed

 

 6/10/2013 12:00 AM  CYTOPATH C/V MANUAL  Reviewed

 

 12/10/2013 12:00 AM  LIPID PANEL  Reviewed

 

 12/10/2013 12:00 AM  ASSAY THYROID STIM HORMONE  Reviewed

 

 12/10/2013 12:00 AM  COMPLETE CBC W/AUTO DIFF WBC  Reviewed

 

 12/10/2013 12:00 AM  COMPREHEN METABOLIC PANEL  Reviewed

 

 2010 12:00 AM  CYTOPATH C/V MANUAL  Reviewed

 

 2010 12:00 AM  SMEAR WET MOUNT SALINE/INK  Reviewed

 

 2010 12:00 AM  LIPID PANEL  Reviewed

 

 2010 12:00 AM  ASSAY THYROID STIM HORMONE  Reviewed

 

 2010 12:00 AM  COMPLETE CBC W/AUTO DIFF WBC  Reviewed

 

 2010 12:00 AM  COMPREHEN METABOLIC PANEL  Reviewed

 

 10/28/2013 12:00 AM  Flu Injection 3 Years And Above NDC# [address]-23  C  
Reviewed

 

 2010 8:48 AM  Blood Pressure Check-no charge  Reviewed

 

 2010 12:00 AM  Blood Pressure Check-no charge  Reviewed

 

 2014 12:00 AM  METABOLIC PANEL TOTAL CA  Reviewed

 

 2014 12:00 AM  ASSAY THYROID STIM HORMONE  Reviewed

 

 2014 12:00 AM  ASSAY OF FREE THYROXINE  Reviewed

 

 2014 12:00 AM  MAMMOGRAM SCREENING  Reviewed

 

 2014 12:00 AM  CT BONE DENSITY AXIAL  Reviewed

 

 2014 12:00 AM  COMPLETE CBC W/AUTO DIFF WBC  Reviewed

 

 2014 12:00 AM  COMPREHEN METABOLIC PANEL  Reviewed

 

 2014 12:00 AM  LIPID PANEL  Reviewed

 

 2014 12:00 AM  ASSAY THYROID STIM HORMONE  Reviewed

 

 10/20/2010 12:00 AM  IMMUNIZATION ADMIN  Reviewed

 

 10/20/2010 12:00 AM  FLU VACCINE 3 YRS & > IM  Reviewed

 

 2010 12:00 AM  COMPREHEN METABOLIC PANEL  Reviewed

 

 2010 12:00 AM  LIPID PANEL  Reviewed

 

 2010 12:00 AM  ASSAY THYROID STIM HORMONE  Reviewed

 

 2010 12:00 AM  COMPLETE CBC W/AUTO DIFF WBC  Reviewed

 

 2010 12:00 AM  Blood Pressure Check-no charge  Reviewed

 

 10/2/2014 12:00 AM  THER/PROPH/DIAG INJ SC/IM  Reviewed

 

 10/2/2014 12:00 AM  Kenalog, Per 10 Mg NDC#2814-9293-34  Reviewed

 

 2014 12:00 AM  COMPLETE CBC W/AUTO DIFF WBC  Reviewed

 

 2014 12:00 AM  COMPREHEN METABOLIC PANEL  Reviewed

 

 2014 12:00 AM  LIPID PANEL  Reviewed

 

 2014 12:00 AM  ASSAY THYROID STIM HORMONE  Reviewed

 

 2015 12:00 AM  Rocephin 1 gram NDC#8478-5114-79  Reviewed

 

 2015 12:00 AM  THER/PROPH/DIAG INJ SC/IM  Reviewed

 

 2011 12:00 AM  COMPREHEN METABOLIC PANEL  Reviewed

 

 2011 12:00 AM  LIPID PANEL  Reviewed

 

 2011 12:00 AM  ASSAY THYROID STIM HORMONE  Reviewed

 

 2011 12:00 AM  COMPLETE CBC W/AUTO DIFF WBC  Reviewed

 

 2011 12:00 AM  METABOLIC PANEL TOTAL CA  Reviewed

 

 2011 12:00 AM  COMPREHEN METABOLIC PANEL  Reviewed

 

 2011 12:00 AM  ASSAY THYROID STIM HORMONE  Reviewed

 

 2011 12:00 AM  COMPLETE CBC W/AUTO DIFF WBC  Reviewed

 

 2011 12:00 AM  ASSAY OF LIPASE  Reviewed

 

 2011 12:00 AM  THER/PROPH/DIAG INJ SC/IM  Reviewed

 

 2011 12:00 AM  Phenergan 50 Mg Im  Bernadine  Reviewed

 

 2011 12:00 AM  METABOLIC PANEL TOTAL CA  Reviewed

 

 2011 12:00 AM  THER/PROPH/DIAG INJ SC/IM  Reviewed

 

 2011 12:00 AM  Phenergan 25 Mg Im  Bernadine  Reviewed

 

 2011 12:00 AM  METABOLIC PANEL TOTAL CA  Reviewed

 

 2011 12:00 AM  ASSAY THYROID STIM HORMONE  Reviewed

 

 2011 12:00 AM  THER/PROPH/DIAG INJ SC/IM  Reviewed

 

 2011 12:00 AM  Phenergan 50 Mg Im  Bernadine  Reviewed

 

 2011 12:00 AM  ASSAY OF SERUM SODIUM  Reviewed

 

 2011 12:00 AM  ASSAY OF SERUM SODIUM  Reviewed

 

 2011 12:00 AM  CYTOPATH C/V MANUAL  Reviewed

 

 2011 12:00 AM  ASSAY THYROID STIM HORMONE  Reviewed

 

 2015 12:00 AM  COMPLETE CBC W/AUTO DIFF WBC  Reviewed

 

 2015 12:00 AM  COMPREHEN METABOLIC PANEL  Reviewed

 

 2015 12:00 AM  LIPID PANEL  Reviewed

 

 2015 12:00 AM  ASSAY THYROID STIM HORMONE  Reviewed

 

 2015 12:00 AM  MAMMOGRAM SCREENING  Reviewed

 

 2015 12:00 AM  CYTOPATH TBS C/V MANUAL  Reviewed







Results Summary







 Date and Description  Results

 

 2010 9:25 AM  Colonoscopy-Women and Men over 50 Abnormal Mammogram -Women 
over 41 Declined Pap Smear Declined 

 

 2010 10:41 AM  WET PREP NO TRICHOMONADS SEEN  No  Few 

 

 2010 8:15 AM  TRIGLYCERIDES 174.0 mg/dLCHOLESTEROL 175.0 mg/dLHDL 58.0 mg/
dLTOT CHOL/HDL 3.0 LDL (CALC) 82.0 mg/dLTSH 0.790 uIU/mLGLUCOSE 95.0 mg/
dLSODIUM 136.0 mmol/LPOTASSIUM 4.50 mmol/LCHLORIDE 99.0 mmol/LCO2 26.0 mmol/
LBUN 12.0 mg/dLCREATININE 0.80 mg/dLSGOT/AST 32.0 IU/LSGPT/ALT 33.0 IU/LALK 
PHOS 103.0 IU/LTOTAL PROTEIN 7.60 g/dLALBUMIN 4.60 g/dLTOTAL BILI 0.60 mg/
dLCALCIUM 9.80 mg/dLAGE 64 GFR NonAA 72 GFR AA 87 eGFR >60 mL/min/1.73 m2eGFR AA
* >60 WBC 5.3 RBC 4.13 HGB 13.10 g/dLHCT 39.20 %MCV 95.0 fLMCH 31.70 pgMCHC 
33.40 g/dLRDW SD 44 RDW CV 12.70 %MPV 9.80 fLPLT 257 NRBC# 0.00 NRBC% 0.0 %NEUT 
57.90 %%LYMP 26.50 %%MONO 11.60 %%EOS 3.20 %%BASO 0.80 %#NEUT 3.04 #LYMP 1.39 #
MONO 0.61 #EOS 0.17 #BASO 0.04 MANUAL DIFF NOT IND 

 

 2011 9:45 AM  GLUCOSE 91.0 mg/dLSODIUM 133.0 mmol/LPOTASSIUM 4.40 mmol/
LCHLORIDE 97.0 mmol/LCO2 24.0 mmol/LBUN 9.0 mg/dLCREATININE 0.70 mg/dLCALCIUM 
10.0 mg/dLAGE 65 GFR NonAA 84 GFR  eGFR >60 mL/min/1.73 m2eGFR AA* >60 

 

 2011 10:25 AM  LIPASE 29.0 U/LTSH 0.10 uIU/mLGLUCOSE 115.0 mg/dLSODIUM 
127.0 mmol/LPOTASSIUM 5.30 mmol/LCHLORIDE 93.0 mmol/LCO2 18.0 mmol/LBUN 9.0 mg/
dLCREATININE 0.70 mg/dLSGOT/AST 62.0 IU/LSGPT/ALT 46.0 IU/LALK PHOS 100.0 IU/
LTOTAL PROTEIN 8.60 g/dLALBUMIN 4.90 g/dLTOTAL BILI 0.60 mg/dLCALCIUM 9.90 mg/
dLAGE 65 GFR NonAA 84 GFR  eGFR >60 mL/min/1.73 m2eGFR AA* >60 WBC 6.9 
RBC 4.48 HGB 14.40 g/dLHCT 40.90 %MCV 91.0 fLMCH 32.10 pgMCHC 35.20 g/dLRDW SD 
41 RDW CV 12.50 %MPV 9.30 fLPLT 260 NRBC# 0.00 NRBC% 0.0 %NEUT 74.90 %%LYMP 
15.10 %%MONO 9.40 %%EOS 0.30 %%BASO 0.30 %#NEUT 5.15 #LYMP 1.04 #MONO 0.65 #EOS 
0.02 #BASO 0.02 MANUAL DIFF NOT IND 

 

 2011 9:07 AM  GLUCOSE 92.0 mg/dLSODIUM 131.0 mmol/LPOTASSIUM 4.20 mmol/
LCHLORIDE 99.0 mmol/LCO2 22.0 mmol/LBUN 9.0 mg/dLCREATININE 0.70 mg/dLCALCIUM 
9.20 mg/dLAGE 65 GFR NonAA 84 GFR  eGFR >60 mL/min/1.73 m2eGFR AA* >60 

 

 2011 11:06 AM  TSH 0.510 uIU/mLGLUCOSE 99.0 mg/dLSODIUM 132.0 mmol/
LPOTASSIUM 3.50 mmol/LCHLORIDE 95.0 mmol/LCO2 23.0 mmol/LBUN 9.0 mg/
dLCREATININE 0.80 mg/dLCALCIUM 10.40 mg/dLAGE 65 GFR NonAA 72 GFR AA 87 eGFR >
60 mL/min/1.73 m2eGFR AA* >60 

 

 2011 9:45 AM  SODIUM 132.0 mmol/L

 

 2011 9:27 AM  SODIUM 137.0 mmol/L

 

 2011 9:55 AM  TSH 1.070 uIU/mL

 

 2011 8:55 AM  GLUCOSE 102.0 mg/dLSODIUM 135.0 mmol/LPOTASSIUM 4.20 mmol/
LCHLORIDE 102.0 mmol/LCO2 24.0 mmol/LBUN 15.0 mg/dLCREATININE 0.80 mg/dLSGOT/
AST 30.0 IU/LSGPT/ALT 35.0 IU/LALK PHOS 119.0 IU/LTOTAL PROTEIN 7.50 g/
dLALBUMIN 4.30 g/dLTOTAL BILI 0.30 mg/dLCALCIUM 9.20 mg/dLAGE 65 GFR NonAA 72 
GFR AA 87 eGFR >60 mL/min/1.73 m2eGFR AA* >60 TSH 1.130 uIU/mL

 

 2011 8:55 AM  GLUCOSE 98.0 mg/dLSODIUM 137.0 mmol/LPOTASSIUM 3.90 mmol/
LCHLORIDE 103.0 mmol/LCO2 25.0 mmol/LBUN 14.0 mg/dLCREATININE 0.70 mg/dLSGOT/
AST 33.0 IU/LSGPT/ALT 36.0 IU/LALK PHOS 111.0 IU/LTOTAL PROTEIN 8.30 g/
dLALBUMIN 4.40 g/dLTOTAL BILI 0.50 mg/dLCALCIUM 9.40 mg/dLAGE 66 GFR NonAA 84 
GFR  eGFR >60 mL/min/1.73 m2eGFR AA* >60 TRIGLYCERIDES 109.0 mg/
dLCHOLESTEROL 153.0 mg/dLHDL 54.0 mg/dLTOT CHOL/HDL 2.8 LDL (CALC) 77.0 mg/
dLTSH 1.330 uIU/mL

 

 2011 10:17 AM  Colonoscopy-Women and Men over 50 Declined Mammogram -
Women over 40 Normal Pap Smear Negative 

 

 2012 10:33 AM  WET PREP NO TRICH SEEN CLUE CELLS NONE SEEN 

 

 2012 8:45 AM  WBC 5.2 RBC 3.96 HGB 12.70 g/dLHCT 37.80 %MCV 96.0 fLMCH 
32.10 pgMCHC 33.60 g/dLRDW SD 46 RDW CV 13.30 %MPV 9.70 fLPLT 297 NRBC# 0.00 
NRBC% 0.0 %NEUT 57.70 %%LYMP 28.50 %%MONO 10.30 %%EOS 2.50 %%BASO 1.0 %#NEUT 
3.02 #LYMP 1.49 #MONO 0.54 #EOS 0.13 #BASO 0.05 MANUAL DIFF NOT IND GLUCOSE 
97.0 mg/dLSODIUM 137.0 mmol/LPOTASSIUM 4.40 mmol/LCHLORIDE 101.0 mmol/LCO2 23.0 
mmol/LBUN 17.0 mg/dLCREATININE 0.80 mg/dLSGOT/AST 30.0 IU/LSGPT/ALT 33.0 IU/
LALK PHOS 95.0 IU/LTOTAL PROTEIN 7.40 g/dLALBUMIN 4.40 g/dLTOTAL BILI 0.60 mg/
dLCALCIUM 9.70 mg/dLAGE 66 GFR NonAA 72 GFR AA 87 eGFR 60 eGFR AA* 60 
TRIGLYCERIDES 130.0 mg/dLCHOLESTEROL 157.0 mg/dLHDL 56.0 mg/dLTOT CHOL/HDL 2.8 
LDL (CALC) 75.0 mg/dLTSH 0.940 uIU/mL

 

 2012 8:45 AM  WBC 5.1 RBC 3.91 HGB 12.50 g/dLHCT 36.30 %MCV 93.0 fLMCH 
32.0 pgMCHC 34.40 g/dLRDW SD 43 RDW CV 12.60 %MPV 9.30 fLPLT 244 NRBC# 0.00 NRBC
% 0.0 %NEUT 57.60 %%LYMP 27.50 %%MONO 9.10 %%EOS 5.0 %%BASO 0.80 %#NEUT 2.91 #
LYMP 1.39 #MONO 0.46 #EOS 0.25 #BASO 0.04 MANUAL DIFF NOT IND 

 

 12/10/2012 8:34 AM  Colonoscopy-Women and Men over 50 Declined Mammogram -
Women over 40 Declined Pap Smear Declined 

 

 2012 5:02 PM  WET PREP NO TRICH SEEN CLUE CELLS NONE SEEN 

 

 2013 8:25 AM  WBC 4.2 RBC 3.96 HGB 12.90 g/dLHCT 37.0 %MCV 93.0 fLMCH 
32.60 pgMCHC 34.90 g/dLRDW SD 43 RDW CV 12.60 %MPV 9.70 fLPLT 254 NRBC# 0.00 
NRBC% 0.0 %NEUT 54.40 %%LYMP 30.40 %%MONO 10.80 %%EOS 3.40 %%BASO 1.0 %#NEUT 
2.26 #LYMP 1.26 #MONO 0.45 #EOS 0.14 #BASO 0.04 MANUAL DIFF NOT IND TSH 1.230 
uIU/mLGLUCOSE 94.0 mg/dLSODIUM 136.0 mmol/LPOTASSIUM 4.30 mmol/LCHLORIDE 99.0 
mmol/LCO2 25.0 mmol/LBUN 10.0 mg/dLCREATININE 0.80 mg/dLSGOT/AST 36.0 IU/LSGPT/
ALT 36.0 IU/LALK PHOS 84.0 IU/LTOTAL PROTEIN 7.90 g/dLALBUMIN 4.40 g/dLTOTAL 
BILI 0.70 mg/dLCALCIUM 9.80 mg/dLAGE 67 GFR NonAA 72 GFR AA 87 eGFR 60 eGFR AA* 
61 TRIGLYCERIDES 122.0 mg/dLCHOLESTEROL 141.0 mg/dLHDL 47.0 mg/dLTOT CHOL/HDL 
3.0 LDL (CALC) 70.0 mg/dL

 

 2013 8:45 AM  WBC 5.3 RBC 4.01 HGB 13.0 g/dLHCT 37.60 %MCV 94.0 fLMCH 
32.40 pgMCHC 34.60 g/dLRDW SD 43 RDW CV 12.50 %MPV 9.40 fLPLT 248 NRBC# 0.00 
NRBC% 0.0 %NEUT 58.70 %%LYMP 27.80 %%MONO 9.80 %%EOS 2.80 %%BASO 0.90 %#NEUT 
3.12 #LYMP 1.48 #MONO 0.52 #EOS 0.15 #BASO 0.05 MANUAL DIFF NOT IND TSH 1.570 
uIU/mLGLUCOSE 94.0 mg/dLSODIUM 134.0 mmol/LPOTASSIUM 4.10 mmol/LCHLORIDE 101.0 
mmol/LCO2 23.0 mmol/LBUN 11.0 mg/dLCREATININE 0.80 mg/dLSGOT/AST 41.0 IU/LSGPT/
ALT 44.0 IU/LALK PHOS 109.0 IU/LTOTAL PROTEIN 7.90 g/dLALBUMIN 4.70 g/dLTOTAL 
BILI 0.80 mg/dLCALCIUM 10.40 mg/dLAGE 68 GFR NonAA 72 GFR AA 87 eGFR >60 mL/min/
1.73 m2eGFR AA* >60 TRIGLYCERIDES 163.0 mg/dLCHOLESTEROL 138.0 mg/dLHDL 49.0 mg/
dLTOT CHOL/HDL 2.8 LDL (CALC) 56.0 mg/dL

 

 2014 8:58 AM  GLUCOSE 86.0 mg/dLSODIUM 134.0 mmol/LPOTASSIUM 4.20 mmol/
LCHLORIDE 99.0 mmol/LCO2 25.0 mmol/LBUN 14.0 mg/dLCREATININE 0.80 mg/dLCALCIUM 
10.10 mg/dLAGE 68 GFR NonAA 72 GFR AA 87 eGFR >60 mL/min/1.73 m2eGFR AA* >60 
FREE T4 1.18 TSH 1.20 uIU/mL

 

 2014 9:50 AM  WBC 5.7 RBC 4.03 HGB 12.90 g/dLHCT 37.90 %MCV 94.0 fLMCH 
32.0 pgMCHC 34.0 g/dLRDW SD 44 RDW CV 12.70 %MPV 9.70 fLPLT 292 NRBC# 0.00 NRBC
% 0.0 %NEUT 62.70 %%LYMP 21.80 %%MONO 11.0 %%EOS 3.40 %%BASO 1.10 %#NEUT 3.55 #
LYMP 1.23 #MONO 0.62 #EOS 0.19 #BASO 0.06 MANUAL DIFF NOT IND GLUCOSE 98.0 mg/
dLSODIUM 135.0 mmol/LPOTASSIUM 4.30 mmol/LCHLORIDE 102.0 mmol/LCO2 22.0 mmol/
LBUN 10.0 mg/dLCREATININE 0.80 mg/dLSGOT/AST 34.0 IU/LSGPT/ALT 32.0 IU/LALK 
PHOS 95.0 IU/LTOTAL PROTEIN 7.70 g/dLALBUMIN 4.30 g/dLTOTAL BILI 0.80 mg/
dLCALCIUM 9.10 mg/dLAGE 68 GFR NonAA 72 GFR AA 87 eGFR 60 eGFR AA* 60 
TRIGLYCERIDES 108.0 mg/dLCHOLESTEROL 146.0 mg/dLHDL 56.0 mg/dLTOT CHOL/HDL 2.6 
LDL (CALC) 68.0 mg/dLTSH 0.90 uIU/mL

 

 2014 8:40 AM  WBC 4.4 RBC 4.13 HGB 13.20 g/dLHCT 38.90 %MCV 94.0 fLMCH 
32.0 pgMCHC 33.90 g/dLRDW SD 47 RDW CV 13.50 %MPV 9.80 fLPLT 279 NRBC# 0.00 NRBC
% 0.0 %NEUT 63.80 %%LYMP 24.60 %%MONO 9.30 %%EOS 1.60 %%BASO 0.70 %#NEUT 2.80 #
LYMP 1.08 #MONO 0.41 #EOS 0.07 #BASO 0.03 MANUAL DIFF NOT IND GLUCOSE 96.0 mg/
dLSODIUM 136.0 mmol/LPOTASSIUM 4.10 mmol/LCHLORIDE 99.0 mmol/LCO2 23.0 mmol/
LBUN 8.0 mg/dLCREATININE 0.80 mg/dLSGOT/AST 31.0 IU/LSGPT/ALT 27.0 IU/LALK PHOS 
85.0 IU/LTOTAL PROTEIN 7.60 g/dLALBUMIN 4.40 g/dLTOTAL BILI 0.80 mg/dLCALCIUM 
10.20 mg/dLAGE 69 GFR NonAA 71 GFR AA 86 eGFR 60 eGFR AA* 60 TRIGLYCERIDES 61.0 
mg/dLCHOLESTEROL 148.0 mg/dLHDL 71.0 mg/dLTOT CHOL/HDL 2.1 LDL (CALC) 65.0 mg/
dLTSH 0.990 uIU/mL

 

 2015 8:32 AM  WBC 4.8 RBC 3.98 HGB 12.70 g/dLHCT 37.30 %MCV 94.0 fLMCH 
31.90 pgMCHC 34.0 g/dLRDW SD 43 RDW CV 12.70 %MPV 9.50 fLPLT 270 NRBC# 0.00 NRBC
% 0.0 %NEUT 57.30 %%LYMP 25.0 %%MONO 13.0 %%EOS 3.60 %%BASO 1.10 %#NEUT 2.73 #
LYMP 1.19 #MONO 0.62 #EOS 0.17 #BASO 0.05 MANUAL DIFF NOT IND TSH 0.640 uIU/
mLGLUCOSE 90.0 mg/dLSODIUM 136.0 mmol/LPOTASSIUM 4.0 mmol/LCHLORIDE 101.0 mmol/
LCO2 24.0 mmol/LBUN 10.0 mg/dLCREATININE 0.80 mg/dLSGOT/AST 34.0 IU/LSGPT/ALT 
32.0 IU/LALK PHOS 92.0 IU/LTOTAL PROTEIN 7.50 g/dLALBUMIN 4.40 g/dLTOTAL BILI 
0.70 mg/dLCALCIUM 9.50 mg/dLAGE 69 GFR NonAA 71 GFR AA 86 eGFR >60 mL/min/1.73 
m2eGFR AA* >60 TRIGLYCERIDES 107.0 mg/dLCHOLESTEROL 138.0 mg/dLHDL 58.0 mg/
dLTOT CHOL/HDL 2.4 LDL (CALC) 59.0 mg/dL

 

 2015 8:31 AM  WBC 4.6 RBC 3.97 HGB 12.90 g/dLHCT 38.0 %MCV 96.0 fLMCH 
32.50 pgMCHC 33.90 g/dLRDW SD 44 RDW CV 12.60 %MPV 9.0 fLPLT 248 NRBC# 0.00 NRBC
% 0.0 %NEUT 61.0 %%LYMP 24.70 %%MONO 10.20 %%EOS 3.0 %%BASO 1.10 %#NEUT 2.82 #
LYMP 1.14 #MONO 0.47 #EOS 0.14 #BASO 0.05 MANUAL DIFF NOT IND TRIGLYCERIDES 
105.0 mg/dLCHOLESTEROL 141.0 mg/dLHDL 58.0 mg/dLTOT CHOL/HDL 2.4 LDL (CALC) 
62.0 mg/dLGLUCOSE 93.0 mg/dLSODIUM 136.0 mmol/LPOTASSIUM 4.10 mmol/LCHLORIDE 
101.0 mmol/LCO2 25.0 mmol/LBUN 9.0 mg/dLCREATININE 0.80 mg/dLSGOT/AST 32.0 IU/
LSGPT/ALT 28.0 IU/LALK PHOS 95.0 IU/LTOTAL PROTEIN 7.30 g/dLALBUMIN 4.50 g/
dLTOTAL BILI 0.80 mg/dLCALCIUM 9.70 mg/dLAGE 70 GFR NonAA 71 GFR AA 86 eGFR >60 
mL/min/1.73meGFR AA* >60 TSH 1.10 uIU/mL

 

 2018 8:15 AM  TSH 1.03 TRIGLYCERIDES 73 CHOLESTEROL 198 HDL 84 TOT CHOL/
HDL 2.4 LDL (CALC) 99 WBC 4.6 RBC 4.03 HGB 13.1 HCT 38.9 MCV 97 MCH 32.5 MCHC 
33.7 RDW SD 45 RDW CV 12.5 MPV 9.8  NRBC# 0.00 NRBC% 0.0 %NEUT 60.3 %
LYMP 25.5 %MONO 9.6 %EOS 3.1 %BASO 1.3 #NEUT 2.77 #LYMP 1.17 #MONO 0.44 #EOS 
0.14 #BASO 0.06 MANUAL DIFF NOT IND GLUCOSE 108 SODIUM 136 POTASSIUM 3.8 
CHLORIDE 100 CO2 28 BUN 19 CREATININE 0.8 SGOT/AST 32 SGPT/ALT 25 ALK PHOS 107 
TOTAL PROTEIN 8.0 ALBUMIN 4.7 TOTAL BILI 0.4 CALCIUM 9.9 AGE 72 GFR NonAA 71 
GFR AA 86 eGFR 71 eGFR AA* >60 







History Of Immunizations







 Name  Date Admin  Mfg Name  Mfg Code  Trade Name  Lot#  Route  Inj  Vis Given  
Vis Pub  CVX

 

 Influenza  10/20/2010  [employer] pasteur  [facility]  FLUZONE  [id number]  Intramuscular  
Left Arm  10/20/2010  2010  999

 

 Influenza  10/28/2011  [employer] paste[employer]  [employer]  FLUZONE  [id number]  Intramuscular  
Left Arm  10/28/2011  2011  141

 

 Influenza  10/29/2012  [employer] pasteur  [employer]  FLUZONE  [id number]  Intramuscular  
Left Deltoid  10/29/2012  2012  141

 

 X  12/10/2012  [employer]  MSD  PNEUMOVAX 23  [id number]  Intramuscular  
Left Gluteous Medius  12/10/2012  2010  33

 

 Influenza  10/28/2013  [employer] pasteur  [facility]  Fluzone > 3 Years  [id number]  
Intramuscular  Right Deltoid  10/28/2013  2013  141

 

 X  9/2/2015  Not Entered  NE  PREVNAR 13     Not Entered  Not Entered  1  109

 

 Influenza  2015  Not Entered  NE  Not Entered     Not Entered  Not Entered
  2015  141

 

 HepB Adult  2016  [employer]  MSD  RECOMBIVAX-ADULT  [id number]  Not 
Entered  Left Deltoid  2016  43

 

 HepB Adult  2016  [employer]  MSD  RECOMBIVAX-ADULT  [id number]  
Intramuscular  Right Deltoid  2016  43

 

 ZVL  2012  Not Entered  NE  Not Entered     Not Entered  Not Entered  1  121

 

 Tdap  2012  Not Entered  NE  Not Entered     Not Entered  Not Entered  1  115

 

 Influenza  10/13/2016  Not Entered  NE  FLUZONE-HIGH DOSE     Intramuscular  
Left Arm  1  141

 

 HepB Adult  2016  [employer]  MSD  RECOMBIVAX-ADULT  [id number]  
Intramuscular  Right Deltoid  2016  43

 

 Influenza  10/30/2017  Not Entered  NE  Fluarix, quadrivalent, preservative 
free     Intramuscular  Left Arm  2017  150

 

 RZV  2018  [employer]  SK[employer]  SHINGRIX     Intramuscular  Left Arm  1  187







History of Past Illness







 Name  Date of Onset  Comments

 

 Benign Essential Hypertension  Dec 14 2009 10:10AM   

 

 Hyperlipidemia  Dec 14 2009 10:10AM   

 

 Hypothyroidism, Acquired  Dec 14 2009 10:10AM   

 

 hypothyroid      

 

 Hypertension      

 

 Hyperlipidemia      

 

 acid reflux      

 

 Hypertension, Benign Essential  2010  9:41AM   

 

 Hypertension, Benign Essential  2010 12:53PM   

 

 Routine gynecological examination  May  5 2010  9:28AM   

 

 Hypertension  May  5 2010  9:28AM   

 

 Hyperlipidemia, unspecified  May  5 2010  9:28AM   

 

 Hypothyroidism, Acquired  May  5 2010  9:28AM   

 

 Vulvovaginitis  May  5 2010  9:28AM   

 

 Rhinitis, Allergic  May  5 2010  9:28AM   

 

 Hypertension, Benign Essential  May 19 2010  8:48AM   

 

 Hypertension, Benign Essential  May 25 2010  9:39AM   

 

 Flu  Oct 20 2010 12:01PM   

 

 Essential Hypertension  2010  8:34AM   

 

 Hyperlipidemia  2010  8:34AM   

 

 Gastroesophageal Reflux  2010  8:34AM   

 

 Hypothyroidism, Acquired  2010  8:34AM   

 

 Hypertension, Benign Essential  2010  9:22AM   

 

 Hypertension, Benign Essential  Dec 15 2010  9:07AM   

 

 Essential Hypertension  2011  1:31PM   

 

 Hyperlipidemia  2011  1:31PM   

 

 Gastroesophageal Reflux  2011  1:31PM   

 

 Hypothyroidism, Acquired  2011  1:31PM   

 

 Essential Hypertension  2011  8:51AM   

 

 Hyperlipidemia  2011  8:51AM   

 

 Gastroesophageal Reflux  2011  8:51AM   

 

 Hypothyroidism, Acquired  2011  8:51AM   

 

 Essential Hypertension  2011  9:13AM   

 

 Hyperlipidemia  2011  9:13AM   

 

 Gastroesophageal Reflux  2011  9:13AM   

 

 Hypothyroidism, Acquired  2011  9:13AM   

 

 Nausea With Vomiting  2011  1:18PM   

 

 Hyponatremia  2011  8:46AM   

 

 Nausea  2011  9:13AM   

 

 Hypothyroidism  2011 11:10AM   

 

 Hyponatremia  2011 11:10AM   

 

 Essential Hypertension  2011 10:05AM   

 

 Hyperlipidemia  2011 10:05AM   

 

 Gastroesophageal Reflux  2011 10:05AM   

 

 Nausea  2011 10:05AM   

 

 Hypothyroidism, Acquired  2011 10:05AM   

 

 Hyponatremia  May  6 2011 11:34AM   

 

 Essential Hypertension  May 16 2011  8:50AM   

 

 Hyperlipidemia  May 16 2011  8:50AM   

 

 Gastroesophageal Reflux  May 16 2011  8:50AM   

 

 Nausea  May 16 2011  8:50AM   

 

 Hypothyroidism, Acquired  May 16 2011  8:50AM   

 

 Hyponatremia  May 16 2011  8:50AM   

 

 Routine gynecological examination  2011  8:54AM   

 

 Hypertension  2011  8:54AM   

 

 Hyperlipidemia, unspecified  2011  8:54AM   

 

 Hypothyroidism, Acquired  2011  8:54AM   

 

 Rhinitis, Allergic  2011  8:54AM   

 

 Hypothyroidism  Aug 29 2011 12:37PM   

 

 Hyponatremia  Aug 29 2011 12:37PM   

 

 Essential Hypertension  Sep 12 2011  8:53AM   

 

 Hyperlipidemia  Sep 12 2011  8:53AM   

 

 Gastroesophageal Reflux  Sep 12 2011  8:53AM   

 

 Hypothyroidism, Acquired  Sep 12 2011  8:53AM   

 

 Hyponatremia  Sep 12 2011  8:53AM   

 

 Hypertension  Sep 29 2011  9:41AM   

 

 Hyperlipidemia  Dec  8 2011  8:31AM   

 

 Hypothyroidism  Dec  8 2011  8:31AM   

 

 Hypertension  Dec  8 2011  8:31AM   

 

 Flu  Dec  9 2011 10:02AM   

 

 Essential Hypertension  Dec 13 2011 10:13AM   

 

 Hyperlipidemia  Dec 13 2011 10:13AM   

 

 Gastroesophageal Reflux  Dec 13 2011 10:13AM   

 

 Hypothyroidism, Acquired  Dec 13 2011 10:13AM   

 

 Hyponatremia  Dec 13 2011 10:13AM   

 

 Vaginal Discharge  2012  9:43AM   

 

 Rhinitis, Allergic  Feb 15 2012  9:31AM   

 

 Eustachian Tube Dysfunction  Feb 15 2012  9:31AM   

 

 Pharyngitis, Acute  Feb 15 2012  9:31AM   

 

 Routine gynecological examination  2012  8:48AM   

 

 Hypertension  2012  8:48AM   

 

 Hyperlipidemia, unspecified  2012  8:48AM   

 

 Hypothyroidism, Acquired  2012  8:48AM   

 

 Rhinitis, Allergic  2012  8:48AM   

 

 Candidiasis, Vulvovaginal  2012 11:04AM   

 

 Essential Hypertension  Aug 15 2012  9:02AM   

 

 Hyperlipidemia  Aug 15 2012  9:02AM   

 

 Gastroesophageal Reflux  Aug 15 2012  9:02AM   

 

 Hypothyroidism, Acquired  Aug 15 2012  9:02AM   

 

 Hyponatremia  Aug 15 2012  9:02AM   

 

 Atrophic Vaginitis, Postmenopausal  Aug 15 2012  9:02AM   

 

 Flu  Oct 29 2012  9:24AM   

 

 Essential Hypertension  Dec 10 2012  8:35AM   

 

 Hyperlipidemia  Dec 10 2012  8:35AM   

 

 Gastroesophageal Reflux  Dec 10 2012  8:35AM   

 

 Hypothyroidism, Acquired  Dec 10 2012  8:35AM   

 

 Hyponatremia  Dec 10 2012  8:35AM   

 

 Atrophic Vaginitis, Postmenopausal  Dec 10 2012  8:35AM   

 

 Pneumococcus  Dec 10 2012  2:07PM   

 

 Vaginal Discharge  Dec 20 2012  1:50PM   

 

 Essential Hypertension  Dec 20 2012  1:50PM   

 

 Hyperlipidemia  Dec 20 2012  1:50PM   

 

 Gastroesophageal Reflux  Dec 20 2012  1:50PM   

 

 Hypothyroidism, Acquired  Dec 20 2012  1:50PM   

 

 Atrophic Vaginitis, Postmenopausal  Dec 20 2012  1:50PM   

 

 Upper Respiratory Infections  2013  8:52AM   

 

 Hyperlipidemia  2013  8:21AM   

 

 Hypertension  2013  8:21AM   

 

 Hypothyroidism  2013  8:21AM   

 

 Screening Mammogram  Beto 10 2013  8:45AM   

 

 Routine gynecological examination  Beto 10 2013  8:34AM   

 

 Hypertension  Beto 10 2013  8:34AM   

 

 Hyperlipidemia, unspecified  Beto 10 2013  8:34AM   

 

 Hypothyroidism, Acquired  Beto 10 2013  8:34AM   

 

 Rhinitis, Allergic  Beto 10 2013  8:34AM   

 

 Flu  Oct 28 2013  8:52AM   

 

 Essential Hypertension  Dec  9 2013  8:37AM   

 

 Hyperlipidemia  Dec  9 2013  8:37AM   

 

 Gastroesophageal Reflux  Dec  9 2013  8:37AM   

 

 Hypothyroidism, Acquired  Dec  9 2013  8:37AM   

 

 Atrophic Vaginitis, Postmenopausal  Dec  9 2013  8:37AM   

 

 Hypertension  2014  9:56AM   

 

 Hyperlipidemia  2014  9:56AM   

 

 Hypothyroidism  2014  9:56AM   

 

 Screening Mammogram  2014  8:32AM   

 

 Osteopenia  2014  8:32AM   

 

 Hypertension  2014  8:35AM   

 

 Hypothyroidism, Acquired  2014  8:35AM   

 

 Hyperlipidemia  2014  8:35AM   

 

 Upper Respiratory Infections  2014  9:28AM   

 

 Sinusitis, Acute  Oct  2 2014  9:17AM   

 

 Herpes Zoster  Oct  5 2014  1:44PM   

 

 Vesicular Eruption  Oct  5 2014  1:44PM   

 

 Hypertension  2014  8:18AM   

 

 Hyperlipidemia  2014  8:18AM   

 

 hypothyroid  2014  8:18AM   

 

 Situational anxiety  Dec 20 2014  9:33AM   

 

 GERD (gastroesophageal reflux disease)  Dec 20 2014  9:33AM   

 

 Nausea  Dec 20 2014  9:33AM   

 

 Abdominal Pain, Epigastric  Dec 20 2014  9:33AM   

 

 Hyperlipidemia  Oct  6 2014  9:03AM   

 

 acid reflux  Oct  6 2014  9:03AM   

 

 hypothyroid  Oct  6 2014  9:03AM   

 

 Shingles  Oct  6 2014  9:03AM   

 

 Pharyngitis  2015  1:09PM   

 

 Bronchitis  2015  9:10AM   

 

 Hyperlipidemia  2015  9:10AM   

 

 acid reflux  2015  9:10AM   

 

 hypothyroid  2015  9:10AM   

 

 Hyperlipidemia  2015  8:18AM   

 

 Hypertension  2015  8:18AM   

 

 hypothyroid  2015  8:18AM   

 

 Screening Mammogram  2015 11:43AM   

 

 Medicare Annual Pap Q 2 years  2015  9:36AM   

 

 Screening Mammogram  2015  9:36AM   

 

 Candidiasis of female genitalia  2015  9:36AM   

 

 Hypertension  2015 11:34AM   

 

 Hyperlipidemia, unspecified  2015 11:34AM   

 

 Hypothyroidism, Acquired  2015 11:34AM   

 

 Osteopenia  2016  8:41AM   

 

 Encounter for screening mammogram for breast cancer  2016  8:41AM   

 

 Hypertension  2016  8:34AM   

 

 Lymphedema  2016  8:34AM   

 

 Hyperlipidemia  2016  8:34AM   

 

 hypothyroid  2016  8:34AM   

 

 HEP B  2016  9:13AM   

 

 HEP B  2016  8:52AM   

 

 Acid Reflux  2016  8:34AM   

 

 History of acute gastritis  Oct 13 2016  2:30PM   

 

 Anxiety as acute reaction to exceptional stress  Oct 13 2016  2:30PM   

 

 HEP B  Dec 29 2016  8:42AM   

 

 Caregiver stress syndrome  May 26 2017  8:28AM   

 

 Anxiety  May 26 2017  8:28AM   

 

 Blood in stool  2017  2:54PM   

 

 Upper GI bleed  2017 11:34AM   

 

 Hyponatremia  2017  9:03AM   

 

 Hyponatremia  2017  8:43AM   

 

 Medicare Annual Pap Q 2 years  2017  9:03AM   

 

 Nausea  2017  9:03AM   

 

 Uterine enlargement  2017  9:03AM   

 

 Encounter for screening mammogram for breast cancer  2017  4:56PM   

 

 Panic disorder [episodic paroxysmal anxiety]  Oct 10 2017  2:02PM   

 

 Acute stress reaction  Oct 10 2017  2:02PM   

 

 Caregiver stress syndrome  Oct 10 2017  2:02PM   

 

 Stress reaction causing mixed disturbance of emotion and conduct  Oct 18 2017  
9:05AM   

 

 Ankle strain, left, initial encounter  Dec 19 2017  8:29AM   

 

 Excoriation of ear canal, left, initial encounter  Dec 19 2017  8:29AM   

 

 Generalized anxiety disorder  Dec 19 2017  8:29AM   

 

 Grief reaction  Dec 19 2017  8:29AM   

 

 Acute non-recurrent frontal sinusitis  2018 11:58AM   

 

 Cough  2018 11:58AM   

 

 Hypertension  2018  8:10AM   

 

 Hyperlipidemia, unspecified  2018  8:10AM   

 

 Hypothyroidism, Acquired  2018  8:10AM   

 

 Situational anxiety  Mar  6 2018  8:30AM   

 

 Nausea  Mar  6 2018  8:30AM   

 

 LESLIE (generalized anxiety disorder)  May 22 2018  8:47AM   

 

 Caregiver stress syndrome  May 22 2018  8:47AM   

 

 Situational anxiety  May 29 2018  8:26AM   

 

 Osteopenia  May 29 2018  8:26AM   

 

 Visit for screening mammogram  May 29 2018  8:26AM   

 

 Anxiety Disorder  2018  9:09AM   

 

 Hypertension  Aug 20 2018  8:05AM   

 

 Hyperlipidemia, unspecified  Aug 20 2018  8:05AM   

 

 Hypothyroidism, Acquired  Aug 20 2018  8:05AM   

 

 Irritable Bowel Syndrome  Aug 31 2018  8:50AM   

 

 Anxiety Disorder  Aug 31 2018  8:50AM   

 

 acid reflux  Aug 31 2018  8:50AM   

 

 Hypertension  Aug 31 2018  8:50AM   







Payers







 Insurance Name  Company Name  Plan Name  Plan Number  Policy Number  Policy 
Group Number  Start Date

 

    Medicare [facility]  Medicare [facility]     [id number]     N/A

 

    [employer]  [facility]     [id number]     2009

 

    Medicare Part B  [place]     [id number]     N/A

 

    Medicare Part A  Medicare Part A     [id number]     N/A

 

    Medicare Part A  ZZZMedicare P A - Preventive     [id number]     N/A

 

    Medicare Part A  Medicare - Lab/Xray     [id number]     N/A

 

    Medicare RHC Medicare RHC     [id number]     N/A







History of Encounters







 Visit Date  Visit Type  Provider

 

 2018  Office visit  Marvin Boyer DO

 

 2018  Office visit  Marvin Boyer DO

 

 2018  Office visit  Marvin Boyer DO

 

 2018  Office visit  Doris Noriega MD

 

 3/6/2018  Office visit  Doris Noriega MD

 

 2018  Office visit  Deedee Carter APRN

 

 2017  Office visit  Doris Noriega MD

 

 10/18/2017  Office visit  Doris Noriega MD

 

 10/10/2017  Office visit  Doris Noriega MD

 

 2017  Office visit  Doris Noriega MD

 

 2017  Office visit  Doris Noriega MD

 

 2017  Office visit  Doris Noriega MD

 

 2017  Office visit  Prince Noe APRN

 

 2016  Nurse visit  Doris Noriega MD

 

 10/13/2016  Office visit  Doris Noriega MD

 

 2016  Nurse visit  Doris Noriega MD

 

 2016  Office visit  Doris Noriega MD

 

 2015  Office visit  Doris Noriega MD

 

 2015  Office visit  Doris Noriega MD

 

 2015  Office visit  Prince Noe APRN

 

 2014  Office visit  Anyi Herrera APRN

 

 10/27/2014  Voided  Doris Noriega MD

 

 10/6/2014  Office visit  Doris Noriega MD

 

 10/5/2014  Office visit  Anyi Herrera APRN

 

 10/2/2014  Office visit   

 

 10/2/2014  Office visit  Corrine Bay APRN

 

 2014  Office visit  Kassie Franklin APRN

 

 2014  Office visit  Doris Noriega MD

 

 2014  Office visit  Doris Noriega MD

 

 2013  Office visit  eDe Colindres MD

 

 10/28/2013  Nurse visit  Dee Colindres MD

 

 6/10/2013  Office visit  Dee Colindres MD

 

 2013  Office visit  Corrine Bay APRN

 

 2012  Office visit  Dee Colindres MD

 

 12/10/2012  Office visit  Dee Colindres MD

 

 10/29/2012  Nurse visit  Dee Colindres MD

 

 8/15/2012  Office visit  Dee Colindres MD

 

 2012  Office visit  Corrine Bay APRN

 

 2012  Office visit  Dee Colindres MD

 

 2/15/2012  Office visit  Dee Colindres MD

 

 2012  Office visit  Corrine Bay APRN

 

 2011  Office visit  Dee Colindres MD

 

 10/28/2011  Nurse visit  Shad Nolasco MD

 

 2011  Office visit  Dee Colindres MD

 

 2011  Office visit  Dee Colindres MD

 

 2011  Office visit  Dee Colindres MD

 

 2011  Office visit  Dee Colindres MD

 

 2011  Office visit  Dee Colindres MD

 

 2011  Office visit  Dee Colindres MD

 

 4/10/2011  Hospital  KHRIS Reeves MD

 

 4/10/2011  [facility]  RICKEY Toussaint MD

 

 2011  Office visit  RICKEY Toussaint MD

 

 2011  [facility]  Fide Castellanos MD

 

 2011  Voided  Fide Castellanos MD

 

 2011  Office visit  Dee Colindres MD

 

 12/15/2010  Nurse visit  Dee Colindres MD

 

 2010  Office visit  Dee Colindres MD

 

 10/20/2010  Nurse visit  Stephenie PERAZA

 

 2010  Nurse visit  Dee Colindres MD

 

 2010  Nurse visit  Dee Colindres MD

 

 2010  Office visit  Dee Colindres MD

 

 3/19/2010  Voided  Stephenie Kay PA

 

 2010  Nurse visit  Stephenie PERAZA

 

 2010  Nurse visit  Stephenie PERAZA

 

 2010  Nurse visit  Stephenie PERAZA

 

 2009  Office visit  Stephenie PERAZA

 

 10/20/2009  Nurse visit  Stephenie Kay PA

## 2018-10-22 NOTE — XMS REPORT
MU2 Ambulatory Summary

 Created on: 10/08/2018



Milady Crespo

External Reference #: [id number]

: 1945

Sex: Female



Demographics







 Address  [address]

[place]  [address]

 

 Home Phone  [phone]

 

 Preferred Language  English

 

 Marital Status  

 

 Jewish Affiliation  Unknown

 

 Race  White

 

 Ethnic Group  Not  or 





Author







 Author  Marvin Boyer

 

 [facility]

 

 Address  [address]

[place], [place]  [id number]



 

 Phone  [phone]







Care Team Providers







 Care Team Member Name  Role  Phone

 

 Marvin Boyer  PCP  [phone]

 

 Marvin Boyer  PreferredProvider  [phone]







Allergies and Adverse Reactions







 Name  Reaction  Notes

 

 NO KNOWN DRUG ALLERGIES      







Plan of Treatment







 Planned Activity  Comments  Planned Date  Planned Time  Plan/Goal

 

 Complete blood count (CBC) with differential count     2016  12:00 AM   

 

 Comprehensive metabolic panel     2016  12:00 AM   

 

 VITAMIN D (25 HYDROXY)     2016  12:00 AM   

 

 Lipid panel (total cholesterol, lipoproteins, HDL, triglycerides)     8/15/
2012  12:00 AM   

 

 Pap smear     2017  12:00 AM   

 

 Comprehensive Metabolic Panel     6/10/2013  12:00 AM   

 

 Lipid panel (total cholesterol, lipoproteins, HDL, triglycerides)     6/10/
2013  12:00 AM   

 

 Thyroid stimulating hormone (TSH)     6/10/2013  12:00 AM   

 

 CBC (automated H&H, platelets, WBC and automated differential)     6/10/2013  
12:00 AM   

 

 Comprehensive Metabolic Panel     2012  12:00 AM   

 

 Lipid panel (total cholesterol, lipoproteins, HDL, triglycerides)     2012  12:00 AM   

 

 Thyroid stimulating hormone (TSH)     2012  12:00 AM   

 

 CBC (automated H&H, platelets, WBC and automated differential)     2012  
12:00 AM   

 

 Screening mammography, bilateral     2015  12:00 AM   







Medications







 Active 

 

 Name  Start Date  Estimated Completion Date  SIG  Comments

 

 aspirin 81 mg oral tablet,delayed release (DR/EC)        take 1 tablet (81 mg) 
by oral route once daily   

 

 Vitamin D3 1,000 unit oral capsule        take 1 capsule by oral route daily   

 

 valsartan 160 mg oral tablet  2016     TAKE ONE TABLET BY MOUTH ONCE 
DAILY   

 

 amlodipine 5 mg oral tablet  2016     TAKE ONE TABLET BY MOUTH ONCE DAILY
   

 

 amlodipine 5 mg oral tablet  2016     TAKE ONE TABLET BY MOUTH ONCE DAILY
   

 

 valsartan 160 mg oral tablet  2016     TAKE ONE TABLET BY MOUTH ONCE 
DAILY   

 

 Zofran (as hydrochloride) 4 mg oral tablet  2016     take 1 tablet by 
oral route daily as needed for 14 days   

 

 Zofran (as hydrochloride) 4 mg oral tablet  2017     take 1 tablet by oral 
route daily as needed for 14 days   

 

 Zofran (as hydrochloride) 4 mg oral tablet  2017     take 1 tablet by oral 
route daily as needed for 14 days   

 

 Zofran (as hydrochloride) 4 mg oral tablet  2017     take 1 tablet by 
oral route daily as needed for 14 days   

 

 Zofran (as hydrochloride) 4 mg oral tablet  2017     take 1 tablet by 
oral route daily as needed for 14 days   

 

 EQ LORATADINE 10MG  TABS  2017     TAKE ONE TABLET BY MOUTH ONCE DAILY   

 

 amlodipine 5 mg oral tablet  2018     TAKE ONE TABLET BY MOUTH ONCE DAILY
   

 

 atenolol 50 mg oral tablet  2018     TAKE ONE TABLET BY MOUTH ONCE DAILY 
  

 

 losartan 100 mg oral tablet  8/3/2018  2019  take 1 tablet (100 mg) by 
oral route once daily for 90 days   

 

 Plavix 75 mg Oral tablet  2018  take 1 tablet (75 mg) by oral 
route once daily   

 

 Lexapro 10 mg oral tablet  2018  take 1 tablet (10 mg) by oral 
route once daily for 90 days   

 

 levothyroxine 50 mcg oral tablet  2018     TAKE ONE TABLET BY MOUTH ONCE 
DAILY   

 

 Vitamins B Complex oral tablet        take 1 tablet by oral route daily   

 

 pantoprazole 40 mg oral tablet,delayed release (DR/EC)  10/3/2018  2019  
take 1 tablet (40 mg) by oral route 2 times per day for 90 days   

 

 dicyclomine 20 mg oral tablet  10/1/2018  2018  take 1 tablet (20 mg) by 
oral route 3 times per day for 30 days   

 

 prochlorperazine maleate 10 mg oral tablet  10/4/2018     take 1 tablet by 
oral route every 8 hours as needed   

 

 mirtazapine 15 mg oral tablet  10/8/2018     take 1 tablet by oral route once 
a day (at bedtime)   









  

 

 Name  Start Date  Expiration Date  SIG  Comments

 

 prednisone 20 mg oral tablet  2011  take 2 tablets by oral 
route daily for 5 days   

 

 calcium carbonate-vitamin D2 600-125 mg-unit oral tablet  8/15/2012  2012
  take 2 tablets by oral route daily   

 

 [place] 3 Fish Oil 900-1,400 mg oral capsule,delayed release(DR/EC)  8/15/2012  9
/  take 1 capsule by oral route daily   

 

 multivitamin Oral tablet  8/15/2012  2012  take 1 tablet by oral route 
daily   

 

 triamcinolone acetonide 0.1 % topical cream  2013  10/7/2013  APPLY A 
THIN LAYER TO THE AFFECTED AREA(S) BY TOPICAL ROUTE TWICE A DAY   

 

 famotidine 20 mg oral tablet  2014  take 1 tablet (20 mg) by 
oral route 2 times per day   

 

 amoxicillin 500 mg oral capsule  2014  take 1 capsule (500 mg) 
by oral route 3 times per day for 10 days   

 

 Zithromax Z-Tab 250 mg oral tablet  10/2/2014  10/7/2014  take 2 tablets (500 
mg) by oral route once daily for 1 day then 1 tablet (250 mg) by oral route 
once daily for 4 days   

 

 acyclovir 800 mg oral tablet  10/5/2014  10/12/2014  take 1 tablet (800 mg) by 
oral route 5 times per day for 7 days   

 

 gabapentin 300 mg oral capsule  10/9/2014  2014  take 1 capsule (300 mg) 
by oral route 3 times per day for 14 days   

 

 Carafate 100 mg/mL oral suspension  2014  take 10 milliliters 
(1 gram) by oral route 4 times per day on an empty stomach 1 hour before meals 
and at bedtime for 4 weeks   

 

 amlodipine 5 mg oral tablet  2015  TAKE ONE TABLET BY MOUTH 
EVERY DAY   

 

 levothyroxine 50 mcg oral tablet  2015  TAKE ONE TABLET BY MOUTH 
EVERY DAY   

 

 Diovan 160 mg oral tablet  2015  2/15/2016  TAKE ONE TABLET BY MOUTH 
EVERY DAY for 90 days   

 

 triamcinolone acetonide 0.1 % topical cream  2015  apply a thin 
layer to the affected area(s) by topical route 2 times per day for 14 days   

 

 fluconazole 150 mg oral tablet  2015  take 1 tablet (150 mg) 
by oral route once for 1 day   

 

 famotidine 20 mg oral tablet  2015     TAKE ONE TABLET BY MOUTH TWICE 
DAILY   

 

 Lipitor 20 mg oral tablet  10/13/2016  2018  take 1 tablet (20 mg) by oral 
route once daily   

 

 Zofran (as hydrochloride) 4 mg oral tablet  10/13/2016  10/27/2016  take 1 
tablet by oral route daily as needed for 14 days   

 

 Zofran (as hydrochloride) 4 mg oral tablet  2017     take 1 tablet by 
oral route daily as needed for 14 days   

 

 ondansetron 4 mg oral tablet,disintegrating  10/10/2017  2017  dissolve  
1 tablet by oral route every 8 hours as needed for 30 days   

 

 EQ LORATADINE 10MG  TABS  2018  TAKE ONE TABLET BY MOUTH ONCE 
DAILY IN THE EVENING   

 

 mirtazapine 15 mg oral tablet  2018  TAKE ONE-HALF TABLET BY 
MOUTH ONCE DAILY AT BEDTIME   









 Discontinued 

 

 Name  Start Date  Discontinued Date  SIG  Comments

 

 Lipitor 40 mg oral tablet     2009  1/2 TAB DAILY   

 

 ramipril 5 mg oral capsule     2009  take 1 capsule (5 mg) by oral route 
once daily   

 

 lovastatin 20 mg oral tablet  2009  take 1 tablet (20 mg) by 
oral route once daily with evening meal   

 

 propranolol 20 mg oral tablet  2010  take 1 tablet by oral 
route 2 times a day   

 

 Fosamax 70 mg oral tablet  2010  take 1 tablet (70 mg) by oral 
route once weekly in the morning, at least 30 minutes before the first food, 
beverage, or medication of the day   

 

 lisinopril 40 mg oral tablet  2010  4/10/2011  take 2 tablets by oral 
route daily   

 

 Zoloft 50 mg oral tablet  2011  take 1 tablet (50 mg) by oral 
route once daily   

 

 promethazine 25 mg oral tablet  2011  take 1 tablet by oral 
route every 6 hours as needed   

 

 Diovan -12.5 mg oral tablet  2011  take 1 tablet by oral 
route once daily for 30 days   

 

 Diflucan 150 mg oral tablet     2012  take 1 tablet (150 mg) by oral 
route once for 1 day   

 

 Diflucan 150 mg oral tablet  2012  8/15/2012  take 1 tablet (150 mg) by 
oral route once   

 

 Vitamin B-12 1,000 mcg oral tablet  8/15/2012  2014  take 1 tablet by 
oral route daily   

 

 Premarin 0.625 mg/gram vaginal cream  10/29/2012  6/10/2013  use 1 gram daily 
for a week then 1 gram twice a week   

 

 Medrol (Tab) 4 mg oral tablets,dose pack  2013  6/10/2013  take as 
directed   

 

 aspirin 325 mg oral tablet  2014  take 1 tablet (325 mg) by 
oral route once daily   

 

 Medrol (Tab) 4 mg oral tablets,dose pack  2014  10/2/2014  take as 
directed   

 

 Tetracaine Lollipops Lollipop  2015  Use as directed   

 

 Allergy Relief (loratadine) 10 mg oral tablet  2016     TAKE ONE TABLET 
BY MOUTH ONCE DAILY   

 

 Allergy Relief (loratadine) 10 mg oral tablet  2017  TAKE ONE 
TABLET BY MOUTH ONCE DAILY   

 

 Zoloft 50 mg oral tablet  2016  take 1 tablet (50 mg) by oral 
route once daily for 90 days  Pt refuses to take...

 

 triamcinolone acetonide 0.1 % topical cream  2017     APPLY A THIN LAYER 
OF CREAM EXTERNALLY TO AFFECTED AREA TWICE DAILY FOR 14 DAYS   

 

 loratadine 10 mg oral tablet  2017  TAKE 1 TABLET BY MOUTH 
EVERY DAY IN THE EVENING   

 

 triamcinolone acetonide 0.1 % topical cream  2017  APPLY  
CREAM EXTERNALLY TO AFFECTED AREA TWICE DAILY FOR 14 DAYS   

 

 Lexapro 10 mg oral tablet  2017  take 1 tablet (10 mg) by oral 
route once daily for 90 days   

 

 valsartan 160 mg oral tablet  2017  8/3/2018  TAKE ONE TABLET BY MOUTH 
ONCE DAILY   recall

 

 Augmentin 875-125 mg oral tablet  2018  3/6/2018  take 1 tablet by oral 
route every 12 hours for 7 days   

 

 benzonatate 100 mg oral capsule  2018  3/6/2018  take 1 capsule (100 mg) 
by oral route 3 times per day as needed for cough   

 

 ondansetron 4 mg oral tablet,disintegrating  7/3/2018  10/4/2018  dissolve  1 
tablet by oral route every 8 hours as needed   

 

 clorazepate dipotassium 7.5 mg oral tablet  2018  take 1 
tablet PO  three times per day for situational anxiety  No longer taking

 

 metoclopramide HCl 10 mg oral tablet  2018  10/1/2018  take 1 tablet (10 
mg) by oral route once daily with supper   







Problem List







 Description  Status  Onset

 

 Hyperlipidemia  Active   

 

 acid reflux  Active   

 

 Hypertension  Active   

 

 hypothyroid  Active   







Vital Signs







 Date  Time  BP-Sys(mm[Hg]  BP-Morena(mm[Hg])  HR(bpm)  RR(rpm)  Temp  WT  HT  HC  
BMI  BSA  BMI Percentile  O2 Sat(%)

 

 10/1/2018  9:02:00 AM  132 mmHg  62 mmHg  66 bpm  20 rpm  98.6 F  139 lbs  61 
in     26.2636 kg/m  1.6473 m     98 %

 

 2018  8:49:00 AM  134 mmHg  70 mmHg  64 bpm  16 rpm  98.6 F  146 lbs  61 
in     27.59 kg/m2  1.69 m2     98 %

 

 2018  9:04:00 AM  138 mmHg  80 mmHg  64 bpm  18 rpm  96 F  140.125 lbs  
61 in     26.4761 kg/m  1.654 m     99 %

 

 2018  8:24:00 AM  142 mmHg  70 mmHg  69 bpm  18 rpm  98.2 F  139 lbs  61 
in     26.26 kg/m2  1.65 m2     98 %

 

 2018  8:39:00 AM  116 mmHg  68 mmHg  68 bpm  18 rpm  97.9 F  142 lbs  61 
in     26.8304 kg/m  1.665 m      

 

 3/6/2018  8:26:00 AM  126 mmHg  76 mmHg  63 bpm  16 rpm  98 F  134.125 lbs  61 
in     25.34 kg/m2  1.62 m2     100 %

 

 2018  11:55:00 AM  126 mmHg  80 mmHg  80 bpm  18 rpm  98 F  132 lbs  61 
in     24.9409 kg/m  1.6053 m     100 %

 

 2017  8:26:00 AM  122 mmHg  76 mmHg  63 bpm  16 rpm  98.4 F  129.25 lbs  
61 in     24.42 kg/m2  1.59 m2     99 %

 

 10/18/2017  9:01:00 AM  126 mmHg  80 mmHg  73 bpm  16 rpm  97.9 F  122.375 lbs
  61 in     23.12 kg/m2  1.55 m2     100 %

 

 10/10/2017  1:52:00 PM  118 mmHg  72 mmHg  74 bpm  16 rpm  98.1 F  121 lbs  61 
in     22.8625 kg/m  1.5369 m     99 %

 

 2017  8:40:00 AM  118 mmHg  68 mmHg  63 bpm  16 rpm  98.1 F  123.125 lbs  
61 in     23.26 kg/m2  1.55 m2     100 %

 

 2017  8:57:00 AM  116 mmHg  62 mmHg  71 bpm  16 rpm  99.8 F  121.5 lbs  
61 in     22.957 kg/m  1.5401 m     98 %

 

 2017  11:30:00 AM  128 mmHg  78 mmHg  69 bpm  16 rpm  98.8 F  129.375 lbs  
61 in     24.44 kg/m2  1.59 m2     98 %

 

 2017  8:22:00 AM  118 mmHg  78 mmHg  62 bpm  18 rpm  97.5 F  129.125 lbs  
61 in     24.3977 kg/m  1.5877 m     100 %

 

 10/13/2016  2:26:00 PM  128 mmHg  78 mmHg  77 bpm  16 rpm  98.4 F  139.25 lbs  
61 in     26.31 kg/m2  1.65 m2     100 %

 

 2016  8:29:00 AM  122 mmHg  70 mmHg  72 bpm  16 rpm  97.8 F  137.125 lbs  
61 in     25.9093 kg/m  1.6361 m     100 %

 

 2015  9:33:00 AM  120 mmHg  68 mmHg  73 bpm     98.7 F  144.375 lbs  61 
in     27.28 kg/m2  1.68 m2     99 %

 

 2015  9:05:00 AM  110 mmHg  75 mmHg  85 bpm  16 rpm  96.7 F  144.375 lbs  
61 in     27.2791 kg/m  1.6788 m     99 %

 

 2015  1:05:00 PM  108 mmHg  62 mmHg  78 bpm  18 rpm  96.9 F  142.375 lbs  
61 in     26.90 kg/m2  1.67 m2     100 %

 

 2014  9:31:00 AM  126 mmHg  66 mmHg  79 bpm  18 rpm  98.7 F  149 lbs  61 
in     28.153 kg/m  1.7055 m     98 %

 

 10/6/2014  9:02:00 AM  110 mmHg  74 mmHg  94 bpm  18 rpm  98.1 F  145.4 lbs  
61 in     27.47 kg/m2  1.68 m2     97 %

 

 10/2/2014  9:14:00 AM  124 mmHg  74 mmHg  79 bpm  18 rpm  98 F  147.25 lbs  61 
in     27.8224 kg/m  1.6955 m     98 %

 

 2014  9:22:00 AM  124 mmHg  76 mmHg  89 bpm  18 rpm  98.1 F  148 lbs  61 
in     27.96 kg/m2  1.70 m2     100 %

 

 2014  8:27:00 AM  118 mmHg  65 mmHg  74 bpm  16 rpm  97.7 F  148 lbs  61 
in     27.9641 kg/m  1.6998 m     99 %

 

 2014  9:48:00 AM  125 mmHg  70 mmHg  93 bpm  18 rpm  96.7 F  156 lbs  61 
in     29.48 kg/m2  1.75 m2     100 %

 

 2013  8:35:00 AM  122 mmHg  74 mmHg  84 bpm  16 rpm  97.9 F  155.375 lbs 
                99 %

 

 6/10/2013  8:30:00 AM  130 mmHg  82 mmHg  79 bpm  16 rpm  97.9 F  161 lbs     
            98 %

 

 2013  8:50:00 AM  124 mmHg  68 mmHg  66 bpm  18 rpm  97.6 F  157.5 lbs  
61 in     29.7591 kg/m  1.7535 m      

 

 2012  1:46:00 PM  120 mmHg  72 mmHg  75 bpm  16 rpm  97.6 F  156.125 lbs
                 98 %

 

 12/10/2012  8:32:00 AM  122 mmHg  82 mmHg  70 bpm  16 rpm  97.3 F  157 lbs    
             99 %

 

 8/15/2012  9:00:00 AM  130 mmHg  76 mmHg  86 bpm  16 rpm  97.4 F  159 lbs     
            100 %

 

 2012  11:02:00 AM  128 mmHg  64 mmHg  66 bpm  18 rpm  97.4 F  162.125 lbs
  61 in     30.6329 kg/m  1.7791 m      

 

 2012  8:45:00 AM  130 mmHg  70 mmHg  82 bpm  16 rpm  98.2 F  160.125 lbs 
                100 %

 

 2/15/2012  9:29:00 AM  122 mmHg  80 mmHg  86 bpm  16 rpm  97.4 F  159.375 lbs  
61 in     30.1133 kg/m  1.7639 m     99 %

 

 2012  9:41:00 AM  132 mmHg  74 mmHg  66 bpm  18 rpm  98.4 F  160.375 lbs  
61 in     30.30 kg/m2  1.77 m2      

 

 2011  10:08:00 AM  134 mmHg  82 mmHg  80 bpm  16 rpm  98 F  160 lbs  61 
in     30.2314 kg/m  1.7674 m     99 %

 

 2011  3:56:00 PM  130 mmHg  80 mmHg                              

 

 2011  8:55:00 AM  130 mmHg  74 mmHg  88 bpm  16 rpm  98.2 F  158.25 lbs  
               98 %

 

 2011  8:54:00 AM  136 mmHg  82 mmHg  102 bpm  16 rpm  98.1 F  153.5 lbs   
              99 %

 

 2011  8:49:00 AM  122 mmHg  88 mmHg  88 bpm  16 rpm  98.6 F  152.25 lbs  
               99 %

 

 2011  10:02:00 AM  140 mmHg  82 mmHg  96 bpm  20 rpm  98.8 F             
       100 %

 

 2011  9:14:00 AM  156 mmHg  98 mmHg  110 bpm  24 rpm  98.5 F  153 lbs    
             100 %

 

 2011  8:50:00 AM  160 mmHg  84 mmHg  100 bpm  16 rpm  98.7 F  155 lbs    
             100 %

 

 2011  1:25:00 PM  152 mmHg  100 mmHg  82 bpm  16 rpm  97.2 F  156.375 lbs 
                100 %

 

 2011  9:55:00 AM  144 mmHg  90 mmHg                              

 

 2010  9:24:00 AM  138 mmHg  84 mmHg                              

 

 2010  8:58:00 AM  160 mmHg  94 mmHg                              

 

 2010  8:33:00 AM  142 mmHg  90 mmHg  100 bpm  16 rpm  98.1 F  158.375 
lbs                  

 

 2010  9:24:00 AM  160 mmHg  102 mmHg  88 bpm  18 rpm  99 F  157.125 lbs  
62 in     28.7382 kg/m  1.7657 m      

 

 3/19/2010  11:01:00 AM  140 mmHg  90 mmHg                              

 

 2009  10:08:00 AM  142 mmHg  86 mmHg  72 bpm  16 rpm  98.1 F  154.125 
lbs  61 in     29.1214 kg/m  1.7346 m      







Social History







 Name  Description  Comments

 

       

 

 Children      

 

 lives alone      

 

 Supervisor      

 

 Tobacco  Never smoker   







History of Procedures







 Date Ordered  Description  Order Status

 

 2011 12:00 AM  COMPREHEN METABOLIC PANEL  Reviewed

 

 2011 12:00 AM  ASSAY THYROID STIM HORMONE  Reviewed

 

 2011 12:00 AM  COMPREHEN METABOLIC PANEL  Reviewed

 

 2011 12:00 AM  LIPID PANEL  Reviewed

 

 2011 12:00 AM  ASSAY THYROID STIM HORMONE  Reviewed

 

 2011 12:00 AM  COMPLETE CBC W/AUTO DIFF WBC  Reviewed

 

 2015 12:00 AM  COMPLETE CBC W/AUTO DIFF WBC  Reviewed

 

 2015 12:00 AM  COMPREHEN METABOLIC PANEL  Reviewed

 

 2015 12:00 AM  LIPID PANEL  Reviewed

 

 2015 12:00 AM  ASSAY THYROID STIM HORMONE  Reviewed

 

 2011 12:00 AM  COMPREHEN METABOLIC PANEL  Reviewed

 

 2011 12:00 AM  COMPREHEN METABOLIC PANEL  Reviewed

 

 2011 12:00 AM  LIPID PANEL  Reviewed

 

 2011 12:00 AM  ASSAY THYROID STIM HORMONE  Reviewed

 

 10/28/2011 12:00 AM  ***Immunization administration, Medicare flu  Reviewed

 

 10/28/2011 12:00 AM  Fluzone ** MEDICARE Only **  Reviewed

 

 2010 11:24 AM  NO CHARGE OV  Reviewed

 

 2010 11:26 AM  NO CHARGE OV  Reviewed

 

 2011 12:00 AM  COMPREHEN METABOLIC PANEL  Reviewed

 

 2011 12:00 AM  LIPID PANEL  Reviewed

 

 2011 12:00 AM  ASSAY THYROID STIM HORMONE  Reviewed

 

 2011 12:00 AM  COMPLETE CBC W/AUTO DIFF WBC  Reviewed

 

 2011 12:00 AM  Wrist Support  Reviewed

 

 2016 12:00 AM  Screening mammography, bilateral  Reviewed

 

 2016 12:00 AM  DXA BONE DENSITY AXIAL  Returned

 

 2016 12:00 AM  TETANUS VACCINE IM  Reviewed

 

 2016 12:00 AM  HEP B VACC ADULT 3 DOSE IM  Reviewed

 

 2012 12:00 AM  TISSUE EXAM FOR FUNGI  Reviewed

 

 2012 12:00 AM  SMEAR WET MOUNT SALINE/INK  Reviewed

 

 2016 12:00 AM  TETANUS VACCINE IM  Reviewed

 

 2016 12:00 AM  HEP B VACC ADULT 3 DOSE IM  Reviewed

 

 2/15/2012 12:00 AM  THER/PROPH/DIAG INJ SC/IM  Reviewed

 

 2/15/2012 12:00 AM  Bicillin CR [facility]#14960039398[facility]  Reviewed

 

 2/15/2012 12:00 AM  Depo-Medrol 40 mg NDC#5582920288  Reviewed

 

 10/13/2016 12:00 AM  COMPLETE CBC W/AUTO DIFF WBC  Returned

 

 10/13/2016 12:00 AM  COMPREHEN METABOLIC PANEL  Returned

 

 10/13/2016 12:00 AM  ASSAY THYROID STIM HORMONE  Returned

 

 10/13/2016 12:00 AM  LIPID PANEL  Returned

 

 2016 12:00 AM  TETANUS VACCINE IM  Reviewed

 

 2016 12:00 AM  HEP B VACC ADULT 3 DOSE IM  Reviewed

 

 2017 12:00 AM  ASSAY OF IRON  Returned

 

 2017 12:00 AM  IRON BINDING TEST  Returned

 

 2017 12:00 AM  ASSAY OF TRANSFERRIN  Returned

 

 2017 12:00 AM  OCCULT BLD FECES 1-3 TESTS  Returned

 

 2017 12:00 AM  COMPLETE CBC AUTOMATED  Returned

 

 8/15/2012 12:00 AM  COMPREHEN METABOLIC PANEL  Reviewed

 

 8/15/2012 12:00 AM  ASSAY THYROID STIM HORMONE  Reviewed

 

 8/15/2012 12:00 AM  COMPLETE CBC W/AUTO DIFF WBC  Reviewed

 

 8/15/2012 12:00 AM  Wrist Support  Reviewed

 

 2017 12:00 AM  METABOLIC PANEL TOTAL CA  Returned

 

 2017 12:00 AM  METABOLIC PANEL TOTAL CA  Returned

 

 2017 12:00 AM  Screening mammography, bilateral  Returned

 

 10/29/2012 12:00 AM  Flu Injection 3 Years And Above NDC# [address]-10  [facility]  
Reviewed

 

 12/10/2012 12:00 AM  IMMUNIZATION ADMIN  Reviewed

 

 12/10/2012 12:00 AM  Pneumovax Injection - [facility]  Reviewed

 

 2018 12:00 AM  THER/PROPH/DIAG INJ SC/IM  Reviewed

 

 2018 12:00 AM  Decadron 4mg Injection  Reviewed

 

 2018 12:00 AM  Depo-Medrol 40mg Injection  Reviewed

 

 2012 12:00 AM  TRICHOMONAS ASSAY W/OPTIC  Reviewed

 

 2018 12:00 AM  COMPLETE CBC W/AUTO DIFF WBC  Returned

 

 2018 12:00 AM  COMPREHEN METABOLIC PANEL  Returned

 

 2018 12:00 AM  LIPID PANEL  Returned

 

 2018 12:00 AM  ASSAY THYROID STIM HORMONE  Returned

 

 2013 12:00 AM  THER/PROPH/DIAG INJ SC/IM  Reviewed

 

 2013 12:00 AM  Bicillin CR, 1.2 million units NDC# 26565-508-17  Reviewed

 

 2018 12:00 AM  Mammogram, screening, bilateral  Reviewed

 

 2018 12:00 AM  DXA BONE DENSITY AXIAL  Reviewed

 

 2018 12:00 AM  COMPLETE CBC W/AUTO DIFF WBC  Reviewed

 

 2018 12:00 AM  COMPREHEN METABOLIC PANEL  Reviewed

 

 2018 12:00 AM  LIPID PANEL  Reviewed

 

 2018 12:00 AM  ASSAY THYROID STIM HORMONE  Reviewed

 

 2013 12:00 AM  COMPREHEN METABOLIC PANEL  Reviewed

 

 2013 12:00 AM  LIPID PANEL  Reviewed

 

 2013 12:00 AM  COMPLETE CBC W/AUTO DIFF WBC  Reviewed

 

 2013 12:00 AM  ASSAY THYROID STIM HORMONE  Reviewed

 

 6/10/2013 12:00 AM  MAMMOGRAM SCREENING  Reviewed

 

 6/10/2013 12:00 AM  CYTOPATH C/V MANUAL  Reviewed

 

 12/10/2013 12:00 AM  LIPID PANEL  Reviewed

 

 12/10/2013 12:00 AM  ASSAY THYROID STIM HORMONE  Reviewed

 

 12/10/2013 12:00 AM  COMPLETE CBC W/AUTO DIFF WBC  Reviewed

 

 12/10/2013 12:00 AM  COMPREHEN METABOLIC PANEL  Reviewed

 

 2010 12:00 AM  CYTOPATH C/V MANUAL  Reviewed

 

 2010 12:00 AM  SMEAR WET MOUNT SALINE/INK  Reviewed

 

 2010 12:00 AM  LIPID PANEL  Reviewed

 

 2010 12:00 AM  ASSAY THYROID STIM HORMONE  Reviewed

 

 2010 12:00 AM  COMPLETE CBC W/AUTO DIFF WBC  Reviewed

 

 2010 12:00 AM  COMPREHEN METABOLIC PANEL  Reviewed

 

 10/28/2013 12:00 AM  Flu Injection 3 Years And Above NDC# [address]-73  C  
Reviewed

 

 2010 8:48 AM  Blood Pressure Check-no charge  Reviewed

 

 2010 12:00 AM  Blood Pressure Check-no charge  Reviewed

 

 2014 12:00 AM  METABOLIC PANEL TOTAL CA  Reviewed

 

 2014 12:00 AM  ASSAY THYROID STIM HORMONE  Reviewed

 

 2014 12:00 AM  ASSAY OF FREE THYROXINE  Reviewed

 

 2014 12:00 AM  MAMMOGRAM SCREENING  Reviewed

 

 2014 12:00 AM  CT BONE DENSITY AXIAL  Reviewed

 

 2014 12:00 AM  COMPLETE CBC W/AUTO DIFF WBC  Reviewed

 

 2014 12:00 AM  COMPREHEN METABOLIC PANEL  Reviewed

 

 2014 12:00 AM  LIPID PANEL  Reviewed

 

 2014 12:00 AM  ASSAY THYROID STIM HORMONE  Reviewed

 

 10/20/2010 12:00 AM  IMMUNIZATION ADMIN  Reviewed

 

 10/20/2010 12:00 AM  FLU VACCINE 3 YRS & > IM  Reviewed

 

 2010 12:00 AM  COMPREHEN METABOLIC PANEL  Reviewed

 

 2010 12:00 AM  LIPID PANEL  Reviewed

 

 2010 12:00 AM  ASSAY THYROID STIM HORMONE  Reviewed

 

 2010 12:00 AM  COMPLETE CBC W/AUTO DIFF WBC  Reviewed

 

 2010 12:00 AM  Blood Pressure Check-no charge  Reviewed

 

 10/2/2014 12:00 AM  THER/PROPH/DIAG INJ SC/IM  Reviewed

 

 10/2/2014 12:00 AM  Kenalog, Per 10 Mg NDC#4023-5634-63  Reviewed

 

 2014 12:00 AM  COMPLETE CBC W/AUTO DIFF WBC  Reviewed

 

 2014 12:00 AM  COMPREHEN METABOLIC PANEL  Reviewed

 

 2014 12:00 AM  LIPID PANEL  Reviewed

 

 2014 12:00 AM  ASSAY THYROID STIM HORMONE  Reviewed

 

 2015 12:00 AM  Rocephin 1 gram NDC#[id number]  Reviewed

 

 2015 12:00 AM  THER/PROPH/DIAG INJ SC/IM  Reviewed

 

 2011 12:00 AM  COMPREHEN METABOLIC PANEL  Reviewed

 

 2011 12:00 AM  LIPID PANEL  Reviewed

 

 2011 12:00 AM  ASSAY THYROID STIM HORMONE  Reviewed

 

 2011 12:00 AM  COMPLETE CBC W/AUTO DIFF WBC  Reviewed

 

 2011 12:00 AM  METABOLIC PANEL TOTAL CA  Reviewed

 

 2011 12:00 AM  COMPREHEN METABOLIC PANEL  Reviewed

 

 2011 12:00 AM  ASSAY THYROID STIM HORMONE  Reviewed

 

 2011 12:00 AM  COMPLETE CBC W/AUTO DIFF WBC  Reviewed

 

 2011 12:00 AM  ASSAY OF LIPASE  Reviewed

 

 2011 12:00 AM  THER/PROPH/DIAG INJ SC/IM  Reviewed

 

 2011 12:00 AM  Phenergan 50 Mg Im  Bernadine  Reviewed

 

 2011 12:00 AM  METABOLIC PANEL TOTAL CA  Reviewed

 

 2011 12:00 AM  THER/PROPH/DIAG INJ SC/IM  Reviewed

 

 2011 12:00 AM  Phenergan 25 Mg Im  Bernadine  Reviewed

 

 2011 12:00 AM  METABOLIC PANEL TOTAL CA  Reviewed

 

 2011 12:00 AM  ASSAY THYROID STIM HORMONE  Reviewed

 

 2011 12:00 AM  THER/PROPH/DIAG INJ SC/IM  Reviewed

 

 2011 12:00 AM  Phenergan 50 Mg Im  Bernadine  Reviewed

 

 2011 12:00 AM  ASSAY OF SERUM SODIUM  Reviewed

 

 2011 12:00 AM  ASSAY OF SERUM SODIUM  Reviewed

 

 2011 12:00 AM  CYTOPATH C/V MANUAL  Reviewed

 

 2011 12:00 AM  ASSAY THYROID STIM HORMONE  Reviewed

 

 2015 12:00 AM  COMPLETE CBC W/AUTO DIFF WBC  Reviewed

 

 2015 12:00 AM  COMPREHEN METABOLIC PANEL  Reviewed

 

 2015 12:00 AM  LIPID PANEL  Reviewed

 

 2015 12:00 AM  ASSAY THYROID STIM HORMONE  Reviewed

 

 2015 12:00 AM  MAMMOGRAM SCREENING  Reviewed

 

 2015 12:00 AM  CYTOPATH TBS C/V MANUAL  Reviewed







Results Summary







 Date and Description  Results

 

 2010 9:25 AM  Colonoscopy-Women and Men over 50 Abnormal Mammogram -Women 
over 40 Declined Pap Smear Declined 

 

 2010 10:41 AM  WET PREP NO TRICHOMONADS SEEN  No  Few 

 

 2010 8:15 AM  TRIGLYCERIDES 174.0 mg/dLCHOLESTEROL 175.0 mg/dLHDL 58.0 mg/
dLTOT CHOL/HDL 3.0 LDL (CALC) 82.0 mg/dLTSH 0.790 uIU/mLGLUCOSE 95.0 mg/
dLSODIUM 136.0 mmol/LPOTASSIUM 4.50 mmol/LCHLORIDE 99.0 mmol/LCO2 26.0 mmol/
LBUN 12.0 mg/dLCREATININE 0.80 mg/dLSGOT/AST 32.0 IU/LSGPT/ALT 33.0 IU/LALK 
PHOS 103.0 IU/LTOTAL PROTEIN 7.60 g/dLALBUMIN 4.60 g/dLTOTAL BILI 0.60 mg/
dLCALCIUM 9.80 mg/dLAGE 64 GFR NonAA 72 GFR AA 87 eGFR >60 mL/min/1.73 m2eGFR AA
* >60 WBC 5.3 RBC 4.13 HGB 13.10 g/dLHCT 39.20 %MCV 95.0 fLMCH 31.70 pgMCHC 
33.40 g/dLRDW SD 44 RDW CV 12.70 %MPV 9.80 fLPLT 257 NRBC# 0.00 NRBC% 0.0 %NEUT 
57.90 %%LYMP 26.50 %%MONO 11.60 %%EOS 3.20 %%BASO 0.80 %#NEUT 3.04 #LYMP 1.39 #
MONO 0.61 #EOS 0.17 #BASO 0.04 MANUAL DIFF NOT IND 

 

 2011 9:45 AM  GLUCOSE 91.0 mg/dLSODIUM 133.0 mmol/LPOTASSIUM 4.40 mmol/
LCHLORIDE 97.0 mmol/LCO2 24.0 mmol/LBUN 9.0 mg/dLCREATININE 0.70 mg/dLCALCIUM 
10.0 mg/dLAGE 65 GFR NonAA 84 GFR  eGFR >60 mL/min/1.73 m2eGFR AA* >60 

 

 2011 10:25 AM  LIPASE 29.0 U/LTSH 0.10 uIU/mLGLUCOSE 115.0 mg/dLSODIUM 
127.0 mmol/LPOTASSIUM 5.30 mmol/LCHLORIDE 93.0 mmol/LCO2 18.0 mmol/LBUN 9.0 mg/
dLCREATININE 0.70 mg/dLSGOT/AST 62.0 IU/LSGPT/ALT 46.0 IU/LALK PHOS 100.0 IU/
LTOTAL PROTEIN 8.60 g/dLALBUMIN 4.90 g/dLTOTAL BILI 0.60 mg/dLCALCIUM 9.90 mg/
dLAGE 65 GFR NonAA 84 GFR  eGFR >60 mL/min/1.73 m2eGFR AA* >60 WBC 6.9 
RBC 4.48 HGB 14.40 g/dLHCT 40.90 %MCV 91.0 fLMCH 32.10 pgMCHC 35.20 g/dLRDW SD 
41 RDW CV 12.50 %MPV 9.30 fLPLT 260 NRBC# 0.00 NRBC% 0.0 %NEUT 74.90 %%LYMP 
15.10 %%MONO 9.40 %%EOS 0.30 %%BASO 0.30 %#NEUT 5.15 #LYMP 1.04 #MONO 0.65 #EOS 
0.02 #BASO 0.02 MANUAL DIFF NOT IND 

 

 2011 9:07 AM  GLUCOSE 92.0 mg/dLSODIUM 131.0 mmol/LPOTASSIUM 4.20 mmol/
LCHLORIDE 99.0 mmol/LCO2 22.0 mmol/LBUN 9.0 mg/dLCREATININE 0.70 mg/dLCALCIUM 
9.20 mg/dLAGE 65 GFR NonAA 84 GFR  eGFR >60 mL/min/1.73 m2eGFR AA* >60 

 

 2011 11:06 AM  TSH 0.510 uIU/mLGLUCOSE 99.0 mg/dLSODIUM 132.0 mmol/
LPOTASSIUM 3.50 mmol/LCHLORIDE 95.0 mmol/LCO2 23.0 mmol/LBUN 9.0 mg/
dLCREATININE 0.80 mg/dLCALCIUM 10.40 mg/dLAGE 65 GFR NonAA 72 GFR AA 87 eGFR >
60 mL/min/1.73 m2eGFR AA* >60 

 

 2011 9:45 AM  SODIUM 132.0 mmol/L

 

 2011 9:27 AM  SODIUM 137.0 mmol/L

 

 2011 9:55 AM  TSH 1.070 uIU/mL

 

 2011 8:55 AM  GLUCOSE 102.0 mg/dLSODIUM 135.0 mmol/LPOTASSIUM 4.20 mmol/
LCHLORIDE 102.0 mmol/LCO2 24.0 mmol/LBUN 15.0 mg/dLCREATININE 0.80 mg/dLSGOT/
AST 30.0 IU/LSGPT/ALT 35.0 IU/LALK PHOS 119.0 IU/LTOTAL PROTEIN 7.50 g/
dLALBUMIN 4.30 g/dLTOTAL BILI 0.30 mg/dLCALCIUM 9.20 mg/dLAGE 65 GFR NonAA 72 
GFR AA 87 eGFR >60 mL/min/1.73 m2eGFR AA* >60 TSH 1.130 uIU/mL

 

 2011 8:55 AM  GLUCOSE 98.0 mg/dLSODIUM 137.0 mmol/LPOTASSIUM 3.90 mmol/
LCHLORIDE 103.0 mmol/LCO2 25.0 mmol/LBUN 14.0 mg/dLCREATININE 0.70 mg/dLSGOT/
AST 33.0 IU/LSGPT/ALT 36.0 IU/LALK PHOS 111.0 IU/LTOTAL PROTEIN 8.30 g/
dLALBUMIN 4.40 g/dLTOTAL BILI 0.50 mg/dLCALCIUM 9.40 mg/dLAGE 66 GFR NonAA 84 
GFR  eGFR >60 mL/min/1.73 m2eGFR AA* >60 TRIGLYCERIDES 109.0 mg/
dLCHOLESTEROL 153.0 mg/dLHDL 54.0 mg/dLTOT CHOL/HDL 2.8 LDL (CALC) 77.0 mg/
dLTSH 1.330 uIU/mL

 

 2011 10:17 AM  Colonoscopy-Women and Men over 50 Declined Mammogram -
Women over 40 Normal Pap Smear Negative 

 

 2012 10:33 AM  WET PREP NO TRICH SEEN CLUE CELLS NONE SEEN 

 

 2012 8:45 AM  WBC 5.2 RBC 3.96 HGB 12.70 g/dLHCT 37.80 %MCV 96.0 fLMCH 
32.10 pgMCHC 33.60 g/dLRDW SD 46 RDW CV 13.30 %MPV 9.70 fLPLT 297 NRBC# 0.00 
NRBC% 0.0 %NEUT 57.70 %%LYMP 28.50 %%MONO 10.30 %%EOS 2.50 %%BASO 1.0 %#NEUT 
3.02 #LYMP 1.49 #MONO 0.54 #EOS 0.13 #BASO 0.05 MANUAL DIFF NOT IND GLUCOSE 
97.0 mg/dLSODIUM 137.0 mmol/LPOTASSIUM 4.40 mmol/LCHLORIDE 101.0 mmol/LCO2 23.0 
mmol/LBUN 17.0 mg/dLCREATININE 0.80 mg/dLSGOT/AST 30.0 IU/LSGPT/ALT 33.0 IU/
LALK PHOS 95.0 IU/LTOTAL PROTEIN 7.40 g/dLALBUMIN 4.40 g/dLTOTAL BILI 0.60 mg/
dLCALCIUM 9.70 mg/dLAGE 66 GFR NonAA 72 GFR AA 87 eGFR 60 eGFR AA* 60 
TRIGLYCERIDES 130.0 mg/dLCHOLESTEROL 157.0 mg/dLHDL 56.0 mg/dLTOT CHOL/HDL 2.8 
LDL (CALC) 75.0 mg/dLTSH 0.940 uIU/mL

 

 2012 8:45 AM  WBC 5.1 RBC 3.91 HGB 12.50 g/dLHCT 36.30 %MCV 93.0 fLMCH 
32.0 pgMCHC 34.40 g/dLRDW SD 43 RDW CV 12.60 %MPV 9.30 fLPLT 244 NRBC# 0.00 NRBC
% 0.0 %NEUT 57.60 %%LYMP 27.50 %%MONO 9.10 %%EOS 5.0 %%BASO 0.80 %#NEUT 2.91 #
LYMP 1.39 #MONO 0.46 #EOS 0.25 #BASO 0.04 MANUAL DIFF NOT IND 

 

 12/10/2012 8:34 AM  Colonoscopy-Women and Men over 50 Declined Mammogram -
Women over 40 Declined Pap Smear Declined 

 

 2012 5:02 PM  WET PREP NO TRICH SEEN CLUE CELLS NONE SEEN 

 

 2013 8:25 AM  WBC 4.2 RBC 3.96 HGB 12.90 g/dLHCT 37.0 %MCV 93.0 fLMCH 
32.60 pgMCHC 34.90 g/dLRDW SD 43 RDW CV 12.60 %MPV 9.70 fLPLT 254 NRBC# 0.00 
NRBC% 0.0 %NEUT 54.40 %%LYMP 30.40 %%MONO 10.80 %%EOS 3.40 %%BASO 1.0 %#NEUT 
2.26 #LYMP 1.26 #MONO 0.45 #EOS 0.14 #BASO 0.04 MANUAL DIFF NOT IND TSH 1.230 
uIU/mLGLUCOSE 94.0 mg/dLSODIUM 136.0 mmol/LPOTASSIUM 4.30 mmol/LCHLORIDE 99.0 
mmol/LCO2 25.0 mmol/LBUN 10.0 mg/dLCREATININE 0.80 mg/dLSGOT/AST 36.0 IU/LSGPT/
ALT 36.0 IU/LALK PHOS 84.0 IU/LTOTAL PROTEIN 7.90 g/dLALBUMIN 4.40 g/dLTOTAL 
BILI 0.70 mg/dLCALCIUM 9.80 mg/dLAGE 67 GFR NonAA 72 GFR AA 87 eGFR 60 eGFR AA* 
60 TRIGLYCERIDES 122.0 mg/dLCHOLESTEROL 141.0 mg/dLHDL 47.0 mg/dLTOT CHOL/HDL 
3.0 LDL (CALC) 70.0 mg/dL

 

 2013 8:45 AM  WBC 5.3 RBC 4.01 HGB 13.0 g/dLHCT 37.60 %MCV 94.0 fLMCH 
32.40 pgMCHC 34.60 g/dLRDW SD 43 RDW CV 12.50 %MPV 9.40 fLPLT 248 NRBC# 0.00 
NRBC% 0.0 %NEUT 58.70 %%LYMP 27.80 %%MONO 9.80 %%EOS 2.80 %%BASO 0.90 %#NEUT 
3.12 #LYMP 1.48 #MONO 0.52 #EOS 0.15 #BASO 0.05 MANUAL DIFF NOT IND TSH 1.570 
uIU/mLGLUCOSE 94.0 mg/dLSODIUM 134.0 mmol/LPOTASSIUM 4.10 mmol/LCHLORIDE 101.0 
mmol/LCO2 23.0 mmol/LBUN 11.0 mg/dLCREATININE 0.80 mg/dLSGOT/AST 41.0 IU/LSGPT/
ALT 44.0 IU/LALK PHOS 109.0 IU/LTOTAL PROTEIN 7.90 g/dLALBUMIN 4.70 g/dLTOTAL 
BILI 0.80 mg/dLCALCIUM 10.40 mg/dLAGE 68 GFR NonAA 72 GFR AA 87 eGFR >60 mL/min/
1.73 m2eGFR AA* >60 TRIGLYCERIDES 163.0 mg/dLCHOLESTEROL 138.0 mg/dLHDL 49.0 mg/
dLTOT CHOL/HDL 2.8 LDL (CALC) 56.0 mg/dL

 

 2014 8:58 AM  GLUCOSE 86.0 mg/dLSODIUM 134.0 mmol/LPOTASSIUM 4.20 mmol/
LCHLORIDE 99.0 mmol/LCO2 25.0 mmol/LBUN 14.0 mg/dLCREATININE 0.80 mg/dLCALCIUM 
10.10 mg/dLAGE 68 GFR NonAA 72 GFR AA 87 eGFR >60 mL/min/1.73 m2eGFR AA* >60 
FREE T4 1.18 TSH 1.20 uIU/mL

 

 2014 9:50 AM  WBC 5.7 RBC 4.03 HGB 12.90 g/dLHCT 37.90 %MCV 94.0 fLMCH 
32.0 pgMCHC 34.0 g/dLRDW SD 44 RDW CV 12.70 %MPV 9.70 fLPLT 292 NRBC# 0.00 NRBC
% 0.0 %NEUT 62.70 %%LYMP 21.80 %%MONO 11.0 %%EOS 3.40 %%BASO 1.10 %#NEUT 3.55 #
LYMP 1.23 #MONO 0.62 #EOS 0.19 #BASO 0.06 MANUAL DIFF NOT IND GLUCOSE 98.0 mg/
dLSODIUM 135.0 mmol/LPOTASSIUM 4.30 mmol/LCHLORIDE 102.0 mmol/LCO2 22.0 mmol/
LBUN 10.0 mg/dLCREATININE 0.80 mg/dLSGOT/AST 34.0 IU/LSGPT/ALT 32.0 IU/LALK 
PHOS 95.0 IU/LTOTAL PROTEIN 7.70 g/dLALBUMIN 4.30 g/dLTOTAL BILI 0.80 mg/
dLCALCIUM 9.10 mg/dLAGE 68 GFR NonAA 72 GFR AA 87 eGFR 60 eGFR AA* 60 
TRIGLYCERIDES 108.0 mg/dLCHOLESTEROL 146.0 mg/dLHDL 56.0 mg/dLTOT CHOL/HDL 2.6 
LDL (CALC) 68.0 mg/dLTSH 0.90 uIU/mL

 

 2014 8:40 AM  WBC 4.4 RBC 4.13 HGB 13.20 g/dLHCT 38.90 %MCV 94.0 fLMCH 
32.0 pgMCHC 33.90 g/dLRDW SD 47 RDW CV 13.50 %MPV 9.80 fLPLT 279 NRBC# 0.00 NRBC
% 0.0 %NEUT 63.80 %%LYMP 24.60 %%MONO 9.30 %%EOS 1.60 %%BASO 0.70 %#NEUT 2.80 #
LYMP 1.08 #MONO 0.41 #EOS 0.07 #BASO 0.03 MANUAL DIFF NOT IND GLUCOSE 96.0 mg/
dLSODIUM 136.0 mmol/LPOTASSIUM 4.10 mmol/LCHLORIDE 99.0 mmol/LCO2 23.0 mmol/
LBUN 8.0 mg/dLCREATININE 0.80 mg/dLSGOT/AST 31.0 IU/LSGPT/ALT 27.0 IU/LALK PHOS 
85.0 IU/LTOTAL PROTEIN 7.60 g/dLALBUMIN 4.40 g/dLTOTAL BILI 0.80 mg/dLCALCIUM 
10.20 mg/dLAGE 69 GFR NonAA 71 GFR AA 86 eGFR 60 eGFR AA* 60 TRIGLYCERIDES 61.0 
mg/dLCHOLESTEROL 148.0 mg/dLHDL 71.0 mg/dLTOT CHOL/HDL 2.1 LDL (CALC) 65.0 mg/
dLTSH 0.990 uIU/mL

 

 2015 8:32 AM  WBC 4.8 RBC 3.98 HGB 12.70 g/dLHCT 37.30 %MCV 94.0 fLMCH 
31.90 pgMCHC 34.0 g/dLRDW SD 43 RDW CV 12.70 %MPV 9.50 fLPLT 270 NRBC# 0.00 NRBC
% 0.0 %NEUT 57.30 %%LYMP 25.0 %%MONO 13.0 %%EOS 3.60 %%BASO 1.10 %#NEUT 2.73 #
LYMP 1.19 #MONO 0.62 #EOS 0.17 #BASO 0.05 MANUAL DIFF NOT IND TSH 0.640 uIU/
mLGLUCOSE 90.0 mg/dLSODIUM 136.0 mmol/LPOTASSIUM 4.0 mmol/LCHLORIDE 101.0 mmol/
LCO2 24.0 mmol/LBUN 10.0 mg/dLCREATININE 0.80 mg/dLSGOT/AST 34.0 IU/LSGPT/ALT 
32.0 IU/LALK PHOS 92.0 IU/LTOTAL PROTEIN 7.50 g/dLALBUMIN 4.40 g/dLTOTAL BILI 
0.70 mg/dLCALCIUM 9.50 mg/dLAGE 69 GFR NonAA 71 GFR AA 86 eGFR >60 mL/min/1.73 
m2eGFR AA* >60 TRIGLYCERIDES 107.0 mg/dLCHOLESTEROL 138.0 mg/dLHDL 58.0 mg/
dLTOT CHOL/HDL 2.4 LDL (CALC) 59.0 mg/dL

 

 2015 8:31 AM  WBC 4.6 RBC 3.97 HGB 12.90 g/dLHCT 38.0 %MCV 96.0 fLMCH 
32.50 pgMCHC 33.90 g/dLRDW SD 44 RDW CV 12.60 %MPV 9.0 fLPLT 248 NRBC# 0.00 NRBC
% 0.0 %NEUT 61.0 %%LYMP 24.70 %%MONO 10.20 %%EOS 3.0 %%BASO 1.10 %#NEUT 2.82 #
LYMP 1.14 #MONO 0.47 #EOS 0.14 #BASO 0.05 MANUAL DIFF NOT IND TRIGLYCERIDES 
105.0 mg/dLCHOLESTEROL 141.0 mg/dLHDL 58.0 mg/dLTOT CHOL/HDL 2.4 LDL (CALC) 
62.0 mg/dLGLUCOSE 93.0 mg/dLSODIUM 136.0 mmol/LPOTASSIUM 4.10 mmol/LCHLORIDE 
101.0 mmol/LCO2 25.0 mmol/LBUN 9.0 mg/dLCREATININE 0.80 mg/dLSGOT/AST 32.0 IU/
LSGPT/ALT 28.0 IU/LALK PHOS 95.0 IU/LTOTAL PROTEIN 7.30 g/dLALBUMIN 4.50 g/
dLTOTAL BILI 0.80 mg/dLCALCIUM 9.70 mg/dLAGE 70 GFR NonAA 71 GFR AA 86 eGFR >60 
mL/min/1.73meGFR AA* >60 TSH 1.10 uIU/mL

 

 2018 8:15 AM  TSH 1.03 TRIGLYCERIDES 73 CHOLESTEROL 198 HDL 84 TOT CHOL/
HDL 2.4 LDL (CALC) 99 WBC 4.6 RBC 4.03 HGB 13.1 HCT 38.9 MCV 97 MCH 32.5 MCHC 
33.7 RDW SD 45 RDW CV 12.5 MPV 9.8  NRBC# 0.00 NRBC% 0.0 %NEUT 60.3 %
LYMP 25.5 %MONO 9.6 %EOS 3.1 %BASO 1.3 #NEUT 2.77 #LYMP 1.17 #MONO 0.44 #EOS 
0.14 #BASO 0.06 MANUAL DIFF NOT IND GLUCOSE 108 SODIUM 136 POTASSIUM 3.8 
CHLORIDE 100 CO2 28 BUN 19 CREATININE 0.8 SGOT/AST 32 SGPT/ALT 25 ALK PHOS 107 
TOTAL PROTEIN 8.0 ALBUMIN 4.7 TOTAL BILI 0.4 CALCIUM 9.9 AGE 72 GFR NonAA 71 
GFR AA 86 eGFR 71 eGFR AA* >60 







History Of Immunizations







 Name  Date Admin  Mfg Name  Mfg Code  Trade Name  Lot#  Route  Inj  Vis Given  
Vis Pub  CVX

 

 Influenza  10/20/2010  [employer] paste[employer]  [employer]  FLUZONE  [id number]  Intramuscular  
Left Arm  10/20/2010  2010  999

 

 Influenza  10/28/2011  [employer] paste[employer]  [employer]  FLUZONE  [id number]  Intramuscular  
Left Arm  10/28/2011  2011  141

 

 Influenza  10/29/2012  [employer] paste[employer]  [employer]  FLUZONE  [id number]  Intramuscular  
Left Deltoid  10/29/2012  2012  141

 

 X  12/10/2012  [employer]  MSD  PNEUMOVAX 23  [id number]  Intramuscular  
Left Gluteous Medius  12/10/2012  2010  33

 

 Influenza  10/28/2013  [employer] pasteur  [employer]  Fluzone > 3 Years  [id number]  
Intramuscular  Right Deltoid  10/28/2013  2013  141

 

 X  9/2/2015  Not Entered  NE  PREVNAR 13     Not Entered  Not Entered  1  109

 

 Influenza  2015  Not Entered  NE  Not Entered     Not Entered  Not Entered
  2015  141

 

 HepB Adult  2016  [employer]  MSD  RECOMBIVAX-ADULT  [id number]  Not 
Entered  Left Deltoid  2016  43

 

 HepB Adult  2016  [employer]  MSD  RECOMBIVAX-ADULT  [id number]  
Intramuscular  Right Deltoid  2016  43

 

 ZVL  2012  Not Entered  NE  Not Entered     Not Entered  Not Entered  1  121

 

 Tdap  2012  Not Entered  NE  Not Entered     Not Entered  Not Entered  1  115

 

 Influenza  10/13/2016  Not Entered  NE  FLUZONE-HIGH DOSE     Intramuscular  
Left Arm  1  141

 

 HepB Adult  2016  [employer]  MSD  RECOMBIVAX-ADULT  [id number]  
Intramuscular  Right Deltoid  2016  43

 

 Influenza  10/30/2017  Not Entered  NE  Fluarix, quadrivalent, preservative 
free     Intramuscular  Left Arm  2017  150

 

 RZV  2018  [employer]  SK[employer]  SHINGRIX     Intramuscular  Left Arm  1  187







History of Past Illness







 Name  Date of Onset  Comments

 

 Benign Essential Hypertension  Dec 14 2009 10:10AM   

 

 Hyperlipidemia  Dec 14 2009 10:10AM   

 

 Hypothyroidism, Acquired  Dec 14 2009 10:10AM   

 

 hypothyroid      

 

 Hypertension      

 

 Hyperlipidemia      

 

 acid reflux      

 

 Hypertension, Benign Essential  2010  9:41AM   

 

 Hypertension, Benign Essential  2010 12:53PM   

 

 Routine gynecological examination  May  5 2010  9:28AM   

 

 Hypertension  May  5 2010  9:28AM   

 

 Hyperlipidemia, unspecified  May  5 2010  9:28AM   

 

 Hypothyroidism, Acquired  May  5 2010  9:28AM   

 

 Vulvovaginitis  May  5 2010  9:28AM   

 

 Rhinitis, Allergic  May  5 2010  9:28AM   

 

 Hypertension, Benign Essential  May 19 2010  8:48AM   

 

 Hypertension, Benign Essential  May 25 2010  9:39AM   

 

 Flu  Oct 20 2010 12:01PM   

 

 Essential Hypertension  2010  8:34AM   

 

 Hyperlipidemia  2010  8:34AM   

 

 Gastroesophageal Reflux  2010  8:34AM   

 

 Hypothyroidism, Acquired  2010  8:34AM   

 

 Hypertension, Benign Essential  2010  9:22AM   

 

 Hypertension, Benign Essential  Dec 15 2010  9:07AM   

 

 Essential Hypertension  2011  1:31PM   

 

 Hyperlipidemia  2011  1:31PM   

 

 Gastroesophageal Reflux  2011  1:31PM   

 

 Hypothyroidism, Acquired  2011  1:31PM   

 

 Essential Hypertension  2011  8:51AM   

 

 Hyperlipidemia  2011  8:51AM   

 

 Gastroesophageal Reflux  2011  8:51AM   

 

 Hypothyroidism, Acquired  2011  8:51AM   

 

 Essential Hypertension  2011  9:13AM   

 

 Hyperlipidemia  2011  9:13AM   

 

 Gastroesophageal Reflux  2011  9:13AM   

 

 Hypothyroidism, Acquired  2011  9:13AM   

 

 Nausea With Vomiting  2011  1:18PM   

 

 Hyponatremia  2011  8:46AM   

 

 Nausea  2011  9:13AM   

 

 Hypothyroidism  2011 11:10AM   

 

 Hyponatremia  2011 11:10AM   

 

 Essential Hypertension  2011 10:05AM   

 

 Hyperlipidemia  2011 10:05AM   

 

 Gastroesophageal Reflux  2011 10:05AM   

 

 Nausea  2011 10:05AM   

 

 Hypothyroidism, Acquired  2011 10:05AM   

 

 Hyponatremia  May  6 2011 11:34AM   

 

 Essential Hypertension  May 16 2011  8:50AM   

 

 Hyperlipidemia  May 16 2011  8:50AM   

 

 Gastroesophageal Reflux  May 16 2011  8:50AM   

 

 Nausea  May 16 2011  8:50AM   

 

 Hypothyroidism, Acquired  May 16 2011  8:50AM   

 

 Hyponatremia  May 16 2011  8:50AM   

 

 Routine gynecological examination  2011  8:54AM   

 

 Hypertension  2011  8:54AM   

 

 Hyperlipidemia, unspecified  2011  8:54AM   

 

 Hypothyroidism, Acquired  2011  8:54AM   

 

 Rhinitis, Allergic  2011  8:54AM   

 

 Hypothyroidism  Aug 29 2011 12:37PM   

 

 Hyponatremia  Aug 29 2011 12:37PM   

 

 Essential Hypertension  Sep 12 2011  8:53AM   

 

 Hyperlipidemia  Sep 12 2011  8:53AM   

 

 Gastroesophageal Reflux  Sep 12 2011  8:53AM   

 

 Hypothyroidism, Acquired  Sep 12 2011  8:53AM   

 

 Hyponatremia  Sep 12 2011  8:53AM   

 

 Hypertension  Sep 29 2011  9:41AM   

 

 Hyperlipidemia  Dec  8 2011  8:31AM   

 

 Hypothyroidism  Dec  8 2011  8:31AM   

 

 Hypertension  Dec  8 2011  8:31AM   

 

 Flu  Dec  9 2011 10:02AM   

 

 Essential Hypertension  Dec 13 2011 10:13AM   

 

 Hyperlipidemia  Dec 13 2011 10:13AM   

 

 Gastroesophageal Reflux  Dec 13 2011 10:13AM   

 

 Hypothyroidism, Acquired  Dec 13 2011 10:13AM   

 

 Hyponatremia  Dec 13 2011 10:13AM   

 

 Vaginal Discharge  2012  9:43AM   

 

 Rhinitis, Allergic  Feb 15 2012  9:31AM   

 

 Eustachian Tube Dysfunction  Feb 15 2012  9:31AM   

 

 Pharyngitis, Acute  Feb 15 2012  9:31AM   

 

 Routine gynecological examination  2012  8:48AM   

 

 Hypertension  2012  8:48AM   

 

 Hyperlipidemia, unspecified  2012  8:48AM   

 

 Hypothyroidism, Acquired  2012  8:48AM   

 

 Rhinitis, Allergic  2012  8:48AM   

 

 Candidiasis, Vulvovaginal  2012 11:04AM   

 

 Essential Hypertension  Aug 15 2012  9:02AM   

 

 Hyperlipidemia  Aug 15 2012  9:02AM   

 

 Gastroesophageal Reflux  Aug 15 2012  9:02AM   

 

 Hypothyroidism, Acquired  Aug 15 2012  9:02AM   

 

 Hyponatremia  Aug 15 2012  9:02AM   

 

 Atrophic Vaginitis, Postmenopausal  Aug 15 2012  9:02AM   

 

 Flu  Oct 29 2012  9:24AM   

 

 Essential Hypertension  Dec 10 2012  8:35AM   

 

 Hyperlipidemia  Dec 10 2012  8:35AM   

 

 Gastroesophageal Reflux  Dec 10 2012  8:35AM   

 

 Hypothyroidism, Acquired  Dec 10 2012  8:35AM   

 

 Hyponatremia  Dec 10 2012  8:35AM   

 

 Atrophic Vaginitis, Postmenopausal  Dec 10 2012  8:35AM   

 

 Pneumococcus  Dec 10 2012  2:07PM   

 

 Vaginal Discharge  Dec 20 2012  1:50PM   

 

 Essential Hypertension  Dec 20 2012  1:50PM   

 

 Hyperlipidemia  Dec 20 2012  1:50PM   

 

 Gastroesophageal Reflux  Dec 20 2012  1:50PM   

 

 Hypothyroidism, Acquired  Dec 20 2012  1:50PM   

 

 Atrophic Vaginitis, Postmenopausal  Dec 20 2012  1:50PM   

 

 Upper Respiratory Infections  2013  8:52AM   

 

 Hyperlipidemia  2013  8:21AM   

 

 Hypertension  2013  8:21AM   

 

 Hypothyroidism  2013  8:21AM   

 

 Screening Mammogram  Beto 10 2013  8:45AM   

 

 Routine gynecological examination  Beto 10 2013  8:34AM   

 

 Hypertension  Beto 10 2013  8:34AM   

 

 Hyperlipidemia, unspecified  Beto 10 2013  8:34AM   

 

 Hypothyroidism, Acquired  Beto 10 2013  8:34AM   

 

 Rhinitis, Allergic  Beto 10 2013  8:34AM   

 

 Flu  Oct 28 2013  8:52AM   

 

 Essential Hypertension  Dec  9 2013  8:37AM   

 

 Hyperlipidemia  Dec  9 2013  8:37AM   

 

 Gastroesophageal Reflux  Dec  9 2013  8:37AM   

 

 Hypothyroidism, Acquired  Dec  9 2013  8:37AM   

 

 Atrophic Vaginitis, Postmenopausal  Dec  9 2013  8:37AM   

 

 Hypertension  2014  9:56AM   

 

 Hyperlipidemia  2014  9:56AM   

 

 Hypothyroidism  2014  9:56AM   

 

 Screening Mammogram  2014  8:32AM   

 

 Osteopenia  2014  8:32AM   

 

 Hypertension  2014  8:35AM   

 

 Hypothyroidism, Acquired  2014  8:35AM   

 

 Hyperlipidemia  2014  8:35AM   

 

 Upper Respiratory Infections  2014  9:28AM   

 

 Sinusitis, Acute  Oct  2 2014  9:17AM   

 

 Herpes Zoster  Oct  5 2014  1:44PM   

 

 Vesicular Eruption  Oct  5 2014  1:44PM   

 

 Hypertension  2014  8:18AM   

 

 Hyperlipidemia  2014  8:18AM   

 

 hypothyroid  2014  8:18AM   

 

 Situational anxiety  Dec 20 2014  9:33AM   

 

 GERD (gastroesophageal reflux disease)  Dec 20 2014  9:33AM   

 

 Nausea  Dec 20 2014  9:33AM   

 

 Abdominal Pain, Epigastric  Dec 20 2014  9:33AM   

 

 Hyperlipidemia  Oct  6 2014  9:03AM   

 

 acid reflux  Oct  6 2014  9:03AM   

 

 hypothyroid  Oct  6 2014  9:03AM   

 

 Shingles  Oct  6 2014  9:03AM   

 

 Pharyngitis  2015  1:09PM   

 

 Bronchitis  2015  9:10AM   

 

 Hyperlipidemia  2015  9:10AM   

 

 acid reflux  2015  9:10AM   

 

 hypothyroid  2015  9:10AM   

 

 Hyperlipidemia  2015  8:18AM   

 

 Hypertension  2015  8:18AM   

 

 hypothyroid  2015  8:18AM   

 

 Screening Mammogram  2015 11:43AM   

 

 Medicare Annual Pap Q 2 years  2015  9:36AM   

 

 Screening Mammogram  2015  9:36AM   

 

 Candidiasis of female genitalia  2015  9:36AM   

 

 Hypertension  2015 11:34AM   

 

 Hyperlipidemia, unspecified  2015 11:34AM   

 

 Hypothyroidism, Acquired  2015 11:34AM   

 

 Osteopenia  2016  8:41AM   

 

 Encounter for screening mammogram for breast cancer  2016  8:41AM   

 

 Hypertension  2016  8:34AM   

 

 Lymphedema  2016  8:34AM   

 

 Hyperlipidemia  2016  8:34AM   

 

 hypothyroid  2016  8:34AM   

 

 HEP B  2016  9:13AM   

 

 HEP B  2016  8:52AM   

 

 Acid Reflux  2016  8:34AM   

 

 History of acute gastritis  Oct 13 2016  2:30PM   

 

 Anxiety as acute reaction to exceptional stress  Oct 13 2016  2:30PM   

 

 HEP B  Dec 29 2016  8:42AM   

 

 Caregiver stress syndrome  May 26 2017  8:28AM   

 

 Anxiety  May 26 2017  8:28AM   

 

 Blood in stool  2017  2:54PM   

 

 Upper GI bleed  2017 11:34AM   

 

 Hyponatremia  2017  9:03AM   

 

 Hyponatremia  2017  8:43AM   

 

 Medicare Annual Pap Q 2 years  2017  9:03AM   

 

 Nausea  2017  9:03AM   

 

 Uterine enlargement  2017  9:03AM   

 

 Encounter for screening mammogram for breast cancer  2017  4:56PM   

 

 Panic disorder [episodic paroxysmal anxiety]  Oct 10 2017  2:02PM   

 

 Acute stress reaction  Oct 10 2017  2:02PM   

 

 Caregiver stress syndrome  Oct 10 2017  2:02PM   

 

 Stress reaction causing mixed disturbance of emotion and conduct  Oct 18 2017  
9:05AM   

 

 Ankle strain, left, initial encounter  Dec 19 2017  8:29AM   

 

 Excoriation of ear canal, left, initial encounter  Dec 19 2017  8:29AM   

 

 Generalized anxiety disorder  Dec 19 2017  8:29AM   

 

 Grief reaction  Dec 19 2017  8:29AM   

 

 Acute non-recurrent frontal sinusitis  2018 11:58AM   

 

 Cough  2018 11:58AM   

 

 Hypertension  2018  8:10AM   

 

 Hyperlipidemia, unspecified  2018  8:10AM   

 

 Hypothyroidism, Acquired  2018  8:10AM   

 

 Situational anxiety  Mar  6 2018  8:30AM   

 

 Nausea  Mar  6 2018  8:30AM   

 

 LESLIE (generalized anxiety disorder)  May 22 2018  8:47AM   

 

 Caregiver stress syndrome  May 22 2018  8:47AM   

 

 Situational anxiety  May 29 2018  8:26AM   

 

 Osteopenia  May 29 2018  8:26AM   

 

 Visit for screening mammogram  May 29 2018  8:26AM   

 

 Anxiety Disorder  2018  9:09AM   

 

 Hypertension  Aug 20 2018  8:05AM   

 

 Hyperlipidemia, unspecified  Aug 20 2018  8:05AM   

 

 Hypothyroidism, Acquired  Aug 20 2018  8:05AM   

 

 Irritable Bowel Syndrome  Aug 31 2018  8:50AM   

 

 Anxiety Disorder  Aug 31 2018  8:50AM   

 

 acid reflux  Aug 31 2018  8:50AM   

 

 Hypertension  Aug 31 2018  8:50AM   

 

 Nausea  Oct  1 2018  9:04AM   

 

 Hiatal hernia  Oct  1 2018  9:04AM   







Payers







 Insurance Name  Company Name  Plan Name  Plan Number  Policy Number  Policy 
Group Number  Start Date

 

    Medicare RHC Medicare RHC     [id number]     N/A

 

    BCBS  [employer] [facility]     [id number]     2009

 

    Medicare Part B  [place]     [id number]     N/A

 

    Medicare Part A  Medicare Part A     [id number]     N/A

 

    Medicare Part A  ZZZMedicare P A - Preventive     [id number]     N/A

 

    Medicare Part A  Medicare - Lab/Xray     [id number]     N/A

 

    Medicare RHC  Medicare RHC     [id number]     N/A







History of Encounters







 Visit Date  Visit Type  Provider

 

 10/1/2018  Office visit  Marvin Boyer DO

 

 2018  Office visit  Marvin Boyer DO

 

 2018  Office visit  Marvin Boyer DO

 

 2018  Office visit  Marvin Boyer DO

 

 2018  Office visit  Doris Noriega MD

 

 3/6/2018  Office visit  Doris Noriega MD

 

 2018  Office visit  Deedee Carter APRN

 

 2017  Office visit  Doris Noriega MD

 

 10/18/2017  Office visit  Doris Noriega MD

 

 10/10/2017  Office visit  Doris Noriega MD

 

 2017  Office visit  Doris Noriega MD

 

 2017  Office visit  Doris Noriega MD

 

 2017  Office visit  Doris Noriega MD

 

 2017  Office visit  Prince Noe APRN

 

 2016  Nurse visit  Doris Noriega MD

 

 10/13/2016  Office visit  Doris Noriega MD

 

 2016  Nurse visit  Doris Noriega MD

 

 2016  Office visit  Doris Noriega MD

 

 2015  Office visit  Doris Noriega MD

 

 2015  Office visit  Doris Noriega MD

 

 2015  Office visit  Prince Noe APRN

 

 2014  Office visit  Anyi Herrera APRN

 

 10/27/2014  Voided  Doris Noriega MD

 

 10/6/2014  Office visit  Doris Noriega MD

 

 10/5/2014  Office visit  Anyi Herrera APRN

 

 10/2/2014  Office visit   

 

 10/2/2014  Office visit  Corrine Bay APRN

 

 2014  Office visit  Kassie Franklin APRN

 

 2014  Office visit  Doris Noriega MD

 

 2014  Office visit  Doris Noriega MD

 

 2013  Office visit  Dee Colindres MD

 

 10/28/2013  Nurse visit  Dee Colindres MD

 

 6/10/2013  Office visit  Dee Colindres MD

 

 2013  Office visit  Corrine Bay APRN

 

 2012  Office visit  Dee Colindres MD

 

 12/10/2012  Office visit  Dee Colindres MD

 

 10/29/2012  Nurse visit  Dee Colindres MD

 

 8/15/2012  Office visit  Dee Colind[place] MD

 

 2012  Office visit  Corrine Bay APRN

 

 2012  Office visit  Dee Colindres MD

 

 2/15/2012  Office visit  Dee Colindres MD

 

 2012  Office visit  Corrine Bay APRN

 

 2011  Office visit  Dee Colindres MD

 

 10/28/2011  Nurse visit  Shad Nolasco MD

 

 2011  Office visit  Dee Colindres MD

 

 2011  Office visit  Dee Colindres MD

 

 2011  Office visit  Dee Colindres MD

 

 2011  Office visit  Dee Colindres MD

 

 2011  Office visit  Dee Colindres MD

 

 2011  Office visit  Dee Colindres MD

 

 4/10/2011  [facility]  KHRIS Reeves MD

 

 4/10/2011  [facility]  RICKEY Toussaint MD

 

 2011  Office visit  RICKEY Toussaint MD

 

 2011  [facility]  Fide Castellanos MD

 

 2011  Voided  Fide Castellanos MD

 

 2011  Office visit  Dee Colindres MD

 

 12/15/2010  Nurse visit  Dee Colindres MD

 

 2010  Office visit  Dee Colindres MD

 

 10/20/2010  Nurse visit  Stephenie PERAZA

 

 2010  Nurse visit  Dee Colindres MD

 

 2010  Nurse visit  Dee Colindres MD

 

 2010  Office visit  Dee Colindres MD

 

 3/19/2010  Voided  Stephenie PERAZA

 

 2010  Nurse visit  Stephenie PERAZA

 

 2010  Nurse visit  Stephenie PERAZA

 

 2010  Nurse visit  Stephenie Kay PA

 

 2009  Office visit  Stephenie PERAZA

 

 10/20/2009  Nurse visit  Stephenie Kay PA

## 2018-10-22 NOTE — XMS REPORT
MU2 Ambulatory Summary

 Created on: 10/06/2018



Milady Crespo

External Reference #: [id number]

: 1945

Sex: Female



Demographics







 Address  [address]

[place]  [address]

 

 Home Phone  [phone]

 

 Preferred Language  English

 

 Marital Status  

 

 Adventism Affiliation  Unknown

 

 Race  White

 

 Ethnic Group  Not  or 





Author







 Author  Marvin Boyer

 

 [facility]

 

 Address  [address]

[place], [place]  [id number]



 

 Phone  [phone]







Care Team Providers







 Care Team Member Name  Role  Phone

 

 Marvin Boyer  PCP  [phone]

 

 Marvin Boyer  PreferredProvider  [phone]







Allergies and Adverse Reactions







 Name  Reaction  Notes

 

 NO KNOWN DRUG ALLERGIES      







Plan of Treatment







 Planned Activity  Comments  Planned Date  Planned Time  Plan/Goal

 

 Complete blood count (CBC) with differential count     2016  12:00 AM   

 

 Comprehensive metabolic panel     2016  12:00 AM   

 

 VITAMIN D (25 HYDROXY)     2016  12:00 AM   

 

 Lipid panel (total cholesterol, lipoproteins, HDL, triglycerides)     8/15/
2012  12:00 AM   

 

 Pap smear     2017  12:00 AM   

 

 Comprehensive Metabolic Panel     6/10/2013  12:00 AM   

 

 Lipid panel (total cholesterol, lipoproteins, HDL, triglycerides)     6/10/
2013  12:00 AM   

 

 Thyroid stimulating hormone (TSH)     6/10/2013  12:00 AM   

 

 CBC (automated H&H, platelets, WBC and automated differential)     6/10/2013  
12:00 AM   

 

 Comprehensive Metabolic Panel     2012  12:00 AM   

 

 Lipid panel (total cholesterol, lipoproteins, HDL, triglycerides)     2012  12:00 AM   

 

 Thyroid stimulating hormone (TSH)     2012  12:00 AM   

 

 CBC (automated H&H, platelets, WBC and automated differential)     2012  
12:00 AM   

 

 Screening mammography, bilateral     2015  12:00 AM   







Medications







 Active 

 

 Name  Start Date  Estimated Completion Date  SIG  Comments

 

 aspirin 81 mg oral tablet,delayed release (DR/EC)        take 1 tablet (81 mg) 
by oral route once daily   

 

 Vitamin D3 1,000 unit oral capsule        take 1 capsule by oral route daily   

 

 valsartan 160 mg oral tablet  2016     TAKE ONE TABLET BY MOUTH ONCE 
DAILY   

 

 amlodipine 5 mg oral tablet  2016     TAKE ONE TABLET BY MOUTH ONCE DAILY
   

 

 amlodipine 5 mg oral tablet  2016     TAKE ONE TABLET BY MOUTH ONCE DAILY
   

 

 valsartan 160 mg oral tablet  2016     TAKE ONE TABLET BY MOUTH ONCE 
DAILY   

 

 Zofran (as hydrochloride) 4 mg oral tablet  2016     take 1 tablet by 
oral route daily as needed for 14 days   

 

 Zofran (as hydrochloride) 4 mg oral tablet  2017     take 1 tablet by oral 
route daily as needed for 14 days   

 

 Zofran (as hydrochloride) 4 mg oral tablet  2017     take 1 tablet by oral 
route daily as needed for 14 days   

 

 Zofran (as hydrochloride) 4 mg oral tablet  2017     take 1 tablet by 
oral route daily as needed for 14 days   

 

 Zofran (as hydrochloride) 4 mg oral tablet  2017     take 1 tablet by 
oral route daily as needed for 14 days   

 

 EQ LORATADINE 10MG  TABS  2017     TAKE ONE TABLET BY MOUTH ONCE DAILY   

 

 amlodipine 5 mg oral tablet  2018     TAKE ONE TABLET BY MOUTH ONCE DAILY
   

 

 atenolol 50 mg oral tablet  2018     TAKE ONE TABLET BY MOUTH ONCE DAILY 
  

 

 mirtazapine 15 mg oral tablet  2018     TAKE ONE-HALF TABLET BY MOUTH 
ONCE DAILY AT BEDTIME   

 

 losartan 100 mg oral tablet  8/3/2018  2019  take 1 tablet (100 mg) by 
oral route once daily for 90 days   

 

 Plavix 75 mg Oral tablet  2018  take 1 tablet (75 mg) by oral 
route once daily   

 

 Lexapro 10 mg oral tablet  2018  take 1 tablet (10 mg) by oral 
route once daily for 90 days   

 

 levothyroxine 50 mcg oral tablet  2018     TAKE ONE TABLET BY MOUTH ONCE 
DAILY   

 

 Vitamins B Complex oral tablet        take 1 tablet by oral route daily   

 

 pantoprazole 40 mg oral tablet,delayed release (DR/EC)  10/3/2018  2019  
take 1 tablet (40 mg) by oral route 2 times per day for 90 days   

 

 dicyclomine 20 mg oral tablet  10/1/2018  2018  take 1 tablet (20 mg) by 
oral route 3 times per day for 30 days   

 

 prochlorperazine maleate 10 mg oral tablet  10/4/2018     take 1 tablet by 
oral route every 8 hours as needed   









  

 

 Name  Start Date  Expiration Date  SIG  Comments

 

 prednisone 20 mg oral tablet  2011  take 2 tablets by oral 
route daily for 5 days   

 

 calcium carbonate-vitamin D2 600-125 mg-unit oral tablet  8/15/2012  2012
  take 2 tablets by oral route daily   

 

 Adrian 3 Fish Oil 900-1,400 mg oral capsule,delayed release(DR/EC)  8/15/2012  9
/  take 1 capsule by oral route daily   

 

 multivitamin Oral tablet  8/15/2012  2012  take 1 tablet by oral route 
daily   

 

 triamcinolone acetonide 0.1 % topical cream  2013  10/7/2013  APPLY A 
THIN LAYER TO THE AFFECTED AREA(S) BY TOPICAL ROUTE TWICE A DAY   

 

 famotidine 20 mg oral tablet  2014  take 1 tablet (20 mg) by 
oral route 2 times per day   

 

 amoxicillin 500 mg oral capsule  2014  take 1 capsule (500 mg) 
by oral route 3 times per day for 10 days   

 

 Zithromax Z-Tab 250 mg oral tablet  10/2/2014  10/7/2014  take 2 tablets (500 
mg) by oral route once daily for 1 day then 1 tablet (250 mg) by oral route 
once daily for 4 days   

 

 acyclovir 800 mg oral tablet  10/5/2014  10/12/2014  take 1 tablet (800 mg) by 
oral route 5 times per day for 7 days   

 

 gabapentin 300 mg oral capsule  10/9/2014  2014  take 1 capsule (300 mg) 
by oral route 3 times per day for 14 days   

 

 Carafate 100 mg/mL oral suspension  2014  take 10 milliliters 
(1 gram) by oral route 4 times per day on an empty stomach 1 hour before meals 
and at bedtime for 4 weeks   

 

 amlodipine 5 mg oral tablet  2015  TAKE ONE TABLET BY MOUTH 
EVERY DAY   

 

 levothyroxine 50 mcg oral tablet  2015  TAKE ONE TABLET BY MOUTH 
EVERY DAY   

 

 Diovan 160 mg oral tablet  2015  2/15/2016  TAKE ONE TABLET BY MOUTH 
EVERY DAY for 90 days   

 

 triamcinolone acetonide 0.1 % topical cream  2015  apply a thin 
layer to the affected area(s) by topical route 2 times per day for 14 days   

 

 fluconazole 150 mg oral tablet  2015  take 1 tablet (150 mg) 
by oral route once for 1 day   

 

 famotidine 20 mg oral tablet  2015     TAKE ONE TABLET BY MOUTH TWICE 
DAILY   

 

 Lipitor 20 mg oral tablet  10/13/2016  2018  take 1 tablet (20 mg) by oral 
route once daily   

 

 Zofran (as hydrochloride) 4 mg oral tablet  10/13/2016  10/27/2016  take 1 
tablet by oral route daily as needed for 14 days   

 

 Zofran (as hydrochloride) 4 mg oral tablet  2017     take 1 tablet by 
oral route daily as needed for 14 days   

 

 ondansetron 4 mg oral tablet,disintegrating  10/10/2017  2017  dissolve  
1 tablet by oral route every 8 hours as needed for 30 days   

 

 EQ LORATADINE 10MG  TABS  2018  TAKE ONE TABLET BY MOUTH ONCE 
DAILY IN THE EVENING   

 

 mirtazapine 15 mg oral tablet  2018  TAKE ONE-HALF TABLET BY 
MOUTH ONCE DAILY AT BEDTIME   









 Discontinued 

 

 Name  Start Date  Discontinued Date  SIG  Comments

 

 Lipitor 40 mg oral tablet     2009  1/2 TAB DAILY   

 

 ramipril 5 mg oral capsule     2009  take 1 capsule (5 mg) by oral route 
once daily   

 

 lovastatin 20 mg oral tablet  2009  take 1 tablet (20 mg) by 
oral route once daily with evening meal   

 

 propranolol 20 mg oral tablet  2010  take 1 tablet by oral 
route 2 times a day   

 

 Fosamax 70 mg oral tablet  2010  take 1 tablet (70 mg) by oral 
route once weekly in the morning, at least 30 minutes before the first food, 
beverage, or medication of the day   

 

 lisinopril 40 mg oral tablet  2010  4/10/2011  take 2 tablets by oral 
route daily   

 

 Zoloft 50 mg oral tablet  2011  take 1 tablet (50 mg) by oral 
route once daily   

 

 promethazine 25 mg oral tablet  2011  take 1 tablet by oral 
route every 6 hours as needed   

 

 Diovan -12.5 mg oral tablet  2011  take 1 tablet by oral 
route once daily for 30 days   

 

 Diflucan 150 mg oral tablet     2012  take 1 tablet (150 mg) by oral 
route once for 1 day   

 

 Diflucan 150 mg oral tablet  2012  8/15/2012  take 1 tablet (150 mg) by 
oral route once   

 

 Vitamin B-12 1,000 mcg oral tablet  8/15/2012  2014  take 1 tablet by 
oral route daily   

 

 Premarin 0.625 mg/gram vaginal cream  10/29/2012  6/10/2013  use 1 gram daily 
for a week then 1 gram twice a week   

 

 Medrol (Tab) 4 mg oral tablets,dose pack  2013  6/10/2013  take as 
directed   

 

 aspirin 325 mg oral tablet  2014  take 1 tablet (325 mg) by 
oral route once daily   

 

 Medrol (Tab) 4 mg oral tablets,dose pack  2014  10/2/2014  take as 
directed   

 

 Tetracaine Lollipops Lollipop  2015  Use as directed   

 

 Allergy Relief (loratadine) 10 mg oral tablet  2016     TAKE ONE TABLET 
BY MOUTH ONCE DAILY   

 

 Allergy Relief (loratadine) 10 mg oral tablet  2017  TAKE ONE 
TABLET BY MOUTH ONCE DAILY   

 

 Zoloft 50 mg oral tablet  2016  take 1 tablet (50 mg) by oral 
route once daily for 90 days  Pt refuses to take...

 

 triamcinolone acetonide 0.1 % topical cream  2017     APPLY A THIN LAYER 
OF CREAM EXTERNALLY TO AFFECTED AREA TWICE DAILY FOR 14 DAYS   

 

 loratadine 10 mg oral tablet  2017  TAKE 1 TABLET BY MOUTH 
EVERY DAY IN THE EVENING   

 

 triamcinolone acetonide 0.1 % topical cream  2017  APPLY  
CREAM EXTERNALLY TO AFFECTED AREA TWICE DAILY FOR 14 DAYS   

 

 Lexapro 10 mg oral tablet  2017  take 1 tablet (10 mg) by oral 
route once daily for 90 days   

 

 valsartan 160 mg oral tablet  2017  8/3/2018  TAKE ONE TABLET BY MOUTH 
ONCE DAILY   recall

 

 Augmentin 875-125 mg oral tablet  2018  3/6/2018  take 1 tablet by oral 
route every 12 hours for 7 days   

 

 benzonatate 100 mg oral capsule  2018  3/6/2018  take 1 capsule (100 mg) 
by oral route 3 times per day as needed for cough   

 

 ondansetron 4 mg oral tablet,disintegrating  7/3/2018  10/4/2018  dissolve  1 
tablet by oral route every 8 hours as needed   

 

 clorazepate dipotassium 7.5 mg oral tablet  2018  take 1 
tablet PO  three times per day for situational anxiety  No longer taking

 

 metoclopramide HCl 10 mg oral tablet  2018  10/1/2018  take 1 tablet (10 
mg) by oral route once daily with supper   







Problem List







 Description  Status  Onset

 

 Hyperlipidemia  Active   

 

 acid reflux  Active   

 

 Hypertension  Active   

 

 hypothyroid  Active   







Vital Signs







 Date  Time  BP-Sys(mm[Hg]  BP-Morena(mm[Hg])  HR(bpm)  RR(rpm)  Temp  WT  HT  HC  
BMI  BSA  BMI Percentile  O2 Sat(%)

 

 10/1/2018  9:02:00 AM  132 mmHg  62 mmHg  66 bpm  20 rpm  98.6 F  139 lbs  61 
in     26.2636 kg/m  1.6473 m     98 %

 

 2018  8:49:00 AM  134 mmHg  70 mmHg  64 bpm  16 rpm  98.6 F  146 lbs  61 
in     27.59 kg/m2  1.69 m2     98 %

 

 2018  9:04:00 AM  138 mmHg  80 mmHg  64 bpm  18 rpm  96 F  140.125 lbs  
61 in     26.4761 kg/m  1.654 m     99 %

 

 2018  8:24:00 AM  142 mmHg  70 mmHg  69 bpm  18 rpm  98.2 F  139 lbs  61 
in     26.26 kg/m2  1.65 m2     98 %

 

 2018  8:39:00 AM  116 mmHg  68 mmHg  68 bpm  18 rpm  97.9 F  142 lbs  61 
in     26.8304 kg/m  1.665 m      

 

 3/6/2018  8:26:00 AM  126 mmHg  76 mmHg  63 bpm  16 rpm  98 F  134.125 lbs  61 
in     25.34 kg/m2  1.62 m2     100 %

 

 2018  11:55:00 AM  126 mmHg  80 mmHg  80 bpm  18 rpm  98 F  132 lbs  61 
in     24.9409 kg/m  1.6053 m     100 %

 

 2017  8:26:00 AM  122 mmHg  76 mmHg  63 bpm  16 rpm  98.4 F  129.25 lbs  
61 in     24.42 kg/m2  1.59 m2     99 %

 

 10/18/2017  9:01:00 AM  126 mmHg  80 mmHg  73 bpm  16 rpm  97.9 F  122.375 lbs
  61 in     23.12 kg/m2  1.55 m2     100 %

 

 10/10/2017  1:52:00 PM  118 mmHg  72 mmHg  74 bpm  16 rpm  98.1 F  121 lbs  61 
in     22.8625 kg/m  1.5369 m     99 %

 

 2017  8:40:00 AM  118 mmHg  68 mmHg  63 bpm  16 rpm  98.1 F  123.125 lbs  
61 in     23.26 kg/m2  1.55 m2     100 %

 

 2017  8:57:00 AM  116 mmHg  62 mmHg  71 bpm  16 rpm  99.8 F  121.5 lbs  
61 in     22.957 kg/m  1.5401 m     98 %

 

 2017  11:30:00 AM  128 mmHg  78 mmHg  69 bpm  16 rpm  98.8 F  129.375 lbs  
61 in     24.44 kg/m2  1.59 m2     98 %

 

 2017  8:22:00 AM  118 mmHg  78 mmHg  62 bpm  18 rpm  97.5 F  129.125 lbs  
61 in     24.3977 kg/m  1.5877 m     100 %

 

 10/13/2016  2:26:00 PM  128 mmHg  78 mmHg  77 bpm  16 rpm  98.4 F  139.25 lbs  
61 in     26.31 kg/m2  1.65 m2     100 %

 

 2016  8:29:00 AM  122 mmHg  70 mmHg  72 bpm  16 rpm  97.8 F  137.125 lbs  
61 in     25.9093 kg/m  1.6361 m     100 %

 

 2015  9:33:00 AM  120 mmHg  68 mmHg  73 bpm     98.7 F  144.375 lbs  61 
in     27.28 kg/m2  1.68 m2     99 %

 

 2015  9:05:00 AM  110 mmHg  75 mmHg  85 bpm  16 rpm  96.7 F  144.375 lbs  
61 in     27.2791 kg/m  1.6788 m     99 %

 

 2015  1:05:00 PM  108 mmHg  62 mmHg  78 bpm  18 rpm  96.9 F  142.375 lbs  
61 in     26.90 kg/m2  1.67 m2     100 %

 

 2014  9:31:00 AM  126 mmHg  66 mmHg  79 bpm  18 rpm  98.7 F  149 lbs  61 
in     28.153 kg/m  1.7055 m     98 %

 

 10/6/2014  9:02:00 AM  110 mmHg  74 mmHg  94 bpm  18 rpm  98.1 F  145.4 lbs  
61 in     27.47 kg/m2  1.68 m2     97 %

 

 10/2/2014  9:14:00 AM  124 mmHg  74 mmHg  79 bpm  18 rpm  98 F  147.25 lbs  61 
in     27.8224 kg/m  1.6955 m     98 %

 

 2014  9:22:00 AM  124 mmHg  76 mmHg  89 bpm  18 rpm  98.1 F  148 lbs  61 
in     27.96 kg/m2  1.70 m2     100 %

 

 2014  8:27:00 AM  118 mmHg  65 mmHg  74 bpm  16 rpm  97.7 F  148 lbs  61 
in     27.9641 kg/m  1.6998 m     99 %

 

 2014  9:48:00 AM  125 mmHg  70 mmHg  93 bpm  18 rpm  96.7 F  156 lbs  61 
in     29.48 kg/m2  1.75 m2     100 %

 

 2013  8:35:00 AM  122 mmHg  74 mmHg  84 bpm  16 rpm  97.9 F  155.375 lbs 
                99 %

 

 6/10/2013  8:30:00 AM  130 mmHg  82 mmHg  79 bpm  16 rpm  97.9 F  161 lbs     
            98 %

 

 2013  8:50:00 AM  124 mmHg  68 mmHg  66 bpm  18 rpm  97.6 F  157.5 lbs  
61 in     29.7591 kg/m  1.7535 m      

 

 2012  1:46:00 PM  120 mmHg  72 mmHg  75 bpm  16 rpm  97.6 F  156.125 lbs
                 98 %

 

 12/10/2012  8:32:00 AM  122 mmHg  82 mmHg  70 bpm  16 rpm  97.3 F  157 lbs    
             99 %

 

 8/15/2012  9:00:00 AM  130 mmHg  76 mmHg  86 bpm  16 rpm  97.4 F  159 lbs     
            100 %

 

 2012  11:02:00 AM  128 mmHg  64 mmHg  66 bpm  18 rpm  97.4 F  162.125 lbs
  61 in     30.6329 kg/m  1.7791 m      

 

 2012  8:45:00 AM  130 mmHg  70 mmHg  82 bpm  16 rpm  98.2 F  160.125 lbs 
                100 %

 

 2/15/2012  9:29:00 AM  122 mmHg  80 mmHg  86 bpm  16 rpm  97.4 F  159.375 lbs  
61 in     30.1133 kg/m  1.7639 m     99 %

 

 2012  9:41:00 AM  132 mmHg  74 mmHg  66 bpm  18 rpm  98.4 F  160.375 lbs  
61 in     30.30 kg/m2  1.77 m2      

 

 2011  10:08:00 AM  134 mmHg  82 mmHg  80 bpm  16 rpm  98 F  160 lbs  61 
in     30.2314 kg/m  1.7674 m     99 %

 

 2011  3:56:00 PM  130 mmHg  80 mmHg                              

 

 2011  8:55:00 AM  130 mmHg  74 mmHg  88 bpm  16 rpm  98.2 F  158.25 lbs  
               98 %

 

 2011  8:54:00 AM  136 mmHg  82 mmHg  102 bpm  16 rpm  98.1 F  153.5 lbs   
              99 %

 

 2011  8:49:00 AM  122 mmHg  88 mmHg  88 bpm  16 rpm  98.6 F  152.25 lbs  
               99 %

 

 2011  10:02:00 AM  140 mmHg  82 mmHg  96 bpm  20 rpm  98.8 F             
       100 %

 

 2011  9:14:00 AM  156 mmHg  98 mmHg  110 bpm  24 rpm  98.5 F  153 lbs    
             100 %

 

 2011  8:50:00 AM  160 mmHg  84 mmHg  100 bpm  16 rpm  98.7 F  155 lbs    
             100 %

 

 2011  1:25:00 PM  152 mmHg  100 mmHg  82 bpm  16 rpm  97.2 F  156.375 lbs 
                100 %

 

 2011  9:55:00 AM  144 mmHg  90 mmHg                              

 

 2010  9:24:00 AM  138 mmHg  84 mmHg                              

 

 2010  8:58:00 AM  160 mmHg  94 mmHg                              

 

 2010  8:33:00 AM  142 mmHg  90 mmHg  100 bpm  16 rpm  98.1 F  158.375 
lbs                  

 

 2010  9:24:00 AM  160 mmHg  102 mmHg  88 bpm  18 rpm  99 F  157.125 lbs  
62 in     28.7382 kg/m  1.7657 m      

 

 3/19/2010  11:01:00 AM  140 mmHg  90 mmHg                              

 

 2009  10:08:00 AM  142 mmHg  86 mmHg  72 bpm  16 rpm  98.1 F  154.125 
lbs  61 in     29.1214 kg/m  1.7346 m      







Social History







 Name  Description  Comments

 

       

 

 Children      

 

 lives alone      

 

 Supervisor      

 

 Tobacco  Never smoker   







History of Procedures







 Date Ordered  Description  Order Status

 

 2011 12:00 AM  COMPREHEN METABOLIC PANEL  Reviewed

 

 2011 12:00 AM  ASSAY THYROID STIM HORMONE  Reviewed

 

 2011 12:00 AM  COMPREHEN METABOLIC PANEL  Reviewed

 

 2011 12:00 AM  LIPID PANEL  Reviewed

 

 2011 12:00 AM  ASSAY THYROID STIM HORMONE  Reviewed

 

 2011 12:00 AM  COMPLETE CBC W/AUTO DIFF WBC  Reviewed

 

 2015 12:00 AM  COMPLETE CBC W/AUTO DIFF WBC  Reviewed

 

 2015 12:00 AM  COMPREHEN METABOLIC PANEL  Reviewed

 

 2015 12:00 AM  LIPID PANEL  Reviewed

 

 2015 12:00 AM  ASSAY THYROID STIM HORMONE  Reviewed

 

 2011 12:00 AM  COMPREHEN METABOLIC PANEL  Reviewed

 

 2011 12:00 AM  COMPREHEN METABOLIC PANEL  Reviewed

 

 2011 12:00 AM  LIPID PANEL  Reviewed

 

 2011 12:00 AM  ASSAY THYROID STIM HORMONE  Reviewed

 

 10/28/2011 12:00 AM  ***Immunization administration, Medicare flu  Reviewed

 

 10/28/2011 12:00 AM  Fluzone ** MEDICARE Only **  Reviewed

 

 2010 11:24 AM  NO CHARGE OV  Reviewed

 

 2010 11:26 AM  NO CHARGE OV  Reviewed

 

 2011 12:00 AM  COMPREHEN METABOLIC PANEL  Reviewed

 

 2011 12:00 AM  LIPID PANEL  Reviewed

 

 2011 12:00 AM  ASSAY THYROID STIM HORMONE  Reviewed

 

 2011 12:00 AM  COMPLETE CBC W/AUTO DIFF WBC  Reviewed

 

 2011 12:00 AM  Wrist Support  Reviewed

 

 2016 12:00 AM  Screening mammography, bilateral  Reviewed

 

 2016 12:00 AM  DXA BONE DENSITY AXIAL  Returned

 

 2016 12:00 AM  TETANUS VACCINE IM  Reviewed

 

 2016 12:00 AM  HEP B VACC ADULT 3 DOSE IM  Reviewed

 

 2012 12:00 AM  TISSUE EXAM FOR FUNGI  Reviewed

 

 2012 12:00 AM  SMEAR WET MOUNT SALINE/INK  Reviewed

 

 2016 12:00 AM  TETANUS VACCINE IM  Reviewed

 

 2016 12:00 AM  HEP B VACC ADULT 3 DOSE IM  Reviewed

 

 2/15/2012 12:00 AM  THER/PROPH/DIAG INJ SC/IM  Reviewed

 

 2/15/2012 12:00 AM  Bicillin MATTHEW [facility]#68817043707[facility]  Reviewed

 

 2/15/2012 12:00 AM  Depo-Medrol 40 mg NDC#7156303576  Reviewed

 

 10/13/2016 12:00 AM  COMPLETE CBC W/AUTO DIFF WBC  Returned

 

 10/13/2016 12:00 AM  COMPREHEN METABOLIC PANEL  Returned

 

 10/13/2016 12:00 AM  ASSAY THYROID STIM HORMONE  Returned

 

 10/13/2016 12:00 AM  LIPID PANEL  Returned

 

 2016 12:00 AM  TETANUS VACCINE IM  Reviewed

 

 2016 12:00 AM  HEP B VACC ADULT 3 DOSE IM  Reviewed

 

 2017 12:00 AM  ASSAY OF IRON  Returned

 

 2017 12:00 AM  IRON BINDING TEST  Returned

 

 2017 12:00 AM  ASSAY OF TRANSFERRIN  Returned

 

 2017 12:00 AM  OCCULT BLD FECES 1-3 TESTS  Returned

 

 2017 12:00 AM  COMPLETE CBC AUTOMATED  Returned

 

 8/15/2012 12:00 AM  COMPREHEN METABOLIC PANEL  Reviewed

 

 8/15/2012 12:00 AM  ASSAY THYROID STIM HORMONE  Reviewed

 

 8/15/2012 12:00 AM  COMPLETE CBC W/AUTO DIFF WBC  Reviewed

 

 8/15/2012 12:00 AM  Wrist Support  Reviewed

 

 2017 12:00 AM  METABOLIC PANEL TOTAL CA  Returned

 

 2017 12:00 AM  METABOLIC PANEL TOTAL CA  Returned

 

 2017 12:00 AM  Screening mammography, bilateral  Returned

 

 10/29/2012 12:00 AM  Flu Injection 3 Years And Above NDC# [address]-99  [facility]  
Reviewed

 

 12/10/2012 12:00 AM  IMMUNIZATION ADMIN  Reviewed

 

 12/10/2012 12:00 AM  Pneumovax Injection - [facility]  Reviewed

 

 2018 12:00 AM  THER/PROPH/DIAG INJ SC/IM  Reviewed

 

 2018 12:00 AM  Decadron 4mg Injection  Reviewed

 

 2018 12:00 AM  Depo-Medrol 40mg Injection  Reviewed

 

 2012 12:00 AM  TRICHOMONAS ASSAY W/OPTIC  Reviewed

 

 2018 12:00 AM  COMPLETE CBC W/AUTO DIFF WBC  Returned

 

 2018 12:00 AM  COMPREHEN METABOLIC PANEL  Returned

 

 2018 12:00 AM  LIPID PANEL  Returned

 

 2018 12:00 AM  ASSAY THYROID STIM HORMONE  Returned

 

 2013 12:00 AM  THER/PROPH/DIAG INJ SC/IM  Reviewed

 

 2013 12:00 AM  Bicillin CR, 1.2 million units NDC# 92441-243-94  Reviewed

 

 2018 12:00 AM  Mammogram, screening, bilateral  Reviewed

 

 2018 12:00 AM  DXA BONE DENSITY AXIAL  Reviewed

 

 2018 12:00 AM  COMPLETE CBC W/AUTO DIFF WBC  Reviewed

 

 2018 12:00 AM  COMPREHEN METABOLIC PANEL  Reviewed

 

 2018 12:00 AM  LIPID PANEL  Reviewed

 

 2018 12:00 AM  ASSAY THYROID STIM HORMONE  Reviewed

 

 2013 12:00 AM  COMPREHEN METABOLIC PANEL  Reviewed

 

 2013 12:00 AM  LIPID PANEL  Reviewed

 

 2013 12:00 AM  COMPLETE CBC W/AUTO DIFF WBC  Reviewed

 

 2013 12:00 AM  ASSAY THYROID STIM HORMONE  Reviewed

 

 6/10/2013 12:00 AM  MAMMOGRAM SCREENING  Reviewed

 

 6/10/2013 12:00 AM  CYTOPATH C/V MANUAL  Reviewed

 

 12/10/2013 12:00 AM  LIPID PANEL  Reviewed

 

 12/10/2013 12:00 AM  ASSAY THYROID STIM HORMONE  Reviewed

 

 12/10/2013 12:00 AM  COMPLETE CBC W/AUTO DIFF WBC  Reviewed

 

 12/10/2013 12:00 AM  COMPREHEN METABOLIC PANEL  Reviewed

 

 2010 12:00 AM  CYTOPATH C/V MANUAL  Reviewed

 

 2010 12:00 AM  SMEAR WET MOUNT SALINE/INK  Reviewed

 

 2010 12:00 AM  LIPID PANEL  Reviewed

 

 2010 12:00 AM  ASSAY THYROID STIM HORMONE  Reviewed

 

 2010 12:00 AM  COMPLETE CBC W/AUTO DIFF WBC  Reviewed

 

 2010 12:00 AM  COMPREHEN METABOLIC PANEL  Reviewed

 

 10/28/2013 12:00 AM  Flu Injection 3 Years And Above NDC# [address]-70  C  
Reviewed

 

 2010 8:48 AM  Blood Pressure Check-no charge  Reviewed

 

 2010 12:00 AM  Blood Pressure Check-no charge  Reviewed

 

 2014 12:00 AM  METABOLIC PANEL TOTAL CA  Reviewed

 

 2014 12:00 AM  ASSAY THYROID STIM HORMONE  Reviewed

 

 2014 12:00 AM  ASSAY OF FREE THYROXINE  Reviewed

 

 2014 12:00 AM  MAMMOGRAM SCREENING  Reviewed

 

 2014 12:00 AM  CT BONE DENSITY AXIAL  Reviewed

 

 2014 12:00 AM  COMPLETE CBC W/AUTO DIFF WBC  Reviewed

 

 2014 12:00 AM  COMPREHEN METABOLIC PANEL  Reviewed

 

 2014 12:00 AM  LIPID PANEL  Reviewed

 

 2014 12:00 AM  ASSAY THYROID STIM HORMONE  Reviewed

 

 10/20/2010 12:00 AM  IMMUNIZATION ADMIN  Reviewed

 

 10/20/2010 12:00 AM  FLU VACCINE 3 YRS & > IM  Reviewed

 

 2010 12:00 AM  COMPREHEN METABOLIC PANEL  Reviewed

 

 2010 12:00 AM  LIPID PANEL  Reviewed

 

 2010 12:00 AM  ASSAY THYROID STIM HORMONE  Reviewed

 

 2010 12:00 AM  COMPLETE CBC W/AUTO DIFF WBC  Reviewed

 

 2010 12:00 AM  Blood Pressure Check-no charge  Reviewed

 

 10/2/2014 12:00 AM  THER/PROPH/DIAG INJ SC/IM  Reviewed

 

 10/2/2014 12:00 AM  Kenalog, Per 10 Mg NDC#3288-7185-06  Reviewed

 

 2014 12:00 AM  COMPLETE CBC W/AUTO DIFF WBC  Reviewed

 

 2014 12:00 AM  COMPREHEN METABOLIC PANEL  Reviewed

 

 2014 12:00 AM  LIPID PANEL  Reviewed

 

 2014 12:00 AM  ASSAY THYROID STIM HORMONE  Reviewed

 

 2015 12:00 AM  Rocephin 1 gram NDC#[id number]  Reviewed

 

 2015 12:00 AM  THER/PROPH/DIAG INJ SC/IM  Reviewed

 

 2011 12:00 AM  COMPREHEN METABOLIC PANEL  Reviewed

 

 2011 12:00 AM  LIPID PANEL  Reviewed

 

 2011 12:00 AM  ASSAY THYROID STIM HORMONE  Reviewed

 

 2011 12:00 AM  COMPLETE CBC W/AUTO DIFF WBC  Reviewed

 

 2011 12:00 AM  METABOLIC PANEL TOTAL CA  Reviewed

 

 2011 12:00 AM  COMPREHEN METABOLIC PANEL  Reviewed

 

 2011 12:00 AM  ASSAY THYROID STIM HORMONE  Reviewed

 

 2011 12:00 AM  COMPLETE CBC W/AUTO DIFF WBC  Reviewed

 

 2011 12:00 AM  ASSAY OF LIPASE  Reviewed

 

 2011 12:00 AM  THER/PROPH/DIAG INJ SC/IM  Reviewed

 

 2011 12:00 AM  Phenergan 50 Mg Im  Bernadine  Reviewed

 

 2011 12:00 AM  METABOLIC PANEL TOTAL CA  Reviewed

 

 2011 12:00 AM  THER/PROPH/DIAG INJ SC/IM  Reviewed

 

 2011 12:00 AM  Phenergan 25 Mg Im  Bernadine  Reviewed

 

 2011 12:00 AM  METABOLIC PANEL TOTAL CA  Reviewed

 

 2011 12:00 AM  ASSAY THYROID STIM HORMONE  Reviewed

 

 2011 12:00 AM  THER/PROPH/DIAG INJ SC/IM  Reviewed

 

 2011 12:00 AM  Phenergan 50 Mg Im  Bernadine  Reviewed

 

 2011 12:00 AM  ASSAY OF SERUM SODIUM  Reviewed

 

 2011 12:00 AM  ASSAY OF SERUM SODIUM  Reviewed

 

 2011 12:00 AM  CYTOPATH C/V MANUAL  Reviewed

 

 2011 12:00 AM  ASSAY THYROID STIM HORMONE  Reviewed

 

 2015 12:00 AM  COMPLETE CBC W/AUTO DIFF WBC  Reviewed

 

 2015 12:00 AM  COMPREHEN METABOLIC PANEL  Reviewed

 

 2015 12:00 AM  LIPID PANEL  Reviewed

 

 2015 12:00 AM  ASSAY THYROID STIM HORMONE  Reviewed

 

 2015 12:00 AM  MAMMOGRAM SCREENING  Reviewed

 

 2015 12:00 AM  CYTOPATH TBS C/V MANUAL  Reviewed







Results Summary







 Date and Description  Results

 

 2010 9:25 AM  Colonoscopy-Women and Men over 50 Abnormal Mammogram -Women 
over 40 Declined Pap Smear Declined 

 

 2010 10:41 AM  WET PREP NO TRICHOMONADS SEEN  No  Few 

 

 2010 8:15 AM  TRIGLYCERIDES 174.0 mg/dLCHOLESTEROL 175.0 mg/dLHDL 58.0 mg/
dLTOT CHOL/HDL 3.0 LDL (CALC) 82.0 mg/dLTSH 0.790 uIU/mLGLUCOSE 95.0 mg/
dLSODIUM 136.0 mmol/LPOTASSIUM 4.50 mmol/LCHLORIDE 99.0 mmol/LCO2 26.0 mmol/
LBUN 12.0 mg/dLCREATININE 0.80 mg/dLSGOT/AST 32.0 IU/LSGPT/ALT 33.0 IU/LALK 
PHOS 103.0 IU/LTOTAL PROTEIN 7.60 g/dLALBUMIN 4.60 g/dLTOTAL BILI 0.60 mg/
dLCALCIUM 9.80 mg/dLAGE 64 GFR NonAA 72 GFR AA 87 eGFR >60 mL/min/1.73 m2eGFR AA
* >60 WBC 5.3 RBC 4.13 HGB 13.10 g/dLHCT 39.20 %MCV 95.0 fLMCH 31.70 pgMCHC 
33.40 g/dLRDW SD 44 RDW CV 12.70 %MPV 9.80 fLPLT 257 NRBC# 0.00 NRBC% 0.0 %NEUT 
57.90 %%LYMP 26.50 %%MONO 11.60 %%EOS 3.20 %%BASO 0.80 %#NEUT 3.04 #LYMP 1.39 #
MONO 0.61 #EOS 0.17 #BASO 0.04 MANUAL DIFF NOT IND 

 

 2011 9:45 AM  GLUCOSE 91.0 mg/dLSODIUM 133.0 mmol/LPOTASSIUM 4.40 mmol/
LCHLORIDE 97.0 mmol/LCO2 24.0 mmol/LBUN 9.0 mg/dLCREATININE 0.70 mg/dLCALCIUM 
10.0 mg/dLAGE 65 GFR NonAA 84 GFR  eGFR >60 mL/min/1.73 m2eGFR AA* >60 

 

 2011 10:25 AM  LIPASE 29.0 U/LTSH 0.10 uIU/mLGLUCOSE 115.0 mg/dLSODIUM 
127.0 mmol/LPOTASSIUM 5.30 mmol/LCHLORIDE 93.0 mmol/LCO2 18.0 mmol/LBUN 9.0 mg/
dLCREATININE 0.70 mg/dLSGOT/AST 62.0 IU/LSGPT/ALT 46.0 IU/LALK PHOS 100.0 IU/
LTOTAL PROTEIN 8.60 g/dLALBUMIN 4.90 g/dLTOTAL BILI 0.60 mg/dLCALCIUM 9.90 mg/
dLAGE 65 GFR NonAA 84 GFR  eGFR >60 mL/min/1.73 m2eGFR AA* >60 WBC 6.9 
RBC 4.48 HGB 14.40 g/dLHCT 40.90 %MCV 91.0 fLMCH 32.10 pgMCHC 35.20 g/dLRDW SD 
41 RDW CV 12.50 %MPV 9.30 fLPLT 260 NRBC# 0.00 NRBC% 0.0 %NEUT 74.90 %%LYMP 
15.10 %%MONO 9.40 %%EOS 0.30 %%BASO 0.30 %#NEUT 5.15 #LYMP 1.04 #MONO 0.65 #EOS 
0.02 #BASO 0.02 MANUAL DIFF NOT IND 

 

 2011 9:07 AM  GLUCOSE 92.0 mg/dLSODIUM 131.0 mmol/LPOTASSIUM 4.20 mmol/
LCHLORIDE 99.0 mmol/LCO2 22.0 mmol/LBUN 9.0 mg/dLCREATININE 0.70 mg/dLCALCIUM 
9.20 mg/dLAGE 65 GFR NonAA 84 GFR  eGFR >60 mL/min/1.73 m2eGFR AA* >60 

 

 2011 11:06 AM  TSH 0.510 uIU/mLGLUCOSE 99.0 mg/dLSODIUM 132.0 mmol/
LPOTASSIUM 3.50 mmol/LCHLORIDE 95.0 mmol/LCO2 23.0 mmol/LBUN 9.0 mg/
dLCREATININE 0.80 mg/dLCALCIUM 10.40 mg/dLAGE 65 GFR NonAA 72 GFR AA 87 eGFR >
60 mL/min/1.73 m2eGFR AA* >60 

 

 2011 9:45 AM  SODIUM 132.0 mmol/L

 

 2011 9:27 AM  SODIUM 137.0 mmol/L

 

 2011 9:55 AM  TSH 1.070 uIU/mL

 

 2011 8:55 AM  GLUCOSE 102.0 mg/dLSODIUM 135.0 mmol/LPOTASSIUM 4.20 mmol/
LCHLORIDE 102.0 mmol/LCO2 24.0 mmol/LBUN 15.0 mg/dLCREATININE 0.80 mg/dLSGOT/
AST 30.0 IU/LSGPT/ALT 35.0 IU/LALK PHOS 119.0 IU/LTOTAL PROTEIN 7.50 g/
dLALBUMIN 4.30 g/dLTOTAL BILI 0.30 mg/dLCALCIUM 9.20 mg/dLAGE 65 GFR NonAA 72 
GFR AA 87 eGFR >60 mL/min/1.73 m2eGFR AA* >60 TSH 1.130 uIU/mL

 

 2011 8:55 AM  GLUCOSE 98.0 mg/dLSODIUM 137.0 mmol/LPOTASSIUM 3.90 mmol/
LCHLORIDE 103.0 mmol/LCO2 25.0 mmol/LBUN 14.0 mg/dLCREATININE 0.70 mg/dLSGOT/
AST 33.0 IU/LSGPT/ALT 36.0 IU/LALK PHOS 111.0 IU/LTOTAL PROTEIN 8.30 g/
dLALBUMIN 4.40 g/dLTOTAL BILI 0.50 mg/dLCALCIUM 9.40 mg/dLAGE 66 GFR NonAA 84 
GFR  eGFR >60 mL/min/1.73 m2eGFR AA* >60 TRIGLYCERIDES 109.0 mg/
dLCHOLESTEROL 153.0 mg/dLHDL 54.0 mg/dLTOT CHOL/HDL 2.8 LDL (CALC) 77.0 mg/
dLTSH 1.330 uIU/mL

 

 2011 10:17 AM  Colonoscopy-Women and Men over 50 Declined Mammogram -
Women over 40 Normal Pap Smear Negative 

 

 2012 10:33 AM  WET PREP NO TRICH SEEN CLUE CELLS NONE SEEN 

 

 2012 8:45 AM  WBC 5.2 RBC 3.96 HGB 12.70 g/dLHCT 37.80 %MCV 96.0 fLMCH 
32.10 pgMCHC 33.60 g/dLRDW SD 46 RDW CV 13.30 %MPV 9.70 fLPLT 297 NRBC# 0.00 
NRBC% 0.0 %NEUT 57.70 %%LYMP 28.50 %%MONO 10.30 %%EOS 2.50 %%BASO 1.0 %#NEUT 
3.02 #LYMP 1.49 #MONO 0.54 #EOS 0.13 #BASO 0.05 MANUAL DIFF NOT IND GLUCOSE 
97.0 mg/dLSODIUM 137.0 mmol/LPOTASSIUM 4.40 mmol/LCHLORIDE 101.0 mmol/LCO2 23.0 
mmol/LBUN 17.0 mg/dLCREATININE 0.80 mg/dLSGOT/AST 30.0 IU/LSGPT/ALT 33.0 IU/
LALK PHOS 95.0 IU/LTOTAL PROTEIN 7.40 g/dLALBUMIN 4.40 g/dLTOTAL BILI 0.60 mg/
dLCALCIUM 9.70 mg/dLAGE 66 GFR NonAA 72 GFR AA 87 eGFR 60 eGFR AA* 60 
TRIGLYCERIDES 130.0 mg/dLCHOLESTEROL 157.0 mg/dLHDL 56.0 mg/dLTOT CHOL/HDL 2.8 
LDL (CALC) 75.0 mg/dLTSH 0.940 uIU/mL

 

 2012 8:45 AM  WBC 5.1 RBC 3.91 HGB 12.50 g/dLHCT 36.30 %MCV 93.0 fLMCH 
32.0 pgMCHC 34.40 g/dLRDW SD 43 RDW CV 12.60 %MPV 9.30 fLPLT 244 NRBC# 0.00 NRBC
% 0.0 %NEUT 57.60 %%LYMP 27.50 %%MONO 9.10 %%EOS 5.0 %%BASO 0.80 %#NEUT 2.91 #
LYMP 1.39 #MONO 0.46 #EOS 0.25 #BASO 0.04 MANUAL DIFF NOT IND 

 

 12/10/2012 8:34 AM  Colonoscopy-Women and Men over 50 Declined Mammogram -
Women over 40 Declined Pap Smear Declined 

 

 2012 5:02 PM  WET PREP NO TRICH SEEN CLUE CELLS NONE SEEN 

 

 2013 8:25 AM  WBC 4.2 RBC 3.96 HGB 12.90 g/dLHCT 37.0 %MCV 93.0 fLMCH 
32.60 pgMCHC 34.90 g/dLRDW SD 43 RDW CV 12.60 %MPV 9.70 fLPLT 254 NRBC# 0.00 
NRBC% 0.0 %NEUT 54.40 %%LYMP 30.40 %%MONO 10.80 %%EOS 3.40 %%BASO 1.0 %#NEUT 
2.26 #LYMP 1.26 #MONO 0.45 #EOS 0.14 #BASO 0.04 MANUAL DIFF NOT IND TSH 1.230 
uIU/mLGLUCOSE 94.0 mg/dLSODIUM 136.0 mmol/LPOTASSIUM 4.30 mmol/LCHLORIDE 99.0 
mmol/LCO2 25.0 mmol/LBUN 10.0 mg/dLCREATININE 0.80 mg/dLSGOT/AST 36.0 IU/LSGPT/
ALT 36.0 IU/LALK PHOS 84.0 IU/LTOTAL PROTEIN 7.90 g/dLALBUMIN 4.40 g/dLTOTAL 
BILI 0.70 mg/dLCALCIUM 9.80 mg/dLAGE 67 GFR NonAA 72 GFR AA 87 eGFR 60 eGFR AA* 
60 TRIGLYCERIDES 122.0 mg/dLCHOLESTEROL 141.0 mg/dLHDL 47.0 mg/dLTOT CHOL/HDL 
3.0 LDL (CALC) 70.0 mg/dL

 

 2013 8:45 AM  WBC 5.3 RBC 4.01 HGB 13.0 g/dLHCT 37.60 %MCV 94.0 fLMCH 
32.40 pgMCHC 34.60 g/dLRDW SD 43 RDW CV 12.50 %MPV 9.40 fLPLT 248 NRBC# 0.00 
NRBC% 0.0 %NEUT 58.70 %%LYMP 27.80 %%MONO 9.80 %%EOS 2.80 %%BASO 0.90 %#NEUT 
3.12 #LYMP 1.48 #MONO 0.52 #EOS 0.15 #BASO 0.05 MANUAL DIFF NOT IND TSH 1.570 
uIU/mLGLUCOSE 94.0 mg/dLSODIUM 134.0 mmol/LPOTASSIUM 4.10 mmol/LCHLORIDE 101.0 
mmol/LCO2 23.0 mmol/LBUN 11.0 mg/dLCREATININE 0.80 mg/dLSGOT/AST 41.0 IU/LSGPT/
ALT 44.0 IU/LALK PHOS 109.0 IU/LTOTAL PROTEIN 7.90 g/dLALBUMIN 4.70 g/dLTOTAL 
BILI 0.80 mg/dLCALCIUM 10.40 mg/dLAGE 68 GFR NonAA 72 GFR AA 87 eGFR >60 mL/min/
1.73 m2eGFR AA* >60 TRIGLYCERIDES 163.0 mg/dLCHOLESTEROL 138.0 mg/dLHDL 49.0 mg/
dLTOT CHOL/HDL 2.8 LDL (CALC) 56.0 mg/dL

 

 2014 8:58 AM  GLUCOSE 86.0 mg/dLSODIUM 134.0 mmol/LPOTASSIUM 4.20 mmol/
LCHLORIDE 99.0 mmol/LCO2 25.0 mmol/LBUN 14.0 mg/dLCREATININE 0.80 mg/dLCALCIUM 
10.10 mg/dLAGE 68 GFR NonAA 72 GFR AA 87 eGFR >60 mL/min/1.73 m2eGFR AA* >60 
FREE T4 1.18 TSH 1.20 uIU/mL

 

 2014 9:50 AM  WBC 5.7 RBC 4.03 HGB 12.90 g/dLHCT 37.90 %MCV 94.0 fLMCH 
32.0 pgMCHC 34.0 g/dLRDW SD 44 RDW CV 12.70 %MPV 9.70 fLPLT 292 NRBC# 0.00 NRBC
% 0.0 %NEUT 62.70 %%LYMP 21.80 %%MONO 11.0 %%EOS 3.40 %%BASO 1.10 %#NEUT 3.55 #
LYMP 1.23 #MONO 0.62 #EOS 0.19 #BASO 0.06 MANUAL DIFF NOT IND GLUCOSE 98.0 mg/
dLSODIUM 135.0 mmol/LPOTASSIUM 4.30 mmol/LCHLORIDE 102.0 mmol/LCO2 22.0 mmol/
LBUN 10.0 mg/dLCREATININE 0.80 mg/dLSGOT/AST 34.0 IU/LSGPT/ALT 32.0 IU/LALK 
PHOS 95.0 IU/LTOTAL PROTEIN 7.70 g/dLALBUMIN 4.30 g/dLTOTAL BILI 0.80 mg/
dLCALCIUM 9.10 mg/dLAGE 68 GFR NonAA 72 GFR AA 87 eGFR 60 eGFR AA* 60 
TRIGLYCERIDES 108.0 mg/dLCHOLESTEROL 146.0 mg/dLHDL 56.0 mg/dLTOT CHOL/HDL 2.6 
LDL (CALC) 68.0 mg/dLTSH 0.90 uIU/mL

 

 2014 8:40 AM  WBC 4.4 RBC 4.13 HGB 13.20 g/dLHCT 38.90 %MCV 94.0 fLMCH 
32.0 pgMCHC 33.90 g/dLRDW SD 47 RDW CV 13.50 %MPV 9.80 fLPLT 279 NRBC# 0.00 NRBC
% 0.0 %NEUT 63.80 %%LYMP 24.60 %%MONO 9.30 %%EOS 1.60 %%BASO 0.70 %#NEUT 2.80 #
LYMP 1.08 #MONO 0.41 #EOS 0.07 #BASO 0.03 MANUAL DIFF NOT IND GLUCOSE 96.0 mg/
dLSODIUM 136.0 mmol/LPOTASSIUM 4.10 mmol/LCHLORIDE 99.0 mmol/LCO2 23.0 mmol/
LBUN 8.0 mg/dLCREATININE 0.80 mg/dLSGOT/AST 31.0 IU/LSGPT/ALT 27.0 IU/LALK PHOS 
85.0 IU/LTOTAL PROTEIN 7.60 g/dLALBUMIN 4.40 g/dLTOTAL BILI 0.80 mg/dLCALCIUM 
10.20 mg/dLAGE 69 GFR NonAA 71 GFR AA 86 eGFR 60 eGFR AA* 60 TRIGLYCERIDES 61.0 
mg/dLCHOLESTEROL 148.0 mg/dLHDL 71.0 mg/dLTOT CHOL/HDL 2.1 LDL (CALC) 65.0 mg/
dLTSH 0.990 uIU/mL

 

 2015 8:32 AM  WBC 4.8 RBC 3.98 HGB 12.70 g/dLHCT 37.30 %MCV 94.0 fLMCH 
31.90 pgMCHC 34.0 g/dLRDW SD 43 RDW CV 12.70 %MPV 9.50 fLPLT 270 NRBC# 0.00 NRBC
% 0.0 %NEUT 57.30 %%LYMP 25.0 %%MONO 13.0 %%EOS 3.60 %%BASO 1.10 %#NEUT 2.73 #
LYMP 1.19 #MONO 0.62 #EOS 0.17 #BASO 0.05 MANUAL DIFF NOT IND TSH 0.640 uIU/
mLGLUCOSE 90.0 mg/dLSODIUM 136.0 mmol/LPOTASSIUM 4.0 mmol/LCHLORIDE 101.0 mmol/
LCO2 24.0 mmol/LBUN 10.0 mg/dLCREATININE 0.80 mg/dLSGOT/AST 34.0 IU/LSGPT/ALT 
32.0 IU/LALK PHOS 92.0 IU/LTOTAL PROTEIN 7.50 g/dLALBUMIN 4.40 g/dLTOTAL BILI 
0.70 mg/dLCALCIUM 9.50 mg/dLAGE 69 GFR NonAA 71 GFR AA 86 eGFR >60 mL/min/1.73 
m2eGFR AA* >60 TRIGLYCERIDES 107.0 mg/dLCHOLESTEROL 138.0 mg/dLHDL 58.0 mg/
dLTOT CHOL/HDL 2.4 LDL (CALC) 59.0 mg/dL

 

 2015 8:31 AM  WBC 4.6 RBC 3.97 HGB 12.90 g/dLHCT 38.0 %MCV 96.0 fLMCH 
32.50 pgMCHC 33.90 g/dLRDW SD 44 RDW CV 12.60 %MPV 9.0 fLPLT 248 NRBC# 0.00 NRBC
% 0.0 %NEUT 61.0 %%LYMP 24.70 %%MONO 10.20 %%EOS 3.0 %%BASO 1.10 %#NEUT 2.82 #
LYMP 1.14 #MONO 0.47 #EOS 0.14 #BASO 0.05 MANUAL DIFF NOT IND TRIGLYCERIDES 
105.0 mg/dLCHOLESTEROL 141.0 mg/dLHDL 58.0 mg/dLTOT CHOL/HDL 2.4 LDL (CALC) 
62.0 mg/dLGLUCOSE 93.0 mg/dLSODIUM 136.0 mmol/LPOTASSIUM 4.10 mmol/LCHLORIDE 
101.0 mmol/LCO2 25.0 mmol/LBUN 9.0 mg/dLCREATININE 0.80 mg/dLSGOT/AST 32.0 IU/
LSGPT/ALT 28.0 IU/LALK PHOS 95.0 IU/LTOTAL PROTEIN 7.30 g/dLALBUMIN 4.50 g/
dLTOTAL BILI 0.80 mg/dLCALCIUM 9.70 mg/dLAGE 70 GFR NonAA 71 GFR AA 86 eGFR >60 
mL/min/1.73meGFR AA* >60 TSH 1.10 uIU/mL

 

 2018 8:15 AM  TSH 1.03 TRIGLYCERIDES 73 CHOLESTEROL 198 HDL 84 TOT CHOL/
HDL 2.4 LDL (CALC) 99 WBC 4.6 RBC 4.03 HGB 13.1 HCT 38.9 MCV 97 MCH 32.5 MCHC 
33.7 RDW SD 45 RDW CV 12.5 MPV 9.8  NRBC# 0.00 NRBC% 0.0 %NEUT 60.3 %
LYMP 25.5 %MONO 9.6 %EOS 3.1 %BASO 1.3 #NEUT 2.77 #LYMP 1.17 #MONO 0.44 #EOS 
0.14 #BASO 0.06 MANUAL DIFF NOT IND GLUCOSE 108 SODIUM 136 POTASSIUM 3.8 
CHLORIDE 100 CO2 28 BUN 19 CREATININE 0.8 SGOT/AST 32 SGPT/ALT 25 ALK PHOS 107 
TOTAL PROTEIN 8.0 ALBUMIN 4.7 TOTAL BILI 0.4 CALCIUM 9.9 AGE 72 GFR NonAA 71 
GFR AA 86 eGFR 71 eGFR AA* >60 







History Of Immunizations







 Name  Date Admin  Mfg Name  Mfg Code  Trade Name  Lot#  Route  Inj  Vis Given  
Vis Pub  CVX

 

 Influenza  10/20/2010  [employer]  [employer]  FLUZONE  [id number]  Intramuscular  
Left Arm  10/20/2010  2010  999

 

 Influenza  10/28/2011  [employer]  FLUZONE  [id number]  Intramuscular  
Left Arm  10/28/2011  2011  141

 

 Influenza  10/29/2012  [employer] paste[employer]  [employer]  FLUZONE  [id number]  Intramuscular  
Left Deltoid  10/29/2012  2012  141

 

 X  12/10/2012  [employer]  MSD  PNEUMOVAX 23  [id number]  Intramuscular  
Left Gluteous Medius  12/10/2012  2010  33

 

 Influenza  10/28/2013  [employer] pasteur  [employer]  Fluzone > 3 Years  [id number]  
Intramuscular  Right Deltoid  10/28/2013  2013  141

 

 X  9/2/2015  Not Entered  NE  PREVNAR 13     Not Entered  Not Entered  1  109

 

 Influenza  2015  Not Entered  NE  Not Entered     Not Entered  Not Entered
  2015  141

 

 HepB Adult  2016  [employer]  MSD  RECOMBIVAX-ADULT  [id number]  Not 
Entered  Left Deltoid  2016  43

 

 HepB Adult  2016  [employer]  MSD  RECOMBIVAX-ADULT  [id number]  
Intramuscular  Right Deltoid  2016  43

 

 ZVL  2012  Not Entered  NE  Not Entered     Not Entered  Not Entered  1  121

 

 Tdap  2012  Not Entered  NE  Not Entered     Not Entered  Not Entered  1  115

 

 Influenza  10/13/2016  Not Entered  NE  FLUZONE-HIGH DOSE     Intramuscular  
Left Arm  1  141

 

 HepB Adult  2016  [employer]  MSD  RECOMBIVAX-ADULT  [id number]  
Intramuscular  Right Deltoid  2016  43

 

 Influenza  10/30/2017  Not Entered  NE  Fluarix, quadrivalent, preservative 
free     Intramuscular  Left Arm  2017  150

 

 RZV  2018  [employer]  SK[employer]  SHINGRIX     Intramuscular  Left Arm  1  187







History of Past Illness







 Name  Date of Onset  Comments

 

 Benign Essential Hypertension  Dec 14 2009 10:10AM   

 

 Hyperlipidemia  Dec 14 2009 10:10AM   

 

 Hypothyroidism, Acquired  Dec 14 2009 10:10AM   

 

 hypothyroid      

 

 Hypertension      

 

 Hyperlipidemia      

 

 acid reflux      

 

 Hypertension, Benign Essential  2010  9:41AM   

 

 Hypertension, Benign Essential  2010 12:53PM   

 

 Routine gynecological examination  May  5 2010  9:28AM   

 

 Hypertension  May  5 2010  9:28AM   

 

 Hyperlipidemia, unspecified  May  5 2010  9:28AM   

 

 Hypothyroidism, Acquired  May  5 2010  9:28AM   

 

 Vulvovaginitis  May  5 2010  9:28AM   

 

 Rhinitis, Allergic  May  5 2010  9:28AM   

 

 Hypertension, Benign Essential  May 19 2010  8:48AM   

 

 Hypertension, Benign Essential  May 25 2010  9:39AM   

 

 Flu  Oct 20 2010 12:01PM   

 

 Essential Hypertension  2010  8:34AM   

 

 Hyperlipidemia  2010  8:34AM   

 

 Gastroesophageal Reflux  2010  8:34AM   

 

 Hypothyroidism, Acquired  2010  8:34AM   

 

 Hypertension, Benign Essential  2010  9:22AM   

 

 Hypertension, Benign Essential  Dec 15 2010  9:07AM   

 

 Essential Hypertension  2011  1:31PM   

 

 Hyperlipidemia  2011  1:31PM   

 

 Gastroesophageal Reflux  2011  1:31PM   

 

 Hypothyroidism, Acquired  2011  1:31PM   

 

 Essential Hypertension  2011  8:51AM   

 

 Hyperlipidemia  2011  8:51AM   

 

 Gastroesophageal Reflux  2011  8:51AM   

 

 Hypothyroidism, Acquired  2011  8:51AM   

 

 Essential Hypertension  2011  9:13AM   

 

 Hyperlipidemia  2011  9:13AM   

 

 Gastroesophageal Reflux  2011  9:13AM   

 

 Hypothyroidism, Acquired  2011  9:13AM   

 

 Nausea With Vomiting  2011  1:18PM   

 

 Hyponatremia  2011  8:46AM   

 

 Nausea  2011  9:13AM   

 

 Hypothyroidism  2011 11:10AM   

 

 Hyponatremia  2011 11:10AM   

 

 Essential Hypertension  2011 10:05AM   

 

 Hyperlipidemia  2011 10:05AM   

 

 Gastroesophageal Reflux  2011 10:05AM   

 

 Nausea  2011 10:05AM   

 

 Hypothyroidism, Acquired  2011 10:05AM   

 

 Hyponatremia  May  6 2011 11:34AM   

 

 Essential Hypertension  May 16 2011  8:50AM   

 

 Hyperlipidemia  May 16 2011  8:50AM   

 

 Gastroesophageal Reflux  May 16 2011  8:50AM   

 

 Nausea  May 16 2011  8:50AM   

 

 Hypothyroidism, Acquired  May 16 2011  8:50AM   

 

 Hyponatremia  May 16 2011  8:50AM   

 

 Routine gynecological examination  2011  8:54AM   

 

 Hypertension  2011  8:54AM   

 

 Hyperlipidemia, unspecified  2011  8:54AM   

 

 Hypothyroidism, Acquired  2011  8:54AM   

 

 Rhinitis, Allergic  2011  8:54AM   

 

 Hypothyroidism  Aug 29 2011 12:37PM   

 

 Hyponatremia  Aug 29 2011 12:37PM   

 

 Essential Hypertension  Sep 12 2011  8:53AM   

 

 Hyperlipidemia  Sep 12 2011  8:53AM   

 

 Gastroesophageal Reflux  Sep 12 2011  8:53AM   

 

 Hypothyroidism, Acquired  Sep 12 2011  8:53AM   

 

 Hyponatremia  Sep 12 2011  8:53AM   

 

 Hypertension  Sep 29 2011  9:41AM   

 

 Hyperlipidemia  Dec  8 2011  8:31AM   

 

 Hypothyroidism  Dec  8 2011  8:31AM   

 

 Hypertension  Dec  8 2011  8:31AM   

 

 Flu  Dec  9 2011 10:02AM   

 

 Essential Hypertension  Dec 13 2011 10:13AM   

 

 Hyperlipidemia  Dec 13 2011 10:13AM   

 

 Gastroesophageal Reflux  Dec 13 2011 10:13AM   

 

 Hypothyroidism, Acquired  Dec 13 2011 10:13AM   

 

 Hyponatremia  Dec 13 2011 10:13AM   

 

 Vaginal Discharge  2012  9:43AM   

 

 Rhinitis, Allergic  Feb 15 2012  9:31AM   

 

 Eustachian Tube Dysfunction  Feb 15 2012  9:31AM   

 

 Pharyngitis, Acute  Feb 15 2012  9:31AM   

 

 Routine gynecological examination  2012  8:48AM   

 

 Hypertension  2012  8:48AM   

 

 Hyperlipidemia, unspecified  2012  8:48AM   

 

 Hypothyroidism, Acquired  2012  8:48AM   

 

 Rhinitis, Allergic  2012  8:48AM   

 

 Candidiasis, Vulvovaginal  2012 11:04AM   

 

 Essential Hypertension  Aug 15 2012  9:02AM   

 

 Hyperlipidemia  Aug 15 2012  9:02AM   

 

 Gastroesophageal Reflux  Aug 15 2012  9:02AM   

 

 Hypothyroidism, Acquired  Aug 15 2012  9:02AM   

 

 Hyponatremia  Aug 15 2012  9:02AM   

 

 Atrophic Vaginitis, Postmenopausal  Aug 15 2012  9:02AM   

 

 Flu  Oct 29 2012  9:24AM   

 

 Essential Hypertension  Dec 10 2012  8:35AM   

 

 Hyperlipidemia  Dec 10 2012  8:35AM   

 

 Gastroesophageal Reflux  Dec 10 2012  8:35AM   

 

 Hypothyroidism, Acquired  Dec 10 2012  8:35AM   

 

 Hyponatremia  Dec 10 2012  8:35AM   

 

 Atrophic Vaginitis, Postmenopausal  Dec 10 2012  8:35AM   

 

 Pneumococcus  Dec 10 2012  2:07PM   

 

 Vaginal Discharge  Dec 20 2012  1:50PM   

 

 Essential Hypertension  Dec 20 2012  1:50PM   

 

 Hyperlipidemia  Dec 20 2012  1:50PM   

 

 Gastroesophageal Reflux  Dec 20 2012  1:50PM   

 

 Hypothyroidism, Acquired  Dec 20 2012  1:50PM   

 

 Atrophic Vaginitis, Postmenopausal  Dec 20 2012  1:50PM   

 

 Upper Respiratory Infections  2013  8:52AM   

 

 Hyperlipidemia  2013  8:21AM   

 

 Hypertension  2013  8:21AM   

 

 Hypothyroidism  2013  8:21AM   

 

 Screening Mammogram  Beto 10 2013  8:45AM   

 

 Routine gynecological examination  Beto 10 2013  8:34AM   

 

 Hypertension  Beto 10 2013  8:34AM   

 

 Hyperlipidemia, unspecified  Beto 10 2013  8:34AM   

 

 Hypothyroidism, Acquired  Beto 10 2013  8:34AM   

 

 Rhinitis, Allergic  Beto 10 2013  8:34AM   

 

 Flu  Oct 28 2013  8:52AM   

 

 Essential Hypertension  Dec  9 2013  8:37AM   

 

 Hyperlipidemia  Dec  9 2013  8:37AM   

 

 Gastroesophageal Reflux  Dec  9 2013  8:37AM   

 

 Hypothyroidism, Acquired  Dec  9 2013  8:37AM   

 

 Atrophic Vaginitis, Postmenopausal  Dec  9 2013  8:37AM   

 

 Hypertension  2014  9:56AM   

 

 Hyperlipidemia  2014  9:56AM   

 

 Hypothyroidism  2014  9:56AM   

 

 Screening Mammogram  2014  8:32AM   

 

 Osteopenia  2014  8:32AM   

 

 Hypertension  2014  8:35AM   

 

 Hypothyroidism, Acquired  2014  8:35AM   

 

 Hyperlipidemia  2014  8:35AM   

 

 Upper Respiratory Infections  2014  9:28AM   

 

 Sinusitis, Acute  Oct  2 2014  9:17AM   

 

 Herpes Zoster  Oct  5 2014  1:44PM   

 

 Vesicular Eruption  Oct  5 2014  1:44PM   

 

 Hypertension  2014  8:18AM   

 

 Hyperlipidemia  2014  8:18AM   

 

 hypothyroid  2014  8:18AM   

 

 Situational anxiety  Dec 20 2014  9:33AM   

 

 GERD (gastroesophageal reflux disease)  Dec 20 2014  9:33AM   

 

 Nausea  Dec 20 2014  9:33AM   

 

 Abdominal Pain, Epigastric  Dec 20 2014  9:33AM   

 

 Hyperlipidemia  Oct  6 2014  9:03AM   

 

 acid reflux  Oct  6 2014  9:03AM   

 

 hypothyroid  Oct  6 2014  9:03AM   

 

 Shingles  Oct  6 2014  9:03AM   

 

 Pharyngitis  2015  1:09PM   

 

 Bronchitis  2015  9:10AM   

 

 Hyperlipidemia  2015  9:10AM   

 

 acid reflux  2015  9:10AM   

 

 hypothyroid  2015  9:10AM   

 

 Hyperlipidemia  2015  8:18AM   

 

 Hypertension  2015  8:18AM   

 

 hypothyroid  2015  8:18AM   

 

 Screening Mammogram  2015 11:43AM   

 

 Medicare Annual Pap Q 2 years  2015  9:36AM   

 

 Screening Mammogram  2015  9:36AM   

 

 Candidiasis of female genitalia  2015  9:36AM   

 

 Hypertension  2015 11:34AM   

 

 Hyperlipidemia, unspecified  2015 11:34AM   

 

 Hypothyroidism, Acquired  2015 11:34AM   

 

 Osteopenia  2016  8:41AM   

 

 Encounter for screening mammogram for breast cancer  2016  8:41AM   

 

 Hypertension  2016  8:34AM   

 

 Lymphedema  2016  8:34AM   

 

 Hyperlipidemia  2016  8:34AM   

 

 hypothyroid  2016  8:34AM   

 

 HEP B  2016  9:13AM   

 

 HEP B  2016  8:52AM   

 

 Acid Reflux  2016  8:34AM   

 

 History of acute gastritis  Oct 13 2016  2:30PM   

 

 Anxiety as acute reaction to exceptional stress  Oct 13 2016  2:30PM   

 

 HEP B  Dec 29 2016  8:42AM   

 

 Caregiver stress syndrome  May 26 2017  8:28AM   

 

 Anxiety  May 26 2017  8:28AM   

 

 Blood in stool  2017  2:54PM   

 

 Upper GI bleed  2017 11:34AM   

 

 Hyponatremia  2017  9:03AM   

 

 Hyponatremia  2017  8:43AM   

 

 Medicare Annual Pap Q 2 years  2017  9:03AM   

 

 Nausea  2017  9:03AM   

 

 Uterine enlargement  2017  9:03AM   

 

 Encounter for screening mammogram for breast cancer  2017  4:56PM   

 

 Panic disorder [episodic paroxysmal anxiety]  Oct 10 2017  2:02PM   

 

 Acute stress reaction  Oct 10 2017  2:02PM   

 

 Caregiver stress syndrome  Oct 10 2017  2:02PM   

 

 Stress reaction causing mixed disturbance of emotion and conduct  Oct 18 2017  
9:05AM   

 

 Ankle strain, left, initial encounter  Dec 19 2017  8:29AM   

 

 Excoriation of ear canal, left, initial encounter  Dec 19 2017  8:29AM   

 

 Generalized anxiety disorder  Dec 19 2017  8:29AM   

 

 Grief reaction  Dec 19 2017  8:29AM   

 

 Acute non-recurrent frontal sinusitis  2018 11:58AM   

 

 Cough  2018 11:58AM   

 

 Hypertension  2018  8:10AM   

 

 Hyperlipidemia, unspecified  2018  8:10AM   

 

 Hypothyroidism, Acquired  2018  8:10AM   

 

 Situational anxiety  Mar  6 2018  8:30AM   

 

 Nausea  Mar  6 2018  8:30AM   

 

 LESLIE (generalized anxiety disorder)  May 22 2018  8:47AM   

 

 Caregiver stress syndrome  May 22 2018  8:47AM   

 

 Situational anxiety  May 29 2018  8:26AM   

 

 Osteopenia  May 29 2018  8:26AM   

 

 Visit for screening mammogram  May 29 2018  8:26AM   

 

 Anxiety Disorder  2018  9:09AM   

 

 Hypertension  Aug 20 2018  8:05AM   

 

 Hyperlipidemia, unspecified  Aug 20 2018  8:05AM   

 

 Hypothyroidism, Acquired  Aug 20 2018  8:05AM   

 

 Irritable Bowel Syndrome  Aug 31 2018  8:50AM   

 

 Anxiety Disorder  Aug 31 2018  8:50AM   

 

 acid reflux  Aug 31 2018  8:50AM   

 

 Hypertension  Aug 31 2018  8:50AM   

 

 Nausea  Oct  1 2018  9:04AM   

 

 Hiatal hernia  Oct  1 2018  9:04AM   







Payers







 Insurance Name  Company Name  Plan Name  Plan Number  Policy Number  Policy 
Group Number  Start Date

 

    Medicare RHC Medicare RHC     [id number]     N/A

 

    BCBS  [facility]     [id number]     2009

 

    Medicare Part B  [place]     [id number]     N/A

 

    Medicare Part A  Medicare Part A     [id number]     N/A

 

    Medicare Part A  [employer]ZMedicare P A - Preventive     [id number]     N/A

 

    Medicare Part A  Medicare - Lab/Xray     [id number]     N/A

 

    Medicare RHC  Medicare RHC     [id number]     N/A







History of Encounters







 Visit Date  Visit Type  Provider

 

 10/1/2018  Office visit  Marvin Boyer DO

 

 2018  Office visit  Marvin Boyer DO

 

 2018  Office visit  Marvin Boyer DO

 

 2018  Office visit  Marvin Boyer DO

 

 2018  Office visit  Doris Noriega MD

 

 3/6/2018  Office visit  Doris Noriega MD

 

 2018  Office visit  Deedee Carter APRN

 

 2017  Office visit  Doris Noriega MD

 

 10/18/2017  Office visit  Doris Noriega MD

 

 10/10/2017  Office visit  Doris Noriega MD

 

 2017  Office visit  Doris Noriega MD

 

 2017  Office visit  Doris Noriega MD

 

 2017  Office visit  Doris Noriega MD

 

 2017  Office visit  Prince Noe APRN

 

 2016  Nurse visit  Doris Noriega MD

 

 10/13/2016  Office visit  Doris Noriega MD

 

 2016  Nurse visit  Doris Noriega MD

 

 2016  Office visit  Doris Noriega MD

 

 2015  Office visit  Doris Noriega MD

 

 2015  Office visit  Doris Noriega MD

 

 2015  Office visit  Prince Noe APRN

 

 2014  Office visit  Anyi Herrera APRN

 

 10/27/2014  Voided  Doris Noriega MD

 

 10/6/2014  Office visit  Doris Noriega MD

 

 10/5/2014  Office visit  Anyi Herrera APRN

 

 10/2/2014  Office visit   

 

 10/2/2014  Office visit  Corrine Bay APRN

 

 2014  Office visit  Kassie Franklin APRN

 

 2014  Office visit  Doris Noriega MD

 

 2014  Office visit  Doris Noriega MD

 

 2013  Office visit  Dee Colindres MD

 

 10/28/2013  Nurse visit  Dee Colindres MD

 

 6/10/2013  Office visit  Dee Colindres MD

 

 2013  Office visit  Corrine Bay APRN

 

 2012  Office visit  Dee Colindres MD

 

 12/10/2012  Office visit  Dee Colindres MD

 

 10/29/2012  Nurse visit  Dee Colindres MD

 

 8/15/2012  Office visit  Dee Colindres MD

 

 2012  Office visit  Corrine Bay APRN

 

 2012  Office visit  Dee Colindres MD

 

 2/15/2012  Office visit  Dee Colindres MD

 

 2012  Office visit  Corrine Bay APRN

 

 2011  Office visit  Dee Colindres MD

 

 10/28/2011  Nurse visit  Shad Nolasco MD

 

 2011  Office visit  Dee Colindres MD

 

 2011  Office visit  Dee Colindres MD

 

 2011  Office visit  Dee Colindres MD

 

 2011  Office visit  Dee Colindres MD

 

 2011  Office visit  Dee Colindres MD

 

 2011  Office visit  Dee Colindres MD

 

 4/10/2011  [facility]  KHRIS Reeves MD

 

 4/10/2011  [facility]  RICKEY Toussaint MD

 

 2011  Office visit  RICKEY Toussaint MD

 

 2011  [facility]  Fide Castellanos MD

 

 2011  Voided  Fide Castellanos MD

 

 2011  Office visit  Dee Colindres MD

 

 12/15/2010  Nurse visit  Dee Colindres MD

 

 2010  Office visit  Dee Colindres MD

 

 10/20/2010  Nurse visit  Stephenie PERAZA

 

 2010  Nurse visit  Dee Colindres MD

 

 2010  Nurse visit  Dee Colindres MD

 

 2010  Office visit  Dee Colindres MD

 

 3/19/2010  Voided  Stephenie PERAZA

 

 2010  Nurse visit  Stephenie PERAZA

 

 2010  Nurse visit  Stephenie PERAZA

 

 2010  Nurse visit  Stephenie PERAZA

 

 2009  Office visit  Stephenie PERAZA

 

 10/20/2009  Nurse visit  Stephenie PERAZA

## 2018-10-22 NOTE — XMS REPORT
Continuity of Care Document

 Created on: 10/22/2018



Milady Crespo

External Reference #: [id number]

: 1945

Sex: Female



Demographics







 Address  [address] [place] [address]

 

 Home Phone  [phone] x

 

 Preferred Language  Unknown

 

 Marital Status  Unknown

 

 Roman [employer] Affiliation  Unknown

 

 Race  Unknown

 

 Ethnic Group  Unknown





Author







 Author  [facility]

 

 Organization  [facility]

 

 Address  Unknown

 

 Phone  Unavailable



              



Allergies

      





 Active            Description            Code            Type            
Severity            Reaction            Onset            Reported/Identified   
         Relationship to Patient            Clinical Status        

 

 Yes            No Known Drug Allergies            [id number]                    
     N/A            N/A                                                        
    

 

 Yes            No Known Drug Allergies            [id number]            Drug 
Allergy            Unknown            N/A                         10/18/2018   
                               



                    



Medications

      



There is no data.                  



Problems

      





 Date Dx Coded            Attending            Type            Code            
Diagnosis            Diagnosed By        

 

 2017            FANG JOHNSON MD            Ot            K21.9     
       GASTRO-ESOPHAGEAL REFLUX DISEASE WITHOUT                     

 

 2017            FANG JOHNSON MD            Ot            R19.5     
       OTHER FECAL ABNORMALITIES                     

 

 2017            FANG JOHNSON MD            Ot            Z01.818   
         ENCOUNTER FOR OTHER PREPROCEDURAL EXAMIN                     

 

 2017            FANG JOHNSON MD            Ot            K21.9     
       GASTRO-ESOPHAGEAL REFLUX DISEASE WITHOUT                     

 

 2017            FANG JOHNSON MD            Ot            R19.5     
       OTHER FECAL ABNORMALITIES                     

 

 2017            FANG JOHNSON MD            Ot            Z01.818   
         ENCOUNTER FOR OTHER PREPROCEDURAL EXAMIN                     

 

 2017            FANG JOHNSON MD            Ot            K21.9     
       GASTRO-ESOPHAGEAL REFLUX DISEASE WITHOUT                     

 

 2017            FANG JOHNSON MD            Ot            R19.5     
       OTHER FECAL ABNORMALITIES                     

 

 2017            FANG JOHNSON MD            Ot            Z01.818   
         ENCOUNTER FOR OTHER PREPROCEDURAL EXAMIN                     

 

 2017            FANG JOHNSON MD            Ot            K21.9     
       GASTRO-ESOPHAGEAL REFLUX DISEASE WITHOUT                     

 

 2017            FANG JOHNSON MD M            Ot            R19.5     
       OTHER FECAL ABNORMALITIES                     

 

 2017            FANG JOHNSON MD            Ot            Z01.818   
         ENCOUNTER FOR OTHER PREPROCEDURAL EXAMIN                     

 

 2017            FANG JOHNSON MD            Ot            E78.00    
        PURE HYPERCHOLESTEROLEMIA, UNSPECIFIED                     

 

 2017            ALEX BECERRA, FANG GUPTA            Ot            I10       
     ESSENTIAL (PRIMARY) HYPERTENSION                     

 

 2017            FANG JOHNSON MD            Ot            K20.9     
       ESOPHAGITIS, UNSPECIFIED                     

 

 2017            FANG JOHNSON MD            Ot            K21.9     
       GASTRO-ESOPHAGEAL REFLUX DISEASE WITHOUT                     

 

 2017            FANG JOHNSON MD            Ot            K44.9     
       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                     

 

 2017            FANG JOHNSON MD            Ot            K64.8     
       OTHER HEMORRHOIDS                     

 

 2017            FANG JOHNSON MD            Ot            R19.5     
       OTHER FECAL ABNORMALITIES                     

 

 2017            FANG JOHNSON MD            Ot            Z80.0     
       FAMILY HISTORY OF MALIGNANT NEOPLASM OF                      

 

 2017            FANG JOHNSON MD            Ot            R10.11    
        RIGHT UPPER QUADRANT PAIN                     

 

 2017            FANG JOHNSON MD            Ot            R93.2     
       ABNORMAL FINDINGS ON DX IMAGING OF LIVER                     

 

 2017            FANG JOHNSON MD            Ot            R10.11    
        RIGHT UPPER QUADRANT PAIN                     

 

 2017            FANG JOHNSON MD            Ot            R93.2     
       ABNORMAL FINDINGS ON DX IMAGING OF LIVER                     

 

 2017            FANG JOHNSON MD            Ot            R10.11    
        RIGHT UPPER QUADRANT PAIN                     

 

 2017            FANG JOHNSON MD            Ot            R93.2     
       ABNORMAL FINDINGS ON DX IMAGING OF LIVER                     

 

 2017            FANG JOHNSON MD            Ot            R10.11    
        RIGHT UPPER QUADRANT PAIN                     

 

 2017            FANG JOHNSON MD            Ot            R93.2     
       ABNORMAL FINDINGS ON DX IMAGING OF LIVER                     

 

 2017            FANG JOHNSON MD            Ot            R10.11    
        RIGHT UPPER QUADRANT PAIN                     

 

 2017            FANG JOHNSON MD            Ot            R93.2     
       ABNORMAL FINDINGS ON DX IMAGING OF LIVER                     

 

 2017            FANG JOHNSON MD            Ot            R10.11    
        RIGHT UPPER QUADRANT PAIN                     

 

 2017            FANG JOHNSON MD            Ot            R93.2     
       ABNORMAL FINDINGS ON DX IMAGING OF LIVER                     

 

 2017            FANG JOHNSON MD            Ot            R10.11    
        RIGHT UPPER QUADRANT PAIN                     

 

 2017            FANG JOHNSON MD            Ot            R93.2     
       ABNORMAL FINDINGS ON DX IMAGING OF LIVER                     

 

 10/17/2018            FANG JOHNSON MD            Ot            Z01.818   
         ENCOUNTER FOR OTHER PREPROCEDURAL EXAMIN                     

 

 10/18/2018            FANG JOHNSON MD            Ot            Z01.818   
         ENCOUNTER FOR OTHER PREPROCEDURAL EXAMIN                     

 

 10/18/2018            ALEX BECERRA, FANG GUPTA            Ot            Z01.818   
         ENCOUNTER FOR OTHER PREPROCEDURAL EXAMIN                     

 

 10/22/2018            FANG JOHNSON MD            Ot            R10.11    
        RIGHT UPPER QUADRANT PAIN                     

 

 10/22/2018            FANG JOHNSON MD            Ot            R93.2     
       ABNORMAL FINDINGS ON DX IMAGING OF LIVER                     



                                                                               
                 



Procedures

      



There is no data.                  



Results

      



There is no data.              



Encounters

      





 ACCT No.            Visit Date/Time            Discharge            Status    
        Pt. Type            Provider            Facility            Loc./Unit  
          Complaint        

 

 [id number]            10/01/2018 09:47:33            10/01/2018 23:59:59          
  [facility]            Outpatient            Rosalind, Marvin                        
                       

 

 [id number]            2018 09:47:30            2018 23:59:59          
  [facility]            Outpatient            Marvin Boyer                        
                       

 

 [id number]            2018 09:48:42            2018 23:59:59          
  [facility]            Outpatient            Marvin Boyer                        
                       

 

 [id number]            2018 09:14:21            2018 23:59:59          
  [facility]            Outpatient            Marvin Boyer                        
                       

 

 [id number]            2018 09:29:14            2018 23:59:59          
  [facility]            Outpatient            Doris Noriega                         
                      

 

 [id number]            2018 09:13:06            2018 23:59:59          
  [facility]            Outpatient            Doris Noriega                         
                      

 

 [id number]            2018 12:50:55            2018 23:59:59          
  [facility]            Outpatient            Deedee Carter                     
                          

 

 [id number]            2017 09:09:30            2017 23:59:59          
  [facility]            Outpatient            Doris Noriega                         
                      

 

 [id number]            10/18/2017 09:53:44            10/18/2017 23:59:59          
  [facility]            Outpatient            RiddelDoris                         
                      

 

 [id number]            10/10/2017 14:41:56            10/10/2017 23:59:59          
  [facility]            Outpatient            Doris Noriega                         
                      

 

 [id number]            2017 09:15:47            2017 23:59:59          
  [facility]            Outpatient            Doris Noriega                         
                      

 

 [id number]            2017 09:38:32            2017 23:59:59          
  CLS            Outpatient            Doris Noriega                         
                      

 

 [id number]            2017 12:09:45            2017 23:59:59          
  CLS            Outpatient            Doris Noriega                         
                      

 

 [id number]            2017 09:14:55            2017 23:59:59          
  CLS            Outpatient            NoePrince tejeda                       
                        

 

 [id number]            2016 09:25:05            2016 23:59:59          
  CLS            Outpatient            RidDoris phan                         
                      

 

 [id number]            10/13/2016 15:12:59            10/13/2016 23:59:59          
  CLS            Outpatient            RiddelDoris                         
                      

 

 [id number]            2016 09:14:04            2016 23:59:59          
  CLS            Outpatient            RiddelDoris                         
                      

 

 [id number]            2016 09:30:33            2016 23:59:59          
  CLS            Outpatient            Riddel, Doris                         
                      

 

 [id number]            08/10/2015 22:02:21            08/10/2015 23:59:59          
  CLS            Outpatient            RidDoris phan                         
                      

 

 [id number]            2015 10:01:13            2015 23:59:59          
  CLS            Outpatient            RidDoris phan                         
                      

 

 [id number]            2015 14:03:46            2015 23:59:59          
  CLS            Outpatient            Prince Noe                       
                        

 

 [id number]            2014 10:21:34            2014 23:59:59          
  CLS            Outpatient            Anyi Herrera                              
                 

 

 [id number]            10/27/2014 09:56:02            10/27/2014 23:59:59          
  CLS            Outpatient            Leann Doris                         
                      

 

 [id number]            10/06/2014 09:50:16            10/06/2014 23:59:59          
  CLS            Outpatient            Leann Doris                         
                      

 

 [id number]            10/05/2014 14:39:50            10/05/2014 23:59:59          
  CLS            Outpatient            Anyi Herrera                              
                 

 

 [id number]            10/02/2014 10:07:18            10/02/2014 23:59:59          
  CLS            Outpatient            Corrine Bay                           
                    

 

 [id number]            2014 10:13:17            2014 23:59:59          
  CLS            Outpatient            Kassie Franklin                  
                             

 

 [id number]            2014 09:15:33            2014 23:59:59          
  CLS            Outpatient            Ridemilie Doris                         
                      

 

 [id number]            2014 10:42:42            2014 23:59:59          
  CLS            Outpatient            RidDoris phan                         
                      

 

 [id number]            2018 08:04:33                                      
Document Registration                                                          
  

 

 [id number]            2018 09:06:18                                      
Document Registration                                                          
  

 

 [id number]            2018 08:10:16                                      
Document Registration                                                          
  

 

 [id number]            10/31/2017 08:31:14                                      
Document Registration                                                          
  

 

 [id number]            10/18/2018 13:31:00            10/18/2018 13:45:00    
        DIS            Outpatient            FANG JOHNSON MD            
[facility]            PREOP            EGD        

 

 [id number]            2017 09:12:00            2017 23:59:59    
        CLS            Outpatient            FANG JOHNSON MD            
[facility]            RAD            RUQ PAIN        

 

 [id number]            2017 08:08:00            2017 11:35:00    
        DIS            Outpatient            FANG JOHNSON MD            
[facility]            ENDO            GERD/HEMOCULT 
POSITIVE STOOLS        

 

 [id number]            07/10/2017 05:43:00            2017 10:32:00    
        DIS            Outpatient            FANG JOHNSON MD            
[facility]            PREOP            GERD/HEMOCULT 
POSITIVE STOOLS        

 

 [id number]            10/22/2018 08:14:00                         ACT       
     Outpatient            FANG JOHNSON MD            [facility]            ENDO            NAUSEA/GERD

## 2018-10-22 NOTE — XMS REPORT
MU2 Ambulatory Summary

 Created on: 2018



Milady Crespo

External Reference #: [id number]

: 1945

Sex: Female



Demographics







 Address  [address]

[place]  [address]

 

 Home Phone  [phone]

 

 Preferred Language  English

 

 Marital Status  

 

 Latter-day Affiliation  Unknown

 

 Race  White

 

 Ethnic Group  Not  or 





Author







 Author  Marvin Boyer

 

 [facility]

 

 Address  [address]

[place], [place]  [id number]



 

 Phone  [phone]







Care Team Providers







 Care Team Member Name  Role  Phone

 

 Marvin Boyer  PCP  [phone]

 

 BerthaemilieEllisDoris  PreferredProvider  [phone]







Allergies and Adverse Reactions







 Name  Reaction  Notes

 

 NO KNOWN DRUG ALLERGIES      







Plan of Treatment







 Planned Activity  Comments  Planned Date  Planned Time  Plan/Goal

 

 Complete blood count (CBC) with differential count     2016  12:00 AM   

 

 Comprehensive metabolic panel     2016  12:00 AM   

 

 VITAMIN D (25 HYDROXY)     2016  12:00 AM   

 

 Lipid panel (total cholesterol, lipoproteins, HDL, triglycerides)     8/15/
2012  12:00 AM   

 

 Pap smear     2017  12:00 AM   

 

 Comprehensive Metabolic Panel     6/10/2013  12:00 AM   

 

 Lipid panel (total cholesterol, lipoproteins, HDL, triglycerides)     6/10/
2013  12:00 AM   

 

 Thyroid stimulating hormone (TSH)     6/10/2013  12:00 AM   

 

 CBC (automated H&H, platelets, WBC and automated differential)     6/10/2013  
12:00 AM   

 

 Comprehensive Metabolic Panel     2012  12:00 AM   

 

 Lipid panel (total cholesterol, lipoproteins, HDL, triglycerides)     2012  12:00 AM   

 

 Thyroid stimulating hormone (TSH)     2012  12:00 AM   

 

 CBC (automated H&H, platelets, WBC and automated differential)     2012  
12:00 AM   

 

 Mammogram, screening, bilateral     2018  12:00 AM   

 

 Screening mammography, bilateral     2015  12:00 AM   







Medications







 Active 

 

 Name  Start Date  Estimated Completion Date  SIG  Comments

 

 aspirin 81 mg oral tablet,delayed release (DR/EC)        take 1 tablet (81 mg) 
by oral route once daily   

 

 Vitamin D3 1,000 unit oral capsule        take 1 capsule by oral route daily   

 

 valsartan 160 mg oral tablet  2016     TAKE ONE TABLET BY MOUTH ONCE 
DAILY   

 

 amlodipine 5 mg oral tablet  2016     TAKE ONE TABLET BY MOUTH ONCE DAILY
   

 

 amlodipine 5 mg oral tablet  2016     TAKE ONE TABLET BY MOUTH ONCE DAILY
   

 

 valsartan 160 mg oral tablet  2016     TAKE ONE TABLET BY MOUTH ONCE 
DAILY   

 

 Zofran (as hydrochloride) 4 mg oral tablet  2016     take 1 tablet by 
oral route daily as needed for 14 days   

 

 Zofran (as hydrochloride) 4 mg oral tablet  2017     take 1 tablet by oral 
route daily as needed for 14 days   

 

 Zofran (as hydrochloride) 4 mg oral tablet  2017     take 1 tablet by oral 
route daily as needed for 14 days   

 

 Zofran (as hydrochloride) 4 mg oral tablet  2017     take 1 tablet by 
oral route daily as needed for 14 days   

 

 Zofran (as hydrochloride) 4 mg oral tablet  2017     take 1 tablet by 
oral route daily as needed for 14 days   

 

 EQ LORATADINE 10MG  TABS  2017     TAKE ONE TABLET BY MOUTH ONCE DAILY   

 

 pantoprazole 40 mg oral tablet,delayed release (DR/EC)  10/9/2017     take 1 
tablet (40 mg) by oral route once daily for 90 days   

 

 amlodipine 5 mg oral tablet  2018     TAKE ONE TABLET BY MOUTH ONCE DAILY
   

 

 atenolol 50 mg oral tablet  2018     TAKE ONE TABLET BY MOUTH ONCE DAILY 
  

 

 mirtazapine 15 mg oral tablet  2018     TAKE ONE-HALF TABLET BY MOUTH 
ONCE DAILY AT BEDTIME   

 

 ondansetron 4 mg oral tablet,disintegrating  7/3/2018     dissolve  1 tablet 
by oral route every 8 hours as needed   









  

 

 Name  Start Date  Expiration Date  SIG  Comments

 

 prednisone 20 mg oral tablet  2011  take 2 tablets by oral 
route daily for 5 days   

 

 calcium carbonate-vitamin D2 600-125 mg-unit oral tablet  8/15/2012  2012
  take 2 tablets by oral route daily   

 

 [place] 3 Fish Oil 900-1,400 mg oral capsule,delayed release(DR/EC)  8/15/2012  9
/  take 1 capsule by oral route daily   

 

 multivitamin Oral tablet  8/15/2012  2012  take 1 tablet by oral route 
daily   

 

 triamcinolone acetonide 0.1 % topical cream  2013  10/7/2013  APPLY A 
THIN LAYER TO THE AFFECTED AREA(S) BY TOPICAL ROUTE TWICE A DAY   

 

 famotidine 20 mg oral tablet  2014  take 1 tablet (20 mg) by 
oral route 2 times per day   

 

 amoxicillin 500 mg oral capsule  2014  take 1 capsule (500 mg) 
by oral route 3 times per day for 10 days   

 

 Zithromax Z-Tab 250 mg oral tablet  10/2/2014  10/7/2014  take 2 tablets (500 
mg) by oral route once daily for 1 day then 1 tablet (250 mg) by oral route 
once daily for 4 days   

 

 acyclovir 800 mg oral tablet  10/5/2014  10/12/2014  take 1 tablet (800 mg) by 
oral route 5 times per day for 7 days   

 

 gabapentin 300 mg oral capsule  10/9/2014  2014  take 1 capsule (300 mg) 
by oral route 3 times per day for 14 days   

 

 Carafate 100 mg/mL oral suspension  2014  take 10 milliliters 
(1 gram) by oral route 4 times per day on an empty stomach 1 hour before meals 
and at bedtime for 4 weeks   

 

 amlodipine 5 mg oral tablet  2015  TAKE ONE TABLET BY MOUTH 
EVERY DAY   

 

 levothyroxine 50 mcg oral tablet  2015  TAKE ONE TABLET BY MOUTH 
EVERY DAY   

 

 Diovan 160 mg oral tablet  2015  2/15/2016  TAKE ONE TABLET BY MOUTH 
EVERY DAY for 90 days   

 

 triamcinolone acetonide 0.1 % topical cream  2015  apply a thin 
layer to the affected area(s) by topical route 2 times per day for 14 days   

 

 fluconazole 150 mg oral tablet  2015  take 1 tablet (150 mg) 
by oral route once for 1 day   

 

 famotidine 20 mg oral tablet  2015     TAKE ONE TABLET BY MOUTH TWICE 
DAILY   

 

 Lipitor 20 mg oral tablet  10/13/2016  2018  take 1 tablet (20 mg) by oral 
route once daily   

 

 Plavix 75 mg oral tablet  10/13/2016  2018  take 1 tablet (75 mg) by oral 
route once daily   

 

 Zofran (as hydrochloride) 4 mg oral tablet  10/13/2016  10/27/2016  take 1 
tablet by oral route daily as needed for 14 days   

 

 Zofran (as hydrochloride) 4 mg oral tablet  2017     take 1 tablet by 
oral route daily as needed for 14 days   

 

 levothyroxine 50 mcg oral tablet  10/9/2017  10/9/2017  TAKE ONE TABLET BY 
MOUTH ONCE DAILY   

 

 ondansetron 4 mg oral tablet,disintegrating  10/10/2017  2017  dissolve  
1 tablet by oral route every 8 hours as needed for 30 days   

 

 valsartan 160 mg oral tablet  2017  TAKE ONE TABLET BY MOUTH 
ONCE DAILY   

 

 EQ LORATADINE 10MG  TABS  2018  TAKE ONE TABLET BY MOUTH ONCE 
DAILY IN THE EVENING   

 

 mirtazapine 15 mg oral tablet  2018  TAKE ONE-HALF TABLET BY 
MOUTH ONCE DAILY AT BEDTIME   

 

 clorazepate dipotassium 7.5 mg oral tablet  2018  take 1 
tablet PO  three times per day for situational anxiety   









 Discontinued 

 

 Name  Start Date  Discontinued Date  SIG  Comments

 

 Lipitor 40 mg oral tablet     2009  1/2 TAB DAILY   

 

 ramipril 5 mg oral capsule     2009  take 1 capsule (5 mg) by oral route 
once daily   

 

 lovastatin 20 mg oral tablet  2009  take 1 tablet (20 mg) by 
oral route once daily with evening meal   

 

 propranolol 20 mg oral tablet  2010  take 1 tablet by oral 
route 2 times a day   

 

 Fosamax 70 mg oral tablet  2010  take 1 tablet (70 mg) by oral 
route once weekly in the morning, at least 30 minutes before the first food, 
beverage, or medication of the day   

 

 lisinopril 40 mg oral tablet  2010  4/10/2011  take 2 tablets by oral 
route daily   

 

 Zoloft 50 mg oral tablet  2011  take 1 tablet (50 mg) by oral 
route once daily   

 

 promethazine 25 mg oral tablet  2011  take 1 tablet by oral 
route every 6 hours as needed   

 

 Diovan -12.5 mg oral tablet  2011  take 1 tablet by oral 
route once daily for 30 days   

 

 Diflucan 150 mg oral tablet     2012  take 1 tablet (150 mg) by oral 
route once for 1 day   

 

 Diflucan 150 mg oral tablet  2012  8/15/2012  take 1 tablet (150 mg) by 
oral route once   

 

 Vitamin B-12 1,000 mcg oral tablet  8/15/2012  2014  take 1 tablet by 
oral route daily   

 

 Premarin 0.625 mg/gram vaginal cream  10/29/2012  6/10/2013  use 1 gram daily 
for a week then 1 gram twice a week   

 

 Medrol (Tab) 4 mg oral tablets,dose pack  2013  6/10/2013  take as 
directed   

 

 aspirin 325 mg oral tablet  2014  take 1 tablet (325 mg) by 
oral route once daily   

 

 Medrol (Tab) 4 mg oral tablets,dose pack  2014  10/2/2014  take as 
directed   

 

 Tetracaine Lollipops Lollipop  2015  Use as directed   

 

 Allergy Relief (loratadine) 10 mg oral tablet  2016     TAKE ONE TABLET 
BY MOUTH ONCE DAILY   

 

 Allergy Relief (loratadine) 10 mg oral tablet  2017  TAKE ONE 
TABLET BY MOUTH ONCE DAILY   

 

 Zoloft 50 mg oral tablet  2016  take 1 tablet (50 mg) by oral 
route once daily for 90 days  Pt refuses to take...

 

 triamcinolone acetonide 0.1 % topical cream  2017     APPLY A THIN LAYER 
OF CREAM EXTERNALLY TO AFFECTED AREA TWICE DAILY FOR 14 DAYS   

 

 loratadine 10 mg oral tablet  2017  TAKE 1 TABLET BY MOUTH 
EVERY DAY IN THE EVENING   

 

 triamcinolone acetonide 0.1 % topical cream  2017  APPLY  
CREAM EXTERNALLY TO AFFECTED AREA TWICE DAILY FOR 14 DAYS   

 

 Lexapro 10 mg oral tablet  2017  take 1 tablet (10 mg) by oral 
route once daily for 90 days   

 

 Augmentin 875-125 mg oral tablet  2018  3/6/2018  take 1 tablet by oral 
route every 12 hours for 7 days   

 

 benzonatate 100 mg oral capsule  2018  3/6/2018  take 1 capsule (100 mg) 
by oral route 3 times per day as needed for cough   







Problem List







 Description  Status  Onset

 

 Hyperlipidemia  Active   

 

 acid reflux  Active   

 

 Hypertension  Active   

 

 hypothyroid  Active   







Vital Signs







 Date  Time  BP-Sys(mm[Hg]  BP-Omrena(mm[Hg])  HR(bpm)  RR(rpm)  Temp  WT  HT  HC  
BMI  BSA  BMI Percentile  O2 Sat(%)

 

 2018  9:04:00 AM  138 mmHg  80 mmHg  64 bpm  18 rpm  96 F  140.125 lbs  
61 in     26.4761 kg/m  1.654 m     99 %

 

 2018  8:24:00 AM  142 mmHg  70 mmHg  69 bpm  18 rpm  98.2 F  139 lbs  61 
in     26.26 kg/m2  1.65 m2     98 %

 

 2018  8:39:00 AM  116 mmHg  68 mmHg  68 bpm  18 rpm  97.9 F  142 lbs  61 
in     26.8304 kg/m  1.665 m      

 

 3/6/2018  8:26:00 AM  126 mmHg  76 mmHg  63 bpm  16 rpm  98 F  134.125 lbs  61 
in     25.34 kg/m2  1.62 m2     100 %

 

 2018  11:55:00 AM  126 mmHg  80 mmHg  80 bpm  18 rpm  98 F  132 lbs  61 
in     24.9409 kg/m  1.6053 m     100 %

 

 2017  8:26:00 AM  122 mmHg  76 mmHg  63 bpm  16 rpm  98.4 F  129.25 lbs  
61 in     24.42 kg/m2  1.59 m2     99 %

 

 10/18/2017  9:01:00 AM  126 mmHg  80 mmHg  73 bpm  16 rpm  97.9 F  122.375 lbs
  61 in     23.12 kg/m2  1.55 m2     100 %

 

 10/10/2017  1:52:00 PM  118 mmHg  72 mmHg  74 bpm  16 rpm  98.1 F  121 lbs  61 
in     22.8625 kg/m  1.5369 m     99 %

 

 2017  8:40:00 AM  118 mmHg  68 mmHg  63 bpm  16 rpm  98.1 F  123.125 lbs  
61 in     23.26 kg/m2  1.55 m2     100 %

 

 2017  8:57:00 AM  116 mmHg  62 mmHg  71 bpm  16 rpm  99.8 F  121.5 lbs  
61 in     22.957 kg/m  1.5401 m     98 %

 

 2017  11:30:00 AM  128 mmHg  78 mmHg  69 bpm  16 rpm  98.8 F  129.375 lbs  
61 in     24.44 kg/m2  1.59 m2     98 %

 

 2017  8:22:00 AM  118 mmHg  78 mmHg  62 bpm  18 rpm  97.5 F  129.125 lbs  
61 in     24.3977 kg/m  1.5877 m     100 %

 

 10/13/2016  2:26:00 PM  128 mmHg  78 mmHg  77 bpm  16 rpm  98.4 F  139.25 lbs  
61 in     26.31 kg/m2  1.65 m2     100 %

 

 2016  8:29:00 AM  122 mmHg  70 mmHg  72 bpm  16 rpm  97.8 F  137.125 lbs  
61 in     25.9093 kg/m  1.6361 m     100 %

 

 2015  9:33:00 AM  120 mmHg  68 mmHg  73 bpm     98.7 F  144.375 lbs  61 
in     27.28 kg/m2  1.68 m2     99 %

 

 2015  9:05:00 AM  110 mmHg  75 mmHg  85 bpm  16 rpm  96.7 F  144.375 lbs  
61 in     27.2791 kg/m  1.6788 m     99 %

 

 2015  1:05:00 PM  108 mmHg  62 mmHg  78 bpm  18 rpm  96.9 F  142.375 lbs  
61 in     26.90 kg/m2  1.67 m2     100 %

 

 2014  9:31:00 AM  126 mmHg  66 mmHg  79 bpm  18 rpm  98.7 F  149 lbs  61 
in     28.153 kg/m  1.7055 m     98 %

 

 10/6/2014  9:02:00 AM  110 mmHg  74 mmHg  94 bpm  18 rpm  98.1 F  145.4 lbs  
61 in     27.47 kg/m2  1.68 m2     97 %

 

 10/2/2014  9:14:00 AM  124 mmHg  74 mmHg  79 bpm  18 rpm  98 F  147.25 lbs  61 
in     27.8224 kg/m  1.6955 m     98 %

 

 2014  9:22:00 AM  124 mmHg  76 mmHg  89 bpm  18 rpm  98.1 F  148 lbs  61 
in     27.96 kg/m2  1.70 m2     100 %

 

 2014  8:27:00 AM  118 mmHg  65 mmHg  74 bpm  16 rpm  97.7 F  148 lbs  61 
in     27.9641 kg/m  1.6998 m     99 %

 

 2014  9:48:00 AM  125 mmHg  70 mmHg  93 bpm  18 rpm  96.7 F  156 lbs  61 
in     29.48 kg/m2  1.75 m2     100 %

 

 2013  8:35:00 AM  122 mmHg  74 mmHg  84 bpm  16 rpm  97.9 F  155.375 lbs 
                99 %

 

 6/10/2013  8:30:00 AM  130 mmHg  82 mmHg  79 bpm  16 rpm  97.9 F  161 lbs     
            98 %

 

 2013  8:50:00 AM  124 mmHg  68 mmHg  66 bpm  18 rpm  97.6 F  157.5 lbs  
61 in     29.7591 kg/m  1.7535 m      

 

 2012  1:46:00 PM  120 mmHg  72 mmHg  75 bpm  16 rpm  97.6 F  156.125 lbs
                 98 %

 

 12/10/2012  8:32:00 AM  122 mmHg  82 mmHg  70 bpm  16 rpm  97.3 F  157 lbs    
             99 %

 

 8/15/2012  9:00:00 AM  130 mmHg  76 mmHg  86 bpm  16 rpm  97.4 F  159 lbs     
            100 %

 

 2012  11:02:00 AM  128 mmHg  64 mmHg  66 bpm  18 rpm  97.4 F  162.125 lbs
  61 in     30.6329 kg/m  1.7791 m      

 

 2012  8:45:00 AM  130 mmHg  70 mmHg  82 bpm  16 rpm  98.2 F  160.125 lbs 
                100 %

 

 2/15/2012  9:29:00 AM  122 mmHg  80 mmHg  86 bpm  16 rpm  97.4 F  159.375 lbs  
61 in     30.1133 kg/m  1.7639 m     99 %

 

 2012  9:41:00 AM  132 mmHg  74 mmHg  66 bpm  18 rpm  98.4 F  160.375 lbs  
61 in     30.30 kg/m2  1.77 m2      

 

 2011  10:08:00 AM  134 mmHg  82 mmHg  80 bpm  16 rpm  98 F  160 lbs  61 
in     30.2314 kg/m  1.7674 m     99 %

 

 2011  3:56:00 PM  130 mmHg  80 mmHg                              

 

 2011  8:55:00 AM  130 mmHg  74 mmHg  88 bpm  16 rpm  98.2 F  158.25 lbs  
               98 %

 

 2011  8:54:00 AM  136 mmHg  82 mmHg  102 bpm  16 rpm  98.1 F  153.5 lbs   
              99 %

 

 2011  8:49:00 AM  122 mmHg  88 mmHg  88 bpm  16 rpm  98.6 F  152.25 lbs  
               99 %

 

 2011  10:02:00 AM  140 mmHg  82 mmHg  96 bpm  20 rpm  98.8 F             
       100 %

 

 2011  9:14:00 AM  156 mmHg  98 mmHg  110 bpm  24 rpm  98.5 F  153 lbs    
             100 %

 

 2011  8:50:00 AM  160 mmHg  84 mmHg  100 bpm  16 rpm  98.7 F  155 lbs    
             100 %

 

 2011  1:25:00 PM  152 mmHg  100 mmHg  82 bpm  16 rpm  97.2 F  156.375 lbs 
                100 %

 

 2011  9:55:00 AM  144 mmHg  90 mmHg                              

 

 2010  9:24:00 AM  138 mmHg  84 mmHg                              

 

 2010  8:58:00 AM  160 mmHg  94 mmHg                              

 

 2010  8:33:00 AM  142 mmHg  90 mmHg  100 bpm  16 rpm  98.1 F  158.375 
lbs                  

 

 2010  9:24:00 AM  160 mmHg  102 mmHg  88 bpm  18 rpm  99 F  157.125 lbs  
62 in     28.7382 kg/m  1.7657 m      

 

 3/19/2010  11:01:00 AM  140 mmHg  90 mmHg                              

 

 2009  10:08:00 AM  142 mmHg  86 mmHg  72 bpm  16 rpm  98.1 F  154.125 
lbs  61 in     29.1214 kg/m  1.7346 m      







Social History







 Name  Description  Comments

 

       

 

 Children      

 

 lives alone      

 

 Supervisor      

 

 Tobacco  Never smoker   







History of Procedures







 Date Ordered  Description  Order Status

 

 2011 12:00 AM  COMPREHEN METABOLIC PANEL  Reviewed

 

 2011 12:00 AM  ASSAY THYROID STIM HORMONE  Reviewed

 

 2011 12:00 AM  COMPREHEN METABOLIC PANEL  Reviewed

 

 2011 12:00 AM  LIPID PANEL  Reviewed

 

 2011 12:00 AM  ASSAY THYROID STIM HORMONE  Reviewed

 

 2011 12:00 AM  COMPLETE CBC W/AUTO DIFF WBC  Reviewed

 

 2015 12:00 AM  COMPLETE CBC W/AUTO DIFF WBC  Reviewed

 

 2015 12:00 AM  COMPREHEN METABOLIC PANEL  Reviewed

 

 2015 12:00 AM  LIPID PANEL  Reviewed

 

 2015 12:00 AM  ASSAY THYROID STIM HORMONE  Reviewed

 

 2011 12:00 AM  COMPREHEN METABOLIC PANEL  Reviewed

 

 2011 12:00 AM  COMPREHEN METABOLIC PANEL  Reviewed

 

 2011 12:00 AM  LIPID PANEL  Reviewed

 

 2011 12:00 AM  ASSAY THYROID STIM HORMONE  Reviewed

 

 10/28/2011 12:00 AM  ***Immunization administration, Medicare flu  Reviewed

 

 10/28/2011 12:00 AM  Fluzone ** MEDICARE Only **  Reviewed

 

 2010 11:24 AM  NO CHARGE OV  Reviewed

 

 2010 11:26 AM  NO CHARGE OV  Reviewed

 

 2011 12:00 AM  COMPREHEN METABOLIC PANEL  Reviewed

 

 2011 12:00 AM  LIPID PANEL  Reviewed

 

 2011 12:00 AM  ASSAY THYROID STIM HORMONE  Reviewed

 

 2011 12:00 AM  COMPLETE CBC W/AUTO DIFF WBC  Reviewed

 

 2011 12:00 AM  Wrist Support  Reviewed

 

 2016 12:00 AM  Screening mammography, bilateral  Reviewed

 

 2016 12:00 AM  DXA BONE DENSITY AXIAL  Returned

 

 2016 12:00 AM  TETANUS VACCINE IM  Reviewed

 

 2016 12:00 AM  HEP B VACC ADULT 3 DOSE IM  Reviewed

 

 2012 12:00 AM  TISSUE EXAM FOR FUNGI  Reviewed

 

 2012 12:00 AM  SMEAR WET MOUNT SALINE/INK  Reviewed

 

 2016 12:00 AM  TETANUS VACCINE IM  Reviewed

 

 2016 12:00 AM  HEP B VACC ADULT 3 DOSE IM  Reviewed

 

 2/15/2012 12:00 AM  THER/PROPH/DIAG INJ SC/IM  Reviewed

 

 2/15/2012 12:00 AM  Bicillin CR [facility]#13537675303[facility]  Reviewed

 

 2/15/2012 12:00 AM  Depo-Medrol 40 mg NDC#2237492217  Reviewed

 

 10/13/2016 12:00 AM  COMPLETE CBC W/AUTO DIFF WBC  Returned

 

 10/13/2016 12:00 AM  COMPREHEN METABOLIC PANEL  Returned

 

 10/13/2016 12:00 AM  ASSAY THYROID STIM HORMONE  Returned

 

 10/13/2016 12:00 AM  LIPID PANEL  Returned

 

 2016 12:00 AM  TETANUS VACCINE IM  Reviewed

 

 2016 12:00 AM  HEP B VACC ADULT 3 DOSE IM  Reviewed

 

 2017 12:00 AM  ASSAY OF IRON  Returned

 

 2017 12:00 AM  IRON BINDING TEST  Returned

 

 2017 12:00 AM  ASSAY OF TRANSFERRIN  Returned

 

 2017 12:00 AM  OCCULT BLD FECES 1-3 TESTS  Returned

 

 2017 12:00 AM  COMPLETE CBC AUTOMATED  Returned

 

 8/15/2012 12:00 AM  COMPREHEN METABOLIC PANEL  Reviewed

 

 8/15/2012 12:00 AM  ASSAY THYROID STIM HORMONE  Reviewed

 

 8/15/2012 12:00 AM  COMPLETE CBC W/AUTO DIFF WBC  Reviewed

 

 8/15/2012 12:00 AM  Wrist Support  Reviewed

 

 2017 12:00 AM  METABOLIC PANEL TOTAL CA  Returned

 

 2017 12:00 AM  METABOLIC PANEL TOTAL CA  Returned

 

 2017 12:00 AM  Screening mammography, bilateral  Returned

 

 10/29/2012 12:00 AM  Flu Injection 3 Years And Above NDC# [address]-95  C  
Reviewed

 

 12/10/2012 12:00 AM  IMMUNIZATION ADMIN  Reviewed

 

 12/10/2012 12:00 AM  Pneumovax Injection - [facility]  Reviewed

 

 2018 12:00 AM  THER/PROPH/DIAG INJ SC/IM  Reviewed

 

 2018 12:00 AM  Decadron 4mg Injection  Reviewed

 

 2018 12:00 AM  Depo-Medrol 40mg Injection  Reviewed

 

 2012 12:00 AM  TRICHOMONAS ASSAY W/OPTIC  Reviewed

 

 2018 12:00 AM  COMPLETE CBC W/AUTO DIFF WBC  Returned

 

 2018 12:00 AM  COMPREHEN METABOLIC PANEL  Returned

 

 2018 12:00 AM  LIPID PANEL  Returned

 

 2018 12:00 AM  ASSAY THYROID STIM HORMONE  Returned

 

 2013 12:00 AM  THER/PROPH/DIAG INJ SC/IM  Reviewed

 

 2013 12:00 AM  Bicillin CR, 1.2 million units NDC# 08685-801-80  Reviewed

 

 2018 12:00 AM  DXA BONE DENSITY AXIAL  Reviewed

 

 2013 12:00 AM  COMPREHEN METABOLIC PANEL  Reviewed

 

 2013 12:00 AM  LIPID PANEL  Reviewed

 

 2013 12:00 AM  COMPLETE CBC W/AUTO DIFF WBC  Reviewed

 

 2013 12:00 AM  ASSAY THYROID STIM HORMONE  Reviewed

 

 6/10/2013 12:00 AM  MAMMOGRAM SCREENING  Reviewed

 

 6/10/2013 12:00 AM  CYTOPATH C/V MANUAL  Reviewed

 

 12/10/2013 12:00 AM  LIPID PANEL  Reviewed

 

 12/10/2013 12:00 AM  ASSAY THYROID STIM HORMONE  Reviewed

 

 12/10/2013 12:00 AM  COMPLETE CBC W/AUTO DIFF WBC  Reviewed

 

 12/10/2013 12:00 AM  COMPREHEN METABOLIC PANEL  Reviewed

 

 2010 12:00 AM  CYTOPATH C/V MANUAL  Reviewed

 

 2010 12:00 AM  SMEAR WET MOUNT SALINE/INK  Reviewed

 

 2010 12:00 AM  LIPID PANEL  Reviewed

 

 2010 12:00 AM  ASSAY THYROID STIM HORMONE  Reviewed

 

 2010 12:00 AM  COMPLETE CBC W/AUTO DIFF WBC  Reviewed

 

 2010 12:00 AM  COMPREHEN METABOLIC PANEL  Reviewed

 

 10/28/2013 12:00 AM  Flu Injection 3 Years And Above NDC# [address]-54  [facility]  
Reviewed

 

 2010 8:48 AM  Blood Pressure Check-no charge  Reviewed

 

 2010 12:00 AM  Blood Pressure Check-no charge  Reviewed

 

 2014 12:00 AM  METABOLIC PANEL TOTAL CA  Reviewed

 

 2014 12:00 AM  ASSAY THYROID STIM HORMONE  Reviewed

 

 2014 12:00 AM  ASSAY OF FREE THYROXINE  Reviewed

 

 2014 12:00 AM  MAMMOGRAM SCREENING  Reviewed

 

 2014 12:00 AM  CT BONE DENSITY AXIAL  Reviewed

 

 2014 12:00 AM  COMPLETE CBC W/AUTO DIFF WBC  Reviewed

 

 2014 12:00 AM  COMPREHEN METABOLIC PANEL  Reviewed

 

 2014 12:00 AM  LIPID PANEL  Reviewed

 

 2014 12:00 AM  ASSAY THYROID STIM HORMONE  Reviewed

 

 10/20/2010 12:00 AM  IMMUNIZATION ADMIN  Reviewed

 

 10/20/2010 12:00 AM  FLU VACCINE 3 YRS & > IM  Reviewed

 

 2010 12:00 AM  COMPREHEN METABOLIC PANEL  Reviewed

 

 2010 12:00 AM  LIPID PANEL  Reviewed

 

 2010 12:00 AM  ASSAY THYROID STIM HORMONE  Reviewed

 

 2010 12:00 AM  COMPLETE CBC W/AUTO DIFF WBC  Reviewed

 

 2010 12:00 AM  Blood Pressure Check-no charge  Reviewed

 

 10/2/2014 12:00 AM  THER/PROPH/DIAG INJ SC/IM  Reviewed

 

 10/2/2014 12:00 AM  Kenalog, Per 10 Mg NDC#0119-3494-92  Reviewed

 

 2014 12:00 AM  COMPLETE CBC W/AUTO DIFF WBC  Reviewed

 

 2014 12:00 AM  COMPREHEN METABOLIC PANEL  Reviewed

 

 2014 12:00 AM  LIPID PANEL  Reviewed

 

 2014 12:00 AM  ASSAY THYROID STIM HORMONE  Reviewed

 

 2015 12:00 AM  Rocephin 1 gram NDC#0061-9680-96  Reviewed

 

 2015 12:00 AM  THER/PROPH/DIAG INJ SC/IM  Reviewed

 

 2011 12:00 AM  COMPREHEN METABOLIC PANEL  Reviewed

 

 2011 12:00 AM  LIPID PANEL  Reviewed

 

 2011 12:00 AM  ASSAY THYROID STIM HORMONE  Reviewed

 

 2011 12:00 AM  COMPLETE CBC W/AUTO DIFF WBC  Reviewed

 

 2011 12:00 AM  METABOLIC PANEL TOTAL CA  Reviewed

 

 2011 12:00 AM  COMPREHEN METABOLIC PANEL  Reviewed

 

 2011 12:00 AM  ASSAY THYROID STIM HORMONE  Reviewed

 

 2011 12:00 AM  COMPLETE CBC W/AUTO DIFF WBC  Reviewed

 

 2011 12:00 AM  ASSAY OF LIPASE  Reviewed

 

 2011 12:00 AM  THER/PROPH/DIAG INJ SC/IM  Reviewed

 

 2011 12:00 AM  Phenergan 50 Mg Im  Bernadine  Reviewed

 

 2011 12:00 AM  METABOLIC PANEL TOTAL CA  Reviewed

 

 2011 12:00 AM  THER/PROPH/DIAG INJ SC/IM  Reviewed

 

 2011 12:00 AM  Phenergan 25 Mg Im  Bernadine  Reviewed

 

 2011 12:00 AM  METABOLIC PANEL TOTAL CA  Reviewed

 

 2011 12:00 AM  ASSAY THYROID STIM HORMONE  Reviewed

 

 2011 12:00 AM  THER/PROPH/DIAG INJ SC/IM  Reviewed

 

 2011 12:00 AM  Phenergan 50 Mg Im  Bernadine  Reviewed

 

 2011 12:00 AM  ASSAY OF SERUM SODIUM  Reviewed

 

 2011 12:00 AM  ASSAY OF SERUM SODIUM  Reviewed

 

 2011 12:00 AM  CYTOPATH C/V MANUAL  Reviewed

 

 2011 12:00 AM  ASSAY THYROID STIM HORMONE  Reviewed

 

 2015 12:00 AM  COMPLETE CBC W/AUTO DIFF WBC  Reviewed

 

 2015 12:00 AM  COMPREHEN METABOLIC PANEL  Reviewed

 

 2015 12:00 AM  LIPID PANEL  Reviewed

 

 2015 12:00 AM  ASSAY THYROID STIM HORMONE  Reviewed

 

 2015 12:00 AM  MAMMOGRAM SCREENING  Reviewed

 

 2015 12:00 AM  CYTOPATH TBS C/V MANUAL  Reviewed







Results Summary







 Date and Description  Results

 

 2010 9:25 AM  Colonoscopy-Women and Men over 50 Abnormal Mammogram -Women 
over 40 Declined Pap Smear Declined 

 

 2010 10:41 AM  WET PREP NO TRICHOMONADS SEEN  No  Few 

 

 2010 8:15 AM  TRIGLYCERIDES 174.0 mg/dLCHOLESTEROL 175.0 mg/dLHDL 58.0 mg/
dLTOT CHOL/HDL 3.0 LDL (CALC) 82.0 mg/dLTSH 0.790 uIU/mLGLUCOSE 95.0 mg/
dLSODIUM 136.0 mmol/LPOTASSIUM 4.50 mmol/LCHLORIDE 99.0 mmol/LCO2 26.0 mmol/
LBUN 12.0 mg/dLCREATININE 0.80 mg/dLSGOT/AST 32.0 IU/LSGPT/ALT 33.0 IU/LALK 
PHOS 103.0 IU/LTOTAL PROTEIN 7.60 g/dLALBUMIN 4.60 g/dLTOTAL BILI 0.60 mg/
dLCALCIUM 9.80 mg/dLAGE 64 GFR NonAA 72 GFR AA 87 eGFR >60 mL/min/1.73 m2eGFR AA
* >60 WBC 5.3 RBC 4.13 HGB 13.10 g/dLHCT 39.20 %MCV 95.0 fLMCH 31.70 pgMCHC 
33.40 g/dLRDW SD 44 RDW CV 12.70 %MPV 9.80 fLPLT 257 NRBC# 0.00 NRBC% 0.0 %NEUT 
57.90 %%LYMP 26.50 %%MONO 11.60 %%EOS 3.20 %%BASO 0.80 %#NEUT 3.04 #LYMP 1.39 #
MONO 0.61 #EOS 0.17 #BASO 0.04 MANUAL DIFF NOT IND 

 

 2011 9:45 AM  GLUCOSE 91.0 mg/dLSODIUM 133.0 mmol/LPOTASSIUM 4.40 mmol/
LCHLORIDE 97.0 mmol/LCO2 24.0 mmol/LBUN 9.0 mg/dLCREATININE 0.70 mg/dLCALCIUM 
10.0 mg/dLAGE 65 GFR NonAA 84 GFR  eGFR >60 mL/min/1.73 m2eGFR AA* >60 

 

 2011 10:25 AM  LIPASE 29.0 U/LTSH 0.10 uIU/mLGLUCOSE 115.0 mg/dLSODIUM 
127.0 mmol/LPOTASSIUM 5.30 mmol/LCHLORIDE 93.0 mmol/LCO2 18.0 mmol/LBUN 9.0 mg/
dLCREATININE 0.70 mg/dLSGOT/AST 62.0 IU/LSGPT/ALT 46.0 IU/LALK PHOS 100.0 IU/
LTOTAL PROTEIN 8.60 g/dLALBUMIN 4.90 g/dLTOTAL BILI 0.60 mg/dLCALCIUM 9.90 mg/
dLAGE 65 GFR NonAA 84 GFR  eGFR >60 mL/min/1.73 m2eGFR AA* >60 WBC 6.9 
RBC 4.48 HGB 14.40 g/dLHCT 40.90 %MCV 91.0 fLMCH 32.10 pgMCHC 35.20 g/dLRDW SD 
41 RDW CV 12.50 %MPV 9.30 fLPLT 260 NRBC# 0.00 NRBC% 0.0 %NEUT 74.90 %%LYMP 
15.10 %%MONO 9.40 %%EOS 0.30 %%BASO 0.30 %#NEUT 5.15 #LYMP 1.04 #MONO 0.65 #EOS 
0.02 #BASO 0.02 MANUAL DIFF NOT IND 

 

 2011 9:07 AM  GLUCOSE 92.0 mg/dLSODIUM 131.0 mmol/LPOTASSIUM 4.20 mmol/
LCHLORIDE 99.0 mmol/LCO2 22.0 mmol/LBUN 9.0 mg/dLCREATININE 0.70 mg/dLCALCIUM 
9.20 mg/dLAGE 65 GFR NonAA 84 GFR  eGFR >60 mL/min/1.73 m2eGFR AA* >60 

 

 2011 11:06 AM  TSH 0.510 uIU/mLGLUCOSE 99.0 mg/dLSODIUM 132.0 mmol/
LPOTASSIUM 3.50 mmol/LCHLORIDE 95.0 mmol/LCO2 23.0 mmol/LBUN 9.0 mg/
dLCREATININE 0.80 mg/dLCALCIUM 10.40 mg/dLAGE 65 GFR NonAA 72 GFR AA 87 eGFR >
60 mL/min/1.73 m2eGFR AA* >60 

 

 2011 9:45 AM  SODIUM 132.0 mmol/L

 

 2011 9:27 AM  SODIUM 137.0 mmol/L

 

 2011 9:55 AM  TSH 1.070 uIU/mL

 

 2011 8:55 AM  GLUCOSE 102.0 mg/dLSODIUM 135.0 mmol/LPOTASSIUM 4.20 mmol/
LCHLORIDE 102.0 mmol/LCO2 24.0 mmol/LBUN 15.0 mg/dLCREATININE 0.80 mg/dLSGOT/
AST 30.0 IU/LSGPT/ALT 35.0 IU/LALK PHOS 119.0 IU/LTOTAL PROTEIN 7.50 g/
dLALBUMIN 4.30 g/dLTOTAL BILI 0.30 mg/dLCALCIUM 9.20 mg/dLAGE 65 GFR NonAA 72 
GFR AA 87 eGFR >60 mL/min/1.73 m2eGFR AA* >60 TSH 1.130 uIU/mL

 

 2011 8:55 AM  GLUCOSE 98.0 mg/dLSODIUM 137.0 mmol/LPOTASSIUM 3.90 mmol/
LCHLORIDE 103.0 mmol/LCO2 25.0 mmol/LBUN 14.0 mg/dLCREATININE 0.70 mg/dLSGOT/
AST 33.0 IU/LSGPT/ALT 36.0 IU/LALK PHOS 111.0 IU/LTOTAL PROTEIN 8.30 g/
dLALBUMIN 4.40 g/dLTOTAL BILI 0.50 mg/dLCALCIUM 9.40 mg/dLAGE 66 GFR NonAA 84 
GFR  eGFR >60 mL/min/1.73 m2eGFR AA* >60 TRIGLYCERIDES 109.0 mg/
dLCHOLESTEROL 153.0 mg/dLHDL 54.0 mg/dLTOT CHOL/HDL 2.8 LDL (CALC) 77.0 mg/
dLTSH 1.330 uIU/mL

 

 2011 10:17 AM  Colonoscopy-Women and Men over 50 Declined Mammogram -
Women over 40 Normal Pap Smear Negative 

 

 2012 10:33 AM  WET PREP NO TRICH SEEN CLUE CELLS NONE SEEN 

 

 2012 8:45 AM  WBC 5.2 RBC 3.96 HGB 12.70 g/dLHCT 37.80 %MCV 96.0 fLMCH 
32.10 pgMCHC 33.60 g/dLRDW SD 46 RDW CV 13.30 %MPV 9.70 fLPLT 297 NRBC# 0.00 
NRBC% 0.0 %NEUT 57.70 %%LYMP 28.50 %%MONO 10.30 %%EOS 2.50 %%BASO 1.0 %#NEUT 
3.02 #LYMP 1.49 #MONO 0.54 #EOS 0.13 #BASO 0.05 MANUAL DIFF NOT IND GLUCOSE 
97.0 mg/dLSODIUM 137.0 mmol/LPOTASSIUM 4.40 mmol/LCHLORIDE 101.0 mmol/LCO2 23.0 
mmol/LBUN 17.0 mg/dLCREATININE 0.80 mg/dLSGOT/AST 30.0 IU/LSGPT/ALT 33.0 IU/
LALK PHOS 95.0 IU/LTOTAL PROTEIN 7.40 g/dLALBUMIN 4.40 g/dLTOTAL BILI 0.60 mg/
dLCALCIUM 9.70 mg/dLAGE 66 GFR NonAA 72 GFR AA 87 eGFR 60 eGFR AA* 60 
TRIGLYCERIDES 130.0 mg/dLCHOLESTEROL 157.0 mg/dLHDL 56.0 mg/dLTOT CHOL/HDL 2.8 
LDL (CALC) 75.0 mg/dLTSH 0.940 uIU/mL

 

 2012 8:45 AM  WBC 5.1 RBC 3.91 HGB 12.50 g/dLHCT 36.30 %MCV 93.0 fLMCH 
32.0 pgMCHC 34.40 g/dLRDW SD 43 RDW CV 12.60 %MPV 9.30 fLPLT 244 NRBC# 0.00 NRBC
% 0.0 %NEUT 57.60 %%LYMP 27.50 %%MONO 9.10 %%EOS 5.0 %%BASO 0.80 %#NEUT 2.91 #
LYMP 1.39 #MONO 0.46 #EOS 0.25 #BASO 0.04 MANUAL DIFF NOT IND 

 

 12/10/2012 8:34 AM  Colonoscopy-Women and Men over 50 Declined Mammogram -
Women over 40 Declined Pap Smear Declined 

 

 2012 5:02 PM  WET PREP NO TRICH SEEN CLUE CELLS NONE SEEN 

 

 2013 8:25 AM  WBC 4.2 RBC 3.96 HGB 12.90 g/dLHCT 37.0 %MCV 93.0 fLMCH 
32.60 pgMCHC 34.90 g/dLRDW SD 43 RDW CV 12.60 %MPV 9.70 fLPLT 254 NRBC# 0.00 
NRBC% 0.0 %NEUT 54.40 %%LYMP 30.40 %%MONO 10.80 %%EOS 3.40 %%BASO 1.0 %#NEUT 
2.26 #LYMP 1.26 #MONO 0.45 #EOS 0.14 #BASO 0.04 MANUAL DIFF NOT IND TSH 1.230 
uIU/mLGLUCOSE 94.0 mg/dLSODIUM 136.0 mmol/LPOTASSIUM 4.30 mmol/LCHLORIDE 99.0 
mmol/LCO2 25.0 mmol/LBUN 10.0 mg/dLCREATININE 0.80 mg/dLSGOT/AST 36.0 IU/LSGPT/
ALT 36.0 IU/LALK PHOS 84.0 IU/LTOTAL PROTEIN 7.90 g/dLALBUMIN 4.40 g/dLTOTAL 
BILI 0.70 mg/dLCALCIUM 9.80 mg/dLAGE 67 GFR NonAA 72 GFR AA 87 eGFR 60 eGFR AA* 
60 TRIGLYCERIDES 122.0 mg/dLCHOLESTEROL 141.0 mg/dLHDL 47.0 mg/dLTOT CHOL/HDL 
3.0 LDL (CALC) 70.0 mg/dL

 

 2013 8:45 AM  WBC 5.3 RBC 4.01 HGB 13.0 g/dLHCT 37.60 %MCV 94.0 fLMCH 
32.40 pgMCHC 34.60 g/dLRDW SD 43 RDW CV 12.50 %MPV 9.40 fLPLT 248 NRBC# 0.00 
NRBC% 0.0 %NEUT 58.70 %%LYMP 27.80 %%MONO 9.80 %%EOS 2.80 %%BASO 0.90 %#NEUT 
3.12 #LYMP 1.48 #MONO 0.52 #EOS 0.15 #BASO 0.05 MANUAL DIFF NOT IND TSH 1.570 
uIU/mLGLUCOSE 94.0 mg/dLSODIUM 134.0 mmol/LPOTASSIUM 4.10 mmol/LCHLORIDE 101.0 
mmol/LCO2 23.0 mmol/LBUN 11.0 mg/dLCREATININE 0.80 mg/dLSGOT/AST 41.0 IU/LSGPT/
ALT 44.0 IU/LALK PHOS 109.0 IU/LTOTAL PROTEIN 7.90 g/dLALBUMIN 4.70 g/dLTOTAL 
BILI 0.80 mg/dLCALCIUM 10.40 mg/dLAGE 68 GFR NonAA 72 GFR AA 87 eGFR >60 mL/min/
1.73 m2eGFR AA* >60 TRIGLYCERIDES 163.0 mg/dLCHOLESTEROL 138.0 mg/dLHDL 49.0 mg/
dLTOT CHOL/HDL 2.8 LDL (CALC) 56.0 mg/dL

 

 2014 8:58 AM  GLUCOSE 86.0 mg/dLSODIUM 134.0 mmol/LPOTASSIUM 4.20 mmol/
LCHLORIDE 99.0 mmol/LCO2 25.0 mmol/LBUN 14.0 mg/dLCREATININE 0.80 mg/dLCALCIUM 
10.10 mg/dLAGE 68 GFR NonAA 72 GFR AA 87 eGFR >60 mL/min/1.73 m2eGFR AA* >60 
FREE T4 1.18 TSH 1.20 uIU/mL

 

 2014 9:50 AM  WBC 5.7 RBC 4.03 HGB 12.90 g/dLHCT 37.90 %MCV 94.0 fLMCH 
32.0 pgMCHC 34.0 g/dLRDW SD 44 RDW CV 12.70 %MPV 9.70 fLPLT 292 NRBC# 0.00 NRBC
% 0.0 %NEUT 62.70 %%LYMP 21.80 %%MONO 11.0 %%EOS 3.40 %%BASO 1.10 %#NEUT 3.55 #
LYMP 1.23 #MONO 0.62 #EOS 0.19 #BASO 0.06 MANUAL DIFF NOT IND GLUCOSE 98.0 mg/
dLSODIUM 135.0 mmol/LPOTASSIUM 4.30 mmol/LCHLORIDE 102.0 mmol/LCO2 22.0 mmol/
LBUN 10.0 mg/dLCREATININE 0.80 mg/dLSGOT/AST 34.0 IU/LSGPT/ALT 32.0 IU/LALK 
PHOS 95.0 IU/LTOTAL PROTEIN 7.70 g/dLALBUMIN 4.30 g/dLTOTAL BILI 0.80 mg/
dLCALCIUM 9.10 mg/dLAGE 68 GFR NonAA 72 GFR AA 87 eGFR 60 eGFR AA* 60 
TRIGLYCERIDES 108.0 mg/dLCHOLESTEROL 146.0 mg/dLHDL 56.0 mg/dLTOT CHOL/HDL 2.6 
LDL (CALC) 68.0 mg/dLTSH 0.90 uIU/mL

 

 2014 8:40 AM  WBC 4.4 RBC 4.13 HGB 13.20 g/dLHCT 38.90 %MCV 94.0 fLMCH 
32.0 pgMCHC 33.90 g/dLRDW SD 47 RDW CV 13.50 %MPV 9.80 fLPLT 279 NRBC# 0.00 NRBC
% 0.0 %NEUT 63.80 %%LYMP 24.60 %%MONO 9.30 %%EOS 1.60 %%BASO 0.70 %#NEUT 2.80 #
LYMP 1.08 #MONO 0.41 #EOS 0.07 #BASO 0.03 MANUAL DIFF NOT IND GLUCOSE 96.0 mg/
dLSODIUM 136.0 mmol/LPOTASSIUM 4.10 mmol/LCHLORIDE 99.0 mmol/LCO2 23.0 mmol/
LBUN 8.0 mg/dLCREATININE 0.80 mg/dLSGOT/AST 31.0 IU/LSGPT/ALT 27.0 IU/LALK PHOS 
85.0 IU/LTOTAL PROTEIN 7.60 g/dLALBUMIN 4.40 g/dLTOTAL BILI 0.80 mg/dLCALCIUM 
10.20 mg/dLAGE 69 GFR NonAA 71 GFR AA 86 eGFR 60 eGFR AA* 60 TRIGLYCERIDES 61.0 
mg/dLCHOLESTEROL 148.0 mg/dLHDL 71.0 mg/dLTOT CHOL/HDL 2.1 LDL (CALC) 65.0 mg/
dLTSH 0.990 uIU/mL

 

 2015 8:32 AM  WBC 4.8 RBC 3.98 HGB 12.70 g/dLHCT 37.30 %MCV 94.0 fLMCH 
31.90 pgMCHC 34.0 g/dLRDW SD 43 RDW CV 12.70 %MPV 9.50 fLPLT 270 NRBC# 0.00 NRBC
% 0.0 %NEUT 57.30 %%LYMP 25.0 %%MONO 13.0 %%EOS 3.60 %%BASO 1.10 %#NEUT 2.73 #
LYMP 1.19 #MONO 0.62 #EOS 0.17 #BASO 0.05 MANUAL DIFF NOT IND TSH 0.640 uIU/
mLGLUCOSE 90.0 mg/dLSODIUM 136.0 mmol/LPOTASSIUM 4.0 mmol/LCHLORIDE 101.0 mmol/
LCO2 24.0 mmol/LBUN 10.0 mg/dLCREATININE 0.80 mg/dLSGOT/AST 34.0 IU/LSGPT/ALT 
32.0 IU/LALK PHOS 92.0 IU/LTOTAL PROTEIN 7.50 g/dLALBUMIN 4.40 g/dLTOTAL BILI 
0.70 mg/dLCALCIUM 9.50 mg/dLAGE 69 GFR NonAA 71 GFR AA 86 eGFR >60 mL/min/1.73 
m2eGFR AA* >60 TRIGLYCERIDES 107.0 mg/dLCHOLESTEROL 138.0 mg/dLHDL 58.0 mg/
dLTOT CHOL/HDL 2.4 LDL (CALC) 59.0 mg/dL

 

 2015 8:31 AM  WBC 4.6 RBC 3.97 HGB 12.90 g/dLHCT 38.0 %MCV 96.0 fLMCH 
32.50 pgMCHC 33.90 g/dLRDW SD 44 RDW CV 12.60 %MPV 9.0 fLPLT 248 NRBC# 0.00 NRBC
% 0.0 %NEUT 61.0 %%LYMP 24.70 %%MONO 10.20 %%EOS 3.0 %%BASO 1.10 %#NEUT 2.82 #
LYMP 1.14 #MONO 0.47 #EOS 0.14 #BASO 0.05 MANUAL DIFF NOT IND TRIGLYCERIDES 
105.0 mg/dLCHOLESTEROL 141.0 mg/dLHDL 58.0 mg/dLTOT CHOL/HDL 2.4 LDL (CALC) 
62.0 mg/dLGLUCOSE 93.0 mg/dLSODIUM 136.0 mmol/LPOTASSIUM 4.10 mmol/LCHLORIDE 
101.0 mmol/LCO2 25.0 mmol/LBUN 9.0 mg/dLCREATININE 0.80 mg/dLSGOT/AST 32.0 IU/
LSGPT/ALT 28.0 IU/LALK PHOS 95.0 IU/LTOTAL PROTEIN 7.30 g/dLALBUMIN 4.50 g/
dLTOTAL BILI 0.80 mg/dLCALCIUM 9.70 mg/dLAGE 70 GFR NonAA 71 GFR AA 86 eGFR >60 
mL/min/1.73meGFR AA* >60 TSH 1.10 uIU/mL







History Of Immunizations







 Name  Date Admin  Mfg Name  Mfg Code  Trade Name  Lot#  Route  Inj  Vis Given  
Vis Pub  CVX

 

 Influenza  10/20/2010  [employer] pasteur  [employer]  FLUZONE  [id number]  Intramuscular  
Left Arm  10/20/2010  2010  999

 

 Influenza  10/28/2011  [employer] pasteur  [employer]  FLUZONE  [id number]  Intramuscular  
Left Arm  10/28/2011  2011  141

 

 Influenza  10/29/2012  [employer] pasteur  [employer]  FLUZONE  [id number]  Intramuscular  
Left Deltoid  10/29/2012  2012  141

 

 X  12/10/2012  [employer]  MSD  PNEUMOVAX 23  [id number]  Intramuscular  
Left Gluteous Medius  12/10/2012  2010  33

 

 Influenza  10/28/2013  [employer] pasteur  [employer]  Fluzone > 3 Years  [id number]  
Intramuscular  Right Deltoid  10/28/2013  2013  141

 

 X  9/2/2015  Not Entered  NE  PREVNAR 13     Not Entered  Not Entered  1  109

 

 Influenza  2015  Not Entered  NE  Not Entered     Not Entered  Not Entered
  2015  141

 

 HepB Adult  2016  [employer]  MSD  RECOMBIVAX-ADULT  [id number]  Not 
Entered  Left Deltoid  2016  43

 

 HepB Adult  2016  [employer]  MSD  RECOMBIVAX-ADULT  [id number]  
Intramuscular  Right Deltoid  2016  43

 

 ZVL  2012  Not Entered  NE  Not Entered     Not Entered  Not Entered  1  121

 

 Tdap  2012  Not Entered  NE  Not Entered     Not Entered  Not Entered  1  115

 

 Influenza  10/13/2016  Not Entered  NE  FLUZONE-HIGH DOSE     Intramuscular  
Left Arm  1  141

 

 HepB Adult  2016  [employer]  MSD  RECOMBIVAX-ADULT  [id number]  
Intramuscular  Right Deltoid  2016  43

 

 Influenza  10/30/2017  Not Entered  NE  Fluarix, quadrivalent, preservative 
free     Intramuscular  Left Arm  2017  150







History of Past Illness







 Name  Date of Onset  Comments

 

 Benign Essential Hypertension  Dec 14 2009 10:10AM   

 

 Hyperlipidemia  Dec 14 2009 10:10AM   

 

 Hypothyroidism, Acquired  Dec 14 2009 10:10AM   

 

 hypothyroid      

 

 Hypertension      

 

 Hyperlipidemia      

 

 acid reflux      

 

 Hypertension, Benign Essential  2010  9:41AM   

 

 Hypertension, Benign Essential  2010 12:53PM   

 

 Routine gynecological examination  May  5 2010  9:28AM   

 

 Hypertension  May  5 2010  9:28AM   

 

 Hyperlipidemia, unspecified  May  5 2010  9:28AM   

 

 Hypothyroidism, Acquired  May  5 2010  9:28AM   

 

 Vulvovaginitis  May  5 2010  9:28AM   

 

 Rhinitis, Allergic  May  5 2010  9:28AM   

 

 Hypertension, Benign Essential  May 19 2010  8:48AM   

 

 Hypertension, Benign Essential  May 25 2010  9:39AM   

 

 Flu  Oct 20 2010 12:01PM   

 

 Essential Hypertension  2010  8:34AM   

 

 Hyperlipidemia  2010  8:34AM   

 

 Gastroesophageal Reflux  2010  8:34AM   

 

 Hypothyroidism, Acquired  2010  8:34AM   

 

 Hypertension, Benign Essential  2010  9:22AM   

 

 Hypertension, Benign Essential  Dec 15 2010  9:07AM   

 

 Essential Hypertension  2011  1:31PM   

 

 Hyperlipidemia  2011  1:31PM   

 

 Gastroesophageal Reflux  2011  1:31PM   

 

 Hypothyroidism, Acquired  2011  1:31PM   

 

 Essential Hypertension  2011  8:51AM   

 

 Hyperlipidemia  2011  8:51AM   

 

 Gastroesophageal Reflux  2011  8:51AM   

 

 Hypothyroidism, Acquired  2011  8:51AM   

 

 Essential Hypertension  2011  9:13AM   

 

 Hyperlipidemia  2011  9:13AM   

 

 Gastroesophageal Reflux  2011  9:13AM   

 

 Hypothyroidism, Acquired  2011  9:13AM   

 

 Nausea With Vomiting  2011  1:18PM   

 

 Hyponatremia  2011  8:46AM   

 

 Nausea  2011  9:13AM   

 

 Hypothyroidism  2011 11:10AM   

 

 Hyponatremia  2011 11:10AM   

 

 Essential Hypertension  2011 10:05AM   

 

 Hyperlipidemia  2011 10:05AM   

 

 Gastroesophageal Reflux  2011 10:05AM   

 

 Nausea  2011 10:05AM   

 

 Hypothyroidism, Acquired  2011 10:05AM   

 

 Hyponatremia  May  6 2011 11:34AM   

 

 Essential Hypertension  May 16 2011  8:50AM   

 

 Hyperlipidemia  May 16 2011  8:50AM   

 

 Gastroesophageal Reflux  May 16 2011  8:50AM   

 

 Nausea  May 16 2011  8:50AM   

 

 Hypothyroidism, Acquired  May 16 2011  8:50AM   

 

 Hyponatremia  May 16 2011  8:50AM   

 

 Routine gynecological examination  2011  8:54AM   

 

 Hypertension  2011  8:54AM   

 

 Hyperlipidemia, unspecified  2011  8:54AM   

 

 Hypothyroidism, Acquired  2011  8:54AM   

 

 Rhinitis, Allergic  2011  8:54AM   

 

 Hypothyroidism  Aug 29 2011 12:37PM   

 

 Hyponatremia  Aug 29 2011 12:37PM   

 

 Essential Hypertension  Sep 12 2011  8:53AM   

 

 Hyperlipidemia  Sep 12 2011  8:53AM   

 

 Gastroesophageal Reflux  Sep 12 2011  8:53AM   

 

 Hypothyroidism, Acquired  Sep 12 2011  8:53AM   

 

 Hyponatremia  Sep 12 2011  8:53AM   

 

 Hypertension  Sep 29 2011  9:41AM   

 

 Hyperlipidemia  Dec  8 2011  8:31AM   

 

 Hypothyroidism  Dec  8 2011  8:31AM   

 

 Hypertension  Dec  8 2011  8:31AM   

 

 Flu  Dec  9 2011 10:02AM   

 

 Essential Hypertension  Dec 13 2011 10:13AM   

 

 Hyperlipidemia  Dec 13 2011 10:13AM   

 

 Gastroesophageal Reflux  Dec 13 2011 10:13AM   

 

 Hypothyroidism, Acquired  Dec 13 2011 10:13AM   

 

 Hyponatremia  Dec 13 2011 10:13AM   

 

 Vaginal Discharge  2012  9:43AM   

 

 Rhinitis, Allergic  Feb 15 2012  9:31AM   

 

 Eustachian Tube Dysfunction  Feb 15 2012  9:31AM   

 

 Pharyngitis, Acute  Feb 15 2012  9:31AM   

 

 Routine gynecological examination  2012  8:48AM   

 

 Hypertension  2012  8:48AM   

 

 Hyperlipidemia, unspecified  2012  8:48AM   

 

 Hypothyroidism, Acquired  2012  8:48AM   

 

 Rhinitis, Allergic  2012  8:48AM   

 

 Candidiasis, Vulvovaginal  2012 11:04AM   

 

 Essential Hypertension  Aug 15 2012  9:02AM   

 

 Hyperlipidemia  Aug 15 2012  9:02AM   

 

 Gastroesophageal Reflux  Aug 15 2012  9:02AM   

 

 Hypothyroidism, Acquired  Aug 15 2012  9:02AM   

 

 Hyponatremia  Aug 15 2012  9:02AM   

 

 Atrophic Vaginitis, Postmenopausal  Aug 15 2012  9:02AM   

 

 Flu  Oct 29 2012  9:24AM   

 

 Essential Hypertension  Dec 10 2012  8:35AM   

 

 Hyperlipidemia  Dec 10 2012  8:35AM   

 

 Gastroesophageal Reflux  Dec 10 2012  8:35AM   

 

 Hypothyroidism, Acquired  Dec 10 2012  8:35AM   

 

 Hyponatremia  Dec 10 2012  8:35AM   

 

 Atrophic Vaginitis, Postmenopausal  Dec 10 2012  8:35AM   

 

 Pneumococcus  Dec 10 2012  2:07PM   

 

 Vaginal Discharge  Dec 20 2012  1:50PM   

 

 Essential Hypertension  Dec 20 2012  1:50PM   

 

 Hyperlipidemia  Dec 20 2012  1:50PM   

 

 Gastroesophageal Reflux  Dec 20 2012  1:50PM   

 

 Hypothyroidism, Acquired  Dec 20 2012  1:50PM   

 

 Atrophic Vaginitis, Postmenopausal  Dec 20 2012  1:50PM   

 

 Upper Respiratory Infections  2013  8:52AM   

 

 Hyperlipidemia  2013  8:21AM   

 

 Hypertension  2013  8:21AM   

 

 Hypothyroidism  2013  8:21AM   

 

 Screening Mammogram  Beto 10 2013  8:45AM   

 

 Routine gynecological examination  Beto 10 2013  8:34AM   

 

 Hypertension  Beto 10 2013  8:34AM   

 

 Hyperlipidemia, unspecified  Beto 10 2013  8:34AM   

 

 Hypothyroidism, Acquired  Beto 10 2013  8:34AM   

 

 Rhinitis, Allergic  Beto 10 2013  8:34AM   

 

 Flu  Oct 28 2013  8:52AM   

 

 Essential Hypertension  Dec  9 2013  8:37AM   

 

 Hyperlipidemia  Dec  9 2013  8:37AM   

 

 Gastroesophageal Reflux  Dec  9 2013  8:37AM   

 

 Hypothyroidism, Acquired  Dec  9 2013  8:37AM   

 

 Atrophic Vaginitis, Postmenopausal  Dec  9 2013  8:37AM   

 

 Hypertension  2014  9:56AM   

 

 Hyperlipidemia  2014  9:56AM   

 

 Hypothyroidism  2014  9:56AM   

 

 Screening Mammogram  2014  8:32AM   

 

 Osteopenia  2014  8:32AM   

 

 Hypertension  2014  8:35AM   

 

 Hypothyroidism, Acquired  2014  8:35AM   

 

 Hyperlipidemia  2014  8:35AM   

 

 Upper Respiratory Infections  2014  9:28AM   

 

 Sinusitis, Acute  Oct  2 2014  9:17AM   

 

 Herpes Zoster  Oct  5 2014  1:44PM   

 

 Vesicular Eruption  Oct  5 2014  1:44PM   

 

 Hypertension  2014  8:18AM   

 

 Hyperlipidemia  2014  8:18AM   

 

 hypothyroid  2014  8:18AM   

 

 Situational anxiety  Dec 20 2014  9:33AM   

 

 GERD (gastroesophageal reflux disease)  Dec 20 2014  9:33AM   

 

 Nausea  Dec 20 2014  9:33AM   

 

 Abdominal Pain, Epigastric  Dec 20 2014  9:33AM   

 

 Hyperlipidemia  Oct  6 2014  9:03AM   

 

 acid reflux  Oct  6 2014  9:03AM   

 

 hypothyroid  Oct  6 2014  9:03AM   

 

 Shingles  Oct  6 2014  9:03AM   

 

 Pharyngitis  2015  1:09PM   

 

 Bronchitis  2015  9:10AM   

 

 Hyperlipidemia  2015  9:10AM   

 

 acid reflux  2015  9:10AM   

 

 hypothyroid  2015  9:10AM   

 

 Hyperlipidemia  2015  8:18AM   

 

 Hypertension  2015  8:18AM   

 

 hypothyroid  2015  8:18AM   

 

 Screening Mammogram  2015 11:43AM   

 

 Medicare Annual Pap Q 2 years  2015  9:36AM   

 

 Screening Mammogram  2015  9:36AM   

 

 Candidiasis of female genitalia  2015  9:36AM   

 

 Hypertension  2015 11:34AM   

 

 Hyperlipidemia, unspecified  2015 11:34AM   

 

 Hypothyroidism, Acquired  2015 11:34AM   

 

 Osteopenia  2016  8:41AM   

 

 Encounter for screening mammogram for breast cancer  2016  8:41AM   

 

 Hypertension  2016  8:34AM   

 

 Lymphedema  2016  8:34AM   

 

 Hyperlipidemia  2016  8:34AM   

 

 hypothyroid  2016  8:34AM   

 

 HEP B  2016  9:13AM   

 

 HEP B  2016  8:52AM   

 

 Acid Reflux  2016  8:34AM   

 

 History of acute gastritis  Oct 13 2016  2:30PM   

 

 Anxiety as acute reaction to exceptional stress  Oct 13 2016  2:30PM   

 

 HEP B  Dec 29 2016  8:42AM   

 

 Caregiver stress syndrome  May 26 2017  8:28AM   

 

 Anxiety  May 26 2017  8:28AM   

 

 Blood in stool  2017  2:54PM   

 

 Upper GI bleed  2017 11:34AM   

 

 Hyponatremia  2017  9:03AM   

 

 Hyponatremia  2017  8:43AM   

 

 Medicare Annual Pap Q 2 years  2017  9:03AM   

 

 Nausea  2017  9:03AM   

 

 Uterine enlargement  2017  9:03AM   

 

 Encounter for screening mammogram for breast cancer  2017  4:56PM   

 

 Panic disorder [episodic paroxysmal anxiety]  Oct 10 2017  2:02PM   

 

 Acute stress reaction  Oct 10 2017  2:02PM   

 

 Caregiver stress syndrome  Oct 10 2017  2:02PM   

 

 Stress reaction causing mixed disturbance of emotion and conduct  Oct 18 2017  
9:05AM   

 

 Ankle strain, left, initial encounter  Dec 19 2017  8:29AM   

 

 Excoriation of ear canal, left, initial encounter  Dec 19 2017  8:29AM   

 

 Generalized anxiety disorder  Dec 19 2017  8:29AM   

 

 Grief reaction  Dec 19 2017  8:29AM   

 

 Acute non-recurrent frontal sinusitis  2018 11:58AM   

 

 Cough  2018 11:58AM   

 

 Hypertension  2018  8:10AM   

 

 Hyperlipidemia, unspecified  2018  8:10AM   

 

 Hypothyroidism, Acquired  2018  8:10AM   

 

 Situational anxiety  Mar  6 2018  8:30AM   

 

 Nausea  Mar  6 2018  8:30AM   

 

 LESLIE (generalized anxiety disorder)  May 22 2018  8:47AM   

 

 Caregiver stress syndrome  May 22 2018  8:47AM   

 

 Situational anxiety  May 29 2018  8:26AM   

 

 Osteopenia  May 29 2018  8:26AM   

 

 Visit for screening mammogram  May 29 2018  8:26AM   

 

 Anxiety Disorder  2018  9:09AM   







Payers







 Insurance Name  Company Name  Plan Name  Plan Number  Policy Number  Policy 
Group Number  Start Date

 

    [facility]  [facility]     [id number]     N/A

 

    [employer]  [facility]     [id number]     2009

 

    Medicare Part B  [facility] [facility] [place]     [id number]     N/A

 

    Medicare Part A  Medicare Part A     [id number]     N/A

 

    Medicare Part A  [employer] - Preventive     [id number]     N/A

 

    Medicare Part A  Medicare - Lab/Xray     [id number]     N/A







History of Encounters







 Visit Date  Visit Type  Provider

 

 2018  Office visit  Marvin Boyer DO

 

 2018  Office visit  Marvin Boyer DO

 

 2018  Office visit  Doris Noriega MD

 

 3/6/2018  Office visit  Doris Noriega MD

 

 2018  Office visit  Deedee Carter APRN

 

 2017  Office visit  Doris Noriega MD

 

 10/18/2017  Office visit  Doris Noriega MD

 

 10/10/2017  Office visit  Doris Noriega MD

 

 2017  Office visit  Doris Noriega MD

 

 2017  Office visit  Doris Noriega MD

 

 2017  Office visit  Doris Noriega MD

 

 2017  Office visit  Prince Noe APRN

 

 2016  Nurse visit  Doris Noriega MD

 

 10/13/2016  Office visit  Doris Noriega MD

 

 2016  Nurse visit  Doris Noriega MD

 

 2016  Office visit  Doris Noriega MD

 

 2015  Office visit  Doris Noriega MD

 

 2015  Office visit  Doris Noriega MD

 

 2015  Office visit  Prince Noe APRN

 

 2014  Office visit  Anyi Herrera APRN

 

 10/27/2014  Voided  Doris Noriega MD

 

 10/6/2014  Office visit  Doris Noriega MD

 

 10/5/2014  Office visit  Anyi Herrera APRN

 

 10/2/2014  Office visit   

 

 10/2/2014  Office visit  Corrine Bay APRN

 

 2014  Office visit  Kassie Franklin APRN

 

 2014  Office visit  Doris Noriega MD

 

 2014  Office visit  Doris Noriega MD

 

 2013  Office visit  Dee Colindres MD

 

 10/28/2013  Nurse visit  Dee Colindres MD

 

 6/10/2013  Office visit  Dee Colindres MD

 

 2013  Office visit  Corrine Bay APRN

 

 2012  Office visit  Dee Colindres MD

 

 12/10/2012  Office visit  Dee Colindres MD

 

 10/29/2012  Nurse visit  Dee Colindres MD

 

 8/15/2012  Office visit  Dee Colindres MD

 

 2012  Office visit  Corrine Bay APRN

 

 2012  Office visit  Dee Colindres MD

 

 2/15/2012  Office visit  Dee Colindres MD

 

 2012  Office visit  Corrine Bya APRN

 

 2011  Office visit  Dee Colindres MD

 

 10/28/2011  Nurse visit  Shad Nolasco MD

 

 2011  Office visit  Dee Colindres MD

 

 2011  Office visit  Dee Colindres MD

 

 2011  Office visit  Dee Colindres MD

 

 2011  Office visit  Dee Colindres MD

 

 2011  Office visit  Dee Colindres MD

 

 2011  Office visit  Dee Colindres MD

 

 4/10/2011  [facility]  KHRIS Reeves MD

 

 4/10/2011  [facility]  RICKEY Toussaint MD

 

 2011  Office visit  RICKEY Toussaint MD

 

 2011  [facility]  Fide Castellanos MD

 

 2011  Voided  Fide Castellanos MD

 

 2011  Office visit  Dee Colindres MD

 

 12/15/2010  Nurse visit  Dee Colindres MD

 

 2010  Office visit  Dee Colindres MD

 

 10/20/2010  Nurse visit  Stephenie PERAZA

 

 2010  Nurse visit  Dee Colindres MD

 

 2010  Nurse visit  Dee Colindres MD

 

 2010  Office visit  Dee Colindres MD

 

 3/19/2010  Voided  Stephenie PERAZA

 

 2010  Nurse visit  Stephenie PERAZA

 

 2010  Nurse visit  Stephenie PERAZA

 

 2010  Nurse visit  Stephenie PERAZA

 

 2009  Office visit  Stephenie PERAZA

 

 10/20/2009  Nurse visit  Stephenie PERAZA

## 2018-10-22 NOTE — XMS REPORT
MU2 Ambulatory Summary

 Created on: 2018



Milady Crespo

External Reference #: [id number]

: 1945

Sex: Female



Demographics







 Address  [address]

[place]  [address]

 

 Home Phone  [phone]

 

 Preferred Language  English

 

 Marital Status  

 

 Anabaptism Affiliation  Unknown

 

 Race  White

 

 Ethnic Group  Not  or 





Author







 Author  Marvin Boyer

 

 [facility]

 

 Address  [address]

[place], [place]  [id number]



 

 Phone  [phone]







Care Team Providers







 Care Team Member Name  Role  Phone

 

 Marvin Boyer  PCP  [phone]

 

 Ellis Noriegaole  PreferredProvider  [phone]







Allergies and Adverse Reactions







 Name  Reaction  Notes

 

 NO KNOWN DRUG ALLERGIES      







Plan of Treatment







 Planned Activity  Comments  Planned Date  Planned Time  Plan/Goal

 

 Complete blood count (CBC) with differential count     2016  12:00 AM   

 

 Comprehensive metabolic panel     2016  12:00 AM   

 

 VITAMIN D (25 HYDROXY)     2016  12:00 AM   

 

 Lipid panel (total cholesterol, lipoproteins, HDL, triglycerides)     8/15/
2012  12:00 AM   

 

 Pap smear     2017  12:00 AM   

 

 Comprehensive Metabolic Panel     6/10/2013  12:00 AM   

 

 Lipid panel (total cholesterol, lipoproteins, HDL, triglycerides)     6/10/
2013  12:00 AM   

 

 Thyroid stimulating hormone (TSH)     6/10/2013  12:00 AM   

 

 CBC (automated H&H, platelets, WBC and automated differential)     6/10/2013  
12:00 AM   

 

 Comprehensive Metabolic Panel     2012  12:00 AM   

 

 Lipid panel (total cholesterol, lipoproteins, HDL, triglycerides)     2012  12:00 AM   

 

 Thyroid stimulating hormone (TSH)     2012  12:00 AM   

 

 CBC (automated H&H, platelets, WBC and automated differential)     2012  
12:00 AM   

 

 Mammogram, screening, bilateral     2018  12:00 AM   

 

 DEXA     2018  12:00 AM   

 

 Screening mammography, bilateral     2015  12:00 AM   







Medications







 Active 

 

 Name  Start Date  Estimated Completion Date  SIG  Comments

 

 aspirin 81 mg oral tablet,delayed release (DR/EC)        take 1 tablet (81 mg) 
by oral route once daily   

 

 Vitamin D3 1,000 unit oral capsule        take 1 capsule by oral route daily   

 

 valsartan 160 mg oral tablet  2016     TAKE ONE TABLET BY MOUTH ONCE 
DAILY   

 

 amlodipine 5 mg oral tablet  2016     TAKE ONE TABLET BY MOUTH ONCE DAILY
   

 

 amlodipine 5 mg oral tablet  2016     TAKE ONE TABLET BY MOUTH ONCE DAILY
   

 

 valsartan 160 mg oral tablet  2016     TAKE ONE TABLET BY MOUTH ONCE 
DAILY   

 

 Zofran (as hydrochloride) 4 mg oral tablet  2016     take 1 tablet by 
oral route daily as needed for 14 days   

 

 Zofran (as hydrochloride) 4 mg oral tablet  2017     take 1 tablet by oral 
route daily as needed for 14 days   

 

 Zofran (as hydrochloride) 4 mg oral tablet  2017     take 1 tablet by oral 
route daily as needed for 14 days   

 

 Zofran (as hydrochloride) 4 mg oral tablet  2017     take 1 tablet by 
oral route daily as needed for 14 days   

 

 Zofran (as hydrochloride) 4 mg oral tablet  2017     take 1 tablet by 
oral route daily as needed for 14 days   

 

 EQ LORATADINE 10MG  TABS  2017     TAKE ONE TABLET BY MOUTH ONCE DAILY   

 

 pantoprazole 40 mg oral tablet,delayed release (DR/EC)  10/9/2017     take 1 
tablet (40 mg) by oral route once daily for 90 days   

 

 amlodipine 5 mg oral tablet  2018     TAKE ONE TABLET BY MOUTH ONCE DAILY
   

 

 atenolol 50 mg oral tablet  2018     TAKE ONE TABLET BY MOUTH ONCE DAILY 
  

 

 clorazepate dipotassium 7.5 mg oral tablet  2018  take 1 
tablet PO  three times per day for situational anxiety   

 

 mirtazapine 15 mg oral tablet  2018     TAKE ONE-HALF TABLET BY MOUTH 
ONCE DAILY AT BEDTIME   









  

 

 Name  Start Date  Expiration Date  SIG  Comments

 

 prednisone 20 mg oral tablet  2011  take 2 tablets by oral 
route daily for 5 days   

 

 calcium carbonate-vitamin D2 600-125 mg-unit oral tablet  8/15/2012  2012
  take 2 tablets by oral route daily   

 

 [place] 3 Fish Oil 900-1,400 mg oral capsule,delayed release(DR/EC)  8/15/2012  9
/  take 1 capsule by oral route daily   

 

 multivitamin Oral tablet  8/15/2012  2012  take 1 tablet by oral route 
daily   

 

 triamcinolone acetonide 0.1 % topical cream  2013  10/7/2013  APPLY A 
THIN LAYER TO THE AFFECTED AREA(S) BY TOPICAL ROUTE TWICE A DAY   

 

 famotidine 20 mg oral tablet  2014  take 1 tablet (20 mg) by 
oral route 2 times per day   

 

 amoxicillin 500 mg oral capsule  2014  take 1 capsule (500 mg) 
by oral route 3 times per day for 10 days   

 

 Zithromax Z-Tab 250 mg oral tablet  10/2/2014  10/7/2014  take 2 tablets (500 
mg) by oral route once daily for 1 day then 1 tablet (250 mg) by oral route 
once daily for 4 days   

 

 acyclovir 800 mg oral tablet  10/5/2014  10/12/2014  take 1 tablet (800 mg) by 
oral route 5 times per day for 7 days   

 

 gabapentin 300 mg oral capsule  10/9/2014  2014  take 1 capsule (300 mg) 
by oral route 3 times per day for 14 days   

 

 Carafate 100 mg/mL oral suspension  2014  take 10 milliliters 
(1 gram) by oral route 4 times per day on an empty stomach 1 hour before meals 
and at bedtime for 4 weeks   

 

 amlodipine 5 mg oral tablet  2015  TAKE ONE TABLET BY MOUTH 
EVERY DAY   

 

 levothyroxine 50 mcg oral tablet  2015  TAKE ONE TABLET BY MOUTH 
EVERY DAY   

 

 Diovan 160 mg oral tablet  2015  2/15/2016  TAKE ONE TABLET BY MOUTH 
EVERY DAY for 90 days   

 

 triamcinolone acetonide 0.1 % topical cream  2015  apply a thin 
layer to the affected area(s) by topical route 2 times per day for 14 days   

 

 fluconazole 150 mg oral tablet  2015  take 1 tablet (150 mg) 
by oral route once for 1 day   

 

 famotidine 20 mg oral tablet  2015     TAKE ONE TABLET BY MOUTH TWICE 
DAILY   

 

 Lipitor 20 mg oral tablet  10/13/2016  2018  take 1 tablet (20 mg) by oral 
route once daily   

 

 Plavix 75 mg oral tablet  10/13/2016  2018  take 1 tablet (75 mg) by oral 
route once daily   

 

 Zofran (as hydrochloride) 4 mg oral tablet  10/13/2016  10/27/2016  take 1 
tablet by oral route daily as needed for 14 days   

 

 Zofran (as hydrochloride) 4 mg oral tablet  2017     take 1 tablet by 
oral route daily as needed for 14 days   

 

 levothyroxine 50 mcg oral tablet  10/9/2017  10/9/2017  TAKE ONE TABLET BY 
MOUTH ONCE DAILY   

 

 ondansetron 4 mg oral tablet,disintegrating  10/10/2017  2017  dissolve  
1 tablet by oral route every 8 hours as needed for 30 days   

 

 valsartan 160 mg oral tablet  2017  TAKE ONE TABLET BY MOUTH 
ONCE DAILY   

 

 EQ LORATADINE 10MG  TABS  2018  TAKE ONE TABLET BY MOUTH ONCE 
DAILY IN THE EVENING   

 

 mirtazapine 15 mg oral tablet  2018  TAKE ONE-HALF TABLET BY 
MOUTH ONCE DAILY AT BEDTIME   









 Discontinued 

 

 Name  Start Date  Discontinued Date  SIG  Comments

 

 Lipitor 40 mg oral tablet     2009 TAB DAILY   

 

 ramipril 5 mg oral capsule     2009  take 1 capsule (5 mg) by oral route 
once daily   

 

 lovastatin 20 mg oral tablet  2009  take 1 tablet (20 mg) by 
oral route once daily with evening meal   

 

 propranolol 20 mg oral tablet  2010  take 1 tablet by oral 
route 2 times a day   

 

 Fosamax 70 mg oral tablet  2010  take 1 tablet (70 mg) by oral 
route once weekly in the morning, at least 30 minutes before the first food, 
beverage, or medication of the day   

 

 lisinopril 40 mg oral tablet  2010  4/10/2011  take 2 tablets by oral 
route daily   

 

 Zoloft 50 mg oral tablet  2011  take 1 tablet (50 mg) by oral 
route once daily   

 

 promethazine 25 mg oral tablet  2011  take 1 tablet by oral 
route every 6 hours as needed   

 

 Diovan -12.5 mg oral tablet  2011  take 1 tablet by oral 
route once daily for 30 days   

 

 Diflucan 150 mg oral tablet     2012  take 1 tablet (150 mg) by oral 
route once for 1 day   

 

 Diflucan 150 mg oral tablet  2012  8/15/2012  take 1 tablet (150 mg) by 
oral route once   

 

 Vitamin B-12 1,000 mcg oral tablet  8/15/2012  2014  take 1 tablet by 
oral route daily   

 

 Premarin 0.625 mg/gram vaginal cream  10/29/2012  6/10/2013  use 1 gram daily 
for a week then 1 gram twice a week   

 

 Medrol (Tab) 4 mg oral tablets,dose pack  2013  6/10/2013  take as 
directed   

 

 aspirin 325 mg oral tablet  2014  take 1 tablet (325 mg) by 
oral route once daily   

 

 Medrol (Tab) 4 mg oral tablets,dose pack  2014  10/2/2014  take as 
directed   

 

 Tetracaine Lollipops Lollipop  2015  Use as directed   

 

 Allergy Relief (loratadine) 10 mg oral tablet  2016     TAKE ONE TABLET 
BY MOUTH ONCE DAILY   

 

 Allergy Relief (loratadine) 10 mg oral tablet  2017  TAKE ONE 
TABLET BY MOUTH ONCE DAILY   

 

 Zoloft 50 mg oral tablet  2016  take 1 tablet (50 mg) by oral 
route once daily for 90 days  Pt refuses to take...

 

 triamcinolone acetonide 0.1 % topical cream  2017     APPLY A THIN LAYER 
OF CREAM EXTERNALLY TO AFFECTED AREA TWICE DAILY FOR 14 DAYS   

 

 loratadine 10 mg oral tablet  2017  TAKE 1 TABLET BY MOUTH 
EVERY DAY IN THE EVENING   

 

 triamcinolone acetonide 0.1 % topical cream  2017  APPLY  
CREAM EXTERNALLY TO AFFECTED AREA TWICE DAILY FOR 14 DAYS   

 

 Lexapro 10 mg oral tablet  2017  take 1 tablet (10 mg) by oral 
route once daily for 90 days   

 

 Augmentin 875-125 mg oral tablet  2018  3/6/2018  take 1 tablet by oral 
route every 12 hours for 7 days   

 

 benzonatate 100 mg oral capsule  2018  3/6/2018  take 1 capsule (100 mg) 
by oral route 3 times per day as needed for cough   







Problem List







 Description  Status  Onset

 

 Hyperlipidemia  Active   

 

 acid reflux  Active   

 

 Hypertension  Active   

 

 hypothyroid  Active   







Vital Signs







 Date  Time  BP-Sys(mm[Hg]  BP-Morena(mm[Hg])  HR(bpm)  RR(rpm)  Temp  WT  HT  HC  
BMI  BSA  BMI Percentile  O2 Sat(%)

 

 2018  8:24:00 AM  142 mmHg  70 mmHg  69 bpm  18 rpm  98.2 F  139 lbs  61 
in     26.2636 kg/m  1.6473 m     98 %

 

 2018  8:39:00 AM  116 mmHg  68 mmHg  68 bpm  18 rpm  97.9 F  142 lbs  61 
in     26.83 kg/m2  1.66 m2      

 

 3/6/2018  8:26:00 AM  126 mmHg  76 mmHg  63 bpm  16 rpm  98 F  134.125 lbs  61 
in     25.3424 kg/m  1.6182 m     100 %

 

 2018  11:55:00 AM  126 mmHg  80 mmHg  80 bpm  18 rpm  98 F  132 lbs  61 
in     24.94 kg/m2  1.61 m2     100 %

 

 2017  8:26:00 AM  122 mmHg  76 mmHg  63 bpm  16 rpm  98.4 F  129.25 lbs  
61 in     24.4213 kg/m  1.5885 m     99 %

 

 10/18/2017  9:01:00 AM  126 mmHg  80 mmHg  73 bpm  16 rpm  97.9 F  122.375 lbs
  61 in     23.12 kg/m2  1.55 m2     100 %

 

 10/10/2017  1:52:00 PM  118 mmHg  72 mmHg  74 bpm  16 rpm  98.1 F  121 lbs  61 
in     22.8625 kg/m  1.5369 m     99 %

 

 2017  8:40:00 AM  118 mmHg  68 mmHg  63 bpm  16 rpm  98.1 F  123.125 lbs  
61 in     23.26 kg/m2  1.55 m2     100 %

 

 2017  8:57:00 AM  116 mmHg  62 mmHg  71 bpm  16 rpm  99.8 F  121.5 lbs  
61 in     22.957 kg/m  1.5401 m     98 %

 

 2017  11:30:00 AM  128 mmHg  78 mmHg  69 bpm  16 rpm  98.8 F  129.375 lbs  
61 in     24.44 kg/m2  1.59 m2     98 %

 

 2017  8:22:00 AM  118 mmHg  78 mmHg  62 bpm  18 rpm  97.5 F  129.125 lbs  
61 in     24.3977 kg/m  1.5877 m     100 %

 

 10/13/2016  2:26:00 PM  128 mmHg  78 mmHg  77 bpm  16 rpm  98.4 F  139.25 lbs  
61 in     26.31 kg/m2  1.65 m2     100 %

 

 2016  8:29:00 AM  122 mmHg  70 mmHg  72 bpm  16 rpm  97.8 F  137.125 lbs  
61 in     25.9093 kg/m  1.6361 m     100 %

 

 2015  9:33:00 AM  120 mmHg  68 mmHg  73 bpm     98.7 F  144.375 lbs  61 
in     27.28 kg/m2  1.68 m2     99 %

 

 2015  9:05:00 AM  110 mmHg  75 mmHg  85 bpm  16 rpm  96.7 F  144.375 lbs  
61 in     27.2791 kg/m  1.6788 m     99 %

 

 2015  1:05:00 PM  108 mmHg  62 mmHg  78 bpm  18 rpm  96.9 F  142.375 lbs  
61 in     26.90 kg/m2  1.67 m2     100 %

 

 2014  9:31:00 AM  126 mmHg  66 mmHg  79 bpm  18 rpm  98.7 F  149 lbs  61 
in     28.153 kg/m  1.7055 m     98 %

 

 10/6/2014  9:02:00 AM  110 mmHg  74 mmHg  94 bpm  18 rpm  98.1 F  145.4 lbs  
61 in     27.47 kg/m2  1.68 m2     97 %

 

 10/2/2014  9:14:00 AM  124 mmHg  74 mmHg  79 bpm  18 rpm  98 F  147.25 lbs  61 
in     27.8224 kg/m  1.6955 m     98 %

 

 2014  9:22:00 AM  124 mmHg  76 mmHg  89 bpm  18 rpm  98.1 F  148 lbs  61 
in     27.96 kg/m2  1.70 m2     100 %

 

 2014  8:27:00 AM  118 mmHg  65 mmHg  74 bpm  16 rpm  97.7 F  148 lbs  61 
in     27.9641 kg/m  1.6998 m     99 %

 

 2014  9:48:00 AM  125 mmHg  70 mmHg  93 bpm  18 rpm  96.7 F  156 lbs  61 
in     29.48 kg/m2  1.75 m2     100 %

 

 2013  8:35:00 AM  122 mmHg  74 mmHg  84 bpm  16 rpm  97.9 F  155.375 lbs 
                99 %

 

 6/10/2013  8:30:00 AM  130 mmHg  82 mmHg  79 bpm  16 rpm  97.9 F  161 lbs     
            98 %

 

 2013  8:50:00 AM  124 mmHg  68 mmHg  66 bpm  18 rpm  97.6 F  157.5 lbs  
61 in     29.7591 kg/m  1.7535 m      

 

 2012  1:46:00 PM  120 mmHg  72 mmHg  75 bpm  16 rpm  97.6 F  156.125 lbs
                 98 %

 

 12/10/2012  8:32:00 AM  122 mmHg  82 mmHg  70 bpm  16 rpm  97.3 F  157 lbs    
             99 %

 

 8/15/2012  9:00:00 AM  130 mmHg  76 mmHg  86 bpm  16 rpm  97.4 F  159 lbs     
            100 %

 

 2012  11:02:00 AM  128 mmHg  64 mmHg  66 bpm  18 rpm  97.4 F  162.125 lbs
  61 in     30.6329 kg/m  1.7791 m      

 

 2012  8:45:00 AM  130 mmHg  70 mmHg  82 bpm  16 rpm  98.2 F  160.125 lbs 
                100 %

 

 2/15/2012  9:29:00 AM  122 mmHg  80 mmHg  86 bpm  16 rpm  97.4 F  159.375 lbs  
61 in     30.1133 kg/m  1.7639 m     99 %

 

 2012  9:41:00 AM  132 mmHg  74 mmHg  66 bpm  18 rpm  98.4 F  160.375 lbs  
61 in     30.30 kg/m2  1.77 m2      

 

 2011  10:08:00 AM  134 mmHg  82 mmHg  80 bpm  16 rpm  98 F  160 lbs  61 
in     30.2314 kg/m  1.7674 m     99 %

 

 2011  3:56:00 PM  130 mmHg  80 mmHg                              

 

 2011  8:55:00 AM  130 mmHg  74 mmHg  88 bpm  16 rpm  98.2 F  158.25 lbs  
               98 %

 

 2011  8:54:00 AM  136 mmHg  82 mmHg  102 bpm  16 rpm  98.1 F  153.5 lbs   
              99 %

 

 2011  8:49:00 AM  122 mmHg  88 mmHg  88 bpm  16 rpm  98.6 F  152.25 lbs  
               99 %

 

 2011  10:02:00 AM  140 mmHg  82 mmHg  96 bpm  20 rpm  98.8 F             
       100 %

 

 2011  9:14:00 AM  156 mmHg  98 mmHg  110 bpm  24 rpm  98.5 F  153 lbs    
             100 %

 

 2011  8:50:00 AM  160 mmHg  84 mmHg  100 bpm  16 rpm  98.7 F  155 lbs    
             100 %

 

 2011  1:25:00 PM  152 mmHg  100 mmHg  82 bpm  16 rpm  97.2 F  156.375 lbs 
                100 %

 

 2011  9:55:00 AM  144 mmHg  90 mmHg                              

 

 2010  9:24:00 AM  138 mmHg  84 mmHg                              

 

 2010  8:58:00 AM  160 mmHg  94 mmHg                              

 

 2010  8:33:00 AM  142 mmHg  90 mmHg  100 bpm  16 rpm  98.1 F  158.375 
lbs                  

 

 2010  9:24:00 AM  160 mmHg  102 mmHg  88 bpm  18 rpm  99 F  157.125 lbs  
62 in     28.7382 kg/m  1.7657 m      

 

 3/19/2010  11:01:00 AM  140 mmHg  90 mmHg                              

 

 2009  10:08:00 AM  142 mmHg  86 mmHg  72 bpm  16 rpm  98.1 F  154.125 
lbs  61 in     29.1214 kg/m  1.7346 m      







Social History







 Name  Description  Comments

 

       

 

 Children      

 

 lives alone      

 

 Supervisor      

 

 Tobacco  Never smoker   







History of Procedures







 Date Ordered  Description  Order Status

 

 2011 12:00 AM  COMPREHEN METABOLIC PANEL  Reviewed

 

 2011 12:00 AM  ASSAY THYROID STIM HORMONE  Reviewed

 

 2011 12:00 AM  COMPREHEN METABOLIC PANEL  Reviewed

 

 2011 12:00 AM  LIPID PANEL  Reviewed

 

 2011 12:00 AM  ASSAY THYROID STIM HORMONE  Reviewed

 

 2011 12:00 AM  COMPLETE CBC W/AUTO DIFF WBC  Reviewed

 

 2015 12:00 AM  COMPLETE CBC W/AUTO DIFF WBC  Reviewed

 

 2015 12:00 AM  COMPREHEN METABOLIC PANEL  Reviewed

 

 2015 12:00 AM  LIPID PANEL  Reviewed

 

 2015 12:00 AM  ASSAY THYROID STIM HORMONE  Reviewed

 

 2011 12:00 AM  COMPREHEN METABOLIC PANEL  Reviewed

 

 2011 12:00 AM  COMPREHEN METABOLIC PANEL  Reviewed

 

 2011 12:00 AM  LIPID PANEL  Reviewed

 

 2011 12:00 AM  ASSAY THYROID STIM HORMONE  Reviewed

 

 10/28/2011 12:00 AM  ***Immunization administration, Medicare flu  Reviewed

 

 10/28/2011 12:00 AM  Fluzone ** MEDICARE Only **  Reviewed

 

 2010 11:24 AM  NO CHARGE OV  Reviewed

 

 2010 11:26 AM  NO CHARGE OV  Reviewed

 

 2011 12:00 AM  COMPREHEN METABOLIC PANEL  Reviewed

 

 2011 12:00 AM  LIPID PANEL  Reviewed

 

 2011 12:00 AM  ASSAY THYROID STIM HORMONE  Reviewed

 

 2011 12:00 AM  COMPLETE CBC W/AUTO DIFF WBC  Reviewed

 

 2011 12:00 AM  Wrist Support  Reviewed

 

 2016 12:00 AM  Screening mammography, bilateral  Reviewed

 

 2016 12:00 AM  DXA BONE DENSITY AXIAL  Returned

 

 2016 12:00 AM  TETANUS VACCINE IM  Reviewed

 

 2016 12:00 AM  HEP B VACC ADULT 3 DOSE IM  Reviewed

 

 2012 12:00 AM  TISSUE EXAM FOR FUNGI  Reviewed

 

 2012 12:00 AM  SMEAR WET MOUNT SALINE/INK  Reviewed

 

 2016 12:00 AM  TETANUS VACCINE IM  Reviewed

 

 2016 12:00 AM  HEP B VACC ADULT 3 DOSE IM  Reviewed

 

 2/15/2012 12:00 AM  THER/PROPH/DIAG INJ SC/IM  Reviewed

 

 2/15/2012 12:00 AM  Bicillin CR [facility]#[id number]  Reviewed

 

 2/15/2012 12:00 AM  Depo-Medrol 40 mg NDC#5704665580  Reviewed

 

 10/13/2016 12:00 AM  COMPLETE CBC W/AUTO DIFF WBC  Returned

 

 10/13/2016 12:00 AM  COMPREHEN METABOLIC PANEL  Returned

 

 10/13/2016 12:00 AM  ASSAY THYROID STIM HORMONE  Returned

 

 10/13/2016 12:00 AM  LIPID PANEL  Returned

 

 2016 12:00 AM  TETANUS VACCINE IM  Reviewed

 

 2016 12:00 AM  HEP B VACC ADULT 3 DOSE IM  Reviewed

 

 2017 12:00 AM  ASSAY OF IRON  Returned

 

 2017 12:00 AM  IRON BINDING TEST  Returned

 

 2017 12:00 AM  ASSAY OF TRANSFERRIN  Returned

 

 2017 12:00 AM  OCCULT BLD FECES 1-3 TESTS  Returned

 

 2017 12:00 AM  COMPLETE CBC AUTOMATED  Returned

 

 8/15/2012 12:00 AM  COMPREHEN METABOLIC PANEL  Reviewed

 

 8/15/2012 12:00 AM  ASSAY THYROID STIM HORMONE  Reviewed

 

 8/15/2012 12:00 AM  COMPLETE CBC W/AUTO DIFF WBC  Reviewed

 

 8/15/2012 12:00 AM  Wrist Support  Reviewed

 

 2017 12:00 AM  METABOLIC PANEL TOTAL CA  Returned

 

 2017 12:00 AM  METABOLIC PANEL TOTAL CA  Returned

 

 2017 12:00 AM  Screening mammography, bilateral  Returned

 

 10/29/2012 12:00 AM  Flu Injection 3 Years And Above NDC# [address]-34  [facility]  
Reviewed

 

 12/10/2012 12:00 AM  IMMUNIZATION ADMIN  Reviewed

 

 12/10/2012 12:00 AM  Pneumovax Injection - [facility]  Reviewed

 

 2018 12:00 AM  THER/PROPH/DIAG INJ SC/IM  Reviewed

 

 2018 12:00 AM  Decadron 4mg Injection  Reviewed

 

 2018 12:00 AM  Depo-Medrol 40mg Injection  Reviewed

 

 2012 12:00 AM  TRICHOMONAS ASSAY W/OPTIC  Reviewed

 

 2018 12:00 AM  COMPLETE CBC W/AUTO DIFF WBC  Returned

 

 2018 12:00 AM  COMPREHEN METABOLIC PANEL  Returned

 

 2018 12:00 AM  LIPID PANEL  Returned

 

 2018 12:00 AM  ASSAY THYROID STIM HORMONE  Returned

 

 2013 12:00 AM  THER/PROPH/DIAG INJ SC/IM  Reviewed

 

 2013 12:00 AM  Bicillin CR, 1.2 million units NDC# 35009-077-87  Reviewed

 

 2013 12:00 AM  COMPREHEN METABOLIC PANEL  Reviewed

 

 2013 12:00 AM  LIPID PANEL  Reviewed

 

 2013 12:00 AM  COMPLETE CBC W/AUTO DIFF WBC  Reviewed

 

 2013 12:00 AM  ASSAY THYROID STIM HORMONE  Reviewed

 

 6/10/2013 12:00 AM  MAMMOGRAM SCREENING  Reviewed

 

 6/10/2013 12:00 AM  CYTOPATH C/V MANUAL  Reviewed

 

 12/10/2013 12:00 AM  LIPID PANEL  Reviewed

 

 12/10/2013 12:00 AM  ASSAY THYROID STIM HORMONE  Reviewed

 

 12/10/2013 12:00 AM  COMPLETE CBC W/AUTO DIFF WBC  Reviewed

 

 12/10/2013 12:00 AM  COMPREHEN METABOLIC PANEL  Reviewed

 

 2010 12:00 AM  CYTOPATH C/V MANUAL  Reviewed

 

 2010 12:00 AM  SMEAR WET MOUNT SALINE/INK  Reviewed

 

 2010 12:00 AM  LIPID PANEL  Reviewed

 

 2010 12:00 AM  ASSAY THYROID STIM HORMONE  Reviewed

 

 2010 12:00 AM  COMPLETE CBC W/AUTO DIFF WBC  Reviewed

 

 2010 12:00 AM  COMPREHEN METABOLIC PANEL  Reviewed

 

 10/28/2013 12:00 AM  Flu Injection 3 Years And Above NDC# [address]-01  [facility]  
Reviewed

 

 2010 8:48 AM  Blood Pressure Check-no charge  Reviewed

 

 2010 12:00 AM  Blood Pressure Check-no charge  Reviewed

 

 2014 12:00 AM  METABOLIC PANEL TOTAL CA  Reviewed

 

 2014 12:00 AM  ASSAY THYROID STIM HORMONE  Reviewed

 

 2014 12:00 AM  ASSAY OF FREE THYROXINE  Reviewed

 

 2014 12:00 AM  MAMMOGRAM SCREENING  Reviewed

 

 2014 12:00 AM  CT BONE DENSITY AXIAL  Reviewed

 

 2014 12:00 AM  COMPLETE CBC W/AUTO DIFF WBC  Reviewed

 

 2014 12:00 AM  COMPREHEN METABOLIC PANEL  Reviewed

 

 2014 12:00 AM  LIPID PANEL  Reviewed

 

 2014 12:00 AM  ASSAY THYROID STIM HORMONE  Reviewed

 

 10/20/2010 12:00 AM  IMMUNIZATION ADMIN  Reviewed

 

 10/20/2010 12:00 AM  FLU VACCINE 3 YRS & > IM  Reviewed

 

 2010 12:00 AM  COMPREHEN METABOLIC PANEL  Reviewed

 

 2010 12:00 AM  LIPID PANEL  Reviewed

 

 2010 12:00 AM  ASSAY THYROID STIM HORMONE  Reviewed

 

 2010 12:00 AM  COMPLETE CBC W/AUTO DIFF WBC  Reviewed

 

 2010 12:00 AM  Blood Pressure Check-no charge  Reviewed

 

 10/2/2014 12:00 AM  THER/PROPH/DIAG INJ SC/IM  Reviewed

 

 10/2/2014 12:00 AM  Kenalog, Per 10 Mg NDC#8463-4245-61  Reviewed

 

 2014 12:00 AM  COMPLETE CBC W/AUTO DIFF WBC  Reviewed

 

 2014 12:00 AM  COMPREHEN METABOLIC PANEL  Reviewed

 

 2014 12:00 AM  LIPID PANEL  Reviewed

 

 2014 12:00 AM  ASSAY THYROID STIM HORMONE  Reviewed

 

 2015 12:00 AM  Rocephin 1 gram NDC#1112-8783-87  Reviewed

 

 2015 12:00 AM  THER/PROPH/DIAG INJ SC/IM  Reviewed

 

 2011 12:00 AM  COMPREHEN METABOLIC PANEL  Reviewed

 

 2011 12:00 AM  LIPID PANEL  Reviewed

 

 2011 12:00 AM  ASSAY THYROID STIM HORMONE  Reviewed

 

 2011 12:00 AM  COMPLETE CBC W/AUTO DIFF WBC  Reviewed

 

 2011 12:00 AM  METABOLIC PANEL TOTAL CA  Reviewed

 

 2011 12:00 AM  COMPREHEN METABOLIC PANEL  Reviewed

 

 2011 12:00 AM  ASSAY THYROID STIM HORMONE  Reviewed

 

 2011 12:00 AM  COMPLETE CBC W/AUTO DIFF WBC  Reviewed

 

 2011 12:00 AM  ASSAY OF LIPASE  Reviewed

 

 2011 12:00 AM  THER/PROPH/DIAG INJ SC/IM  Reviewed

 

 2011 12:00 AM  Phenergan 50 Mg Im  Bernadine  Reviewed

 

 2011 12:00 AM  METABOLIC PANEL TOTAL CA  Reviewed

 

 2011 12:00 AM  THER/PROPH/DIAG INJ SC/IM  Reviewed

 

 2011 12:00 AM  Phenergan 25 Mg Im  Bernadine  Reviewed

 

 2011 12:00 AM  METABOLIC PANEL TOTAL CA  Reviewed

 

 2011 12:00 AM  ASSAY THYROID STIM HORMONE  Reviewed

 

 2011 12:00 AM  THER/PROPH/DIAG INJ SC/IM  Reviewed

 

 2011 12:00 AM  Phenergan 50 Mg Im  Bernadine  Reviewed

 

 2011 12:00 AM  ASSAY OF SERUM SODIUM  Reviewed

 

 2011 12:00 AM  ASSAY OF SERUM SODIUM  Reviewed

 

 2011 12:00 AM  CYTOPATH C/V MANUAL  Reviewed

 

 2011 12:00 AM  ASSAY THYROID STIM HORMONE  Reviewed

 

 2015 12:00 AM  COMPLETE CBC W/AUTO DIFF WBC  Reviewed

 

 2015 12:00 AM  COMPREHEN METABOLIC PANEL  Reviewed

 

 2015 12:00 AM  LIPID PANEL  Reviewed

 

 2015 12:00 AM  ASSAY THYROID STIM HORMONE  Reviewed

 

 2015 12:00 AM  MAMMOGRAM SCREENING  Reviewed

 

 2015 12:00 AM  CYTOPATH TBS C/V MANUAL  Reviewed







Results Summary







 Date and Description  Results

 

 2010 9:25 AM  Colonoscopy-Women and Men over 50 Abnormal Mammogram -Women 
over 40 Declined Pap Smear Declined 

 

 2010 10:41 AM  WET PREP NO TRICHOMONADS SEEN  No  Few 

 

 2010 8:15 AM  TRIGLYCERIDES 174.0 mg/dLCHOLESTEROL 175.0 mg/dLHDL 58.0 mg/
dLTOT CHOL/HDL 3.0 LDL (CALC) 82.0 mg/dLTSH 0.790 uIU/mLGLUCOSE 95.0 mg/
dLSODIUM 136.0 mmol/LPOTASSIUM 4.50 mmol/LCHLORIDE 99.0 mmol/LCO2 26.0 mmol/
LBUN 12.0 mg/dLCREATININE 0.80 mg/dLSGOT/AST 32.0 IU/LSGPT/ALT 33.0 IU/LALK 
PHOS 103.0 IU/LTOTAL PROTEIN 7.60 g/dLALBUMIN 4.60 g/dLTOTAL BILI 0.60 mg/
dLCALCIUM 9.80 mg/dLAGE 64 GFR NonAA 72 GFR AA 87 eGFR >60 mL/min/1.73 m2eGFR AA
* >60 WBC 5.3 RBC 4.13 HGB 13.10 g/dLHCT 39.20 %MCV 95.0 fLMCH 31.70 pgMCHC 
33.40 g/dLRDW SD 44 RDW CV 12.70 %MPV 9.80 fLPLT 257 NRBC# 0.00 NRBC% 0.0 %NEUT 
57.90 %%LYMP 26.50 %%MONO 11.60 %%EOS 3.20 %%BASO 0.80 %#NEUT 3.04 #LYMP 1.39 #
MONO 0.61 #EOS 0.17 #BASO 0.04 MANUAL DIFF NOT IND 

 

 2011 9:45 AM  GLUCOSE 91.0 mg/dLSODIUM 133.0 mmol/LPOTASSIUM 4.40 mmol/
LCHLORIDE 97.0 mmol/LCO2 24.0 mmol/LBUN 9.0 mg/dLCREATININE 0.70 mg/dLCALCIUM 
10.0 mg/dLAGE 65 GFR NonAA 84 GFR  eGFR >60 mL/min/1.73 m2eGFR AA* >60 

 

 2011 10:25 AM  LIPASE 29.0 U/LTSH 0.10 uIU/mLGLUCOSE 115.0 mg/dLSODIUM 
127.0 mmol/LPOTASSIUM 5.30 mmol/LCHLORIDE 93.0 mmol/LCO2 18.0 mmol/LBUN 9.0 mg/
dLCREATININE 0.70 mg/dLSGOT/AST 62.0 IU/LSGPT/ALT 46.0 IU/LALK PHOS 100.0 IU/
LTOTAL PROTEIN 8.60 g/dLALBUMIN 4.90 g/dLTOTAL BILI 0.60 mg/dLCALCIUM 9.90 mg/
dLAGE 65 GFR NonAA 84 GFR  eGFR >60 mL/min/1.73 m2eGFR AA* >60 WBC 6.9 
RBC 4.48 HGB 14.40 g/dLHCT 40.90 %MCV 91.0 fLMCH 32.10 pgMCHC 35.20 g/dLRDW SD 
41 RDW CV 12.50 %MPV 9.30 fLPLT 260 NRBC# 0.00 NRBC% 0.0 %NEUT 74.90 %%LYMP 
15.10 %%MONO 9.40 %%EOS 0.30 %%BASO 0.30 %#NEUT 5.15 #LYMP 1.04 #MONO 0.65 #EOS 
0.02 #BASO 0.02 MANUAL DIFF NOT IND 

 

 2011 9:07 AM  GLUCOSE 92.0 mg/dLSODIUM 131.0 mmol/LPOTASSIUM 4.20 mmol/
LCHLORIDE 99.0 mmol/LCO2 22.0 mmol/LBUN 9.0 mg/dLCREATININE 0.70 mg/dLCALCIUM 
9.20 mg/dLAGE 65 GFR NonAA 84 GFR  eGFR >60 mL/min/1.73 m2eGFR AA* >60 

 

 2011 11:06 AM  TSH 0.510 uIU/mLGLUCOSE 99.0 mg/dLSODIUM 132.0 mmol/
LPOTASSIUM 3.50 mmol/LCHLORIDE 95.0 mmol/LCO2 23.0 mmol/LBUN 9.0 mg/
dLCREATININE 0.80 mg/dLCALCIUM 10.40 mg/dLAGE 65 GFR NonAA 72 GFR AA 87 eGFR >
60 mL/min/1.73 m2eGFR AA* >60 

 

 2011 9:45 AM  SODIUM 132.0 mmol/L

 

 2011 9:27 AM  SODIUM 137.0 mmol/L

 

 2011 9:55 AM  TSH 1.070 uIU/mL

 

 2011 8:55 AM  GLUCOSE 102.0 mg/dLSODIUM 135.0 mmol/LPOTASSIUM 4.20 mmol/
LCHLORIDE 102.0 mmol/LCO2 24.0 mmol/LBUN 15.0 mg/dLCREATININE 0.80 mg/dLSGOT/
AST 30.0 IU/LSGPT/ALT 35.0 IU/LALK PHOS 119.0 IU/LTOTAL PROTEIN 7.50 g/
dLALBUMIN 4.30 g/dLTOTAL BILI 0.30 mg/dLCALCIUM 9.20 mg/dLAGE 65 GFR NonAA 72 
GFR AA 87 eGFR >60 mL/min/1.73 m2eGFR AA* >60 TSH 1.130 uIU/mL

 

 2011 8:55 AM  GLUCOSE 98.0 mg/dLSODIUM 137.0 mmol/LPOTASSIUM 3.90 mmol/
LCHLORIDE 103.0 mmol/LCO2 25.0 mmol/LBUN 14.0 mg/dLCREATININE 0.70 mg/dLSGOT/
AST 33.0 IU/LSGPT/ALT 36.0 IU/LALK PHOS 111.0 IU/LTOTAL PROTEIN 8.30 g/
dLALBUMIN 4.40 g/dLTOTAL BILI 0.50 mg/dLCALCIUM 9.40 mg/dLAGE 66 GFR NonAA 84 
GFR  eGFR >60 mL/min/1.73 m2eGFR AA* >60 TRIGLYCERIDES 109.0 mg/
dLCHOLESTEROL 153.0 mg/dLHDL 54.0 mg/dLTOT CHOL/HDL 2.8 LDL (CALC) 77.0 mg/
dLTSH 1.330 uIU/mL

 

 2011 10:17 AM  Colonoscopy-Women and Men over 50 Declined Mammogram -
Women over 40 Normal Pap Smear Negative 

 

 2012 10:33 AM  WET PREP NO TRICH SEEN CLUE CELLS NONE SEEN 

 

 2012 8:45 AM  WBC 5.2 RBC 3.96 HGB 12.70 g/dLHCT 37.80 %MCV 96.0 fLMCH 
32.10 pgMCHC 33.60 g/dLRDW SD 46 RDW CV 13.30 %MPV 9.70 fLPLT 297 NRBC# 0.00 
NRBC% 0.0 %NEUT 57.70 %%LYMP 28.50 %%MONO 10.30 %%EOS 2.50 %%BASO 1.0 %#NEUT 
3.02 #LYMP 1.49 #MONO 0.54 #EOS 0.13 #BASO 0.05 MANUAL DIFF NOT IND GLUCOSE 
97.0 mg/dLSODIUM 137.0 mmol/LPOTASSIUM 4.40 mmol/LCHLORIDE 101.0 mmol/LCO2 23.0 
mmol/LBUN 17.0 mg/dLCREATININE 0.80 mg/dLSGOT/AST 30.0 IU/LSGPT/ALT 33.0 IU/
LALK PHOS 95.0 IU/LTOTAL PROTEIN 7.40 g/dLALBUMIN 4.40 g/dLTOTAL BILI 0.60 mg/
dLCALCIUM 9.70 mg/dLAGE 66 GFR NonAA 72 GFR AA 87 eGFR 60 eGFR AA* 60 
TRIGLYCERIDES 130.0 mg/dLCHOLESTEROL 157.0 mg/dLHDL 56.0 mg/dLTOT CHOL/HDL 2.8 
LDL (CALC) 75.0 mg/dLTSH 0.940 uIU/mL

 

 2012 8:45 AM  WBC 5.1 RBC 3.91 HGB 12.50 g/dLHCT 36.30 %MCV 93.0 fLMCH 
32.0 pgMCHC 34.40 g/dLRDW SD 43 RDW CV 12.60 %MPV 9.30 fLPLT 244 NRBC# 0.00 NRBC
% 0.0 %NEUT 57.60 %%LYMP 27.50 %%MONO 9.10 %%EOS 5.0 %%BASO 0.80 %#NEUT 2.91 #
LYMP 1.39 #MONO 0.46 #EOS 0.25 #BASO 0.04 MANUAL DIFF NOT IND 

 

 12/10/2012 8:34 AM  Colonoscopy-Women and Men over 50 Declined Mammogram -
Women over 40 Declined Pap Smear Declined 

 

 2012 5:02 PM  WET PREP NO TRICH SEEN CLUE CELLS NONE SEEN 

 

 2013 8:25 AM  WBC 4.2 RBC 3.96 HGB 12.90 g/dLHCT 37.0 %MCV 93.0 fLMCH 
32.60 pgMCHC 34.90 g/dLRDW SD 43 RDW CV 12.60 %MPV 9.70 fLPLT 254 NRBC# 0.00 
NRBC% 0.0 %NEUT 54.40 %%LYMP 30.40 %%MONO 10.80 %%EOS 3.40 %%BASO 1.0 %#NEUT 
2.26 #LYMP 1.26 #MONO 0.45 #EOS 0.14 #BASO 0.04 MANUAL DIFF NOT IND TSH 1.230 
uIU/mLGLUCOSE 94.0 mg/dLSODIUM 136.0 mmol/LPOTASSIUM 4.30 mmol/LCHLORIDE 99.0 
mmol/LCO2 25.0 mmol/LBUN 10.0 mg/dLCREATININE 0.80 mg/dLSGOT/AST 36.0 IU/LSGPT/
ALT 36.0 IU/LALK PHOS 84.0 IU/LTOTAL PROTEIN 7.90 g/dLALBUMIN 4.40 g/dLTOTAL 
BILI 0.70 mg/dLCALCIUM 9.80 mg/dLAGE 67 GFR NonAA 72 GFR AA 87 eGFR 60 eGFR AA* 
60 TRIGLYCERIDES 122.0 mg/dLCHOLESTEROL 141.0 mg/dLHDL 47.0 mg/dLTOT CHOL/HDL 
3.0 LDL (CALC) 70.0 mg/dL

 

 2013 8:45 AM  WBC 5.3 RBC 4.01 HGB 13.0 g/dLHCT 37.60 %MCV 94.0 fLMCH 
32.40 pgMCHC 34.60 g/dLRDW SD 43 RDW CV 12.50 %MPV 9.40 fLPLT 248 NRBC# 0.00 
NRBC% 0.0 %NEUT 58.70 %%LYMP 27.80 %%MONO 9.80 %%EOS 2.80 %%BASO 0.90 %#NEUT 
3.12 #LYMP 1.48 #MONO 0.52 #EOS 0.15 #BASO 0.05 MANUAL DIFF NOT IND TSH 1.570 
uIU/mLGLUCOSE 94.0 mg/dLSODIUM 134.0 mmol/LPOTASSIUM 4.10 mmol/LCHLORIDE 101.0 
mmol/LCO2 23.0 mmol/LBUN 11.0 mg/dLCREATININE 0.80 mg/dLSGOT/AST 41.0 IU/LSGPT/
ALT 44.0 IU/LALK PHOS 109.0 IU/LTOTAL PROTEIN 7.90 g/dLALBUMIN 4.70 g/dLTOTAL 
BILI 0.80 mg/dLCALCIUM 10.40 mg/dLAGE 68 GFR NonAA 72 GFR AA 87 eGFR >60 mL/min/
1.73 m2eGFR AA* >60 TRIGLYCERIDES 163.0 mg/dLCHOLESTEROL 138.0 mg/dLHDL 49.0 mg/
dLTOT CHOL/HDL 2.8 LDL (CALC) 56.0 mg/dL

 

 2014 8:58 AM  GLUCOSE 86.0 mg/dLSODIUM 134.0 mmol/LPOTASSIUM 4.20 mmol/
LCHLORIDE 99.0 mmol/LCO2 25.0 mmol/LBUN 14.0 mg/dLCREATININE 0.80 mg/dLCALCIUM 
10.10 mg/dLAGE 68 GFR NonAA 72 GFR AA 87 eGFR >60 mL/min/1.73 m2eGFR AA* >60 
FREE T4 1.18 TSH 1.20 uIU/mL

 

 2014 9:50 AM  WBC 5.7 RBC 4.03 HGB 12.90 g/dLHCT 37.90 %MCV 94.0 fLMCH 
32.0 pgMCHC 34.0 g/dLRDW SD 44 RDW CV 12.70 %MPV 9.70 fLPLT 292 NRBC# 0.00 NRBC
% 0.0 %NEUT 62.70 %%LYMP 21.80 %%MONO 11.0 %%EOS 3.40 %%BASO 1.10 %#NEUT 3.55 #
LYMP 1.23 #MONO 0.62 #EOS 0.19 #BASO 0.06 MANUAL DIFF NOT IND GLUCOSE 98.0 mg/
dLSODIUM 135.0 mmol/LPOTASSIUM 4.30 mmol/LCHLORIDE 102.0 mmol/LCO2 22.0 mmol/
LBUN 10.0 mg/dLCREATININE 0.80 mg/dLSGOT/AST 34.0 IU/LSGPT/ALT 32.0 IU/LALK 
PHOS 95.0 IU/LTOTAL PROTEIN 7.70 g/dLALBUMIN 4.30 g/dLTOTAL BILI 0.80 mg/
dLCALCIUM 9.10 mg/dLAGE 68 GFR NonAA 72 GFR AA 87 eGFR 60 eGFR AA* 60 
TRIGLYCERIDES 108.0 mg/dLCHOLESTEROL 146.0 mg/dLHDL 56.0 mg/dLTOT CHOL/HDL 2.6 
LDL (CALC) 68.0 mg/dLTSH 0.90 uIU/mL

 

 2014 8:40 AM  WBC 4.4 RBC 4.13 HGB 13.20 g/dLHCT 38.90 %MCV 94.0 fLMCH 
32.0 pgMCHC 33.90 g/dLRDW SD 47 RDW CV 13.50 %MPV 9.80 fLPLT 279 NRBC# 0.00 NRBC
% 0.0 %NEUT 63.80 %%LYMP 24.60 %%MONO 9.30 %%EOS 1.60 %%BASO 0.70 %#NEUT 2.80 #
LYMP 1.08 #MONO 0.41 #EOS 0.07 #BASO 0.03 MANUAL DIFF NOT IND GLUCOSE 96.0 mg/
dLSODIUM 136.0 mmol/LPOTASSIUM 4.10 mmol/LCHLORIDE 99.0 mmol/LCO2 23.0 mmol/
LBUN 8.0 mg/dLCREATININE 0.80 mg/dLSGOT/AST 31.0 IU/LSGPT/ALT 27.0 IU/LALK PHOS 
85.0 IU/LTOTAL PROTEIN 7.60 g/dLALBUMIN 4.40 g/dLTOTAL BILI 0.80 mg/dLCALCIUM 
10.20 mg/dLAGE 69 GFR NonAA 71 GFR AA 86 eGFR 60 eGFR AA* 60 TRIGLYCERIDES 61.0 
mg/dLCHOLESTEROL 148.0 mg/dLHDL 71.0 mg/dLTOT CHOL/HDL 2.1 LDL (CALC) 65.0 mg/
dLTSH 0.990 uIU/mL

 

 2015 8:32 AM  WBC 4.8 RBC 3.98 HGB 12.70 g/dLHCT 37.30 %MCV 94.0 fLMCH 
31.90 pgMCHC 34.0 g/dLRDW SD 43 RDW CV 12.70 %MPV 9.50 fLPLT 270 NRBC# 0.00 NRBC
% 0.0 %NEUT 57.30 %%LYMP 25.0 %%MONO 13.0 %%EOS 3.60 %%BASO 1.10 %#NEUT 2.73 #
LYMP 1.19 #MONO 0.62 #EOS 0.17 #BASO 0.05 MANUAL DIFF NOT IND TSH 0.640 uIU/
mLGLUCOSE 90.0 mg/dLSODIUM 136.0 mmol/LPOTASSIUM 4.0 mmol/LCHLORIDE 101.0 mmol/
LCO2 24.0 mmol/LBUN 10.0 mg/dLCREATININE 0.80 mg/dLSGOT/AST 34.0 IU/LSGPT/ALT 
32.0 IU/LALK PHOS 92.0 IU/LTOTAL PROTEIN 7.50 g/dLALBUMIN 4.40 g/dLTOTAL BILI 
0.70 mg/dLCALCIUM 9.50 mg/dLAGE 69 GFR NonAA 71 GFR AA 86 eGFR >60 mL/min/1.73 
m2eGFR AA* >60 TRIGLYCERIDES 107.0 mg/dLCHOLESTEROL 138.0 mg/dLHDL 58.0 mg/
dLTOT CHOL/HDL 2.4 LDL (CALC) 59.0 mg/dL

 

 2015 8:31 AM  WBC 4.6 RBC 3.97 HGB 12.90 g/dLHCT 38.0 %MCV 96.0 fLMCH 
32.50 pgMCHC 33.90 g/dLRDW SD 44 RDW CV 12.60 %MPV 9.0 fLPLT 248 NRBC# 0.00 NRBC
% 0.0 %NEUT 61.0 %%LYMP 24.70 %%MONO 10.20 %%EOS 3.0 %%BASO 1.10 %#NEUT 2.82 #
LYMP 1.14 #MONO 0.47 #EOS 0.14 #BASO 0.05 MANUAL DIFF NOT IND TRIGLYCERIDES 
105.0 mg/dLCHOLESTEROL 141.0 mg/dLHDL 58.0 mg/dLTOT CHOL/HDL 2.4 LDL (CALC) 
62.0 mg/dLGLUCOSE 93.0 mg/dLSODIUM 136.0 mmol/LPOTASSIUM 4.10 mmol/LCHLORIDE 
101.0 mmol/LCO2 25.0 mmol/LBUN 9.0 mg/dLCREATININE 0.80 mg/dLSGOT/AST 32.0 IU/
LSGPT/ALT 28.0 IU/LALK PHOS 95.0 IU/LTOTAL PROTEIN 7.30 g/dLALBUMIN 4.50 g/
dLTOTAL BILI 0.80 mg/dLCALCIUM 9.70 mg/dLAGE 70 GFR NonAA 71 GFR AA 86 eGFR >60 
mL/min/1.73meGFR AA* >60 TSH 1.10 uIU/mL







History Of Immunizations







 Name  Date Admin  Mfg Name  Mfg Code  Trade Name  Lot#  Route  Inj  Vis Given  
Vis Pub  CVX

 

 Influenza  10/20/2010  [employer] pasteur  [employer]  FLUZONE  [id number]  Intramuscular  
Left Arm  10/20/2010  2010  999

 

 Influenza  10/28/2011  [employer] pasteur  [employer]  FLUZONE  [id number]  Intramuscular  
Left Arm  10/28/2011  2011  141

 

 Influenza  10/29/2012  [employer] pasteur  [employer]  FLUZONE  [id number]  Intramuscular  
Left Deltoid  10/29/2012  2012  141

 

 X  12/10/2012  [employer]  MSD  PNEUMOVAX 23  [id number]  Intramuscular  
Left Gluteous Medius  12/10/2012  2010  33

 

 Influenza  10/28/2013  [employer]of[employer] pasteur  [employer]  Fluzone > 3 Years  [id number]  
Intramuscular  Right Deltoid  10/28/2013  2013  141

 

 X  9/2/2015  Not Entered  NE  PREVNAR 13     Not Entered  Not Entered  1  109

 

 Influenza  2015  Not Entered  NE  Not Entered     Not Entered  Not Entered
  2015  141

 

 HepB Adult  2016  [employer]  MSD  RECOMBIVAX-ADULT  [id number]  Not 
Entered  Left Deltoid  2016  43

 

 HepB Adult  2016  [employer]  MSD  RECOMBIVAX-ADULT  [id number]  
Intramuscular  Right Deltoid  2016  43

 

 ZVL  2012  Not Entered  NE  Not Entered     Not Entered  Not Entered  1  121

 

 Tdap  2012  Not Entered  NE  Not Entered     Not Entered  Not Entered  1  115

 

 Influenza  10/13/2016  Not Entered  NE  FLUZONE-HIGH DOSE     Intramuscular  
Left Arm  1  141

 

 HepB Adult  2016  [employer]  MSD  RECOMBIVAX-ADULT  [id number]  
Intramuscular  Right Deltoid  2016  43

 

 Influenza  10/30/2017  Not Entered  NE  Fluarix, quadrivalent, preservative 
free     Intramuscular  Left Arm  2017  150







History of Past Illness







 Name  Date of Onset  Comments

 

 Benign Essential Hypertension  Dec 14 2009 10:10AM   

 

 Hyperlipidemia  Dec 14 2009 10:10AM   

 

 Hypothyroidism, Acquired  Dec 14 2009 10:10AM   

 

 hypothyroid      

 

 Hypertension      

 

 Hyperlipidemia      

 

 acid reflux      

 

 Hypertension, Benign Essential  2010  9:41AM   

 

 Hypertension, Benign Essential  2010 12:53PM   

 

 Routine gynecological examination  May  5 2010  9:28AM   

 

 Hypertension  May  5 2010  9:28AM   

 

 Hyperlipidemia, unspecified  May  5 2010  9:28AM   

 

 Hypothyroidism, Acquired  May  5 2010  9:28AM   

 

 Vulvovaginitis  May  5 2010  9:28AM   

 

 Rhinitis, Allergic  May  5 2010  9:28AM   

 

 Hypertension, Benign Essential  May 19 2010  8:48AM   

 

 Hypertension, Benign Essential  May 25 2010  9:39AM   

 

 Flu  Oct 20 2010 12:01PM   

 

 Essential Hypertension  2010  8:34AM   

 

 Hyperlipidemia  2010  8:34AM   

 

 Gastroesophageal Reflux  2010  8:34AM   

 

 Hypothyroidism, Acquired  2010  8:34AM   

 

 Hypertension, Benign Essential  2010  9:22AM   

 

 Hypertension, Benign Essential  Dec 15 2010  9:07AM   

 

 Essential Hypertension  2011  1:31PM   

 

 Hyperlipidemia  2011  1:31PM   

 

 Gastroesophageal Reflux  2011  1:31PM   

 

 Hypothyroidism, Acquired  2011  1:31PM   

 

 Essential Hypertension  2011  8:51AM   

 

 Hyperlipidemia  2011  8:51AM   

 

 Gastroesophageal Reflux  2011  8:51AM   

 

 Hypothyroidism, Acquired  2011  8:51AM   

 

 Essential Hypertension  2011  9:13AM   

 

 Hyperlipidemia  2011  9:13AM   

 

 Gastroesophageal Reflux  2011  9:13AM   

 

 Hypothyroidism, Acquired  2011  9:13AM   

 

 Nausea With Vomiting  2011  1:18PM   

 

 Hyponatremia  2011  8:46AM   

 

 Nausea  2011  9:13AM   

 

 Hypothyroidism  2011 11:10AM   

 

 Hyponatremia  2011 11:10AM   

 

 Essential Hypertension  2011 10:05AM   

 

 Hyperlipidemia  2011 10:05AM   

 

 Gastroesophageal Reflux  2011 10:05AM   

 

 Nausea  2011 10:05AM   

 

 Hypothyroidism, Acquired  2011 10:05AM   

 

 Hyponatremia  May  6 2011 11:34AM   

 

 Essential Hypertension  May 16 2011  8:50AM   

 

 Hyperlipidemia  May 16 2011  8:50AM   

 

 Gastroesophageal Reflux  May 16 2011  8:50AM   

 

 Nausea  May 16 2011  8:50AM   

 

 Hypothyroidism, Acquired  May 16 2011  8:50AM   

 

 Hyponatremia  May 16 2011  8:50AM   

 

 Routine gynecological examination  2011  8:54AM   

 

 Hypertension  2011  8:54AM   

 

 Hyperlipidemia, unspecified  2011  8:54AM   

 

 Hypothyroidism, Acquired  2011  8:54AM   

 

 Rhinitis, Allergic  2011  8:54AM   

 

 Hypothyroidism  Aug 29 2011 12:37PM   

 

 Hyponatremia  Aug 29 2011 12:37PM   

 

 Essential Hypertension  Sep 12 2011  8:53AM   

 

 Hyperlipidemia  Sep 12 2011  8:53AM   

 

 Gastroesophageal Reflux  Sep 12 2011  8:53AM   

 

 Hypothyroidism, Acquired  Sep 12 2011  8:53AM   

 

 Hyponatremia  Sep 12 2011  8:53AM   

 

 Hypertension  Sep 29 2011  9:41AM   

 

 Hyperlipidemia  Dec  8 2011  8:31AM   

 

 Hypothyroidism  Dec  8 2011  8:31AM   

 

 Hypertension  Dec  8 2011  8:31AM   

 

 Flu  Dec  9 2011 10:02AM   

 

 Essential Hypertension  Dec 13 2011 10:13AM   

 

 Hyperlipidemia  Dec 13 2011 10:13AM   

 

 Gastroesophageal Reflux  Dec 13 2011 10:13AM   

 

 Hypothyroidism, Acquired  Dec 13 2011 10:13AM   

 

 Hyponatremia  Dec 13 2011 10:13AM   

 

 Vaginal Discharge  2012  9:43AM   

 

 Rhinitis, Allergic  Feb 15 2012  9:31AM   

 

 Eustachian Tube Dysfunction  Feb 15 2012  9:31AM   

 

 Pharyngitis, Acute  Feb 15 2012  9:31AM   

 

 Routine gynecological examination  2012  8:48AM   

 

 Hypertension  2012  8:48AM   

 

 Hyperlipidemia, unspecified  2012  8:48AM   

 

 Hypothyroidism, Acquired  2012  8:48AM   

 

 Rhinitis, Allergic  2012  8:48AM   

 

 Candidiasis, Vulvovaginal  2012 11:04AM   

 

 Essential Hypertension  Aug 15 2012  9:02AM   

 

 Hyperlipidemia  Aug 15 2012  9:02AM   

 

 Gastroesophageal Reflux  Aug 15 2012  9:02AM   

 

 Hypothyroidism, Acquired  Aug 15 2012  9:02AM   

 

 Hyponatremia  Aug 15 2012  9:02AM   

 

 Atrophic Vaginitis, Postmenopausal  Aug 15 2012  9:02AM   

 

 Flu  Oct 29 2012  9:24AM   

 

 Essential Hypertension  Dec 10 2012  8:35AM   

 

 Hyperlipidemia  Dec 10 2012  8:35AM   

 

 Gastroesophageal Reflux  Dec 10 2012  8:35AM   

 

 Hypothyroidism, Acquired  Dec 10 2012  8:35AM   

 

 Hyponatremia  Dec 10 2012  8:35AM   

 

 Atrophic Vaginitis, Postmenopausal  Dec 10 2012  8:35AM   

 

 Pneumococcus  Dec 10 2012  2:07PM   

 

 Vaginal Discharge  Dec 20 2012  1:50PM   

 

 Essential Hypertension  Dec 20 2012  1:50PM   

 

 Hyperlipidemia  Dec 20 2012  1:50PM   

 

 Gastroesophageal Reflux  Dec 20 2012  1:50PM   

 

 Hypothyroidism, Acquired  Dec 20 2012  1:50PM   

 

 Atrophic Vaginitis, Postmenopausal  Dec 20 2012  1:50PM   

 

 Upper Respiratory Infections  2013  8:52AM   

 

 Hyperlipidemia  2013  8:21AM   

 

 Hypertension  2013  8:21AM   

 

 Hypothyroidism  2013  8:21AM   

 

 Screening Mammogram  Beto 10 2013  8:45AM   

 

 Routine gynecological examination  Beto 10 2013  8:34AM   

 

 Hypertension  Beto 10 2013  8:34AM   

 

 Hyperlipidemia, unspecified  Beto 10 2013  8:34AM   

 

 Hypothyroidism, Acquired  Beto 10 2013  8:34AM   

 

 Rhinitis, Allergic  Beto 10 2013  8:34AM   

 

 Flu  Oct 28 2013  8:52AM   

 

 Essential Hypertension  Dec  9 2013  8:37AM   

 

 Hyperlipidemia  Dec  9 2013  8:37AM   

 

 Gastroesophageal Reflux  Dec  9 2013  8:37AM   

 

 Hypothyroidism, Acquired  Dec  9 2013  8:37AM   

 

 Atrophic Vaginitis, Postmenopausal  Dec  9 2013  8:37AM   

 

 Hypertension  2014  9:56AM   

 

 Hyperlipidemia  2014  9:56AM   

 

 Hypothyroidism  2014  9:56AM   

 

 Screening Mammogram  2014  8:32AM   

 

 Osteopenia  2014  8:32AM   

 

 Hypertension  2014  8:35AM   

 

 Hypothyroidism, Acquired  2014  8:35AM   

 

 Hyperlipidemia  2014  8:35AM   

 

 Upper Respiratory Infections  2014  9:28AM   

 

 Sinusitis, Acute  Oct  2 2014  9:17AM   

 

 Herpes Zoster  Oct  5 2014  1:44PM   

 

 Vesicular Eruption  Oct  5 2014  1:44PM   

 

 Hypertension  2014  8:18AM   

 

 Hyperlipidemia  2014  8:18AM   

 

 hypothyroid  2014  8:18AM   

 

 Situational anxiety  Dec 20 2014  9:33AM   

 

 GERD (gastroesophageal reflux disease)  Dec 20 2014  9:33AM   

 

 Nausea  Dec 20 2014  9:33AM   

 

 Abdominal Pain, Epigastric  Dec 20 2014  9:33AM   

 

 Hyperlipidemia  Oct  6 2014  9:03AM   

 

 acid reflux  Oct  6 2014  9:03AM   

 

 hypothyroid  Oct  6 2014  9:03AM   

 

 Shingles  Oct  6 2014  9:03AM   

 

 Pharyngitis  2015  1:09PM   

 

 Bronchitis  2015  9:10AM   

 

 Hyperlipidemia  2015  9:10AM   

 

 acid reflux  2015  9:10AM   

 

 hypothyroid  2015  9:10AM   

 

 Hyperlipidemia  2015  8:18AM   

 

 Hypertension  2015  8:18AM   

 

 hypothyroid  2015  8:18AM   

 

 Screening Mammogram  2015 11:43AM   

 

 Medicare Annual Pap Q 2 years  2015  9:36AM   

 

 Screening Mammogram  2015  9:36AM   

 

 Candidiasis of female genitalia  2015  9:36AM   

 

 Hypertension  2015 11:34AM   

 

 Hyperlipidemia, unspecified  2015 11:34AM   

 

 Hypothyroidism, Acquired  2015 11:34AM   

 

 Osteopenia  2016  8:41AM   

 

 Encounter for screening mammogram for breast cancer  2016  8:41AM   

 

 Hypertension  2016  8:34AM   

 

 Lymphedema  2016  8:34AM   

 

 Hyperlipidemia  2016  8:34AM   

 

 hypothyroid  2016  8:34AM   

 

 HEP B  2016  9:13AM   

 

 HEP B  2016  8:52AM   

 

 Acid Reflux  2016  8:34AM   

 

 History of acute gastritis  Oct 13 2016  2:30PM   

 

 Anxiety as acute reaction to exceptional stress  Oct 13 2016  2:30PM   

 

 HEP B  Dec 29 2016  8:42AM   

 

 Caregiver stress syndrome  May 26 2017  8:28AM   

 

 Anxiety  May 26 2017  8:28AM   

 

 Blood in stool  2017  2:54PM   

 

 Upper GI bleed  2017 11:34AM   

 

 Hyponatremia  2017  9:03AM   

 

 Hyponatremia  2017  8:43AM   

 

 Medicare Annual Pap Q 2 years  2017  9:03AM   

 

 Nausea  2017  9:03AM   

 

 Uterine enlargement  2017  9:03AM   

 

 Encounter for screening mammogram for breast cancer  2017  4:56PM   

 

 Panic disorder [episodic paroxysmal anxiety]  Oct 10 2017  2:02PM   

 

 Acute stress reaction  Oct 10 2017  2:02PM   

 

 Caregiver stress syndrome  Oct 10 2017  2:02PM   

 

 Stress reaction causing mixed disturbance of emotion and conduct  Oct 18 2017  
9:05AM   

 

 Ankle strain, left, initial encounter  Dec 19 2017  8:29AM   

 

 Excoriation of ear canal, left, initial encounter  Dec 19 2017  8:29AM   

 

 Generalized anxiety disorder  Dec 19 2017  8:29AM   

 

 Grief reaction  Dec 19 2017  8:29AM   

 

 Acute non-recurrent frontal sinusitis  2018 11:58AM   

 

 Cough  2018 11:58AM   

 

 Hypertension  2018  8:10AM   

 

 Hyperlipidemia, unspecified  2018  8:10AM   

 

 Hypothyroidism, Acquired  2018  8:10AM   

 

 Situational anxiety  Mar  6 2018  8:30AM   

 

 Nausea  Mar  6 2018  8:30AM   

 

 LESLIE (generalized anxiety disorder)  May 22 2018  8:47AM   

 

 Caregiver stress syndrome  May 22 2018  8:47AM   

 

 Situational anxiety  May 29 2018  8:26AM   

 

 Osteopenia  May 29 2018  8:26AM   

 

 Visit for screening mammogram  May 29 2018  8:26AM   







Payers







 Insurance Name  Company Name  Plan Name  Plan Number  Policy Number  Policy 
Group Number  Start Date

 

    Medicare [employer]  [employer]     [id number]     N/A

 

    [employer]  [employer] [facility]     [id number]     2009

 

    Medicare Part B  [place]     [id number]     N/A

 

    Medicare Part A  Medicare Part A     [id number]     N/A

 

    Medicare Part A  [employer] - Preventive     [id number]     N/A

 

    Medicare Part A  Medicare - Lab/Xray     [id number]     N/A







History of Encounters







 Visit Date  Visit Type  Provider

 

 2018  Office visit  Marvin Boyer DO

 

 2018  Office visit  Doris Noriega MD

 

 3/6/2018  Office visit  Doris Noriega MD

 

 2018  Office visit  Deedee Carter APRN

 

 2017  Office visit  Doris Noriega MD

 

 10/18/2017  Office visit  Doris Noriega MD

 

 10/10/2017  Office visit  Doris Noriega MD

 

 2017  Office visit  Doris Noriega MD

 

 2017  Office visit  Doris Noriega MD

 

 2017  Office visit  Doris Noriega MD

 

 2017  Office visit  Prince Noe APRN

 

 2016  Nurse visit  Doris Noriega MD

 

 10/13/2016  Office visit  Doris Noreiga MD

 

 2016  Nurse visit  Doris Noriega MD

 

 2016  Office visit  Doris Noriega MD

 

 2015  Office visit  Doris Noriega MD

 

 2015  Office visit  Doris Noriega MD

 

 2015  Office visit  Prince Noe APRN

 

 2014  Office visit  Anyi Herrera APRN

 

 10/27/2014  Voided  Doris Noriega MD

 

 10/6/2014  Office visit  Doris Noriega MD

 

 10/5/2014  Office visit  Anyi Herrera APRN

 

 10/2/2014  Office visit   

 

 10/2/2014  Office visit  Corrine Bay APRN

 

 2014  Office visit  Ksasie Franklin APRN

 

 2014  Office visit  Doris Noriega MD

 

 2014  Office visit  Doris Noriega MD

 

 2013  Office visit  Dee Colindres MD

 

 10/28/2013  Nurse visit  Dee Colindres MD

 

 6/10/2013  Office visit  Dee Colindres MD

 

 2013  Office visit  Corrine Bay APRN

 

 2012  Office visit  Dee Colindres MD

 

 12/10/2012  Office visit  Dee Colindres MD

 

 10/29/2012  Nurse visit  Dee Colindres MD

 

 8/15/2012  Office visit  Dee Colindres MD

 

 2012  Office visit  Corrine Bay APRN

 

 2012  Office visit  Dee Colindres MD

 

 2/15/2012  Office visit  Dee Colindres MD

 

 2012  Office visit  Corrine Bay APRN

 

 2011  Office visit  Dee Colindres MD

 

 10/28/2011  Nurse visit  Shad Nolasco MD

 

 2011  Office visit  Dee Colindres MD

 

 2011  Office visit  Dee Colindres MD

 

 2011  Office visit  Dee Colindres MD

 

 2011  Office visit  Dee Colindres MD

 

 2011  Office visit  Dee Colindres MD

 

 2011  Office visit  Dee Colindres MD

 

 4/10/2011  [facility]  KHRIS Reeves MD

 

 4/10/2011  [facility]  RICKEY Toussaint MD

 

 2011  Office visit  RICKEY Toussaint MD

 

 2011  [facility]  Fide Castellanos MD

 

 2011  Voided  Fide Castellanos MD

 

 2011  Office visit  Dee Colindres MD

 

 12/15/2010  Nurse visit  Dee Colindres MD

 

 2010  Office visit  Dee Colindres MD

 

 10/20/2010  Nurse visit  Stephenie PERAZA

 

 2010  Nurse visit  Dee Colindres MD

 

 2010  Nurse visit  Dee Colindres MD

 

 2010  Office visit  Dee Colindres MD

 

 3/19/2010  Voided  Stephenie PERAZA

 

 2010  Nurse visit  Stephenie PERAZA

 

 2010  Nurse visit  Stephenie PERAZA

 

 2010  Nurse visit  Stephenie PERAZA

 

 2009  Office visit  Stephenie PERAZA

 

 10/20/2009  Nurse visit  Stephenie PERAZA

## 2018-10-22 NOTE — XMS REPORT
MU2 Ambulatory Summary

 Created on: 10/05/2018



Milady Crespo

External Reference #: [id number]

: 1945

Sex: Female



Demographics







 Address  [address]

[place]  [address]

 

 Home Phone  [phone]

 

 Preferred Language  English

 

 Marital Status  

 

 Mandaen Affiliation  Unknown

 

 Race  White

 

 Ethnic Group  Not  or 





Author







 Author  Marvin Boyer

 

 [facility]

 

 Address  [address]

[place], [place]  [id number]



 

 Phone  [phone]







Care Team Providers







 Care Team Member Name  Role  Phone

 

 Marvin Boyer  PCP  [phone]

 

 Marvin Boyer  PreferredProvider  [phone]







Allergies and Adverse Reactions







 Name  Reaction  Notes

 

 NO KNOWN DRUG ALLERGIES      







Plan of Treatment







 Planned Activity  Comments  Planned Date  Planned Time  Plan/Goal

 

 Complete blood count (CBC) with differential count     2016  12:00 AM   

 

 Comprehensive metabolic panel     2016  12:00 AM   

 

 VITAMIN D (25 HYDROXY)     2016  12:00 AM   

 

 Lipid panel (total cholesterol, lipoproteins, HDL, triglycerides)     8/15/
2012  12:00 AM   

 

 Pap smear     2017  12:00 AM   

 

 Comprehensive Metabolic Panel     6/10/2013  12:00 AM   

 

 Lipid panel (total cholesterol, lipoproteins, HDL, triglycerides)     6/10/
2013  12:00 AM   

 

 Thyroid stimulating hormone (TSH)     6/10/2013  12:00 AM   

 

 CBC (automated H&H, platelets, WBC and automated differential)     6/10/2013  
12:00 AM   

 

 Comprehensive Metabolic Panel     2012  12:00 AM   

 

 Lipid panel (total cholesterol, lipoproteins, HDL, triglycerides)     2012  12:00 AM   

 

 Thyroid stimulating hormone (TSH)     2012  12:00 AM   

 

 CBC (automated H&H, platelets, WBC and automated differential)     2012  
12:00 AM   

 

 Screening mammography, bilateral     2015  12:00 AM   







Medications







 Active 

 

 Name  Start Date  Estimated Completion Date  SIG  Comments

 

 aspirin 81 mg oral tablet,delayed release (DR/EC)        take 1 tablet (81 mg) 
by oral route once daily   

 

 Vitamin D3 1,000 unit oral capsule        take 1 capsule by oral route daily   

 

 valsartan 160 mg oral tablet  2016     TAKE ONE TABLET BY MOUTH ONCE 
DAILY   

 

 amlodipine 5 mg oral tablet  2016     TAKE ONE TABLET BY MOUTH ONCE DAILY
   

 

 amlodipine 5 mg oral tablet  2016     TAKE ONE TABLET BY MOUTH ONCE DAILY
   

 

 valsartan 160 mg oral tablet  2016     TAKE ONE TABLET BY MOUTH ONCE 
DAILY   

 

 Zofran (as hydrochloride) 4 mg oral tablet  2016     take 1 tablet by 
oral route daily as needed for 14 days   

 

 Zofran (as hydrochloride) 4 mg oral tablet  2017     take 1 tablet by oral 
route daily as needed for 14 days   

 

 Zofran (as hydrochloride) 4 mg oral tablet  2017     take 1 tablet by oral 
route daily as needed for 14 days   

 

 Zofran (as hydrochloride) 4 mg oral tablet  2017     take 1 tablet by 
oral route daily as needed for 14 days   

 

 Zofran (as hydrochloride) 4 mg oral tablet  2017     take 1 tablet by 
oral route daily as needed for 14 days   

 

 EQ LORATADINE 10MG  TABS  2017     TAKE ONE TABLET BY MOUTH ONCE DAILY   

 

 amlodipine 5 mg oral tablet  2018     TAKE ONE TABLET BY MOUTH ONCE DAILY
   

 

 atenolol 50 mg oral tablet  2018     TAKE ONE TABLET BY MOUTH ONCE DAILY 
  

 

 mirtazapine 15 mg oral tablet  2018     TAKE ONE-HALF TABLET BY MOUTH 
ONCE DAILY AT BEDTIME   

 

 losartan 100 mg oral tablet  8/3/2018  2019  take 1 tablet (100 mg) by 
oral route once daily for 90 days   

 

 Plavix 75 mg Oral tablet  2018  take 1 tablet (75 mg) by oral 
route once daily   

 

 Lexapro 10 mg oral tablet  2018  take 1 tablet (10 mg) by oral 
route once daily for 90 days   

 

 levothyroxine 50 mcg oral tablet  2018     TAKE ONE TABLET BY MOUTH ONCE 
DAILY   

 

 Vitamins B Complex oral tablet        take 1 tablet by oral route daily   

 

 pantoprazole 40 mg oral tablet,delayed release (DR/EC)  10/3/2018  2019  
take 1 tablet (40 mg) by oral route 2 times per day for 90 days   

 

 dicyclomine 20 mg oral tablet  10/1/2018  2018  take 1 tablet (20 mg) by 
oral route 3 times per day for 30 days   

 

 prochlorperazine maleate 10 mg oral tablet  10/4/2018     take 1 tablet by 
oral route every 8 hours as needed   









  

 

 Name  Start Date  Expiration Date  SIG  Comments

 

 prednisone 20 mg oral tablet  2011  take 2 tablets by oral 
route daily for 5 days   

 

 calcium carbonate-vitamin D2 600-125 mg-unit oral tablet  8/15/2012  2012
  take 2 tablets by oral route daily   

 

 [place] 3 Fish Oil 900-1,400 mg oral capsule,delayed release(DR/EC)  8/15/2012  9
/  take 1 capsule by oral route daily   

 

 multivitamin Oral tablet  8/15/2012  2012  take 1 tablet by oral route 
daily   

 

 triamcinolone acetonide 0.1 % topical cream  2013  10/7/2013  APPLY A 
THIN LAYER TO THE AFFECTED AREA(S) BY TOPICAL ROUTE TWICE A DAY   

 

 famotidine 20 mg oral tablet  2014  take 1 tablet (20 mg) by 
oral route 2 times per day   

 

 amoxicillin 500 mg oral capsule  2014  take 1 capsule (500 mg) 
by oral route 3 times per day for 10 days   

 

 Zithromax Z-Tab 250 mg oral tablet  10/2/2014  10/7/2014  take 2 tablets (500 
mg) by oral route once daily for 1 day then 1 tablet (250 mg) by oral route 
once daily for 4 days   

 

 acyclovir 800 mg oral tablet  10/5/2014  10/12/2014  take 1 tablet (800 mg) by 
oral route 5 times per day for 7 days   

 

 gabapentin 300 mg oral capsule  10/9/2014  2014  take 1 capsule (300 mg) 
by oral route 3 times per day for 14 days   

 

 Carafate 100 mg/mL oral suspension  2014  take 10 milliliters 
(1 gram) by oral route 4 times per day on an empty stomach 1 hour before meals 
and at bedtime for 4 weeks   

 

 amlodipine 5 mg oral tablet  2015  TAKE ONE TABLET BY MOUTH 
EVERY DAY   

 

 levothyroxine 50 mcg oral tablet  2015  TAKE ONE TABLET BY MOUTH 
EVERY DAY   

 

 Diovan 160 mg oral tablet  2015  2/15/2016  TAKE ONE TABLET BY MOUTH 
EVERY DAY for 90 days   

 

 triamcinolone acetonide 0.1 % topical cream  2015  apply a thin 
layer to the affected area(s) by topical route 2 times per day for 14 days   

 

 fluconazole 150 mg oral tablet  2015  take 1 tablet (150 mg) 
by oral route once for 1 day   

 

 famotidine 20 mg oral tablet  2015     TAKE ONE TABLET BY MOUTH TWICE 
DAILY   

 

 Lipitor 20 mg oral tablet  10/13/2016  2018  take 1 tablet (20 mg) by oral 
route once daily   

 

 Zofran (as hydrochloride) 4 mg oral tablet  10/13/2016  10/27/2016  take 1 
tablet by oral route daily as needed for 14 days   

 

 Zofran (as hydrochloride) 4 mg oral tablet  2017     take 1 tablet by 
oral route daily as needed for 14 days   

 

 ondansetron 4 mg oral tablet,disintegrating  10/10/2017  2017  dissolve  
1 tablet by oral route every 8 hours as needed for 30 days   

 

 EQ LORATADINE 10MG  TABS  2018  TAKE ONE TABLET BY MOUTH ONCE 
DAILY IN THE EVENING   

 

 mirtazapine 15 mg oral tablet  2018  TAKE ONE-HALF TABLET BY 
MOUTH ONCE DAILY AT BEDTIME   









 Discontinued 

 

 Name  Start Date  Discontinued Date  SIG  Comments

 

 Lipitor 40 mg oral tablet     2009  1/2 TAB DAILY   

 

 ramipril 5 mg oral capsule     2009  take 1 capsule (5 mg) by oral route 
once daily   

 

 lovastatin 20 mg oral tablet  2009  take 1 tablet (20 mg) by 
oral route once daily with evening meal   

 

 propranolol 20 mg oral tablet  2010  take 1 tablet by oral 
route 2 times a day   

 

 Fosamax 70 mg oral tablet  2010  take 1 tablet (70 mg) by oral 
route once weekly in the morning, at least 30 minutes before the first food, 
beverage, or medication of the day   

 

 lisinopril 40 mg oral tablet  2010  4/10/2011  take 2 tablets by oral 
route daily   

 

 Zoloft 50 mg oral tablet  2011  take 1 tablet (50 mg) by oral 
route once daily   

 

 promethazine 25 mg oral tablet  2011  take 1 tablet by oral 
route every 6 hours as needed   

 

 Diovan -12.5 mg oral tablet  2011  take 1 tablet by oral 
route once daily for 30 days   

 

 Diflucan 150 mg oral tablet     2012  take 1 tablet (150 mg) by oral 
route once for 1 day   

 

 Diflucan 150 mg oral tablet  2012  8/15/2012  take 1 tablet (150 mg) by 
oral route once   

 

 Vitamin B-12 1,000 mcg oral tablet  8/15/2012  2014  take 1 tablet by 
oral route daily   

 

 Premarin 0.625 mg/gram vaginal cream  10/29/2012  6/10/2013  use 1 gram daily 
for a week then 1 gram twice a week   

 

 Medrol (Tab) 4 mg oral tablets,dose pack  2013  6/10/2013  take as 
directed   

 

 aspirin 325 mg oral tablet  2014  take 1 tablet (325 mg) by 
oral route once daily   

 

 Medrol (Tab) 4 mg oral tablets,dose pack  2014  10/2/2014  take as 
directed   

 

 Tetracaine Lollipops Lollipop  2015  Use as directed   

 

 Allergy Relief (loratadine) 10 mg oral tablet  2016     TAKE ONE TABLET 
BY MOUTH ONCE DAILY   

 

 Allergy Relief (loratadine) 10 mg oral tablet  2017  TAKE ONE 
TABLET BY MOUTH ONCE DAILY   

 

 Zoloft 50 mg oral tablet  2016  take 1 tablet (50 mg) by oral 
route once daily for 90 days  Pt refuses to take...

 

 triamcinolone acetonide 0.1 % topical cream  2017     APPLY A THIN LAYER 
OF CREAM EXTERNALLY TO AFFECTED AREA TWICE DAILY FOR 14 DAYS   

 

 loratadine 10 mg oral tablet  2017  TAKE 1 TABLET BY MOUTH 
EVERY DAY IN THE EVENING   

 

 triamcinolone acetonide 0.1 % topical cream  2017  APPLY  
CREAM EXTERNALLY TO AFFECTED AREA TWICE DAILY FOR 14 DAYS   

 

 Lexapro 10 mg oral tablet  2017  take 1 tablet (10 mg) by oral 
route once daily for 90 days   

 

 valsartan 160 mg oral tablet  2017  8/3/2018  TAKE ONE TABLET BY MOUTH 
ONCE DAILY   recall

 

 Augmentin 875-125 mg oral tablet  2018  3/6/2018  take 1 tablet by oral 
route every 12 hours for 7 days   

 

 benzonatate 100 mg oral capsule  2018  3/6/2018  take 1 capsule (100 mg) 
by oral route 3 times per day as needed for cough   

 

 ondansetron 4 mg oral tablet,disintegrating  7/3/2018  10/4/2018  dissolve  1 
tablet by oral route every 8 hours as needed   

 

 clorazepate dipotassium 7.5 mg oral tablet  2018  take 1 
tablet PO  three times per day for situational anxiety  No longer taking

 

 metoclopramide HCl 10 mg oral tablet  2018  10/1/2018  take 1 tablet (10 
mg) by oral route once daily with supper   







Problem List







 Description  Status  Onset

 

 Hyperlipidemia  Active   

 

 acid reflux  Active   

 

 Hypertension  Active   

 

 hypothyroid  Active   







Vital Signs







 Date  Time  BP-Sys(mm[Hg]  BP-Morena(mm[Hg])  HR(bpm)  RR(rpm)  Temp  WT  HT  HC  
BMI  BSA  BMI Percentile  O2 Sat(%)

 

 10/1/2018  9:02:00 AM  132 mmHg  62 mmHg  66 bpm  20 rpm  98.6 F  139 lbs  61 
in     26.2636 kg/m  1.6473 m     98 %

 

 2018  8:49:00 AM  134 mmHg  70 mmHg  64 bpm  16 rpm  98.6 F  146 lbs  61 
in     27.59 kg/m2  1.69 m2     98 %

 

 2018  9:04:00 AM  138 mmHg  80 mmHg  64 bpm  18 rpm  96 F  140.125 lbs  
61 in     26.4761 kg/m  1.654 m     99 %

 

 2018  8:24:00 AM  142 mmHg  70 mmHg  69 bpm  18 rpm  98.2 F  139 lbs  61 
in     26.26 kg/m2  1.65 m2     98 %

 

 2018  8:39:00 AM  116 mmHg  68 mmHg  68 bpm  18 rpm  97.9 F  142 lbs  61 
in     26.8304 kg/m  1.665 m      

 

 3/6/2018  8:26:00 AM  126 mmHg  76 mmHg  63 bpm  16 rpm  98 F  134.125 lbs  61 
in     25.34 kg/m2  1.62 m2     100 %

 

 2018  11:55:00 AM  126 mmHg  80 mmHg  80 bpm  18 rpm  98 F  132 lbs  61 
in     24.9409 kg/m  1.6053 m     100 %

 

 2017  8:26:00 AM  122 mmHg  76 mmHg  63 bpm  16 rpm  98.4 F  129.25 lbs  
61 in     24.42 kg/m2  1.59 m2     99 %

 

 10/18/2017  9:01:00 AM  126 mmHg  80 mmHg  73 bpm  16 rpm  97.9 F  122.375 lbs
  61 in     23.12 kg/m2  1.55 m2     100 %

 

 10/10/2017  1:52:00 PM  118 mmHg  72 mmHg  74 bpm  16 rpm  98.1 F  121 lbs  61 
in     22.8625 kg/m  1.5369 m     99 %

 

 2017  8:40:00 AM  118 mmHg  68 mmHg  63 bpm  16 rpm  98.1 F  123.125 lbs  
61 in     23.26 kg/m2  1.55 m2     100 %

 

 2017  8:57:00 AM  116 mmHg  62 mmHg  71 bpm  16 rpm  99.8 F  121.5 lbs  
61 in     22.957 kg/m  1.5401 m     98 %

 

 2017  11:30:00 AM  128 mmHg  78 mmHg  69 bpm  16 rpm  98.8 F  129.375 lbs  
61 in     24.44 kg/m2  1.59 m2     98 %

 

 2017  8:22:00 AM  118 mmHg  78 mmHg  62 bpm  18 rpm  97.5 F  129.125 lbs  
61 in     24.3977 kg/m  1.5877 m     100 %

 

 10/13/2016  2:26:00 PM  128 mmHg  78 mmHg  77 bpm  16 rpm  98.4 F  139.25 lbs  
61 in     26.31 kg/m2  1.65 m2     100 %

 

 2016  8:29:00 AM  122 mmHg  70 mmHg  72 bpm  16 rpm  97.8 F  137.125 lbs  
61 in     25.9093 kg/m  1.6361 m     100 %

 

 2015  9:33:00 AM  120 mmHg  68 mmHg  73 bpm     98.7 F  144.375 lbs  61 
in     27.28 kg/m2  1.68 m2     99 %

 

 2015  9:05:00 AM  110 mmHg  75 mmHg  85 bpm  16 rpm  96.7 F  144.375 lbs  
61 in     27.2791 kg/m  1.6788 m     99 %

 

 2015  1:05:00 PM  108 mmHg  62 mmHg  78 bpm  18 rpm  96.9 F  142.375 lbs  
61 in     26.90 kg/m2  1.67 m2     100 %

 

 2014  9:31:00 AM  126 mmHg  66 mmHg  79 bpm  18 rpm  98.7 F  149 lbs  61 
in     28.153 kg/m  1.7055 m     98 %

 

 10/6/2014  9:02:00 AM  110 mmHg  74 mmHg  94 bpm  18 rpm  98.1 F  145.4 lbs  
61 in     27.47 kg/m2  1.68 m2     97 %

 

 10/2/2014  9:14:00 AM  124 mmHg  74 mmHg  79 bpm  18 rpm  98 F  147.25 lbs  61 
in     27.8224 kg/m  1.6955 m     98 %

 

 2014  9:22:00 AM  124 mmHg  76 mmHg  89 bpm  18 rpm  98.1 F  148 lbs  61 
in     27.96 kg/m2  1.70 m2     100 %

 

 2014  8:27:00 AM  118 mmHg  65 mmHg  74 bpm  16 rpm  97.7 F  148 lbs  61 
in     27.9641 kg/m  1.6998 m     99 %

 

 2014  9:48:00 AM  125 mmHg  70 mmHg  93 bpm  18 rpm  96.7 F  156 lbs  61 
in     29.48 kg/m2  1.75 m2     100 %

 

 2013  8:35:00 AM  122 mmHg  74 mmHg  84 bpm  16 rpm  97.9 F  155.375 lbs 
                99 %

 

 6/10/2013  8:30:00 AM  130 mmHg  82 mmHg  79 bpm  16 rpm  97.9 F  161 lbs     
            98 %

 

 2013  8:50:00 AM  124 mmHg  68 mmHg  66 bpm  18 rpm  97.6 F  157.5 lbs  
61 in     29.7591 kg/m  1.7535 m      

 

 2012  1:46:00 PM  120 mmHg  72 mmHg  75 bpm  16 rpm  97.6 F  156.125 lbs
                 98 %

 

 12/10/2012  8:32:00 AM  122 mmHg  82 mmHg  70 bpm  16 rpm  97.3 F  157 lbs    
             99 %

 

 8/15/2012  9:00:00 AM  130 mmHg  76 mmHg  86 bpm  16 rpm  97.4 F  159 lbs     
            100 %

 

 2012  11:02:00 AM  128 mmHg  64 mmHg  66 bpm  18 rpm  97.4 F  162.125 lbs
  61 in     30.6329 kg/m  1.7791 m      

 

 2012  8:45:00 AM  130 mmHg  70 mmHg  82 bpm  16 rpm  98.2 F  160.125 lbs 
                100 %

 

 2/15/2012  9:29:00 AM  122 mmHg  80 mmHg  86 bpm  16 rpm  97.4 F  159.375 lbs  
61 in     30.1133 kg/m  1.7639 m     99 %

 

 2012  9:41:00 AM  132 mmHg  74 mmHg  66 bpm  18 rpm  98.4 F  160.375 lbs  
61 in     30.30 kg/m2  1.77 m2      

 

 2011  10:08:00 AM  134 mmHg  82 mmHg  80 bpm  16 rpm  98 F  160 lbs  61 
in     30.2314 kg/m  1.7674 m     99 %

 

 2011  3:56:00 PM  130 mmHg  80 mmHg                              

 

 2011  8:55:00 AM  130 mmHg  74 mmHg  88 bpm  16 rpm  98.2 F  158.25 lbs  
               98 %

 

 2011  8:54:00 AM  136 mmHg  82 mmHg  102 bpm  16 rpm  98.1 F  153.5 lbs   
              99 %

 

 2011  8:49:00 AM  122 mmHg  88 mmHg  88 bpm  16 rpm  98.6 F  152.25 lbs  
               99 %

 

 2011  10:02:00 AM  140 mmHg  82 mmHg  96 bpm  20 rpm  98.8 F             
       100 %

 

 2011  9:14:00 AM  156 mmHg  98 mmHg  110 bpm  24 rpm  98.5 F  153 lbs    
             100 %

 

 2011  8:50:00 AM  160 mmHg  84 mmHg  100 bpm  16 rpm  98.7 F  155 lbs    
             100 %

 

 2011  1:25:00 PM  152 mmHg  100 mmHg  82 bpm  16 rpm  97.2 F  156.375 lbs 
                100 %

 

 2011  9:55:00 AM  144 mmHg  90 mmHg                              

 

 2010  9:24:00 AM  138 mmHg  84 mmHg                              

 

 2010  8:58:00 AM  160 mmHg  94 mmHg                              

 

 2010  8:33:00 AM  142 mmHg  90 mmHg  100 bpm  16 rpm  98.1 F  158.375 
lbs                  

 

 2010  9:24:00 AM  160 mmHg  102 mmHg  88 bpm  18 rpm  99 F  157.125 lbs  
62 in     28.7382 kg/m  1.7657 m      

 

 3/19/2010  11:01:00 AM  140 mmHg  90 mmHg                              

 

 2009  10:08:00 AM  142 mmHg  86 mmHg  72 bpm  16 rpm  98.1 F  154.125 
lbs  61 in     29.1214 kg/m  1.7346 m      







Social History







 Name  Description  Comments

 

       

 

 Children      

 

 lives alone      

 

 Supervisor      

 

 Tobacco  Never smoker   







History of Procedures







 Date Ordered  Description  Order Status

 

 2011 12:00 AM  COMPREHEN METABOLIC PANEL  Reviewed

 

 2011 12:00 AM  ASSAY THYROID STIM HORMONE  Reviewed

 

 2011 12:00 AM  COMPREHEN METABOLIC PANEL  Reviewed

 

 2011 12:00 AM  LIPID PANEL  Reviewed

 

 2011 12:00 AM  ASSAY THYROID STIM HORMONE  Reviewed

 

 2011 12:00 AM  COMPLETE CBC W/AUTO DIFF WBC  Reviewed

 

 2015 12:00 AM  COMPLETE CBC W/AUTO DIFF WBC  Reviewed

 

 2015 12:00 AM  COMPREHEN METABOLIC PANEL  Reviewed

 

 2015 12:00 AM  LIPID PANEL  Reviewed

 

 2015 12:00 AM  ASSAY THYROID STIM HORMONE  Reviewed

 

 2011 12:00 AM  COMPREHEN METABOLIC PANEL  Reviewed

 

 2011 12:00 AM  COMPREHEN METABOLIC PANEL  Reviewed

 

 2011 12:00 AM  LIPID PANEL  Reviewed

 

 2011 12:00 AM  ASSAY THYROID STIM HORMONE  Reviewed

 

 10/28/2011 12:00 AM  ***Immunization administration, Medicare flu  Reviewed

 

 10/28/2011 12:00 AM  Fluzone ** MEDICARE Only **  Reviewed

 

 2010 11:24 AM  NO CHARGE OV  Reviewed

 

 2010 11:26 AM  NO CHARGE OV  Reviewed

 

 2011 12:00 AM  COMPREHEN METABOLIC PANEL  Reviewed

 

 2011 12:00 AM  LIPID PANEL  Reviewed

 

 2011 12:00 AM  ASSAY THYROID STIM HORMONE  Reviewed

 

 2011 12:00 AM  COMPLETE CBC W/AUTO DIFF WBC  Reviewed

 

 2011 12:00 AM  Wrist Support  Reviewed

 

 2016 12:00 AM  Screening mammography, bilateral  Reviewed

 

 2016 12:00 AM  DXA BONE DENSITY AXIAL  Returned

 

 2016 12:00 AM  TETANUS VACCINE IM  Reviewed

 

 2016 12:00 AM  HEP B VACC ADULT 3 DOSE IM  Reviewed

 

 2012 12:00 AM  TISSUE EXAM FOR FUNGI  Reviewed

 

 2012 12:00 AM  SMEAR WET MOUNT SALINE/INK  Reviewed

 

 2016 12:00 AM  TETANUS VACCINE IM  Reviewed

 

 2016 12:00 AM  HEP B VACC ADULT 3 DOSE IM  Reviewed

 

 2/15/2012 12:00 AM  THER/PROPH/DIAG INJ SC/IM  Reviewed

 

 2/15/2012 12:00 AM  Bicillin MATTHEW [facility]#68006182215-[facility]  Reviewed

 

 2/15/2012 12:00 AM  Depo-Medrol 40 mg NDC#6472961348  Reviewed

 

 10/13/2016 12:00 AM  COMPLETE CBC W/AUTO DIFF WBC  Returned

 

 10/13/2016 12:00 AM  COMPREHEN METABOLIC PANEL  Returned

 

 10/13/2016 12:00 AM  ASSAY THYROID STIM HORMONE  Returned

 

 10/13/2016 12:00 AM  LIPID PANEL  Returned

 

 2016 12:00 AM  TETANUS VACCINE IM  Reviewed

 

 2016 12:00 AM  HEP B VACC ADULT 3 DOSE IM  Reviewed

 

 2017 12:00 AM  ASSAY OF IRON  Returned

 

 2017 12:00 AM  IRON BINDING TEST  Returned

 

 2017 12:00 AM  ASSAY OF TRANSFERRIN  Returned

 

 2017 12:00 AM  OCCULT BLD FECES 1-3 TESTS  Returned

 

 2017 12:00 AM  COMPLETE CBC AUTOMATED  Returned

 

 8/15/2012 12:00 AM  COMPREHEN METABOLIC PANEL  Reviewed

 

 8/15/2012 12:00 AM  ASSAY THYROID STIM HORMONE  Reviewed

 

 8/15/2012 12:00 AM  COMPLETE CBC W/AUTO DIFF WBC  Reviewed

 

 8/15/2012 12:00 AM  Wrist Support  Reviewed

 

 2017 12:00 AM  METABOLIC PANEL TOTAL CA  Returned

 

 2017 12:00 AM  METABOLIC PANEL TOTAL CA  Returned

 

 2017 12:00 AM  Screening mammography, bilateral  Returned

 

 10/29/2012 12:00 AM  Flu Injection 3 Years And Above NDC# [address]-32  [facility]  
Reviewed

 

 12/10/2012 12:00 AM  IMMUNIZATION ADMIN  Reviewed

 

 12/10/2012 12:00 AM  Pneumovax Injection - [facility]  Reviewed

 

 2018 12:00 AM  THER/PROPH/DIAG INJ SC/IM  Reviewed

 

 2018 12:00 AM  Decadron 4mg Injection  Reviewed

 

 2018 12:00 AM  Depo-Medrol 40mg Injection  Reviewed

 

 2012 12:00 AM  TRICHOMONAS ASSAY W/OPTIC  Reviewed

 

 2018 12:00 AM  COMPLETE CBC W/AUTO DIFF WBC  Returned

 

 2018 12:00 AM  COMPREHEN METABOLIC PANEL  Returned

 

 2018 12:00 AM  LIPID PANEL  Returned

 

 2018 12:00 AM  ASSAY THYROID STIM HORMONE  Returned

 

 2013 12:00 AM  THER/PROPH/DIAG INJ SC/IM  Reviewed

 

 2013 12:00 AM  Bicillin CR, 1.2 million units NDC# 69410-534-06  Reviewed

 

 2018 12:00 AM  Mammogram, screening, bilateral  Reviewed

 

 2018 12:00 AM  DXA BONE DENSITY AXIAL  Reviewed

 

 2018 12:00 AM  COMPLETE CBC W/AUTO DIFF WBC  Reviewed

 

 2018 12:00 AM  COMPREHEN METABOLIC PANEL  Reviewed

 

 2018 12:00 AM  LIPID PANEL  Reviewed

 

 2018 12:00 AM  ASSAY THYROID STIM HORMONE  Reviewed

 

 2013 12:00 AM  COMPREHEN METABOLIC PANEL  Reviewed

 

 2013 12:00 AM  LIPID PANEL  Reviewed

 

 2013 12:00 AM  COMPLETE CBC W/AUTO DIFF WBC  Reviewed

 

 2013 12:00 AM  ASSAY THYROID STIM HORMONE  Reviewed

 

 6/10/2013 12:00 AM  MAMMOGRAM SCREENING  Reviewed

 

 6/10/2013 12:00 AM  CYTOPATH C/V MANUAL  Reviewed

 

 12/10/2013 12:00 AM  LIPID PANEL  Reviewed

 

 12/10/2013 12:00 AM  ASSAY THYROID STIM HORMONE  Reviewed

 

 12/10/2013 12:00 AM  COMPLETE CBC W/AUTO DIFF WBC  Reviewed

 

 12/10/2013 12:00 AM  COMPREHEN METABOLIC PANEL  Reviewed

 

 2010 12:00 AM  CYTOPATH C/V MANUAL  Reviewed

 

 2010 12:00 AM  SMEAR WET MOUNT SALINE/INK  Reviewed

 

 2010 12:00 AM  LIPID PANEL  Reviewed

 

 2010 12:00 AM  ASSAY THYROID STIM HORMONE  Reviewed

 

 2010 12:00 AM  COMPLETE CBC W/AUTO DIFF WBC  Reviewed

 

 2010 12:00 AM  COMPREHEN METABOLIC PANEL  Reviewed

 

 10/28/2013 12:00 AM  Flu Injection 3 Years And Above NDC# [address]-95  C  
Reviewed

 

 2010 8:48 AM  Blood Pressure Check-no charge  Reviewed

 

 2010 12:00 AM  Blood Pressure Check-no charge  Reviewed

 

 2014 12:00 AM  METABOLIC PANEL TOTAL CA  Reviewed

 

 2014 12:00 AM  ASSAY THYROID STIM HORMONE  Reviewed

 

 2014 12:00 AM  ASSAY OF FREE THYROXINE  Reviewed

 

 2014 12:00 AM  MAMMOGRAM SCREENING  Reviewed

 

 2014 12:00 AM  CT BONE DENSITY AXIAL  Reviewed

 

 2014 12:00 AM  COMPLETE CBC W/AUTO DIFF WBC  Reviewed

 

 2014 12:00 AM  COMPREHEN METABOLIC PANEL  Reviewed

 

 2014 12:00 AM  LIPID PANEL  Reviewed

 

 2014 12:00 AM  ASSAY THYROID STIM HORMONE  Reviewed

 

 10/20/2010 12:00 AM  IMMUNIZATION ADMIN  Reviewed

 

 10/20/2010 12:00 AM  FLU VACCINE 3 YRS & > IM  Reviewed

 

 2010 12:00 AM  COMPREHEN METABOLIC PANEL  Reviewed

 

 2010 12:00 AM  LIPID PANEL  Reviewed

 

 2010 12:00 AM  ASSAY THYROID STIM HORMONE  Reviewed

 

 2010 12:00 AM  COMPLETE CBC W/AUTO DIFF WBC  Reviewed

 

 2010 12:00 AM  Blood Pressure Check-no charge  Reviewed

 

 10/2/2014 12:00 AM  THER/PROPH/DIAG INJ SC/IM  Reviewed

 

 10/2/2014 12:00 AM  Kenalog, Per 10 Mg NDC#3816-3973-11  Reviewed

 

 2014 12:00 AM  COMPLETE CBC W/AUTO DIFF WBC  Reviewed

 

 2014 12:00 AM  COMPREHEN METABOLIC PANEL  Reviewed

 

 2014 12:00 AM  LIPID PANEL  Reviewed

 

 2014 12:00 AM  ASSAY THYROID STIM HORMONE  Reviewed

 

 2015 12:00 AM  Rocephin 1 gram NDC#[id number]  Reviewed

 

 2015 12:00 AM  THER/PROPH/DIAG INJ SC/IM  Reviewed

 

 2011 12:00 AM  COMPREHEN METABOLIC PANEL  Reviewed

 

 2011 12:00 AM  LIPID PANEL  Reviewed

 

 2011 12:00 AM  ASSAY THYROID STIM HORMONE  Reviewed

 

 2011 12:00 AM  COMPLETE CBC W/AUTO DIFF WBC  Reviewed

 

 2011 12:00 AM  METABOLIC PANEL TOTAL CA  Reviewed

 

 2011 12:00 AM  COMPREHEN METABOLIC PANEL  Reviewed

 

 2011 12:00 AM  ASSAY THYROID STIM HORMONE  Reviewed

 

 2011 12:00 AM  COMPLETE CBC W/AUTO DIFF WBC  Reviewed

 

 2011 12:00 AM  ASSAY OF LIPASE  Reviewed

 

 2011 12:00 AM  THER/PROPH/DIAG INJ SC/IM  Reviewed

 

 2011 12:00 AM  Phenergan 50 Mg Im  Bernadine  Reviewed

 

 2011 12:00 AM  METABOLIC PANEL TOTAL CA  Reviewed

 

 2011 12:00 AM  THER/PROPH/DIAG INJ SC/IM  Reviewed

 

 2011 12:00 AM  Phenergan 25 Mg Im  Bernadine  Reviewed

 

 2011 12:00 AM  METABOLIC PANEL TOTAL CA  Reviewed

 

 2011 12:00 AM  ASSAY THYROID STIM HORMONE  Reviewed

 

 2011 12:00 AM  THER/PROPH/DIAG INJ SC/IM  Reviewed

 

 2011 12:00 AM  Phenergan 50 Mg Im  Bernadine  Reviewed

 

 2011 12:00 AM  ASSAY OF SERUM SODIUM  Reviewed

 

 2011 12:00 AM  ASSAY OF SERUM SODIUM  Reviewed

 

 2011 12:00 AM  CYTOPATH C/V MANUAL  Reviewed

 

 2011 12:00 AM  ASSAY THYROID STIM HORMONE  Reviewed

 

 2015 12:00 AM  COMPLETE CBC W/AUTO DIFF WBC  Reviewed

 

 2015 12:00 AM  COMPREHEN METABOLIC PANEL  Reviewed

 

 2015 12:00 AM  LIPID PANEL  Reviewed

 

 2015 12:00 AM  ASSAY THYROID STIM HORMONE  Reviewed

 

 2015 12:00 AM  MAMMOGRAM SCREENING  Reviewed

 

 2015 12:00 AM  CYTOPATH TBS C/V MANUAL  Reviewed







Results Summary







 Date and Description  Results

 

 2010 9:25 AM  Colonoscopy-Women and Men over 50 Abnormal Mammogram -Women 
over 40 Declined Pap Smear Declined 

 

 2010 10:41 AM  WET PREP NO TRICHOMONADS SEEN  No  Few 

 

 2010 8:15 AM  TRIGLYCERIDES 174.0 mg/dLCHOLESTEROL 175.0 mg/dLHDL 58.0 mg/
dLTOT CHOL/HDL 3.0 LDL (CALC) 82.0 mg/dLTSH 0.790 uIU/mLGLUCOSE 95.0 mg/
dLSODIUM 136.0 mmol/LPOTASSIUM 4.50 mmol/LCHLORIDE 99.0 mmol/LCO2 26.0 mmol/
LBUN 12.0 mg/dLCREATININE 0.80 mg/dLSGOT/AST 32.0 IU/LSGPT/ALT 33.0 IU/LALK 
PHOS 103.0 IU/LTOTAL PROTEIN 7.60 g/dLALBUMIN 4.60 g/dLTOTAL BILI 0.60 mg/
dLCALCIUM 9.80 mg/dLAGE 64 GFR NonAA 72 GFR AA 87 eGFR >60 mL/min/1.73 m2eGFR AA
* >60 WBC 5.3 RBC 4.13 HGB 13.10 g/dLHCT 39.20 %MCV 95.0 fLMCH 31.70 pgMCHC 
33.40 g/dLRDW SD 44 RDW CV 12.70 %MPV 9.80 fLPLT 257 NRBC# 0.00 NRBC% 0.0 %NEUT 
57.90 %%LYMP 26.50 %%MONO 11.60 %%EOS 3.20 %%BASO 0.80 %#NEUT 3.04 #LYMP 1.39 #
MONO 0.61 #EOS 0.17 #BASO 0.04 MANUAL DIFF NOT IND 

 

 2011 9:45 AM  GLUCOSE 91.0 mg/dLSODIUM 133.0 mmol/LPOTASSIUM 4.40 mmol/
LCHLORIDE 97.0 mmol/LCO2 24.0 mmol/LBUN 9.0 mg/dLCREATININE 0.70 mg/dLCALCIUM 
10.0 mg/dLAGE 65 GFR NonAA 84 GFR  eGFR >60 mL/min/1.73 m2eGFR AA* >60 

 

 2011 10:25 AM  LIPASE 29.0 U/LTSH 0.10 uIU/mLGLUCOSE 115.0 mg/dLSODIUM 
127.0 mmol/LPOTASSIUM 5.30 mmol/LCHLORIDE 93.0 mmol/LCO2 18.0 mmol/LBUN 9.0 mg/
dLCREATININE 0.70 mg/dLSGOT/AST 62.0 IU/LSGPT/ALT 46.0 IU/LALK PHOS 100.0 IU/
LTOTAL PROTEIN 8.60 g/dLALBUMIN 4.90 g/dLTOTAL BILI 0.60 mg/dLCALCIUM 9.90 mg/
dLAGE 65 GFR NonAA 84 GFR  eGFR >60 mL/min/1.73 m2eGFR AA* >60 WBC 6.9 
RBC 4.48 HGB 14.40 g/dLHCT 40.90 %MCV 91.0 fLMCH 32.10 pgMCHC 35.20 g/dLRDW SD 
41 RDW CV 12.50 %MPV 9.30 fLPLT 260 NRBC# 0.00 NRBC% 0.0 %NEUT 74.90 %%LYMP 
15.10 %%MONO 9.40 %%EOS 0.30 %%BASO 0.30 %#NEUT 5.15 #LYMP 1.04 #MONO 0.65 #EOS 
0.02 #BASO 0.02 MANUAL DIFF NOT IND 

 

 2011 9:07 AM  GLUCOSE 92.0 mg/dLSODIUM 131.0 mmol/LPOTASSIUM 4.20 mmol/
LCHLORIDE 99.0 mmol/LCO2 22.0 mmol/LBUN 9.0 mg/dLCREATININE 0.70 mg/dLCALCIUM 
9.20 mg/dLAGE 65 GFR NonAA 84 GFR  eGFR >60 mL/min/1.73 m2eGFR AA* >60 

 

 2011 11:06 AM  TSH 0.510 uIU/mLGLUCOSE 99.0 mg/dLSODIUM 132.0 mmol/
LPOTASSIUM 3.50 mmol/LCHLORIDE 95.0 mmol/LCO2 23.0 mmol/LBUN 9.0 mg/
dLCREATININE 0.80 mg/dLCALCIUM 10.40 mg/dLAGE 65 GFR NonAA 72 GFR AA 87 eGFR >
60 mL/min/1.73 m2eGFR AA* >60 

 

 2011 9:45 AM  SODIUM 132.0 mmol/L

 

 2011 9:27 AM  SODIUM 137.0 mmol/L

 

 2011 9:55 AM  TSH 1.070 uIU/mL

 

 2011 8:55 AM  GLUCOSE 102.0 mg/dLSODIUM 135.0 mmol/LPOTASSIUM 4.20 mmol/
LCHLORIDE 102.0 mmol/LCO2 24.0 mmol/LBUN 15.0 mg/dLCREATININE 0.80 mg/dLSGOT/
AST 30.0 IU/LSGPT/ALT 35.0 IU/LALK PHOS 119.0 IU/LTOTAL PROTEIN 7.50 g/
dLALBUMIN 4.30 g/dLTOTAL BILI 0.30 mg/dLCALCIUM 9.20 mg/dLAGE 65 GFR NonAA 72 
GFR AA 87 eGFR >60 mL/min/1.73 m2eGFR AA* >60 TSH 1.130 uIU/mL

 

 2011 8:55 AM  GLUCOSE 98.0 mg/dLSODIUM 137.0 mmol/LPOTASSIUM 3.90 mmol/
LCHLORIDE 103.0 mmol/LCO2 25.0 mmol/LBUN 14.0 mg/dLCREATININE 0.70 mg/dLSGOT/
AST 33.0 IU/LSGPT/ALT 36.0 IU/LALK PHOS 111.0 IU/LTOTAL PROTEIN 8.30 g/
dLALBUMIN 4.40 g/dLTOTAL BILI 0.50 mg/dLCALCIUM 9.40 mg/dLAGE 66 GFR NonAA 84 
GFR  eGFR >60 mL/min/1.73 m2eGFR AA* >60 TRIGLYCERIDES 109.0 mg/
dLCHOLESTEROL 153.0 mg/dLHDL 54.0 mg/dLTOT CHOL/HDL 2.8 LDL (CALC) 77.0 mg/
dLTSH 1.330 uIU/mL

 

 2011 10:17 AM  Colonoscopy-Women and Men over 50 Declined Mammogram -
Women over 40 Normal Pap Smear Negative 

 

 2012 10:33 AM  WET PREP NO TRICH SEEN CLUE CELLS NONE SEEN 

 

 2012 8:45 AM  WBC 5.2 RBC 3.96 HGB 12.70 g/dLHCT 37.80 %MCV 96.0 fLMCH 
32.10 pgMCHC 33.60 g/dLRDW SD 46 RDW CV 13.30 %MPV 9.70 fLPLT 297 NRBC# 0.00 
NRBC% 0.0 %NEUT 57.70 %%LYMP 28.50 %%MONO 10.30 %%EOS 2.50 %%BASO 1.0 %#NEUT 
3.02 #LYMP 1.49 #MONO 0.54 #EOS 0.13 #BASO 0.05 MANUAL DIFF NOT IND GLUCOSE 
97.0 mg/dLSODIUM 137.0 mmol/LPOTASSIUM 4.40 mmol/LCHLORIDE 101.0 mmol/LCO2 23.0 
mmol/LBUN 17.0 mg/dLCREATININE 0.80 mg/dLSGOT/AST 30.0 IU/LSGPT/ALT 33.0 IU/
LALK PHOS 95.0 IU/LTOTAL PROTEIN 7.40 g/dLALBUMIN 4.40 g/dLTOTAL BILI 0.60 mg/
dLCALCIUM 9.70 mg/dLAGE 66 GFR NonAA 72 GFR AA 87 eGFR 60 eGFR AA* 60 
TRIGLYCERIDES 130.0 mg/dLCHOLESTEROL 157.0 mg/dLHDL 56.0 mg/dLTOT CHOL/HDL 2.8 
LDL (CALC) 75.0 mg/dLTSH 0.940 uIU/mL

 

 2012 8:45 AM  WBC 5.1 RBC 3.91 HGB 12.50 g/dLHCT 36.30 %MCV 93.0 fLMCH 
32.0 pgMCHC 34.40 g/dLRDW SD 43 RDW CV 12.60 %MPV 9.30 fLPLT 244 NRBC# 0.00 NRBC
% 0.0 %NEUT 57.60 %%LYMP 27.50 %%MONO 9.10 %%EOS 5.0 %%BASO 0.80 %#NEUT 2.91 #
LYMP 1.39 #MONO 0.46 #EOS 0.25 #BASO 0.04 MANUAL DIFF NOT IND 

 

 12/10/2012 8:34 AM  Colonoscopy-Women and Men over 50 Declined Mammogram -
Women over 40 Declined Pap Smear Declined 

 

 2012 5:02 PM  WET PREP NO TRICH SEEN CLUE CELLS NONE SEEN 

 

 2013 8:25 AM  WBC 4.2 RBC 3.96 HGB 12.90 g/dLHCT 37.0 %MCV 93.0 fLMCH 
32.60 pgMCHC 34.90 g/dLRDW SD 43 RDW CV 12.60 %MPV 9.70 fLPLT 254 NRBC# 0.00 
NRBC% 0.0 %NEUT 54.40 %%LYMP 30.40 %%MONO 10.80 %%EOS 3.40 %%BASO 1.0 %#NEUT 
2.26 #LYMP 1.26 #MONO 0.45 #EOS 0.14 #BASO 0.04 MANUAL DIFF NOT IND TSH 1.230 
uIU/mLGLUCOSE 94.0 mg/dLSODIUM 136.0 mmol/LPOTASSIUM 4.30 mmol/LCHLORIDE 99.0 
mmol/LCO2 25.0 mmol/LBUN 10.0 mg/dLCREATININE 0.80 mg/dLSGOT/AST 36.0 IU/LSGPT/
ALT 36.0 IU/LALK PHOS 84.0 IU/LTOTAL PROTEIN 7.90 g/dLALBUMIN 4.40 g/dLTOTAL 
BILI 0.70 mg/dLCALCIUM 9.80 mg/dLAGE 67 GFR NonAA 72 GFR AA 87 eGFR 60 eGFR AA* 
60 TRIGLYCERIDES 122.0 mg/dLCHOLESTEROL 141.0 mg/dLHDL 47.0 mg/dLTOT CHOL/HDL 
3.0 LDL (CALC) 70.0 mg/dL

 

 2013 8:45 AM  WBC 5.3 RBC 4.01 HGB 13.0 g/dLHCT 37.60 %MCV 94.0 fLMCH 
32.40 pgMCHC 34.60 g/dLRDW SD 43 RDW CV 12.50 %MPV 9.40 fLPLT 248 NRBC# 0.00 
NRBC% 0.0 %NEUT 58.70 %%LYMP 27.80 %%MONO 9.80 %%EOS 2.80 %%BASO 0.90 %#NEUT 
3.12 #LYMP 1.48 #MONO 0.52 #EOS 0.15 #BASO 0.05 MANUAL DIFF NOT IND TSH 1.570 
uIU/mLGLUCOSE 94.0 mg/dLSODIUM 134.0 mmol/LPOTASSIUM 4.10 mmol/LCHLORIDE 101.0 
mmol/LCO2 23.0 mmol/LBUN 11.0 mg/dLCREATININE 0.80 mg/dLSGOT/AST 41.0 IU/LSGPT/
ALT 44.0 IU/LALK PHOS 109.0 IU/LTOTAL PROTEIN 7.90 g/dLALBUMIN 4.70 g/dLTOTAL 
BILI 0.80 mg/dLCALCIUM 10.40 mg/dLAGE 68 GFR NonAA 72 GFR AA 87 eGFR >60 mL/min/
1.73 m2eGFR AA* >60 TRIGLYCERIDES 163.0 mg/dLCHOLESTEROL 138.0 mg/dLHDL 49.0 mg/
dLTOT CHOL/HDL 2.8 LDL (CALC) 56.0 mg/dL

 

 2014 8:58 AM  GLUCOSE 86.0 mg/dLSODIUM 134.0 mmol/LPOTASSIUM 4.20 mmol/
LCHLORIDE 99.0 mmol/LCO2 25.0 mmol/LBUN 14.0 mg/dLCREATININE 0.80 mg/dLCALCIUM 
10.10 mg/dLAGE 68 GFR NonAA 72 GFR AA 87 eGFR >60 mL/min/1.73 m2eGFR AA* >60 
FREE T4 1.18 TSH 1.20 uIU/mL

 

 2014 9:50 AM  WBC 5.7 RBC 4.03 HGB 12.90 g/dLHCT 37.90 %MCV 94.0 fLMCH 
32.0 pgMCHC 34.0 g/dLRDW SD 44 RDW CV 12.70 %MPV 9.70 fLPLT 292 NRBC# 0.00 NRBC
% 0.0 %NEUT 62.70 %%LYMP 21.80 %%MONO 11.0 %%EOS 3.40 %%BASO 1.10 %#NEUT 3.55 #
LYMP 1.23 #MONO 0.62 #EOS 0.19 #BASO 0.06 MANUAL DIFF NOT IND GLUCOSE 98.0 mg/
dLSODIUM 135.0 mmol/LPOTASSIUM 4.30 mmol/LCHLORIDE 102.0 mmol/LCO2 22.0 mmol/
LBUN 10.0 mg/dLCREATININE 0.80 mg/dLSGOT/AST 34.0 IU/LSGPT/ALT 32.0 IU/LALK 
PHOS 95.0 IU/LTOTAL PROTEIN 7.70 g/dLALBUMIN 4.30 g/dLTOTAL BILI 0.80 mg/
dLCALCIUM 9.10 mg/dLAGE 68 GFR NonAA 72 GFR AA 87 eGFR 60 eGFR AA* 60 
TRIGLYCERIDES 108.0 mg/dLCHOLESTEROL 146.0 mg/dLHDL 56.0 mg/dLTOT CHOL/HDL 2.6 
LDL (CALC) 68.0 mg/dLTSH 0.90 uIU/mL

 

 2014 8:40 AM  WBC 4.4 RBC 4.13 HGB 13.20 g/dLHCT 38.90 %MCV 94.0 fLMCH 
32.0 pgMCHC 33.90 g/dLRDW SD 47 RDW CV 13.50 %MPV 9.80 fLPLT 279 NRBC# 0.00 NRBC
% 0.0 %NEUT 63.80 %%LYMP 24.60 %%MONO 9.30 %%EOS 1.60 %%BASO 0.70 %#NEUT 2.80 #
LYMP 1.08 #MONO 0.41 #EOS 0.07 #BASO 0.03 MANUAL DIFF NOT IND GLUCOSE 96.0 mg/
dLSODIUM 136.0 mmol/LPOTASSIUM 4.10 mmol/LCHLORIDE 99.0 mmol/LCO2 23.0 mmol/
LBUN 8.0 mg/dLCREATININE 0.80 mg/dLSGOT/AST 31.0 IU/LSGPT/ALT 27.0 IU/LALK PHOS 
85.0 IU/LTOTAL PROTEIN 7.60 g/dLALBUMIN 4.40 g/dLTOTAL BILI 0.80 mg/dLCALCIUM 
10.20 mg/dLAGE 69 GFR NonAA 71 GFR AA 86 eGFR 60 eGFR AA* 60 TRIGLYCERIDES 61.0 
mg/dLCHOLESTEROL 148.0 mg/dLHDL 71.0 mg/dLTOT CHOL/HDL 2.1 LDL (CALC) 65.0 mg/
dLTSH 0.990 uIU/mL

 

 2015 8:32 AM  WBC 4.8 RBC 3.98 HGB 12.70 g/dLHCT 37.30 %MCV 94.0 fLMCH 
31.90 pgMCHC 34.0 g/dLRDW SD 43 RDW CV 12.70 %MPV 9.50 fLPLT 270 NRBC# 0.00 NRBC
% 0.0 %NEUT 57.30 %%LYMP 25.0 %%MONO 13.0 %%EOS 3.60 %%BASO 1.10 %#NEUT 2.73 #
LYMP 1.19 #MONO 0.62 #EOS 0.17 #BASO 0.05 MANUAL DIFF NOT IND TSH 0.640 uIU/
mLGLUCOSE 90.0 mg/dLSODIUM 136.0 mmol/LPOTASSIUM 4.0 mmol/LCHLORIDE 101.0 mmol/
LCO2 24.0 mmol/LBUN 10.0 mg/dLCREATININE 0.80 mg/dLSGOT/AST 34.0 IU/LSGPT/ALT 
32.0 IU/LALK PHOS 92.0 IU/LTOTAL PROTEIN 7.50 g/dLALBUMIN 4.40 g/dLTOTAL BILI 
0.70 mg/dLCALCIUM 9.50 mg/dLAGE 69 GFR NonAA 71 GFR AA 86 eGFR >60 mL/min/1.73 
m2eGFR AA* >60 TRIGLYCERIDES 107.0 mg/dLCHOLESTEROL 138.0 mg/dLHDL 58.0 mg/
dLTOT CHOL/HDL 2.4 LDL (CALC) 59.0 mg/dL

 

 2015 8:31 AM  WBC 4.6 RBC 3.97 HGB 12.90 g/dLHCT 38.0 %MCV 96.0 fLMCH 
32.50 pgMCHC 33.90 g/dLRDW SD 44 RDW CV 12.60 %MPV 9.0 fLPLT 248 NRBC# 0.00 NRBC
% 0.0 %NEUT 61.0 %%LYMP 24.70 %%MONO 10.20 %%EOS 3.0 %%BASO 1.10 %#NEUT 2.82 #
LYMP 1.14 #MONO 0.47 #EOS 0.14 #BASO 0.05 MANUAL DIFF NOT IND TRIGLYCERIDES 
105.0 mg/dLCHOLESTEROL 141.0 mg/dLHDL 58.0 mg/dLTOT CHOL/HDL 2.4 LDL (CALC) 
62.0 mg/dLGLUCOSE 93.0 mg/dLSODIUM 136.0 mmol/LPOTASSIUM 4.10 mmol/LCHLORIDE 
101.0 mmol/LCO2 25.0 mmol/LBUN 9.0 mg/dLCREATININE 0.80 mg/dLSGOT/AST 32.0 IU/
LSGPT/ALT 28.0 IU/LALK PHOS 95.0 IU/LTOTAL PROTEIN 7.30 g/dLALBUMIN 4.50 g/
dLTOTAL BILI 0.80 mg/dLCALCIUM 9.70 mg/dLAGE 70 GFR NonAA 71 GFR AA 86 eGFR >60 
mL/min/1.73meGFR AA* >60 TSH 1.10 uIU/mL

 

 2018 8:15 AM  TSH 1.03 TRIGLYCERIDES 73 CHOLESTEROL 198 HDL 84 TOT CHOL/
HDL 2.4 LDL (CALC) 99 WBC 4.6 RBC 4.03 HGB 13.1 HCT 38.9 MCV 97 MCH 32.5 MCHC 
33.7 RDW SD 45 RDW CV 12.5 MPV 9.8  NRBC# 0.00 NRBC% 0.0 %NEUT 60.3 %
LYMP 25.5 %MONO 9.6 %EOS 3.1 %BASO 1.3 #NEUT 2.77 #LYMP 1.17 #MONO 0.44 #EOS 
0.14 #BASO 0.06 MANUAL DIFF NOT IND GLUCOSE 108 SODIUM 136 POTASSIUM 3.8 
CHLORIDE 100 CO2 28 BUN 19 CREATININE 0.8 SGOT/AST 32 SGPT/ALT 25 ALK PHOS 107 
TOTAL PROTEIN 8.0 ALBUMIN 4.7 TOTAL BILI 0.4 CALCIUM 9.9 AGE 72 GFR NonAA 71 
GFR AA 86 eGFR 71 eGFR AA* >60 







History Of Immunizations







 Name  Date Admin  Mfg Name  Mfg Code  Trade Name  Lot#  Route  Inj  Vis Given  
Vis Pub  CVX

 

 Influenza  10/20/2010  [employer] paste[employer]  [employer]  FLUZONE  [id number]  Intramuscular  
Left Arm  10/20/2010  2010  999

 

 Influenza  10/28/2011  [employer] paste[employer]  [employer]  FLUZONE  [id number]  Intramuscular  
Left Arm  10/28/2011  2011  141

 

 Influenza  10/29/2012  [employer] paste[employer]  [employer]  FLUZONE  [id number]  Intramuscular  
Left Deltoid  10/29/2012  2012  141

 

 X  12/10/2012  [employer]  MSD  PNEUMOVAX 23  [id number]  Intramuscular  
Left Gluteous Medius  12/10/2012  2010  33

 

 Influenza  10/28/2013  [employer] pasteur  [employer]  Fluzone > 3 Years  [id number]  
Intramuscular  Right Deltoid  10/28/2013  2013  141

 

 X  9/2/2015  Not Entered  NE  PREVNAR 13     Not Entered  Not Entered  1  109

 

 Influenza  2015  Not Entered  NE  Not Entered     Not Entered  Not Entered
  2015  141

 

 HepB Adult  2016  [employer]  MSD  RECOMBIVAX-ADULT  [id number]  Not 
Entered  Left Deltoid  2016  43

 

 HepB Adult  2016  [employer]  MSD  RECOMBIVAX-ADULT  [id number]  
Intramuscular  Right Deltoid  2016  43

 

 ZVL  2012  Not Entered  NE  Not Entered     Not Entered  Not Entered  1  121

 

 Tdap  2012  Not Entered  NE  Not Entered     Not Entered  Not Entered  1  115

 

 Influenza  10/13/2016  Not Entered  NE  FLUZONE-HIGH DOSE     Intramuscular  
Left Arm  1  141

 

 HepB Adult  2016  [employer]  MSD  RECOMBIVAX-ADULT  [id number]  
Intramuscular  Right Deltoid  2016  43

 

 Influenza  10/30/2017  Not Entered  NE  Fluarix, quadrivalent, preservative 
free     Intramuscular  Left Arm  2017  150

 

 RZV  2018  [employer]  SHINGRIX     Intramuscular  Left Arm  1  187







History of Past Illness







 Name  Date of Onset  Comments

 

 Benign Essential Hypertension  Dec 14 2009 10:10AM   

 

 Hyperlipidemia  Dec 14 2009 10:10AM   

 

 Hypothyroidism, Acquired  Dec 14 2009 10:10AM   

 

 hypothyroid      

 

 Hypertension      

 

 Hyperlipidemia      

 

 acid reflux      

 

 Hypertension, Benign Essential  2010  9:41AM   

 

 Hypertension, Benign Essential  2010 12:53PM   

 

 Routine gynecological examination  May  5 2010  9:28AM   

 

 Hypertension  May  5 2010  9:28AM   

 

 Hyperlipidemia, unspecified  May  5 2010  9:28AM   

 

 Hypothyroidism, Acquired  May  5 2010  9:28AM   

 

 Vulvovaginitis  May  5 2010  9:28AM   

 

 Rhinitis, Allergic  May  5 2010  9:28AM   

 

 Hypertension, Benign Essential  May 19 2010  8:48AM   

 

 Hypertension, Benign Essential  May 25 2010  9:39AM   

 

 Flu  Oct 20 2010 12:01PM   

 

 Essential Hypertension  2010  8:34AM   

 

 Hyperlipidemia  2010  8:34AM   

 

 Gastroesophageal Reflux  2010  8:34AM   

 

 Hypothyroidism, Acquired  2010  8:34AM   

 

 Hypertension, Benign Essential  2010  9:22AM   

 

 Hypertension, Benign Essential  Dec 15 2010  9:07AM   

 

 Essential Hypertension  2011  1:31PM   

 

 Hyperlipidemia  2011  1:31PM   

 

 Gastroesophageal Reflux  2011  1:31PM   

 

 Hypothyroidism, Acquired  2011  1:31PM   

 

 Essential Hypertension  2011  8:51AM   

 

 Hyperlipidemia  2011  8:51AM   

 

 Gastroesophageal Reflux  2011  8:51AM   

 

 Hypothyroidism, Acquired  2011  8:51AM   

 

 Essential Hypertension  2011  9:13AM   

 

 Hyperlipidemia  2011  9:13AM   

 

 Gastroesophageal Reflux  2011  9:13AM   

 

 Hypothyroidism, Acquired  2011  9:13AM   

 

 Nausea With Vomiting  2011  1:18PM   

 

 Hyponatremia  2011  8:46AM   

 

 Nausea  2011  9:13AM   

 

 Hypothyroidism  2011 11:10AM   

 

 Hyponatremia  2011 11:10AM   

 

 Essential Hypertension  2011 10:05AM   

 

 Hyperlipidemia  2011 10:05AM   

 

 Gastroesophageal Reflux  2011 10:05AM   

 

 Nausea  2011 10:05AM   

 

 Hypothyroidism, Acquired  2011 10:05AM   

 

 Hyponatremia  May  6 2011 11:34AM   

 

 Essential Hypertension  May 16 2011  8:50AM   

 

 Hyperlipidemia  May 16 2011  8:50AM   

 

 Gastroesophageal Reflux  May 16 2011  8:50AM   

 

 Nausea  May 16 2011  8:50AM   

 

 Hypothyroidism, Acquired  May 16 2011  8:50AM   

 

 Hyponatremia  May 16 2011  8:50AM   

 

 Routine gynecological examination  2011  8:54AM   

 

 Hypertension  2011  8:54AM   

 

 Hyperlipidemia, unspecified  2011  8:54AM   

 

 Hypothyroidism, Acquired  2011  8:54AM   

 

 Rhinitis, Allergic  2011  8:54AM   

 

 Hypothyroidism  Aug 29 2011 12:37PM   

 

 Hyponatremia  Aug 29 2011 12:37PM   

 

 Essential Hypertension  Sep 12 2011  8:53AM   

 

 Hyperlipidemia  Sep 12 2011  8:53AM   

 

 Gastroesophageal Reflux  Sep 12 2011  8:53AM   

 

 Hypothyroidism, Acquired  Sep 12 2011  8:53AM   

 

 Hyponatremia  Sep 12 2011  8:53AM   

 

 Hypertension  Sep 29 2011  9:41AM   

 

 Hyperlipidemia  Dec  8 2011  8:31AM   

 

 Hypothyroidism  Dec  8 2011  8:31AM   

 

 Hypertension  Dec  8 2011  8:31AM   

 

 Flu  Dec  9 2011 10:02AM   

 

 Essential Hypertension  Dec 13 2011 10:13AM   

 

 Hyperlipidemia  Dec 13 2011 10:13AM   

 

 Gastroesophageal Reflux  Dec 13 2011 10:13AM   

 

 Hypothyroidism, Acquired  Dec 13 2011 10:13AM   

 

 Hyponatremia  Dec 13 2011 10:13AM   

 

 Vaginal Discharge  2012  9:43AM   

 

 Rhinitis, Allergic  Feb 15 2012  9:31AM   

 

 Eustachian Tube Dysfunction  Feb 15 2012  9:31AM   

 

 Pharyngitis, Acute  Feb 15 2012  9:31AM   

 

 Routine gynecological examination  2012  8:48AM   

 

 Hypertension  2012  8:48AM   

 

 Hyperlipidemia, unspecified  2012  8:48AM   

 

 Hypothyroidism, Acquired  2012  8:48AM   

 

 Rhinitis, Allergic  2012  8:48AM   

 

 Candidiasis, Vulvovaginal  2012 11:04AM   

 

 Essential Hypertension  Aug 15 2012  9:02AM   

 

 Hyperlipidemia  Aug 15 2012  9:02AM   

 

 Gastroesophageal Reflux  Aug 15 2012  9:02AM   

 

 Hypothyroidism, Acquired  Aug 15 2012  9:02AM   

 

 Hyponatremia  Aug 15 2012  9:02AM   

 

 Atrophic Vaginitis, Postmenopausal  Aug 15 2012  9:02AM   

 

 Flu  Oct 29 2012  9:24AM   

 

 Essential Hypertension  Dec 10 2012  8:35AM   

 

 Hyperlipidemia  Dec 10 2012  8:35AM   

 

 Gastroesophageal Reflux  Dec 10 2012  8:35AM   

 

 Hypothyroidism, Acquired  Dec 10 2012  8:35AM   

 

 Hyponatremia  Dec 10 2012  8:35AM   

 

 Atrophic Vaginitis, Postmenopausal  Dec 10 2012  8:35AM   

 

 Pneumococcus  Dec 10 2012  2:07PM   

 

 Vaginal Discharge  Dec 20 2012  1:50PM   

 

 Essential Hypertension  Dec 20 2012  1:50PM   

 

 Hyperlipidemia  Dec 20 2012  1:50PM   

 

 Gastroesophageal Reflux  Dec 20 2012  1:50PM   

 

 Hypothyroidism, Acquired  Dec 20 2012  1:50PM   

 

 Atrophic Vaginitis, Postmenopausal  Dec 20 2012  1:50PM   

 

 Upper Respiratory Infections  2013  8:52AM   

 

 Hyperlipidemia  2013  8:21AM   

 

 Hypertension  2013  8:21AM   

 

 Hypothyroidism  2013  8:21AM   

 

 Screening Mammogram  Beto 10 2013  8:45AM   

 

 Routine gynecological examination  Beto 10 2013  8:34AM   

 

 Hypertension  Beto 10 2013  8:34AM   

 

 Hyperlipidemia, unspecified  Beto 10 2013  8:34AM   

 

 Hypothyroidism, Acquired  Beto 10 2013  8:34AM   

 

 Rhinitis, Allergic  Beto 10 2013  8:34AM   

 

 Flu  Oct 28 2013  8:52AM   

 

 Essential Hypertension  Dec  9 2013  8:37AM   

 

 Hyperlipidemia  Dec  9 2013  8:37AM   

 

 Gastroesophageal Reflux  Dec  9 2013  8:37AM   

 

 Hypothyroidism, Acquired  Dec  9 2013  8:37AM   

 

 Atrophic Vaginitis, Postmenopausal  Dec  9 2013  8:37AM   

 

 Hypertension  2014  9:56AM   

 

 Hyperlipidemia  2014  9:56AM   

 

 Hypothyroidism  2014  9:56AM   

 

 Screening Mammogram  2014  8:32AM   

 

 Osteopenia  2014  8:32AM   

 

 Hypertension  2014  8:35AM   

 

 Hypothyroidism, Acquired  2014  8:35AM   

 

 Hyperlipidemia  2014  8:35AM   

 

 Upper Respiratory Infections  2014  9:28AM   

 

 Sinusitis, Acute  Oct  2 2014  9:17AM   

 

 Herpes Zoster  Oct  5 2014  1:44PM   

 

 Vesicular Eruption  Oct  5 2014  1:44PM   

 

 Hypertension  2014  8:18AM   

 

 Hyperlipidemia  2014  8:18AM   

 

 hypothyroid  2014  8:18AM   

 

 Situational anxiety  Dec 20 2014  9:33AM   

 

 GERD (gastroesophageal reflux disease)  Dec 20 2014  9:33AM   

 

 Nausea  Dec 20 2014  9:33AM   

 

 Abdominal Pain, Epigastric  Dec 20 2014  9:33AM   

 

 Hyperlipidemia  Oct  6 2014  9:03AM   

 

 acid reflux  Oct  6 2014  9:03AM   

 

 hypothyroid  Oct  6 2014  9:03AM   

 

 Shingles  Oct  6 2014  9:03AM   

 

 Pharyngitis  2015  1:09PM   

 

 Bronchitis  2015  9:10AM   

 

 Hyperlipidemia  2015  9:10AM   

 

 acid reflux  2015  9:10AM   

 

 hypothyroid  2015  9:10AM   

 

 Hyperlipidemia  2015  8:18AM   

 

 Hypertension  2015  8:18AM   

 

 hypothyroid  2015  8:18AM   

 

 Screening Mammogram  2015 11:43AM   

 

 Medicare Annual Pap Q 2 years  2015  9:36AM   

 

 Screening Mammogram  2015  9:36AM   

 

 Candidiasis of female genitalia  2015  9:36AM   

 

 Hypertension  2015 11:34AM   

 

 Hyperlipidemia, unspecified  2015 11:34AM   

 

 Hypothyroidism, Acquired  2015 11:34AM   

 

 Osteopenia  2016  8:41AM   

 

 Encounter for screening mammogram for breast cancer  2016  8:41AM   

 

 Hypertension  2016  8:34AM   

 

 Lymphedema  2016  8:34AM   

 

 Hyperlipidemia  2016  8:34AM   

 

 hypothyroid  2016  8:34AM   

 

 HEP B  2016  9:13AM   

 

 HEP B  2016  8:52AM   

 

 Acid Reflux  2016  8:34AM   

 

 History of acute gastritis  Oct 13 2016  2:30PM   

 

 Anxiety as acute reaction to exceptional stress  Oct 13 2016  2:30PM   

 

 HEP B  Dec 29 2016  8:42AM   

 

 Caregiver stress syndrome  May 26 2017  8:28AM   

 

 Anxiety  May 26 2017  8:28AM   

 

 Blood in stool  2017  2:54PM   

 

 Upper GI bleed  2017 11:34AM   

 

 Hyponatremia  2017  9:03AM   

 

 Hyponatremia  2017  8:43AM   

 

 Medicare Annual Pap Q 2 years  2017  9:03AM   

 

 Nausea  2017  9:03AM   

 

 Uterine enlargement  2017  9:03AM   

 

 Encounter for screening mammogram for breast cancer  2017  4:56PM   

 

 Panic disorder [episodic paroxysmal anxiety]  Oct 10 2017  2:02PM   

 

 Acute stress reaction  Oct 10 2017  2:02PM   

 

 Caregiver stress syndrome  Oct 10 2017  2:02PM   

 

 Stress reaction causing mixed disturbance of emotion and conduct  Oct 18 2017  
9:05AM   

 

 Ankle strain, left, initial encounter  Dec 19 2017  8:29AM   

 

 Excoriation of ear canal, left, initial encounter  Dec 19 2017  8:29AM   

 

 Generalized anxiety disorder  Dec 19 2017  8:29AM   

 

 Grief reaction  Dec 19 2017  8:29AM   

 

 Acute non-recurrent frontal sinusitis  2018 11:58AM   

 

 Cough  2018 11:58AM   

 

 Hypertension  2018  8:10AM   

 

 Hyperlipidemia, unspecified  2018  8:10AM   

 

 Hypothyroidism, Acquired  2018  8:10AM   

 

 Situational anxiety  Mar  6 2018  8:30AM   

 

 Nausea  Mar  6 2018  8:30AM   

 

 LESLIE (generalized anxiety disorder)  May 22 2018  8:47AM   

 

 Caregiver stress syndrome  May 22 2018  8:47AM   

 

 Situational anxiety  May 29 2018  8:26AM   

 

 Osteopenia  May 29 2018  8:26AM   

 

 Visit for screening mammogram  May 29 2018  8:26AM   

 

 Anxiety Disorder  2018  9:09AM   

 

 Hypertension  Aug 20 2018  8:05AM   

 

 Hyperlipidemia, unspecified  Aug 20 2018  8:05AM   

 

 Hypothyroidism, Acquired  Aug 20 2018  8:05AM   

 

 Irritable Bowel Syndrome  Aug 31 2018  8:50AM   

 

 Anxiety Disorder  Aug 31 2018  8:50AM   

 

 acid reflux  Aug 31 2018  8:50AM   

 

 Hypertension  Aug 31 2018  8:50AM   







Payers







 Insurance Name  Company Name  Plan Name  Plan Number  Policy Number  Policy 
Group Number  Start Date

 

    Medicare RHC  Medicare RHC     [id number]     N/A

 

    [employer]  [facility]     [id number]     2009

 

    Medicare Part B  [place]     [id number]     N/A

 

    Medicare Part A  Medicare Part A     [id number]     N/A

 

    Medicare Part A  ZZZMedicare P A - Preventive     [id number]     N/A

 

    Medicare Part A  Medicare - Lab/Xray     [id number]     N/A

 

    Medicare RHC  Medicare RHC     [id number]     N/A







History of Encounters







 Visit Date  Visit Type  Provider

 

 10/1/2018  Office visit  Marvin Boyer DO

 

 2018  Office visit  Marvin Boyer DO

 

 2018  Office visit  Marvin Boyer DO

 

 2018  Office visit  Marvin Boyer DO

 

 2018  Office visit  Doris Noriega MD

 

 3/6/2018  Office visit  Doris Noriega MD

 

 2018  Office visit  Deedee Carter APRN

 

 2017  Office visit  Doris Noriega MD

 

 10/18/2017  Office visit  Doris Noriega MD

 

 10/10/2017  Office visit  Doris Noriega MD

 

 2017  Office visit  Doris Noriega MD

 

 2017  Office visit  Doris Noriega MD

 

 2017  Office visit  Doris Noriega MD

 

 2017  Office visit  Prince Noe APRN

 

 2016  Nurse visit  Doris Noriega MD

 

 10/13/2016  Office visit  Doris Noriega MD

 

 2016  Nurse visit  Doris Noriega MD

 

 2016  Office visit  Doris Noriega MD

 

 2015  Office visit  Doris Noriega MD

 

 2015  Office visit  Doris Noriega MD

 

 2015  Office visit  Prince Noe APRN

 

 2014  Office visit  Anyi Herrera APRN

 

 10/27/2014  Voided  Doris Noriega MD

 

 10/6/2014  Office visit  Doris Noriega MD

 

 10/5/2014  Office visit  Anyi Herrera APRN

 

 10/2/2014  Office visit   

 

 10/2/2014  Office visit  Corrine Bay APRN

 

 2014  Office visit  Kassie Franklin APRN

 

 2014  Office visit  Doris Noriega MD

 

 2014  Office visit  Doris Noriega MD

 

 2013  Office visit  Dee Colindres MD

 

 10/28/2013  Nurse visit  Dee Colindres MD

 

 6/10/2013  Office visit  Dee Colindres MD

 

 2013  Office visit  Corrine Bay APRN

 

 2012  Office visit  Dee Colindres MD

 

 12/10/2012  Office visit  Dee Colindres MD

 

 10/29/2012  Nurse visit  Dee Colindres MD

 

 8/15/2012  Office visit  Dee Colindres MD

 

 2012  Office visit  Corrine Bay APRN

 

 2012  Office visit  Dee Colindres MD

 

 2/15/2012  Office visit  Dee Colindres MD

 

 2012  Office visit  Corrine Bay APRN

 

 2011  Office visit  Dee Colindres MD

 

 10/28/2011  Nurse visit  Shad Nolasco MD

 

 2011  Office visit  Dee Colindres MD

 

 2011  Office visit  Dee Colindres MD

 

 2011  Office visit  Dee Colindres MD

 

 2011  Office visit  Dee Colindres MD

 

 2011  Office visit  Dee Colindres MD

 

 2011  Office visit  Dee Colindres MD

 

 4/10/2011  [facility]  KHRIS Reeves MD

 

 4/10/2011  [facility]  RICKEY Toussaint MD

 

 2011  Office visit  RICKEY Toussaint MD

 

 2011  [facility]  Fide Castellanos MD

 

 2011  Voided  Fide Castellanos MD

 

 2011  Office visit  Dee Colindres MD

 

 12/15/2010  Nurse visit  Dee Colindres MD

 

 2010  Office visit  Dee Colindres MD

 

 10/20/2010  Nurse visit  Stephenie Kay PA

 

 2010  Nurse visit  Dee Colindres MD

 

 2010  Nurse visit  Dee Colindres MD

 

 2010  Office visit  Dee Colindres MD

 

 3/19/2010  Voided  Stephenie Kay PA

 

 2010  Nurse visit  Stephenie Kay PA

 

 2010  Nurse visit  Stephenie Kay PA

 

 2010  Nurse visit  Stephenie Kay PA

 

 2009  Office visit  Stephenie PERAZA

 

 10/20/2009  Nurse visit  Stephenie PERAZA

## 2018-10-22 NOTE — XMS REPORT
MU2 Ambulatory Summary

 Created on: 2018



Milady Crespo

External Reference #: [id number]

: 1945

Sex: Female



Demographics







 Address  [address]

[place]  [address]

 

 Home Phone  [phone]

 

 Preferred Language  English

 

 Marital Status  

 

 Restoration Affiliation  Unknown

 

 Race  White

 

 Ethnic Group  Not  or 





Author







 Author  Marvin Boyer

 

 [facility]

 

 Address  [address]

[place], [place]  [id number]



 

 Phone  [phone]







Care Team Providers







 Care Team Member Name  Role  Phone

 

 Marvin Boyer  PCP  [phone]

 

 BerthaemilieEllisDoris  PreferredProvider  [phone]







Allergies and Adverse Reactions







 Name  Reaction  Notes

 

 NO KNOWN DRUG ALLERGIES      







Plan of Treatment







 Planned Activity  Comments  Planned Date  Planned Time  Plan/Goal

 

 Complete blood count (CBC) with differential count     2016  12:00 AM   

 

 Comprehensive metabolic panel     2016  12:00 AM   

 

 VITAMIN D (25 HYDROXY)     2016  12:00 AM   

 

 Lipid panel (total cholesterol, lipoproteins, HDL, triglycerides)     8/15/
2012  12:00 AM   

 

 Pap smear     2017  12:00 AM   

 

 Comprehensive Metabolic Panel     6/10/2013  12:00 AM   

 

 Lipid panel (total cholesterol, lipoproteins, HDL, triglycerides)     6/10/
2013  12:00 AM   

 

 Thyroid stimulating hormone (TSH)     6/10/2013  12:00 AM   

 

 CBC (automated H&H, platelets, WBC and automated differential)     6/10/2013  
12:00 AM   

 

 Comprehensive Metabolic Panel     2012  12:00 AM   

 

 Lipid panel (total cholesterol, lipoproteins, HDL, triglycerides)     2012  12:00 AM   

 

 Thyroid stimulating hormone (TSH)     2012  12:00 AM   

 

 CBC (automated H&H, platelets, WBC and automated differential)     2012  
12:00 AM   

 

 Mammogram, screening, bilateral     2018  12:00 AM   

 

 DEXA     2018  12:00 AM   

 

 Screening mammography, bilateral     2015  12:00 AM   







Medications







 Active 

 

 Name  Start Date  Estimated Completion Date  SIG  Comments

 

 aspirin 81 mg oral tablet,delayed release (DR/EC)        take 1 tablet (81 mg) 
by oral route once daily   

 

 Vitamin D3 1,000 unit oral capsule        take 1 capsule by oral route daily   

 

 valsartan 160 mg oral tablet  2016     TAKE ONE TABLET BY MOUTH ONCE 
DAILY   

 

 amlodipine 5 mg oral tablet  2016     TAKE ONE TABLET BY MOUTH ONCE DAILY
   

 

 amlodipine 5 mg oral tablet  2016     TAKE ONE TABLET BY MOUTH ONCE DAILY
   

 

 valsartan 160 mg oral tablet  2016     TAKE ONE TABLET BY MOUTH ONCE 
DAILY   

 

 Zofran (as hydrochloride) 4 mg oral tablet  2016     take 1 tablet by 
oral route daily as needed for 14 days   

 

 Zofran (as hydrochloride) 4 mg oral tablet  2017     take 1 tablet by oral 
route daily as needed for 14 days   

 

 Zofran (as hydrochloride) 4 mg oral tablet  2017     take 1 tablet by oral 
route daily as needed for 14 days   

 

 Zofran (as hydrochloride) 4 mg oral tablet  2017     take 1 tablet by 
oral route daily as needed for 14 days   

 

 Zofran (as hydrochloride) 4 mg oral tablet  2017     take 1 tablet by 
oral route daily as needed for 14 days   

 

 EQ LORATADINE 10MG  TABS  2017     TAKE ONE TABLET BY MOUTH ONCE DAILY   

 

 pantoprazole 40 mg oral tablet,delayed release (DR/EC)  10/9/2017     take 1 
tablet (40 mg) by oral route once daily for 90 days   

 

 amlodipine 5 mg oral tablet  2018     TAKE ONE TABLET BY MOUTH ONCE DAILY
   

 

 atenolol 50 mg oral tablet  2018     TAKE ONE TABLET BY MOUTH ONCE DAILY 
  

 

 mirtazapine 15 mg oral tablet  2018     TAKE ONE-HALF TABLET BY MOUTH 
ONCE DAILY AT BEDTIME   









  

 

 Name  Start Date  Expiration Date  SIG  Comments

 

 prednisone 20 mg oral tablet  2011  take 2 tablets by oral 
route daily for 5 days   

 

 calcium carbonate-vitamin D2 600-125 mg-unit oral tablet  8/15/2012  2012
  take 2 tablets by oral route daily   

 

 Gaffney 3 Fish Oil 900-1,400 mg oral capsule,delayed release(DR/EC)  8/15/2012  9
/  take 1 capsule by oral route daily   

 

 multivitamin Oral tablet  8/15/2012  2012  take 1 tablet by oral route 
daily   

 

 triamcinolone acetonide 0.1 % topical cream  2013  10/7/2013  APPLY A 
THIN LAYER TO THE AFFECTED AREA(S) BY TOPICAL ROUTE TWICE A DAY   

 

 famotidine 20 mg oral tablet  2014  take 1 tablet (20 mg) by 
oral route 2 times per day   

 

 amoxicillin 500 mg oral capsule  2014  take 1 capsule (500 mg) 
by oral route 3 times per day for 10 days   

 

 Zithromax Z-Tab 250 mg oral tablet  10/2/2014  10/7/2014  take 2 tablets (500 
mg) by oral route once daily for 1 day then 1 tablet (250 mg) by oral route 
once daily for 4 days   

 

 acyclovir 800 mg oral tablet  10/5/2014  10/12/2014  take 1 tablet (800 mg) by 
oral route 5 times per day for 7 days   

 

 gabapentin 300 mg oral capsule  10/9/2014  2014  take 1 capsule (300 mg) 
by oral route 3 times per day for 14 days   

 

 Carafate 100 mg/mL oral suspension  2014  take 10 milliliters 
(1 gram) by oral route 4 times per day on an empty stomach 1 hour before meals 
and at bedtime for 4 weeks   

 

 amlodipine 5 mg oral tablet  2015  TAKE ONE TABLET BY MOUTH 
EVERY DAY   

 

 levothyroxine 50 mcg oral tablet  2015  TAKE ONE TABLET BY MOUTH 
EVERY DAY   

 

 Diovan 160 mg oral tablet  2015  2/15/2016  TAKE ONE TABLET BY MOUTH 
EVERY DAY for 90 days   

 

 triamcinolone acetonide 0.1 % topical cream  2015  apply a thin 
layer to the affected area(s) by topical route 2 times per day for 14 days   

 

 fluconazole 150 mg oral tablet  2015  take 1 tablet (150 mg) 
by oral route once for 1 day   

 

 famotidine 20 mg oral tablet  2015     TAKE ONE TABLET BY MOUTH TWICE 
DAILY   

 

 Lipitor 20 mg oral tablet  10/13/2016  2018  take 1 tablet (20 mg) by oral 
route once daily   

 

 Plavix 75 mg oral tablet  10/13/2016  2018  take 1 tablet (75 mg) by oral 
route once daily   

 

 Zofran (as hydrochloride) 4 mg oral tablet  10/13/2016  10/27/2016  take 1 
tablet by oral route daily as needed for 14 days   

 

 Zofran (as hydrochloride) 4 mg oral tablet  2017     take 1 tablet by 
oral route daily as needed for 14 days   

 

 levothyroxine 50 mcg oral tablet  10/9/2017  10/9/2017  TAKE ONE TABLET BY 
MOUTH ONCE DAILY   

 

 ondansetron 4 mg oral tablet,disintegrating  10/10/2017  2017  dissolve  
1 tablet by oral route every 8 hours as needed for 30 days   

 

 valsartan 160 mg oral tablet  2017  TAKE ONE TABLET BY MOUTH 
ONCE DAILY   

 

 EQ LORATADINE 10MG  TABS  2018  TAKE ONE TABLET BY MOUTH ONCE 
DAILY IN THE EVENING   

 

 mirtazapine 15 mg oral tablet  2018  TAKE ONE-HALF TABLET BY 
MOUTH ONCE DAILY AT BEDTIME   

 

 clorazepate dipotassium 7.5 mg oral tablet  2018  take 1 
tablet PO  three times per day for situational anxiety   









 Discontinued 

 

 Name  Start Date  Discontinued Date  SIG  Comments

 

 Lipitor 40 mg oral tablet     2009 TAB DAILY   

 

 ramipril 5 mg oral capsule     2009  take 1 capsule (5 mg) by oral route 
once daily   

 

 lovastatin 20 mg oral tablet  2009  take 1 tablet (20 mg) by 
oral route once daily with evening meal   

 

 propranolol 20 mg oral tablet  2010  take 1 tablet by oral 
route 2 times a day   

 

 Fosamax 70 mg oral tablet  2010  take 1 tablet (70 mg) by oral 
route once weekly in the morning, at least 30 minutes before the first food, 
beverage, or medication of the day   

 

 lisinopril 40 mg oral tablet  2010  4/10/2011  take 2 tablets by oral 
route daily   

 

 Zoloft 50 mg oral tablet  2011  take 1 tablet (50 mg) by oral 
route once daily   

 

 promethazine 25 mg oral tablet  2011  take 1 tablet by oral 
route every 6 hours as needed   

 

 Diovan -12.5 mg oral tablet  2011  take 1 tablet by oral 
route once daily for 30 days   

 

 Diflucan 150 mg oral tablet     2012  take 1 tablet (150 mg) by oral 
route once for 1 day   

 

 Diflucan 150 mg oral tablet  2012  8/15/2012  take 1 tablet (150 mg) by 
oral route once   

 

 Vitamin B-12 1,000 mcg oral tablet  8/15/2012  2014  take 1 tablet by 
oral route daily   

 

 Premarin 0.625 mg/gram vaginal cream  10/29/2012  6/10/2013  use 1 gram daily 
for a week then 1 gram twice a week   

 

 Medrol (Tab) 4 mg oral tablets,dose pack  2013  6/10/2013  take as 
directed   

 

 aspirin 325 mg oral tablet  2014  take 1 tablet (325 mg) by 
oral route once daily   

 

 Medrol (Tab) 4 mg oral tablets,dose pack  2014  10/2/2014  take as 
directed   

 

 Tetracaine Lollipops Lollipop  2015  Use as directed   

 

 Allergy Relief (loratadine) 10 mg oral tablet  2016     TAKE ONE TABLET 
BY MOUTH ONCE DAILY   

 

 Allergy Relief (loratadine) 10 mg oral tablet  2017  TAKE ONE 
TABLET BY MOUTH ONCE DAILY   

 

 Zoloft 50 mg oral tablet  2016  take 1 tablet (50 mg) by oral 
route once daily for 90 days  Pt refuses to take...

 

 triamcinolone acetonide 0.1 % topical cream  2017     APPLY A THIN LAYER 
OF CREAM EXTERNALLY TO AFFECTED AREA TWICE DAILY FOR 14 DAYS   

 

 loratadine 10 mg oral tablet  2017  TAKE 1 TABLET BY MOUTH 
EVERY DAY IN THE EVENING   

 

 triamcinolone acetonide 0.1 % topical cream  2017  APPLY  
CREAM EXTERNALLY TO AFFECTED AREA TWICE DAILY FOR 14 DAYS   

 

 Lexapro 10 mg oral tablet  2017  take 1 tablet (10 mg) by oral 
route once daily for 90 days   

 

 Augmentin 875-125 mg oral tablet  2018  3/6/2018  take 1 tablet by oral 
route every 12 hours for 7 days   

 

 benzonatate 100 mg oral capsule  2018  3/6/2018  take 1 capsule (100 mg) 
by oral route 3 times per day as needed for cough   







Problem List







 Description  Status  Onset

 

 Hyperlipidemia  Active   

 

 acid reflux  Active   

 

 Hypertension  Active   

 

 hypothyroid  Active   







Vital Signs







 Date  Time  BP-Sys(mm[Hg]  BP-Morena(mm[Hg])  HR(bpm)  RR(rpm)  Temp  WT  HT  HC  
BMI  BSA  BMI Percentile  O2 Sat(%)

 

 2018  9:04:00 AM  138 mmHg  80 mmHg  64 bpm  18 rpm  96 F  140.125 lbs  
61 in     26.4761 kg/m  1.654 m     99 %

 

 2018  8:24:00 AM  142 mmHg  70 mmHg  69 bpm  18 rpm  98.2 F  139 lbs  61 
in     26.26 kg/m2  1.65 m2     98 %

 

 2018  8:39:00 AM  116 mmHg  68 mmHg  68 bpm  18 rpm  97.9 F  142 lbs  61 
in     26.8304 kg/m  1.665 m      

 

 3/6/2018  8:26:00 AM  126 mmHg  76 mmHg  63 bpm  16 rpm  98 F  134.125 lbs  61 
in     25.34 kg/m2  1.62 m2     100 %

 

 2018  11:55:00 AM  126 mmHg  80 mmHg  80 bpm  18 rpm  98 F  132 lbs  61 
in     24.9409 kg/m  1.6053 m     100 %

 

 2017  8:26:00 AM  122 mmHg  76 mmHg  63 bpm  16 rpm  98.4 F  129.25 lbs  
61 in     24.42 kg/m2  1.59 m2     99 %

 

 10/18/2017  9:01:00 AM  126 mmHg  80 mmHg  73 bpm  16 rpm  97.9 F  122.375 lbs
  61 in     23.12 kg/m2  1.55 m2     100 %

 

 10/10/2017  1:52:00 PM  118 mmHg  72 mmHg  74 bpm  16 rpm  98.1 F  121 lbs  61 
in     22.8625 kg/m  1.5369 m     99 %

 

 2017  8:40:00 AM  118 mmHg  68 mmHg  63 bpm  16 rpm  98.1 F  123.125 lbs  
61 in     23.26 kg/m2  1.55 m2     100 %

 

 2017  8:57:00 AM  116 mmHg  62 mmHg  71 bpm  16 rpm  99.8 F  121.5 lbs  
61 in     22.957 kg/m  1.5401 m     98 %

 

 2017  11:30:00 AM  128 mmHg  78 mmHg  69 bpm  16 rpm  98.8 F  129.375 lbs  
61 in     24.44 kg/m2  1.59 m2     98 %

 

 2017  8:22:00 AM  118 mmHg  78 mmHg  62 bpm  18 rpm  97.5 F  129.125 lbs  
61 in     24.3977 kg/m  1.5877 m     100 %

 

 10/13/2016  2:26:00 PM  128 mmHg  78 mmHg  77 bpm  16 rpm  98.4 F  139.25 lbs  
61 in     26.31 kg/m2  1.65 m2     100 %

 

 2016  8:29:00 AM  122 mmHg  70 mmHg  72 bpm  16 rpm  97.8 F  137.125 lbs  
61 in     25.9093 kg/m  1.6361 m     100 %

 

 2015  9:33:00 AM  120 mmHg  68 mmHg  73 bpm     98.7 F  144.375 lbs  61 
in     27.28 kg/m2  1.68 m2     99 %

 

 2015  9:05:00 AM  110 mmHg  75 mmHg  85 bpm  16 rpm  96.7 F  144.375 lbs  
61 in     27.2791 kg/m  1.6788 m     99 %

 

 2015  1:05:00 PM  108 mmHg  62 mmHg  78 bpm  18 rpm  96.9 F  142.375 lbs  
61 in     26.90 kg/m2  1.67 m2     100 %

 

 2014  9:31:00 AM  126 mmHg  66 mmHg  79 bpm  18 rpm  98.7 F  149 lbs  61 
in     28.153 kg/m  1.7055 m     98 %

 

 10/6/2014  9:02:00 AM  110 mmHg  74 mmHg  94 bpm  18 rpm  98.1 F  145.4 lbs  
61 in     27.47 kg/m2  1.68 m2     97 %

 

 10/2/2014  9:14:00 AM  124 mmHg  74 mmHg  79 bpm  18 rpm  98 F  147.25 lbs  61 
in     27.8224 kg/m  1.6955 m     98 %

 

 2014  9:22:00 AM  124 mmHg  76 mmHg  89 bpm  18 rpm  98.1 F  148 lbs  61 
in     27.96 kg/m2  1.70 m2     100 %

 

 2014  8:27:00 AM  118 mmHg  65 mmHg  74 bpm  16 rpm  97.7 F  148 lbs  61 
in     27.9641 kg/m  1.6998 m     99 %

 

 2014  9:48:00 AM  125 mmHg  70 mmHg  93 bpm  18 rpm  96.7 F  156 lbs  61 
in     29.48 kg/m2  1.75 m2     100 %

 

 2013  8:35:00 AM  122 mmHg  74 mmHg  84 bpm  16 rpm  97.9 F  155.375 lbs 
                99 %

 

 6/10/2013  8:30:00 AM  130 mmHg  82 mmHg  79 bpm  16 rpm  97.9 F  161 lbs     
            98 %

 

 2013  8:50:00 AM  124 mmHg  68 mmHg  66 bpm  18 rpm  97.6 F  157.5 lbs  
61 in     29.7591 kg/m  1.7535 m      

 

 2012  1:46:00 PM  120 mmHg  72 mmHg  75 bpm  16 rpm  97.6 F  156.125 lbs
                 98 %

 

 12/10/2012  8:32:00 AM  122 mmHg  82 mmHg  70 bpm  16 rpm  97.3 F  157 lbs    
             99 %

 

 8/15/2012  9:00:00 AM  130 mmHg  76 mmHg  86 bpm  16 rpm  97.4 F  159 lbs     
            100 %

 

 2012  11:02:00 AM  128 mmHg  64 mmHg  66 bpm  18 rpm  97.4 F  162.125 lbs
  61 in     30.6329 kg/m  1.7791 m      

 

 2012  8:45:00 AM  130 mmHg  70 mmHg  82 bpm  16 rpm  98.2 F  160.125 lbs 
                100 %

 

 2/15/2012  9:29:00 AM  122 mmHg  80 mmHg  86 bpm  16 rpm  97.4 F  159.375 lbs  
61 in     30.1133 kg/m  1.7639 m     99 %

 

 2012  9:41:00 AM  132 mmHg  74 mmHg  66 bpm  18 rpm  98.4 F  160.375 lbs  
61 in     30.30 kg/m2  1.77 m2      

 

 2011  10:08:00 AM  134 mmHg  82 mmHg  80 bpm  16 rpm  98 F  160 lbs  61 
in     30.2314 kg/m  1.7674 m     99 %

 

 2011  3:56:00 PM  130 mmHg  80 mmHg                              

 

 2011  8:55:00 AM  130 mmHg  74 mmHg  88 bpm  16 rpm  98.2 F  158.25 lbs  
               98 %

 

 2011  8:54:00 AM  136 mmHg  82 mmHg  102 bpm  16 rpm  98.1 F  153.5 lbs   
              99 %

 

 2011  8:49:00 AM  122 mmHg  88 mmHg  88 bpm  16 rpm  98.6 F  152.25 lbs  
               99 %

 

 2011  10:02:00 AM  140 mmHg  82 mmHg  96 bpm  20 rpm  98.8 F             
       100 %

 

 2011  9:14:00 AM  156 mmHg  98 mmHg  110 bpm  24 rpm  98.5 F  153 lbs    
             100 %

 

 2011  8:50:00 AM  160 mmHg  84 mmHg  100 bpm  16 rpm  98.7 F  155 lbs    
             100 %

 

 2011  1:25:00 PM  152 mmHg  100 mmHg  82 bpm  16 rpm  97.2 F  156.375 lbs 
                100 %

 

 2011  9:55:00 AM  144 mmHg  90 mmHg                              

 

 2010  9:24:00 AM  138 mmHg  84 mmHg                              

 

 2010  8:58:00 AM  160 mmHg  94 mmHg                              

 

 2010  8:33:00 AM  142 mmHg  90 mmHg  100 bpm  16 rpm  98.1 F  158.375 
lbs                  

 

 2010  9:24:00 AM  160 mmHg  102 mmHg  88 bpm  18 rpm  99 F  157.125 lbs  
62 in     28.7382 kg/m  1.7657 m      

 

 3/19/2010  11:01:00 AM  140 mmHg  90 mmHg                              

 

 2009  10:08:00 AM  142 mmHg  86 mmHg  72 bpm  16 rpm  98.1 F  154.125 
lbs  61 in     29.1214 kg/m  1.7346 m      







Social History







 Name  Description  Comments

 

       

 

 Children      

 

 lives alone      

 

 Supervisor      

 

 Tobacco  Never smoker   







History of Procedures







 Date Ordered  Description  Order Status

 

 2011 12:00 AM  COMPREHEN METABOLIC PANEL  Reviewed

 

 2011 12:00 AM  ASSAY THYROID STIM HORMONE  Reviewed

 

 2011 12:00 AM  COMPREHEN METABOLIC PANEL  Reviewed

 

 2011 12:00 AM  LIPID PANEL  Reviewed

 

 2011 12:00 AM  ASSAY THYROID STIM HORMONE  Reviewed

 

 2011 12:00 AM  COMPLETE CBC W/AUTO DIFF WBC  Reviewed

 

 2015 12:00 AM  COMPLETE CBC W/AUTO DIFF WBC  Reviewed

 

 2015 12:00 AM  COMPREHEN METABOLIC PANEL  Reviewed

 

 2015 12:00 AM  LIPID PANEL  Reviewed

 

 2015 12:00 AM  ASSAY THYROID STIM HORMONE  Reviewed

 

 2011 12:00 AM  COMPREHEN METABOLIC PANEL  Reviewed

 

 2011 12:00 AM  COMPREHEN METABOLIC PANEL  Reviewed

 

 2011 12:00 AM  LIPID PANEL  Reviewed

 

 2011 12:00 AM  ASSAY THYROID STIM HORMONE  Reviewed

 

 10/28/2011 12:00 AM  ***Immunization administration, Medicare flu  Reviewed

 

 10/28/2011 12:00 AM  Fluzone ** MEDICARE Only **  Reviewed

 

 2010 11:24 AM  NO CHARGE OV  Reviewed

 

 2010 11:26 AM  NO CHARGE OV  Reviewed

 

 2011 12:00 AM  COMPREHEN METABOLIC PANEL  Reviewed

 

 2011 12:00 AM  LIPID PANEL  Reviewed

 

 2011 12:00 AM  ASSAY THYROID STIM HORMONE  Reviewed

 

 2011 12:00 AM  COMPLETE CBC W/AUTO DIFF WBC  Reviewed

 

 2011 12:00 AM  Wrist Support  Reviewed

 

 2016 12:00 AM  Screening mammography, bilateral  Reviewed

 

 2016 12:00 AM  DXA BONE DENSITY AXIAL  Returned

 

 2016 12:00 AM  TETANUS VACCINE IM  Reviewed

 

 2016 12:00 AM  HEP B VACC ADULT 3 DOSE IM  Reviewed

 

 2012 12:00 AM  TISSUE EXAM FOR FUNGI  Reviewed

 

 2012 12:00 AM  SMEAR WET MOUNT SALINE/INK  Reviewed

 

 2016 12:00 AM  TETANUS VACCINE IM  Reviewed

 

 2016 12:00 AM  HEP B VACC ADULT 3 DOSE IM  Reviewed

 

 2/15/2012 12:00 AM  THER/PROPH/DIAG INJ SC/IM  Reviewed

 

 2/15/2012 12:00 AM  Bicillin CR [facility]#76922808041[facility]  Reviewed

 

 2/15/2012 12:00 AM  Depo-Medrol 40 mg NDC#0037594119  Reviewed

 

 10/13/2016 12:00 AM  COMPLETE CBC W/AUTO DIFF WBC  Returned

 

 10/13/2016 12:00 AM  COMPREHEN METABOLIC PANEL  Returned

 

 10/13/2016 12:00 AM  ASSAY THYROID STIM HORMONE  Returned

 

 10/13/2016 12:00 AM  LIPID PANEL  Returned

 

 2016 12:00 AM  TETANUS VACCINE IM  Reviewed

 

 2016 12:00 AM  HEP B VACC ADULT 3 DOSE IM  Reviewed

 

 2017 12:00 AM  ASSAY OF IRON  Returned

 

 2017 12:00 AM  IRON BINDING TEST  Returned

 

 2017 12:00 AM  ASSAY OF TRANSFERRIN  Returned

 

 2017 12:00 AM  OCCULT BLD FECES 1-3 TESTS  Returned

 

 2017 12:00 AM  COMPLETE CBC AUTOMATED  Returned

 

 8/15/2012 12:00 AM  COMPREHEN METABOLIC PANEL  Reviewed

 

 8/15/2012 12:00 AM  ASSAY THYROID STIM HORMONE  Reviewed

 

 8/15/2012 12:00 AM  COMPLETE CBC W/AUTO DIFF WBC  Reviewed

 

 8/15/2012 12:00 AM  Wrist Support  Reviewed

 

 2017 12:00 AM  METABOLIC PANEL TOTAL CA  Returned

 

 2017 12:00 AM  METABOLIC PANEL TOTAL CA  Returned

 

 2017 12:00 AM  Screening mammography, bilateral  Returned

 

 10/29/2012 12:00 AM  Flu Injection 3 Years And Above NDC# [address]-62  [facility]  
Reviewed

 

 12/10/2012 12:00 AM  IMMUNIZATION ADMIN  Reviewed

 

 12/10/2012 12:00 AM  Pneumovax Injection - [facility]  Reviewed

 

 2018 12:00 AM  THER/PROPH/DIAG INJ SC/IM  Reviewed

 

 2018 12:00 AM  Decadron 4mg Injection  Reviewed

 

 2018 12:00 AM  Depo-Medrol 40mg Injection  Reviewed

 

 2012 12:00 AM  TRICHOMONAS ASSAY W/OPTIC  Reviewed

 

 2018 12:00 AM  COMPLETE CBC W/AUTO DIFF WBC  Returned

 

 2018 12:00 AM  COMPREHEN METABOLIC PANEL  Returned

 

 2018 12:00 AM  LIPID PANEL  Returned

 

 2018 12:00 AM  ASSAY THYROID STIM HORMONE  Returned

 

 2013 12:00 AM  THER/PROPH/DIAG INJ SC/IM  Reviewed

 

 2013 12:00 AM  Bicillin CR, 1.2 million units NDC# 40497-680-33  Reviewed

 

 2013 12:00 AM  COMPREHEN METABOLIC PANEL  Reviewed

 

 2013 12:00 AM  LIPID PANEL  Reviewed

 

 2013 12:00 AM  COMPLETE CBC W/AUTO DIFF WBC  Reviewed

 

 2013 12:00 AM  ASSAY THYROID STIM HORMONE  Reviewed

 

 6/10/2013 12:00 AM  MAMMOGRAM SCREENING  Reviewed

 

 6/10/2013 12:00 AM  CYTOPATH C/V MANUAL  Reviewed

 

 12/10/2013 12:00 AM  LIPID PANEL  Reviewed

 

 12/10/2013 12:00 AM  ASSAY THYROID STIM HORMONE  Reviewed

 

 12/10/2013 12:00 AM  COMPLETE CBC W/AUTO DIFF WBC  Reviewed

 

 12/10/2013 12:00 AM  COMPREHEN METABOLIC PANEL  Reviewed

 

 2010 12:00 AM  CYTOPATH C/V MANUAL  Reviewed

 

 2010 12:00 AM  SMEAR WET MOUNT SALINE/INK  Reviewed

 

 2010 12:00 AM  LIPID PANEL  Reviewed

 

 2010 12:00 AM  ASSAY THYROID STIM HORMONE  Reviewed

 

 2010 12:00 AM  COMPLETE CBC W/AUTO DIFF WBC  Reviewed

 

 2010 12:00 AM  COMPREHEN METABOLIC PANEL  Reviewed

 

 10/28/2013 12:00 AM  Flu Injection 3 Years And Above NDC# [address]-42  [facility]  
Reviewed

 

 2010 8:48 AM  Blood Pressure Check-no charge  Reviewed

 

 2010 12:00 AM  Blood Pressure Check-no charge  Reviewed

 

 2014 12:00 AM  METABOLIC PANEL TOTAL CA  Reviewed

 

 2014 12:00 AM  ASSAY THYROID STIM HORMONE  Reviewed

 

 2014 12:00 AM  ASSAY OF FREE THYROXINE  Reviewed

 

 2014 12:00 AM  MAMMOGRAM SCREENING  Reviewed

 

 2014 12:00 AM  CT BONE DENSITY AXIAL  Reviewed

 

 2014 12:00 AM  COMPLETE CBC W/AUTO DIFF WBC  Reviewed

 

 2014 12:00 AM  COMPREHEN METABOLIC PANEL  Reviewed

 

 2014 12:00 AM  LIPID PANEL  Reviewed

 

 2014 12:00 AM  ASSAY THYROID STIM HORMONE  Reviewed

 

 10/20/2010 12:00 AM  IMMUNIZATION ADMIN  Reviewed

 

 10/20/2010 12:00 AM  FLU VACCINE 3 YRS & > IM  Reviewed

 

 2010 12:00 AM  COMPREHEN METABOLIC PANEL  Reviewed

 

 2010 12:00 AM  LIPID PANEL  Reviewed

 

 2010 12:00 AM  ASSAY THYROID STIM HORMONE  Reviewed

 

 2010 12:00 AM  COMPLETE CBC W/AUTO DIFF WBC  Reviewed

 

 2010 12:00 AM  Blood Pressure Check-no charge  Reviewed

 

 10/2/2014 12:00 AM  THER/PROPH/DIAG INJ SC/IM  Reviewed

 

 10/2/2014 12:00 AM  Kenalog, Per 10 Mg NDC#3426-4235-86  Reviewed

 

 2014 12:00 AM  COMPLETE CBC W/AUTO DIFF WBC  Reviewed

 

 2014 12:00 AM  COMPREHEN METABOLIC PANEL  Reviewed

 

 2014 12:00 AM  LIPID PANEL  Reviewed

 

 2014 12:00 AM  ASSAY THYROID STIM HORMONE  Reviewed

 

 2015 12:00 AM  Rocephin 1 gram NDC#1492-6832-69  Reviewed

 

 2015 12:00 AM  THER/PROPH/DIAG INJ SC/IM  Reviewed

 

 2011 12:00 AM  COMPREHEN METABOLIC PANEL  Reviewed

 

 2011 12:00 AM  LIPID PANEL  Reviewed

 

 2011 12:00 AM  ASSAY THYROID STIM HORMONE  Reviewed

 

 2011 12:00 AM  COMPLETE CBC W/AUTO DIFF WBC  Reviewed

 

 2011 12:00 AM  METABOLIC PANEL TOTAL CA  Reviewed

 

 2011 12:00 AM  COMPREHEN METABOLIC PANEL  Reviewed

 

 2011 12:00 AM  ASSAY THYROID STIM HORMONE  Reviewed

 

 2011 12:00 AM  COMPLETE CBC W/AUTO DIFF WBC  Reviewed

 

 2011 12:00 AM  ASSAY OF LIPASE  Reviewed

 

 2011 12:00 AM  THER/PROPH/DIAG INJ SC/IM  Reviewed

 

 2011 12:00 AM  Phenergan 50 Mg Im  Bernadine  Reviewed

 

 2011 12:00 AM  METABOLIC PANEL TOTAL CA  Reviewed

 

 2011 12:00 AM  THER/PROPH/DIAG INJ SC/IM  Reviewed

 

 2011 12:00 AM  Phenergan 25 Mg Im  Bernadine  Reviewed

 

 2011 12:00 AM  METABOLIC PANEL TOTAL CA  Reviewed

 

 2011 12:00 AM  ASSAY THYROID STIM HORMONE  Reviewed

 

 2011 12:00 AM  THER/PROPH/DIAG INJ SC/IM  Reviewed

 

 2011 12:00 AM  Phenergan 50 Mg Im  Bernadine  Reviewed

 

 2011 12:00 AM  ASSAY OF SERUM SODIUM  Reviewed

 

 2011 12:00 AM  ASSAY OF SERUM SODIUM  Reviewed

 

 2011 12:00 AM  CYTOPATH C/V MANUAL  Reviewed

 

 2011 12:00 AM  ASSAY THYROID STIM HORMONE  Reviewed

 

 2015 12:00 AM  COMPLETE CBC W/AUTO DIFF WBC  Reviewed

 

 2015 12:00 AM  COMPREHEN METABOLIC PANEL  Reviewed

 

 2015 12:00 AM  LIPID PANEL  Reviewed

 

 2015 12:00 AM  ASSAY THYROID STIM HORMONE  Reviewed

 

 2015 12:00 AM  MAMMOGRAM SCREENING  Reviewed

 

 2015 12:00 AM  CYTOPATH TBS C/V MANUAL  Reviewed







Results Summary







 Date and Description  Results

 

 2010 9:25 AM  Colonoscopy-Women and Men over 50 Abnormal Mammogram -Women 
over 40 Declined Pap Smear Declined 

 

 2010 10:41 AM  WET PREP NO TRICHOMONADS SEEN  No  Few 

 

 2010 8:15 AM  TRIGLYCERIDES 174.0 mg/dLCHOLESTEROL 175.0 mg/dLHDL 58.0 mg/
dLTOT CHOL/HDL 3.0 LDL (CALC) 82.0 mg/dLTSH 0.790 uIU/mLGLUCOSE 95.0 mg/
dLSODIUM 136.0 mmol/LPOTASSIUM 4.50 mmol/LCHLORIDE 99.0 mmol/LCO2 26.0 mmol/
LBUN 12.0 mg/dLCREATININE 0.80 mg/dLSGOT/AST 32.0 IU/LSGPT/ALT 33.0 IU/LALK 
PHOS 103.0 IU/LTOTAL PROTEIN 7.60 g/dLALBUMIN 4.60 g/dLTOTAL BILI 0.60 mg/
dLCALCIUM 9.80 mg/dLAGE 64 GFR NonAA 72 GFR AA 87 eGFR >60 mL/min/1.73 m2eGFR AA
* >60 WBC 5.3 RBC 4.13 HGB 13.10 g/dLHCT 39.20 %MCV 95.0 fLMCH 31.70 pgMCHC 
33.40 g/dLRDW SD 44 RDW CV 12.70 %MPV 9.80 fLPLT 257 NRBC# 0.00 NRBC% 0.0 %NEUT 
57.90 %%LYMP 26.50 %%MONO 11.60 %%EOS 3.20 %%BASO 0.80 %#NEUT 3.04 #LYMP 1.39 #
MONO 0.61 #EOS 0.17 #BASO 0.04 MANUAL DIFF NOT IND 

 

 2011 9:45 AM  GLUCOSE 91.0 mg/dLSODIUM 133.0 mmol/LPOTASSIUM 4.40 mmol/
LCHLORIDE 97.0 mmol/LCO2 24.0 mmol/LBUN 9.0 mg/dLCREATININE 0.70 mg/dLCALCIUM 
10.0 mg/dLAGE 65 GFR NonAA 84 GFR  eGFR >60 mL/min/1.73 m2eGFR AA* >60 

 

 2011 10:25 AM  LIPASE 29.0 U/LTSH 0.10 uIU/mLGLUCOSE 115.0 mg/dLSODIUM 
127.0 mmol/LPOTASSIUM 5.30 mmol/LCHLORIDE 93.0 mmol/LCO2 18.0 mmol/LBUN 9.0 mg/
dLCREATININE 0.70 mg/dLSGOT/AST 62.0 IU/LSGPT/ALT 46.0 IU/LALK PHOS 100.0 IU/
LTOTAL PROTEIN 8.60 g/dLALBUMIN 4.90 g/dLTOTAL BILI 0.60 mg/dLCALCIUM 9.90 mg/
dLAGE 65 GFR NonAA 84 GFR  eGFR >60 mL/min/1.73 m2eGFR AA* >60 WBC 6.9 
RBC 4.48 HGB 14.40 g/dLHCT 40.90 %MCV 91.0 fLMCH 32.10 pgMCHC 35.20 g/dLRDW SD 
41 RDW CV 12.50 %MPV 9.30 fLPLT 260 NRBC# 0.00 NRBC% 0.0 %NEUT 74.90 %%LYMP 
15.10 %%MONO 9.40 %%EOS 0.30 %%BASO 0.30 %#NEUT 5.15 #LYMP 1.04 #MONO 0.65 #EOS 
0.02 #BASO 0.02 MANUAL DIFF NOT IND 

 

 2011 9:07 AM  GLUCOSE 92.0 mg/dLSODIUM 131.0 mmol/LPOTASSIUM 4.20 mmol/
LCHLORIDE 99.0 mmol/LCO2 22.0 mmol/LBUN 9.0 mg/dLCREATININE 0.70 mg/dLCALCIUM 
9.20 mg/dLAGE 65 GFR NonAA 84 GFR  eGFR >60 mL/min/1.73 m2eGFR AA* >60 

 

 2011 11:06 AM  TSH 0.510 uIU/mLGLUCOSE 99.0 mg/dLSODIUM 132.0 mmol/
LPOTASSIUM 3.50 mmol/LCHLORIDE 95.0 mmol/LCO2 23.0 mmol/LBUN 9.0 mg/
dLCREATININE 0.80 mg/dLCALCIUM 10.40 mg/dLAGE 65 GFR NonAA 72 GFR AA 87 eGFR >
60 mL/min/1.73 m2eGFR AA* >60 

 

 2011 9:45 AM  SODIUM 132.0 mmol/L

 

 2011 9:27 AM  SODIUM 137.0 mmol/L

 

 2011 9:55 AM  TSH 1.070 uIU/mL

 

 2011 8:55 AM  GLUCOSE 102.0 mg/dLSODIUM 135.0 mmol/LPOTASSIUM 4.20 mmol/
LCHLORIDE 102.0 mmol/LCO2 24.0 mmol/LBUN 15.0 mg/dLCREATININE 0.80 mg/dLSGOT/
AST 30.0 IU/LSGPT/ALT 35.0 IU/LALK PHOS 119.0 IU/LTOTAL PROTEIN 7.50 g/
dLALBUMIN 4.30 g/dLTOTAL BILI 0.30 mg/dLCALCIUM 9.20 mg/dLAGE 65 GFR NonAA 72 
GFR AA 87 eGFR >60 mL/min/1.73 m2eGFR AA* >60 TSH 1.130 uIU/mL

 

 2011 8:55 AM  GLUCOSE 98.0 mg/dLSODIUM 137.0 mmol/LPOTASSIUM 3.90 mmol/
LCHLORIDE 103.0 mmol/LCO2 25.0 mmol/LBUN 14.0 mg/dLCREATININE 0.70 mg/dLSGOT/
AST 33.0 IU/LSGPT/ALT 36.0 IU/LALK PHOS 111.0 IU/LTOTAL PROTEIN 8.30 g/
dLALBUMIN 4.40 g/dLTOTAL BILI 0.50 mg/dLCALCIUM 9.40 mg/dLAGE 66 GFR NonAA 84 
GFR  eGFR >60 mL/min/1.73 m2eGFR AA* >60 TRIGLYCERIDES 109.0 mg/
dLCHOLESTEROL 153.0 mg/dLHDL 54.0 mg/dLTOT CHOL/HDL 2.8 LDL (CALC) 77.0 mg/
dLTSH 1.330 uIU/mL

 

 2011 10:17 AM  Colonoscopy-Women and Men over 50 Declined Mammogram -
Women over 40 Normal Pap Smear Negative 

 

 2012 10:33 AM  WET PREP NO TRICH SEEN CLUE CELLS NONE SEEN 

 

 2012 8:45 AM  WBC 5.2 RBC 3.96 HGB 12.70 g/dLHCT 37.80 %MCV 96.0 fLMCH 
32.10 pgMCHC 33.60 g/dLRDW SD 46 RDW CV 13.30 %MPV 9.70 fLPLT 297 NRBC# 0.00 
NRBC% 0.0 %NEUT 57.70 %%LYMP 28.50 %%MONO 10.30 %%EOS 2.50 %%BASO 1.0 %#NEUT 
3.02 #LYMP 1.49 #MONO 0.54 #EOS 0.13 #BASO 0.05 MANUAL DIFF NOT IND GLUCOSE 
97.0 mg/dLSODIUM 137.0 mmol/LPOTASSIUM 4.40 mmol/LCHLORIDE 101.0 mmol/LCO2 23.0 
mmol/LBUN 17.0 mg/dLCREATININE 0.80 mg/dLSGOT/AST 30.0 IU/LSGPT/ALT 33.0 IU/
LALK PHOS 95.0 IU/LTOTAL PROTEIN 7.40 g/dLALBUMIN 4.40 g/dLTOTAL BILI 0.60 mg/
dLCALCIUM 9.70 mg/dLAGE 66 GFR NonAA 72 GFR AA 87 eGFR 60 eGFR AA* 60 
TRIGLYCERIDES 130.0 mg/dLCHOLESTEROL 157.0 mg/dLHDL 56.0 mg/dLTOT CHOL/HDL 2.8 
LDL (CALC) 75.0 mg/dLTSH 0.940 uIU/mL

 

 2012 8:45 AM  WBC 5.1 RBC 3.91 HGB 12.50 g/dLHCT 36.30 %MCV 93.0 fLMCH 
32.0 pgMCHC 34.40 g/dLRDW SD 43 RDW CV 12.60 %MPV 9.30 fLPLT 244 NRBC# 0.00 NRBC
% 0.0 %NEUT 57.60 %%LYMP 27.50 %%MONO 9.10 %%EOS 5.0 %%BASO 0.80 %#NEUT 2.91 #
LYMP 1.39 #MONO 0.46 #EOS 0.25 #BASO 0.04 MANUAL DIFF NOT IND 

 

 12/10/2012 8:34 AM  Colonoscopy-Women and Men over 50 Declined Mammogram -
Women over 40 Declined Pap Smear Declined 

 

 2012 5:02 PM  WET PREP NO TRICH SEEN CLUE CELLS NONE SEEN 

 

 2013 8:25 AM  WBC 4.2 RBC 3.96 HGB 12.90 g/dLHCT 37.0 %MCV 93.0 fLMCH 
32.60 pgMCHC 34.90 g/dLRDW SD 43 RDW CV 12.60 %MPV 9.70 fLPLT 254 NRBC# 0.00 
NRBC% 0.0 %NEUT 54.40 %%LYMP 30.40 %%MONO 10.80 %%EOS 3.40 %%BASO 1.0 %#NEUT 
2.26 #LYMP 1.26 #MONO 0.45 #EOS 0.14 #BASO 0.04 MANUAL DIFF NOT IND TSH 1.230 
uIU/mLGLUCOSE 94.0 mg/dLSODIUM 136.0 mmol/LPOTASSIUM 4.30 mmol/LCHLORIDE 99.0 
mmol/LCO2 25.0 mmol/LBUN 10.0 mg/dLCREATININE 0.80 mg/dLSGOT/AST 36.0 IU/LSGPT/
ALT 36.0 IU/LALK PHOS 84.0 IU/LTOTAL PROTEIN 7.90 g/dLALBUMIN 4.40 g/dLTOTAL 
BILI 0.70 mg/dLCALCIUM 9.80 mg/dLAGE 67 GFR NonAA 72 GFR AA 87 eGFR 60 eGFR AA* 
60 TRIGLYCERIDES 122.0 mg/dLCHOLESTEROL 141.0 mg/dLHDL 47.0 mg/dLTOT CHOL/HDL 
3.0 LDL (CALC) 70.0 mg/dL

 

 2013 8:45 AM  WBC 5.3 RBC 4.01 HGB 13.0 g/dLHCT 37.60 %MCV 94.0 fLMCH 
32.40 pgMCHC 34.60 g/dLRDW SD 43 RDW CV 12.50 %MPV 9.40 fLPLT 248 NRBC# 0.00 
NRBC% 0.0 %NEUT 58.70 %%LYMP 27.80 %%MONO 9.80 %%EOS 2.80 %%BASO 0.90 %#NEUT 
3.12 #LYMP 1.48 #MONO 0.52 #EOS 0.15 #BASO 0.05 MANUAL DIFF NOT IND TSH 1.570 
uIU/mLGLUCOSE 94.0 mg/dLSODIUM 134.0 mmol/LPOTASSIUM 4.10 mmol/LCHLORIDE 101.0 
mmol/LCO2 23.0 mmol/LBUN 11.0 mg/dLCREATININE 0.80 mg/dLSGOT/AST 41.0 IU/LSGPT/
ALT 44.0 IU/LALK PHOS 109.0 IU/LTOTAL PROTEIN 7.90 g/dLALBUMIN 4.70 g/dLTOTAL 
BILI 0.80 mg/dLCALCIUM 10.40 mg/dLAGE 68 GFR NonAA 72 GFR AA 87 eGFR >60 mL/min/
1.73 m2eGFR AA* >60 TRIGLYCERIDES 163.0 mg/dLCHOLESTEROL 138.0 mg/dLHDL 49.0 mg/
dLTOT CHOL/HDL 2.8 LDL (CALC) 56.0 mg/dL

 

 2014 8:58 AM  GLUCOSE 86.0 mg/dLSODIUM 134.0 mmol/LPOTASSIUM 4.20 mmol/
LCHLORIDE 99.0 mmol/LCO2 25.0 mmol/LBUN 14.0 mg/dLCREATININE 0.80 mg/dLCALCIUM 
10.10 mg/dLAGE 68 GFR NonAA 72 GFR AA 87 eGFR >60 mL/min/1.73 m2eGFR AA* >60 
FREE T4 1.18 TSH 1.20 uIU/mL

 

 2014 9:50 AM  WBC 5.7 RBC 4.03 HGB 12.90 g/dLHCT 37.90 %MCV 94.0 fLMCH 
32.0 pgMCHC 34.0 g/dLRDW SD 44 RDW CV 12.70 %MPV 9.70 fLPLT 292 NRBC# 0.00 NRBC
% 0.0 %NEUT 62.70 %%LYMP 21.80 %%MONO 11.0 %%EOS 3.40 %%BASO 1.10 %#NEUT 3.55 #
LYMP 1.23 #MONO 0.62 #EOS 0.19 #BASO 0.06 MANUAL DIFF NOT IND GLUCOSE 98.0 mg/
dLSODIUM 135.0 mmol/LPOTASSIUM 4.30 mmol/LCHLORIDE 102.0 mmol/LCO2 22.0 mmol/
LBUN 10.0 mg/dLCREATININE 0.80 mg/dLSGOT/AST 34.0 IU/LSGPT/ALT 32.0 IU/LALK 
PHOS 95.0 IU/LTOTAL PROTEIN 7.70 g/dLALBUMIN 4.30 g/dLTOTAL BILI 0.80 mg/
dLCALCIUM 9.10 mg/dLAGE 68 GFR NonAA 72 GFR AA 87 eGFR 60 eGFR AA* 60 
TRIGLYCERIDES 108.0 mg/dLCHOLESTEROL 146.0 mg/dLHDL 56.0 mg/dLTOT CHOL/HDL 2.6 
LDL (CALC) 68.0 mg/dLTSH 0.90 uIU/mL

 

 2014 8:40 AM  WBC 4.4 RBC 4.13 HGB 13.20 g/dLHCT 38.90 %MCV 94.0 fLMCH 
32.0 pgMCHC 33.90 g/dLRDW SD 47 RDW CV 13.50 %MPV 9.80 fLPLT 279 NRBC# 0.00 NRBC
% 0.0 %NEUT 63.80 %%LYMP 24.60 %%MONO 9.30 %%EOS 1.60 %%BASO 0.70 %#NEUT 2.80 #
LYMP 1.08 #MONO 0.41 #EOS 0.07 #BASO 0.03 MANUAL DIFF NOT IND GLUCOSE 96.0 mg/
dLSODIUM 136.0 mmol/LPOTASSIUM 4.10 mmol/LCHLORIDE 99.0 mmol/LCO2 23.0 mmol/
LBUN 8.0 mg/dLCREATININE 0.80 mg/dLSGOT/AST 31.0 IU/LSGPT/ALT 27.0 IU/LALK PHOS 
85.0 IU/LTOTAL PROTEIN 7.60 g/dLALBUMIN 4.40 g/dLTOTAL BILI 0.80 mg/dLCALCIUM 
10.20 mg/dLAGE 69 GFR NonAA 71 GFR AA 86 eGFR 60 eGFR AA* 60 TRIGLYCERIDES 61.0 
mg/dLCHOLESTEROL 148.0 mg/dLHDL 71.0 mg/dLTOT CHOL/HDL 2.1 LDL (CALC) 65.0 mg/
dLTSH 0.990 uIU/mL

 

 2015 8:32 AM  WBC 4.8 RBC 3.98 HGB 12.70 g/dLHCT 37.30 %MCV 94.0 fLMCH 
31.90 pgMCHC 34.0 g/dLRDW SD 43 RDW CV 12.70 %MPV 9.50 fLPLT 270 NRBC# 0.00 NRBC
% 0.0 %NEUT 57.30 %%LYMP 25.0 %%MONO 13.0 %%EOS 3.60 %%BASO 1.10 %#NEUT 2.73 #
LYMP 1.19 #MONO 0.62 #EOS 0.17 #BASO 0.05 MANUAL DIFF NOT IND TSH 0.640 uIU/
mLGLUCOSE 90.0 mg/dLSODIUM 136.0 mmol/LPOTASSIUM 4.0 mmol/LCHLORIDE 101.0 mmol/
LCO2 24.0 mmol/LBUN 10.0 mg/dLCREATININE 0.80 mg/dLSGOT/AST 34.0 IU/LSGPT/ALT 
32.0 IU/LALK PHOS 92.0 IU/LTOTAL PROTEIN 7.50 g/dLALBUMIN 4.40 g/dLTOTAL BILI 
0.70 mg/dLCALCIUM 9.50 mg/dLAGE 69 GFR NonAA 71 GFR AA 86 eGFR >60 mL/min/1.73 
m2eGFR AA* >60 TRIGLYCERIDES 107.0 mg/dLCHOLESTEROL 138.0 mg/dLHDL 58.0 mg/
dLTOT CHOL/HDL 2.4 LDL (CALC) 59.0 mg/dL

 

 2015 8:31 AM  WBC 4.6 RBC 3.97 HGB 12.90 g/dLHCT 38.0 %MCV 96.0 fLMCH 
32.50 pgMCHC 33.90 g/dLRDW SD 44 RDW CV 12.60 %MPV 9.0 fLPLT 248 NRBC# 0.00 NRBC
% 0.0 %NEUT 61.0 %%LYMP 24.70 %%MONO 10.20 %%EOS 3.0 %%BASO 1.10 %#NEUT 2.82 #
LYMP 1.14 #MONO 0.47 #EOS 0.14 #BASO 0.05 MANUAL DIFF NOT IND TRIGLYCERIDES 
105.0 mg/dLCHOLESTEROL 141.0 mg/dLHDL 58.0 mg/dLTOT CHOL/HDL 2.4 LDL (CALC) 
62.0 mg/dLGLUCOSE 93.0 mg/dLSODIUM 136.0 mmol/LPOTASSIUM 4.10 mmol/LCHLORIDE 
101.0 mmol/LCO2 25.0 mmol/LBUN 9.0 mg/dLCREATININE 0.80 mg/dLSGOT/AST 32.0 IU/
LSGPT/ALT 28.0 IU/LALK PHOS 95.0 IU/LTOTAL PROTEIN 7.30 g/dLALBUMIN 4.50 g/
dLTOTAL BILI 0.80 mg/dLCALCIUM 9.70 mg/dLAGE 70 GFR NonAA 71 GFR AA 86 eGFR >60 
mL/min/1.73meGFR AA* >60 TSH 1.10 uIU/mL







History Of Immunizations







 Name  Date Admin  Mfg Name  Mfg Code  Trade Name  Lot#  Route  Inj  Vis Given  
Vis Pub  CVX

 

 Influenza  10/20/2010  [employer] pasteur  [facility]  FLUZONE  [id number]  Intramuscular  
Left Arm  10/20/2010  2010  999

 

 Influenza  10/28/2011  [employer] pasteur  [employer]  FLUZONE  [id number]  Intramuscular  
Left Arm  10/28/2011  2011  141

 

 Influenza  10/29/2012  [employer] pasteur  [facility]  FLUZONE  [id number]  Intramuscular  
Left Deltoid  10/29/2012  2012  141

 

 X  12/10/2012  [employer]  MSD  PNEUMOVAX 23  [id number]  Intramuscular  
Left Gluteous Medius  12/10/2012  2010  33

 

 Influenza  10/28/2013  [employer] pasteur  [facility]  Fluzone > 3 Years  [id number]  
Intramuscular  Right Deltoid  10/28/2013  2013  141

 

 X  9/2/2015  Not Entered  NE  PREVNAR 13     Not Entered  Not Entered  1  109

 

 Influenza  2015  Not Entered  NE  Not Entered     Not Entered  Not Entered
  2015  141

 

 HepB Adult  2016  [employer]  MSD  RECOMBIVAX-ADULT  [id number]  Not 
Entered  Left Deltoid  2016  43

 

 HepB Adult  2016  [employer]  MSD  RECOMBIVAX-ADULT  [id number]  
Intramuscular  Right Deltoid  2016  43

 

 ZVL  2012  Not Entered  NE  Not Entered     Not Entered  Not Entered  1  121

 

 Tdap  2012  Not Entered  NE  Not Entered     Not Entered  Not Entered  1  115

 

 Influenza  10/13/2016  Not Entered  NE  FLUZONE-HIGH DOSE     Intramuscular  
Left Arm  1  141

 

 HepB Adult  2016  [employer]  MSD  RECOMBIVAX-ADULT  [id number]  
Intramuscular  Right Deltoid  2016  43

 

 Influenza  10/30/2017  Not Entered  NE  Fluarix, quadrivalent, preservative 
free     Intramuscular  Left Arm  2017  150







History of Past Illness







 Name  Date of Onset  Comments

 

 Benign Essential Hypertension  Dec 14 2009 10:10AM   

 

 Hyperlipidemia  Dec 14 2009 10:10AM   

 

 Hypothyroidism, Acquired  Dec 14 2009 10:10AM   

 

 hypothyroid      

 

 Hypertension      

 

 Hyperlipidemia      

 

 acid reflux      

 

 Hypertension, Benign Essential  2010  9:41AM   

 

 Hypertension, Benign Essential  2010 12:53PM   

 

 Routine gynecological examination  May  5 2010  9:28AM   

 

 Hypertension  May  5 2010  9:28AM   

 

 Hyperlipidemia, unspecified  May  5 2010  9:28AM   

 

 Hypothyroidism, Acquired  May  5 2010  9:28AM   

 

 Vulvovaginitis  May  5 2010  9:28AM   

 

 Rhinitis, Allergic  May  5 2010  9:28AM   

 

 Hypertension, Benign Essential  May 19 2010  8:48AM   

 

 Hypertension, Benign Essential  May 25 2010  9:39AM   

 

 Flu  Oct 20 2010 12:01PM   

 

 Essential Hypertension  2010  8:34AM   

 

 Hyperlipidemia  2010  8:34AM   

 

 Gastroesophageal Reflux  2010  8:34AM   

 

 Hypothyroidism, Acquired  2010  8:34AM   

 

 Hypertension, Benign Essential  2010  9:22AM   

 

 Hypertension, Benign Essential  Dec 15 2010  9:07AM   

 

 Essential Hypertension  2011  1:31PM   

 

 Hyperlipidemia  2011  1:31PM   

 

 Gastroesophageal Reflux  2011  1:31PM   

 

 Hypothyroidism, Acquired  2011  1:31PM   

 

 Essential Hypertension  2011  8:51AM   

 

 Hyperlipidemia  2011  8:51AM   

 

 Gastroesophageal Reflux  2011  8:51AM   

 

 Hypothyroidism, Acquired  2011  8:51AM   

 

 Essential Hypertension  2011  9:13AM   

 

 Hyperlipidemia  2011  9:13AM   

 

 Gastroesophageal Reflux  2011  9:13AM   

 

 Hypothyroidism, Acquired  2011  9:13AM   

 

 Nausea With Vomiting  2011  1:18PM   

 

 Hyponatremia  2011  8:46AM   

 

 Nausea  2011  9:13AM   

 

 Hypothyroidism  2011 11:10AM   

 

 Hyponatremia  2011 11:10AM   

 

 Essential Hypertension  2011 10:05AM   

 

 Hyperlipidemia  2011 10:05AM   

 

 Gastroesophageal Reflux  2011 10:05AM   

 

 Nausea  2011 10:05AM   

 

 Hypothyroidism, Acquired  2011 10:05AM   

 

 Hyponatremia  May  6 2011 11:34AM   

 

 Essential Hypertension  May 16 2011  8:50AM   

 

 Hyperlipidemia  May 16 2011  8:50AM   

 

 Gastroesophageal Reflux  May 16 2011  8:50AM   

 

 Nausea  May 16 2011  8:50AM   

 

 Hypothyroidism, Acquired  May 16 2011  8:50AM   

 

 Hyponatremia  May 16 2011  8:50AM   

 

 Routine gynecological examination  2011  8:54AM   

 

 Hypertension  2011  8:54AM   

 

 Hyperlipidemia, unspecified  2011  8:54AM   

 

 Hypothyroidism, Acquired  2011  8:54AM   

 

 Rhinitis, Allergic  2011  8:54AM   

 

 Hypothyroidism  Aug 29 2011 12:37PM   

 

 Hyponatremia  Aug 29 2011 12:37PM   

 

 Essential Hypertension  Sep 12 2011  8:53AM   

 

 Hyperlipidemia  Sep 12 2011  8:53AM   

 

 Gastroesophageal Reflux  Sep 12 2011  8:53AM   

 

 Hypothyroidism, Acquired  Sep 12 2011  8:53AM   

 

 Hyponatremia  Sep 12 2011  8:53AM   

 

 Hypertension  Sep 29 2011  9:41AM   

 

 Hyperlipidemia  Dec  8 2011  8:31AM   

 

 Hypothyroidism  Dec  8 2011  8:31AM   

 

 Hypertension  Dec  8 2011  8:31AM   

 

 Flu  Dec  9 2011 10:02AM   

 

 Essential Hypertension  Dec 13 2011 10:13AM   

 

 Hyperlipidemia  Dec 13 2011 10:13AM   

 

 Gastroesophageal Reflux  Dec 13 2011 10:13AM   

 

 Hypothyroidism, Acquired  Dec 13 2011 10:13AM   

 

 Hyponatremia  Dec 13 2011 10:13AM   

 

 Vaginal Discharge  2012  9:43AM   

 

 Rhinitis, Allergic  Feb 15 2012  9:31AM   

 

 Eustachian Tube Dysfunction  Feb 15 2012  9:31AM   

 

 Pharyngitis, Acute  Feb 15 2012  9:31AM   

 

 Routine gynecological examination  2012  8:48AM   

 

 Hypertension  2012  8:48AM   

 

 Hyperlipidemia, unspecified  2012  8:48AM   

 

 Hypothyroidism, Acquired  2012  8:48AM   

 

 Rhinitis, Allergic  2012  8:48AM   

 

 Candidiasis, Vulvovaginal  2012 11:04AM   

 

 Essential Hypertension  Aug 15 2012  9:02AM   

 

 Hyperlipidemia  Aug 15 2012  9:02AM   

 

 Gastroesophageal Reflux  Aug 15 2012  9:02AM   

 

 Hypothyroidism, Acquired  Aug 15 2012  9:02AM   

 

 Hyponatremia  Aug 15 2012  9:02AM   

 

 Atrophic Vaginitis, Postmenopausal  Aug 15 2012  9:02AM   

 

 Flu  Oct 29 2012  9:24AM   

 

 Essential Hypertension  Dec 10 2012  8:35AM   

 

 Hyperlipidemia  Dec 10 2012  8:35AM   

 

 Gastroesophageal Reflux  Dec 10 2012  8:35AM   

 

 Hypothyroidism, Acquired  Dec 10 2012  8:35AM   

 

 Hyponatremia  Dec 10 2012  8:35AM   

 

 Atrophic Vaginitis, Postmenopausal  Dec 10 2012  8:35AM   

 

 Pneumococcus  Dec 10 2012  2:07PM   

 

 Vaginal Discharge  Dec 20 2012  1:50PM   

 

 Essential Hypertension  Dec 20 2012  1:50PM   

 

 Hyperlipidemia  Dec 20 2012  1:50PM   

 

 Gastroesophageal Reflux  Dec 20 2012  1:50PM   

 

 Hypothyroidism, Acquired  Dec 20 2012  1:50PM   

 

 Atrophic Vaginitis, Postmenopausal  Dec 20 2012  1:50PM   

 

 Upper Respiratory Infections  2013  8:52AM   

 

 Hyperlipidemia  2013  8:21AM   

 

 Hypertension  2013  8:21AM   

 

 Hypothyroidism  2013  8:21AM   

 

 Screening Mammogram  Beto 10 2013  8:45AM   

 

 Routine gynecological examination  Beto 10 2013  8:34AM   

 

 Hypertension  Beto 10 2013  8:34AM   

 

 Hyperlipidemia, unspecified  Beto 10 2013  8:34AM   

 

 Hypothyroidism, Acquired  Beto 10 2013  8:34AM   

 

 Rhinitis, Allergic  Beto 10 2013  8:34AM   

 

 Flu  Oct 28 2013  8:52AM   

 

 Essential Hypertension  Dec  9 2013  8:37AM   

 

 Hyperlipidemia  Dec  9 2013  8:37AM   

 

 Gastroesophageal Reflux  Dec  9 2013  8:37AM   

 

 Hypothyroidism, Acquired  Dec  9 2013  8:37AM   

 

 Atrophic Vaginitis, Postmenopausal  Dec  9 2013  8:37AM   

 

 Hypertension  2014  9:56AM   

 

 Hyperlipidemia  2014  9:56AM   

 

 Hypothyroidism  2014  9:56AM   

 

 Screening Mammogram  2014  8:32AM   

 

 Osteopenia  2014  8:32AM   

 

 Hypertension  2014  8:35AM   

 

 Hypothyroidism, Acquired  2014  8:35AM   

 

 Hyperlipidemia  2014  8:35AM   

 

 Upper Respiratory Infections  2014  9:28AM   

 

 Sinusitis, Acute  Oct  2 2014  9:17AM   

 

 Herpes Zoster  Oct  5 2014  1:44PM   

 

 Vesicular Eruption  Oct  5 2014  1:44PM   

 

 Hypertension  2014  8:18AM   

 

 Hyperlipidemia  2014  8:18AM   

 

 hypothyroid  2014  8:18AM   

 

 Situational anxiety  Dec 20 2014  9:33AM   

 

 GERD (gastroesophageal reflux disease)  Dec 20 2014  9:33AM   

 

 Nausea  Dec 20 2014  9:33AM   

 

 Abdominal Pain, Epigastric  Dec 20 2014  9:33AM   

 

 Hyperlipidemia  Oct  6 2014  9:03AM   

 

 acid reflux  Oct  6 2014  9:03AM   

 

 hypothyroid  Oct  6 2014  9:03AM   

 

 Shingles  Oct  6 2014  9:03AM   

 

 Pharyngitis  2015  1:09PM   

 

 Bronchitis  2015  9:10AM   

 

 Hyperlipidemia  2015  9:10AM   

 

 acid reflux  2015  9:10AM   

 

 hypothyroid  2015  9:10AM   

 

 Hyperlipidemia  2015  8:18AM   

 

 Hypertension  2015  8:18AM   

 

 hypothyroid  2015  8:18AM   

 

 Screening Mammogram  2015 11:43AM   

 

 Medicare Annual Pap Q 2 years  2015  9:36AM   

 

 Screening Mammogram  2015  9:36AM   

 

 Candidiasis of female genitalia  2015  9:36AM   

 

 Hypertension  2015 11:34AM   

 

 Hyperlipidemia, unspecified  2015 11:34AM   

 

 Hypothyroidism, Acquired  2015 11:34AM   

 

 Osteopenia  2016  8:41AM   

 

 Encounter for screening mammogram for breast cancer  2016  8:41AM   

 

 Hypertension  2016  8:34AM   

 

 Lymphedema  2016  8:34AM   

 

 Hyperlipidemia  2016  8:34AM   

 

 hypothyroid  2016  8:34AM   

 

 HEP B  2016  9:13AM   

 

 HEP B  2016  8:52AM   

 

 Acid Reflux  2016  8:34AM   

 

 History of acute gastritis  Oct 13 2016  2:30PM   

 

 Anxiety as acute reaction to exceptional stress  Oct 13 2016  2:30PM   

 

 HEP B  Dec 29 2016  8:42AM   

 

 Caregiver stress syndrome  May 26 2017  8:28AM   

 

 Anxiety  May 26 2017  8:28AM   

 

 Blood in stool  2017  2:54PM   

 

 Upper GI bleed  2017 11:34AM   

 

 Hyponatremia  2017  9:03AM   

 

 Hyponatremia  2017  8:43AM   

 

 Medicare Annual Pap Q 2 years  2017  9:03AM   

 

 Nausea  2017  9:03AM   

 

 Uterine enlargement  2017  9:03AM   

 

 Encounter for screening mammogram for breast cancer  2017  4:56PM   

 

 Panic disorder [episodic paroxysmal anxiety]  Oct 10 2017  2:02PM   

 

 Acute stress reaction  Oct 10 2017  2:02PM   

 

 Caregiver stress syndrome  Oct 10 2017  2:02PM   

 

 Stress reaction causing mixed disturbance of emotion and conduct  Oct 18 2017  
9:05AM   

 

 Ankle strain, left, initial encounter  Dec 19 2017  8:29AM   

 

 Excoriation of ear canal, left, initial encounter  Dec 19 2017  8:29AM   

 

 Generalized anxiety disorder  Dec 19 2017  8:29AM   

 

 Grief reaction  Dec 19 2017  8:29AM   

 

 Acute non-recurrent frontal sinusitis  2018 11:58AM   

 

 Cough  2018 11:58AM   

 

 Hypertension  2018  8:10AM   

 

 Hyperlipidemia, unspecified  2018  8:10AM   

 

 Hypothyroidism, Acquired  2018  8:10AM   

 

 Situational anxiety  Mar  6 2018  8:30AM   

 

 Nausea  Mar  6 2018  8:30AM   

 

 LESLIE (generalized anxiety disorder)  May 22 2018  8:47AM   

 

 Caregiver stress syndrome  May 22 2018  8:47AM   

 

 Situational anxiety  May 29 2018  8:26AM   

 

 Osteopenia  May 29 2018  8:26AM   

 

 Visit for screening mammogram  May 29 2018  8:26AM   

 

 Anxiety Disorder  2018  9:09AM   







Payers







 Insurance Name  Company Name  Plan Name  Plan Number  Policy Number  Policy 
Group Number  Start Date

 

    Medicare RHC Medicare RHC     [id number]     N/A

 

    [facility]     [id number]     2009

 

    Medicare Part B  [place]     [id number]     N/A

 

    Medicare Part A  Medicare Part A     [id number]     N/A

 

    Medicare Part A  [employer] - Preventive     [id number]     N/A

 

    Medicare Part A  Medicare - Lab/Xray     [id number]     N/A







History of Encounters







 Visit Date  Visit Type  Provider

 

 2018  Office visit  Marvin Boyer DO

 

 2018  Office visit  Marvin Boyer DO

 

 2018  Office visit  Doris Noriega MD

 

 3/6/2018  Office visit  Doris Noriega MD

 

 2018  Office visit  Deedee Carter APRN

 

 2017  Office visit  Doris Noriega MD

 

 10/18/2017  Office visit  Doris Noriega MD

 

 10/10/2017  Office visit  Doris Noriega MD

 

 2017  Office visit  Doris Noriega MD

 

 2017  Office visit  Doris Noriega MD

 

 2017  Office visit  Doris Noriega MD

 

 2017  Office visit  Prince Noe APRN

 

 2016  Nurse visit  Doris Noriega MD

 

 10/13/2016  Office visit  Doris Noriega MD

 

 2016  Nurse visit  Doris Noriega MD

 

 2016  Office visit  Doris Noriega MD

 

 2015  Office visit  Doris Noriega MD

 

 2015  Office visit  Doris Noriega MD

 

 2015  Office visit  Prince Noe APRN

 

 2014  Office visit  Anyi Herrera APRN

 

 10/27/2014  Voided  Doris Noriega MD

 

 10/6/2014  Office visit  Doris Noriega MD

 

 10/5/2014  Office visit  Anyi Herrera APRN

 

 10/2/2014  Office visit   

 

 10/2/2014  Office visit  Corrine Bay APRN

 

 2014  Office visit  Kassie Franklin APRN

 

 2014  Office visit  Doris Noriega MD

 

 2014  Office visit  Doris Noriega MD

 

 2013  Office visit  Dee Colindres MD

 

 10/28/2013  Nurse visit  Dee Colindres MD

 

 6/10/2013  Office visit  Dee Colindres MD

 

 2013  Office visit  Corrine Bay APRN

 

 2012  Office visit  Dee Colindres MD

 

 12/10/2012  Office visit  Dee Colindres MD

 

 10/29/2012  Nurse visit  Dee Colindres MD

 

 8/15/2012  Office visit  Dee Colindres MD

 

 2012  Office visit  Corrine Bay APRN

 

 2012  Office visit  Dee Colindres MD

 

 2/15/2012  Office visit  Dee Colindres MD

 

 2012  Office visit  Corrine Bay APRN

 

 2011  Office visit  Dee Colidnres MD

 

 10/28/2011  Nurse visit  Shad Nolasco MD

 

 2011  Office visit  Dee Colindres MD

 

 2011  Office visit  Dee Colindres MD

 

 2011  Office visit  Dee Colindres MD

 

 2011  Office visit  Dee Colindres MD

 

 2011  Office visit  Dee Colindres MD

 

 2011  Office visit  Dee Colindres MD

 

 4/10/2011  [facility]  KHRIS Reeves MD

 

 4/10/2011  [facility]  RICKEY Toussaint MD

 

 2011  Office visit  RICKEY Toussaint MD

 

 2011  [facility]  Fide Castellanos MD

 

 2011  Voided  Fide Castellanos MD

 

 2011  Office visit  Dee Colindres MD

 

 12/15/2010  Nurse visit  Dee Colindres MD

 

 2010  Office visit  Dee Colindres MD

 

 10/20/2010  Nurse visit  Stephenie PERAZA

 

 2010  Nurse visit  Dee Colindres MD

 

 2010  Nurse visit  Dee Colindres MD

 

 2010  Office visit  Dee Colindres MD

 

 3/19/2010  Voided  Stephenie PERAZA

 

 2010  Nurse visit  Stephenie PERAZA

 

 2010  Nurse visit  Stephenie PERAZA

 

 2010  Nurse visit  Stephenie PERAZA

 

 2009  Office visit  Stephenie PERAZA

 

 10/20/2009  Nurse visit  Stephenie Kay PA

## 2018-10-22 NOTE — XMS REPORT
MU2 Ambulatory Summary

 Created on: 2018



Milady Crespo

External Reference #: [id number]

: 1945

Sex: Female



Demographics







 Address  [address]

[place]  [address]

 

 Home Phone  [phone]

 

 Preferred Language  English

 

 Marital Status  

 

 [employer] Affiliation  Unknown

 

 Race  White

 

 Ethnic Group  Not  or 





Author







 Author  Marvin Boyer

 

 [facility]

 

 Address  [address]

[place], [place]  [id number]



 

 Phone  [phone]







Care Team Providers







 Care Team Member Name  Role  Phone

 

 Marvin Boyer  PCP  [phone]

 

 Ellis Noriegaole  PreferredProvider  [phone]







Allergies and Adverse Reactions







 Name  Reaction  Notes

 

 NO KNOWN DRUG ALLERGIES      







Plan of Treatment







 Planned Activity  Comments  Planned Date  Planned Time  Plan/Goal

 

 Complete blood count (CBC) with differential count     2016  12:00 AM   

 

 Comprehensive metabolic panel     2016  12:00 AM   

 

 VITAMIN D (25 HYDROXY)     2016  12:00 AM   

 

 Lipid panel (total cholesterol, lipoproteins, HDL, triglycerides)     8/15/
2012  12:00 AM   

 

 Pap smear     2017  12:00 AM   

 

 Comprehensive Metabolic Panel     6/10/2013  12:00 AM   

 

 Lipid panel (total cholesterol, lipoproteins, HDL, triglycerides)     6/10/
2013  12:00 AM   

 

 Thyroid stimulating hormone (TSH)     6/10/2013  12:00 AM   

 

 CBC (automated H&H, platelets, WBC and automated differential)     6/10/2013  
12:00 AM   

 

 Comprehensive Metabolic Panel     2012  12:00 AM   

 

 Lipid panel (total cholesterol, lipoproteins, HDL, triglycerides)     2012  12:00 AM   

 

 Thyroid stimulating hormone (TSH)     2012  12:00 AM   

 

 CBC (automated H&H, platelets, WBC and automated differential)     2012  
12:00 AM   

 

 Mammogram, screening, bilateral     2018  12:00 AM   

 

 DEXA     2018  12:00 AM   

 

 Screening mammography, bilateral     2015  12:00 AM   







Medications







 Active 

 

 Name  Start Date  Estimated Completion Date  SIG  Comments

 

 aspirin 81 mg oral tablet,delayed release (DR/EC)        take 1 tablet (81 mg) 
by oral route once daily   

 

 Vitamin D3 1,000 unit oral capsule        take 1 capsule by oral route daily   

 

 valsartan 160 mg oral tablet  2016     TAKE ONE TABLET BY MOUTH ONCE 
DAILY   

 

 amlodipine 5 mg oral tablet  2016     TAKE ONE TABLET BY MOUTH ONCE DAILY
   

 

 amlodipine 5 mg oral tablet  2016     TAKE ONE TABLET BY MOUTH ONCE DAILY
   

 

 valsartan 160 mg oral tablet  2016     TAKE ONE TABLET BY MOUTH ONCE 
DAILY   

 

 Zofran (as hydrochloride) 4 mg oral tablet  2016     take 1 tablet by 
oral route daily as needed for 14 days   

 

 Zofran (as hydrochloride) 4 mg oral tablet  2017     take 1 tablet by oral 
route daily as needed for 14 days   

 

 Zofran (as hydrochloride) 4 mg oral tablet  2017     take 1 tablet by oral 
route daily as needed for 14 days   

 

 Zofran (as hydrochloride) 4 mg oral tablet  2017     take 1 tablet by 
oral route daily as needed for 14 days   

 

 Zofran (as hydrochloride) 4 mg oral tablet  2017     take 1 tablet by 
oral route daily as needed for 14 days   

 

 EQ LORATADINE 10MG  TABS  2017     TAKE ONE TABLET BY MOUTH ONCE DAILY   

 

 pantoprazole 40 mg oral tablet,delayed release (DR/EC)  10/9/2017     take 1 
tablet (40 mg) by oral route once daily for 90 days   

 

 mirtazapine 15 mg oral tablet  2018     TAKE ONE-HALF TABLET BY MOUTH 
ONCE DAILY AT BEDTIME   

 

 amlodipine 5 mg oral tablet  2018     TAKE ONE TABLET BY MOUTH ONCE DAILY
   

 

 atenolol 50 mg oral tablet  2018     TAKE ONE TABLET BY MOUTH ONCE DAILY 
  

 

 clorazepate dipotassium 7.5 mg oral tablet  2018  take 1 
tablet PO  three times per day for situational anxiety   









  

 

 Name  Start Date  Expiration Date  SIG  Comments

 

 prednisone 20 mg oral tablet  2011  take 2 tablets by oral 
route daily for 5 days   

 

 calcium carbonate-vitamin D2 600-125 mg-unit oral tablet  8/15/2012  2012
  take 2 tablets by oral route daily   

 

 Phoenix 3 Fish Oil 900-1,400 mg oral capsule,delayed release(DR/EC)  8/15/2012  9
/  take 1 capsule by oral route daily   

 

 multivitamin Oral tablet  8/15/2012  2012  take 1 tablet by oral route 
daily   

 

 triamcinolone acetonide 0.1 % topical cream  2013  10/7/2013  APPLY A 
THIN LAYER TO THE AFFECTED AREA(S) BY TOPICAL ROUTE TWICE A DAY   

 

 famotidine 20 mg oral tablet  2014  take 1 tablet (20 mg) by 
oral route 2 times per day   

 

 amoxicillin 500 mg oral capsule  2014  take 1 capsule (500 mg) 
by oral route 3 times per day for 10 days   

 

 Zithromax Z-Tab 250 mg oral tablet  10/2/2014  10/7/2014  take 2 tablets (500 
mg) by oral route once daily for 1 day then 1 tablet (250 mg) by oral route 
once daily for 4 days   

 

 acyclovir 800 mg oral tablet  10/5/2014  10/12/2014  take 1 tablet (800 mg) by 
oral route 5 times per day for 7 days   

 

 gabapentin 300 mg oral capsule  10/9/2014  2014  take 1 capsule (300 mg) 
by oral route 3 times per day for 14 days   

 

 Carafate 100 mg/mL oral suspension  2014  take 10 milliliters 
(1 gram) by oral route 4 times per day on an empty stomach 1 hour before meals 
and at bedtime for 4 weeks   

 

 amlodipine 5 mg oral tablet  2015  TAKE ONE TABLET BY MOUTH 
EVERY DAY   

 

 levothyroxine 50 mcg oral tablet  2015  TAKE ONE TABLET BY MOUTH 
EVERY DAY   

 

 Diovan 160 mg oral tablet  2015  2/15/2016  TAKE ONE TABLET BY MOUTH 
EVERY DAY for 90 days   

 

 triamcinolone acetonide 0.1 % topical cream  2015  apply a thin 
layer to the affected area(s) by topical route 2 times per day for 14 days   

 

 fluconazole 150 mg oral tablet  2015  take 1 tablet (150 mg) 
by oral route once for 1 day   

 

 famotidine 20 mg oral tablet  2015     TAKE ONE TABLET BY MOUTH TWICE 
DAILY   

 

 Lipitor 20 mg oral tablet  10/13/2016  2018  take 1 tablet (20 mg) by oral 
route once daily   

 

 Plavix 75 mg oral tablet  10/13/2016  2018  take 1 tablet (75 mg) by oral 
route once daily   

 

 Zofran (as hydrochloride) 4 mg oral tablet  10/13/2016  10/27/2016  take 1 
tablet by oral route daily as needed for 14 days   

 

 Zofran (as hydrochloride) 4 mg oral tablet  2017     take 1 tablet by 
oral route daily as needed for 14 days   

 

 levothyroxine 50 mcg oral tablet  10/9/2017  10/9/2017  TAKE ONE TABLET BY 
MOUTH ONCE DAILY   

 

 ondansetron 4 mg oral tablet,disintegrating  10/10/2017  2017  dissolve  
1 tablet by oral route every 8 hours as needed for 30 days   

 

 valsartan 160 mg oral tablet  2017  TAKE ONE TABLET BY MOUTH 
ONCE DAILY   

 

 EQ LORATADINE 10MG  TABS  2018  TAKE ONE TABLET BY MOUTH ONCE 
DAILY IN THE EVENING   

 

 mirtazapine 15 mg oral tablet  2018  TAKE ONE-HALF TABLET BY 
MOUTH ONCE DAILY AT BEDTIME   









 Discontinued 

 

 Name  Start Date  Discontinued Date  SIG  Comments

 

 Lipitor 40 mg oral tablet     2009 TAB DAILY   

 

 ramipril 5 mg oral capsule     2009  take 1 capsule (5 mg) by oral route 
once daily   

 

 lovastatin 20 mg oral tablet  2009  take 1 tablet (20 mg) by 
oral route once daily with evening meal   

 

 propranolol 20 mg oral tablet  2010  take 1 tablet by oral 
route 2 times a day   

 

 Fosamax 70 mg oral tablet  2010  take 1 tablet (70 mg) by oral 
route once weekly in the morning, at least 30 minutes before the first food, 
beverage, or medication of the day   

 

 lisinopril 40 mg oral tablet  2010  4/10/2011  take 2 tablets by oral 
route daily   

 

 Zoloft 50 mg oral tablet  2011  take 1 tablet (50 mg) by oral 
route once daily   

 

 promethazine 25 mg oral tablet  2011  take 1 tablet by oral 
route every 6 hours as needed   

 

 Diovan -12.5 mg oral tablet  2011  take 1 tablet by oral 
route once daily for 30 days   

 

 Diflucan 150 mg oral tablet     2012  take 1 tablet (150 mg) by oral 
route once for 1 day   

 

 Diflucan 150 mg oral tablet  2012  8/15/2012  take 1 tablet (150 mg) by 
oral route once   

 

 Vitamin B-12 1,000 mcg oral tablet  8/15/2012  2014  take 1 tablet by 
oral route daily   

 

 Premarin 0.625 mg/gram vaginal cream  10/29/2012  6/10/2013  use 1 gram daily 
for a week then 1 gram twice a week   

 

 Medrol (Tab) 4 mg oral tablets,dose pack  2013  6/10/2013  take as 
directed   

 

 aspirin 325 mg oral tablet  2014  take 1 tablet (325 mg) by 
oral route once daily   

 

 Medrol (Tab) 4 mg oral tablets,dose pack  2014  10/2/2014  take as 
directed   

 

 Tetracaine Lollipops Lollipop  2015  Use as directed   

 

 Allergy Relief (loratadine) 10 mg oral tablet  2016     TAKE ONE TABLET 
BY MOUTH ONCE DAILY   

 

 Allergy Relief (loratadine) 10 mg oral tablet  2017  TAKE ONE 
TABLET BY MOUTH ONCE DAILY   

 

 Zoloft 50 mg oral tablet  2016  take 1 tablet (50 mg) by oral 
route once daily for 90 days  Pt refuses to take...

 

 triamcinolone acetonide 0.1 % topical cream  2017     APPLY A THIN LAYER 
OF CREAM EXTERNALLY TO AFFECTED AREA TWICE DAILY FOR 14 DAYS   

 

 loratadine 10 mg oral tablet  2017  TAKE 1 TABLET BY MOUTH 
EVERY DAY IN THE EVENING   

 

 triamcinolone acetonide 0.1 % topical cream  2017  APPLY  
CREAM EXTERNALLY TO AFFECTED AREA TWICE DAILY FOR 14 DAYS   

 

 Lexapro 10 mg oral tablet  2017  take 1 tablet (10 mg) by oral 
route once daily for 90 days   

 

 Augmentin 875-125 mg oral tablet  2018  3/6/2018  take 1 tablet by oral 
route every 12 hours for 7 days   

 

 benzonatate 100 mg oral capsule  2018  3/6/2018  take 1 capsule (100 mg) 
by oral route 3 times per day as needed for cough   







Problem List







 Description  Status  Onset

 

 Hyperlipidemia  Active   

 

 acid reflux  Active   

 

 Hypertension  Active   

 

 hypothyroid  Active   







Vital Signs







 Date  Time  BP-Sys(mm[Hg]  BP-Morena(mm[Hg])  HR(bpm)  RR(rpm)  Temp  WT  HT  HC  
BMI  BSA  BMI Percentile  O2 Sat(%)

 

 2018  8:24:00 AM  142 mmHg  70 mmHg  69 bpm  18 rpm  98.2 F  139 lbs  61 
in     26.2636 kg/m  1.6473 m     98 %

 

 2018  8:39:00 AM  116 mmHg  68 mmHg  68 bpm  18 rpm  97.9 F  142 lbs  61 
in     26.83 kg/m2  1.66 m2      

 

 3/6/2018  8:26:00 AM  126 mmHg  76 mmHg  63 bpm  16 rpm  98 F  134.125 lbs  61 
in     25.3424 kg/m  1.6182 m     100 %

 

 2018  11:55:00 AM  126 mmHg  80 mmHg  80 bpm  18 rpm  98 F  132 lbs  61 
in     24.94 kg/m2  1.61 m2     100 %

 

 2017  8:26:00 AM  122 mmHg  76 mmHg  63 bpm  16 rpm  98.4 F  129.25 lbs  
61 in     24.4213 kg/m  1.5885 m     99 %

 

 10/18/2017  9:01:00 AM  126 mmHg  80 mmHg  73 bpm  16 rpm  97.9 F  122.375 lbs
  61 in     23.12 kg/m2  1.55 m2     100 %

 

 10/10/2017  1:52:00 PM  118 mmHg  72 mmHg  74 bpm  16 rpm  98.1 F  121 lbs  61 
in     22.8625 kg/m  1.5369 m     99 %

 

 2017  8:40:00 AM  118 mmHg  68 mmHg  63 bpm  16 rpm  98.1 F  123.125 lbs  
61 in     23.26 kg/m2  1.55 m2     100 %

 

 2017  8:57:00 AM  116 mmHg  62 mmHg  71 bpm  16 rpm  99.8 F  121.5 lbs  
61 in     22.957 kg/m  1.5401 m     98 %

 

 2017  11:30:00 AM  128 mmHg  78 mmHg  69 bpm  16 rpm  98.8 F  129.375 lbs  
61 in     24.44 kg/m2  1.59 m2     98 %

 

 2017  8:22:00 AM  118 mmHg  78 mmHg  62 bpm  18 rpm  97.5 F  129.125 lbs  
61 in     24.3977 kg/m  1.5877 m     100 %

 

 10/13/2016  2:26:00 PM  128 mmHg  78 mmHg  77 bpm  16 rpm  98.4 F  139.25 lbs  
61 in     26.31 kg/m2  1.65 m2     100 %

 

 2016  8:29:00 AM  122 mmHg  70 mmHg  72 bpm  16 rpm  97.8 F  137.125 lbs  
61 in     25.9093 kg/m  1.6361 m     100 %

 

 2015  9:33:00 AM  120 mmHg  68 mmHg  73 bpm     98.7 F  144.375 lbs  61 
in     27.28 kg/m2  1.68 m2     99 %

 

 2015  9:05:00 AM  110 mmHg  75 mmHg  85 bpm  16 rpm  96.7 F  144.375 lbs  
61 in     27.2791 kg/m  1.6788 m     99 %

 

 2015  1:05:00 PM  108 mmHg  62 mmHg  78 bpm  18 rpm  96.9 F  142.375 lbs  
61 in     26.90 kg/m2  1.67 m2     100 %

 

 2014  9:31:00 AM  126 mmHg  66 mmHg  79 bpm  18 rpm  98.7 F  149 lbs  61 
in     28.153 kg/m  1.7055 m     98 %

 

 10/6/2014  9:02:00 AM  110 mmHg  74 mmHg  94 bpm  18 rpm  98.1 F  145.4 lbs  
61 in     27.47 kg/m2  1.68 m2     97 %

 

 10/2/2014  9:14:00 AM  124 mmHg  74 mmHg  79 bpm  18 rpm  98 F  147.25 lbs  61 
in     27.8224 kg/m  1.6955 m     98 %

 

 2014  9:22:00 AM  124 mmHg  76 mmHg  89 bpm  18 rpm  98.1 F  148 lbs  61 
in     27.96 kg/m2  1.70 m2     100 %

 

 2014  8:27:00 AM  118 mmHg  65 mmHg  74 bpm  16 rpm  97.7 F  148 lbs  61 
in     27.9641 kg/m  1.6998 m     99 %

 

 2014  9:48:00 AM  125 mmHg  70 mmHg  93 bpm  18 rpm  96.7 F  156 lbs  61 
in     29.48 kg/m2  1.75 m2     100 %

 

 2013  8:35:00 AM  122 mmHg  74 mmHg  84 bpm  16 rpm  97.9 F  155.375 lbs 
                99 %

 

 6/10/2013  8:30:00 AM  130 mmHg  82 mmHg  79 bpm  16 rpm  97.9 F  161 lbs     
            98 %

 

 2013  8:50:00 AM  124 mmHg  68 mmHg  66 bpm  18 rpm  97.6 F  157.5 lbs  
61 in     29.7591 kg/m  1.7535 m      

 

 2012  1:46:00 PM  120 mmHg  72 mmHg  75 bpm  16 rpm  97.6 F  156.125 lbs
                 98 %

 

 12/10/2012  8:32:00 AM  122 mmHg  82 mmHg  70 bpm  16 rpm  97.3 F  157 lbs    
             99 %

 

 8/15/2012  9:00:00 AM  130 mmHg  76 mmHg  86 bpm  16 rpm  97.4 F  159 lbs     
            100 %

 

 2012  11:02:00 AM  128 mmHg  64 mmHg  66 bpm  18 rpm  97.4 F  162.125 lbs
  61 in     30.6329 kg/m  1.7791 m      

 

 2012  8:45:00 AM  130 mmHg  70 mmHg  82 bpm  16 rpm  98.2 F  160.125 lbs 
                100 %

 

 2/15/2012  9:29:00 AM  122 mmHg  80 mmHg  86 bpm  16 rpm  97.4 F  159.375 lbs  
61 in     30.1133 kg/m  1.7639 m     99 %

 

 2012  9:41:00 AM  132 mmHg  74 mmHg  66 bpm  18 rpm  98.4 F  160.375 lbs  
61 in     30.30 kg/m2  1.77 m2      

 

 2011  10:08:00 AM  134 mmHg  82 mmHg  80 bpm  16 rpm  98 F  160 lbs  61 
in     30.2314 kg/m  1.7674 m     99 %

 

 2011  3:56:00 PM  130 mmHg  80 mmHg                              

 

 2011  8:55:00 AM  130 mmHg  74 mmHg  88 bpm  16 rpm  98.2 F  158.25 lbs  
               98 %

 

 2011  8:54:00 AM  136 mmHg  82 mmHg  102 bpm  16 rpm  98.1 F  153.5 lbs   
              99 %

 

 2011  8:49:00 AM  122 mmHg  88 mmHg  88 bpm  16 rpm  98.6 F  152.25 lbs  
               99 %

 

 2011  10:02:00 AM  140 mmHg  82 mmHg  96 bpm  20 rpm  98.8 F             
       100 %

 

 2011  9:14:00 AM  156 mmHg  98 mmHg  110 bpm  24 rpm  98.5 F  153 lbs    
             100 %

 

 2011  8:50:00 AM  160 mmHg  84 mmHg  100 bpm  16 rpm  98.7 F  155 lbs    
             100 %

 

 2011  1:25:00 PM  152 mmHg  100 mmHg  82 bpm  16 rpm  97.2 F  156.375 lbs 
                100 %

 

 2011  9:55:00 AM  144 mmHg  90 mmHg                              

 

 2010  9:24:00 AM  138 mmHg  84 mmHg                              

 

 2010  8:58:00 AM  160 mmHg  94 mmHg                              

 

 2010  8:33:00 AM  142 mmHg  90 mmHg  100 bpm  16 rpm  98.1 F  158.375 
lbs                  

 

 2010  9:24:00 AM  160 mmHg  102 mmHg  88 bpm  18 rpm  99 F  157.125 lbs  
62 in     28.7382 kg/m  1.7657 m      

 

 3/19/2010  11:01:00 AM  140 mmHg  90 mmHg                              

 

 2009  10:08:00 AM  142 mmHg  86 mmHg  72 bpm  16 rpm  98.1 F  154.125 
lbs  61 in     29.1214 kg/m  1.7346 m      







Social History







 Name  Description  Comments

 

       

 

 Children      

 

 lives alone      

 

 Supervisor      

 

 Tobacco  Never smoker   







History of Procedures







 Date Ordered  Description  Order Status

 

 2011 12:00 AM  COMPREHEN METABOLIC PANEL  Reviewed

 

 2011 12:00 AM  ASSAY THYROID STIM HORMONE  Reviewed

 

 2011 12:00 AM  COMPREHEN METABOLIC PANEL  Reviewed

 

 2011 12:00 AM  LIPID PANEL  Reviewed

 

 2011 12:00 AM  ASSAY THYROID STIM HORMONE  Reviewed

 

 2011 12:00 AM  COMPLETE CBC W/AUTO DIFF WBC  Reviewed

 

 2015 12:00 AM  COMPLETE CBC W/AUTO DIFF WBC  Reviewed

 

 2015 12:00 AM  COMPREHEN METABOLIC PANEL  Reviewed

 

 2015 12:00 AM  LIPID PANEL  Reviewed

 

 2015 12:00 AM  ASSAY THYROID STIM HORMONE  Reviewed

 

 2011 12:00 AM  COMPREHEN METABOLIC PANEL  Reviewed

 

 2011 12:00 AM  COMPREHEN METABOLIC PANEL  Reviewed

 

 2011 12:00 AM  LIPID PANEL  Reviewed

 

 2011 12:00 AM  ASSAY THYROID STIM HORMONE  Reviewed

 

 10/28/2011 12:00 AM  ***Immunization administration, Medicare flu  Reviewed

 

 10/28/2011 12:00 AM  Fluzone ** MEDICARE Only **  Reviewed

 

 2010 11:24 AM  NO CHARGE OV  Reviewed

 

 2010 11:26 AM  NO CHARGE OV  Reviewed

 

 2011 12:00 AM  COMPREHEN METABOLIC PANEL  Reviewed

 

 2011 12:00 AM  LIPID PANEL  Reviewed

 

 2011 12:00 AM  ASSAY THYROID STIM HORMONE  Reviewed

 

 2011 12:00 AM  COMPLETE CBC W/AUTO DIFF WBC  Reviewed

 

 2011 12:00 AM  Wrist Support  Reviewed

 

 2016 12:00 AM  Screening mammography, bilateral  Reviewed

 

 2016 12:00 AM  DXA BONE DENSITY AXIAL  Returned

 

 2016 12:00 AM  TETANUS VACCINE IM  Reviewed

 

 2016 12:00 AM  HEP B VACC ADULT 3 DOSE IM  Reviewed

 

 2012 12:00 AM  TISSUE EXAM FOR FUNGI  Reviewed

 

 2012 12:00 AM  SMEAR WET MOUNT SALINE/INK  Reviewed

 

 2016 12:00 AM  TETANUS VACCINE IM  Reviewed

 

 2016 12:00 AM  HEP B VACC ADULT 3 DOSE IM  Reviewed

 

 2/15/2012 12:00 AM  THER/PROPH/DIAG INJ SC/IM  Reviewed

 

 2/15/2012 12:00 AM  Bicillin CR [facility]#48724122696[facility]  Reviewed

 

 2/15/2012 12:00 AM  Depo-Medrol 40 mg NDC#1663631600  Reviewed

 

 10/13/2016 12:00 AM  COMPLETE CBC W/AUTO DIFF WBC  Returned

 

 10/13/2016 12:00 AM  COMPREHEN METABOLIC PANEL  Returned

 

 10/13/2016 12:00 AM  ASSAY THYROID STIM HORMONE  Returned

 

 10/13/2016 12:00 AM  LIPID PANEL  Returned

 

 2016 12:00 AM  TETANUS VACCINE IM  Reviewed

 

 2016 12:00 AM  HEP B VACC ADULT 3 DOSE IM  Reviewed

 

 2017 12:00 AM  ASSAY OF IRON  Returned

 

 2017 12:00 AM  IRON BINDING TEST  Returned

 

 2017 12:00 AM  ASSAY OF TRANSFERRIN  Returned

 

 2017 12:00 AM  OCCULT BLD FECES 1-3 TESTS  Returned

 

 2017 12:00 AM  COMPLETE CBC AUTOMATED  Returned

 

 8/15/2012 12:00 AM  COMPREHEN METABOLIC PANEL  Reviewed

 

 8/15/2012 12:00 AM  ASSAY THYROID STIM HORMONE  Reviewed

 

 8/15/2012 12:00 AM  COMPLETE CBC W/AUTO DIFF WBC  Reviewed

 

 8/15/2012 12:00 AM  Wrist Support  Reviewed

 

 2017 12:00 AM  METABOLIC PANEL TOTAL CA  Returned

 

 2017 12:00 AM  METABOLIC PANEL TOTAL CA  Returned

 

 2017 12:00 AM  Screening mammography, bilateral  Returned

 

 10/29/2012 12:00 AM  Flu Injection 3 Years And Above NDC# [address]-72  [facility]  
Reviewed

 

 12/10/2012 12:00 AM  IMMUNIZATION ADMIN  Reviewed

 

 12/10/2012 12:00 AM  Pneumovax Injection - [facility]  Reviewed

 

 2018 12:00 AM  THER/PROPH/DIAG INJ SC/IM  Reviewed

 

 2018 12:00 AM  Decadron 4mg Injection  Reviewed

 

 2018 12:00 AM  Depo-Medrol 40mg Injection  Reviewed

 

 2012 12:00 AM  TRICHOMONAS ASSAY W/OPTIC  Reviewed

 

 2018 12:00 AM  COMPLETE CBC W/AUTO DIFF WBC  Returned

 

 2018 12:00 AM  COMPREHEN METABOLIC PANEL  Returned

 

 2018 12:00 AM  LIPID PANEL  Returned

 

 2018 12:00 AM  ASSAY THYROID STIM HORMONE  Returned

 

 2013 12:00 AM  THER/PROPH/DIAG INJ SC/IM  Reviewed

 

 2013 12:00 AM  Bicillin CR, 1.2 million units NDC# 85556-411-70  Reviewed

 

 2013 12:00 AM  COMPREHEN METABOLIC PANEL  Reviewed

 

 2013 12:00 AM  LIPID PANEL  Reviewed

 

 2013 12:00 AM  COMPLETE CBC W/AUTO DIFF WBC  Reviewed

 

 2013 12:00 AM  ASSAY THYROID STIM HORMONE  Reviewed

 

 6/10/2013 12:00 AM  MAMMOGRAM SCREENING  Reviewed

 

 6/10/2013 12:00 AM  CYTOPATH C/V MANUAL  Reviewed

 

 12/10/2013 12:00 AM  LIPID PANEL  Reviewed

 

 12/10/2013 12:00 AM  ASSAY THYROID STIM HORMONE  Reviewed

 

 12/10/2013 12:00 AM  COMPLETE CBC W/AUTO DIFF WBC  Reviewed

 

 12/10/2013 12:00 AM  COMPREHEN METABOLIC PANEL  Reviewed

 

 2010 12:00 AM  CYTOPATH C/V MANUAL  Reviewed

 

 2010 12:00 AM  SMEAR WET MOUNT SALINE/INK  Reviewed

 

 2010 12:00 AM  LIPID PANEL  Reviewed

 

 2010 12:00 AM  ASSAY THYROID STIM HORMONE  Reviewed

 

 2010 12:00 AM  COMPLETE CBC W/AUTO DIFF WBC  Reviewed

 

 2010 12:00 AM  COMPREHEN METABOLIC PANEL  Reviewed

 

 10/28/2013 12:00 AM  Flu Injection 3 Years And Above NDC# [address]-84  [facility]  
Reviewed

 

 2010 8:48 AM  Blood Pressure Check-no charge  Reviewed

 

 2010 12:00 AM  Blood Pressure Check-no charge  Reviewed

 

 2014 12:00 AM  METABOLIC PANEL TOTAL CA  Reviewed

 

 2014 12:00 AM  ASSAY THYROID STIM HORMONE  Reviewed

 

 2014 12:00 AM  ASSAY OF FREE THYROXINE  Reviewed

 

 2014 12:00 AM  MAMMOGRAM SCREENING  Reviewed

 

 2014 12:00 AM  CT BONE DENSITY AXIAL  Reviewed

 

 2014 12:00 AM  COMPLETE CBC W/AUTO DIFF WBC  Reviewed

 

 2014 12:00 AM  COMPREHEN METABOLIC PANEL  Reviewed

 

 2014 12:00 AM  LIPID PANEL  Reviewed

 

 2014 12:00 AM  ASSAY THYROID STIM HORMONE  Reviewed

 

 10/20/2010 12:00 AM  IMMUNIZATION ADMIN  Reviewed

 

 10/20/2010 12:00 AM  FLU VACCINE 3 YRS & > IM  Reviewed

 

 2010 12:00 AM  COMPREHEN METABOLIC PANEL  Reviewed

 

 2010 12:00 AM  LIPID PANEL  Reviewed

 

 2010 12:00 AM  ASSAY THYROID STIM HORMONE  Reviewed

 

 2010 12:00 AM  COMPLETE CBC W/AUTO DIFF WBC  Reviewed

 

 2010 12:00 AM  Blood Pressure Check-no charge  Reviewed

 

 10/2/2014 12:00 AM  THER/PROPH/DIAG INJ SC/IM  Reviewed

 

 10/2/2014 12:00 AM  Kenalog, Per 10 Mg NDC#7638-8525-94  Reviewed

 

 2014 12:00 AM  COMPLETE CBC W/AUTO DIFF WBC  Reviewed

 

 2014 12:00 AM  COMPREHEN METABOLIC PANEL  Reviewed

 

 2014 12:00 AM  LIPID PANEL  Reviewed

 

 2014 12:00 AM  ASSAY THYROID STIM HORMONE  Reviewed

 

 2015 12:00 AM  Rocephin 1 gram NDC#0638-8759-90  Reviewed

 

 2015 12:00 AM  THER/PROPH/DIAG INJ SC/IM  Reviewed

 

 2011 12:00 AM  COMPREHEN METABOLIC PANEL  Reviewed

 

 2011 12:00 AM  LIPID PANEL  Reviewed

 

 2011 12:00 AM  ASSAY THYROID STIM HORMONE  Reviewed

 

 2011 12:00 AM  COMPLETE CBC W/AUTO DIFF WBC  Reviewed

 

 2011 12:00 AM  METABOLIC PANEL TOTAL CA  Reviewed

 

 2011 12:00 AM  COMPREHEN METABOLIC PANEL  Reviewed

 

 2011 12:00 AM  ASSAY THYROID STIM HORMONE  Reviewed

 

 2011 12:00 AM  COMPLETE CBC W/AUTO DIFF WBC  Reviewed

 

 2011 12:00 AM  ASSAY OF LIPASE  Reviewed

 

 2011 12:00 AM  THER/PROPH/DIAG INJ SC/IM  Reviewed

 

 2011 12:00 AM  Phenergan 50 Mg Im  Bernadine  Reviewed

 

 2011 12:00 AM  METABOLIC PANEL TOTAL CA  Reviewed

 

 2011 12:00 AM  THER/PROPH/DIAG INJ SC/IM  Reviewed

 

 2011 12:00 AM  Phenergan 25 Mg Im  Bernadine  Reviewed

 

 2011 12:00 AM  METABOLIC PANEL TOTAL CA  Reviewed

 

 2011 12:00 AM  ASSAY THYROID STIM HORMONE  Reviewed

 

 2011 12:00 AM  THER/PROPH/DIAG INJ SC/IM  Reviewed

 

 2011 12:00 AM  Phenergan 50 Mg Im  Bernadine  Reviewed

 

 2011 12:00 AM  ASSAY OF SERUM SODIUM  Reviewed

 

 2011 12:00 AM  ASSAY OF SERUM SODIUM  Reviewed

 

 2011 12:00 AM  CYTOPATH C/V MANUAL  Reviewed

 

 2011 12:00 AM  ASSAY THYROID STIM HORMONE  Reviewed

 

 2015 12:00 AM  COMPLETE CBC W/AUTO DIFF WBC  Reviewed

 

 2015 12:00 AM  COMPREHEN METABOLIC PANEL  Reviewed

 

 2015 12:00 AM  LIPID PANEL  Reviewed

 

 2015 12:00 AM  ASSAY THYROID STIM HORMONE  Reviewed

 

 2015 12:00 AM  MAMMOGRAM SCREENING  Reviewed

 

 2015 12:00 AM  CYTOPATH TBS C/V MANUAL  Reviewed







Results Summary







 Date and Description  Results

 

 2010 9:25 AM  Colonoscopy-Women and Men over 50 Abnormal Mammogram -Women 
over 40 Declined Pap Smear Declined 

 

 2010 10:41 AM  WET PREP NO TRICHOMONADS SEEN  No  Few 

 

 2010 8:15 AM  TRIGLYCERIDES 174.0 mg/dLCHOLESTEROL 175.0 mg/dLHDL 58.0 mg/
dLTOT CHOL/HDL 3.0 LDL (CALC) 82.0 mg/dLTSH 0.790 uIU/mLGLUCOSE 95.0 mg/
dLSODIUM 136.0 mmol/LPOTASSIUM 4.50 mmol/LCHLORIDE 99.0 mmol/LCO2 26.0 mmol/
LBUN 12.0 mg/dLCREATININE 0.80 mg/dLSGOT/AST 32.0 IU/LSGPT/ALT 33.0 IU/LALK 
PHOS 103.0 IU/LTOTAL PROTEIN 7.60 g/dLALBUMIN 4.60 g/dLTOTAL BILI 0.60 mg/
dLCALCIUM 9.80 mg/dLAGE 64 GFR NonAA 72 GFR AA 87 eGFR >60 mL/min/1.73 m2eGFR AA
* >60 WBC 5.3 RBC 4.13 HGB 13.10 g/dLHCT 39.20 %MCV 95.0 fLMCH 31.70 pgMCHC 
33.40 g/dLRDW SD 44 RDW CV 12.70 %MPV 9.80 fLPLT 257 NRBC# 0.00 NRBC% 0.0 %NEUT 
57.90 %%LYMP 26.50 %%MONO 11.60 %%EOS 3.20 %%BASO 0.80 %#NEUT 3.04 #LYMP 1.39 #
MONO 0.61 #EOS 0.17 #BASO 0.04 MANUAL DIFF NOT IND 

 

 2011 9:45 AM  GLUCOSE 91.0 mg/dLSODIUM 133.0 mmol/LPOTASSIUM 4.40 mmol/
LCHLORIDE 97.0 mmol/LCO2 24.0 mmol/LBUN 9.0 mg/dLCREATININE 0.70 mg/dLCALCIUM 
10.0 mg/dLAGE 65 GFR NonAA 84 GFR  eGFR >60 mL/min/1.73 m2eGFR AA* >60 

 

 2011 10:25 AM  LIPASE 29.0 U/LTSH 0.10 uIU/mLGLUCOSE 115.0 mg/dLSODIUM 
127.0 mmol/LPOTASSIUM 5.30 mmol/LCHLORIDE 93.0 mmol/LCO2 18.0 mmol/LBUN 9.0 mg/
dLCREATININE 0.70 mg/dLSGOT/AST 62.0 IU/LSGPT/ALT 46.0 IU/LALK PHOS 100.0 IU/
LTOTAL PROTEIN 8.60 g/dLALBUMIN 4.90 g/dLTOTAL BILI 0.60 mg/dLCALCIUM 9.90 mg/
dLAGE 65 GFR NonAA 84 GFR  eGFR >60 mL/min/1.73 m2eGFR AA* >60 WBC 6.9 
RBC 4.48 HGB 14.40 g/dLHCT 40.90 %MCV 91.0 fLMCH 32.10 pgMCHC 35.20 g/dLRDW SD 
41 RDW CV 12.50 %MPV 9.30 fLPLT 260 NRBC# 0.00 NRBC% 0.0 %NEUT 74.90 %%LYMP 
15.10 %%MONO 9.40 %%EOS 0.30 %%BASO 0.30 %#NEUT 5.15 #LYMP 1.04 #MONO 0.65 #EOS 
0.02 #BASO 0.02 MANUAL DIFF NOT IND 

 

 2011 9:07 AM  GLUCOSE 92.0 mg/dLSODIUM 131.0 mmol/LPOTASSIUM 4.20 mmol/
LCHLORIDE 99.0 mmol/LCO2 22.0 mmol/LBUN 9.0 mg/dLCREATININE 0.70 mg/dLCALCIUM 
9.20 mg/dLAGE 65 GFR NonAA 84 GFR  eGFR >60 mL/min/1.73 m2eGFR AA* >60 

 

 2011 11:06 AM  TSH 0.510 uIU/mLGLUCOSE 99.0 mg/dLSODIUM 132.0 mmol/
LPOTASSIUM 3.50 mmol/LCHLORIDE 95.0 mmol/LCO2 23.0 mmol/LBUN 9.0 mg/
dLCREATININE 0.80 mg/dLCALCIUM 10.40 mg/dLAGE 65 GFR NonAA 72 GFR AA 87 eGFR >
60 mL/min/1.73 m2eGFR AA* >60 

 

 2011 9:45 AM  SODIUM 132.0 mmol/L

 

 2011 9:27 AM  SODIUM 137.0 mmol/L

 

 2011 9:55 AM  TSH 1.070 uIU/mL

 

 2011 8:55 AM  GLUCOSE 102.0 mg/dLSODIUM 135.0 mmol/LPOTASSIUM 4.20 mmol/
LCHLORIDE 102.0 mmol/LCO2 24.0 mmol/LBUN 15.0 mg/dLCREATININE 0.80 mg/dLSGOT/
AST 30.0 IU/LSGPT/ALT 35.0 IU/LALK PHOS 119.0 IU/LTOTAL PROTEIN 7.50 g/
dLALBUMIN 4.30 g/dLTOTAL BILI 0.30 mg/dLCALCIUM 9.20 mg/dLAGE 65 GFR NonAA 72 
GFR AA 87 eGFR >60 mL/min/1.73 m2eGFR AA* >60 TSH 1.130 uIU/mL

 

 2011 8:55 AM  GLUCOSE 98.0 mg/dLSODIUM 137.0 mmol/LPOTASSIUM 3.90 mmol/
LCHLORIDE 103.0 mmol/LCO2 25.0 mmol/LBUN 14.0 mg/dLCREATININE 0.70 mg/dLSGOT/
AST 33.0 IU/LSGPT/ALT 36.0 IU/LALK PHOS 111.0 IU/LTOTAL PROTEIN 8.30 g/
dLALBUMIN 4.40 g/dLTOTAL BILI 0.50 mg/dLCALCIUM 9.40 mg/dLAGE 66 GFR NonAA 84 
GFR  eGFR >60 mL/min/1.73 m2eGFR AA* >60 TRIGLYCERIDES 109.0 mg/
dLCHOLESTEROL 153.0 mg/dLHDL 54.0 mg/dLTOT CHOL/HDL 2.8 LDL (CALC) 77.0 mg/
dLTSH 1.330 uIU/mL

 

 2011 10:17 AM  Colonoscopy-Women and Men over 50 Declined Mammogram -
Women over 40 Normal Pap Smear Negative 

 

 2012 10:33 AM  WET PREP NO TRICH SEEN CLUE CELLS NONE SEEN 

 

 2012 8:45 AM  WBC 5.2 RBC 3.96 HGB 12.70 g/dLHCT 37.80 %MCV 96.0 fLMCH 
32.10 pgMCHC 33.60 g/dLRDW SD 46 RDW CV 13.30 %MPV 9.70 fLPLT 297 NRBC# 0.00 
NRBC% 0.0 %NEUT 57.70 %%LYMP 28.50 %%MONO 10.30 %%EOS 2.50 %%BASO 1.0 %#NEUT 
3.02 #LYMP 1.49 #MONO 0.54 #EOS 0.13 #BASO 0.05 MANUAL DIFF NOT IND GLUCOSE 
97.0 mg/dLSODIUM 137.0 mmol/LPOTASSIUM 4.40 mmol/LCHLORIDE 101.0 mmol/LCO2 23.0 
mmol/LBUN 17.0 mg/dLCREATININE 0.80 mg/dLSGOT/AST 30.0 IU/LSGPT/ALT 33.0 IU/
LALK PHOS 95.0 IU/LTOTAL PROTEIN 7.40 g/dLALBUMIN 4.40 g/dLTOTAL BILI 0.60 mg/
dLCALCIUM 9.70 mg/dLAGE 66 GFR NonAA 72 GFR AA 87 eGFR 60 eGFR AA* 60 
TRIGLYCERIDES 130.0 mg/dLCHOLESTEROL 157.0 mg/dLHDL 56.0 mg/dLTOT CHOL/HDL 2.8 
LDL (CALC) 75.0 mg/dLTSH 0.940 uIU/mL

 

 2012 8:45 AM  WBC 5.1 RBC 3.91 HGB 12.50 g/dLHCT 36.30 %MCV 93.0 fLMCH 
32.0 pgMCHC 34.40 g/dLRDW SD 43 RDW CV 12.60 %MPV 9.30 fLPLT 244 NRBC# 0.00 NRBC
% 0.0 %NEUT 57.60 %%LYMP 27.50 %%MONO 9.10 %%EOS 5.0 %%BASO 0.80 %#NEUT 2.91 #
LYMP 1.39 #MONO 0.46 #EOS 0.25 #BASO 0.04 MANUAL DIFF NOT IND 

 

 12/10/2012 8:34 AM  Colonoscopy-Women and Men over 50 Declined Mammogram -
Women over 40 Declined Pap Smear Declined 

 

 2012 5:02 PM  WET PREP NO TRICH SEEN CLUE CELLS NONE SEEN 

 

 2013 8:25 AM  WBC 4.2 RBC 3.96 HGB 12.90 g/dLHCT 37.0 %MCV 93.0 fLMCH 
32.60 pgMCHC 34.90 g/dLRDW SD 43 RDW CV 12.60 %MPV 9.70 fLPLT 254 NRBC# 0.00 
NRBC% 0.0 %NEUT 54.40 %%LYMP 30.40 %%MONO 10.80 %%EOS 3.40 %%BASO 1.0 %#NEUT 
2.26 #LYMP 1.26 #MONO 0.45 #EOS 0.14 #BASO 0.04 MANUAL DIFF NOT IND TSH 1.230 
uIU/mLGLUCOSE 94.0 mg/dLSODIUM 136.0 mmol/LPOTASSIUM 4.30 mmol/LCHLORIDE 99.0 
mmol/LCO2 25.0 mmol/LBUN 10.0 mg/dLCREATININE 0.80 mg/dLSGOT/AST 36.0 IU/LSGPT/
ALT 36.0 IU/LALK PHOS 84.0 IU/LTOTAL PROTEIN 7.90 g/dLALBUMIN 4.40 g/dLTOTAL 
BILI 0.70 mg/dLCALCIUM 9.80 mg/dLAGE 67 GFR NonAA 72 GFR AA 87 eGFR 60 eGFR AA* 
60 TRIGLYCERIDES 122.0 mg/dLCHOLESTEROL 141.0 mg/dLHDL 47.0 mg/dLTOT CHOL/HDL 
3.0 LDL (CALC) 70.0 mg/dL

 

 2013 8:45 AM  WBC 5.3 RBC 4.01 HGB 13.0 g/dLHCT 37.60 %MCV 94.0 fLMCH 
32.40 pgMCHC 34.60 g/dLRDW SD 43 RDW CV 12.50 %MPV 9.40 fLPLT 248 NRBC# 0.00 
NRBC% 0.0 %NEUT 58.70 %%LYMP 27.80 %%MONO 9.80 %%EOS 2.80 %%BASO 0.90 %#NEUT 
3.12 #LYMP 1.48 #MONO 0.52 #EOS 0.15 #BASO 0.05 MANUAL DIFF NOT IND TSH 1.570 
uIU/mLGLUCOSE 94.0 mg/dLSODIUM 134.0 mmol/LPOTASSIUM 4.10 mmol/LCHLORIDE 101.0 
mmol/LCO2 23.0 mmol/LBUN 11.0 mg/dLCREATININE 0.80 mg/dLSGOT/AST 41.0 IU/LSGPT/
ALT 44.0 IU/LALK PHOS 109.0 IU/LTOTAL PROTEIN 7.90 g/dLALBUMIN 4.70 g/dLTOTAL 
BILI 0.80 mg/dLCALCIUM 10.40 mg/dLAGE 68 GFR NonAA 72 GFR AA 87 eGFR >60 mL/min/
1.73 m2eGFR AA* >60 TRIGLYCERIDES 163.0 mg/dLCHOLESTEROL 138.0 mg/dLHDL 49.0 mg/
dLTOT CHOL/HDL 2.8 LDL (CALC) 56.0 mg/dL

 

 2014 8:58 AM  GLUCOSE 86.0 mg/dLSODIUM 134.0 mmol/LPOTASSIUM 4.20 mmol/
LCHLORIDE 99.0 mmol/LCO2 25.0 mmol/LBUN 14.0 mg/dLCREATININE 0.80 mg/dLCALCIUM 
10.10 mg/dLAGE 68 GFR NonAA 72 GFR AA 87 eGFR >60 mL/min/1.73 m2eGFR AA* >60 
FREE T4 1.18 TSH 1.20 uIU/mL

 

 2014 9:50 AM  WBC 5.7 RBC 4.03 HGB 12.90 g/dLHCT 37.90 %MCV 94.0 fLMCH 
32.0 pgMCHC 34.0 g/dLRDW SD 44 RDW CV 12.70 %MPV 9.70 fLPLT 292 NRBC# 0.00 NRBC
% 0.0 %NEUT 62.70 %%LYMP 21.80 %%MONO 11.0 %%EOS 3.40 %%BASO 1.10 %#NEUT 3.55 #
LYMP 1.23 #MONO 0.62 #EOS 0.19 #BASO 0.06 MANUAL DIFF NOT IND GLUCOSE 98.0 mg/
dLSODIUM 135.0 mmol/LPOTASSIUM 4.30 mmol/LCHLORIDE 102.0 mmol/LCO2 22.0 mmol/
LBUN 10.0 mg/dLCREATININE 0.80 mg/dLSGOT/AST 34.0 IU/LSGPT/ALT 32.0 IU/LALK 
PHOS 95.0 IU/LTOTAL PROTEIN 7.70 g/dLALBUMIN 4.30 g/dLTOTAL BILI 0.80 mg/
dLCALCIUM 9.10 mg/dLAGE 68 GFR NonAA 72 GFR AA 87 eGFR 60 eGFR AA* 60 
TRIGLYCERIDES 108.0 mg/dLCHOLESTEROL 146.0 mg/dLHDL 56.0 mg/dLTOT CHOL/HDL 2.6 
LDL (CALC) 68.0 mg/dLTSH 0.90 uIU/mL

 

 2014 8:40 AM  WBC 4.4 RBC 4.13 HGB 13.20 g/dLHCT 38.90 %MCV 94.0 fLMCH 
32.0 pgMCHC 33.90 g/dLRDW SD 47 RDW CV 13.50 %MPV 9.80 fLPLT 279 NRBC# 0.00 NRBC
% 0.0 %NEUT 63.80 %%LYMP 24.60 %%MONO 9.30 %%EOS 1.60 %%BASO 0.70 %#NEUT 2.80 #
LYMP 1.08 #MONO 0.41 #EOS 0.07 #BASO 0.03 MANUAL DIFF NOT IND GLUCOSE 96.0 mg/
dLSODIUM 136.0 mmol/LPOTASSIUM 4.10 mmol/LCHLORIDE 99.0 mmol/LCO2 23.0 mmol/
LBUN 8.0 mg/dLCREATININE 0.80 mg/dLSGOT/AST 31.0 IU/LSGPT/ALT 27.0 IU/LALK PHOS 
85.0 IU/LTOTAL PROTEIN 7.60 g/dLALBUMIN 4.40 g/dLTOTAL BILI 0.80 mg/dLCALCIUM 
10.20 mg/dLAGE 69 GFR NonAA 71 GFR AA 86 eGFR 60 eGFR AA* 60 TRIGLYCERIDES 61.0 
mg/dLCHOLESTEROL 148.0 mg/dLHDL 71.0 mg/dLTOT CHOL/HDL 2.1 LDL (CALC) 65.0 mg/
dLTSH 0.990 uIU/mL

 

 2015 8:32 AM  WBC 4.8 RBC 3.98 HGB 12.70 g/dLHCT 37.30 %MCV 94.0 fLMCH 
31.90 pgMCHC 34.0 g/dLRDW SD 43 RDW CV 12.70 %MPV 9.50 fLPLT 270 NRBC# 0.00 NRBC
% 0.0 %NEUT 57.30 %%LYMP 25.0 %%MONO 13.0 %%EOS 3.60 %%BASO 1.10 %#NEUT 2.73 #
LYMP 1.19 #MONO 0.62 #EOS 0.17 #BASO 0.05 MANUAL DIFF NOT IND TSH 0.640 uIU/
mLGLUCOSE 90.0 mg/dLSODIUM 136.0 mmol/LPOTASSIUM 4.0 mmol/LCHLORIDE 101.0 mmol/
LCO2 24.0 mmol/LBUN 10.0 mg/dLCREATININE 0.80 mg/dLSGOT/AST 34.0 IU/LSGPT/ALT 
32.0 IU/LALK PHOS 92.0 IU/LTOTAL PROTEIN 7.50 g/dLALBUMIN 4.40 g/dLTOTAL BILI 
0.70 mg/dLCALCIUM 9.50 mg/dLAGE 69 GFR NonAA 71 GFR AA 86 eGFR >60 mL/min/1.73 
m2eGFR AA* >60 TRIGLYCERIDES 107.0 mg/dLCHOLESTEROL 138.0 mg/dLHDL 58.0 mg/
dLTOT CHOL/HDL 2.4 LDL (CALC) 59.0 mg/dL

 

 2015 8:31 AM  WBC 4.6 RBC 3.97 HGB 12.90 g/dLHCT 38.0 %MCV 96.0 fLMCH 
32.50 pgMCHC 33.90 g/dLRDW SD 44 RDW CV 12.60 %MPV 9.0 fLPLT 248 NRBC# 0.00 NRBC
% 0.0 %NEUT 61.0 %%LYMP 24.70 %%MONO 10.20 %%EOS 3.0 %%BASO 1.10 %#NEUT 2.82 #
LYMP 1.14 #MONO 0.47 #EOS 0.14 #BASO 0.05 MANUAL DIFF NOT IND TRIGLYCERIDES 
105.0 mg/dLCHOLESTEROL 141.0 mg/dLHDL 58.0 mg/dLTOT CHOL/HDL 2.4 LDL (CALC) 
62.0 mg/dLGLUCOSE 93.0 mg/dLSODIUM 136.0 mmol/LPOTASSIUM 4.10 mmol/LCHLORIDE 
101.0 mmol/LCO2 25.0 mmol/LBUN 9.0 mg/dLCREATININE 0.80 mg/dLSGOT/AST 32.0 IU/
LSGPT/ALT 28.0 IU/LALK PHOS 95.0 IU/LTOTAL PROTEIN 7.30 g/dLALBUMIN 4.50 g/
dLTOTAL BILI 0.80 mg/dLCALCIUM 9.70 mg/dLAGE 70 GFR NonAA 71 GFR AA 86 eGFR >60 
mL/min/1.73meGFR AA* >60 TSH 1.10 uIU/mL







History Of Immunizations







 Name  Date Admin  Mfg Name  Mfg Code  Trade Name  Lot#  Route  Inj  Vis Given  
Vis Pub  CVX

 

 Influenza  10/20/2010  [employer] pasteur  [employer]  FLUZONE  [id number]  Intramuscular  
Left Arm  10/20/2010  2010  999

 

 Influenza  10/28/2011  [employer] pasteur  [employer]  FLUZONE  [id number]  Intramuscular  
Left Arm  10/28/2011  2011  141

 

 Influenza  10/29/2012  [employer]of[employer] pasteur  [employer]  FLUZONE  [id number]  Intramuscular  
Left Deltoid  10/29/2012  2012  141

 

 X  12/10/2012  [employer]  MSD  PNEUMOVAX 23  [id number]  Intramuscular  
Left Gluteous Medius  12/10/2012  2010  33

 

 Influenza  10/28/2013  [employer]of[employer] pasteur  [facility]  Fluzone > 3 Years  [id number]  
Intramuscular  Right Deltoid  10/28/2013  2013  141

 

 X  9/2/2015  Not Entered  NE  PREVNAR 13     Not Entered  Not Entered  1  109

 

 Influenza  2015  Not Entered  NE  Not Entered     Not Entered  Not Entered
  2015  141

 

 HepB Adult  2016  [employer]  MSD  RECOMBIVAX-ADULT  [id number]  Not 
Entered  Left Deltoid  2016  43

 

 HepB Adult  2016  [employer]  MSD  RECOMBIVAX-ADULT  [id number]  
Intramuscular  Right Deltoid  2016  43

 

 Zostavax  2012  Not Entered  NE  Not Entered     Not Entered  Not 
Entered  1  121

 

 Tdap  2012  Not Entered  NE  Not Entered     Not Entered  Not Entered  1  115

 

 Influenza  10/13/2016  Not Entered  NE  FLUZONE-HIGH DOSE     Intramuscular  
Left Arm  1  141

 

 HepB Adult  2016  [employer]  MSD  RECOMBIVAX-ADULT  [id number]  
Intramuscular  Right Deltoid  2016  43

 

 Influenza  10/30/2017  Not Entered  NE  Fluarix, quadrivalent, preservative 
free     Intramuscular  Left Arm  2017  150







History of Past Illness







 Name  Date of Onset  Comments

 

 Benign Essential Hypertension  Dec 14 2009 10:10AM   

 

 Hyperlipidemia  Dec 14 2009 10:10AM   

 

 Hypothyroidism, Acquired  Dec 14 2009 10:10AM   

 

 hypothyroid      

 

 Hypertension      

 

 Hyperlipidemia      

 

 acid reflux      

 

 Hypertension, Benign Essential  2010  9:41AM   

 

 Hypertension, Benign Essential  2010 12:53PM   

 

 Routine gynecological examination  May  5 2010  9:28AM   

 

 Hypertension  May  5 2010  9:28AM   

 

 Hyperlipidemia, unspecified  May  5 2010  9:28AM   

 

 Hypothyroidism, Acquired  May  5 2010  9:28AM   

 

 Vulvovaginitis  May  5 2010  9:28AM   

 

 Rhinitis, Allergic  May  5 2010  9:28AM   

 

 Hypertension, Benign Essential  May 19 2010  8:48AM   

 

 Hypertension, Benign Essential  May 25 2010  9:39AM   

 

 Flu  Oct 20 2010 12:01PM   

 

 Essential Hypertension  2010  8:34AM   

 

 Hyperlipidemia  2010  8:34AM   

 

 Gastroesophageal Reflux  2010  8:34AM   

 

 Hypothyroidism, Acquired  2010  8:34AM   

 

 Hypertension, Benign Essential  2010  9:22AM   

 

 Hypertension, Benign Essential  Dec 15 2010  9:07AM   

 

 Essential Hypertension  2011  1:31PM   

 

 Hyperlipidemia  2011  1:31PM   

 

 Gastroesophageal Reflux  2011  1:31PM   

 

 Hypothyroidism, Acquired  2011  1:31PM   

 

 Essential Hypertension  2011  8:51AM   

 

 Hyperlipidemia  2011  8:51AM   

 

 Gastroesophageal Reflux  2011  8:51AM   

 

 Hypothyroidism, Acquired  2011  8:51AM   

 

 Essential Hypertension  2011  9:13AM   

 

 Hyperlipidemia  2011  9:13AM   

 

 Gastroesophageal Reflux  2011  9:13AM   

 

 Hypothyroidism, Acquired  2011  9:13AM   

 

 Nausea With Vomiting  2011  1:18PM   

 

 Hyponatremia  2011  8:46AM   

 

 Nausea  2011  9:13AM   

 

 Hypothyroidism  2011 11:10AM   

 

 Hyponatremia  2011 11:10AM   

 

 Essential Hypertension  2011 10:05AM   

 

 Hyperlipidemia  2011 10:05AM   

 

 Gastroesophageal Reflux  2011 10:05AM   

 

 Nausea  2011 10:05AM   

 

 Hypothyroidism, Acquired  2011 10:05AM   

 

 Hyponatremia  May  6 2011 11:34AM   

 

 Essential Hypertension  May 16 2011  8:50AM   

 

 Hyperlipidemia  May 16 2011  8:50AM   

 

 Gastroesophageal Reflux  May 16 2011  8:50AM   

 

 Nausea  May 16 2011  8:50AM   

 

 Hypothyroidism, Acquired  May 16 2011  8:50AM   

 

 Hyponatremia  May 16 2011  8:50AM   

 

 Routine gynecological examination  2011  8:54AM   

 

 Hypertension  2011  8:54AM   

 

 Hyperlipidemia, unspecified  2011  8:54AM   

 

 Hypothyroidism, Acquired  2011  8:54AM   

 

 Rhinitis, Allergic  2011  8:54AM   

 

 Hypothyroidism  Aug 29 2011 12:37PM   

 

 Hyponatremia  Aug 29 2011 12:37PM   

 

 Essential Hypertension  Sep 12 2011  8:53AM   

 

 Hyperlipidemia  Sep 12 2011  8:53AM   

 

 Gastroesophageal Reflux  Sep 12 2011  8:53AM   

 

 Hypothyroidism, Acquired  Sep 12 2011  8:53AM   

 

 Hyponatremia  Sep 12 2011  8:53AM   

 

 Hypertension  Sep 29 2011  9:41AM   

 

 Hyperlipidemia  Dec  8 2011  8:31AM   

 

 Hypothyroidism  Dec  8 2011  8:31AM   

 

 Hypertension  Dec  8 2011  8:31AM   

 

 Flu  Dec  9 2011 10:02AM   

 

 Essential Hypertension  Dec 13 2011 10:13AM   

 

 Hyperlipidemia  Dec 13 2011 10:13AM   

 

 Gastroesophageal Reflux  Dec 13 2011 10:13AM   

 

 Hypothyroidism, Acquired  Dec 13 2011 10:13AM   

 

 Hyponatremia  Dec 13 2011 10:13AM   

 

 Vaginal Discharge  2012  9:43AM   

 

 Rhinitis, Allergic  Feb 15 2012  9:31AM   

 

 Eustachian Tube Dysfunction  Feb 15 2012  9:31AM   

 

 Pharyngitis, Acute  Feb 15 2012  9:31AM   

 

 Routine gynecological examination  2012  8:48AM   

 

 Hypertension  2012  8:48AM   

 

 Hyperlipidemia, unspecified  2012  8:48AM   

 

 Hypothyroidism, Acquired  2012  8:48AM   

 

 Rhinitis, Allergic  2012  8:48AM   

 

 Candidiasis, Vulvovaginal  2012 11:04AM   

 

 Essential Hypertension  Aug 15 2012  9:02AM   

 

 Hyperlipidemia  Aug 15 2012  9:02AM   

 

 Gastroesophageal Reflux  Aug 15 2012  9:02AM   

 

 Hypothyroidism, Acquired  Aug 15 2012  9:02AM   

 

 Hyponatremia  Aug 15 2012  9:02AM   

 

 Atrophic Vaginitis, Postmenopausal  Aug 15 2012  9:02AM   

 

 Flu  Oct 29 2012  9:24AM   

 

 Essential Hypertension  Dec 10 2012  8:35AM   

 

 Hyperlipidemia  Dec 10 2012  8:35AM   

 

 Gastroesophageal Reflux  Dec 10 2012  8:35AM   

 

 Hypothyroidism, Acquired  Dec 10 2012  8:35AM   

 

 Hyponatremia  Dec 10 2012  8:35AM   

 

 Atrophic Vaginitis, Postmenopausal  Dec 10 2012  8:35AM   

 

 Pneumococcus  Dec 10 2012  2:07PM   

 

 Vaginal Discharge  Dec 20 2012  1:50PM   

 

 Essential Hypertension  Dec 20 2012  1:50PM   

 

 Hyperlipidemia  Dec 20 2012  1:50PM   

 

 Gastroesophageal Reflux  Dec 20 2012  1:50PM   

 

 Hypothyroidism, Acquired  Dec 20 2012  1:50PM   

 

 Atrophic Vaginitis, Postmenopausal  Dec 20 2012  1:50PM   

 

 Upper Respiratory Infections  2013  8:52AM   

 

 Hyperlipidemia  2013  8:21AM   

 

 Hypertension  2013  8:21AM   

 

 Hypothyroidism  2013  8:21AM   

 

 Screening Mammogram  Beto 10 2013  8:45AM   

 

 Routine gynecological examination  Beto 10 2013  8:34AM   

 

 Hypertension  Beto 10 2013  8:34AM   

 

 Hyperlipidemia, unspecified  Beto 10 2013  8:34AM   

 

 Hypothyroidism, Acquired  Beto 10 2013  8:34AM   

 

 Rhinitis, Allergic  Beto 10 2013  8:34AM   

 

 Flu  Oct 28 2013  8:52AM   

 

 Essential Hypertension  Dec  9 2013  8:37AM   

 

 Hyperlipidemia  Dec  9 2013  8:37AM   

 

 Gastroesophageal Reflux  Dec  9 2013  8:37AM   

 

 Hypothyroidism, Acquired  Dec  9 2013  8:37AM   

 

 Atrophic Vaginitis, Postmenopausal  Dec  9 2013  8:37AM   

 

 Hypertension  2014  9:56AM   

 

 Hyperlipidemia  2014  9:56AM   

 

 Hypothyroidism  2014  9:56AM   

 

 Screening Mammogram  2014  8:32AM   

 

 Osteopenia  2014  8:32AM   

 

 Hypertension  2014  8:35AM   

 

 Hypothyroidism, Acquired  2014  8:35AM   

 

 Hyperlipidemia  2014  8:35AM   

 

 Upper Respiratory Infections  2014  9:28AM   

 

 Sinusitis, Acute  Oct  2 2014  9:17AM   

 

 Herpes Zoster  Oct  5 2014  1:44PM   

 

 Vesicular Eruption  Oct  5 2014  1:44PM   

 

 Hypertension  2014  8:18AM   

 

 Hyperlipidemia  2014  8:18AM   

 

 hypothyroid  2014  8:18AM   

 

 Situational anxiety  Dec 20 2014  9:33AM   

 

 GERD (gastroesophageal reflux disease)  Dec 20 2014  9:33AM   

 

 Nausea  Dec 20 2014  9:33AM   

 

 Abdominal Pain, Epigastric  Dec 20 2014  9:33AM   

 

 Hyperlipidemia  Oct  6 2014  9:03AM   

 

 acid reflux  Oct  6 2014  9:03AM   

 

 hypothyroid  Oct  6 2014  9:03AM   

 

 Shingles  Oct  6 2014  9:03AM   

 

 Pharyngitis  2015  1:09PM   

 

 Bronchitis  2015  9:10AM   

 

 Hyperlipidemia  2015  9:10AM   

 

 acid reflux  2015  9:10AM   

 

 hypothyroid  2015  9:10AM   

 

 Hyperlipidemia  2015  8:18AM   

 

 Hypertension  2015  8:18AM   

 

 hypothyroid  2015  8:18AM   

 

 Screening Mammogram  2015 11:43AM   

 

 Medicare Annual Pap Q 2 years  2015  9:36AM   

 

 Screening Mammogram  2015  9:36AM   

 

 Candidiasis of female genitalia  2015  9:36AM   

 

 Hypertension  2015 11:34AM   

 

 Hyperlipidemia, unspecified  2015 11:34AM   

 

 Hypothyroidism, Acquired  2015 11:34AM   

 

 Osteopenia  2016  8:41AM   

 

 Encounter for screening mammogram for breast cancer  2016  8:41AM   

 

 Hypertension  2016  8:34AM   

 

 Lymphedema  2016  8:34AM   

 

 Hyperlipidemia  2016  8:34AM   

 

 hypothyroid  2016  8:34AM   

 

 HEP B  2016  9:13AM   

 

 HEP B  2016  8:52AM   

 

 Acid Reflux  2016  8:34AM   

 

 History of acute gastritis  Oct 13 2016  2:30PM   

 

 Anxiety as acute reaction to exceptional stress  Oct 13 2016  2:30PM   

 

 HEP B  Dec 29 2016  8:42AM   

 

 Caregiver stress syndrome  May 26 2017  8:28AM   

 

 Anxiety  May 26 2017  8:28AM   

 

 Blood in stool  2017  2:54PM   

 

 Upper GI bleed  2017 11:34AM   

 

 Hyponatremia  2017  9:03AM   

 

 Hyponatremia  2017  8:43AM   

 

 Medicare Annual Pap Q 2 years  2017  9:03AM   

 

 Nausea  2017  9:03AM   

 

 Uterine enlargement  2017  9:03AM   

 

 Encounter for screening mammogram for breast cancer  2017  4:56PM   

 

 Panic disorder [episodic paroxysmal anxiety]  Oct 10 2017  2:02PM   

 

 Acute stress reaction  Oct 10 2017  2:02PM   

 

 Caregiver stress syndrome  Oct 10 2017  2:02PM   

 

 Stress reaction causing mixed disturbance of emotion and conduct  Oct 18 2017  
9:05AM   

 

 Ankle strain, left, initial encounter  Dec 19 2017  8:29AM   

 

 Excoriation of ear canal, left, initial encounter  Dec 19 2017  8:29AM   

 

 Generalized anxiety disorder  Dec 19 2017  8:29AM   

 

 Grief reaction  Dec 19 2017  8:29AM   

 

 Acute non-recurrent frontal sinusitis  2018 11:58AM   

 

 Cough  2018 11:58AM   

 

 Hypertension  2018  8:10AM   

 

 Hyperlipidemia, unspecified  2018  8:10AM   

 

 Hypothyroidism, Acquired  2018  8:10AM   

 

 Situational anxiety  Mar  6 2018  8:30AM   

 

 Nausea  Mar  6 2018  8:30AM   

 

 LESLIE (generalized anxiety disorder)  May 22 2018  8:47AM   

 

 Caregiver stress syndrome  May 22 2018  8:47AM   

 

 Situational anxiety  May 29 2018  8:26AM   

 

 Osteopenia  May 29 2018  8:26AM   

 

 Visit for screening mammogram  May 29 2018  8:26AM   







Payers







 Insurance Name  Company Name  Plan Name  Plan Number  Policy Number  Policy 
Group Number  Start Date

 

    Medicare [facility]  Medicare [facility]     [id number]     N/A

 

    [employer]  [facility]     [id number]     2009

 

    Medicare Part B  [facility] [place]     [id number]     N/A

 

    Medicare Part A  Medicare Part A     [id number]     N/A

 

    Medicare Part A  [employer]are P [employer] - Preventive     [id number]     N/A

 

    Medicare Part A  Medicare - Lab/Xray     [id number]     N/A







History of Encounters







 Visit Date  Visit Type  Provider

 

 2018  Office visit  Marvin Boyer DO

 

 2018  Office visit  Doris Noriega MD

 

 3/6/2018  Office visit  Doris Noriega MD

 

 2018  Office visit  Deedee PIERRE

 

 2017  Office visit  Doris Noriega MD

 

 10/18/2017  Office visit  Doris Noriega MD

 

 10/10/2017  Office visit  Doris Noriega MD

 

 2017  Office visit  Doris Noriega MD

 

 2017  Office visit  Doris Noriega MD

 

 2017  Office visit  Doris Noriega MD

 

 2017  Office visit  Prince Noe APRN

 

 2016  Nurse visit  Doris Noriega MD

 

 10/13/2016  Office visit  Doris Noriega MD

 

 2016  Nurse visit  Doris Noriega MD

 

 2016  Office visit  Doris Noriega MD

 

 2015  Office visit  Doris Noriega MD

 

 2015  Office visit  Doris Noriega MD

 

 2015  Office visit  Prince Noe APRN

 

 2014  Office visit  Anyi Herrera APRN

 

 10/27/2014  Voided  Doris Noriega MD

 

 10/6/2014  Office visit  Doris Noriega MD

 

 10/5/2014  Office visit  Anyi Herrera APRN

 

 10/2/2014  Office visit   

 

 10/2/2014  Office visit  Corrine Bay APRN

 

 2014  Office visit  Kassie Franklin APRN

 

 2014  Office visit  Doris Noriega MD

 

 2014  Office visit  Doris Noriega MD

 

 2013  Office visit  Dee Colindres MD

 

 10/28/2013  Nurse visit  Dee Colindres MD

 

 6/10/2013  Office visit  Dee Colindres MD

 

 2013  Office visit  Corrine Bay APRN

 

 2012  Office visit  Dee Colindres MD

 

 12/10/2012  Office visit  Dee Colindres MD

 

 10/29/2012  Nurse visit  Dee Colindres MD

 

 8/15/2012  Office visit  Dee Colindres MD

 

 2012  Office visit  Corrine Bay APRN

 

 2012  Office visit  Dee Colindres MD

 

 2/15/2012  Office visit  Dee Colindres MD

 

 2012  Office visit  Corrine Bay APRN

 

 2011  Office visit  Dee Colindres MD

 

 10/28/2011  Nurse visit  Shad Nolasco MD

 

 2011  Office visit  Dee Colindres MD

 

 2011  Office visit  Dee Colindres MD

 

 2011  Office visit  Dee Colindres MD

 

 2011  Office visit  Dee Colindres MD

 

 2011  Office visit  Dee Colindres MD

 

 2011  Office visit  Dee Colindres MD

 

 4/10/2011  [facility]  KHRIS Reeves MD

 

 4/10/2011  [facility]  RICKEY Toussaint MD

 

 2011  Office visit  RICKEY Toussaint MD

 

 2011  [facility]  Fide Castellanos MD

 

 2011  Voided  Fide Castellanos MD

 

 2011  Office visit  Dee Colindres MD

 

 12/15/2010  Nurse visit  Dee Colindres MD

 

 2010  Office visit  Dee Colindres MD

 

 10/20/2010  Nurse visit  Stephenie PERAZA

 

 2010  Nurse visit  Dee Colindres MD

 

 2010  Nurse visit  Dee Colnidres MD

 

 2010  Office visit  Dee Colindres MD

 

 3/19/2010  Voided  Stephenie Kay PA

 

 2010  Nurse visit  Stephenie PERAZA

 

 2010  Nurse visit  Stephenie PERAZA

 

 2010  Nurse visit  Stephenie PERAZA

 

 2009  Office visit  Stephenie PERAZA

 

 10/20/2009  Nurse visit  Stephenie PERAZA

## 2018-10-22 NOTE — XMS REPORT
MU2 Ambulatory Summary

 Created on: 2017



Milady Crespo

External Reference #: [id number]

: 1945

Sex: Female



Demographics







 Address  [address]

[place]  [address]

 

 Home Phone  [phone]

 

 Preferred Language  English

 

 Marital Status  

 

 [employer] Affiliation  Unknown

 

 Race  White

 

 Ethnic Group  Not  or 





Author







 Author  Doris Noriega

 

 Organization  [facility]

 

 Address  [address]

[place], [place]  [id number]



 

 Phone  [phone]







Care Team Providers







 Care Team Member Name  Role  Phone

 

 Doris Noriega  PCP  [phone]

 

 Doris Noriega  PreferredProvider  [phone]







Allergies and Adverse Reactions







 Name  Reaction  Notes

 

 NO KNOWN DRUG ALLERGIES      







Plan of Treatment







 Planned Activity  Comments  Planned Date  Planned Time  Plan/Goal

 

 Complete blood count (CBC) with differential count     2016  12:00 AM   

 

 Comprehensive metabolic panel     2016  12:00 AM   

 

 VITAMIN D (25 HYDROXY)     2016  12:00 AM   

 

 Lipid panel (total cholesterol, lipoproteins, HDL, triglycerides)     8/15/
2012  12:00 AM   

 

 Pap smear     2017  12:00 AM   

 

 Comprehensive Metabolic Panel     6/10/2013  12:00 AM   

 

 Lipid panel (total cholesterol, lipoproteins, HDL, triglycerides)     6/10/
2013  12:00 AM   

 

 Thyroid stimulating hormone (TSH)     6/10/2013  12:00 AM   

 

 CBC (automated H&H, platelets, WBC and automated differential)     6/10/2013  
12:00 AM   

 

 Comprehensive Metabolic Panel     2012  12:00 AM   

 

 Lipid panel (total cholesterol, lipoproteins, HDL, triglycerides)     2012  12:00 AM   

 

 Thyroid stimulating hormone (TSH)     2012  12:00 AM   

 

 CBC (automated H&H, platelets, WBC and automated differential)     2012  
12:00 AM   

 

 Screening mammography, bilateral     2015  12:00 AM   







Medications







 Active 

 

 Name  Start Date  Estimated Completion Date  SIG  Comments

 

 aspirin 81 mg oral tablet,delayed release (DR/EC)        take 1 tablet (81 mg) 
by oral route once daily   

 

 Vitamin D3 1,000 unit oral capsule        take 1 capsule by oral route daily   

 

 famotidine 20 mg oral tablet  2015     TAKE ONE TABLET BY MOUTH TWICE 
DAILY   

 

 valsartan 160 mg oral tablet  2016     TAKE ONE TABLET BY MOUTH ONCE 
DAILY   

 

 amlodipine 5 mg oral tablet  2016     TAKE ONE TABLET BY MOUTH ONCE DAILY
   

 

 Allergy Relief (loratadine) 10 mg oral tablet  2016     TAKE ONE TABLET 
BY MOUTH ONCE DAILY   

 

 amlodipine 5 mg oral tablet  2016     TAKE ONE TABLET BY MOUTH ONCE DAILY
   

 

 Allergy Relief (loratadine) 10 mg oral tablet  2016     TAKE ONE TABLET 
BY MOUTH ONCE DAILY   

 

 valsartan 160 mg oral tablet  2016     TAKE ONE TABLET BY MOUTH ONCE 
DAILY   

 

 Lipitor 20 mg oral tablet  10/13/2016  2018  take 1 tablet (20 mg) by oral 
route once daily   

 

 Plavix 75 mg oral tablet  10/13/2016  2018  take 1 tablet (75 mg) by oral 
route once daily   

 

 Zofran (as hydrochloride) 4 mg oral tablet  2016     take 1 tablet by 
oral route daily as needed for 14 days   

 

 Zofran (as hydrochloride) 4 mg oral tablet  2017     take 1 tablet by oral 
route daily as needed for 14 days   

 

 Zofran (as hydrochloride) 4 mg oral tablet  2017     take 1 tablet by oral 
route daily as needed for 14 days   

 

 Zofran (as hydrochloride) 4 mg oral tablet  2017     take 1 tablet by 
oral route daily as needed for 14 days   

 

 Zofran (as hydrochloride) 4 mg oral tablet  2017     take 1 tablet by 
oral route daily as needed for 14 days   

 

 triamcinolone acetonide 0.1 % topical cream  2017     APPLY A THIN LAYER 
OF CREAM EXTERNALLY TO AFFECTED AREA TWICE DAILY FOR 14 DAYS   

 

 loratadine 10 mg oral tablet  2017  TAKE 1 TABLET BY MOUTH 
EVERY DAY IN THE EVENING   

 

 triamcinolone acetonide 0.1 % topical cream  2017     APPLY  CREAM 
EXTERNALLY TO AFFECTED AREA TWICE DAILY FOR 14 DAYS   

 

 EQ LORATADINE 10MG  TABS  2017     TAKE ONE TABLET BY MOUTH ONCE DAILY   

 

 Lexapro 10 mg oral tablet  2017     take 1 tablet (10 mg) by oral route 
once daily for 90 days   

 

 pantoprazole 40 mg oral tablet,delayed release (DR/EC)  10/9/2017     take 1 
tablet (40 mg) by oral route once daily for 90 days   

 

 mirtazapine 15 mg oral tablet  10/10/2017  2017  take 0.5 tablet by oral 
route once a day (at bedtime) for 30 days   









  

 

 Name  Start Date  Expiration Date  SIG  Comments

 

 prednisone 20 mg oral tablet  2011  take 2 tablets by oral 
route daily for 5 days   

 

 calcium carbonate-vitamin D2 600-125 mg-unit oral tablet  8/15/2012  2012
  take 2 tablets by oral route daily   

 

 Montgomery 3 Fish Oil 900-1,400 mg oral capsule,delayed release(DR/EC)  8/15/2012  9
/  take 1 capsule by oral route daily   

 

 multivitamin Oral tablet  8/15/2012  2012  take 1 tablet by oral route 
daily   

 

 triamcinolone acetonide 0.1 % topical cream  2013  10/7/2013  APPLY A 
THIN LAYER TO THE AFFECTED AREA(S) BY TOPICAL ROUTE TWICE A DAY   

 

 famotidine 20 mg oral tablet  2014  take 1 tablet (20 mg) by 
oral route 2 times per day   

 

 amoxicillin 500 mg oral capsule  2014  take 1 capsule (500 mg) 
by oral route 3 times per day for 10 days   

 

 Zithromax Z-Tab 250 mg oral tablet  10/2/2014  10/7/2014  take 2 tablets (500 
mg) by oral route once daily for 1 day then 1 tablet (250 mg) by oral route 
once daily for 4 days   

 

 acyclovir 800 mg oral tablet  10/5/2014  10/12/2014  take 1 tablet (800 mg) by 
oral route 5 times per day for 7 days   

 

 gabapentin 300 mg oral capsule  10/9/2014  2014  take 1 capsule (300 mg) 
by oral route 3 times per day for 14 days   

 

 Carafate 100 mg/mL oral suspension  2014  take 10 milliliters 
(1 gram) by oral route 4 times per day on an empty stomach 1 hour before meals 
and at bedtime for 4 weeks   

 

 amlodipine 5 mg oral tablet  2015  TAKE ONE TABLET BY MOUTH 
EVERY DAY   

 

 levothyroxine 50 mcg oral tablet  2015  TAKE ONE TABLET BY MOUTH 
EVERY DAY   

 

 Diovan 160 mg oral tablet  2015  2/15/2016  TAKE ONE TABLET BY MOUTH 
EVERY DAY for 90 days   

 

 triamcinolone acetonide 0.1 % topical cream  2015  apply a thin 
layer to the affected area(s) by topical route 2 times per day for 14 days   

 

 fluconazole 150 mg oral tablet  2015  take 1 tablet (150 mg) 
by oral route once for 1 day   

 

 atenolol 50 mg oral tablet  10/13/2016  10/8/2017  take 1 tablet (50 mg) by 
oral route once daily   

 

 Zofran (as hydrochloride) 4 mg oral tablet  10/13/2016  10/27/2016  take 1 
tablet by oral route daily as needed for 14 days   

 

 Zofran (as hydrochloride) 4 mg oral tablet  2017     take 1 tablet by 
oral route daily as needed for 14 days   

 

 levothyroxine 50 mcg oral tablet  10/9/2017  10/9/2017  TAKE ONE TABLET BY 
MOUTH ONCE DAILY   

 

 clorazepate dipotassium 7.5 mg oral tablet  10/10/2017  2017  take 1/2 
to1 tablet PO up to three times per day for situational anxiety   

 

 ondansetron 4 mg oral tablet,disintegrating  10/10/2017  2017  dissolve  
1 tablet by oral route every 8 hours as needed for 30 days   

 

 valsartan 160 mg oral tablet  2017  TAKE ONE TABLET BY MOUTH 
ONCE DAILY   

 

 amlodipine 5 mg oral tablet  2017  TAKE ONE TABLET BY MOUTH 
ONCE DAILY   









 Discontinued 

 

 Name  Start Date  Discontinued Date  SIG  Comments

 

 Lipitor 40 mg oral tablet     20092 TAB DAILY   

 

 ramipril 5 mg oral capsule     2009  take 1 capsule (5 mg) by oral route 
once daily   

 

 lovastatin 20 mg oral tablet  2009  take 1 tablet (20 mg) by 
oral route once daily with evening meal   

 

 propranolol 20 mg oral tablet  2010  take 1 tablet by oral 
route 2 times a day   

 

 Fosamax 70 mg oral tablet  2010  take 1 tablet (70 mg) by oral 
route once weekly in the morning, at least 30 minutes before the first food, 
beverage, or medication of the day   

 

 lisinopril 40 mg oral tablet  2010  4/10/2011  take 2 tablets by oral 
route daily   

 

 Zoloft 50 mg oral tablet  2011  take 1 tablet (50 mg) by oral 
route once daily   

 

 promethazine 25 mg oral tablet  2011  take 1 tablet by oral 
route every 6 hours as needed   

 

 Diovan -12.5 mg oral tablet  2011  take 1 tablet by oral 
route once daily for 30 days   

 

 Diflucan 150 mg oral tablet     2012  take 1 tablet (150 mg) by oral 
route once for 1 day   

 

 Diflucan 150 mg oral tablet  2012  8/15/2012  take 1 tablet (150 mg) by 
oral route once   

 

 Vitamin B-12 1,000 mcg oral tablet  8/15/2012  2014  take 1 tablet by 
oral route daily   

 

 Premarin 0.625 mg/gram vaginal cream  10/29/2012  6/10/2013  use 1 gram daily 
for a week then 1 gram twice a week   

 

 Medrol (Tab) 4 mg oral tablets,dose pack  2013  6/10/2013  take as 
directed   

 

 aspirin 325 mg oral tablet  2014  take 1 tablet (325 mg) by 
oral route once daily   

 

 Medrol (Tab) 4 mg oral tablets,dose pack  2014  10/2/2014  take as 
directed   

 

 Tetracaine Lollipops Lollipop  2015  Use as directed   

 

 Zoloft 50 mg oral tablet  2016  take 1 tablet (50 mg) by oral 
route once daily for 90 days   

 

 Zoloft 50 mg oral tablet  2016  take 1 tablet (50 mg) by oral 
route once daily for 90 days  Pt refuses to take...







Problem List







 Description  Status  Onset

 

 Hyperlipidemia  Active   

 

 acid reflux  Active   

 

 Hypertension  Active   

 

 hypothyroid  Active   







Vital Signs







 Date  Time  BP-Sys(mm[Hg]  BP-Morena(mm[Hg])  HR(bpm)  RR(rpm)  Temp  WT  HT  HC  
BMI  BSA  BMI Percentile  O2 Sat(%)

 

 10/18/2017  9:01:00 AM  126 mmHg  80 mmHg  73 bpm  16 rpm  97.9 F  122.375 lbs
  61 in     23.12 kg/m2  1.55 m2     100 %

 

 10/10/2017  1:52:00 PM  118 mmHg  72 mmHg  74 bpm  16 rpm  98.1 F  121 lbs  61 
in     22.8625 kg/m  1.5369 m     99 %

 

 2017  8:40:00 AM  118 mmHg  68 mmHg  63 bpm  16 rpm  98.1 F  123.125 lbs  
61 in     23.26 kg/m2  1.55 m2     100 %

 

 2017  8:57:00 AM  116 mmHg  62 mmHg  71 bpm  16 rpm  99.8 F  121.5 lbs  
61 in     22.957 kg/m  1.5401 m     98 %

 

 2017  11:30:00 AM  128 mmHg  78 mmHg  69 bpm  16 rpm  98.8 F  129.375 lbs  
61 in     24.44 kg/m2  1.59 m2     98 %

 

 2017  8:22:00 AM  118 mmHg  78 mmHg  62 bpm  18 rpm  97.5 F  129.125 lbs  
61 in     24.3977 kg/m  1.5877 m     100 %

 

 10/13/2016  2:26:00 PM  128 mmHg  78 mmHg  77 bpm  16 rpm  98.4 F  139.25 lbs  
61 in     26.31 kg/m2  1.65 m2     100 %

 

 2016  8:29:00 AM  122 mmHg  70 mmHg  72 bpm  16 rpm  97.8 F  137.125 lbs  
61 in     25.9093 kg/m  1.6361 m     100 %

 

 2015  9:33:00 AM  120 mmHg  68 mmHg  73 bpm     98.7 F  144.375 lbs  61 
in     27.28 kg/m2  1.68 m2     99 %

 

 2015  9:05:00 AM  110 mmHg  75 mmHg  85 bpm  16 rpm  96.7 F  144.375 lbs  
61 in     27.2791 kg/m  1.6788 m     99 %

 

 2015  1:05:00 PM  108 mmHg  62 mmHg  78 bpm  18 rpm  96.9 F  142.375 lbs  
61 in     26.90 kg/m2  1.67 m2     100 %

 

 2014  9:31:00 AM  126 mmHg  66 mmHg  79 bpm  18 rpm  98.7 F  149 lbs  61 
in     28.153 kg/m  1.7055 m     98 %

 

 10/6/2014  9:02:00 AM  110 mmHg  74 mmHg  94 bpm  18 rpm  98.1 F  145.4 lbs  
61 in     27.47 kg/m2  1.68 m2     97 %

 

 10/2/2014  9:14:00 AM  124 mmHg  74 mmHg  79 bpm  18 rpm  98 F  147.25 lbs  61 
in     27.8224 kg/m  1.6955 m     98 %

 

 2014  9:22:00 AM  124 mmHg  76 mmHg  89 bpm  18 rpm  98.1 F  148 lbs  61 
in     27.96 kg/m2  1.70 m2     100 %

 

 2014  8:27:00 AM  118 mmHg  65 mmHg  74 bpm  16 rpm  97.7 F  148 lbs  61 
in     27.9641 kg/m  1.6998 m     99 %

 

 2014  9:48:00 AM  125 mmHg  70 mmHg  93 bpm  18 rpm  96.7 F  156 lbs  61 
in     29.48 kg/m2  1.75 m2     100 %

 

 2013  8:35:00 AM  122 mmHg  74 mmHg  84 bpm  16 rpm  97.9 F  155.375 lbs 
                99 %

 

 6/10/2013  8:30:00 AM  130 mmHg  82 mmHg  79 bpm  16 rpm  97.9 F  161 lbs     
            98 %

 

 2013  8:50:00 AM  124 mmHg  68 mmHg  66 bpm  18 rpm  97.6 F  157.5 lbs  
61 in     29.7591 kg/m  1.7535 m      

 

 2012  1:46:00 PM  120 mmHg  72 mmHg  75 bpm  16 rpm  97.6 F  156.125 lbs
                 98 %

 

 12/10/2012  8:32:00 AM  122 mmHg  82 mmHg  70 bpm  16 rpm  97.3 F  157 lbs    
             99 %

 

 8/15/2012  9:00:00 AM  130 mmHg  76 mmHg  86 bpm  16 rpm  97.4 F  159 lbs     
            100 %

 

 2012  11:02:00 AM  128 mmHg  64 mmHg  66 bpm  18 rpm  97.4 F  162.125 lbs
  61 in     30.6329 kg/m  1.7791 m      

 

 2012  8:45:00 AM  130 mmHg  70 mmHg  82 bpm  16 rpm  98.2 F  160.125 lbs 
                100 %

 

 2/15/2012  9:29:00 AM  122 mmHg  80 mmHg  86 bpm  16 rpm  97.4 F  159.375 lbs  
61 in     30.1133 kg/m  1.7639 m     99 %

 

 2012  9:41:00 AM  132 mmHg  74 mmHg  66 bpm  18 rpm  98.4 F  160.375 lbs  
61 in     30.30 kg/m2  1.77 m2      

 

 2011  10:08:00 AM  134 mmHg  82 mmHg  80 bpm  16 rpm  98 F  160 lbs  61 
in     30.2314 kg/m  1.7674 m     99 %

 

 2011  3:56:00 PM  130 mmHg  80 mmHg                              

 

 2011  8:55:00 AM  130 mmHg  74 mmHg  88 bpm  16 rpm  98.2 F  158.25 lbs  
               98 %

 

 2011  8:54:00 AM  136 mmHg  82 mmHg  102 bpm  16 rpm  98.1 F  153.5 lbs   
              99 %

 

 2011  8:49:00 AM  122 mmHg  88 mmHg  88 bpm  16 rpm  98.6 F  152.25 lbs  
               99 %

 

 2011  10:02:00 AM  140 mmHg  82 mmHg  96 bpm  20 rpm  98.8 F             
       100 %

 

 2011  9:14:00 AM  156 mmHg  98 mmHg  110 bpm  24 rpm  98.5 F  153 lbs    
             100 %

 

 2011  8:50:00 AM  160 mmHg  84 mmHg  100 bpm  16 rpm  98.7 F  155 lbs    
             100 %

 

 2011  1:25:00 PM  152 mmHg  100 mmHg  82 bpm  16 rpm  97.2 F  156.375 lbs 
                100 %

 

 2011  9:55:00 AM  144 mmHg  90 mmHg                              

 

 2010  9:24:00 AM  138 mmHg  84 mmHg                              

 

 2010  8:58:00 AM  160 mmHg  94 mmHg                              

 

 2010  8:33:00 AM  142 mmHg  90 mmHg  100 bpm  16 rpm  98.1 F  158.375 
lbs                  

 

 2010  9:24:00 AM  160 mmHg  102 mmHg  88 bpm  18 rpm  99 F  157.125 lbs  
62 in     28.7382 kg/m  1.7657 m      

 

 3/19/2010  11:01:00 AM  140 mmHg  90 mmHg                              

 

 2009  10:08:00 AM  142 mmHg  86 mmHg  72 bpm  16 rpm  98.1 F  154.125 
lbs  61 in     29.12 kg/m2  1.73 m2      







Social History







 Name  Description  Comments

 

       

 

 Children      

 

 lives alone      

 

 Supervisor      

 

 Tobacco  Never smoker   







History of Procedures







 Date Ordered  Description  Order Status

 

 2011 12:00 AM  COMPREHEN METABOLIC PANEL  Reviewed

 

 2011 12:00 AM  ASSAY THYROID STIM HORMONE  Reviewed

 

 2011 12:00 AM  COMPREHEN METABOLIC PANEL  Reviewed

 

 2011 12:00 AM  LIPID PANEL  Reviewed

 

 2011 12:00 AM  ASSAY THYROID STIM HORMONE  Reviewed

 

 2011 12:00 AM  COMPLETE CBC W/AUTO DIFF WBC  Reviewed

 

 2015 12:00 AM  COMPLETE CBC W/AUTO DIFF WBC  Reviewed

 

 2015 12:00 AM  COMPREHEN METABOLIC PANEL  Reviewed

 

 2015 12:00 AM  LIPID PANEL  Reviewed

 

 2015 12:00 AM  ASSAY THYROID STIM HORMONE  Reviewed

 

 2011 12:00 AM  COMPREHEN METABOLIC PANEL  Reviewed

 

 2011 12:00 AM  COMPREHEN METABOLIC PANEL  Reviewed

 

 2011 12:00 AM  LIPID PANEL  Reviewed

 

 2011 12:00 AM  ASSAY THYROID STIM HORMONE  Reviewed

 

 10/28/2011 12:00 AM  ***Immunization administration, Medicare flu  Reviewed

 

 10/28/2011 12:00 AM  Fluzone ** MEDICARE Only **  Reviewed

 

 2010 11:24 AM  NO CHARGE OV  Reviewed

 

 2010 11:26 AM  NO CHARGE OV  Reviewed

 

 2011 12:00 AM  COMPREHEN METABOLIC PANEL  Reviewed

 

 2011 12:00 AM  LIPID PANEL  Reviewed

 

 2011 12:00 AM  ASSAY THYROID STIM HORMONE  Reviewed

 

 2011 12:00 AM  COMPLETE CBC W/AUTO DIFF WBC  Reviewed

 

 2011 12:00 AM  Wrist Support  Reviewed

 

 2016 12:00 AM  Screening mammography, bilateral  Reviewed

 

 2016 12:00 AM  DXA BONE DENSITY AXIAL  Returned

 

 2016 12:00 AM  TETANUS VACCINE IM  Reviewed

 

 2016 12:00 AM  HEP B VACC ADULT 3 DOSE IM  Reviewed

 

 2012 12:00 AM  TISSUE EXAM FOR FUNGI  Reviewed

 

 2012 12:00 AM  SMEAR WET MOUNT SALINE/INK  Reviewed

 

 2016 12:00 AM  TETANUS VACCINE IM  Reviewed

 

 2016 12:00 AM  HEP B VACC ADULT 3 DOSE IM  Reviewed

 

 2/15/2012 12:00 AM  THER/PROPH/DIAG INJ SC/IM  Reviewed

 

 2/15/2012 12:00 AM  Bicillin CR [facility]#04069980405[facility]  Reviewed

 

 2/15/2012 12:00 AM  Depo-Medrol 40 mg NDC#0664744068  Reviewed

 

 10/13/2016 12:00 AM  COMPLETE CBC W/AUTO DIFF WBC  Returned

 

 10/13/2016 12:00 AM  COMPREHEN METABOLIC PANEL  Returned

 

 10/13/2016 12:00 AM  ASSAY THYROID STIM HORMONE  Returned

 

 10/13/2016 12:00 AM  LIPID PANEL  Returned

 

 2016 12:00 AM  TETANUS VACCINE IM  Reviewed

 

 2016 12:00 AM  HEP B VACC ADULT 3 DOSE IM  Reviewed

 

 2017 12:00 AM  ASSAY OF IRON  Returned

 

 2017 12:00 AM  IRON BINDING TEST  Returned

 

 2017 12:00 AM  ASSAY OF TRANSFERRIN  Returned

 

 2017 12:00 AM  OCCULT BLD FECES 1-3 TESTS  Returned

 

 2017 12:00 AM  COMPLETE CBC AUTOMATED  Returned

 

 8/15/2012 12:00 AM  COMPREHEN METABOLIC PANEL  Reviewed

 

 8/15/2012 12:00 AM  ASSAY THYROID STIM HORMONE  Reviewed

 

 8/15/2012 12:00 AM  COMPLETE CBC W/AUTO DIFF WBC  Reviewed

 

 8/15/2012 12:00 AM  Wrist Support  Reviewed

 

 2017 12:00 AM  METABOLIC PANEL TOTAL CA  Returned

 

 2017 12:00 AM  METABOLIC PANEL TOTAL CA  Returned

 

 2017 12:00 AM  Screening mammography, bilateral  Returned

 

 10/29/2012 12:00 AM  Flu Injection 3 Years And Above NDC# [address]-75  C  
Reviewed

 

 12/10/2012 12:00 AM  IMMUNIZATION ADMIN  Reviewed

 

 12/10/2012 12:00 AM  Pneumovax Injection - [facility]  Reviewed

 

 2012 12:00 AM  TRICHOMONAS ASSAY W/OPTIC  Reviewed

 

 2013 12:00 AM  THER/PROPH/DIAG INJ SC/IM  Reviewed

 

 2013 12:00 AM  Bicillin CR, 1.2 million units NDC# 56933-343-03  Reviewed

 

 2013 12:00 AM  COMPREHEN METABOLIC PANEL  Reviewed

 

 2013 12:00 AM  LIPID PANEL  Reviewed

 

 2013 12:00 AM  COMPLETE CBC W/AUTO DIFF WBC  Reviewed

 

 2013 12:00 AM  ASSAY THYROID STIM HORMONE  Reviewed

 

 6/10/2013 12:00 AM  MAMMOGRAM SCREENING  Reviewed

 

 6/10/2013 12:00 AM  CYTOPATH C/V MANUAL  Reviewed

 

 12/10/2013 12:00 AM  LIPID PANEL  Reviewed

 

 12/10/2013 12:00 AM  ASSAY THYROID STIM HORMONE  Reviewed

 

 12/10/2013 12:00 AM  COMPLETE CBC W/AUTO DIFF WBC  Reviewed

 

 12/10/2013 12:00 AM  COMPREHEN METABOLIC PANEL  Reviewed

 

 2010 12:00 AM  CYTOPATH C/V MANUAL  Reviewed

 

 2010 12:00 AM  SMEAR WET MOUNT SALINE/INK  Reviewed

 

 2010 12:00 AM  LIPID PANEL  Reviewed

 

 2010 12:00 AM  ASSAY THYROID STIM HORMONE  Reviewed

 

 2010 12:00 AM  COMPLETE CBC W/AUTO DIFF WBC  Reviewed

 

 2010 12:00 AM  COMPREHEN METABOLIC PANEL  Reviewed

 

 10/28/2013 12:00 AM  Flu Injection 3 Years And Above NDC# [address]-14  [facility]  
Reviewed

 

 2010 8:48 AM  Blood Pressure Check-no charge  Reviewed

 

 2010 12:00 AM  Blood Pressure Check-no charge  Reviewed

 

 2014 12:00 AM  METABOLIC PANEL TOTAL CA  Reviewed

 

 2014 12:00 AM  ASSAY THYROID STIM HORMONE  Reviewed

 

 2014 12:00 AM  ASSAY OF FREE THYROXINE  Reviewed

 

 2014 12:00 AM  MAMMOGRAM SCREENING  Reviewed

 

 2014 12:00 AM  CT BONE DENSITY AXIAL  Reviewed

 

 2014 12:00 AM  COMPLETE CBC W/AUTO DIFF WBC  Reviewed

 

 2014 12:00 AM  COMPREHEN METABOLIC PANEL  Reviewed

 

 2014 12:00 AM  LIPID PANEL  Reviewed

 

 2014 12:00 AM  ASSAY THYROID STIM HORMONE  Reviewed

 

 10/20/2010 12:00 AM  IMMUNIZATION ADMIN  Reviewed

 

 10/20/2010 12:00 AM  FLU VACCINE 3 YRS & > IM  Reviewed

 

 2010 12:00 AM  COMPREHEN METABOLIC PANEL  Reviewed

 

 2010 12:00 AM  LIPID PANEL  Reviewed

 

 2010 12:00 AM  ASSAY THYROID STIM HORMONE  Reviewed

 

 2010 12:00 AM  COMPLETE CBC W/AUTO DIFF WBC  Reviewed

 

 2010 12:00 AM  Blood Pressure Check-no charge  Reviewed

 

 10/2/2014 12:00 AM  THER/PROPH/DIAG INJ SC/IM  Reviewed

 

 10/2/2014 12:00 AM  Kenalog, Per 10 Mg NDC#5298-2067-20  Reviewed

 

 2014 12:00 AM  COMPLETE CBC W/AUTO DIFF WBC  Reviewed

 

 2014 12:00 AM  COMPREHEN METABOLIC PANEL  Reviewed

 

 2014 12:00 AM  LIPID PANEL  Reviewed

 

 2014 12:00 AM  ASSAY THYROID STIM HORMONE  Reviewed

 

 2015 12:00 AM  Rocephin 1 gram NDC#4310-0049-73  Reviewed

 

 2015 12:00 AM  THER/PROPH/DIAG INJ SC/IM  Reviewed

 

 2011 12:00 AM  COMPREHEN METABOLIC PANEL  Reviewed

 

 2011 12:00 AM  LIPID PANEL  Reviewed

 

 2011 12:00 AM  ASSAY THYROID STIM HORMONE  Reviewed

 

 2011 12:00 AM  COMPLETE CBC W/AUTO DIFF WBC  Reviewed

 

 2011 12:00 AM  METABOLIC PANEL TOTAL CA  Reviewed

 

 2011 12:00 AM  COMPREHEN METABOLIC PANEL  Reviewed

 

 2011 12:00 AM  ASSAY THYROID STIM HORMONE  Reviewed

 

 2011 12:00 AM  COMPLETE CBC W/AUTO DIFF WBC  Reviewed

 

 2011 12:00 AM  ASSAY OF LIPASE  Reviewed

 

 2011 12:00 AM  THER/PROPH/DIAG INJ SC/IM  Reviewed

 

 2011 12:00 AM  Phenergan 50 Mg Im  Bernadine  Reviewed

 

 2011 12:00 AM  METABOLIC PANEL TOTAL CA  Reviewed

 

 2011 12:00 AM  THER/PROPH/DIAG INJ SC/IM  Reviewed

 

 2011 12:00 AM  Phenergan 25 Mg Im  Bernadine  Reviewed

 

 2011 12:00 AM  METABOLIC PANEL TOTAL CA  Reviewed

 

 2011 12:00 AM  ASSAY THYROID STIM HORMONE  Reviewed

 

 2011 12:00 AM  THER/PROPH/DIAG INJ SC/IM  Reviewed

 

 2011 12:00 AM  Phenergan 50 Mg Im  Bernadine  Reviewed

 

 2011 12:00 AM  ASSAY OF SERUM SODIUM  Reviewed

 

 2011 12:00 AM  ASSAY OF SERUM SODIUM  Reviewed

 

 2011 12:00 AM  CYTOPATH C/V MANUAL  Reviewed

 

 2011 12:00 AM  ASSAY THYROID STIM HORMONE  Reviewed

 

 2015 12:00 AM  COMPLETE CBC W/AUTO DIFF WBC  Reviewed

 

 2015 12:00 AM  COMPREHEN METABOLIC PANEL  Reviewed

 

 2015 12:00 AM  LIPID PANEL  Reviewed

 

 2015 12:00 AM  ASSAY THYROID STIM HORMONE  Reviewed

 

 2015 12:00 AM  MAMMOGRAM SCREENING  Reviewed

 

 2015 12:00 AM  CYTOPATH TBS C/V MANUAL  Reviewed







Results Summary







 Date and Description  Results

 

 2010 10:41 AM  WET PREP NO TRICHOMONADS SEEN  No  Few 

 

 2010 8:15 AM  TRIGLYCERIDES 174.0 mg/dLCHOLESTEROL 175.0 mg/dLHDL 58.0 mg/
dLTOT CHOL/HDL 3.0 LDL (CALC) 82.0 mg/dLTSH 0.790 uIU/mLGLUCOSE 95.0 mg/
dLSODIUM 136.0 mmol/LPOTASSIUM 4.50 mmol/LCHLORIDE 99.0 mmol/LCO2 26.0 mmol/
LBUN 12.0 mg/dLCREATININE 0.80 mg/dLSGOT/AST 32.0 IU/LSGPT/ALT 33.0 IU/LALK 
PHOS 103.0 IU/LTOTAL PROTEIN 7.60 g/dLALBUMIN 4.60 g/dLTOTAL BILI 0.60 mg/
dLCALCIUM 9.80 mg/dLAGE 64 GFR NonAA 72 GFR AA 87 eGFR >60 mL/min/1.73 m2eGFR AA
* >60 WBC 5.3 RBC 4.13 HGB 13.10 g/dLHCT 39.20 %MCV 95.0 fLMCH 31.70 pgMCHC 
33.40 g/dLRDW SD 44 RDW CV 12.70 %MPV 9.80 fLPLT 257 NRBC# 0.00 NRBC% 0.0 %NEUT 
57.90 %%LYMP 26.50 %%MONO 11.60 %%EOS 3.20 %%BASO 0.80 %#NEUT 3.04 #LYMP 1.39 #
MONO 0.61 #EOS 0.17 #BASO 0.04 MANUAL DIFF NOT IND 

 

 2011 9:45 AM  GLUCOSE 91.0 mg/dLSODIUM 133.0 mmol/LPOTASSIUM 4.40 mmol/
LCHLORIDE 97.0 mmol/LCO2 24.0 mmol/LBUN 9.0 mg/dLCREATININE 0.70 mg/dLCALCIUM 
10.0 mg/dLAGE 65 GFR NonAA 84 GFR  eGFR >60 mL/min/1.73 m2eGFR AA* >60 

 

 2011 10:25 AM  LIPASE 29.0 U/LTSH 0.10 uIU/mLGLUCOSE 115.0 mg/dLSODIUM 
127.0 mmol/LPOTASSIUM 5.30 mmol/LCHLORIDE 93.0 mmol/LCO2 18.0 mmol/LBUN 9.0 mg/
dLCREATININE 0.70 mg/dLSGOT/AST 62.0 IU/LSGPT/ALT 46.0 IU/LALK PHOS 100.0 IU/
LTOTAL PROTEIN 8.60 g/dLALBUMIN 4.90 g/dLTOTAL BILI 0.60 mg/dLCALCIUM 9.90 mg/
dLAGE 65 GFR NonAA 84 GFR  eGFR >60 mL/min/1.73 m2eGFR AA* >60 WBC 6.9 
RBC 4.48 HGB 14.40 g/dLHCT 40.90 %MCV 91.0 fLMCH 32.10 pgMCHC 35.20 g/dLRDW SD 
41 RDW CV 12.50 %MPV 9.30 fLPLT 260 NRBC# 0.00 NRBC% 0.0 %NEUT 74.90 %%LYMP 
15.10 %%MONO 9.40 %%EOS 0.30 %%BASO 0.30 %#NEUT 5.15 #LYMP 1.04 #MONO 0.65 #EOS 
0.02 #BASO 0.02 MANUAL DIFF NOT IND 

 

 2011 9:07 AM  GLUCOSE 92.0 mg/dLSODIUM 131.0 mmol/LPOTASSIUM 4.20 mmol/
LCHLORIDE 99.0 mmol/LCO2 22.0 mmol/LBUN 9.0 mg/dLCREATININE 0.70 mg/dLCALCIUM 
9.20 mg/dLAGE 65 GFR NonAA 84 GFR  eGFR >60 mL/min/1.73 m2eGFR AA* >60 

 

 2011 11:06 AM  TSH 0.510 uIU/mLGLUCOSE 99.0 mg/dLSODIUM 132.0 mmol/
LPOTASSIUM 3.50 mmol/LCHLORIDE 95.0 mmol/LCO2 23.0 mmol/LBUN 9.0 mg/
dLCREATININE 0.80 mg/dLCALCIUM 10.40 mg/dLAGE 65 GFR NonAA 72 GFR AA 87 eGFR >
60 mL/min/1.73 m2eGFR AA* >60 

 

 2011 9:45 AM  SODIUM 132.0 mmol/L

 

 2011 9:27 AM  SODIUM 137.0 mmol/L

 

 2011 9:55 AM  TSH 1.070 uIU/mL

 

 2011 8:55 AM  GLUCOSE 102.0 mg/dLSODIUM 135.0 mmol/LPOTASSIUM 4.20 mmol/
LCHLORIDE 102.0 mmol/LCO2 24.0 mmol/LBUN 15.0 mg/dLCREATININE 0.80 mg/dLSGOT/
AST 30.0 IU/LSGPT/ALT 35.0 IU/LALK PHOS 119.0 IU/LTOTAL PROTEIN 7.50 g/
dLALBUMIN 4.30 g/dLTOTAL BILI 0.30 mg/dLCALCIUM 9.20 mg/dLAGE 65 GFR NonAA 72 
GFR AA 87 eGFR >60 mL/min/1.73 m2eGFR AA* >60 TSH 1.130 uIU/mL

 

 2011 8:55 AM  GLUCOSE 98.0 mg/dLSODIUM 137.0 mmol/LPOTASSIUM 3.90 mmol/
LCHLORIDE 103.0 mmol/LCO2 25.0 mmol/LBUN 14.0 mg/dLCREATININE 0.70 mg/dLSGOT/
AST 33.0 IU/LSGPT/ALT 36.0 IU/LALK PHOS 111.0 IU/LTOTAL PROTEIN 8.30 g/
dLALBUMIN 4.40 g/dLTOTAL BILI 0.50 mg/dLCALCIUM 9.40 mg/dLAGE 66 GFR NonAA 84 
GFR  eGFR >60 mL/min/1.73 m2eGFR AA* >60 TRIGLYCERIDES 109.0 mg/
dLCHOLESTEROL 153.0 mg/dLHDL 54.0 mg/dLTOT CHOL/HDL 2.8 LDL (CALC) 77.0 mg/
dLTSH 1.330 uIU/mL

 

 2012 10:33 AM  WET PREP NO TRICH SEEN CLUE CELLS NONE SEEN 

 

 2012 8:45 AM  WBC 5.2 RBC 3.96 HGB 12.70 g/dLHCT 37.80 %MCV 96.0 fLMCH 
32.10 pgMCHC 33.60 g/dLRDW SD 46 RDW CV 13.30 %MPV 9.70 fLPLT 297 NRBC# 0.00 
NRBC% 0.0 %NEUT 57.70 %%LYMP 28.50 %%MONO 10.30 %%EOS 2.50 %%BASO 1.0 %#NEUT 
3.02 #LYMP 1.49 #MONO 0.54 #EOS 0.13 #BASO 0.05 MANUAL DIFF NOT IND GLUCOSE 
97.0 mg/dLSODIUM 137.0 mmol/LPOTASSIUM 4.40 mmol/LCHLORIDE 101.0 mmol/LCO2 23.0 
mmol/LBUN 17.0 mg/dLCREATININE 0.80 mg/dLSGOT/AST 30.0 IU/LSGPT/ALT 33.0 IU/
LALK PHOS 95.0 IU/LTOTAL PROTEIN 7.40 g/dLALBUMIN 4.40 g/dLTOTAL BILI 0.60 mg/
dLCALCIUM 9.70 mg/dLAGE 66 GFR NonAA 72 GFR AA 87 eGFR 60 eGFR AA* 60 
TRIGLYCERIDES 130.0 mg/dLCHOLESTEROL 157.0 mg/dLHDL 56.0 mg/dLTOT CHOL/HDL 2.8 
LDL (CALC) 75.0 mg/dLTSH 0.940 uIU/mL

 

 2012 8:45 AM  WBC 5.1 RBC 3.91 HGB 12.50 g/dLHCT 36.30 %MCV 93.0 fLMCH 
32.0 pgMCHC 34.40 g/dLRDW SD 43 RDW CV 12.60 %MPV 9.30 fLPLT 244 NRBC# 0.00 NRBC
% 0.0 %NEUT 57.60 %%LYMP 27.50 %%MONO 9.10 %%EOS 5.0 %%BASO 0.80 %#NEUT 2.91 #
LYMP 1.39 #MONO 0.46 #EOS 0.25 #BASO 0.04 MANUAL DIFF NOT IND 

 

 2012 5:02 PM  WET PREP NO TRICH SEEN CLUE CELLS NONE SEEN 

 

 2013 8:25 AM  WBC 4.2 RBC 3.96 HGB 12.90 g/dLHCT 37.0 %MCV 93.0 fLMCH 
32.60 pgMCHC 34.90 g/dLRDW SD 43 RDW CV 12.60 %MPV 9.70 fLPLT 254 NRBC# 0.00 
NRBC% 0.0 %NEUT 54.40 %%LYMP 30.40 %%MONO 10.80 %%EOS 3.40 %%BASO 1.0 %#NEUT 
2.26 #LYMP 1.26 #MONO 0.45 #EOS 0.14 #BASO 0.04 MANUAL DIFF NOT IND TSH 1.230 
uIU/mLGLUCOSE 94.0 mg/dLSODIUM 136.0 mmol/LPOTASSIUM 4.30 mmol/LCHLORIDE 99.0 
mmol/LCO2 25.0 mmol/LBUN 10.0 mg/dLCREATININE 0.80 mg/dLSGOT/AST 36.0 IU/LSGPT/
ALT 36.0 IU/LALK PHOS 84.0 IU/LTOTAL PROTEIN 7.90 g/dLALBUMIN 4.40 g/dLTOTAL 
BILI 0.70 mg/dLCALCIUM 9.80 mg/dLAGE 67 GFR NonAA 72 GFR AA 87 eGFR 60 eGFR AA* 
60 TRIGLYCERIDES 122.0 mg/dLCHOLESTEROL 141.0 mg/dLHDL 47.0 mg/dLTOT CHOL/HDL 
3.0 LDL (CALC) 70.0 mg/dL

 

 2013 8:45 AM  WBC 5.3 RBC 4.01 HGB 13.0 g/dLHCT 37.60 %MCV 94.0 fLMCH 
32.40 pgMCHC 34.60 g/dLRDW SD 43 RDW CV 12.50 %MPV 9.40 fLPLT 248 NRBC# 0.00 
NRBC% 0.0 %NEUT 58.70 %%LYMP 27.80 %%MONO 9.80 %%EOS 2.80 %%BASO 0.90 %#NEUT 
3.12 #LYMP 1.48 #MONO 0.52 #EOS 0.15 #BASO 0.05 MANUAL DIFF NOT IND TSH 1.570 
uIU/mLGLUCOSE 94.0 mg/dLSODIUM 134.0 mmol/LPOTASSIUM 4.10 mmol/LCHLORIDE 101.0 
mmol/LCO2 23.0 mmol/LBUN 11.0 mg/dLCREATININE 0.80 mg/dLSGOT/AST 41.0 IU/LSGPT/
ALT 44.0 IU/LALK PHOS 109.0 IU/LTOTAL PROTEIN 7.90 g/dLALBUMIN 4.70 g/dLTOTAL 
BILI 0.80 mg/dLCALCIUM 10.40 mg/dLAGE 68 GFR NonAA 72 GFR AA 87 eGFR >60 mL/min/
1.73 m2eGFR AA* >60 TRIGLYCERIDES 163.0 mg/dLCHOLESTEROL 138.0 mg/dLHDL 49.0 mg/
dLTOT CHOL/HDL 2.8 LDL (CALC) 56.0 mg/dL

 

 2014 8:58 AM  GLUCOSE 86.0 mg/dLSODIUM 134.0 mmol/LPOTASSIUM 4.20 mmol/
LCHLORIDE 99.0 mmol/LCO2 25.0 mmol/LBUN 14.0 mg/dLCREATININE 0.80 mg/dLCALCIUM 
10.10 mg/dLAGE 68 GFR NonAA 72 GFR AA 87 eGFR >60 mL/min/1.73 m2eGFR AA* >60 
FREE T4 1.18 TSH 1.20 uIU/mL

 

 2014 9:50 AM  WBC 5.7 RBC 4.03 HGB 12.90 g/dLHCT 37.90 %MCV 94.0 fLMCH 
32.0 pgMCHC 34.0 g/dLRDW SD 44 RDW CV 12.70 %MPV 9.70 fLPLT 292 NRBC# 0.00 NRBC
% 0.0 %NEUT 62.70 %%LYMP 21.80 %%MONO 11.0 %%EOS 3.40 %%BASO 1.10 %#NEUT 3.55 #
LYMP 1.23 #MONO 0.62 #EOS 0.19 #BASO 0.06 MANUAL DIFF NOT IND GLUCOSE 98.0 mg/
dLSODIUM 135.0 mmol/LPOTASSIUM 4.30 mmol/LCHLORIDE 102.0 mmol/LCO2 22.0 mmol/
LBUN 10.0 mg/dLCREATININE 0.80 mg/dLSGOT/AST 34.0 IU/LSGPT/ALT 32.0 IU/LALK 
PHOS 95.0 IU/LTOTAL PROTEIN 7.70 g/dLALBUMIN 4.30 g/dLTOTAL BILI 0.80 mg/
dLCALCIUM 9.10 mg/dLAGE 68 GFR NonAA 72 GFR AA 87 eGFR 60 eGFR AA* 60 
TRIGLYCERIDES 108.0 mg/dLCHOLESTEROL 146.0 mg/dLHDL 56.0 mg/dLTOT CHOL/HDL 2.6 
LDL (CALC) 68.0 mg/dLTSH 0.90 uIU/mL

 

 2014 8:40 AM  WBC 4.4 RBC 4.13 HGB 13.20 g/dLHCT 38.90 %MCV 94.0 fLMCH 
32.0 pgMCHC 33.90 g/dLRDW SD 47 RDW CV 13.50 %MPV 9.80 fLPLT 279 NRBC# 0.00 NRBC
% 0.0 %NEUT 63.80 %%LYMP 24.60 %%MONO 9.30 %%EOS 1.60 %%BASO 0.70 %#NEUT 2.80 #
LYMP 1.08 #MONO 0.41 #EOS 0.07 #BASO 0.03 MANUAL DIFF NOT IND GLUCOSE 96.0 mg/
dLSODIUM 136.0 mmol/LPOTASSIUM 4.10 mmol/LCHLORIDE 99.0 mmol/LCO2 23.0 mmol/
LBUN 8.0 mg/dLCREATININE 0.80 mg/dLSGOT/AST 31.0 IU/LSGPT/ALT 27.0 IU/LALK PHOS 
85.0 IU/LTOTAL PROTEIN 7.60 g/dLALBUMIN 4.40 g/dLTOTAL BILI 0.80 mg/dLCALCIUM 
10.20 mg/dLAGE 69 GFR NonAA 71 GFR AA 86 eGFR 60 eGFR AA* 60 TRIGLYCERIDES 61.0 
mg/dLCHOLESTEROL 148.0 mg/dLHDL 71.0 mg/dLTOT CHOL/HDL 2.1 LDL (CALC) 65.0 mg/
dLTSH 0.990 uIU/mL

 

 2015 8:32 AM  WBC 4.8 RBC 3.98 HGB 12.70 g/dLHCT 37.30 %MCV 94.0 fLMCH 
31.90 pgMCHC 34.0 g/dLRDW SD 43 RDW CV 12.70 %MPV 9.50 fLPLT 270 NRBC# 0.00 NRBC
% 0.0 %NEUT 57.30 %%LYMP 25.0 %%MONO 13.0 %%EOS 3.60 %%BASO 1.10 %#NEUT 2.73 #
LYMP 1.19 #MONO 0.62 #EOS 0.17 #BASO 0.05 MANUAL DIFF NOT IND TSH 0.640 uIU/
mLGLUCOSE 90.0 mg/dLSODIUM 136.0 mmol/LPOTASSIUM 4.0 mmol/LCHLORIDE 101.0 mmol/
LCO2 24.0 mmol/LBUN 10.0 mg/dLCREATININE 0.80 mg/dLSGOT/AST 34.0 IU/LSGPT/ALT 
32.0 IU/LALK PHOS 92.0 IU/LTOTAL PROTEIN 7.50 g/dLALBUMIN 4.40 g/dLTOTAL BILI 
0.70 mg/dLCALCIUM 9.50 mg/dLAGE 69 GFR NonAA 71 GFR AA 86 eGFR >60 mL/min/1.73 
m2eGFR AA* >60 TRIGLYCERIDES 107.0 mg/dLCHOLESTEROL 138.0 mg/dLHDL 58.0 mg/
dLTOT CHOL/HDL 2.4 LDL (CALC) 59.0 mg/dL

 

 2015 8:31 AM  WBC 4.6 RBC 3.97 HGB 12.90 g/dLHCT 38.0 %MCV 96.0 fLMCH 
32.50 pgMCHC 33.90 g/dLRDW SD 44 RDW CV 12.60 %MPV 9.0 fLPLT 248 NRBC# 0.00 NRBC
% 0.0 %NEUT 61.0 %%LYMP 24.70 %%MONO 10.20 %%EOS 3.0 %%BASO 1.10 %#NEUT 2.82 #
LYMP 1.14 #MONO 0.47 #EOS 0.14 #BASO 0.05 MANUAL DIFF NOT IND TRIGLYCERIDES 
105.0 mg/dLCHOLESTEROL 141.0 mg/dLHDL 58.0 mg/dLTOT CHOL/HDL 2.4 LDL (CALC) 
62.0 mg/dLGLUCOSE 93.0 mg/dLSODIUM 136.0 mmol/LPOTASSIUM 4.10 mmol/LCHLORIDE 
101.0 mmol/LCO2 25.0 mmol/LBUN 9.0 mg/dLCREATININE 0.80 mg/dLSGOT/AST 32.0 IU/
LSGPT/ALT 28.0 IU/LALK PHOS 95.0 IU/LTOTAL PROTEIN 7.30 g/dLALBUMIN 4.50 g/
dLTOTAL BILI 0.80 mg/dLCALCIUM 9.70 mg/dLAGE 70 GFR NonAA 71 GFR AA 86 eGFR >60 
mL/min/1.73meGFR AA* >60 TSH 1.10 uIU/mL







History Of Immunizations







 Name  Date Admin  Mfg Name  Mfg Code  Trade Name  Lot#  Route  Inj  Vis Given  
Vis Pub  CVX

 

 Influenza  10/20/2010  sanofi pasteur  [facility]  Fluzone  [id number]  Intramuscular  
Left Arm  10/20/2010  2010  999

 

 Influenza  10/28/2011  sanofi pasteur  [facility]  Fluzone  [id number]  Intramuscular  
Left Arm  10/28/2011  2011  141

 

 Influenza  10/29/2012  sanofi pasteur  [facility]  Fluzone  [id number]  Intramuscular  
Left Deltoid  10/29/2012  2012  141

 

 X  12/10/2012  [employer]  MSD  Pneumovax 23  [id number]  Intramuscular  
Left Gluteous Medius  12/10/2012  2010  33

 

 Influenza  10/28/2013  sanofi pasteur  [facility]  Fluzone > 3 Years  [id number]  
Intramuscular  Right Deltoid  10/28/2013  2013  141

 

 X  9/2/2015  Not Entered  NE  Prevnar 13     Not Entered  Not Entered  1  109

 

 Influenza  2015  Not Entered  NE  Not Entered     Not Entered  Not Entered
  2015  141

 

 HepB Adult  2016  [employer]  MSD  Recombivax Adult  [id number]  Not 
Entered  Left Deltoid  2016  43

 

 HepB Adult  2016  [employer]  MSD  Recombivax Adult  [id number]  
Intramuscular  Right Deltoid  2016  43

 

 Zostavax  2012  Not Entered  NE  Not Entered     Not Entered  Not 
Entered  1  121

 

 Tdap  2012  Not Entered  NE  Not Entered     Not Entered  Not Entered  1  115

 

 Influenza  10/13/2016  Not Entered  NE  Fluzone High-Dose     Intramuscular  
Left Arm  1  141

 

 HepB Adult  2016  [employer]  MSD  Recombivax Adult  [id number]  
Intramuscular  Right Deltoid  2016  43

 

 Influenza  10/30/2017  Not Entered  NE  Fluarix Quadrivalent     Intramuscular
  Left Arm  2017  150







History of Past Illness







 Name  Date of Onset  Comments

 

 Benign Essential Hypertension  Dec 14 2009 10:10AM   

 

 Hyperlipidemia  Dec 14 2009 10:10AM   

 

 Hypothyroidism, Acquired  Dec 14 2009 10:10AM   

 

 hypothyroid      

 

 Hypertension      

 

 Hyperlipidemia      

 

 acid reflux      

 

 Hypertension, Benign Essential  2010  9:41AM   

 

 Hypertension, Benign Essential  2010 12:53PM   

 

 Routine gynecological examination  May  5 2010  9:28AM   

 

 Hypertension  May  5 2010  9:28AM   

 

 Hyperlipidemia, unspecified  May  5 2010  9:28AM   

 

 Hypothyroidism, Acquired  May  5 2010  9:28AM   

 

 Vulvovaginitis  May  5 2010  9:28AM   

 

 Rhinitis, Allergic  May  5 2010  9:28AM   

 

 Hypertension, Benign Essential  May 19 2010  8:48AM   

 

 Hypertension, Benign Essential  May 25 2010  9:39AM   

 

 Flu  Oct 20 2010 12:01PM   

 

 Essential Hypertension  2010  8:34AM   

 

 Hyperlipidemia  2010  8:34AM   

 

 Gastroesophageal Reflux  2010  8:34AM   

 

 Hypothyroidism, Acquired  2010  8:34AM   

 

 Hypertension, Benign Essential  2010  9:22AM   

 

 Hypertension, Benign Essential  Dec 15 2010  9:07AM   

 

 Essential Hypertension  2011  1:31PM   

 

 Hyperlipidemia  2011  1:31PM   

 

 Gastroesophageal Reflux  2011  1:31PM   

 

 Hypothyroidism, Acquired  2011  1:31PM   

 

 Essential Hypertension  2011  8:51AM   

 

 Hyperlipidemia  2011  8:51AM   

 

 Gastroesophageal Reflux  2011  8:51AM   

 

 Hypothyroidism, Acquired  2011  8:51AM   

 

 Essential Hypertension  2011  9:13AM   

 

 Hyperlipidemia  2011  9:13AM   

 

 Gastroesophageal Reflux  2011  9:13AM   

 

 Hypothyroidism, Acquired  2011  9:13AM   

 

 Nausea With Vomiting  2011  1:18PM   

 

 Hyponatremia  2011  8:46AM   

 

 Nausea  2011  9:13AM   

 

 Hypothyroidism  2011 11:10AM   

 

 Hyponatremia  2011 11:10AM   

 

 Essential Hypertension  2011 10:05AM   

 

 Hyperlipidemia  2011 10:05AM   

 

 Gastroesophageal Reflux  2011 10:05AM   

 

 Nausea  2011 10:05AM   

 

 Hypothyroidism, Acquired  2011 10:05AM   

 

 Hyponatremia  May  6 2011 11:34AM   

 

 Essential Hypertension  May 16 2011  8:50AM   

 

 Hyperlipidemia  May 16 2011  8:50AM   

 

 Gastroesophageal Reflux  May 16 2011  8:50AM   

 

 Nausea  May 16 2011  8:50AM   

 

 Hypothyroidism, Acquired  May 16 2011  8:50AM   

 

 Hyponatremia  May 16 2011  8:50AM   

 

 Routine gynecological examination  2011  8:54AM   

 

 Hypertension  2011  8:54AM   

 

 Hyperlipidemia, unspecified  2011  8:54AM   

 

 Hypothyroidism, Acquired  2011  8:54AM   

 

 Rhinitis, Allergic  2011  8:54AM   

 

 Hypothyroidism  Aug 29 2011 12:37PM   

 

 Hyponatremia  Aug 29 2011 12:37PM   

 

 Essential Hypertension  Sep 12 2011  8:53AM   

 

 Hyperlipidemia  Sep 12 2011  8:53AM   

 

 Gastroesophageal Reflux  Sep 12 2011  8:53AM   

 

 Hypothyroidism, Acquired  Sep 12 2011  8:53AM   

 

 Hyponatremia  Sep 12 2011  8:53AM   

 

 Hypertension  Sep 29 2011  9:41AM   

 

 Hyperlipidemia  Dec  8 2011  8:31AM   

 

 Hypothyroidism  Dec  8 2011  8:31AM   

 

 Hypertension  Dec  8 2011  8:31AM   

 

 Flu  Dec  9 2011 10:02AM   

 

 Essential Hypertension  Dec 13 2011 10:13AM   

 

 Hyperlipidemia  Dec 13 2011 10:13AM   

 

 Gastroesophageal Reflux  Dec 13 2011 10:13AM   

 

 Hypothyroidism, Acquired  Dec 13 2011 10:13AM   

 

 Hyponatremia  Dec 13 2011 10:13AM   

 

 Vaginal Discharge  2012  9:43AM   

 

 Rhinitis, Allergic  Feb 15 2012  9:31AM   

 

 Eustachian Tube Dysfunction  Feb 15 2012  9:31AM   

 

 Pharyngitis, Acute  Feb 15 2012  9:31AM   

 

 Routine gynecological examination  2012  8:48AM   

 

 Hypertension  2012  8:48AM   

 

 Hyperlipidemia, unspecified  2012  8:48AM   

 

 Hypothyroidism, Acquired  2012  8:48AM   

 

 Rhinitis, Allergic  2012  8:48AM   

 

 Candidiasis, Vulvovaginal  2012 11:04AM   

 

 Essential Hypertension  Aug 15 2012  9:02AM   

 

 Hyperlipidemia  Aug 15 2012  9:02AM   

 

 Gastroesophageal Reflux  Aug 15 2012  9:02AM   

 

 Hypothyroidism, Acquired  Aug 15 2012  9:02AM   

 

 Hyponatremia  Aug 15 2012  9:02AM   

 

 Atrophic Vaginitis, Postmenopausal  Aug 15 2012  9:02AM   

 

 Flu  Oct 29 2012  9:24AM   

 

 Essential Hypertension  Dec 10 2012  8:35AM   

 

 Hyperlipidemia  Dec 10 2012  8:35AM   

 

 Gastroesophageal Reflux  Dec 10 2012  8:35AM   

 

 Hypothyroidism, Acquired  Dec 10 2012  8:35AM   

 

 Hyponatremia  Dec 10 2012  8:35AM   

 

 Atrophic Vaginitis, Postmenopausal  Dec 10 2012  8:35AM   

 

 Pneumococcus  Dec 10 2012  2:07PM   

 

 Vaginal Discharge  Dec 20 2012  1:50PM   

 

 Essential Hypertension  Dec 20 2012  1:50PM   

 

 Hyperlipidemia  Dec 20 2012  1:50PM   

 

 Gastroesophageal Reflux  Dec 20 2012  1:50PM   

 

 Hypothyroidism, Acquired  Dec 20 2012  1:50PM   

 

 Atrophic Vaginitis, Postmenopausal  Dec 20 2012  1:50PM   

 

 Upper Respiratory Infections  2013  8:52AM   

 

 Hyperlipidemia  2013  8:21AM   

 

 Hypertension  2013  8:21AM   

 

 Hypothyroidism  2013  8:21AM   

 

 Screening Mammogram  Beto 10 2013  8:45AM   

 

 Routine gynecological examination  Beto 10 2013  8:34AM   

 

 Hypertension  Beto 10 2013  8:34AM   

 

 Hyperlipidemia, unspecified  Beto 10 2013  8:34AM   

 

 Hypothyroidism, Acquired  Beto 10 2013  8:34AM   

 

 Rhinitis, Allergic  Beto 10 2013  8:34AM   

 

 Flu  Oct 28 2013  8:52AM   

 

 Essential Hypertension  Dec  9 2013  8:37AM   

 

 Hyperlipidemia  Dec  9 2013  8:37AM   

 

 Gastroesophageal Reflux  Dec  9 2013  8:37AM   

 

 Hypothyroidism, Acquired  Dec  9 2013  8:37AM   

 

 Atrophic Vaginitis, Postmenopausal  Dec  9 2013  8:37AM   

 

 Hypertension  2014  9:56AM   

 

 Hyperlipidemia  2014  9:56AM   

 

 Hypothyroidism  2014  9:56AM   

 

 Screening Mammogram  2014  8:32AM   

 

 Osteopenia  2014  8:32AM   

 

 Hypertension  2014  8:35AM   

 

 Hypothyroidism, Acquired  2014  8:35AM   

 

 Hyperlipidemia  2014  8:35AM   

 

 Upper Respiratory Infections  2014  9:28AM   

 

 Sinusitis, Acute  Oct  2 2014  9:17AM   

 

 Herpes Zoster  Oct  5 2014  1:44PM   

 

 Vesicular Eruption  Oct  5 2014  1:44PM   

 

 Hypertension  2014  8:18AM   

 

 Hyperlipidemia  2014  8:18AM   

 

 hypothyroid  2014  8:18AM   

 

 Situational anxiety  Dec 20 2014  9:33AM   

 

 GERD (gastroesophageal reflux disease)  Dec 20 2014  9:33AM   

 

 Nausea  Dec 20 2014  9:33AM   

 

 Abdominal Pain, Epigastric  Dec 20 2014  9:33AM   

 

 Hyperlipidemia  Oct  6 2014  9:03AM   

 

 acid reflux  Oct  6 2014  9:03AM   

 

 hypothyroid  Oct  6 2014  9:03AM   

 

 Shingles  Oct  6 2014  9:03AM   

 

 Pharyngitis  2015  1:09PM   

 

 Bronchitis  2015  9:10AM   

 

 Hyperlipidemia  2015  9:10AM   

 

 acid reflux  2015  9:10AM   

 

 hypothyroid  2015  9:10AM   

 

 Hyperlipidemia  2015  8:18AM   

 

 Hypertension  2015  8:18AM   

 

 hypothyroid  2015  8:18AM   

 

 Screening Mammogram  2015 11:43AM   

 

 Medicare Annual Pap Q 2 years  2015  9:36AM   

 

 Screening Mammogram  2015  9:36AM   

 

 Candidiasis of female genitalia  2015  9:36AM   

 

 Hypertension  2015 11:34AM   

 

 Hyperlipidemia, unspecified  2015 11:34AM   

 

 Hypothyroidism, Acquired  2015 11:34AM   

 

 Osteopenia  2016  8:41AM   

 

 Encounter for screening mammogram for breast cancer  2016  8:41AM   

 

 Hypertension  2016  8:34AM   

 

 Lymphedema  2016  8:34AM   

 

 Hyperlipidemia  2016  8:34AM   

 

 hypothyroid  2016  8:34AM   

 

 HEP B  2016  9:13AM   

 

 HEP B  2016  8:52AM   

 

 Acid Reflux  2016  8:34AM   

 

 History of acute gastritis  Oct 13 2016  2:30PM   

 

 Anxiety as acute reaction to exceptional stress  Oct 13 2016  2:30PM   

 

 HEP B  Dec 29 2016  8:42AM   

 

 Caregiver stress syndrome  May 26 2017  8:28AM   

 

 Anxiety  May 26 2017  8:28AM   

 

 Blood in stool  2017  2:54PM   

 

 Upper GI bleed  2017 11:34AM   

 

 Hyponatremia  2017  9:03AM   

 

 Hyponatremia  2017  8:43AM   

 

 Medicare Annual Pap Q 2 years  2017  9:03AM   

 

 Nausea  2017  9:03AM   

 

 Uterine enlargement  2017  9:03AM   

 

 Encounter for screening mammogram for breast cancer  2017  4:56PM   

 

 Panic disorder [episodic paroxysmal anxiety]  Oct 10 2017  2:02PM   

 

 Acute stress reaction  Oct 10 2017  2:02PM   

 

 Caregiver stress syndrome  Oct 10 2017  2:02PM   

 

 Stress reaction causing mixed disturbance of emotion and conduct  Oct 18 2017  
9:05AM   







Payers







 Insurance Name  Company Name  Plan Name  Plan Number  Policy Number  Policy 
Group Number  Start Date

 

    Medicare RHC  Medicare RHC     [id number]     N/A

 

    [employer]  [employer] [place]     [id number]     2009

 

    Medicare Part B  [place] [place]     [id number]     N/A

 

    Medicare Part A  Medicare Part A     [id number]     N/A

 

    Medicare Part A  ZZZMedicare P A - Preventive     [id number]     N/A

 

    Medicare Part A  Medicare - Lab/Xray     [id number]     N/A







History of Encounters







 Visit Date  Visit Type  Provider

 

 10/18/2017  Office visit  Doris Noriega MD

 

 10/10/2017  Office visit  Doris Noriega MD

 

 2017  Office visit  Doris Noriega MD

 

 2017  Office visit  Doris Noriega MD

 

 2017  Office visit  Doris Noriega MD

 

 2017  Office visit  Prince Noe APRN

 

 2016  Nurse visit  Doris Noriega MD

 

 10/13/2016  Office visit  Doris Noriega MD

 

 2016  Nurse visit  Doris Noriega MD

 

 2016  Office visit  Doris Noriega MD

 

 2015  Office visit  Doris Noriega MD

 

 2015  Office visit  Doris Noriega MD

 

 2015  Office visit  Prince Noe APRN

 

 2014  Office visit  Anyi Herrera APRN

 

 10/27/2014  Voided  Doris Noriega MD

 

 10/6/2014  Office visit  Doris Noriega MD

 

 10/5/2014  Office visit  Anyi Herrera APRN

 

 10/2/2014  Office visit   

 

 10/2/2014  Office visit  Corrine Bay APRN

 

 2014  Office visit  Kassie Franklin APRN

 

 2014  Office visit  Doris Noriega MD

 

 2014  Office visit  Doris Noriega MD

 

 2013  Office visit  Dee Colindres MD

 

 10/28/2013  Nurse visit  Dee Colindres MD

 

 6/10/2013  Office visit  Dee Colindres MD

 

 2013  Office visit  Corrine Bay APRN

 

 2012  Office visit  Dee Colindres MD

 

 12/10/2012  Office visit  Dee Colindres MD

 

 10/29/2012  Nurse visit  Dee Colindres MD

 

 8/15/2012  Office visit  Dee Colindres MD

 

 2012  Office visit  Corrine Bay APRN

 

 2012  Office visit  Dee Colindres MD

 

 2/15/2012  Office visit  Dee Colindres MD

 

 2012  Office visit  Corrine Bay APRN

 

 2011  Office visit  Dee Colindres MD

 

 10/28/2011  Nurse visit  Shad Nolasco MD

 

 2011  Office visit  Dee Colindres MD

 

 2011  Office visit  Dee Colindres MD

 

 2011  Office visit  Dee Colindres MD

 

 2011  Office visit  Dee Colindres MD

 

 2011  Office visit  Dee Colindres MD

 

 2011  Office visit  Dee Colindres MD

 

 4/10/2011  [facility]  KHRIS Reeves MD

 

 4/10/2011  [facility]  RICKEY Toussaint MD

 

 2011  Office visit  RICKEY Toussaint MD

 

 2011  [facility]  Fide Castellanos MD

 

 2011  Voided  Fide Castellanos MD

 

 2011  Office visit  Dee Colindres MD

 

 12/15/2010  Nurse visit  Dee Colindres MD

 

 2010  Office visit  Dee Colindres MD

 

 10/20/2010  Nurse visit  Stephenie Kay PA

 

 2010  Nurse visit  Dee Colindres MD

 

 2010  Nurse visit  Dee Colindres MD

 

 2010  Office visit  Dee Colindres MD

 

 3/19/2010  Voided  Stephenie Kay PA

 

 2010  Nurse visit  Stephenie Kay PA

 

 2010  Nurse visit  Stephenie PERAZA

 

 2010  Nurse visit  Stephenie Kay PA

 

 2009  Office visit  Stephenie Kay PA

 

 10/20/2009  Nurse visit  Stephenie Kay PA

## 2018-10-22 NOTE — XMS REPORT
MU2 Ambulatory Summary

 Created on: 2018



Milady Crespo

External Reference #: [id number]

: 1945

Sex: Female



Demographics







 Address  [address]

[place]  [address]

 

 Home Phone  [phone]

 

 Preferred Language  English

 

 Marital Status  

 

 Evangelical Affiliation  Unknown

 

 Race  White

 

 Ethnic Group  Not  or 





Author







 Author  Marvin Boyer

 

 [facility]

 

 Address  [address]

[place], [place]  [id number]



 

 Phone  [phone]







Care Team Providers







 Care Team Member Name  Role  Phone

 

 Marvin Boyer  PCP  [phone]

 

 Ellis Noriegaole  PreferredProvider  [phone]







Allergies and Adverse Reactions







 Name  Reaction  Notes

 

 NO KNOWN DRUG ALLERGIES      







Plan of Treatment







 Planned Activity  Comments  Planned Date  Planned Time  Plan/Goal

 

 Complete blood count (CBC) with differential count     2016  12:00 AM   

 

 Comprehensive metabolic panel     2016  12:00 AM   

 

 VITAMIN D (25 HYDROXY)     2016  12:00 AM   

 

 Lipid panel (total cholesterol, lipoproteins, HDL, triglycerides)     8/15/
2012  12:00 AM   

 

 Pap smear     2017  12:00 AM   

 

 Comprehensive Metabolic Panel     6/10/2013  12:00 AM   

 

 Lipid panel (total cholesterol, lipoproteins, HDL, triglycerides)     6/10/
2013  12:00 AM   

 

 Thyroid stimulating hormone (TSH)     6/10/2013  12:00 AM   

 

 CBC (automated H&H, platelets, WBC and automated differential)     6/10/2013  
12:00 AM   

 

 Comprehensive Metabolic Panel     2012  12:00 AM   

 

 Lipid panel (total cholesterol, lipoproteins, HDL, triglycerides)     2012  12:00 AM   

 

 Thyroid stimulating hormone (TSH)     2012  12:00 AM   

 

 CBC (automated H&H, platelets, WBC and automated differential)     2012  
12:00 AM   

 

 Screening mammography, bilateral     2015  12:00 AM   







Medications







 Active 

 

 Name  Start Date  Estimated Completion Date  SIG  Comments

 

 aspirin 81 mg oral tablet,delayed release (DR/EC)        take 1 tablet (81 mg) 
by oral route once daily   

 

 Vitamin D3 1,000 unit oral capsule        take 1 capsule by oral route daily   

 

 valsartan 160 mg oral tablet  2016     TAKE ONE TABLET BY MOUTH ONCE 
DAILY   

 

 amlodipine 5 mg oral tablet  2016     TAKE ONE TABLET BY MOUTH ONCE DAILY
   

 

 amlodipine 5 mg oral tablet  2016     TAKE ONE TABLET BY MOUTH ONCE DAILY
   

 

 valsartan 160 mg oral tablet  2016     TAKE ONE TABLET BY MOUTH ONCE 
DAILY   

 

 Zofran (as hydrochloride) 4 mg oral tablet  2016     take 1 tablet by 
oral route daily as needed for 14 days   

 

 Zofran (as hydrochloride) 4 mg oral tablet  2017     take 1 tablet by oral 
route daily as needed for 14 days   

 

 Zofran (as hydrochloride) 4 mg oral tablet  2017     take 1 tablet by oral 
route daily as needed for 14 days   

 

 Zofran (as hydrochloride) 4 mg oral tablet  2017     take 1 tablet by 
oral route daily as needed for 14 days   

 

 Zofran (as hydrochloride) 4 mg oral tablet  2017     take 1 tablet by 
oral route daily as needed for 14 days   

 

 EQ LORATADINE 10MG  TABS  2017     TAKE ONE TABLET BY MOUTH ONCE DAILY   

 

 pantoprazole 40 mg oral tablet,delayed release (DR/EC)  10/9/2017     take 1 
tablet (40 mg) by oral route once daily for 90 days   

 

 amlodipine 5 mg oral tablet  2018     TAKE ONE TABLET BY MOUTH ONCE DAILY
   

 

 atenolol 50 mg oral tablet  2018     TAKE ONE TABLET BY MOUTH ONCE DAILY 
  

 

 mirtazapine 15 mg oral tablet  2018     TAKE ONE-HALF TABLET BY MOUTH 
ONCE DAILY AT BEDTIME   

 

 ondansetron 4 mg oral tablet,disintegrating  7/3/2018     dissolve  1 tablet 
by oral route every 8 hours as needed   

 

 losartan 100 mg oral tablet  8/3/2018  2019  take 1 tablet (100 mg) by 
oral route once daily for 90 days   

 

 clorazepate dipotassium 7.5 mg oral tablet  2018  take 1 
tablet PO  three times per day for situational anxiety   









  

 

 Name  Start Date  Expiration Date  SIG  Comments

 

 prednisone 20 mg oral tablet  2011  take 2 tablets by oral 
route daily for 5 days   

 

 calcium carbonate-vitamin D2 600-125 mg-unit oral tablet  8/15/2012  2012
  take 2 tablets by oral route daily   

 

 [place]onder[place] 3 Fish Oil 900-1,400 mg oral capsule,delayed release(DR/EC)  8/15/2012  9
/  take 1 capsule by oral route daily   

 

 multivitamin Oral tablet  8/15/2012  2012  take 1 tablet by oral route 
daily   

 

 triamcinolone acetonide 0.1 % topical cream  2013  10/7/2013  APPLY A 
THIN LAYER TO THE AFFECTED AREA(S) BY TOPICAL ROUTE TWICE A DAY   

 

 famotidine 20 mg oral tablet  2014  take 1 tablet (20 mg) by 
oral route 2 times per day   

 

 amoxicillin 500 mg oral capsule  2014  take 1 capsule (500 mg) 
by oral route 3 times per day for 10 days   

 

 Zithromax Z-Tab 250 mg oral tablet  10/2/2014  10/7/2014  take 2 tablets (500 
mg) by oral route once daily for 1 day then 1 tablet (250 mg) by oral route 
once daily for 4 days   

 

 acyclovir 800 mg oral tablet  10/5/2014  10/12/2014  take 1 tablet (800 mg) by 
oral route 5 times per day for 7 days   

 

 gabapentin 300 mg oral capsule  10/9/2014  2014  take 1 capsule (300 mg) 
by oral route 3 times per day for 14 days   

 

 Carafate 100 mg/mL oral suspension  2014  take 10 milliliters 
(1 gram) by oral route 4 times per day on an empty stomach 1 hour before meals 
and at bedtime for 4 weeks   

 

 amlodipine 5 mg oral tablet  2015  TAKE ONE TABLET BY MOUTH 
EVERY DAY   

 

 levothyroxine 50 mcg oral tablet  2015  TAKE ONE TABLET BY MOUTH 
EVERY DAY   

 

 Diovan 160 mg oral tablet  2015  2/15/2016  TAKE ONE TABLET BY MOUTH 
EVERY DAY for 90 days   

 

 triamcinolone acetonide 0.1 % topical cream  2015  apply a thin 
layer to the affected area(s) by topical route 2 times per day for 14 days   

 

 fluconazole 150 mg oral tablet  2015  take 1 tablet (150 mg) 
by oral route once for 1 day   

 

 famotidine 20 mg oral tablet  2015     TAKE ONE TABLET BY MOUTH TWICE 
DAILY   

 

 Lipitor 20 mg oral tablet  10/13/2016  2018  take 1 tablet (20 mg) by oral 
route once daily   

 

 Plavix 75 mg oral tablet  10/13/2016  2018  take 1 tablet (75 mg) by oral 
route once daily   

 

 Zofran (as hydrochloride) 4 mg oral tablet  10/13/2016  10/27/2016  take 1 
tablet by oral route daily as needed for 14 days   

 

 Zofran (as hydrochloride) 4 mg oral tablet  2017     take 1 tablet by 
oral route daily as needed for 14 days   

 

 levothyroxine 50 mcg oral tablet  10/9/2017  10/9/2017  TAKE ONE TABLET BY 
MOUTH ONCE DAILY   

 

 ondansetron 4 mg oral tablet,disintegrating  10/10/2017  2017  dissolve  
1 tablet by oral route every 8 hours as needed for 30 days   

 

 EQ LORATADINE 10MG  TABS  2018  TAKE ONE TABLET BY MOUTH ONCE 
DAILY IN THE EVENING   

 

 mirtazapine 15 mg oral tablet  2018  TAKE ONE-HALF TABLET BY 
MOUTH ONCE DAILY AT BEDTIME   









 Discontinued 

 

 Name  Start Date  Discontinued Date  SIG  Comments

 

 Lipitor 40 mg oral tablet     2009 TAB DAILY   

 

 ramipril 5 mg oral capsule     2009  take 1 capsule (5 mg) by oral route 
once daily   

 

 lovastatin 20 mg oral tablet  2009  take 1 tablet (20 mg) by 
oral route once daily with evening meal   

 

 propranolol 20 mg oral tablet  2010  take 1 tablet by oral 
route 2 times a day   

 

 Fosamax 70 mg oral tablet  2010  take 1 tablet (70 mg) by oral 
route once weekly in the morning, at least 30 minutes before the first food, 
beverage, or medication of the day   

 

 lisinopril 40 mg oral tablet  2010  4/10/2011  take 2 tablets by oral 
route daily   

 

 Zoloft 50 mg oral tablet  2011  take 1 tablet (50 mg) by oral 
route once daily   

 

 promethazine 25 mg oral tablet  2011  take 1 tablet by oral 
route every 6 hours as needed   

 

 Diovan -12.5 mg oral tablet  2011  take 1 tablet by oral 
route once daily for 30 days   

 

 Diflucan 150 mg oral tablet     2012  take 1 tablet (150 mg) by oral 
route once for 1 day   

 

 Diflucan 150 mg oral tablet  2012  8/15/2012  take 1 tablet (150 mg) by 
oral route once   

 

 Vitamin B-12 1,000 mcg oral tablet  8/15/2012  2014  take 1 tablet by 
oral route daily   

 

 Premarin 0.625 mg/gram vaginal cream  10/29/2012  6/10/2013  use 1 gram daily 
for a week then 1 gram twice a week   

 

 Medrol (Tab) 4 mg oral tablets,dose pack  2013  6/10/2013  take as 
directed   

 

 aspirin 325 mg oral tablet  2014  take 1 tablet (325 mg) by 
oral route once daily   

 

 Medrol (Tab) 4 mg oral tablets,dose pack  2014  10/2/2014  take as 
directed   

 

 Tetracaine Lollipops Lollipop  2015  Use as directed   

 

 Allergy Relief (loratadine) 10 mg oral tablet  2016     TAKE ONE TABLET 
BY MOUTH ONCE DAILY   

 

 Allergy Relief (loratadine) 10 mg oral tablet  2017  TAKE ONE 
TABLET BY MOUTH ONCE DAILY   

 

 Zoloft 50 mg oral tablet  2016  take 1 tablet (50 mg) by oral 
route once daily for 90 days  Pt refuses to take...

 

 triamcinolone acetonide 0.1 % topical cream  2017     APPLY A THIN LAYER 
OF CREAM EXTERNALLY TO AFFECTED AREA TWICE DAILY FOR 14 DAYS   

 

 loratadine 10 mg oral tablet  2017  TAKE 1 TABLET BY MOUTH 
EVERY DAY IN THE EVENING   

 

 triamcinolone acetonide 0.1 % topical cream  2017  APPLY  
CREAM EXTERNALLY TO AFFECTED AREA TWICE DAILY FOR 14 DAYS   

 

 Lexapro 10 mg oral tablet  2017  take 1 tablet (10 mg) by oral 
route once daily for 90 days   

 

 valsartan 160 mg oral tablet  2017  8/3/2018  TAKE ONE TABLET BY MOUTH 
ONCE DAILY   recall

 

 Augmentin 875-125 mg oral tablet  2018  3/6/2018  take 1 tablet by oral 
route every 12 hours for 7 days   

 

 benzonatate 100 mg oral capsule  2018  3/6/2018  take 1 capsule (100 mg) 
by oral route 3 times per day as needed for cough   







Problem List







 Description  Status  Onset

 

 Hyperlipidemia  Active   

 

 acid reflux  Active   

 

 Hypertension  Active   

 

 hypothyroid  Active   







Vital Signs







 Date  Time  BP-Sys(mm[Hg]  BP-Morena(mm[Hg])  HR(bpm)  RR(rpm)  Temp  WT  HT  HC  
BMI  BSA  BMI Percentile  O2 Sat(%)

 

 2018  9:04:00 AM  138 mmHg  80 mmHg  64 bpm  18 rpm  96 F  140.125 lbs  
61 in     26.4761 kg/m  1.654 m     99 %

 

 2018  8:24:00 AM  142 mmHg  70 mmHg  69 bpm  18 rpm  98.2 F  139 lbs  61 
in     26.26 kg/m2  1.65 m2     98 %

 

 2018  8:39:00 AM  116 mmHg  68 mmHg  68 bpm  18 rpm  97.9 F  142 lbs  61 
in     26.8304 kg/m  1.665 m      

 

 3/6/2018  8:26:00 AM  126 mmHg  76 mmHg  63 bpm  16 rpm  98 F  134.125 lbs  61 
in     25.34 kg/m2  1.62 m2     100 %

 

 2018  11:55:00 AM  126 mmHg  80 mmHg  80 bpm  18 rpm  98 F  132 lbs  61 
in     24.9409 kg/m  1.6053 m     100 %

 

 2017  8:26:00 AM  122 mmHg  76 mmHg  63 bpm  16 rpm  98.4 F  129.25 lbs  
61 in     24.42 kg/m2  1.59 m2     99 %

 

 10/18/2017  9:01:00 AM  126 mmHg  80 mmHg  73 bpm  16 rpm  97.9 F  122.375 lbs
  61 in     23.1223 kg/m  1.5456 m     100 %

 

 10/10/2017  1:52:00 PM  118 mmHg  72 mmHg  74 bpm  16 rpm  98.1 F  121 lbs  61 
in     22.86 kg/m2  1.54 m2     99 %

 

 2017  8:40:00 AM  118 mmHg  68 mmHg  63 bpm  16 rpm  98.1 F  123.125 lbs  
61 in     23.264 kg/m  1.5504 m     100 %

 

 2017  8:57:00 AM  116 mmHg  62 mmHg  71 bpm  16 rpm  99.8 F  121.5 lbs  
61 in     22.96 kg/m2  1.54 m2     98 %

 

 2017  11:30:00 AM  128 mmHg  78 mmHg  69 bpm  16 rpm  98.8 F  129.375 lbs  
61 in     24.4449 kg/m  1.5892 m     98 %

 

 2017  8:22:00 AM  118 mmHg  78 mmHg  62 bpm  18 rpm  97.5 F  129.125 lbs  
61 in     24.40 kg/m2  1.59 m2     100 %

 

 10/13/2016  2:26:00 PM  128 mmHg  78 mmHg  77 bpm  16 rpm  98.4 F  139.25 lbs  
61 in     26.3108 kg/m  1.6488 m     100 %

 

 2016  8:29:00 AM  122 mmHg  70 mmHg  72 bpm  16 rpm  97.8 F  137.125 lbs  
61 in     25.91 kg/m2  1.64 m2     100 %

 

 2015  9:33:00 AM  120 mmHg  68 mmHg  73 bpm     98.7 F  144.375 lbs  61 
in     27.2791 kg/m  1.6788 m     99 %

 

 2015  9:05:00 AM  110 mmHg  75 mmHg  85 bpm  16 rpm  96.7 F  144.375 lbs  
61 in     27.28 kg/m2  1.68 m2     99 %

 

 2015  1:05:00 PM  108 mmHg  62 mmHg  78 bpm  18 rpm  96.9 F  142.375 lbs  
61 in     26.9012 kg/m  1.6672 m     100 %

 

 2014  9:31:00 AM  126 mmHg  66 mmHg  79 bpm  18 rpm  98.7 F  149 lbs  61 
in     28.15 kg/m2  1.71 m2     98 %

 

 10/6/2014  9:02:00 AM  110 mmHg  74 mmHg  94 bpm  18 rpm  98.1 F  145.4 lbs  
61 in     27.4728 kg/m  1.6848 m     97 %

 

 10/2/2014  9:14:00 AM  124 mmHg  74 mmHg  79 bpm  18 rpm  98 F  147.25 lbs  61 
in     27.82 kg/m2  1.70 m2     98 %

 

 2014  9:22:00 AM  124 mmHg  76 mmHg  89 bpm  18 rpm  98.1 F  148 lbs  61 
in     27.9641 kg/m  1.6998 m     100 %

 

 2014  8:27:00 AM  118 mmHg  65 mmHg  74 bpm  16 rpm  97.7 F  148 lbs  61 
in     27.96 kg/m2  1.70 m2     99 %

 

 2014  9:48:00 AM  125 mmHg  70 mmHg  93 bpm  18 rpm  96.7 F  156 lbs  61 
in     29.4756 kg/m  1.7451 m     100 %

 

 2013  8:35:00 AM  122 mmHg  74 mmHg  84 bpm  16 rpm  97.9 F  155.375 lbs 
                99 %

 

 6/10/2013  8:30:00 AM  130 mmHg  82 mmHg  79 bpm  16 rpm  97.9 F  161 lbs     
            98 %

 

 2013  8:50:00 AM  124 mmHg  68 mmHg  66 bpm  18 rpm  97.6 F  157.5 lbs  
61 in     29.7591 kg/m  1.7535 m      

 

 2012  1:46:00 PM  120 mmHg  72 mmHg  75 bpm  16 rpm  97.6 F  156.125 lbs
                 98 %

 

 12/10/2012  8:32:00 AM  122 mmHg  82 mmHg  70 bpm  16 rpm  97.3 F  157 lbs    
             99 %

 

 8/15/2012  9:00:00 AM  130 mmHg  76 mmHg  86 bpm  16 rpm  97.4 F  159 lbs     
            100 %

 

 2012  11:02:00 AM  128 mmHg  64 mmHg  66 bpm  18 rpm  97.4 F  162.125 lbs
  61 in     30.6329 kg/m  1.7791 m      

 

 2012  8:45:00 AM  130 mmHg  70 mmHg  82 bpm  16 rpm  98.2 F  160.125 lbs 
                100 %

 

 2/15/2012  9:29:00 AM  122 mmHg  80 mmHg  86 bpm  16 rpm  97.4 F  159.375 lbs  
61 in     30.1133 kg/m  1.7639 m     99 %

 

 2012  9:41:00 AM  132 mmHg  74 mmHg  66 bpm  18 rpm  98.4 F  160.375 lbs  
61 in     30.30 kg/m2  1.77 m2      

 

 2011  10:08:00 AM  134 mmHg  82 mmHg  80 bpm  16 rpm  98 F  160 lbs  61 
in     30.2314 kg/m  1.7674 m     99 %

 

 2011  3:56:00 PM  130 mmHg  80 mmHg                              

 

 2011  8:55:00 AM  130 mmHg  74 mmHg  88 bpm  16 rpm  98.2 F  158.25 lbs  
               98 %

 

 2011  8:54:00 AM  136 mmHg  82 mmHg  102 bpm  16 rpm  98.1 F  153.5 lbs   
              99 %

 

 2011  8:49:00 AM  122 mmHg  88 mmHg  88 bpm  16 rpm  98.6 F  152.25 lbs  
               99 %

 

 2011  10:02:00 AM  140 mmHg  82 mmHg  96 bpm  20 rpm  98.8 F             
       100 %

 

 2011  9:14:00 AM  156 mmHg  98 mmHg  110 bpm  24 rpm  98.5 F  153 lbs    
             100 %

 

 2011  8:50:00 AM  160 mmHg  84 mmHg  100 bpm  16 rpm  98.7 F  155 lbs    
             100 %

 

 2011  1:25:00 PM  152 mmHg  100 mmHg  82 bpm  16 rpm  97.2 F  156.375 lbs 
                100 %

 

 2011  9:55:00 AM  144 mmHg  90 mmHg                              

 

 2010  9:24:00 AM  138 mmHg  84 mmHg                              

 

 2010  8:58:00 AM  160 mmHg  94 mmHg                              

 

 2010  8:33:00 AM  142 mmHg  90 mmHg  100 bpm  16 rpm  98.1 F  158.375 
lbs                  

 

 2010  9:24:00 AM  160 mmHg  102 mmHg  88 bpm  18 rpm  99 F  157.125 lbs  
62 in     28.7382 kg/m  1.7657 m      

 

 3/19/2010  11:01:00 AM  140 mmHg  90 mmHg                              

 

 2009  10:08:00 AM  142 mmHg  86 mmHg  72 bpm  16 rpm  98.1 F  154.125 
lbs  61 in     29.1214 kg/m  1.7346 m      







Social History







 Name  Description  Comments

 

       

 

 Children      

 

 lives alone      

 

 Supervisor      

 

 Tobacco  Never smoker   







History of Procedures







 Date Ordered  Description  Order Status

 

 2011 12:00 AM  COMPREHEN METABOLIC PANEL  Reviewed

 

 2011 12:00 AM  ASSAY THYROID STIM HORMONE  Reviewed

 

 2011 12:00 AM  COMPREHEN METABOLIC PANEL  Reviewed

 

 2011 12:00 AM  LIPID PANEL  Reviewed

 

 2011 12:00 AM  ASSAY THYROID STIM HORMONE  Reviewed

 

 2011 12:00 AM  COMPLETE CBC W/AUTO DIFF WBC  Reviewed

 

 2015 12:00 AM  COMPLETE CBC W/AUTO DIFF WBC  Reviewed

 

 2015 12:00 AM  COMPREHEN METABOLIC PANEL  Reviewed

 

 2015 12:00 AM  LIPID PANEL  Reviewed

 

 2015 12:00 AM  ASSAY THYROID STIM HORMONE  Reviewed

 

 2011 12:00 AM  COMPREHEN METABOLIC PANEL  Reviewed

 

 2011 12:00 AM  COMPREHEN METABOLIC PANEL  Reviewed

 

 2011 12:00 AM  LIPID PANEL  Reviewed

 

 2011 12:00 AM  ASSAY THYROID STIM HORMONE  Reviewed

 

 10/28/2011 12:00 AM  ***Immunization administration, Medicare flu  Reviewed

 

 10/28/2011 12:00 AM  Fluzone ** MEDICARE Only **  Reviewed

 

 2010 11:24 AM  NO CHARGE OV  Reviewed

 

 2010 11:26 AM  NO CHARGE OV  Reviewed

 

 2011 12:00 AM  COMPREHEN METABOLIC PANEL  Reviewed

 

 2011 12:00 AM  LIPID PANEL  Reviewed

 

 2011 12:00 AM  ASSAY THYROID STIM HORMONE  Reviewed

 

 2011 12:00 AM  COMPLETE CBC W/AUTO DIFF WBC  Reviewed

 

 2011 12:00 AM  Wrist Support  Reviewed

 

 2016 12:00 AM  Screening mammography, bilateral  Reviewed

 

 2016 12:00 AM  DXA BONE DENSITY AXIAL  Returned

 

 2016 12:00 AM  TETANUS VACCINE IM  Reviewed

 

 2016 12:00 AM  HEP B VACC ADULT 3 DOSE IM  Reviewed

 

 2012 12:00 AM  TISSUE EXAM FOR FUNGI  Reviewed

 

 2012 12:00 AM  SMEAR WET MOUNT SALINE/INK  Reviewed

 

 2016 12:00 AM  TETANUS VACCINE IM  Reviewed

 

 2016 12:00 AM  HEP B VACC ADULT 3 DOSE IM  Reviewed

 

 2/15/2012 12:00 AM  THER/PROPH/DIAG INJ SC/IM  Reviewed

 

 2/15/2012 12:00 AM  Bicillin CR [facility]3717463[facility]  Reviewed

 

 2/15/2012 12:00 AM  Depo-Medrol 40 mg NDC#2707749072  Reviewed

 

 10/13/2016 12:00 AM  COMPLETE CBC W/AUTO DIFF WBC  Returned

 

 10/13/2016 12:00 AM  COMPREHEN METABOLIC PANEL  Returned

 

 10/13/2016 12:00 AM  ASSAY THYROID STIM HORMONE  Returned

 

 10/13/2016 12:00 AM  LIPID PANEL  Returned

 

 2016 12:00 AM  TETANUS VACCINE IM  Reviewed

 

 2016 12:00 AM  HEP B VACC ADULT 3 DOSE IM  Reviewed

 

 2017 12:00 AM  ASSAY OF IRON  Returned

 

 2017 12:00 AM  IRON BINDING TEST  Returned

 

 2017 12:00 AM  ASSAY OF TRANSFERRIN  Returned

 

 2017 12:00 AM  OCCULT BLD FECES 1-3 TESTS  Returned

 

 2017 12:00 AM  COMPLETE CBC AUTOMATED  Returned

 

 8/15/2012 12:00 AM  COMPREHEN METABOLIC PANEL  Reviewed

 

 8/15/2012 12:00 AM  ASSAY THYROID STIM HORMONE  Reviewed

 

 8/15/2012 12:00 AM  COMPLETE CBC W/AUTO DIFF WBC  Reviewed

 

 8/15/2012 12:00 AM  Wrist Support  Reviewed

 

 2017 12:00 AM  METABOLIC PANEL TOTAL CA  Returned

 

 2017 12:00 AM  METABOLIC PANEL TOTAL CA  Returned

 

 2017 12:00 AM  Screening mammography, bilateral  Returned

 

 10/29/2012 12:00 AM  Flu Injection 3 Years And Above NDC# [address]-00  [facility]  
Reviewed

 

 12/10/2012 12:00 AM  IMMUNIZATION ADMIN  Reviewed

 

 12/10/2012 12:00 AM  Pneumovax Injection - [facility]  Reviewed

 

 2018 12:00 AM  THER/PROPH/DIAG INJ SC/IM  Reviewed

 

 2018 12:00 AM  Decadron 4mg Injection  Reviewed

 

 2018 12:00 AM  Depo-Medrol 40mg Injection  Reviewed

 

 2012 12:00 AM  TRICHOMONAS ASSAY W/OPTIC  Reviewed

 

 2018 12:00 AM  COMPLETE CBC W/AUTO DIFF WBC  Returned

 

 2018 12:00 AM  COMPREHEN METABOLIC PANEL  Returned

 

 2018 12:00 AM  LIPID PANEL  Returned

 

 2018 12:00 AM  ASSAY THYROID STIM HORMONE  Returned

 

 2013 12:00 AM  THER/PROPH/DIAG INJ SC/IM  Reviewed

 

 2013 12:00 AM  Bicillin CR, 1.2 million units NDC# 03150-121-41  Reviewed

 

 2018 12:00 AM  Mammogram, screening, bilateral  Reviewed

 

 2018 12:00 AM  DXA BONE DENSITY AXIAL  Reviewed

 

 2018 12:00 AM  COMPLETE CBC W/AUTO DIFF WBC  Reviewed

 

 2018 12:00 AM  COMPREHEN METABOLIC PANEL  Reviewed

 

 2018 12:00 AM  LIPID PANEL  Reviewed

 

 2018 12:00 AM  ASSAY THYROID STIM HORMONE  Reviewed

 

 2013 12:00 AM  COMPREHEN METABOLIC PANEL  Reviewed

 

 2013 12:00 AM  LIPID PANEL  Reviewed

 

 2013 12:00 AM  COMPLETE CBC W/AUTO DIFF WBC  Reviewed

 

 2013 12:00 AM  ASSAY THYROID STIM HORMONE  Reviewed

 

 6/10/2013 12:00 AM  MAMMOGRAM SCREENING  Reviewed

 

 6/10/2013 12:00 AM  CYTOPATH C/V MANUAL  Reviewed

 

 12/10/2013 12:00 AM  LIPID PANEL  Reviewed

 

 12/10/2013 12:00 AM  ASSAY THYROID STIM HORMONE  Reviewed

 

 12/10/2013 12:00 AM  COMPLETE CBC W/AUTO DIFF WBC  Reviewed

 

 12/10/2013 12:00 AM  COMPREHEN METABOLIC PANEL  Reviewed

 

 2010 12:00 AM  CYTOPATH C/V MANUAL  Reviewed

 

 2010 12:00 AM  SMEAR WET MOUNT SALINE/INK  Reviewed

 

 2010 12:00 AM  LIPID PANEL  Reviewed

 

 2010 12:00 AM  ASSAY THYROID STIM HORMONE  Reviewed

 

 2010 12:00 AM  COMPLETE CBC W/AUTO DIFF WBC  Reviewed

 

 2010 12:00 AM  COMPREHEN METABOLIC PANEL  Reviewed

 

 10/28/2013 12:00 AM  Flu Injection 3 Years And Above NDC# [address]-22  [facility]  
Reviewed

 

 2010 8:48 AM  Blood Pressure Check-no charge  Reviewed

 

 2010 12:00 AM  Blood Pressure Check-no charge  Reviewed

 

 2014 12:00 AM  METABOLIC PANEL TOTAL CA  Reviewed

 

 2014 12:00 AM  ASSAY THYROID STIM HORMONE  Reviewed

 

 2014 12:00 AM  ASSAY OF FREE THYROXINE  Reviewed

 

 2014 12:00 AM  MAMMOGRAM SCREENING  Reviewed

 

 2014 12:00 AM  CT BONE DENSITY AXIAL  Reviewed

 

 2014 12:00 AM  COMPLETE CBC W/AUTO DIFF WBC  Reviewed

 

 2014 12:00 AM  COMPREHEN METABOLIC PANEL  Reviewed

 

 2014 12:00 AM  LIPID PANEL  Reviewed

 

 2014 12:00 AM  ASSAY THYROID STIM HORMONE  Reviewed

 

 10/20/2010 12:00 AM  IMMUNIZATION ADMIN  Reviewed

 

 10/20/2010 12:00 AM  FLU VACCINE 3 YRS & > IM  Reviewed

 

 2010 12:00 AM  COMPREHEN METABOLIC PANEL  Reviewed

 

 2010 12:00 AM  LIPID PANEL  Reviewed

 

 2010 12:00 AM  ASSAY THYROID STIM HORMONE  Reviewed

 

 2010 12:00 AM  COMPLETE CBC W/AUTO DIFF WBC  Reviewed

 

 2010 12:00 AM  Blood Pressure Check-no charge  Reviewed

 

 10/2/2014 12:00 AM  THER/PROPH/DIAG INJ SC/IM  Reviewed

 

 10/2/2014 12:00 AM  Kenalog, Per 10 Mg NDC#0919-1680-12  Reviewed

 

 2014 12:00 AM  COMPLETE CBC W/AUTO DIFF WBC  Reviewed

 

 2014 12:00 AM  COMPREHEN METABOLIC PANEL  Reviewed

 

 2014 12:00 AM  LIPID PANEL  Reviewed

 

 2014 12:00 AM  ASSAY THYROID STIM HORMONE  Reviewed

 

 2015 12:00 AM  Rocephin 1 gram NDC#7339-8008-59  Reviewed

 

 2015 12:00 AM  THER/PROPH/DIAG INJ SC/IM  Reviewed

 

 2011 12:00 AM  COMPREHEN METABOLIC PANEL  Reviewed

 

 2011 12:00 AM  LIPID PANEL  Reviewed

 

 2011 12:00 AM  ASSAY THYROID STIM HORMONE  Reviewed

 

 2011 12:00 AM  COMPLETE CBC W/AUTO DIFF WBC  Reviewed

 

 2011 12:00 AM  METABOLIC PANEL TOTAL CA  Reviewed

 

 2011 12:00 AM  COMPREHEN METABOLIC PANEL  Reviewed

 

 2011 12:00 AM  ASSAY THYROID STIM HORMONE  Reviewed

 

 2011 12:00 AM  COMPLETE CBC W/AUTO DIFF WBC  Reviewed

 

 2011 12:00 AM  ASSAY OF LIPASE  Reviewed

 

 2011 12:00 AM  THER/PROPH/DIAG INJ SC/IM  Reviewed

 

 2011 12:00 AM  Phenergan 50 Mg Im  Bernadine  Reviewed

 

 2011 12:00 AM  METABOLIC PANEL TOTAL CA  Reviewed

 

 2011 12:00 AM  THER/PROPH/DIAG INJ SC/IM  Reviewed

 

 2011 12:00 AM  Phenergan 25 Mg Im  Bernadine  Reviewed

 

 2011 12:00 AM  METABOLIC PANEL TOTAL CA  Reviewed

 

 2011 12:00 AM  ASSAY THYROID STIM HORMONE  Reviewed

 

 2011 12:00 AM  THER/PROPH/DIAG INJ SC/IM  Reviewed

 

 2011 12:00 AM  Phenergan 50 Mg Im  Bernadine  Reviewed

 

 2011 12:00 AM  ASSAY OF SERUM SODIUM  Reviewed

 

 2011 12:00 AM  ASSAY OF SERUM SODIUM  Reviewed

 

 2011 12:00 AM  CYTOPATH C/V MANUAL  Reviewed

 

 2011 12:00 AM  ASSAY THYROID STIM HORMONE  Reviewed

 

 2015 12:00 AM  COMPLETE CBC W/AUTO DIFF WBC  Reviewed

 

 2015 12:00 AM  COMPREHEN METABOLIC PANEL  Reviewed

 

 2015 12:00 AM  LIPID PANEL  Reviewed

 

 2015 12:00 AM  ASSAY THYROID STIM HORMONE  Reviewed

 

 2015 12:00 AM  MAMMOGRAM SCREENING  Reviewed

 

 2015 12:00 AM  CYTOPATH TBS C/V MANUAL  Reviewed







Results Summary







 Date and Description  Results

 

 2010 9:25 AM  Colonoscopy-Women and Men over 50 Abnormal Mammogram -Women 
over 40 Declined Pap Smear Declined 

 

 2010 10:41 AM  WET PREP NO TRICHOMONADS SEEN  No  Few 

 

 2010 8:15 AM  TRIGLYCERIDES 174.0 mg/dLCHOLESTEROL 175.0 mg/dLHDL 58.0 mg/
dLTOT CHOL/HDL 3.0 LDL (CALC) 82.0 mg/dLTSH 0.790 uIU/mLGLUCOSE 95.0 mg/
dLSODIUM 136.0 mmol/LPOTASSIUM 4.50 mmol/LCHLORIDE 99.0 mmol/LCO2 26.0 mmol/
LBUN 12.0 mg/dLCREATININE 0.80 mg/dLSGOT/AST 32.0 IU/LSGPT/ALT 33.0 IU/LALK 
PHOS 103.0 IU/LTOTAL PROTEIN 7.60 g/dLALBUMIN 4.60 g/dLTOTAL BILI 0.60 mg/
dLCALCIUM 9.80 mg/dLAGE 64 GFR NonAA 72 GFR AA 87 eGFR >60 mL/min/1.73 m2eGFR AA
* >60 WBC 5.3 RBC 4.13 HGB 13.10 g/dLHCT 39.20 %MCV 95.0 fLMCH 31.70 pgMCHC 
33.40 g/dLRDW SD 44 RDW CV 12.70 %MPV 9.80 fLPLT 257 NRBC# 0.00 NRBC% 0.0 %NEUT 
57.90 %%LYMP 26.50 %%MONO 11.60 %%EOS 3.20 %%BASO 0.80 %#NEUT 3.04 #LYMP 1.39 #
MONO 0.61 #EOS 0.17 #BASO 0.04 MANUAL DIFF NOT IND 

 

 2011 9:45 AM  GLUCOSE 91.0 mg/dLSODIUM 133.0 mmol/LPOTASSIUM 4.40 mmol/
LCHLORIDE 97.0 mmol/LCO2 24.0 mmol/LBUN 9.0 mg/dLCREATININE 0.70 mg/dLCALCIUM 
10.0 mg/dLAGE 65 GFR NonAA 84 GFR  eGFR >60 mL/min/1.73 m2eGFR AA* >60 

 

 2011 10:25 AM  LIPASE 29.0 U/LTSH 0.10 uIU/mLGLUCOSE 115.0 mg/dLSODIUM 
127.0 mmol/LPOTASSIUM 5.30 mmol/LCHLORIDE 93.0 mmol/LCO2 18.0 mmol/LBUN 9.0 mg/
dLCREATININE 0.70 mg/dLSGOT/AST 62.0 IU/LSGPT/ALT 46.0 IU/LALK PHOS 100.0 IU/
LTOTAL PROTEIN 8.60 g/dLALBUMIN 4.90 g/dLTOTAL BILI 0.60 mg/dLCALCIUM 9.90 mg/
dLAGE 65 GFR NonAA 84 GFR  eGFR >60 mL/min/1.73 m2eGFR AA* >60 WBC 6.9 
RBC 4.48 HGB 14.40 g/dLHCT 40.90 %MCV 91.0 fLMCH 32.10 pgMCHC 35.20 g/dLRDW SD 
41 RDW CV 12.50 %MPV 9.30 fLPLT 260 NRBC# 0.00 NRBC% 0.0 %NEUT 74.90 %%LYMP 
15.10 %%MONO 9.40 %%EOS 0.30 %%BASO 0.30 %#NEUT 5.15 #LYMP 1.04 #MONO 0.65 #EOS 
0.02 #BASO 0.02 MANUAL DIFF NOT IND 

 

 2011 9:07 AM  GLUCOSE 92.0 mg/dLSODIUM 131.0 mmol/LPOTASSIUM 4.20 mmol/
LCHLORIDE 99.0 mmol/LCO2 22.0 mmol/LBUN 9.0 mg/dLCREATININE 0.70 mg/dLCALCIUM 
9.20 mg/dLAGE 65 GFR NonAA 84 GFR  eGFR >60 mL/min/1.73 m2eGFR AA* >60 

 

 2011 11:06 AM  TSH 0.510 uIU/mLGLUCOSE 99.0 mg/dLSODIUM 132.0 mmol/
LPOTASSIUM 3.50 mmol/LCHLORIDE 95.0 mmol/LCO2 23.0 mmol/LBUN 9.0 mg/
dLCREATININE 0.80 mg/dLCALCIUM 10.40 mg/dLAGE 65 GFR NonAA 72 GFR AA 87 eGFR >
60 mL/min/1.73 m2eGFR AA* >60 

 

 2011 9:45 AM  SODIUM 132.0 mmol/L

 

 2011 9:27 AM  SODIUM 137.0 mmol/L

 

 2011 9:55 AM  TSH 1.070 uIU/mL

 

 2011 8:55 AM  GLUCOSE 102.0 mg/dLSODIUM 135.0 mmol/LPOTASSIUM 4.20 mmol/
LCHLORIDE 102.0 mmol/LCO2 24.0 mmol/LBUN 15.0 mg/dLCREATININE 0.80 mg/dLSGOT/
AST 30.0 IU/LSGPT/ALT 35.0 IU/LALK PHOS 119.0 IU/LTOTAL PROTEIN 7.50 g/
dLALBUMIN 4.30 g/dLTOTAL BILI 0.30 mg/dLCALCIUM 9.20 mg/dLAGE 65 GFR NonAA 72 
GFR AA 87 eGFR >60 mL/min/1.73 m2eGFR AA* >60 TSH 1.130 uIU/mL

 

 2011 8:55 AM  GLUCOSE 98.0 mg/dLSODIUM 137.0 mmol/LPOTASSIUM 3.90 mmol/
LCHLORIDE 103.0 mmol/LCO2 25.0 mmol/LBUN 14.0 mg/dLCREATININE 0.70 mg/dLSGOT/
AST 33.0 IU/LSGPT/ALT 36.0 IU/LALK PHOS 111.0 IU/LTOTAL PROTEIN 8.30 g/
dLALBUMIN 4.40 g/dLTOTAL BILI 0.50 mg/dLCALCIUM 9.40 mg/dLAGE 66 GFR NonAA 84 
GFR  eGFR >60 mL/min/1.73 m2eGFR AA* >60 TRIGLYCERIDES 109.0 mg/
dLCHOLESTEROL 153.0 mg/dLHDL 54.0 mg/dLTOT CHOL/HDL 2.8 LDL (CALC) 77.0 mg/
dLTSH 1.330 uIU/mL

 

 2011 10:17 AM  Colonoscopy-Women and Men over 50 Declined Mammogram -
Women over 40 Normal Pap Smear Negative 

 

 2012 10:33 AM  WET PREP NO TRICH SEEN CLUE CELLS NONE SEEN 

 

 2012 8:45 AM  WBC 5.2 RBC 3.96 HGB 12.70 g/dLHCT 37.80 %MCV 96.0 fLMCH 
32.10 pgMCHC 33.60 g/dLRDW SD 46 RDW CV 13.30 %MPV 9.70 fLPLT 297 NRBC# 0.00 
NRBC% 0.0 %NEUT 57.70 %%LYMP 28.50 %%MONO 10.30 %%EOS 2.50 %%BASO 1.0 %#NEUT 
3.02 #LYMP 1.49 #MONO 0.54 #EOS 0.13 #BASO 0.05 MANUAL DIFF NOT IND GLUCOSE 
97.0 mg/dLSODIUM 137.0 mmol/LPOTASSIUM 4.40 mmol/LCHLORIDE 101.0 mmol/LCO2 23.0 
mmol/LBUN 17.0 mg/dLCREATININE 0.80 mg/dLSGOT/AST 30.0 IU/LSGPT/ALT 33.0 IU/
LALK PHOS 95.0 IU/LTOTAL PROTEIN 7.40 g/dLALBUMIN 4.40 g/dLTOTAL BILI 0.60 mg/
dLCALCIUM 9.70 mg/dLAGE 66 GFR NonAA 72 GFR AA 87 eGFR 60 eGFR AA* 60 
TRIGLYCERIDES 130.0 mg/dLCHOLESTEROL 157.0 mg/dLHDL 56.0 mg/dLTOT CHOL/HDL 2.8 
LDL (CALC) 75.0 mg/dLTSH 0.940 uIU/mL

 

 2012 8:45 AM  WBC 5.1 RBC 3.91 HGB 12.50 g/dLHCT 36.30 %MCV 93.0 fLMCH 
32.0 pgMCHC 34.40 g/dLRDW SD 43 RDW CV 12.60 %MPV 9.30 fLPLT 244 NRBC# 0.00 NRBC
% 0.0 %NEUT 57.60 %%LYMP 27.50 %%MONO 9.10 %%EOS 5.0 %%BASO 0.80 %#NEUT 2.91 #
LYMP 1.39 #MONO 0.46 #EOS 0.25 #BASO 0.04 MANUAL DIFF NOT IND 

 

 12/10/2012 8:34 AM  Colonoscopy-Women and Men over 50 Declined Mammogram -
Women over 40 Declined Pap Smear Declined 

 

 2012 5:02 PM  WET PREP NO TRICH SEEN CLUE CELLS NONE SEEN 

 

 2013 8:25 AM  WBC 4.2 RBC 3.96 HGB 12.90 g/dLHCT 37.0 %MCV 93.0 fLMCH 
32.60 pgMCHC 34.90 g/dLRDW SD 43 RDW CV 12.60 %MPV 9.70 fLPLT 254 NRBC# 0.00 
NRBC% 0.0 %NEUT 54.40 %%LYMP 30.40 %%MONO 10.80 %%EOS 3.40 %%BASO 1.0 %#NEUT 
2.26 #LYMP 1.26 #MONO 0.45 #EOS 0.14 #BASO 0.04 MANUAL DIFF NOT IND TSH 1.230 
uIU/mLGLUCOSE 94.0 mg/dLSODIUM 136.0 mmol/LPOTASSIUM 4.30 mmol/LCHLORIDE 99.0 
mmol/LCO2 25.0 mmol/LBUN 10.0 mg/dLCREATININE 0.80 mg/dLSGOT/AST 36.0 IU/LSGPT/
ALT 36.0 IU/LALK PHOS 84.0 IU/LTOTAL PROTEIN 7.90 g/dLALBUMIN 4.40 g/dLTOTAL 
BILI 0.70 mg/dLCALCIUM 9.80 mg/dLAGE 67 GFR NonAA 72 GFR AA 87 eGFR 60 eGFR AA* 
60 TRIGLYCERIDES 122.0 mg/dLCHOLESTEROL 141.0 mg/dLHDL 47.0 mg/dLTOT CHOL/HDL 
3.0 LDL (CALC) 70.0 mg/dL

 

 2013 8:45 AM  WBC 5.3 RBC 4.01 HGB 13.0 g/dLHCT 37.60 %MCV 94.0 fLMCH 
32.40 pgMCHC 34.60 g/dLRDW SD 43 RDW CV 12.50 %MPV 9.40 fLPLT 248 NRBC# 0.00 
NRBC% 0.0 %NEUT 58.70 %%LYMP 27.80 %%MONO 9.80 %%EOS 2.80 %%BASO 0.90 %#NEUT 
3.12 #LYMP 1.48 #MONO 0.52 #EOS 0.15 #BASO 0.05 MANUAL DIFF NOT IND TSH 1.570 
uIU/mLGLUCOSE 94.0 mg/dLSODIUM 134.0 mmol/LPOTASSIUM 4.10 mmol/LCHLORIDE 101.0 
mmol/LCO2 23.0 mmol/LBUN 11.0 mg/dLCREATININE 0.80 mg/dLSGOT/AST 41.0 IU/LSGPT/
ALT 44.0 IU/LALK PHOS 109.0 IU/LTOTAL PROTEIN 7.90 g/dLALBUMIN 4.70 g/dLTOTAL 
BILI 0.80 mg/dLCALCIUM 10.40 mg/dLAGE 68 GFR NonAA 72 GFR AA 87 eGFR >60 mL/min/
1.73 m2eGFR AA* >60 TRIGLYCERIDES 163.0 mg/dLCHOLESTEROL 138.0 mg/dLHDL 49.0 mg/
dLTOT CHOL/HDL 2.8 LDL (CALC) 56.0 mg/dL

 

 2014 8:58 AM  GLUCOSE 86.0 mg/dLSODIUM 134.0 mmol/LPOTASSIUM 4.20 mmol/
LCHLORIDE 99.0 mmol/LCO2 25.0 mmol/LBUN 14.0 mg/dLCREATININE 0.80 mg/dLCALCIUM 
10.10 mg/dLAGE 68 GFR NonAA 72 GFR AA 87 eGFR >60 mL/min/1.73 m2eGFR AA* >60 
FREE T4 1.18 TSH 1.20 uIU/mL

 

 2014 9:50 AM  WBC 5.7 RBC 4.03 HGB 12.90 g/dLHCT 37.90 %MCV 94.0 fLMCH 
32.0 pgMCHC 34.0 g/dLRDW SD 44 RDW CV 12.70 %MPV 9.70 fLPLT 292 NRBC# 0.00 NRBC
% 0.0 %NEUT 62.70 %%LYMP 21.80 %%MONO 11.0 %%EOS 3.40 %%BASO 1.10 %#NEUT 3.55 #
LYMP 1.23 #MONO 0.62 #EOS 0.19 #BASO 0.06 MANUAL DIFF NOT IND GLUCOSE 98.0 mg/
dLSODIUM 135.0 mmol/LPOTASSIUM 4.30 mmol/LCHLORIDE 102.0 mmol/LCO2 22.0 mmol/
LBUN 10.0 mg/dLCREATININE 0.80 mg/dLSGOT/AST 34.0 IU/LSGPT/ALT 32.0 IU/LALK 
PHOS 95.0 IU/LTOTAL PROTEIN 7.70 g/dLALBUMIN 4.30 g/dLTOTAL BILI 0.80 mg/
dLCALCIUM 9.10 mg/dLAGE 68 GFR NonAA 72 GFR AA 87 eGFR 60 eGFR AA* 60 
TRIGLYCERIDES 108.0 mg/dLCHOLESTEROL 146.0 mg/dLHDL 56.0 mg/dLTOT CHOL/HDL 2.6 
LDL (CALC) 68.0 mg/dLTSH 0.90 uIU/mL

 

 2014 8:40 AM  WBC 4.4 RBC 4.13 HGB 13.20 g/dLHCT 38.90 %MCV 94.0 fLMCH 
32.0 pgMCHC 33.90 g/dLRDW SD 47 RDW CV 13.50 %MPV 9.80 fLPLT 279 NRBC# 0.00 NRBC
% 0.0 %NEUT 63.80 %%LYMP 24.60 %%MONO 9.30 %%EOS 1.60 %%BASO 0.70 %#NEUT 2.80 #
LYMP 1.08 #MONO 0.41 #EOS 0.07 #BASO 0.03 MANUAL DIFF NOT IND GLUCOSE 96.0 mg/
dLSODIUM 136.0 mmol/LPOTASSIUM 4.10 mmol/LCHLORIDE 99.0 mmol/LCO2 23.0 mmol/
LBUN 8.0 mg/dLCREATININE 0.80 mg/dLSGOT/AST 31.0 IU/LSGPT/ALT 27.0 IU/LALK PHOS 
85.0 IU/LTOTAL PROTEIN 7.60 g/dLALBUMIN 4.40 g/dLTOTAL BILI 0.80 mg/dLCALCIUM 
10.20 mg/dLAGE 69 GFR NonAA 71 GFR AA 86 eGFR 60 eGFR AA* 60 TRIGLYCERIDES 61.0 
mg/dLCHOLESTEROL 148.0 mg/dLHDL 71.0 mg/dLTOT CHOL/HDL 2.1 LDL (CALC) 65.0 mg/
dLTSH 0.990 uIU/mL

 

 2015 8:32 AM  WBC 4.8 RBC 3.98 HGB 12.70 g/dLHCT 37.30 %MCV 94.0 fLMCH 
31.90 pgMCHC 34.0 g/dLRDW SD 43 RDW CV 12.70 %MPV 9.50 fLPLT 270 NRBC# 0.00 NRBC
% 0.0 %NEUT 57.30 %%LYMP 25.0 %%MONO 13.0 %%EOS 3.60 %%BASO 1.10 %#NEUT 2.73 #
LYMP 1.19 #MONO 0.62 #EOS 0.17 #BASO 0.05 MANUAL DIFF NOT IND TSH 0.640 uIU/
mLGLUCOSE 90.0 mg/dLSODIUM 136.0 mmol/LPOTASSIUM 4.0 mmol/LCHLORIDE 101.0 mmol/
LCO2 24.0 mmol/LBUN 10.0 mg/dLCREATININE 0.80 mg/dLSGOT/AST 34.0 IU/LSGPT/ALT 
32.0 IU/LALK PHOS 92.0 IU/LTOTAL PROTEIN 7.50 g/dLALBUMIN 4.40 g/dLTOTAL BILI 
0.70 mg/dLCALCIUM 9.50 mg/dLAGE 69 GFR NonAA 71 GFR AA 86 eGFR >60 mL/min/1.73 
m2eGFR AA* >60 TRIGLYCERIDES 107.0 mg/dLCHOLESTEROL 138.0 mg/dLHDL 58.0 mg/
dLTOT CHOL/HDL 2.4 LDL (CALC) 59.0 mg/dL

 

 2015 8:31 AM  WBC 4.6 RBC 3.97 HGB 12.90 g/dLHCT 38.0 %MCV 96.0 fLMCH 
32.50 pgMCHC 33.90 g/dLRDW SD 44 RDW CV 12.60 %MPV 9.0 fLPLT 248 NRBC# 0.00 NRBC
% 0.0 %NEUT 61.0 %%LYMP 24.70 %%MONO 10.20 %%EOS 3.0 %%BASO 1.10 %#NEUT 2.82 #
LYMP 1.14 #MONO 0.47 #EOS 0.14 #BASO 0.05 MANUAL DIFF NOT IND TRIGLYCERIDES 
105.0 mg/dLCHOLESTEROL 141.0 mg/dLHDL 58.0 mg/dLTOT CHOL/HDL 2.4 LDL (CALC) 
62.0 mg/dLGLUCOSE 93.0 mg/dLSODIUM 136.0 mmol/LPOTASSIUM 4.10 mmol/LCHLORIDE 
101.0 mmol/LCO2 25.0 mmol/LBUN 9.0 mg/dLCREATININE 0.80 mg/dLSGOT/AST 32.0 IU/
LSGPT/ALT 28.0 IU/LALK PHOS 95.0 IU/LTOTAL PROTEIN 7.30 g/dLALBUMIN 4.50 g/
dLTOTAL BILI 0.80 mg/dLCALCIUM 9.70 mg/dLAGE 70 GFR NonAA 71 GFR AA 86 eGFR >60 
mL/min/1.73meGFR AA* >60 TSH 1.10 uIU/mL

 

 2018 8:15 AM  TSH 1.03 TRIGLYCERIDES 73 CHOLESTEROL 198 HDL 84 TOT CHOL/
HDL 2.4 LDL (CALC) 99 WBC 4.6 RBC 4.03 HGB 13.1 HCT 38.9 MCV 97 MCH 32.5 MCHC 
33.7 RDW SD 45 RDW CV 12.5 MPV 9.8  NRBC# 0.00 NRBC% 0.0 %NEUT 60.3 %
LYMP 25.5 %MONO 9.6 %EOS 3.1 %BASO 1.3 #NEUT 2.77 #LYMP 1.17 #MONO 0.44 #EOS 
0.14 #BASO 0.06 MANUAL DIFF NOT IND GLUCOSE 108 SODIUM 136 POTASSIUM 3.8 
CHLORIDE 100 CO2 28 BUN 19 CREATININE 0.8 SGOT/AST 32 SGPT/ALT 25 ALK PHOS 107 
TOTAL PROTEIN 8.0 ALBUMIN 4.7 TOTAL BILI 0.4 CALCIUM 9.9 AGE 72 GFR NonAA 71 
GFR AA 86 eGFR 71 eGFR AA* >60 







History Of Immunizations







 Name  Date Admin  Mfg Name  Mfg Code  Trade Name  Lot#  Route  Inj  Vis Given  
Vis Pub  CVX

 

 Influenza  10/20/2010  [employer] pasteur  [employer]  FLUZONE  [id number]  Intramuscular  
Left Arm  10/20/2010  2010  999

 

 Influenza  10/28/2011  [employer] paste[employer]  [employer]  FLUZONE  [id number]  Intramuscular  
Left Arm  10/28/2011  2011  141

 

 Influenza  10/29/2012  [employer] pasteur  [employer]  FLUZONE  [id number]  Intramuscular  
Left Deltoid  10/29/2012  2012  141

 

 X  12/10/2012  [employer]  MSD  PNEUMOVAX 23  [id number]  Intramuscular  
Left Gluteous Medius  12/10/2012  2010  33

 

 Influenza  10/28/2013  [employer] pasteur  [facility]  Fluzone > 3 Years  [id number]  
Intramuscular  Right Deltoid  10/28/2013  2013  141

 

 X  9/2/2015  Not Entered  NE  PREVNAR 13     Not Entered  Not Entered  1  109

 

 Influenza  2015  Not Entered  NE  Not Entered     Not Entered  Not Entered
  2015  141

 

 HepB Adult  2016  [employer]  MSD  RECOMBIVAX-ADULT  [id number]  Not 
Entered  Left Deltoid  2016  43

 

 HepB Adult  2016  [employer]  MSD  RECOMBIVAX-ADULT  [id number]  
Intramuscular  Right Deltoid  2016  43

 

 ZVL  2012  Not Entered  NE  Not Entered     Not Entered  Not Entered  1  121

 

 Tdap  2012  Not Entered  NE  Not Entered     Not Entered  Not Entered  1  115

 

 Influenza  10/13/2016  Not Entered  NE  FLUZONE-HIGH DOSE     Intramuscular  
Left Arm  1  141

 

 HepB Adult  2016  [employer]  MSD  RECOMBIVAX-ADULT  [id number]  
Intramuscular  Right Deltoid  2016  43

 

 Influenza  10/30/2017  Not Entered  NE  Fluarix, quadrivalent, preservative 
free     Intramuscular  Left Arm  2017  150







History of Past Illness







 Name  Date of Onset  Comments

 

 Benign Essential Hypertension  Dec 14 2009 10:10AM   

 

 Hyperlipidemia  Dec 14 2009 10:10AM   

 

 Hypothyroidism, Acquired  Dec 14 2009 10:10AM   

 

 hypothyroid      

 

 Hypertension      

 

 Hyperlipidemia      

 

 acid reflux      

 

 Hypertension, Benign Essential  2010  9:41AM   

 

 Hypertension, Benign Essential  2010 12:53PM   

 

 Routine gynecological examination  May  5 2010  9:28AM   

 

 Hypertension  May  5 2010  9:28AM   

 

 Hyperlipidemia, unspecified  May  5 2010  9:28AM   

 

 Hypothyroidism, Acquired  May  5 2010  9:28AM   

 

 Vulvovaginitis  May  5 2010  9:28AM   

 

 Rhinitis, Allergic  May  5 2010  9:28AM   

 

 Hypertension, Benign Essential  May 19 2010  8:48AM   

 

 Hypertension, Benign Essential  May 25 2010  9:39AM   

 

 Flu  Oct 20 2010 12:01PM   

 

 Essential Hypertension  2010  8:34AM   

 

 Hyperlipidemia  2010  8:34AM   

 

 Gastroesophageal Reflux  2010  8:34AM   

 

 Hypothyroidism, Acquired  2010  8:34AM   

 

 Hypertension, Benign Essential  2010  9:22AM   

 

 Hypertension, Benign Essential  Dec 15 2010  9:07AM   

 

 Essential Hypertension  2011  1:31PM   

 

 Hyperlipidemia  2011  1:31PM   

 

 Gastroesophageal Reflux  2011  1:31PM   

 

 Hypothyroidism, Acquired  2011  1:31PM   

 

 Essential Hypertension  2011  8:51AM   

 

 Hyperlipidemia  2011  8:51AM   

 

 Gastroesophageal Reflux  2011  8:51AM   

 

 Hypothyroidism, Acquired  2011  8:51AM   

 

 Essential Hypertension  2011  9:13AM   

 

 Hyperlipidemia  2011  9:13AM   

 

 Gastroesophageal Reflux  2011  9:13AM   

 

 Hypothyroidism, Acquired  2011  9:13AM   

 

 Nausea With Vomiting  2011  1:18PM   

 

 Hyponatremia  2011  8:46AM   

 

 Nausea  2011  9:13AM   

 

 Hypothyroidism  2011 11:10AM   

 

 Hyponatremia  2011 11:10AM   

 

 Essential Hypertension  2011 10:05AM   

 

 Hyperlipidemia  2011 10:05AM   

 

 Gastroesophageal Reflux  2011 10:05AM   

 

 Nausea  2011 10:05AM   

 

 Hypothyroidism, Acquired  2011 10:05AM   

 

 Hyponatremia  May  6 2011 11:34AM   

 

 Essential Hypertension  May 16 2011  8:50AM   

 

 Hyperlipidemia  May 16 2011  8:50AM   

 

 Gastroesophageal Reflux  May 16 2011  8:50AM   

 

 Nausea  May 16 2011  8:50AM   

 

 Hypothyroidism, Acquired  May 16 2011  8:50AM   

 

 Hyponatremia  May 16 2011  8:50AM   

 

 Routine gynecological examination  2011  8:54AM   

 

 Hypertension  2011  8:54AM   

 

 Hyperlipidemia, unspecified  2011  8:54AM   

 

 Hypothyroidism, Acquired  2011  8:54AM   

 

 Rhinitis, Allergic  2011  8:54AM   

 

 Hypothyroidism  Aug 29 2011 12:37PM   

 

 Hyponatremia  Aug 29 2011 12:37PM   

 

 Essential Hypertension  Sep 12 2011  8:53AM   

 

 Hyperlipidemia  Sep 12 2011  8:53AM   

 

 Gastroesophageal Reflux  Sep 12 2011  8:53AM   

 

 Hypothyroidism, Acquired  Sep 12 2011  8:53AM   

 

 Hyponatremia  Sep 12 2011  8:53AM   

 

 Hypertension  Sep 29 2011  9:41AM   

 

 Hyperlipidemia  Dec  8 2011  8:31AM   

 

 Hypothyroidism  Dec  8 2011  8:31AM   

 

 Hypertension  Dec  8 2011  8:31AM   

 

 Flu  Dec  9 2011 10:02AM   

 

 Essential Hypertension  Dec 13 2011 10:13AM   

 

 Hyperlipidemia  Dec 13 2011 10:13AM   

 

 Gastroesophageal Reflux  Dec 13 2011 10:13AM   

 

 Hypothyroidism, Acquired  Dec 13 2011 10:13AM   

 

 Hyponatremia  Dec 13 2011 10:13AM   

 

 Vaginal Discharge  2012  9:43AM   

 

 Rhinitis, Allergic  Feb 15 2012  9:31AM   

 

 Eustachian Tube Dysfunction  Feb 15 2012  9:31AM   

 

 Pharyngitis, Acute  Feb 15 2012  9:31AM   

 

 Routine gynecological examination  2012  8:48AM   

 

 Hypertension  2012  8:48AM   

 

 Hyperlipidemia, unspecified  2012  8:48AM   

 

 Hypothyroidism, Acquired  2012  8:48AM   

 

 Rhinitis, Allergic  2012  8:48AM   

 

 Candidiasis, Vulvovaginal  2012 11:04AM   

 

 Essential Hypertension  Aug 15 2012  9:02AM   

 

 Hyperlipidemia  Aug 15 2012  9:02AM   

 

 Gastroesophageal Reflux  Aug 15 2012  9:02AM   

 

 Hypothyroidism, Acquired  Aug 15 2012  9:02AM   

 

 Hyponatremia  Aug 15 2012  9:02AM   

 

 Atrophic Vaginitis, Postmenopausal  Aug 15 2012  9:02AM   

 

 Flu  Oct 29 2012  9:24AM   

 

 Essential Hypertension  Dec 10 2012  8:35AM   

 

 Hyperlipidemia  Dec 10 2012  8:35AM   

 

 Gastroesophageal Reflux  Dec 10 2012  8:35AM   

 

 Hypothyroidism, Acquired  Dec 10 2012  8:35AM   

 

 Hyponatremia  Dec 10 2012  8:35AM   

 

 Atrophic Vaginitis, Postmenopausal  Dec 10 2012  8:35AM   

 

 Pneumococcus  Dec 10 2012  2:07PM   

 

 Vaginal Discharge  Dec 20 2012  1:50PM   

 

 Essential Hypertension  Dec 20 2012  1:50PM   

 

 Hyperlipidemia  Dec 20 2012  1:50PM   

 

 Gastroesophageal Reflux  Dec 20 2012  1:50PM   

 

 Hypothyroidism, Acquired  Dec 20 2012  1:50PM   

 

 Atrophic Vaginitis, Postmenopausal  Dec 20 2012  1:50PM   

 

 Upper Respiratory Infections  2013  8:52AM   

 

 Hyperlipidemia  2013  8:21AM   

 

 Hypertension  2013  8:21AM   

 

 Hypothyroidism  2013  8:21AM   

 

 Screening Mammogram  Beto 10 2013  8:45AM   

 

 Routine gynecological examination  Beto 10 2013  8:34AM   

 

 Hypertension  Beto 10 2013  8:34AM   

 

 Hyperlipidemia, unspecified  Beto 10 2013  8:34AM   

 

 Hypothyroidism, Acquired  Beto 10 2013  8:34AM   

 

 Rhinitis, Allergic  Beto 10 2013  8:34AM   

 

 Flu  Oct 28 2013  8:52AM   

 

 Essential Hypertension  Dec  9 2013  8:37AM   

 

 Hyperlipidemia  Dec  9 2013  8:37AM   

 

 Gastroesophageal Reflux  Dec  9 2013  8:37AM   

 

 Hypothyroidism, Acquired  Dec  9 2013  8:37AM   

 

 Atrophic Vaginitis, Postmenopausal  Dec  9 2013  8:37AM   

 

 Hypertension  2014  9:56AM   

 

 Hyperlipidemia  2014  9:56AM   

 

 Hypothyroidism  2014  9:56AM   

 

 Screening Mammogram  2014  8:32AM   

 

 Osteopenia  2014  8:32AM   

 

 Hypertension  2014  8:35AM   

 

 Hypothyroidism, Acquired  2014  8:35AM   

 

 Hyperlipidemia  2014  8:35AM   

 

 Upper Respiratory Infections  2014  9:28AM   

 

 Sinusitis, Acute  Oct  2 2014  9:17AM   

 

 Herpes Zoster  Oct  5 2014  1:44PM   

 

 Vesicular Eruption  Oct  5 2014  1:44PM   

 

 Hypertension  2014  8:18AM   

 

 Hyperlipidemia  2014  8:18AM   

 

 hypothyroid  2014  8:18AM   

 

 Situational anxiety  Dec 20 2014  9:33AM   

 

 GERD (gastroesophageal reflux disease)  Dec 20 2014  9:33AM   

 

 Nausea  Dec 20 2014  9:33AM   

 

 Abdominal Pain, Epigastric  Dec 20 2014  9:33AM   

 

 Hyperlipidemia  Oct  6 2014  9:03AM   

 

 acid reflux  Oct  6 2014  9:03AM   

 

 hypothyroid  Oct  6 2014  9:03AM   

 

 Shingles  Oct  6 2014  9:03AM   

 

 Pharyngitis  2015  1:09PM   

 

 Bronchitis  2015  9:10AM   

 

 Hyperlipidemia  2015  9:10AM   

 

 acid reflux  2015  9:10AM   

 

 hypothyroid  2015  9:10AM   

 

 Hyperlipidemia  2015  8:18AM   

 

 Hypertension  2015  8:18AM   

 

 hypothyroid  2015  8:18AM   

 

 Screening Mammogram  2015 11:43AM   

 

 Medicare Annual Pap Q 2 years  2015  9:36AM   

 

 Screening Mammogram  2015  9:36AM   

 

 Candidiasis of female genitalia  2015  9:36AM   

 

 Hypertension  2015 11:34AM   

 

 Hyperlipidemia, unspecified  2015 11:34AM   

 

 Hypothyroidism, Acquired  2015 11:34AM   

 

 Osteopenia  2016  8:41AM   

 

 Encounter for screening mammogram for breast cancer  2016  8:41AM   

 

 Hypertension  2016  8:34AM   

 

 Lymphedema  2016  8:34AM   

 

 Hyperlipidemia  2016  8:34AM   

 

 hypothyroid  2016  8:34AM   

 

 HEP B  2016  9:13AM   

 

 HEP B  2016  8:52AM   

 

 Acid Reflux  2016  8:34AM   

 

 History of acute gastritis  Oct 13 2016  2:30PM   

 

 Anxiety as acute reaction to exceptional stress  Oct 13 2016  2:30PM   

 

 HEP B  Dec 29 2016  8:42AM   

 

 Caregiver stress syndrome  May 26 2017  8:28AM   

 

 Anxiety  May 26 2017  8:28AM   

 

 Blood in stool  2017  2:54PM   

 

 Upper GI bleed  2017 11:34AM   

 

 Hyponatremia  2017  9:03AM   

 

 Hyponatremia  2017  8:43AM   

 

 Medicare Annual Pap Q 2 years  2017  9:03AM   

 

 Nausea  2017  9:03AM   

 

 Uterine enlargement  2017  9:03AM   

 

 Encounter for screening mammogram for breast cancer  2017  4:56PM   

 

 Panic disorder [episodic paroxysmal anxiety]  Oct 10 2017  2:02PM   

 

 Acute stress reaction  Oct 10 2017  2:02PM   

 

 Caregiver stress syndrome  Oct 10 2017  2:02PM   

 

 Stress reaction causing mixed disturbance of emotion and conduct  Oct 18 2017  
9:05AM   

 

 Ankle strain, left, initial encounter  Dec 19 2017  8:29AM   

 

 Excoriation of ear canal, left, initial encounter  Dec 19 2017  8:29AM   

 

 Generalized anxiety disorder  Dec 19 2017  8:29AM   

 

 Grief reaction  Dec 19 2017  8:29AM   

 

 Acute non-recurrent frontal sinusitis  2018 11:58AM   

 

 Cough  2018 11:58AM   

 

 Hypertension  2018  8:10AM   

 

 Hyperlipidemia, unspecified  2018  8:10AM   

 

 Hypothyroidism, Acquired  2018  8:10AM   

 

 Situational anxiety  Mar  6 2018  8:30AM   

 

 Nausea  Mar  6 2018  8:30AM   

 

 LESLIE (generalized anxiety disorder)  May 22 2018  8:47AM   

 

 Caregiver stress syndrome  May 22 2018  8:47AM   

 

 Situational anxiety  May 29 2018  8:26AM   

 

 Osteopenia  May 29 2018  8:26AM   

 

 Visit for screening mammogram  May 29 2018  8:26AM   

 

 Anxiety Disorder  2018  9:09AM   

 

 Hypertension  Aug 20 2018  8:05AM   

 

 Hyperlipidemia, unspecified  Aug 20 2018  8:05AM   

 

 Hypothyroidism, Acquired  Aug 20 2018  8:05AM   







Payers







 Insurance Name  Company Name  Plan Name  Plan Number  Policy Number  Policy 
Group Number  Start Date

 

    Medicare RHC  Medicare RHC     [id number]     N/A

 

    [employer]  [facility]     [id number]     2009

 

    Medicare Part B  [place]ic[place]     [id number]     N/A

 

    Medicare Part A  Medicare Part A     [id number]     N/A

 

    Medicare Part A  [employer]are P [employer] - Preventive     [id number]     N/A

 

    Medicare Part A  Medicare - Lab/Xray     [id number]     N/A







History of Encounters







 Visit Date  Visit Type  Provider

 

 2018  Office visit  Marvin Boyer DO

 

 2018  Office visit  Marvin Boyer DO

 

 2018  Office visit  Doris Noriega MD

 

 3/6/2018  Office visit  Doris Noriega MD

 

 2018  Office visit  Deedee Carter APRN

 

 2017  Office visit  Doris Noriega MD

 

 10/18/2017  Office visit  Doris Noriega MD

 

 10/10/2017  Office visit  Doris Noriega MD

 

 2017  Office visit  Doris Noriega MD

 

 2017  Office visit  Doris Noriega MD

 

 2017  Office visit  Doris Noriega MD

 

 2017  Office visit  Prince Noe APRVLADIMIR

 

 2016  Nurse visit  Doris Noriega MD

 

 10/13/2016  Office visit  Doris Noriega MD

 

 2016  Nurse visit  Doris Noriega MD

 

 2016  Office visit  Doris Noriega MD

 

 2015  Office visit  Doris Noriega MD

 

 2015  Office visit  Doris Noriega MD

 

 2015  Office visit  Prince Noe APRN

 

 2014  Office visit  Anyi Herrera APRN

 

 10/27/2014  Voided  Doris Noriega MD

 

 10/6/2014  Office visit  Doris Noriega MD

 

 10/5/2014  Office visit  Anyi Herrera APRN

 

 10/2/2014  Office visit   

 

 10/2/2014  Office visit  Corrine Bay APRN

 

 2014  Office visit  Kassie MELVIN Franklin APRN

 

 2014  Office visit  Doris Noriega MD

 

 2014  Office visit  Doris Noriega MD

 

 2013  Office visit  Dee Colindres MD

 

 10/28/2013  Nurse visit  Dee Colindres MD

 

 6/10/2013  Office visit  Dee Colindres MD

 

 2013  Office visit  Corrine Bay APRN

 

 2012  Office visit  Dee Colindres MD

 

 12/10/2012  Office visit  Dee Colindres MD

 

 10/29/2012  Nurse visit  Dee Colindres MD

 

 8/15/2012  Office visit  Dee Colindres MD

 

 2012  Office visit  Corrine Bay APRN

 

 2012  Office visit  Dee Colindres MD

 

 2/15/2012  Office visit  Dee Colindres MD

 

 2012  Office visit  Corrine Bay APRN

 

 2011  Office visit  Dee Colindres MD

 

 10/28/2011  Nurse visit  Shad Nolasco MD

 

 2011  Office visit  Dee Colindres MD

 

 2011  Office visit  Dee Colindres MD

 

 2011  Office visit  Dee Colindres MD

 

 2011  Office visit  Dee Colindres MD

 

 2011  Office visit  Dee Colindres MD

 

 2011  Office visit  Dee Colindres MD

 

 4/10/2011  Hospital  KHRIS Reeves MD

 

 4/10/2011  Hospital  RICKEY Toussaint MD

 

 2011  Office visit  RICKEY Toussaint MD

 

 2011  Hospital  Fide Castellanos MD

 

 2011  Voided  Fide Castellanos MD

 

 2011  Office visit  Dee Colindres MD

 

 12/15/2010  Nurse visit  Dee Colindres MD

 

 2010  Office visit  Dee Colindres MD

 

 10/20/2010  Nurse visit  Stephenie PERAZA

 

 2010  Nurse visit  Dee Colindres MD

 

 2010  Nurse visit  Dee Colindres MD

 

 2010  Office visit  Dee Colidnres MD

 

 3/19/2010  Voided  Stephenie Kay PA

 

 2010  Nurse visit  Stephenie PERAZA

 

 2010  Nurse visit  Stephenie PERAZA

 

 2010  Nurse visit  Stephenie PERAZA

 

 2009  Office visit  Stephenie PERAZA

 

 10/20/2009  Nurse visit  Stephenie PERAZA

## 2018-10-22 NOTE — XMS REPORT
MU2 Ambulatory Summary

 Created on: 2017



Milady Crespo

External Reference #: [id number]

: 1945

Sex: Female



Demographics







 Address  [address]

[place]  [address]

 

 Home Phone  [phone]

 

 Preferred Language  English

 

 Marital Status  

 

 Jain Affiliation  Unknown

 

 Race  White

 

 Ethnic Group  Not  or 





Author







 Author  Doris Noriega

 

 Organization  [facility]

 

 Address  [address]

[place], [place]  [id number]



 

 Phone  [phone]







Care Team Providers







 Care Team Member Name  Role  Phone

 

 Doris Noriega  PCP  [phone]

 

 Doris Noriega  PreferredProvider  [phone]







Allergies and Adverse Reactions







 Name  Reaction  Notes

 

 NO KNOWN DRUG ALLERGIES      







Plan of Treatment







 Planned Activity  Comments  Planned Date  Planned Time  Plan/Goal

 

 Complete blood count (CBC) with differential count     2016  12:00 AM   

 

 Comprehensive metabolic panel     2016  12:00 AM   

 

 VITAMIN D (25 HYDROXY)     2016  12:00 AM   

 

 Lipid panel (total cholesterol, lipoproteins, HDL, triglycerides)     8/15/
2012  12:00 AM   

 

 Pap smear     2017  12:00 AM   

 

 Basic metabolic panel     2017  12:00 AM   

 

 Comprehensive Metabolic Panel     6/10/2013  12:00 AM   

 

 Lipid panel (total cholesterol, lipoproteins, HDL, triglycerides)     6/10/
2013  12:00 AM   

 

 Thyroid stimulating hormone (TSH)     6/10/2013  12:00 AM   

 

 CBC (automated H&H, platelets, WBC and automated differential)     6/10/2013  
12:00 AM   

 

 Comprehensive Metabolic Panel     2012  12:00 AM   

 

 Lipid panel (total cholesterol, lipoproteins, HDL, triglycerides)     2012  12:00 AM   

 

 Thyroid stimulating hormone (TSH)     2012  12:00 AM   

 

 CBC (automated H&H, platelets, WBC and automated differential)     2012  
12:00 AM   

 

 Screening mammography, bilateral     2015  12:00 AM   







Medications







 Active 

 

 Name  Start Date  Estimated Completion Date  SIG  Comments

 

 aspirin 81 mg oral tablet,delayed release (DR/EC)        take 1 tablet (81 mg) 
by oral route once daily   

 

 Vitamin D3 1,000 unit oral capsule        take 1 capsule by oral route daily   

 

 famotidine 20 mg oral tablet  2015     TAKE ONE TABLET BY MOUTH TWICE 
DAILY   

 

 valsartan 160 mg oral tablet  2016     TAKE ONE TABLET BY MOUTH ONCE 
DAILY   

 

 amlodipine 5 mg oral tablet  2016     TAKE ONE TABLET BY MOUTH ONCE DAILY
   

 

 Allergy Relief (loratadine) 10 mg oral tablet  2016     TAKE ONE TABLET 
BY MOUTH ONCE DAILY   

 

 amlodipine 5 mg oral tablet  2016     TAKE ONE TABLET BY MOUTH ONCE DAILY
   

 

 Allergy Relief (loratadine) 10 mg oral tablet  2016     TAKE ONE TABLET 
BY MOUTH ONCE DAILY   

 

 valsartan 160 mg oral tablet  2016     TAKE ONE TABLET BY MOUTH ONCE 
DAILY   

 

 pantoprazole 40 mg oral tablet,delayed release (DR/EC)  10/13/2016  10/8/2017  
take 1 tablet (40 mg) by oral route once daily for 90 days   

 

 amlodipine 5 mg oral tablet  10/13/2016  10/8/2017  TAKE ONE TABLET BY MOUTH 
ONCE DAILY for 90 days   

 

 atenolol 50 mg oral tablet  10/13/2016  10/8/2017  take 1 tablet (50 mg) by 
oral route once daily   

 

 levothyroxine 50 mcg oral tablet  10/13/2016  10/8/2017  TAKE ONE TABLET BY 
MOUTH ONCE DAILY for 90 days   

 

 Lipitor 20 mg oral tablet  10/13/2016  2018  take 1 tablet (20 mg) by oral 
route once daily   

 

 Plavix 75 mg oral tablet  10/13/2016  2018  take 1 tablet (75 mg) by oral 
route once daily   

 

 valsartan 160 mg oral tablet  10/13/2016  10/8/2017  TAKE ONE TABLET BY MOUTH 
ONCE DAILY for 90 days   

 

 Zofran (as hydrochloride) 4 mg oral tablet  2016     take 1 tablet by 
oral route daily as needed for 14 days   

 

 Zofran (as hydrochloride) 4 mg oral tablet  2017     take 1 tablet by oral 
route daily as needed for 14 days   

 

 Zofran (as hydrochloride) 4 mg oral tablet  2017     take 1 tablet by oral 
route daily as needed for 14 days   

 

 Zofran (as hydrochloride) 4 mg oral tablet  2017     take 1 tablet by 
oral route daily as needed for 14 days   

 

 Zofran (as hydrochloride) 4 mg oral tablet  2017     take 1 tablet by 
oral route daily as needed for 14 days   

 

 Lexapro 10 mg oral tablet  2017  take 1 tablet (10 mg) by oral 
route once daily for 90 days   

 

 triamcinolone acetonide 0.1 % topical cream  2017     APPLY A THIN LAYER 
OF CREAM EXTERNALLY TO AFFECTED AREA TWICE DAILY FOR 14 DAYS   

 

 ondansetron 4 mg oral tablet,disintegrating  2017     dissolve  1 tablet 
by oral route every 8 hours as needed   

 

 loratadine 10 mg oral tablet  2017  TAKE 1 TABLET BY MOUTH 
EVERY DAY IN THE EVENING   

 

 triamcinolone acetonide 0.1 % topical cream  2017     APPLY  CREAM 
EXTERNALLY TO AFFECTED AREA TWICE DAILY FOR 14 DAYS   

 

 EQ LORATADINE 10MG  TABS  2017     TAKE ONE TABLET BY MOUTH ONCE DAILY   

 

 clorazepate dipotassium 7.5 mg oral tablet  2017  take 1/2 to1 
tablet PO up to three times per day for situational anxiety   









  

 

 Name  Start Date  Expiration Date  SIG  Comments

 

 prednisone 20 mg oral tablet  2011  take 2 tablets by oral 
route daily for 5 days   

 

 calcium carbonate-vitamin D2 600-125 mg-unit oral tablet  8/15/2012  2012
  take 2 tablets by oral route daily   

 

 Jenks 3 Fish Oil 900-1,400 mg oral capsule,delayed release(DR/EC)  8/15/2012  9
/  take 1 capsule by oral route daily   

 

 multivitamin Oral tablet  8/15/2012  2012  take 1 tablet by oral route 
daily   

 

 triamcinolone acetonide 0.1 % topical cream  2013  10/7/2013  APPLY A 
THIN LAYER TO THE AFFECTED AREA(S) BY TOPICAL ROUTE TWICE A DAY   

 

 famotidine 20 mg oral tablet  2014  take 1 tablet (20 mg) by 
oral route 2 times per day   

 

 amoxicillin 500 mg oral capsule  2014  take 1 capsule (500 mg) 
by oral route 3 times per day for 10 days   

 

 Zithromax Z-Tab 250 mg oral tablet  10/2/2014  10/7/2014  take 2 tablets (500 
mg) by oral route once daily for 1 day then 1 tablet (250 mg) by oral route 
once daily for 4 days   

 

 acyclovir 800 mg oral tablet  10/5/2014  10/12/2014  take 1 tablet (800 mg) by 
oral route 5 times per day for 7 days   

 

 gabapentin 300 mg oral capsule  10/9/2014  2014  take 1 capsule (300 mg) 
by oral route 3 times per day for 14 days   

 

 Carafate 100 mg/mL oral suspension  2014  take 10 milliliters 
(1 gram) by oral route 4 times per day on an empty stomach 1 hour before meals 
and at bedtime for 4 weeks   

 

 amlodipine 5 mg oral tablet  2015  TAKE ONE TABLET BY MOUTH 
EVERY DAY   

 

 levothyroxine 50 mcg oral tablet  2015  TAKE ONE TABLET BY MOUTH 
EVERY DAY   

 

 Diovan 160 mg oral tablet  2015  2/15/2016  TAKE ONE TABLET BY MOUTH 
EVERY DAY for 90 days   

 

 triamcinolone acetonide 0.1 % topical cream  2015  apply a thin 
layer to the affected area(s) by topical route 2 times per day for 14 days   

 

 fluconazole 150 mg oral tablet  2015  take 1 tablet (150 mg) 
by oral route once for 1 day   

 

 Zofran (as hydrochloride) 4 mg oral tablet  10/13/2016  10/27/2016  take 1 
tablet by oral route daily as needed for 14 days   

 

 Zofran (as hydrochloride) 4 mg oral tablet  2017     take 1 tablet by 
oral route daily as needed for 14 days   









 Discontinued 

 

 Name  Start Date  Discontinued Date  SIG  Comments

 

 Lipitor 40 mg oral tablet     2009  12 TAB DAILY   

 

 ramipril 5 mg oral capsule     2009  take 1 capsule (5 mg) by oral route 
once daily   

 

 lovastatin 20 mg oral tablet  2009  take 1 tablet (20 mg) by 
oral route once daily with evening meal   

 

 propranolol 20 mg oral tablet  2010  take 1 tablet by oral 
route 2 times a day   

 

 Fosamax 70 mg oral tablet  2010  take 1 tablet (70 mg) by oral 
route once weekly in the morning, at least 30 minutes before the first food, 
beverage, or medication of the day   

 

 lisinopril 40 mg oral tablet  2010  4/10/2011  take 2 tablets by oral 
route daily   

 

 Zoloft 50 mg oral tablet  2011  take 1 tablet (50 mg) by oral 
route once daily   

 

 promethazine 25 mg oral tablet  2011  take 1 tablet by oral 
route every 6 hours as needed   

 

 Diovan -12.5 mg oral tablet  2011  take 1 tablet by oral 
route once daily for 30 days   

 

 Diflucan 150 mg oral tablet     2012  take 1 tablet (150 mg) by oral 
route once for 1 day   

 

 Diflucan 150 mg oral tablet  2012  8/15/2012  take 1 tablet (150 mg) by 
oral route once   

 

 Vitamin B-12 1,000 mcg oral tablet  8/15/2012  2014  take 1 tablet by 
oral route daily   

 

 Premarin 0.625 mg/gram vaginal cream  10/29/2012  6/10/2013  use 1 gram daily 
for a week then 1 gram twice a week   

 

 Medrol (Tab) 4 mg oral tablets,dose pack  2013  6/10/2013  take as 
directed   

 

 aspirin 325 mg oral tablet  2014  take 1 tablet (325 mg) by 
oral route once daily   

 

 Medrol (Tab) 4 mg oral tablets,dose pack  2014  10/2/2014  take as 
directed   

 

 Tetracaine Lollipops Lollipop  2015  Use as directed   

 

 Zoloft 50 mg oral tablet  2016  take 1 tablet (50 mg) by oral 
route once daily for 90 days   

 

 Zoloft 50 mg oral tablet  2016  take 1 tablet (50 mg) by oral 
route once daily for 90 days  Pt refuses to take...







Problem List







 Description  Status  Onset

 

 Hyperlipidemia  Active   

 

 acid reflux  Active   

 

 Hypertension  Active   

 

 hypothyroid  Active   







Vital Signs







 Date  Time  BP-Sys(mm[Hg]  BP-Morena(mm[Hg])  HR(bpm)  RR(rpm)  Temp  WT  HT  HC  
BMI  BSA  BMI Percentile  O2 Sat(%)

 

 2017  8:40:00 AM  118 mmHg  68 mmHg  63 bpm  16 rpm  98.1 F  123.125 lbs  
61 in     23.26 kg/m2  1.55 m2     100 %

 

 2017  8:57:00 AM  116 mmHg  62 mmHg  71 bpm  16 rpm  99.8 F  121.5 lbs  
61 in     22.957 kg/m  1.5401 m     98 %

 

 2017  11:30:00 AM  128 mmHg  78 mmHg  69 bpm  16 rpm  98.8 F  129.375 lbs  
61 in     24.44 kg/m2  1.59 m2     98 %

 

 2017  8:22:00 AM  118 mmHg  78 mmHg  62 bpm  18 rpm  97.5 F  129.125 lbs  
61 in     24.3977 kg/m  1.5877 m     100 %

 

 10/13/2016  2:26:00 PM  128 mmHg  78 mmHg  77 bpm  16 rpm  98.4 F  139.25 lbs  
61 in     26.31 kg/m2  1.65 m2     100 %

 

 2016  8:29:00 AM  122 mmHg  70 mmHg  72 bpm  16 rpm  97.8 F  137.125 lbs  
61 in     25.9093 kg/m  1.6361 m     100 %

 

 2015  9:33:00 AM  120 mmHg  68 mmHg  73 bpm     98.7 F  144.375 lbs  61 
in     27.28 kg/m2  1.68 m2     99 %

 

 2015  9:05:00 AM  110 mmHg  75 mmHg  85 bpm  16 rpm  96.7 F  144.375 lbs  
61 in     27.2791 kg/m  1.6788 m     99 %

 

 2015  1:05:00 PM  108 mmHg  62 mmHg  78 bpm  18 rpm  96.9 F  142.375 lbs  
61 in     26.90 kg/m2  1.67 m2     100 %

 

 2014  9:31:00 AM  126 mmHg  66 mmHg  79 bpm  18 rpm  98.7 F  149 lbs  61 
in     28.153 kg/m  1.7055 m     98 %

 

 10/6/2014  9:02:00 AM  110 mmHg  74 mmHg  94 bpm  18 rpm  98.1 F  145.4 lbs  
61 in     27.47 kg/m2  1.68 m2     97 %

 

 10/2/2014  9:14:00 AM  124 mmHg  74 mmHg  79 bpm  18 rpm  98 F  147.25 lbs  61 
in     27.8224 kg/m  1.6955 m     98 %

 

 2014  9:22:00 AM  124 mmHg  76 mmHg  89 bpm  18 rpm  98.1 F  148 lbs  61 
in     27.96 kg/m2  1.70 m2     100 %

 

 2014  8:27:00 AM  118 mmHg  65 mmHg  74 bpm  16 rpm  97.7 F  148 lbs  61 
in     27.9641 kg/m  1.6998 m     99 %

 

 2014  9:48:00 AM  125 mmHg  70 mmHg  93 bpm  18 rpm  96.7 F  156 lbs  61 
in     29.48 kg/m2  1.75 m2     100 %

 

 2013  8:35:00 AM  122 mmHg  74 mmHg  84 bpm  16 rpm  97.9 F  155.375 lbs 
                99 %

 

 6/10/2013  8:30:00 AM  130 mmHg  82 mmHg  79 bpm  16 rpm  97.9 F  161 lbs     
            98 %

 

 2013  8:50:00 AM  124 mmHg  68 mmHg  66 bpm  18 rpm  97.6 F  157.5 lbs  
61 in     29.7591 kg/m  1.7535 m      

 

 2012  1:46:00 PM  120 mmHg  72 mmHg  75 bpm  16 rpm  97.6 F  156.125 lbs
                 98 %

 

 12/10/2012  8:32:00 AM  122 mmHg  82 mmHg  70 bpm  16 rpm  97.3 F  157 lbs    
             99 %

 

 8/15/2012  9:00:00 AM  130 mmHg  76 mmHg  86 bpm  16 rpm  97.4 F  159 lbs     
            100 %

 

 2012  11:02:00 AM  128 mmHg  64 mmHg  66 bpm  18 rpm  97.4 F  162.125 lbs
  61 in     30.6329 kg/m  1.7791 m      

 

 2012  8:45:00 AM  130 mmHg  70 mmHg  82 bpm  16 rpm  98.2 F  160.125 lbs 
                100 %

 

 2/15/2012  9:29:00 AM  122 mmHg  80 mmHg  86 bpm  16 rpm  97.4 F  159.375 lbs  
61 in     30.1133 kg/m  1.7639 m     99 %

 

 2012  9:41:00 AM  132 mmHg  74 mmHg  66 bpm  18 rpm  98.4 F  160.375 lbs  
61 in     30.30 kg/m2  1.77 m2      

 

 2011  10:08:00 AM  134 mmHg  82 mmHg  80 bpm  16 rpm  98 F  160 lbs  61 
in     30.23 kg/m2  1.7674 m     99 %

 

 2011  3:56:00 PM  130 mmHg  80 mmHg                              

 

 2011  8:55:00 AM  130 mmHg  74 mmHg  88 bpm  16 rpm  98.2 F  158.25 lbs  
               98 %

 

 2011  8:54:00 AM  136 mmHg  82 mmHg  102 bpm  16 rpm  98.1 F  153.5 lbs   
              99 %

 

 2011  8:49:00 AM  122 mmHg  88 mmHg  88 bpm  16 rpm  98.6 F  152.25 lbs  
               99 %

 

 2011  10:02:00 AM  140 mmHg  82 mmHg  96 bpm  20 rpm  98.8 F             
       100 %

 

 2011  9:14:00 AM  156 mmHg  98 mmHg  110 bpm  24 rpm  98.5 F  153 lbs    
             100 %

 

 2011  8:50:00 AM  160 mmHg  84 mmHg  100 bpm  16 rpm  98.7 F  155 lbs    
             100 %

 

 2011  1:25:00 PM  152 mmHg  100 mmHg  82 bpm  16 rpm  97.2 F  156.375 lbs 
                100 %

 

 2011  9:55:00 AM  144 mmHg  90 mmHg                              

 

 2010  9:24:00 AM  138 mmHg  84 mmHg                              

 

 2010  8:58:00 AM  160 mmHg  94 mmHg                              

 

 2010  8:33:00 AM  142 mmHg  90 mmHg  100 bpm  16 rpm  98.1 F  158.375 
lbs                  

 

 2010  9:24:00 AM  160 mmHg  102 mmHg  88 bpm  18 rpm  99 F  157.125 lbs  
62 in     28.7382 kg/m  1.7657 m      

 

 3/19/2010  11:01:00 AM  140 mmHg  90 mmHg                              

 

 2009  10:08:00 AM  142 mmHg  86 mmHg  72 bpm  16 rpm  98.1 F  154.125 
lbs  61 in     29.1214 kg/m  1.73 m2      







Social History







 Name  Description  Comments

 

       

 

 Children      

 

 lives alone      

 

 Supervisor      

 

 Tobacco  Never smoker   







History of Procedures







 Date Ordered  Description  Order Status

 

 2011 12:00 AM  COMPREHEN METABOLIC PANEL  Reviewed

 

 2011 12:00 AM  ASSAY THYROID STIM HORMONE  Reviewed

 

 2011 12:00 AM  COMPREHEN METABOLIC PANEL  Reviewed

 

 2011 12:00 AM  LIPID PANEL  Reviewed

 

 2011 12:00 AM  ASSAY THYROID STIM HORMONE  Reviewed

 

 2011 12:00 AM  COMPLETE CBC W/AUTO DIFF WBC  Reviewed

 

 2015 12:00 AM  COMPLETE CBC W/AUTO DIFF WBC  Reviewed

 

 2015 12:00 AM  COMPREHEN METABOLIC PANEL  Reviewed

 

 2015 12:00 AM  LIPID PANEL  Reviewed

 

 2015 12:00 AM  ASSAY THYROID STIM HORMONE  Reviewed

 

 2011 12:00 AM  COMPREHEN METABOLIC PANEL  Reviewed

 

 2011 12:00 AM  COMPREHEN METABOLIC PANEL  Reviewed

 

 2011 12:00 AM  LIPID PANEL  Reviewed

 

 2011 12:00 AM  ASSAY THYROID STIM HORMONE  Reviewed

 

 10/28/2011 12:00 AM  ***Immunization administration, Medicare flu  Reviewed

 

 10/28/2011 12:00 AM  Fluzone ** MEDICARE Only **  Reviewed

 

 2010 11:24 AM  NO CHARGE OV  Reviewed

 

 2010 11:26 AM  NO CHARGE OV  Reviewed

 

 2011 12:00 AM  COMPREHEN METABOLIC PANEL  Reviewed

 

 2011 12:00 AM  LIPID PANEL  Reviewed

 

 2011 12:00 AM  ASSAY THYROID STIM HORMONE  Reviewed

 

 2011 12:00 AM  COMPLETE CBC W/AUTO DIFF WBC  Reviewed

 

 2011 12:00 AM  Wrist Support  Reviewed

 

 2016 12:00 AM  Screening mammography, bilateral  Reviewed

 

 2016 12:00 AM  DXA BONE DENSITY AXIAL  Returned

 

 2016 12:00 AM  TETANUS VACCINE IM  Reviewed

 

 2016 12:00 AM  HEP B VACC ADULT 3 DOSE IM  Reviewed

 

 2012 12:00 AM  TISSUE EXAM FOR FUNGI  Reviewed

 

 2012 12:00 AM  SMEAR WET MOUNT SALINE/INK  Reviewed

 

 2016 12:00 AM  TETANUS VACCINE IM  Reviewed

 

 2016 12:00 AM  HEP B VACC ADULT 3 DOSE IM  Reviewed

 

 2/15/2012 12:00 AM  THER/PROPH/DIAG INJ SC/IM  Reviewed

 

 2/15/2012 12:00 AM  Bicillin CR [facility]#70467700289[facility]  Reviewed

 

 2/15/2012 12:00 AM  Depo-Medrol 40 mg NDC#3186889694  Reviewed

 

 10/13/2016 12:00 AM  COMPLETE CBC W/AUTO DIFF WBC  Returned

 

 10/13/2016 12:00 AM  COMPREHEN METABOLIC PANEL  Returned

 

 10/13/2016 12:00 AM  ASSAY THYROID STIM HORMONE  Returned

 

 10/13/2016 12:00 AM  LIPID PANEL  Returned

 

 2016 12:00 AM  TETANUS VACCINE IM  Reviewed

 

 2016 12:00 AM  HEP B VACC ADULT 3 DOSE IM  Reviewed

 

 2017 12:00 AM  ASSAY OF IRON  Returned

 

 2017 12:00 AM  IRON BINDING TEST  Returned

 

 2017 12:00 AM  ASSAY OF TRANSFERRIN  Returned

 

 2017 12:00 AM  OCCULT BLD FECES 1-3 TESTS  Returned

 

 2017 12:00 AM  COMPLETE CBC AUTOMATED  Returned

 

 8/15/2012 12:00 AM  COMPREHEN METABOLIC PANEL  Reviewed

 

 8/15/2012 12:00 AM  ASSAY THYROID STIM HORMONE  Reviewed

 

 8/15/2012 12:00 AM  COMPLETE CBC W/AUTO DIFF WBC  Reviewed

 

 8/15/2012 12:00 AM  Wrist Support  Reviewed

 

 2017 12:00 AM  METABOLIC PANEL TOTAL CA  Returned

 

 10/29/2012 12:00 AM  Flu Injection 3 Years And Above NDC# [address]-17  [facility]  
Reviewed

 

 12/10/2012 12:00 AM  IMMUNIZATION ADMIN  Reviewed

 

 12/10/2012 12:00 AM  Pneumovax Injection - [facility]  Reviewed

 

 2012 12:00 AM  TRICHOMONAS ASSAY W/OPTIC  Reviewed

 

 2013 12:00 AM  THER/PROPH/DIAG INJ SC/IM  Reviewed

 

 2013 12:00 AM  Bicillin CR, 1.2 million units NDC# 04539-473-15  Reviewed

 

 2013 12:00 AM  COMPREHEN METABOLIC PANEL  Reviewed

 

 2013 12:00 AM  LIPID PANEL  Reviewed

 

 2013 12:00 AM  COMPLETE CBC W/AUTO DIFF WBC  Reviewed

 

 2013 12:00 AM  ASSAY THYROID STIM HORMONE  Reviewed

 

 6/10/2013 12:00 AM  MAMMOGRAM SCREENING  Reviewed

 

 6/10/2013 12:00 AM  CYTOPATH C/V MANUAL  Reviewed

 

 12/10/2013 12:00 AM  LIPID PANEL  Reviewed

 

 12/10/2013 12:00 AM  ASSAY THYROID STIM HORMONE  Reviewed

 

 12/10/2013 12:00 AM  COMPLETE CBC W/AUTO DIFF WBC  Reviewed

 

 12/10/2013 12:00 AM  COMPREHEN METABOLIC PANEL  Reviewed

 

 2010 12:00 AM  CYTOPATH C/V MANUAL  Reviewed

 

 2010 12:00 AM  SMEAR WET MOUNT SALINE/INK  Reviewed

 

 2010 12:00 AM  LIPID PANEL  Reviewed

 

 2010 12:00 AM  ASSAY THYROID STIM HORMONE  Reviewed

 

 2010 12:00 AM  COMPLETE CBC W/AUTO DIFF WBC  Reviewed

 

 2010 12:00 AM  COMPREHEN METABOLIC PANEL  Reviewed

 

 10/28/2013 12:00 AM  Flu Injection 3 Years And Above NDC# [address]-18  [facility]  
Reviewed

 

 2010 8:48 AM  Blood Pressure Check-no charge  Reviewed

 

 2010 12:00 AM  Blood Pressure Check-no charge  Reviewed

 

 2014 12:00 AM  METABOLIC PANEL TOTAL CA  Reviewed

 

 2014 12:00 AM  ASSAY THYROID STIM HORMONE  Reviewed

 

 2014 12:00 AM  ASSAY OF FREE THYROXINE  Reviewed

 

 2014 12:00 AM  MAMMOGRAM SCREENING  Reviewed

 

 2014 12:00 AM  CT BONE DENSITY AXIAL  Reviewed

 

 2014 12:00 AM  COMPLETE CBC W/AUTO DIFF WBC  Reviewed

 

 2014 12:00 AM  COMPREHEN METABOLIC PANEL  Reviewed

 

 2014 12:00 AM  LIPID PANEL  Reviewed

 

 2014 12:00 AM  ASSAY THYROID STIM HORMONE  Reviewed

 

 10/20/2010 12:00 AM  IMMUNIZATION ADMIN  Reviewed

 

 10/20/2010 12:00 AM  FLU VACCINE 3 YRS & > IM  Reviewed

 

 2010 12:00 AM  COMPREHEN METABOLIC PANEL  Reviewed

 

 2010 12:00 AM  LIPID PANEL  Reviewed

 

 2010 12:00 AM  ASSAY THYROID STIM HORMONE  Reviewed

 

 2010 12:00 AM  COMPLETE CBC W/AUTO DIFF WBC  Reviewed

 

 2010 12:00 AM  Blood Pressure Check-no charge  Reviewed

 

 10/2/2014 12:00 AM  THER/PROPH/DIAG INJ SC/IM  Reviewed

 

 10/2/2014 12:00 AM  Kenalog, Per 10 Mg NDC#1430-3424-62  Reviewed

 

 2014 12:00 AM  COMPLETE CBC W/AUTO DIFF WBC  Reviewed

 

 2014 12:00 AM  COMPREHEN METABOLIC PANEL  Reviewed

 

 2014 12:00 AM  LIPID PANEL  Reviewed

 

 2014 12:00 AM  ASSAY THYROID STIM HORMONE  Reviewed

 

 2015 12:00 AM  Rocephin 1 gram NDC#3956-5476-45  Reviewed

 

 2015 12:00 AM  THER/PROPH/DIAG INJ SC/IM  Reviewed

 

 2011 12:00 AM  COMPREHEN METABOLIC PANEL  Reviewed

 

 2011 12:00 AM  LIPID PANEL  Reviewed

 

 2011 12:00 AM  ASSAY THYROID STIM HORMONE  Reviewed

 

 2011 12:00 AM  COMPLETE CBC W/AUTO DIFF WBC  Reviewed

 

 2011 12:00 AM  METABOLIC PANEL TOTAL CA  Reviewed

 

 2011 12:00 AM  COMPREHEN METABOLIC PANEL  Reviewed

 

 2011 12:00 AM  ASSAY THYROID STIM HORMONE  Reviewed

 

 2011 12:00 AM  COMPLETE CBC W/AUTO DIFF WBC  Reviewed

 

 2011 12:00 AM  ASSAY OF LIPASE  Reviewed

 

 2011 12:00 AM  THER/PROPH/DIAG INJ SC/IM  Reviewed

 

 2011 12:00 AM  Phenergan 50 Mg Im  Bernadine  Reviewed

 

 2011 12:00 AM  METABOLIC PANEL TOTAL CA  Reviewed

 

 2011 12:00 AM  THER/PROPH/DIAG INJ SC/IM  Reviewed

 

 2011 12:00 AM  Phenergan 25 Mg Im  Bernadine  Reviewed

 

 2011 12:00 AM  METABOLIC PANEL TOTAL CA  Reviewed

 

 2011 12:00 AM  ASSAY THYROID STIM HORMONE  Reviewed

 

 2011 12:00 AM  THER/PROPH/DIAG INJ SC/IM  Reviewed

 

 2011 12:00 AM  Phenergan 50 Mg Im  Bernadine  Reviewed

 

 2011 12:00 AM  ASSAY OF SERUM SODIUM  Reviewed

 

 2011 12:00 AM  ASSAY OF SERUM SODIUM  Reviewed

 

 2011 12:00 AM  CYTOPATH C/V MANUAL  Reviewed

 

 2011 12:00 AM  ASSAY THYROID STIM HORMONE  Reviewed

 

 2015 12:00 AM  COMPLETE CBC W/AUTO DIFF WBC  Reviewed

 

 2015 12:00 AM  COMPREHEN METABOLIC PANEL  Reviewed

 

 2015 12:00 AM  LIPID PANEL  Reviewed

 

 2015 12:00 AM  ASSAY THYROID STIM HORMONE  Reviewed

 

 2015 12:00 AM  MAMMOGRAM SCREENING  Reviewed

 

 2015 12:00 AM  CYTOPATH TBS C/V MANUAL  Reviewed







Results Summary







 Date and Description  Results

 

 2010 9:25 AM  Colonoscopy-Women and Men over 50 Abnormal Mammogram -Women 
over 40 Declined Pap Smear Declined 

 

 2010 10:41 AM  WET PREP NO TRICHOMONADS SEEN  No  Few 

 

 2010 8:15 AM  TRIGLYCERIDES 174.0 mg/dLCHOLESTEROL 175.0 mg/dLHDL 58.0 mg/
dLTOT CHOL/HDL 3.0 LDL (CALC) 82.0 mg/dLTSH 0.790 uIU/mLGLUCOSE 95.0 mg/
dLSODIUM 136.0 mmol/LPOTASSIUM 4.50 mmol/LCHLORIDE 99.0 mmol/LCO2 26.0 mmol/
LBUN 12.0 mg/dLCREATININE 0.80 mg/dLSGOT/AST 32.0 IU/LSGPT/ALT 33.0 IU/LALK 
PHOS 103.0 IU/LTOTAL PROTEIN 7.60 g/dLALBUMIN 4.60 g/dLTOTAL BILI 0.60 mg/
dLCALCIUM 9.80 mg/dLAGE 64 GFR NonAA 72 GFR AA 87 eGFR >60 mL/min/1.73 m2eGFR AA
* >60 WBC 5.3 RBC 4.13 HGB 13.10 g/dLHCT 39.20 %MCV 95.0 fLMCH 31.70 pgMCHC 
33.40 g/dLRDW SD 44 RDW CV 12.70 %MPV 9.80 fLPLT 257 NRBC# 0.00 NRBC% 0.0 %NEUT 
57.90 %%LYMP 26.50 %%MONO 11.60 %%EOS 3.20 %%BASO 0.80 %#NEUT 3.04 #LYMP 1.39 #
MONO 0.61 #EOS 0.17 #BASO 0.04 MANUAL DIFF NOT IND 

 

 12/15/2010 8:30 AM  TRIGLYCERIDES 157.0 mg/dLCHOLESTEROL 163.0 mg/dLHDL 56.0 mg
/dLTOT CHOL/HDL 2.9 LDL (CALC) 76.0 mg/dLTSH 0.650 uIU/mLWBC 6.5 RBC 4.25 HGB 
13.60 g/dLHCT 40.70 %MCV 96.0 fLMCH 32.0 pgMCHC 33.40 g/dLRDW SD 44 RDW CV 
12.60 %MPV 9.90 fLPLT 313 NRBC# 0.00 NRBC% 0.0 %NEUT 61.80 %%LYMP 26.20 %%MONO 
8.30 %%EOS 3.10 %%BASO 0.60 %#NEUT 4.00 #LYMP 1.70 #MONO 0.54 #EOS 0.20 #BASO 
0.04 MANUAL DIFF NOT IND GLUCOSE 97.0 mg/dLSODIUM 136.0 mmol/LPOTASSIUM 4.40 
mmol/LCHLORIDE 101.0 mmol/LCO2 26.0 mmol/LBUN 10.0 mg/dLCREATININE 0.80 mg/
dLSGOT/AST 31.0 IU/LSGPT/ALT 34.0 IU/LALK PHOS 92.0 IU/LTOTAL PROTEIN 8.10 g/
dLALBUMIN 4.60 g/dLTOTAL BILI 0.40 mg/dLCALCIUM 10.0 mg/dLAGE 65 GFR NonAA 72 
GFR AA 87 eGFR >60 mL/min/1.73 m2eGFR AA* >60 

 

 2011 9:45 AM  GLUCOSE 91.0 mg/dLSODIUM 133.0 mmol/LPOTASSIUM 4.40 mmol/
LCHLORIDE 97.0 mmol/LCO2 24.0 mmol/LBUN 9.0 mg/dLCREATININE 0.70 mg/dLCALCIUM 
10.0 mg/dLAGE 65 GFR NonAA 84 GFR  eGFR >60 mL/min/1.73 m2eGFR AA* >60 

 

 2011 10:25 AM  LIPASE 29.0 U/LTSH 0.10 uIU/mLGLUCOSE 115.0 mg/dLSODIUM 
127.0 mmol/LPOTASSIUM 5.30 mmol/LCHLORIDE 93.0 mmol/LCO2 18.0 mmol/LBUN 9.0 mg/
dLCREATININE 0.70 mg/dLSGOT/AST 62.0 IU/LSGPT/ALT 46.0 IU/LALK PHOS 100.0 IU/
LTOTAL PROTEIN 8.60 g/dLALBUMIN 4.90 g/dLTOTAL BILI 0.60 mg/dLCALCIUM 9.90 mg/
dLAGE 65 GFR NonAA 84 GFR  eGFR >60 mL/min/1.73 m2eGFR AA* >60 WBC 6.9 
RBC 4.48 HGB 14.40 g/dLHCT 40.90 %MCV 91.0 fLMCH 32.10 pgMCHC 35.20 g/dLRDW SD 
41 RDW CV 12.50 %MPV 9.30 fLPLT 260 NRBC# 0.00 NRBC% 0.0 %NEUT 74.90 %%LYMP 
15.10 %%MONO 9.40 %%EOS 0.30 %%BASO 0.30 %#NEUT 5.15 #LYMP 1.04 #MONO 0.65 #EOS 
0.02 #BASO 0.02 MANUAL DIFF NOT IND 

 

 2011 9:07 AM  GLUCOSE 92.0 mg/dLSODIUM 131.0 mmol/LPOTASSIUM 4.20 mmol/
LCHLORIDE 99.0 mmol/LCO2 22.0 mmol/LBUN 9.0 mg/dLCREATININE 0.70 mg/dLCALCIUM 
9.20 mg/dLAGE 65 GFR NonAA 84 GFR  eGFR >60 mL/min/1.73 m2eGFR AA* >60 

 

 2011 11:06 AM  TSH 0.510 uIU/mLGLUCOSE 99.0 mg/dLSODIUM 132.0 mmol/
LPOTASSIUM 3.50 mmol/LCHLORIDE 95.0 mmol/LCO2 23.0 mmol/LBUN 9.0 mg/
dLCREATININE 0.80 mg/dLCALCIUM 10.40 mg/dLAGE 65 GFR NonAA 72 GFR AA 87 eGFR >
60 mL/min/1.73 m2eGFR AA* >60 

 

 2011 9:45 AM  SODIUM 132.0 mmol/L

 

 2011 9:27 AM  SODIUM 137.0 mmol/L

 

 2011 8:35 AM  TRIGLYCERIDES 114.0 mg/dLCHOLESTEROL 145.0 mg/dLHDL 56.0 mg/
dLTOT CHOL/HDL 2.6 LDL (CALC) 66.0 mg/dLGLUCOSE 105.0 mg/dLSODIUM 138.0 mmol/
LPOTASSIUM 4.40 mmol/LCHLORIDE 103.0 mmol/LCO2 25.0 mmol/LBUN 8.0 mg/
dLCREATININE 0.70 mg/dLSGOT/AST 36.0 IU/LSGPT/ALT 38.0 IU/LALK PHOS 103.0 IU/
LTOTAL PROTEIN 7.40 g/dLALBUMIN 4.30 g/dLTOTAL BILI 0.30 mg/dLCALCIUM 9.20 mg/
dLAGE 65 GFR NonAA 84 GFR  eGFR >60 mL/min/1.73 m2eGFR AA* >60 WBC 5.1 
RBC 3.76 HGB 12.0 g/dLHCT 36.10 %MCV 96.0 fLMCH 31.90 pgMCHC 33.20 g/dLRDW SD 
46 RDW CV 13.10 %MPV 9.40 fLPLT 249 NRBC# 0.00 NRBC% 0.0 %NEUT 60.40 %%LYMP 
25.90 %%MONO 8.90 %%EOS 4.0 %%BASO 0.80 %#NEUT 3.06 #LYMP 1.31 #MONO 0.45 #EOS 
0.20 #BASO 0.04 MANUAL DIFF NOT IND 

 

 2011 9:55 AM  TSH 1.070 uIU/mL

 

 2011 8:55 AM  GLUCOSE 102.0 mg/dLSODIUM 135.0 mmol/LPOTASSIUM 4.20 mmol/
LCHLORIDE 102.0 mmol/LCO2 24.0 mmol/LBUN 15.0 mg/dLCREATININE 0.80 mg/dLSGOT/
AST 30.0 IU/LSGPT/ALT 35.0 IU/LALK PHOS 119.0 IU/LTOTAL PROTEIN 7.50 g/
dLALBUMIN 4.30 g/dLTOTAL BILI 0.30 mg/dLCALCIUM 9.20 mg/dLAGE 65 GFR NonAA 72 
GFR AA 87 eGFR >60 mL/min/1.73 m2eGFR AA* >60 TSH 1.130 uIU/mL

 

 2011 9:50 AM  GLUCOSE 85.0 mg/dLSODIUM 133.0 mmol/LPOTASSIUM 4.20 mmol/
LCHLORIDE 101.0 mmol/LCO2 21.0 mmol/LBUN 13.0 mg/dLCREATININE 0.80 mg/dLSGOT/
AST 36.0 IU/LSGPT/ALT 36.0 IU/LALK PHOS 109.0 IU/LTOTAL PROTEIN 7.40 g/
dLALBUMIN 4.20 g/dLTOTAL BILI 0.50 mg/dLCALCIUM 9.40 mg/dLAGE 65 GFR NonAA 72 
GFR AA 87 eGFR >60 mL/min/1.73 m2eGFR AA* >60 

 

 2011 8:55 AM  GLUCOSE 98.0 mg/dLSODIUM 137.0 mmol/LPOTASSIUM 3.90 mmol/
LCHLORIDE 103.0 mmol/LCO2 25.0 mmol/LBUN 14.0 mg/dLCREATININE 0.70 mg/dLSGOT/
AST 33.0 IU/LSGPT/ALT 36.0 IU/LALK PHOS 111.0 IU/LTOTAL PROTEIN 8.30 g/
dLALBUMIN 4.40 g/dLTOTAL BILI 0.50 mg/dLCALCIUM 9.40 mg/dLAGE 66 GFR NonAA 84 
GFR  eGFR >60 mL/min/1.73 m2eGFR AA* >60 TRIGLYCERIDES 109.0 mg/
dLCHOLESTEROL 153.0 mg/dLHDL 54.0 mg/dLTOT CHOL/HDL 2.8 LDL (CALC) 77.0 mg/
dLTSH 1.330 uIU/mL

 

 2011 10:17 AM  Colonoscopy-Women and Men over 50 Declined Mammogram -
Women over 40 Normal Pap Smear Negative 

 

 2012 10:33 AM  WET PREP NO TRICH SEEN CLUE CELLS NONE SEEN 

 

 2012 8:45 AM  WBC 5.2 RBC 3.96 HGB 12.70 g/dLHCT 37.80 %MCV 96.0 fLMCH 
32.10 pgMCHC 33.60 g/dLRDW SD 46 RDW CV 13.30 %MPV 9.70 fLPLT 297 NRBC# 0.00 
NRBC% 0.0 %NEUT 57.70 %%LYMP 28.50 %%MONO 10.30 %%EOS 2.50 %%BASO 1.0 %#NEUT 
3.02 #LYMP 1.49 #MONO 0.54 #EOS 0.13 #BASO 0.05 MANUAL DIFF NOT IND GLUCOSE 
97.0 mg/dLSODIUM 137.0 mmol/LPOTASSIUM 4.40 mmol/LCHLORIDE 101.0 mmol/LCO2 23.0 
mmol/LBUN 17.0 mg/dLCREATININE 0.80 mg/dLSGOT/AST 30.0 IU/LSGPT/ALT 33.0 IU/
LALK PHOS 95.0 IU/LTOTAL PROTEIN 7.40 g/dLALBUMIN 4.40 g/dLTOTAL BILI 0.60 mg/
dLCALCIUM 9.70 mg/dLAGE 66 GFR NonAA 72 GFR AA 87 eGFR 60 eGFR AA* 60 
TRIGLYCERIDES 130.0 mg/dLCHOLESTEROL 157.0 mg/dLHDL 56.0 mg/dLTOT CHOL/HDL 2.8 
LDL (CALC) 75.0 mg/dLTSH 0.940 uIU/mL

 

 8/15/2012 4:02 PM  WET PREP NO TRICH SEEN CLUE CELLS NONE SEEN 

 

 2012 8:45 AM  WBC 5.1 RBC 3.91 HGB 12.50 g/dLHCT 36.30 %MCV 93.0 fLMCH 
32.0 pgMCHC 34.40 g/dLRDW SD 43 RDW CV 12.60 %MPV 9.30 fLPLT 244 NRBC# 0.00 NRBC
% 0.0 %NEUT 57.60 %%LYMP 27.50 %%MONO 9.10 %%EOS 5.0 %%BASO 0.80 %#NEUT 2.91 #
LYMP 1.39 #MONO 0.46 #EOS 0.25 #BASO 0.04 MANUAL DIFF NOT IND TSH 1.390 uIU/
mLTRIGLYCERIDES 154.0 mg/dLCHOLESTEROL 147.0 mg/dLHDL 45.0 mg/dLTOT CHOL/HDL 
3.3 LDL (CALC) 71.0 mg/dLGLUCOSE 101.0 mg/dLSODIUM 134.0 mmol/LPOTASSIUM 4.30 
mmol/LCHLORIDE 102.0 mmol/LCO2 23.0 mmol/LBUN 11.0 mg/dLCREATININE 0.80 mg/
dLSGOT/AST 34.0 IU/LSGPT/ALT 38.0 IU/LALK PHOS 104.0 IU/LTOTAL PROTEIN 7.60 g/
dLALBUMIN 4.40 g/dLTOTAL BILI 0.50 mg/dLCALCIUM 9.40 mg/dLAGE 67 GFR NonAA 72 
GFR AA 87 eGFR 60 eGFR AA* 60 

 

 12/10/2012 8:34 AM  Colonoscopy-Women and Men over 50 Declined Mammogram -
Women over 40 Declined Pap Smear Declined 

 

 2012 5:02 PM  WET PREP NO TRICH SEEN CLUE CELLS NONE SEEN 

 

 2013 8:25 AM  WBC 4.2 RBC 3.96 HGB 12.90 g/dLHCT 37.0 %MCV 93.0 fLMCH 
32.60 pgMCHC 34.90 g/dLRDW SD 43 RDW CV 12.60 %MPV 9.70 fLPLT 254 NRBC# 0.00 
NRBC% 0.0 %NEUT 54.40 %%LYMP 30.40 %%MONO 10.80 %%EOS 3.40 %%BASO 1.0 %#NEUT 
2.26 #LYMP 1.26 #MONO 0.45 #EOS 0.14 #BASO 0.04 MANUAL DIFF NOT IND TSH 1.230 
uIU/mLGLUCOSE 94.0 mg/dLSODIUM 136.0 mmol/LPOTASSIUM 4.30 mmol/LCHLORIDE 99.0 
mmol/LCO2 25.0 mmol/LBUN 10.0 mg/dLCREATININE 0.80 mg/dLSGOT/AST 36.0 IU/LSGPT/
ALT 36.0 IU/LALK PHOS 84.0 IU/LTOTAL PROTEIN 7.90 g/dLALBUMIN 4.40 g/dLTOTAL 
BILI 0.70 mg/dLCALCIUM 9.80 mg/dLAGE 67 GFR NonAA 72 GFR AA 87 eGFR 60 eGFR AA* 
60 TRIGLYCERIDES 122.0 mg/dLCHOLESTEROL 141.0 mg/dLHDL 47.0 mg/dLTOT CHOL/HDL 
3.0 LDL (CALC) 70.0 mg/dL

 

 6/10/2013 3:52 PM  WET PREP NO TRICH SEEN CLUE CELLS NONE SEEN 

 

 2013 8:45 AM  WBC 5.3 RBC 4.01 HGB 13.0 g/dLHCT 37.60 %MCV 94.0 fLMCH 
32.40 pgMCHC 34.60 g/dLRDW SD 43 RDW CV 12.50 %MPV 9.40 fLPLT 248 NRBC# 0.00 
NRBC% 0.0 %NEUT 58.70 %%LYMP 27.80 %%MONO 9.80 %%EOS 2.80 %%BASO 0.90 %#NEUT 
3.12 #LYMP 1.48 #MONO 0.52 #EOS 0.15 #BASO 0.05 MANUAL DIFF NOT IND TSH 1.570 
uIU/mLGLUCOSE 94.0 mg/dLSODIUM 134.0 mmol/LPOTASSIUM 4.10 mmol/LCHLORIDE 101.0 
mmol/LCO2 23.0 mmol/LBUN 11.0 mg/dLCREATININE 0.80 mg/dLSGOT/AST 41.0 IU/LSGPT/
ALT 44.0 IU/LALK PHOS 109.0 IU/LTOTAL PROTEIN 7.90 g/dLALBUMIN 4.70 g/dLTOTAL 
BILI 0.80 mg/dLCALCIUM 10.40 mg/dLAGE 68 GFR NonAA 72 GFR AA 87 eGFR >60 mL/min/
1.73 m2eGFR AA* >60 TRIGLYCERIDES 163.0 mg/dLCHOLESTEROL 138.0 mg/dLHDL 49.0 mg/
dLTOT CHOL/HDL 2.8 LDL (CALC) 56.0 mg/dL

 

 2014 8:58 AM  GLUCOSE 86.0 mg/dLSODIUM 134.0 mmol/LPOTASSIUM 4.20 mmol/
LCHLORIDE 99.0 mmol/LCO2 25.0 mmol/LBUN 14.0 mg/dLCREATININE 0.80 mg/dLCALCIUM 
10.10 mg/dLAGE 68 GFR NonAA 72 GFR AA 87 eGFR >60 mL/min/1.73 m2eGFR AA* >60 
FREE T4 1.18 TSH 1.20 uIU/mL

 

 2014 9:50 AM  WBC 5.7 RBC 4.03 HGB 12.90 g/dLHCT 37.90 %MCV 94.0 fLMCH 
32.0 pgMCHC 34.0 g/dLRDW SD 44 RDW CV 12.70 %MPV 9.70 fLPLT 292 NRBC# 0.00 NRBC
% 0.0 %NEUT 62.70 %%LYMP 21.80 %%MONO 11.0 %%EOS 3.40 %%BASO 1.10 %#NEUT 3.55 #
LYMP 1.23 #MONO 0.62 #EOS 0.19 #BASO 0.06 MANUAL DIFF NOT IND GLUCOSE 98.0 mg/
dLSODIUM 135.0 mmol/LPOTASSIUM 4.30 mmol/LCHLORIDE 102.0 mmol/LCO2 22.0 mmol/
LBUN 10.0 mg/dLCREATININE 0.80 mg/dLSGOT/AST 34.0 IU/LSGPT/ALT 32.0 IU/LALK 
PHOS 95.0 IU/LTOTAL PROTEIN 7.70 g/dLALBUMIN 4.30 g/dLTOTAL BILI 0.80 mg/
dLCALCIUM 9.10 mg/dLAGE 68 GFR NonAA 72 GFR AA 87 eGFR 60 eGFR AA* 60 
TRIGLYCERIDES 108.0 mg/dLCHOLESTEROL 146.0 mg/dLHDL 56.0 mg/dLTOT CHOL/HDL 2.6 
LDL (CALC) 68.0 mg/dLTSH 0.90 uIU/mL

 

 2014 8:40 AM  WBC 4.4 RBC 4.13 HGB 13.20 g/dLHCT 38.90 %MCV 94.0 fLMCH 
32.0 pgMCHC 33.90 g/dLRDW SD 47 RDW CV 13.50 %MPV 9.80 fLPLT 279 NRBC# 0.00 NRBC
% 0.0 %NEUT 63.80 %%LYMP 24.60 %%MONO 9.30 %%EOS 1.60 %%BASO 0.70 %#NEUT 2.80 #
LYMP 1.08 #MONO 0.41 #EOS 0.07 #BASO 0.03 MANUAL DIFF NOT IND GLUCOSE 96.0 mg/
dLSODIUM 136.0 mmol/LPOTASSIUM 4.10 mmol/LCHLORIDE 99.0 mmol/LCO2 23.0 mmol/
LBUN 8.0 mg/dLCREATININE 0.80 mg/dLSGOT/AST 31.0 IU/LSGPT/ALT 27.0 IU/LALK PHOS 
85.0 IU/LTOTAL PROTEIN 7.60 g/dLALBUMIN 4.40 g/dLTOTAL BILI 0.80 mg/dLCALCIUM 
10.20 mg/dLAGE 69 GFR NonAA 71 GFR AA 86 eGFR 60 eGFR AA* 60 TRIGLYCERIDES 61.0 
mg/dLCHOLESTEROL 148.0 mg/dLHDL 71.0 mg/dLTOT CHOL/HDL 2.1 LDL (CALC) 65.0 mg/
dLTSH 0.990 uIU/mL

 

 2015 8:32 AM  WBC 4.8 RBC 3.98 HGB 12.70 g/dLHCT 37.30 %MCV 94.0 fLMCH 
31.90 pgMCHC 34.0 g/dLRDW SD 43 RDW CV 12.70 %MPV 9.50 fLPLT 270 NRBC# 0.00 NRBC
% 0.0 %NEUT 57.30 %%LYMP 25.0 %%MONO 13.0 %%EOS 3.60 %%BASO 1.10 %#NEUT 2.73 #
LYMP 1.19 #MONO 0.62 #EOS 0.17 #BASO 0.05 MANUAL DIFF NOT IND TSH 0.640 uIU/
mLGLUCOSE 90.0 mg/dLSODIUM 136.0 mmol/LPOTASSIUM 4.0 mmol/LCHLORIDE 101.0 mmol/
LCO2 24.0 mmol/LBUN 10.0 mg/dLCREATININE 0.80 mg/dLSGOT/AST 34.0 IU/LSGPT/ALT 
32.0 IU/LALK PHOS 92.0 IU/LTOTAL PROTEIN 7.50 g/dLALBUMIN 4.40 g/dLTOTAL BILI 
0.70 mg/dLCALCIUM 9.50 mg/dLAGE 69 GFR NonAA 71 GFR AA 86 eGFR >60 mL/min/1.73 
m2eGFR AA* >60 TRIGLYCERIDES 107.0 mg/dLCHOLESTEROL 138.0 mg/dLHDL 58.0 mg/
dLTOT CHOL/HDL 2.4 LDL (CALC) 59.0 mg/dL

 

 2015 8:31 AM  WBC 4.6 RBC 3.97 HGB 12.90 g/dLHCT 38.0 %MCV 96.0 fLMCH 
32.50 pgMCHC 33.90 g/dLRDW SD 44 RDW CV 12.60 %MPV 9.0 fLPLT 248 NRBC# 0.00 NRBC
% 0.0 %NEUT 61.0 %%LYMP 24.70 %%MONO 10.20 %%EOS 3.0 %%BASO 1.10 %#NEUT 2.82 #
LYMP 1.14 #MONO 0.47 #EOS 0.14 #BASO 0.05 MANUAL DIFF NOT IND TRIGLYCERIDES 
105.0 mg/dLCHOLESTEROL 141.0 mg/dLHDL 58.0 mg/dLTOT CHOL/HDL 2.4 LDL (CALC) 
62.0 mg/dLGLUCOSE 93.0 mg/dLSODIUM 136.0 mmol/LPOTASSIUM 4.10 mmol/LCHLORIDE 
101.0 mmol/LCO2 25.0 mmol/LBUN 9.0 mg/dLCREATININE 0.80 mg/dLSGOT/AST 32.0 IU/
LSGPT/ALT 28.0 IU/LALK PHOS 95.0 IU/LTOTAL PROTEIN 7.30 g/dLALBUMIN 4.50 g/
dLTOTAL BILI 0.80 mg/dLCALCIUM 9.70 mg/dLAGE 70 GFR NonAA 71 GFR AA 86 eGFR >60 
mL/min/1.73meGFR AA* >60 TSH 1.10 uIU/mL

 

 2016 8:25 AM  TSH 0.70 uIU/mLTRIGLYCERIDES 85.0 mg/dLCHOLESTEROL 166.0 mg
/dLHDL 74.0 mg/dLTOT CHOL/HDL 2.2 LDL (CALC) 75.0 mg/dLGLUCOSE 96.0 mg/dLSODIUM 
136.0 mmol/LPOTASSIUM 4.0 mmol/LCHLORIDE 100.0 mmol/LCO2 24.0 mmol/LBUN 11.0 mg/
dLCREATININE 0.80 mg/dLSGOT/AST 34.0 IU/LSGPT/ALT 24.0 IU/LALK PHOS 98.0 IU/
LTOTAL PROTEIN 7.60 g/dLALBUMIN 4.40 g/dLTOTAL BILI 0.80 mg/dLCALCIUM 9.70 mg/
dLAGE 71 GFR NonAA 71 GFR AA 86 eGFR >60 mL/min/1.73meGFR AA* >60 WBC 5.9 RBC 
4.00 HGB 12.80 g/dLHCT 38.0 %MCV 95.0 fLMCH 32.0 pgMCHC 33.70 g/dLRDW SD 43 RDW 
CV 12.20 %MPV 9.30 fLPLT 272 NRBC# 0.00 NRBC% 0.0 %NEUT 73.90 %%LYMP 15.60 %%
MONO 7.80 %%EOS 1.70 %%BASO 0.70 %#NEUT 4.35 #LYMP 0.92 #MONO 0.46 #EOS 0.10 #
BASO 0.04 MANUAL DIFF NOT IND 

 

 2017 4:05 PM  IRON TOTAL 31.0 ug/dLTransferrin 256.0 mg/dLTIBC Calculation 
320 %Saturation Calc 10 WBC 6.2 RBC 3.56 HGB 11.60 g/dLHCT 33.40 %MCV 94.0 
fLMCH 32.60 pgMCHC 34.70 g/dLRDW SD 42 RDW CV 12.10 %MPV 9.50 fLPLT 260 NRBC# 
0.00 NRBC% 0.0 

 

 2017 8:49 AM  OCC BLD STOOL POSITIVE 

 

 7/3/2017 8:15 AM  WBC 6.7 RBC 3.96 HGB 12.70 g/dLHCT 37.30 %MCV 94.0 fLMCH 
32.10 pgMCHC 34.0 g/dLRDW SD 41 RDW CV 11.90 %MPV 9.80 fLPLT 275 NRBC# 0.00 NRBC
% 0.0 %NEUT 76.20 %%LYMP 13.60 %%MONO 8.50 %%EOS 1.0 %%BASO 0.40 %#NEUT 5.07 #
LYMP 0.91 #MONO 0.57 #EOS 0.07 #BASO 0.03 MANUAL DIFF NOT IND TRIGLYCERIDES 
97.0 mg/dLCHOLESTEROL 144.0 mg/dLHDL 60.0 mg/dLTOT CHOL/HDL 2.4 LDL (CALC) 65.0 
mg/dLGLUCOSE 91.0 mg/dLSODIUM 131.0 mmol/LPOTASSIUM 3.70 mmol/LCHLORIDE 96.0 
mmol/LCO2 25.0 mmol/LBUN 10.0 mg/dLCREATININE 0.80 mg/dLSGOT/AST 29.0 IU/LSGPT/
ALT 21.0 IU/LALK PHOS 85.0 IU/LTOTAL PROTEIN 7.60 g/dLALBUMIN 4.40 g/dLTOTAL 
BILI 0.80 mg/dLCALCIUM 9.60 mg/dLAGE 71 GFR NonAA 71 GFR AA 86 eGFR >60 mL/min/
1.73meGFR AA* >60 TSH 0.90 uIU/mL

 

 2017 8:12 AM  GLUCOSE 88.0 mg/dLSODIUM 135.0 mmol/LPOTASSIUM 3.80 mmol/
LCHLORIDE 100.0 mmol/LCO2 26.0 mmol/LBUN 9.0 mg/dLCREATININE 0.80 mg/dLCALCIUM 
9.50 mg/dLAGE 71 GFR NonAA 71 GFR AA 86 eGFR >60 mL/min/1.73meGFR AA* >60 







History Of Immunizations







 Name  Date Admin  Mfg Name  Mfg Code  Trade Name  Lot#  Route  Inj  Vis Given  
Vis Pub  CVX

 

 Influenza  10/20/2010  [employer] pasteur  [facility]  Fluzone  [id number]  Intramuscular  
Left Arm  10/20/2010  2010  999

 

 Influenza  10/28/2011  [employer] pasteur  [employer]  Fluzone  [id number]  Intramuscular  
Left Arm  10/28/2011  2011  141

 

 Influenza  10/29/2012  [employer] pasteur  [employer]  Fluzone  [id number]  Intramuscular  
Left Deltoid  10/29/2012  2012  141

 

 X  12/10/2012  [employer]  MSD  Pneumovax 23  [id number]  Intramuscular  
Left Gluteous Medius  12/10/2012  2010  33

 

 Influenza  10/28/2013  [employer] pasteur  [facility]  Fluzone > 3 Years  [id number]  
Intramuscular  Right Deltoid  10/28/2013  2013  141

 

 X  9/2/2015  Not Entered  NE  Prevnar 13     Not Entered  Not Entered  1  109

 

 Influenza  2015  Not Entered  NE  Not Entered     Not Entered  Not Entered
  2015  141

 

 HepB Adult  2016  [employer]  MSD  Recombivax Adult  [id number]  Not 
Entered  Left Deltoid  2016  43

 

 HepB Adult  2016  [employer]  MSD  Recombivax Adult  [id number]  
Intramuscular  Right Deltoid  2016  43

 

 Zostavax  2012  Not Entered  NE  Not Entered     Not Entered  Not 
Entered  1  121

 

 Tdap  2012  Not Entered  NE  Not Entered     Not Entered  Not Entered  1  115

 

 Influenza  10/13/2016  Not Entered  NE  Fluzone High-Dose     Intramuscular  
Left Arm  1  141

 

 HepB Adult  2016  [employer]  MSD  Recombivax Adult  [id number]  
Intramuscular  Right Deltoid  2016  43







History of Past Illness







 Name  Date of Onset  Comments

 

 Benign Essential Hypertension  Dec 14 2009 10:10AM   

 

 Hyperlipidemia  Dec 14 2009 10:10AM   

 

 Hypothyroidism, Acquired  Dec 14 2009 10:10AM   

 

 hypothyroid      

 

 Hypertension      

 

 Hyperlipidemia      

 

 acid reflux      

 

 Hypertension, Benign Essential  2010  9:41AM   

 

 Hypertension, Benign Essential  2010 12:53PM   

 

 Routine gynecological examination  May  5 2010  9:28AM   

 

 Hypertension  May  5 2010  9:28AM   

 

 Hyperlipidemia, unspecified  May  5 2010  9:28AM   

 

 Hypothyroidism, Acquired  May  5 2010  9:28AM   

 

 Vulvovaginitis  May  5 2010  9:28AM   

 

 Rhinitis, Allergic  May  5 2010  9:28AM   

 

 Hypertension, Benign Essential  May 19 2010  8:48AM   

 

 Hypertension, Benign Essential  May 25 2010  9:39AM   

 

 Flu  Oct 20 2010 12:01PM   

 

 Essential Hypertension  2010  8:34AM   

 

 Hyperlipidemia  2010  8:34AM   

 

 Gastroesophageal Reflux  2010  8:34AM   

 

 Hypothyroidism, Acquired  2010  8:34AM   

 

 Hypertension, Benign Essential  2010  9:22AM   

 

 Hypertension, Benign Essential  Dec 15 2010  9:07AM   

 

 Essential Hypertension  2011  1:31PM   

 

 Hyperlipidemia  2011  1:31PM   

 

 Gastroesophageal Reflux  2011  1:31PM   

 

 Hypothyroidism, Acquired  2011  1:31PM   

 

 Essential Hypertension  2011  8:51AM   

 

 Hyperlipidemia  2011  8:51AM   

 

 Gastroesophageal Reflux  2011  8:51AM   

 

 Hypothyroidism, Acquired  2011  8:51AM   

 

 Essential Hypertension  2011  9:13AM   

 

 Hyperlipidemia  2011  9:13AM   

 

 Gastroesophageal Reflux  2011  9:13AM   

 

 Hypothyroidism, Acquired  2011  9:13AM   

 

 Nausea With Vomiting  2011  1:18PM   

 

 Hyponatremia  2011  8:46AM   

 

 Nausea  2011  9:13AM   

 

 Hypothyroidism  2011 11:10AM   

 

 Hyponatremia  2011 11:10AM   

 

 Essential Hypertension  2011 10:05AM   

 

 Hyperlipidemia  2011 10:05AM   

 

 Gastroesophageal Reflux  2011 10:05AM   

 

 Nausea  2011 10:05AM   

 

 Hypothyroidism, Acquired  2011 10:05AM   

 

 Hyponatremia  May  6 2011 11:34AM   

 

 Essential Hypertension  May 16 2011  8:50AM   

 

 Hyperlipidemia  May 16 2011  8:50AM   

 

 Gastroesophageal Reflux  May 16 2011  8:50AM   

 

 Nausea  May 16 2011  8:50AM   

 

 Hypothyroidism, Acquired  May 16 2011  8:50AM   

 

 Hyponatremia  May 16 2011  8:50AM   

 

 Routine gynecological examination  2011  8:54AM   

 

 Hypertension  2011  8:54AM   

 

 Hyperlipidemia, unspecified  2011  8:54AM   

 

 Hypothyroidism, Acquired  2011  8:54AM   

 

 Rhinitis, Allergic  2011  8:54AM   

 

 Hypothyroidism  Aug 29 2011 12:37PM   

 

 Hyponatremia  Aug 29 2011 12:37PM   

 

 Essential Hypertension  Sep 12 2011  8:53AM   

 

 Hyperlipidemia  Sep 12 2011  8:53AM   

 

 Gastroesophageal Reflux  Sep 12 2011  8:53AM   

 

 Hypothyroidism, Acquired  Sep 12 2011  8:53AM   

 

 Hyponatremia  Sep 12 2011  8:53AM   

 

 Hypertension  Sep 29 2011  9:41AM   

 

 Hyperlipidemia  Dec  8 2011  8:31AM   

 

 Hypothyroidism  Dec  8 2011  8:31AM   

 

 Hypertension  Dec  8 2011  8:31AM   

 

 Flu  Dec  9 2011 10:02AM   

 

 Essential Hypertension  Dec 13 2011 10:13AM   

 

 Hyperlipidemia  Dec 13 2011 10:13AM   

 

 Gastroesophageal Reflux  Dec 13 2011 10:13AM   

 

 Hypothyroidism, Acquired  Dec 13 2011 10:13AM   

 

 Hyponatremia  Dec 13 2011 10:13AM   

 

 Vaginal Discharge  2012  9:43AM   

 

 Rhinitis, Allergic  Feb 15 2012  9:31AM   

 

 Eustachian Tube Dysfunction  Feb 15 2012  9:31AM   

 

 Pharyngitis, Acute  Feb 15 2012  9:31AM   

 

 Routine gynecological examination  2012  8:48AM   

 

 Hypertension  2012  8:48AM   

 

 Hyperlipidemia, unspecified  2012  8:48AM   

 

 Hypothyroidism, Acquired  2012  8:48AM   

 

 Rhinitis, Allergic  2012  8:48AM   

 

 Candidiasis, Vulvovaginal  2012 11:04AM   

 

 Essential Hypertension  Aug 15 2012  9:02AM   

 

 Hyperlipidemia  Aug 15 2012  9:02AM   

 

 Gastroesophageal Reflux  Aug 15 2012  9:02AM   

 

 Hypothyroidism, Acquired  Aug 15 2012  9:02AM   

 

 Hyponatremia  Aug 15 2012  9:02AM   

 

 Atrophic Vaginitis, Postmenopausal  Aug 15 2012  9:02AM   

 

 Flu  Oct 29 2012  9:24AM   

 

 Essential Hypertension  Dec 10 2012  8:35AM   

 

 Hyperlipidemia  Dec 10 2012  8:35AM   

 

 Gastroesophageal Reflux  Dec 10 2012  8:35AM   

 

 Hypothyroidism, Acquired  Dec 10 2012  8:35AM   

 

 Hyponatremia  Dec 10 2012  8:35AM   

 

 Atrophic Vaginitis, Postmenopausal  Dec 10 2012  8:35AM   

 

 Pneumococcus  Dec 10 2012  2:07PM   

 

 Vaginal Discharge  Dec 20 2012  1:50PM   

 

 Essential Hypertension  Dec 20 2012  1:50PM   

 

 Hyperlipidemia  Dec 20 2012  1:50PM   

 

 Gastroesophageal Reflux  Dec 20 2012  1:50PM   

 

 Hypothyroidism, Acquired  Dec 20 2012  1:50PM   

 

 Atrophic Vaginitis, Postmenopausal  Dec 20 2012  1:50PM   

 

 Upper Respiratory Infections  2013  8:52AM   

 

 Hyperlipidemia  2013  8:21AM   

 

 Hypertension  2013  8:21AM   

 

 Hypothyroidism  2013  8:21AM   

 

 Screening Mammogram  Beto 10 2013  8:45AM   

 

 Routine gynecological examination  Beto 10 2013  8:34AM   

 

 Hypertension  Beto 10 2013  8:34AM   

 

 Hyperlipidemia, unspecified  Beto 10 2013  8:34AM   

 

 Hypothyroidism, Acquired  Beto 10 2013  8:34AM   

 

 Rhinitis, Allergic  Beto 10 2013  8:34AM   

 

 Flu  Oct 28 2013  8:52AM   

 

 Essential Hypertension  Dec  9 2013  8:37AM   

 

 Hyperlipidemia  Dec  9 2013  8:37AM   

 

 Gastroesophageal Reflux  Dec  9 2013  8:37AM   

 

 Hypothyroidism, Acquired  Dec  9 2013  8:37AM   

 

 Atrophic Vaginitis, Postmenopausal  Dec  9 2013  8:37AM   

 

 Hypertension  2014  9:56AM   

 

 Hyperlipidemia  2014  9:56AM   

 

 Hypothyroidism  2014  9:56AM   

 

 Screening Mammogram  2014  8:32AM   

 

 Osteopenia  2014  8:32AM   

 

 Hypertension  2014  8:35AM   

 

 Hypothyroidism, Acquired  2014  8:35AM   

 

 Hyperlipidemia  2014  8:35AM   

 

 Upper Respiratory Infections  2014  9:28AM   

 

 Sinusitis, Acute  Oct  2 2014  9:17AM   

 

 Herpes Zoster  Oct  5 2014  1:44PM   

 

 Vesicular Eruption  Oct  5 2014  1:44PM   

 

 Hypertension  2014  8:18AM   

 

 Hyperlipidemia  2014  8:18AM   

 

 hypothyroid  2014  8:18AM   

 

 Situational anxiety  Dec 20 2014  9:33AM   

 

 GERD (gastroesophageal reflux disease)  Dec 20 2014  9:33AM   

 

 Nausea  Dec 20 2014  9:33AM   

 

 Abdominal Pain, Epigastric  Dec 20 2014  9:33AM   

 

 Hyperlipidemia  Oct  6 2014  9:03AM   

 

 acid reflux  Oct  6 2014  9:03AM   

 

 hypothyroid  Oct  6 2014  9:03AM   

 

 Shingles  Oct  6 2014  9:03AM   

 

 Pharyngitis  2015  1:09PM   

 

 Bronchitis  2015  9:10AM   

 

 Hyperlipidemia  2015  9:10AM   

 

 acid reflux  2015  9:10AM   

 

 hypothyroid  2015  9:10AM   

 

 Hyperlipidemia  2015  8:18AM   

 

 Hypertension  2015  8:18AM   

 

 hypothyroid  2015  8:18AM   

 

 Screening Mammogram  2015 11:43AM   

 

 Medicare Annual Pap Q 2 years  2015  9:36AM   

 

 Screening Mammogram  2015  9:36AM   

 

 Candidiasis of female genitalia  2015  9:36AM   

 

 Hypertension  2015 11:34AM   

 

 Hyperlipidemia, unspecified  2015 11:34AM   

 

 Hypothyroidism, Acquired  2015 11:34AM   

 

 Osteopenia  2016  8:41AM   

 

 Encounter for screening mammogram for breast cancer  2016  8:41AM   

 

 Hypertension  2016  8:34AM   

 

 Lymphedema  2016  8:34AM   

 

 Hyperlipidemia  2016  8:34AM   

 

 hypothyroid  2016  8:34AM   

 

 HEP B  2016  9:13AM   

 

 HEP B  2016  8:52AM   

 

 Acid Reflux  2016  8:34AM   

 

 History of acute gastritis  Oct 13 2016  2:30PM   

 

 Anxiety as acute reaction to exceptional stress  Oct 13 2016  2:30PM   

 

 HEP B  Dec 29 2016  8:42AM   

 

 Caregiver stress syndrome  May 26 2017  8:28AM   

 

 Anxiety  May 26 2017  8:28AM   

 

 Blood in stool  2017  2:54PM   

 

 Upper GI bleed  2017 11:34AM   

 

 Hyponatremia  2017  9:03AM   

 

 Hyponatremia  2017  8:43AM   

 

 Medicare Annual Pap Q 2 years  2017  9:03AM   

 

 Nausea  2017  9:03AM   

 

 Uterine enlargement  2017  9:03AM   

 

 Encounter for screening mammogram for breast cancer  2017  4:56PM   







Payers







 Insurance Name  Company Name  Plan Name  Plan Number  Policy Number  Policy 
Group Number  Start Date

 

    Medicare [facility]  Medicare [facility]     [id number]     N/A

 

    [facility]     [id number]     2009

 

    Medicare Part B  [employer]     [id number]     N/A

 

    Medicare Part A  Medicare Part A     [id number]     N/A

 

    Medicare Part A  [employer] - Preventive     [id number]     N/A

 

    Medicare Part A  Medicare - Lab/Xray     [id number]     N/A







History of Encounters







 Visit Date  Visit Type  Provider

 

 2017  Office visit  Doris Noriega MD

 

 2017  Office visit  Doris Noriega MD

 

 2017  Office visit  Doris Noriega MD

 

 2017  Office visit  Prince Noe APRN

 

 2016  Nurse visit  Doris Noriega MD

 

 10/13/2016  Office visit  Doris Noriega MD

 

 2016  Nurse visit  Doris Noriega MD

 

 2016  Office visit  Doris Noriega MD

 

 2015  Office visit  Doris Noriega MD

 

 2015  Office visit  Doris Noriega MD

 

 2015  Office visit  Prince Noe APRN

 

 2014  Office visit  Anyi Herrera APRN

 

 10/27/2014  Voided  Doris Noriega MD

 

 10/6/2014  Office visit  Doris Noriega MD

 

 10/5/2014  Office visit  Anyi Herrera APRN

 

 10/2/2014  Office visit   

 

 10/2/2014  Office visit  Corrine Bay APRN

 

 2014  Office visit  Kassie Franklin APRN

 

 2014  Office visit  Doris Noriega MD

 

 2014  Office visit  Doris Noriega MD

 

 2013  Office visit  Dee Colindres MD

 

 10/28/2013  Nurse visit  Dee Colindres MD

 

 6/10/2013  Office visit  Dee Colindres MD

 

 2013  Office visit  Corrine Bay APRN

 

 2012  Office visit  Dee Colindres MD

 

 12/10/2012  Office visit  Dee Colindres MD

 

 10/29/2012  Nurse visit  Dee Colindres MD

 

 8/15/2012  Office visit  Dee Colindres MD

 

 2012  Office visit  Corrine Bay APRN

 

 2012  Office visit  Dee Colindres MD

 

 2/15/2012  Office visit  Dee Colindres MD

 

 2012  Office visit  Corrine Bay APRN

 

 2011  Office visit  Dee Colindres MD

 

 10/28/2011  Nurse visit  Shad Nolasco MD

 

 2011  Office visit  Dee Colindres MD

 

 2011  Office visit  Dee Colindres MD

 

 2011  Office visit  Dee Colindres MD

 

 2011  Office visit  Dee Colindres MD

 

 2011  Office visit  Dee Colindres MD

 

 2011  Office visit  Dee Colindres MD

 

 4/10/2011  [facility]  KHRIS Reeves MD

 

 4/10/2011  [facility]  RICKEY Toussaint MD

 

 2011  Office visit  RICKEY Toussaint MD

 

 2011  [facility]  Fide Castellanos MD

 

 2011  Voided  Fide Castellanos MD

 

 2011  Office visit  Dee Colindres MD

 

 12/15/2010  Nurse visit  Dee Colindres MD

 

 2010  Office visit  Dee Colindres MD

 

 10/20/2010  Nurse visit  Stephenie PERAZA

 

 2010  Nurse visit  Dee Colindres MD

 

 2010  Nurse visit  Dee Colindres MD

 

 2010  Office visit  Dee Colindres MD

 

 3/19/2010  Voided  Stephenie PERAZA

 

 2010  Nurse visit  Stephenie PERAZA

 

 2010  Nurse visit  Stephenie PERAZA

 

 2010  Nurse visit  Stephenie PERAZA

 

 2009  Office visit  Stephenie PERAZA

 

 10/20/2009  Nurse visit  Stephenie Kay PA

## 2018-10-22 NOTE — XMS REPORT
MU2 Ambulatory Summary

 Created on: 2018



Milady Crespo

External Reference #: [id number]

: 1945

Sex: Female



Demographics







 Address  [address]

[place]  [address]

 

 Home Phone  [phone]

 

 Preferred Language  English

 

 Marital Status  

 

 Pentecostal Affiliation  Unknown

 

 Race  White

 

 Ethnic Group  Not  or 





Author







 Author  Doris Noriega

 

 Organization  [facility]

 

 Address  [address]

[place], [place]  [id number]



 

 Phone  [phone]







Care Team Providers







 Care Team Member Name  Role  Phone

 

 Doris Noriega  PCP  [phone]

 

 Doris Noriega  PreferredProvider  [phone]







Allergies and Adverse Reactions







 Name  Reaction  Notes

 

 NO KNOWN DRUG ALLERGIES      







Plan of Treatment







 Planned Activity  Comments  Planned Date  Planned Time  Plan/Goal

 

 Complete blood count (CBC) with differential count     2016  12:00 AM   

 

 Comprehensive metabolic panel     2016  12:00 AM   

 

 VITAMIN D (25 HYDROXY)     2016  12:00 AM   

 

 Lipid panel (total cholesterol, lipoproteins, HDL, triglycerides)     8/15/
2012  12:00 AM   

 

 Pap smear     2017  12:00 AM   

 

 Comprehensive Metabolic Panel     6/10/2013  12:00 AM   

 

 Lipid panel (total cholesterol, lipoproteins, HDL, triglycerides)     6/10/
2013  12:00 AM   

 

 Thyroid stimulating hormone (TSH)     6/10/2013  12:00 AM   

 

 CBC (automated H&H, platelets, WBC and automated differential)     6/10/2013  
12:00 AM   

 

 Comprehensive Metabolic Panel     2012  12:00 AM   

 

 Lipid panel (total cholesterol, lipoproteins, HDL, triglycerides)     2012  12:00 AM   

 

 Thyroid stimulating hormone (TSH)     2012  12:00 AM   

 

 CBC (automated H&H, platelets, WBC and automated differential)     2012  
12:00 AM   

 

 Screening mammography, bilateral     2015  12:00 AM   







Medications







 Active 

 

 Name  Start Date  Estimated Completion Date  SIG  Comments

 

 aspirin 81 mg oral tablet,delayed release (DR/EC)        take 1 tablet (81 mg) 
by oral route once daily   

 

 Vitamin D3 1,000 unit oral capsule        take 1 capsule by oral route daily   

 

 famotidine 20 mg oral tablet  2015     TAKE ONE TABLET BY MOUTH TWICE 
DAILY   

 

 valsartan 160 mg oral tablet  2016     TAKE ONE TABLET BY MOUTH ONCE 
DAILY   

 

 amlodipine 5 mg oral tablet  2016     TAKE ONE TABLET BY MOUTH ONCE DAILY
   

 

 amlodipine 5 mg oral tablet  2016     TAKE ONE TABLET BY MOUTH ONCE DAILY
   

 

 valsartan 160 mg oral tablet  2016     TAKE ONE TABLET BY MOUTH ONCE 
DAILY   

 

 Lipitor 20 mg oral tablet  10/13/2016  2018  take 1 tablet (20 mg) by oral 
route once daily   

 

 Plavix 75 mg oral tablet  10/13/2016  2018  take 1 tablet (75 mg) by oral 
route once daily   

 

 Zofran (as hydrochloride) 4 mg oral tablet  2016     take 1 tablet by 
oral route daily as needed for 14 days   

 

 Zofran (as hydrochloride) 4 mg oral tablet  2017     take 1 tablet by oral 
route daily as needed for 14 days   

 

 Zofran (as hydrochloride) 4 mg oral tablet  2017     take 1 tablet by oral 
route daily as needed for 14 days   

 

 Zofran (as hydrochloride) 4 mg oral tablet  2017     take 1 tablet by 
oral route daily as needed for 14 days   

 

 Zofran (as hydrochloride) 4 mg oral tablet  2017     take 1 tablet by 
oral route daily as needed for 14 days   

 

 EQ LORATADINE 10MG  TABS  2017     TAKE ONE TABLET BY MOUTH ONCE DAILY   

 

 Lexapro 10 mg oral tablet  2017     take 1 tablet (10 mg) by oral route 
once daily for 90 days   

 

 pantoprazole 40 mg oral tablet,delayed release (DR/EC)  10/9/2017     take 1 
tablet (40 mg) by oral route once daily for 90 days   









  

 

 Name  Start Date  Expiration Date  SIG  Comments

 

 prednisone 20 mg oral tablet  2011  take 2 tablets by oral 
route daily for 5 days   

 

 calcium carbonate-vitamin D2 600-125 mg-unit oral tablet  8/15/2012  2012
  take 2 tablets by oral route daily   

 

 [place] 3 Fish Oil 900-1,400 mg oral capsule,delayed release(DR/EC)  8/15/2012  9
/  take 1 capsule by oral route daily   

 

 multivitamin Oral tablet  8/15/2012  2012  take 1 tablet by oral route 
daily   

 

 triamcinolone acetonide 0.1 % topical cream  2013  10/7/2013  APPLY A 
THIN LAYER TO THE AFFECTED AREA(S) BY TOPICAL ROUTE TWICE A DAY   

 

 famotidine 20 mg oral tablet  2014  take 1 tablet (20 mg) by 
oral route 2 times per day   

 

 amoxicillin 500 mg oral capsule  2014  take 1 capsule (500 mg) 
by oral route 3 times per day for 10 days   

 

 Zithromax Z-Tab 250 mg oral tablet  10/2/2014  10/7/2014  take 2 tablets (500 
mg) by oral route once daily for 1 day then 1 tablet (250 mg) by oral route 
once daily for 4 days   

 

 acyclovir 800 mg oral tablet  10/5/2014  10/12/2014  take 1 tablet (800 mg) by 
oral route 5 times per day for 7 days   

 

 gabapentin 300 mg oral capsule  10/9/2014  2014  take 1 capsule (300 mg) 
by oral route 3 times per day for 14 days   

 

 Carafate 100 mg/mL oral suspension  2014  take 10 milliliters 
(1 gram) by oral route 4 times per day on an empty stomach 1 hour before meals 
and at bedtime for 4 weeks   

 

 amlodipine 5 mg oral tablet  2015  TAKE ONE TABLET BY MOUTH 
EVERY DAY   

 

 levothyroxine 50 mcg oral tablet  2015  TAKE ONE TABLET BY MOUTH 
EVERY DAY   

 

 Diovan 160 mg oral tablet  2015  2/15/2016  TAKE ONE TABLET BY MOUTH 
EVERY DAY for 90 days   

 

 triamcinolone acetonide 0.1 % topical cream  2015  apply a thin 
layer to the affected area(s) by topical route 2 times per day for 14 days   

 

 fluconazole 150 mg oral tablet  2015  take 1 tablet (150 mg) 
by oral route once for 1 day   

 

 Zofran (as hydrochloride) 4 mg oral tablet  10/13/2016  10/27/2016  take 1 
tablet by oral route daily as needed for 14 days   

 

 Zofran (as hydrochloride) 4 mg oral tablet  2017     take 1 tablet by 
oral route daily as needed for 14 days   

 

 levothyroxine 50 mcg oral tablet  10/9/2017  10/9/2017  TAKE ONE TABLET BY 
MOUTH ONCE DAILY   

 

 ondansetron 4 mg oral tablet,disintegrating  10/10/2017  2017  dissolve  
1 tablet by oral route every 8 hours as needed for 30 days   

 

 valsartan 160 mg oral tablet  2017  TAKE ONE TABLET BY MOUTH 
ONCE DAILY   

 

 amlodipine 5 mg oral tablet  2017  TAKE ONE TABLET BY MOUTH 
ONCE DAILY   

 

 atenolol 50 mg oral tablet  2018  TAKE ONE TABLET BY MOUTH 
ONCE DAILY   

 

 EQ LORATADINE 10MG  TABS  2018  TAKE ONE TABLET BY MOUTH ONCE 
DAILY IN THE EVENING   

 

 mirtazapine 15 mg oral tablet  2018  TAKE ONE-HALF TABLET BY 
MOUTH ONCE DAILY AT BEDTIME   

 

 clorazepate dipotassium 7.5 mg oral tablet  2018  3/21/2018  take 1/2 to1 
tablet PO up to three times per day for situational anxiety   









 Discontinued 

 

 Name  Start Date  Discontinued Date  SIG  Comments

 

 Lipitor 40 mg oral tablet     2009 TAB DAILY   

 

 ramipril 5 mg oral capsule     2009  take 1 capsule (5 mg) by oral route 
once daily   

 

 lovastatin 20 mg oral tablet  2009  take 1 tablet (20 mg) by 
oral route once daily with evening meal   

 

 propranolol 20 mg oral tablet  2010  take 1 tablet by oral 
route 2 times a day   

 

 Fosamax 70 mg oral tablet  2010  take 1 tablet (70 mg) by oral 
route once weekly in the morning, at least 30 minutes before the first food, 
beverage, or medication of the day   

 

 lisinopril 40 mg oral tablet  2010  4/10/2011  take 2 tablets by oral 
route daily   

 

 Zoloft 50 mg oral tablet  2011  take 1 tablet (50 mg) by oral 
route once daily   

 

 promethazine 25 mg oral tablet  2011  take 1 tablet by oral 
route every 6 hours as needed   

 

 Diovan -12.5 mg oral tablet  2011  take 1 tablet by oral 
route once daily for 30 days   

 

 Diflucan 150 mg oral tablet     2012  take 1 tablet (150 mg) by oral 
route once for 1 day   

 

 Diflucan 150 mg oral tablet  2012  8/15/2012  take 1 tablet (150 mg) by 
oral route once   

 

 Vitamin B-12 1,000 mcg oral tablet  8/15/2012  2014  take 1 tablet by 
oral route daily   

 

 Premarin 0.625 mg/gram vaginal cream  10/29/2012  6/10/2013  use 1 gram daily 
for a week then 1 gram twice a week   

 

 Medrol (Tab) 4 mg oral tablets,dose pack  2013  6/10/2013  take as 
directed   

 

 aspirin 325 mg oral tablet  2014  take 1 tablet (325 mg) by 
oral route once daily   

 

 Medrol (Tab) 4 mg oral tablets,dose pack  2014  10/2/2014  take as 
directed   

 

 Tetracaine Lollipops Lollipop  2015  Use as directed   

 

 Allergy Relief (loratadine) 10 mg oral tablet  2016     TAKE ONE TABLET 
BY MOUTH ONCE DAILY   

 

 Allergy Relief (loratadine) 10 mg oral tablet  2017  TAKE ONE 
TABLET BY MOUTH ONCE DAILY   

 

 Zoloft 50 mg oral tablet  2016  take 1 tablet (50 mg) by oral 
route once daily for 90 days   

 

 Zoloft 50 mg oral tablet  2016  take 1 tablet (50 mg) by oral 
route once daily for 90 days  Pt refuses to take...

 

 triamcinolone acetonide 0.1 % topical cream  2017     APPLY A THIN LAYER 
OF CREAM EXTERNALLY TO AFFECTED AREA TWICE DAILY FOR 14 DAYS   

 

 loratadine 10 mg oral tablet  2017  TAKE 1 TABLET BY MOUTH 
EVERY DAY IN THE EVENING   

 

 triamcinolone acetonide 0.1 % topical cream  2017  APPLY  
CREAM EXTERNALLY TO AFFECTED AREA TWICE DAILY FOR 14 DAYS   

 

 Augmentin 875-125 mg oral tablet  2018  3/6/2018  take 1 tablet by oral 
route every 12 hours for 7 days   

 

 benzonatate 100 mg oral capsule  2018  3/6/2018  take 1 capsule (100 mg) 
by oral route 3 times per day as needed for cough   







Problem List







 Description  Status  Onset

 

 Hyperlipidemia  Active   

 

 acid reflux  Active   

 

 Hypertension  Active   

 

 hypothyroid  Active   







Vital Signs







 Date  Time  BP-Sys(mm[Hg]  BP-Morena(mm[Hg])  HR(bpm)  RR(rpm)  Temp  WT  HT  HC  
BMI  BSA  BMI Percentile  O2 Sat(%)

 

 3/6/2018  8:26:00 AM  126 mmHg  76 mmHg  63 bpm  16 rpm  98 F  134.125 lbs  61 
in     25.34 kg/m2  1.62 m2     100 %

 

 2018  11:55:00 AM  126 mmHg  80 mmHg  80 bpm  18 rpm  98 F  132 lbs  61 
in     24.9409 kg/m  1.6053 m     100 %

 

 2017  8:26:00 AM  122 mmHg  76 mmHg  63 bpm  16 rpm  98.4 F  129.25 lbs  
61 in     24.42 kg/m2  1.59 m2     99 %

 

 10/18/2017  9:01:00 AM  126 mmHg  80 mmHg  73 bpm  16 rpm  97.9 F  122.375 lbs
  61 in     23.1223 kg/m  1.5456 m     100 %

 

 10/10/2017  1:52:00 PM  118 mmHg  72 mmHg  74 bpm  16 rpm  98.1 F  121 lbs  61 
in     22.86 kg/m2  1.54 m2     99 %

 

 2017  8:40:00 AM  118 mmHg  68 mmHg  63 bpm  16 rpm  98.1 F  123.125 lbs  
61 in     23.264 kg/m  1.5504 m     100 %

 

 2017  8:57:00 AM  116 mmHg  62 mmHg  71 bpm  16 rpm  99.8 F  121.5 lbs  
61 in     22.96 kg/m2  1.54 m2     98 %

 

 2017  11:30:00 AM  128 mmHg  78 mmHg  69 bpm  16 rpm  98.8 F  129.375 lbs  
61 in     24.4449 kg/m  1.5892 m     98 %

 

 2017  8:22:00 AM  118 mmHg  78 mmHg  62 bpm  18 rpm  97.5 F  129.125 lbs  
61 in     24.40 kg/m2  1.59 m2     100 %

 

 10/13/2016  2:26:00 PM  128 mmHg  78 mmHg  77 bpm  16 rpm  98.4 F  139.25 lbs  
61 in     26.3108 kg/m  1.6488 m     100 %

 

 2016  8:29:00 AM  122 mmHg  70 mmHg  72 bpm  16 rpm  97.8 F  137.125 lbs  
61 in     25.91 kg/m2  1.64 m2     100 %

 

 2015  9:33:00 AM  120 mmHg  68 mmHg  73 bpm     98.7 F  144.375 lbs  61 
in     27.2791 kg/m  1.6788 m     99 %

 

 2015  9:05:00 AM  110 mmHg  75 mmHg  85 bpm  16 rpm  96.7 F  144.375 lbs  
61 in     27.28 kg/m2  1.68 m2     99 %

 

 2015  1:05:00 PM  108 mmHg  62 mmHg  78 bpm  18 rpm  96.9 F  142.375 lbs  
61 in     26.9012 kg/m  1.6672 m     100 %

 

 2014  9:31:00 AM  126 mmHg  66 mmHg  79 bpm  18 rpm  98.7 F  149 lbs  61 
in     28.15 kg/m2  1.71 m2     98 %

 

 10/6/2014  9:02:00 AM  110 mmHg  74 mmHg  94 bpm  18 rpm  98.1 F  145.4 lbs  
61 in     27.4728 kg/m  1.6848 m     97 %

 

 10/2/2014  9:14:00 AM  124 mmHg  74 mmHg  79 bpm  18 rpm  98 F  147.25 lbs  61 
in     27.82 kg/m2  1.70 m2     98 %

 

 2014  9:22:00 AM  124 mmHg  76 mmHg  89 bpm  18 rpm  98.1 F  148 lbs  61 
in     27.9641 kg/m  1.6998 m     100 %

 

 2014  8:27:00 AM  118 mmHg  65 mmHg  74 bpm  16 rpm  97.7 F  148 lbs  61 
in     27.96 kg/m2  1.70 m2     99 %

 

 2014  9:48:00 AM  125 mmHg  70 mmHg  93 bpm  18 rpm  96.7 F  156 lbs  61 
in     29.4756 kg/m  1.7451 m     100 %

 

 2013  8:35:00 AM  122 mmHg  74 mmHg  84 bpm  16 rpm  97.9 F  155.375 lbs 
                99 %

 

 6/10/2013  8:30:00 AM  130 mmHg  82 mmHg  79 bpm  16 rpm  97.9 F  161 lbs     
            98 %

 

 2013  8:50:00 AM  124 mmHg  68 mmHg  66 bpm  18 rpm  97.6 F  157.5 lbs  
61 in     29.76 kg/m2  1.75 m2      

 

 2012  1:46:00 PM  120 mmHg  72 mmHg  75 bpm  16 rpm  97.6 F  156.125 lbs
                 98 %

 

 12/10/2012  8:32:00 AM  122 mmHg  82 mmHg  70 bpm  16 rpm  97.3 F  157 lbs    
             99 %

 

 8/15/2012  9:00:00 AM  130 mmHg  76 mmHg  86 bpm  16 rpm  97.4 F  159 lbs     
            100 %

 

 2012  11:02:00 AM  128 mmHg  64 mmHg  66 bpm  18 rpm  97.4 F  162.125 lbs
  61 in     30.63 kg/m2  1.78 m2      

 

 2012  8:45:00 AM  130 mmHg  70 mmHg  82 bpm  16 rpm  98.2 F  160.125 lbs 
                100 %

 

 2/15/2012  9:29:00 AM  122 mmHg  80 mmHg  86 bpm  16 rpm  97.4 F  159.375 lbs  
61 in     30.11 kg/m2  1.76 m2     99 %

 

 2012  9:41:00 AM  132 mmHg  74 mmHg  66 bpm  18 rpm  98.4 F  160.375 lbs  
61 in     30.3023 kg/m  1.7694 m      

 

 2011  10:08:00 AM  134 mmHg  82 mmHg  80 bpm  16 rpm  98 F  160 lbs  61 
in     30.23 kg/m2  1.77 m2     99 %

 

 2011  3:56:00 PM  130 mmHg  80 mmHg                              

 

 2011  8:55:00 AM  130 mmHg  74 mmHg  88 bpm  16 rpm  98.2 F  158.25 lbs  
               98 %

 

 2011  8:54:00 AM  136 mmHg  82 mmHg  102 bpm  16 rpm  98.1 F  153.5 lbs   
              99 %

 

 2011  8:49:00 AM  122 mmHg  88 mmHg  88 bpm  16 rpm  98.6 F  152.25 lbs  
               99 %

 

 2011  10:02:00 AM  140 mmHg  82 mmHg  96 bpm  20 rpm  98.8 F             
       100 %

 

 2011  9:14:00 AM  156 mmHg  98 mmHg  110 bpm  24 rpm  98.5 F  153 lbs    
             100 %

 

 2011  8:50:00 AM  160 mmHg  84 mmHg  100 bpm  16 rpm  98.7 F  155 lbs    
             100 %

 

 2011  1:25:00 PM  152 mmHg  100 mmHg  82 bpm  16 rpm  97.2 F  156.375 lbs 
                100 %

 

 2011  9:55:00 AM  144 mmHg  90 mmHg                              

 

 2010  9:24:00 AM  138 mmHg  84 mmHg                              

 

 2010  8:58:00 AM  160 mmHg  94 mmHg                              

 

 2010  8:33:00 AM  142 mmHg  90 mmHg  100 bpm  16 rpm  98.1 F  158.375 
lbs                  

 

 2010  9:24:00 AM  160 mmHg  102 mmHg  88 bpm  18 rpm  99 F  157.125 lbs  
62 in     28.7382 kg/m  1.7657 m      

 

 3/19/2010  11:01:00 AM  140 mmHg  90 mmHg                              

 

 2009  10:08:00 AM  142 mmHg  86 mmHg  72 bpm  16 rpm  98.1 F  154.125 
lbs  61 in     29.12 kg/m2  1.73 m2      







Social History







 Name  Description  Comments

 

       

 

 Children      

 

 lives alone      

 

 Supervisor      

 

 Tobacco  Never smoker   







History of Procedures







 Date Ordered  Description  Order Status

 

 2011 12:00 AM  COMPREHEN METABOLIC PANEL  Reviewed

 

 2011 12:00 AM  ASSAY THYROID STIM HORMONE  Reviewed

 

 2011 12:00 AM  COMPREHEN METABOLIC PANEL  Reviewed

 

 2011 12:00 AM  LIPID PANEL  Reviewed

 

 2011 12:00 AM  ASSAY THYROID STIM HORMONE  Reviewed

 

 2011 12:00 AM  COMPLETE CBC W/AUTO DIFF WBC  Reviewed

 

 2015 12:00 AM  COMPLETE CBC W/AUTO DIFF WBC  Reviewed

 

 2015 12:00 AM  COMPREHEN METABOLIC PANEL  Reviewed

 

 2015 12:00 AM  LIPID PANEL  Reviewed

 

 2015 12:00 AM  ASSAY THYROID STIM HORMONE  Reviewed

 

 2011 12:00 AM  COMPREHEN METABOLIC PANEL  Reviewed

 

 2011 12:00 AM  COMPREHEN METABOLIC PANEL  Reviewed

 

 2011 12:00 AM  LIPID PANEL  Reviewed

 

 2011 12:00 AM  ASSAY THYROID STIM HORMONE  Reviewed

 

 10/28/2011 12:00 AM  ***Immunization administration, Medicare flu  Reviewed

 

 10/28/2011 12:00 AM  Fluzone ** MEDICARE Only **  Reviewed

 

 2010 11:24 AM  NO CHARGE OV  Reviewed

 

 2010 11:26 AM  NO CHARGE OV  Reviewed

 

 2011 12:00 AM  COMPREHEN METABOLIC PANEL  Reviewed

 

 2011 12:00 AM  LIPID PANEL  Reviewed

 

 2011 12:00 AM  ASSAY THYROID STIM HORMONE  Reviewed

 

 2011 12:00 AM  COMPLETE CBC W/AUTO DIFF WBC  Reviewed

 

 2011 12:00 AM  Wrist Support  Reviewed

 

 2016 12:00 AM  Screening mammography, bilateral  Reviewed

 

 2016 12:00 AM  DXA BONE DENSITY AXIAL  Returned

 

 2016 12:00 AM  TETANUS VACCINE IM  Reviewed

 

 2016 12:00 AM  HEP B VACC ADULT 3 DOSE IM  Reviewed

 

 2012 12:00 AM  TISSUE EXAM FOR FUNGI  Reviewed

 

 2012 12:00 AM  SMEAR WET MOUNT SALINE/INK  Reviewed

 

 2016 12:00 AM  TETANUS VACCINE IM  Reviewed

 

 2016 12:00 AM  HEP B VACC ADULT 3 DOSE IM  Reviewed

 

 2/15/2012 12:00 AM  THER/PROPH/DIAG INJ SC/IM  Reviewed

 

 2/15/2012 12:00 AM  Bicillin CR [facility]90784898210[facility]  Reviewed

 

 2/15/2012 12:00 AM  Depo-Medrol 40 mg NDC#4576554719  Reviewed

 

 10/13/2016 12:00 AM  COMPLETE CBC W/AUTO DIFF WBC  Returned

 

 10/13/2016 12:00 AM  COMPREHEN METABOLIC PANEL  Returned

 

 10/13/2016 12:00 AM  ASSAY THYROID STIM HORMONE  Returned

 

 10/13/2016 12:00 AM  LIPID PANEL  Returned

 

 2016 12:00 AM  TETANUS VACCINE IM  Reviewed

 

 2016 12:00 AM  HEP B VACC ADULT 3 DOSE IM  Reviewed

 

 2017 12:00 AM  ASSAY OF IRON  Returned

 

 2017 12:00 AM  IRON BINDING TEST  Returned

 

 2017 12:00 AM  ASSAY OF TRANSFERRIN  Returned

 

 2017 12:00 AM  OCCULT BLD FECES 1-3 TESTS  Returned

 

 2017 12:00 AM  COMPLETE CBC AUTOMATED  Returned

 

 8/15/2012 12:00 AM  COMPREHEN METABOLIC PANEL  Reviewed

 

 8/15/2012 12:00 AM  ASSAY THYROID STIM HORMONE  Reviewed

 

 8/15/2012 12:00 AM  COMPLETE CBC W/AUTO DIFF WBC  Reviewed

 

 8/15/2012 12:00 AM  Wrist Support  Reviewed

 

 2017 12:00 AM  METABOLIC PANEL TOTAL CA  Returned

 

 2017 12:00 AM  METABOLIC PANEL TOTAL CA  Returned

 

 2017 12:00 AM  Screening mammography, bilateral  Returned

 

 10/29/2012 12:00 AM  Flu Injection 3 Years And Above NDC# [address]-18  [facility]  
Reviewed

 

 12/10/2012 12:00 AM  IMMUNIZATION ADMIN  Reviewed

 

 12/10/2012 12:00 AM  Pneumovax Injection - [facility]  Reviewed

 

 2018 12:00 AM  THER/PROPH/DIAG INJ SC/IM  Reviewed

 

 2018 12:00 AM  Decadron 4mg Injection  Reviewed

 

 2018 12:00 AM  Depo-Medrol 40mg Injection  Reviewed

 

 2012 12:00 AM  TRICHOMONAS ASSAY W/OPTIC  Reviewed

 

 2018 12:00 AM  COMPLETE CBC W/AUTO DIFF WBC  Returned

 

 2018 12:00 AM  COMPREHEN METABOLIC PANEL  Returned

 

 2018 12:00 AM  LIPID PANEL  Returned

 

 2018 12:00 AM  ASSAY THYROID STIM HORMONE  Returned

 

 2013 12:00 AM  THER/PROPH/DIAG INJ SC/IM  Reviewed

 

 2013 12:00 AM  Bicillin CR, 1.2 million units NDC# 15321-695-59  Reviewed

 

 2013 12:00 AM  COMPREHEN METABOLIC PANEL  Reviewed

 

 2013 12:00 AM  LIPID PANEL  Reviewed

 

 2013 12:00 AM  COMPLETE CBC W/AUTO DIFF WBC  Reviewed

 

 2013 12:00 AM  ASSAY THYROID STIM HORMONE  Reviewed

 

 6/10/2013 12:00 AM  MAMMOGRAM SCREENING  Reviewed

 

 6/10/2013 12:00 AM  CYTOPATH C/V MANUAL  Reviewed

 

 12/10/2013 12:00 AM  LIPID PANEL  Reviewed

 

 12/10/2013 12:00 AM  ASSAY THYROID STIM HORMONE  Reviewed

 

 12/10/2013 12:00 AM  COMPLETE CBC W/AUTO DIFF WBC  Reviewed

 

 12/10/2013 12:00 AM  COMPREHEN METABOLIC PANEL  Reviewed

 

 2010 12:00 AM  CYTOPATH C/V MANUAL  Reviewed

 

 2010 12:00 AM  SMEAR WET MOUNT SALINE/INK  Reviewed

 

 2010 12:00 AM  LIPID PANEL  Reviewed

 

 2010 12:00 AM  ASSAY THYROID STIM HORMONE  Reviewed

 

 2010 12:00 AM  COMPLETE CBC W/AUTO DIFF WBC  Reviewed

 

 2010 12:00 AM  COMPREHEN METABOLIC PANEL  Reviewed

 

 10/28/2013 12:00 AM  Flu Injection 3 Years And Above NDC# [address]-33  [facility]  
Reviewed

 

 2010 8:48 AM  Blood Pressure Check-no charge  Reviewed

 

 2010 12:00 AM  Blood Pressure Check-no charge  Reviewed

 

 2014 12:00 AM  METABOLIC PANEL TOTAL CA  Reviewed

 

 2014 12:00 AM  ASSAY THYROID STIM HORMONE  Reviewed

 

 2014 12:00 AM  ASSAY OF FREE THYROXINE  Reviewed

 

 2014 12:00 AM  MAMMOGRAM SCREENING  Reviewed

 

 2014 12:00 AM  CT BONE DENSITY AXIAL  Reviewed

 

 2014 12:00 AM  COMPLETE CBC W/AUTO DIFF WBC  Reviewed

 

 2014 12:00 AM  COMPREHEN METABOLIC PANEL  Reviewed

 

 2014 12:00 AM  LIPID PANEL  Reviewed

 

 2014 12:00 AM  ASSAY THYROID STIM HORMONE  Reviewed

 

 10/20/2010 12:00 AM  IMMUNIZATION ADMIN  Reviewed

 

 10/20/2010 12:00 AM  FLU VACCINE 3 YRS & > IM  Reviewed

 

 2010 12:00 AM  COMPREHEN METABOLIC PANEL  Reviewed

 

 2010 12:00 AM  LIPID PANEL  Reviewed

 

 2010 12:00 AM  ASSAY THYROID STIM HORMONE  Reviewed

 

 2010 12:00 AM  COMPLETE CBC W/AUTO DIFF WBC  Reviewed

 

 2010 12:00 AM  Blood Pressure Check-no charge  Reviewed

 

 10/2/2014 12:00 AM  THER/PROPH/DIAG INJ SC/IM  Reviewed

 

 10/2/2014 12:00 AM  Kenalog, Per 10 Mg NDC#0047-1932-17  Reviewed

 

 2014 12:00 AM  COMPLETE CBC W/AUTO DIFF WBC  Reviewed

 

 2014 12:00 AM  COMPREHEN METABOLIC PANEL  Reviewed

 

 2014 12:00 AM  LIPID PANEL  Reviewed

 

 2014 12:00 AM  ASSAY THYROID STIM HORMONE  Reviewed

 

 2015 12:00 AM  Rocephin 1 gram NDC#7867-0038-72  Reviewed

 

 2015 12:00 AM  THER/PROPH/DIAG INJ SC/IM  Reviewed

 

 2011 12:00 AM  COMPREHEN METABOLIC PANEL  Reviewed

 

 2011 12:00 AM  LIPID PANEL  Reviewed

 

 2011 12:00 AM  ASSAY THYROID STIM HORMONE  Reviewed

 

 2011 12:00 AM  COMPLETE CBC W/AUTO DIFF WBC  Reviewed

 

 2011 12:00 AM  METABOLIC PANEL TOTAL CA  Reviewed

 

 2011 12:00 AM  COMPREHEN METABOLIC PANEL  Reviewed

 

 2011 12:00 AM  ASSAY THYROID STIM HORMONE  Reviewed

 

 2011 12:00 AM  COMPLETE CBC W/AUTO DIFF WBC  Reviewed

 

 2011 12:00 AM  ASSAY OF LIPASE  Reviewed

 

 2011 12:00 AM  THER/PROPH/DIAG INJ SC/IM  Reviewed

 

 2011 12:00 AM  Phenergan 50 Mg Im  Bernadine  Reviewed

 

 2011 12:00 AM  METABOLIC PANEL TOTAL CA  Reviewed

 

 2011 12:00 AM  THER/PROPH/DIAG INJ SC/IM  Reviewed

 

 2011 12:00 AM  Phenergan 25 Mg Im  Bernadine  Reviewed

 

 2011 12:00 AM  METABOLIC PANEL TOTAL CA  Reviewed

 

 2011 12:00 AM  ASSAY THYROID STIM HORMONE  Reviewed

 

 2011 12:00 AM  THER/PROPH/DIAG INJ SC/IM  Reviewed

 

 2011 12:00 AM  Phenergan 50 Mg Im  Bernadine  Reviewed

 

 2011 12:00 AM  ASSAY OF SERUM SODIUM  Reviewed

 

 2011 12:00 AM  ASSAY OF SERUM SODIUM  Reviewed

 

 2011 12:00 AM  CYTOPATH C/V MANUAL  Reviewed

 

 2011 12:00 AM  ASSAY THYROID STIM HORMONE  Reviewed

 

 2015 12:00 AM  COMPLETE CBC W/AUTO DIFF WBC  Reviewed

 

 2015 12:00 AM  COMPREHEN METABOLIC PANEL  Reviewed

 

 2015 12:00 AM  LIPID PANEL  Reviewed

 

 2015 12:00 AM  ASSAY THYROID STIM HORMONE  Reviewed

 

 2015 12:00 AM  MAMMOGRAM SCREENING  Reviewed

 

 2015 12:00 AM  CYTOPATH TBS C/V MANUAL  Reviewed







Results Summary







 Date and Description  Results

 

 2010 10:41 AM  WET PREP NO TRICHOMONADS SEEN  No  Few 

 

 2010 8:15 AM  TRIGLYCERIDES 174.0 mg/dLCHOLESTEROL 175.0 mg/dLHDL 58.0 mg/
dLTOT CHOL/HDL 3.0 LDL (CALC) 82.0 mg/dLTSH 0.790 uIU/mLGLUCOSE 95.0 mg/
dLSODIUM 136.0 mmol/LPOTASSIUM 4.50 mmol/LCHLORIDE 99.0 mmol/LCO2 26.0 mmol/
LBUN 12.0 mg/dLCREATININE 0.80 mg/dLSGOT/AST 32.0 IU/LSGPT/ALT 33.0 IU/LALK 
PHOS 103.0 IU/LTOTAL PROTEIN 7.60 g/dLALBUMIN 4.60 g/dLTOTAL BILI 0.60 mg/
dLCALCIUM 9.80 mg/dLAGE 64 GFR NonAA 72 GFR AA 87 eGFR >60 mL/min/1.73 m2eGFR AA
* >60 WBC 5.3 RBC 4.13 HGB 13.10 g/dLHCT 39.20 %MCV 95.0 fLMCH 31.70 pgMCHC 
33.40 g/dLRDW SD 44 RDW CV 12.70 %MPV 9.80 fLPLT 257 NRBC# 0.00 NRBC% 0.0 %NEUT 
57.90 %%LYMP 26.50 %%MONO 11.60 %%EOS 3.20 %%BASO 0.80 %#NEUT 3.04 #LYMP 1.39 #
MONO 0.61 #EOS 0.17 #BASO 0.04 MANUAL DIFF NOT IND 

 

 2011 9:45 AM  GLUCOSE 91.0 mg/dLSODIUM 133.0 mmol/LPOTASSIUM 4.40 mmol/
LCHLORIDE 97.0 mmol/LCO2 24.0 mmol/LBUN 9.0 mg/dLCREATININE 0.70 mg/dLCALCIUM 
10.0 mg/dLAGE 65 GFR NonAA 84 GFR  eGFR >60 mL/min/1.73 m2eGFR AA* >60 

 

 2011 10:25 AM  LIPASE 29.0 U/LTSH 0.10 uIU/mLGLUCOSE 115.0 mg/dLSODIUM 
127.0 mmol/LPOTASSIUM 5.30 mmol/LCHLORIDE 93.0 mmol/LCO2 18.0 mmol/LBUN 9.0 mg/
dLCREATININE 0.70 mg/dLSGOT/AST 62.0 IU/LSGPT/ALT 46.0 IU/LALK PHOS 100.0 IU/
LTOTAL PROTEIN 8.60 g/dLALBUMIN 4.90 g/dLTOTAL BILI 0.60 mg/dLCALCIUM 9.90 mg/
dLAGE 65 GFR NonAA 84 GFR  eGFR >60 mL/min/1.73 m2eGFR AA* >60 WBC 6.9 
RBC 4.48 HGB 14.40 g/dLHCT 40.90 %MCV 91.0 fLMCH 32.10 pgMCHC 35.20 g/dLRDW SD 
41 RDW CV 12.50 %MPV 9.30 fLPLT 260 NRBC# 0.00 NRBC% 0.0 %NEUT 74.90 %%LYMP 
15.10 %%MONO 9.40 %%EOS 0.30 %%BASO 0.30 %#NEUT 5.15 #LYMP 1.04 #MONO 0.65 #EOS 
0.02 #BASO 0.02 MANUAL DIFF NOT IND 

 

 2011 9:07 AM  GLUCOSE 92.0 mg/dLSODIUM 131.0 mmol/LPOTASSIUM 4.20 mmol/
LCHLORIDE 99.0 mmol/LCO2 22.0 mmol/LBUN 9.0 mg/dLCREATININE 0.70 mg/dLCALCIUM 
9.20 mg/dLAGE 65 GFR NonAA 84 GFR  eGFR >60 mL/min/1.73 m2eGFR AA* >60 

 

 2011 11:06 AM  TSH 0.510 uIU/mLGLUCOSE 99.0 mg/dLSODIUM 132.0 mmol/
LPOTASSIUM 3.50 mmol/LCHLORIDE 95.0 mmol/LCO2 23.0 mmol/LBUN 9.0 mg/
dLCREATININE 0.80 mg/dLCALCIUM 10.40 mg/dLAGE 65 GFR NonAA 72 GFR AA 87 eGFR >
60 mL/min/1.73 m2eGFR AA* >60 

 

 2011 9:45 AM  SODIUM 132.0 mmol/L

 

 2011 9:27 AM  SODIUM 137.0 mmol/L

 

 2011 9:55 AM  TSH 1.070 uIU/mL

 

 2011 8:55 AM  GLUCOSE 102.0 mg/dLSODIUM 135.0 mmol/LPOTASSIUM 4.20 mmol/
LCHLORIDE 102.0 mmol/LCO2 24.0 mmol/LBUN 15.0 mg/dLCREATININE 0.80 mg/dLSGOT/
AST 30.0 IU/LSGPT/ALT 35.0 IU/LALK PHOS 119.0 IU/LTOTAL PROTEIN 7.50 g/
dLALBUMIN 4.30 g/dLTOTAL BILI 0.30 mg/dLCALCIUM 9.20 mg/dLAGE 65 GFR NonAA 72 
GFR AA 87 eGFR >60 mL/min/1.73 m2eGFR AA* >60 TSH 1.130 uIU/mL

 

 2011 8:55 AM  GLUCOSE 98.0 mg/dLSODIUM 137.0 mmol/LPOTASSIUM 3.90 mmol/
LCHLORIDE 103.0 mmol/LCO2 25.0 mmol/LBUN 14.0 mg/dLCREATININE 0.70 mg/dLSGOT/
AST 33.0 IU/LSGPT/ALT 36.0 IU/LALK PHOS 111.0 IU/LTOTAL PROTEIN 8.30 g/
dLALBUMIN 4.40 g/dLTOTAL BILI 0.50 mg/dLCALCIUM 9.40 mg/dLAGE 66 GFR NonAA 84 
GFR  eGFR >60 mL/min/1.73 m2eGFR AA* >60 TRIGLYCERIDES 109.0 mg/
dLCHOLESTEROL 153.0 mg/dLHDL 54.0 mg/dLTOT CHOL/HDL 2.8 LDL (CALC) 77.0 mg/
dLTSH 1.330 uIU/mL

 

 2012 10:33 AM  WET PREP NO TRICH SEEN CLUE CELLS NONE SEEN 

 

 2012 8:45 AM  WBC 5.2 RBC 3.96 HGB 12.70 g/dLHCT 37.80 %MCV 96.0 fLMCH 
32.10 pgMCHC 33.60 g/dLRDW SD 46 RDW CV 13.30 %MPV 9.70 fLPLT 297 NRBC# 0.00 
NRBC% 0.0 %NEUT 57.70 %%LYMP 28.50 %%MONO 10.30 %%EOS 2.50 %%BASO 1.0 %#NEUT 
3.02 #LYMP 1.49 #MONO 0.54 #EOS 0.13 #BASO 0.05 MANUAL DIFF NOT IND GLUCOSE 
97.0 mg/dLSODIUM 137.0 mmol/LPOTASSIUM 4.40 mmol/LCHLORIDE 101.0 mmol/LCO2 23.0 
mmol/LBUN 17.0 mg/dLCREATININE 0.80 mg/dLSGOT/AST 30.0 IU/LSGPT/ALT 33.0 IU/
LALK PHOS 95.0 IU/LTOTAL PROTEIN 7.40 g/dLALBUMIN 4.40 g/dLTOTAL BILI 0.60 mg/
dLCALCIUM 9.70 mg/dLAGE 66 GFR NonAA 72 GFR AA 87 eGFR 60 eGFR AA* 60 
TRIGLYCERIDES 130.0 mg/dLCHOLESTEROL 157.0 mg/dLHDL 56.0 mg/dLTOT CHOL/HDL 2.8 
LDL (CALC) 75.0 mg/dLTSH 0.940 uIU/mL

 

 2012 8:45 AM  WBC 5.1 RBC 3.91 HGB 12.50 g/dLHCT 36.30 %MCV 93.0 fLMCH 
32.0 pgMCHC 34.40 g/dLRDW SD 43 RDW CV 12.60 %MPV 9.30 fLPLT 244 NRBC# 0.00 NRBC
% 0.0 %NEUT 57.60 %%LYMP 27.50 %%MONO 9.10 %%EOS 5.0 %%BASO 0.80 %#NEUT 2.91 #
LYMP 1.39 #MONO 0.46 #EOS 0.25 #BASO 0.04 MANUAL DIFF NOT IND 

 

 2012 5:02 PM  WET PREP NO TRICH SEEN CLUE CELLS NONE SEEN 

 

 2013 8:25 AM  WBC 4.2 RBC 3.96 HGB 12.90 g/dLHCT 37.0 %MCV 93.0 fLMCH 
32.60 pgMCHC 34.90 g/dLRDW SD 43 RDW CV 12.60 %MPV 9.70 fLPLT 254 NRBC# 0.00 
NRBC% 0.0 %NEUT 54.40 %%LYMP 30.40 %%MONO 10.80 %%EOS 3.40 %%BASO 1.0 %#NEUT 
2.26 #LYMP 1.26 #MONO 0.45 #EOS 0.14 #BASO 0.04 MANUAL DIFF NOT IND TSH 1.230 
uIU/mLGLUCOSE 94.0 mg/dLSODIUM 136.0 mmol/LPOTASSIUM 4.30 mmol/LCHLORIDE 99.0 
mmol/LCO2 25.0 mmol/LBUN 10.0 mg/dLCREATININE 0.80 mg/dLSGOT/AST 36.0 IU/LSGPT/
ALT 36.0 IU/LALK PHOS 84.0 IU/LTOTAL PROTEIN 7.90 g/dLALBUMIN 4.40 g/dLTOTAL 
BILI 0.70 mg/dLCALCIUM 9.80 mg/dLAGE 67 GFR NonAA 72 GFR AA 87 eGFR 60 eGFR AA* 
60 TRIGLYCERIDES 122.0 mg/dLCHOLESTEROL 141.0 mg/dLHDL 47.0 mg/dLTOT CHOL/HDL 
3.0 LDL (CALC) 70.0 mg/dL

 

 2013 8:45 AM  WBC 5.3 RBC 4.01 HGB 13.0 g/dLHCT 37.60 %MCV 94.0 fLMCH 
32.40 pgMCHC 34.60 g/dLRDW SD 43 RDW CV 12.50 %MPV 9.40 fLPLT 248 NRBC# 0.00 
NRBC% 0.0 %NEUT 58.70 %%LYMP 27.80 %%MONO 9.80 %%EOS 2.80 %%BASO 0.90 %#NEUT 
3.12 #LYMP 1.48 #MONO 0.52 #EOS 0.15 #BASO 0.05 MANUAL DIFF NOT IND TSH 1.570 
uIU/mLGLUCOSE 94.0 mg/dLSODIUM 134.0 mmol/LPOTASSIUM 4.10 mmol/LCHLORIDE 101.0 
mmol/LCO2 23.0 mmol/LBUN 11.0 mg/dLCREATININE 0.80 mg/dLSGOT/AST 41.0 IU/LSGPT/
ALT 44.0 IU/LALK PHOS 109.0 IU/LTOTAL PROTEIN 7.90 g/dLALBUMIN 4.70 g/dLTOTAL 
BILI 0.80 mg/dLCALCIUM 10.40 mg/dLAGE 68 GFR NonAA 72 GFR AA 87 eGFR >60 mL/min/
1.73 m2eGFR AA* >60 TRIGLYCERIDES 163.0 mg/dLCHOLESTEROL 138.0 mg/dLHDL 49.0 mg/
dLTOT CHOL/HDL 2.8 LDL (CALC) 56.0 mg/dL

 

 2014 8:58 AM  GLUCOSE 86.0 mg/dLSODIUM 134.0 mmol/LPOTASSIUM 4.20 mmol/
LCHLORIDE 99.0 mmol/LCO2 25.0 mmol/LBUN 14.0 mg/dLCREATININE 0.80 mg/dLCALCIUM 
10.10 mg/dLAGE 68 GFR NonAA 72 GFR AA 87 eGFR >60 mL/min/1.73 m2eGFR AA* >60 
FREE T4 1.18 TSH 1.20 uIU/mL

 

 2014 9:50 AM  WBC 5.7 RBC 4.03 HGB 12.90 g/dLHCT 37.90 %MCV 94.0 fLMCH 
32.0 pgMCHC 34.0 g/dLRDW SD 44 RDW CV 12.70 %MPV 9.70 fLPLT 292 NRBC# 0.00 NRBC
% 0.0 %NEUT 62.70 %%LYMP 21.80 %%MONO 11.0 %%EOS 3.40 %%BASO 1.10 %#NEUT 3.55 #
LYMP 1.23 #MONO 0.62 #EOS 0.19 #BASO 0.06 MANUAL DIFF NOT IND GLUCOSE 98.0 mg/
dLSODIUM 135.0 mmol/LPOTASSIUM 4.30 mmol/LCHLORIDE 102.0 mmol/LCO2 22.0 mmol/
LBUN 10.0 mg/dLCREATININE 0.80 mg/dLSGOT/AST 34.0 IU/LSGPT/ALT 32.0 IU/LALK 
PHOS 95.0 IU/LTOTAL PROTEIN 7.70 g/dLALBUMIN 4.30 g/dLTOTAL BILI 0.80 mg/
dLCALCIUM 9.10 mg/dLAGE 68 GFR NonAA 72 GFR AA 87 eGFR 60 eGFR AA* 60 
TRIGLYCERIDES 108.0 mg/dLCHOLESTEROL 146.0 mg/dLHDL 56.0 mg/dLTOT CHOL/HDL 2.6 
LDL (CALC) 68.0 mg/dLTSH 0.90 uIU/mL

 

 2014 8:40 AM  WBC 4.4 RBC 4.13 HGB 13.20 g/dLHCT 38.90 %MCV 94.0 fLMCH 
32.0 pgMCHC 33.90 g/dLRDW SD 47 RDW CV 13.50 %MPV 9.80 fLPLT 279 NRBC# 0.00 NRBC
% 0.0 %NEUT 63.80 %%LYMP 24.60 %%MONO 9.30 %%EOS 1.60 %%BASO 0.70 %#NEUT 2.80 #
LYMP 1.08 #MONO 0.41 #EOS 0.07 #BASO 0.03 MANUAL DIFF NOT IND GLUCOSE 96.0 mg/
dLSODIUM 136.0 mmol/LPOTASSIUM 4.10 mmol/LCHLORIDE 99.0 mmol/LCO2 23.0 mmol/
LBUN 8.0 mg/dLCREATININE 0.80 mg/dLSGOT/AST 31.0 IU/LSGPT/ALT 27.0 IU/LALK PHOS 
85.0 IU/LTOTAL PROTEIN 7.60 g/dLALBUMIN 4.40 g/dLTOTAL BILI 0.80 mg/dLCALCIUM 
10.20 mg/dLAGE 69 GFR NonAA 71 GFR AA 86 eGFR 60 eGFR AA* 60 TRIGLYCERIDES 61.0 
mg/dLCHOLESTEROL 148.0 mg/dLHDL 71.0 mg/dLTOT CHOL/HDL 2.1 LDL (CALC) 65.0 mg/
dLTSH 0.990 uIU/mL

 

 2015 8:32 AM  WBC 4.8 RBC 3.98 HGB 12.70 g/dLHCT 37.30 %MCV 94.0 fLMCH 
31.90 pgMCHC 34.0 g/dLRDW SD 43 RDW CV 12.70 %MPV 9.50 fLPLT 270 NRBC# 0.00 NRBC
% 0.0 %NEUT 57.30 %%LYMP 25.0 %%MONO 13.0 %%EOS 3.60 %%BASO 1.10 %#NEUT 2.73 #
LYMP 1.19 #MONO 0.62 #EOS 0.17 #BASO 0.05 MANUAL DIFF NOT IND TSH 0.640 uIU/
mLGLUCOSE 90.0 mg/dLSODIUM 136.0 mmol/LPOTASSIUM 4.0 mmol/LCHLORIDE 101.0 mmol/
LCO2 24.0 mmol/LBUN 10.0 mg/dLCREATININE 0.80 mg/dLSGOT/AST 34.0 IU/LSGPT/ALT 
32.0 IU/LALK PHOS 92.0 IU/LTOTAL PROTEIN 7.50 g/dLALBUMIN 4.40 g/dLTOTAL BILI 
0.70 mg/dLCALCIUM 9.50 mg/dLAGE 69 GFR NonAA 71 GFR AA 86 eGFR >60 mL/min/1.73 
m2eGFR AA* >60 TRIGLYCERIDES 107.0 mg/dLCHOLESTEROL 138.0 mg/dLHDL 58.0 mg/
dLTOT CHOL/HDL 2.4 LDL (CALC) 59.0 mg/dL

 

 2015 8:31 AM  WBC 4.6 RBC 3.97 HGB 12.90 g/dLHCT 38.0 %MCV 96.0 fLMCH 
32.50 pgMCHC 33.90 g/dLRDW SD 44 RDW CV 12.60 %MPV 9.0 fLPLT 248 NRBC# 0.00 NRBC
% 0.0 %NEUT 61.0 %%LYMP 24.70 %%MONO 10.20 %%EOS 3.0 %%BASO 1.10 %#NEUT 2.82 #
LYMP 1.14 #MONO 0.47 #EOS 0.14 #BASO 0.05 MANUAL DIFF NOT IND TRIGLYCERIDES 
105.0 mg/dLCHOLESTEROL 141.0 mg/dLHDL 58.0 mg/dLTOT CHOL/HDL 2.4 LDL (CALC) 
62.0 mg/dLGLUCOSE 93.0 mg/dLSODIUM 136.0 mmol/LPOTASSIUM 4.10 mmol/LCHLORIDE 
101.0 mmol/LCO2 25.0 mmol/LBUN 9.0 mg/dLCREATININE 0.80 mg/dLSGOT/AST 32.0 IU/
LSGPT/ALT 28.0 IU/LALK PHOS 95.0 IU/LTOTAL PROTEIN 7.30 g/dLALBUMIN 4.50 g/
dLTOTAL BILI 0.80 mg/dLCALCIUM 9.70 mg/dLAGE 70 GFR NonAA 71 GFR AA 86 eGFR >60 
mL/min/1.73meGFR AA* >60 TSH 1.10 uIU/mL







History Of Immunizations







 Name  Date Admin  Mfg Name  Mfg Code  Trade Name  Lot#  Route  Inj  Vis Given  
Vis Pub  CVX

 

 Influenza  10/20/2010  [employer]of[employer] pasteur  [facility]  Fluzone  [id number]  Intramuscular  
Left Arm  10/20/2010  2010  999

 

 Influenza  10/28/2011  [employer] pasteur  [facility]  Fluzone  [id number]  Intramuscular  
Left Arm  10/28/2011  2011  141

 

 Influenza  10/29/2012  sanofi pasteur  [facility]  Fluzone  [id number]  Intramuscular  
Left Deltoid  10/29/2012  2012  141

 

 X  12/10/2012  [employer]  MSD  Pneumovax 23  [id number]  Intramuscular  
Left Gluteous Medius  12/10/2012  2010  33

 

 Influenza  10/28/2013  sanofi pasteur  [facility]  Fluzone > 3 Years  [id number]  
Intramuscular  Right Deltoid  10/28/2013  2013  141

 

 X  9/2/2015  Not Entered  NE  Prevnar 13     Not Entered  Not Entered  1  109

 

 Influenza  2015  Not Entered  NE  Not Entered     Not Entered  Not Entered
  2015  141

 

 HepB Adult  2016  [employer]  MSD  Recombivax Adult  [id number]  Not 
Entered  Left Deltoid  2016  43

 

 HepB Adult  2016  [employer]  MSD  Recombivax Adult  [id number]  
Intramuscular  Right Deltoid  2016  43

 

 Zostavax  2012  Not Entered  NE  Not Entered     Not Entered  Not 
Entered  1  121

 

 Tdap  2012  Not Entered  NE  Not Entered     Not Entered  Not Entered  1  115

 

 Influenza  10/13/2016  Not Entered  NE  Fluzone High-Dose     Intramuscular  
Left Arm  1  141

 

 HepB Adult  2016  [employer]  MSD  Recombivax Adult  [id number]  
Intramuscular  Right Deltoid  2016  43

 

 Influenza  10/30/2017  Not Entered  NE  Fluarix Quadrivalent     Intramuscular
  Left Arm  2017  150







History of Past Illness







 Name  Date of Onset  Comments

 

 Benign Essential Hypertension  Dec 14 2009 10:10AM   

 

 Hyperlipidemia  Dec 14 2009 10:10AM   

 

 Hypothyroidism, Acquired  Dec 14 2009 10:10AM   

 

 hypothyroid      

 

 Hypertension      

 

 Hyperlipidemia      

 

 acid reflux      

 

 Hypertension, Benign Essential  2010  9:41AM   

 

 Hypertension, Benign Essential  2010 12:53PM   

 

 Routine gynecological examination  May  5 2010  9:28AM   

 

 Hypertension  May  5 2010  9:28AM   

 

 Hyperlipidemia, unspecified  May  5 2010  9:28AM   

 

 Hypothyroidism, Acquired  May  5 2010  9:28AM   

 

 Vulvovaginitis  May  5 2010  9:28AM   

 

 Rhinitis, Allergic  May  5 2010  9:28AM   

 

 Hypertension, Benign Essential  May 19 2010  8:48AM   

 

 Hypertension, Benign Essential  May 25 2010  9:39AM   

 

 Flu  Oct 20 2010 12:01PM   

 

 Essential Hypertension  2010  8:34AM   

 

 Hyperlipidemia  2010  8:34AM   

 

 Gastroesophageal Reflux  2010  8:34AM   

 

 Hypothyroidism, Acquired  2010  8:34AM   

 

 Hypertension, Benign Essential  2010  9:22AM   

 

 Hypertension, Benign Essential  Dec 15 2010  9:07AM   

 

 Essential Hypertension  2011  1:31PM   

 

 Hyperlipidemia  2011  1:31PM   

 

 Gastroesophageal Reflux  2011  1:31PM   

 

 Hypothyroidism, Acquired  2011  1:31PM   

 

 Essential Hypertension  2011  8:51AM   

 

 Hyperlipidemia  2011  8:51AM   

 

 Gastroesophageal Reflux  2011  8:51AM   

 

 Hypothyroidism, Acquired  2011  8:51AM   

 

 Essential Hypertension  2011  9:13AM   

 

 Hyperlipidemia  2011  9:13AM   

 

 Gastroesophageal Reflux  2011  9:13AM   

 

 Hypothyroidism, Acquired  2011  9:13AM   

 

 Nausea With Vomiting  2011  1:18PM   

 

 Hyponatremia  2011  8:46AM   

 

 Nausea  2011  9:13AM   

 

 Hypothyroidism  2011 11:10AM   

 

 Hyponatremia  2011 11:10AM   

 

 Essential Hypertension  2011 10:05AM   

 

 Hyperlipidemia  2011 10:05AM   

 

 Gastroesophageal Reflux  2011 10:05AM   

 

 Nausea  2011 10:05AM   

 

 Hypothyroidism, Acquired  2011 10:05AM   

 

 Hyponatremia  May  6 2011 11:34AM   

 

 Essential Hypertension  May 16 2011  8:50AM   

 

 Hyperlipidemia  May 16 2011  8:50AM   

 

 Gastroesophageal Reflux  May 16 2011  8:50AM   

 

 Nausea  May 16 2011  8:50AM   

 

 Hypothyroidism, Acquired  May 16 2011  8:50AM   

 

 Hyponatremia  May 16 2011  8:50AM   

 

 Routine gynecological examination  2011  8:54AM   

 

 Hypertension  2011  8:54AM   

 

 Hyperlipidemia, unspecified  2011  8:54AM   

 

 Hypothyroidism, Acquired  2011  8:54AM   

 

 Rhinitis, Allergic  2011  8:54AM   

 

 Hypothyroidism  Aug 29 2011 12:37PM   

 

 Hyponatremia  Aug 29 2011 12:37PM   

 

 Essential Hypertension  Sep 12 2011  8:53AM   

 

 Hyperlipidemia  Sep 12 2011  8:53AM   

 

 Gastroesophageal Reflux  Sep 12 2011  8:53AM   

 

 Hypothyroidism, Acquired  Sep 12 2011  8:53AM   

 

 Hyponatremia  Sep 12 2011  8:53AM   

 

 Hypertension  Sep 29 2011  9:41AM   

 

 Hyperlipidemia  Dec  8 2011  8:31AM   

 

 Hypothyroidism  Dec  8 2011  8:31AM   

 

 Hypertension  Dec  8 2011  8:31AM   

 

 Flu  Dec  9 2011 10:02AM   

 

 Essential Hypertension  Dec 13 2011 10:13AM   

 

 Hyperlipidemia  Dec 13 2011 10:13AM   

 

 Gastroesophageal Reflux  Dec 13 2011 10:13AM   

 

 Hypothyroidism, Acquired  Dec 13 2011 10:13AM   

 

 Hyponatremia  Dec 13 2011 10:13AM   

 

 Vaginal Discharge  2012  9:43AM   

 

 Rhinitis, Allergic  Feb 15 2012  9:31AM   

 

 Eustachian Tube Dysfunction  Feb 15 2012  9:31AM   

 

 Pharyngitis, Acute  Feb 15 2012  9:31AM   

 

 Routine gynecological examination  2012  8:48AM   

 

 Hypertension  2012  8:48AM   

 

 Hyperlipidemia, unspecified  2012  8:48AM   

 

 Hypothyroidism, Acquired  2012  8:48AM   

 

 Rhinitis, Allergic  2012  8:48AM   

 

 Candidiasis, Vulvovaginal  2012 11:04AM   

 

 Essential Hypertension  Aug 15 2012  9:02AM   

 

 Hyperlipidemia  Aug 15 2012  9:02AM   

 

 Gastroesophageal Reflux  Aug 15 2012  9:02AM   

 

 Hypothyroidism, Acquired  Aug 15 2012  9:02AM   

 

 Hyponatremia  Aug 15 2012  9:02AM   

 

 Atrophic Vaginitis, Postmenopausal  Aug 15 2012  9:02AM   

 

 Flu  Oct 29 2012  9:24AM   

 

 Essential Hypertension  Dec 10 2012  8:35AM   

 

 Hyperlipidemia  Dec 10 2012  8:35AM   

 

 Gastroesophageal Reflux  Dec 10 2012  8:35AM   

 

 Hypothyroidism, Acquired  Dec 10 2012  8:35AM   

 

 Hyponatremia  Dec 10 2012  8:35AM   

 

 Atrophic Vaginitis, Postmenopausal  Dec 10 2012  8:35AM   

 

 Pneumococcus  Dec 10 2012  2:07PM   

 

 Vaginal Discharge  Dec 20 2012  1:50PM   

 

 Essential Hypertension  Dec 20 2012  1:50PM   

 

 Hyperlipidemia  Dec 20 2012  1:50PM   

 

 Gastroesophageal Reflux  Dec 20 2012  1:50PM   

 

 Hypothyroidism, Acquired  Dec 20 2012  1:50PM   

 

 Atrophic Vaginitis, Postmenopausal  Dec 20 2012  1:50PM   

 

 Upper Respiratory Infections  2013  8:52AM   

 

 Hyperlipidemia  2013  8:21AM   

 

 Hypertension  2013  8:21AM   

 

 Hypothyroidism  2013  8:21AM   

 

 Screening Mammogram  Beto 10 2013  8:45AM   

 

 Routine gynecological examination  Beto 10 2013  8:34AM   

 

 Hypertension  Beto 10 2013  8:34AM   

 

 Hyperlipidemia, unspecified  Beto 10 2013  8:34AM   

 

 Hypothyroidism, Acquired  Beto 10 2013  8:34AM   

 

 Rhinitis, Allergic  Beto 10 2013  8:34AM   

 

 Flu  Oct 28 2013  8:52AM   

 

 Essential Hypertension  Dec  9 2013  8:37AM   

 

 Hyperlipidemia  Dec  9 2013  8:37AM   

 

 Gastroesophageal Reflux  Dec  9 2013  8:37AM   

 

 Hypothyroidism, Acquired  Dec  9 2013  8:37AM   

 

 Atrophic Vaginitis, Postmenopausal  Dec  9 2013  8:37AM   

 

 Hypertension  2014  9:56AM   

 

 Hyperlipidemia  2014  9:56AM   

 

 Hypothyroidism  2014  9:56AM   

 

 Screening Mammogram  2014  8:32AM   

 

 Osteopenia  2014  8:32AM   

 

 Hypertension  2014  8:35AM   

 

 Hypothyroidism, Acquired  2014  8:35AM   

 

 Hyperlipidemia  2014  8:35AM   

 

 Upper Respiratory Infections  2014  9:28AM   

 

 Sinusitis, Acute  Oct  2 2014  9:17AM   

 

 Herpes Zoster  Oct  5 2014  1:44PM   

 

 Vesicular Eruption  Oct  5 2014  1:44PM   

 

 Hypertension  2014  8:18AM   

 

 Hyperlipidemia  2014  8:18AM   

 

 hypothyroid  2014  8:18AM   

 

 Situational anxiety  Dec 20 2014  9:33AM   

 

 GERD (gastroesophageal reflux disease)  Dec 20 2014  9:33AM   

 

 Nausea  Dec 20 2014  9:33AM   

 

 Abdominal Pain, Epigastric  Dec 20 2014  9:33AM   

 

 Hyperlipidemia  Oct  6 2014  9:03AM   

 

 acid reflux  Oct  6 2014  9:03AM   

 

 hypothyroid  Oct  6 2014  9:03AM   

 

 Shingles  Oct  6 2014  9:03AM   

 

 Pharyngitis  2015  1:09PM   

 

 Bronchitis  2015  9:10AM   

 

 Hyperlipidemia  2015  9:10AM   

 

 acid reflux  2015  9:10AM   

 

 hypothyroid  2015  9:10AM   

 

 Hyperlipidemia  2015  8:18AM   

 

 Hypertension  2015  8:18AM   

 

 hypothyroid  2015  8:18AM   

 

 Screening Mammogram  2015 11:43AM   

 

 Medicare Annual Pap Q 2 years  2015  9:36AM   

 

 Screening Mammogram  2015  9:36AM   

 

 Candidiasis of female genitalia  2015  9:36AM   

 

 Hypertension  2015 11:34AM   

 

 Hyperlipidemia, unspecified  2015 11:34AM   

 

 Hypothyroidism, Acquired  2015 11:34AM   

 

 Osteopenia  2016  8:41AM   

 

 Encounter for screening mammogram for breast cancer  2016  8:41AM   

 

 Hypertension  2016  8:34AM   

 

 Lymphedema  2016  8:34AM   

 

 Hyperlipidemia  2016  8:34AM   

 

 hypothyroid  2016  8:34AM   

 

 HEP B  2016  9:13AM   

 

 HEP B  2016  8:52AM   

 

 Acid Reflux  2016  8:34AM   

 

 History of acute gastritis  Oct 13 2016  2:30PM   

 

 Anxiety as acute reaction to exceptional stress  Oct 13 2016  2:30PM   

 

 HEP B  Dec 29 2016  8:42AM   

 

 Caregiver stress syndrome  May 26 2017  8:28AM   

 

 Anxiety  May 26 2017  8:28AM   

 

 Blood in stool  2017  2:54PM   

 

 Upper GI bleed  2017 11:34AM   

 

 Hyponatremia  2017  9:03AM   

 

 Hyponatremia  2017  8:43AM   

 

 Medicare Annual Pap Q 2 years  2017  9:03AM   

 

 Nausea  2017  9:03AM   

 

 Uterine enlargement  2017  9:03AM   

 

 Encounter for screening mammogram for breast cancer  2017  4:56PM   

 

 Panic disorder [episodic paroxysmal anxiety]  Oct 10 2017  2:02PM   

 

 Acute stress reaction  Oct 10 2017  2:02PM   

 

 Caregiver stress syndrome  Oct 10 2017  2:02PM   

 

 Stress reaction causing mixed disturbance of emotion and conduct  Oct 18 2017  
9:05AM   

 

 Ankle strain, left, initial encounter  Dec 19 2017  8:29AM   

 

 Excoriation of ear canal, left, initial encounter  Dec 19 2017  8:29AM   

 

 Generalized anxiety disorder  Dec 19 2017  8:29AM   

 

 Grief reaction  Dec 19 2017  8:29AM   

 

 Acute non-recurrent frontal sinusitis  2018 11:58AM   

 

 Cough  2018 11:58AM   

 

 Hypertension  2018  8:10AM   

 

 Hyperlipidemia, unspecified  2018  8:10AM   

 

 Hypothyroidism, Acquired  2018  8:10AM   

 

 Situational anxiety  Mar  6 2018  8:30AM   

 

 Nausea  Mar  6 2018  8:30AM   







Payers







 Insurance Name  Company Name  Plan Name  Plan Number  Policy Number  Policy 
Group Number  Start Date

 

    Medicare RHC  Medicare RHC     [id number]     N/A

 

    [employer]  [facility]     [id number]     2009

 

    Medicare Part B  [place]     [id number]     N/A

 

    Medicare Part A  Medicare Part A     [id number]     N/A

 

    Medicare Part A  [employer]are P [employer] - Preventive     [id number]     N/A

 

    Medicare Part A  Medicare - Lab/Xray     [id number]     N/A







History of Encounters







 Visit Date  Visit Type  Provider

 

 3/6/2018  Office visit  Doris Noriega MD

 

 2018  Office visit  Deedee Carter APRN

 

 2017  Office visit  Doris Noriega MD

 

 10/18/2017  Office visit  Doris Noriega MD

 

 10/10/2017  Office visit  Doris Noriega MD

 

 2017  Office visit  Doris Noriega MD

 

 2017  Office visit  Doris Noriega MD

 

 2017  Office visit  Doris Noriega MD

 

 2017  Office visit  Prince Noe APRN

 

 2016  Nurse visit  Doris Noriega MD

 

 10/13/2016  Office visit  Doris Noriega MD

 

 2016  Nurse visit  Doris Noriega MD

 

 2016  Office visit  Doris Noriega MD

 

 2015  Office visit  Doris Noriega MD

 

 2015  Office visit  Doris Noriega MD

 

 2015  Office visit  Prince Neo APRN

 

 2014  Office visit  Anyi Herrera APRN

 

 10/27/2014  Voided  Doris Noriega MD

 

 10/6/2014  Office visit  Doris Noriega MD

 

 10/5/2014  Office visit  Anyi Herrera APRN

 

 10/2/2014  Office visit   

 

 10/2/2014  Office visit  Corrine Bay APRN

 

 2014  Office visit  Kassie Franklin APRN

 

 2014  Office visit  Doris Noriega MD

 

 2014  Office visit  Doris Noriega MD

 

 2013  Office visit  Dee Colindres MD

 

 10/28/2013  Nurse visit  Dee Colindres MD

 

 6/10/2013  Office visit  Dee Colindres MD

 

 2013  Office visit  Corrine Bay APRN

 

 2012  Office visit  Dee Colindres MD

 

 12/10/2012  Office visit  Dee Colindres MD

 

 10/29/2012  Nurse visit  Dee Colindres MD

 

 8/15/2012  Office visit  Dee Colindres MD

 

 2012  Office visit  Corrine Bay APRN

 

 2012  Office visit  Dee Colindres MD

 

 2/15/2012  Office visit  Dee Colindres MD

 

 2012  Office visit  Corrine Bay APRN

 

 2011  Office visit  Dee Colindres MD

 

 10/28/2011  Nurse visit  Shad Nolasco MD

 

 2011  Office visit  Dee Colindres MD

 

 2011  Office visit  Dee Colindres MD

 

 2011  Office visit  Dee Colindres MD

 

 2011  Office visit  Dee Colindres MD

 

 2011  Office visit  Dee Colindres MD

 

 2011  Office visit  Dee Colindres MD

 

 4/10/2011  [facility]  KHRIS Reeves MD

 

 4/10/2011  [facility]  RICKEY Toussaint MD

 

 2011  Office visit  RICKEY Toussaint MD

 

 2011  [facility]  Fide Castellanos MD

 

 2011  Voided  Fide Castellanos MD

 

 2011  Office visit  Dee Colindres MD

 

 12/15/2010  Nurse visit  Dee Colindres MD

 

 2010  Office visit  Dee Colindres MD

 

 10/20/2010  Nurse visit  Stephenie PERAZA

 

 2010  Nurse visit  Dee Colindres MD

 

 2010  Nurse visit  Dee Colindres MD

 

 2010  Office visit  Dee Colindres MD

 

 3/19/2010  Voided  Stephenie Kay PA

 

 2010  Nurse visit  Stephenie PERAZA

 

 2010  Nurse visit  Stephenie PERAZA

 

 2010  Nurse visit  Stephenie PERAZA

 

 2009  Office visit  Stephenie PERAZA

 

 10/20/2009  Nurse visit  Stephenie Kay PA

## 2018-10-22 NOTE — XMS REPORT
MU2 Ambulatory Summary

 Created on: 01/10/2018



Milady Crespo

External Reference #: [id number]

: 1945

Sex: Female



Demographics







 Address  [address]

[place]  [address]

 

 Home Phone  [phone]

 

 Preferred Language  English

 

 Marital Status  

 

 Voodoo Affiliation  Unknown

 

 Race  White

 

 Ethnic Group  Not  or 





Author







 Author  Doris Noriega

 

 Organization  [facility]

 

 Address  [address]

[place], [place]  [id number]



 

 Phone  [phone]







Care Team Providers







 Care Team Member Name  Role  Phone

 

 Doris Noriega  PCP  [phone]

 

 Doris Noriega  PreferredProvider  [phone]







Allergies and Adverse Reactions







 Name  Reaction  Notes

 

 NO KNOWN DRUG ALLERGIES      







Plan of Treatment







 Planned Activity  Comments  Planned Date  Planned Time  Plan/Goal

 

 Complete blood count (CBC) with differential count     2016  12:00 AM   

 

 Comprehensive metabolic panel     2016  12:00 AM   

 

 VITAMIN D (25 HYDROXY)     2016  12:00 AM   

 

 Lipid panel (total cholesterol, lipoproteins, HDL, triglycerides)     8/15/
2012  12:00 AM   

 

 Pap smear     2017  12:00 AM   

 

 Comprehensive Metabolic Panel     6/10/2013  12:00 AM   

 

 Lipid panel (total cholesterol, lipoproteins, HDL, triglycerides)     6/10/
2013  12:00 AM   

 

 Thyroid stimulating hormone (TSH)     6/10/2013  12:00 AM   

 

 CBC (automated H&H, platelets, WBC and automated differential)     6/10/2013  
12:00 AM   

 

 Comprehensive Metabolic Panel     2012  12:00 AM   

 

 Lipid panel (total cholesterol, lipoproteins, HDL, triglycerides)     2012  12:00 AM   

 

 Thyroid stimulating hormone (TSH)     2012  12:00 AM   

 

 CBC (automated H&H, platelets, WBC and automated differential)     2012  
12:00 AM   

 

 Screening mammography, bilateral     2015  12:00 AM   







Medications







 Active 

 

 Name  Start Date  Estimated Completion Date  SIG  Comments

 

 aspirin 81 mg oral tablet,delayed release (DR/EC)        take 1 tablet (81 mg) 
by oral route once daily   

 

 Vitamin D3 1,000 unit oral capsule        take 1 capsule by oral route daily   

 

 famotidine 20 mg oral tablet  2015     TAKE ONE TABLET BY MOUTH TWICE 
DAILY   

 

 valsartan 160 mg oral tablet  2016     TAKE ONE TABLET BY MOUTH ONCE 
DAILY   

 

 amlodipine 5 mg oral tablet  2016     TAKE ONE TABLET BY MOUTH ONCE DAILY
   

 

 amlodipine 5 mg oral tablet  2016     TAKE ONE TABLET BY MOUTH ONCE DAILY
   

 

 valsartan 160 mg oral tablet  2016     TAKE ONE TABLET BY MOUTH ONCE 
DAILY   

 

 Lipitor 20 mg oral tablet  10/13/2016  2018  take 1 tablet (20 mg) by oral 
route once daily   

 

 Plavix 75 mg oral tablet  10/13/2016  2018  take 1 tablet (75 mg) by oral 
route once daily   

 

 Zofran (as hydrochloride) 4 mg oral tablet  2016     take 1 tablet by 
oral route daily as needed for 14 days   

 

 Zofran (as hydrochloride) 4 mg oral tablet  2017     take 1 tablet by oral 
route daily as needed for 14 days   

 

 Zofran (as hydrochloride) 4 mg oral tablet  2017     take 1 tablet by oral 
route daily as needed for 14 days   

 

 Zofran (as hydrochloride) 4 mg oral tablet  2017     take 1 tablet by 
oral route daily as needed for 14 days   

 

 Zofran (as hydrochloride) 4 mg oral tablet  2017     take 1 tablet by 
oral route daily as needed for 14 days   

 

 EQ LORATADINE 10MG  TABS  2017     TAKE ONE TABLET BY MOUTH ONCE DAILY   

 

 Lexapro 10 mg oral tablet  2017     take 1 tablet (10 mg) by oral route 
once daily for 90 days   

 

 pantoprazole 40 mg oral tablet,delayed release (DR/EC)  10/9/2017     take 1 
tablet (40 mg) by oral route once daily for 90 days   

 

 clorazepate dipotassium 7.5 mg oral tablet  2017  take 1/2 
to1 tablet PO up to three times per day for situational anxiety   









  

 

 Name  Start Date  Expiration Date  SIG  Comments

 

 prednisone 20 mg oral tablet  2011  take 2 tablets by oral 
route daily for 5 days   

 

 calcium carbonate-vitamin D2 600-125 mg-unit oral tablet  8/15/2012  2012
  take 2 tablets by oral route daily   

 

 [place] 3 Fish Oil 900-1,400 mg oral capsule,delayed release(DR/EC)  8/15/2012  9
/  take 1 capsule by oral route daily   

 

 multivitamin Oral tablet  8/15/2012  2012  take 1 tablet by oral route 
daily   

 

 triamcinolone acetonide 0.1 % topical cream  2013  10/7/2013  APPLY A 
THIN LAYER TO THE AFFECTED AREA(S) BY TOPICAL ROUTE TWICE A DAY   

 

 famotidine 20 mg oral tablet  2014  take 1 tablet (20 mg) by 
oral route 2 times per day   

 

 amoxicillin 500 mg oral capsule  2014  take 1 capsule (500 mg) 
by oral route 3 times per day for 10 days   

 

 Zithromax Z-Tab 250 mg oral tablet  10/2/2014  10/7/2014  take 2 tablets (500 
mg) by oral route once daily for 1 day then 1 tablet (250 mg) by oral route 
once daily for 4 days   

 

 acyclovir 800 mg oral tablet  10/5/2014  10/12/2014  take 1 tablet (800 mg) by 
oral route 5 times per day for 7 days   

 

 gabapentin 300 mg oral capsule  10/9/2014  2014  take 1 capsule (300 mg) 
by oral route 3 times per day for 14 days   

 

 Carafate 100 mg/mL oral suspension  2014  take 10 milliliters 
(1 gram) by oral route 4 times per day on an empty stomach 1 hour before meals 
and at bedtime for 4 weeks   

 

 amlodipine 5 mg oral tablet  2015  TAKE ONE TABLET BY MOUTH 
EVERY DAY   

 

 levothyroxine 50 mcg oral tablet  2015  TAKE ONE TABLET BY MOUTH 
EVERY DAY   

 

 Diovan 160 mg oral tablet  2015  2/15/2016  TAKE ONE TABLET BY MOUTH 
EVERY DAY for 90 days   

 

 triamcinolone acetonide 0.1 % topical cream  2015  apply a thin 
layer to the affected area(s) by topical route 2 times per day for 14 days   

 

 fluconazole 150 mg oral tablet  2015  take 1 tablet (150 mg) 
by oral route once for 1 day   

 

 atenolol 50 mg oral tablet  10/13/2016  10/8/2017  take 1 tablet (50 mg) by 
oral route once daily   

 

 Zofran (as hydrochloride) 4 mg oral tablet  10/13/2016  10/27/2016  take 1 
tablet by oral route daily as needed for 14 days   

 

 Zofran (as hydrochloride) 4 mg oral tablet  2017     take 1 tablet by 
oral route daily as needed for 14 days   

 

 levothyroxine 50 mcg oral tablet  10/9/2017  10/9/2017  TAKE ONE TABLET BY 
MOUTH ONCE DAILY   

 

 ondansetron 4 mg oral tablet,disintegrating  10/10/2017  2017  dissolve  
1 tablet by oral route every 8 hours as needed for 30 days   

 

 valsartan 160 mg oral tablet  2017  TAKE ONE TABLET BY MOUTH 
ONCE DAILY   

 

 amlodipine 5 mg oral tablet  2017  TAKE ONE TABLET BY MOUTH 
ONCE DAILY   

 

 mirtazapine 15 mg oral tablet  2017  TAKE ONE-HALF TABLET BY 
MOUTH ONCE DAILY AT BEDTIME   









 Discontinued 

 

 Name  Start Date  Discontinued Date  SIG  Comments

 

 Lipitor 40 mg oral tablet     20092 TAB DAILY   

 

 ramipril 5 mg oral capsule     2009  take 1 capsule (5 mg) by oral route 
once daily   

 

 lovastatin 20 mg oral tablet  2009  take 1 tablet (20 mg) by 
oral route once daily with evening meal   

 

 propranolol 20 mg oral tablet  2010  take 1 tablet by oral 
route 2 times a day   

 

 Fosamax 70 mg oral tablet  2010  take 1 tablet (70 mg) by oral 
route once weekly in the morning, at least 30 minutes before the first food, 
beverage, or medication of the day   

 

 lisinopril 40 mg oral tablet  2010  4/10/2011  take 2 tablets by oral 
route daily   

 

 Zoloft 50 mg oral tablet  2011  take 1 tablet (50 mg) by oral 
route once daily   

 

 promethazine 25 mg oral tablet  2011  take 1 tablet by oral 
route every 6 hours as needed   

 

 Diovan -12.5 mg oral tablet  2011  take 1 tablet by oral 
route once daily for 30 days   

 

 Diflucan 150 mg oral tablet     2012  take 1 tablet (150 mg) by oral 
route once for 1 day   

 

 Diflucan 150 mg oral tablet  2012  8/15/2012  take 1 tablet (150 mg) by 
oral route once   

 

 Vitamin B-12 1,000 mcg oral tablet  8/15/2012  2014  take 1 tablet by 
oral route daily   

 

 Premarin 0.625 mg/gram vaginal cream  10/29/2012  6/10/2013  use 1 gram daily 
for a week then 1 gram twice a week   

 

 Medrol (Tab) 4 mg oral tablets,dose pack  2013  6/10/2013  take as 
directed   

 

 aspirin 325 mg oral tablet  2014  take 1 tablet (325 mg) by 
oral route once daily   

 

 Medrol (Tab) 4 mg oral tablets,dose pack  2014  10/2/2014  take as 
directed   

 

 Tetracaine Lollipops Lollipop  2015  Use as directed   

 

 Allergy Relief (loratadine) 10 mg oral tablet  2016     TAKE ONE TABLET 
BY MOUTH ONCE DAILY   

 

 Allergy Relief (loratadine) 10 mg oral tablet  2017  TAKE ONE 
TABLET BY MOUTH ONCE DAILY   

 

 Zoloft 50 mg oral tablet  2016  take 1 tablet (50 mg) by oral 
route once daily for 90 days   

 

 Zoloft 50 mg oral tablet  2016  take 1 tablet (50 mg) by oral 
route once daily for 90 days  Pt refuses to take...

 

 triamcinolone acetonide 0.1 % topical cream  2017     APPLY A THIN LAYER 
OF CREAM EXTERNALLY TO AFFECTED AREA TWICE DAILY FOR 14 DAYS   

 

 loratadine 10 mg oral tablet  2017  TAKE 1 TABLET BY MOUTH 
EVERY DAY IN THE EVENING   

 

 triamcinolone acetonide 0.1 % topical cream  2017  APPLY  
CREAM EXTERNALLY TO AFFECTED AREA TWICE DAILY FOR 14 DAYS   







Problem List







 Description  Status  Onset

 

 Hyperlipidemia  Active   

 

 acid reflux  Active   

 

 Hypertension  Active   

 

 hypothyroid  Active   







Vital Signs







 Date  Time  BP-Sys(mm[Hg]  BP-Morena(mm[Hg])  HR(bpm)  RR(rpm)  Temp  WT  HT  HC  
BMI  BSA  BMI Percentile  O2 Sat(%)

 

 2017  8:26:00 AM  122 mmHg  76 mmHg  63 bpm  16 rpm  98.4 F  129.25 lbs  
61 in     24.42 kg/m2  1.59 m2     99 %

 

 10/18/2017  9:01:00 AM  126 mmHg  80 mmHg  73 bpm  16 rpm  97.9 F  122.375 lbs
  61 in     23.1223 kg/m  1.5456 m     100 %

 

 10/10/2017  1:52:00 PM  118 mmHg  72 mmHg  74 bpm  16 rpm  98.1 F  121 lbs  61 
in     22.86 kg/m2  1.54 m2     99 %

 

 2017  8:40:00 AM  118 mmHg  68 mmHg  63 bpm  16 rpm  98.1 F  123.125 lbs  
61 in     23.264 kg/m  1.5504 m     100 %

 

 2017  8:57:00 AM  116 mmHg  62 mmHg  71 bpm  16 rpm  99.8 F  121.5 lbs  
61 in     22.96 kg/m2  1.54 m2     98 %

 

 2017  11:30:00 AM  128 mmHg  78 mmHg  69 bpm  16 rpm  98.8 F  129.375 lbs  
61 in     24.4449 kg/m  1.5892 m     98 %

 

 2017  8:22:00 AM  118 mmHg  78 mmHg  62 bpm  18 rpm  97.5 F  129.125 lbs  
61 in     24.40 kg/m2  1.59 m2     100 %

 

 10/13/2016  2:26:00 PM  128 mmHg  78 mmHg  77 bpm  16 rpm  98.4 F  139.25 lbs  
61 in     26.3108 kg/m  1.6488 m     100 %

 

 2016  8:29:00 AM  122 mmHg  70 mmHg  72 bpm  16 rpm  97.8 F  137.125 lbs  
61 in     25.91 kg/m2  1.64 m2     100 %

 

 2015  9:33:00 AM  120 mmHg  68 mmHg  73 bpm     98.7 F  144.375 lbs  61 
in     27.2791 kg/m  1.6788 m     99 %

 

 2015  9:05:00 AM  110 mmHg  75 mmHg  85 bpm  16 rpm  96.7 F  144.375 lbs  
61 in     27.28 kg/m2  1.68 m2     99 %

 

 2015  1:05:00 PM  108 mmHg  62 mmHg  78 bpm  18 rpm  96.9 F  142.375 lbs  
61 in     26.9012 kg/m  1.6672 m     100 %

 

 2014  9:31:00 AM  126 mmHg  66 mmHg  79 bpm  18 rpm  98.7 F  149 lbs  61 
in     28.15 kg/m2  1.71 m2     98 %

 

 10/6/2014  9:02:00 AM  110 mmHg  74 mmHg  94 bpm  18 rpm  98.1 F  145.4 lbs  
61 in     27.4728 kg/m  1.6848 m     97 %

 

 10/2/2014  9:14:00 AM  124 mmHg  74 mmHg  79 bpm  18 rpm  98 F  147.25 lbs  61 
in     27.82 kg/m2  1.70 m2     98 %

 

 2014  9:22:00 AM  124 mmHg  76 mmHg  89 bpm  18 rpm  98.1 F  148 lbs  61 
in     27.9641 kg/m  1.6998 m     100 %

 

 2014  8:27:00 AM  118 mmHg  65 mmHg  74 bpm  16 rpm  97.7 F  148 lbs  61 
in     27.96 kg/m2  1.70 m2     99 %

 

 2014  9:48:00 AM  125 mmHg  70 mmHg  93 bpm  18 rpm  96.7 F  156 lbs  61 
in     29.4756 kg/m  1.7451 m     100 %

 

 2013  8:35:00 AM  122 mmHg  74 mmHg  84 bpm  16 rpm  97.9 F  155.375 lbs 
                99 %

 

 6/10/2013  8:30:00 AM  130 mmHg  82 mmHg  79 bpm  16 rpm  97.9 F  161 lbs     
            98 %

 

 2013  8:50:00 AM  124 mmHg  68 mmHg  66 bpm  18 rpm  97.6 F  157.5 lbs  
61 in     29.76 kg/m2  1.75 m2      

 

 2012  1:46:00 PM  120 mmHg  72 mmHg  75 bpm  16 rpm  97.6 F  156.125 lbs
                 98 %

 

 12/10/2012  8:32:00 AM  122 mmHg  82 mmHg  70 bpm  16 rpm  97.3 F  157 lbs    
             99 %

 

 8/15/2012  9:00:00 AM  130 mmHg  76 mmHg  86 bpm  16 rpm  97.4 F  159 lbs     
            100 %

 

 2012  11:02:00 AM  128 mmHg  64 mmHg  66 bpm  18 rpm  97.4 F  162.125 lbs
  61 in     30.63 kg/m2  1.78 m2      

 

 2012  8:45:00 AM  130 mmHg  70 mmHg  82 bpm  16 rpm  98.2 F  160.125 lbs 
                100 %

 

 2/15/2012  9:29:00 AM  122 mmHg  80 mmHg  86 bpm  16 rpm  97.4 F  159.375 lbs  
61 in     30.11 kg/m2  1.76 m2     99 %

 

 2012  9:41:00 AM  132 mmHg  74 mmHg  66 bpm  18 rpm  98.4 F  160.375 lbs  
61 in     30.3023 kg/m  1.7694 m      

 

 2011  10:08:00 AM  134 mmHg  82 mmHg  80 bpm  16 rpm  98 F  160 lbs  61 
in     30.23 kg/m2  1.77 m2     99 %

 

 2011  3:56:00 PM  130 mmHg  80 mmHg                              

 

 2011  8:55:00 AM  130 mmHg  74 mmHg  88 bpm  16 rpm  98.2 F  158.25 lbs  
               98 %

 

 2011  8:54:00 AM  136 mmHg  82 mmHg  102 bpm  16 rpm  98.1 F  153.5 lbs   
              99 %

 

 2011  8:49:00 AM  122 mmHg  88 mmHg  88 bpm  16 rpm  98.6 F  152.25 lbs  
               99 %

 

 2011  10:02:00 AM  140 mmHg  82 mmHg  96 bpm  20 rpm  98.8 F             
       100 %

 

 2011  9:14:00 AM  156 mmHg  98 mmHg  110 bpm  24 rpm  98.5 F  153 lbs    
             100 %

 

 2011  8:50:00 AM  160 mmHg  84 mmHg  100 bpm  16 rpm  98.7 F  155 lbs    
             100 %

 

 2011  1:25:00 PM  152 mmHg  100 mmHg  82 bpm  16 rpm  97.2 F  156.375 lbs 
                100 %

 

 2011  9:55:00 AM  144 mmHg  90 mmHg                              

 

 2010  9:24:00 AM  138 mmHg  84 mmHg                              

 

 2010  8:58:00 AM  160 mmHg  94 mmHg                              

 

 2010  8:33:00 AM  142 mmHg  90 mmHg  100 bpm  16 rpm  98.1 F  158.375 
lbs                  

 

 2010  9:24:00 AM  160 mmHg  102 mmHg  88 bpm  18 rpm  99 F  157.125 lbs  
62 in     28.7382 kg/m  1.7657 m      

 

 3/19/2010  11:01:00 AM  140 mmHg  90 mmHg                              

 

 2009  10:08:00 AM  142 mmHg  86 mmHg  72 bpm  16 rpm  98.1 F  154.125 
lbs  61 in     29.12 kg/m2  1.73 m2      







Social History







 Name  Description  Comments

 

       

 

 Children      

 

 lives alone      

 

 Supervisor      

 

 Tobacco  Never smoker   







History of Procedures







 Date Ordered  Description  Order Status

 

 2011 12:00 AM  COMPREHEN METABOLIC PANEL  Reviewed

 

 2011 12:00 AM  ASSAY THYROID STIM HORMONE  Reviewed

 

 2011 12:00 AM  COMPREHEN METABOLIC PANEL  Reviewed

 

 2011 12:00 AM  LIPID PANEL  Reviewed

 

 2011 12:00 AM  ASSAY THYROID STIM HORMONE  Reviewed

 

 2011 12:00 AM  COMPLETE CBC W/AUTO DIFF WBC  Reviewed

 

 2015 12:00 AM  COMPLETE CBC W/AUTO DIFF WBC  Reviewed

 

 2015 12:00 AM  COMPREHEN METABOLIC PANEL  Reviewed

 

 2015 12:00 AM  LIPID PANEL  Reviewed

 

 2015 12:00 AM  ASSAY THYROID STIM HORMONE  Reviewed

 

 2011 12:00 AM  COMPREHEN METABOLIC PANEL  Reviewed

 

 2011 12:00 AM  COMPREHEN METABOLIC PANEL  Reviewed

 

 2011 12:00 AM  LIPID PANEL  Reviewed

 

 2011 12:00 AM  ASSAY THYROID STIM HORMONE  Reviewed

 

 10/28/2011 12:00 AM  ***Immunization administration, Medicare flu  Reviewed

 

 10/28/2011 12:00 AM  Fluzone ** MEDICARE Only **  Reviewed

 

 2010 11:24 AM  NO CHARGE OV  Reviewed

 

 2010 11:26 AM  NO CHARGE OV  Reviewed

 

 2011 12:00 AM  COMPREHEN METABOLIC PANEL  Reviewed

 

 2011 12:00 AM  LIPID PANEL  Reviewed

 

 2011 12:00 AM  ASSAY THYROID STIM HORMONE  Reviewed

 

 2011 12:00 AM  COMPLETE CBC W/AUTO DIFF WBC  Reviewed

 

 2011 12:00 AM  Wrist Support  Reviewed

 

 2016 12:00 AM  Screening mammography, bilateral  Reviewed

 

 2016 12:00 AM  DXA BONE DENSITY AXIAL  Returned

 

 2016 12:00 AM  TETANUS VACCINE IM  Reviewed

 

 2016 12:00 AM  HEP B VACC ADULT 3 DOSE IM  Reviewed

 

 2012 12:00 AM  TISSUE EXAM FOR FUNGI  Reviewed

 

 2012 12:00 AM  SMEAR WET MOUNT SALINE/INK  Reviewed

 

 2016 12:00 AM  TETANUS VACCINE IM  Reviewed

 

 2016 12:00 AM  HEP B VACC ADULT 3 DOSE IM  Reviewed

 

 2/15/2012 12:00 AM  THER/PROPH/DIAG INJ SC/IM  Reviewed

 

 2/15/2012 12:00 AM  Bicillin CR [facility]#90609517009[facility]  Reviewed

 

 2/15/2012 12:00 AM  Depo-Medrol 40 mg NDC#3318848064  Reviewed

 

 10/13/2016 12:00 AM  COMPLETE CBC W/AUTO DIFF WBC  Returned

 

 10/13/2016 12:00 AM  COMPREHEN METABOLIC PANEL  Returned

 

 10/13/2016 12:00 AM  ASSAY THYROID STIM HORMONE  Returned

 

 10/13/2016 12:00 AM  LIPID PANEL  Returned

 

 2016 12:00 AM  TETANUS VACCINE IM  Reviewed

 

 2016 12:00 AM  HEP B VACC ADULT 3 DOSE IM  Reviewed

 

 2017 12:00 AM  ASSAY OF IRON  Returned

 

 2017 12:00 AM  IRON BINDING TEST  Returned

 

 2017 12:00 AM  ASSAY OF TRANSFERRIN  Returned

 

 2017 12:00 AM  OCCULT BLD FECES 1-3 TESTS  Returned

 

 2017 12:00 AM  COMPLETE CBC AUTOMATED  Returned

 

 8/15/2012 12:00 AM  COMPREHEN METABOLIC PANEL  Reviewed

 

 8/15/2012 12:00 AM  ASSAY THYROID STIM HORMONE  Reviewed

 

 8/15/2012 12:00 AM  COMPLETE CBC W/AUTO DIFF WBC  Reviewed

 

 8/15/2012 12:00 AM  Wrist Support  Reviewed

 

 2017 12:00 AM  METABOLIC PANEL TOTAL CA  Returned

 

 2017 12:00 AM  METABOLIC PANEL TOTAL CA  Returned

 

 2017 12:00 AM  Screening mammography, bilateral  Returned

 

 10/29/2012 12:00 AM  Flu Injection 3 Years And Above NDC# [address]-66  [facility]  
Reviewed

 

 12/10/2012 12:00 AM  IMMUNIZATION ADMIN  Reviewed

 

 12/10/2012 12:00 AM  Pneumovax Injection - [facility]  Reviewed

 

 2012 12:00 AM  TRICHOMONAS ASSAY W/OPTIC  Reviewed

 

 2013 12:00 AM  THER/PROPH/DIAG INJ SC/IM  Reviewed

 

 2013 12:00 AM  Bicillin CR, 1.2 million units NDC# 82198-771-24  Reviewed

 

 2013 12:00 AM  COMPREHEN METABOLIC PANEL  Reviewed

 

 2013 12:00 AM  LIPID PANEL  Reviewed

 

 2013 12:00 AM  COMPLETE CBC W/AUTO DIFF WBC  Reviewed

 

 2013 12:00 AM  ASSAY THYROID STIM HORMONE  Reviewed

 

 6/10/2013 12:00 AM  MAMMOGRAM SCREENING  Reviewed

 

 6/10/2013 12:00 AM  CYTOPATH C/V MANUAL  Reviewed

 

 12/10/2013 12:00 AM  LIPID PANEL  Reviewed

 

 12/10/2013 12:00 AM  ASSAY THYROID STIM HORMONE  Reviewed

 

 12/10/2013 12:00 AM  COMPLETE CBC W/AUTO DIFF WBC  Reviewed

 

 12/10/2013 12:00 AM  COMPREHEN METABOLIC PANEL  Reviewed

 

 2010 12:00 AM  CYTOPATH C/V MANUAL  Reviewed

 

 2010 12:00 AM  SMEAR WET MOUNT SALINE/INK  Reviewed

 

 2010 12:00 AM  LIPID PANEL  Reviewed

 

 2010 12:00 AM  ASSAY THYROID STIM HORMONE  Reviewed

 

 2010 12:00 AM  COMPLETE CBC W/AUTO DIFF WBC  Reviewed

 

 2010 12:00 AM  COMPREHEN METABOLIC PANEL  Reviewed

 

 10/28/2013 12:00 AM  Flu Injection 3 Years And Above NDC# [address]-50  [facility]  
Reviewed

 

 2010 8:48 AM  Blood Pressure Check-no charge  Reviewed

 

 2010 12:00 AM  Blood Pressure Check-no charge  Reviewed

 

 2014 12:00 AM  METABOLIC PANEL TOTAL CA  Reviewed

 

 2014 12:00 AM  ASSAY THYROID STIM HORMONE  Reviewed

 

 2014 12:00 AM  ASSAY OF FREE THYROXINE  Reviewed

 

 2014 12:00 AM  MAMMOGRAM SCREENING  Reviewed

 

 2014 12:00 AM  CT BONE DENSITY AXIAL  Reviewed

 

 2014 12:00 AM  COMPLETE CBC W/AUTO DIFF WBC  Reviewed

 

 2014 12:00 AM  COMPREHEN METABOLIC PANEL  Reviewed

 

 2014 12:00 AM  LIPID PANEL  Reviewed

 

 2014 12:00 AM  ASSAY THYROID STIM HORMONE  Reviewed

 

 10/20/2010 12:00 AM  IMMUNIZATION ADMIN  Reviewed

 

 10/20/2010 12:00 AM  FLU VACCINE 3 YRS & > IM  Reviewed

 

 2010 12:00 AM  COMPREHEN METABOLIC PANEL  Reviewed

 

 2010 12:00 AM  LIPID PANEL  Reviewed

 

 2010 12:00 AM  ASSAY THYROID STIM HORMONE  Reviewed

 

 2010 12:00 AM  COMPLETE CBC W/AUTO DIFF WBC  Reviewed

 

 2010 12:00 AM  Blood Pressure Check-no charge  Reviewed

 

 10/2/2014 12:00 AM  THER/PROPH/DIAG INJ SC/IM  Reviewed

 

 10/2/2014 12:00 AM  Kenalog, Per 10 Mg NDC#5033-9990-61  Reviewed

 

 2014 12:00 AM  COMPLETE CBC W/AUTO DIFF WBC  Reviewed

 

 2014 12:00 AM  COMPREHEN METABOLIC PANEL  Reviewed

 

 2014 12:00 AM  LIPID PANEL  Reviewed

 

 2014 12:00 AM  ASSAY THYROID STIM HORMONE  Reviewed

 

 2015 12:00 AM  Rocephin 1 gram NDC#7682-7176-71  Reviewed

 

 2015 12:00 AM  THER/PROPH/DIAG INJ SC/IM  Reviewed

 

 2011 12:00 AM  COMPREHEN METABOLIC PANEL  Reviewed

 

 2011 12:00 AM  LIPID PANEL  Reviewed

 

 2011 12:00 AM  ASSAY THYROID STIM HORMONE  Reviewed

 

 2011 12:00 AM  COMPLETE CBC W/AUTO DIFF WBC  Reviewed

 

 2011 12:00 AM  METABOLIC PANEL TOTAL CA  Reviewed

 

 2011 12:00 AM  COMPREHEN METABOLIC PANEL  Reviewed

 

 2011 12:00 AM  ASSAY THYROID STIM HORMONE  Reviewed

 

 2011 12:00 AM  COMPLETE CBC W/AUTO DIFF WBC  Reviewed

 

 2011 12:00 AM  ASSAY OF LIPASE  Reviewed

 

 2011 12:00 AM  THER/PROPH/DIAG INJ SC/IM  Reviewed

 

 2011 12:00 AM  Phenergan 50 Mg Im  Bernadine  Reviewed

 

 2011 12:00 AM  METABOLIC PANEL TOTAL CA  Reviewed

 

 2011 12:00 AM  THER/PROPH/DIAG INJ SC/IM  Reviewed

 

 2011 12:00 AM  Phenergan 25 Mg Im  Bernadine  Reviewed

 

 2011 12:00 AM  METABOLIC PANEL TOTAL CA  Reviewed

 

 2011 12:00 AM  ASSAY THYROID STIM HORMONE  Reviewed

 

 2011 12:00 AM  THER/PROPH/DIAG INJ SC/IM  Reviewed

 

 2011 12:00 AM  Phenergan 50 Mg Im  Bernadine  Reviewed

 

 2011 12:00 AM  ASSAY OF SERUM SODIUM  Reviewed

 

 2011 12:00 AM  ASSAY OF SERUM SODIUM  Reviewed

 

 2011 12:00 AM  CYTOPATH C/V MANUAL  Reviewed

 

 2011 12:00 AM  ASSAY THYROID STIM HORMONE  Reviewed

 

 2015 12:00 AM  COMPLETE CBC W/AUTO DIFF WBC  Reviewed

 

 2015 12:00 AM  COMPREHEN METABOLIC PANEL  Reviewed

 

 2015 12:00 AM  LIPID PANEL  Reviewed

 

 2015 12:00 AM  ASSAY THYROID STIM HORMONE  Reviewed

 

 2015 12:00 AM  MAMMOGRAM SCREENING  Reviewed

 

 2015 12:00 AM  CYTOPATH TBS C/V MANUAL  Reviewed







Results Summary







 Date and Description  Results

 

 2010 10:41 AM  WET PREP NO TRICHOMONADS SEEN  No  Few 

 

 2010 8:15 AM  TRIGLYCERIDES 174.0 mg/dLCHOLESTEROL 175.0 mg/dLHDL 58.0 mg/
dLTOT CHOL/HDL 3.0 LDL (CALC) 82.0 mg/dLTSH 0.790 uIU/mLGLUCOSE 95.0 mg/
dLSODIUM 136.0 mmol/LPOTASSIUM 4.50 mmol/LCHLORIDE 99.0 mmol/LCO2 26.0 mmol/
LBUN 12.0 mg/dLCREATININE 0.80 mg/dLSGOT/AST 32.0 IU/LSGPT/ALT 33.0 IU/LALK 
PHOS 103.0 IU/LTOTAL PROTEIN 7.60 g/dLALBUMIN 4.60 g/dLTOTAL BILI 0.60 mg/
dLCALCIUM 9.80 mg/dLAGE 64 GFR NonAA 72 GFR AA 87 eGFR >60 mL/min/1.73 m2eGFR AA
* >60 WBC 5.3 RBC 4.13 HGB 13.10 g/dLHCT 39.20 %MCV 95.0 fLMCH 31.70 pgMCHC 
33.40 g/dLRDW SD 44 RDW CV 12.70 %MPV 9.80 fLPLT 257 NRBC# 0.00 NRBC% 0.0 %NEUT 
57.90 %%LYMP 26.50 %%MONO 11.60 %%EOS 3.20 %%BASO 0.80 %#NEUT 3.04 #LYMP 1.39 #
MONO 0.61 #EOS 0.17 #BASO 0.04 MANUAL DIFF NOT IND 

 

 2011 9:45 AM  GLUCOSE 91.0 mg/dLSODIUM 133.0 mmol/LPOTASSIUM 4.40 mmol/
LCHLORIDE 97.0 mmol/LCO2 24.0 mmol/LBUN 9.0 mg/dLCREATININE 0.70 mg/dLCALCIUM 
10.0 mg/dLAGE 65 GFR NonAA 84 GFR  eGFR >60 mL/min/1.73 m2eGFR AA* >60 

 

 2011 10:25 AM  LIPASE 29.0 U/LTSH 0.10 uIU/mLGLUCOSE 115.0 mg/dLSODIUM 
127.0 mmol/LPOTASSIUM 5.30 mmol/LCHLORIDE 93.0 mmol/LCO2 18.0 mmol/LBUN 9.0 mg/
dLCREATININE 0.70 mg/dLSGOT/AST 62.0 IU/LSGPT/ALT 46.0 IU/LALK PHOS 100.0 IU/
LTOTAL PROTEIN 8.60 g/dLALBUMIN 4.90 g/dLTOTAL BILI 0.60 mg/dLCALCIUM 9.90 mg/
dLAGE 65 GFR NonAA 84 GFR  eGFR >60 mL/min/1.73 m2eGFR AA* >60 WBC 6.9 
RBC 4.48 HGB 14.40 g/dLHCT 40.90 %MCV 91.0 fLMCH 32.10 pgMCHC 35.20 g/dLRDW SD 
41 RDW CV 12.50 %MPV 9.30 fLPLT 260 NRBC# 0.00 NRBC% 0.0 %NEUT 74.90 %%LYMP 
15.10 %%MONO 9.40 %%EOS 0.30 %%BASO 0.30 %#NEUT 5.15 #LYMP 1.04 #MONO 0.65 #EOS 
0.02 #BASO 0.02 MANUAL DIFF NOT IND 

 

 2011 9:07 AM  GLUCOSE 92.0 mg/dLSODIUM 131.0 mmol/LPOTASSIUM 4.20 mmol/
LCHLORIDE 99.0 mmol/LCO2 22.0 mmol/LBUN 9.0 mg/dLCREATININE 0.70 mg/dLCALCIUM 
9.20 mg/dLAGE 65 GFR NonAA 84 GFR  eGFR >60 mL/min/1.73 m2eGFR AA* >60 

 

 2011 11:06 AM  TSH 0.510 uIU/mLGLUCOSE 99.0 mg/dLSODIUM 132.0 mmol/
LPOTASSIUM 3.50 mmol/LCHLORIDE 95.0 mmol/LCO2 23.0 mmol/LBUN 9.0 mg/
dLCREATININE 0.80 mg/dLCALCIUM 10.40 mg/dLAGE 65 GFR NonAA 72 GFR AA 87 eGFR >
60 mL/min/1.73 m2eGFR AA* >60 

 

 2011 9:45 AM  SODIUM 132.0 mmol/L

 

 2011 9:27 AM  SODIUM 137.0 mmol/L

 

 2011 9:55 AM  TSH 1.070 uIU/mL

 

 2011 8:55 AM  GLUCOSE 102.0 mg/dLSODIUM 135.0 mmol/LPOTASSIUM 4.20 mmol/
LCHLORIDE 102.0 mmol/LCO2 24.0 mmol/LBUN 15.0 mg/dLCREATININE 0.80 mg/dLSGOT/
AST 30.0 IU/LSGPT/ALT 35.0 IU/LALK PHOS 119.0 IU/LTOTAL PROTEIN 7.50 g/
dLALBUMIN 4.30 g/dLTOTAL BILI 0.30 mg/dLCALCIUM 9.20 mg/dLAGE 65 GFR NonAA 72 
GFR AA 87 eGFR >60 mL/min/1.73 m2eGFR AA* >60 TSH 1.130 uIU/mL

 

 2011 8:55 AM  GLUCOSE 98.0 mg/dLSODIUM 137.0 mmol/LPOTASSIUM 3.90 mmol/
LCHLORIDE 103.0 mmol/LCO2 25.0 mmol/LBUN 14.0 mg/dLCREATININE 0.70 mg/dLSGOT/
AST 33.0 IU/LSGPT/ALT 36.0 IU/LALK PHOS 111.0 IU/LTOTAL PROTEIN 8.30 g/
dLALBUMIN 4.40 g/dLTOTAL BILI 0.50 mg/dLCALCIUM 9.40 mg/dLAGE 66 GFR NonAA 84 
GFR  eGFR >60 mL/min/1.73 m2eGFR AA* >60 TRIGLYCERIDES 109.0 mg/
dLCHOLESTEROL 153.0 mg/dLHDL 54.0 mg/dLTOT CHOL/HDL 2.8 LDL (CALC) 77.0 mg/
dLTSH 1.330 uIU/mL

 

 2012 10:33 AM  WET PREP NO TRICH SEEN CLUE CELLS NONE SEEN 

 

 2012 8:45 AM  WBC 5.2 RBC 3.96 HGB 12.70 g/dLHCT 37.80 %MCV 96.0 fLMCH 
32.10 pgMCHC 33.60 g/dLRDW SD 46 RDW CV 13.30 %MPV 9.70 fLPLT 297 NRBC# 0.00 
NRBC% 0.0 %NEUT 57.70 %%LYMP 28.50 %%MONO 10.30 %%EOS 2.50 %%BASO 1.0 %#NEUT 
3.02 #LYMP 1.49 #MONO 0.54 #EOS 0.13 #BASO 0.05 MANUAL DIFF NOT IND GLUCOSE 
97.0 mg/dLSODIUM 137.0 mmol/LPOTASSIUM 4.40 mmol/LCHLORIDE 101.0 mmol/LCO2 23.0 
mmol/LBUN 17.0 mg/dLCREATININE 0.80 mg/dLSGOT/AST 30.0 IU/LSGPT/ALT 33.0 IU/
LALK PHOS 95.0 IU/LTOTAL PROTEIN 7.40 g/dLALBUMIN 4.40 g/dLTOTAL BILI 0.60 mg/
dLCALCIUM 9.70 mg/dLAGE 66 GFR NonAA 72 GFR AA 87 eGFR 60 eGFR AA* 60 
TRIGLYCERIDES 130.0 mg/dLCHOLESTEROL 157.0 mg/dLHDL 56.0 mg/dLTOT CHOL/HDL 2.8 
LDL (CALC) 75.0 mg/dLTSH 0.940 uIU/mL

 

 2012 8:45 AM  WBC 5.1 RBC 3.91 HGB 12.50 g/dLHCT 36.30 %MCV 93.0 fLMCH 
32.0 pgMCHC 34.40 g/dLRDW SD 43 RDW CV 12.60 %MPV 9.30 fLPLT 244 NRBC# 0.00 NRBC
% 0.0 %NEUT 57.60 %%LYMP 27.50 %%MONO 9.10 %%EOS 5.0 %%BASO 0.80 %#NEUT 2.91 #
LYMP 1.39 #MONO 0.46 #EOS 0.25 #BASO 0.04 MANUAL DIFF NOT IND 

 

 2012 5:02 PM  WET PREP NO TRICH SEEN CLUE CELLS NONE SEEN 

 

 2013 8:25 AM  WBC 4.2 RBC 3.96 HGB 12.90 g/dLHCT 37.0 %MCV 93.0 fLMCH 
32.60 pgMCHC 34.90 g/dLRDW SD 43 RDW CV 12.60 %MPV 9.70 fLPLT 254 NRBC# 0.00 
NRBC% 0.0 %NEUT 54.40 %%LYMP 30.40 %%MONO 10.80 %%EOS 3.40 %%BASO 1.0 %#NEUT 
2.26 #LYMP 1.26 #MONO 0.45 #EOS 0.14 #BASO 0.04 MANUAL DIFF NOT IND TSH 1.230 
uIU/mLGLUCOSE 94.0 mg/dLSODIUM 136.0 mmol/LPOTASSIUM 4.30 mmol/LCHLORIDE 99.0 
mmol/LCO2 25.0 mmol/LBUN 10.0 mg/dLCREATININE 0.80 mg/dLSGOT/AST 36.0 IU/LSGPT/
ALT 36.0 IU/LALK PHOS 84.0 IU/LTOTAL PROTEIN 7.90 g/dLALBUMIN 4.40 g/dLTOTAL 
BILI 0.70 mg/dLCALCIUM 9.80 mg/dLAGE 67 GFR NonAA 72 GFR AA 87 eGFR 60 eGFR AA* 
60 TRIGLYCERIDES 122.0 mg/dLCHOLESTEROL 141.0 mg/dLHDL 47.0 mg/dLTOT CHOL/HDL 
3.0 LDL (CALC) 70.0 mg/dL

 

 2013 8:45 AM  WBC 5.3 RBC 4.01 HGB 13.0 g/dLHCT 37.60 %MCV 94.0 fLMCH 
32.40 pgMCHC 34.60 g/dLRDW SD 43 RDW CV 12.50 %MPV 9.40 fLPLT 248 NRBC# 0.00 
NRBC% 0.0 %NEUT 58.70 %%LYMP 27.80 %%MONO 9.80 %%EOS 2.80 %%BASO 0.90 %#NEUT 
3.12 #LYMP 1.48 #MONO 0.52 #EOS 0.15 #BASO 0.05 MANUAL DIFF NOT IND TSH 1.570 
uIU/mLGLUCOSE 94.0 mg/dLSODIUM 134.0 mmol/LPOTASSIUM 4.10 mmol/LCHLORIDE 101.0 
mmol/LCO2 23.0 mmol/LBUN 11.0 mg/dLCREATININE 0.80 mg/dLSGOT/AST 41.0 IU/LSGPT/
ALT 44.0 IU/LALK PHOS 109.0 IU/LTOTAL PROTEIN 7.90 g/dLALBUMIN 4.70 g/dLTOTAL 
BILI 0.80 mg/dLCALCIUM 10.40 mg/dLAGE 68 GFR NonAA 72 GFR AA 87 eGFR >60 mL/min/
1.73 m2eGFR AA* >60 TRIGLYCERIDES 163.0 mg/dLCHOLESTEROL 138.0 mg/dLHDL 49.0 mg/
dLTOT CHOL/HDL 2.8 LDL (CALC) 56.0 mg/dL

 

 2014 8:58 AM  GLUCOSE 86.0 mg/dLSODIUM 134.0 mmol/LPOTASSIUM 4.20 mmol/
LCHLORIDE 99.0 mmol/LCO2 25.0 mmol/LBUN 14.0 mg/dLCREATININE 0.80 mg/dLCALCIUM 
10.10 mg/dLAGE 68 GFR NonAA 72 GFR AA 87 eGFR >60 mL/min/1.73 m2eGFR AA* >60 
FREE T4 1.18 TSH 1.20 uIU/mL

 

 2014 9:50 AM  WBC 5.7 RBC 4.03 HGB 12.90 g/dLHCT 37.90 %MCV 94.0 fLMCH 
32.0 pgMCHC 34.0 g/dLRDW SD 44 RDW CV 12.70 %MPV 9.70 fLPLT 292 NRBC# 0.00 NRBC
% 0.0 %NEUT 62.70 %%LYMP 21.80 %%MONO 11.0 %%EOS 3.40 %%BASO 1.10 %#NEUT 3.55 #
LYMP 1.23 #MONO 0.62 #EOS 0.19 #BASO 0.06 MANUAL DIFF NOT IND GLUCOSE 98.0 mg/
dLSODIUM 135.0 mmol/LPOTASSIUM 4.30 mmol/LCHLORIDE 102.0 mmol/LCO2 22.0 mmol/
LBUN 10.0 mg/dLCREATININE 0.80 mg/dLSGOT/AST 34.0 IU/LSGPT/ALT 32.0 IU/LALK 
PHOS 95.0 IU/LTOTAL PROTEIN 7.70 g/dLALBUMIN 4.30 g/dLTOTAL BILI 0.80 mg/
dLCALCIUM 9.10 mg/dLAGE 68 GFR NonAA 72 GFR AA 87 eGFR 60 eGFR AA* 60 
TRIGLYCERIDES 108.0 mg/dLCHOLESTEROL 146.0 mg/dLHDL 56.0 mg/dLTOT CHOL/HDL 2.6 
LDL (CALC) 68.0 mg/dLTSH 0.90 uIU/mL

 

 2014 8:40 AM  WBC 4.4 RBC 4.13 HGB 13.20 g/dLHCT 38.90 %MCV 94.0 fLMCH 
32.0 pgMCHC 33.90 g/dLRDW SD 47 RDW CV 13.50 %MPV 9.80 fLPLT 279 NRBC# 0.00 NRBC
% 0.0 %NEUT 63.80 %%LYMP 24.60 %%MONO 9.30 %%EOS 1.60 %%BASO 0.70 %#NEUT 2.80 #
LYMP 1.08 #MONO 0.41 #EOS 0.07 #BASO 0.03 MANUAL DIFF NOT IND GLUCOSE 96.0 mg/
dLSODIUM 136.0 mmol/LPOTASSIUM 4.10 mmol/LCHLORIDE 99.0 mmol/LCO2 23.0 mmol/
LBUN 8.0 mg/dLCREATININE 0.80 mg/dLSGOT/AST 31.0 IU/LSGPT/ALT 27.0 IU/LALK PHOS 
85.0 IU/LTOTAL PROTEIN 7.60 g/dLALBUMIN 4.40 g/dLTOTAL BILI 0.80 mg/dLCALCIUM 
10.20 mg/dLAGE 69 GFR NonAA 71 GFR AA 86 eGFR 60 eGFR AA* 60 TRIGLYCERIDES 61.0 
mg/dLCHOLESTEROL 148.0 mg/dLHDL 71.0 mg/dLTOT CHOL/HDL 2.1 LDL (CALC) 65.0 mg/
dLTSH 0.990 uIU/mL

 

 2015 8:32 AM  WBC 4.8 RBC 3.98 HGB 12.70 g/dLHCT 37.30 %MCV 94.0 fLMCH 
31.90 pgMCHC 34.0 g/dLRDW SD 43 RDW CV 12.70 %MPV 9.50 fLPLT 270 NRBC# 0.00 NRBC
% 0.0 %NEUT 57.30 %%LYMP 25.0 %%MONO 13.0 %%EOS 3.60 %%BASO 1.10 %#NEUT 2.73 #
LYMP 1.19 #MONO 0.62 #EOS 0.17 #BASO 0.05 MANUAL DIFF NOT IND TSH 0.640 uIU/
mLGLUCOSE 90.0 mg/dLSODIUM 136.0 mmol/LPOTASSIUM 4.0 mmol/LCHLORIDE 101.0 mmol/
LCO2 24.0 mmol/LBUN 10.0 mg/dLCREATININE 0.80 mg/dLSGOT/AST 34.0 IU/LSGPT/ALT 
32.0 IU/LALK PHOS 92.0 IU/LTOTAL PROTEIN 7.50 g/dLALBUMIN 4.40 g/dLTOTAL BILI 
0.70 mg/dLCALCIUM 9.50 mg/dLAGE 69 GFR NonAA 71 GFR AA 86 eGFR >60 mL/min/1.73 
m2eGFR AA* >60 TRIGLYCERIDES 107.0 mg/dLCHOLESTEROL 138.0 mg/dLHDL 58.0 mg/
dLTOT CHOL/HDL 2.4 LDL (CALC) 59.0 mg/dL

 

 2015 8:31 AM  WBC 4.6 RBC 3.97 HGB 12.90 g/dLHCT 38.0 %MCV 96.0 fLMCH 
32.50 pgMCHC 33.90 g/dLRDW SD 44 RDW CV 12.60 %MPV 9.0 fLPLT 248 NRBC# 0.00 NRBC
% 0.0 %NEUT 61.0 %%LYMP 24.70 %%MONO 10.20 %%EOS 3.0 %%BASO 1.10 %#NEUT 2.82 #
LYMP 1.14 #MONO 0.47 #EOS 0.14 #BASO 0.05 MANUAL DIFF NOT IND TRIGLYCERIDES 
105.0 mg/dLCHOLESTEROL 141.0 mg/dLHDL 58.0 mg/dLTOT CHOL/HDL 2.4 LDL (CALC) 
62.0 mg/dLGLUCOSE 93.0 mg/dLSODIUM 136.0 mmol/LPOTASSIUM 4.10 mmol/LCHLORIDE 
101.0 mmol/LCO2 25.0 mmol/LBUN 9.0 mg/dLCREATININE 0.80 mg/dLSGOT/AST 32.0 IU/
LSGPT/ALT 28.0 IU/LALK PHOS 95.0 IU/LTOTAL PROTEIN 7.30 g/dLALBUMIN 4.50 g/
dLTOTAL BILI 0.80 mg/dLCALCIUM 9.70 mg/dLAGE 70 GFR NonAA 71 GFR AA 86 eGFR >60 
mL/min/1.73meGFR AA* >60 TSH 1.10 uIU/mL







History Of Immunizations







 Name  Date Admin  Mfg Name  Mfg Code  Trade Name  Lot#  Route  Inj  Vis Given  
Vis Pub  CVX

 

 Influenza  10/20/2010  [employer]of[employer] pasteur  [facility]  Fluzone  [id number]  Intramuscular  
Left Arm  10/20/2010  2010  999

 

 Influenza  10/28/2011  [employer]of[employer] pasteur  [employer]  Fluzone  [id number]  Intramuscular  
Left Arm  10/28/2011  2011  141

 

 Influenza  10/29/2012  sanofi pasteur  [employer]  Fluzone  [id number]  Intramuscular  
Left Deltoid  10/29/2012  2012  141

 

 X  12/10/2012  [employer]  MSD  Pneumovax 23  [id number]  Intramuscular  
Left Gluteous Medius  12/10/2012  2010  33

 

 Influenza  10/28/2013  sanofi pasteur  [facility]  Fluzone > 3 Years  [id number]  
Intramuscular  Right Deltoid  10/28/2013  2013  141

 

 X  9/2/2015  Not Entered  NE  Prevnar 13     Not Entered  Not Entered  1  109

 

 Influenza  2015  Not Entered  NE  Not Entered     Not Entered  Not Entered
  2015  141

 

 HepB Adult  2016  [employer]  MSD  Recombivax Adult  [id number]  Not 
Entered  Left Deltoid  2016  43

 

 HepB Adult  2016  [employer]  MSD  Recombivax Adult  [id number]  
Intramuscular  Right Deltoid  2016  43

 

 Zostavax  2012  Not Entered  NE  Not Entered     Not Entered  Not 
Entered  1  121

 

 Tdap  2012  Not Entered  NE  Not Entered     Not Entered  Not Entered  1  115

 

 Influenza  10/13/2016  Not Entered  NE  Fluzone High-Dose     Intramuscular  
Left Arm  1  141

 

 HepB Adult  2016  [employer]  MSD  Recombivax Adult  [id number]  
Intramuscular  Right Deltoid  2016  43

 

 Influenza  10/30/2017  Not Entered  NE  Fluarix Quadrivalent     Intramuscular
  Left Arm  2017  150







History of Past Illness







 Name  Date of Onset  Comments

 

 Benign Essential Hypertension  Dec 14 2009 10:10AM   

 

 Hyperlipidemia  Dec 14 2009 10:10AM   

 

 Hypothyroidism, Acquired  Dec 14 2009 10:10AM   

 

 hypothyroid      

 

 Hypertension      

 

 Hyperlipidemia      

 

 acid reflux      

 

 Hypertension, Benign Essential  2010  9:41AM   

 

 Hypertension, Benign Essential  2010 12:53PM   

 

 Routine gynecological examination  May  5 2010  9:28AM   

 

 Hypertension  May  5 2010  9:28AM   

 

 Hyperlipidemia, unspecified  May  5 2010  9:28AM   

 

 Hypothyroidism, Acquired  May  5 2010  9:28AM   

 

 Vulvovaginitis  May  5 2010  9:28AM   

 

 Rhinitis, Allergic  May  5 2010  9:28AM   

 

 Hypertension, Benign Essential  May 19 2010  8:48AM   

 

 Hypertension, Benign Essential  May 25 2010  9:39AM   

 

 Flu  Oct 20 2010 12:01PM   

 

 Essential Hypertension  2010  8:34AM   

 

 Hyperlipidemia  2010  8:34AM   

 

 Gastroesophageal Reflux  2010  8:34AM   

 

 Hypothyroidism, Acquired  2010  8:34AM   

 

 Hypertension, Benign Essential  2010  9:22AM   

 

 Hypertension, Benign Essential  Dec 15 2010  9:07AM   

 

 Essential Hypertension  2011  1:31PM   

 

 Hyperlipidemia  2011  1:31PM   

 

 Gastroesophageal Reflux  2011  1:31PM   

 

 Hypothyroidism, Acquired  2011  1:31PM   

 

 Essential Hypertension  2011  8:51AM   

 

 Hyperlipidemia  2011  8:51AM   

 

 Gastroesophageal Reflux  2011  8:51AM   

 

 Hypothyroidism, Acquired  2011  8:51AM   

 

 Essential Hypertension  2011  9:13AM   

 

 Hyperlipidemia  2011  9:13AM   

 

 Gastroesophageal Reflux  2011  9:13AM   

 

 Hypothyroidism, Acquired  2011  9:13AM   

 

 Nausea With Vomiting  2011  1:18PM   

 

 Hyponatremia  2011  8:46AM   

 

 Nausea  2011  9:13AM   

 

 Hypothyroidism  2011 11:10AM   

 

 Hyponatremia  2011 11:10AM   

 

 Essential Hypertension  2011 10:05AM   

 

 Hyperlipidemia  2011 10:05AM   

 

 Gastroesophageal Reflux  2011 10:05AM   

 

 Nausea  2011 10:05AM   

 

 Hypothyroidism, Acquired  2011 10:05AM   

 

 Hyponatremia  May  6 2011 11:34AM   

 

 Essential Hypertension  May 16 2011  8:50AM   

 

 Hyperlipidemia  May 16 2011  8:50AM   

 

 Gastroesophageal Reflux  May 16 2011  8:50AM   

 

 Nausea  May 16 2011  8:50AM   

 

 Hypothyroidism, Acquired  May 16 2011  8:50AM   

 

 Hyponatremia  May 16 2011  8:50AM   

 

 Routine gynecological examination  2011  8:54AM   

 

 Hypertension  2011  8:54AM   

 

 Hyperlipidemia, unspecified  2011  8:54AM   

 

 Hypothyroidism, Acquired  2011  8:54AM   

 

 Rhinitis, Allergic  2011  8:54AM   

 

 Hypothyroidism  Aug 29 2011 12:37PM   

 

 Hyponatremia  Aug 29 2011 12:37PM   

 

 Essential Hypertension  Sep 12 2011  8:53AM   

 

 Hyperlipidemia  Sep 12 2011  8:53AM   

 

 Gastroesophageal Reflux  Sep 12 2011  8:53AM   

 

 Hypothyroidism, Acquired  Sep 12 2011  8:53AM   

 

 Hyponatremia  Sep 12 2011  8:53AM   

 

 Hypertension  Sep 29 2011  9:41AM   

 

 Hyperlipidemia  Dec  8 2011  8:31AM   

 

 Hypothyroidism  Dec  8 2011  8:31AM   

 

 Hypertension  Dec  8 2011  8:31AM   

 

 Flu  Dec  9 2011 10:02AM   

 

 Essential Hypertension  Dec 13 2011 10:13AM   

 

 Hyperlipidemia  Dec 13 2011 10:13AM   

 

 Gastroesophageal Reflux  Dec 13 2011 10:13AM   

 

 Hypothyroidism, Acquired  Dec 13 2011 10:13AM   

 

 Hyponatremia  Dec 13 2011 10:13AM   

 

 Vaginal Discharge  2012  9:43AM   

 

 Rhinitis, Allergic  Feb 15 2012  9:31AM   

 

 Eustachian Tube Dysfunction  Feb 15 2012  9:31AM   

 

 Pharyngitis, Acute  Feb 15 2012  9:31AM   

 

 Routine gynecological examination  2012  8:48AM   

 

 Hypertension  2012  8:48AM   

 

 Hyperlipidemia, unspecified  2012  8:48AM   

 

 Hypothyroidism, Acquired  2012  8:48AM   

 

 Rhinitis, Allergic  2012  8:48AM   

 

 Candidiasis, Vulvovaginal  2012 11:04AM   

 

 Essential Hypertension  Aug 15 2012  9:02AM   

 

 Hyperlipidemia  Aug 15 2012  9:02AM   

 

 Gastroesophageal Reflux  Aug 15 2012  9:02AM   

 

 Hypothyroidism, Acquired  Aug 15 2012  9:02AM   

 

 Hyponatremia  Aug 15 2012  9:02AM   

 

 Atrophic Vaginitis, Postmenopausal  Aug 15 2012  9:02AM   

 

 Flu  Oct 29 2012  9:24AM   

 

 Essential Hypertension  Dec 10 2012  8:35AM   

 

 Hyperlipidemia  Dec 10 2012  8:35AM   

 

 Gastroesophageal Reflux  Dec 10 2012  8:35AM   

 

 Hypothyroidism, Acquired  Dec 10 2012  8:35AM   

 

 Hyponatremia  Dec 10 2012  8:35AM   

 

 Atrophic Vaginitis, Postmenopausal  Dec 10 2012  8:35AM   

 

 Pneumococcus  Dec 10 2012  2:07PM   

 

 Vaginal Discharge  Dec 20 2012  1:50PM   

 

 Essential Hypertension  Dec 20 2012  1:50PM   

 

 Hyperlipidemia  Dec 20 2012  1:50PM   

 

 Gastroesophageal Reflux  Dec 20 2012  1:50PM   

 

 Hypothyroidism, Acquired  Dec 20 2012  1:50PM   

 

 Atrophic Vaginitis, Postmenopausal  Dec 20 2012  1:50PM   

 

 Upper Respiratory Infections  2013  8:52AM   

 

 Hyperlipidemia  2013  8:21AM   

 

 Hypertension  2013  8:21AM   

 

 Hypothyroidism  2013  8:21AM   

 

 Screening Mammogram  Beto 10 2013  8:45AM   

 

 Routine gynecological examination  Beto 10 2013  8:34AM   

 

 Hypertension  Beto 10 2013  8:34AM   

 

 Hyperlipidemia, unspecified  Beto 10 2013  8:34AM   

 

 Hypothyroidism, Acquired  Beto 10 2013  8:34AM   

 

 Rhinitis, Allergic  Beto 10 2013  8:34AM   

 

 Flu  Oct 28 2013  8:52AM   

 

 Essential Hypertension  Dec  9 2013  8:37AM   

 

 Hyperlipidemia  Dec  9 2013  8:37AM   

 

 Gastroesophageal Reflux  Dec  9 2013  8:37AM   

 

 Hypothyroidism, Acquired  Dec  9 2013  8:37AM   

 

 Atrophic Vaginitis, Postmenopausal  Dec  9 2013  8:37AM   

 

 Hypertension  2014  9:56AM   

 

 Hyperlipidemia  2014  9:56AM   

 

 Hypothyroidism  2014  9:56AM   

 

 Screening Mammogram  2014  8:32AM   

 

 Osteopenia  2014  8:32AM   

 

 Hypertension  2014  8:35AM   

 

 Hypothyroidism, Acquired  2014  8:35AM   

 

 Hyperlipidemia  2014  8:35AM   

 

 Upper Respiratory Infections  2014  9:28AM   

 

 Sinusitis, Acute  Oct  2 2014  9:17AM   

 

 Herpes Zoster  Oct  5 2014  1:44PM   

 

 Vesicular Eruption  Oct  5 2014  1:44PM   

 

 Hypertension  2014  8:18AM   

 

 Hyperlipidemia  2014  8:18AM   

 

 hypothyroid  2014  8:18AM   

 

 Situational anxiety  Dec 20 2014  9:33AM   

 

 GERD (gastroesophageal reflux disease)  Dec 20 2014  9:33AM   

 

 Nausea  Dec 20 2014  9:33AM   

 

 Abdominal Pain, Epigastric  Dec 20 2014  9:33AM   

 

 Hyperlipidemia  Oct  6 2014  9:03AM   

 

 acid reflux  Oct  6 2014  9:03AM   

 

 hypothyroid  Oct  6 2014  9:03AM   

 

 Shingles  Oct  6 2014  9:03AM   

 

 Pharyngitis  2015  1:09PM   

 

 Bronchitis  2015  9:10AM   

 

 Hyperlipidemia  2015  9:10AM   

 

 acid reflux  2015  9:10AM   

 

 hypothyroid  2015  9:10AM   

 

 Hyperlipidemia  2015  8:18AM   

 

 Hypertension  2015  8:18AM   

 

 hypothyroid  2015  8:18AM   

 

 Screening Mammogram  2015 11:43AM   

 

 Medicare Annual Pap Q 2 years  2015  9:36AM   

 

 Screening Mammogram  2015  9:36AM   

 

 Candidiasis of female genitalia  2015  9:36AM   

 

 Hypertension  2015 11:34AM   

 

 Hyperlipidemia, unspecified  2015 11:34AM   

 

 Hypothyroidism, Acquired  2015 11:34AM   

 

 Osteopenia  2016  8:41AM   

 

 Encounter for screening mammogram for breast cancer  2016  8:41AM   

 

 Hypertension  2016  8:34AM   

 

 Lymphedema  2016  8:34AM   

 

 Hyperlipidemia  2016  8:34AM   

 

 hypothyroid  2016  8:34AM   

 

 HEP B  2016  9:13AM   

 

 HEP B  2016  8:52AM   

 

 Acid Reflux  2016  8:34AM   

 

 History of acute gastritis  Oct 13 2016  2:30PM   

 

 Anxiety as acute reaction to exceptional stress  Oct 13 2016  2:30PM   

 

 HEP B  Dec 29 2016  8:42AM   

 

 Caregiver stress syndrome  May 26 2017  8:28AM   

 

 Anxiety  May 26 2017  8:28AM   

 

 Blood in stool  2017  2:54PM   

 

 Upper GI bleed  2017 11:34AM   

 

 Hyponatremia  2017  9:03AM   

 

 Hyponatremia  2017  8:43AM   

 

 Medicare Annual Pap Q 2 years  2017  9:03AM   

 

 Nausea  2017  9:03AM   

 

 Uterine enlargement  2017  9:03AM   

 

 Encounter for screening mammogram for breast cancer  2017  4:56PM   

 

 Panic disorder [episodic paroxysmal anxiety]  Oct 10 2017  2:02PM   

 

 Acute stress reaction  Oct 10 2017  2:02PM   

 

 Caregiver stress syndrome  Oct 10 2017  2:02PM   

 

 Stress reaction causing mixed disturbance of emotion and conduct  Oct 18 2017  
9:05AM   

 

 Ankle strain, left, initial encounter  Dec 19 2017  8:29AM   

 

 Excoriation of ear canal, left, initial encounter  Dec 19 2017  8:29AM   

 

 Generalized anxiety disorder  Dec 19 2017  8:29AM   

 

 Grief reaction  Dec 19 2017  8:29AM   







Payers







 Insurance Name  Company Name  Plan Name  Plan Number  Policy Number  Policy 
Group Number  Start Date

 

    Medicare RHC  Medicare RHC     [id number]     N/A

 

    [employer]BS  [employer] [facility]     [id number]     2009

 

    Medicare Part B  [place]ic[place]     [id number]     N/A

 

    Medicare Part A  Medicare Part A     [id number]     N/A

 

    Medicare Part A  [employer]ZMedicare P A - Preventive     [id number]     N/A

 

    Medicare Part A  Medicare - Lab/Xray     [id number]     N/A







History of Encounters







 Visit Date  Visit Type  Provider

 

 2017  Office visit  Doris Noriega MD

 

 10/18/2017  Office visit  Doris Noriega MD

 

 10/10/2017  Office visit  Doris Noriega MD

 

 2017  Office visit  Doris Noriega MD

 

 2017  Office visit  Doris Noriega MD

 

 2017  Office visit  Doris Noriega MD

 

 2017  Office visit  Prince PIERRE

 

 2016  Nurse visit  Doris Noriega MD

 

 10/13/2016  Office visit  Doris Noriega MD

 

 2016  Nurse visit  Doris Noriega MD

 

 2016  Office visit  Doris Noriega MD

 

 2015  Office visit  Doris Noriega MD

 

 2015  Office visit  Doris Noriega MD

 

 2015  Office visit  Prince PIERRE

 

 2014  Office visit  Anyi PIERRE

 

 10/27/2014  Voided  Doris Noriega MD

 

 10/6/2014  Office visit  Doris Noriega MD

 

 10/5/2014  Office visit  Anyi Herrera APRN

 

 10/2/2014  Office visit   

 

 10/2/2014  Office visit  Corrine Bay APRN

 

 2014  Office visit  Kassie Franklin APRN

 

 2014  Office visit  Doris Noriega MD

 

 2014  Office visit  Doris Noriega MD

 

 2013  Office visit  Dee Colindres MD

 

 10/28/2013  Nurse visit  Dee Colindres MD

 

 6/10/2013  Office visit  Dee Colindres MD

 

 2013  Office visit  oCrrine Bay APRN

 

 2012  Office visit  Dee Colindres MD

 

 12/10/2012  Office visit  Dee Colindres MD

 

 10/29/2012  Nurse visit  Dee Colindres MD

 

 8/15/2012  Office visit  Dee Colindres MD

 

 2012  Office visit  Corrine Bay APRN

 

 2012  Office visit  Dee Colindres MD

 

 2/15/2012  Office visit  Dee Colindres MD

 

 2012  Office visit  Corrine Bay APRN

 

 2011  Office visit  Dee Colindres MD

 

 10/28/2011  Nurse visit  Shad Nolasco MD

 

 2011  Office visit  Dee Colindres MD

 

 2011  Office visit  Dee Colindres MD

 

 2011  Office visit  Dee Colindres MD

 

 2011  Office visit  Dee Colindres MD

 

 2011  Office visit  Dee Colindres MD

 

 2011  Office visit  Dee Colindres MD

 

 4/10/2011  [facility]  KHRIS Reeves MD

 

 4/10/2011  [facility]  RICKEY Toussaint MD

 

 2011  Office visit  RICKEY Toussaint MD

 

 2011  Hospital  Fide Castellanos MD

 

 2011  Voided  Fide Castellanos MD

 

 2011  Office visit  Dee Colindres MD

 

 12/15/2010  Nurse visit  Dee Colindres MD

 

 2010  Office visit  Dee Colindres MD

 

 10/20/2010  Nurse visit  Stephenie PERAZA

 

 2010  Nurse visit  Dee Colindres MD

 

 2010  Nurse visit  Dee Colindres MD

 

 2010  Office visit  Dee Colindres MD

 

 3/19/2010  Voided  Stephenie PERAZA

 

 2010  Nurse visit  Stephenie PERAZA

 

 2010  Nurse visit  Stephenie PERAZA

 

 2010  Nurse visit  Stephenie PERAZA

 

 2009  Office visit  Stephenie PERAZA

 

 10/20/2009  Nurse visit  Stephenie Kay PA

## 2018-10-22 NOTE — XMS REPORT
MU2 Ambulatory Summary

 Created on: 2017



Milady Crespo

External Reference #: [id number]

: 1945

Sex: Female



Demographics







 Address  [address]

[place]  [address]

 

 Home Phone  [phone]

 

 Preferred Language  English

 

 Marital Status  

 

 Rastafarian Affiliation  Unknown

 

 Race  White

 

 Ethnic Group  Not  or 





Author







 Author  Doris Noriega

 

 Organization  [facility]

 

 Address  [address]

[place], [place]  [id number]



 

 Phone  [phone]







Care Team Providers







 Care Team Member Name  Role  Phone

 

 Doris Noriega  PCP  [phone]

 

 Doris Noriega  PreferredProvider  [phone]







Allergies and Adverse Reactions







 Name  Reaction  Notes

 

 NO KNOWN DRUG ALLERGIES      







Plan of Treatment







 Planned Activity  Comments  Planned Date  Planned Time  Plan/Goal

 

 Complete blood count (CBC) with differential count     2016  12:00 AM   

 

 Comprehensive metabolic panel     2016  12:00 AM   

 

 VITAMIN D (25 HYDROXY)     2016  12:00 AM   

 

 Lipid panel (total cholesterol, lipoproteins, HDL, triglycerides)     8/15/
2012  12:00 AM   

 

 Pap smear     2017  12:00 AM   

 

 Comprehensive Metabolic Panel     6/10/2013  12:00 AM   

 

 Lipid panel (total cholesterol, lipoproteins, HDL, triglycerides)     6/10/
2013  12:00 AM   

 

 Thyroid stimulating hormone (TSH)     6/10/2013  12:00 AM   

 

 CBC (automated H&H, platelets, WBC and automated differential)     6/10/2013  
12:00 AM   

 

 Comprehensive Metabolic Panel     2012  12:00 AM   

 

 Lipid panel (total cholesterol, lipoproteins, HDL, triglycerides)     2012  12:00 AM   

 

 Thyroid stimulating hormone (TSH)     2012  12:00 AM   

 

 CBC (automated H&H, platelets, WBC and automated differential)     2012  
12:00 AM   

 

 Screening mammography, bilateral     2015  12:00 AM   







Medications







 Active 

 

 Name  Start Date  Estimated Completion Date  SIG  Comments

 

 aspirin 81 mg oral tablet,delayed release (DR/EC)        take 1 tablet (81 mg) 
by oral route once daily   

 

 Vitamin D3 1,000 unit oral capsule        take 1 capsule by oral route daily   

 

 famotidine 20 mg oral tablet  2015     TAKE ONE TABLET BY MOUTH TWICE 
DAILY   

 

 valsartan 160 mg oral tablet  2016     TAKE ONE TABLET BY MOUTH ONCE 
DAILY   

 

 amlodipine 5 mg oral tablet  2016     TAKE ONE TABLET BY MOUTH ONCE DAILY
   

 

 amlodipine 5 mg oral tablet  2016     TAKE ONE TABLET BY MOUTH ONCE DAILY
   

 

 valsartan 160 mg oral tablet  2016     TAKE ONE TABLET BY MOUTH ONCE 
DAILY   

 

 Lipitor 20 mg oral tablet  10/13/2016  2018  take 1 tablet (20 mg) by oral 
route once daily   

 

 Plavix 75 mg oral tablet  10/13/2016  2018  take 1 tablet (75 mg) by oral 
route once daily   

 

 Zofran (as hydrochloride) 4 mg oral tablet  2016     take 1 tablet by 
oral route daily as needed for 14 days   

 

 Zofran (as hydrochloride) 4 mg oral tablet  2017     take 1 tablet by oral 
route daily as needed for 14 days   

 

 Zofran (as hydrochloride) 4 mg oral tablet  2017     take 1 tablet by oral 
route daily as needed for 14 days   

 

 Zofran (as hydrochloride) 4 mg oral tablet  2017     take 1 tablet by 
oral route daily as needed for 14 days   

 

 Zofran (as hydrochloride) 4 mg oral tablet  2017     take 1 tablet by 
oral route daily as needed for 14 days   

 

 EQ LORATADINE 10MG  TABS  2017     TAKE ONE TABLET BY MOUTH ONCE DAILY   

 

 Lexapro 10 mg oral tablet  2017     take 1 tablet (10 mg) by oral route 
once daily for 90 days   

 

 pantoprazole 40 mg oral tablet,delayed release (DR/EC)  10/9/2017     take 1 
tablet (40 mg) by oral route once daily for 90 days   

 

 clorazepate dipotassium 7.5 mg oral tablet  2017  take 1/2 
to1 tablet PO up to three times per day for situational anxiety   









  

 

 Name  Start Date  Expiration Date  SIG  Comments

 

 prednisone 20 mg oral tablet  2011  take 2 tablets by oral 
route daily for 5 days   

 

 calcium carbonate-vitamin D2 600-125 mg-unit oral tablet  8/15/2012  2012
  take 2 tablets by oral route daily   

 

 Nichols 3 Fish Oil 900-1,400 mg oral capsule,delayed release(DR/EC)  8/15/2012  9
/  take 1 capsule by oral route daily   

 

 multivitamin Oral tablet  8/15/2012  2012  take 1 tablet by oral route 
daily   

 

 triamcinolone acetonide 0.1 % topical cream  2013  10/7/2013  APPLY A 
THIN LAYER TO THE AFFECTED AREA(S) BY TOPICAL ROUTE TWICE A DAY   

 

 famotidine 20 mg oral tablet  2014  take 1 tablet (20 mg) by 
oral route 2 times per day   

 

 amoxicillin 500 mg oral capsule  2014  take 1 capsule (500 mg) 
by oral route 3 times per day for 10 days   

 

 Zithromax Z-Tab 250 mg oral tablet  10/2/2014  10/7/2014  take 2 tablets (500 
mg) by oral route once daily for 1 day then 1 tablet (250 mg) by oral route 
once daily for 4 days   

 

 acyclovir 800 mg oral tablet  10/5/2014  10/12/2014  take 1 tablet (800 mg) by 
oral route 5 times per day for 7 days   

 

 gabapentin 300 mg oral capsule  10/9/2014  2014  take 1 capsule (300 mg) 
by oral route 3 times per day for 14 days   

 

 Carafate 100 mg/mL oral suspension  2014  take 10 milliliters 
(1 gram) by oral route 4 times per day on an empty stomach 1 hour before meals 
and at bedtime for 4 weeks   

 

 amlodipine 5 mg oral tablet  2015  TAKE ONE TABLET BY MOUTH 
EVERY DAY   

 

 levothyroxine 50 mcg oral tablet  2015  TAKE ONE TABLET BY MOUTH 
EVERY DAY   

 

 Diovan 160 mg oral tablet  2015  2/15/2016  TAKE ONE TABLET BY MOUTH 
EVERY DAY for 90 days   

 

 triamcinolone acetonide 0.1 % topical cream  2015  apply a thin 
layer to the affected area(s) by topical route 2 times per day for 14 days   

 

 fluconazole 150 mg oral tablet  2015  take 1 tablet (150 mg) 
by oral route once for 1 day   

 

 atenolol 50 mg oral tablet  10/13/2016  10/8/2017  take 1 tablet (50 mg) by 
oral route once daily   

 

 Zofran (as hydrochloride) 4 mg oral tablet  10/13/2016  10/27/2016  take 1 
tablet by oral route daily as needed for 14 days   

 

 Zofran (as hydrochloride) 4 mg oral tablet  2017     take 1 tablet by 
oral route daily as needed for 14 days   

 

 levothyroxine 50 mcg oral tablet  10/9/2017  10/9/2017  TAKE ONE TABLET BY 
MOUTH ONCE DAILY   

 

 ondansetron 4 mg oral tablet,disintegrating  10/10/2017  2017  dissolve  
1 tablet by oral route every 8 hours as needed for 30 days   

 

 valsartan 160 mg oral tablet  2017  TAKE ONE TABLET BY MOUTH 
ONCE DAILY   

 

 amlodipine 5 mg oral tablet  2017  TAKE ONE TABLET BY MOUTH 
ONCE DAILY   

 

 mirtazapine 15 mg oral tablet  2017  TAKE ONE-HALF TABLET BY 
MOUTH ONCE DAILY AT BEDTIME   









 Discontinued 

 

 Name  Start Date  Discontinued Date  SIG  Comments

 

 Lipitor 40 mg oral tablet     20092 TAB DAILY   

 

 ramipril 5 mg oral capsule     2009  take 1 capsule (5 mg) by oral route 
once daily   

 

 lovastatin 20 mg oral tablet  2009  take 1 tablet (20 mg) by 
oral route once daily with evening meal   

 

 propranolol 20 mg oral tablet  2010  take 1 tablet by oral 
route 2 times a day   

 

 Fosamax 70 mg oral tablet  2010  take 1 tablet (70 mg) by oral 
route once weekly in the morning, at least 30 minutes before the first food, 
beverage, or medication of the day   

 

 lisinopril 40 mg oral tablet  2010  4/10/2011  take 2 tablets by oral 
route daily   

 

 Zoloft 50 mg oral tablet  2011  take 1 tablet (50 mg) by oral 
route once daily   

 

 promethazine 25 mg oral tablet  2011  take 1 tablet by oral 
route every 6 hours as needed   

 

 Diovan -12.5 mg oral tablet  2011  take 1 tablet by oral 
route once daily for 30 days   

 

 Diflucan 150 mg oral tablet     2012  take 1 tablet (150 mg) by oral 
route once for 1 day   

 

 Diflucan 150 mg oral tablet  2012  8/15/2012  take 1 tablet (150 mg) by 
oral route once   

 

 Vitamin B-12 1,000 mcg oral tablet  8/15/2012  2014  take 1 tablet by 
oral route daily   

 

 Premarin 0.625 mg/gram vaginal cream  10/29/2012  6/10/2013  use 1 gram daily 
for a week then 1 gram twice a week   

 

 Medrol (Tab) 4 mg oral tablets,dose pack  2013  6/10/2013  take as 
directed   

 

 aspirin 325 mg oral tablet  2014  take 1 tablet (325 mg) by 
oral route once daily   

 

 Medrol (Tab) 4 mg oral tablets,dose pack  2014  10/2/2014  take as 
directed   

 

 Tetracaine Lollipops Lollipop  2015  Use as directed   

 

 Allergy Relief (loratadine) 10 mg oral tablet  2016     TAKE ONE TABLET 
BY MOUTH ONCE DAILY   

 

 Allergy Relief (loratadine) 10 mg oral tablet  2017  TAKE ONE 
TABLET BY MOUTH ONCE DAILY   

 

 Zoloft 50 mg oral tablet  2016  take 1 tablet (50 mg) by oral 
route once daily for 90 days   

 

 Zoloft 50 mg oral tablet  2016  take 1 tablet (50 mg) by oral 
route once daily for 90 days  Pt refuses to take...

 

 triamcinolone acetonide 0.1 % topical cream  2017     APPLY A THIN LAYER 
OF CREAM EXTERNALLY TO AFFECTED AREA TWICE DAILY FOR 14 DAYS   

 

 loratadine 10 mg oral tablet  2017  TAKE 1 TABLET BY MOUTH 
EVERY DAY IN THE EVENING   

 

 triamcinolone acetonide 0.1 % topical cream  2017  APPLY  
CREAM EXTERNALLY TO AFFECTED AREA TWICE DAILY FOR 14 DAYS   







Problem List







 Description  Status  Onset

 

 Hyperlipidemia  Active   

 

 acid reflux  Active   

 

 Hypertension  Active   

 

 hypothyroid  Active   







Vital Signs







 Date  Time  BP-Sys(mm[Hg]  BP-Morena(mm[Hg])  HR(bpm)  RR(rpm)  Temp  WT  HT  HC  
BMI  BSA  BMI Percentile  O2 Sat(%)

 

 2017  8:26:00 AM  122 mmHg  76 mmHg  63 bpm  16 rpm  98.4 F  129.25 lbs  
61 in     24.42 kg/m2  1.59 m2     99 %

 

 10/18/2017  9:01:00 AM  126 mmHg  80 mmHg  73 bpm  16 rpm  97.9 F  122.375 lbs
  61 in     23.1223 kg/m  1.5456 m     100 %

 

 10/10/2017  1:52:00 PM  118 mmHg  72 mmHg  74 bpm  16 rpm  98.1 F  121 lbs  61 
in     22.86 kg/m2  1.54 m2     99 %

 

 2017  8:40:00 AM  118 mmHg  68 mmHg  63 bpm  16 rpm  98.1 F  123.125 lbs  
61 in     23.264 kg/m  1.5504 m     100 %

 

 2017  8:57:00 AM  116 mmHg  62 mmHg  71 bpm  16 rpm  99.8 F  121.5 lbs  
61 in     22.96 kg/m2  1.54 m2     98 %

 

 2017  11:30:00 AM  128 mmHg  78 mmHg  69 bpm  16 rpm  98.8 F  129.375 lbs  
61 in     24.4449 kg/m  1.5892 m     98 %

 

 2017  8:22:00 AM  118 mmHg  78 mmHg  62 bpm  18 rpm  97.5 F  129.125 lbs  
61 in     24.40 kg/m2  1.59 m2     100 %

 

 10/13/2016  2:26:00 PM  128 mmHg  78 mmHg  77 bpm  16 rpm  98.4 F  139.25 lbs  
61 in     26.3108 kg/m  1.6488 m     100 %

 

 2016  8:29:00 AM  122 mmHg  70 mmHg  72 bpm  16 rpm  97.8 F  137.125 lbs  
61 in     25.91 kg/m2  1.64 m2     100 %

 

 2015  9:33:00 AM  120 mmHg  68 mmHg  73 bpm     98.7 F  144.375 lbs  61 
in     27.2791 kg/m  1.6788 m     99 %

 

 2015  9:05:00 AM  110 mmHg  75 mmHg  85 bpm  16 rpm  96.7 F  144.375 lbs  
61 in     27.28 kg/m2  1.68 m2     99 %

 

 2015  1:05:00 PM  108 mmHg  62 mmHg  78 bpm  18 rpm  96.9 F  142.375 lbs  
61 in     26.9012 kg/m  1.6672 m     100 %

 

 2014  9:31:00 AM  126 mmHg  66 mmHg  79 bpm  18 rpm  98.7 F  149 lbs  61 
in     28.15 kg/m2  1.71 m2     98 %

 

 10/6/2014  9:02:00 AM  110 mmHg  74 mmHg  94 bpm  18 rpm  98.1 F  145.4 lbs  
61 in     27.4728 kg/m  1.6848 m     97 %

 

 10/2/2014  9:14:00 AM  124 mmHg  74 mmHg  79 bpm  18 rpm  98 F  147.25 lbs  61 
in     27.82 kg/m2  1.70 m2     98 %

 

 2014  9:22:00 AM  124 mmHg  76 mmHg  89 bpm  18 rpm  98.1 F  148 lbs  61 
in     27.9641 kg/m  1.6998 m     100 %

 

 2014  8:27:00 AM  118 mmHg  65 mmHg  74 bpm  16 rpm  97.7 F  148 lbs  61 
in     27.96 kg/m2  1.70 m2     99 %

 

 2014  9:48:00 AM  125 mmHg  70 mmHg  93 bpm  18 rpm  96.7 F  156 lbs  61 
in     29.4756 kg/m  1.7451 m     100 %

 

 2013  8:35:00 AM  122 mmHg  74 mmHg  84 bpm  16 rpm  97.9 F  155.375 lbs 
                99 %

 

 6/10/2013  8:30:00 AM  130 mmHg  82 mmHg  79 bpm  16 rpm  97.9 F  161 lbs     
            98 %

 

 2013  8:50:00 AM  124 mmHg  68 mmHg  66 bpm  18 rpm  97.6 F  157.5 lbs  
61 in     29.76 kg/m2  1.75 m2      

 

 2012  1:46:00 PM  120 mmHg  72 mmHg  75 bpm  16 rpm  97.6 F  156.125 lbs
                 98 %

 

 12/10/2012  8:32:00 AM  122 mmHg  82 mmHg  70 bpm  16 rpm  97.3 F  157 lbs    
             99 %

 

 8/15/2012  9:00:00 AM  130 mmHg  76 mmHg  86 bpm  16 rpm  97.4 F  159 lbs     
            100 %

 

 2012  11:02:00 AM  128 mmHg  64 mmHg  66 bpm  18 rpm  97.4 F  162.125 lbs
  61 in     30.63 kg/m2  1.78 m2      

 

 2012  8:45:00 AM  130 mmHg  70 mmHg  82 bpm  16 rpm  98.2 F  160.125 lbs 
                100 %

 

 2/15/2012  9:29:00 AM  122 mmHg  80 mmHg  86 bpm  16 rpm  97.4 F  159.375 lbs  
61 in     30.11 kg/m2  1.76 m2     99 %

 

 2012  9:41:00 AM  132 mmHg  74 mmHg  66 bpm  18 rpm  98.4 F  160.375 lbs  
61 in     30.3023 kg/m  1.7694 m      

 

 2011  10:08:00 AM  134 mmHg  82 mmHg  80 bpm  16 rpm  98 F  160 lbs  61 
in     30.23 kg/m2  1.77 m2     99 %

 

 2011  3:56:00 PM  130 mmHg  80 mmHg                              

 

 2011  8:55:00 AM  130 mmHg  74 mmHg  88 bpm  16 rpm  98.2 F  158.25 lbs  
               98 %

 

 2011  8:54:00 AM  136 mmHg  82 mmHg  102 bpm  16 rpm  98.1 F  153.5 lbs   
              99 %

 

 2011  8:49:00 AM  122 mmHg  88 mmHg  88 bpm  16 rpm  98.6 F  152.25 lbs  
               99 %

 

 2011  10:02:00 AM  140 mmHg  82 mmHg  96 bpm  20 rpm  98.8 F             
       100 %

 

 2011  9:14:00 AM  156 mmHg  98 mmHg  110 bpm  24 rpm  98.5 F  153 lbs    
             100 %

 

 2011  8:50:00 AM  160 mmHg  84 mmHg  100 bpm  16 rpm  98.7 F  155 lbs    
             100 %

 

 2011  1:25:00 PM  152 mmHg  100 mmHg  82 bpm  16 rpm  97.2 F  156.375 lbs 
                100 %

 

 2011  9:55:00 AM  144 mmHg  90 mmHg                              

 

 2010  9:24:00 AM  138 mmHg  84 mmHg                              

 

 2010  8:58:00 AM  160 mmHg  94 mmHg                              

 

 2010  8:33:00 AM  142 mmHg  90 mmHg  100 bpm  16 rpm  98.1 F  158.375 
lbs                  

 

 2010  9:24:00 AM  160 mmHg  102 mmHg  88 bpm  18 rpm  99 F  157.125 lbs  
62 in     28.7382 kg/m  1.7657 m      

 

 3/19/2010  11:01:00 AM  140 mmHg  90 mmHg                              

 

 2009  10:08:00 AM  142 mmHg  86 mmHg  72 bpm  16 rpm  98.1 F  154.125 
lbs  61 in     29.12 kg/m2  1.73 m2      







Social History







 Name  Description  Comments

 

       

 

 Children      

 

 lives alone      

 

 Supervisor      

 

 Tobacco  Never smoker   







History of Procedures







 Date Ordered  Description  Order Status

 

 2011 12:00 AM  COMPREHEN METABOLIC PANEL  Reviewed

 

 2011 12:00 AM  ASSAY THYROID STIM HORMONE  Reviewed

 

 2011 12:00 AM  COMPREHEN METABOLIC PANEL  Reviewed

 

 2011 12:00 AM  LIPID PANEL  Reviewed

 

 2011 12:00 AM  ASSAY THYROID STIM HORMONE  Reviewed

 

 2011 12:00 AM  COMPLETE CBC W/AUTO DIFF WBC  Reviewed

 

 2015 12:00 AM  COMPLETE CBC W/AUTO DIFF WBC  Reviewed

 

 2015 12:00 AM  COMPREHEN METABOLIC PANEL  Reviewed

 

 2015 12:00 AM  LIPID PANEL  Reviewed

 

 2015 12:00 AM  ASSAY THYROID STIM HORMONE  Reviewed

 

 2011 12:00 AM  COMPREHEN METABOLIC PANEL  Reviewed

 

 2011 12:00 AM  COMPREHEN METABOLIC PANEL  Reviewed

 

 2011 12:00 AM  LIPID PANEL  Reviewed

 

 2011 12:00 AM  ASSAY THYROID STIM HORMONE  Reviewed

 

 10/28/2011 12:00 AM  ***Immunization administration, Medicare flu  Reviewed

 

 10/28/2011 12:00 AM  Fluzone ** MEDICARE Only **  Reviewed

 

 2010 11:24 AM  NO CHARGE OV  Reviewed

 

 2010 11:26 AM  NO CHARGE OV  Reviewed

 

 2011 12:00 AM  COMPREHEN METABOLIC PANEL  Reviewed

 

 2011 12:00 AM  LIPID PANEL  Reviewed

 

 2011 12:00 AM  ASSAY THYROID STIM HORMONE  Reviewed

 

 2011 12:00 AM  COMPLETE CBC W/AUTO DIFF WBC  Reviewed

 

 2011 12:00 AM  Wrist Support  Reviewed

 

 2016 12:00 AM  Screening mammography, bilateral  Reviewed

 

 2016 12:00 AM  DXA BONE DENSITY AXIAL  Returned

 

 2016 12:00 AM  TETANUS VACCINE IM  Reviewed

 

 2016 12:00 AM  HEP B VACC ADULT 3 DOSE IM  Reviewed

 

 2012 12:00 AM  TISSUE EXAM FOR FUNGI  Reviewed

 

 2012 12:00 AM  SMEAR WET MOUNT SALINE/INK  Reviewed

 

 2016 12:00 AM  TETANUS VACCINE IM  Reviewed

 

 2016 12:00 AM  HEP B VACC ADULT 3 DOSE IM  Reviewed

 

 2/15/2012 12:00 AM  THER/PROPH/DIAG INJ SC/IM  Reviewed

 

 2/15/2012 12:00 AM  Bicillin CR [facility]#96557907861[facility]  Reviewed

 

 2/15/2012 12:00 AM  Depo-Medrol 40 mg NDC#6824585034  Reviewed

 

 10/13/2016 12:00 AM  COMPLETE CBC W/AUTO DIFF WBC  Returned

 

 10/13/2016 12:00 AM  COMPREHEN METABOLIC PANEL  Returned

 

 10/13/2016 12:00 AM  ASSAY THYROID STIM HORMONE  Returned

 

 10/13/2016 12:00 AM  LIPID PANEL  Returned

 

 2016 12:00 AM  TETANUS VACCINE IM  Reviewed

 

 2016 12:00 AM  HEP B VACC ADULT 3 DOSE IM  Reviewed

 

 2017 12:00 AM  ASSAY OF IRON  Returned

 

 2017 12:00 AM  IRON BINDING TEST  Returned

 

 2017 12:00 AM  ASSAY OF TRANSFERRIN  Returned

 

 2017 12:00 AM  OCCULT BLD FECES 1-3 TESTS  Returned

 

 2017 12:00 AM  COMPLETE CBC AUTOMATED  Returned

 

 8/15/2012 12:00 AM  COMPREHEN METABOLIC PANEL  Reviewed

 

 8/15/2012 12:00 AM  ASSAY THYROID STIM HORMONE  Reviewed

 

 8/15/2012 12:00 AM  COMPLETE CBC W/AUTO DIFF WBC  Reviewed

 

 8/15/2012 12:00 AM  Wrist Support  Reviewed

 

 2017 12:00 AM  METABOLIC PANEL TOTAL CA  Returned

 

 2017 12:00 AM  METABOLIC PANEL TOTAL CA  Returned

 

 2017 12:00 AM  Screening mammography, bilateral  Returned

 

 10/29/2012 12:00 AM  Flu Injection 3 Years And Above NDC# [address]-23  [facility]  
Reviewed

 

 12/10/2012 12:00 AM  IMMUNIZATION ADMIN  Reviewed

 

 12/10/2012 12:00 AM  Pneumovax Injection - [facility]  Reviewed

 

 2012 12:00 AM  TRICHOMONAS ASSAY W/OPTIC  Reviewed

 

 2013 12:00 AM  THER/PROPH/DIAG INJ SC/IM  Reviewed

 

 2013 12:00 AM  Bicillin CR, 1.2 million units NDC# 07620-932-04  Reviewed

 

 2013 12:00 AM  COMPREHEN METABOLIC PANEL  Reviewed

 

 2013 12:00 AM  LIPID PANEL  Reviewed

 

 2013 12:00 AM  COMPLETE CBC W/AUTO DIFF WBC  Reviewed

 

 2013 12:00 AM  ASSAY THYROID STIM HORMONE  Reviewed

 

 6/10/2013 12:00 AM  MAMMOGRAM SCREENING  Reviewed

 

 6/10/2013 12:00 AM  CYTOPATH C/V MANUAL  Reviewed

 

 12/10/2013 12:00 AM  LIPID PANEL  Reviewed

 

 12/10/2013 12:00 AM  ASSAY THYROID STIM HORMONE  Reviewed

 

 12/10/2013 12:00 AM  COMPLETE CBC W/AUTO DIFF WBC  Reviewed

 

 12/10/2013 12:00 AM  COMPREHEN METABOLIC PANEL  Reviewed

 

 2010 12:00 AM  CYTOPATH C/V MANUAL  Reviewed

 

 2010 12:00 AM  SMEAR WET MOUNT SALINE/INK  Reviewed

 

 2010 12:00 AM  LIPID PANEL  Reviewed

 

 2010 12:00 AM  ASSAY THYROID STIM HORMONE  Reviewed

 

 2010 12:00 AM  COMPLETE CBC W/AUTO DIFF WBC  Reviewed

 

 2010 12:00 AM  COMPREHEN METABOLIC PANEL  Reviewed

 

 10/28/2013 12:00 AM  Flu Injection 3 Years And Above NDC# [address]-10  [facility]  
Reviewed

 

 2010 8:48 AM  Blood Pressure Check-no charge  Reviewed

 

 2010 12:00 AM  Blood Pressure Check-no charge  Reviewed

 

 2014 12:00 AM  METABOLIC PANEL TOTAL CA  Reviewed

 

 2014 12:00 AM  ASSAY THYROID STIM HORMONE  Reviewed

 

 2014 12:00 AM  ASSAY OF FREE THYROXINE  Reviewed

 

 2014 12:00 AM  MAMMOGRAM SCREENING  Reviewed

 

 2014 12:00 AM  CT BONE DENSITY AXIAL  Reviewed

 

 2014 12:00 AM  COMPLETE CBC W/AUTO DIFF WBC  Reviewed

 

 2014 12:00 AM  COMPREHEN METABOLIC PANEL  Reviewed

 

 2014 12:00 AM  LIPID PANEL  Reviewed

 

 2014 12:00 AM  ASSAY THYROID STIM HORMONE  Reviewed

 

 10/20/2010 12:00 AM  IMMUNIZATION ADMIN  Reviewed

 

 10/20/2010 12:00 AM  FLU VACCINE 3 YRS & > IM  Reviewed

 

 2010 12:00 AM  COMPREHEN METABOLIC PANEL  Reviewed

 

 2010 12:00 AM  LIPID PANEL  Reviewed

 

 2010 12:00 AM  ASSAY THYROID STIM HORMONE  Reviewed

 

 2010 12:00 AM  COMPLETE CBC W/AUTO DIFF WBC  Reviewed

 

 2010 12:00 AM  Blood Pressure Check-no charge  Reviewed

 

 10/2/2014 12:00 AM  THER/PROPH/DIAG INJ SC/IM  Reviewed

 

 10/2/2014 12:00 AM  Kenalog, Per 10 Mg NDC#6227-9725-18  Reviewed

 

 2014 12:00 AM  COMPLETE CBC W/AUTO DIFF WBC  Reviewed

 

 2014 12:00 AM  COMPREHEN METABOLIC PANEL  Reviewed

 

 2014 12:00 AM  LIPID PANEL  Reviewed

 

 2014 12:00 AM  ASSAY THYROID STIM HORMONE  Reviewed

 

 2015 12:00 AM  Rocephin 1 gram NDC#8114-3384-22  Reviewed

 

 2015 12:00 AM  THER/PROPH/DIAG INJ SC/IM  Reviewed

 

 2011 12:00 AM  COMPREHEN METABOLIC PANEL  Reviewed

 

 2011 12:00 AM  LIPID PANEL  Reviewed

 

 2011 12:00 AM  ASSAY THYROID STIM HORMONE  Reviewed

 

 2011 12:00 AM  COMPLETE CBC W/AUTO DIFF WBC  Reviewed

 

 2011 12:00 AM  METABOLIC PANEL TOTAL CA  Reviewed

 

 2011 12:00 AM  COMPREHEN METABOLIC PANEL  Reviewed

 

 2011 12:00 AM  ASSAY THYROID STIM HORMONE  Reviewed

 

 2011 12:00 AM  COMPLETE CBC W/AUTO DIFF WBC  Reviewed

 

 2011 12:00 AM  ASSAY OF LIPASE  Reviewed

 

 2011 12:00 AM  THER/PROPH/DIAG INJ SC/IM  Reviewed

 

 2011 12:00 AM  Phenergan 50 Mg Im  Bernadine  Reviewed

 

 2011 12:00 AM  METABOLIC PANEL TOTAL CA  Reviewed

 

 2011 12:00 AM  THER/PROPH/DIAG INJ SC/IM  Reviewed

 

 2011 12:00 AM  Phenergan 25 Mg Im  Bernadine  Reviewed

 

 2011 12:00 AM  METABOLIC PANEL TOTAL CA  Reviewed

 

 2011 12:00 AM  ASSAY THYROID STIM HORMONE  Reviewed

 

 2011 12:00 AM  THER/PROPH/DIAG INJ SC/IM  Reviewed

 

 2011 12:00 AM  Phenergan 50 Mg Im  Bernadine  Reviewed

 

 2011 12:00 AM  ASSAY OF SERUM SODIUM  Reviewed

 

 2011 12:00 AM  ASSAY OF SERUM SODIUM  Reviewed

 

 2011 12:00 AM  CYTOPATH C/V MANUAL  Reviewed

 

 2011 12:00 AM  ASSAY THYROID STIM HORMONE  Reviewed

 

 2015 12:00 AM  COMPLETE CBC W/AUTO DIFF WBC  Reviewed

 

 2015 12:00 AM  COMPREHEN METABOLIC PANEL  Reviewed

 

 2015 12:00 AM  LIPID PANEL  Reviewed

 

 2015 12:00 AM  ASSAY THYROID STIM HORMONE  Reviewed

 

 2015 12:00 AM  MAMMOGRAM SCREENING  Reviewed

 

 2015 12:00 AM  CYTOPATH TBS C/V MANUAL  Reviewed







Results Summary







 Date and Description  Results

 

 2010 10:41 AM  WET PREP NO TRICHOMONADS SEEN  No  Few 

 

 2010 8:15 AM  TRIGLYCERIDES 174.0 mg/dLCHOLESTEROL 175.0 mg/dLHDL 58.0 mg/
dLTOT CHOL/HDL 3.0 LDL (CALC) 82.0 mg/dLTSH 0.790 uIU/mLGLUCOSE 95.0 mg/
dLSODIUM 136.0 mmol/LPOTASSIUM 4.50 mmol/LCHLORIDE 99.0 mmol/LCO2 26.0 mmol/
LBUN 12.0 mg/dLCREATININE 0.80 mg/dLSGOT/AST 32.0 IU/LSGPT/ALT 33.0 IU/LALK 
PHOS 103.0 IU/LTOTAL PROTEIN 7.60 g/dLALBUMIN 4.60 g/dLTOTAL BILI 0.60 mg/
dLCALCIUM 9.80 mg/dLAGE 64 GFR NonAA 72 GFR AA 87 eGFR >60 mL/min/1.73 m2eGFR AA
* >60 WBC 5.3 RBC 4.13 HGB 13.10 g/dLHCT 39.20 %MCV 95.0 fLMCH 31.70 pgMCHC 
33.40 g/dLRDW SD 44 RDW CV 12.70 %MPV 9.80 fLPLT 257 NRBC# 0.00 NRBC% 0.0 %NEUT 
57.90 %%LYMP 26.50 %%MONO 11.60 %%EOS 3.20 %%BASO 0.80 %#NEUT 3.04 #LYMP 1.39 #
MONO 0.61 #EOS 0.17 #BASO 0.04 MANUAL DIFF NOT IND 

 

 2011 9:45 AM  GLUCOSE 91.0 mg/dLSODIUM 133.0 mmol/LPOTASSIUM 4.40 mmol/
LCHLORIDE 97.0 mmol/LCO2 24.0 mmol/LBUN 9.0 mg/dLCREATININE 0.70 mg/dLCALCIUM 
10.0 mg/dLAGE 65 GFR NonAA 84 GFR  eGFR >60 mL/min/1.73 m2eGFR AA* >60 

 

 2011 10:25 AM  LIPASE 29.0 U/LTSH 0.10 uIU/mLGLUCOSE 115.0 mg/dLSODIUM 
127.0 mmol/LPOTASSIUM 5.30 mmol/LCHLORIDE 93.0 mmol/LCO2 18.0 mmol/LBUN 9.0 mg/
dLCREATININE 0.70 mg/dLSGOT/AST 62.0 IU/LSGPT/ALT 46.0 IU/LALK PHOS 100.0 IU/
LTOTAL PROTEIN 8.60 g/dLALBUMIN 4.90 g/dLTOTAL BILI 0.60 mg/dLCALCIUM 9.90 mg/
dLAGE 65 GFR NonAA 84 GFR  eGFR >60 mL/min/1.73 m2eGFR AA* >60 WBC 6.9 
RBC 4.48 HGB 14.40 g/dLHCT 40.90 %MCV 91.0 fLMCH 32.10 pgMCHC 35.20 g/dLRDW SD 
41 RDW CV 12.50 %MPV 9.30 fLPLT 260 NRBC# 0.00 NRBC% 0.0 %NEUT 74.90 %%LYMP 
15.10 %%MONO 9.40 %%EOS 0.30 %%BASO 0.30 %#NEUT 5.15 #LYMP 1.04 #MONO 0.65 #EOS 
0.02 #BASO 0.02 MANUAL DIFF NOT IND 

 

 2011 9:07 AM  GLUCOSE 92.0 mg/dLSODIUM 131.0 mmol/LPOTASSIUM 4.20 mmol/
LCHLORIDE 99.0 mmol/LCO2 22.0 mmol/LBUN 9.0 mg/dLCREATININE 0.70 mg/dLCALCIUM 
9.20 mg/dLAGE 65 GFR NonAA 84 GFR  eGFR >60 mL/min/1.73 m2eGFR AA* >60 

 

 2011 11:06 AM  TSH 0.510 uIU/mLGLUCOSE 99.0 mg/dLSODIUM 132.0 mmol/
LPOTASSIUM 3.50 mmol/LCHLORIDE 95.0 mmol/LCO2 23.0 mmol/LBUN 9.0 mg/
dLCREATININE 0.80 mg/dLCALCIUM 10.40 mg/dLAGE 65 GFR NonAA 72 GFR AA 87 eGFR >
60 mL/min/1.73 m2eGFR AA* >60 

 

 2011 9:45 AM  SODIUM 132.0 mmol/L

 

 2011 9:27 AM  SODIUM 137.0 mmol/L

 

 2011 9:55 AM  TSH 1.070 uIU/mL

 

 2011 8:55 AM  GLUCOSE 102.0 mg/dLSODIUM 135.0 mmol/LPOTASSIUM 4.20 mmol/
LCHLORIDE 102.0 mmol/LCO2 24.0 mmol/LBUN 15.0 mg/dLCREATININE 0.80 mg/dLSGOT/
AST 30.0 IU/LSGPT/ALT 35.0 IU/LALK PHOS 119.0 IU/LTOTAL PROTEIN 7.50 g/
dLALBUMIN 4.30 g/dLTOTAL BILI 0.30 mg/dLCALCIUM 9.20 mg/dLAGE 65 GFR NonAA 72 
GFR AA 87 eGFR >60 mL/min/1.73 m2eGFR AA* >60 TSH 1.130 uIU/mL

 

 2011 8:55 AM  GLUCOSE 98.0 mg/dLSODIUM 137.0 mmol/LPOTASSIUM 3.90 mmol/
LCHLORIDE 103.0 mmol/LCO2 25.0 mmol/LBUN 14.0 mg/dLCREATININE 0.70 mg/dLSGOT/
AST 33.0 IU/LSGPT/ALT 36.0 IU/LALK PHOS 111.0 IU/LTOTAL PROTEIN 8.30 g/
dLALBUMIN 4.40 g/dLTOTAL BILI 0.50 mg/dLCALCIUM 9.40 mg/dLAGE 66 GFR NonAA 84 
GFR  eGFR >60 mL/min/1.73 m2eGFR AA* >60 TRIGLYCERIDES 109.0 mg/
dLCHOLESTEROL 153.0 mg/dLHDL 54.0 mg/dLTOT CHOL/HDL 2.8 LDL (CALC) 77.0 mg/
dLTSH 1.330 uIU/mL

 

 2012 10:33 AM  WET PREP NO TRICH SEEN CLUE CELLS NONE SEEN 

 

 2012 8:45 AM  WBC 5.2 RBC 3.96 HGB 12.70 g/dLHCT 37.80 %MCV 96.0 fLMCH 
32.10 pgMCHC 33.60 g/dLRDW SD 46 RDW CV 13.30 %MPV 9.70 fLPLT 297 NRBC# 0.00 
NRBC% 0.0 %NEUT 57.70 %%LYMP 28.50 %%MONO 10.30 %%EOS 2.50 %%BASO 1.0 %#NEUT 
3.02 #LYMP 1.49 #MONO 0.54 #EOS 0.13 #BASO 0.05 MANUAL DIFF NOT IND GLUCOSE 
97.0 mg/dLSODIUM 137.0 mmol/LPOTASSIUM 4.40 mmol/LCHLORIDE 101.0 mmol/LCO2 23.0 
mmol/LBUN 17.0 mg/dLCREATININE 0.80 mg/dLSGOT/AST 30.0 IU/LSGPT/ALT 33.0 IU/
LALK PHOS 95.0 IU/LTOTAL PROTEIN 7.40 g/dLALBUMIN 4.40 g/dLTOTAL BILI 0.60 mg/
dLCALCIUM 9.70 mg/dLAGE 66 GFR NonAA 72 GFR AA 87 eGFR 60 eGFR AA* 60 
TRIGLYCERIDES 130.0 mg/dLCHOLESTEROL 157.0 mg/dLHDL 56.0 mg/dLTOT CHOL/HDL 2.8 
LDL (CALC) 75.0 mg/dLTSH 0.940 uIU/mL

 

 2012 8:45 AM  WBC 5.1 RBC 3.91 HGB 12.50 g/dLHCT 36.30 %MCV 93.0 fLMCH 
32.0 pgMCHC 34.40 g/dLRDW SD 43 RDW CV 12.60 %MPV 9.30 fLPLT 244 NRBC# 0.00 NRBC
% 0.0 %NEUT 57.60 %%LYMP 27.50 %%MONO 9.10 %%EOS 5.0 %%BASO 0.80 %#NEUT 2.91 #
LYMP 1.39 #MONO 0.46 #EOS 0.25 #BASO 0.04 MANUAL DIFF NOT IND 

 

 2012 5:02 PM  WET PREP NO TRICH SEEN CLUE CELLS NONE SEEN 

 

 2013 8:25 AM  WBC 4.2 RBC 3.96 HGB 12.90 g/dLHCT 37.0 %MCV 93.0 fLMCH 
32.60 pgMCHC 34.90 g/dLRDW SD 43 RDW CV 12.60 %MPV 9.70 fLPLT 254 NRBC# 0.00 
NRBC% 0.0 %NEUT 54.40 %%LYMP 30.40 %%MONO 10.80 %%EOS 3.40 %%BASO 1.0 %#NEUT 
2.26 #LYMP 1.26 #MONO 0.45 #EOS 0.14 #BASO 0.04 MANUAL DIFF NOT IND TSH 1.230 
uIU/mLGLUCOSE 94.0 mg/dLSODIUM 136.0 mmol/LPOTASSIUM 4.30 mmol/LCHLORIDE 99.0 
mmol/LCO2 25.0 mmol/LBUN 10.0 mg/dLCREATININE 0.80 mg/dLSGOT/AST 36.0 IU/LSGPT/
ALT 36.0 IU/LALK PHOS 84.0 IU/LTOTAL PROTEIN 7.90 g/dLALBUMIN 4.40 g/dLTOTAL 
BILI 0.70 mg/dLCALCIUM 9.80 mg/dLAGE 67 GFR NonAA 72 GFR AA 87 eGFR 60 eGFR AA* 
60 TRIGLYCERIDES 122.0 mg/dLCHOLESTEROL 141.0 mg/dLHDL 47.0 mg/dLTOT CHOL/HDL 
3.0 LDL (CALC) 70.0 mg/dL

 

 2013 8:45 AM  WBC 5.3 RBC 4.01 HGB 13.0 g/dLHCT 37.60 %MCV 94.0 fLMCH 
32.40 pgMCHC 34.60 g/dLRDW SD 43 RDW CV 12.50 %MPV 9.40 fLPLT 248 NRBC# 0.00 
NRBC% 0.0 %NEUT 58.70 %%LYMP 27.80 %%MONO 9.80 %%EOS 2.80 %%BASO 0.90 %#NEUT 
3.12 #LYMP 1.48 #MONO 0.52 #EOS 0.15 #BASO 0.05 MANUAL DIFF NOT IND TSH 1.570 
uIU/mLGLUCOSE 94.0 mg/dLSODIUM 134.0 mmol/LPOTASSIUM 4.10 mmol/LCHLORIDE 101.0 
mmol/LCO2 23.0 mmol/LBUN 11.0 mg/dLCREATININE 0.80 mg/dLSGOT/AST 41.0 IU/LSGPT/
ALT 44.0 IU/LALK PHOS 109.0 IU/LTOTAL PROTEIN 7.90 g/dLALBUMIN 4.70 g/dLTOTAL 
BILI 0.80 mg/dLCALCIUM 10.40 mg/dLAGE 68 GFR NonAA 72 GFR AA 87 eGFR >60 mL/min/
1.73 m2eGFR AA* >60 TRIGLYCERIDES 163.0 mg/dLCHOLESTEROL 138.0 mg/dLHDL 49.0 mg/
dLTOT CHOL/HDL 2.8 LDL (CALC) 56.0 mg/dL

 

 2014 8:58 AM  GLUCOSE 86.0 mg/dLSODIUM 134.0 mmol/LPOTASSIUM 4.20 mmol/
LCHLORIDE 99.0 mmol/LCO2 25.0 mmol/LBUN 14.0 mg/dLCREATININE 0.80 mg/dLCALCIUM 
10.10 mg/dLAGE 68 GFR NonAA 72 GFR AA 87 eGFR >60 mL/min/1.73 m2eGFR AA* >60 
FREE T4 1.18 TSH 1.20 uIU/mL

 

 2014 9:50 AM  WBC 5.7 RBC 4.03 HGB 12.90 g/dLHCT 37.90 %MCV 94.0 fLMCH 
32.0 pgMCHC 34.0 g/dLRDW SD 44 RDW CV 12.70 %MPV 9.70 fLPLT 292 NRBC# 0.00 NRBC
% 0.0 %NEUT 62.70 %%LYMP 21.80 %%MONO 11.0 %%EOS 3.40 %%BASO 1.10 %#NEUT 3.55 #
LYMP 1.23 #MONO 0.62 #EOS 0.19 #BASO 0.06 MANUAL DIFF NOT IND GLUCOSE 98.0 mg/
dLSODIUM 135.0 mmol/LPOTASSIUM 4.30 mmol/LCHLORIDE 102.0 mmol/LCO2 22.0 mmol/
LBUN 10.0 mg/dLCREATININE 0.80 mg/dLSGOT/AST 34.0 IU/LSGPT/ALT 32.0 IU/LALK 
PHOS 95.0 IU/LTOTAL PROTEIN 7.70 g/dLALBUMIN 4.30 g/dLTOTAL BILI 0.80 mg/
dLCALCIUM 9.10 mg/dLAGE 68 GFR NonAA 72 GFR AA 87 eGFR 60 eGFR AA* 60 
TRIGLYCERIDES 108.0 mg/dLCHOLESTEROL 146.0 mg/dLHDL 56.0 mg/dLTOT CHOL/HDL 2.6 
LDL (CALC) 68.0 mg/dLTSH 0.90 uIU/mL

 

 2014 8:40 AM  WBC 4.4 RBC 4.13 HGB 13.20 g/dLHCT 38.90 %MCV 94.0 fLMCH 
32.0 pgMCHC 33.90 g/dLRDW SD 47 RDW CV 13.50 %MPV 9.80 fLPLT 279 NRBC# 0.00 NRBC
% 0.0 %NEUT 63.80 %%LYMP 24.60 %%MONO 9.30 %%EOS 1.60 %%BASO 0.70 %#NEUT 2.80 #
LYMP 1.08 #MONO 0.41 #EOS 0.07 #BASO 0.03 MANUAL DIFF NOT IND GLUCOSE 96.0 mg/
dLSODIUM 136.0 mmol/LPOTASSIUM 4.10 mmol/LCHLORIDE 99.0 mmol/LCO2 23.0 mmol/
LBUN 8.0 mg/dLCREATININE 0.80 mg/dLSGOT/AST 31.0 IU/LSGPT/ALT 27.0 IU/LALK PHOS 
85.0 IU/LTOTAL PROTEIN 7.60 g/dLALBUMIN 4.40 g/dLTOTAL BILI 0.80 mg/dLCALCIUM 
10.20 mg/dLAGE 69 GFR NonAA 71 GFR AA 86 eGFR 60 eGFR AA* 60 TRIGLYCERIDES 61.0 
mg/dLCHOLESTEROL 148.0 mg/dLHDL 71.0 mg/dLTOT CHOL/HDL 2.1 LDL (CALC) 65.0 mg/
dLTSH 0.990 uIU/mL

 

 2015 8:32 AM  WBC 4.8 RBC 3.98 HGB 12.70 g/dLHCT 37.30 %MCV 94.0 fLMCH 
31.90 pgMCHC 34.0 g/dLRDW SD 43 RDW CV 12.70 %MPV 9.50 fLPLT 270 NRBC# 0.00 NRBC
% 0.0 %NEUT 57.30 %%LYMP 25.0 %%MONO 13.0 %%EOS 3.60 %%BASO 1.10 %#NEUT 2.73 #
LYMP 1.19 #MONO 0.62 #EOS 0.17 #BASO 0.05 MANUAL DIFF NOT IND TSH 0.640 uIU/
mLGLUCOSE 90.0 mg/dLSODIUM 136.0 mmol/LPOTASSIUM 4.0 mmol/LCHLORIDE 101.0 mmol/
LCO2 24.0 mmol/LBUN 10.0 mg/dLCREATININE 0.80 mg/dLSGOT/AST 34.0 IU/LSGPT/ALT 
32.0 IU/LALK PHOS 92.0 IU/LTOTAL PROTEIN 7.50 g/dLALBUMIN 4.40 g/dLTOTAL BILI 
0.70 mg/dLCALCIUM 9.50 mg/dLAGE 69 GFR NonAA 71 GFR AA 86 eGFR >60 mL/min/1.73 
m2eGFR AA* >60 TRIGLYCERIDES 107.0 mg/dLCHOLESTEROL 138.0 mg/dLHDL 58.0 mg/
dLTOT CHOL/HDL 2.4 LDL (CALC) 59.0 mg/dL

 

 2015 8:31 AM  WBC 4.6 RBC 3.97 HGB 12.90 g/dLHCT 38.0 %MCV 96.0 fLMCH 
32.50 pgMCHC 33.90 g/dLRDW SD 44 RDW CV 12.60 %MPV 9.0 fLPLT 248 NRBC# 0.00 NRBC
% 0.0 %NEUT 61.0 %%LYMP 24.70 %%MONO 10.20 %%EOS 3.0 %%BASO 1.10 %#NEUT 2.82 #
LYMP 1.14 #MONO 0.47 #EOS 0.14 #BASO 0.05 MANUAL DIFF NOT IND TRIGLYCERIDES 
105.0 mg/dLCHOLESTEROL 141.0 mg/dLHDL 58.0 mg/dLTOT CHOL/HDL 2.4 LDL (CALC) 
62.0 mg/dLGLUCOSE 93.0 mg/dLSODIUM 136.0 mmol/LPOTASSIUM 4.10 mmol/LCHLORIDE 
101.0 mmol/LCO2 25.0 mmol/LBUN 9.0 mg/dLCREATININE 0.80 mg/dLSGOT/AST 32.0 IU/
LSGPT/ALT 28.0 IU/LALK PHOS 95.0 IU/LTOTAL PROTEIN 7.30 g/dLALBUMIN 4.50 g/
dLTOTAL BILI 0.80 mg/dLCALCIUM 9.70 mg/dLAGE 70 GFR NonAA 71 GFR AA 86 eGFR >60 
mL/min/1.73meGFR AA* >60 TSH 1.10 uIU/mL







History Of Immunizations







 Name  Date Admin  Mfg Name  Mfg Code  Trade Name  Lot#  Route  Inj  Vis Given  
Vis Pub  CVX

 

 Influenza  10/20/2010  [employer]of[employer] pasteur  [employer]  Fluzone  [id number]  Intramuscular  
Left Arm  10/20/2010  2010  999

 

 Influenza  10/28/2011  [employer]of[employer] pasteur  [employer]  Fluzone  [id number]  Intramuscular  
Left Arm  10/28/2011  2011  141

 

 Influenza  10/29/2012  sanofi pasteur  [employer]  Fluzone  [id number]  Intramuscular  
Left Deltoid  10/29/2012  2012  141

 

 X  12/10/2012  [employer]  MSD  Pneumovax 23  [id number]  Intramuscular  
Left Gluteous Medius  12/10/2012  2010  33

 

 Influenza  10/28/2013  sanofi pasteur  [employer]  Fluzone > 3 Years  [id number]  
Intramuscular  Right Deltoid  10/28/2013  2013  141

 

 X  9/2/2015  Not Entered  NE  Prevnar 13     Not Entered  Not Entered  1  109

 

 Influenza  2015  Not Entered  NE  Not Entered     Not Entered  Not Entered
  2015  141

 

 HepB Adult  2016  [employer]  MSD  Recombivax Adult  [id number]  Not 
Entered  Left Deltoid  2016  43

 

 HepB Adult  2016  [employer]  MSD  Recombivax Adult  [id number]  
Intramuscular  Right Deltoid  2016  43

 

 Zostavax  2012  Not Entered  NE  Not Entered     Not Entered  Not 
Entered  1  121

 

 Tdap  2012  Not Entered  NE  Not Entered     Not Entered  Not Entered  1  115

 

 Influenza  10/13/2016  Not Entered  NE  Fluzone High-Dose     Intramuscular  
Left Arm  1  141

 

 HepB Adult  2016  [employer]  MSD  Recombivax Adult  [id number]  
Intramuscular  Right Deltoid  2016  43

 

 Influenza  10/30/2017  Not Entered  NE  Fluarix Quadrivalent     Intramuscular
  Left Arm  2017  150







History of Past Illness







 Name  Date of Onset  Comments

 

 Benign Essential Hypertension  Dec 14 2009 10:10AM   

 

 Hyperlipidemia  Dec 14 2009 10:10AM   

 

 Hypothyroidism, Acquired  Dec 14 2009 10:10AM   

 

 hypothyroid      

 

 Hypertension      

 

 Hyperlipidemia      

 

 acid reflux      

 

 Hypertension, Benign Essential  2010  9:41AM   

 

 Hypertension, Benign Essential  2010 12:53PM   

 

 Routine gynecological examination  May  5 2010  9:28AM   

 

 Hypertension  May  5 2010  9:28AM   

 

 Hyperlipidemia, unspecified  May  5 2010  9:28AM   

 

 Hypothyroidism, Acquired  May  5 2010  9:28AM   

 

 Vulvovaginitis  May  5 2010  9:28AM   

 

 Rhinitis, Allergic  May  5 2010  9:28AM   

 

 Hypertension, Benign Essential  May 19 2010  8:48AM   

 

 Hypertension, Benign Essential  May 25 2010  9:39AM   

 

 Flu  Oct 20 2010 12:01PM   

 

 Essential Hypertension  2010  8:34AM   

 

 Hyperlipidemia  2010  8:34AM   

 

 Gastroesophageal Reflux  2010  8:34AM   

 

 Hypothyroidism, Acquired  2010  8:34AM   

 

 Hypertension, Benign Essential  2010  9:22AM   

 

 Hypertension, Benign Essential  Dec 15 2010  9:07AM   

 

 Essential Hypertension  2011  1:31PM   

 

 Hyperlipidemia  2011  1:31PM   

 

 Gastroesophageal Reflux  2011  1:31PM   

 

 Hypothyroidism, Acquired  2011  1:31PM   

 

 Essential Hypertension  2011  8:51AM   

 

 Hyperlipidemia  2011  8:51AM   

 

 Gastroesophageal Reflux  2011  8:51AM   

 

 Hypothyroidism, Acquired  2011  8:51AM   

 

 Essential Hypertension  2011  9:13AM   

 

 Hyperlipidemia  2011  9:13AM   

 

 Gastroesophageal Reflux  2011  9:13AM   

 

 Hypothyroidism, Acquired  2011  9:13AM   

 

 Nausea With Vomiting  2011  1:18PM   

 

 Hyponatremia  2011  8:46AM   

 

 Nausea  2011  9:13AM   

 

 Hypothyroidism  2011 11:10AM   

 

 Hyponatremia  2011 11:10AM   

 

 Essential Hypertension  2011 10:05AM   

 

 Hyperlipidemia  2011 10:05AM   

 

 Gastroesophageal Reflux  2011 10:05AM   

 

 Nausea  2011 10:05AM   

 

 Hypothyroidism, Acquired  2011 10:05AM   

 

 Hyponatremia  May  6 2011 11:34AM   

 

 Essential Hypertension  May 16 2011  8:50AM   

 

 Hyperlipidemia  May 16 2011  8:50AM   

 

 Gastroesophageal Reflux  May 16 2011  8:50AM   

 

 Nausea  May 16 2011  8:50AM   

 

 Hypothyroidism, Acquired  May 16 2011  8:50AM   

 

 Hyponatremia  May 16 2011  8:50AM   

 

 Routine gynecological examination  2011  8:54AM   

 

 Hypertension  2011  8:54AM   

 

 Hyperlipidemia, unspecified  2011  8:54AM   

 

 Hypothyroidism, Acquired  2011  8:54AM   

 

 Rhinitis, Allergic  2011  8:54AM   

 

 Hypothyroidism  Aug 29 2011 12:37PM   

 

 Hyponatremia  Aug 29 2011 12:37PM   

 

 Essential Hypertension  Sep 12 2011  8:53AM   

 

 Hyperlipidemia  Sep 12 2011  8:53AM   

 

 Gastroesophageal Reflux  Sep 12 2011  8:53AM   

 

 Hypothyroidism, Acquired  Sep 12 2011  8:53AM   

 

 Hyponatremia  Sep 12 2011  8:53AM   

 

 Hypertension  Sep 29 2011  9:41AM   

 

 Hyperlipidemia  Dec  8 2011  8:31AM   

 

 Hypothyroidism  Dec  8 2011  8:31AM   

 

 Hypertension  Dec  8 2011  8:31AM   

 

 Flu  Dec  9 2011 10:02AM   

 

 Essential Hypertension  Dec 13 2011 10:13AM   

 

 Hyperlipidemia  Dec 13 2011 10:13AM   

 

 Gastroesophageal Reflux  Dec 13 2011 10:13AM   

 

 Hypothyroidism, Acquired  Dec 13 2011 10:13AM   

 

 Hyponatremia  Dec 13 2011 10:13AM   

 

 Vaginal Discharge  2012  9:43AM   

 

 Rhinitis, Allergic  Feb 15 2012  9:31AM   

 

 Eustachian Tube Dysfunction  Feb 15 2012  9:31AM   

 

 Pharyngitis, Acute  Feb 15 2012  9:31AM   

 

 Routine gynecological examination  2012  8:48AM   

 

 Hypertension  2012  8:48AM   

 

 Hyperlipidemia, unspecified  2012  8:48AM   

 

 Hypothyroidism, Acquired  2012  8:48AM   

 

 Rhinitis, Allergic  2012  8:48AM   

 

 Candidiasis, Vulvovaginal  2012 11:04AM   

 

 Essential Hypertension  Aug 15 2012  9:02AM   

 

 Hyperlipidemia  Aug 15 2012  9:02AM   

 

 Gastroesophageal Reflux  Aug 15 2012  9:02AM   

 

 Hypothyroidism, Acquired  Aug 15 2012  9:02AM   

 

 Hyponatremia  Aug 15 2012  9:02AM   

 

 Atrophic Vaginitis, Postmenopausal  Aug 15 2012  9:02AM   

 

 Flu  Oct 29 2012  9:24AM   

 

 Essential Hypertension  Dec 10 2012  8:35AM   

 

 Hyperlipidemia  Dec 10 2012  8:35AM   

 

 Gastroesophageal Reflux  Dec 10 2012  8:35AM   

 

 Hypothyroidism, Acquired  Dec 10 2012  8:35AM   

 

 Hyponatremia  Dec 10 2012  8:35AM   

 

 Atrophic Vaginitis, Postmenopausal  Dec 10 2012  8:35AM   

 

 Pneumococcus  Dec 10 2012  2:07PM   

 

 Vaginal Discharge  Dec 20 2012  1:50PM   

 

 Essential Hypertension  Dec 20 2012  1:50PM   

 

 Hyperlipidemia  Dec 20 2012  1:50PM   

 

 Gastroesophageal Reflux  Dec 20 2012  1:50PM   

 

 Hypothyroidism, Acquired  Dec 20 2012  1:50PM   

 

 Atrophic Vaginitis, Postmenopausal  Dec 20 2012  1:50PM   

 

 Upper Respiratory Infections  2013  8:52AM   

 

 Hyperlipidemia  2013  8:21AM   

 

 Hypertension  2013  8:21AM   

 

 Hypothyroidism  2013  8:21AM   

 

 Screening Mammogram  Beto 10 2013  8:45AM   

 

 Routine gynecological examination  Beto 10 2013  8:34AM   

 

 Hypertension  Beto 10 2013  8:34AM   

 

 Hyperlipidemia, unspecified  Beto 10 2013  8:34AM   

 

 Hypothyroidism, Acquired  Beto 10 2013  8:34AM   

 

 Rhinitis, Allergic  Beto 10 2013  8:34AM   

 

 Flu  Oct 28 2013  8:52AM   

 

 Essential Hypertension  Dec  9 2013  8:37AM   

 

 Hyperlipidemia  Dec  9 2013  8:37AM   

 

 Gastroesophageal Reflux  Dec  9 2013  8:37AM   

 

 Hypothyroidism, Acquired  Dec  9 2013  8:37AM   

 

 Atrophic Vaginitis, Postmenopausal  Dec  9 2013  8:37AM   

 

 Hypertension  2014  9:56AM   

 

 Hyperlipidemia  2014  9:56AM   

 

 Hypothyroidism  2014  9:56AM   

 

 Screening Mammogram  2014  8:32AM   

 

 Osteopenia  2014  8:32AM   

 

 Hypertension  2014  8:35AM   

 

 Hypothyroidism, Acquired  2014  8:35AM   

 

 Hyperlipidemia  2014  8:35AM   

 

 Upper Respiratory Infections  2014  9:28AM   

 

 Sinusitis, Acute  Oct  2 2014  9:17AM   

 

 Herpes Zoster  Oct  5 2014  1:44PM   

 

 Vesicular Eruption  Oct  5 2014  1:44PM   

 

 Hypertension  2014  8:18AM   

 

 Hyperlipidemia  2014  8:18AM   

 

 hypothyroid  2014  8:18AM   

 

 Situational anxiety  Dec 20 2014  9:33AM   

 

 GERD (gastroesophageal reflux disease)  Dec 20 2014  9:33AM   

 

 Nausea  Dec 20 2014  9:33AM   

 

 Abdominal Pain, Epigastric  Dec 20 2014  9:33AM   

 

 Hyperlipidemia  Oct  6 2014  9:03AM   

 

 acid reflux  Oct  6 2014  9:03AM   

 

 hypothyroid  Oct  6 2014  9:03AM   

 

 Shingles  Oct  6 2014  9:03AM   

 

 Pharyngitis  2015  1:09PM   

 

 Bronchitis  2015  9:10AM   

 

 Hyperlipidemia  2015  9:10AM   

 

 acid reflux  2015  9:10AM   

 

 hypothyroid  2015  9:10AM   

 

 Hyperlipidemia  2015  8:18AM   

 

 Hypertension  2015  8:18AM   

 

 hypothyroid  2015  8:18AM   

 

 Screening Mammogram  2015 11:43AM   

 

 Medicare Annual Pap Q 2 years  2015  9:36AM   

 

 Screening Mammogram  2015  9:36AM   

 

 Candidiasis of female genitalia  2015  9:36AM   

 

 Hypertension  2015 11:34AM   

 

 Hyperlipidemia, unspecified  2015 11:34AM   

 

 Hypothyroidism, Acquired  2015 11:34AM   

 

 Osteopenia  2016  8:41AM   

 

 Encounter for screening mammogram for breast cancer  2016  8:41AM   

 

 Hypertension  2016  8:34AM   

 

 Lymphedema  2016  8:34AM   

 

 Hyperlipidemia  2016  8:34AM   

 

 hypothyroid  2016  8:34AM   

 

 HEP B  2016  9:13AM   

 

 HEP B  2016  8:52AM   

 

 Acid Reflux  2016  8:34AM   

 

 History of acute gastritis  Oct 13 2016  2:30PM   

 

 Anxiety as acute reaction to exceptional stress  Oct 13 2016  2:30PM   

 

 HEP B  Dec 29 2016  8:42AM   

 

 Caregiver stress syndrome  May 26 2017  8:28AM   

 

 Anxiety  May 26 2017  8:28AM   

 

 Blood in stool  2017  2:54PM   

 

 Upper GI bleed  2017 11:34AM   

 

 Hyponatremia  2017  9:03AM   

 

 Hyponatremia  2017  8:43AM   

 

 Medicare Annual Pap Q 2 years  2017  9:03AM   

 

 Nausea  2017  9:03AM   

 

 Uterine enlargement  2017  9:03AM   

 

 Encounter for screening mammogram for breast cancer  2017  4:56PM   

 

 Panic disorder [episodic paroxysmal anxiety]  Oct 10 2017  2:02PM   

 

 Acute stress reaction  Oct 10 2017  2:02PM   

 

 Caregiver stress syndrome  Oct 10 2017  2:02PM   

 

 Stress reaction causing mixed disturbance of emotion and conduct  Oct 18 2017  
9:05AM   

 

 Ankle strain, left, initial encounter  Dec 19 2017  8:29AM   

 

 Excoriation of ear canal, left, initial encounter  Dec 19 2017  8:29AM   

 

 Generalized anxiety disorder  Dec 19 2017  8:29AM   

 

 Grief reaction  Dec 19 2017  8:29AM   







Payers







 Insurance Name  Company Name  Plan Name  Plan Number  Policy Number  Policy 
Group Number  Start Date

 

    Medicare RHC  Medicare RHC     [id number]     N/A

 

    [employer]BS  [employer] [facility]     [id number]     2009

 

    Medicare Part B  [place] [place]     [id number]     N/A

 

    Medicare Part A  Medicare Part A     [id number]     N/A

 

    Medicare Part A  [employer]ZMedicare P A - Preventive     [id number]     N/A

 

    Medicare Part A  Medicare - Lab/Xray     [id number]     N/A







History of Encounters







 Visit Date  Visit Type  Provider

 

 2017  Office visit  Doris Noriega MD

 

 10/18/2017  Office visit  Doris Noriega MD

 

 10/10/2017  Office visit  Doris Noriega MD

 

 2017  Office visit  Doris Noriega MD

 

 2017  Office visit  Doris Noriega MD

 

 2017  Office visit  Doris Noriega MD

 

 2017  Office visit  Prince PIERRE

 

 2016  Nurse visit  Doris Noriega MD

 

 10/13/2016  Office visit  Doris Noriega MD

 

 2016  Nurse visit  Doris Noriega MD

 

 2016  Office visit  Doris Noriega MD

 

 2015  Office visit  Doris Noriega MD

 

 2015  Office visit  Doris Noriega MD

 

 2015  Office visit  Prince PIERRE

 

 2014  Office visit  Anyi PIERRE

 

 10/27/2014  Voided  Doris Noriega MD

 

 10/6/2014  Office visit  Doris Noriega MD

 

 10/5/2014  Office visit  Anyi Herrera APRN

 

 10/2/2014  Office visit   

 

 10/2/2014  Office visit  Corrine Bay APRN

 

 2014  Office visit  Kassie Franklin APRN

 

 2014  Office visit  Doris Noriega MD

 

 2014  Office visit  Doris Noriega MD

 

 2013  Office visit  Dee Colindres MD

 

 10/28/2013  Nurse visit  Dee Colindres MD

 

 6/10/2013  Office visit  Dee Colindres MD

 

 2013  Office visit  Corrine Bay APRN

 

 2012  Office visit  Dee Colindres MD

 

 12/10/2012  Office visit  Dee Colindres MD

 

 10/29/2012  Nurse visit  Dee Colindres MD

 

 8/15/2012  Office visit  Dee Colindres MD

 

 2012  Office visit  Corrine Bay APRN

 

 2012  Office visit  Dee Colindres MD

 

 2/15/2012  Office visit  Dee Colindres MD

 

 2012  Office visit  Corrine Bay APRN

 

 2011  Office visit  Dee Colindres MD

 

 10/28/2011  Nurse visit  Shad Nolasco MD

 

 2011  Office visit  Dee Colindres MD

 

 2011  Office visit  Dee Colindres MD

 

 2011  Office visit  Dee Colindres MD

 

 2011  Office visit  Dee Colindres MD

 

 2011  Office visit  Dee Colindres MD

 

 2011  Office visit  Dee Colindres MD

 

 4/10/2011  [facility]  KHRIS Reeves MD

 

 4/10/2011  [facility]  RICKEY Toussaint MD

 

 2011  Office visit  RICKEY Toussaint MD

 

 2011  Hospital  Fide Castellanos MD

 

 2011  Voided  Fide Castellanos MD

 

 2011  Office visit  Dee Colindres MD

 

 12/15/2010  Nurse visit  Dee Colindres MD

 

 2010  Office visit  Dee Colindres MD

 

 10/20/2010  Nurse visit  Stephenie PERAZA

 

 2010  Nurse visit  Dee Colindres MD

 

 2010  Nurse visit  Dee Colindres MD

 

 2010  Office visit  Dee Colindres MD

 

 3/19/2010  Voided  Stephenie PERAZA

 

 2010  Nurse visit  Stephenie PERAZA

 

 2010  Nurse visit  Stephenie PERAZA

 

 2010  Nurse visit  Stephenie PERAZA

 

 2009  Office visit  Stephenie PERAZA

 

 10/20/2009  Nurse visit  Stephenie Kay PA

## 2018-10-22 NOTE — XMS REPORT
MU2 Ambulatory Summary

 Created on: 2017



Milady Crespo

External Reference #: [id number]

: 1945

Sex: Female



Demographics







 Address  [address]

[place]  [address]

 

 Home Phone  [phone]

 

 Preferred Language  English

 

 Marital Status  

 

 Rastafari Affiliation  Unknown

 

 Race  White

 

 Ethnic Group  Not  or 





Author







 Author  Doris Noriega

 

 Organization  [facility]

 

 Address  [address]

[place], [place]  [id number]



 

 Phone  [phone]







Care Team Providers







 Care Team Member Name  Role  Phone

 

 Doris Noriega  PCP  [phone]

 

 Doris Noriega  PreferredProvider  [phone]







Allergies and Adverse Reactions







 Name  Reaction  Notes

 

 NO KNOWN DRUG ALLERGIES      







Plan of Treatment







 Planned Activity  Comments  Planned Date  Planned Time  Plan/Goal

 

 Complete blood count (CBC) with differential count     2016  12:00 AM   

 

 Comprehensive metabolic panel     2016  12:00 AM   

 

 VITAMIN D (25 HYDROXY)     2016  12:00 AM   

 

 Lipid panel (total cholesterol, lipoproteins, HDL, triglycerides)     8/15/
2012  12:00 AM   

 

 Pap smear     2017  12:00 AM   

 

 Comprehensive Metabolic Panel     6/10/2013  12:00 AM   

 

 Lipid panel (total cholesterol, lipoproteins, HDL, triglycerides)     6/10/
2013  12:00 AM   

 

 Thyroid stimulating hormone (TSH)     6/10/2013  12:00 AM   

 

 CBC (automated H&H, platelets, WBC and automated differential)     6/10/2013  
12:00 AM   

 

 Comprehensive Metabolic Panel     2012  12:00 AM   

 

 Lipid panel (total cholesterol, lipoproteins, HDL, triglycerides)     2012  12:00 AM   

 

 Thyroid stimulating hormone (TSH)     2012  12:00 AM   

 

 CBC (automated H&H, platelets, WBC and automated differential)     2012  
12:00 AM   

 

 Screening mammography, bilateral     2015  12:00 AM   







Medications







 Active 

 

 Name  Start Date  Estimated Completion Date  SIG  Comments

 

 aspirin 81 mg oral tablet,delayed release (DR/EC)        take 1 tablet (81 mg) 
by oral route once daily   

 

 Vitamin D3 1,000 unit oral capsule        take 1 capsule by oral route daily   

 

 famotidine 20 mg oral tablet  2015     TAKE ONE TABLET BY MOUTH TWICE 
DAILY   

 

 valsartan 160 mg oral tablet  2016     TAKE ONE TABLET BY MOUTH ONCE 
DAILY   

 

 amlodipine 5 mg oral tablet  2016     TAKE ONE TABLET BY MOUTH ONCE DAILY
   

 

 Allergy Relief (loratadine) 10 mg oral tablet  2016     TAKE ONE TABLET 
BY MOUTH ONCE DAILY   

 

 amlodipine 5 mg oral tablet  2016     TAKE ONE TABLET BY MOUTH ONCE DAILY
   

 

 Allergy Relief (loratadine) 10 mg oral tablet  2016     TAKE ONE TABLET 
BY MOUTH ONCE DAILY   

 

 valsartan 160 mg oral tablet  2016     TAKE ONE TABLET BY MOUTH ONCE 
DAILY   

 

 Lipitor 20 mg oral tablet  10/13/2016  2018  take 1 tablet (20 mg) by oral 
route once daily   

 

 Plavix 75 mg oral tablet  10/13/2016  2018  take 1 tablet (75 mg) by oral 
route once daily   

 

 Zofran (as hydrochloride) 4 mg oral tablet  2016     take 1 tablet by 
oral route daily as needed for 14 days   

 

 Zofran (as hydrochloride) 4 mg oral tablet  2017     take 1 tablet by oral 
route daily as needed for 14 days   

 

 Zofran (as hydrochloride) 4 mg oral tablet  2017     take 1 tablet by oral 
route daily as needed for 14 days   

 

 Zofran (as hydrochloride) 4 mg oral tablet  2017     take 1 tablet by 
oral route daily as needed for 14 days   

 

 Zofran (as hydrochloride) 4 mg oral tablet  2017     take 1 tablet by 
oral route daily as needed for 14 days   

 

 triamcinolone acetonide 0.1 % topical cream  2017     APPLY A THIN LAYER 
OF CREAM EXTERNALLY TO AFFECTED AREA TWICE DAILY FOR 14 DAYS   

 

 loratadine 10 mg oral tablet  2017  TAKE 1 TABLET BY MOUTH 
EVERY DAY IN THE EVENING   

 

 triamcinolone acetonide 0.1 % topical cream  2017     APPLY  CREAM 
EXTERNALLY TO AFFECTED AREA TWICE DAILY FOR 14 DAYS   

 

 EQ LORATADINE 10MG  TABS  2017     TAKE ONE TABLET BY MOUTH ONCE DAILY   

 

 Lexapro 10 mg oral tablet  2017     take 1 tablet (10 mg) by oral route 
once daily for 90 days   

 

 pantoprazole 40 mg oral tablet,delayed release (DR/EC)  10/9/2017     take 1 
tablet (40 mg) by oral route once daily for 90 days   

 

 mirtazapine 15 mg oral tablet  10/10/2017  2017  take 0.5 tablet by oral 
route once a day (at bedtime) for 30 days   









  

 

 Name  Start Date  Expiration Date  SIG  Comments

 

 prednisone 20 mg oral tablet  2011  take 2 tablets by oral 
route daily for 5 days   

 

 calcium carbonate-vitamin D2 600-125 mg-unit oral tablet  8/15/2012  2012
  take 2 tablets by oral route daily   

 

 [place] 3 Fish Oil 900-1,400 mg oral capsule,delayed release(DR/EC)  8/15/2012  9
/  take 1 capsule by oral route daily   

 

 multivitamin Oral tablet  8/15/2012  2012  take 1 tablet by oral route 
daily   

 

 triamcinolone acetonide 0.1 % topical cream  2013  10/7/2013  APPLY A 
THIN LAYER TO THE AFFECTED AREA(S) BY TOPICAL ROUTE TWICE A DAY   

 

 famotidine 20 mg oral tablet  2014  take 1 tablet (20 mg) by 
oral route 2 times per day   

 

 amoxicillin 500 mg oral capsule  2014  take 1 capsule (500 mg) 
by oral route 3 times per day for 10 days   

 

 Zithromax Z-Tab 250 mg oral tablet  10/2/2014  10/7/2014  take 2 tablets (500 
mg) by oral route once daily for 1 day then 1 tablet (250 mg) by oral route 
once daily for 4 days   

 

 acyclovir 800 mg oral tablet  10/5/2014  10/12/2014  take 1 tablet (800 mg) by 
oral route 5 times per day for 7 days   

 

 gabapentin 300 mg oral capsule  10/9/2014  2014  take 1 capsule (300 mg) 
by oral route 3 times per day for 14 days   

 

 Carafate 100 mg/mL oral suspension  2014  take 10 milliliters 
(1 gram) by oral route 4 times per day on an empty stomach 1 hour before meals 
and at bedtime for 4 weeks   

 

 amlodipine 5 mg oral tablet  2015  TAKE ONE TABLET BY MOUTH 
EVERY DAY   

 

 levothyroxine 50 mcg oral tablet  2015  TAKE ONE TABLET BY MOUTH 
EVERY DAY   

 

 Diovan 160 mg oral tablet  2015  2/15/2016  TAKE ONE TABLET BY MOUTH 
EVERY DAY for 90 days   

 

 triamcinolone acetonide 0.1 % topical cream  2015  apply a thin 
layer to the affected area(s) by topical route 2 times per day for 14 days   

 

 fluconazole 150 mg oral tablet  2015  take 1 tablet (150 mg) 
by oral route once for 1 day   

 

 atenolol 50 mg oral tablet  10/13/2016  10/8/2017  take 1 tablet (50 mg) by 
oral route once daily   

 

 Zofran (as hydrochloride) 4 mg oral tablet  10/13/2016  10/27/2016  take 1 
tablet by oral route daily as needed for 14 days   

 

 Zofran (as hydrochloride) 4 mg oral tablet  2017     take 1 tablet by 
oral route daily as needed for 14 days   

 

 levothyroxine 50 mcg oral tablet  10/9/2017  10/9/2017  TAKE ONE TABLET BY 
MOUTH ONCE DAILY   

 

 clorazepate dipotassium 7.5 mg oral tablet  10/10/2017  2017  take 1/2 
to1 tablet PO up to three times per day for situational anxiety   

 

 ondansetron 4 mg oral tablet,disintegrating  10/10/2017  2017  dissolve  
1 tablet by oral route every 8 hours as needed for 30 days   

 

 valsartan 160 mg oral tablet  2017  TAKE ONE TABLET BY MOUTH 
ONCE DAILY   

 

 amlodipine 5 mg oral tablet  2017  TAKE ONE TABLET BY MOUTH 
ONCE DAILY   









 Discontinued 

 

 Name  Start Date  Discontinued Date  SIG  Comments

 

 Lipitor 40 mg oral tablet     20092 TAB DAILY   

 

 ramipril 5 mg oral capsule     2009  take 1 capsule (5 mg) by oral route 
once daily   

 

 lovastatin 20 mg oral tablet  2009  take 1 tablet (20 mg) by 
oral route once daily with evening meal   

 

 propranolol 20 mg oral tablet  2010  take 1 tablet by oral 
route 2 times a day   

 

 Fosamax 70 mg oral tablet  2010  take 1 tablet (70 mg) by oral 
route once weekly in the morning, at least 30 minutes before the first food, 
beverage, or medication of the day   

 

 lisinopril 40 mg oral tablet  2010  4/10/2011  take 2 tablets by oral 
route daily   

 

 Zoloft 50 mg oral tablet  2011  take 1 tablet (50 mg) by oral 
route once daily   

 

 promethazine 25 mg oral tablet  2011  take 1 tablet by oral 
route every 6 hours as needed   

 

 Diovan -12.5 mg oral tablet  2011  take 1 tablet by oral 
route once daily for 30 days   

 

 Diflucan 150 mg oral tablet     2012  take 1 tablet (150 mg) by oral 
route once for 1 day   

 

 Diflucan 150 mg oral tablet  2012  8/15/2012  take 1 tablet (150 mg) by 
oral route once   

 

 Vitamin B-12 1,000 mcg oral tablet  8/15/2012  2014  take 1 tablet by 
oral route daily   

 

 Premarin 0.625 mg/gram vaginal cream  10/29/2012  6/10/2013  use 1 gram daily 
for a week then 1 gram twice a week   

 

 Medrol (Tab) 4 mg oral tablets,dose pack  2013  6/10/2013  take as 
directed   

 

 aspirin 325 mg oral tablet  2014  take 1 tablet (325 mg) by 
oral route once daily   

 

 Medrol (Tab) 4 mg oral tablets,dose pack  2014  10/2/2014  take as 
directed   

 

 Tetracaine Lollipops Lollipop  2015  Use as directed   

 

 Zoloft 50 mg oral tablet  2016  take 1 tablet (50 mg) by oral 
route once daily for 90 days   

 

 Zoloft 50 mg oral tablet  2016  take 1 tablet (50 mg) by oral 
route once daily for 90 days  Pt refuses to take...







Problem List







 Description  Status  Onset

 

 Hyperlipidemia  Active   

 

 acid reflux  Active   

 

 Hypertension  Active   

 

 hypothyroid  Active   







Vital Signs







 Date  Time  BP-Sys(mm[Hg]  BP-Morena(mm[Hg])  HR(bpm)  RR(rpm)  Temp  WT  HT  HC  
BMI  BSA  BMI Percentile  O2 Sat(%)

 

 10/18/2017  9:01:00 AM  126 mmHg  80 mmHg  73 bpm  16 rpm  97.9 F  122.375 lbs
  61 in     23.12 kg/m2  1.55 m2     100 %

 

 10/10/2017  1:52:00 PM  118 mmHg  72 mmHg  74 bpm  16 rpm  98.1 F  121 lbs  61 
in     22.8625 kg/m  1.5369 m     99 %

 

 2017  8:40:00 AM  118 mmHg  68 mmHg  63 bpm  16 rpm  98.1 F  123.125 lbs  
61 in     23.26 kg/m2  1.55 m2     100 %

 

 2017  8:57:00 AM  116 mmHg  62 mmHg  71 bpm  16 rpm  99.8 F  121.5 lbs  
61 in     22.957 kg/m  1.5401 m     98 %

 

 2017  11:30:00 AM  128 mmHg  78 mmHg  69 bpm  16 rpm  98.8 F  129.375 lbs  
61 in     24.44 kg/m2  1.59 m2     98 %

 

 2017  8:22:00 AM  118 mmHg  78 mmHg  62 bpm  18 rpm  97.5 F  129.125 lbs  
61 in     24.3977 kg/m  1.5877 m     100 %

 

 10/13/2016  2:26:00 PM  128 mmHg  78 mmHg  77 bpm  16 rpm  98.4 F  139.25 lbs  
61 in     26.31 kg/m2  1.65 m2     100 %

 

 2016  8:29:00 AM  122 mmHg  70 mmHg  72 bpm  16 rpm  97.8 F  137.125 lbs  
61 in     25.9093 kg/m  1.6361 m     100 %

 

 2015  9:33:00 AM  120 mmHg  68 mmHg  73 bpm     98.7 F  144.375 lbs  61 
in     27.28 kg/m2  1.68 m2     99 %

 

 2015  9:05:00 AM  110 mmHg  75 mmHg  85 bpm  16 rpm  96.7 F  144.375 lbs  
61 in     27.2791 kg/m  1.6788 m     99 %

 

 2015  1:05:00 PM  108 mmHg  62 mmHg  78 bpm  18 rpm  96.9 F  142.375 lbs  
61 in     26.90 kg/m2  1.67 m2     100 %

 

 2014  9:31:00 AM  126 mmHg  66 mmHg  79 bpm  18 rpm  98.7 F  149 lbs  61 
in     28.153 kg/m  1.7055 m     98 %

 

 10/6/2014  9:02:00 AM  110 mmHg  74 mmHg  94 bpm  18 rpm  98.1 F  145.4 lbs  
61 in     27.47 kg/m2  1.68 m2     97 %

 

 10/2/2014  9:14:00 AM  124 mmHg  74 mmHg  79 bpm  18 rpm  98 F  147.25 lbs  61 
in     27.8224 kg/m  1.6955 m     98 %

 

 2014  9:22:00 AM  124 mmHg  76 mmHg  89 bpm  18 rpm  98.1 F  148 lbs  61 
in     27.96 kg/m2  1.70 m2     100 %

 

 2014  8:27:00 AM  118 mmHg  65 mmHg  74 bpm  16 rpm  97.7 F  148 lbs  61 
in     27.9641 kg/m  1.6998 m     99 %

 

 2014  9:48:00 AM  125 mmHg  70 mmHg  93 bpm  18 rpm  96.7 F  156 lbs  61 
in     29.48 kg/m2  1.75 m2     100 %

 

 2013  8:35:00 AM  122 mmHg  74 mmHg  84 bpm  16 rpm  97.9 F  155.375 lbs 
                99 %

 

 6/10/2013  8:30:00 AM  130 mmHg  82 mmHg  79 bpm  16 rpm  97.9 F  161 lbs     
            98 %

 

 2013  8:50:00 AM  124 mmHg  68 mmHg  66 bpm  18 rpm  97.6 F  157.5 lbs  
61 in     29.7591 kg/m  1.7535 m      

 

 2012  1:46:00 PM  120 mmHg  72 mmHg  75 bpm  16 rpm  97.6 F  156.125 lbs
                 98 %

 

 12/10/2012  8:32:00 AM  122 mmHg  82 mmHg  70 bpm  16 rpm  97.3 F  157 lbs    
             99 %

 

 8/15/2012  9:00:00 AM  130 mmHg  76 mmHg  86 bpm  16 rpm  97.4 F  159 lbs     
            100 %

 

 2012  11:02:00 AM  128 mmHg  64 mmHg  66 bpm  18 rpm  97.4 F  162.125 lbs
  61 in     30.6329 kg/m  1.7791 m      

 

 2012  8:45:00 AM  130 mmHg  70 mmHg  82 bpm  16 rpm  98.2 F  160.125 lbs 
                100 %

 

 2/15/2012  9:29:00 AM  122 mmHg  80 mmHg  86 bpm  16 rpm  97.4 F  159.375 lbs  
61 in     30.1133 kg/m  1.7639 m     99 %

 

 2012  9:41:00 AM  132 mmHg  74 mmHg  66 bpm  18 rpm  98.4 F  160.375 lbs  
61 in     30.30 kg/m2  1.77 m2      

 

 2011  10:08:00 AM  134 mmHg  82 mmHg  80 bpm  16 rpm  98 F  160 lbs  61 
in     30.2314 kg/m  1.7674 m     99 %

 

 2011  3:56:00 PM  130 mmHg  80 mmHg                              

 

 2011  8:55:00 AM  130 mmHg  74 mmHg  88 bpm  16 rpm  98.2 F  158.25 lbs  
               98 %

 

 2011  8:54:00 AM  136 mmHg  82 mmHg  102 bpm  16 rpm  98.1 F  153.5 lbs   
              99 %

 

 2011  8:49:00 AM  122 mmHg  88 mmHg  88 bpm  16 rpm  98.6 F  152.25 lbs  
               99 %

 

 2011  10:02:00 AM  140 mmHg  82 mmHg  96 bpm  20 rpm  98.8 F             
       100 %

 

 2011  9:14:00 AM  156 mmHg  98 mmHg  110 bpm  24 rpm  98.5 F  153 lbs    
             100 %

 

 2011  8:50:00 AM  160 mmHg  84 mmHg  100 bpm  16 rpm  98.7 F  155 lbs    
             100 %

 

 2011  1:25:00 PM  152 mmHg  100 mmHg  82 bpm  16 rpm  97.2 F  156.375 lbs 
                100 %

 

 2011  9:55:00 AM  144 mmHg  90 mmHg                              

 

 2010  9:24:00 AM  138 mmHg  84 mmHg                              

 

 2010  8:58:00 AM  160 mmHg  94 mmHg                              

 

 2010  8:33:00 AM  142 mmHg  90 mmHg  100 bpm  16 rpm  98.1 F  158.375 
lbs                  

 

 2010  9:24:00 AM  160 mmHg  102 mmHg  88 bpm  18 rpm  99 F  157.125 lbs  
62 in     28.7382 kg/m  1.7657 m      

 

 3/19/2010  11:01:00 AM  140 mmHg  90 mmHg                              

 

 2009  10:08:00 AM  142 mmHg  86 mmHg  72 bpm  16 rpm  98.1 F  154.125 
lbs  61 in     29.12 kg/m2  1.73 m2      







Social History







 Name  Description  Comments

 

       

 

 Children      

 

 lives alone      

 

 Supervisor      

 

 Tobacco  Never smoker   







History of Procedures







 Date Ordered  Description  Order Status

 

 2011 12:00 AM  COMPREHEN METABOLIC PANEL  Reviewed

 

 2011 12:00 AM  ASSAY THYROID STIM HORMONE  Reviewed

 

 2011 12:00 AM  COMPREHEN METABOLIC PANEL  Reviewed

 

 2011 12:00 AM  LIPID PANEL  Reviewed

 

 2011 12:00 AM  ASSAY THYROID STIM HORMONE  Reviewed

 

 2011 12:00 AM  COMPLETE CBC W/AUTO DIFF WBC  Reviewed

 

 2015 12:00 AM  COMPLETE CBC W/AUTO DIFF WBC  Reviewed

 

 2015 12:00 AM  COMPREHEN METABOLIC PANEL  Reviewed

 

 2015 12:00 AM  LIPID PANEL  Reviewed

 

 2015 12:00 AM  ASSAY THYROID STIM HORMONE  Reviewed

 

 2011 12:00 AM  COMPREHEN METABOLIC PANEL  Reviewed

 

 2011 12:00 AM  COMPREHEN METABOLIC PANEL  Reviewed

 

 2011 12:00 AM  LIPID PANEL  Reviewed

 

 2011 12:00 AM  ASSAY THYROID STIM HORMONE  Reviewed

 

 10/28/2011 12:00 AM  ***Immunization administration, Medicare flu  Reviewed

 

 10/28/2011 12:00 AM  Fluzone ** MEDICARE Only **  Reviewed

 

 2010 11:24 AM  NO CHARGE OV  Reviewed

 

 2010 11:26 AM  NO CHARGE OV  Reviewed

 

 2011 12:00 AM  COMPREHEN METABOLIC PANEL  Reviewed

 

 2011 12:00 AM  LIPID PANEL  Reviewed

 

 2011 12:00 AM  ASSAY THYROID STIM HORMONE  Reviewed

 

 2011 12:00 AM  COMPLETE CBC W/AUTO DIFF WBC  Reviewed

 

 2011 12:00 AM  Wrist Support  Reviewed

 

 2016 12:00 AM  Screening mammography, bilateral  Reviewed

 

 2016 12:00 AM  DXA BONE DENSITY AXIAL  Returned

 

 2016 12:00 AM  TETANUS VACCINE IM  Reviewed

 

 2016 12:00 AM  HEP B VACC ADULT 3 DOSE IM  Reviewed

 

 2012 12:00 AM  TISSUE EXAM FOR FUNGI  Reviewed

 

 2012 12:00 AM  SMEAR WET MOUNT SALINE/INK  Reviewed

 

 2016 12:00 AM  TETANUS VACCINE IM  Reviewed

 

 2016 12:00 AM  HEP B VACC ADULT 3 DOSE IM  Reviewed

 

 2/15/2012 12:00 AM  THER/PROPH/DIAG INJ SC/IM  Reviewed

 

 2/15/2012 12:00 AM  Bicillin CR [facility]18[facility]  Reviewed

 

 2/15/2012 12:00 AM  Depo-Medrol 40 mg NDC#9612049493  Reviewed

 

 10/13/2016 12:00 AM  COMPLETE CBC W/AUTO DIFF WBC  Returned

 

 10/13/2016 12:00 AM  COMPREHEN METABOLIC PANEL  Returned

 

 10/13/2016 12:00 AM  ASSAY THYROID STIM HORMONE  Returned

 

 10/13/2016 12:00 AM  LIPID PANEL  Returned

 

 2016 12:00 AM  TETANUS VACCINE IM  Reviewed

 

 2016 12:00 AM  HEP B VACC ADULT 3 DOSE IM  Reviewed

 

 2017 12:00 AM  ASSAY OF IRON  Returned

 

 2017 12:00 AM  IRON BINDING TEST  Returned

 

 2017 12:00 AM  ASSAY OF TRANSFERRIN  Returned

 

 2017 12:00 AM  OCCULT BLD FECES 1-3 TESTS  Returned

 

 2017 12:00 AM  COMPLETE CBC AUTOMATED  Returned

 

 8/15/2012 12:00 AM  COMPREHEN METABOLIC PANEL  Reviewed

 

 8/15/2012 12:00 AM  ASSAY THYROID STIM HORMONE  Reviewed

 

 8/15/2012 12:00 AM  COMPLETE CBC W/AUTO DIFF WBC  Reviewed

 

 8/15/2012 12:00 AM  Wrist Support  Reviewed

 

 2017 12:00 AM  METABOLIC PANEL TOTAL CA  Returned

 

 2017 12:00 AM  METABOLIC PANEL TOTAL CA  Returned

 

 2017 12:00 AM  Screening mammography, bilateral  Returned

 

 10/29/2012 12:00 AM  Flu Injection 3 Years And Above NDC# [address]-77  C  
Reviewed

 

 12/10/2012 12:00 AM  IMMUNIZATION ADMIN  Reviewed

 

 12/10/2012 12:00 AM  Pneumovax Injection - [facility]  Reviewed

 

 2012 12:00 AM  TRICHOMONAS ASSAY W/OPTIC  Reviewed

 

 2013 12:00 AM  THER/PROPH/DIAG INJ SC/IM  Reviewed

 

 2013 12:00 AM  Bicillin CR, 1.2 million units NDC# 62744-899-58  Reviewed

 

 2013 12:00 AM  COMPREHEN METABOLIC PANEL  Reviewed

 

 2013 12:00 AM  LIPID PANEL  Reviewed

 

 2013 12:00 AM  COMPLETE CBC W/AUTO DIFF WBC  Reviewed

 

 2013 12:00 AM  ASSAY THYROID STIM HORMONE  Reviewed

 

 6/10/2013 12:00 AM  MAMMOGRAM SCREENING  Reviewed

 

 6/10/2013 12:00 AM  CYTOPATH C/V MANUAL  Reviewed

 

 12/10/2013 12:00 AM  LIPID PANEL  Reviewed

 

 12/10/2013 12:00 AM  ASSAY THYROID STIM HORMONE  Reviewed

 

 12/10/2013 12:00 AM  COMPLETE CBC W/AUTO DIFF WBC  Reviewed

 

 12/10/2013 12:00 AM  COMPREHEN METABOLIC PANEL  Reviewed

 

 2010 12:00 AM  CYTOPATH C/V MANUAL  Reviewed

 

 2010 12:00 AM  SMEAR WET MOUNT SALINE/INK  Reviewed

 

 2010 12:00 AM  LIPID PANEL  Reviewed

 

 2010 12:00 AM  ASSAY THYROID STIM HORMONE  Reviewed

 

 2010 12:00 AM  COMPLETE CBC W/AUTO DIFF WBC  Reviewed

 

 2010 12:00 AM  COMPREHEN METABOLIC PANEL  Reviewed

 

 10/28/2013 12:00 AM  Flu Injection 3 Years And Above NDC# [address]-44  [facility]  
Reviewed

 

 2010 8:48 AM  Blood Pressure Check-no charge  Reviewed

 

 2010 12:00 AM  Blood Pressure Check-no charge  Reviewed

 

 2014 12:00 AM  METABOLIC PANEL TOTAL CA  Reviewed

 

 2014 12:00 AM  ASSAY THYROID STIM HORMONE  Reviewed

 

 2014 12:00 AM  ASSAY OF FREE THYROXINE  Reviewed

 

 2014 12:00 AM  MAMMOGRAM SCREENING  Reviewed

 

 2014 12:00 AM  CT BONE DENSITY AXIAL  Reviewed

 

 2014 12:00 AM  COMPLETE CBC W/AUTO DIFF WBC  Reviewed

 

 2014 12:00 AM  COMPREHEN METABOLIC PANEL  Reviewed

 

 2014 12:00 AM  LIPID PANEL  Reviewed

 

 2014 12:00 AM  ASSAY THYROID STIM HORMONE  Reviewed

 

 10/20/2010 12:00 AM  IMMUNIZATION ADMIN  Reviewed

 

 10/20/2010 12:00 AM  FLU VACCINE 3 YRS & > IM  Reviewed

 

 2010 12:00 AM  COMPREHEN METABOLIC PANEL  Reviewed

 

 2010 12:00 AM  LIPID PANEL  Reviewed

 

 2010 12:00 AM  ASSAY THYROID STIM HORMONE  Reviewed

 

 2010 12:00 AM  COMPLETE CBC W/AUTO DIFF WBC  Reviewed

 

 2010 12:00 AM  Blood Pressure Check-no charge  Reviewed

 

 10/2/2014 12:00 AM  THER/PROPH/DIAG INJ SC/IM  Reviewed

 

 10/2/2014 12:00 AM  Kenalog, Per 10 Mg NDC#2970-0670-10  Reviewed

 

 2014 12:00 AM  COMPLETE CBC W/AUTO DIFF WBC  Reviewed

 

 2014 12:00 AM  COMPREHEN METABOLIC PANEL  Reviewed

 

 2014 12:00 AM  LIPID PANEL  Reviewed

 

 2014 12:00 AM  ASSAY THYROID STIM HORMONE  Reviewed

 

 2015 12:00 AM  Rocephin 1 gram NDC#1737-0255-97  Reviewed

 

 2015 12:00 AM  THER/PROPH/DIAG INJ SC/IM  Reviewed

 

 2011 12:00 AM  COMPREHEN METABOLIC PANEL  Reviewed

 

 2011 12:00 AM  LIPID PANEL  Reviewed

 

 2011 12:00 AM  ASSAY THYROID STIM HORMONE  Reviewed

 

 2011 12:00 AM  COMPLETE CBC W/AUTO DIFF WBC  Reviewed

 

 2011 12:00 AM  METABOLIC PANEL TOTAL CA  Reviewed

 

 2011 12:00 AM  COMPREHEN METABOLIC PANEL  Reviewed

 

 2011 12:00 AM  ASSAY THYROID STIM HORMONE  Reviewed

 

 2011 12:00 AM  COMPLETE CBC W/AUTO DIFF WBC  Reviewed

 

 2011 12:00 AM  ASSAY OF LIPASE  Reviewed

 

 2011 12:00 AM  THER/PROPH/DIAG INJ SC/IM  Reviewed

 

 2011 12:00 AM  Phenergan 50 Mg Im  Bernadine  Reviewed

 

 2011 12:00 AM  METABOLIC PANEL TOTAL CA  Reviewed

 

 2011 12:00 AM  THER/PROPH/DIAG INJ SC/IM  Reviewed

 

 2011 12:00 AM  Phenergan 25 Mg Im  Bernadine  Reviewed

 

 2011 12:00 AM  METABOLIC PANEL TOTAL CA  Reviewed

 

 2011 12:00 AM  ASSAY THYROID STIM HORMONE  Reviewed

 

 2011 12:00 AM  THER/PROPH/DIAG INJ SC/IM  Reviewed

 

 2011 12:00 AM  Phenergan 50 Mg Im  Bernadine  Reviewed

 

 2011 12:00 AM  ASSAY OF SERUM SODIUM  Reviewed

 

 2011 12:00 AM  ASSAY OF SERUM SODIUM  Reviewed

 

 2011 12:00 AM  CYTOPATH C/V MANUAL  Reviewed

 

 2011 12:00 AM  ASSAY THYROID STIM HORMONE  Reviewed

 

 2015 12:00 AM  COMPLETE CBC W/AUTO DIFF WBC  Reviewed

 

 2015 12:00 AM  COMPREHEN METABOLIC PANEL  Reviewed

 

 2015 12:00 AM  LIPID PANEL  Reviewed

 

 2015 12:00 AM  ASSAY THYROID STIM HORMONE  Reviewed

 

 2015 12:00 AM  MAMMOGRAM SCREENING  Reviewed

 

 2015 12:00 AM  CYTOPATH TBS C/V MANUAL  Reviewed







Results Summary







 Date and Description  Results

 

 2010 10:41 AM  WET PREP NO TRICHOMONADS SEEN  No  Few 

 

 2010 8:15 AM  TRIGLYCERIDES 174.0 mg/dLCHOLESTEROL 175.0 mg/dLHDL 58.0 mg/
dLTOT CHOL/HDL 3.0 LDL (CALC) 82.0 mg/dLTSH 0.790 uIU/mLGLUCOSE 95.0 mg/
dLSODIUM 136.0 mmol/LPOTASSIUM 4.50 mmol/LCHLORIDE 99.0 mmol/LCO2 26.0 mmol/
LBUN 12.0 mg/dLCREATININE 0.80 mg/dLSGOT/AST 32.0 IU/LSGPT/ALT 33.0 IU/LALK 
PHOS 103.0 IU/LTOTAL PROTEIN 7.60 g/dLALBUMIN 4.60 g/dLTOTAL BILI 0.60 mg/
dLCALCIUM 9.80 mg/dLAGE 64 GFR NonAA 72 GFR AA 87 eGFR >60 mL/min/1.73 m2eGFR AA
* >60 WBC 5.3 RBC 4.13 HGB 13.10 g/dLHCT 39.20 %MCV 95.0 fLMCH 31.70 pgMCHC 
33.40 g/dLRDW SD 44 RDW CV 12.70 %MPV 9.80 fLPLT 257 NRBC# 0.00 NRBC% 0.0 %NEUT 
57.90 %%LYMP 26.50 %%MONO 11.60 %%EOS 3.20 %%BASO 0.80 %#NEUT 3.04 #LYMP 1.39 #
MONO 0.61 #EOS 0.17 #BASO 0.04 MANUAL DIFF NOT IND 

 

 2011 9:45 AM  GLUCOSE 91.0 mg/dLSODIUM 133.0 mmol/LPOTASSIUM 4.40 mmol/
LCHLORIDE 97.0 mmol/LCO2 24.0 mmol/LBUN 9.0 mg/dLCREATININE 0.70 mg/dLCALCIUM 
10.0 mg/dLAGE 65 GFR NonAA 84 GFR  eGFR >60 mL/min/1.73 m2eGFR AA* >60 

 

 2011 10:25 AM  LIPASE 29.0 U/LTSH 0.10 uIU/mLGLUCOSE 115.0 mg/dLSODIUM 
127.0 mmol/LPOTASSIUM 5.30 mmol/LCHLORIDE 93.0 mmol/LCO2 18.0 mmol/LBUN 9.0 mg/
dLCREATININE 0.70 mg/dLSGOT/AST 62.0 IU/LSGPT/ALT 46.0 IU/LALK PHOS 100.0 IU/
LTOTAL PROTEIN 8.60 g/dLALBUMIN 4.90 g/dLTOTAL BILI 0.60 mg/dLCALCIUM 9.90 mg/
dLAGE 65 GFR NonAA 84 GFR  eGFR >60 mL/min/1.73 m2eGFR AA* >60 WBC 6.9 
RBC 4.48 HGB 14.40 g/dLHCT 40.90 %MCV 91.0 fLMCH 32.10 pgMCHC 35.20 g/dLRDW SD 
41 RDW CV 12.50 %MPV 9.30 fLPLT 260 NRBC# 0.00 NRBC% 0.0 %NEUT 74.90 %%LYMP 
15.10 %%MONO 9.40 %%EOS 0.30 %%BASO 0.30 %#NEUT 5.15 #LYMP 1.04 #MONO 0.65 #EOS 
0.02 #BASO 0.02 MANUAL DIFF NOT IND 

 

 2011 9:07 AM  GLUCOSE 92.0 mg/dLSODIUM 131.0 mmol/LPOTASSIUM 4.20 mmol/
LCHLORIDE 99.0 mmol/LCO2 22.0 mmol/LBUN 9.0 mg/dLCREATININE 0.70 mg/dLCALCIUM 
9.20 mg/dLAGE 65 GFR NonAA 84 GFR  eGFR >60 mL/min/1.73 m2eGFR AA* >60 

 

 2011 11:06 AM  TSH 0.510 uIU/mLGLUCOSE 99.0 mg/dLSODIUM 132.0 mmol/
LPOTASSIUM 3.50 mmol/LCHLORIDE 95.0 mmol/LCO2 23.0 mmol/LBUN 9.0 mg/
dLCREATININE 0.80 mg/dLCALCIUM 10.40 mg/dLAGE 65 GFR NonAA 72 GFR AA 87 eGFR >
60 mL/min/1.73 m2eGFR AA* >60 

 

 2011 9:45 AM  SODIUM 132.0 mmol/L

 

 2011 9:27 AM  SODIUM 137.0 mmol/L

 

 2011 9:55 AM  TSH 1.070 uIU/mL

 

 2011 8:55 AM  GLUCOSE 102.0 mg/dLSODIUM 135.0 mmol/LPOTASSIUM 4.20 mmol/
LCHLORIDE 102.0 mmol/LCO2 24.0 mmol/LBUN 15.0 mg/dLCREATININE 0.80 mg/dLSGOT/
AST 30.0 IU/LSGPT/ALT 35.0 IU/LALK PHOS 119.0 IU/LTOTAL PROTEIN 7.50 g/
dLALBUMIN 4.30 g/dLTOTAL BILI 0.30 mg/dLCALCIUM 9.20 mg/dLAGE 65 GFR NonAA 72 
GFR AA 87 eGFR >60 mL/min/1.73 m2eGFR AA* >60 TSH 1.130 uIU/mL

 

 2011 8:55 AM  GLUCOSE 98.0 mg/dLSODIUM 137.0 mmol/LPOTASSIUM 3.90 mmol/
LCHLORIDE 103.0 mmol/LCO2 25.0 mmol/LBUN 14.0 mg/dLCREATININE 0.70 mg/dLSGOT/
AST 33.0 IU/LSGPT/ALT 36.0 IU/LALK PHOS 111.0 IU/LTOTAL PROTEIN 8.30 g/
dLALBUMIN 4.40 g/dLTOTAL BILI 0.50 mg/dLCALCIUM 9.40 mg/dLAGE 66 GFR NonAA 84 
GFR  eGFR >60 mL/min/1.73 m2eGFR AA* >60 TRIGLYCERIDES 109.0 mg/
dLCHOLESTEROL 153.0 mg/dLHDL 54.0 mg/dLTOT CHOL/HDL 2.8 LDL (CALC) 77.0 mg/
dLTSH 1.330 uIU/mL

 

 2012 10:33 AM  WET PREP NO TRICH SEEN CLUE CELLS NONE SEEN 

 

 2012 8:45 AM  WBC 5.2 RBC 3.96 HGB 12.70 g/dLHCT 37.80 %MCV 96.0 fLMCH 
32.10 pgMCHC 33.60 g/dLRDW SD 46 RDW CV 13.30 %MPV 9.70 fLPLT 297 NRBC# 0.00 
NRBC% 0.0 %NEUT 57.70 %%LYMP 28.50 %%MONO 10.30 %%EOS 2.50 %%BASO 1.0 %#NEUT 
3.02 #LYMP 1.49 #MONO 0.54 #EOS 0.13 #BASO 0.05 MANUAL DIFF NOT IND GLUCOSE 
97.0 mg/dLSODIUM 137.0 mmol/LPOTASSIUM 4.40 mmol/LCHLORIDE 101.0 mmol/LCO2 23.0 
mmol/LBUN 17.0 mg/dLCREATININE 0.80 mg/dLSGOT/AST 30.0 IU/LSGPT/ALT 33.0 IU/
LALK PHOS 95.0 IU/LTOTAL PROTEIN 7.40 g/dLALBUMIN 4.40 g/dLTOTAL BILI 0.60 mg/
dLCALCIUM 9.70 mg/dLAGE 66 GFR NonAA 72 GFR AA 87 eGFR 60 eGFR AA* 60 
TRIGLYCERIDES 130.0 mg/dLCHOLESTEROL 157.0 mg/dLHDL 56.0 mg/dLTOT CHOL/HDL 2.8 
LDL (CALC) 75.0 mg/dLTSH 0.940 uIU/mL

 

 2012 8:45 AM  WBC 5.1 RBC 3.91 HGB 12.50 g/dLHCT 36.30 %MCV 93.0 fLMCH 
32.0 pgMCHC 34.40 g/dLRDW SD 43 RDW CV 12.60 %MPV 9.30 fLPLT 244 NRBC# 0.00 NRBC
% 0.0 %NEUT 57.60 %%LYMP 27.50 %%MONO 9.10 %%EOS 5.0 %%BASO 0.80 %#NEUT 2.91 #
LYMP 1.39 #MONO 0.46 #EOS 0.25 #BASO 0.04 MANUAL DIFF NOT IND 

 

 2012 5:02 PM  WET PREP NO TRICH SEEN CLUE CELLS NONE SEEN 

 

 2013 8:25 AM  WBC 4.2 RBC 3.96 HGB 12.90 g/dLHCT 37.0 %MCV 93.0 fLMCH 
32.60 pgMCHC 34.90 g/dLRDW SD 43 RDW CV 12.60 %MPV 9.70 fLPLT 254 NRBC# 0.00 
NRBC% 0.0 %NEUT 54.40 %%LYMP 30.40 %%MONO 10.80 %%EOS 3.40 %%BASO 1.0 %#NEUT 
2.26 #LYMP 1.26 #MONO 0.45 #EOS 0.14 #BASO 0.04 MANUAL DIFF NOT IND TSH 1.230 
uIU/mLGLUCOSE 94.0 mg/dLSODIUM 136.0 mmol/LPOTASSIUM 4.30 mmol/LCHLORIDE 99.0 
mmol/LCO2 25.0 mmol/LBUN 10.0 mg/dLCREATININE 0.80 mg/dLSGOT/AST 36.0 IU/LSGPT/
ALT 36.0 IU/LALK PHOS 84.0 IU/LTOTAL PROTEIN 7.90 g/dLALBUMIN 4.40 g/dLTOTAL 
BILI 0.70 mg/dLCALCIUM 9.80 mg/dLAGE 67 GFR NonAA 72 GFR AA 87 eGFR 60 eGFR AA* 
60 TRIGLYCERIDES 122.0 mg/dLCHOLESTEROL 141.0 mg/dLHDL 47.0 mg/dLTOT CHOL/HDL 
3.0 LDL (CALC) 70.0 mg/dL

 

 2013 8:45 AM  WBC 5.3 RBC 4.01 HGB 13.0 g/dLHCT 37.60 %MCV 94.0 fLMCH 
32.40 pgMCHC 34.60 g/dLRDW SD 43 RDW CV 12.50 %MPV 9.40 fLPLT 248 NRBC# 0.00 
NRBC% 0.0 %NEUT 58.70 %%LYMP 27.80 %%MONO 9.80 %%EOS 2.80 %%BASO 0.90 %#NEUT 
3.12 #LYMP 1.48 #MONO 0.52 #EOS 0.15 #BASO 0.05 MANUAL DIFF NOT IND TSH 1.570 
uIU/mLGLUCOSE 94.0 mg/dLSODIUM 134.0 mmol/LPOTASSIUM 4.10 mmol/LCHLORIDE 101.0 
mmol/LCO2 23.0 mmol/LBUN 11.0 mg/dLCREATININE 0.80 mg/dLSGOT/AST 41.0 IU/LSGPT/
ALT 44.0 IU/LALK PHOS 109.0 IU/LTOTAL PROTEIN 7.90 g/dLALBUMIN 4.70 g/dLTOTAL 
BILI 0.80 mg/dLCALCIUM 10.40 mg/dLAGE 68 GFR NonAA 72 GFR AA 87 eGFR >60 mL/min/
1.73 m2eGFR AA* >60 TRIGLYCERIDES 163.0 mg/dLCHOLESTEROL 138.0 mg/dLHDL 49.0 mg/
dLTOT CHOL/HDL 2.8 LDL (CALC) 56.0 mg/dL

 

 2014 8:58 AM  GLUCOSE 86.0 mg/dLSODIUM 134.0 mmol/LPOTASSIUM 4.20 mmol/
LCHLORIDE 99.0 mmol/LCO2 25.0 mmol/LBUN 14.0 mg/dLCREATININE 0.80 mg/dLCALCIUM 
10.10 mg/dLAGE 68 GFR NonAA 72 GFR AA 87 eGFR >60 mL/min/1.73 m2eGFR AA* >60 
FREE T4 1.18 TSH 1.20 uIU/mL

 

 2014 9:50 AM  WBC 5.7 RBC 4.03 HGB 12.90 g/dLHCT 37.90 %MCV 94.0 fLMCH 
32.0 pgMCHC 34.0 g/dLRDW SD 44 RDW CV 12.70 %MPV 9.70 fLPLT 292 NRBC# 0.00 NRBC
% 0.0 %NEUT 62.70 %%LYMP 21.80 %%MONO 11.0 %%EOS 3.40 %%BASO 1.10 %#NEUT 3.55 #
LYMP 1.23 #MONO 0.62 #EOS 0.19 #BASO 0.06 MANUAL DIFF NOT IND GLUCOSE 98.0 mg/
dLSODIUM 135.0 mmol/LPOTASSIUM 4.30 mmol/LCHLORIDE 102.0 mmol/LCO2 22.0 mmol/
LBUN 10.0 mg/dLCREATININE 0.80 mg/dLSGOT/AST 34.0 IU/LSGPT/ALT 32.0 IU/LALK 
PHOS 95.0 IU/LTOTAL PROTEIN 7.70 g/dLALBUMIN 4.30 g/dLTOTAL BILI 0.80 mg/
dLCALCIUM 9.10 mg/dLAGE 68 GFR NonAA 72 GFR AA 87 eGFR 60 eGFR AA* 60 
TRIGLYCERIDES 108.0 mg/dLCHOLESTEROL 146.0 mg/dLHDL 56.0 mg/dLTOT CHOL/HDL 2.6 
LDL (CALC) 68.0 mg/dLTSH 0.90 uIU/mL

 

 2014 8:40 AM  WBC 4.4 RBC 4.13 HGB 13.20 g/dLHCT 38.90 %MCV 94.0 fLMCH 
32.0 pgMCHC 33.90 g/dLRDW SD 47 RDW CV 13.50 %MPV 9.80 fLPLT 279 NRBC# 0.00 NRBC
% 0.0 %NEUT 63.80 %%LYMP 24.60 %%MONO 9.30 %%EOS 1.60 %%BASO 0.70 %#NEUT 2.80 #
LYMP 1.08 #MONO 0.41 #EOS 0.07 #BASO 0.03 MANUAL DIFF NOT IND GLUCOSE 96.0 mg/
dLSODIUM 136.0 mmol/LPOTASSIUM 4.10 mmol/LCHLORIDE 99.0 mmol/LCO2 23.0 mmol/
LBUN 8.0 mg/dLCREATININE 0.80 mg/dLSGOT/AST 31.0 IU/LSGPT/ALT 27.0 IU/LALK PHOS 
85.0 IU/LTOTAL PROTEIN 7.60 g/dLALBUMIN 4.40 g/dLTOTAL BILI 0.80 mg/dLCALCIUM 
10.20 mg/dLAGE 69 GFR NonAA 71 GFR AA 86 eGFR 60 eGFR AA* 60 TRIGLYCERIDES 61.0 
mg/dLCHOLESTEROL 148.0 mg/dLHDL 71.0 mg/dLTOT CHOL/HDL 2.1 LDL (CALC) 65.0 mg/
dLTSH 0.990 uIU/mL

 

 2015 8:32 AM  WBC 4.8 RBC 3.98 HGB 12.70 g/dLHCT 37.30 %MCV 94.0 fLMCH 
31.90 pgMCHC 34.0 g/dLRDW SD 43 RDW CV 12.70 %MPV 9.50 fLPLT 270 NRBC# 0.00 NRBC
% 0.0 %NEUT 57.30 %%LYMP 25.0 %%MONO 13.0 %%EOS 3.60 %%BASO 1.10 %#NEUT 2.73 #
LYMP 1.19 #MONO 0.62 #EOS 0.17 #BASO 0.05 MANUAL DIFF NOT IND TSH 0.640 uIU/
mLGLUCOSE 90.0 mg/dLSODIUM 136.0 mmol/LPOTASSIUM 4.0 mmol/LCHLORIDE 101.0 mmol/
LCO2 24.0 mmol/LBUN 10.0 mg/dLCREATININE 0.80 mg/dLSGOT/AST 34.0 IU/LSGPT/ALT 
32.0 IU/LALK PHOS 92.0 IU/LTOTAL PROTEIN 7.50 g/dLALBUMIN 4.40 g/dLTOTAL BILI 
0.70 mg/dLCALCIUM 9.50 mg/dLAGE 69 GFR NonAA 71 GFR AA 86 eGFR >60 mL/min/1.73 
m2eGFR AA* >60 TRIGLYCERIDES 107.0 mg/dLCHOLESTEROL 138.0 mg/dLHDL 58.0 mg/
dLTOT CHOL/HDL 2.4 LDL (CALC) 59.0 mg/dL

 

 2015 8:31 AM  WBC 4.6 RBC 3.97 HGB 12.90 g/dLHCT 38.0 %MCV 96.0 fLMCH 
32.50 pgMCHC 33.90 g/dLRDW SD 44 RDW CV 12.60 %MPV 9.0 fLPLT 248 NRBC# 0.00 NRBC
% 0.0 %NEUT 61.0 %%LYMP 24.70 %%MONO 10.20 %%EOS 3.0 %%BASO 1.10 %#NEUT 2.82 #
LYMP 1.14 #MONO 0.47 #EOS 0.14 #BASO 0.05 MANUAL DIFF NOT IND TRIGLYCERIDES 
105.0 mg/dLCHOLESTEROL 141.0 mg/dLHDL 58.0 mg/dLTOT CHOL/HDL 2.4 LDL (CALC) 
62.0 mg/dLGLUCOSE 93.0 mg/dLSODIUM 136.0 mmol/LPOTASSIUM 4.10 mmol/LCHLORIDE 
101.0 mmol/LCO2 25.0 mmol/LBUN 9.0 mg/dLCREATININE 0.80 mg/dLSGOT/AST 32.0 IU/
LSGPT/ALT 28.0 IU/LALK PHOS 95.0 IU/LTOTAL PROTEIN 7.30 g/dLALBUMIN 4.50 g/
dLTOTAL BILI 0.80 mg/dLCALCIUM 9.70 mg/dLAGE 70 GFR NonAA 71 GFR AA 86 eGFR >60 
mL/min/1.73meGFR AA* >60 TSH 1.10 uIU/mL







History Of Immunizations







 Name  Date Admin  Mfg Name  Mfg Code  Trade Name  Lot#  Route  Inj  Vis Given  
Vis Pub  CVX

 

 Influenza  10/20/2010  [employer]of[employer] pasteur  [facility]  Fluzone  [id number]  Intramuscular  
Left Arm  10/20/2010  2010  999

 

 Influenza  10/28/2011  [employer] pasteur  [employer]  Fluzone  [id number]  Intramuscular  
Left Arm  10/28/2011  2011  141

 

 Influenza  10/29/2012  sanofi pasteur  [employer]  Fluzone  [id number]  Intramuscular  
Left Deltoid  10/29/2012  2012  141

 

 X  12/10/2012  [employer]  MSD  Pneumovax 23  [id number]  Intramuscular  
Left Gluteous Medius  12/10/2012  2010  33

 

 Influenza  10/28/2013  sanofi pasteur  [facility]  Fluzone > 3 Years  [id number]  
Intramuscular  Right Deltoid  10/28/2013  2013  141

 

 X  9/2/2015  Not Entered  NE  Prevnar 13     Not Entered  Not Entered  1  109

 

 Influenza  2015  Not Entered  NE  Not Entered     Not Entered  Not Entered
  2015  141

 

 HepB Adult  2016  [employer]  MSD  Recombivax Adult  [id number]  Not 
Entered  Left Deltoid  2016  43

 

 HepB Adult  2016  [employer]  MSD  Recombivax Adult  [id number]  
Intramuscular  Right Deltoid  2016  43

 

 Zostavax  2012  Not Entered  NE  Not Entered     Not Entered  Not 
Entered  1  121

 

 Tdap  2012  Not Entered  NE  Not Entered     Not Entered  Not Entered  1  115

 

 Influenza  10/13/2016  Not Entered  NE  Fluzone High-Dose     Intramuscular  
Left Arm  1  141

 

 HepB Adult  2016  [employer]  MSD  Recombivax Adult  [id number]  
Intramuscular  Right Deltoid  2016  43

 

 Influenza  10/30/2017  Not Entered  NE  Fluarix Quadrivalent     Intramuscular
  Left Arm  2017  150







History of Past Illness







 Name  Date of Onset  Comments

 

 Benign Essential Hypertension  Dec 14 2009 10:10AM   

 

 Hyperlipidemia  Dec 14 2009 10:10AM   

 

 Hypothyroidism, Acquired  Dec 14 2009 10:10AM   

 

 hypothyroid      

 

 Hypertension      

 

 Hyperlipidemia      

 

 acid reflux      

 

 Hypertension, Benign Essential  2010  9:41AM   

 

 Hypertension, Benign Essential  2010 12:53PM   

 

 Routine gynecological examination  May  5 2010  9:28AM   

 

 Hypertension  May  5 2010  9:28AM   

 

 Hyperlipidemia, unspecified  May  5 2010  9:28AM   

 

 Hypothyroidism, Acquired  May  5 2010  9:28AM   

 

 Vulvovaginitis  May  5 2010  9:28AM   

 

 Rhinitis, Allergic  May  5 2010  9:28AM   

 

 Hypertension, Benign Essential  May 19 2010  8:48AM   

 

 Hypertension, Benign Essential  May 25 2010  9:39AM   

 

 Flu  Oct 20 2010 12:01PM   

 

 Essential Hypertension  2010  8:34AM   

 

 Hyperlipidemia  2010  8:34AM   

 

 Gastroesophageal Reflux  2010  8:34AM   

 

 Hypothyroidism, Acquired  2010  8:34AM   

 

 Hypertension, Benign Essential  2010  9:22AM   

 

 Hypertension, Benign Essential  Dec 15 2010  9:07AM   

 

 Essential Hypertension  2011  1:31PM   

 

 Hyperlipidemia  2011  1:31PM   

 

 Gastroesophageal Reflux  2011  1:31PM   

 

 Hypothyroidism, Acquired  2011  1:31PM   

 

 Essential Hypertension  2011  8:51AM   

 

 Hyperlipidemia  2011  8:51AM   

 

 Gastroesophageal Reflux  2011  8:51AM   

 

 Hypothyroidism, Acquired  2011  8:51AM   

 

 Essential Hypertension  2011  9:13AM   

 

 Hyperlipidemia  2011  9:13AM   

 

 Gastroesophageal Reflux  2011  9:13AM   

 

 Hypothyroidism, Acquired  2011  9:13AM   

 

 Nausea With Vomiting  2011  1:18PM   

 

 Hyponatremia  2011  8:46AM   

 

 Nausea  2011  9:13AM   

 

 Hypothyroidism  2011 11:10AM   

 

 Hyponatremia  2011 11:10AM   

 

 Essential Hypertension  2011 10:05AM   

 

 Hyperlipidemia  2011 10:05AM   

 

 Gastroesophageal Reflux  2011 10:05AM   

 

 Nausea  2011 10:05AM   

 

 Hypothyroidism, Acquired  2011 10:05AM   

 

 Hyponatremia  May  6 2011 11:34AM   

 

 Essential Hypertension  May 16 2011  8:50AM   

 

 Hyperlipidemia  May 16 2011  8:50AM   

 

 Gastroesophageal Reflux  May 16 2011  8:50AM   

 

 Nausea  May 16 2011  8:50AM   

 

 Hypothyroidism, Acquired  May 16 2011  8:50AM   

 

 Hyponatremia  May 16 2011  8:50AM   

 

 Routine gynecological examination  2011  8:54AM   

 

 Hypertension  2011  8:54AM   

 

 Hyperlipidemia, unspecified  2011  8:54AM   

 

 Hypothyroidism, Acquired  2011  8:54AM   

 

 Rhinitis, Allergic  2011  8:54AM   

 

 Hypothyroidism  Aug 29 2011 12:37PM   

 

 Hyponatremia  Aug 29 2011 12:37PM   

 

 Essential Hypertension  Sep 12 2011  8:53AM   

 

 Hyperlipidemia  Sep 12 2011  8:53AM   

 

 Gastroesophageal Reflux  Sep 12 2011  8:53AM   

 

 Hypothyroidism, Acquired  Sep 12 2011  8:53AM   

 

 Hyponatremia  Sep 12 2011  8:53AM   

 

 Hypertension  Sep 29 2011  9:41AM   

 

 Hyperlipidemia  Dec  8 2011  8:31AM   

 

 Hypothyroidism  Dec  8 2011  8:31AM   

 

 Hypertension  Dec  8 2011  8:31AM   

 

 Flu  Dec  9 2011 10:02AM   

 

 Essential Hypertension  Dec 13 2011 10:13AM   

 

 Hyperlipidemia  Dec 13 2011 10:13AM   

 

 Gastroesophageal Reflux  Dec 13 2011 10:13AM   

 

 Hypothyroidism, Acquired  Dec 13 2011 10:13AM   

 

 Hyponatremia  Dec 13 2011 10:13AM   

 

 Vaginal Discharge  2012  9:43AM   

 

 Rhinitis, Allergic  Feb 15 2012  9:31AM   

 

 Eustachian Tube Dysfunction  Feb 15 2012  9:31AM   

 

 Pharyngitis, Acute  Feb 15 2012  9:31AM   

 

 Routine gynecological examination  2012  8:48AM   

 

 Hypertension  2012  8:48AM   

 

 Hyperlipidemia, unspecified  2012  8:48AM   

 

 Hypothyroidism, Acquired  2012  8:48AM   

 

 Rhinitis, Allergic  2012  8:48AM   

 

 Candidiasis, Vulvovaginal  2012 11:04AM   

 

 Essential Hypertension  Aug 15 2012  9:02AM   

 

 Hyperlipidemia  Aug 15 2012  9:02AM   

 

 Gastroesophageal Reflux  Aug 15 2012  9:02AM   

 

 Hypothyroidism, Acquired  Aug 15 2012  9:02AM   

 

 Hyponatremia  Aug 15 2012  9:02AM   

 

 Atrophic Vaginitis, Postmenopausal  Aug 15 2012  9:02AM   

 

 Flu  Oct 29 2012  9:24AM   

 

 Essential Hypertension  Dec 10 2012  8:35AM   

 

 Hyperlipidemia  Dec 10 2012  8:35AM   

 

 Gastroesophageal Reflux  Dec 10 2012  8:35AM   

 

 Hypothyroidism, Acquired  Dec 10 2012  8:35AM   

 

 Hyponatremia  Dec 10 2012  8:35AM   

 

 Atrophic Vaginitis, Postmenopausal  Dec 10 2012  8:35AM   

 

 Pneumococcus  Dec 10 2012  2:07PM   

 

 Vaginal Discharge  Dec 20 2012  1:50PM   

 

 Essential Hypertension  Dec 20 2012  1:50PM   

 

 Hyperlipidemia  Dec 20 2012  1:50PM   

 

 Gastroesophageal Reflux  Dec 20 2012  1:50PM   

 

 Hypothyroidism, Acquired  Dec 20 2012  1:50PM   

 

 Atrophic Vaginitis, Postmenopausal  Dec 20 2012  1:50PM   

 

 Upper Respiratory Infections  2013  8:52AM   

 

 Hyperlipidemia  2013  8:21AM   

 

 Hypertension  2013  8:21AM   

 

 Hypothyroidism  2013  8:21AM   

 

 Screening Mammogram  Beto 10 2013  8:45AM   

 

 Routine gynecological examination  Beto 10 2013  8:34AM   

 

 Hypertension  Beto 10 2013  8:34AM   

 

 Hyperlipidemia, unspecified  Beto 10 2013  8:34AM   

 

 Hypothyroidism, Acquired  Beto 10 2013  8:34AM   

 

 Rhinitis, Allergic  Beto 10 2013  8:34AM   

 

 Flu  Oct 28 2013  8:52AM   

 

 Essential Hypertension  Dec  9 2013  8:37AM   

 

 Hyperlipidemia  Dec  9 2013  8:37AM   

 

 Gastroesophageal Reflux  Dec  9 2013  8:37AM   

 

 Hypothyroidism, Acquired  Dec  9 2013  8:37AM   

 

 Atrophic Vaginitis, Postmenopausal  Dec  9 2013  8:37AM   

 

 Hypertension  2014  9:56AM   

 

 Hyperlipidemia  2014  9:56AM   

 

 Hypothyroidism  2014  9:56AM   

 

 Screening Mammogram  2014  8:32AM   

 

 Osteopenia  2014  8:32AM   

 

 Hypertension  2014  8:35AM   

 

 Hypothyroidism, Acquired  2014  8:35AM   

 

 Hyperlipidemia  2014  8:35AM   

 

 Upper Respiratory Infections  2014  9:28AM   

 

 Sinusitis, Acute  Oct  2 2014  9:17AM   

 

 Herpes Zoster  Oct  5 2014  1:44PM   

 

 Vesicular Eruption  Oct  5 2014  1:44PM   

 

 Hypertension  2014  8:18AM   

 

 Hyperlipidemia  2014  8:18AM   

 

 hypothyroid  2014  8:18AM   

 

 Situational anxiety  Dec 20 2014  9:33AM   

 

 GERD (gastroesophageal reflux disease)  Dec 20 2014  9:33AM   

 

 Nausea  Dec 20 2014  9:33AM   

 

 Abdominal Pain, Epigastric  Dec 20 2014  9:33AM   

 

 Hyperlipidemia  Oct  6 2014  9:03AM   

 

 acid reflux  Oct  6 2014  9:03AM   

 

 hypothyroid  Oct  6 2014  9:03AM   

 

 Shingles  Oct  6 2014  9:03AM   

 

 Pharyngitis  2015  1:09PM   

 

 Bronchitis  2015  9:10AM   

 

 Hyperlipidemia  2015  9:10AM   

 

 acid reflux  2015  9:10AM   

 

 hypothyroid  2015  9:10AM   

 

 Hyperlipidemia  2015  8:18AM   

 

 Hypertension  2015  8:18AM   

 

 hypothyroid  2015  8:18AM   

 

 Screening Mammogram  2015 11:43AM   

 

 Medicare Annual Pap Q 2 years  2015  9:36AM   

 

 Screening Mammogram  2015  9:36AM   

 

 Candidiasis of female genitalia  2015  9:36AM   

 

 Hypertension  2015 11:34AM   

 

 Hyperlipidemia, unspecified  2015 11:34AM   

 

 Hypothyroidism, Acquired  2015 11:34AM   

 

 Osteopenia  2016  8:41AM   

 

 Encounter for screening mammogram for breast cancer  2016  8:41AM   

 

 Hypertension  2016  8:34AM   

 

 Lymphedema  2016  8:34AM   

 

 Hyperlipidemia  2016  8:34AM   

 

 hypothyroid  2016  8:34AM   

 

 HEP B  2016  9:13AM   

 

 HEP B  2016  8:52AM   

 

 Acid Reflux  2016  8:34AM   

 

 History of acute gastritis  Oct 13 2016  2:30PM   

 

 Anxiety as acute reaction to exceptional stress  Oct 13 2016  2:30PM   

 

 HEP B  Dec 29 2016  8:42AM   

 

 Caregiver stress syndrome  May 26 2017  8:28AM   

 

 Anxiety  May 26 2017  8:28AM   

 

 Blood in stool  2017  2:54PM   

 

 Upper GI bleed  2017 11:34AM   

 

 Hyponatremia  2017  9:03AM   

 

 Hyponatremia  2017  8:43AM   

 

 Medicare Annual Pap Q 2 years  2017  9:03AM   

 

 Nausea  2017  9:03AM   

 

 Uterine enlargement  2017  9:03AM   

 

 Encounter for screening mammogram for breast cancer  2017  4:56PM   

 

 Panic disorder [episodic paroxysmal anxiety]  Oct 10 2017  2:02PM   

 

 Acute stress reaction  Oct 10 2017  2:02PM   

 

 Caregiver stress syndrome  Oct 10 2017  2:02PM   

 

 Stress reaction causing mixed disturbance of emotion and conduct  Oct 18 2017  
9:05AM   







Payers







 Insurance Name  Company Name  Plan Name  Plan Number  Policy Number  Policy 
Group Number  Start Date

 

    Medicare RHC  Medicare RHC     [id number]     N/A

 

    [employer]  [employer] [place]     [id number]     2009

 

    Medicare Part B  [place] [place]     [id number]     N/A

 

    Medicare Part A  Medicare Part A     [id number]     N/A

 

    Medicare Part A  ZZZMedicare P A - Preventive     [id number]     N/A

 

    Medicare Part A  Medicare - Lab/Xray     [id number]     N/A







History of Encounters







 Visit Date  Visit Type  Provider

 

 10/18/2017  Office visit  Doris Noriega MD

 

 10/10/2017  Office visit  Doris Noriega MD

 

 2017  Office visit  Doris Noriega MD

 

 2017  Office visit  Doris Noriega MD

 

 2017  Office visit  Doris Noriega MD

 

 2017  Office visit  Prince Noe APRN

 

 2016  Nurse visit  Doris Noriega MD

 

 10/13/2016  Office visit  Doris Noriega MD

 

 2016  Nurse visit  Doris Noriega MD

 

 2016  Office visit  Doris Noriega MD

 

 2015  Office visit  Doris Noriega MD

 

 2015  Office visit  Doris Noriega MD

 

 2015  Office visit  Prince Noe APRN

 

 2014  Office visit  Anyi Herrera APRN

 

 10/27/2014  Voided  Doris Noriega MD

 

 10/6/2014  Office visit  Doris Noriega MD

 

 10/5/2014  Office visit  Anyi Herrera APRN

 

 10/2/2014  Office visit   

 

 10/2/2014  Office visit  Corrine Bay APRN

 

 2014  Office visit  Kassie Franklin APRN

 

 2014  Office visit  Doris Noriega MD

 

 2014  Office visit  Doris Noriega MD

 

 2013  Office visit  Dee Colindres MD

 

 10/28/2013  Nurse visit  Dee Colindres MD

 

 6/10/2013  Office visit  Dee Colindres MD

 

 2013  Office visit  Corrine Bay APRN

 

 2012  Office visit  Dee Colindres MD

 

 12/10/2012  Office visit  Dee Colindres MD

 

 10/29/2012  Nurse visit  Dee Colindres MD

 

 8/15/2012  Office visit  Dee Colindres MD

 

 2012  Office visit  Corrine Bay APRN

 

 2012  Office visit  Dee Colindres MD

 

 2/15/2012  Office visit  Dee Colindres MD

 

 2012  Office visit  Corrine Bay APRN

 

 2011  Office visit  Dee Colindres MD

 

 10/28/2011  Nurse visit  Shad Nolasco MD

 

 2011  Office visit  Dee Colindres MD

 

 2011  Office visit  Dee Colindres MD

 

 2011  Office visit  Dee Colindres MD

 

 2011  Office visit  Dee Colindres MD

 

 2011  Office visit  Dee Colindres MD

 

 2011  Office visit  Dee Colindres MD

 

 4/10/2011  [facility]  KHRIS Reeves MD

 

 4/10/2011  [facility]  RICKEY Toussaint MD

 

 2011  Office visit  RICKEY Toussaint MD

 

 2011  [facility]  Fide Castellanos MD

 

 2011  Voided  Fide Castellanos MD

 

 2011  Office visit  Dee Colindres MD

 

 12/15/2010  Nurse visit  Dee Colindres MD

 

 2010  Office visit  Dee Colindres MD

 

 10/20/2010  Nurse visit  Stephenie Kay PA

 

 2010  Nurse visit  Dee Colindres MD

 

 2010  Nurse visit  Dee Colindres MD

 

 2010  Office visit  Dee Colindres MD

 

 3/19/2010  Voided  Stephenie Kay PA

 

 2010  Nurse visit  Stephenie Kay PA

 

 2010  Nurse visit  Stephenie PERAZA

 

 2010  Nurse visit  Stephenie Kay PA

 

 2009  Office visit  Stephenie Kay PA

 

 10/20/2009  Nurse visit  Stephenie Kay PA

## 2018-10-22 NOTE — DISCHARGE INST-SIMPLE/STANDARD
Discharge Inst-Standard


Discharge Medications


New, Converted or Re-Newed RX:  Other





Patient Instructions/Follow Up


Plan of Care/Instructions/FU:  


please schedule a HIDA scan with ejection fraction as an outpatient


Activity as Tolerated:  Yes


Discharge Diet:  No Restrictions











FANG JOHNSON MD Oct 22, 2018 09:53

## 2018-10-22 NOTE — XMS REPORT
MU2 Ambulatory Summary

 Created on: 10/06/2017



Milady Crespo

External Reference #: [id number]

: 1945

Sex: Female



Demographics







 Address  [address]

[place]  [address]

 

 Home Phone  [phone]

 

 Preferred Language  English

 

 Marital Status  

 

 Latter day Affiliation  Unknown

 

 Race  White

 

 Ethnic Group  Not  or 





Author







 Author  Doris Noriega

 

 Organization  [facility]

 

 Address  [address]

[place], [place]  [id number]



 

 Phone  [phone]







Care Team Providers







 Care Team Member Name  Role  Phone

 

 Doris Noriega  PCP  [phone]

 

 Doris Noriega  PreferredProvider  [phone]







Allergies and Adverse Reactions







 Name  Reaction  Notes

 

 NO KNOWN DRUG ALLERGIES      







Plan of Treatment







 Planned Activity  Comments  Planned Date  Planned Time  Plan/Goal

 

 Complete blood count (CBC) with differential count     2016  12:00 AM   

 

 Comprehensive metabolic panel     2016  12:00 AM   

 

 VITAMIN D (25 HYDROXY)     2016  12:00 AM   

 

 Lipid panel (total cholesterol, lipoproteins, HDL, triglycerides)     8/15/
2012  12:00 AM   

 

 Pap smear     2017  12:00 AM   

 

 Basic metabolic panel     2017  12:00 AM   

 

 Comprehensive Metabolic Panel     6/10/2013  12:00 AM   

 

 Lipid panel (total cholesterol, lipoproteins, HDL, triglycerides)     6/10/
2013  12:00 AM   

 

 Thyroid stimulating hormone (TSH)     6/10/2013  12:00 AM   

 

 CBC (automated H&H, platelets, WBC and automated differential)     6/10/2013  
12:00 AM   

 

 Comprehensive Metabolic Panel     2012  12:00 AM   

 

 Lipid panel (total cholesterol, lipoproteins, HDL, triglycerides)     2012  12:00 AM   

 

 Thyroid stimulating hormone (TSH)     2012  12:00 AM   

 

 CBC (automated H&H, platelets, WBC and automated differential)     2012  
12:00 AM   

 

 Screening mammography, bilateral     2015  12:00 AM   







Medications







 Active 

 

 Name  Start Date  Estimated Completion Date  SIG  Comments

 

 aspirin 81 mg oral tablet,delayed release (DR/EC)        take 1 tablet (81 mg) 
by oral route once daily   

 

 Vitamin D3 1,000 unit oral capsule        take 1 capsule by oral route daily   

 

 famotidine 20 mg oral tablet  2015     TAKE ONE TABLET BY MOUTH TWICE 
DAILY   

 

 valsartan 160 mg oral tablet  2016     TAKE ONE TABLET BY MOUTH ONCE 
DAILY   

 

 amlodipine 5 mg oral tablet  2016     TAKE ONE TABLET BY MOUTH ONCE DAILY
   

 

 Allergy Relief (loratadine) 10 mg oral tablet  2016     TAKE ONE TABLET 
BY MOUTH ONCE DAILY   

 

 amlodipine 5 mg oral tablet  2016     TAKE ONE TABLET BY MOUTH ONCE DAILY
   

 

 Allergy Relief (loratadine) 10 mg oral tablet  2016     TAKE ONE TABLET 
BY MOUTH ONCE DAILY   

 

 valsartan 160 mg oral tablet  2016     TAKE ONE TABLET BY MOUTH ONCE 
DAILY   

 

 amlodipine 5 mg oral tablet  10/13/2016  10/8/2017  TAKE ONE TABLET BY MOUTH 
ONCE DAILY for 90 days   

 

 atenolol 50 mg oral tablet  10/13/2016  10/8/2017  take 1 tablet (50 mg) by 
oral route once daily   

 

 levothyroxine 50 mcg oral tablet  10/13/2016  10/8/2017  TAKE ONE TABLET BY 
MOUTH ONCE DAILY for 90 days   

 

 Lipitor 20 mg oral tablet  10/13/2016  2018  take 1 tablet (20 mg) by oral 
route once daily   

 

 Plavix 75 mg oral tablet  10/13/2016  2018  take 1 tablet (75 mg) by oral 
route once daily   

 

 valsartan 160 mg oral tablet  10/13/2016  10/8/2017  TAKE ONE TABLET BY MOUTH 
ONCE DAILY for 90 days   

 

 Zofran (as hydrochloride) 4 mg oral tablet  2016     take 1 tablet by 
oral route daily as needed for 14 days   

 

 Zofran (as hydrochloride) 4 mg oral tablet  2017     take 1 tablet by oral 
route daily as needed for 14 days   

 

 Zofran (as hydrochloride) 4 mg oral tablet  2017     take 1 tablet by oral 
route daily as needed for 14 days   

 

 Zofran (as hydrochloride) 4 mg oral tablet  2017     take 1 tablet by 
oral route daily as needed for 14 days   

 

 Zofran (as hydrochloride) 4 mg oral tablet  2017     take 1 tablet by 
oral route daily as needed for 14 days   

 

 triamcinolone acetonide 0.1 % topical cream  2017     APPLY A THIN LAYER 
OF CREAM EXTERNALLY TO AFFECTED AREA TWICE DAILY FOR 14 DAYS   

 

 ondansetron 4 mg oral tablet,disintegrating  2017     dissolve  1 tablet 
by oral route every 8 hours as needed   

 

 loratadine 10 mg oral tablet  2017  TAKE 1 TABLET BY MOUTH 
EVERY DAY IN THE EVENING   

 

 triamcinolone acetonide 0.1 % topical cream  2017     APPLY  CREAM 
EXTERNALLY TO AFFECTED AREA TWICE DAILY FOR 14 DAYS   

 

 EQ LORATADINE 10MG  TABS  2017     TAKE ONE TABLET BY MOUTH ONCE DAILY   

 

 Lexapro 10 mg oral tablet  2017     take 1 tablet (10 mg) by oral route 
once daily for 90 days   

 

 clorazepate dipotassium 7.5 mg oral tablet  2017  10/11/2017  take 1/2 
to1 tablet PO up to three times per day for situational anxiety   

 

 pantoprazole 40 mg oral tablet,delayed release (DR/EC)  10/6/2017  10/1/2018  
take 1 tablet (40 mg) by oral route once daily for 90 days   









  

 

 Name  Start Date  Expiration Date  SIG  Comments

 

 prednisone 20 mg oral tablet  2011  take 2 tablets by oral 
route daily for 5 days   

 

 calcium carbonate-vitamin D2 600-125 mg-unit oral tablet  8/15/2012  2012
  take 2 tablets by oral route daily   

 

 [place] 3 Fish Oil 900-1,400 mg oral capsule,delayed release(DR/EC)  8/15/2012  9
/  take 1 capsule by oral route daily   

 

 multivitamin Oral tablet  8/15/2012  2012  take 1 tablet by oral route 
daily   

 

 triamcinolone acetonide 0.1 % topical cream  2013  10/7/2013  APPLY A 
THIN LAYER TO THE AFFECTED AREA(S) BY TOPICAL ROUTE TWICE A DAY   

 

 famotidine 20 mg oral tablet  2014  take 1 tablet (20 mg) by 
oral route 2 times per day   

 

 amoxicillin 500 mg oral capsule  2014  take 1 capsule (500 mg) 
by oral route 3 times per day for 10 days   

 

 Zithromax Z-Tab 250 mg oral tablet  10/2/2014  10/7/2014  take 2 tablets (500 
mg) by oral route once daily for 1 day then 1 tablet (250 mg) by oral route 
once daily for 4 days   

 

 acyclovir 800 mg oral tablet  10/5/2014  10/12/2014  take 1 tablet (800 mg) by 
oral route 5 times per day for 7 days   

 

 gabapentin 300 mg oral capsule  10/9/2014  2014  take 1 capsule (300 mg) 
by oral route 3 times per day for 14 days   

 

 Carafate 100 mg/mL oral suspension  2014  take 10 milliliters 
(1 gram) by oral route 4 times per day on an empty stomach 1 hour before meals 
and at bedtime for 4 weeks   

 

 amlodipine 5 mg oral tablet  2015  TAKE ONE TABLET BY MOUTH 
EVERY DAY   

 

 levothyroxine 50 mcg oral tablet  2015  TAKE ONE TABLET BY MOUTH 
EVERY DAY   

 

 Diovan 160 mg oral tablet  2015  2/15/2016  TAKE ONE TABLET BY MOUTH 
EVERY DAY for 90 days   

 

 triamcinolone acetonide 0.1 % topical cream  2015  apply a thin 
layer to the affected area(s) by topical route 2 times per day for 14 days   

 

 fluconazole 150 mg oral tablet  2015  take 1 tablet (150 mg) 
by oral route once for 1 day   

 

 Zofran (as hydrochloride) 4 mg oral tablet  10/13/2016  10/27/2016  take 1 
tablet by oral route daily as needed for 14 days   

 

 Zofran (as hydrochloride) 4 mg oral tablet  2017     take 1 tablet by 
oral route daily as needed for 14 days   









 Discontinued 

 

 Name  Start Date  Discontinued Date  SIG  Comments

 

 Lipitor 40 mg oral tablet     2009 TAB DAILY   

 

 ramipril 5 mg oral capsule     2009  take 1 capsule (5 mg) by oral route 
once daily   

 

 lovastatin 20 mg oral tablet  2009  take 1 tablet (20 mg) by 
oral route once daily with evening meal   

 

 propranolol 20 mg oral tablet  2010  take 1 tablet by oral 
route 2 times a day   

 

 Fosamax 70 mg oral tablet  2010  take 1 tablet (70 mg) by oral 
route once weekly in the morning, at least 30 minutes before the first food, 
beverage, or medication of the day   

 

 lisinopril 40 mg oral tablet  2010  4/10/2011  take 2 tablets by oral 
route daily   

 

 Zoloft 50 mg oral tablet  2011  take 1 tablet (50 mg) by oral 
route once daily   

 

 promethazine 25 mg oral tablet  2011  take 1 tablet by oral 
route every 6 hours as needed   

 

 Diovan -12.5 mg oral tablet  2011  take 1 tablet by oral 
route once daily for 30 days   

 

 Diflucan 150 mg oral tablet     2012  take 1 tablet (150 mg) by oral 
route once for 1 day   

 

 Diflucan 150 mg oral tablet  2012  8/15/2012  take 1 tablet (150 mg) by 
oral route once   

 

 Vitamin B-12 1,000 mcg oral tablet  8/15/2012  2014  take 1 tablet by 
oral route daily   

 

 Premarin 0.625 mg/gram vaginal cream  10/29/2012  6/10/2013  use 1 gram daily 
for a week then 1 gram twice a week   

 

 Medrol (Tab) 4 mg oral tablets,dose pack  2013  6/10/2013  take as 
directed   

 

 aspirin 325 mg oral tablet  2014  take 1 tablet (325 mg) by 
oral route once daily   

 

 Medrol (Tab) 4 mg oral tablets,dose pack  2014  10/2/2014  take as 
directed   

 

 Tetracaine Lollipops Lollipop  2015  Use as directed   

 

 Zoloft 50 mg oral tablet  2016  take 1 tablet (50 mg) by oral 
route once daily for 90 days   

 

 Zoloft 50 mg oral tablet  2016  take 1 tablet (50 mg) by oral 
route once daily for 90 days  Pt refuses to take...







Problem List







 Description  Status  Onset

 

 Hyperlipidemia  Active   

 

 acid reflux  Active   

 

 Hypertension  Active   

 

 hypothyroid  Active   







Vital Signs







 Date  Time  BP-Sys(mm[Hg]  BP-Morena(mm[Hg])  HR(bpm)  RR(rpm)  Temp  WT  HT  HC  
BMI  BSA  BMI Percentile  O2 Sat(%)

 

 2017  8:40:00 AM  118 mmHg  68 mmHg  63 bpm  16 rpm  98.1 F  123.125 lbs  
61 in     23.26 kg/m2  1.55 m2     100 %

 

 2017  8:57:00 AM  116 mmHg  62 mmHg  71 bpm  16 rpm  99.8 F  121.5 lbs  
61 in     22.957 kg/m  1.5401 m     98 %

 

 2017  11:30:00 AM  128 mmHg  78 mmHg  69 bpm  16 rpm  98.8 F  129.375 lbs  
61 in     24.44 kg/m2  1.59 m2     98 %

 

 2017  8:22:00 AM  118 mmHg  78 mmHg  62 bpm  18 rpm  97.5 F  129.125 lbs  
61 in     24.3977 kg/m  1.5877 m     100 %

 

 10/13/2016  2:26:00 PM  128 mmHg  78 mmHg  77 bpm  16 rpm  98.4 F  139.25 lbs  
61 in     26.31 kg/m2  1.65 m2     100 %

 

 2016  8:29:00 AM  122 mmHg  70 mmHg  72 bpm  16 rpm  97.8 F  137.125 lbs  
61 in     25.9093 kg/m  1.6361 m     100 %

 

 2015  9:33:00 AM  120 mmHg  68 mmHg  73 bpm     98.7 F  144.375 lbs  61 
in     27.28 kg/m2  1.68 m2     99 %

 

 2015  9:05:00 AM  110 mmHg  75 mmHg  85 bpm  16 rpm  96.7 F  144.375 lbs  
61 in     27.2791 kg/m  1.6788 m     99 %

 

 2015  1:05:00 PM  108 mmHg  62 mmHg  78 bpm  18 rpm  96.9 F  142.375 lbs  
61 in     26.90 kg/m2  1.67 m2     100 %

 

 2014  9:31:00 AM  126 mmHg  66 mmHg  79 bpm  18 rpm  98.7 F  149 lbs  61 
in     28.153 kg/m  1.7055 m     98 %

 

 10/6/2014  9:02:00 AM  110 mmHg  74 mmHg  94 bpm  18 rpm  98.1 F  145.4 lbs  
61 in     27.47 kg/m2  1.68 m2     97 %

 

 10/2/2014  9:14:00 AM  124 mmHg  74 mmHg  79 bpm  18 rpm  98 F  147.25 lbs  61 
in     27.8224 kg/m  1.6955 m     98 %

 

 2014  9:22:00 AM  124 mmHg  76 mmHg  89 bpm  18 rpm  98.1 F  148 lbs  61 
in     27.96 kg/m2  1.70 m2     100 %

 

 2014  8:27:00 AM  118 mmHg  65 mmHg  74 bpm  16 rpm  97.7 F  148 lbs  61 
in     27.9641 kg/m  1.6998 m     99 %

 

 2014  9:48:00 AM  125 mmHg  70 mmHg  93 bpm  18 rpm  96.7 F  156 lbs  61 
in     29.48 kg/m2  1.75 m2     100 %

 

 2013  8:35:00 AM  122 mmHg  74 mmHg  84 bpm  16 rpm  97.9 F  155.375 lbs 
                99 %

 

 6/10/2013  8:30:00 AM  130 mmHg  82 mmHg  79 bpm  16 rpm  97.9 F  161 lbs     
            98 %

 

 2013  8:50:00 AM  124 mmHg  68 mmHg  66 bpm  18 rpm  97.6 F  157.5 lbs  
61 in     29.7591 kg/m  1.7535 m      

 

 2012  1:46:00 PM  120 mmHg  72 mmHg  75 bpm  16 rpm  97.6 F  156.125 lbs
                 98 %

 

 12/10/2012  8:32:00 AM  122 mmHg  82 mmHg  70 bpm  16 rpm  97.3 F  157 lbs    
             99 %

 

 8/15/2012  9:00:00 AM  130 mmHg  76 mmHg  86 bpm  16 rpm  97.4 F  159 lbs     
            100 %

 

 2012  11:02:00 AM  128 mmHg  64 mmHg  66 bpm  18 rpm  97.4 F  162.125 lbs
  61 in     30.6329 kg/m  1.7791 m      

 

 2012  8:45:00 AM  130 mmHg  70 mmHg  82 bpm  16 rpm  98.2 F  160.125 lbs 
                100 %

 

 2/15/2012  9:29:00 AM  122 mmHg  80 mmHg  86 bpm  16 rpm  97.4 F  159.375 lbs  
61 in     30.1133 kg/m  1.7639 m     99 %

 

 2012  9:41:00 AM  132 mmHg  74 mmHg  66 bpm  18 rpm  98.4 F  160.375 lbs  
61 in     30.30 kg/m2  1.77 m2      

 

 2011  10:08:00 AM  134 mmHg  82 mmHg  80 bpm  16 rpm  98 F  160 lbs  61 
in     30.23 kg/m2  1.7674 m     99 %

 

 2011  3:56:00 PM  130 mmHg  80 mmHg                              

 

 2011  8:55:00 AM  130 mmHg  74 mmHg  88 bpm  16 rpm  98.2 F  158.25 lbs  
               98 %

 

 2011  8:54:00 AM  136 mmHg  82 mmHg  102 bpm  16 rpm  98.1 F  153.5 lbs   
              99 %

 

 2011  8:49:00 AM  122 mmHg  88 mmHg  88 bpm  16 rpm  98.6 F  152.25 lbs  
               99 %

 

 2011  10:02:00 AM  140 mmHg  82 mmHg  96 bpm  20 rpm  98.8 F             
       100 %

 

 2011  9:14:00 AM  156 mmHg  98 mmHg  110 bpm  24 rpm  98.5 F  153 lbs    
             100 %

 

 2011  8:50:00 AM  160 mmHg  84 mmHg  100 bpm  16 rpm  98.7 F  155 lbs    
             100 %

 

 2011  1:25:00 PM  152 mmHg  100 mmHg  82 bpm  16 rpm  97.2 F  156.375 lbs 
                100 %

 

 2011  9:55:00 AM  144 mmHg  90 mmHg                              

 

 2010  9:24:00 AM  138 mmHg  84 mmHg                              

 

 2010  8:58:00 AM  160 mmHg  94 mmHg                              

 

 2010  8:33:00 AM  142 mmHg  90 mmHg  100 bpm  16 rpm  98.1 F  158.375 
lbs                  

 

 2010  9:24:00 AM  160 mmHg  102 mmHg  88 bpm  18 rpm  99 F  157.125 lbs  
62 in     28.7382 kg/m  1.7657 m      

 

 3/19/2010  11:01:00 AM  140 mmHg  90 mmHg                              

 

 2009  10:08:00 AM  142 mmHg  86 mmHg  72 bpm  16 rpm  98.1 F  154.125 
lbs  61 in     29.1214 kg/m  1.73 m2      







Social History







 Name  Description  Comments

 

       

 

 Children      

 

 lives alone      

 

 Supervisor      

 

 Tobacco  Never smoker   







History of Procedures







 Date Ordered  Description  Order Status

 

 2011 12:00 AM  COMPREHEN METABOLIC PANEL  Reviewed

 

 2011 12:00 AM  ASSAY THYROID STIM HORMONE  Reviewed

 

 2011 12:00 AM  COMPREHEN METABOLIC PANEL  Reviewed

 

 2011 12:00 AM  LIPID PANEL  Reviewed

 

 2011 12:00 AM  ASSAY THYROID STIM HORMONE  Reviewed

 

 2011 12:00 AM  COMPLETE CBC W/AUTO DIFF WBC  Reviewed

 

 2015 12:00 AM  COMPLETE CBC W/AUTO DIFF WBC  Reviewed

 

 2015 12:00 AM  COMPREHEN METABOLIC PANEL  Reviewed

 

 2015 12:00 AM  LIPID PANEL  Reviewed

 

 2015 12:00 AM  ASSAY THYROID STIM HORMONE  Reviewed

 

 2011 12:00 AM  COMPREHEN METABOLIC PANEL  Reviewed

 

 2011 12:00 AM  COMPREHEN METABOLIC PANEL  Reviewed

 

 2011 12:00 AM  LIPID PANEL  Reviewed

 

 2011 12:00 AM  ASSAY THYROID STIM HORMONE  Reviewed

 

 10/28/2011 12:00 AM  ***Immunization administration, Medicare flu  Reviewed

 

 10/28/2011 12:00 AM  Fluzone ** MEDICARE Only **  Reviewed

 

 2010 11:24 AM  NO CHARGE OV  Reviewed

 

 2010 11:26 AM  NO CHARGE OV  Reviewed

 

 2011 12:00 AM  COMPREHEN METABOLIC PANEL  Reviewed

 

 2011 12:00 AM  LIPID PANEL  Reviewed

 

 2011 12:00 AM  ASSAY THYROID STIM HORMONE  Reviewed

 

 2011 12:00 AM  COMPLETE CBC W/AUTO DIFF WBC  Reviewed

 

 2011 12:00 AM  Wrist Support  Reviewed

 

 2016 12:00 AM  Screening mammography, bilateral  Reviewed

 

 2016 12:00 AM  DXA BONE DENSITY AXIAL  Returned

 

 2016 12:00 AM  TETANUS VACCINE IM  Reviewed

 

 2016 12:00 AM  HEP B VACC ADULT 3 DOSE IM  Reviewed

 

 2012 12:00 AM  TISSUE EXAM FOR FUNGI  Reviewed

 

 2012 12:00 AM  SMEAR WET MOUNT SALINE/INK  Reviewed

 

 2016 12:00 AM  TETANUS VACCINE IM  Reviewed

 

 2016 12:00 AM  HEP B VACC ADULT 3 DOSE IM  Reviewed

 

 2/15/2012 12:00 AM  THER/PROPH/DIAG INJ SC/IM  Reviewed

 

 2/15/2012 12:00 AM  Bicillin CR [facility]405352[facility]  Reviewed

 

 2/15/2012 12:00 AM  Depo-Medrol 40 mg NDC#5831132125  Reviewed

 

 10/13/2016 12:00 AM  COMPLETE CBC W/AUTO DIFF WBC  Returned

 

 10/13/2016 12:00 AM  COMPREHEN METABOLIC PANEL  Returned

 

 10/13/2016 12:00 AM  ASSAY THYROID STIM HORMONE  Returned

 

 10/13/2016 12:00 AM  LIPID PANEL  Returned

 

 2016 12:00 AM  TETANUS VACCINE IM  Reviewed

 

 2016 12:00 AM  HEP B VACC ADULT 3 DOSE IM  Reviewed

 

 2017 12:00 AM  ASSAY OF IRON  Returned

 

 2017 12:00 AM  IRON BINDING TEST  Returned

 

 2017 12:00 AM  ASSAY OF TRANSFERRIN  Returned

 

 2017 12:00 AM  OCCULT BLD FECES 1-3 TESTS  Returned

 

 2017 12:00 AM  COMPLETE CBC AUTOMATED  Returned

 

 8/15/2012 12:00 AM  COMPREHEN METABOLIC PANEL  Reviewed

 

 8/15/2012 12:00 AM  ASSAY THYROID STIM HORMONE  Reviewed

 

 8/15/2012 12:00 AM  COMPLETE CBC W/AUTO DIFF WBC  Reviewed

 

 8/15/2012 12:00 AM  Wrist Support  Reviewed

 

 2017 12:00 AM  METABOLIC PANEL TOTAL CA  Returned

 

 2017 12:00 AM  Screening mammography, bilateral  Returned

 

 10/29/2012 12:00 AM  Flu Injection 3 Years And Above NDC# [address]-58  [facility]  
Reviewed

 

 12/10/2012 12:00 AM  IMMUNIZATION ADMIN  Reviewed

 

 12/10/2012 12:00 AM  Pneumovax Injection - [facility]  Reviewed

 

 2012 12:00 AM  TRICHOMONAS ASSAY W/OPTIC  Reviewed

 

 2013 12:00 AM  THER/PROPH/DIAG INJ SC/IM  Reviewed

 

 2013 12:00 AM  Bicillin CR, 1.2 million units NDC# 61155-696-92  Reviewed

 

 2013 12:00 AM  COMPREHEN METABOLIC PANEL  Reviewed

 

 2013 12:00 AM  LIPID PANEL  Reviewed

 

 2013 12:00 AM  COMPLETE CBC W/AUTO DIFF WBC  Reviewed

 

 2013 12:00 AM  ASSAY THYROID STIM HORMONE  Reviewed

 

 6/10/2013 12:00 AM  MAMMOGRAM SCREENING  Reviewed

 

 6/10/2013 12:00 AM  CYTOPATH C/V MANUAL  Reviewed

 

 12/10/2013 12:00 AM  LIPID PANEL  Reviewed

 

 12/10/2013 12:00 AM  ASSAY THYROID STIM HORMONE  Reviewed

 

 12/10/2013 12:00 AM  COMPLETE CBC W/AUTO DIFF WBC  Reviewed

 

 12/10/2013 12:00 AM  COMPREHEN METABOLIC PANEL  Reviewed

 

 2010 12:00 AM  CYTOPATH C/V MANUAL  Reviewed

 

 2010 12:00 AM  SMEAR WET MOUNT SALINE/INK  Reviewed

 

 2010 12:00 AM  LIPID PANEL  Reviewed

 

 2010 12:00 AM  ASSAY THYROID STIM HORMONE  Reviewed

 

 2010 12:00 AM  COMPLETE CBC W/AUTO DIFF WBC  Reviewed

 

 2010 12:00 AM  COMPREHEN METABOLIC PANEL  Reviewed

 

 10/28/2013 12:00 AM  Flu Injection 3 Years And Above NDC# [address]-78  [facility]  
Reviewed

 

 2010 8:48 AM  Blood Pressure Check-no charge  Reviewed

 

 2010 12:00 AM  Blood Pressure Check-no charge  Reviewed

 

 2014 12:00 AM  METABOLIC PANEL TOTAL CA  Reviewed

 

 2014 12:00 AM  ASSAY THYROID STIM HORMONE  Reviewed

 

 2014 12:00 AM  ASSAY OF FREE THYROXINE  Reviewed

 

 2014 12:00 AM  MAMMOGRAM SCREENING  Reviewed

 

 2014 12:00 AM  CT BONE DENSITY AXIAL  Reviewed

 

 2014 12:00 AM  COMPLETE CBC W/AUTO DIFF WBC  Reviewed

 

 2014 12:00 AM  COMPREHEN METABOLIC PANEL  Reviewed

 

 2014 12:00 AM  LIPID PANEL  Reviewed

 

 2014 12:00 AM  ASSAY THYROID STIM HORMONE  Reviewed

 

 10/20/2010 12:00 AM  IMMUNIZATION ADMIN  Reviewed

 

 10/20/2010 12:00 AM  FLU VACCINE 3 YRS & > IM  Reviewed

 

 2010 12:00 AM  COMPREHEN METABOLIC PANEL  Reviewed

 

 2010 12:00 AM  LIPID PANEL  Reviewed

 

 2010 12:00 AM  ASSAY THYROID STIM HORMONE  Reviewed

 

 2010 12:00 AM  COMPLETE CBC W/AUTO DIFF WBC  Reviewed

 

 2010 12:00 AM  Blood Pressure Check-no charge  Reviewed

 

 10/2/2014 12:00 AM  THER/PROPH/DIAG INJ SC/IM  Reviewed

 

 10/2/2014 12:00 AM  Kenalog, Per 10 Mg NDC#6731-9972-38  Reviewed

 

 2014 12:00 AM  COMPLETE CBC W/AUTO DIFF WBC  Reviewed

 

 2014 12:00 AM  COMPREHEN METABOLIC PANEL  Reviewed

 

 2014 12:00 AM  LIPID PANEL  Reviewed

 

 2014 12:00 AM  ASSAY THYROID STIM HORMONE  Reviewed

 

 2015 12:00 AM  Rocephin 1 gram NDC#9396-8163-87  Reviewed

 

 2015 12:00 AM  THER/PROPH/DIAG INJ SC/IM  Reviewed

 

 2011 12:00 AM  COMPREHEN METABOLIC PANEL  Reviewed

 

 2011 12:00 AM  LIPID PANEL  Reviewed

 

 2011 12:00 AM  ASSAY THYROID STIM HORMONE  Reviewed

 

 2011 12:00 AM  COMPLETE CBC W/AUTO DIFF WBC  Reviewed

 

 2011 12:00 AM  METABOLIC PANEL TOTAL CA  Reviewed

 

 2011 12:00 AM  COMPREHEN METABOLIC PANEL  Reviewed

 

 2011 12:00 AM  ASSAY THYROID STIM HORMONE  Reviewed

 

 2011 12:00 AM  COMPLETE CBC W/AUTO DIFF WBC  Reviewed

 

 2011 12:00 AM  ASSAY OF LIPASE  Reviewed

 

 2011 12:00 AM  THER/PROPH/DIAG INJ SC/IM  Reviewed

 

 2011 12:00 AM  Phenergan 50 Mg Im  Bernadine  Reviewed

 

 2011 12:00 AM  METABOLIC PANEL TOTAL CA  Reviewed

 

 2011 12:00 AM  THER/PROPH/DIAG INJ SC/IM  Reviewed

 

 2011 12:00 AM  Phenergan 25 Mg Im  Bernadine  Reviewed

 

 2011 12:00 AM  METABOLIC PANEL TOTAL CA  Reviewed

 

 2011 12:00 AM  ASSAY THYROID STIM HORMONE  Reviewed

 

 2011 12:00 AM  THER/PROPH/DIAG INJ SC/IM  Reviewed

 

 2011 12:00 AM  Phenergan 50 Mg Im  Bernadine  Reviewed

 

 2011 12:00 AM  ASSAY OF SERUM SODIUM  Reviewed

 

 2011 12:00 AM  ASSAY OF SERUM SODIUM  Reviewed

 

 2011 12:00 AM  CYTOPATH C/V MANUAL  Reviewed

 

 2011 12:00 AM  ASSAY THYROID STIM HORMONE  Reviewed

 

 2015 12:00 AM  COMPLETE CBC W/AUTO DIFF WBC  Reviewed

 

 2015 12:00 AM  COMPREHEN METABOLIC PANEL  Reviewed

 

 2015 12:00 AM  LIPID PANEL  Reviewed

 

 2015 12:00 AM  ASSAY THYROID STIM HORMONE  Reviewed

 

 2015 12:00 AM  MAMMOGRAM SCREENING  Reviewed

 

 2015 12:00 AM  CYTOPATH TBS C/V MANUAL  Reviewed







Results Summary







 Date and Description  Results

 

 2010 10:41 AM  WET PREP NO TRICHOMONADS SEEN  No  Few 

 

 2010 8:15 AM  TRIGLYCERIDES 174.0 mg/dLCHOLESTEROL 175.0 mg/dLHDL 58.0 mg/
dLTOT CHOL/HDL 3.0 LDL (CALC) 82.0 mg/dLTSH 0.790 uIU/mLGLUCOSE 95.0 mg/
dLSODIUM 136.0 mmol/LPOTASSIUM 4.50 mmol/LCHLORIDE 99.0 mmol/LCO2 26.0 mmol/
LBUN 12.0 mg/dLCREATININE 0.80 mg/dLSGOT/AST 32.0 IU/LSGPT/ALT 33.0 IU/LALK 
PHOS 103.0 IU/LTOTAL PROTEIN 7.60 g/dLALBUMIN 4.60 g/dLTOTAL BILI 0.60 mg/
dLCALCIUM 9.80 mg/dLAGE 64 GFR NonAA 72 GFR AA 87 eGFR >60 mL/min/1.73 m2eGFR AA
* >60 WBC 5.3 RBC 4.13 HGB 13.10 g/dLHCT 39.20 %MCV 95.0 fLMCH 31.70 pgMCHC 
33.40 g/dLRDW SD 44 RDW CV 12.70 %MPV 9.80 fLPLT 257 NRBC# 0.00 NRBC% 0.0 %NEUT 
57.90 %%LYMP 26.50 %%MONO 11.60 %%EOS 3.20 %%BASO 0.80 %#NEUT 3.04 #LYMP 1.39 #
MONO 0.61 #EOS 0.17 #BASO 0.04 MANUAL DIFF NOT IND 

 

 2011 9:45 AM  GLUCOSE 91.0 mg/dLSODIUM 133.0 mmol/LPOTASSIUM 4.40 mmol/
LCHLORIDE 97.0 mmol/LCO2 24.0 mmol/LBUN 9.0 mg/dLCREATININE 0.70 mg/dLCALCIUM 
10.0 mg/dLAGE 65 GFR NonAA 84 GFR  eGFR >60 mL/min/1.73 m2eGFR AA* >60 

 

 2011 10:25 AM  LIPASE 29.0 U/LTSH 0.10 uIU/mLGLUCOSE 115.0 mg/dLSODIUM 
127.0 mmol/LPOTASSIUM 5.30 mmol/LCHLORIDE 93.0 mmol/LCO2 18.0 mmol/LBUN 9.0 mg/
dLCREATININE 0.70 mg/dLSGOT/AST 62.0 IU/LSGPT/ALT 46.0 IU/LALK PHOS 100.0 IU/
LTOTAL PROTEIN 8.60 g/dLALBUMIN 4.90 g/dLTOTAL BILI 0.60 mg/dLCALCIUM 9.90 mg/
dLAGE 65 GFR NonAA 84 GFR  eGFR >60 mL/min/1.73 m2eGFR AA* >60 WBC 6.9 
RBC 4.48 HGB 14.40 g/dLHCT 40.90 %MCV 91.0 fLMCH 32.10 pgMCHC 35.20 g/dLRDW SD 
41 RDW CV 12.50 %MPV 9.30 fLPLT 260 NRBC# 0.00 NRBC% 0.0 %NEUT 74.90 %%LYMP 
15.10 %%MONO 9.40 %%EOS 0.30 %%BASO 0.30 %#NEUT 5.15 #LYMP 1.04 #MONO 0.65 #EOS 
0.02 #BASO 0.02 MANUAL DIFF NOT IND 

 

 2011 9:07 AM  GLUCOSE 92.0 mg/dLSODIUM 131.0 mmol/LPOTASSIUM 4.20 mmol/
LCHLORIDE 99.0 mmol/LCO2 22.0 mmol/LBUN 9.0 mg/dLCREATININE 0.70 mg/dLCALCIUM 
9.20 mg/dLAGE 65 GFR NonAA 84 GFR  eGFR >60 mL/min/1.73 m2eGFR AA* >60 

 

 2011 11:06 AM  TSH 0.510 uIU/mLGLUCOSE 99.0 mg/dLSODIUM 132.0 mmol/
LPOTASSIUM 3.50 mmol/LCHLORIDE 95.0 mmol/LCO2 23.0 mmol/LBUN 9.0 mg/
dLCREATININE 0.80 mg/dLCALCIUM 10.40 mg/dLAGE 65 GFR NonAA 72 GFR AA 87 eGFR >
60 mL/min/1.73 m2eGFR AA* >60 

 

 2011 9:45 AM  SODIUM 132.0 mmol/L

 

 2011 9:27 AM  SODIUM 137.0 mmol/L

 

 2011 9:55 AM  TSH 1.070 uIU/mL

 

 2011 8:55 AM  GLUCOSE 102.0 mg/dLSODIUM 135.0 mmol/LPOTASSIUM 4.20 mmol/
LCHLORIDE 102.0 mmol/LCO2 24.0 mmol/LBUN 15.0 mg/dLCREATININE 0.80 mg/dLSGOT/
AST 30.0 IU/LSGPT/ALT 35.0 IU/LALK PHOS 119.0 IU/LTOTAL PROTEIN 7.50 g/
dLALBUMIN 4.30 g/dLTOTAL BILI 0.30 mg/dLCALCIUM 9.20 mg/dLAGE 65 GFR NonAA 72 
GFR AA 87 eGFR >60 mL/min/1.73 m2eGFR AA* >60 TSH 1.130 uIU/mL

 

 2011 8:55 AM  GLUCOSE 98.0 mg/dLSODIUM 137.0 mmol/LPOTASSIUM 3.90 mmol/
LCHLORIDE 103.0 mmol/LCO2 25.0 mmol/LBUN 14.0 mg/dLCREATININE 0.70 mg/dLSGOT/
AST 33.0 IU/LSGPT/ALT 36.0 IU/LALK PHOS 111.0 IU/LTOTAL PROTEIN 8.30 g/
dLALBUMIN 4.40 g/dLTOTAL BILI 0.50 mg/dLCALCIUM 9.40 mg/dLAGE 66 GFR NonAA 84 
GFR  eGFR >60 mL/min/1.73 m2eGFR AA* >60 TRIGLYCERIDES 109.0 mg/
dLCHOLESTEROL 153.0 mg/dLHDL 54.0 mg/dLTOT CHOL/HDL 2.8 LDL (CALC) 77.0 mg/
dLTSH 1.330 uIU/mL

 

 2012 10:33 AM  WET PREP NO TRICH SEEN CLUE CELLS NONE SEEN 

 

 2012 8:45 AM  WBC 5.2 RBC 3.96 HGB 12.70 g/dLHCT 37.80 %MCV 96.0 fLMCH 
32.10 pgMCHC 33.60 g/dLRDW SD 46 RDW CV 13.30 %MPV 9.70 fLPLT 297 NRBC# 0.00 
NRBC% 0.0 %NEUT 57.70 %%LYMP 28.50 %%MONO 10.30 %%EOS 2.50 %%BASO 1.0 %#NEUT 
3.02 #LYMP 1.49 #MONO 0.54 #EOS 0.13 #BASO 0.05 MANUAL DIFF NOT IND GLUCOSE 
97.0 mg/dLSODIUM 137.0 mmol/LPOTASSIUM 4.40 mmol/LCHLORIDE 101.0 mmol/LCO2 23.0 
mmol/LBUN 17.0 mg/dLCREATININE 0.80 mg/dLSGOT/AST 30.0 IU/LSGPT/ALT 33.0 IU/
LALK PHOS 95.0 IU/LTOTAL PROTEIN 7.40 g/dLALBUMIN 4.40 g/dLTOTAL BILI 0.60 mg/
dLCALCIUM 9.70 mg/dLAGE 66 GFR NonAA 72 GFR AA 87 eGFR 60 eGFR AA* 60 
TRIGLYCERIDES 130.0 mg/dLCHOLESTEROL 157.0 mg/dLHDL 56.0 mg/dLTOT CHOL/HDL 2.8 
LDL (CALC) 75.0 mg/dLTSH 0.940 uIU/mL

 

 2012 8:45 AM  WBC 5.1 RBC 3.91 HGB 12.50 g/dLHCT 36.30 %MCV 93.0 fLMCH 
32.0 pgMCHC 34.40 g/dLRDW SD 43 RDW CV 12.60 %MPV 9.30 fLPLT 244 NRBC# 0.00 NRBC
% 0.0 %NEUT 57.60 %%LYMP 27.50 %%MONO 9.10 %%EOS 5.0 %%BASO 0.80 %#NEUT 2.91 #
LYMP 1.39 #MONO 0.46 #EOS 0.25 #BASO 0.04 MANUAL DIFF NOT IND 

 

 2012 5:02 PM  WET PREP NO TRICH SEEN CLUE CELLS NONE SEEN 

 

 2013 8:25 AM  WBC 4.2 RBC 3.96 HGB 12.90 g/dLHCT 37.0 %MCV 93.0 fLMCH 
32.60 pgMCHC 34.90 g/dLRDW SD 43 RDW CV 12.60 %MPV 9.70 fLPLT 254 NRBC# 0.00 
NRBC% 0.0 %NEUT 54.40 %%LYMP 30.40 %%MONO 10.80 %%EOS 3.40 %%BASO 1.0 %#NEUT 
2.26 #LYMP 1.26 #MONO 0.45 #EOS 0.14 #BASO 0.04 MANUAL DIFF NOT IND TSH 1.230 
uIU/mLGLUCOSE 94.0 mg/dLSODIUM 136.0 mmol/LPOTASSIUM 4.30 mmol/LCHLORIDE 99.0 
mmol/LCO2 25.0 mmol/LBUN 10.0 mg/dLCREATININE 0.80 mg/dLSGOT/AST 36.0 IU/LSGPT/
ALT 36.0 IU/LALK PHOS 84.0 IU/LTOTAL PROTEIN 7.90 g/dLALBUMIN 4.40 g/dLTOTAL 
BILI 0.70 mg/dLCALCIUM 9.80 mg/dLAGE 67 GFR NonAA 72 GFR AA 87 eGFR 60 eGFR AA* 
60 TRIGLYCERIDES 122.0 mg/dLCHOLESTEROL 141.0 mg/dLHDL 47.0 mg/dLTOT CHOL/HDL 
3.0 LDL (CALC) 70.0 mg/dL

 

 2013 8:45 AM  WBC 5.3 RBC 4.01 HGB 13.0 g/dLHCT 37.60 %MCV 94.0 fLMCH 
32.40 pgMCHC 34.60 g/dLRDW SD 43 RDW CV 12.50 %MPV 9.40 fLPLT 248 NRBC# 0.00 
NRBC% 0.0 %NEUT 58.70 %%LYMP 27.80 %%MONO 9.80 %%EOS 2.80 %%BASO 0.90 %#NEUT 
3.12 #LYMP 1.48 #MONO 0.52 #EOS 0.15 #BASO 0.05 MANUAL DIFF NOT IND TSH 1.570 
uIU/mLGLUCOSE 94.0 mg/dLSODIUM 134.0 mmol/LPOTASSIUM 4.10 mmol/LCHLORIDE 101.0 
mmol/LCO2 23.0 mmol/LBUN 11.0 mg/dLCREATININE 0.80 mg/dLSGOT/AST 41.0 IU/LSGPT/
ALT 44.0 IU/LALK PHOS 109.0 IU/LTOTAL PROTEIN 7.90 g/dLALBUMIN 4.70 g/dLTOTAL 
BILI 0.80 mg/dLCALCIUM 10.40 mg/dLAGE 68 GFR NonAA 72 GFR AA 87 eGFR >60 mL/min/
1.73 m2eGFR AA* >60 TRIGLYCERIDES 163.0 mg/dLCHOLESTEROL 138.0 mg/dLHDL 49.0 mg/
dLTOT CHOL/HDL 2.8 LDL (CALC) 56.0 mg/dL

 

 2014 8:58 AM  GLUCOSE 86.0 mg/dLSODIUM 134.0 mmol/LPOTASSIUM 4.20 mmol/
LCHLORIDE 99.0 mmol/LCO2 25.0 mmol/LBUN 14.0 mg/dLCREATININE 0.80 mg/dLCALCIUM 
10.10 mg/dLAGE 68 GFR NonAA 72 GFR AA 87 eGFR >60 mL/min/1.73 m2eGFR AA* >60 
FREE T4 1.18 TSH 1.20 uIU/mL

 

 2014 9:50 AM  WBC 5.7 RBC 4.03 HGB 12.90 g/dLHCT 37.90 %MCV 94.0 fLMCH 
32.0 pgMCHC 34.0 g/dLRDW SD 44 RDW CV 12.70 %MPV 9.70 fLPLT 292 NRBC# 0.00 NRBC
% 0.0 %NEUT 62.70 %%LYMP 21.80 %%MONO 11.0 %%EOS 3.40 %%BASO 1.10 %#NEUT 3.55 #
LYMP 1.23 #MONO 0.62 #EOS 0.19 #BASO 0.06 MANUAL DIFF NOT IND GLUCOSE 98.0 mg/
dLSODIUM 135.0 mmol/LPOTASSIUM 4.30 mmol/LCHLORIDE 102.0 mmol/LCO2 22.0 mmol/
LBUN 10.0 mg/dLCREATININE 0.80 mg/dLSGOT/AST 34.0 IU/LSGPT/ALT 32.0 IU/LALK 
PHOS 95.0 IU/LTOTAL PROTEIN 7.70 g/dLALBUMIN 4.30 g/dLTOTAL BILI 0.80 mg/
dLCALCIUM 9.10 mg/dLAGE 68 GFR NonAA 72 GFR AA 87 eGFR 60 eGFR AA* 60 
TRIGLYCERIDES 108.0 mg/dLCHOLESTEROL 146.0 mg/dLHDL 56.0 mg/dLTOT CHOL/HDL 2.6 
LDL (CALC) 68.0 mg/dLTSH 0.90 uIU/mL

 

 2014 8:40 AM  WBC 4.4 RBC 4.13 HGB 13.20 g/dLHCT 38.90 %MCV 94.0 fLMCH 
32.0 pgMCHC 33.90 g/dLRDW SD 47 RDW CV 13.50 %MPV 9.80 fLPLT 279 NRBC# 0.00 NRBC
% 0.0 %NEUT 63.80 %%LYMP 24.60 %%MONO 9.30 %%EOS 1.60 %%BASO 0.70 %#NEUT 2.80 #
LYMP 1.08 #MONO 0.41 #EOS 0.07 #BASO 0.03 MANUAL DIFF NOT IND GLUCOSE 96.0 mg/
dLSODIUM 136.0 mmol/LPOTASSIUM 4.10 mmol/LCHLORIDE 99.0 mmol/LCO2 23.0 mmol/
LBUN 8.0 mg/dLCREATININE 0.80 mg/dLSGOT/AST 31.0 IU/LSGPT/ALT 27.0 IU/LALK PHOS 
85.0 IU/LTOTAL PROTEIN 7.60 g/dLALBUMIN 4.40 g/dLTOTAL BILI 0.80 mg/dLCALCIUM 
10.20 mg/dLAGE 69 GFR NonAA 71 GFR AA 86 eGFR 60 eGFR AA* 60 TRIGLYCERIDES 61.0 
mg/dLCHOLESTEROL 148.0 mg/dLHDL 71.0 mg/dLTOT CHOL/HDL 2.1 LDL (CALC) 65.0 mg/
dLTSH 0.990 uIU/mL

 

 2015 8:32 AM  WBC 4.8 RBC 3.98 HGB 12.70 g/dLHCT 37.30 %MCV 94.0 fLMCH 
31.90 pgMCHC 34.0 g/dLRDW SD 43 RDW CV 12.70 %MPV 9.50 fLPLT 270 NRBC# 0.00 NRBC
% 0.0 %NEUT 57.30 %%LYMP 25.0 %%MONO 13.0 %%EOS 3.60 %%BASO 1.10 %#NEUT 2.73 #
LYMP 1.19 #MONO 0.62 #EOS 0.17 #BASO 0.05 MANUAL DIFF NOT IND TSH 0.640 uIU/
mLGLUCOSE 90.0 mg/dLSODIUM 136.0 mmol/LPOTASSIUM 4.0 mmol/LCHLORIDE 101.0 mmol/
LCO2 24.0 mmol/LBUN 10.0 mg/dLCREATININE 0.80 mg/dLSGOT/AST 34.0 IU/LSGPT/ALT 
32.0 IU/LALK PHOS 92.0 IU/LTOTAL PROTEIN 7.50 g/dLALBUMIN 4.40 g/dLTOTAL BILI 
0.70 mg/dLCALCIUM 9.50 mg/dLAGE 69 GFR NonAA 71 GFR AA 86 eGFR >60 mL/min/1.73 
m2eGFR AA* >60 TRIGLYCERIDES 107.0 mg/dLCHOLESTEROL 138.0 mg/dLHDL 58.0 mg/
dLTOT CHOL/HDL 2.4 LDL (CALC) 59.0 mg/dL

 

 2015 8:31 AM  WBC 4.6 RBC 3.97 HGB 12.90 g/dLHCT 38.0 %MCV 96.0 fLMCH 
32.50 pgMCHC 33.90 g/dLRDW SD 44 RDW CV 12.60 %MPV 9.0 fLPLT 248 NRBC# 0.00 NRBC
% 0.0 %NEUT 61.0 %%LYMP 24.70 %%MONO 10.20 %%EOS 3.0 %%BASO 1.10 %#NEUT 2.82 #
LYMP 1.14 #MONO 0.47 #EOS 0.14 #BASO 0.05 MANUAL DIFF NOT IND TRIGLYCERIDES 
105.0 mg/dLCHOLESTEROL 141.0 mg/dLHDL 58.0 mg/dLTOT CHOL/HDL 2.4 LDL (CALC) 
62.0 mg/dLGLUCOSE 93.0 mg/dLSODIUM 136.0 mmol/LPOTASSIUM 4.10 mmol/LCHLORIDE 
101.0 mmol/LCO2 25.0 mmol/LBUN 9.0 mg/dLCREATININE 0.80 mg/dLSGOT/AST 32.0 IU/
LSGPT/ALT 28.0 IU/LALK PHOS 95.0 IU/LTOTAL PROTEIN 7.30 g/dLALBUMIN 4.50 g/
dLTOTAL BILI 0.80 mg/dLCALCIUM 9.70 mg/dLAGE 70 GFR NonAA 71 GFR AA 86 eGFR >60 
mL/min/1.73meGFR AA* >60 TSH 1.10 uIU/mL







History Of Immunizations







 Name  Date Admin  Mfg Name  Mfg Code  Trade Name  Lot#  Route  Inj  Vis Given  
Vis Pub  CVX

 

 Influenza  10/20/2010  [employer]of[employer] pasteur  [facility]  Fluzone  [id number]  Intramuscular  
Left Arm  10/20/2010  2010  999

 

 Influenza  10/28/2011  [employer]of[employer] pasteur  [facility]  Fluzone  [id number]  Intramuscular  
Left Arm  10/28/2011  2011  141

 

 Influenza  10/29/2012  sanofi pasteur  [facility]  Fluzone  [id number]  Intramuscular  
Left Deltoid  10/29/2012  2012  141

 

 X  12/10/2012  [employer]  MSD  Pneumovax 23  [id number]  Intramuscular  
Left Gluteous Medius  12/10/2012  2010  33

 

 Influenza  10/28/2013  sanofi pasteur  [facility]  Fluzone > 3 Years  [id number]  
Intramuscular  Right Deltoid  10/28/2013  2013  141

 

 X  9/2/2015  Not Entered  NE  Prevnar 13     Not Entered  Not Entered  1  109

 

 Influenza  2015  Not Entered  NE  Not Entered     Not Entered  Not Entered
  2015  141

 

 HepB Adult  2016  [employer]  MSD  Recombivax Adult  [id number]  Not 
Entered  Left Deltoid  2016  43

 

 HepB Adult  2016  [employer]  MSD  Recombivax Adult  [id number]  
Intramuscular  Right Deltoid  2016  43

 

 Zostavax  2012  Not Entered  NE  Not Entered     Not Entered  Not 
Entered  1  121

 

 Tdap  2012  Not Entered  NE  Not Entered     Not Entered  Not Entered  1  115

 

 Influenza  10/13/2016  Not Entered  NE  Fluzone High-Dose     Intramuscular  
Left Arm  1  141

 

 HepB Adult  2016  [employer]  MSD  Recombivax Adult  [id number]  
Intramuscular  Right Deltoid  2016  43







History of Past Illness







 Name  Date of Onset  Comments

 

 Benign Essential Hypertension  Dec 14 2009 10:10AM   

 

 Hyperlipidemia  Dec 14 2009 10:10AM   

 

 Hypothyroidism, Acquired  Dec 14 2009 10:10AM   

 

 hypothyroid      

 

 Hypertension      

 

 Hyperlipidemia      

 

 acid reflux      

 

 Hypertension, Benign Essential  2010  9:41AM   

 

 Hypertension, Benign Essential  2010 12:53PM   

 

 Routine gynecological examination  May  5 2010  9:28AM   

 

 Hypertension  May  5 2010  9:28AM   

 

 Hyperlipidemia, unspecified  May  5 2010  9:28AM   

 

 Hypothyroidism, Acquired  May  5 2010  9:28AM   

 

 Vulvovaginitis  May  5 2010  9:28AM   

 

 Rhinitis, Allergic  May  5 2010  9:28AM   

 

 Hypertension, Benign Essential  May 19 2010  8:48AM   

 

 Hypertension, Benign Essential  May 25 2010  9:39AM   

 

 Flu  Oct 20 2010 12:01PM   

 

 Essential Hypertension  2010  8:34AM   

 

 Hyperlipidemia  2010  8:34AM   

 

 Gastroesophageal Reflux  2010  8:34AM   

 

 Hypothyroidism, Acquired  2010  8:34AM   

 

 Hypertension, Benign Essential  2010  9:22AM   

 

 Hypertension, Benign Essential  Dec 15 2010  9:07AM   

 

 Essential Hypertension  2011  1:31PM   

 

 Hyperlipidemia  2011  1:31PM   

 

 Gastroesophageal Reflux  2011  1:31PM   

 

 Hypothyroidism, Acquired  2011  1:31PM   

 

 Essential Hypertension  2011  8:51AM   

 

 Hyperlipidemia  2011  8:51AM   

 

 Gastroesophageal Reflux  2011  8:51AM   

 

 Hypothyroidism, Acquired  2011  8:51AM   

 

 Essential Hypertension  2011  9:13AM   

 

 Hyperlipidemia  2011  9:13AM   

 

 Gastroesophageal Reflux  2011  9:13AM   

 

 Hypothyroidism, Acquired  2011  9:13AM   

 

 Nausea With Vomiting  2011  1:18PM   

 

 Hyponatremia  2011  8:46AM   

 

 Nausea  2011  9:13AM   

 

 Hypothyroidism  2011 11:10AM   

 

 Hyponatremia  2011 11:10AM   

 

 Essential Hypertension  2011 10:05AM   

 

 Hyperlipidemia  2011 10:05AM   

 

 Gastroesophageal Reflux  2011 10:05AM   

 

 Nausea  2011 10:05AM   

 

 Hypothyroidism, Acquired  2011 10:05AM   

 

 Hyponatremia  May  6 2011 11:34AM   

 

 Essential Hypertension  May 16 2011  8:50AM   

 

 Hyperlipidemia  May 16 2011  8:50AM   

 

 Gastroesophageal Reflux  May 16 2011  8:50AM   

 

 Nausea  May 16 2011  8:50AM   

 

 Hypothyroidism, Acquired  May 16 2011  8:50AM   

 

 Hyponatremia  May 16 2011  8:50AM   

 

 Routine gynecological examination  2011  8:54AM   

 

 Hypertension  2011  8:54AM   

 

 Hyperlipidemia, unspecified  2011  8:54AM   

 

 Hypothyroidism, Acquired  2011  8:54AM   

 

 Rhinitis, Allergic  2011  8:54AM   

 

 Hypothyroidism  Aug 29 2011 12:37PM   

 

 Hyponatremia  Aug 29 2011 12:37PM   

 

 Essential Hypertension  Sep 12 2011  8:53AM   

 

 Hyperlipidemia  Sep 12 2011  8:53AM   

 

 Gastroesophageal Reflux  Sep 12 2011  8:53AM   

 

 Hypothyroidism, Acquired  Sep 12 2011  8:53AM   

 

 Hyponatremia  Sep 12 2011  8:53AM   

 

 Hypertension  Sep 29 2011  9:41AM   

 

 Hyperlipidemia  Dec  8 2011  8:31AM   

 

 Hypothyroidism  Dec  8 2011  8:31AM   

 

 Hypertension  Dec  8 2011  8:31AM   

 

 Flu  Dec  9 2011 10:02AM   

 

 Essential Hypertension  Dec 13 2011 10:13AM   

 

 Hyperlipidemia  Dec 13 2011 10:13AM   

 

 Gastroesophageal Reflux  Dec 13 2011 10:13AM   

 

 Hypothyroidism, Acquired  Dec 13 2011 10:13AM   

 

 Hyponatremia  Dec 13 2011 10:13AM   

 

 Vaginal Discharge  2012  9:43AM   

 

 Rhinitis, Allergic  Feb 15 2012  9:31AM   

 

 Eustachian Tube Dysfunction  Feb 15 2012  9:31AM   

 

 Pharyngitis, Acute  Feb 15 2012  9:31AM   

 

 Routine gynecological examination  2012  8:48AM   

 

 Hypertension  2012  8:48AM   

 

 Hyperlipidemia, unspecified  2012  8:48AM   

 

 Hypothyroidism, Acquired  2012  8:48AM   

 

 Rhinitis, Allergic  2012  8:48AM   

 

 Candidiasis, Vulvovaginal  2012 11:04AM   

 

 Essential Hypertension  Aug 15 2012  9:02AM   

 

 Hyperlipidemia  Aug 15 2012  9:02AM   

 

 Gastroesophageal Reflux  Aug 15 2012  9:02AM   

 

 Hypothyroidism, Acquired  Aug 15 2012  9:02AM   

 

 Hyponatremia  Aug 15 2012  9:02AM   

 

 Atrophic Vaginitis, Postmenopausal  Aug 15 2012  9:02AM   

 

 Flu  Oct 29 2012  9:24AM   

 

 Essential Hypertension  Dec 10 2012  8:35AM   

 

 Hyperlipidemia  Dec 10 2012  8:35AM   

 

 Gastroesophageal Reflux  Dec 10 2012  8:35AM   

 

 Hypothyroidism, Acquired  Dec 10 2012  8:35AM   

 

 Hyponatremia  Dec 10 2012  8:35AM   

 

 Atrophic Vaginitis, Postmenopausal  Dec 10 2012  8:35AM   

 

 Pneumococcus  Dec 10 2012  2:07PM   

 

 Vaginal Discharge  Dec 20 2012  1:50PM   

 

 Essential Hypertension  Dec 20 2012  1:50PM   

 

 Hyperlipidemia  Dec 20 2012  1:50PM   

 

 Gastroesophageal Reflux  Dec 20 2012  1:50PM   

 

 Hypothyroidism, Acquired  Dec 20 2012  1:50PM   

 

 Atrophic Vaginitis, Postmenopausal  Dec 20 2012  1:50PM   

 

 Upper Respiratory Infections  2013  8:52AM   

 

 Hyperlipidemia  2013  8:21AM   

 

 Hypertension  2013  8:21AM   

 

 Hypothyroidism  2013  8:21AM   

 

 Screening Mammogram  Beto 10 2013  8:45AM   

 

 Routine gynecological examination  Beto 10 2013  8:34AM   

 

 Hypertension  Beto 10 2013  8:34AM   

 

 Hyperlipidemia, unspecified  Beto 10 2013  8:34AM   

 

 Hypothyroidism, Acquired  Beto 10 2013  8:34AM   

 

 Rhinitis, Allergic  Beto 10 2013  8:34AM   

 

 Flu  Oct 28 2013  8:52AM   

 

 Essential Hypertension  Dec  9 2013  8:37AM   

 

 Hyperlipidemia  Dec  9 2013  8:37AM   

 

 Gastroesophageal Reflux  Dec  9 2013  8:37AM   

 

 Hypothyroidism, Acquired  Dec  9 2013  8:37AM   

 

 Atrophic Vaginitis, Postmenopausal  Dec  9 2013  8:37AM   

 

 Hypertension  2014  9:56AM   

 

 Hyperlipidemia  2014  9:56AM   

 

 Hypothyroidism  2014  9:56AM   

 

 Screening Mammogram  2014  8:32AM   

 

 Osteopenia  2014  8:32AM   

 

 Hypertension  2014  8:35AM   

 

 Hypothyroidism, Acquired  2014  8:35AM   

 

 Hyperlipidemia  2014  8:35AM   

 

 Upper Respiratory Infections  2014  9:28AM   

 

 Sinusitis, Acute  Oct  2 2014  9:17AM   

 

 Herpes Zoster  Oct  5 2014  1:44PM   

 

 Vesicular Eruption  Oct  5 2014  1:44PM   

 

 Hypertension  2014  8:18AM   

 

 Hyperlipidemia  2014  8:18AM   

 

 hypothyroid  2014  8:18AM   

 

 Situational anxiety  Dec 20 2014  9:33AM   

 

 GERD (gastroesophageal reflux disease)  Dec 20 2014  9:33AM   

 

 Nausea  Dec 20 2014  9:33AM   

 

 Abdominal Pain, Epigastric  Dec 20 2014  9:33AM   

 

 Hyperlipidemia  Oct  6 2014  9:03AM   

 

 acid reflux  Oct  6 2014  9:03AM   

 

 hypothyroid  Oct  6 2014  9:03AM   

 

 Shingles  Oct  6 2014  9:03AM   

 

 Pharyngitis  2015  1:09PM   

 

 Bronchitis  2015  9:10AM   

 

 Hyperlipidemia  2015  9:10AM   

 

 acid reflux  2015  9:10AM   

 

 hypothyroid  2015  9:10AM   

 

 Hyperlipidemia  2015  8:18AM   

 

 Hypertension  2015  8:18AM   

 

 hypothyroid  2015  8:18AM   

 

 Screening Mammogram  2015 11:43AM   

 

 Medicare Annual Pap Q 2 years  2015  9:36AM   

 

 Screening Mammogram  2015  9:36AM   

 

 Candidiasis of female genitalia  2015  9:36AM   

 

 Hypertension  2015 11:34AM   

 

 Hyperlipidemia, unspecified  2015 11:34AM   

 

 Hypothyroidism, Acquired  2015 11:34AM   

 

 Osteopenia  2016  8:41AM   

 

 Encounter for screening mammogram for breast cancer  2016  8:41AM   

 

 Hypertension  2016  8:34AM   

 

 Lymphedema  2016  8:34AM   

 

 Hyperlipidemia  2016  8:34AM   

 

 hypothyroid  2016  8:34AM   

 

 HEP B  2016  9:13AM   

 

 HEP B  2016  8:52AM   

 

 Acid Reflux  2016  8:34AM   

 

 History of acute gastritis  Oct 13 2016  2:30PM   

 

 Anxiety as acute reaction to exceptional stress  Oct 13 2016  2:30PM   

 

 HEP B  Dec 29 2016  8:42AM   

 

 Caregiver stress syndrome  May 26 2017  8:28AM   

 

 Anxiety  May 26 2017  8:28AM   

 

 Blood in stool  2017  2:54PM   

 

 Upper GI bleed  2017 11:34AM   

 

 Hyponatremia  2017  9:03AM   

 

 Hyponatremia  2017  8:43AM   

 

 Medicare Annual Pap Q 2 years  2017  9:03AM   

 

 Nausea  2017  9:03AM   

 

 Uterine enlargement  2017  9:03AM   

 

 Encounter for screening mammogram for breast cancer  2017  4:56PM   







Payers







 Insurance Name  Company Name  Plan Name  Plan Number  Policy Number  Policy 
Group Number  Start Date

 

    Medicare [facility]  [facility]     [id number]     N/A

 

    [facility]     [id number]     2009

 

    Medicare Part B  [employer] [facility] [place]     [id number]     N/A

 

    Medicare Part A  Medicare Part A     [id number]     N/A

 

    Medicare Part A  [employer] - Preventive     [id number]     N/A

 

    Medicare Part A  Medicare - Lab/Xray     [id number]     N/A







History of Encounters







 Visit Date  Visit Type  Provider

 

 2017  Office visit  Doris Noriega MD

 

 2017  Office visit  Doris Noriega MD

 

 2017  Office visit  Doris Noriega MD

 

 2017  Office visit  Prince Noe APRN

 

 2016  Nurse visit  Doris Noriega MD

 

 10/13/2016  Office visit  Doris Noriega MD

 

 2016  Nurse visit  Doris Noriega MD

 

 2016  Office visit  Doris Noriega MD

 

 2015  Office visit  Doris Noriega MD

 

 2015  Office visit  Doris Noriega MD

 

 2015  Office visit  Prince Noe APRN

 

 2014  Office visit  Anyi Herrera APRN

 

 10/27/2014  Voided  Doris Noriega MD

 

 10/6/2014  Office visit  Doris Noriega MD

 

 10/5/2014  Office visit  Anyi Herrera APRN

 

 10/2/2014  Office visit   

 

 10/2/2014  Office visit  Corrine Bay APRN

 

 2014  Office visit  Kassie Franklin APRN

 

 2014  Office visit  Doris Noriega MD

 

 2014  Office visit  Doris Noriega MD

 

 2013  Office visit  Dee Colindres MD

 

 10/28/2013  Nurse visit  Dee Colindres MD

 

 6/10/2013  Office visit  Dee Colindres MD

 

 2013  Office visit  Corrine Bay APRN

 

 2012  Office visit  Dee Coilndres MD

 

 12/10/2012  Office visit  Dee Colindres MD

 

 10/29/2012  Nurse visit  Dee Colindres MD

 

 8/15/2012  Office visit  Dee Colindres MD

 

 2012  Office visit  Corrine Bay APRN

 

 2012  Office visit  Dee Colindres MD

 

 2/15/2012  Office visit  Dee Colindres MD

 

 2012  Office visit  Corrine Bay APRN

 

 2011  Office visit  Dee Colindres MD

 

 10/28/2011  Nurse visit  Shad Nolasco MD

 

 2011  Office visit  Dee Colindres MD

 

 2011  Office visit  Dee Colindres MD

 

 2011  Office visit  Dee Colindres MD

 

 2011  Office visit  Dee Colindres MD

 

 2011  Office visit  Dee Colindres MD

 

 2011  Office visit  Dee Colindres MD

 

 4/10/2011  [facility]  KHRIS Reeves MD

 

 4/10/2011  [facility]  RICKEY Toussaint MD

 

 2011  Office visit  RICKEY Toussaint MD

 

 2011  [facility]  Fide Castellanos MD

 

 2011  Voided  Fide Castellanos MD

 

 2011  Office visit  Dee Colindres MD

 

 12/15/2010  Nurse visit  Dee Colindres MD

 

 2010  Office visit  Dee Colindres MD

 

 10/20/2010  Nurse visit  Stephenie PERAZA

 

 2010  Nurse visit  Dee Colindres MD

 

 2010  Nurse visit  Dee Colindres MD

 

 2010  Office visit  Dee Colindres MD

 

 3/19/2010  Voided  Stephenie Kay PA

 

 2010  Nurse visit  Stephenie PERAZA

 

 2010  Nurse visit  Stephenie PERAZA

 

 2010  Nurse visit  Stephenie PERAZA

 

 2009  Office visit  Stephenie PERAZA

 

 10/20/2009  Nurse visit  Stephenie Kay PA

## 2018-10-22 NOTE — XMS REPORT
MU2 Ambulatory Summary

 Created on: 2018



Milady Crespo

External Reference #: [id number]

: 1945

Sex: Female



Demographics







 Address  [address]

[place]  [address]

 

 Home Phone  [phone]

 

 Preferred Language  English

 

 Marital Status  

 

 Zoroastrianism Affiliation  Unknown

 

 Race  White

 

 Ethnic Group  Not  or 





Author







 Author  Marvin Boyer

 

 [facility]

 

 Address  [address]

[place], [place]  [id number]



 

 Phone  [phone]







Care Team Providers







 Care Team Member Name  Role  Phone

 

 Marvin Boyer  PCP  [phone]

 

 Ellis Noriegaole  PreferredProvider  [phone]







Allergies and Adverse Reactions







 Name  Reaction  Notes

 

 NO KNOWN DRUG ALLERGIES      







Plan of Treatment







 Planned Activity  Comments  Planned Date  Planned Time  Plan/Goal

 

 Complete blood count (CBC) with differential count     2016  12:00 AM   

 

 Comprehensive metabolic panel     2016  12:00 AM   

 

 VITAMIN D (25 HYDROXY)     2016  12:00 AM   

 

 Lipid panel (total cholesterol, lipoproteins, HDL, triglycerides)     8/15/
2012  12:00 AM   

 

 Pap smear     2017  12:00 AM   

 

 Comprehensive Metabolic Panel     6/10/2013  12:00 AM   

 

 Lipid panel (total cholesterol, lipoproteins, HDL, triglycerides)     6/10/
2013  12:00 AM   

 

 Thyroid stimulating hormone (TSH)     6/10/2013  12:00 AM   

 

 CBC (automated H&H, platelets, WBC and automated differential)     6/10/2013  
12:00 AM   

 

 Comprehensive Metabolic Panel     2012  12:00 AM   

 

 Lipid panel (total cholesterol, lipoproteins, HDL, triglycerides)     2012  12:00 AM   

 

 Thyroid stimulating hormone (TSH)     2012  12:00 AM   

 

 CBC (automated H&H, platelets, WBC and automated differential)     2012  
12:00 AM   

 

 Screening mammography, bilateral     2015  12:00 AM   







Medications







 Active 

 

 Name  Start Date  Estimated Completion Date  SIG  Comments

 

 aspirin 81 mg oral tablet,delayed release (DR/EC)        take 1 tablet (81 mg) 
by oral route once daily   

 

 Vitamin D3 1,000 unit oral capsule        take 1 capsule by oral route daily   

 

 valsartan 160 mg oral tablet  2016     TAKE ONE TABLET BY MOUTH ONCE 
DAILY   

 

 amlodipine 5 mg oral tablet  2016     TAKE ONE TABLET BY MOUTH ONCE DAILY
   

 

 amlodipine 5 mg oral tablet  2016     TAKE ONE TABLET BY MOUTH ONCE DAILY
   

 

 valsartan 160 mg oral tablet  2016     TAKE ONE TABLET BY MOUTH ONCE 
DAILY   

 

 Zofran (as hydrochloride) 4 mg oral tablet  2016     take 1 tablet by 
oral route daily as needed for 14 days   

 

 Zofran (as hydrochloride) 4 mg oral tablet  2017     take 1 tablet by oral 
route daily as needed for 14 days   

 

 Zofran (as hydrochloride) 4 mg oral tablet  2017     take 1 tablet by oral 
route daily as needed for 14 days   

 

 Zofran (as hydrochloride) 4 mg oral tablet  2017     take 1 tablet by 
oral route daily as needed for 14 days   

 

 Zofran (as hydrochloride) 4 mg oral tablet  2017     take 1 tablet by 
oral route daily as needed for 14 days   

 

 EQ LORATADINE 10MG  TABS  2017     TAKE ONE TABLET BY MOUTH ONCE DAILY   

 

 pantoprazole 40 mg oral tablet,delayed release (DR/EC)  10/9/2017     take 1 
tablet (40 mg) by oral route once daily for 90 days   

 

 amlodipine 5 mg oral tablet  2018     TAKE ONE TABLET BY MOUTH ONCE DAILY
   

 

 atenolol 50 mg oral tablet  2018     TAKE ONE TABLET BY MOUTH ONCE DAILY 
  

 

 mirtazapine 15 mg oral tablet  2018     TAKE ONE-HALF TABLET BY MOUTH 
ONCE DAILY AT BEDTIME   

 

 ondansetron 4 mg oral tablet,disintegrating  7/3/2018     dissolve  1 tablet 
by oral route every 8 hours as needed   

 

 clorazepate dipotassium 7.5 mg oral tablet  2018  take 1 
tablet PO  three times per day for situational anxiety   

 

 losartan 100 mg oral tablet  8/3/2018  2019  take 1 tablet (100 mg) by 
oral route once daily for 90 days   









  

 

 Name  Start Date  Expiration Date  SIG  Comments

 

 prednisone 20 mg oral tablet  2011  take 2 tablets by oral 
route daily for 5 days   

 

 calcium carbonate-vitamin D2 600-125 mg-unit oral tablet  8/15/2012  2012
  take 2 tablets by oral route daily   

 

 Hancock 3 Fish Oil 900-1,400 mg oral capsule,delayed release(DR/EC)  8/15/2012  9
/  take 1 capsule by oral route daily   

 

 multivitamin Oral tablet  8/15/2012  2012  take 1 tablet by oral route 
daily   

 

 triamcinolone acetonide 0.1 % topical cream  2013  10/7/2013  APPLY A 
THIN LAYER TO THE AFFECTED AREA(S) BY TOPICAL ROUTE TWICE A DAY   

 

 famotidine 20 mg oral tablet  2014  take 1 tablet (20 mg) by 
oral route 2 times per day   

 

 amoxicillin 500 mg oral capsule  2014  take 1 capsule (500 mg) 
by oral route 3 times per day for 10 days   

 

 Zithromax Z-Tab 250 mg oral tablet  10/2/2014  10/7/2014  take 2 tablets (500 
mg) by oral route once daily for 1 day then 1 tablet (250 mg) by oral route 
once daily for 4 days   

 

 acyclovir 800 mg oral tablet  10/5/2014  10/12/2014  take 1 tablet (800 mg) by 
oral route 5 times per day for 7 days   

 

 gabapentin 300 mg oral capsule  10/9/2014  2014  take 1 capsule (300 mg) 
by oral route 3 times per day for 14 days   

 

 Carafate 100 mg/mL oral suspension  2014  take 10 milliliters 
(1 gram) by oral route 4 times per day on an empty stomach 1 hour before meals 
and at bedtime for 4 weeks   

 

 amlodipine 5 mg oral tablet  2015  TAKE ONE TABLET BY MOUTH 
EVERY DAY   

 

 levothyroxine 50 mcg oral tablet  2015  TAKE ONE TABLET BY MOUTH 
EVERY DAY   

 

 Diovan 160 mg oral tablet  2015  2/15/2016  TAKE ONE TABLET BY MOUTH 
EVERY DAY for 90 days   

 

 triamcinolone acetonide 0.1 % topical cream  2015  apply a thin 
layer to the affected area(s) by topical route 2 times per day for 14 days   

 

 fluconazole 150 mg oral tablet  2015  take 1 tablet (150 mg) 
by oral route once for 1 day   

 

 famotidine 20 mg oral tablet  2015     TAKE ONE TABLET BY MOUTH TWICE 
DAILY   

 

 Lipitor 20 mg oral tablet  10/13/2016  2018  take 1 tablet (20 mg) by oral 
route once daily   

 

 Plavix 75 mg oral tablet  10/13/2016  2018  take 1 tablet (75 mg) by oral 
route once daily   

 

 Zofran (as hydrochloride) 4 mg oral tablet  10/13/2016  10/27/2016  take 1 
tablet by oral route daily as needed for 14 days   

 

 Zofran (as hydrochloride) 4 mg oral tablet  2017     take 1 tablet by 
oral route daily as needed for 14 days   

 

 levothyroxine 50 mcg oral tablet  10/9/2017  10/9/2017  TAKE ONE TABLET BY 
MOUTH ONCE DAILY   

 

 ondansetron 4 mg oral tablet,disintegrating  10/10/2017  2017  dissolve  
1 tablet by oral route every 8 hours as needed for 30 days   

 

 EQ LORATADINE 10MG  TABS  2018  TAKE ONE TABLET BY MOUTH ONCE 
DAILY IN THE EVENING   

 

 mirtazapine 15 mg oral tablet  2018  TAKE ONE-HALF TABLET BY 
MOUTH ONCE DAILY AT BEDTIME   









 Discontinued 

 

 Name  Start Date  Discontinued Date  SIG  Comments

 

 Lipitor 40 mg oral tablet     2009 TAB DAILY   

 

 ramipril 5 mg oral capsule     2009  take 1 capsule (5 mg) by oral route 
once daily   

 

 lovastatin 20 mg oral tablet  2009  take 1 tablet (20 mg) by 
oral route once daily with evening meal   

 

 propranolol 20 mg oral tablet  2010  take 1 tablet by oral 
route 2 times a day   

 

 Fosamax 70 mg oral tablet  2010  take 1 tablet (70 mg) by oral 
route once weekly in the morning, at least 30 minutes before the first food, 
beverage, or medication of the day   

 

 lisinopril 40 mg oral tablet  2010  4/10/2011  take 2 tablets by oral 
route daily   

 

 Zoloft 50 mg oral tablet  2011  take 1 tablet (50 mg) by oral 
route once daily   

 

 promethazine 25 mg oral tablet  2011  take 1 tablet by oral 
route every 6 hours as needed   

 

 Diovan -12.5 mg oral tablet  2011  take 1 tablet by oral 
route once daily for 30 days   

 

 Diflucan 150 mg oral tablet     2012  take 1 tablet (150 mg) by oral 
route once for 1 day   

 

 Diflucan 150 mg oral tablet  2012  8/15/2012  take 1 tablet (150 mg) by 
oral route once   

 

 Vitamin B-12 1,000 mcg oral tablet  8/15/2012  2014  take 1 tablet by 
oral route daily   

 

 Premarin 0.625 mg/gram vaginal cream  10/29/2012  6/10/2013  use 1 gram daily 
for a week then 1 gram twice a week   

 

 Medrol (Tab) 4 mg oral tablets,dose pack  2013  6/10/2013  take as 
directed   

 

 aspirin 325 mg oral tablet  2014  take 1 tablet (325 mg) by 
oral route once daily   

 

 Medrol (Tab) 4 mg oral tablets,dose pack  2014  10/2/2014  take as 
directed   

 

 Tetracaine Lollipops Lollipop  2015  Use as directed   

 

 Allergy Relief (loratadine) 10 mg oral tablet  2016     TAKE ONE TABLET 
BY MOUTH ONCE DAILY   

 

 Allergy Relief (loratadine) 10 mg oral tablet  2017  TAKE ONE 
TABLET BY MOUTH ONCE DAILY   

 

 Zoloft 50 mg oral tablet  2016  take 1 tablet (50 mg) by oral 
route once daily for 90 days  Pt refuses to take...

 

 triamcinolone acetonide 0.1 % topical cream  2017     APPLY A THIN LAYER 
OF CREAM EXTERNALLY TO AFFECTED AREA TWICE DAILY FOR 14 DAYS   

 

 loratadine 10 mg oral tablet  2017  TAKE 1 TABLET BY MOUTH 
EVERY DAY IN THE EVENING   

 

 triamcinolone acetonide 0.1 % topical cream  2017  APPLY  
CREAM EXTERNALLY TO AFFECTED AREA TWICE DAILY FOR 14 DAYS   

 

 Lexapro 10 mg oral tablet  2017  take 1 tablet (10 mg) by oral 
route once daily for 90 days   

 

 valsartan 160 mg oral tablet  2017  8/3/2018  TAKE ONE TABLET BY MOUTH 
ONCE DAILY   recall

 

 Augmentin 875-125 mg oral tablet  2018  3/6/2018  take 1 tablet by oral 
route every 12 hours for 7 days   

 

 benzonatate 100 mg oral capsule  2018  3/6/2018  take 1 capsule (100 mg) 
by oral route 3 times per day as needed for cough   







Problem List







 Description  Status  Onset

 

 Hyperlipidemia  Active   

 

 acid reflux  Active   

 

 Hypertension  Active   

 

 hypothyroid  Active   







Vital Signs







 Date  Time  BP-Sys(mm[Hg]  BP-Morena(mm[Hg])  HR(bpm)  RR(rpm)  Temp  WT  HT  HC  
BMI  BSA  BMI Percentile  O2 Sat(%)

 

 2018  9:04:00 AM  138 mmHg  80 mmHg  64 bpm  18 rpm  96 F  140.125 lbs  
61 in     26.4761 kg/m  1.654 m     99 %

 

 2018  8:24:00 AM  142 mmHg  70 mmHg  69 bpm  18 rpm  98.2 F  139 lbs  61 
in     26.26 kg/m2  1.65 m2     98 %

 

 2018  8:39:00 AM  116 mmHg  68 mmHg  68 bpm  18 rpm  97.9 F  142 lbs  61 
in     26.8304 kg/m  1.665 m      

 

 3/6/2018  8:26:00 AM  126 mmHg  76 mmHg  63 bpm  16 rpm  98 F  134.125 lbs  61 
in     25.34 kg/m2  1.62 m2     100 %

 

 2018  11:55:00 AM  126 mmHg  80 mmHg  80 bpm  18 rpm  98 F  132 lbs  61 
in     24.9409 kg/m  1.6053 m     100 %

 

 2017  8:26:00 AM  122 mmHg  76 mmHg  63 bpm  16 rpm  98.4 F  129.25 lbs  
61 in     24.42 kg/m2  1.59 m2     99 %

 

 10/18/2017  9:01:00 AM  126 mmHg  80 mmHg  73 bpm  16 rpm  97.9 F  122.375 lbs
  61 in     23.12 kg/m2  1.55 m2     100 %

 

 10/10/2017  1:52:00 PM  118 mmHg  72 mmHg  74 bpm  16 rpm  98.1 F  121 lbs  61 
in     22.8625 kg/m  1.5369 m     99 %

 

 2017  8:40:00 AM  118 mmHg  68 mmHg  63 bpm  16 rpm  98.1 F  123.125 lbs  
61 in     23.26 kg/m2  1.55 m2     100 %

 

 2017  8:57:00 AM  116 mmHg  62 mmHg  71 bpm  16 rpm  99.8 F  121.5 lbs  
61 in     22.957 kg/m  1.5401 m     98 %

 

 2017  11:30:00 AM  128 mmHg  78 mmHg  69 bpm  16 rpm  98.8 F  129.375 lbs  
61 in     24.44 kg/m2  1.59 m2     98 %

 

 2017  8:22:00 AM  118 mmHg  78 mmHg  62 bpm  18 rpm  97.5 F  129.125 lbs  
61 in     24.3977 kg/m  1.5877 m     100 %

 

 10/13/2016  2:26:00 PM  128 mmHg  78 mmHg  77 bpm  16 rpm  98.4 F  139.25 lbs  
61 in     26.31 kg/m2  1.65 m2     100 %

 

 2016  8:29:00 AM  122 mmHg  70 mmHg  72 bpm  16 rpm  97.8 F  137.125 lbs  
61 in     25.9093 kg/m  1.6361 m     100 %

 

 2015  9:33:00 AM  120 mmHg  68 mmHg  73 bpm     98.7 F  144.375 lbs  61 
in     27.28 kg/m2  1.68 m2     99 %

 

 2015  9:05:00 AM  110 mmHg  75 mmHg  85 bpm  16 rpm  96.7 F  144.375 lbs  
61 in     27.2791 kg/m  1.6788 m     99 %

 

 2015  1:05:00 PM  108 mmHg  62 mmHg  78 bpm  18 rpm  96.9 F  142.375 lbs  
61 in     26.90 kg/m2  1.67 m2     100 %

 

 2014  9:31:00 AM  126 mmHg  66 mmHg  79 bpm  18 rpm  98.7 F  149 lbs  61 
in     28.153 kg/m  1.7055 m     98 %

 

 10/6/2014  9:02:00 AM  110 mmHg  74 mmHg  94 bpm  18 rpm  98.1 F  145.4 lbs  
61 in     27.47 kg/m2  1.68 m2     97 %

 

 10/2/2014  9:14:00 AM  124 mmHg  74 mmHg  79 bpm  18 rpm  98 F  147.25 lbs  61 
in     27.8224 kg/m  1.6955 m     98 %

 

 2014  9:22:00 AM  124 mmHg  76 mmHg  89 bpm  18 rpm  98.1 F  148 lbs  61 
in     27.96 kg/m2  1.70 m2     100 %

 

 2014  8:27:00 AM  118 mmHg  65 mmHg  74 bpm  16 rpm  97.7 F  148 lbs  61 
in     27.9641 kg/m  1.6998 m     99 %

 

 2014  9:48:00 AM  125 mmHg  70 mmHg  93 bpm  18 rpm  96.7 F  156 lbs  61 
in     29.48 kg/m2  1.75 m2     100 %

 

 2013  8:35:00 AM  122 mmHg  74 mmHg  84 bpm  16 rpm  97.9 F  155.375 lbs 
                99 %

 

 6/10/2013  8:30:00 AM  130 mmHg  82 mmHg  79 bpm  16 rpm  97.9 F  161 lbs     
            98 %

 

 2013  8:50:00 AM  124 mmHg  68 mmHg  66 bpm  18 rpm  97.6 F  157.5 lbs  
61 in     29.7591 kg/m  1.7535 m      

 

 2012  1:46:00 PM  120 mmHg  72 mmHg  75 bpm  16 rpm  97.6 F  156.125 lbs
                 98 %

 

 12/10/2012  8:32:00 AM  122 mmHg  82 mmHg  70 bpm  16 rpm  97.3 F  157 lbs    
             99 %

 

 8/15/2012  9:00:00 AM  130 mmHg  76 mmHg  86 bpm  16 rpm  97.4 F  159 lbs     
            100 %

 

 2012  11:02:00 AM  128 mmHg  64 mmHg  66 bpm  18 rpm  97.4 F  162.125 lbs
  61 in     30.6329 kg/m  1.7791 m      

 

 2012  8:45:00 AM  130 mmHg  70 mmHg  82 bpm  16 rpm  98.2 F  160.125 lbs 
                100 %

 

 2/15/2012  9:29:00 AM  122 mmHg  80 mmHg  86 bpm  16 rpm  97.4 F  159.375 lbs  
61 in     30.1133 kg/m  1.7639 m     99 %

 

 2012  9:41:00 AM  132 mmHg  74 mmHg  66 bpm  18 rpm  98.4 F  160.375 lbs  
61 in     30.30 kg/m2  1.77 m2      

 

 2011  10:08:00 AM  134 mmHg  82 mmHg  80 bpm  16 rpm  98 F  160 lbs  61 
in     30.2314 kg/m  1.7674 m     99 %

 

 2011  3:56:00 PM  130 mmHg  80 mmHg                              

 

 2011  8:55:00 AM  130 mmHg  74 mmHg  88 bpm  16 rpm  98.2 F  158.25 lbs  
               98 %

 

 2011  8:54:00 AM  136 mmHg  82 mmHg  102 bpm  16 rpm  98.1 F  153.5 lbs   
              99 %

 

 2011  8:49:00 AM  122 mmHg  88 mmHg  88 bpm  16 rpm  98.6 F  152.25 lbs  
               99 %

 

 2011  10:02:00 AM  140 mmHg  82 mmHg  96 bpm  20 rpm  98.8 F             
       100 %

 

 2011  9:14:00 AM  156 mmHg  98 mmHg  110 bpm  24 rpm  98.5 F  153 lbs    
             100 %

 

 2011  8:50:00 AM  160 mmHg  84 mmHg  100 bpm  16 rpm  98.7 F  155 lbs    
             100 %

 

 2011  1:25:00 PM  152 mmHg  100 mmHg  82 bpm  16 rpm  97.2 F  156.375 lbs 
                100 %

 

 2011  9:55:00 AM  144 mmHg  90 mmHg                              

 

 2010  9:24:00 AM  138 mmHg  84 mmHg                              

 

 2010  8:58:00 AM  160 mmHg  94 mmHg                              

 

 2010  8:33:00 AM  142 mmHg  90 mmHg  100 bpm  16 rpm  98.1 F  158.375 
lbs                  

 

 2010  9:24:00 AM  160 mmHg  102 mmHg  88 bpm  18 rpm  99 F  157.125 lbs  
62 in     28.7382 kg/m  1.7657 m      

 

 3/19/2010  11:01:00 AM  140 mmHg  90 mmHg                              

 

 2009  10:08:00 AM  142 mmHg  86 mmHg  72 bpm  16 rpm  98.1 F  154.125 
lbs  61 in     29.1214 kg/m  1.7346 m      







Social History







 Name  Description  Comments

 

       

 

 Children      

 

 lives alone      

 

 Supervisor      

 

 Tobacco  Never smoker   







History of Procedures







 Date Ordered  Description  Order Status

 

 2011 12:00 AM  COMPREHEN METABOLIC PANEL  Reviewed

 

 2011 12:00 AM  ASSAY THYROID STIM HORMONE  Reviewed

 

 2011 12:00 AM  COMPREHEN METABOLIC PANEL  Reviewed

 

 2011 12:00 AM  LIPID PANEL  Reviewed

 

 2011 12:00 AM  ASSAY THYROID STIM HORMONE  Reviewed

 

 2011 12:00 AM  COMPLETE CBC W/AUTO DIFF WBC  Reviewed

 

 2015 12:00 AM  COMPLETE CBC W/AUTO DIFF WBC  Reviewed

 

 2015 12:00 AM  COMPREHEN METABOLIC PANEL  Reviewed

 

 2015 12:00 AM  LIPID PANEL  Reviewed

 

 2015 12:00 AM  ASSAY THYROID STIM HORMONE  Reviewed

 

 2011 12:00 AM  COMPREHEN METABOLIC PANEL  Reviewed

 

 2011 12:00 AM  COMPREHEN METABOLIC PANEL  Reviewed

 

 2011 12:00 AM  LIPID PANEL  Reviewed

 

 2011 12:00 AM  ASSAY THYROID STIM HORMONE  Reviewed

 

 10/28/2011 12:00 AM  ***Immunization administration, Medicare flu  Reviewed

 

 10/28/2011 12:00 AM  Fluzone ** MEDICARE Only **  Reviewed

 

 2010 11:24 AM  NO CHARGE OV  Reviewed

 

 2010 11:26 AM  NO CHARGE OV  Reviewed

 

 2011 12:00 AM  COMPREHEN METABOLIC PANEL  Reviewed

 

 2011 12:00 AM  LIPID PANEL  Reviewed

 

 2011 12:00 AM  ASSAY THYROID STIM HORMONE  Reviewed

 

 2011 12:00 AM  COMPLETE CBC W/AUTO DIFF WBC  Reviewed

 

 2011 12:00 AM  Wrist Support  Reviewed

 

 2016 12:00 AM  Screening mammography, bilateral  Reviewed

 

 2016 12:00 AM  DXA BONE DENSITY AXIAL  Returned

 

 2016 12:00 AM  TETANUS VACCINE IM  Reviewed

 

 2016 12:00 AM  HEP B VACC ADULT 3 DOSE IM  Reviewed

 

 2012 12:00 AM  TISSUE EXAM FOR FUNGI  Reviewed

 

 2012 12:00 AM  SMEAR WET MOUNT SALINE/INK  Reviewed

 

 2016 12:00 AM  TETANUS VACCINE IM  Reviewed

 

 2016 12:00 AM  HEP B VACC ADULT 3 DOSE IM  Reviewed

 

 2/15/2012 12:00 AM  THER/PROPH/DIAG INJ SC/IM  Reviewed

 

 2/15/2012 12:00 AM  Bicillin CR [facility]#27405443510[facility]  Reviewed

 

 2/15/2012 12:00 AM  Depo-Medrol 40 mg NDC#6749876471  Reviewed

 

 10/13/2016 12:00 AM  COMPLETE CBC W/AUTO DIFF WBC  Returned

 

 10/13/2016 12:00 AM  COMPREHEN METABOLIC PANEL  Returned

 

 10/13/2016 12:00 AM  ASSAY THYROID STIM HORMONE  Returned

 

 10/13/2016 12:00 AM  LIPID PANEL  Returned

 

 2016 12:00 AM  TETANUS VACCINE IM  Reviewed

 

 2016 12:00 AM  HEP B VACC ADULT 3 DOSE IM  Reviewed

 

 2017 12:00 AM  ASSAY OF IRON  Returned

 

 2017 12:00 AM  IRON BINDING TEST  Returned

 

 2017 12:00 AM  ASSAY OF TRANSFERRIN  Returned

 

 2017 12:00 AM  OCCULT BLD FECES 1-3 TESTS  Returned

 

 2017 12:00 AM  COMPLETE CBC AUTOMATED  Returned

 

 8/15/2012 12:00 AM  COMPREHEN METABOLIC PANEL  Reviewed

 

 8/15/2012 12:00 AM  ASSAY THYROID STIM HORMONE  Reviewed

 

 8/15/2012 12:00 AM  COMPLETE CBC W/AUTO DIFF WBC  Reviewed

 

 8/15/2012 12:00 AM  Wrist Support  Reviewed

 

 2017 12:00 AM  METABOLIC PANEL TOTAL CA  Returned

 

 2017 12:00 AM  METABOLIC PANEL TOTAL CA  Returned

 

 2017 12:00 AM  Screening mammography, bilateral  Returned

 

 10/29/2012 12:00 AM  Flu Injection 3 Years And Above NDC# [address]-71  [facility]  
Reviewed

 

 12/10/2012 12:00 AM  IMMUNIZATION ADMIN  Reviewed

 

 12/10/2012 12:00 AM  Pneumovax Injection - [facility]  Reviewed

 

 2018 12:00 AM  THER/PROPH/DIAG INJ SC/IM  Reviewed

 

 2018 12:00 AM  Decadron 4mg Injection  Reviewed

 

 2018 12:00 AM  Depo-Medrol 40mg Injection  Reviewed

 

 2012 12:00 AM  TRICHOMONAS ASSAY W/OPTIC  Reviewed

 

 2018 12:00 AM  COMPLETE CBC W/AUTO DIFF WBC  Returned

 

 2018 12:00 AM  COMPREHEN METABOLIC PANEL  Returned

 

 2018 12:00 AM  LIPID PANEL  Returned

 

 2018 12:00 AM  ASSAY THYROID STIM HORMONE  Returned

 

 2013 12:00 AM  THER/PROPH/DIAG INJ SC/IM  Reviewed

 

 2013 12:00 AM  Bicillin CR, 1.2 million units NDC# 04179-895-38  Reviewed

 

 2018 12:00 AM  Mammogram, screening, bilateral  Reviewed

 

 2018 12:00 AM  DXA BONE DENSITY AXIAL  Reviewed

 

 2013 12:00 AM  COMPREHEN METABOLIC PANEL  Reviewed

 

 2013 12:00 AM  LIPID PANEL  Reviewed

 

 2013 12:00 AM  COMPLETE CBC W/AUTO DIFF WBC  Reviewed

 

 2013 12:00 AM  ASSAY THYROID STIM HORMONE  Reviewed

 

 6/10/2013 12:00 AM  MAMMOGRAM SCREENING  Reviewed

 

 6/10/2013 12:00 AM  CYTOPATH C/V MANUAL  Reviewed

 

 12/10/2013 12:00 AM  LIPID PANEL  Reviewed

 

 12/10/2013 12:00 AM  ASSAY THYROID STIM HORMONE  Reviewed

 

 12/10/2013 12:00 AM  COMPLETE CBC W/AUTO DIFF WBC  Reviewed

 

 12/10/2013 12:00 AM  COMPREHEN METABOLIC PANEL  Reviewed

 

 2010 12:00 AM  CYTOPATH C/V MANUAL  Reviewed

 

 2010 12:00 AM  SMEAR WET MOUNT SALINE/INK  Reviewed

 

 2010 12:00 AM  LIPID PANEL  Reviewed

 

 2010 12:00 AM  ASSAY THYROID STIM HORMONE  Reviewed

 

 2010 12:00 AM  COMPLETE CBC W/AUTO DIFF WBC  Reviewed

 

 2010 12:00 AM  COMPREHEN METABOLIC PANEL  Reviewed

 

 10/28/2013 12:00 AM  Flu Injection 3 Years And Above NDC# [address]-39  [facility]  
Reviewed

 

 2010 8:48 AM  Blood Pressure Check-no charge  Reviewed

 

 2010 12:00 AM  Blood Pressure Check-no charge  Reviewed

 

 2014 12:00 AM  METABOLIC PANEL TOTAL CA  Reviewed

 

 2014 12:00 AM  ASSAY THYROID STIM HORMONE  Reviewed

 

 2014 12:00 AM  ASSAY OF FREE THYROXINE  Reviewed

 

 2014 12:00 AM  MAMMOGRAM SCREENING  Reviewed

 

 2014 12:00 AM  CT BONE DENSITY AXIAL  Reviewed

 

 2014 12:00 AM  COMPLETE CBC W/AUTO DIFF WBC  Reviewed

 

 2014 12:00 AM  COMPREHEN METABOLIC PANEL  Reviewed

 

 2014 12:00 AM  LIPID PANEL  Reviewed

 

 2014 12:00 AM  ASSAY THYROID STIM HORMONE  Reviewed

 

 10/20/2010 12:00 AM  IMMUNIZATION ADMIN  Reviewed

 

 10/20/2010 12:00 AM  FLU VACCINE 3 YRS & > IM  Reviewed

 

 2010 12:00 AM  COMPREHEN METABOLIC PANEL  Reviewed

 

 2010 12:00 AM  LIPID PANEL  Reviewed

 

 2010 12:00 AM  ASSAY THYROID STIM HORMONE  Reviewed

 

 2010 12:00 AM  COMPLETE CBC W/AUTO DIFF WBC  Reviewed

 

 2010 12:00 AM  Blood Pressure Check-no charge  Reviewed

 

 10/2/2014 12:00 AM  THER/PROPH/DIAG INJ SC/IM  Reviewed

 

 10/2/2014 12:00 AM  Kenalog, Per 10 Mg NDC#2407-2663-43  Reviewed

 

 2014 12:00 AM  COMPLETE CBC W/AUTO DIFF WBC  Reviewed

 

 2014 12:00 AM  COMPREHEN METABOLIC PANEL  Reviewed

 

 2014 12:00 AM  LIPID PANEL  Reviewed

 

 2014 12:00 AM  ASSAY THYROID STIM HORMONE  Reviewed

 

 2015 12:00 AM  Rocephin 1 gram NDC#9567-9172-20  Reviewed

 

 2015 12:00 AM  THER/PROPH/DIAG INJ SC/IM  Reviewed

 

 2011 12:00 AM  COMPREHEN METABOLIC PANEL  Reviewed

 

 2011 12:00 AM  LIPID PANEL  Reviewed

 

 2011 12:00 AM  ASSAY THYROID STIM HORMONE  Reviewed

 

 2011 12:00 AM  COMPLETE CBC W/AUTO DIFF WBC  Reviewed

 

 2011 12:00 AM  METABOLIC PANEL TOTAL CA  Reviewed

 

 2011 12:00 AM  COMPREHEN METABOLIC PANEL  Reviewed

 

 2011 12:00 AM  ASSAY THYROID STIM HORMONE  Reviewed

 

 2011 12:00 AM  COMPLETE CBC W/AUTO DIFF WBC  Reviewed

 

 2011 12:00 AM  ASSAY OF LIPASE  Reviewed

 

 2011 12:00 AM  THER/PROPH/DIAG INJ SC/IM  Reviewed

 

 2011 12:00 AM  Phenergan 50 Mg Im  Bernadine  Reviewed

 

 2011 12:00 AM  METABOLIC PANEL TOTAL CA  Reviewed

 

 2011 12:00 AM  THER/PROPH/DIAG INJ SC/IM  Reviewed

 

 2011 12:00 AM  Phenergan 25 Mg Im  Bernadine  Reviewed

 

 2011 12:00 AM  METABOLIC PANEL TOTAL CA  Reviewed

 

 2011 12:00 AM  ASSAY THYROID STIM HORMONE  Reviewed

 

 2011 12:00 AM  THER/PROPH/DIAG INJ SC/IM  Reviewed

 

 2011 12:00 AM  Phenergan 50 Mg Im  Bernadine  Reviewed

 

 2011 12:00 AM  ASSAY OF SERUM SODIUM  Reviewed

 

 2011 12:00 AM  ASSAY OF SERUM SODIUM  Reviewed

 

 2011 12:00 AM  CYTOPATH C/V MANUAL  Reviewed

 

 2011 12:00 AM  ASSAY THYROID STIM HORMONE  Reviewed

 

 2015 12:00 AM  COMPLETE CBC W/AUTO DIFF WBC  Reviewed

 

 2015 12:00 AM  COMPREHEN METABOLIC PANEL  Reviewed

 

 2015 12:00 AM  LIPID PANEL  Reviewed

 

 2015 12:00 AM  ASSAY THYROID STIM HORMONE  Reviewed

 

 2015 12:00 AM  MAMMOGRAM SCREENING  Reviewed

 

 2015 12:00 AM  CYTOPATH TBS C/V MANUAL  Reviewed







Results Summary







 Date and Description  Results

 

 2010 9:25 AM  Colonoscopy-Women and Men over 50 Abnormal Mammogram -Women 
over 40 Declined Pap Smear Declined 

 

 2010 10:41 AM  WET PREP NO TRICHOMONADS SEEN  No  Few 

 

 2010 8:15 AM  TRIGLYCERIDES 174.0 mg/dLCHOLESTEROL 175.0 mg/dLHDL 58.0 mg/
dLTOT CHOL/HDL 3.0 LDL (CALC) 82.0 mg/dLTSH 0.790 uIU/mLGLUCOSE 95.0 mg/
dLSODIUM 136.0 mmol/LPOTASSIUM 4.50 mmol/LCHLORIDE 99.0 mmol/LCO2 26.0 mmol/
LBUN 12.0 mg/dLCREATININE 0.80 mg/dLSGOT/AST 32.0 IU/LSGPT/ALT 33.0 IU/LALK 
PHOS 103.0 IU/LTOTAL PROTEIN 7.60 g/dLALBUMIN 4.60 g/dLTOTAL BILI 0.60 mg/
dLCALCIUM 9.80 mg/dLAGE 64 GFR NonAA 72 GFR AA 87 eGFR >60 mL/min/1.73 m2eGFR AA
* >60 WBC 5.3 RBC 4.13 HGB 13.10 g/dLHCT 39.20 %MCV 95.0 fLMCH 31.70 pgMCHC 
33.40 g/dLRDW SD 44 RDW CV 12.70 %MPV 9.80 fLPLT 257 NRBC# 0.00 NRBC% 0.0 %NEUT 
57.90 %%LYMP 26.50 %%MONO 11.60 %%EOS 3.20 %%BASO 0.80 %#NEUT 3.04 #LYMP 1.39 #
MONO 0.61 #EOS 0.17 #BASO 0.04 MANUAL DIFF NOT IND 

 

 2011 9:45 AM  GLUCOSE 91.0 mg/dLSODIUM 133.0 mmol/LPOTASSIUM 4.40 mmol/
LCHLORIDE 97.0 mmol/LCO2 24.0 mmol/LBUN 9.0 mg/dLCREATININE 0.70 mg/dLCALCIUM 
10.0 mg/dLAGE 65 GFR NonAA 84 GFR  eGFR >60 mL/min/1.73 m2eGFR AA* >60 

 

 2011 10:25 AM  LIPASE 29.0 U/LTSH 0.10 uIU/mLGLUCOSE 115.0 mg/dLSODIUM 
127.0 mmol/LPOTASSIUM 5.30 mmol/LCHLORIDE 93.0 mmol/LCO2 18.0 mmol/LBUN 9.0 mg/
dLCREATININE 0.70 mg/dLSGOT/AST 62.0 IU/LSGPT/ALT 46.0 IU/LALK PHOS 100.0 IU/
LTOTAL PROTEIN 8.60 g/dLALBUMIN 4.90 g/dLTOTAL BILI 0.60 mg/dLCALCIUM 9.90 mg/
dLAGE 65 GFR NonAA 84 GFR  eGFR >60 mL/min/1.73 m2eGFR AA* >60 WBC 6.9 
RBC 4.48 HGB 14.40 g/dLHCT 40.90 %MCV 91.0 fLMCH 32.10 pgMCHC 35.20 g/dLRDW SD 
41 RDW CV 12.50 %MPV 9.30 fLPLT 260 NRBC# 0.00 NRBC% 0.0 %NEUT 74.90 %%LYMP 
15.10 %%MONO 9.40 %%EOS 0.30 %%BASO 0.30 %#NEUT 5.15 #LYMP 1.04 #MONO 0.65 #EOS 
0.02 #BASO 0.02 MANUAL DIFF NOT IND 

 

 2011 9:07 AM  GLUCOSE 92.0 mg/dLSODIUM 131.0 mmol/LPOTASSIUM 4.20 mmol/
LCHLORIDE 99.0 mmol/LCO2 22.0 mmol/LBUN 9.0 mg/dLCREATININE 0.70 mg/dLCALCIUM 
9.20 mg/dLAGE 65 GFR NonAA 84 GFR  eGFR >60 mL/min/1.73 m2eGFR AA* >60 

 

 2011 11:06 AM  TSH 0.510 uIU/mLGLUCOSE 99.0 mg/dLSODIUM 132.0 mmol/
LPOTASSIUM 3.50 mmol/LCHLORIDE 95.0 mmol/LCO2 23.0 mmol/LBUN 9.0 mg/
dLCREATININE 0.80 mg/dLCALCIUM 10.40 mg/dLAGE 65 GFR NonAA 72 GFR AA 87 eGFR >
60 mL/min/1.73 m2eGFR AA* >60 

 

 2011 9:45 AM  SODIUM 132.0 mmol/L

 

 2011 9:27 AM  SODIUM 137.0 mmol/L

 

 2011 9:55 AM  TSH 1.070 uIU/mL

 

 2011 8:55 AM  GLUCOSE 102.0 mg/dLSODIUM 135.0 mmol/LPOTASSIUM 4.20 mmol/
LCHLORIDE 102.0 mmol/LCO2 24.0 mmol/LBUN 15.0 mg/dLCREATININE 0.80 mg/dLSGOT/
AST 30.0 IU/LSGPT/ALT 35.0 IU/LALK PHOS 119.0 IU/LTOTAL PROTEIN 7.50 g/
dLALBUMIN 4.30 g/dLTOTAL BILI 0.30 mg/dLCALCIUM 9.20 mg/dLAGE 65 GFR NonAA 72 
GFR AA 87 eGFR >60 mL/min/1.73 m2eGFR AA* >60 TSH 1.130 uIU/mL

 

 2011 8:55 AM  GLUCOSE 98.0 mg/dLSODIUM 137.0 mmol/LPOTASSIUM 3.90 mmol/
LCHLORIDE 103.0 mmol/LCO2 25.0 mmol/LBUN 14.0 mg/dLCREATININE 0.70 mg/dLSGOT/
AST 33.0 IU/LSGPT/ALT 36.0 IU/LALK PHOS 111.0 IU/LTOTAL PROTEIN 8.30 g/
dLALBUMIN 4.40 g/dLTOTAL BILI 0.50 mg/dLCALCIUM 9.40 mg/dLAGE 66 GFR NonAA 84 
GFR  eGFR >60 mL/min/1.73 m2eGFR AA* >60 TRIGLYCERIDES 109.0 mg/
dLCHOLESTEROL 153.0 mg/dLHDL 54.0 mg/dLTOT CHOL/HDL 2.8 LDL (CALC) 77.0 mg/
dLTSH 1.330 uIU/mL

 

 2011 10:17 AM  Colonoscopy-Women and Men over 50 Declined Mammogram -
Women over 40 Normal Pap Smear Negative 

 

 2012 10:33 AM  WET PREP NO TRICH SEEN CLUE CELLS NONE SEEN 

 

 2012 8:45 AM  WBC 5.2 RBC 3.96 HGB 12.70 g/dLHCT 37.80 %MCV 96.0 fLMCH 
32.10 pgMCHC 33.60 g/dLRDW SD 46 RDW CV 13.30 %MPV 9.70 fLPLT 297 NRBC# 0.00 
NRBC% 0.0 %NEUT 57.70 %%LYMP 28.50 %%MONO 10.30 %%EOS 2.50 %%BASO 1.0 %#NEUT 
3.02 #LYMP 1.49 #MONO 0.54 #EOS 0.13 #BASO 0.05 MANUAL DIFF NOT IND GLUCOSE 
97.0 mg/dLSODIUM 137.0 mmol/LPOTASSIUM 4.40 mmol/LCHLORIDE 101.0 mmol/LCO2 23.0 
mmol/LBUN 17.0 mg/dLCREATININE 0.80 mg/dLSGOT/AST 30.0 IU/LSGPT/ALT 33.0 IU/
LALK PHOS 95.0 IU/LTOTAL PROTEIN 7.40 g/dLALBUMIN 4.40 g/dLTOTAL BILI 0.60 mg/
dLCALCIUM 9.70 mg/dLAGE 66 GFR NonAA 72 GFR AA 87 eGFR 60 eGFR AA* 60 
TRIGLYCERIDES 130.0 mg/dLCHOLESTEROL 157.0 mg/dLHDL 56.0 mg/dLTOT CHOL/HDL 2.8 
LDL (CALC) 75.0 mg/dLTSH 0.940 uIU/mL

 

 2012 8:45 AM  WBC 5.1 RBC 3.91 HGB 12.50 g/dLHCT 36.30 %MCV 93.0 fLMCH 
32.0 pgMCHC 34.40 g/dLRDW SD 43 RDW CV 12.60 %MPV 9.30 fLPLT 244 NRBC# 0.00 NRBC
% 0.0 %NEUT 57.60 %%LYMP 27.50 %%MONO 9.10 %%EOS 5.0 %%BASO 0.80 %#NEUT 2.91 #
LYMP 1.39 #MONO 0.46 #EOS 0.25 #BASO 0.04 MANUAL DIFF NOT IND 

 

 12/10/2012 8:34 AM  Colonoscopy-Women and Men over 50 Declined Mammogram -
Women over 40 Declined Pap Smear Declined 

 

 2012 5:02 PM  WET PREP NO TRICH SEEN CLUE CELLS NONE SEEN 

 

 2013 8:25 AM  WBC 4.2 RBC 3.96 HGB 12.90 g/dLHCT 37.0 %MCV 93.0 fLMCH 
32.60 pgMCHC 34.90 g/dLRDW SD 43 RDW CV 12.60 %MPV 9.70 fLPLT 254 NRBC# 0.00 
NRBC% 0.0 %NEUT 54.40 %%LYMP 30.40 %%MONO 10.80 %%EOS 3.40 %%BASO 1.0 %#NEUT 
2.26 #LYMP 1.26 #MONO 0.45 #EOS 0.14 #BASO 0.04 MANUAL DIFF NOT IND TSH 1.230 
uIU/mLGLUCOSE 94.0 mg/dLSODIUM 136.0 mmol/LPOTASSIUM 4.30 mmol/LCHLORIDE 99.0 
mmol/LCO2 25.0 mmol/LBUN 10.0 mg/dLCREATININE 0.80 mg/dLSGOT/AST 36.0 IU/LSGPT/
ALT 36.0 IU/LALK PHOS 84.0 IU/LTOTAL PROTEIN 7.90 g/dLALBUMIN 4.40 g/dLTOTAL 
BILI 0.70 mg/dLCALCIUM 9.80 mg/dLAGE 67 GFR NonAA 72 GFR AA 87 eGFR 60 eGFR AA* 
60 TRIGLYCERIDES 122.0 mg/dLCHOLESTEROL 141.0 mg/dLHDL 47.0 mg/dLTOT CHOL/HDL 
3.0 LDL (CALC) 70.0 mg/dL

 

 2013 8:45 AM  WBC 5.3 RBC 4.01 HGB 13.0 g/dLHCT 37.60 %MCV 94.0 fLMCH 
32.40 pgMCHC 34.60 g/dLRDW SD 43 RDW CV 12.50 %MPV 9.40 fLPLT 248 NRBC# 0.00 
NRBC% 0.0 %NEUT 58.70 %%LYMP 27.80 %%MONO 9.80 %%EOS 2.80 %%BASO 0.90 %#NEUT 
3.12 #LYMP 1.48 #MONO 0.52 #EOS 0.15 #BASO 0.05 MANUAL DIFF NOT IND TSH 1.570 
uIU/mLGLUCOSE 94.0 mg/dLSODIUM 134.0 mmol/LPOTASSIUM 4.10 mmol/LCHLORIDE 101.0 
mmol/LCO2 23.0 mmol/LBUN 11.0 mg/dLCREATININE 0.80 mg/dLSGOT/AST 41.0 IU/LSGPT/
ALT 44.0 IU/LALK PHOS 109.0 IU/LTOTAL PROTEIN 7.90 g/dLALBUMIN 4.70 g/dLTOTAL 
BILI 0.80 mg/dLCALCIUM 10.40 mg/dLAGE 68 GFR NonAA 72 GFR AA 87 eGFR >60 mL/min/
1.73 m2eGFR AA* >60 TRIGLYCERIDES 163.0 mg/dLCHOLESTEROL 138.0 mg/dLHDL 49.0 mg/
dLTOT CHOL/HDL 2.8 LDL (CALC) 56.0 mg/dL

 

 2014 8:58 AM  GLUCOSE 86.0 mg/dLSODIUM 134.0 mmol/LPOTASSIUM 4.20 mmol/
LCHLORIDE 99.0 mmol/LCO2 25.0 mmol/LBUN 14.0 mg/dLCREATININE 0.80 mg/dLCALCIUM 
10.10 mg/dLAGE 68 GFR NonAA 72 GFR AA 87 eGFR >60 mL/min/1.73 m2eGFR AA* >60 
FREE T4 1.18 TSH 1.20 uIU/mL

 

 2014 9:50 AM  WBC 5.7 RBC 4.03 HGB 12.90 g/dLHCT 37.90 %MCV 94.0 fLMCH 
32.0 pgMCHC 34.0 g/dLRDW SD 44 RDW CV 12.70 %MPV 9.70 fLPLT 292 NRBC# 0.00 NRBC
% 0.0 %NEUT 62.70 %%LYMP 21.80 %%MONO 11.0 %%EOS 3.40 %%BASO 1.10 %#NEUT 3.55 #
LYMP 1.23 #MONO 0.62 #EOS 0.19 #BASO 0.06 MANUAL DIFF NOT IND GLUCOSE 98.0 mg/
dLSODIUM 135.0 mmol/LPOTASSIUM 4.30 mmol/LCHLORIDE 102.0 mmol/LCO2 22.0 mmol/
LBUN 10.0 mg/dLCREATININE 0.80 mg/dLSGOT/AST 34.0 IU/LSGPT/ALT 32.0 IU/LALK 
PHOS 95.0 IU/LTOTAL PROTEIN 7.70 g/dLALBUMIN 4.30 g/dLTOTAL BILI 0.80 mg/
dLCALCIUM 9.10 mg/dLAGE 68 GFR NonAA 72 GFR AA 87 eGFR 60 eGFR AA* 60 
TRIGLYCERIDES 108.0 mg/dLCHOLESTEROL 146.0 mg/dLHDL 56.0 mg/dLTOT CHOL/HDL 2.6 
LDL (CALC) 68.0 mg/dLTSH 0.90 uIU/mL

 

 2014 8:40 AM  WBC 4.4 RBC 4.13 HGB 13.20 g/dLHCT 38.90 %MCV 94.0 fLMCH 
32.0 pgMCHC 33.90 g/dLRDW SD 47 RDW CV 13.50 %MPV 9.80 fLPLT 279 NRBC# 0.00 NRBC
% 0.0 %NEUT 63.80 %%LYMP 24.60 %%MONO 9.30 %%EOS 1.60 %%BASO 0.70 %#NEUT 2.80 #
LYMP 1.08 #MONO 0.41 #EOS 0.07 #BASO 0.03 MANUAL DIFF NOT IND GLUCOSE 96.0 mg/
dLSODIUM 136.0 mmol/LPOTASSIUM 4.10 mmol/LCHLORIDE 99.0 mmol/LCO2 23.0 mmol/
LBUN 8.0 mg/dLCREATININE 0.80 mg/dLSGOT/AST 31.0 IU/LSGPT/ALT 27.0 IU/LALK PHOS 
85.0 IU/LTOTAL PROTEIN 7.60 g/dLALBUMIN 4.40 g/dLTOTAL BILI 0.80 mg/dLCALCIUM 
10.20 mg/dLAGE 69 GFR NonAA 71 GFR AA 86 eGFR 60 eGFR AA* 60 TRIGLYCERIDES 61.0 
mg/dLCHOLESTEROL 148.0 mg/dLHDL 71.0 mg/dLTOT CHOL/HDL 2.1 LDL (CALC) 65.0 mg/
dLTSH 0.990 uIU/mL

 

 2015 8:32 AM  WBC 4.8 RBC 3.98 HGB 12.70 g/dLHCT 37.30 %MCV 94.0 fLMCH 
31.90 pgMCHC 34.0 g/dLRDW SD 43 RDW CV 12.70 %MPV 9.50 fLPLT 270 NRBC# 0.00 NRBC
% 0.0 %NEUT 57.30 %%LYMP 25.0 %%MONO 13.0 %%EOS 3.60 %%BASO 1.10 %#NEUT 2.73 #
LYMP 1.19 #MONO 0.62 #EOS 0.17 #BASO 0.05 MANUAL DIFF NOT IND TSH 0.640 uIU/
mLGLUCOSE 90.0 mg/dLSODIUM 136.0 mmol/LPOTASSIUM 4.0 mmol/LCHLORIDE 101.0 mmol/
LCO2 24.0 mmol/LBUN 10.0 mg/dLCREATININE 0.80 mg/dLSGOT/AST 34.0 IU/LSGPT/ALT 
32.0 IU/LALK PHOS 92.0 IU/LTOTAL PROTEIN 7.50 g/dLALBUMIN 4.40 g/dLTOTAL BILI 
0.70 mg/dLCALCIUM 9.50 mg/dLAGE 69 GFR NonAA 71 GFR AA 86 eGFR >60 mL/min/1.73 
m2eGFR AA* >60 TRIGLYCERIDES 107.0 mg/dLCHOLESTEROL 138.0 mg/dLHDL 58.0 mg/
dLTOT CHOL/HDL 2.4 LDL (CALC) 59.0 mg/dL

 

 2015 8:31 AM  WBC 4.6 RBC 3.97 HGB 12.90 g/dLHCT 38.0 %MCV 96.0 fLMCH 
32.50 pgMCHC 33.90 g/dLRDW SD 44 RDW CV 12.60 %MPV 9.0 fLPLT 248 NRBC# 0.00 NRBC
% 0.0 %NEUT 61.0 %%LYMP 24.70 %%MONO 10.20 %%EOS 3.0 %%BASO 1.10 %#NEUT 2.82 #
LYMP 1.14 #MONO 0.47 #EOS 0.14 #BASO 0.05 MANUAL DIFF NOT IND TRIGLYCERIDES 
105.0 mg/dLCHOLESTEROL 141.0 mg/dLHDL 58.0 mg/dLTOT CHOL/HDL 2.4 LDL (CALC) 
62.0 mg/dLGLUCOSE 93.0 mg/dLSODIUM 136.0 mmol/LPOTASSIUM 4.10 mmol/LCHLORIDE 
101.0 mmol/LCO2 25.0 mmol/LBUN 9.0 mg/dLCREATININE 0.80 mg/dLSGOT/AST 32.0 IU/
LSGPT/ALT 28.0 IU/LALK PHOS 95.0 IU/LTOTAL PROTEIN 7.30 g/dLALBUMIN 4.50 g/
dLTOTAL BILI 0.80 mg/dLCALCIUM 9.70 mg/dLAGE 70 GFR NonAA 71 GFR AA 86 eGFR >60 
mL/min/1.73meGFR AA* >60 TSH 1.10 uIU/mL







History Of Immunizations







 Name  Date Admin  Mfg Name  Mfg Code  Trade Name  Lot#  Route  Inj  Vis Given  
Vis Pub  CVX

 

 Influenza  10/20/2010  [employer] paste[employer]  [employer]  FLUZONE  [id number]  Intramuscular  
Left Arm  10/20/2010  2010  999

 

 Influenza  10/28/2011  [employer] paste[employer]  [employer]  FLUZONE  [id number]  Intramuscular  
Left Arm  10/28/2011  2011  141

 

 Influenza  10/29/2012  [employer] paste[employer]  [employer]  FLUZONE  [id number]  Intramuscular  
Left Deltoid  10/29/2012  2012  141

 

 X  12/10/2012  [employer]  MSD  PNEUMOVAX 23  [id number]  Intramuscular  
Left Gluteous Medius  12/10/2012  2010  33

 

 Influenza  10/28/2013  [employer] pasteur  [facility]  Fluzone > 3 Years  [id number]  
Intramuscular  Right Deltoid  10/28/2013  2013  141

 

 X  9/2/2015  Not Entered  NE  PREVNAR 13     Not Entered  Not Entered  1  109

 

 Influenza  2015  Not Entered  NE  Not Entered     Not Entered  Not Entered
  2015  141

 

 HepB Adult  2016  [employer]  MSD  RECOMBIVAX-ADULT  [id number]  Not 
Entered  Left Deltoid  2016  43

 

 HepB Adult  2016  [employer]  MSD  RECOMBIVAX-ADULT  [id number]  
Intramuscular  Right Deltoid  2016  43

 

 ZVL  2012  Not Entered  NE  Not Entered     Not Entered  Not Entered  1  121

 

 Tdap  2012  Not Entered  NE  Not Entered     Not Entered  Not Entered  1  115

 

 Influenza  10/13/2016  Not Entered  NE  FLUZONE-HIGH DOSE     Intramuscular  
Left Arm  1  141

 

 HepB Adult  2016  [employer]  MSD  RECOMBIVAX-ADULT  [id number]  
Intramuscular  Right Deltoid  2016  43

 

 Influenza  10/30/2017  Not Entered  NE  Fluarix, quadrivalent, preservative 
free     Intramuscular  Left Arm  2017  150







History of Past Illness







 Name  Date of Onset  Comments

 

 Benign Essential Hypertension  Dec 14 2009 10:10AM   

 

 Hyperlipidemia  Dec 14 2009 10:10AM   

 

 Hypothyroidism, Acquired  Dec 14 2009 10:10AM   

 

 hypothyroid      

 

 Hypertension      

 

 Hyperlipidemia      

 

 acid reflux      

 

 Hypertension, Benign Essential  2010  9:41AM   

 

 Hypertension, Benign Essential  2010 12:53PM   

 

 Routine gynecological examination  May  5 2010  9:28AM   

 

 Hypertension  May  5 2010  9:28AM   

 

 Hyperlipidemia, unspecified  May  5 2010  9:28AM   

 

 Hypothyroidism, Acquired  May  5 2010  9:28AM   

 

 Vulvovaginitis  May  5 2010  9:28AM   

 

 Rhinitis, Allergic  May  5 2010  9:28AM   

 

 Hypertension, Benign Essential  May 19 2010  8:48AM   

 

 Hypertension, Benign Essential  May 25 2010  9:39AM   

 

 Flu  Oct 20 2010 12:01PM   

 

 Essential Hypertension  2010  8:34AM   

 

 Hyperlipidemia  2010  8:34AM   

 

 Gastroesophageal Reflux  2010  8:34AM   

 

 Hypothyroidism, Acquired  2010  8:34AM   

 

 Hypertension, Benign Essential  2010  9:22AM   

 

 Hypertension, Benign Essential  Dec 15 2010  9:07AM   

 

 Essential Hypertension  2011  1:31PM   

 

 Hyperlipidemia  2011  1:31PM   

 

 Gastroesophageal Reflux  2011  1:31PM   

 

 Hypothyroidism, Acquired  2011  1:31PM   

 

 Essential Hypertension  2011  8:51AM   

 

 Hyperlipidemia  2011  8:51AM   

 

 Gastroesophageal Reflux  2011  8:51AM   

 

 Hypothyroidism, Acquired  2011  8:51AM   

 

 Essential Hypertension  2011  9:13AM   

 

 Hyperlipidemia  2011  9:13AM   

 

 Gastroesophageal Reflux  2011  9:13AM   

 

 Hypothyroidism, Acquired  2011  9:13AM   

 

 Nausea With Vomiting  2011  1:18PM   

 

 Hyponatremia  2011  8:46AM   

 

 Nausea  2011  9:13AM   

 

 Hypothyroidism  2011 11:10AM   

 

 Hyponatremia  2011 11:10AM   

 

 Essential Hypertension  2011 10:05AM   

 

 Hyperlipidemia  2011 10:05AM   

 

 Gastroesophageal Reflux  2011 10:05AM   

 

 Nausea  2011 10:05AM   

 

 Hypothyroidism, Acquired  2011 10:05AM   

 

 Hyponatremia  May  6 2011 11:34AM   

 

 Essential Hypertension  May 16 2011  8:50AM   

 

 Hyperlipidemia  May 16 2011  8:50AM   

 

 Gastroesophageal Reflux  May 16 2011  8:50AM   

 

 Nausea  May 16 2011  8:50AM   

 

 Hypothyroidism, Acquired  May 16 2011  8:50AM   

 

 Hyponatremia  May 16 2011  8:50AM   

 

 Routine gynecological examination  2011  8:54AM   

 

 Hypertension  2011  8:54AM   

 

 Hyperlipidemia, unspecified  2011  8:54AM   

 

 Hypothyroidism, Acquired  2011  8:54AM   

 

 Rhinitis, Allergic  2011  8:54AM   

 

 Hypothyroidism  Aug 29 2011 12:37PM   

 

 Hyponatremia  Aug 29 2011 12:37PM   

 

 Essential Hypertension  Sep 12 2011  8:53AM   

 

 Hyperlipidemia  Sep 12 2011  8:53AM   

 

 Gastroesophageal Reflux  Sep 12 2011  8:53AM   

 

 Hypothyroidism, Acquired  Sep 12 2011  8:53AM   

 

 Hyponatremia  Sep 12 2011  8:53AM   

 

 Hypertension  Sep 29 2011  9:41AM   

 

 Hyperlipidemia  Dec  8 2011  8:31AM   

 

 Hypothyroidism  Dec  8 2011  8:31AM   

 

 Hypertension  Dec  8 2011  8:31AM   

 

 Flu  Dec  9 2011 10:02AM   

 

 Essential Hypertension  Dec 13 2011 10:13AM   

 

 Hyperlipidemia  Dec 13 2011 10:13AM   

 

 Gastroesophageal Reflux  Dec 13 2011 10:13AM   

 

 Hypothyroidism, Acquired  Dec 13 2011 10:13AM   

 

 Hyponatremia  Dec 13 2011 10:13AM   

 

 Vaginal Discharge  2012  9:43AM   

 

 Rhinitis, Allergic  Feb 15 2012  9:31AM   

 

 Eustachian Tube Dysfunction  Feb 15 2012  9:31AM   

 

 Pharyngitis, Acute  Feb 15 2012  9:31AM   

 

 Routine gynecological examination  2012  8:48AM   

 

 Hypertension  2012  8:48AM   

 

 Hyperlipidemia, unspecified  2012  8:48AM   

 

 Hypothyroidism, Acquired  2012  8:48AM   

 

 Rhinitis, Allergic  2012  8:48AM   

 

 Candidiasis, Vulvovaginal  2012 11:04AM   

 

 Essential Hypertension  Aug 15 2012  9:02AM   

 

 Hyperlipidemia  Aug 15 2012  9:02AM   

 

 Gastroesophageal Reflux  Aug 15 2012  9:02AM   

 

 Hypothyroidism, Acquired  Aug 15 2012  9:02AM   

 

 Hyponatremia  Aug 15 2012  9:02AM   

 

 Atrophic Vaginitis, Postmenopausal  Aug 15 2012  9:02AM   

 

 Flu  Oct 29 2012  9:24AM   

 

 Essential Hypertension  Dec 10 2012  8:35AM   

 

 Hyperlipidemia  Dec 10 2012  8:35AM   

 

 Gastroesophageal Reflux  Dec 10 2012  8:35AM   

 

 Hypothyroidism, Acquired  Dec 10 2012  8:35AM   

 

 Hyponatremia  Dec 10 2012  8:35AM   

 

 Atrophic Vaginitis, Postmenopausal  Dec 10 2012  8:35AM   

 

 Pneumococcus  Dec 10 2012  2:07PM   

 

 Vaginal Discharge  Dec 20 2012  1:50PM   

 

 Essential Hypertension  Dec 20 2012  1:50PM   

 

 Hyperlipidemia  Dec 20 2012  1:50PM   

 

 Gastroesophageal Reflux  Dec 20 2012  1:50PM   

 

 Hypothyroidism, Acquired  Dec 20 2012  1:50PM   

 

 Atrophic Vaginitis, Postmenopausal  Dec 20 2012  1:50PM   

 

 Upper Respiratory Infections  2013  8:52AM   

 

 Hyperlipidemia  2013  8:21AM   

 

 Hypertension  2013  8:21AM   

 

 Hypothyroidism  2013  8:21AM   

 

 Screening Mammogram  Beto 10 2013  8:45AM   

 

 Routine gynecological examination  Beto 10 2013  8:34AM   

 

 Hypertension  Beto 10 2013  8:34AM   

 

 Hyperlipidemia, unspecified  Beto 10 2013  8:34AM   

 

 Hypothyroidism, Acquired  Beto 10 2013  8:34AM   

 

 Rhinitis, Allergic  Beto 10 2013  8:34AM   

 

 Flu  Oct 28 2013  8:52AM   

 

 Essential Hypertension  Dec  9 2013  8:37AM   

 

 Hyperlipidemia  Dec  9 2013  8:37AM   

 

 Gastroesophageal Reflux  Dec  9 2013  8:37AM   

 

 Hypothyroidism, Acquired  Dec  9 2013  8:37AM   

 

 Atrophic Vaginitis, Postmenopausal  Dec  9 2013  8:37AM   

 

 Hypertension  2014  9:56AM   

 

 Hyperlipidemia  2014  9:56AM   

 

 Hypothyroidism  2014  9:56AM   

 

 Screening Mammogram  2014  8:32AM   

 

 Osteopenia  2014  8:32AM   

 

 Hypertension  2014  8:35AM   

 

 Hypothyroidism, Acquired  2014  8:35AM   

 

 Hyperlipidemia  2014  8:35AM   

 

 Upper Respiratory Infections  2014  9:28AM   

 

 Sinusitis, Acute  Oct  2 2014  9:17AM   

 

 Herpes Zoster  Oct  5 2014  1:44PM   

 

 Vesicular Eruption  Oct  5 2014  1:44PM   

 

 Hypertension  2014  8:18AM   

 

 Hyperlipidemia  2014  8:18AM   

 

 hypothyroid  2014  8:18AM   

 

 Situational anxiety  Dec 20 2014  9:33AM   

 

 GERD (gastroesophageal reflux disease)  Dec 20 2014  9:33AM   

 

 Nausea  Dec 20 2014  9:33AM   

 

 Abdominal Pain, Epigastric  Dec 20 2014  9:33AM   

 

 Hyperlipidemia  Oct  6 2014  9:03AM   

 

 acid reflux  Oct  6 2014  9:03AM   

 

 hypothyroid  Oct  6 2014  9:03AM   

 

 Shingles  Oct  6 2014  9:03AM   

 

 Pharyngitis  2015  1:09PM   

 

 Bronchitis  2015  9:10AM   

 

 Hyperlipidemia  2015  9:10AM   

 

 acid reflux  2015  9:10AM   

 

 hypothyroid  2015  9:10AM   

 

 Hyperlipidemia  2015  8:18AM   

 

 Hypertension  2015  8:18AM   

 

 hypothyroid  2015  8:18AM   

 

 Screening Mammogram  2015 11:43AM   

 

 Medicare Annual Pap Q 2 years  2015  9:36AM   

 

 Screening Mammogram  2015  9:36AM   

 

 Candidiasis of female genitalia  2015  9:36AM   

 

 Hypertension  2015 11:34AM   

 

 Hyperlipidemia, unspecified  2015 11:34AM   

 

 Hypothyroidism, Acquired  2015 11:34AM   

 

 Osteopenia  2016  8:41AM   

 

 Encounter for screening mammogram for breast cancer  2016  8:41AM   

 

 Hypertension  2016  8:34AM   

 

 Lymphedema  2016  8:34AM   

 

 Hyperlipidemia  2016  8:34AM   

 

 hypothyroid  2016  8:34AM   

 

 HEP B  2016  9:13AM   

 

 HEP B  2016  8:52AM   

 

 Acid Reflux  2016  8:34AM   

 

 History of acute gastritis  Oct 13 2016  2:30PM   

 

 Anxiety as acute reaction to exceptional stress  Oct 13 2016  2:30PM   

 

 HEP B  Dec 29 2016  8:42AM   

 

 Caregiver stress syndrome  May 26 2017  8:28AM   

 

 Anxiety  May 26 2017  8:28AM   

 

 Blood in stool  2017  2:54PM   

 

 Upper GI bleed  2017 11:34AM   

 

 Hyponatremia  2017  9:03AM   

 

 Hyponatremia  2017  8:43AM   

 

 Medicare Annual Pap Q 2 years  2017  9:03AM   

 

 Nausea  2017  9:03AM   

 

 Uterine enlargement  2017  9:03AM   

 

 Encounter for screening mammogram for breast cancer  2017  4:56PM   

 

 Panic disorder [episodic paroxysmal anxiety]  Oct 10 2017  2:02PM   

 

 Acute stress reaction  Oct 10 2017  2:02PM   

 

 Caregiver stress syndrome  Oct 10 2017  2:02PM   

 

 Stress reaction causing mixed disturbance of emotion and conduct  Oct 18 2017  
9:05AM   

 

 Ankle strain, left, initial encounter  Dec 19 2017  8:29AM   

 

 Excoriation of ear canal, left, initial encounter  Dec 19 2017  8:29AM   

 

 Generalized anxiety disorder  Dec 19 2017  8:29AM   

 

 Grief reaction  Dec 19 2017  8:29AM   

 

 Acute non-recurrent frontal sinusitis  2018 11:58AM   

 

 Cough  2018 11:58AM   

 

 Hypertension  2018  8:10AM   

 

 Hyperlipidemia, unspecified  2018  8:10AM   

 

 Hypothyroidism, Acquired  2018  8:10AM   

 

 Situational anxiety  Mar  6 2018  8:30AM   

 

 Nausea  Mar  6 2018  8:30AM   

 

 LESLIE (generalized anxiety disorder)  May 22 2018  8:47AM   

 

 Caregiver stress syndrome  May 22 2018  8:47AM   

 

 Situational anxiety  May 29 2018  8:26AM   

 

 Osteopenia  May 29 2018  8:26AM   

 

 Visit for screening mammogram  May 29 2018  8:26AM   

 

 Anxiety Disorder  2018  9:09AM   







Payers







 Insurance Name  Company Name  Plan Name  Plan Number  Policy Number  Policy 
Group Number  Start Date

 

    Medicare [employer]  Medicare [employer]     [id number]     N/A

 

    BC  [facility]     [id number]     2009

 

    Medicare Part B  [employer] [place]     [id number]     N/A

 

    Medicare Part A  Medicare Part A     [id number]     N/A

 

    Medicare Part A  [employer] - Preventive     [id number]     N/A

 

    Medicare Part A  Medicare - Lab/Xray     [id number]     N/A







History of Encounters







 Visit Date  Visit Type  Provider

 

 2018  Office visit  Marvin Boyer DO

 

 2018  Office visit  Marvin Boyer DO

 

 2018  Office visit  Doris Noriega MD

 

 3/6/2018  Office visit  Doris Noriega MD

 

 2018  Office visit  Deedee Carter APRN

 

 2017  Office visit  Doris Noriega MD

 

 10/18/2017  Office visit  Doris Noriega MD

 

 10/10/2017  Office visit  Doris Noriega MD

 

 2017  Office visit  Doris Noriega MD

 

 2017  Office visit  Doris Noriega MD

 

 2017  Office visit  Doris Noriega MD

 

 2017  Office visit  Prince Noe APRN

 

 2016  Nurse visit  Doris Noriega MD

 

 10/13/2016  Office visit  Doris Noriega MD

 

 2016  Nurse visit  Doris Noriega MD

 

 2016  Office visit  Doris Noriega MD

 

 2015  Office visit  Doris Noriega MD

 

 2015  Office visit  Doris Noriega MD

 

 2015  Office visit  Prince Noe APRN

 

 2014  Office visit  Anyi Herrera APRN

 

 10/27/2014  Voided  Doris Noriega MD

 

 10/6/2014  Office visit  Doris Noriega MD

 

 10/5/2014  Office visit  Anyi Herrera APRN

 

 10/2/2014  Office visit   

 

 10/2/2014  Office visit  Corrine Bay APRN

 

 2014  Office visit  Kassie Franklin APRN

 

 2014  Office visit  Doris Noriega MD

 

 2014  Office visit  Doris Noriega MD

 

 2013  Office visit  Dee Colindres MD

 

 10/28/2013  Nurse visit  Dee Colindres MD

 

 6/10/2013  Office visit  Dee Colindres MD

 

 2013  Office visit  Corrine Bay APRN

 

 2012  Office visit  Dee Colindres MD

 

 12/10/2012  Office visit  Dee Colindres MD

 

 10/29/2012  Nurse visit  Dee Colindres MD

 

 8/15/2012  Office visit  Dee Colindres MD

 

 2012  Office visit  Corrine Bay APRN

 

 2012  Office visit  Dee Colindres MD

 

 2/15/2012  Office visit  Dee Colindres MD

 

 2012  Office visit  Corrine Bay APRN

 

 2011  Office visit  Dee Colindres MD

 

 10/28/2011  Nurse visit  Shad Nolasco MD

 

 2011  Office visit  Dee Colindres MD

 

 2011  Office visit  Dee Colindres MD

 

 2011  Office visit  Dee Colindres MD

 

 2011  Office visit  Dee Colindres MD

 

 2011  Office visit  Dee Colindres MD

 

 2011  Office visit  Dee Colindres MD

 

 4/10/2011  Hospital  KHRIS Reeves MD

 

 4/10/2011  Hospital  RICKEY Toussaint MD

 

 2011  Office visit  RICKEY Toussaint MD

 

 2011  [facility]  Fide Castellanos MD

 

 2011  Voided  Fide Castellanos MD

 

 2011  Office visit  Dee Colindres MD

 

 12/15/2010  Nurse visit  Dee Colindres MD

 

 2010  Office visit  Dee Colindres MD

 

 10/20/2010  Nurse visit  Stephenie PERAZA

 

 2010  Nurse visit  Dee Colindres MD

 

 2010  Nurse visit  Dee Colindres MD

 

 2010  Office visit  Dee Colindres MD

 

 3/19/2010  Voided  Stephenie Kay PA

 

 2010  Nurse visit  Stephenie PERAZA

 

 2010  Nurse visit  Stephenie PERAZA

 

 2010  Nurse visit  Stephenie PERAZA

 

 2009  Office visit  Stephenie PERAZA

 

 10/20/2009  Nurse visit  Stephenie Kay PA

## 2018-10-22 NOTE — XMS REPORT
MU2 Ambulatory Summary

 Created on: 2018



Milady Crespo

External Reference #: [id number]

: 1945

Sex: Female



Demographics







 Address  [address]

[place]  [address]

 

 Home Phone  [phone]

 

 Preferred Language  English

 

 Marital Status  

 

 Sabianist Affiliation  Unknown

 

 Race  White

 

 Ethnic Group  Not  or 





Author







 Author  Deedee Carter

 

 Organization  [facility]

 

 Address  [address]

[place], [place]  [id number]



 

 Phone  [phone]







Care Team Providers







 Care Team Member Name  Role  Phone

 

 Deedee Carter  PCP  [phone]

 

 BerthaDoris phan  PreferredProvider  [phone]







Allergies and Adverse Reactions







 Name  Reaction  Notes

 

 NO KNOWN DRUG ALLERGIES      







Plan of Treatment







 Planned Activity  Comments  Planned Date  Planned Time  Plan/Goal

 

 Complete blood count (CBC) with differential count     2016  12:00 AM   

 

 Comprehensive metabolic panel     2016  12:00 AM   

 

 VITAMIN D (25 HYDROXY)     2016  12:00 AM   

 

 Lipid panel (total cholesterol, lipoproteins, HDL, triglycerides)     8/15/
2012  12:00 AM   

 

 Pap smear     2017  12:00 AM   

 

 Comprehensive Metabolic Panel     6/10/2013  12:00 AM   

 

 Lipid panel (total cholesterol, lipoproteins, HDL, triglycerides)     6/10/
2013  12:00 AM   

 

 Thyroid stimulating hormone (TSH)     6/10/2013  12:00 AM   

 

 CBC (automated H&H, platelets, WBC and automated differential)     6/10/2013  
12:00 AM   

 

 Comprehensive Metabolic Panel     2012  12:00 AM   

 

 Lipid panel (total cholesterol, lipoproteins, HDL, triglycerides)     2012  12:00 AM   

 

 Thyroid stimulating hormone (TSH)     2012  12:00 AM   

 

 CBC (automated H&H, platelets, WBC and automated differential)     2012  
12:00 AM   

 

 Screening mammography, bilateral     2015  12:00 AM   







Medications







 Active 

 

 Name  Start Date  Estimated Completion Date  SIG  Comments

 

 aspirin 81 mg oral tablet,delayed release (DR/EC)        take 1 tablet (81 mg) 
by oral route once daily   

 

 Vitamin D3 1,000 unit oral capsule        take 1 capsule by oral route daily   

 

 famotidine 20 mg oral tablet  2015     TAKE ONE TABLET BY MOUTH TWICE 
DAILY   

 

 valsartan 160 mg oral tablet  2016     TAKE ONE TABLET BY MOUTH ONCE 
DAILY   

 

 amlodipine 5 mg oral tablet  2016     TAKE ONE TABLET BY MOUTH ONCE DAILY
   

 

 amlodipine 5 mg oral tablet  2016     TAKE ONE TABLET BY MOUTH ONCE DAILY
   

 

 valsartan 160 mg oral tablet  2016     TAKE ONE TABLET BY MOUTH ONCE 
DAILY   

 

 Lipitor 20 mg oral tablet  10/13/2016  2018  take 1 tablet (20 mg) by oral 
route once daily   

 

 Plavix 75 mg oral tablet  10/13/2016  2018  take 1 tablet (75 mg) by oral 
route once daily   

 

 Zofran (as hydrochloride) 4 mg oral tablet  2016     take 1 tablet by 
oral route daily as needed for 14 days   

 

 Zofran (as hydrochloride) 4 mg oral tablet  2017     take 1 tablet by oral 
route daily as needed for 14 days   

 

 Zofran (as hydrochloride) 4 mg oral tablet  2017     take 1 tablet by oral 
route daily as needed for 14 days   

 

 Zofran (as hydrochloride) 4 mg oral tablet  2017     take 1 tablet by 
oral route daily as needed for 14 days   

 

 Zofran (as hydrochloride) 4 mg oral tablet  2017     take 1 tablet by 
oral route daily as needed for 14 days   

 

 EQ LORATADINE 10MG  TABS  2017     TAKE ONE TABLET BY MOUTH ONCE DAILY   

 

 Lexapro 10 mg oral tablet  2017     take 1 tablet (10 mg) by oral route 
once daily for 90 days   

 

 pantoprazole 40 mg oral tablet,delayed release (DR/EC)  10/9/2017     take 1 
tablet (40 mg) by oral route once daily for 90 days   

 

 clorazepate dipotassium 7.5 mg oral tablet  2017  take 1/2 
to1 tablet PO up to three times per day for situational anxiety   

 

 Augmentin 875-125 mg oral tablet  2018     take 1 tablet by oral route 
every 12 hours for 7 days   

 

 benzonatate 100 mg oral capsule  2018     take 1 capsule (100 mg) by oral 
route 3 times per day as needed for cough   









  

 

 Name  Start Date  Expiration Date  SIG  Comments

 

 prednisone 20 mg oral tablet  2011  take 2 tablets by oral 
route daily for 5 days   

 

 calcium carbonate-vitamin D2 600-125 mg-unit oral tablet  8/15/2012  2012
  take 2 tablets by oral route daily   

 

 [place] 3 Fish Oil 900-1,400 mg oral capsule,delayed release(DR/EC)  8/15/2012  9
/  take 1 capsule by oral route daily   

 

 multivitamin Oral tablet  8/15/2012  2012  take 1 tablet by oral route 
daily   

 

 triamcinolone acetonide 0.1 % topical cream  2013  10/7/2013  APPLY A 
THIN LAYER TO THE AFFECTED AREA(S) BY TOPICAL ROUTE TWICE A DAY   

 

 famotidine 20 mg oral tablet  2014  take 1 tablet (20 mg) by 
oral route 2 times per day   

 

 amoxicillin 500 mg oral capsule  2014  take 1 capsule (500 mg) 
by oral route 3 times per day for 10 days   

 

 Zithromax Z-Tab 250 mg oral tablet  10/2/2014  10/7/2014  take 2 tablets (500 
mg) by oral route once daily for 1 day then 1 tablet (250 mg) by oral route 
once daily for 4 days   

 

 acyclovir 800 mg oral tablet  10/5/2014  10/12/2014  take 1 tablet (800 mg) by 
oral route 5 times per day for 7 days   

 

 gabapentin 300 mg oral capsule  10/9/2014  2014  take 1 capsule (300 mg) 
by oral route 3 times per day for 14 days   

 

 Carafate 100 mg/mL oral suspension  2014  take 10 milliliters 
(1 gram) by oral route 4 times per day on an empty stomach 1 hour before meals 
and at bedtime for 4 weeks   

 

 amlodipine 5 mg oral tablet  2015  TAKE ONE TABLET BY MOUTH 
EVERY DAY   

 

 levothyroxine 50 mcg oral tablet  2015  TAKE ONE TABLET BY MOUTH 
EVERY DAY   

 

 Diovan 160 mg oral tablet  2015  2/15/2016  TAKE ONE TABLET BY MOUTH 
EVERY DAY for 90 days   

 

 triamcinolone acetonide 0.1 % topical cream  2015  apply a thin 
layer to the affected area(s) by topical route 2 times per day for 14 days   

 

 fluconazole 150 mg oral tablet  2015  take 1 tablet (150 mg) 
by oral route once for 1 day   

 

 atenolol 50 mg oral tablet  10/13/2016  10/8/2017  take 1 tablet (50 mg) by 
oral route once daily   

 

 Zofran (as hydrochloride) 4 mg oral tablet  10/13/2016  10/27/2016  take 1 
tablet by oral route daily as needed for 14 days   

 

 Zofran (as hydrochloride) 4 mg oral tablet  2017     take 1 tablet by 
oral route daily as needed for 14 days   

 

 levothyroxine 50 mcg oral tablet  10/9/2017  10/9/2017  TAKE ONE TABLET BY 
MOUTH ONCE DAILY   

 

 ondansetron 4 mg oral tablet,disintegrating  10/10/2017  2017  dissolve  
1 tablet by oral route every 8 hours as needed for 30 days   

 

 valsartan 160 mg oral tablet  2017  TAKE ONE TABLET BY MOUTH 
ONCE DAILY   

 

 amlodipine 5 mg oral tablet  2017  TAKE ONE TABLET BY MOUTH 
ONCE DAILY   

 

 mirtazapine 15 mg oral tablet  2017  TAKE ONE-HALF TABLET BY 
MOUTH ONCE DAILY AT BEDTIME   









 Discontinued 

 

 Name  Start Date  Discontinued Date  SIG  Comments

 

 Lipitor 40 mg oral tablet     2009  1/2 TAB DAILY   

 

 ramipril 5 mg oral capsule     2009  take 1 capsule (5 mg) by oral route 
once daily   

 

 lovastatin 20 mg oral tablet  2009  take 1 tablet (20 mg) by 
oral route once daily with evening meal   

 

 propranolol 20 mg oral tablet  2010  take 1 tablet by oral 
route 2 times a day   

 

 Fosamax 70 mg oral tablet  2010  take 1 tablet (70 mg) by oral 
route once weekly in the morning, at least 30 minutes before the first food, 
beverage, or medication of the day   

 

 lisinopril 40 mg oral tablet  2010  4/10/2011  take 2 tablets by oral 
route daily   

 

 Zoloft 50 mg oral tablet  2011  take 1 tablet (50 mg) by oral 
route once daily   

 

 promethazine 25 mg oral tablet  2011  take 1 tablet by oral 
route every 6 hours as needed   

 

 Diovan -12.5 mg oral tablet  2011  take 1 tablet by oral 
route once daily for 30 days   

 

 Diflucan 150 mg oral tablet     2012  take 1 tablet (150 mg) by oral 
route once for 1 day   

 

 Diflucan 150 mg oral tablet  2012  8/15/2012  take 1 tablet (150 mg) by 
oral route once   

 

 Vitamin B-12 1,000 mcg oral tablet  8/15/2012  2014  take 1 tablet by 
oral route daily   

 

 Premarin 0.625 mg/gram vaginal cream  10/29/2012  6/10/2013  use 1 gram daily 
for a week then 1 gram twice a week   

 

 Medrol (Tab) 4 mg oral tablets,dose pack  2013  6/10/2013  take as 
directed   

 

 aspirin 325 mg oral tablet  2014  take 1 tablet (325 mg) by 
oral route once daily   

 

 Medrol (Tab) 4 mg oral tablets,dose pack  2014  10/2/2014  take as 
directed   

 

 Tetracaine Lollipops Lollipop  2015  Use as directed   

 

 Allergy Relief (loratadine) 10 mg oral tablet  2016     TAKE ONE TABLET 
BY MOUTH ONCE DAILY   

 

 Allergy Relief (loratadine) 10 mg oral tablet  2017  TAKE ONE 
TABLET BY MOUTH ONCE DAILY   

 

 Zoloft 50 mg oral tablet  2016  take 1 tablet (50 mg) by oral 
route once daily for 90 days   

 

 Zoloft 50 mg oral tablet  2016  take 1 tablet (50 mg) by oral 
route once daily for 90 days  Pt refuses to take...

 

 triamcinolone acetonide 0.1 % topical cream  2017     APPLY A THIN LAYER 
OF CREAM EXTERNALLY TO AFFECTED AREA TWICE DAILY FOR 14 DAYS   

 

 loratadine 10 mg oral tablet  2017  TAKE 1 TABLET BY MOUTH 
EVERY DAY IN THE EVENING   

 

 triamcinolone acetonide 0.1 % topical cream  2017  APPLY  
CREAM EXTERNALLY TO AFFECTED AREA TWICE DAILY FOR 14 DAYS   







Problem List







 Description  Status  Onset

 

 Hyperlipidemia  Active   

 

 acid reflux  Active   

 

 Hypertension  Active   

 

 hypothyroid  Active   







Vital Signs







 Date  Time  BP-Sys(mm[Hg]  BP-Morena(mm[Hg])  HR(bpm)  RR(rpm)  Temp  WT  HT  HC  
BMI  BSA  BMI Percentile  O2 Sat(%)

 

 2018  11:55:00 AM  126 mmHg  80 mmHg  80 bpm  18 rpm  98 F  132 lbs  61 
in     24.94 kg/m2  1.61 m2     100 %

 

 2017  8:26:00 AM  122 mmHg  76 mmHg  63 bpm  16 rpm  98.4 F  129.25 lbs  
61 in     24.4213 kg/m  1.5885 m     99 %

 

 10/18/2017  9:01:00 AM  126 mmHg  80 mmHg  73 bpm  16 rpm  97.9 F  122.375 lbs
  61 in     23.12 kg/m2  1.55 m2     100 %

 

 10/10/2017  1:52:00 PM  118 mmHg  72 mmHg  74 bpm  16 rpm  98.1 F  121 lbs  61 
in     22.8625 kg/m  1.5369 m     99 %

 

 2017  8:40:00 AM  118 mmHg  68 mmHg  63 bpm  16 rpm  98.1 F  123.125 lbs  
61 in     23.26 kg/m2  1.55 m2     100 %

 

 2017  8:57:00 AM  116 mmHg  62 mmHg  71 bpm  16 rpm  99.8 F  121.5 lbs  
61 in     22.957 kg/m  1.5401 m     98 %

 

 2017  11:30:00 AM  128 mmHg  78 mmHg  69 bpm  16 rpm  98.8 F  129.375 lbs  
61 in     24.44 kg/m2  1.59 m2     98 %

 

 2017  8:22:00 AM  118 mmHg  78 mmHg  62 bpm  18 rpm  97.5 F  129.125 lbs  
61 in     24.3977 kg/m  1.5877 m     100 %

 

 10/13/2016  2:26:00 PM  128 mmHg  78 mmHg  77 bpm  16 rpm  98.4 F  139.25 lbs  
61 in     26.31 kg/m2  1.65 m2     100 %

 

 2016  8:29:00 AM  122 mmHg  70 mmHg  72 bpm  16 rpm  97.8 F  137.125 lbs  
61 in     25.9093 kg/m  1.6361 m     100 %

 

 2015  9:33:00 AM  120 mmHg  68 mmHg  73 bpm     98.7 F  144.375 lbs  61 
in     27.28 kg/m2  1.68 m2     99 %

 

 2015  9:05:00 AM  110 mmHg  75 mmHg  85 bpm  16 rpm  96.7 F  144.375 lbs  
61 in     27.2791 kg/m  1.6788 m     99 %

 

 2015  1:05:00 PM  108 mmHg  62 mmHg  78 bpm  18 rpm  96.9 F  142.375 lbs  
61 in     26.90 kg/m2  1.67 m2     100 %

 

 2014  9:31:00 AM  126 mmHg  66 mmHg  79 bpm  18 rpm  98.7 F  149 lbs  61 
in     28.153 kg/m  1.7055 m     98 %

 

 10/6/2014  9:02:00 AM  110 mmHg  74 mmHg  94 bpm  18 rpm  98.1 F  145.4 lbs  
61 in     27.47 kg/m2  1.68 m2     97 %

 

 10/2/2014  9:14:00 AM  124 mmHg  74 mmHg  79 bpm  18 rpm  98 F  147.25 lbs  61 
in     27.8224 kg/m  1.6955 m     98 %

 

 2014  9:22:00 AM  124 mmHg  76 mmHg  89 bpm  18 rpm  98.1 F  148 lbs  61 
in     27.96 kg/m2  1.70 m2     100 %

 

 2014  8:27:00 AM  118 mmHg  65 mmHg  74 bpm  16 rpm  97.7 F  148 lbs  61 
in     27.9641 kg/m  1.6998 m     99 %

 

 2014  9:48:00 AM  125 mmHg  70 mmHg  93 bpm  18 rpm  96.7 F  156 lbs  61 
in     29.48 kg/m2  1.75 m2     100 %

 

 2013  8:35:00 AM  122 mmHg  74 mmHg  84 bpm  16 rpm  97.9 F  155.375 lbs 
                99 %

 

 6/10/2013  8:30:00 AM  130 mmHg  82 mmHg  79 bpm  16 rpm  97.9 F  161 lbs     
            98 %

 

 2013  8:50:00 AM  124 mmHg  68 mmHg  66 bpm  18 rpm  97.6 F  157.5 lbs  
61 in     29.7591 kg/m  1.7535 m      

 

 2012  1:46:00 PM  120 mmHg  72 mmHg  75 bpm  16 rpm  97.6 F  156.125 lbs
                 98 %

 

 12/10/2012  8:32:00 AM  122 mmHg  82 mmHg  70 bpm  16 rpm  97.3 F  157 lbs    
             99 %

 

 8/15/2012  9:00:00 AM  130 mmHg  76 mmHg  86 bpm  16 rpm  97.4 F  159 lbs     
            100 %

 

 2012  11:02:00 AM  128 mmHg  64 mmHg  66 bpm  18 rpm  97.4 F  162.125 lbs
  61 in     30.6329 kg/m  1.7791 m      

 

 2012  8:45:00 AM  130 mmHg  70 mmHg  82 bpm  16 rpm  98.2 F  160.125 lbs 
                100 %

 

 2/15/2012  9:29:00 AM  122 mmHg  80 mmHg  86 bpm  16 rpm  97.4 F  159.375 lbs  
61 in     30.1133 kg/m  1.7639 m     99 %

 

 2012  9:41:00 AM  132 mmHg  74 mmHg  66 bpm  18 rpm  98.4 F  160.375 lbs  
61 in     30.30 kg/m2  1.77 m2      

 

 2011  10:08:00 AM  134 mmHg  82 mmHg  80 bpm  16 rpm  98 F  160 lbs  61 
in     30.2314 kg/m  1.7674 m     99 %

 

 2011  3:56:00 PM  130 mmHg  80 mmHg                              

 

 2011  8:55:00 AM  130 mmHg  74 mmHg  88 bpm  16 rpm  98.2 F  158.25 lbs  
               98 %

 

 2011  8:54:00 AM  136 mmHg  82 mmHg  102 bpm  16 rpm  98.1 F  153.5 lbs   
              99 %

 

 2011  8:49:00 AM  122 mmHg  88 mmHg  88 bpm  16 rpm  98.6 F  152.25 lbs  
               99 %

 

 2011  10:02:00 AM  140 mmHg  82 mmHg  96 bpm  20 rpm  98.8 F             
       100 %

 

 2011  9:14:00 AM  156 mmHg  98 mmHg  110 bpm  24 rpm  98.5 F  153 lbs    
             100 %

 

 2011  8:50:00 AM  160 mmHg  84 mmHg  100 bpm  16 rpm  98.7 F  155 lbs    
             100 %

 

 2011  1:25:00 PM  152 mmHg  100 mmHg  82 bpm  16 rpm  97.2 F  156.375 lbs 
                100 %

 

 2011  9:55:00 AM  144 mmHg  90 mmHg                              

 

 2010  9:24:00 AM  138 mmHg  84 mmHg                              

 

 2010  8:58:00 AM  160 mmHg  94 mmHg                              

 

 2010  8:33:00 AM  142 mmHg  90 mmHg  100 bpm  16 rpm  98.1 F  158.375 
lbs                  

 

 2010  9:24:00 AM  160 mmHg  102 mmHg  88 bpm  18 rpm  99 F  157.125 lbs  
62 in     28.7382 kg/m  1.7657 m      

 

 3/19/2010  11:01:00 AM  140 mmHg  90 mmHg                              

 

 2009  10:08:00 AM  142 mmHg  86 mmHg  72 bpm  16 rpm  98.1 F  154.125 
lbs  61 in     29.12 kg/m2  1.73 m2      







Social History







 Name  Description  Comments

 

       

 

 Children      

 

 lives alone      

 

 Supervisor      

 

 Tobacco  Never smoker   







History of Procedures







 Date Ordered  Description  Order Status

 

 2011 12:00 AM  COMPREHEN METABOLIC PANEL  Reviewed

 

 2011 12:00 AM  ASSAY THYROID STIM HORMONE  Reviewed

 

 2011 12:00 AM  COMPREHEN METABOLIC PANEL  Reviewed

 

 2011 12:00 AM  LIPID PANEL  Reviewed

 

 2011 12:00 AM  ASSAY THYROID STIM HORMONE  Reviewed

 

 2011 12:00 AM  COMPLETE CBC W/AUTO DIFF WBC  Reviewed

 

 2015 12:00 AM  COMPLETE CBC W/AUTO DIFF WBC  Reviewed

 

 2015 12:00 AM  COMPREHEN METABOLIC PANEL  Reviewed

 

 2015 12:00 AM  LIPID PANEL  Reviewed

 

 2015 12:00 AM  ASSAY THYROID STIM HORMONE  Reviewed

 

 2011 12:00 AM  COMPREHEN METABOLIC PANEL  Reviewed

 

 2011 12:00 AM  COMPREHEN METABOLIC PANEL  Reviewed

 

 2011 12:00 AM  LIPID PANEL  Reviewed

 

 2011 12:00 AM  ASSAY THYROID STIM HORMONE  Reviewed

 

 10/28/2011 12:00 AM  ***Immunization administration, Medicare flu  Reviewed

 

 10/28/2011 12:00 AM  Fluzone ** MEDICARE Only **  Reviewed

 

 2010 11:24 AM  NO CHARGE OV  Reviewed

 

 2010 11:26 AM  NO CHARGE OV  Reviewed

 

 2011 12:00 AM  COMPREHEN METABOLIC PANEL  Reviewed

 

 2011 12:00 AM  LIPID PANEL  Reviewed

 

 2011 12:00 AM  ASSAY THYROID STIM HORMONE  Reviewed

 

 2011 12:00 AM  COMPLETE CBC W/AUTO DIFF WBC  Reviewed

 

 2011 12:00 AM  Wrist Support  Reviewed

 

 2016 12:00 AM  Screening mammography, bilateral  Reviewed

 

 2016 12:00 AM  DXA BONE DENSITY AXIAL  Returned

 

 2016 12:00 AM  TETANUS VACCINE IM  Reviewed

 

 2016 12:00 AM  HEP B VACC ADULT 3 DOSE IM  Reviewed

 

 2012 12:00 AM  TISSUE EXAM FOR FUNGI  Reviewed

 

 2012 12:00 AM  SMEAR WET MOUNT SALINE/INK  Reviewed

 

 2016 12:00 AM  TETANUS VACCINE IM  Reviewed

 

 2016 12:00 AM  HEP B VACC ADULT 3 DOSE IM  Reviewed

 

 2/15/2012 12:00 AM  THER/PROPH/DIAG INJ SC/IM  Reviewed

 

 2/15/2012 12:00 AM  Bicillin CR [facility]#00556341836[facility]  Reviewed

 

 2/15/2012 12:00 AM  Depo-Medrol 40 mg NDC#5725946154  Reviewed

 

 10/13/2016 12:00 AM  COMPLETE CBC W/AUTO DIFF WBC  Returned

 

 10/13/2016 12:00 AM  COMPREHEN METABOLIC PANEL  Returned

 

 10/13/2016 12:00 AM  ASSAY THYROID STIM HORMONE  Returned

 

 10/13/2016 12:00 AM  LIPID PANEL  Returned

 

 2016 12:00 AM  TETANUS VACCINE IM  Reviewed

 

 2016 12:00 AM  HEP B VACC ADULT 3 DOSE IM  Reviewed

 

 2017 12:00 AM  ASSAY OF IRON  Returned

 

 2017 12:00 AM  IRON BINDING TEST  Returned

 

 2017 12:00 AM  ASSAY OF TRANSFERRIN  Returned

 

 2017 12:00 AM  OCCULT BLD FECES 1-3 TESTS  Returned

 

 2017 12:00 AM  COMPLETE CBC AUTOMATED  Returned

 

 8/15/2012 12:00 AM  COMPREHEN METABOLIC PANEL  Reviewed

 

 8/15/2012 12:00 AM  ASSAY THYROID STIM HORMONE  Reviewed

 

 8/15/2012 12:00 AM  COMPLETE CBC W/AUTO DIFF WBC  Reviewed

 

 8/15/2012 12:00 AM  Wrist Support  Reviewed

 

 2017 12:00 AM  METABOLIC PANEL TOTAL CA  Returned

 

 2017 12:00 AM  METABOLIC PANEL TOTAL CA  Returned

 

 2017 12:00 AM  Screening mammography, bilateral  Returned

 

 10/29/2012 12:00 AM  Flu Injection 3 Years And Above NDC# [address]-26  [facility]  
Reviewed

 

 12/10/2012 12:00 AM  IMMUNIZATION ADMIN  Reviewed

 

 12/10/2012 12:00 AM  Pneumovax Injection - RH  Reviewed

 

 2018 12:00 AM  THER/PROPH/DIAG INJ SC/IM  Reviewed

 

 2018 12:00 AM  Decadron 4mg Injection  Reviewed

 

 2018 12:00 AM  Depo-Medrol 40mg Injection  Reviewed

 

 2012 12:00 AM  TRICHOMONAS ASSAY W/OPTIC  Reviewed

 

 2013 12:00 AM  THER/PROPH/DIAG INJ SC/IM  Reviewed

 

 2013 12:00 AM  Bicillin CR, 1.2 million units NDC# 41883-922-91  Reviewed

 

 2013 12:00 AM  COMPREHEN METABOLIC PANEL  Reviewed

 

 2013 12:00 AM  LIPID PANEL  Reviewed

 

 2013 12:00 AM  COMPLETE CBC W/AUTO DIFF WBC  Reviewed

 

 2013 12:00 AM  ASSAY THYROID STIM HORMONE  Reviewed

 

 6/10/2013 12:00 AM  MAMMOGRAM SCREENING  Reviewed

 

 6/10/2013 12:00 AM  CYTOPATH C/V MANUAL  Reviewed

 

 12/10/2013 12:00 AM  LIPID PANEL  Reviewed

 

 12/10/2013 12:00 AM  ASSAY THYROID STIM HORMONE  Reviewed

 

 12/10/2013 12:00 AM  COMPLETE CBC W/AUTO DIFF WBC  Reviewed

 

 12/10/2013 12:00 AM  COMPREHEN METABOLIC PANEL  Reviewed

 

 2010 12:00 AM  CYTOPATH C/V MANUAL  Reviewed

 

 2010 12:00 AM  SMEAR WET MOUNT SALINE/INK  Reviewed

 

 2010 12:00 AM  LIPID PANEL  Reviewed

 

 2010 12:00 AM  ASSAY THYROID STIM HORMONE  Reviewed

 

 2010 12:00 AM  COMPLETE CBC W/AUTO DIFF WBC  Reviewed

 

 2010 12:00 AM  COMPREHEN METABOLIC PANEL  Reviewed

 

 10/28/2013 12:00 AM  Flu Injection 3 Years And Above NDC# [address]-85  C  
Reviewed

 

 2010 8:48 AM  Blood Pressure Check-no charge  Reviewed

 

 2010 12:00 AM  Blood Pressure Check-no charge  Reviewed

 

 2014 12:00 AM  METABOLIC PANEL TOTAL CA  Reviewed

 

 2014 12:00 AM  ASSAY THYROID STIM HORMONE  Reviewed

 

 2014 12:00 AM  ASSAY OF FREE THYROXINE  Reviewed

 

 2014 12:00 AM  MAMMOGRAM SCREENING  Reviewed

 

 2014 12:00 AM  CT BONE DENSITY AXIAL  Reviewed

 

 2014 12:00 AM  COMPLETE CBC W/AUTO DIFF WBC  Reviewed

 

 2014 12:00 AM  COMPREHEN METABOLIC PANEL  Reviewed

 

 2014 12:00 AM  LIPID PANEL  Reviewed

 

 2014 12:00 AM  ASSAY THYROID STIM HORMONE  Reviewed

 

 10/20/2010 12:00 AM  IMMUNIZATION ADMIN  Reviewed

 

 10/20/2010 12:00 AM  FLU VACCINE 3 YRS & > IM  Reviewed

 

 2010 12:00 AM  COMPREHEN METABOLIC PANEL  Reviewed

 

 2010 12:00 AM  LIPID PANEL  Reviewed

 

 2010 12:00 AM  ASSAY THYROID STIM HORMONE  Reviewed

 

 2010 12:00 AM  COMPLETE CBC W/AUTO DIFF WBC  Reviewed

 

 2010 12:00 AM  Blood Pressure Check-no charge  Reviewed

 

 10/2/2014 12:00 AM  THER/PROPH/DIAG INJ SC/IM  Reviewed

 

 10/2/2014 12:00 AM  Kenalog, Per 10 Mg NDC#9973-9877-45  Reviewed

 

 2014 12:00 AM  COMPLETE CBC W/AUTO DIFF WBC  Reviewed

 

 2014 12:00 AM  COMPREHEN METABOLIC PANEL  Reviewed

 

 2014 12:00 AM  LIPID PANEL  Reviewed

 

 2014 12:00 AM  ASSAY THYROID STIM HORMONE  Reviewed

 

 2015 12:00 AM  Rocephin 1 gram NDC#6463-5350-59  Reviewed

 

 2015 12:00 AM  THER/PROPH/DIAG INJ SC/IM  Reviewed

 

 2011 12:00 AM  COMPREHEN METABOLIC PANEL  Reviewed

 

 2011 12:00 AM  LIPID PANEL  Reviewed

 

 2011 12:00 AM  ASSAY THYROID STIM HORMONE  Reviewed

 

 2011 12:00 AM  COMPLETE CBC W/AUTO DIFF WBC  Reviewed

 

 2011 12:00 AM  METABOLIC PANEL TOTAL CA  Reviewed

 

 2011 12:00 AM  COMPREHEN METABOLIC PANEL  Reviewed

 

 2011 12:00 AM  ASSAY THYROID STIM HORMONE  Reviewed

 

 2011 12:00 AM  COMPLETE CBC W/AUTO DIFF WBC  Reviewed

 

 2011 12:00 AM  ASSAY OF LIPASE  Reviewed

 

 2011 12:00 AM  THER/PROPH/DIAG INJ SC/IM  Reviewed

 

 2011 12:00 AM  Phenergan 50 Mg Im  Bernadine  Reviewed

 

 2011 12:00 AM  METABOLIC PANEL TOTAL CA  Reviewed

 

 2011 12:00 AM  THER/PROPH/DIAG INJ SC/IM  Reviewed

 

 2011 12:00 AM  Phenergan 25 Mg Im  Bernadine  Reviewed

 

 2011 12:00 AM  METABOLIC PANEL TOTAL CA  Reviewed

 

 2011 12:00 AM  ASSAY THYROID STIM HORMONE  Reviewed

 

 2011 12:00 AM  THER/PROPH/DIAG INJ SC/IM  Reviewed

 

 2011 12:00 AM  Phenergan 50 Mg Im  Bernadine  Reviewed

 

 2011 12:00 AM  ASSAY OF SERUM SODIUM  Reviewed

 

 2011 12:00 AM  ASSAY OF SERUM SODIUM  Reviewed

 

 2011 12:00 AM  CYTOPATH C/V MANUAL  Reviewed

 

 2011 12:00 AM  ASSAY THYROID STIM HORMONE  Reviewed

 

 2015 12:00 AM  COMPLETE CBC W/AUTO DIFF WBC  Reviewed

 

 2015 12:00 AM  COMPREHEN METABOLIC PANEL  Reviewed

 

 2015 12:00 AM  LIPID PANEL  Reviewed

 

 2015 12:00 AM  ASSAY THYROID STIM HORMONE  Reviewed

 

 2015 12:00 AM  MAMMOGRAM SCREENING  Reviewed

 

 2015 12:00 AM  CYTOPATH TBS C/V MANUAL  Reviewed







Results Summary







 Date and Description  Results

 

 2010 10:41 AM  WET PREP NO TRICHOMONADS SEEN  No  Few 

 

 2010 8:15 AM  TRIGLYCERIDES 174.0 mg/dLCHOLESTEROL 175.0 mg/dLHDL 58.0 mg/
dLTOT CHOL/HDL 3.0 LDL (CALC) 82.0 mg/dLTSH 0.790 uIU/mLGLUCOSE 95.0 mg/
dLSODIUM 136.0 mmol/LPOTASSIUM 4.50 mmol/LCHLORIDE 99.0 mmol/LCO2 26.0 mmol/
LBUN 12.0 mg/dLCREATININE 0.80 mg/dLSGOT/AST 32.0 IU/LSGPT/ALT 33.0 IU/LALK 
PHOS 103.0 IU/LTOTAL PROTEIN 7.60 g/dLALBUMIN 4.60 g/dLTOTAL BILI 0.60 mg/
dLCALCIUM 9.80 mg/dLAGE 64 GFR NonAA 72 GFR AA 87 eGFR >60 mL/min/1.73 m2eGFR AA
* >60 WBC 5.3 RBC 4.13 HGB 13.10 g/dLHCT 39.20 %MCV 95.0 fLMCH 31.70 pgMCHC 
33.40 g/dLRDW SD 44 RDW CV 12.70 %MPV 9.80 fLPLT 257 NRBC# 0.00 NRBC% 0.0 %NEUT 
57.90 %%LYMP 26.50 %%MONO 11.60 %%EOS 3.20 %%BASO 0.80 %#NEUT 3.04 #LYMP 1.39 #
MONO 0.61 #EOS 0.17 #BASO 0.04 MANUAL DIFF NOT IND 

 

 2011 9:45 AM  GLUCOSE 91.0 mg/dLSODIUM 133.0 mmol/LPOTASSIUM 4.40 mmol/
LCHLORIDE 97.0 mmol/LCO2 24.0 mmol/LBUN 9.0 mg/dLCREATININE 0.70 mg/dLCALCIUM 
10.0 mg/dLAGE 65 GFR NonAA 84 GFR  eGFR >60 mL/min/1.73 m2eGFR AA* >60 

 

 2011 10:25 AM  LIPASE 29.0 U/LTSH 0.10 uIU/mLGLUCOSE 115.0 mg/dLSODIUM 
127.0 mmol/LPOTASSIUM 5.30 mmol/LCHLORIDE 93.0 mmol/LCO2 18.0 mmol/LBUN 9.0 mg/
dLCREATININE 0.70 mg/dLSGOT/AST 62.0 IU/LSGPT/ALT 46.0 IU/LALK PHOS 100.0 IU/
LTOTAL PROTEIN 8.60 g/dLALBUMIN 4.90 g/dLTOTAL BILI 0.60 mg/dLCALCIUM 9.90 mg/
dLAGE 65 GFR NonAA 84 GFR  eGFR >60 mL/min/1.73 m2eGFR AA* >60 WBC 6.9 
RBC 4.48 HGB 14.40 g/dLHCT 40.90 %MCV 91.0 fLMCH 32.10 pgMCHC 35.20 g/dLRDW SD 
41 RDW CV 12.50 %MPV 9.30 fLPLT 260 NRBC# 0.00 NRBC% 0.0 %NEUT 74.90 %%LYMP 
15.10 %%MONO 9.40 %%EOS 0.30 %%BASO 0.30 %#NEUT 5.15 #LYMP 1.04 #MONO 0.65 #EOS 
0.02 #BASO 0.02 MANUAL DIFF NOT IND 

 

 2011 9:07 AM  GLUCOSE 92.0 mg/dLSODIUM 131.0 mmol/LPOTASSIUM 4.20 mmol/
LCHLORIDE 99.0 mmol/LCO2 22.0 mmol/LBUN 9.0 mg/dLCREATININE 0.70 mg/dLCALCIUM 
9.20 mg/dLAGE 65 GFR NonAA 84 GFR  eGFR >60 mL/min/1.73 m2eGFR AA* >60 

 

 2011 11:06 AM  TSH 0.510 uIU/mLGLUCOSE 99.0 mg/dLSODIUM 132.0 mmol/
LPOTASSIUM 3.50 mmol/LCHLORIDE 95.0 mmol/LCO2 23.0 mmol/LBUN 9.0 mg/
dLCREATININE 0.80 mg/dLCALCIUM 10.40 mg/dLAGE 65 GFR NonAA 72 GFR AA 87 eGFR >
60 mL/min/1.73 m2eGFR AA* >60 

 

 2011 9:45 AM  SODIUM 132.0 mmol/L

 

 2011 9:27 AM  SODIUM 137.0 mmol/L

 

 2011 9:55 AM  TSH 1.070 uIU/mL

 

 2011 8:55 AM  GLUCOSE 102.0 mg/dLSODIUM 135.0 mmol/LPOTASSIUM 4.20 mmol/
LCHLORIDE 102.0 mmol/LCO2 24.0 mmol/LBUN 15.0 mg/dLCREATININE 0.80 mg/dLSGOT/
AST 30.0 IU/LSGPT/ALT 35.0 IU/LALK PHOS 119.0 IU/LTOTAL PROTEIN 7.50 g/
dLALBUMIN 4.30 g/dLTOTAL BILI 0.30 mg/dLCALCIUM 9.20 mg/dLAGE 65 GFR NonAA 72 
GFR AA 87 eGFR >60 mL/min/1.73 m2eGFR AA* >60 TSH 1.130 uIU/mL

 

 2011 8:55 AM  GLUCOSE 98.0 mg/dLSODIUM 137.0 mmol/LPOTASSIUM 3.90 mmol/
LCHLORIDE 103.0 mmol/LCO2 25.0 mmol/LBUN 14.0 mg/dLCREATININE 0.70 mg/dLSGOT/
AST 33.0 IU/LSGPT/ALT 36.0 IU/LALK PHOS 111.0 IU/LTOTAL PROTEIN 8.30 g/
dLALBUMIN 4.40 g/dLTOTAL BILI 0.50 mg/dLCALCIUM 9.40 mg/dLAGE 66 GFR NonAA 84 
GFR  eGFR >60 mL/min/1.73 m2eGFR AA* >60 TRIGLYCERIDES 109.0 mg/
dLCHOLESTEROL 153.0 mg/dLHDL 54.0 mg/dLTOT CHOL/HDL 2.8 LDL (CALC) 77.0 mg/
dLTSH 1.330 uIU/mL

 

 2012 10:33 AM  WET PREP NO TRICH SEEN CLUE CELLS NONE SEEN 

 

 2012 8:45 AM  WBC 5.2 RBC 3.96 HGB 12.70 g/dLHCT 37.80 %MCV 96.0 fLMCH 
32.10 pgMCHC 33.60 g/dLRDW SD 46 RDW CV 13.30 %MPV 9.70 fLPLT 297 NRBC# 0.00 
NRBC% 0.0 %NEUT 57.70 %%LYMP 28.50 %%MONO 10.30 %%EOS 2.50 %%BASO 1.0 %#NEUT 
3.02 #LYMP 1.49 #MONO 0.54 #EOS 0.13 #BASO 0.05 MANUAL DIFF NOT IND GLUCOSE 
97.0 mg/dLSODIUM 137.0 mmol/LPOTASSIUM 4.40 mmol/LCHLORIDE 101.0 mmol/LCO2 23.0 
mmol/LBUN 17.0 mg/dLCREATININE 0.80 mg/dLSGOT/AST 30.0 IU/LSGPT/ALT 33.0 IU/
LALK PHOS 95.0 IU/LTOTAL PROTEIN 7.40 g/dLALBUMIN 4.40 g/dLTOTAL BILI 0.60 mg/
dLCALCIUM 9.70 mg/dLAGE 66 GFR NonAA 72 GFR AA 87 eGFR 60 eGFR AA* 60 
TRIGLYCERIDES 130.0 mg/dLCHOLESTEROL 157.0 mg/dLHDL 56.0 mg/dLTOT CHOL/HDL 2.8 
LDL (CALC) 75.0 mg/dLTSH 0.940 uIU/mL

 

 2012 8:45 AM  WBC 5.1 RBC 3.91 HGB 12.50 g/dLHCT 36.30 %MCV 93.0 fLMCH 
32.0 pgMCHC 34.40 g/dLRDW SD 43 RDW CV 12.60 %MPV 9.30 fLPLT 244 NRBC# 0.00 NRBC
% 0.0 %NEUT 57.60 %%LYMP 27.50 %%MONO 9.10 %%EOS 5.0 %%BASO 0.80 %#NEUT 2.91 #
LYMP 1.39 #MONO 0.46 #EOS 0.25 #BASO 0.04 MANUAL DIFF NOT IND 

 

 2012 5:02 PM  WET PREP NO TRICH SEEN CLUE CELLS NONE SEEN 

 

 2013 8:25 AM  WBC 4.2 RBC 3.96 HGB 12.90 g/dLHCT 37.0 %MCV 93.0 fLMCH 
32.60 pgMCHC 34.90 g/dLRDW SD 43 RDW CV 12.60 %MPV 9.70 fLPLT 254 NRBC# 0.00 
NRBC% 0.0 %NEUT 54.40 %%LYMP 30.40 %%MONO 10.80 %%EOS 3.40 %%BASO 1.0 %#NEUT 
2.26 #LYMP 1.26 #MONO 0.45 #EOS 0.14 #BASO 0.04 MANUAL DIFF NOT IND TSH 1.230 
uIU/mLGLUCOSE 94.0 mg/dLSODIUM 136.0 mmol/LPOTASSIUM 4.30 mmol/LCHLORIDE 99.0 
mmol/LCO2 25.0 mmol/LBUN 10.0 mg/dLCREATININE 0.80 mg/dLSGOT/AST 36.0 IU/LSGPT/
ALT 36.0 IU/LALK PHOS 84.0 IU/LTOTAL PROTEIN 7.90 g/dLALBUMIN 4.40 g/dLTOTAL 
BILI 0.70 mg/dLCALCIUM 9.80 mg/dLAGE 67 GFR NonAA 72 GFR AA 87 eGFR 60 eGFR AA* 
60 TRIGLYCERIDES 122.0 mg/dLCHOLESTEROL 141.0 mg/dLHDL 47.0 mg/dLTOT CHOL/HDL 
3.0 LDL (CALC) 70.0 mg/dL

 

 2013 8:45 AM  WBC 5.3 RBC 4.01 HGB 13.0 g/dLHCT 37.60 %MCV 94.0 fLMCH 
32.40 pgMCHC 34.60 g/dLRDW SD 43 RDW CV 12.50 %MPV 9.40 fLPLT 248 NRBC# 0.00 
NRBC% 0.0 %NEUT 58.70 %%LYMP 27.80 %%MONO 9.80 %%EOS 2.80 %%BASO 0.90 %#NEUT 
3.12 #LYMP 1.48 #MONO 0.52 #EOS 0.15 #BASO 0.05 MANUAL DIFF NOT IND TSH 1.570 
uIU/mLGLUCOSE 94.0 mg/dLSODIUM 134.0 mmol/LPOTASSIUM 4.10 mmol/LCHLORIDE 101.0 
mmol/LCO2 23.0 mmol/LBUN 11.0 mg/dLCREATININE 0.80 mg/dLSGOT/AST 41.0 IU/LSGPT/
ALT 44.0 IU/LALK PHOS 109.0 IU/LTOTAL PROTEIN 7.90 g/dLALBUMIN 4.70 g/dLTOTAL 
BILI 0.80 mg/dLCALCIUM 10.40 mg/dLAGE 68 GFR NonAA 72 GFR AA 87 eGFR >60 mL/min/
1.73 m2eGFR AA* >60 TRIGLYCERIDES 163.0 mg/dLCHOLESTEROL 138.0 mg/dLHDL 49.0 mg/
dLTOT CHOL/HDL 2.8 LDL (CALC) 56.0 mg/dL

 

 2014 8:58 AM  GLUCOSE 86.0 mg/dLSODIUM 134.0 mmol/LPOTASSIUM 4.20 mmol/
LCHLORIDE 99.0 mmol/LCO2 25.0 mmol/LBUN 14.0 mg/dLCREATININE 0.80 mg/dLCALCIUM 
10.10 mg/dLAGE 68 GFR NonAA 72 GFR AA 87 eGFR >60 mL/min/1.73 m2eGFR AA* >60 
FREE T4 1.18 TSH 1.20 uIU/mL

 

 2014 9:50 AM  WBC 5.7 RBC 4.03 HGB 12.90 g/dLHCT 37.90 %MCV 94.0 fLMCH 
32.0 pgMCHC 34.0 g/dLRDW SD 44 RDW CV 12.70 %MPV 9.70 fLPLT 292 NRBC# 0.00 NRBC
% 0.0 %NEUT 62.70 %%LYMP 21.80 %%MONO 11.0 %%EOS 3.40 %%BASO 1.10 %#NEUT 3.55 #
LYMP 1.23 #MONO 0.62 #EOS 0.19 #BASO 0.06 MANUAL DIFF NOT IND GLUCOSE 98.0 mg/
dLSODIUM 135.0 mmol/LPOTASSIUM 4.30 mmol/LCHLORIDE 102.0 mmol/LCO2 22.0 mmol/
LBUN 10.0 mg/dLCREATININE 0.80 mg/dLSGOT/AST 34.0 IU/LSGPT/ALT 32.0 IU/LALK 
PHOS 95.0 IU/LTOTAL PROTEIN 7.70 g/dLALBUMIN 4.30 g/dLTOTAL BILI 0.80 mg/
dLCALCIUM 9.10 mg/dLAGE 68 GFR NonAA 72 GFR AA 87 eGFR 60 eGFR AA* 60 
TRIGLYCERIDES 108.0 mg/dLCHOLESTEROL 146.0 mg/dLHDL 56.0 mg/dLTOT CHOL/HDL 2.6 
LDL (CALC) 68.0 mg/dLTSH 0.90 uIU/mL

 

 2014 8:40 AM  WBC 4.4 RBC 4.13 HGB 13.20 g/dLHCT 38.90 %MCV 94.0 fLMCH 
32.0 pgMCHC 33.90 g/dLRDW SD 47 RDW CV 13.50 %MPV 9.80 fLPLT 279 NRBC# 0.00 NRBC
% 0.0 %NEUT 63.80 %%LYMP 24.60 %%MONO 9.30 %%EOS 1.60 %%BASO 0.70 %#NEUT 2.80 #
LYMP 1.08 #MONO 0.41 #EOS 0.07 #BASO 0.03 MANUAL DIFF NOT IND GLUCOSE 96.0 mg/
dLSODIUM 136.0 mmol/LPOTASSIUM 4.10 mmol/LCHLORIDE 99.0 mmol/LCO2 23.0 mmol/
LBUN 8.0 mg/dLCREATININE 0.80 mg/dLSGOT/AST 31.0 IU/LSGPT/ALT 27.0 IU/LALK PHOS 
85.0 IU/LTOTAL PROTEIN 7.60 g/dLALBUMIN 4.40 g/dLTOTAL BILI 0.80 mg/dLCALCIUM 
10.20 mg/dLAGE 69 GFR NonAA 71 GFR AA 86 eGFR 60 eGFR AA* 60 TRIGLYCERIDES 61.0 
mg/dLCHOLESTEROL 148.0 mg/dLHDL 71.0 mg/dLTOT CHOL/HDL 2.1 LDL (CALC) 65.0 mg/
dLTSH 0.990 uIU/mL

 

 2015 8:32 AM  WBC 4.8 RBC 3.98 HGB 12.70 g/dLHCT 37.30 %MCV 94.0 fLMCH 
31.90 pgMCHC 34.0 g/dLRDW SD 43 RDW CV 12.70 %MPV 9.50 fLPLT 270 NRBC# 0.00 NRBC
% 0.0 %NEUT 57.30 %%LYMP 25.0 %%MONO 13.0 %%EOS 3.60 %%BASO 1.10 %#NEUT 2.73 #
LYMP 1.19 #MONO 0.62 #EOS 0.17 #BASO 0.05 MANUAL DIFF NOT IND TSH 0.640 uIU/
mLGLUCOSE 90.0 mg/dLSODIUM 136.0 mmol/LPOTASSIUM 4.0 mmol/LCHLORIDE 101.0 mmol/
LCO2 24.0 mmol/LBUN 10.0 mg/dLCREATININE 0.80 mg/dLSGOT/AST 34.0 IU/LSGPT/ALT 
32.0 IU/LALK PHOS 92.0 IU/LTOTAL PROTEIN 7.50 g/dLALBUMIN 4.40 g/dLTOTAL BILI 
0.70 mg/dLCALCIUM 9.50 mg/dLAGE 69 GFR NonAA 71 GFR AA 86 eGFR >60 mL/min/1.73 
m2eGFR AA* >60 TRIGLYCERIDES 107.0 mg/dLCHOLESTEROL 138.0 mg/dLHDL 58.0 mg/
dLTOT CHOL/HDL 2.4 LDL (CALC) 59.0 mg/dL

 

 2015 8:31 AM  WBC 4.6 RBC 3.97 HGB 12.90 g/dLHCT 38.0 %MCV 96.0 fLMCH 
32.50 pgMCHC 33.90 g/dLRDW SD 44 RDW CV 12.60 %MPV 9.0 fLPLT 248 NRBC# 0.00 NRBC
% 0.0 %NEUT 61.0 %%LYMP 24.70 %%MONO 10.20 %%EOS 3.0 %%BASO 1.10 %#NEUT 2.82 #
LYMP 1.14 #MONO 0.47 #EOS 0.14 #BASO 0.05 MANUAL DIFF NOT IND TRIGLYCERIDES 
105.0 mg/dLCHOLESTEROL 141.0 mg/dLHDL 58.0 mg/dLTOT CHOL/HDL 2.4 LDL (CALC) 
62.0 mg/dLGLUCOSE 93.0 mg/dLSODIUM 136.0 mmol/LPOTASSIUM 4.10 mmol/LCHLORIDE 
101.0 mmol/LCO2 25.0 mmol/LBUN 9.0 mg/dLCREATININE 0.80 mg/dLSGOT/AST 32.0 IU/
LSGPT/ALT 28.0 IU/LALK PHOS 95.0 IU/LTOTAL PROTEIN 7.30 g/dLALBUMIN 4.50 g/
dLTOTAL BILI 0.80 mg/dLCALCIUM 9.70 mg/dLAGE 70 GFR NonAA 71 GFR AA 86 eGFR >60 
mL/min/1.73meGFR AA* >60 TSH 1.10 uIU/mL







History Of Immunizations







 Name  Date Admin  Mfg Name  Mfg Code  Trade Name  Lot#  Route  Inj  Vis Given  
Vis Pub  CVX

 

 Influenza  10/20/2010  [employer]of[employer] pasteur  [employer]  Fluzone  [id number]  Intramuscular  
Left Arm  10/20/2010  2010  999

 

 Influenza  10/28/2011  [employer]of[employer] pasteur  [employer]  Fluzone  [id number]  Intramuscular  
Left Arm  10/28/2011  2011  141

 

 Influenza  10/29/2012  sanofi pasteur  [employer]  Fluzone  [id number]  Intramuscular  
Left Deltoid  10/29/2012  2012  141

 

 X  12/10/2012  [employer]  MSD  Pneumovax 23  [id number]  Intramuscular  
Left Gluteous Medius  12/10/2012  2010  33

 

 Influenza  10/28/2013  sanofi pasteur  [employer]  Fluzone > 3 Years  [id number]  
Intramuscular  Right Deltoid  10/28/2013  2013  141

 

 X  9/2/2015  Not Entered  NE  Prevnar 13     Not Entered  Not Entered  1  109

 

 Influenza  2015  Not Entered  NE  Not Entered     Not Entered  Not Entered
  2015  141

 

 HepB Adult  2016  [employer]  MSD  Recombivax Adult  [id number]  Not 
Entered  Left Deltoid  2016  43

 

 HepB Adult  2016  [employer]  MSD  Recombivax Adult  [id number]  
Intramuscular  Right Deltoid  2016  43

 

 Zostavax  2012  Not Entered  NE  Not Entered     Not Entered  Not 
Entered  1  121

 

 Tdap  2012  Not Entered  NE  Not Entered     Not Entered  Not Entered  1  115

 

 Influenza  10/13/2016  Not Entered  NE  Fluzone High-Dose     Intramuscular  
Left Arm  1  141

 

 HepB Adult  2016  [employer]  MSD  Recombivax Adult  [id number]  
Intramuscular  Right Deltoid  2016  43

 

 Influenza  10/30/2017  Not Entered  NE  Fluarix Quadrivalent     Intramuscular
  Left Arm  2017  150







History of Past Illness







 Name  Date of Onset  Comments

 

 Benign Essential Hypertension  Dec 14 2009 10:10AM   

 

 Hyperlipidemia  Dec 14 2009 10:10AM   

 

 Hypothyroidism, Acquired  Dec 14 2009 10:10AM   

 

 hypothyroid      

 

 Hypertension      

 

 Hyperlipidemia      

 

 acid reflux      

 

 Hypertension, Benign Essential  2010  9:41AM   

 

 Hypertension, Benign Essential  2010 12:53PM   

 

 Routine gynecological examination  May  5 2010  9:28AM   

 

 Hypertension  May  5 2010  9:28AM   

 

 Hyperlipidemia, unspecified  May  5 2010  9:28AM   

 

 Hypothyroidism, Acquired  May  5 2010  9:28AM   

 

 Vulvovaginitis  May  5 2010  9:28AM   

 

 Rhinitis, Allergic  May  5 2010  9:28AM   

 

 Hypertension, Benign Essential  May 19 2010  8:48AM   

 

 Hypertension, Benign Essential  May 25 2010  9:39AM   

 

 Flu  Oct 20 2010 12:01PM   

 

 Essential Hypertension  2010  8:34AM   

 

 Hyperlipidemia  2010  8:34AM   

 

 Gastroesophageal Reflux  2010  8:34AM   

 

 Hypothyroidism, Acquired  2010  8:34AM   

 

 Hypertension, Benign Essential  2010  9:22AM   

 

 Hypertension, Benign Essential  Dec 15 2010  9:07AM   

 

 Essential Hypertension  2011  1:31PM   

 

 Hyperlipidemia  2011  1:31PM   

 

 Gastroesophageal Reflux  2011  1:31PM   

 

 Hypothyroidism, Acquired  2011  1:31PM   

 

 Essential Hypertension  2011  8:51AM   

 

 Hyperlipidemia  2011  8:51AM   

 

 Gastroesophageal Reflux  2011  8:51AM   

 

 Hypothyroidism, Acquired  2011  8:51AM   

 

 Essential Hypertension  2011  9:13AM   

 

 Hyperlipidemia  2011  9:13AM   

 

 Gastroesophageal Reflux  2011  9:13AM   

 

 Hypothyroidism, Acquired  2011  9:13AM   

 

 Nausea With Vomiting  2011  1:18PM   

 

 Hyponatremia  2011  8:46AM   

 

 Nausea  2011  9:13AM   

 

 Hypothyroidism  2011 11:10AM   

 

 Hyponatremia  2011 11:10AM   

 

 Essential Hypertension  2011 10:05AM   

 

 Hyperlipidemia  2011 10:05AM   

 

 Gastroesophageal Reflux  2011 10:05AM   

 

 Nausea  2011 10:05AM   

 

 Hypothyroidism, Acquired  2011 10:05AM   

 

 Hyponatremia  May  6 2011 11:34AM   

 

 Essential Hypertension  May 16 2011  8:50AM   

 

 Hyperlipidemia  May 16 2011  8:50AM   

 

 Gastroesophageal Reflux  May 16 2011  8:50AM   

 

 Nausea  May 16 2011  8:50AM   

 

 Hypothyroidism, Acquired  May 16 2011  8:50AM   

 

 Hyponatremia  May 16 2011  8:50AM   

 

 Routine gynecological examination  2011  8:54AM   

 

 Hypertension  2011  8:54AM   

 

 Hyperlipidemia, unspecified  2011  8:54AM   

 

 Hypothyroidism, Acquired  2011  8:54AM   

 

 Rhinitis, Allergic  2011  8:54AM   

 

 Hypothyroidism  Aug 29 2011 12:37PM   

 

 Hyponatremia  Aug 29 2011 12:37PM   

 

 Essential Hypertension  Sep 12 2011  8:53AM   

 

 Hyperlipidemia  Sep 12 2011  8:53AM   

 

 Gastroesophageal Reflux  Sep 12 2011  8:53AM   

 

 Hypothyroidism, Acquired  Sep 12 2011  8:53AM   

 

 Hyponatremia  Sep 12 2011  8:53AM   

 

 Hypertension  Sep 29 2011  9:41AM   

 

 Hyperlipidemia  Dec  8 2011  8:31AM   

 

 Hypothyroidism  Dec  8 2011  8:31AM   

 

 Hypertension  Dec  8 2011  8:31AM   

 

 Flu  Dec  9 2011 10:02AM   

 

 Essential Hypertension  Dec 13 2011 10:13AM   

 

 Hyperlipidemia  Dec 13 2011 10:13AM   

 

 Gastroesophageal Reflux  Dec 13 2011 10:13AM   

 

 Hypothyroidism, Acquired  Dec 13 2011 10:13AM   

 

 Hyponatremia  Dec 13 2011 10:13AM   

 

 Vaginal Discharge  2012  9:43AM   

 

 Rhinitis, Allergic  Feb 15 2012  9:31AM   

 

 Eustachian Tube Dysfunction  Feb 15 2012  9:31AM   

 

 Pharyngitis, Acute  Feb 15 2012  9:31AM   

 

 Routine gynecological examination  2012  8:48AM   

 

 Hypertension  2012  8:48AM   

 

 Hyperlipidemia, unspecified  2012  8:48AM   

 

 Hypothyroidism, Acquired  2012  8:48AM   

 

 Rhinitis, Allergic  2012  8:48AM   

 

 Candidiasis, Vulvovaginal  2012 11:04AM   

 

 Essential Hypertension  Aug 15 2012  9:02AM   

 

 Hyperlipidemia  Aug 15 2012  9:02AM   

 

 Gastroesophageal Reflux  Aug 15 2012  9:02AM   

 

 Hypothyroidism, Acquired  Aug 15 2012  9:02AM   

 

 Hyponatremia  Aug 15 2012  9:02AM   

 

 Atrophic Vaginitis, Postmenopausal  Aug 15 2012  9:02AM   

 

 Flu  Oct 29 2012  9:24AM   

 

 Essential Hypertension  Dec 10 2012  8:35AM   

 

 Hyperlipidemia  Dec 10 2012  8:35AM   

 

 Gastroesophageal Reflux  Dec 10 2012  8:35AM   

 

 Hypothyroidism, Acquired  Dec 10 2012  8:35AM   

 

 Hyponatremia  Dec 10 2012  8:35AM   

 

 Atrophic Vaginitis, Postmenopausal  Dec 10 2012  8:35AM   

 

 Pneumococcus  Dec 10 2012  2:07PM   

 

 Vaginal Discharge  Dec 20 2012  1:50PM   

 

 Essential Hypertension  Dec 20 2012  1:50PM   

 

 Hyperlipidemia  Dec 20 2012  1:50PM   

 

 Gastroesophageal Reflux  Dec 20 2012  1:50PM   

 

 Hypothyroidism, Acquired  Dec 20 2012  1:50PM   

 

 Atrophic Vaginitis, Postmenopausal  Dec 20 2012  1:50PM   

 

 Upper Respiratory Infections  2013  8:52AM   

 

 Hyperlipidemia  2013  8:21AM   

 

 Hypertension  2013  8:21AM   

 

 Hypothyroidism  2013  8:21AM   

 

 Screening Mammogram  Beto 10 2013  8:45AM   

 

 Routine gynecological examination  Beto 10 2013  8:34AM   

 

 Hypertension  Beto 10 2013  8:34AM   

 

 Hyperlipidemia, unspecified  Beto 10 2013  8:34AM   

 

 Hypothyroidism, Acquired  Beto 10 2013  8:34AM   

 

 Rhinitis, Allergic  Beto 10 2013  8:34AM   

 

 Flu  Oct 28 2013  8:52AM   

 

 Essential Hypertension  Dec  9 2013  8:37AM   

 

 Hyperlipidemia  Dec  9 2013  8:37AM   

 

 Gastroesophageal Reflux  Dec  9 2013  8:37AM   

 

 Hypothyroidism, Acquired  Dec  9 2013  8:37AM   

 

 Atrophic Vaginitis, Postmenopausal  Dec  9 2013  8:37AM   

 

 Hypertension  2014  9:56AM   

 

 Hyperlipidemia  2014  9:56AM   

 

 Hypothyroidism  2014  9:56AM   

 

 Screening Mammogram  2014  8:32AM   

 

 Osteopenia  2014  8:32AM   

 

 Hypertension  2014  8:35AM   

 

 Hypothyroidism, Acquired  2014  8:35AM   

 

 Hyperlipidemia  2014  8:35AM   

 

 Upper Respiratory Infections  2014  9:28AM   

 

 Sinusitis, Acute  Oct  2 2014  9:17AM   

 

 Herpes Zoster  Oct  5 2014  1:44PM   

 

 Vesicular Eruption  Oct  5 2014  1:44PM   

 

 Hypertension  2014  8:18AM   

 

 Hyperlipidemia  2014  8:18AM   

 

 hypothyroid  2014  8:18AM   

 

 Situational anxiety  Dec 20 2014  9:33AM   

 

 GERD (gastroesophageal reflux disease)  Dec 20 2014  9:33AM   

 

 Nausea  Dec 20 2014  9:33AM   

 

 Abdominal Pain, Epigastric  Dec 20 2014  9:33AM   

 

 Hyperlipidemia  Oct  6 2014  9:03AM   

 

 acid reflux  Oct  6 2014  9:03AM   

 

 hypothyroid  Oct  6 2014  9:03AM   

 

 Shingles  Oct  6 2014  9:03AM   

 

 Pharyngitis  2015  1:09PM   

 

 Bronchitis  2015  9:10AM   

 

 Hyperlipidemia  2015  9:10AM   

 

 acid reflux  2015  9:10AM   

 

 hypothyroid  2015  9:10AM   

 

 Hyperlipidemia  2015  8:18AM   

 

 Hypertension  2015  8:18AM   

 

 hypothyroid  2015  8:18AM   

 

 Screening Mammogram  2015 11:43AM   

 

 Medicare Annual Pap Q 2 years  2015  9:36AM   

 

 Screening Mammogram  2015  9:36AM   

 

 Candidiasis of female genitalia  2015  9:36AM   

 

 Hypertension  2015 11:34AM   

 

 Hyperlipidemia, unspecified  2015 11:34AM   

 

 Hypothyroidism, Acquired  2015 11:34AM   

 

 Osteopenia  2016  8:41AM   

 

 Encounter for screening mammogram for breast cancer  2016  8:41AM   

 

 Hypertension  2016  8:34AM   

 

 Lymphedema  2016  8:34AM   

 

 Hyperlipidemia  2016  8:34AM   

 

 hypothyroid  2016  8:34AM   

 

 HEP B  2016  9:13AM   

 

 HEP B  2016  8:52AM   

 

 Acid Reflux  2016  8:34AM   

 

 History of acute gastritis  Oct 13 2016  2:30PM   

 

 Anxiety as acute reaction to exceptional stress  Oct 13 2016  2:30PM   

 

 HEP B  Dec 29 2016  8:42AM   

 

 Caregiver stress syndrome  May 26 2017  8:28AM   

 

 Anxiety  May 26 2017  8:28AM   

 

 Blood in stool  2017  2:54PM   

 

 Upper GI bleed  2017 11:34AM   

 

 Hyponatremia  2017  9:03AM   

 

 Hyponatremia  2017  8:43AM   

 

 Medicare Annual Pap Q 2 years  2017  9:03AM   

 

 Nausea  2017  9:03AM   

 

 Uterine enlargement  2017  9:03AM   

 

 Encounter for screening mammogram for breast cancer  2017  4:56PM   

 

 Panic disorder [episodic paroxysmal anxiety]  Oct 10 2017  2:02PM   

 

 Acute stress reaction  Oct 10 2017  2:02PM   

 

 Caregiver stress syndrome  Oct 10 2017  2:02PM   

 

 Stress reaction causing mixed disturbance of emotion and conduct  Oct 18 2017  
9:05AM   

 

 Ankle strain, left, initial encounter  Dec 19 2017  8:29AM   

 

 Excoriation of ear canal, left, initial encounter  Dec 19 2017  8:29AM   

 

 Generalized anxiety disorder  Dec 19 2017  8:29AM   

 

 Grief reaction  Dec 19 2017  8:29AM   

 

 Acute non-recurrent frontal sinusitis  2018 11:58AM   

 

 Cough  2018 11:58AM   







Payers







 Insurance Name  Company Name  Plan Name  Plan Number  Policy Number  Policy 
Group Number  Start Date

 

    Medicare [facility]  Medicare [facility]     [id number]     N/A

 

    [facility]     [id number]     2009

 

    Medicare Part B  [employer] [place]     [id number]     N/A

 

    Medicare Part A  Medicare Part A     [id number]     N/A

 

    Medicare Part A  [employer] - Preventive     [id number]     N/A

 

    Medicare Part A  Medicare - Lab/Xray     [id number]     N/A







History of Encounters







 Visit Date  Visit Type  Provider

 

 2018  Office visit  Deedee Carter APRN

 

 2017  Office visit  Doris Noriega MD

 

 10/18/2017  Office visit  Doris Noriega MD

 

 10/10/2017  Office visit  Doris Noriega MD

 

 2017  Office visit  Doris Noriega MD

 

 2017  Office visit  Doris Noriega MD

 

 2017  Office visit  Doris Noriega MD

 

 2017  Office visit  Prince Noe APRN

 

 2016  Nurse visit  Doris Noriega MD

 

 10/13/2016  Office visit  Doris Noriega MD

 

 2016  Nurse visit  Doris Noriega MD

 

 2016  Office visit  Doris Noriega MD

 

 2015  Office visit  Doris Noriega MD

 

 2015  Office visit  Doris Noriega MD

 

 2015  Office visit  Prince Noe APRN

 

 2014  Office visit  Anyi Herrera APRN

 

 10/27/2014  Voided  Doris Noriega MD

 

 10/6/2014  Office visit  Doris Noriega MD

 

 10/5/2014  Office visit  Anyi Herrera APRN

 

 10/2/2014  Office visit   

 

 10/2/2014  Office visit  Corrine Bay APRN

 

 2014  Office visit  Kassie Franklin APRN

 

 2014  Office visit  Doris Noriega MD

 

 2014  Office visit  Doris Noriega MD

 

 2013  Office visit  Dee Colindres MD

 

 10/28/2013  Nurse visit  Dee Colindres MD

 

 6/10/2013  Office visit  Dee Colindres MD

 

 2013  Office visit  Corrine Bay APRN

 

 2012  Office visit  Dee Colindres MD

 

 12/10/2012  Office visit  Dee Colindres MD

 

 10/29/2012  Nurse visit  Dee Colindres MD

 

 8/15/2012  Office visit  Dee Colindres MD

 

 2012  Office visit  Corrine Bay APRN

 

 2012  Office visit  Dee Colindres MD

 

 2/15/2012  Office visit  Dee Colindres MD

 

 2012  Office visit  Corrine Bay APRN

 

 2011  Office visit  Dee Colindres MD

 

 10/28/2011  Nurse visit  Shad Nolasco MD

 

 2011  Office visit  Dee Colindres MD

 

 2011  Office visit  Dee Colindres MD

 

 2011  Office visit  Dee Colindres MD

 

 2011  Office visit  Dee Colindres MD

 

 2011  Office visit  Dee Colindres MD

 

 2011  Office visit  Dee Colindres MD

 

 4/10/2011  [facility]  KHRIS Reeves MD

 

 4/10/2011  [facility]  RICKEY Toussaint MD

 

 2011  Office visit  RICKEY Toussaint MD

 

 2011  [facility]  Fide Castellanos MD

 

 2011  Voided  Fide Castellanos MD

 

 2011  Office visit  Dee Colindres MD

 

 12/15/2010  Nurse visit  Dee Colindres MD

 

 2010  Office visit  Dee Colindres MD

 

 10/20/2010  Nurse visit  Stephenie PERAZA

 

 2010  Nurse visit  Dee Colindres MD

 

 2010  Nurse visit  Dee Colindres MD

 

 2010  Office visit  Dee Colindres MD

 

 3/19/2010  Voided  Stephenie PERAZA

 

 2010  Nurse visit  Stephenie PERAZA

 

 2010  Nurse visit  Stephenie PERAZA

 

 2010  Nurse visit  Stephenie PERAZA

 

 2009  Office visit  Stephenie PERAZA

 

 10/20/2009  Nurse visit  Stephenie Kay PA

## 2018-10-22 NOTE — XMS REPORT
MU2 Ambulatory Summary

 Created on: 2017



Milady Crespo

External Reference #: [id number]

: 1945

Sex: Female



Demographics







 Address  [address]

[place]  [address]

 

 Home Phone  [phone]

 

 Preferred Language  English

 

 Marital Status  

 

 Worship Affiliation  Unknown

 

 Race  White

 

 Ethnic Group  Not  or 





Author







 Author  Doris Noriega

 

 Organization  [facility]

 

 Address  [address]

[place], [place]  [id number]



 

 Phone  [phone]







Care Team Providers







 Care Team Member Name  Role  Phone

 

 Doris Noriega  PCP  [phone]

 

 Doris Noriega  PreferredProvider  [phone]







Allergies and Adverse Reactions







 Name  Reaction  Notes

 

 NO KNOWN DRUG ALLERGIES      







Plan of Treatment







 Planned Activity  Comments  Planned Date  Planned Time  Plan/Goal

 

 Complete blood count (CBC) with differential count     2016  12:00 AM   

 

 Comprehensive metabolic panel     2016  12:00 AM   

 

 VITAMIN D (25 HYDROXY)     2016  12:00 AM   

 

 Lipid panel (total cholesterol, lipoproteins, HDL, triglycerides)     8/15/
2012  12:00 AM   

 

 Pap smear     2017  12:00 AM   

 

 Comprehensive Metabolic Panel     6/10/2013  12:00 AM   

 

 Lipid panel (total cholesterol, lipoproteins, HDL, triglycerides)     6/10/
2013  12:00 AM   

 

 Thyroid stimulating hormone (TSH)     6/10/2013  12:00 AM   

 

 CBC (automated H&H, platelets, WBC and automated differential)     6/10/2013  
12:00 AM   

 

 Comprehensive Metabolic Panel     2012  12:00 AM   

 

 Lipid panel (total cholesterol, lipoproteins, HDL, triglycerides)     2012  12:00 AM   

 

 Thyroid stimulating hormone (TSH)     2012  12:00 AM   

 

 CBC (automated H&H, platelets, WBC and automated differential)     2012  
12:00 AM   

 

 Screening mammography, bilateral     2015  12:00 AM   







Medications







 Active 

 

 Name  Start Date  Estimated Completion Date  SIG  Comments

 

 aspirin 81 mg oral tablet,delayed release (DR/EC)        take 1 tablet (81 mg) 
by oral route once daily   

 

 Vitamin D3 1,000 unit oral capsule        take 1 capsule by oral route daily   

 

 famotidine 20 mg oral tablet  2015     TAKE ONE TABLET BY MOUTH TWICE 
DAILY   

 

 valsartan 160 mg oral tablet  2016     TAKE ONE TABLET BY MOUTH ONCE 
DAILY   

 

 amlodipine 5 mg oral tablet  2016     TAKE ONE TABLET BY MOUTH ONCE DAILY
   

 

 Allergy Relief (loratadine) 10 mg oral tablet  2016     TAKE ONE TABLET 
BY MOUTH ONCE DAILY   

 

 amlodipine 5 mg oral tablet  2016     TAKE ONE TABLET BY MOUTH ONCE DAILY
   

 

 Allergy Relief (loratadine) 10 mg oral tablet  2016     TAKE ONE TABLET 
BY MOUTH ONCE DAILY   

 

 valsartan 160 mg oral tablet  2016     TAKE ONE TABLET BY MOUTH ONCE 
DAILY   

 

 Lipitor 20 mg oral tablet  10/13/2016  2018  take 1 tablet (20 mg) by oral 
route once daily   

 

 Plavix 75 mg oral tablet  10/13/2016  2018  take 1 tablet (75 mg) by oral 
route once daily   

 

 Zofran (as hydrochloride) 4 mg oral tablet  2016     take 1 tablet by 
oral route daily as needed for 14 days   

 

 Zofran (as hydrochloride) 4 mg oral tablet  2017     take 1 tablet by oral 
route daily as needed for 14 days   

 

 Zofran (as hydrochloride) 4 mg oral tablet  2017     take 1 tablet by oral 
route daily as needed for 14 days   

 

 Zofran (as hydrochloride) 4 mg oral tablet  2017     take 1 tablet by 
oral route daily as needed for 14 days   

 

 Zofran (as hydrochloride) 4 mg oral tablet  2017     take 1 tablet by 
oral route daily as needed for 14 days   

 

 triamcinolone acetonide 0.1 % topical cream  2017     APPLY A THIN LAYER 
OF CREAM EXTERNALLY TO AFFECTED AREA TWICE DAILY FOR 14 DAYS   

 

 loratadine 10 mg oral tablet  2017  TAKE 1 TABLET BY MOUTH 
EVERY DAY IN THE EVENING   

 

 triamcinolone acetonide 0.1 % topical cream  2017     APPLY  CREAM 
EXTERNALLY TO AFFECTED AREA TWICE DAILY FOR 14 DAYS   

 

 EQ LORATADINE 10MG  TABS  2017     TAKE ONE TABLET BY MOUTH ONCE DAILY   

 

 Lexapro 10 mg oral tablet  2017     take 1 tablet (10 mg) by oral route 
once daily for 90 days   

 

 pantoprazole 40 mg oral tablet,delayed release (DR/EC)  10/9/2017     take 1 
tablet (40 mg) by oral route once daily for 90 days   

 

 mirtazapine 15 mg oral tablet  10/10/2017  2017  take 0.5 tablet by oral 
route once a day (at bedtime) for 30 days   

 

 clorazepate dipotassium 7.5 mg oral tablet  10/10/2017  2017  take 1/2 
to1 tablet PO up to three times per day for situational anxiety   

 

 ondansetron 4 mg oral tablet,disintegrating  10/10/2017  2017  dissolve  
1 tablet by oral route every 8 hours as needed for 30 days   









  

 

 Name  Start Date  Expiration Date  SIG  Comments

 

 prednisone 20 mg oral tablet  2011  take 2 tablets by oral 
route daily for 5 days   

 

 calcium carbonate-vitamin D2 600-125 mg-unit oral tablet  8/15/2012  2012
  take 2 tablets by oral route daily   

 

 Riesel 3 Fish Oil 900-1,400 mg oral capsule,delayed release(DR/EC)  8/15/2012  9
/  take 1 capsule by oral route daily   

 

 multivitamin Oral tablet  8/15/2012  2012  take 1 tablet by oral route 
daily   

 

 triamcinolone acetonide 0.1 % topical cream  2013  10/7/2013  APPLY A 
THIN LAYER TO THE AFFECTED AREA(S) BY TOPICAL ROUTE TWICE A DAY   

 

 famotidine 20 mg oral tablet  2014  take 1 tablet (20 mg) by 
oral route 2 times per day   

 

 amoxicillin 500 mg oral capsule  2014  take 1 capsule (500 mg) 
by oral route 3 times per day for 10 days   

 

 Zithromax Z-Tab 250 mg oral tablet  10/2/2014  10/7/2014  take 2 tablets (500 
mg) by oral route once daily for 1 day then 1 tablet (250 mg) by oral route 
once daily for 4 days   

 

 acyclovir 800 mg oral tablet  10/5/2014  10/12/2014  take 1 tablet (800 mg) by 
oral route 5 times per day for 7 days   

 

 gabapentin 300 mg oral capsule  10/9/2014  2014  take 1 capsule (300 mg) 
by oral route 3 times per day for 14 days   

 

 Carafate 100 mg/mL oral suspension  2014  take 10 milliliters 
(1 gram) by oral route 4 times per day on an empty stomach 1 hour before meals 
and at bedtime for 4 weeks   

 

 amlodipine 5 mg oral tablet  2015  TAKE ONE TABLET BY MOUTH 
EVERY DAY   

 

 levothyroxine 50 mcg oral tablet  2015  TAKE ONE TABLET BY MOUTH 
EVERY DAY   

 

 Diovan 160 mg oral tablet  2015  2/15/2016  TAKE ONE TABLET BY MOUTH 
EVERY DAY for 90 days   

 

 triamcinolone acetonide 0.1 % topical cream  2015  apply a thin 
layer to the affected area(s) by topical route 2 times per day for 14 days   

 

 fluconazole 150 mg oral tablet  2015  take 1 tablet (150 mg) 
by oral route once for 1 day   

 

 amlodipine 5 mg oral tablet  10/13/2016  10/8/2017  TAKE ONE TABLET BY MOUTH 
ONCE DAILY for 90 days   

 

 atenolol 50 mg oral tablet  10/13/2016  10/8/2017  take 1 tablet (50 mg) by 
oral route once daily   

 

 valsartan 160 mg oral tablet  10/13/2016  10/8/2017  TAKE ONE TABLET BY MOUTH 
ONCE DAILY for 90 days   

 

 Zofran (as hydrochloride) 4 mg oral tablet  10/13/2016  10/27/2016  take 1 
tablet by oral route daily as needed for 14 days   

 

 Zofran (as hydrochloride) 4 mg oral tablet  2017     take 1 tablet by 
oral route daily as needed for 14 days   

 

 levothyroxine 50 mcg oral tablet  10/9/2017  10/9/2017  TAKE ONE TABLET BY 
MOUTH ONCE DAILY   









 Discontinued 

 

 Name  Start Date  Discontinued Date  SIG  Comments

 

 Lipitor 40 mg oral tablet     2009 TAB DAILY   

 

 ramipril 5 mg oral capsule     2009  take 1 capsule (5 mg) by oral route 
once daily   

 

 lovastatin 20 mg oral tablet  2009  take 1 tablet (20 mg) by 
oral route once daily with evening meal   

 

 propranolol 20 mg oral tablet  2010  take 1 tablet by oral 
route 2 times a day   

 

 Fosamax 70 mg oral tablet  2010  take 1 tablet (70 mg) by oral 
route once weekly in the morning, at least 30 minutes before the first food, 
beverage, or medication of the day   

 

 lisinopril 40 mg oral tablet  2010  4/10/2011  take 2 tablets by oral 
route daily   

 

 Zoloft 50 mg oral tablet  2011  take 1 tablet (50 mg) by oral 
route once daily   

 

 promethazine 25 mg oral tablet  2011  take 1 tablet by oral 
route every 6 hours as needed   

 

 Diovan -12.5 mg oral tablet  2011  take 1 tablet by oral 
route once daily for 30 days   

 

 Diflucan 150 mg oral tablet     2012  take 1 tablet (150 mg) by oral 
route once for 1 day   

 

 Diflucan 150 mg oral tablet  2012  8/15/2012  take 1 tablet (150 mg) by 
oral route once   

 

 Vitamin B-12 1,000 mcg oral tablet  8/15/2012  2014  take 1 tablet by 
oral route daily   

 

 Premarin 0.625 mg/gram vaginal cream  10/29/2012  6/10/2013  use 1 gram daily 
for a week then 1 gram twice a week   

 

 Medrol (Tab) 4 mg oral tablets,dose pack  2013  6/10/2013  take as 
directed   

 

 aspirin 325 mg oral tablet  2014  take 1 tablet (325 mg) by 
oral route once daily   

 

 Medrol (Tab) 4 mg oral tablets,dose pack  2014  10/2/2014  take as 
directed   

 

 Tetracaine Lollipops Lollipop  2015  Use as directed   

 

 Zoloft 50 mg oral tablet  2016  take 1 tablet (50 mg) by oral 
route once daily for 90 days   

 

 Zoloft 50 mg oral tablet  2016  take 1 tablet (50 mg) by oral 
route once daily for 90 days  Pt refuses to take...







Problem List







 Description  Status  Onset

 

 Hyperlipidemia  Active   

 

 acid reflux  Active   

 

 Hypertension  Active   

 

 hypothyroid  Active   







Vital Signs







 Date  Time  BP-Sys(mm[Hg]  BP-Morena(mm[Hg])  HR(bpm)  RR(rpm)  Temp  WT  HT  HC  
BMI  BSA  BMI Percentile  O2 Sat(%)

 

 10/18/2017  9:01:00 AM  126 mmHg  80 mmHg  73 bpm  16 rpm  97.9 F  122.375 lbs
  61 in     23.12 kg/m2  1.55 m2     100 %

 

 10/10/2017  1:52:00 PM  118 mmHg  72 mmHg  74 bpm  16 rpm  98.1 F  121 lbs  61 
in     22.8625 kg/m  1.5369 m     99 %

 

 2017  8:40:00 AM  118 mmHg  68 mmHg  63 bpm  16 rpm  98.1 F  123.125 lbs  
61 in     23.26 kg/m2  1.55 m2     100 %

 

 2017  8:57:00 AM  116 mmHg  62 mmHg  71 bpm  16 rpm  99.8 F  121.5 lbs  
61 in     22.957 kg/m  1.5401 m     98 %

 

 2017  11:30:00 AM  128 mmHg  78 mmHg  69 bpm  16 rpm  98.8 F  129.375 lbs  
61 in     24.44 kg/m2  1.59 m2     98 %

 

 2017  8:22:00 AM  118 mmHg  78 mmHg  62 bpm  18 rpm  97.5 F  129.125 lbs  
61 in     24.3977 kg/m  1.5877 m     100 %

 

 10/13/2016  2:26:00 PM  128 mmHg  78 mmHg  77 bpm  16 rpm  98.4 F  139.25 lbs  
61 in     26.31 kg/m2  1.65 m2     100 %

 

 2016  8:29:00 AM  122 mmHg  70 mmHg  72 bpm  16 rpm  97.8 F  137.125 lbs  
61 in     25.9093 kg/m  1.6361 m     100 %

 

 2015  9:33:00 AM  120 mmHg  68 mmHg  73 bpm     98.7 F  144.375 lbs  61 
in     27.28 kg/m2  1.68 m2     99 %

 

 2015  9:05:00 AM  110 mmHg  75 mmHg  85 bpm  16 rpm  96.7 F  144.375 lbs  
61 in     27.2791 kg/m  1.6788 m     99 %

 

 2015  1:05:00 PM  108 mmHg  62 mmHg  78 bpm  18 rpm  96.9 F  142.375 lbs  
61 in     26.90 kg/m2  1.67 m2     100 %

 

 2014  9:31:00 AM  126 mmHg  66 mmHg  79 bpm  18 rpm  98.7 F  149 lbs  61 
in     28.153 kg/m  1.7055 m     98 %

 

 10/6/2014  9:02:00 AM  110 mmHg  74 mmHg  94 bpm  18 rpm  98.1 F  145.4 lbs  
61 in     27.47 kg/m2  1.68 m2     97 %

 

 10/2/2014  9:14:00 AM  124 mmHg  74 mmHg  79 bpm  18 rpm  98 F  147.25 lbs  61 
in     27.8224 kg/m  1.6955 m     98 %

 

 2014  9:22:00 AM  124 mmHg  76 mmHg  89 bpm  18 rpm  98.1 F  148 lbs  61 
in     27.96 kg/m2  1.70 m2     100 %

 

 2014  8:27:00 AM  118 mmHg  65 mmHg  74 bpm  16 rpm  97.7 F  148 lbs  61 
in     27.9641 kg/m  1.6998 m     99 %

 

 2014  9:48:00 AM  125 mmHg  70 mmHg  93 bpm  18 rpm  96.7 F  156 lbs  61 
in     29.48 kg/m2  1.75 m2     100 %

 

 2013  8:35:00 AM  122 mmHg  74 mmHg  84 bpm  16 rpm  97.9 F  155.375 lbs 
                99 %

 

 6/10/2013  8:30:00 AM  130 mmHg  82 mmHg  79 bpm  16 rpm  97.9 F  161 lbs     
            98 %

 

 2013  8:50:00 AM  124 mmHg  68 mmHg  66 bpm  18 rpm  97.6 F  157.5 lbs  
61 in     29.7591 kg/m  1.7535 m      

 

 2012  1:46:00 PM  120 mmHg  72 mmHg  75 bpm  16 rpm  97.6 F  156.125 lbs
                 98 %

 

 12/10/2012  8:32:00 AM  122 mmHg  82 mmHg  70 bpm  16 rpm  97.3 F  157 lbs    
             99 %

 

 8/15/2012  9:00:00 AM  130 mmHg  76 mmHg  86 bpm  16 rpm  97.4 F  159 lbs     
            100 %

 

 2012  11:02:00 AM  128 mmHg  64 mmHg  66 bpm  18 rpm  97.4 F  162.125 lbs
  61 in     30.6329 kg/m  1.7791 m      

 

 2012  8:45:00 AM  130 mmHg  70 mmHg  82 bpm  16 rpm  98.2 F  160.125 lbs 
                100 %

 

 2/15/2012  9:29:00 AM  122 mmHg  80 mmHg  86 bpm  16 rpm  97.4 F  159.375 lbs  
61 in     30.1133 kg/m  1.7639 m     99 %

 

 2012  9:41:00 AM  132 mmHg  74 mmHg  66 bpm  18 rpm  98.4 F  160.375 lbs  
61 in     30.30 kg/m2  1.77 m2      

 

 2011  10:08:00 AM  134 mmHg  82 mmHg  80 bpm  16 rpm  98 F  160 lbs  61 
in     30.2314 kg/m  1.7674 m     99 %

 

 2011  3:56:00 PM  130 mmHg  80 mmHg                              

 

 2011  8:55:00 AM  130 mmHg  74 mmHg  88 bpm  16 rpm  98.2 F  158.25 lbs  
               98 %

 

 2011  8:54:00 AM  136 mmHg  82 mmHg  102 bpm  16 rpm  98.1 F  153.5 lbs   
              99 %

 

 2011  8:49:00 AM  122 mmHg  88 mmHg  88 bpm  16 rpm  98.6 F  152.25 lbs  
               99 %

 

 2011  10:02:00 AM  140 mmHg  82 mmHg  96 bpm  20 rpm  98.8 F             
       100 %

 

 2011  9:14:00 AM  156 mmHg  98 mmHg  110 bpm  24 rpm  98.5 F  153 lbs    
             100 %

 

 2011  8:50:00 AM  160 mmHg  84 mmHg  100 bpm  16 rpm  98.7 F  155 lbs    
             100 %

 

 2011  1:25:00 PM  152 mmHg  100 mmHg  82 bpm  16 rpm  97.2 F  156.375 lbs 
                100 %

 

 2011  9:55:00 AM  144 mmHg  90 mmHg                              

 

 2010  9:24:00 AM  138 mmHg  84 mmHg                              

 

 2010  8:58:00 AM  160 mmHg  94 mmHg                              

 

 2010  8:33:00 AM  142 mmHg  90 mmHg  100 bpm  16 rpm  98.1 F  158.375 
lbs                  

 

 2010  9:24:00 AM  160 mmHg  102 mmHg  88 bpm  18 rpm  99 F  157.125 lbs  
62 in     28.7382 kg/m  1.7657 m      

 

 3/19/2010  11:01:00 AM  140 mmHg  90 mmHg                              

 

 2009  10:08:00 AM  142 mmHg  86 mmHg  72 bpm  16 rpm  98.1 F  154.125 
lbs  61 in     29.12 kg/m2  1.73 m2      







Social History







 Name  Description  Comments

 

       

 

 Children      

 

 lives alone      

 

 Supervisor      

 

 Tobacco  Never smoker   







History of Procedures







 Date Ordered  Description  Order Status

 

 2011 12:00 AM  COMPREHEN METABOLIC PANEL  Reviewed

 

 2011 12:00 AM  ASSAY THYROID STIM HORMONE  Reviewed

 

 2011 12:00 AM  COMPREHEN METABOLIC PANEL  Reviewed

 

 2011 12:00 AM  LIPID PANEL  Reviewed

 

 2011 12:00 AM  ASSAY THYROID STIM HORMONE  Reviewed

 

 2011 12:00 AM  COMPLETE CBC W/AUTO DIFF WBC  Reviewed

 

 2015 12:00 AM  COMPLETE CBC W/AUTO DIFF WBC  Reviewed

 

 2015 12:00 AM  COMPREHEN METABOLIC PANEL  Reviewed

 

 2015 12:00 AM  LIPID PANEL  Reviewed

 

 2015 12:00 AM  ASSAY THYROID STIM HORMONE  Reviewed

 

 2011 12:00 AM  COMPREHEN METABOLIC PANEL  Reviewed

 

 2011 12:00 AM  COMPREHEN METABOLIC PANEL  Reviewed

 

 2011 12:00 AM  LIPID PANEL  Reviewed

 

 2011 12:00 AM  ASSAY THYROID STIM HORMONE  Reviewed

 

 10/28/2011 12:00 AM  ***Immunization administration, Medicare flu  Reviewed

 

 10/28/2011 12:00 AM  Fluzone ** MEDICARE Only **  Reviewed

 

 2010 11:24 AM  NO CHARGE OV  Reviewed

 

 2010 11:26 AM  NO CHARGE OV  Reviewed

 

 2011 12:00 AM  COMPREHEN METABOLIC PANEL  Reviewed

 

 2011 12:00 AM  LIPID PANEL  Reviewed

 

 2011 12:00 AM  ASSAY THYROID STIM HORMONE  Reviewed

 

 2011 12:00 AM  COMPLETE CBC W/AUTO DIFF WBC  Reviewed

 

 2011 12:00 AM  Wrist Support  Reviewed

 

 2016 12:00 AM  Screening mammography, bilateral  Reviewed

 

 2016 12:00 AM  DXA BONE DENSITY AXIAL  Returned

 

 2016 12:00 AM  TETANUS VACCINE IM  Reviewed

 

 2016 12:00 AM  HEP B VACC ADULT 3 DOSE IM  Reviewed

 

 2012 12:00 AM  TISSUE EXAM FOR FUNGI  Reviewed

 

 2012 12:00 AM  SMEAR WET MOUNT SALINE/INK  Reviewed

 

 2016 12:00 AM  TETANUS VACCINE IM  Reviewed

 

 2016 12:00 AM  HEP B VACC ADULT 3 DOSE IM  Reviewed

 

 2/15/2012 12:00 AM  THER/PROPH/DIAG INJ SC/IM  Reviewed

 

 2/15/2012 12:00 AM  Bicillin CR [facility]#56037306527[facility]  Reviewed

 

 2/15/2012 12:00 AM  Depo-Medrol 40 mg NDC#9457801612  Reviewed

 

 10/13/2016 12:00 AM  COMPLETE CBC W/AUTO DIFF WBC  Returned

 

 10/13/2016 12:00 AM  COMPREHEN METABOLIC PANEL  Returned

 

 10/13/2016 12:00 AM  ASSAY THYROID STIM HORMONE  Returned

 

 10/13/2016 12:00 AM  LIPID PANEL  Returned

 

 2016 12:00 AM  TETANUS VACCINE IM  Reviewed

 

 2016 12:00 AM  HEP B VACC ADULT 3 DOSE IM  Reviewed

 

 2017 12:00 AM  ASSAY OF IRON  Returned

 

 2017 12:00 AM  IRON BINDING TEST  Returned

 

 2017 12:00 AM  ASSAY OF TRANSFERRIN  Returned

 

 2017 12:00 AM  OCCULT BLD FECES 1-3 TESTS  Returned

 

 2017 12:00 AM  COMPLETE CBC AUTOMATED  Returned

 

 8/15/2012 12:00 AM  COMPREHEN METABOLIC PANEL  Reviewed

 

 8/15/2012 12:00 AM  ASSAY THYROID STIM HORMONE  Reviewed

 

 8/15/2012 12:00 AM  COMPLETE CBC W/AUTO DIFF WBC  Reviewed

 

 8/15/2012 12:00 AM  Wrist Support  Reviewed

 

 2017 12:00 AM  METABOLIC PANEL TOTAL CA  Returned

 

 2017 12:00 AM  METABOLIC PANEL TOTAL CA  Returned

 

 2017 12:00 AM  Screening mammography, bilateral  Returned

 

 10/29/2012 12:00 AM  Flu Injection 3 Years And Above NDC# [address]-86  [facility]  
Reviewed

 

 12/10/2012 12:00 AM  IMMUNIZATION ADMIN  Reviewed

 

 12/10/2012 12:00 AM  Pneumovax Injection - C  Reviewed

 

 2012 12:00 AM  TRICHOMONAS ASSAY W/OPTIC  Reviewed

 

 2013 12:00 AM  THER/PROPH/DIAG INJ SC/IM  Reviewed

 

 2013 12:00 AM  Bicillin CR, 1.2 million units NDC# 45005-067-36  Reviewed

 

 2013 12:00 AM  COMPREHEN METABOLIC PANEL  Reviewed

 

 2013 12:00 AM  LIPID PANEL  Reviewed

 

 2013 12:00 AM  COMPLETE CBC W/AUTO DIFF WBC  Reviewed

 

 2013 12:00 AM  ASSAY THYROID STIM HORMONE  Reviewed

 

 6/10/2013 12:00 AM  MAMMOGRAM SCREENING  Reviewed

 

 6/10/2013 12:00 AM  CYTOPATH C/V MANUAL  Reviewed

 

 12/10/2013 12:00 AM  LIPID PANEL  Reviewed

 

 12/10/2013 12:00 AM  ASSAY THYROID STIM HORMONE  Reviewed

 

 12/10/2013 12:00 AM  COMPLETE CBC W/AUTO DIFF WBC  Reviewed

 

 12/10/2013 12:00 AM  COMPREHEN METABOLIC PANEL  Reviewed

 

 2010 12:00 AM  CYTOPATH C/V MANUAL  Reviewed

 

 2010 12:00 AM  SMEAR WET MOUNT SALINE/INK  Reviewed

 

 2010 12:00 AM  LIPID PANEL  Reviewed

 

 2010 12:00 AM  ASSAY THYROID STIM HORMONE  Reviewed

 

 2010 12:00 AM  COMPLETE CBC W/AUTO DIFF WBC  Reviewed

 

 2010 12:00 AM  COMPREHEN METABOLIC PANEL  Reviewed

 

 10/28/2013 12:00 AM  Flu Injection 3 Years And Above NDC# [address]-47  [facility]  
Reviewed

 

 2010 8:48 AM  Blood Pressure Check-no charge  Reviewed

 

 2010 12:00 AM  Blood Pressure Check-no charge  Reviewed

 

 2014 12:00 AM  METABOLIC PANEL TOTAL CA  Reviewed

 

 2014 12:00 AM  ASSAY THYROID STIM HORMONE  Reviewed

 

 2014 12:00 AM  ASSAY OF FREE THYROXINE  Reviewed

 

 2014 12:00 AM  MAMMOGRAM SCREENING  Reviewed

 

 2014 12:00 AM  CT BONE DENSITY AXIAL  Reviewed

 

 2014 12:00 AM  COMPLETE CBC W/AUTO DIFF WBC  Reviewed

 

 2014 12:00 AM  COMPREHEN METABOLIC PANEL  Reviewed

 

 2014 12:00 AM  LIPID PANEL  Reviewed

 

 2014 12:00 AM  ASSAY THYROID STIM HORMONE  Reviewed

 

 10/20/2010 12:00 AM  IMMUNIZATION ADMIN  Reviewed

 

 10/20/2010 12:00 AM  FLU VACCINE 3 YRS & > IM  Reviewed

 

 2010 12:00 AM  COMPREHEN METABOLIC PANEL  Reviewed

 

 2010 12:00 AM  LIPID PANEL  Reviewed

 

 2010 12:00 AM  ASSAY THYROID STIM HORMONE  Reviewed

 

 2010 12:00 AM  COMPLETE CBC W/AUTO DIFF WBC  Reviewed

 

 2010 12:00 AM  Blood Pressure Check-no charge  Reviewed

 

 10/2/2014 12:00 AM  THER/PROPH/DIAG INJ SC/IM  Reviewed

 

 10/2/2014 12:00 AM  Kenalog, Per 10 Mg NDC#2973-4831-03  Reviewed

 

 2014 12:00 AM  COMPLETE CBC W/AUTO DIFF WBC  Reviewed

 

 2014 12:00 AM  COMPREHEN METABOLIC PANEL  Reviewed

 

 2014 12:00 AM  LIPID PANEL  Reviewed

 

 2014 12:00 AM  ASSAY THYROID STIM HORMONE  Reviewed

 

 2015 12:00 AM  Rocephin 1 gram NDC#0970-6442-36  Reviewed

 

 2015 12:00 AM  THER/PROPH/DIAG INJ SC/IM  Reviewed

 

 2011 12:00 AM  COMPREHEN METABOLIC PANEL  Reviewed

 

 2011 12:00 AM  LIPID PANEL  Reviewed

 

 2011 12:00 AM  ASSAY THYROID STIM HORMONE  Reviewed

 

 2011 12:00 AM  COMPLETE CBC W/AUTO DIFF WBC  Reviewed

 

 2011 12:00 AM  METABOLIC PANEL TOTAL CA  Reviewed

 

 2011 12:00 AM  COMPREHEN METABOLIC PANEL  Reviewed

 

 2011 12:00 AM  ASSAY THYROID STIM HORMONE  Reviewed

 

 2011 12:00 AM  COMPLETE CBC W/AUTO DIFF WBC  Reviewed

 

 2011 12:00 AM  ASSAY OF LIPASE  Reviewed

 

 2011 12:00 AM  THER/PROPH/DIAG INJ SC/IM  Reviewed

 

 2011 12:00 AM  Phenergan 50 Mg Im  Bernadine  Reviewed

 

 2011 12:00 AM  METABOLIC PANEL TOTAL CA  Reviewed

 

 2011 12:00 AM  THER/PROPH/DIAG INJ SC/IM  Reviewed

 

 2011 12:00 AM  Phenergan 25 Mg Im  Bernadine  Reviewed

 

 2011 12:00 AM  METABOLIC PANEL TOTAL CA  Reviewed

 

 2011 12:00 AM  ASSAY THYROID STIM HORMONE  Reviewed

 

 2011 12:00 AM  THER/PROPH/DIAG INJ SC/IM  Reviewed

 

 2011 12:00 AM  Phenergan 50 Mg Im  Bernadine  Reviewed

 

 2011 12:00 AM  ASSAY OF SERUM SODIUM  Reviewed

 

 2011 12:00 AM  ASSAY OF SERUM SODIUM  Reviewed

 

 2011 12:00 AM  CYTOPATH C/V MANUAL  Reviewed

 

 2011 12:00 AM  ASSAY THYROID STIM HORMONE  Reviewed

 

 2015 12:00 AM  COMPLETE CBC W/AUTO DIFF WBC  Reviewed

 

 2015 12:00 AM  COMPREHEN METABOLIC PANEL  Reviewed

 

 2015 12:00 AM  LIPID PANEL  Reviewed

 

 2015 12:00 AM  ASSAY THYROID STIM HORMONE  Reviewed

 

 2015 12:00 AM  MAMMOGRAM SCREENING  Reviewed

 

 2015 12:00 AM  CYTOPATH TBS C/V MANUAL  Reviewed







Results Summary







 Date and Description  Results

 

 2010 10:41 AM  WET PREP NO TRICHOMONADS SEEN  No  Few 

 

 2010 8:15 AM  TRIGLYCERIDES 174.0 mg/dLCHOLESTEROL 175.0 mg/dLHDL 58.0 mg/
dLTOT CHOL/HDL 3.0 LDL (CALC) 82.0 mg/dLTSH 0.790 uIU/mLGLUCOSE 95.0 mg/
dLSODIUM 136.0 mmol/LPOTASSIUM 4.50 mmol/LCHLORIDE 99.0 mmol/LCO2 26.0 mmol/
LBUN 12.0 mg/dLCREATININE 0.80 mg/dLSGOT/AST 32.0 IU/LSGPT/ALT 33.0 IU/LALK 
PHOS 103.0 IU/LTOTAL PROTEIN 7.60 g/dLALBUMIN 4.60 g/dLTOTAL BILI 0.60 mg/
dLCALCIUM 9.80 mg/dLAGE 64 GFR NonAA 72 GFR AA 87 eGFR >60 mL/min/1.73 m2eGFR AA
* >60 WBC 5.3 RBC 4.13 HGB 13.10 g/dLHCT 39.20 %MCV 95.0 fLMCH 31.70 pgMCHC 
33.40 g/dLRDW SD 44 RDW CV 12.70 %MPV 9.80 fLPLT 257 NRBC# 0.00 NRBC% 0.0 %NEUT 
57.90 %%LYMP 26.50 %%MONO 11.60 %%EOS 3.20 %%BASO 0.80 %#NEUT 3.04 #LYMP 1.39 #
MONO 0.61 #EOS 0.17 #BASO 0.04 MANUAL DIFF NOT IND 

 

 2011 9:45 AM  GLUCOSE 91.0 mg/dLSODIUM 133.0 mmol/LPOTASSIUM 4.40 mmol/
LCHLORIDE 97.0 mmol/LCO2 24.0 mmol/LBUN 9.0 mg/dLCREATININE 0.70 mg/dLCALCIUM 
10.0 mg/dLAGE 65 GFR NonAA 84 GFR  eGFR >60 mL/min/1.73 m2eGFR AA* >60 

 

 2011 10:25 AM  LIPASE 29.0 U/LTSH 0.10 uIU/mLGLUCOSE 115.0 mg/dLSODIUM 
127.0 mmol/LPOTASSIUM 5.30 mmol/LCHLORIDE 93.0 mmol/LCO2 18.0 mmol/LBUN 9.0 mg/
dLCREATININE 0.70 mg/dLSGOT/AST 62.0 IU/LSGPT/ALT 46.0 IU/LALK PHOS 100.0 IU/
LTOTAL PROTEIN 8.60 g/dLALBUMIN 4.90 g/dLTOTAL BILI 0.60 mg/dLCALCIUM 9.90 mg/
dLAGE 65 GFR NonAA 84 GFR  eGFR >60 mL/min/1.73 m2eGFR AA* >60 WBC 6.9 
RBC 4.48 HGB 14.40 g/dLHCT 40.90 %MCV 91.0 fLMCH 32.10 pgMCHC 35.20 g/dLRDW SD 
41 RDW CV 12.50 %MPV 9.30 fLPLT 260 NRBC# 0.00 NRBC% 0.0 %NEUT 74.90 %%LYMP 
15.10 %%MONO 9.40 %%EOS 0.30 %%BASO 0.30 %#NEUT 5.15 #LYMP 1.04 #MONO 0.65 #EOS 
0.02 #BASO 0.02 MANUAL DIFF NOT IND 

 

 2011 9:07 AM  GLUCOSE 92.0 mg/dLSODIUM 131.0 mmol/LPOTASSIUM 4.20 mmol/
LCHLORIDE 99.0 mmol/LCO2 22.0 mmol/LBUN 9.0 mg/dLCREATININE 0.70 mg/dLCALCIUM 
9.20 mg/dLAGE 65 GFR NonAA 84 GFR  eGFR >60 mL/min/1.73 m2eGFR AA* >60 

 

 2011 11:06 AM  TSH 0.510 uIU/mLGLUCOSE 99.0 mg/dLSODIUM 132.0 mmol/
LPOTASSIUM 3.50 mmol/LCHLORIDE 95.0 mmol/LCO2 23.0 mmol/LBUN 9.0 mg/
dLCREATININE 0.80 mg/dLCALCIUM 10.40 mg/dLAGE 65 GFR NonAA 72 GFR AA 87 eGFR >
60 mL/min/1.73 m2eGFR AA* >60 

 

 2011 9:45 AM  SODIUM 132.0 mmol/L

 

 2011 9:27 AM  SODIUM 137.0 mmol/L

 

 2011 9:55 AM  TSH 1.070 uIU/mL

 

 2011 8:55 AM  GLUCOSE 102.0 mg/dLSODIUM 135.0 mmol/LPOTASSIUM 4.20 mmol/
LCHLORIDE 102.0 mmol/LCO2 24.0 mmol/LBUN 15.0 mg/dLCREATININE 0.80 mg/dLSGOT/
AST 30.0 IU/LSGPT/ALT 35.0 IU/LALK PHOS 119.0 IU/LTOTAL PROTEIN 7.50 g/
dLALBUMIN 4.30 g/dLTOTAL BILI 0.30 mg/dLCALCIUM 9.20 mg/dLAGE 65 GFR NonAA 72 
GFR AA 87 eGFR >60 mL/min/1.73 m2eGFR AA* >60 TSH 1.130 uIU/mL

 

 2011 8:55 AM  GLUCOSE 98.0 mg/dLSODIUM 137.0 mmol/LPOTASSIUM 3.90 mmol/
LCHLORIDE 103.0 mmol/LCO2 25.0 mmol/LBUN 14.0 mg/dLCREATININE 0.70 mg/dLSGOT/
AST 33.0 IU/LSGPT/ALT 36.0 IU/LALK PHOS 111.0 IU/LTOTAL PROTEIN 8.30 g/
dLALBUMIN 4.40 g/dLTOTAL BILI 0.50 mg/dLCALCIUM 9.40 mg/dLAGE 66 GFR NonAA 84 
GFR  eGFR >60 mL/min/1.73 m2eGFR AA* >60 TRIGLYCERIDES 109.0 mg/
dLCHOLESTEROL 153.0 mg/dLHDL 54.0 mg/dLTOT CHOL/HDL 2.8 LDL (CALC) 77.0 mg/
dLTSH 1.330 uIU/mL

 

 2012 10:33 AM  WET PREP NO TRICH SEEN CLUE CELLS NONE SEEN 

 

 2012 8:45 AM  WBC 5.2 RBC 3.96 HGB 12.70 g/dLHCT 37.80 %MCV 96.0 fLMCH 
32.10 pgMCHC 33.60 g/dLRDW SD 46 RDW CV 13.30 %MPV 9.70 fLPLT 297 NRBC# 0.00 
NRBC% 0.0 %NEUT 57.70 %%LYMP 28.50 %%MONO 10.30 %%EOS 2.50 %%BASO 1.0 %#NEUT 
3.02 #LYMP 1.49 #MONO 0.54 #EOS 0.13 #BASO 0.05 MANUAL DIFF NOT IND GLUCOSE 
97.0 mg/dLSODIUM 137.0 mmol/LPOTASSIUM 4.40 mmol/LCHLORIDE 101.0 mmol/LCO2 23.0 
mmol/LBUN 17.0 mg/dLCREATININE 0.80 mg/dLSGOT/AST 30.0 IU/LSGPT/ALT 33.0 IU/
LALK PHOS 95.0 IU/LTOTAL PROTEIN 7.40 g/dLALBUMIN 4.40 g/dLTOTAL BILI 0.60 mg/
dLCALCIUM 9.70 mg/dLAGE 66 GFR NonAA 72 GFR AA 87 eGFR 60 eGFR AA* 60 
TRIGLYCERIDES 130.0 mg/dLCHOLESTEROL 157.0 mg/dLHDL 56.0 mg/dLTOT CHOL/HDL 2.8 
LDL (CALC) 75.0 mg/dLTSH 0.940 uIU/mL

 

 2012 8:45 AM  WBC 5.1 RBC 3.91 HGB 12.50 g/dLHCT 36.30 %MCV 93.0 fLMCH 
32.0 pgMCHC 34.40 g/dLRDW SD 43 RDW CV 12.60 %MPV 9.30 fLPLT 244 NRBC# 0.00 NRBC
% 0.0 %NEUT 57.60 %%LYMP 27.50 %%MONO 9.10 %%EOS 5.0 %%BASO 0.80 %#NEUT 2.91 #
LYMP 1.39 #MONO 0.46 #EOS 0.25 #BASO 0.04 MANUAL DIFF NOT IND 

 

 2012 5:02 PM  WET PREP NO TRICH SEEN CLUE CELLS NONE SEEN 

 

 2013 8:25 AM  WBC 4.2 RBC 3.96 HGB 12.90 g/dLHCT 37.0 %MCV 93.0 fLMCH 
32.60 pgMCHC 34.90 g/dLRDW SD 43 RDW CV 12.60 %MPV 9.70 fLPLT 254 NRBC# 0.00 
NRBC% 0.0 %NEUT 54.40 %%LYMP 30.40 %%MONO 10.80 %%EOS 3.40 %%BASO 1.0 %#NEUT 
2.26 #LYMP 1.26 #MONO 0.45 #EOS 0.14 #BASO 0.04 MANUAL DIFF NOT IND TSH 1.230 
uIU/mLGLUCOSE 94.0 mg/dLSODIUM 136.0 mmol/LPOTASSIUM 4.30 mmol/LCHLORIDE 99.0 
mmol/LCO2 25.0 mmol/LBUN 10.0 mg/dLCREATININE 0.80 mg/dLSGOT/AST 36.0 IU/LSGPT/
ALT 36.0 IU/LALK PHOS 84.0 IU/LTOTAL PROTEIN 7.90 g/dLALBUMIN 4.40 g/dLTOTAL 
BILI 0.70 mg/dLCALCIUM 9.80 mg/dLAGE 67 GFR NonAA 72 GFR AA 87 eGFR 60 eGFR AA* 
60 TRIGLYCERIDES 122.0 mg/dLCHOLESTEROL 141.0 mg/dLHDL 47.0 mg/dLTOT CHOL/HDL 
3.0 LDL (CALC) 70.0 mg/dL

 

 2013 8:45 AM  WBC 5.3 RBC 4.01 HGB 13.0 g/dLHCT 37.60 %MCV 94.0 fLMCH 
32.40 pgMCHC 34.60 g/dLRDW SD 43 RDW CV 12.50 %MPV 9.40 fLPLT 248 NRBC# 0.00 
NRBC% 0.0 %NEUT 58.70 %%LYMP 27.80 %%MONO 9.80 %%EOS 2.80 %%BASO 0.90 %#NEUT 
3.12 #LYMP 1.48 #MONO 0.52 #EOS 0.15 #BASO 0.05 MANUAL DIFF NOT IND TSH 1.570 
uIU/mLGLUCOSE 94.0 mg/dLSODIUM 134.0 mmol/LPOTASSIUM 4.10 mmol/LCHLORIDE 101.0 
mmol/LCO2 23.0 mmol/LBUN 11.0 mg/dLCREATININE 0.80 mg/dLSGOT/AST 41.0 IU/LSGPT/
ALT 44.0 IU/LALK PHOS 109.0 IU/LTOTAL PROTEIN 7.90 g/dLALBUMIN 4.70 g/dLTOTAL 
BILI 0.80 mg/dLCALCIUM 10.40 mg/dLAGE 68 GFR NonAA 72 GFR AA 87 eGFR >60 mL/min/
1.73 m2eGFR AA* >60 TRIGLYCERIDES 163.0 mg/dLCHOLESTEROL 138.0 mg/dLHDL 49.0 mg/
dLTOT CHOL/HDL 2.8 LDL (CALC) 56.0 mg/dL

 

 2014 8:58 AM  GLUCOSE 86.0 mg/dLSODIUM 134.0 mmol/LPOTASSIUM 4.20 mmol/
LCHLORIDE 99.0 mmol/LCO2 25.0 mmol/LBUN 14.0 mg/dLCREATININE 0.80 mg/dLCALCIUM 
10.10 mg/dLAGE 68 GFR NonAA 72 GFR AA 87 eGFR >60 mL/min/1.73 m2eGFR AA* >60 
FREE T4 1.18 TSH 1.20 uIU/mL

 

 2014 9:50 AM  WBC 5.7 RBC 4.03 HGB 12.90 g/dLHCT 37.90 %MCV 94.0 fLMCH 
32.0 pgMCHC 34.0 g/dLRDW SD 44 RDW CV 12.70 %MPV 9.70 fLPLT 292 NRBC# 0.00 NRBC
% 0.0 %NEUT 62.70 %%LYMP 21.80 %%MONO 11.0 %%EOS 3.40 %%BASO 1.10 %#NEUT 3.55 #
LYMP 1.23 #MONO 0.62 #EOS 0.19 #BASO 0.06 MANUAL DIFF NOT IND GLUCOSE 98.0 mg/
dLSODIUM 135.0 mmol/LPOTASSIUM 4.30 mmol/LCHLORIDE 102.0 mmol/LCO2 22.0 mmol/
LBUN 10.0 mg/dLCREATININE 0.80 mg/dLSGOT/AST 34.0 IU/LSGPT/ALT 32.0 IU/LALK 
PHOS 95.0 IU/LTOTAL PROTEIN 7.70 g/dLALBUMIN 4.30 g/dLTOTAL BILI 0.80 mg/
dLCALCIUM 9.10 mg/dLAGE 68 GFR NonAA 72 GFR AA 87 eGFR 60 eGFR AA* 60 
TRIGLYCERIDES 108.0 mg/dLCHOLESTEROL 146.0 mg/dLHDL 56.0 mg/dLTOT CHOL/HDL 2.6 
LDL (CALC) 68.0 mg/dLTSH 0.90 uIU/mL

 

 2014 8:40 AM  WBC 4.4 RBC 4.13 HGB 13.20 g/dLHCT 38.90 %MCV 94.0 fLMCH 
32.0 pgMCHC 33.90 g/dLRDW SD 47 RDW CV 13.50 %MPV 9.80 fLPLT 279 NRBC# 0.00 NRBC
% 0.0 %NEUT 63.80 %%LYMP 24.60 %%MONO 9.30 %%EOS 1.60 %%BASO 0.70 %#NEUT 2.80 #
LYMP 1.08 #MONO 0.41 #EOS 0.07 #BASO 0.03 MANUAL DIFF NOT IND GLUCOSE 96.0 mg/
dLSODIUM 136.0 mmol/LPOTASSIUM 4.10 mmol/LCHLORIDE 99.0 mmol/LCO2 23.0 mmol/
LBUN 8.0 mg/dLCREATININE 0.80 mg/dLSGOT/AST 31.0 IU/LSGPT/ALT 27.0 IU/LALK PHOS 
85.0 IU/LTOTAL PROTEIN 7.60 g/dLALBUMIN 4.40 g/dLTOTAL BILI 0.80 mg/dLCALCIUM 
10.20 mg/dLAGE 69 GFR NonAA 71 GFR AA 86 eGFR 60 eGFR AA* 60 TRIGLYCERIDES 61.0 
mg/dLCHOLESTEROL 148.0 mg/dLHDL 71.0 mg/dLTOT CHOL/HDL 2.1 LDL (CALC) 65.0 mg/
dLTSH 0.990 uIU/mL

 

 2015 8:32 AM  WBC 4.8 RBC 3.98 HGB 12.70 g/dLHCT 37.30 %MCV 94.0 fLMCH 
31.90 pgMCHC 34.0 g/dLRDW SD 43 RDW CV 12.70 %MPV 9.50 fLPLT 270 NRBC# 0.00 NRBC
% 0.0 %NEUT 57.30 %%LYMP 25.0 %%MONO 13.0 %%EOS 3.60 %%BASO 1.10 %#NEUT 2.73 #
LYMP 1.19 #MONO 0.62 #EOS 0.17 #BASO 0.05 MANUAL DIFF NOT IND TSH 0.640 uIU/
mLGLUCOSE 90.0 mg/dLSODIUM 136.0 mmol/LPOTASSIUM 4.0 mmol/LCHLORIDE 101.0 mmol/
LCO2 24.0 mmol/LBUN 10.0 mg/dLCREATININE 0.80 mg/dLSGOT/AST 34.0 IU/LSGPT/ALT 
32.0 IU/LALK PHOS 92.0 IU/LTOTAL PROTEIN 7.50 g/dLALBUMIN 4.40 g/dLTOTAL BILI 
0.70 mg/dLCALCIUM 9.50 mg/dLAGE 69 GFR NonAA 71 GFR AA 86 eGFR >60 mL/min/1.73 
m2eGFR AA* >60 TRIGLYCERIDES 107.0 mg/dLCHOLESTEROL 138.0 mg/dLHDL 58.0 mg/
dLTOT CHOL/HDL 2.4 LDL (CALC) 59.0 mg/dL

 

 2015 8:31 AM  WBC 4.6 RBC 3.97 HGB 12.90 g/dLHCT 38.0 %MCV 96.0 fLMCH 
32.50 pgMCHC 33.90 g/dLRDW SD 44 RDW CV 12.60 %MPV 9.0 fLPLT 248 NRBC# 0.00 NRBC
% 0.0 %NEUT 61.0 %%LYMP 24.70 %%MONO 10.20 %%EOS 3.0 %%BASO 1.10 %#NEUT 2.82 #
LYMP 1.14 #MONO 0.47 #EOS 0.14 #BASO 0.05 MANUAL DIFF NOT IND TRIGLYCERIDES 
105.0 mg/dLCHOLESTEROL 141.0 mg/dLHDL 58.0 mg/dLTOT CHOL/HDL 2.4 LDL (CALC) 
62.0 mg/dLGLUCOSE 93.0 mg/dLSODIUM 136.0 mmol/LPOTASSIUM 4.10 mmol/LCHLORIDE 
101.0 mmol/LCO2 25.0 mmol/LBUN 9.0 mg/dLCREATININE 0.80 mg/dLSGOT/AST 32.0 IU/
LSGPT/ALT 28.0 IU/LALK PHOS 95.0 IU/LTOTAL PROTEIN 7.30 g/dLALBUMIN 4.50 g/
dLTOTAL BILI 0.80 mg/dLCALCIUM 9.70 mg/dLAGE 70 GFR NonAA 71 GFR AA 86 eGFR >60 
mL/min/1.73meGFR AA* >60 TSH 1.10 uIU/mL







History Of Immunizations







 Name  Date Admin  Mfg Name  Mfg Code  Trade Name  Lot#  Route  Inj  Vis Given  
Vis Pub  CVX

 

 Influenza  10/20/2010  [employer]of[employer] pasteur  [employer]  Fluzone  [id number]  Intramuscular  
Left Arm  10/20/2010  2010  999

 

 Influenza  10/28/2011  [employer]of[employer] pasteur  [employer]  Fluzone  [id number]  Intramuscular  
Left Arm  10/28/2011  2011  141

 

 Influenza  10/29/2012  sanofi pasteur  [employer]  Fluzone  [id number]  Intramuscular  
Left Deltoid  10/29/2012  2012  141

 

 X  12/10/2012  [employer]  MSD  Pneumovax 23  [id number]  Intramuscular  
Left Gluteous Medius  12/10/2012  2010  33

 

 Influenza  10/28/2013  sanofi pasteur  [employer]  Fluzone > 3 Years  [id number]  
Intramuscular  Right Deltoid  10/28/2013  2013  141

 

 X  9/2/2015  Not Entered  NE  Prevnar 13     Not Entered  Not Entered  1  109

 

 Influenza  2015  Not Entered  NE  Not Entered     Not Entered  Not Entered
  2015  141

 

 HepB Adult  2016  [employer]  MSD  Recombivax Adult  [id number]  Not 
Entered  Left Deltoid  2016  43

 

 HepB Adult  2016  [employer]  MSD  Recombivax Adult  [id number]  
Intramuscular  Right Deltoid  2016  43

 

 Zostavax  2012  Not Entered  NE  Not Entered     Not Entered  Not 
Entered  1  121

 

 Tdap  2012  Not Entered  NE  Not Entered     Not Entered  Not Entered  1  115

 

 Influenza  10/13/2016  Not Entered  NE  Fluzone High-Dose     Intramuscular  
Left Arm  1  141

 

 HepB Adult  2016  [employer]  MSD  Recombivax Adult  [id number]  
Intramuscular  Right Deltoid  2016  43







History of Past Illness







 Name  Date of Onset  Comments

 

 Benign Essential Hypertension  Dec 14 2009 10:10AM   

 

 Hyperlipidemia  Dec 14 2009 10:10AM   

 

 Hypothyroidism, Acquired  Dec 14 2009 10:10AM   

 

 hypothyroid      

 

 Hypertension      

 

 Hyperlipidemia      

 

 acid reflux      

 

 Hypertension, Benign Essential  2010  9:41AM   

 

 Hypertension, Benign Essential  2010 12:53PM   

 

 Routine gynecological examination  May  5 2010  9:28AM   

 

 Hypertension  May  5 2010  9:28AM   

 

 Hyperlipidemia, unspecified  May  5 2010  9:28AM   

 

 Hypothyroidism, Acquired  May  5 2010  9:28AM   

 

 Vulvovaginitis  May  5 2010  9:28AM   

 

 Rhinitis, Allergic  May  5 2010  9:28AM   

 

 Hypertension, Benign Essential  May 19 2010  8:48AM   

 

 Hypertension, Benign Essential  May 25 2010  9:39AM   

 

 Flu  Oct 20 2010 12:01PM   

 

 Essential Hypertension  2010  8:34AM   

 

 Hyperlipidemia  2010  8:34AM   

 

 Gastroesophageal Reflux  2010  8:34AM   

 

 Hypothyroidism, Acquired  2010  8:34AM   

 

 Hypertension, Benign Essential  2010  9:22AM   

 

 Hypertension, Benign Essential  Dec 15 2010  9:07AM   

 

 Essential Hypertension  2011  1:31PM   

 

 Hyperlipidemia  2011  1:31PM   

 

 Gastroesophageal Reflux  2011  1:31PM   

 

 Hypothyroidism, Acquired  2011  1:31PM   

 

 Essential Hypertension  2011  8:51AM   

 

 Hyperlipidemia  2011  8:51AM   

 

 Gastroesophageal Reflux  2011  8:51AM   

 

 Hypothyroidism, Acquired  2011  8:51AM   

 

 Essential Hypertension  2011  9:13AM   

 

 Hyperlipidemia  2011  9:13AM   

 

 Gastroesophageal Reflux  2011  9:13AM   

 

 Hypothyroidism, Acquired  2011  9:13AM   

 

 Nausea With Vomiting  2011  1:18PM   

 

 Hyponatremia  2011  8:46AM   

 

 Nausea  2011  9:13AM   

 

 Hypothyroidism  2011 11:10AM   

 

 Hyponatremia  2011 11:10AM   

 

 Essential Hypertension  2011 10:05AM   

 

 Hyperlipidemia  2011 10:05AM   

 

 Gastroesophageal Reflux  2011 10:05AM   

 

 Nausea  2011 10:05AM   

 

 Hypothyroidism, Acquired  2011 10:05AM   

 

 Hyponatremia  May  6 2011 11:34AM   

 

 Essential Hypertension  May 16 2011  8:50AM   

 

 Hyperlipidemia  May 16 2011  8:50AM   

 

 Gastroesophageal Reflux  May 16 2011  8:50AM   

 

 Nausea  May 16 2011  8:50AM   

 

 Hypothyroidism, Acquired  May 16 2011  8:50AM   

 

 Hyponatremia  May 16 2011  8:50AM   

 

 Routine gynecological examination  2011  8:54AM   

 

 Hypertension  2011  8:54AM   

 

 Hyperlipidemia, unspecified  2011  8:54AM   

 

 Hypothyroidism, Acquired  2011  8:54AM   

 

 Rhinitis, Allergic  2011  8:54AM   

 

 Hypothyroidism  Aug 29 2011 12:37PM   

 

 Hyponatremia  Aug 29 2011 12:37PM   

 

 Essential Hypertension  Sep 12 2011  8:53AM   

 

 Hyperlipidemia  Sep 12 2011  8:53AM   

 

 Gastroesophageal Reflux  Sep 12 2011  8:53AM   

 

 Hypothyroidism, Acquired  Sep 12 2011  8:53AM   

 

 Hyponatremia  Sep 12 2011  8:53AM   

 

 Hypertension  Sep 29 2011  9:41AM   

 

 Hyperlipidemia  Dec  8 2011  8:31AM   

 

 Hypothyroidism  Dec  8 2011  8:31AM   

 

 Hypertension  Dec  8 2011  8:31AM   

 

 Flu  Dec  9 2011 10:02AM   

 

 Essential Hypertension  Dec 13 2011 10:13AM   

 

 Hyperlipidemia  Dec 13 2011 10:13AM   

 

 Gastroesophageal Reflux  Dec 13 2011 10:13AM   

 

 Hypothyroidism, Acquired  Dec 13 2011 10:13AM   

 

 Hyponatremia  Dec 13 2011 10:13AM   

 

 Vaginal Discharge  2012  9:43AM   

 

 Rhinitis, Allergic  Feb 15 2012  9:31AM   

 

 Eustachian Tube Dysfunction  Feb 15 2012  9:31AM   

 

 Pharyngitis, Acute  Feb 15 2012  9:31AM   

 

 Routine gynecological examination  2012  8:48AM   

 

 Hypertension  2012  8:48AM   

 

 Hyperlipidemia, unspecified  2012  8:48AM   

 

 Hypothyroidism, Acquired  2012  8:48AM   

 

 Rhinitis, Allergic  2012  8:48AM   

 

 Candidiasis, Vulvovaginal  2012 11:04AM   

 

 Essential Hypertension  Aug 15 2012  9:02AM   

 

 Hyperlipidemia  Aug 15 2012  9:02AM   

 

 Gastroesophageal Reflux  Aug 15 2012  9:02AM   

 

 Hypothyroidism, Acquired  Aug 15 2012  9:02AM   

 

 Hyponatremia  Aug 15 2012  9:02AM   

 

 Atrophic Vaginitis, Postmenopausal  Aug 15 2012  9:02AM   

 

 Flu  Oct 29 2012  9:24AM   

 

 Essential Hypertension  Dec 10 2012  8:35AM   

 

 Hyperlipidemia  Dec 10 2012  8:35AM   

 

 Gastroesophageal Reflux  Dec 10 2012  8:35AM   

 

 Hypothyroidism, Acquired  Dec 10 2012  8:35AM   

 

 Hyponatremia  Dec 10 2012  8:35AM   

 

 Atrophic Vaginitis, Postmenopausal  Dec 10 2012  8:35AM   

 

 Pneumococcus  Dec 10 2012  2:07PM   

 

 Vaginal Discharge  Dec 20 2012  1:50PM   

 

 Essential Hypertension  Dec 20 2012  1:50PM   

 

 Hyperlipidemia  Dec 20 2012  1:50PM   

 

 Gastroesophageal Reflux  Dec 20 2012  1:50PM   

 

 Hypothyroidism, Acquired  Dec 20 2012  1:50PM   

 

 Atrophic Vaginitis, Postmenopausal  Dec 20 2012  1:50PM   

 

 Upper Respiratory Infections  2013  8:52AM   

 

 Hyperlipidemia  2013  8:21AM   

 

 Hypertension  2013  8:21AM   

 

 Hypothyroidism  2013  8:21AM   

 

 Screening Mammogram  Beto 10 2013  8:45AM   

 

 Routine gynecological examination  Beto 10 2013  8:34AM   

 

 Hypertension  Beto 10 2013  8:34AM   

 

 Hyperlipidemia, unspecified  Beto 10 2013  8:34AM   

 

 Hypothyroidism, Acquired  Beto 10 2013  8:34AM   

 

 Rhinitis, Allergic  Beto 10 2013  8:34AM   

 

 Flu  Oct 28 2013  8:52AM   

 

 Essential Hypertension  Dec  9 2013  8:37AM   

 

 Hyperlipidemia  Dec  9 2013  8:37AM   

 

 Gastroesophageal Reflux  Dec  9 2013  8:37AM   

 

 Hypothyroidism, Acquired  Dec  9 2013  8:37AM   

 

 Atrophic Vaginitis, Postmenopausal  Dec  9 2013  8:37AM   

 

 Hypertension  2014  9:56AM   

 

 Hyperlipidemia  2014  9:56AM   

 

 Hypothyroidism  2014  9:56AM   

 

 Screening Mammogram  2014  8:32AM   

 

 Osteopenia  2014  8:32AM   

 

 Hypertension  2014  8:35AM   

 

 Hypothyroidism, Acquired  2014  8:35AM   

 

 Hyperlipidemia  2014  8:35AM   

 

 Upper Respiratory Infections  2014  9:28AM   

 

 Sinusitis, Acute  Oct  2 2014  9:17AM   

 

 Herpes Zoster  Oct  5 2014  1:44PM   

 

 Vesicular Eruption  Oct  5 2014  1:44PM   

 

 Hypertension  2014  8:18AM   

 

 Hyperlipidemia  2014  8:18AM   

 

 hypothyroid  2014  8:18AM   

 

 Situational anxiety  Dec 20 2014  9:33AM   

 

 GERD (gastroesophageal reflux disease)  Dec 20 2014  9:33AM   

 

 Nausea  Dec 20 2014  9:33AM   

 

 Abdominal Pain, Epigastric  Dec 20 2014  9:33AM   

 

 Hyperlipidemia  Oct  6 2014  9:03AM   

 

 acid reflux  Oct  6 2014  9:03AM   

 

 hypothyroid  Oct  6 2014  9:03AM   

 

 Shingles  Oct  6 2014  9:03AM   

 

 Pharyngitis  2015  1:09PM   

 

 Bronchitis  2015  9:10AM   

 

 Hyperlipidemia  2015  9:10AM   

 

 acid reflux  2015  9:10AM   

 

 hypothyroid  2015  9:10AM   

 

 Hyperlipidemia  2015  8:18AM   

 

 Hypertension  2015  8:18AM   

 

 hypothyroid  2015  8:18AM   

 

 Screening Mammogram  2015 11:43AM   

 

 Medicare Annual Pap Q 2 years  2015  9:36AM   

 

 Screening Mammogram  2015  9:36AM   

 

 Candidiasis of female genitalia  2015  9:36AM   

 

 Hypertension  2015 11:34AM   

 

 Hyperlipidemia, unspecified  2015 11:34AM   

 

 Hypothyroidism, Acquired  2015 11:34AM   

 

 Osteopenia  2016  8:41AM   

 

 Encounter for screening mammogram for breast cancer  2016  8:41AM   

 

 Hypertension  2016  8:34AM   

 

 Lymphedema  2016  8:34AM   

 

 Hyperlipidemia  2016  8:34AM   

 

 hypothyroid  2016  8:34AM   

 

 HEP B  2016  9:13AM   

 

 HEP B  2016  8:52AM   

 

 Acid Reflux  2016  8:34AM   

 

 History of acute gastritis  Oct 13 2016  2:30PM   

 

 Anxiety as acute reaction to exceptional stress  Oct 13 2016  2:30PM   

 

 HEP B  Dec 29 2016  8:42AM   

 

 Caregiver stress syndrome  May 26 2017  8:28AM   

 

 Anxiety  May 26 2017  8:28AM   

 

 Blood in stool  2017  2:54PM   

 

 Upper GI bleed  2017 11:34AM   

 

 Hyponatremia  2017  9:03AM   

 

 Hyponatremia  2017  8:43AM   

 

 Medicare Annual Pap Q 2 years  2017  9:03AM   

 

 Nausea  2017  9:03AM   

 

 Uterine enlargement  2017  9:03AM   

 

 Encounter for screening mammogram for breast cancer  2017  4:56PM   







Payers







 Insurance Name  Company Name  Plan Name  Plan Number  Policy Number  Policy 
Group Number  Start Date

 

    Medicare RHC Medicare RHC     [id number]     N/A

 

    BCBS  [employer] [facility]     [id number]     2009

 

    Medicare Part B  [place] [place]     [id number]     N/A

 

    Medicare Part A  Medicare Part A     [id number]     N/A

 

    Medicare Part A  ZZZMedicare P A - Preventive     [id number]     N/A

 

    Medicare Part A  Medicare - Lab/Xray     [id number]     N/A







History of Encounters







 Visit Date  Visit Type  Provider

 

 10/18/2017  Office visit  Doris Noriega MD

 

 10/10/2017  Office visit  Doris Noriega MD

 

 2017  Office visit  Doris Noriega MD

 

 2017  Office visit  Doris Noriega MD

 

 2017  Office visit  Doris Noriega MD

 

 2017  Office visit  Prince Noe APRN

 

 2016  Nurse visit  Doris Noriega MD

 

 10/13/2016  Office visit  Doris Noriega MD

 

 2016  Nurse visit  Doris Noriega MD

 

 2016  Office visit  Doris Noriega MD

 

 2015  Office visit  Doris Noriega MD

 

 2015  Office visit  Doris Noriega MD

 

 2015  Office visit  Prince Noe APRN

 

 2014  Office visit  Anyi Herrera APRN

 

 10/27/2014  Voided  Doris Noriega MD

 

 10/6/2014  Office visit  Doris Noriega MD

 

 10/5/2014  Office visit  Anyi Herrera APRN

 

 10/2/2014  Office visit   

 

 10/2/2014  Office visit  Corrine Bay APRN

 

 2014  Office visit  Kassie Franklin APRN

 

 2014  Office visit  Doris Noriega MD

 

 2014  Office visit  Doris Noriega MD

 

 2013  Office visit  Dee Colindres MD

 

 10/28/2013  Nurse visit  Dee Colindres MD

 

 6/10/2013  Office visit  Dee Colindres MD

 

 2013  Office visit  Corrine Bay APRN

 

 2012  Office visit  Dee Colindres MD

 

 12/10/2012  Office visit  Dee Colindres MD

 

 10/29/2012  Nurse visit  Dee Colindres MD

 

 8/15/2012  Office visit  Dee Colindres MD

 

 2012  Office visit  Corrine Bay APRN

 

 2012  Office visit  Dee Colindres MD

 

 2/15/2012  Office visit  Dee Colindres MD

 

 2012  Office visit  Corrine Bay APRN

 

 2011  Office visit  Dee Colindres MD

 

 10/28/2011  Nurse visit  Shad Nolasco MD

 

 2011  Office visit  Dee Colindres MD

 

 2011  Office visit  Dee Colindres MD

 

 2011  Office visit  Dee Colindres MD

 

 2011  Office visit  Dee Colindres MD

 

 2011  Office visit  Dee Colindres MD

 

 2011  Office visit  Dee Colindres MD

 

 4/10/2011  [facility]  KHRIS Reeves MD

 

 4/10/2011  [facility]  RICKEY Toussaint MD

 

 2011  Office visit  RICKEY Toussaint MD

 

 2011  [facility]  Fide Castellanos MD

 

 2011  Voided  Fide Castellanos MD

 

 2011  Office visit  Dee Colindres MD

 

 12/15/2010  Nurse visit  Dee Colindres MD

 

 2010  Office visit  Dee Colindres MD

 

 10/20/2010  Nurse visit  Stephenie PERAZA

 

 2010  Nurse visit  Dee Colindres MD

 

 2010  Nurse visit  Dee Colindres MD

 

 2010  Office visit  Dee Colindres MD

 

 3/19/2010  Voided  Stephenie Kay PA

 

 2010  Nurse visit  Stephenie PERZAA

 

 2010  Nurse visit  Stephenie PERAZA

 

 2010  Nurse visit  Stephenie PERAZA

 

 2009  Office visit  Stephenie PERAZA

 

 10/20/2009  Nurse visit  Stephenie PERAZA

## 2018-10-22 NOTE — CONSCIOUS SEDATION/ASA
Conscious Sedation Pre-Proced


Time


09:04





ASA Score


2


For ASA 3 and 4: Consider anesthesia and medical clearance. Also, for 

patients with a history of failed moderate sedation consider anesthesia.

















Airway 


 


Lungs 


 


Heart 


 


 ASA score


 


 ASA 1: a normal healthy patient


 


 ASA 2:  a patient with a mild systemic disease (mid diabetes, controlled 

hypertension, obesity 


 


 ASA 3:  a patient with a severe systemic disease that limits activity  (angina

, COPD, prior Myocardial infarction)


 


 ASA 4:  a patient with an incapacitating disease that is a constant threat to 

life (CHF, renal failure)


 


 ASA 5:  a moribund patient not expected to survive 24 hrs.  (ruptured aneurysm)


 


 ASA 6:  a declared brain dead patient whose organs are being harvested.


 


 For emergent operations, add the letter E after the classification











Mallampati Classification


Grade 1





Sedation Plan


Discussed options with patient/fam


The patient is an appropriate candidate to undergo the planned procedure, 

sedation, and anesthesia.





The patient immediately re-assessed prior to indication.











FANG JOHNSON MD Oct 22, 2018 09:05

## 2018-10-22 NOTE — XMS REPORT
MU2 Ambulatory Summary

 Created on: 2017



Milady Crespo

External Reference #: [id number]

: 1945

Sex: Female



Demographics







 Address  [address]

[place]  [address]

 

 Home Phone  [phone]

 

 Preferred Language  English

 

 Marital Status  

 

 Evangelical Affiliation  Unknown

 

 Race  White

 

 Ethnic Group  Not  or 





Author







 Author  Doris Noriega

 

 Organization  [facility]

 

 Address  [address]

[place], [place]  [id number]



 

 Phone  [phone]







Care Team Providers







 Care Team Member Name  Role  Phone

 

 Doris Noriega  PCP  [phone]

 

 Doris Noriega  PreferredProvider  [phone]







Allergies and Adverse Reactions







 Name  Reaction  Notes

 

 NO KNOWN DRUG ALLERGIES      







Plan of Treatment







 Planned Activity  Comments  Planned Date  Planned Time  Plan/Goal

 

 Complete blood count (CBC) with differential count     2016  12:00 AM   

 

 Comprehensive metabolic panel     2016  12:00 AM   

 

 VITAMIN D (25 HYDROXY)     2016  12:00 AM   

 

 Lipid panel (total cholesterol, lipoproteins, HDL, triglycerides)     8/15/
2012  12:00 AM   

 

 Pap smear     2017  12:00 AM   

 

 Basic metabolic panel     2017  12:00 AM   

 

 Comprehensive Metabolic Panel     6/10/2013  12:00 AM   

 

 Lipid panel (total cholesterol, lipoproteins, HDL, triglycerides)     6/10/
2013  12:00 AM   

 

 Thyroid stimulating hormone (TSH)     6/10/2013  12:00 AM   

 

 CBC (automated H&H, platelets, WBC and automated differential)     6/10/2013  
12:00 AM   

 

 Comprehensive Metabolic Panel     2012  12:00 AM   

 

 Lipid panel (total cholesterol, lipoproteins, HDL, triglycerides)     2012  12:00 AM   

 

 Thyroid stimulating hormone (TSH)     2012  12:00 AM   

 

 CBC (automated H&H, platelets, WBC and automated differential)     2012  
12:00 AM   

 

 Screening mammography, bilateral     2015  12:00 AM   







Medications







 Active 

 

 Name  Start Date  Estimated Completion Date  SIG  Comments

 

 aspirin 81 mg oral tablet,delayed release (DR/EC)        take 1 tablet (81 mg) 
by oral route once daily   

 

 Vitamin D3 1,000 unit oral capsule        take 1 capsule by oral route daily   

 

 famotidine 20 mg oral tablet  2015     TAKE ONE TABLET BY MOUTH TWICE 
DAILY   

 

 valsartan 160 mg oral tablet  2016     TAKE ONE TABLET BY MOUTH ONCE 
DAILY   

 

 amlodipine 5 mg oral tablet  2016     TAKE ONE TABLET BY MOUTH ONCE DAILY
   

 

 Allergy Relief (loratadine) 10 mg oral tablet  2016     TAKE ONE TABLET 
BY MOUTH ONCE DAILY   

 

 amlodipine 5 mg oral tablet  2016     TAKE ONE TABLET BY MOUTH ONCE DAILY
   

 

 Allergy Relief (loratadine) 10 mg oral tablet  2016     TAKE ONE TABLET 
BY MOUTH ONCE DAILY   

 

 valsartan 160 mg oral tablet  2016     TAKE ONE TABLET BY MOUTH ONCE 
DAILY   

 

 pantoprazole 40 mg oral tablet,delayed release (DR/EC)  10/13/2016  10/8/2017  
take 1 tablet (40 mg) by oral route once daily for 90 days   

 

 amlodipine 5 mg oral tablet  10/13/2016  10/8/2017  TAKE ONE TABLET BY MOUTH 
ONCE DAILY for 90 days   

 

 atenolol 50 mg oral tablet  10/13/2016  10/8/2017  take 1 tablet (50 mg) by 
oral route once daily   

 

 levothyroxine 50 mcg oral tablet  10/13/2016  10/8/2017  TAKE ONE TABLET BY 
MOUTH ONCE DAILY for 90 days   

 

 Lipitor 20 mg oral tablet  10/13/2016  2018  take 1 tablet (20 mg) by oral 
route once daily   

 

 Plavix 75 mg oral tablet  10/13/2016  2018  take 1 tablet (75 mg) by oral 
route once daily   

 

 valsartan 160 mg oral tablet  10/13/2016  10/8/2017  TAKE ONE TABLET BY MOUTH 
ONCE DAILY for 90 days   

 

 Zofran (as hydrochloride) 4 mg oral tablet  2016     take 1 tablet by 
oral route daily as needed for 14 days   

 

 Zofran (as hydrochloride) 4 mg oral tablet  2017     take 1 tablet by oral 
route daily as needed for 14 days   

 

 Zofran (as hydrochloride) 4 mg oral tablet  2017     take 1 tablet by oral 
route daily as needed for 14 days   

 

 Zofran (as hydrochloride) 4 mg oral tablet  2017     take 1 tablet by 
oral route daily as needed for 14 days   

 

 Zofran (as hydrochloride) 4 mg oral tablet  2017     take 1 tablet by 
oral route daily as needed for 14 days   

 

 Lexapro 10 mg oral tablet  2017  take 1 tablet (10 mg) by oral 
route once daily for 90 days   

 

 triamcinolone acetonide 0.1 % topical cream  2017     APPLY A THIN LAYER 
OF CREAM EXTERNALLY TO AFFECTED AREA TWICE DAILY FOR 14 DAYS   

 

 ondansetron 4 mg oral tablet,disintegrating  2017     dissolve  1 tablet 
by oral route every 8 hours as needed   

 

 loratadine 10 mg oral tablet  2017  TAKE 1 TABLET BY MOUTH 
EVERY DAY IN THE EVENING   

 

 triamcinolone acetonide 0.1 % topical cream  2017     APPLY  CREAM 
EXTERNALLY TO AFFECTED AREA TWICE DAILY FOR 14 DAYS   

 

 EQ LORATADINE 10MG  TABS  2017     TAKE ONE TABLET BY MOUTH ONCE DAILY   

 

 clorazepate dipotassium 7.5 mg oral tablet  2017  take 1/2 to1 
tablet PO up to three times per day for situational anxiety   









  

 

 Name  Start Date  Expiration Date  SIG  Comments

 

 prednisone 20 mg oral tablet  2011  take 2 tablets by oral 
route daily for 5 days   

 

 calcium carbonate-vitamin D2 600-125 mg-unit oral tablet  8/15/2012  2012
  take 2 tablets by oral route daily   

 

 Sloan 3 Fish Oil 900-1,400 mg oral capsule,delayed release(DR/EC)  8/15/2012  9
/  take 1 capsule by oral route daily   

 

 multivitamin Oral tablet  8/15/2012  2012  take 1 tablet by oral route 
daily   

 

 triamcinolone acetonide 0.1 % topical cream  2013  10/7/2013  APPLY A 
THIN LAYER TO THE AFFECTED AREA(S) BY TOPICAL ROUTE TWICE A DAY   

 

 famotidine 20 mg oral tablet  2014  take 1 tablet (20 mg) by 
oral route 2 times per day   

 

 amoxicillin 500 mg oral capsule  2014  take 1 capsule (500 mg) 
by oral route 3 times per day for 10 days   

 

 Zithromax Z-Tab 250 mg oral tablet  10/2/2014  10/7/2014  take 2 tablets (500 
mg) by oral route once daily for 1 day then 1 tablet (250 mg) by oral route 
once daily for 4 days   

 

 acyclovir 800 mg oral tablet  10/5/2014  10/12/2014  take 1 tablet (800 mg) by 
oral route 5 times per day for 7 days   

 

 gabapentin 300 mg oral capsule  10/9/2014  2014  take 1 capsule (300 mg) 
by oral route 3 times per day for 14 days   

 

 Carafate 100 mg/mL oral suspension  2014  take 10 milliliters 
(1 gram) by oral route 4 times per day on an empty stomach 1 hour before meals 
and at bedtime for 4 weeks   

 

 amlodipine 5 mg oral tablet  2015  TAKE ONE TABLET BY MOUTH 
EVERY DAY   

 

 levothyroxine 50 mcg oral tablet  2015  TAKE ONE TABLET BY MOUTH 
EVERY DAY   

 

 Diovan 160 mg oral tablet  2015  2/15/2016  TAKE ONE TABLET BY MOUTH 
EVERY DAY for 90 days   

 

 triamcinolone acetonide 0.1 % topical cream  2015  apply a thin 
layer to the affected area(s) by topical route 2 times per day for 14 days   

 

 fluconazole 150 mg oral tablet  2015  take 1 tablet (150 mg) 
by oral route once for 1 day   

 

 Zofran (as hydrochloride) 4 mg oral tablet  10/13/2016  10/27/2016  take 1 
tablet by oral route daily as needed for 14 days   

 

 Zofran (as hydrochloride) 4 mg oral tablet  2017     take 1 tablet by 
oral route daily as needed for 14 days   









 Discontinued 

 

 Name  Start Date  Discontinued Date  SIG  Comments

 

 Lipitor 40 mg oral tablet     2009  12 TAB DAILY   

 

 ramipril 5 mg oral capsule     2009  take 1 capsule (5 mg) by oral route 
once daily   

 

 lovastatin 20 mg oral tablet  2009  take 1 tablet (20 mg) by 
oral route once daily with evening meal   

 

 propranolol 20 mg oral tablet  2010  take 1 tablet by oral 
route 2 times a day   

 

 Fosamax 70 mg oral tablet  2010  take 1 tablet (70 mg) by oral 
route once weekly in the morning, at least 30 minutes before the first food, 
beverage, or medication of the day   

 

 lisinopril 40 mg oral tablet  2010  4/10/2011  take 2 tablets by oral 
route daily   

 

 Zoloft 50 mg oral tablet  2011  take 1 tablet (50 mg) by oral 
route once daily   

 

 promethazine 25 mg oral tablet  2011  take 1 tablet by oral 
route every 6 hours as needed   

 

 Diovan -12.5 mg oral tablet  2011  take 1 tablet by oral 
route once daily for 30 days   

 

 Diflucan 150 mg oral tablet     2012  take 1 tablet (150 mg) by oral 
route once for 1 day   

 

 Diflucan 150 mg oral tablet  2012  8/15/2012  take 1 tablet (150 mg) by 
oral route once   

 

 Vitamin B-12 1,000 mcg oral tablet  8/15/2012  2014  take 1 tablet by 
oral route daily   

 

 Premarin 0.625 mg/gram vaginal cream  10/29/2012  6/10/2013  use 1 gram daily 
for a week then 1 gram twice a week   

 

 Medrol (Tab) 4 mg oral tablets,dose pack  2013  6/10/2013  take as 
directed   

 

 aspirin 325 mg oral tablet  2014  take 1 tablet (325 mg) by 
oral route once daily   

 

 Medrol (Tab) 4 mg oral tablets,dose pack  2014  10/2/2014  take as 
directed   

 

 Tetracaine Lollipops Lollipop  2015  Use as directed   

 

 Zoloft 50 mg oral tablet  2016  take 1 tablet (50 mg) by oral 
route once daily for 90 days   

 

 Zoloft 50 mg oral tablet  2016  take 1 tablet (50 mg) by oral 
route once daily for 90 days  Pt refuses to take...







Problem List







 Description  Status  Onset

 

 Hyperlipidemia  Active   

 

 acid reflux  Active   

 

 Hypertension  Active   

 

 hypothyroid  Active   







Vital Signs







 Date  Time  BP-Sys(mm[Hg]  BP-Morena(mm[Hg])  HR(bpm)  RR(rpm)  Temp  WT  HT  HC  
BMI  BSA  BMI Percentile  O2 Sat(%)

 

 2017  8:40:00 AM  118 mmHg  68 mmHg  63 bpm  16 rpm  98.1 F  123.125 lbs  
61 in     23.26 kg/m2  1.55 m2     100 %

 

 2017  8:57:00 AM  116 mmHg  62 mmHg  71 bpm  16 rpm  99.8 F  121.5 lbs  
61 in     22.957 kg/m  1.5401 m     98 %

 

 2017  11:30:00 AM  128 mmHg  78 mmHg  69 bpm  16 rpm  98.8 F  129.375 lbs  
61 in     24.44 kg/m2  1.59 m2     98 %

 

 2017  8:22:00 AM  118 mmHg  78 mmHg  62 bpm  18 rpm  97.5 F  129.125 lbs  
61 in     24.3977 kg/m  1.5877 m     100 %

 

 10/13/2016  2:26:00 PM  128 mmHg  78 mmHg  77 bpm  16 rpm  98.4 F  139.25 lbs  
61 in     26.31 kg/m2  1.65 m2     100 %

 

 2016  8:29:00 AM  122 mmHg  70 mmHg  72 bpm  16 rpm  97.8 F  137.125 lbs  
61 in     25.9093 kg/m  1.6361 m     100 %

 

 2015  9:33:00 AM  120 mmHg  68 mmHg  73 bpm     98.7 F  144.375 lbs  61 
in     27.28 kg/m2  1.68 m2     99 %

 

 2015  9:05:00 AM  110 mmHg  75 mmHg  85 bpm  16 rpm  96.7 F  144.375 lbs  
61 in     27.2791 kg/m  1.6788 m     99 %

 

 2015  1:05:00 PM  108 mmHg  62 mmHg  78 bpm  18 rpm  96.9 F  142.375 lbs  
61 in     26.90 kg/m2  1.67 m2     100 %

 

 2014  9:31:00 AM  126 mmHg  66 mmHg  79 bpm  18 rpm  98.7 F  149 lbs  61 
in     28.153 kg/m  1.7055 m     98 %

 

 10/6/2014  9:02:00 AM  110 mmHg  74 mmHg  94 bpm  18 rpm  98.1 F  145.4 lbs  
61 in     27.47 kg/m2  1.68 m2     97 %

 

 10/2/2014  9:14:00 AM  124 mmHg  74 mmHg  79 bpm  18 rpm  98 F  147.25 lbs  61 
in     27.8224 kg/m  1.6955 m     98 %

 

 2014  9:22:00 AM  124 mmHg  76 mmHg  89 bpm  18 rpm  98.1 F  148 lbs  61 
in     27.96 kg/m2  1.70 m2     100 %

 

 2014  8:27:00 AM  118 mmHg  65 mmHg  74 bpm  16 rpm  97.7 F  148 lbs  61 
in     27.9641 kg/m  1.6998 m     99 %

 

 2014  9:48:00 AM  125 mmHg  70 mmHg  93 bpm  18 rpm  96.7 F  156 lbs  61 
in     29.48 kg/m2  1.75 m2     100 %

 

 2013  8:35:00 AM  122 mmHg  74 mmHg  84 bpm  16 rpm  97.9 F  155.375 lbs 
                99 %

 

 6/10/2013  8:30:00 AM  130 mmHg  82 mmHg  79 bpm  16 rpm  97.9 F  161 lbs     
            98 %

 

 2013  8:50:00 AM  124 mmHg  68 mmHg  66 bpm  18 rpm  97.6 F  157.5 lbs  
61 in     29.7591 kg/m  1.7535 m      

 

 2012  1:46:00 PM  120 mmHg  72 mmHg  75 bpm  16 rpm  97.6 F  156.125 lbs
                 98 %

 

 12/10/2012  8:32:00 AM  122 mmHg  82 mmHg  70 bpm  16 rpm  97.3 F  157 lbs    
             99 %

 

 8/15/2012  9:00:00 AM  130 mmHg  76 mmHg  86 bpm  16 rpm  97.4 F  159 lbs     
            100 %

 

 2012  11:02:00 AM  128 mmHg  64 mmHg  66 bpm  18 rpm  97.4 F  162.125 lbs
  61 in     30.6329 kg/m  1.7791 m      

 

 2012  8:45:00 AM  130 mmHg  70 mmHg  82 bpm  16 rpm  98.2 F  160.125 lbs 
                100 %

 

 2/15/2012  9:29:00 AM  122 mmHg  80 mmHg  86 bpm  16 rpm  97.4 F  159.375 lbs  
61 in     30.1133 kg/m  1.7639 m     99 %

 

 2012  9:41:00 AM  132 mmHg  74 mmHg  66 bpm  18 rpm  98.4 F  160.375 lbs  
61 in     30.30 kg/m2  1.77 m2      

 

 2011  10:08:00 AM  134 mmHg  82 mmHg  80 bpm  16 rpm  98 F  160 lbs  61 
in     30.23 kg/m2  1.7674 m     99 %

 

 2011  3:56:00 PM  130 mmHg  80 mmHg                              

 

 2011  8:55:00 AM  130 mmHg  74 mmHg  88 bpm  16 rpm  98.2 F  158.25 lbs  
               98 %

 

 2011  8:54:00 AM  136 mmHg  82 mmHg  102 bpm  16 rpm  98.1 F  153.5 lbs   
              99 %

 

 2011  8:49:00 AM  122 mmHg  88 mmHg  88 bpm  16 rpm  98.6 F  152.25 lbs  
               99 %

 

 2011  10:02:00 AM  140 mmHg  82 mmHg  96 bpm  20 rpm  98.8 F             
       100 %

 

 2011  9:14:00 AM  156 mmHg  98 mmHg  110 bpm  24 rpm  98.5 F  153 lbs    
             100 %

 

 2011  8:50:00 AM  160 mmHg  84 mmHg  100 bpm  16 rpm  98.7 F  155 lbs    
             100 %

 

 2011  1:25:00 PM  152 mmHg  100 mmHg  82 bpm  16 rpm  97.2 F  156.375 lbs 
                100 %

 

 2011  9:55:00 AM  144 mmHg  90 mmHg                              

 

 2010  9:24:00 AM  138 mmHg  84 mmHg                              

 

 2010  8:58:00 AM  160 mmHg  94 mmHg                              

 

 2010  8:33:00 AM  142 mmHg  90 mmHg  100 bpm  16 rpm  98.1 F  158.375 
lbs                  

 

 2010  9:24:00 AM  160 mmHg  102 mmHg  88 bpm  18 rpm  99 F  157.125 lbs  
62 in     28.7382 kg/m  1.7657 m      

 

 3/19/2010  11:01:00 AM  140 mmHg  90 mmHg                              

 

 2009  10:08:00 AM  142 mmHg  86 mmHg  72 bpm  16 rpm  98.1 F  154.125 
lbs  61 in     29.1214 kg/m  1.73 m2      







Social History







 Name  Description  Comments

 

       

 

 Children      

 

 lives alone      

 

 Supervisor      

 

 Tobacco  Never smoker   







History of Procedures







 Date Ordered  Description  Order Status

 

 2011 12:00 AM  COMPREHEN METABOLIC PANEL  Reviewed

 

 2011 12:00 AM  ASSAY THYROID STIM HORMONE  Reviewed

 

 2011 12:00 AM  COMPREHEN METABOLIC PANEL  Reviewed

 

 2011 12:00 AM  LIPID PANEL  Reviewed

 

 2011 12:00 AM  ASSAY THYROID STIM HORMONE  Reviewed

 

 2011 12:00 AM  COMPLETE CBC W/AUTO DIFF WBC  Reviewed

 

 2015 12:00 AM  COMPLETE CBC W/AUTO DIFF WBC  Reviewed

 

 2015 12:00 AM  COMPREHEN METABOLIC PANEL  Reviewed

 

 2015 12:00 AM  LIPID PANEL  Reviewed

 

 2015 12:00 AM  ASSAY THYROID STIM HORMONE  Reviewed

 

 2011 12:00 AM  COMPREHEN METABOLIC PANEL  Reviewed

 

 2011 12:00 AM  COMPREHEN METABOLIC PANEL  Reviewed

 

 2011 12:00 AM  LIPID PANEL  Reviewed

 

 2011 12:00 AM  ASSAY THYROID STIM HORMONE  Reviewed

 

 10/28/2011 12:00 AM  ***Immunization administration, Medicare flu  Reviewed

 

 10/28/2011 12:00 AM  Fluzone ** MEDICARE Only **  Reviewed

 

 2010 11:24 AM  NO CHARGE OV  Reviewed

 

 2010 11:26 AM  NO CHARGE OV  Reviewed

 

 2011 12:00 AM  COMPREHEN METABOLIC PANEL  Reviewed

 

 2011 12:00 AM  LIPID PANEL  Reviewed

 

 2011 12:00 AM  ASSAY THYROID STIM HORMONE  Reviewed

 

 2011 12:00 AM  COMPLETE CBC W/AUTO DIFF WBC  Reviewed

 

 2011 12:00 AM  Wrist Support  Reviewed

 

 2016 12:00 AM  Screening mammography, bilateral  Reviewed

 

 2016 12:00 AM  DXA BONE DENSITY AXIAL  Returned

 

 2016 12:00 AM  TETANUS VACCINE IM  Reviewed

 

 2016 12:00 AM  HEP B VACC ADULT 3 DOSE IM  Reviewed

 

 2012 12:00 AM  TISSUE EXAM FOR FUNGI  Reviewed

 

 2012 12:00 AM  SMEAR WET MOUNT SALINE/INK  Reviewed

 

 2016 12:00 AM  TETANUS VACCINE IM  Reviewed

 

 2016 12:00 AM  HEP B VACC ADULT 3 DOSE IM  Reviewed

 

 2/15/2012 12:00 AM  THER/PROPH/DIAG INJ SC/IM  Reviewed

 

 2/15/2012 12:00 AM  Bicillin CR [facility]8072[facility]  Reviewed

 

 2/15/2012 12:00 AM  Depo-Medrol 40 mg NDC#9406055329  Reviewed

 

 10/13/2016 12:00 AM  COMPLETE CBC W/AUTO DIFF WBC  Returned

 

 10/13/2016 12:00 AM  COMPREHEN METABOLIC PANEL  Returned

 

 10/13/2016 12:00 AM  ASSAY THYROID STIM HORMONE  Returned

 

 10/13/2016 12:00 AM  LIPID PANEL  Returned

 

 2016 12:00 AM  TETANUS VACCINE IM  Reviewed

 

 2016 12:00 AM  HEP B VACC ADULT 3 DOSE IM  Reviewed

 

 2017 12:00 AM  ASSAY OF IRON  Returned

 

 2017 12:00 AM  IRON BINDING TEST  Returned

 

 2017 12:00 AM  ASSAY OF TRANSFERRIN  Returned

 

 2017 12:00 AM  OCCULT BLD FECES 1-3 TESTS  Returned

 

 2017 12:00 AM  COMPLETE CBC AUTOMATED  Returned

 

 8/15/2012 12:00 AM  COMPREHEN METABOLIC PANEL  Reviewed

 

 8/15/2012 12:00 AM  ASSAY THYROID STIM HORMONE  Reviewed

 

 8/15/2012 12:00 AM  COMPLETE CBC W/AUTO DIFF WBC  Reviewed

 

 8/15/2012 12:00 AM  Wrist Support  Reviewed

 

 2017 12:00 AM  METABOLIC PANEL TOTAL CA  Returned

 

 10/29/2012 12:00 AM  Flu Injection 3 Years And Above NDC# [address]-31  [facility]  
Reviewed

 

 12/10/2012 12:00 AM  IMMUNIZATION ADMIN  Reviewed

 

 12/10/2012 12:00 AM  Pneumovax Injection - [facility]  Reviewed

 

 2012 12:00 AM  TRICHOMONAS ASSAY W/OPTIC  Reviewed

 

 2013 12:00 AM  THER/PROPH/DIAG INJ SC/IM  Reviewed

 

 2013 12:00 AM  Bicillin CR, 1.2 million units NDC# 11197-084-71  Reviewed

 

 2013 12:00 AM  COMPREHEN METABOLIC PANEL  Reviewed

 

 2013 12:00 AM  LIPID PANEL  Reviewed

 

 2013 12:00 AM  COMPLETE CBC W/AUTO DIFF WBC  Reviewed

 

 2013 12:00 AM  ASSAY THYROID STIM HORMONE  Reviewed

 

 6/10/2013 12:00 AM  MAMMOGRAM SCREENING  Reviewed

 

 6/10/2013 12:00 AM  CYTOPATH C/V MANUAL  Reviewed

 

 12/10/2013 12:00 AM  LIPID PANEL  Reviewed

 

 12/10/2013 12:00 AM  ASSAY THYROID STIM HORMONE  Reviewed

 

 12/10/2013 12:00 AM  COMPLETE CBC W/AUTO DIFF WBC  Reviewed

 

 12/10/2013 12:00 AM  COMPREHEN METABOLIC PANEL  Reviewed

 

 2010 12:00 AM  CYTOPATH C/V MANUAL  Reviewed

 

 2010 12:00 AM  SMEAR WET MOUNT SALINE/INK  Reviewed

 

 2010 12:00 AM  LIPID PANEL  Reviewed

 

 2010 12:00 AM  ASSAY THYROID STIM HORMONE  Reviewed

 

 2010 12:00 AM  COMPLETE CBC W/AUTO DIFF WBC  Reviewed

 

 2010 12:00 AM  COMPREHEN METABOLIC PANEL  Reviewed

 

 10/28/2013 12:00 AM  Flu Injection 3 Years And Above NDC# [address]-13  [facility]  
Reviewed

 

 2010 8:48 AM  Blood Pressure Check-no charge  Reviewed

 

 2010 12:00 AM  Blood Pressure Check-no charge  Reviewed

 

 2014 12:00 AM  METABOLIC PANEL TOTAL CA  Reviewed

 

 2014 12:00 AM  ASSAY THYROID STIM HORMONE  Reviewed

 

 2014 12:00 AM  ASSAY OF FREE THYROXINE  Reviewed

 

 2014 12:00 AM  MAMMOGRAM SCREENING  Reviewed

 

 2014 12:00 AM  CT BONE DENSITY AXIAL  Reviewed

 

 2014 12:00 AM  COMPLETE CBC W/AUTO DIFF WBC  Reviewed

 

 2014 12:00 AM  COMPREHEN METABOLIC PANEL  Reviewed

 

 2014 12:00 AM  LIPID PANEL  Reviewed

 

 2014 12:00 AM  ASSAY THYROID STIM HORMONE  Reviewed

 

 10/20/2010 12:00 AM  IMMUNIZATION ADMIN  Reviewed

 

 10/20/2010 12:00 AM  FLU VACCINE 3 YRS & > IM  Reviewed

 

 2010 12:00 AM  COMPREHEN METABOLIC PANEL  Reviewed

 

 2010 12:00 AM  LIPID PANEL  Reviewed

 

 2010 12:00 AM  ASSAY THYROID STIM HORMONE  Reviewed

 

 2010 12:00 AM  COMPLETE CBC W/AUTO DIFF WBC  Reviewed

 

 2010 12:00 AM  Blood Pressure Check-no charge  Reviewed

 

 10/2/2014 12:00 AM  THER/PROPH/DIAG INJ SC/IM  Reviewed

 

 10/2/2014 12:00 AM  Kenalog, Per 10 Mg NDC#4754-7260-21  Reviewed

 

 2014 12:00 AM  COMPLETE CBC W/AUTO DIFF WBC  Reviewed

 

 2014 12:00 AM  COMPREHEN METABOLIC PANEL  Reviewed

 

 2014 12:00 AM  LIPID PANEL  Reviewed

 

 2014 12:00 AM  ASSAY THYROID STIM HORMONE  Reviewed

 

 2015 12:00 AM  Rocephin 1 gram NDC#9432-5391-91  Reviewed

 

 2015 12:00 AM  THER/PROPH/DIAG INJ SC/IM  Reviewed

 

 2011 12:00 AM  COMPREHEN METABOLIC PANEL  Reviewed

 

 2011 12:00 AM  LIPID PANEL  Reviewed

 

 2011 12:00 AM  ASSAY THYROID STIM HORMONE  Reviewed

 

 2011 12:00 AM  COMPLETE CBC W/AUTO DIFF WBC  Reviewed

 

 2011 12:00 AM  METABOLIC PANEL TOTAL CA  Reviewed

 

 2011 12:00 AM  COMPREHEN METABOLIC PANEL  Reviewed

 

 2011 12:00 AM  ASSAY THYROID STIM HORMONE  Reviewed

 

 2011 12:00 AM  COMPLETE CBC W/AUTO DIFF WBC  Reviewed

 

 2011 12:00 AM  ASSAY OF LIPASE  Reviewed

 

 2011 12:00 AM  THER/PROPH/DIAG INJ SC/IM  Reviewed

 

 2011 12:00 AM  Phenergan 50 Mg Im  Bernadine  Reviewed

 

 2011 12:00 AM  METABOLIC PANEL TOTAL CA  Reviewed

 

 2011 12:00 AM  THER/PROPH/DIAG INJ SC/IM  Reviewed

 

 2011 12:00 AM  Phenergan 25 Mg Im  Bernadine  Reviewed

 

 2011 12:00 AM  METABOLIC PANEL TOTAL CA  Reviewed

 

 2011 12:00 AM  ASSAY THYROID STIM HORMONE  Reviewed

 

 2011 12:00 AM  THER/PROPH/DIAG INJ SC/IM  Reviewed

 

 2011 12:00 AM  Phenergan 50 Mg Im  Bernadine  Reviewed

 

 2011 12:00 AM  ASSAY OF SERUM SODIUM  Reviewed

 

 2011 12:00 AM  ASSAY OF SERUM SODIUM  Reviewed

 

 2011 12:00 AM  CYTOPATH C/V MANUAL  Reviewed

 

 2011 12:00 AM  ASSAY THYROID STIM HORMONE  Reviewed

 

 2015 12:00 AM  COMPLETE CBC W/AUTO DIFF WBC  Reviewed

 

 2015 12:00 AM  COMPREHEN METABOLIC PANEL  Reviewed

 

 2015 12:00 AM  LIPID PANEL  Reviewed

 

 2015 12:00 AM  ASSAY THYROID STIM HORMONE  Reviewed

 

 2015 12:00 AM  MAMMOGRAM SCREENING  Reviewed

 

 2015 12:00 AM  CYTOPATH TBS C/V MANUAL  Reviewed







Results Summary







 Date and Description  Results

 

 2010 9:25 AM  Colonoscopy-Women and Men over 50 Abnormal Mammogram -Women 
over 40 Declined Pap Smear Declined 

 

 2010 10:41 AM  WET PREP NO TRICHOMONADS SEEN  No  Few 

 

 2010 8:15 AM  TRIGLYCERIDES 174.0 mg/dLCHOLESTEROL 175.0 mg/dLHDL 58.0 mg/
dLTOT CHOL/HDL 3.0 LDL (CALC) 82.0 mg/dLTSH 0.790 uIU/mLGLUCOSE 95.0 mg/
dLSODIUM 136.0 mmol/LPOTASSIUM 4.50 mmol/LCHLORIDE 99.0 mmol/LCO2 26.0 mmol/
LBUN 12.0 mg/dLCREATININE 0.80 mg/dLSGOT/AST 32.0 IU/LSGPT/ALT 33.0 IU/LALK 
PHOS 103.0 IU/LTOTAL PROTEIN 7.60 g/dLALBUMIN 4.60 g/dLTOTAL BILI 0.60 mg/
dLCALCIUM 9.80 mg/dLAGE 64 GFR NonAA 72 GFR AA 87 eGFR >60 mL/min/1.73 m2eGFR AA
* >60 WBC 5.3 RBC 4.13 HGB 13.10 g/dLHCT 39.20 %MCV 95.0 fLMCH 31.70 pgMCHC 
33.40 g/dLRDW SD 44 RDW CV 12.70 %MPV 9.80 fLPLT 257 NRBC# 0.00 NRBC% 0.0 %NEUT 
57.90 %%LYMP 26.50 %%MONO 11.60 %%EOS 3.20 %%BASO 0.80 %#NEUT 3.04 #LYMP 1.39 #
MONO 0.61 #EOS 0.17 #BASO 0.04 MANUAL DIFF NOT IND 

 

 12/15/2010 8:30 AM  TRIGLYCERIDES 157.0 mg/dLCHOLESTEROL 163.0 mg/dLHDL 56.0 mg
/dLTOT CHOL/HDL 2.9 LDL (CALC) 76.0 mg/dLTSH 0.650 uIU/mLWBC 6.5 RBC 4.25 HGB 
13.60 g/dLHCT 40.70 %MCV 96.0 fLMCH 32.0 pgMCHC 33.40 g/dLRDW SD 44 RDW CV 
12.60 %MPV 9.90 fLPLT 313 NRBC# 0.00 NRBC% 0.0 %NEUT 61.80 %%LYMP 26.20 %%MONO 
8.30 %%EOS 3.10 %%BASO 0.60 %#NEUT 4.00 #LYMP 1.70 #MONO 0.54 #EOS 0.20 #BASO 
0.04 MANUAL DIFF NOT IND GLUCOSE 97.0 mg/dLSODIUM 136.0 mmol/LPOTASSIUM 4.40 
mmol/LCHLORIDE 101.0 mmol/LCO2 26.0 mmol/LBUN 10.0 mg/dLCREATININE 0.80 mg/
dLSGOT/AST 31.0 IU/LSGPT/ALT 34.0 IU/LALK PHOS 92.0 IU/LTOTAL PROTEIN 8.10 g/
dLALBUMIN 4.60 g/dLTOTAL BILI 0.40 mg/dLCALCIUM 10.0 mg/dLAGE 65 GFR NonAA 72 
GFR AA 87 eGFR >60 mL/min/1.73 m2eGFR AA* >60 

 

 2011 9:45 AM  GLUCOSE 91.0 mg/dLSODIUM 133.0 mmol/LPOTASSIUM 4.40 mmol/
LCHLORIDE 97.0 mmol/LCO2 24.0 mmol/LBUN 9.0 mg/dLCREATININE 0.70 mg/dLCALCIUM 
10.0 mg/dLAGE 65 GFR NonAA 84 GFR  eGFR >60 mL/min/1.73 m2eGFR AA* >60 

 

 2011 10:25 AM  LIPASE 29.0 U/LTSH 0.10 uIU/mLGLUCOSE 115.0 mg/dLSODIUM 
127.0 mmol/LPOTASSIUM 5.30 mmol/LCHLORIDE 93.0 mmol/LCO2 18.0 mmol/LBUN 9.0 mg/
dLCREATININE 0.70 mg/dLSGOT/AST 62.0 IU/LSGPT/ALT 46.0 IU/LALK PHOS 100.0 IU/
LTOTAL PROTEIN 8.60 g/dLALBUMIN 4.90 g/dLTOTAL BILI 0.60 mg/dLCALCIUM 9.90 mg/
dLAGE 65 GFR NonAA 84 GFR  eGFR >60 mL/min/1.73 m2eGFR AA* >60 WBC 6.9 
RBC 4.48 HGB 14.40 g/dLHCT 40.90 %MCV 91.0 fLMCH 32.10 pgMCHC 35.20 g/dLRDW SD 
41 RDW CV 12.50 %MPV 9.30 fLPLT 260 NRBC# 0.00 NRBC% 0.0 %NEUT 74.90 %%LYMP 
15.10 %%MONO 9.40 %%EOS 0.30 %%BASO 0.30 %#NEUT 5.15 #LYMP 1.04 #MONO 0.65 #EOS 
0.02 #BASO 0.02 MANUAL DIFF NOT IND 

 

 2011 9:07 AM  GLUCOSE 92.0 mg/dLSODIUM 131.0 mmol/LPOTASSIUM 4.20 mmol/
LCHLORIDE 99.0 mmol/LCO2 22.0 mmol/LBUN 9.0 mg/dLCREATININE 0.70 mg/dLCALCIUM 
9.20 mg/dLAGE 65 GFR NonAA 84 GFR  eGFR >60 mL/min/1.73 m2eGFR AA* >60 

 

 2011 11:06 AM  TSH 0.510 uIU/mLGLUCOSE 99.0 mg/dLSODIUM 132.0 mmol/
LPOTASSIUM 3.50 mmol/LCHLORIDE 95.0 mmol/LCO2 23.0 mmol/LBUN 9.0 mg/
dLCREATININE 0.80 mg/dLCALCIUM 10.40 mg/dLAGE 65 GFR NonAA 72 GFR AA 87 eGFR >
60 mL/min/1.73 m2eGFR AA* >60 

 

 2011 9:45 AM  SODIUM 132.0 mmol/L

 

 2011 9:27 AM  SODIUM 137.0 mmol/L

 

 2011 8:35 AM  TRIGLYCERIDES 114.0 mg/dLCHOLESTEROL 145.0 mg/dLHDL 56.0 mg/
dLTOT CHOL/HDL 2.6 LDL (CALC) 66.0 mg/dLGLUCOSE 105.0 mg/dLSODIUM 138.0 mmol/
LPOTASSIUM 4.40 mmol/LCHLORIDE 103.0 mmol/LCO2 25.0 mmol/LBUN 8.0 mg/
dLCREATININE 0.70 mg/dLSGOT/AST 36.0 IU/LSGPT/ALT 38.0 IU/LALK PHOS 103.0 IU/
LTOTAL PROTEIN 7.40 g/dLALBUMIN 4.30 g/dLTOTAL BILI 0.30 mg/dLCALCIUM 9.20 mg/
dLAGE 65 GFR NonAA 84 GFR  eGFR >60 mL/min/1.73 m2eGFR AA* >60 WBC 5.1 
RBC 3.76 HGB 12.0 g/dLHCT 36.10 %MCV 96.0 fLMCH 31.90 pgMCHC 33.20 g/dLRDW SD 
46 RDW CV 13.10 %MPV 9.40 fLPLT 249 NRBC# 0.00 NRBC% 0.0 %NEUT 60.40 %%LYMP 
25.90 %%MONO 8.90 %%EOS 4.0 %%BASO 0.80 %#NEUT 3.06 #LYMP 1.31 #MONO 0.45 #EOS 
0.20 #BASO 0.04 MANUAL DIFF NOT IND 

 

 2011 9:55 AM  TSH 1.070 uIU/mL

 

 2011 8:55 AM  GLUCOSE 102.0 mg/dLSODIUM 135.0 mmol/LPOTASSIUM 4.20 mmol/
LCHLORIDE 102.0 mmol/LCO2 24.0 mmol/LBUN 15.0 mg/dLCREATININE 0.80 mg/dLSGOT/
AST 30.0 IU/LSGPT/ALT 35.0 IU/LALK PHOS 119.0 IU/LTOTAL PROTEIN 7.50 g/
dLALBUMIN 4.30 g/dLTOTAL BILI 0.30 mg/dLCALCIUM 9.20 mg/dLAGE 65 GFR NonAA 72 
GFR AA 87 eGFR >60 mL/min/1.73 m2eGFR AA* >60 TSH 1.130 uIU/mL

 

 2011 9:50 AM  GLUCOSE 85.0 mg/dLSODIUM 133.0 mmol/LPOTASSIUM 4.20 mmol/
LCHLORIDE 101.0 mmol/LCO2 21.0 mmol/LBUN 13.0 mg/dLCREATININE 0.80 mg/dLSGOT/
AST 36.0 IU/LSGPT/ALT 36.0 IU/LALK PHOS 109.0 IU/LTOTAL PROTEIN 7.40 g/
dLALBUMIN 4.20 g/dLTOTAL BILI 0.50 mg/dLCALCIUM 9.40 mg/dLAGE 65 GFR NonAA 72 
GFR AA 87 eGFR >60 mL/min/1.73 m2eGFR AA* >60 

 

 2011 8:55 AM  GLUCOSE 98.0 mg/dLSODIUM 137.0 mmol/LPOTASSIUM 3.90 mmol/
LCHLORIDE 103.0 mmol/LCO2 25.0 mmol/LBUN 14.0 mg/dLCREATININE 0.70 mg/dLSGOT/
AST 33.0 IU/LSGPT/ALT 36.0 IU/LALK PHOS 111.0 IU/LTOTAL PROTEIN 8.30 g/
dLALBUMIN 4.40 g/dLTOTAL BILI 0.50 mg/dLCALCIUM 9.40 mg/dLAGE 66 GFR NonAA 84 
GFR  eGFR >60 mL/min/1.73 m2eGFR AA* >60 TRIGLYCERIDES 109.0 mg/
dLCHOLESTEROL 153.0 mg/dLHDL 54.0 mg/dLTOT CHOL/HDL 2.8 LDL (CALC) 77.0 mg/
dLTSH 1.330 uIU/mL

 

 2011 10:17 AM  Colonoscopy-Women and Men over 50 Declined Mammogram -
Women over 40 Normal Pap Smear Negative 

 

 2012 10:33 AM  WET PREP NO TRICH SEEN CLUE CELLS NONE SEEN 

 

 2012 8:45 AM  WBC 5.2 RBC 3.96 HGB 12.70 g/dLHCT 37.80 %MCV 96.0 fLMCH 
32.10 pgMCHC 33.60 g/dLRDW SD 46 RDW CV 13.30 %MPV 9.70 fLPLT 297 NRBC# 0.00 
NRBC% 0.0 %NEUT 57.70 %%LYMP 28.50 %%MONO 10.30 %%EOS 2.50 %%BASO 1.0 %#NEUT 
3.02 #LYMP 1.49 #MONO 0.54 #EOS 0.13 #BASO 0.05 MANUAL DIFF NOT IND GLUCOSE 
97.0 mg/dLSODIUM 137.0 mmol/LPOTASSIUM 4.40 mmol/LCHLORIDE 101.0 mmol/LCO2 23.0 
mmol/LBUN 17.0 mg/dLCREATININE 0.80 mg/dLSGOT/AST 30.0 IU/LSGPT/ALT 33.0 IU/
LALK PHOS 95.0 IU/LTOTAL PROTEIN 7.40 g/dLALBUMIN 4.40 g/dLTOTAL BILI 0.60 mg/
dLCALCIUM 9.70 mg/dLAGE 66 GFR NonAA 72 GFR AA 87 eGFR 60 eGFR AA* 60 
TRIGLYCERIDES 130.0 mg/dLCHOLESTEROL 157.0 mg/dLHDL 56.0 mg/dLTOT CHOL/HDL 2.8 
LDL (CALC) 75.0 mg/dLTSH 0.940 uIU/mL

 

 8/15/2012 4:02 PM  WET PREP NO TRICH SEEN CLUE CELLS NONE SEEN 

 

 2012 8:45 AM  WBC 5.1 RBC 3.91 HGB 12.50 g/dLHCT 36.30 %MCV 93.0 fLMCH 
32.0 pgMCHC 34.40 g/dLRDW SD 43 RDW CV 12.60 %MPV 9.30 fLPLT 244 NRBC# 0.00 NRBC
% 0.0 %NEUT 57.60 %%LYMP 27.50 %%MONO 9.10 %%EOS 5.0 %%BASO 0.80 %#NEUT 2.91 #
LYMP 1.39 #MONO 0.46 #EOS 0.25 #BASO 0.04 MANUAL DIFF NOT IND TSH 1.390 uIU/
mLTRIGLYCERIDES 154.0 mg/dLCHOLESTEROL 147.0 mg/dLHDL 45.0 mg/dLTOT CHOL/HDL 
3.3 LDL (CALC) 71.0 mg/dLGLUCOSE 101.0 mg/dLSODIUM 134.0 mmol/LPOTASSIUM 4.30 
mmol/LCHLORIDE 102.0 mmol/LCO2 23.0 mmol/LBUN 11.0 mg/dLCREATININE 0.80 mg/
dLSGOT/AST 34.0 IU/LSGPT/ALT 38.0 IU/LALK PHOS 104.0 IU/LTOTAL PROTEIN 7.60 g/
dLALBUMIN 4.40 g/dLTOTAL BILI 0.50 mg/dLCALCIUM 9.40 mg/dLAGE 67 GFR NonAA 72 
GFR AA 87 eGFR 60 eGFR AA* 60 

 

 12/10/2012 8:34 AM  Colonoscopy-Women and Men over 50 Declined Mammogram -
Women over 40 Declined Pap Smear Declined 

 

 2012 5:02 PM  WET PREP NO TRICH SEEN CLUE CELLS NONE SEEN 

 

 2013 8:25 AM  WBC 4.2 RBC 3.96 HGB 12.90 g/dLHCT 37.0 %MCV 93.0 fLMCH 
32.60 pgMCHC 34.90 g/dLRDW SD 43 RDW CV 12.60 %MPV 9.70 fLPLT 254 NRBC# 0.00 
NRBC% 0.0 %NEUT 54.40 %%LYMP 30.40 %%MONO 10.80 %%EOS 3.40 %%BASO 1.0 %#NEUT 
2.26 #LYMP 1.26 #MONO 0.45 #EOS 0.14 #BASO 0.04 MANUAL DIFF NOT IND TSH 1.230 
uIU/mLGLUCOSE 94.0 mg/dLSODIUM 136.0 mmol/LPOTASSIUM 4.30 mmol/LCHLORIDE 99.0 
mmol/LCO2 25.0 mmol/LBUN 10.0 mg/dLCREATININE 0.80 mg/dLSGOT/AST 36.0 IU/LSGPT/
ALT 36.0 IU/LALK PHOS 84.0 IU/LTOTAL PROTEIN 7.90 g/dLALBUMIN 4.40 g/dLTOTAL 
BILI 0.70 mg/dLCALCIUM 9.80 mg/dLAGE 67 GFR NonAA 72 GFR AA 87 eGFR 60 eGFR AA* 
60 TRIGLYCERIDES 122.0 mg/dLCHOLESTEROL 141.0 mg/dLHDL 47.0 mg/dLTOT CHOL/HDL 
3.0 LDL (CALC) 70.0 mg/dL

 

 6/10/2013 3:52 PM  WET PREP NO TRICH SEEN CLUE CELLS NONE SEEN 

 

 2013 8:45 AM  WBC 5.3 RBC 4.01 HGB 13.0 g/dLHCT 37.60 %MCV 94.0 fLMCH 
32.40 pgMCHC 34.60 g/dLRDW SD 43 RDW CV 12.50 %MPV 9.40 fLPLT 248 NRBC# 0.00 
NRBC% 0.0 %NEUT 58.70 %%LYMP 27.80 %%MONO 9.80 %%EOS 2.80 %%BASO 0.90 %#NEUT 
3.12 #LYMP 1.48 #MONO 0.52 #EOS 0.15 #BASO 0.05 MANUAL DIFF NOT IND TSH 1.570 
uIU/mLGLUCOSE 94.0 mg/dLSODIUM 134.0 mmol/LPOTASSIUM 4.10 mmol/LCHLORIDE 101.0 
mmol/LCO2 23.0 mmol/LBUN 11.0 mg/dLCREATININE 0.80 mg/dLSGOT/AST 41.0 IU/LSGPT/
ALT 44.0 IU/LALK PHOS 109.0 IU/LTOTAL PROTEIN 7.90 g/dLALBUMIN 4.70 g/dLTOTAL 
BILI 0.80 mg/dLCALCIUM 10.40 mg/dLAGE 68 GFR NonAA 72 GFR AA 87 eGFR >60 mL/min/
1.73 m2eGFR AA* >60 TRIGLYCERIDES 163.0 mg/dLCHOLESTEROL 138.0 mg/dLHDL 49.0 mg/
dLTOT CHOL/HDL 2.8 LDL (CALC) 56.0 mg/dL

 

 2014 8:58 AM  GLUCOSE 86.0 mg/dLSODIUM 134.0 mmol/LPOTASSIUM 4.20 mmol/
LCHLORIDE 99.0 mmol/LCO2 25.0 mmol/LBUN 14.0 mg/dLCREATININE 0.80 mg/dLCALCIUM 
10.10 mg/dLAGE 68 GFR NonAA 72 GFR AA 87 eGFR >60 mL/min/1.73 m2eGFR AA* >60 
FREE T4 1.18 TSH 1.20 uIU/mL

 

 2014 9:50 AM  WBC 5.7 RBC 4.03 HGB 12.90 g/dLHCT 37.90 %MCV 94.0 fLMCH 
32.0 pgMCHC 34.0 g/dLRDW SD 44 RDW CV 12.70 %MPV 9.70 fLPLT 292 NRBC# 0.00 NRBC
% 0.0 %NEUT 62.70 %%LYMP 21.80 %%MONO 11.0 %%EOS 3.40 %%BASO 1.10 %#NEUT 3.55 #
LYMP 1.23 #MONO 0.62 #EOS 0.19 #BASO 0.06 MANUAL DIFF NOT IND GLUCOSE 98.0 mg/
dLSODIUM 135.0 mmol/LPOTASSIUM 4.30 mmol/LCHLORIDE 102.0 mmol/LCO2 22.0 mmol/
LBUN 10.0 mg/dLCREATININE 0.80 mg/dLSGOT/AST 34.0 IU/LSGPT/ALT 32.0 IU/LALK 
PHOS 95.0 IU/LTOTAL PROTEIN 7.70 g/dLALBUMIN 4.30 g/dLTOTAL BILI 0.80 mg/
dLCALCIUM 9.10 mg/dLAGE 68 GFR NonAA 72 GFR AA 87 eGFR 60 eGFR AA* 60 
TRIGLYCERIDES 108.0 mg/dLCHOLESTEROL 146.0 mg/dLHDL 56.0 mg/dLTOT CHOL/HDL 2.6 
LDL (CALC) 68.0 mg/dLTSH 0.90 uIU/mL

 

 2014 8:40 AM  WBC 4.4 RBC 4.13 HGB 13.20 g/dLHCT 38.90 %MCV 94.0 fLMCH 
32.0 pgMCHC 33.90 g/dLRDW SD 47 RDW CV 13.50 %MPV 9.80 fLPLT 279 NRBC# 0.00 NRBC
% 0.0 %NEUT 63.80 %%LYMP 24.60 %%MONO 9.30 %%EOS 1.60 %%BASO 0.70 %#NEUT 2.80 #
LYMP 1.08 #MONO 0.41 #EOS 0.07 #BASO 0.03 MANUAL DIFF NOT IND GLUCOSE 96.0 mg/
dLSODIUM 136.0 mmol/LPOTASSIUM 4.10 mmol/LCHLORIDE 99.0 mmol/LCO2 23.0 mmol/
LBUN 8.0 mg/dLCREATININE 0.80 mg/dLSGOT/AST 31.0 IU/LSGPT/ALT 27.0 IU/LALK PHOS 
85.0 IU/LTOTAL PROTEIN 7.60 g/dLALBUMIN 4.40 g/dLTOTAL BILI 0.80 mg/dLCALCIUM 
10.20 mg/dLAGE 69 GFR NonAA 71 GFR AA 86 eGFR 60 eGFR AA* 60 TRIGLYCERIDES 61.0 
mg/dLCHOLESTEROL 148.0 mg/dLHDL 71.0 mg/dLTOT CHOL/HDL 2.1 LDL (CALC) 65.0 mg/
dLTSH 0.990 uIU/mL

 

 2015 8:32 AM  WBC 4.8 RBC 3.98 HGB 12.70 g/dLHCT 37.30 %MCV 94.0 fLMCH 
31.90 pgMCHC 34.0 g/dLRDW SD 43 RDW CV 12.70 %MPV 9.50 fLPLT 270 NRBC# 0.00 NRBC
% 0.0 %NEUT 57.30 %%LYMP 25.0 %%MONO 13.0 %%EOS 3.60 %%BASO 1.10 %#NEUT 2.73 #
LYMP 1.19 #MONO 0.62 #EOS 0.17 #BASO 0.05 MANUAL DIFF NOT IND TSH 0.640 uIU/
mLGLUCOSE 90.0 mg/dLSODIUM 136.0 mmol/LPOTASSIUM 4.0 mmol/LCHLORIDE 101.0 mmol/
LCO2 24.0 mmol/LBUN 10.0 mg/dLCREATININE 0.80 mg/dLSGOT/AST 34.0 IU/LSGPT/ALT 
32.0 IU/LALK PHOS 92.0 IU/LTOTAL PROTEIN 7.50 g/dLALBUMIN 4.40 g/dLTOTAL BILI 
0.70 mg/dLCALCIUM 9.50 mg/dLAGE 69 GFR NonAA 71 GFR AA 86 eGFR >60 mL/min/1.73 
m2eGFR AA* >60 TRIGLYCERIDES 107.0 mg/dLCHOLESTEROL 138.0 mg/dLHDL 58.0 mg/
dLTOT CHOL/HDL 2.4 LDL (CALC) 59.0 mg/dL

 

 2015 8:31 AM  WBC 4.6 RBC 3.97 HGB 12.90 g/dLHCT 38.0 %MCV 96.0 fLMCH 
32.50 pgMCHC 33.90 g/dLRDW SD 44 RDW CV 12.60 %MPV 9.0 fLPLT 248 NRBC# 0.00 NRBC
% 0.0 %NEUT 61.0 %%LYMP 24.70 %%MONO 10.20 %%EOS 3.0 %%BASO 1.10 %#NEUT 2.82 #
LYMP 1.14 #MONO 0.47 #EOS 0.14 #BASO 0.05 MANUAL DIFF NOT IND TRIGLYCERIDES 
105.0 mg/dLCHOLESTEROL 141.0 mg/dLHDL 58.0 mg/dLTOT CHOL/HDL 2.4 LDL (CALC) 
62.0 mg/dLGLUCOSE 93.0 mg/dLSODIUM 136.0 mmol/LPOTASSIUM 4.10 mmol/LCHLORIDE 
101.0 mmol/LCO2 25.0 mmol/LBUN 9.0 mg/dLCREATININE 0.80 mg/dLSGOT/AST 32.0 IU/
LSGPT/ALT 28.0 IU/LALK PHOS 95.0 IU/LTOTAL PROTEIN 7.30 g/dLALBUMIN 4.50 g/
dLTOTAL BILI 0.80 mg/dLCALCIUM 9.70 mg/dLAGE 70 GFR NonAA 71 GFR AA 86 eGFR >60 
mL/min/1.73meGFR AA* >60 TSH 1.10 uIU/mL

 

 2016 8:25 AM  TSH 0.70 uIU/mLTRIGLYCERIDES 85.0 mg/dLCHOLESTEROL 166.0 mg
/dLHDL 74.0 mg/dLTOT CHOL/HDL 2.2 LDL (CALC) 75.0 mg/dLGLUCOSE 96.0 mg/dLSODIUM 
136.0 mmol/LPOTASSIUM 4.0 mmol/LCHLORIDE 100.0 mmol/LCO2 24.0 mmol/LBUN 11.0 mg/
dLCREATININE 0.80 mg/dLSGOT/AST 34.0 IU/LSGPT/ALT 24.0 IU/LALK PHOS 98.0 IU/
LTOTAL PROTEIN 7.60 g/dLALBUMIN 4.40 g/dLTOTAL BILI 0.80 mg/dLCALCIUM 9.70 mg/
dLAGE 71 GFR NonAA 71 GFR AA 86 eGFR >60 mL/min/1.73meGFR AA* >60 WBC 5.9 RBC 
4.00 HGB 12.80 g/dLHCT 38.0 %MCV 95.0 fLMCH 32.0 pgMCHC 33.70 g/dLRDW SD 43 RDW 
CV 12.20 %MPV 9.30 fLPLT 272 NRBC# 0.00 NRBC% 0.0 %NEUT 73.90 %%LYMP 15.60 %%
MONO 7.80 %%EOS 1.70 %%BASO 0.70 %#NEUT 4.35 #LYMP 0.92 #MONO 0.46 #EOS 0.10 #
BASO 0.04 MANUAL DIFF NOT IND 

 

 2017 4:05 PM  IRON TOTAL 31.0 ug/dLTransferrin 256.0 mg/dLTIBC Calculation 
320 %Saturation Calc 10 WBC 6.2 RBC 3.56 HGB 11.60 g/dLHCT 33.40 %MCV 94.0 
fLMCH 32.60 pgMCHC 34.70 g/dLRDW SD 42 RDW CV 12.10 %MPV 9.50 fLPLT 260 NRBC# 
0.00 NRBC% 0.0 

 

 2017 8:49 AM  OCC BLD STOOL POSITIVE 

 

 7/3/2017 8:15 AM  WBC 6.7 RBC 3.96 HGB 12.70 g/dLHCT 37.30 %MCV 94.0 fLMCH 
32.10 pgMCHC 34.0 g/dLRDW SD 41 RDW CV 11.90 %MPV 9.80 fLPLT 275 NRBC# 0.00 NRBC
% 0.0 %NEUT 76.20 %%LYMP 13.60 %%MONO 8.50 %%EOS 1.0 %%BASO 0.40 %#NEUT 5.07 #
LYMP 0.91 #MONO 0.57 #EOS 0.07 #BASO 0.03 MANUAL DIFF NOT IND TRIGLYCERIDES 
97.0 mg/dLCHOLESTEROL 144.0 mg/dLHDL 60.0 mg/dLTOT CHOL/HDL 2.4 LDL (CALC) 65.0 
mg/dLGLUCOSE 91.0 mg/dLSODIUM 131.0 mmol/LPOTASSIUM 3.70 mmol/LCHLORIDE 96.0 
mmol/LCO2 25.0 mmol/LBUN 10.0 mg/dLCREATININE 0.80 mg/dLSGOT/AST 29.0 IU/LSGPT/
ALT 21.0 IU/LALK PHOS 85.0 IU/LTOTAL PROTEIN 7.60 g/dLALBUMIN 4.40 g/dLTOTAL 
BILI 0.80 mg/dLCALCIUM 9.60 mg/dLAGE 71 GFR NonAA 71 GFR AA 86 eGFR >60 mL/min/
1.73meGFR AA* >60 TSH 0.90 uIU/mL

 

 2017 8:12 AM  GLUCOSE 88.0 mg/dLSODIUM 135.0 mmol/LPOTASSIUM 3.80 mmol/
LCHLORIDE 100.0 mmol/LCO2 26.0 mmol/LBUN 9.0 mg/dLCREATININE 0.80 mg/dLCALCIUM 
9.50 mg/dLAGE 71 GFR NonAA 71 GFR AA 86 eGFR >60 mL/min/1.73meGFR AA* >60 







History Of Immunizations







 Name  Date Admin  Mfg Name  Mfg Code  Trade Name  Lot#  Route  Inj  Vis Given  
Vis Pub  CVX

 

 Influenza  10/20/2010  [employer] pasteur  [facility]  Fluzone  [id number]  Intramuscular  
Left Arm  10/20/2010  2010  999

 

 Influenza  10/28/2011  [employer] pasteur  [facility]  Fluzone  [id number]  Intramuscular  
Left Arm  10/28/2011  2011  141

 

 Influenza  10/29/2012  [employer] pasteur  [facility]  Fluzone  [id number]  Intramuscular  
Left Deltoid  10/29/2012  2012  141

 

 X  12/10/2012  [employer]  MSD  Pneumovax 23  [id number]  Intramuscular  
Left Gluteous Medius  12/10/2012  2010  33

 

 Influenza  10/28/2013  [employer] pasteur  [facility]  Fluzone > 3 Years  [id number]  
Intramuscular  Right Deltoid  10/28/2013  2013  141

 

 X  9/2/2015  Not Entered  NE  Prevnar 13     Not Entered  Not Entered  1  109

 

 Influenza  2015  Not Entered  NE  Not Entered     Not Entered  Not Entered
  2015  141

 

 HepB Adult  2016  [employer]  MSD  Recombivax Adult  [id number]  Not 
Entered  Left Deltoid  2016  43

 

 HepB Adult  2016  [employer]  MSD  Recombivax Adult  [id number]  
Intramuscular  Right Deltoid  2016  43

 

 Zostavax  2012  Not Entered  NE  Not Entered     Not Entered  Not 
Entered  1  121

 

 Tdap  2012  Not Entered  NE  Not Entered     Not Entered  Not Entered  1  115

 

 Influenza  10/13/2016  Not Entered  NE  Fluzone High-Dose     Intramuscular  
Left Arm  1  141

 

 HepB Adult  2016  [employer]  MSD  Recombivax Adult  [id number]  
Intramuscular  Right Deltoid  2016  43







History of Past Illness







 Name  Date of Onset  Comments

 

 Benign Essential Hypertension  Dec 14 2009 10:10AM   

 

 Hyperlipidemia  Dec 14 2009 10:10AM   

 

 Hypothyroidism, Acquired  Dec 14 2009 10:10AM   

 

 hypothyroid      

 

 Hypertension      

 

 Hyperlipidemia      

 

 acid reflux      

 

 Hypertension, Benign Essential  2010  9:41AM   

 

 Hypertension, Benign Essential  2010 12:53PM   

 

 Routine gynecological examination  May  5 2010  9:28AM   

 

 Hypertension  May  5 2010  9:28AM   

 

 Hyperlipidemia, unspecified  May  5 2010  9:28AM   

 

 Hypothyroidism, Acquired  May  5 2010  9:28AM   

 

 Vulvovaginitis  May  5 2010  9:28AM   

 

 Rhinitis, Allergic  May  5 2010  9:28AM   

 

 Hypertension, Benign Essential  May 19 2010  8:48AM   

 

 Hypertension, Benign Essential  May 25 2010  9:39AM   

 

 Flu  Oct 20 2010 12:01PM   

 

 Essential Hypertension  2010  8:34AM   

 

 Hyperlipidemia  2010  8:34AM   

 

 Gastroesophageal Reflux  2010  8:34AM   

 

 Hypothyroidism, Acquired  2010  8:34AM   

 

 Hypertension, Benign Essential  2010  9:22AM   

 

 Hypertension, Benign Essential  Dec 15 2010  9:07AM   

 

 Essential Hypertension  2011  1:31PM   

 

 Hyperlipidemia  2011  1:31PM   

 

 Gastroesophageal Reflux  2011  1:31PM   

 

 Hypothyroidism, Acquired  2011  1:31PM   

 

 Essential Hypertension  2011  8:51AM   

 

 Hyperlipidemia  2011  8:51AM   

 

 Gastroesophageal Reflux  2011  8:51AM   

 

 Hypothyroidism, Acquired  2011  8:51AM   

 

 Essential Hypertension  2011  9:13AM   

 

 Hyperlipidemia  2011  9:13AM   

 

 Gastroesophageal Reflux  2011  9:13AM   

 

 Hypothyroidism, Acquired  2011  9:13AM   

 

 Nausea With Vomiting  2011  1:18PM   

 

 Hyponatremia  2011  8:46AM   

 

 Nausea  2011  9:13AM   

 

 Hypothyroidism  2011 11:10AM   

 

 Hyponatremia  2011 11:10AM   

 

 Essential Hypertension  2011 10:05AM   

 

 Hyperlipidemia  2011 10:05AM   

 

 Gastroesophageal Reflux  2011 10:05AM   

 

 Nausea  2011 10:05AM   

 

 Hypothyroidism, Acquired  2011 10:05AM   

 

 Hyponatremia  May  6 2011 11:34AM   

 

 Essential Hypertension  May 16 2011  8:50AM   

 

 Hyperlipidemia  May 16 2011  8:50AM   

 

 Gastroesophageal Reflux  May 16 2011  8:50AM   

 

 Nausea  May 16 2011  8:50AM   

 

 Hypothyroidism, Acquired  May 16 2011  8:50AM   

 

 Hyponatremia  May 16 2011  8:50AM   

 

 Routine gynecological examination  2011  8:54AM   

 

 Hypertension  2011  8:54AM   

 

 Hyperlipidemia, unspecified  2011  8:54AM   

 

 Hypothyroidism, Acquired  2011  8:54AM   

 

 Rhinitis, Allergic  2011  8:54AM   

 

 Hypothyroidism  Aug 29 2011 12:37PM   

 

 Hyponatremia  Aug 29 2011 12:37PM   

 

 Essential Hypertension  Sep 12 2011  8:53AM   

 

 Hyperlipidemia  Sep 12 2011  8:53AM   

 

 Gastroesophageal Reflux  Sep 12 2011  8:53AM   

 

 Hypothyroidism, Acquired  Sep 12 2011  8:53AM   

 

 Hyponatremia  Sep 12 2011  8:53AM   

 

 Hypertension  Sep 29 2011  9:41AM   

 

 Hyperlipidemia  Dec  8 2011  8:31AM   

 

 Hypothyroidism  Dec  8 2011  8:31AM   

 

 Hypertension  Dec  8 2011  8:31AM   

 

 Flu  Dec  9 2011 10:02AM   

 

 Essential Hypertension  Dec 13 2011 10:13AM   

 

 Hyperlipidemia  Dec 13 2011 10:13AM   

 

 Gastroesophageal Reflux  Dec 13 2011 10:13AM   

 

 Hypothyroidism, Acquired  Dec 13 2011 10:13AM   

 

 Hyponatremia  Dec 13 2011 10:13AM   

 

 Vaginal Discharge  2012  9:43AM   

 

 Rhinitis, Allergic  Feb 15 2012  9:31AM   

 

 Eustachian Tube Dysfunction  Feb 15 2012  9:31AM   

 

 Pharyngitis, Acute  Feb 15 2012  9:31AM   

 

 Routine gynecological examination  2012  8:48AM   

 

 Hypertension  2012  8:48AM   

 

 Hyperlipidemia, unspecified  2012  8:48AM   

 

 Hypothyroidism, Acquired  2012  8:48AM   

 

 Rhinitis, Allergic  2012  8:48AM   

 

 Candidiasis, Vulvovaginal  2012 11:04AM   

 

 Essential Hypertension  Aug 15 2012  9:02AM   

 

 Hyperlipidemia  Aug 15 2012  9:02AM   

 

 Gastroesophageal Reflux  Aug 15 2012  9:02AM   

 

 Hypothyroidism, Acquired  Aug 15 2012  9:02AM   

 

 Hyponatremia  Aug 15 2012  9:02AM   

 

 Atrophic Vaginitis, Postmenopausal  Aug 15 2012  9:02AM   

 

 Flu  Oct 29 2012  9:24AM   

 

 Essential Hypertension  Dec 10 2012  8:35AM   

 

 Hyperlipidemia  Dec 10 2012  8:35AM   

 

 Gastroesophageal Reflux  Dec 10 2012  8:35AM   

 

 Hypothyroidism, Acquired  Dec 10 2012  8:35AM   

 

 Hyponatremia  Dec 10 2012  8:35AM   

 

 Atrophic Vaginitis, Postmenopausal  Dec 10 2012  8:35AM   

 

 Pneumococcus  Dec 10 2012  2:07PM   

 

 Vaginal Discharge  Dec 20 2012  1:50PM   

 

 Essential Hypertension  Dec 20 2012  1:50PM   

 

 Hyperlipidemia  Dec 20 2012  1:50PM   

 

 Gastroesophageal Reflux  Dec 20 2012  1:50PM   

 

 Hypothyroidism, Acquired  Dec 20 2012  1:50PM   

 

 Atrophic Vaginitis, Postmenopausal  Dec 20 2012  1:50PM   

 

 Upper Respiratory Infections  2013  8:52AM   

 

 Hyperlipidemia  2013  8:21AM   

 

 Hypertension  2013  8:21AM   

 

 Hypothyroidism  2013  8:21AM   

 

 Screening Mammogram  Beto 10 2013  8:45AM   

 

 Routine gynecological examination  Beto 10 2013  8:34AM   

 

 Hypertension  Beto 10 2013  8:34AM   

 

 Hyperlipidemia, unspecified  Beto 10 2013  8:34AM   

 

 Hypothyroidism, Acquired  Beto 10 2013  8:34AM   

 

 Rhinitis, Allergic  Beto 10 2013  8:34AM   

 

 Flu  Oct 28 2013  8:52AM   

 

 Essential Hypertension  Dec  9 2013  8:37AM   

 

 Hyperlipidemia  Dec  9 2013  8:37AM   

 

 Gastroesophageal Reflux  Dec  9 2013  8:37AM   

 

 Hypothyroidism, Acquired  Dec  9 2013  8:37AM   

 

 Atrophic Vaginitis, Postmenopausal  Dec  9 2013  8:37AM   

 

 Hypertension  2014  9:56AM   

 

 Hyperlipidemia  2014  9:56AM   

 

 Hypothyroidism  2014  9:56AM   

 

 Screening Mammogram  2014  8:32AM   

 

 Osteopenia  2014  8:32AM   

 

 Hypertension  2014  8:35AM   

 

 Hypothyroidism, Acquired  2014  8:35AM   

 

 Hyperlipidemia  2014  8:35AM   

 

 Upper Respiratory Infections  2014  9:28AM   

 

 Sinusitis, Acute  Oct  2 2014  9:17AM   

 

 Herpes Zoster  Oct  5 2014  1:44PM   

 

 Vesicular Eruption  Oct  5 2014  1:44PM   

 

 Hypertension  2014  8:18AM   

 

 Hyperlipidemia  2014  8:18AM   

 

 hypothyroid  2014  8:18AM   

 

 Situational anxiety  Dec 20 2014  9:33AM   

 

 GERD (gastroesophageal reflux disease)  Dec 20 2014  9:33AM   

 

 Nausea  Dec 20 2014  9:33AM   

 

 Abdominal Pain, Epigastric  Dec 20 2014  9:33AM   

 

 Hyperlipidemia  Oct  6 2014  9:03AM   

 

 acid reflux  Oct  6 2014  9:03AM   

 

 hypothyroid  Oct  6 2014  9:03AM   

 

 Shingles  Oct  6 2014  9:03AM   

 

 Pharyngitis  2015  1:09PM   

 

 Bronchitis  2015  9:10AM   

 

 Hyperlipidemia  2015  9:10AM   

 

 acid reflux  2015  9:10AM   

 

 hypothyroid  2015  9:10AM   

 

 Hyperlipidemia  2015  8:18AM   

 

 Hypertension  2015  8:18AM   

 

 hypothyroid  2015  8:18AM   

 

 Screening Mammogram  2015 11:43AM   

 

 Medicare Annual Pap Q 2 years  2015  9:36AM   

 

 Screening Mammogram  2015  9:36AM   

 

 Candidiasis of female genitalia  2015  9:36AM   

 

 Hypertension  2015 11:34AM   

 

 Hyperlipidemia, unspecified  2015 11:34AM   

 

 Hypothyroidism, Acquired  2015 11:34AM   

 

 Osteopenia  2016  8:41AM   

 

 Encounter for screening mammogram for breast cancer  2016  8:41AM   

 

 Hypertension  2016  8:34AM   

 

 Lymphedema  2016  8:34AM   

 

 Hyperlipidemia  2016  8:34AM   

 

 hypothyroid  2016  8:34AM   

 

 HEP B  2016  9:13AM   

 

 HEP B  2016  8:52AM   

 

 Acid Reflux  2016  8:34AM   

 

 History of acute gastritis  Oct 13 2016  2:30PM   

 

 Anxiety as acute reaction to exceptional stress  Oct 13 2016  2:30PM   

 

 HEP B  Dec 29 2016  8:42AM   

 

 Caregiver stress syndrome  May 26 2017  8:28AM   

 

 Anxiety  May 26 2017  8:28AM   

 

 Blood in stool  2017  2:54PM   

 

 Upper GI bleed  2017 11:34AM   

 

 Hyponatremia  2017  9:03AM   

 

 Hyponatremia  2017  8:43AM   

 

 Medicare Annual Pap Q 2 years  2017  9:03AM   

 

 Nausea  2017  9:03AM   

 

 Uterine enlargement  2017  9:03AM   







Payers







 Insurance Name  Company Name  Plan Name  Plan Number  Policy Number  Policy 
Group Number  Start Date

 

    Medicare [facility]  Medicare [facility]     [id number]     N/A

 

    BC  [facility]     [id number]     2009

 

    Medicare Part B  [employer]     [id number]     N/A

 

    Medicare Part A  Medicare Part A     [id number]     N/A

 

    Medicare Part A  [employer] - Preventive     [id number]     N/A

 

    Medicare Part A  Medicare - Lab/Xray     [id number]     N/A







History of Encounters







 Visit Date  Visit Type  Provider

 

 2017  Office visit  Doris Noriega MD

 

 2017  Office visit  Doris Noriega MD

 

 2017  Office visit  Doris Noriega MD

 

 2017  Office visit  Prince Noe APRN

 

 2016  Nurse visit  Doris Noriega MD

 

 10/13/2016  Office visit  Doris Noriega MD

 

 2016  Nurse visit  Doris Noriega MD

 

 2016  Office visit  Doris Noriega MD

 

 2015  Office visit  Doris Noriega MD

 

 2015  Office visit  Doris Noriega MD

 

 2015  Office visit  Prince Noe APRN

 

 2014  Office visit  Anyi Herrera APRN

 

 10/27/2014  Voided  Doris Noriega MD

 

 10/6/2014  Office visit  Doris Noriega MD

 

 10/5/2014  Office visit  Anyi Herrera APRN

 

 10/2/2014  Office visit   

 

 10/2/2014  Office visit  Corrine Bay APRN

 

 2014  Office visit  Kassie Franklin APRN

 

 2014  Office visit  Doris Noriega MD

 

 2014  Office visit  Doris Noriega MD

 

 2013  Office visit  Dee Colindres MD

 

 10/28/2013  Nurse visit  Dee Colindres MD

 

 6/10/2013  Office visit  Dee Colindres MD

 

 2013  Office visit  Corrine Bay APRN

 

 2012  Office visit  Dee Colindres MD

 

 12/10/2012  Office visit  Dee Colindres MD

 

 10/29/2012  Nurse visit  Dee Colindres MD

 

 8/15/2012  Office visit  Dee Colindres MD

 

 2012  Office visit  Corrine Bay APRN

 

 2012  Office visit  Dee Colindres MD

 

 2/15/2012  Office visit  eDe Colindres MD

 

 2012  Office visit  Corrine Bay APRN

 

 2011  Office visit  Dee Colindres MD

 

 10/28/2011  Nurse visit  Shad Nolasco MD

 

 2011  Office visit  Dee Colindres MD

 

 2011  Office visit  Dee Colindres MD

 

 2011  Office visit  Dee Colindres MD

 

 2011  Office visit  Dee Colindres MD

 

 2011  Office visit  Dee Colindres MD

 

 2011  Office visit  Dee Colindres MD

 

 4/10/2011  [facility]  KHRIS Reeves MD

 

 4/10/2011  [facility]  RICKEY Toussaint MD

 

 2011  Office visit  RICKEY Toussaint MD

 

 2011  [facility]  Fide Castellanos MD

 

 2011  Voided  Fide Castellanos MD

 

 2011  Office visit  Dee Colindres MD

 

 12/15/2010  Nurse visit  Dee Colindres MD

 

 2010  Office visit  Dee Colindres MD

 

 10/20/2010  Nurse visit  Stephenie PERAZA

 

 2010  Nurse visit  Dee Colindres MD

 

 2010  Nurse visit  Dee Colindres MD

 

 2010  Office visit  Dee Colindres MD

 

 3/19/2010  Voided  Stephenie Kay PA

 

 2010  Nurse visit  Stephenie PERAZA

 

 2010  Nurse visit  Stephenie PERAZA

 

 2010  Nurse visit  Stephenie PERAZA

 

 2009  Office visit  Stephenie PERAZA

 

 10/20/2009  Nurse visit  Stephenie Kay PA

## 2018-10-22 NOTE — XMS REPORT
MU2 Ambulatory Summary

 Created on: 10/18/2017



Milady Crespo

External Reference #: [id number]

: 1945

Sex: Female



Demographics







 Address  [address]

[place]  [address]

 

 Home Phone  [phone]

 

 Preferred Language  English

 

 Marital Status  

 

 Baptism Affiliation  Unknown

 

 Race  White

 

 Ethnic Group  Not  or 





Author







 Author  Doris Noriega

 

 Organization  [facility]

 

 Address  [address]

[place], [place]  [address]



 

 Phone  [phone]







Care Team Providers







 Care Team Member Name  Role  Phone

 

 Doris Noriega  PCP  [phone]

 

 Doris Noriega  PreferredProvider  [phone]







Allergies and Adverse Reactions







 Name  Reaction  Notes

 

 NO KNOWN DRUG ALLERGIES      







Plan of Treatment







 Planned Activity  Comments  Planned Date  Planned Time  Plan/Goal

 

 Complete blood count (CBC) with differential count     2016  12:00 AM   

 

 Comprehensive metabolic panel     2016  12:00 AM   

 

 VITAMIN D (25 HYDROXY)     2016  12:00 AM   

 

 Lipid panel (total cholesterol, lipoproteins, HDL, triglycerides)     8/15/
2012  12:00 AM   

 

 Pap smear     2017  12:00 AM   

 

 Basic metabolic panel     2017  12:00 AM   

 

 Comprehensive Metabolic Panel     6/10/2013  12:00 AM   

 

 Lipid panel (total cholesterol, lipoproteins, HDL, triglycerides)     6/10/
2013  12:00 AM   

 

 Thyroid stimulating hormone (TSH)     6/10/2013  12:00 AM   

 

 CBC (automated H&H, platelets, WBC and automated differential)     6/10/2013  
12:00 AM   

 

 Comprehensive Metabolic Panel     2012  12:00 AM   

 

 Lipid panel (total cholesterol, lipoproteins, HDL, triglycerides)     2012  12:00 AM   

 

 Thyroid stimulating hormone (TSH)     2012  12:00 AM   

 

 CBC (automated H&H, platelets, WBC and automated differential)     2012  
12:00 AM   

 

 Screening mammography, bilateral     2015  12:00 AM   







Medications







 Active 

 

 Name  Start Date  Estimated Completion Date  SIG  Comments

 

 aspirin 81 mg oral tablet,delayed release (DR/EC)        take 1 tablet (81 mg) 
by oral route once daily   

 

 Vitamin D3 1,000 unit oral capsule        take 1 capsule by oral route daily   

 

 famotidine 20 mg oral tablet  2015     TAKE ONE TABLET BY MOUTH TWICE 
DAILY   

 

 valsartan 160 mg oral tablet  2016     TAKE ONE TABLET BY MOUTH ONCE 
DAILY   

 

 amlodipine 5 mg oral tablet  2016     TAKE ONE TABLET BY MOUTH ONCE DAILY
   

 

 Allergy Relief (loratadine) 10 mg oral tablet  2016     TAKE ONE TABLET 
BY MOUTH ONCE DAILY   

 

 amlodipine 5 mg oral tablet  2016     TAKE ONE TABLET BY MOUTH ONCE DAILY
   

 

 Allergy Relief (loratadine) 10 mg oral tablet  2016     TAKE ONE TABLET 
BY MOUTH ONCE DAILY   

 

 valsartan 160 mg oral tablet  2016     TAKE ONE TABLET BY MOUTH ONCE 
DAILY   

 

 Lipitor 20 mg oral tablet  10/13/2016  2018  take 1 tablet (20 mg) by oral 
route once daily   

 

 Plavix 75 mg oral tablet  10/13/2016  2018  take 1 tablet (75 mg) by oral 
route once daily   

 

 Zofran (as hydrochloride) 4 mg oral tablet  2016     take 1 tablet by 
oral route daily as needed for 14 days   

 

 Zofran (as hydrochloride) 4 mg oral tablet  2017     take 1 tablet by oral 
route daily as needed for 14 days   

 

 Zofran (as hydrochloride) 4 mg oral tablet  2017     take 1 tablet by oral 
route daily as needed for 14 days   

 

 Zofran (as hydrochloride) 4 mg oral tablet  2017     take 1 tablet by 
oral route daily as needed for 14 days   

 

 Zofran (as hydrochloride) 4 mg oral tablet  2017     take 1 tablet by 
oral route daily as needed for 14 days   

 

 triamcinolone acetonide 0.1 % topical cream  2017     APPLY A THIN LAYER 
OF CREAM EXTERNALLY TO AFFECTED AREA TWICE DAILY FOR 14 DAYS   

 

 loratadine 10 mg oral tablet  2017  TAKE 1 TABLET BY MOUTH 
EVERY DAY IN THE EVENING   

 

 triamcinolone acetonide 0.1 % topical cream  2017     APPLY  CREAM 
EXTERNALLY TO AFFECTED AREA TWICE DAILY FOR 14 DAYS   

 

 EQ LORATADINE 10MG  TABS  2017     TAKE ONE TABLET BY MOUTH ONCE DAILY   

 

 Lexapro 10 mg oral tablet  2017     take 1 tablet (10 mg) by oral route 
once daily for 90 days   

 

 pantoprazole 40 mg oral tablet,delayed release (DR/EC)  10/9/2017     take 1 
tablet (40 mg) by oral route once daily for 90 days   

 

 mirtazapine 15 mg oral tablet  10/10/2017  2017  take 0.5 tablet by oral 
route once a day (at bedtime) for 30 days   

 

 clorazepate dipotassium 7.5 mg oral tablet  10/10/2017  2017  take 1/2 
to1 tablet PO up to three times per day for situational anxiety   

 

 ondansetron 4 mg oral tablet,disintegrating  10/10/2017  2017  dissolve  
1 tablet by oral route every 8 hours as needed for 30 days   









  

 

 Name  Start Date  Expiration Date  SIG  Comments

 

 prednisone 20 mg oral tablet  2011  take 2 tablets by oral 
route daily for 5 days   

 

 calcium carbonate-vitamin D2 600-125 mg-unit oral tablet  8/15/2012  2012
  take 2 tablets by oral route daily   

 

 [place] 3 Fish Oil 900-1,400 mg oral capsule,delayed release(DR/EC)  8/15/2012  9
/  take 1 capsule by oral route daily   

 

 multivitamin Oral tablet  8/15/2012  2012  take 1 tablet by oral route 
daily   

 

 triamcinolone acetonide 0.1 % topical cream  2013  10/7/2013  APPLY A 
THIN LAYER TO THE AFFECTED AREA(S) BY TOPICAL ROUTE TWICE A DAY   

 

 famotidine 20 mg oral tablet  2014  take 1 tablet (20 mg) by 
oral route 2 times per day   

 

 amoxicillin 500 mg oral capsule  2014  take 1 capsule (500 mg) 
by oral route 3 times per day for 10 days   

 

 Zithromax Z-Tab 250 mg oral tablet  10/2/2014  10/7/2014  take 2 tablets (500 
mg) by oral route once daily for 1 day then 1 tablet (250 mg) by oral route 
once daily for 4 days   

 

 acyclovir 800 mg oral tablet  10/5/2014  10/12/2014  take 1 tablet (800 mg) by 
oral route 5 times per day for 7 days   

 

 gabapentin 300 mg oral capsule  10/9/2014  2014  take 1 capsule (300 mg) 
by oral route 3 times per day for 14 days   

 

 Carafate 100 mg/mL oral suspension  2014  take 10 milliliters 
(1 gram) by oral route 4 times per day on an empty stomach 1 hour before meals 
and at bedtime for 4 weeks   

 

 amlodipine 5 mg oral tablet  2015  TAKE ONE TABLET BY MOUTH 
EVERY DAY   

 

 levothyroxine 50 mcg oral tablet  2015  TAKE ONE TABLET BY MOUTH 
EVERY DAY   

 

 Diovan 160 mg oral tablet  2015  2/15/2016  TAKE ONE TABLET BY MOUTH 
EVERY DAY for 90 days   

 

 triamcinolone acetonide 0.1 % topical cream  2015  apply a thin 
layer to the affected area(s) by topical route 2 times per day for 14 days   

 

 fluconazole 150 mg oral tablet  2015  take 1 tablet (150 mg) 
by oral route once for 1 day   

 

 amlodipine 5 mg oral tablet  10/13/2016  10/8/2017  TAKE ONE TABLET BY MOUTH 
ONCE DAILY for 90 days   

 

 atenolol 50 mg oral tablet  10/13/2016  10/8/2017  take 1 tablet (50 mg) by 
oral route once daily   

 

 valsartan 160 mg oral tablet  10/13/2016  10/8/2017  TAKE ONE TABLET BY MOUTH 
ONCE DAILY for 90 days   

 

 Zofran (as hydrochloride) 4 mg oral tablet  10/13/2016  10/27/2016  take 1 
tablet by oral route daily as needed for 14 days   

 

 Zofran (as hydrochloride) 4 mg oral tablet  2017     take 1 tablet by 
oral route daily as needed for 14 days   

 

 levothyroxine 50 mcg oral tablet  10/9/2017  10/9/2017  TAKE ONE TABLET BY 
MOUTH ONCE DAILY   









 Discontinued 

 

 Name  Start Date  Discontinued Date  SIG  Comments

 

 Lipitor 40 mg oral tablet     2009 TAB DAILY   

 

 ramipril 5 mg oral capsule     2009  take 1 capsule (5 mg) by oral route 
once daily   

 

 lovastatin 20 mg oral tablet  2009  take 1 tablet (20 mg) by 
oral route once daily with evening meal   

 

 propranolol 20 mg oral tablet  2010  take 1 tablet by oral 
route 2 times a day   

 

 Fosamax 70 mg oral tablet  2010  take 1 tablet (70 mg) by oral 
route once weekly in the morning, at least 30 minutes before the first food, 
beverage, or medication of the day   

 

 lisinopril 40 mg oral tablet  2010  4/10/2011  take 2 tablets by oral 
route daily   

 

 Zoloft 50 mg oral tablet  2011  take 1 tablet (50 mg) by oral 
route once daily   

 

 promethazine 25 mg oral tablet  2011  take 1 tablet by oral 
route every 6 hours as needed   

 

 Diovan -12.5 mg oral tablet  2011  take 1 tablet by oral 
route once daily for 30 days   

 

 Diflucan 150 mg oral tablet     2012  take 1 tablet (150 mg) by oral 
route once for 1 day   

 

 Diflucan 150 mg oral tablet  2012  8/15/2012  take 1 tablet (150 mg) by 
oral route once   

 

 Vitamin B-12 1,000 mcg oral tablet  8/15/2012  2014  take 1 tablet by 
oral route daily   

 

 Premarin 0.625 mg/gram vaginal cream  10/29/2012  6/10/2013  use 1 gram daily 
for a week then 1 gram twice a week   

 

 Medrol (Tab) 4 mg oral tablets,dose pack  2013  6/10/2013  take as 
directed   

 

 aspirin 325 mg oral tablet  2014  take 1 tablet (325 mg) by 
oral route once daily   

 

 Medrol (Tab) 4 mg oral tablets,dose pack  2014  10/2/2014  take as 
directed   

 

 Tetracaine Lollipops Lollipop  2015  Use as directed   

 

 Zoloft 50 mg oral tablet  2016  take 1 tablet (50 mg) by oral 
route once daily for 90 days   

 

 Zoloft 50 mg oral tablet  2016  take 1 tablet (50 mg) by oral 
route once daily for 90 days  Pt refuses to take...







Problem List







 Description  Status  Onset

 

 Hyperlipidemia  Active   

 

 acid reflux  Active   

 

 Hypertension  Active   

 

 hypothyroid  Active   







Vital Signs







 Date  Time  BP-Sys(mm[Hg]  BP-Morena(mm[Hg])  HR(bpm)  RR(rpm)  Temp  WT  HT  HC  
BMI  BSA  BMI Percentile  O2 Sat(%)

 

 10/18/2017  9:01:00 AM  126 mmHg  80 mmHg  73 bpm  16 rpm  97.9 F  122.375 lbs
  61 in     23.12 kg/m2  1.55 m2     100 %

 

 10/10/2017  1:52:00 PM  118 mmHg  72 mmHg  74 bpm  16 rpm  98.1 F  121 lbs  61 
in     22.8625 kg/m  1.5369 m     99 %

 

 2017  8:40:00 AM  118 mmHg  68 mmHg  63 bpm  16 rpm  98.1 F  123.125 lbs  
61 in     23.26 kg/m2  1.55 m2     100 %

 

 2017  8:57:00 AM  116 mmHg  62 mmHg  71 bpm  16 rpm  99.8 F  121.5 lbs  
61 in     22.957 kg/m  1.5401 m     98 %

 

 2017  11:30:00 AM  128 mmHg  78 mmHg  69 bpm  16 rpm  98.8 F  129.375 lbs  
61 in     24.44 kg/m2  1.59 m2     98 %

 

 2017  8:22:00 AM  118 mmHg  78 mmHg  62 bpm  18 rpm  97.5 F  129.125 lbs  
61 in     24.3977 kg/m  1.5877 m     100 %

 

 10/13/2016  2:26:00 PM  128 mmHg  78 mmHg  77 bpm  16 rpm  98.4 F  139.25 lbs  
61 in     26.31 kg/m2  1.65 m2     100 %

 

 2016  8:29:00 AM  122 mmHg  70 mmHg  72 bpm  16 rpm  97.8 F  137.125 lbs  
61 in     25.9093 kg/m  1.6361 m     100 %

 

 2015  9:33:00 AM  120 mmHg  68 mmHg  73 bpm     98.7 F  144.375 lbs  61 
in     27.28 kg/m2  1.68 m2     99 %

 

 2015  9:05:00 AM  110 mmHg  75 mmHg  85 bpm  16 rpm  96.7 F  144.375 lbs  
61 in     27.2791 kg/m  1.6788 m     99 %

 

 2015  1:05:00 PM  108 mmHg  62 mmHg  78 bpm  18 rpm  96.9 F  142.375 lbs  
61 in     26.90 kg/m2  1.67 m2     100 %

 

 2014  9:31:00 AM  126 mmHg  66 mmHg  79 bpm  18 rpm  98.7 F  149 lbs  61 
in     28.153 kg/m  1.7055 m     98 %

 

 10/6/2014  9:02:00 AM  110 mmHg  74 mmHg  94 bpm  18 rpm  98.1 F  145.4 lbs  
61 in     27.47 kg/m2  1.68 m2     97 %

 

 10/2/2014  9:14:00 AM  124 mmHg  74 mmHg  79 bpm  18 rpm  98 F  147.25 lbs  61 
in     27.8224 kg/m  1.6955 m     98 %

 

 2014  9:22:00 AM  124 mmHg  76 mmHg  89 bpm  18 rpm  98.1 F  148 lbs  61 
in     27.96 kg/m2  1.70 m2     100 %

 

 2014  8:27:00 AM  118 mmHg  65 mmHg  74 bpm  16 rpm  97.7 F  148 lbs  61 
in     27.9641 kg/m  1.6998 m     99 %

 

 2014  9:48:00 AM  125 mmHg  70 mmHg  93 bpm  18 rpm  96.7 F  156 lbs  61 
in     29.48 kg/m2  1.75 m2     100 %

 

 2013  8:35:00 AM  122 mmHg  74 mmHg  84 bpm  16 rpm  97.9 F  155.375 lbs 
                99 %

 

 6/10/2013  8:30:00 AM  130 mmHg  82 mmHg  79 bpm  16 rpm  97.9 F  161 lbs     
            98 %

 

 2013  8:50:00 AM  124 mmHg  68 mmHg  66 bpm  18 rpm  97.6 F  157.5 lbs  
61 in     29.7591 kg/m  1.7535 m      

 

 2012  1:46:00 PM  120 mmHg  72 mmHg  75 bpm  16 rpm  97.6 F  156.125 lbs
                 98 %

 

 12/10/2012  8:32:00 AM  122 mmHg  82 mmHg  70 bpm  16 rpm  97.3 F  157 lbs    
             99 %

 

 8/15/2012  9:00:00 AM  130 mmHg  76 mmHg  86 bpm  16 rpm  97.4 F  159 lbs     
            100 %

 

 2012  11:02:00 AM  128 mmHg  64 mmHg  66 bpm  18 rpm  97.4 F  162.125 lbs
  61 in     30.6329 kg/m  1.7791 m      

 

 2012  8:45:00 AM  130 mmHg  70 mmHg  82 bpm  16 rpm  98.2 F  160.125 lbs 
                100 %

 

 2/15/2012  9:29:00 AM  122 mmHg  80 mmHg  86 bpm  16 rpm  97.4 F  159.375 lbs  
61 in     30.1133 kg/m  1.7639 m     99 %

 

 2012  9:41:00 AM  132 mmHg  74 mmHg  66 bpm  18 rpm  98.4 F  160.375 lbs  
61 in     30.30 kg/m2  1.77 m2      

 

 2011  10:08:00 AM  134 mmHg  82 mmHg  80 bpm  16 rpm  98 F  160 lbs  61 
in     30.2314 kg/m  1.7674 m     99 %

 

 2011  3:56:00 PM  130 mmHg  80 mmHg                              

 

 2011  8:55:00 AM  130 mmHg  74 mmHg  88 bpm  16 rpm  98.2 F  158.25 lbs  
               98 %

 

 2011  8:54:00 AM  136 mmHg  82 mmHg  102 bpm  16 rpm  98.1 F  153.5 lbs   
              99 %

 

 2011  8:49:00 AM  122 mmHg  88 mmHg  88 bpm  16 rpm  98.6 F  152.25 lbs  
               99 %

 

 2011  10:02:00 AM  140 mmHg  82 mmHg  96 bpm  20 rpm  98.8 F             
       100 %

 

 2011  9:14:00 AM  156 mmHg  98 mmHg  110 bpm  24 rpm  98.5 F  153 lbs    
             100 %

 

 2011  8:50:00 AM  160 mmHg  84 mmHg  100 bpm  16 rpm  98.7 F  155 lbs    
             100 %

 

 2011  1:25:00 PM  152 mmHg  100 mmHg  82 bpm  16 rpm  97.2 F  156.375 lbs 
                100 %

 

 2011  9:55:00 AM  144 mmHg  90 mmHg                              

 

 2010  9:24:00 AM  138 mmHg  84 mmHg                              

 

 2010  8:58:00 AM  160 mmHg  94 mmHg                              

 

 2010  8:33:00 AM  142 mmHg  90 mmHg  100 bpm  16 rpm  98.1 F  158.375 
lbs                  

 

 2010  9:24:00 AM  160 mmHg  102 mmHg  88 bpm  18 rpm  99 F  157.125 lbs  
62 in     28.7382 kg/m  1.7657 m      

 

 3/19/2010  11:01:00 AM  140 mmHg  90 mmHg                              

 

 2009  10:08:00 AM  142 mmHg  86 mmHg  72 bpm  16 rpm  98.1 F  154.125 
lbs  61 in     29.12 kg/m2  1.73 m2      







Social History







 Name  Description  Comments

 

       

 

 Children      

 

 lives alone      

 

 Supervisor      

 

 Tobacco  Never smoker   







History of Procedures







 Date Ordered  Description  Order Status

 

 2011 12:00 AM  COMPREHEN METABOLIC PANEL  Reviewed

 

 2011 12:00 AM  ASSAY THYROID STIM HORMONE  Reviewed

 

 2011 12:00 AM  COMPREHEN METABOLIC PANEL  Reviewed

 

 2011 12:00 AM  LIPID PANEL  Reviewed

 

 2011 12:00 AM  ASSAY THYROID STIM HORMONE  Reviewed

 

 2011 12:00 AM  COMPLETE CBC W/AUTO DIFF WBC  Reviewed

 

 2015 12:00 AM  COMPLETE CBC W/AUTO DIFF WBC  Reviewed

 

 2015 12:00 AM  COMPREHEN METABOLIC PANEL  Reviewed

 

 2015 12:00 AM  LIPID PANEL  Reviewed

 

 2015 12:00 AM  ASSAY THYROID STIM HORMONE  Reviewed

 

 2011 12:00 AM  COMPREHEN METABOLIC PANEL  Reviewed

 

 2011 12:00 AM  COMPREHEN METABOLIC PANEL  Reviewed

 

 2011 12:00 AM  LIPID PANEL  Reviewed

 

 2011 12:00 AM  ASSAY THYROID STIM HORMONE  Reviewed

 

 10/28/2011 12:00 AM  ***Immunization administration, Medicare flu  Reviewed

 

 10/28/2011 12:00 AM  Fluzone ** MEDICARE Only **  Reviewed

 

 2010 11:24 AM  NO CHARGE OV  Reviewed

 

 2010 11:26 AM  NO CHARGE OV  Reviewed

 

 2011 12:00 AM  COMPREHEN METABOLIC PANEL  Reviewed

 

 2011 12:00 AM  LIPID PANEL  Reviewed

 

 2011 12:00 AM  ASSAY THYROID STIM HORMONE  Reviewed

 

 2011 12:00 AM  COMPLETE CBC W/AUTO DIFF WBC  Reviewed

 

 2011 12:00 AM  Wrist Support  Reviewed

 

 2016 12:00 AM  Screening mammography, bilateral  Reviewed

 

 2016 12:00 AM  DXA BONE DENSITY AXIAL  Returned

 

 2016 12:00 AM  TETANUS VACCINE IM  Reviewed

 

 2016 12:00 AM  HEP B VACC ADULT 3 DOSE IM  Reviewed

 

 2012 12:00 AM  TISSUE EXAM FOR FUNGI  Reviewed

 

 2012 12:00 AM  SMEAR WET MOUNT SALINE/INK  Reviewed

 

 2016 12:00 AM  TETANUS VACCINE IM  Reviewed

 

 2016 12:00 AM  HEP B VACC ADULT 3 DOSE IM  Reviewed

 

 2/15/2012 12:00 AM  THER/PROPH/DIAG INJ SC/IM  Reviewed

 

 2/15/2012 12:00 AM  Bicillin CR [facility]42987426221[facility]  Reviewed

 

 2/15/2012 12:00 AM  Depo-Medrol 40 mg NDC#1437394982  Reviewed

 

 10/13/2016 12:00 AM  COMPLETE CBC W/AUTO DIFF WBC  Returned

 

 10/13/2016 12:00 AM  COMPREHEN METABOLIC PANEL  Returned

 

 10/13/2016 12:00 AM  ASSAY THYROID STIM HORMONE  Returned

 

 10/13/2016 12:00 AM  LIPID PANEL  Returned

 

 2016 12:00 AM  TETANUS VACCINE IM  Reviewed

 

 2016 12:00 AM  HEP B VACC ADULT 3 DOSE IM  Reviewed

 

 2017 12:00 AM  ASSAY OF IRON  Returned

 

 2017 12:00 AM  IRON BINDING TEST  Returned

 

 2017 12:00 AM  ASSAY OF TRANSFERRIN  Returned

 

 2017 12:00 AM  OCCULT BLD FECES 1-3 TESTS  Returned

 

 2017 12:00 AM  COMPLETE CBC AUTOMATED  Returned

 

 8/15/2012 12:00 AM  COMPREHEN METABOLIC PANEL  Reviewed

 

 8/15/2012 12:00 AM  ASSAY THYROID STIM HORMONE  Reviewed

 

 8/15/2012 12:00 AM  COMPLETE CBC W/AUTO DIFF WBC  Reviewed

 

 8/15/2012 12:00 AM  Wrist Support  Reviewed

 

 2017 12:00 AM  METABOLIC PANEL TOTAL CA  Returned

 

 2017 12:00 AM  Screening mammography, bilateral  Returned

 

 10/29/2012 12:00 AM  Flu Injection 3 Years And Above NDC# [address]-37  [facility]  
Reviewed

 

 12/10/2012 12:00 AM  IMMUNIZATION ADMIN  Reviewed

 

 12/10/2012 12:00 AM  Pneumovax Injection - C  Reviewed

 

 2012 12:00 AM  TRICHOMONAS ASSAY W/OPTIC  Reviewed

 

 2013 12:00 AM  THER/PROPH/DIAG INJ SC/IM  Reviewed

 

 2013 12:00 AM  Bicillin CR, 1.2 million units NDC# 16517-902-26  Reviewed

 

 2013 12:00 AM  COMPREHEN METABOLIC PANEL  Reviewed

 

 2013 12:00 AM  LIPID PANEL  Reviewed

 

 2013 12:00 AM  COMPLETE CBC W/AUTO DIFF WBC  Reviewed

 

 2013 12:00 AM  ASSAY THYROID STIM HORMONE  Reviewed

 

 6/10/2013 12:00 AM  MAMMOGRAM SCREENING  Reviewed

 

 6/10/2013 12:00 AM  CYTOPATH C/V MANUAL  Reviewed

 

 12/10/2013 12:00 AM  LIPID PANEL  Reviewed

 

 12/10/2013 12:00 AM  ASSAY THYROID STIM HORMONE  Reviewed

 

 12/10/2013 12:00 AM  COMPLETE CBC W/AUTO DIFF WBC  Reviewed

 

 12/10/2013 12:00 AM  COMPREHEN METABOLIC PANEL  Reviewed

 

 2010 12:00 AM  CYTOPATH C/V MANUAL  Reviewed

 

 2010 12:00 AM  SMEAR WET MOUNT SALINE/INK  Reviewed

 

 2010 12:00 AM  LIPID PANEL  Reviewed

 

 2010 12:00 AM  ASSAY THYROID STIM HORMONE  Reviewed

 

 2010 12:00 AM  COMPLETE CBC W/AUTO DIFF WBC  Reviewed

 

 2010 12:00 AM  COMPREHEN METABOLIC PANEL  Reviewed

 

 10/28/2013 12:00 AM  Flu Injection 3 Years And Above NDC# [address]-36  [facility]  
Reviewed

 

 2010 8:48 AM  Blood Pressure Check-no charge  Reviewed

 

 2010 12:00 AM  Blood Pressure Check-no charge  Reviewed

 

 2014 12:00 AM  METABOLIC PANEL TOTAL CA  Reviewed

 

 2014 12:00 AM  ASSAY THYROID STIM HORMONE  Reviewed

 

 2014 12:00 AM  ASSAY OF FREE THYROXINE  Reviewed

 

 2014 12:00 AM  MAMMOGRAM SCREENING  Reviewed

 

 2014 12:00 AM  CT BONE DENSITY AXIAL  Reviewed

 

 2014 12:00 AM  COMPLETE CBC W/AUTO DIFF WBC  Reviewed

 

 2014 12:00 AM  COMPREHEN METABOLIC PANEL  Reviewed

 

 2014 12:00 AM  LIPID PANEL  Reviewed

 

 2014 12:00 AM  ASSAY THYROID STIM HORMONE  Reviewed

 

 10/20/2010 12:00 AM  IMMUNIZATION ADMIN  Reviewed

 

 10/20/2010 12:00 AM  FLU VACCINE 3 YRS & > IM  Reviewed

 

 2010 12:00 AM  COMPREHEN METABOLIC PANEL  Reviewed

 

 2010 12:00 AM  LIPID PANEL  Reviewed

 

 2010 12:00 AM  ASSAY THYROID STIM HORMONE  Reviewed

 

 2010 12:00 AM  COMPLETE CBC W/AUTO DIFF WBC  Reviewed

 

 2010 12:00 AM  Blood Pressure Check-no charge  Reviewed

 

 10/2/2014 12:00 AM  THER/PROPH/DIAG INJ SC/IM  Reviewed

 

 10/2/2014 12:00 AM  Kenalog, Per 10 Mg NDC#4766-4089-56  Reviewed

 

 2014 12:00 AM  COMPLETE CBC W/AUTO DIFF WBC  Reviewed

 

 2014 12:00 AM  COMPREHEN METABOLIC PANEL  Reviewed

 

 2014 12:00 AM  LIPID PANEL  Reviewed

 

 2014 12:00 AM  ASSAY THYROID STIM HORMONE  Reviewed

 

 2015 12:00 AM  Rocephin 1 gram NDC#5777-8464-22  Reviewed

 

 2015 12:00 AM  THER/PROPH/DIAG INJ SC/IM  Reviewed

 

 2011 12:00 AM  COMPREHEN METABOLIC PANEL  Reviewed

 

 2011 12:00 AM  LIPID PANEL  Reviewed

 

 2011 12:00 AM  ASSAY THYROID STIM HORMONE  Reviewed

 

 2011 12:00 AM  COMPLETE CBC W/AUTO DIFF WBC  Reviewed

 

 2011 12:00 AM  METABOLIC PANEL TOTAL CA  Reviewed

 

 2011 12:00 AM  COMPREHEN METABOLIC PANEL  Reviewed

 

 2011 12:00 AM  ASSAY THYROID STIM HORMONE  Reviewed

 

 2011 12:00 AM  COMPLETE CBC W/AUTO DIFF WBC  Reviewed

 

 2011 12:00 AM  ASSAY OF LIPASE  Reviewed

 

 2011 12:00 AM  THER/PROPH/DIAG INJ SC/IM  Reviewed

 

 2011 12:00 AM  Phenergan 50 Mg Im  Bernadine  Reviewed

 

 2011 12:00 AM  METABOLIC PANEL TOTAL CA  Reviewed

 

 2011 12:00 AM  THER/PROPH/DIAG INJ SC/IM  Reviewed

 

 2011 12:00 AM  Phenergan 25 Mg Im  Bernadine  Reviewed

 

 2011 12:00 AM  METABOLIC PANEL TOTAL CA  Reviewed

 

 2011 12:00 AM  ASSAY THYROID STIM HORMONE  Reviewed

 

 2011 12:00 AM  THER/PROPH/DIAG INJ SC/IM  Reviewed

 

 2011 12:00 AM  Phenergan 50 Mg Im  Bernadine  Reviewed

 

 2011 12:00 AM  ASSAY OF SERUM SODIUM  Reviewed

 

 2011 12:00 AM  ASSAY OF SERUM SODIUM  Reviewed

 

 2011 12:00 AM  CYTOPATH C/V MANUAL  Reviewed

 

 2011 12:00 AM  ASSAY THYROID STIM HORMONE  Reviewed

 

 2015 12:00 AM  COMPLETE CBC W/AUTO DIFF WBC  Reviewed

 

 2015 12:00 AM  COMPREHEN METABOLIC PANEL  Reviewed

 

 2015 12:00 AM  LIPID PANEL  Reviewed

 

 2015 12:00 AM  ASSAY THYROID STIM HORMONE  Reviewed

 

 2015 12:00 AM  MAMMOGRAM SCREENING  Reviewed

 

 2015 12:00 AM  CYTOPATH TBS C/V MANUAL  Reviewed







Results Summary







 Date and Description  Results

 

 2010 10:41 AM  WET PREP NO TRICHOMONADS SEEN  No  Few 

 

 2010 8:15 AM  TRIGLYCERIDES 174.0 mg/dLCHOLESTEROL 175.0 mg/dLHDL 58.0 mg/
dLTOT CHOL/HDL 3.0 LDL (CALC) 82.0 mg/dLTSH 0.790 uIU/mLGLUCOSE 95.0 mg/
dLSODIUM 136.0 mmol/LPOTASSIUM 4.50 mmol/LCHLORIDE 99.0 mmol/LCO2 26.0 mmol/
LBUN 12.0 mg/dLCREATININE 0.80 mg/dLSGOT/AST 32.0 IU/LSGPT/ALT 33.0 IU/LALK 
PHOS 103.0 IU/LTOTAL PROTEIN 7.60 g/dLALBUMIN 4.60 g/dLTOTAL BILI 0.60 mg/
dLCALCIUM 9.80 mg/dLAGE 64 GFR NonAA 72 GFR AA 87 eGFR >60 mL/min/1.73 m2eGFR AA
* >60 WBC 5.3 RBC 4.13 HGB 13.10 g/dLHCT 39.20 %MCV 95.0 fLMCH 31.70 pgMCHC 
33.40 g/dLRDW SD 44 RDW CV 12.70 %MPV 9.80 fLPLT 257 NRBC# 0.00 NRBC% 0.0 %NEUT 
57.90 %%LYMP 26.50 %%MONO 11.60 %%EOS 3.20 %%BASO 0.80 %#NEUT 3.04 #LYMP 1.39 #
MONO 0.61 #EOS 0.17 #BASO 0.04 MANUAL DIFF NOT IND 

 

 2011 9:45 AM  GLUCOSE 91.0 mg/dLSODIUM 133.0 mmol/LPOTASSIUM 4.40 mmol/
LCHLORIDE 97.0 mmol/LCO2 24.0 mmol/LBUN 9.0 mg/dLCREATININE 0.70 mg/dLCALCIUM 
10.0 mg/dLAGE 65 GFR NonAA 84 GFR  eGFR >60 mL/min/1.73 m2eGFR AA* >60 

 

 2011 10:25 AM  LIPASE 29.0 U/LTSH 0.10 uIU/mLGLUCOSE 115.0 mg/dLSODIUM 
127.0 mmol/LPOTASSIUM 5.30 mmol/LCHLORIDE 93.0 mmol/LCO2 18.0 mmol/LBUN 9.0 mg/
dLCREATININE 0.70 mg/dLSGOT/AST 62.0 IU/LSGPT/ALT 46.0 IU/LALK PHOS 100.0 IU/
LTOTAL PROTEIN 8.60 g/dLALBUMIN 4.90 g/dLTOTAL BILI 0.60 mg/dLCALCIUM 9.90 mg/
dLAGE 65 GFR NonAA 84 GFR  eGFR >60 mL/min/1.73 m2eGFR AA* >60 WBC 6.9 
RBC 4.48 HGB 14.40 g/dLHCT 40.90 %MCV 91.0 fLMCH 32.10 pgMCHC 35.20 g/dLRDW SD 
41 RDW CV 12.50 %MPV 9.30 fLPLT 260 NRBC# 0.00 NRBC% 0.0 %NEUT 74.90 %%LYMP 
15.10 %%MONO 9.40 %%EOS 0.30 %%BASO 0.30 %#NEUT 5.15 #LYMP 1.04 #MONO 0.65 #EOS 
0.02 #BASO 0.02 MANUAL DIFF NOT IND 

 

 2011 9:07 AM  GLUCOSE 92.0 mg/dLSODIUM 131.0 mmol/LPOTASSIUM 4.20 mmol/
LCHLORIDE 99.0 mmol/LCO2 22.0 mmol/LBUN 9.0 mg/dLCREATININE 0.70 mg/dLCALCIUM 
9.20 mg/dLAGE 65 GFR NonAA 84 GFR  eGFR >60 mL/min/1.73 m2eGFR AA* >60 

 

 2011 11:06 AM  TSH 0.510 uIU/mLGLUCOSE 99.0 mg/dLSODIUM 132.0 mmol/
LPOTASSIUM 3.50 mmol/LCHLORIDE 95.0 mmol/LCO2 23.0 mmol/LBUN 9.0 mg/
dLCREATININE 0.80 mg/dLCALCIUM 10.40 mg/dLAGE 65 GFR NonAA 72 GFR AA 87 eGFR >
60 mL/min/1.73 m2eGFR AA* >60 

 

 2011 9:45 AM  SODIUM 132.0 mmol/L

 

 2011 9:27 AM  SODIUM 137.0 mmol/L

 

 2011 9:55 AM  TSH 1.070 uIU/mL

 

 2011 8:55 AM  GLUCOSE 102.0 mg/dLSODIUM 135.0 mmol/LPOTASSIUM 4.20 mmol/
LCHLORIDE 102.0 mmol/LCO2 24.0 mmol/LBUN 15.0 mg/dLCREATININE 0.80 mg/dLSGOT/
AST 30.0 IU/LSGPT/ALT 35.0 IU/LALK PHOS 119.0 IU/LTOTAL PROTEIN 7.50 g/
dLALBUMIN 4.30 g/dLTOTAL BILI 0.30 mg/dLCALCIUM 9.20 mg/dLAGE 65 GFR NonAA 72 
GFR AA 87 eGFR >60 mL/min/1.73 m2eGFR AA* >60 TSH 1.130 uIU/mL

 

 2011 8:55 AM  GLUCOSE 98.0 mg/dLSODIUM 137.0 mmol/LPOTASSIUM 3.90 mmol/
LCHLORIDE 103.0 mmol/LCO2 25.0 mmol/LBUN 14.0 mg/dLCREATININE 0.70 mg/dLSGOT/
AST 33.0 IU/LSGPT/ALT 36.0 IU/LALK PHOS 111.0 IU/LTOTAL PROTEIN 8.30 g/
dLALBUMIN 4.40 g/dLTOTAL BILI 0.50 mg/dLCALCIUM 9.40 mg/dLAGE 66 GFR NonAA 84 
GFR  eGFR >60 mL/min/1.73 m2eGFR AA* >60 TRIGLYCERIDES 109.0 mg/
dLCHOLESTEROL 153.0 mg/dLHDL 54.0 mg/dLTOT CHOL/HDL 2.8 LDL (CALC) 77.0 mg/
dLTSH 1.330 uIU/mL

 

 2012 10:33 AM  WET PREP NO TRICH SEEN CLUE CELLS NONE SEEN 

 

 2012 8:45 AM  WBC 5.2 RBC 3.96 HGB 12.70 g/dLHCT 37.80 %MCV 96.0 fLMCH 
32.10 pgMCHC 33.60 g/dLRDW SD 46 RDW CV 13.30 %MPV 9.70 fLPLT 297 NRBC# 0.00 
NRBC% 0.0 %NEUT 57.70 %%LYMP 28.50 %%MONO 10.30 %%EOS 2.50 %%BASO 1.0 %#NEUT 
3.02 #LYMP 1.49 #MONO 0.54 #EOS 0.13 #BASO 0.05 MANUAL DIFF NOT IND GLUCOSE 
97.0 mg/dLSODIUM 137.0 mmol/LPOTASSIUM 4.40 mmol/LCHLORIDE 101.0 mmol/LCO2 23.0 
mmol/LBUN 17.0 mg/dLCREATININE 0.80 mg/dLSGOT/AST 30.0 IU/LSGPT/ALT 33.0 IU/
LALK PHOS 95.0 IU/LTOTAL PROTEIN 7.40 g/dLALBUMIN 4.40 g/dLTOTAL BILI 0.60 mg/
dLCALCIUM 9.70 mg/dLAGE 66 GFR NonAA 72 GFR AA 87 eGFR 60 eGFR AA* 60 
TRIGLYCERIDES 130.0 mg/dLCHOLESTEROL 157.0 mg/dLHDL 56.0 mg/dLTOT CHOL/HDL 2.8 
LDL (CALC) 75.0 mg/dLTSH 0.940 uIU/mL

 

 2012 8:45 AM  WBC 5.1 RBC 3.91 HGB 12.50 g/dLHCT 36.30 %MCV 93.0 fLMCH 
32.0 pgMCHC 34.40 g/dLRDW SD 43 RDW CV 12.60 %MPV 9.30 fLPLT 244 NRBC# 0.00 NRBC
% 0.0 %NEUT 57.60 %%LYMP 27.50 %%MONO 9.10 %%EOS 5.0 %%BASO 0.80 %#NEUT 2.91 #
LYMP 1.39 #MONO 0.46 #EOS 0.25 #BASO 0.04 MANUAL DIFF NOT IND 

 

 2012 5:02 PM  WET PREP NO TRICH SEEN CLUE CELLS NONE SEEN 

 

 2013 8:25 AM  WBC 4.2 RBC 3.96 HGB 12.90 g/dLHCT 37.0 %MCV 93.0 fLMCH 
32.60 pgMCHC 34.90 g/dLRDW SD 43 RDW CV 12.60 %MPV 9.70 fLPLT 254 NRBC# 0.00 
NRBC% 0.0 %NEUT 54.40 %%LYMP 30.40 %%MONO 10.80 %%EOS 3.40 %%BASO 1.0 %#NEUT 
2.26 #LYMP 1.26 #MONO 0.45 #EOS 0.14 #BASO 0.04 MANUAL DIFF NOT IND TSH 1.230 
uIU/mLGLUCOSE 94.0 mg/dLSODIUM 136.0 mmol/LPOTASSIUM 4.30 mmol/LCHLORIDE 99.0 
mmol/LCO2 25.0 mmol/LBUN 10.0 mg/dLCREATININE 0.80 mg/dLSGOT/AST 36.0 IU/LSGPT/
ALT 36.0 IU/LALK PHOS 84.0 IU/LTOTAL PROTEIN 7.90 g/dLALBUMIN 4.40 g/dLTOTAL 
BILI 0.70 mg/dLCALCIUM 9.80 mg/dLAGE 67 GFR NonAA 72 GFR AA 87 eGFR 60 eGFR AA* 
60 TRIGLYCERIDES 122.0 mg/dLCHOLESTEROL 141.0 mg/dLHDL 47.0 mg/dLTOT CHOL/HDL 
3.0 LDL (CALC) 70.0 mg/dL

 

 2013 8:45 AM  WBC 5.3 RBC 4.01 HGB 13.0 g/dLHCT 37.60 %MCV 94.0 fLMCH 
32.40 pgMCHC 34.60 g/dLRDW SD 43 RDW CV 12.50 %MPV 9.40 fLPLT 248 NRBC# 0.00 
NRBC% 0.0 %NEUT 58.70 %%LYMP 27.80 %%MONO 9.80 %%EOS 2.80 %%BASO 0.90 %#NEUT 
3.12 #LYMP 1.48 #MONO 0.52 #EOS 0.15 #BASO 0.05 MANUAL DIFF NOT IND TSH 1.570 
uIU/mLGLUCOSE 94.0 mg/dLSODIUM 134.0 mmol/LPOTASSIUM 4.10 mmol/LCHLORIDE 101.0 
mmol/LCO2 23.0 mmol/LBUN 11.0 mg/dLCREATININE 0.80 mg/dLSGOT/AST 41.0 IU/LSGPT/
ALT 44.0 IU/LALK PHOS 109.0 IU/LTOTAL PROTEIN 7.90 g/dLALBUMIN 4.70 g/dLTOTAL 
BILI 0.80 mg/dLCALCIUM 10.40 mg/dLAGE 68 GFR NonAA 72 GFR AA 87 eGFR >60 mL/min/
1.73 m2eGFR AA* >60 TRIGLYCERIDES 163.0 mg/dLCHOLESTEROL 138.0 mg/dLHDL 49.0 mg/
dLTOT CHOL/HDL 2.8 LDL (CALC) 56.0 mg/dL

 

 2014 8:58 AM  GLUCOSE 86.0 mg/dLSODIUM 134.0 mmol/LPOTASSIUM 4.20 mmol/
LCHLORIDE 99.0 mmol/LCO2 25.0 mmol/LBUN 14.0 mg/dLCREATININE 0.80 mg/dLCALCIUM 
10.10 mg/dLAGE 68 GFR NonAA 72 GFR AA 87 eGFR >60 mL/min/1.73 m2eGFR AA* >60 
FREE T4 1.18 TSH 1.20 uIU/mL

 

 2014 9:50 AM  WBC 5.7 RBC 4.03 HGB 12.90 g/dLHCT 37.90 %MCV 94.0 fLMCH 
32.0 pgMCHC 34.0 g/dLRDW SD 44 RDW CV 12.70 %MPV 9.70 fLPLT 292 NRBC# 0.00 NRBC
% 0.0 %NEUT 62.70 %%LYMP 21.80 %%MONO 11.0 %%EOS 3.40 %%BASO 1.10 %#NEUT 3.55 #
LYMP 1.23 #MONO 0.62 #EOS 0.19 #BASO 0.06 MANUAL DIFF NOT IND GLUCOSE 98.0 mg/
dLSODIUM 135.0 mmol/LPOTASSIUM 4.30 mmol/LCHLORIDE 102.0 mmol/LCO2 22.0 mmol/
LBUN 10.0 mg/dLCREATININE 0.80 mg/dLSGOT/AST 34.0 IU/LSGPT/ALT 32.0 IU/LALK 
PHOS 95.0 IU/LTOTAL PROTEIN 7.70 g/dLALBUMIN 4.30 g/dLTOTAL BILI 0.80 mg/
dLCALCIUM 9.10 mg/dLAGE 68 GFR NonAA 72 GFR AA 87 eGFR 60 eGFR AA* 60 
TRIGLYCERIDES 108.0 mg/dLCHOLESTEROL 146.0 mg/dLHDL 56.0 mg/dLTOT CHOL/HDL 2.6 
LDL (CALC) 68.0 mg/dLTSH 0.90 uIU/mL

 

 2014 8:40 AM  WBC 4.4 RBC 4.13 HGB 13.20 g/dLHCT 38.90 %MCV 94.0 fLMCH 
32.0 pgMCHC 33.90 g/dLRDW SD 47 RDW CV 13.50 %MPV 9.80 fLPLT 279 NRBC# 0.00 NRBC
% 0.0 %NEUT 63.80 %%LYMP 24.60 %%MONO 9.30 %%EOS 1.60 %%BASO 0.70 %#NEUT 2.80 #
LYMP 1.08 #MONO 0.41 #EOS 0.07 #BASO 0.03 MANUAL DIFF NOT IND GLUCOSE 96.0 mg/
dLSODIUM 136.0 mmol/LPOTASSIUM 4.10 mmol/LCHLORIDE 99.0 mmol/LCO2 23.0 mmol/
LBUN 8.0 mg/dLCREATININE 0.80 mg/dLSGOT/AST 31.0 IU/LSGPT/ALT 27.0 IU/LALK PHOS 
85.0 IU/LTOTAL PROTEIN 7.60 g/dLALBUMIN 4.40 g/dLTOTAL BILI 0.80 mg/dLCALCIUM 
10.20 mg/dLAGE 69 GFR NonAA 71 GFR AA 86 eGFR 60 eGFR AA* 60 TRIGLYCERIDES 61.0 
mg/dLCHOLESTEROL 148.0 mg/dLHDL 71.0 mg/dLTOT CHOL/HDL 2.1 LDL (CALC) 65.0 mg/
dLTSH 0.990 uIU/mL

 

 2015 8:32 AM  WBC 4.8 RBC 3.98 HGB 12.70 g/dLHCT 37.30 %MCV 94.0 fLMCH 
31.90 pgMCHC 34.0 g/dLRDW SD 43 RDW CV 12.70 %MPV 9.50 fLPLT 270 NRBC# 0.00 NRBC
% 0.0 %NEUT 57.30 %%LYMP 25.0 %%MONO 13.0 %%EOS 3.60 %%BASO 1.10 %#NEUT 2.73 #
LYMP 1.19 #MONO 0.62 #EOS 0.17 #BASO 0.05 MANUAL DIFF NOT IND TSH 0.640 uIU/
mLGLUCOSE 90.0 mg/dLSODIUM 136.0 mmol/LPOTASSIUM 4.0 mmol/LCHLORIDE 101.0 mmol/
LCO2 24.0 mmol/LBUN 10.0 mg/dLCREATININE 0.80 mg/dLSGOT/AST 34.0 IU/LSGPT/ALT 
32.0 IU/LALK PHOS 92.0 IU/LTOTAL PROTEIN 7.50 g/dLALBUMIN 4.40 g/dLTOTAL BILI 
0.70 mg/dLCALCIUM 9.50 mg/dLAGE 69 GFR NonAA 71 GFR AA 86 eGFR >60 mL/min/1.73 
m2eGFR AA* >60 TRIGLYCERIDES 107.0 mg/dLCHOLESTEROL 138.0 mg/dLHDL 58.0 mg/
dLTOT CHOL/HDL 2.4 LDL (CALC) 59.0 mg/dL

 

 2015 8:31 AM  WBC 4.6 RBC 3.97 HGB 12.90 g/dLHCT 38.0 %MCV 96.0 fLMCH 
32.50 pgMCHC 33.90 g/dLRDW SD 44 RDW CV 12.60 %MPV 9.0 fLPLT 248 NRBC# 0.00 NRBC
% 0.0 %NEUT 61.0 %%LYMP 24.70 %%MONO 10.20 %%EOS 3.0 %%BASO 1.10 %#NEUT 2.82 #
LYMP 1.14 #MONO 0.47 #EOS 0.14 #BASO 0.05 MANUAL DIFF NOT IND TRIGLYCERIDES 
105.0 mg/dLCHOLESTEROL 141.0 mg/dLHDL 58.0 mg/dLTOT CHOL/HDL 2.4 LDL (CALC) 
62.0 mg/dLGLUCOSE 93.0 mg/dLSODIUM 136.0 mmol/LPOTASSIUM 4.10 mmol/LCHLORIDE 
101.0 mmol/LCO2 25.0 mmol/LBUN 9.0 mg/dLCREATININE 0.80 mg/dLSGOT/AST 32.0 IU/
LSGPT/ALT 28.0 IU/LALK PHOS 95.0 IU/LTOTAL PROTEIN 7.30 g/dLALBUMIN 4.50 g/
dLTOTAL BILI 0.80 mg/dLCALCIUM 9.70 mg/dLAGE 70 GFR NonAA 71 GFR AA 86 eGFR >60 
mL/min/1.73meGFR AA* >60 TSH 1.10 uIU/mL







History Of Immunizations







 Name  Date Admin  Mfg Name  Mfg Code  Trade Name  Lot#  Route  Inj  Vis Given  
Vis Pub  CVX

 

 Influenza  10/20/2010  [employer]of[employer] pasteur  [employer]  Fluzone  [id number]  Intramuscular  
Left Arm  10/20/2010  2010  999

 

 Influenza  10/28/2011  [employer]of[employer] pasteur  [employer]  Fluzone  [id number]  Intramuscular  
Left Arm  10/28/2011  2011  141

 

 Influenza  10/29/2012  sanofi pasteur  [employer]  Fluzone  [id number]  Intramuscular  
Left Deltoid  10/29/2012  2012  141

 

 X  12/10/2012  [employer]  MSD  Pneumovax 23  [id number]  Intramuscular  
Left Gluteous Medius  12/10/2012  2010  33

 

 Influenza  10/28/2013  sanofi pasteur  [employer]  Fluzone > 3 Years  [id number]  
Intramuscular  Right Deltoid  10/28/2013  2013  141

 

 X  9/2/2015  Not Entered  NE  Prevnar 13     Not Entered  Not Entered  1  109

 

 Influenza  2015  Not Entered  NE  Not Entered     Not Entered  Not Entered
  2015  141

 

 HepB Adult  2016  [employer]  MSD  Recombivax Adult  [id number]  Not 
Entered  Left Deltoid  2016  43

 

 HepB Adult  2016  [employer]  MSD  Recombivax Adult  [id number]  
Intramuscular  Right Deltoid  2016  43

 

 Zostavax  2012  Not Entered  NE  Not Entered     Not Entered  Not 
Entered  1  121

 

 Tdap  2012  Not Entered  NE  Not Entered     Not Entered  Not Entered  1  115

 

 Influenza  10/13/2016  Not Entered  NE  Fluzone High-Dose     Intramuscular  
Left Arm  1  141

 

 HepB Adult  2016  [employer]  MSD  Recombivax Adult  [id number]  
Intramuscular  Right Deltoid  2016  43







History of Past Illness







 Name  Date of Onset  Comments

 

 Benign Essential Hypertension  Dec 14 2009 10:10AM   

 

 Hyperlipidemia  Dec 14 2009 10:10AM   

 

 Hypothyroidism, Acquired  Dec 14 2009 10:10AM   

 

 hypothyroid      

 

 Hypertension      

 

 Hyperlipidemia      

 

 acid reflux      

 

 Hypertension, Benign Essential  2010  9:41AM   

 

 Hypertension, Benign Essential  2010 12:53PM   

 

 Routine gynecological examination  May  5 2010  9:28AM   

 

 Hypertension  May  5 2010  9:28AM   

 

 Hyperlipidemia, unspecified  May  5 2010  9:28AM   

 

 Hypothyroidism, Acquired  May  5 2010  9:28AM   

 

 Vulvovaginitis  May  5 2010  9:28AM   

 

 Rhinitis, Allergic  May  5 2010  9:28AM   

 

 Hypertension, Benign Essential  May 19 2010  8:48AM   

 

 Hypertension, Benign Essential  May 25 2010  9:39AM   

 

 Flu  Oct 20 2010 12:01PM   

 

 Essential Hypertension  2010  8:34AM   

 

 Hyperlipidemia  2010  8:34AM   

 

 Gastroesophageal Reflux  2010  8:34AM   

 

 Hypothyroidism, Acquired  2010  8:34AM   

 

 Hypertension, Benign Essential  2010  9:22AM   

 

 Hypertension, Benign Essential  Dec 15 2010  9:07AM   

 

 Essential Hypertension  2011  1:31PM   

 

 Hyperlipidemia  2011  1:31PM   

 

 Gastroesophageal Reflux  2011  1:31PM   

 

 Hypothyroidism, Acquired  2011  1:31PM   

 

 Essential Hypertension  2011  8:51AM   

 

 Hyperlipidemia  2011  8:51AM   

 

 Gastroesophageal Reflux  2011  8:51AM   

 

 Hypothyroidism, Acquired  2011  8:51AM   

 

 Essential Hypertension  2011  9:13AM   

 

 Hyperlipidemia  2011  9:13AM   

 

 Gastroesophageal Reflux  2011  9:13AM   

 

 Hypothyroidism, Acquired  2011  9:13AM   

 

 Nausea With Vomiting  2011  1:18PM   

 

 Hyponatremia  2011  8:46AM   

 

 Nausea  2011  9:13AM   

 

 Hypothyroidism  2011 11:10AM   

 

 Hyponatremia  2011 11:10AM   

 

 Essential Hypertension  2011 10:05AM   

 

 Hyperlipidemia  2011 10:05AM   

 

 Gastroesophageal Reflux  2011 10:05AM   

 

 Nausea  2011 10:05AM   

 

 Hypothyroidism, Acquired  2011 10:05AM   

 

 Hyponatremia  May  6 2011 11:34AM   

 

 Essential Hypertension  May 16 2011  8:50AM   

 

 Hyperlipidemia  May 16 2011  8:50AM   

 

 Gastroesophageal Reflux  May 16 2011  8:50AM   

 

 Nausea  May 16 2011  8:50AM   

 

 Hypothyroidism, Acquired  May 16 2011  8:50AM   

 

 Hyponatremia  May 16 2011  8:50AM   

 

 Routine gynecological examination  2011  8:54AM   

 

 Hypertension  2011  8:54AM   

 

 Hyperlipidemia, unspecified  2011  8:54AM   

 

 Hypothyroidism, Acquired  2011  8:54AM   

 

 Rhinitis, Allergic  2011  8:54AM   

 

 Hypothyroidism  Aug 29 2011 12:37PM   

 

 Hyponatremia  Aug 29 2011 12:37PM   

 

 Essential Hypertension  Sep 12 2011  8:53AM   

 

 Hyperlipidemia  Sep 12 2011  8:53AM   

 

 Gastroesophageal Reflux  Sep 12 2011  8:53AM   

 

 Hypothyroidism, Acquired  Sep 12 2011  8:53AM   

 

 Hyponatremia  Sep 12 2011  8:53AM   

 

 Hypertension  Sep 29 2011  9:41AM   

 

 Hyperlipidemia  Dec  8 2011  8:31AM   

 

 Hypothyroidism  Dec  8 2011  8:31AM   

 

 Hypertension  Dec  8 2011  8:31AM   

 

 Flu  Dec  9 2011 10:02AM   

 

 Essential Hypertension  Dec 13 2011 10:13AM   

 

 Hyperlipidemia  Dec 13 2011 10:13AM   

 

 Gastroesophageal Reflux  Dec 13 2011 10:13AM   

 

 Hypothyroidism, Acquired  Dec 13 2011 10:13AM   

 

 Hyponatremia  Dec 13 2011 10:13AM   

 

 Vaginal Discharge  2012  9:43AM   

 

 Rhinitis, Allergic  Feb 15 2012  9:31AM   

 

 Eustachian Tube Dysfunction  Feb 15 2012  9:31AM   

 

 Pharyngitis, Acute  Feb 15 2012  9:31AM   

 

 Routine gynecological examination  2012  8:48AM   

 

 Hypertension  2012  8:48AM   

 

 Hyperlipidemia, unspecified  2012  8:48AM   

 

 Hypothyroidism, Acquired  2012  8:48AM   

 

 Rhinitis, Allergic  2012  8:48AM   

 

 Candidiasis, Vulvovaginal  2012 11:04AM   

 

 Essential Hypertension  Aug 15 2012  9:02AM   

 

 Hyperlipidemia  Aug 15 2012  9:02AM   

 

 Gastroesophageal Reflux  Aug 15 2012  9:02AM   

 

 Hypothyroidism, Acquired  Aug 15 2012  9:02AM   

 

 Hyponatremia  Aug 15 2012  9:02AM   

 

 Atrophic Vaginitis, Postmenopausal  Aug 15 2012  9:02AM   

 

 Flu  Oct 29 2012  9:24AM   

 

 Essential Hypertension  Dec 10 2012  8:35AM   

 

 Hyperlipidemia  Dec 10 2012  8:35AM   

 

 Gastroesophageal Reflux  Dec 10 2012  8:35AM   

 

 Hypothyroidism, Acquired  Dec 10 2012  8:35AM   

 

 Hyponatremia  Dec 10 2012  8:35AM   

 

 Atrophic Vaginitis, Postmenopausal  Dec 10 2012  8:35AM   

 

 Pneumococcus  Dec 10 2012  2:07PM   

 

 Vaginal Discharge  Dec 20 2012  1:50PM   

 

 Essential Hypertension  Dec 20 2012  1:50PM   

 

 Hyperlipidemia  Dec 20 2012  1:50PM   

 

 Gastroesophageal Reflux  Dec 20 2012  1:50PM   

 

 Hypothyroidism, Acquired  Dec 20 2012  1:50PM   

 

 Atrophic Vaginitis, Postmenopausal  Dec 20 2012  1:50PM   

 

 Upper Respiratory Infections  2013  8:52AM   

 

 Hyperlipidemia  2013  8:21AM   

 

 Hypertension  2013  8:21AM   

 

 Hypothyroidism  2013  8:21AM   

 

 Screening Mammogram  Beto 10 2013  8:45AM   

 

 Routine gynecological examination  Beto 10 2013  8:34AM   

 

 Hypertension  Beto 10 2013  8:34AM   

 

 Hyperlipidemia, unspecified  Beto 10 2013  8:34AM   

 

 Hypothyroidism, Acquired  Beto 10 2013  8:34AM   

 

 Rhinitis, Allergic  Beto 10 2013  8:34AM   

 

 Flu  Oct 28 2013  8:52AM   

 

 Essential Hypertension  Dec  9 2013  8:37AM   

 

 Hyperlipidemia  Dec  9 2013  8:37AM   

 

 Gastroesophageal Reflux  Dec  9 2013  8:37AM   

 

 Hypothyroidism, Acquired  Dec  9 2013  8:37AM   

 

 Atrophic Vaginitis, Postmenopausal  Dec  9 2013  8:37AM   

 

 Hypertension  2014  9:56AM   

 

 Hyperlipidemia  2014  9:56AM   

 

 Hypothyroidism  2014  9:56AM   

 

 Screening Mammogram  2014  8:32AM   

 

 Osteopenia  2014  8:32AM   

 

 Hypertension  2014  8:35AM   

 

 Hypothyroidism, Acquired  2014  8:35AM   

 

 Hyperlipidemia  2014  8:35AM   

 

 Upper Respiratory Infections  2014  9:28AM   

 

 Sinusitis, Acute  Oct  2 2014  9:17AM   

 

 Herpes Zoster  Oct  5 2014  1:44PM   

 

 Vesicular Eruption  Oct  5 2014  1:44PM   

 

 Hypertension  2014  8:18AM   

 

 Hyperlipidemia  2014  8:18AM   

 

 hypothyroid  2014  8:18AM   

 

 Situational anxiety  Dec 20 2014  9:33AM   

 

 GERD (gastroesophageal reflux disease)  Dec 20 2014  9:33AM   

 

 Nausea  Dec 20 2014  9:33AM   

 

 Abdominal Pain, Epigastric  Dec 20 2014  9:33AM   

 

 Hyperlipidemia  Oct  6 2014  9:03AM   

 

 acid reflux  Oct  6 2014  9:03AM   

 

 hypothyroid  Oct  6 2014  9:03AM   

 

 Shingles  Oct  6 2014  9:03AM   

 

 Pharyngitis  2015  1:09PM   

 

 Bronchitis  2015  9:10AM   

 

 Hyperlipidemia  2015  9:10AM   

 

 acid reflux  2015  9:10AM   

 

 hypothyroid  2015  9:10AM   

 

 Hyperlipidemia  2015  8:18AM   

 

 Hypertension  2015  8:18AM   

 

 hypothyroid  2015  8:18AM   

 

 Screening Mammogram  2015 11:43AM   

 

 Medicare Annual Pap Q 2 years  2015  9:36AM   

 

 Screening Mammogram  2015  9:36AM   

 

 Candidiasis of female genitalia  2015  9:36AM   

 

 Hypertension  2015 11:34AM   

 

 Hyperlipidemia, unspecified  2015 11:34AM   

 

 Hypothyroidism, Acquired  2015 11:34AM   

 

 Osteopenia  2016  8:41AM   

 

 Encounter for screening mammogram for breast cancer  2016  8:41AM   

 

 Hypertension  2016  8:34AM   

 

 Lymphedema  2016  8:34AM   

 

 Hyperlipidemia  2016  8:34AM   

 

 hypothyroid  2016  8:34AM   

 

 HEP B  2016  9:13AM   

 

 HEP B  2016  8:52AM   

 

 Acid Reflux  2016  8:34AM   

 

 History of acute gastritis  Oct 13 2016  2:30PM   

 

 Anxiety as acute reaction to exceptional stress  Oct 13 2016  2:30PM   

 

 HEP B  Dec 29 2016  8:42AM   

 

 Caregiver stress syndrome  May 26 2017  8:28AM   

 

 Anxiety  May 26 2017  8:28AM   

 

 Blood in stool  2017  2:54PM   

 

 Upper GI bleed  2017 11:34AM   

 

 Hyponatremia  2017  9:03AM   

 

 Hyponatremia  2017  8:43AM   

 

 Medicare Annual Pap Q 2 years  2017  9:03AM   

 

 Nausea  2017  9:03AM   

 

 Uterine enlargement  2017  9:03AM   

 

 Encounter for screening mammogram for breast cancer  2017  4:56PM   







Payers







 Insurance Name  Company Name  Plan Name  Plan Number  Policy Number  Policy 
Group Number  Start Date

 

    Medicare RHC Medicare RHC     [id number]     N/A

 

    [employer]BS  [facility] [facility]     [id number]     2009

 

    Medicare Part B  [place]     [id number]     N/A

 

    Medicare Part A  Medicare Part A     [id number]     N/A

 

    Medicare Part A  [employer]are P [employer] - Preventive     [id number]     N/A

 

    Medicare Part A  Medicare - Lab/Xray     [id number]     N/A







History of Encounters







 Visit Date  Visit Type  Provider

 

 10/18/2017  Office visit  Doris Noriega MD

 

 10/10/2017  Office visit  Doris Noriega MD

 

 2017  Office visit  Doris Noriega MD

 

 2017  Office visit  Doris Noriega MD

 

 2017  Office visit  Doris Noriega MD

 

 2017  Office visit  Prince Noe APRN

 

 2016  Nurse visit  Doris Noriega MD

 

 10/13/2016  Office visit  Doris Noriega MD

 

 2016  Nurse visit  Doris Noriega MD

 

 2016  Office visit  Doris Noriega MD

 

 2015  Office visit  Doris Noriega MD

 

 2015  Office visit  Doris Noriega MD

 

 2015  Office visit  Prince Noe APRN

 

 2014  Office visit  Anyi Herrera APRN

 

 10/27/2014  Voided  Doris Noriega MD

 

 10/6/2014  Office visit  Doris Noriega MD

 

 10/5/2014  Office visit  Anyi Herrera APRN

 

 10/2/2014  Office visit   

 

 10/2/2014  Office visit  Corrine Bay APRN

 

 2014  Office visit  Kassie Franklin APRN

 

 2014  Office visit  Doris Noriega MD

 

 2014  Office visit  Doris Noriega MD

 

 2013  Office visit  Dee Colindres MD

 

 10/28/2013  Nurse visit  Dee Colindres MD

 

 6/10/2013  Office visit  Dee Colindres MD

 

 2013  Office visit  Corrine Bay APRN

 

 2012  Office visit  Dee Colindres MD

 

 12/10/2012  Office visit  Dee Colindres MD

 

 10/29/2012  Nurse visit  Dee Colindres MD

 

 8/15/2012  Office visit  Dee Colindres MD

 

 2012  Office visit  Corrine Bay APRN

 

 2012  Office visit  Dee Colindres MD

 

 2/15/2012  Office visit  Dee Colindres MD

 

 2012  Office visit  Corrine Bay APRN

 

 2011  Office visit  Dee Colindres MD

 

 10/28/2011  Nurse visit  Shad Nolasco MD

 

 2011  Office visit  Dee Colindres MD

 

 2011  Office visit  Dee Colindres MD

 

 2011  Office visit  Dee Colindres MD

 

 2011  Office visit  Dee Colindres MD

 

 2011  Office visit  Dee Colindres MD

 

 2011  Office visit  Dee Colindres MD

 

 4/10/2011  [facility]  KHRIS Reeves MD

 

 4/10/2011  [facility]  RICKEY Toussaint MD

 

 2011  Office visit  RICKEY Toussaint MD

 

 2011  [facility]  Fide Castellanos MD

 

 2011  Voided  Fide Castellanos MD

 

 2011  Office visit  Dee Colindres MD

 

 12/15/2010  Nurse visit  Dee Colindres MD

 

 2010  Office visit  Dee Colindres MD

 

 10/20/2010  Nurse visit  Stephenie PERAZA

 

 2010  Nurse visit  Dee Colindres MD

 

 2010  Nurse visit  Dee Colindres MD

 

 2010  Office visit  Dee Colindres MD

 

 3/19/2010  Voided  Stephenie Kay PA

 

 2010  Nurse visit  Stephenie PERAZA

 

 2010  Nurse visit  Stephenie PERAZA

 

 2010  Nurse visit  Stephenie PERAZA

 

 2009  Office visit  Stephenie PERAZA

 

 10/20/2009  Nurse visit  Stephenie Kay PA

## 2018-10-22 NOTE — ENDO PROCEDURE RECORD
Endo Procedure Report


Date of Procedure


Last Colonoscopy:  Yes (2017)


Oct 22, 2018


Surgeon (s)


FANG JOHNSON MD





Post Procedure/Op Diagnosis





Short, uncomplicated hiatal hernia


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





Procedure Performed





upper GI endoscopy





Description of Procedure


Anesthesia Type:  Conscious Sedation


Specimen(s) collected/removed


None


Description of the Procedure


Indication for the procedure: This lady came in for an upper endoscopy to 

evaluate nausea and some symptoms of reflux disease.  Informed consent was 

obtained after reviewing the procedure in detail.





Description of the procedure: She was placed in left lateral decubitus position 

and her vital signs were monitored.  Conscious sedation was achieved using 

Versed and fentanyl.  The flexible gastroscope was then introduced down the 

esophagus, past the stomach, into the proximal duodenum.





Findings:





Esophagus: A short, uncomplicated hiatal hernia.





Stomach and duodenum: 1 mm, incidental, nonbleeding AV malformation at the 

fundus.  The rest of the stomach and duodenum were normal





She tolerated the procedure well and was taken back to the nursing area in a 

stable condition.





Impression: Epigastric discomfort and nausea.  Gallbladder ultrasound negative 

and therefore it would be reasonable to obtain a HIDA scan.  This would be 

arranged











FANG JOHNSON MD Oct 22, 2018 09:42

## 2018-10-23 ENCOUNTER — HOSPITAL ENCOUNTER (OUTPATIENT)
Dept: HOSPITAL 75 - CARD | Age: 73
End: 2018-10-23
Attending: SURGERY
Payer: MEDICARE

## 2018-10-23 DIAGNOSIS — R10.11: Primary | ICD-10-CM

## 2018-10-23 DIAGNOSIS — R11.0: ICD-10-CM

## 2018-10-23 PROCEDURE — 78227 HEPATOBIL SYST IMAGE W/DRUG: CPT

## 2018-10-23 NOTE — DIAGNOSTIC IMAGING REPORT
INDICATION: Right upper quadrant pain and nausea.



TECHNIQUE: Patient was administered 5.3 mCi technetium-99m

Choletec, and imaging of the abdomen was performed. At 1 hour,

patient ingested 8 ounces of Ensure, and gallbladder ejection

fraction was calculated.



FINDINGS: There is homogeneous uptake of activity by the liver.

Prompt excretion of activity into the common duct and gallbladder

is seen with normal passage of activity into the small bowel.

Gallbladder ejection fraction is abnormally low at 18%. Normal

values are 33% or greater.



IMPRESSION:

1. No evidence of cystic duct or common bile duct obstruction.

2. Low gallbladder ejection fraction of 18%.



Dictated by: 



  Dictated on workstation # [id number]

## 2018-11-15 ENCOUNTER — HOSPITAL ENCOUNTER (OUTPATIENT)
Dept: HOSPITAL 75 - PREOP | Age: 73
Discharge: HOME | End: 2018-11-15
Attending: SURGERY
Payer: MEDICARE

## 2018-11-15 VITALS — WEIGHT: 139 LBS | BODY MASS INDEX: 25.91 KG/M2 | HEIGHT: 61.25 IN

## 2018-11-15 DIAGNOSIS — Z01.818: Primary | ICD-10-CM

## 2018-11-21 ENCOUNTER — HOSPITAL ENCOUNTER (OUTPATIENT)
Dept: HOSPITAL 75 - SDC | Age: 73
Discharge: HOME | End: 2018-11-21
Attending: SURGERY
Payer: MEDICARE

## 2018-11-21 VITALS — SYSTOLIC BLOOD PRESSURE: 139 MMHG | DIASTOLIC BLOOD PRESSURE: 78 MMHG

## 2018-11-21 VITALS — DIASTOLIC BLOOD PRESSURE: 75 MMHG | SYSTOLIC BLOOD PRESSURE: 130 MMHG

## 2018-11-21 VITALS — DIASTOLIC BLOOD PRESSURE: 79 MMHG | SYSTOLIC BLOOD PRESSURE: 135 MMHG

## 2018-11-21 VITALS — BODY MASS INDEX: 26.47 KG/M2 | WEIGHT: 142 LBS | HEIGHT: 61.25 IN

## 2018-11-21 VITALS — SYSTOLIC BLOOD PRESSURE: 135 MMHG | DIASTOLIC BLOOD PRESSURE: 79 MMHG

## 2018-11-21 VITALS — SYSTOLIC BLOOD PRESSURE: 131 MMHG | DIASTOLIC BLOOD PRESSURE: 71 MMHG

## 2018-11-21 DIAGNOSIS — I25.10: ICD-10-CM

## 2018-11-21 DIAGNOSIS — E78.5: ICD-10-CM

## 2018-11-21 DIAGNOSIS — Z79.899: ICD-10-CM

## 2018-11-21 DIAGNOSIS — K82.8: ICD-10-CM

## 2018-11-21 DIAGNOSIS — K81.1: Primary | ICD-10-CM

## 2018-11-21 DIAGNOSIS — K21.9: ICD-10-CM

## 2018-11-21 DIAGNOSIS — Z95.5: ICD-10-CM

## 2018-11-21 DIAGNOSIS — I10: ICD-10-CM

## 2018-11-21 DIAGNOSIS — Z79.82: ICD-10-CM

## 2018-11-21 DIAGNOSIS — F41.9: ICD-10-CM

## 2018-11-21 DIAGNOSIS — Z11.2: ICD-10-CM

## 2018-11-21 DIAGNOSIS — K44.9: ICD-10-CM

## 2018-11-21 LAB
ALBUMIN SERPL-MCNC: 4.3 GM/DL (ref 3.2–4.5)
ALP SERPL-CCNC: 103 U/L (ref 40–136)
ALT SERPL-CCNC: 19 U/L (ref 0–55)
BASOPHILS # BLD AUTO: 0.1 10^3/UL (ref 0–0.1)
BASOPHILS NFR BLD AUTO: 1 % (ref 0–10)
BILIRUB SERPL-MCNC: 0.5 MG/DL (ref 0.1–1)
BUN/CREAT SERPL: 18
CALCIUM SERPL-MCNC: 9.1 MG/DL (ref 8.5–10.1)
CHLORIDE SERPL-SCNC: 99 MMOL/L (ref 98–107)
CO2 SERPL-SCNC: 26 MMOL/L (ref 21–32)
CREAT SERPL-MCNC: 0.9 MG/DL (ref 0.6–1.3)
EOSINOPHIL # BLD AUTO: 0.2 10^3/UL (ref 0–0.3)
EOSINOPHIL NFR BLD AUTO: 4 % (ref 0–10)
ERYTHROCYTE [DISTWIDTH] IN BLOOD BY AUTOMATED COUNT: 12.7 % (ref 10–14.5)
GFR SERPLBLD BASED ON 1.73 SQ M-ARVRAT: > 60 ML/MIN
GLUCOSE SERPL-MCNC: 105 MG/DL (ref 70–105)
HCT VFR BLD CALC: 37 % (ref 35–52)
HGB BLD-MCNC: 12.2 G/DL (ref 11.5–16)
LYMPHOCYTES # BLD AUTO: 1.2 X 10^3 (ref 1–4)
LYMPHOCYTES NFR BLD AUTO: 21 % (ref 12–44)
MANUAL DIFFERENTIAL PERFORMED BLD QL: NO
MCH RBC QN AUTO: 32 PG (ref 25–34)
MCHC RBC AUTO-ENTMCNC: 33 G/DL (ref 32–36)
MCV RBC AUTO: 97 FL (ref 80–99)
MONOCYTES # BLD AUTO: 0.6 X 10^3 (ref 0–1)
MONOCYTES NFR BLD AUTO: 10 % (ref 0–12)
NEUTROPHILS # BLD AUTO: 3.8 X 10^3 (ref 1.8–7.8)
NEUTROPHILS NFR BLD AUTO: 65 % (ref 42–75)
PLATELET # BLD: 272 10^3/UL (ref 130–400)
PMV BLD AUTO: 9.8 FL (ref 7.4–10.4)
POTASSIUM SERPL-SCNC: 4.1 MMOL/L (ref 3.6–5)
PROT SERPL-MCNC: 7.2 GM/DL (ref 6.4–8.2)
RBC # BLD AUTO: 3.87 10^6/UL (ref 4.35–5.85)
SODIUM SERPL-SCNC: 135 MMOL/L (ref 135–145)
WBC # BLD AUTO: 5.9 10^3/UL (ref 4.3–11)

## 2018-11-21 PROCEDURE — 36415 COLL VENOUS BLD VENIPUNCTURE: CPT

## 2018-11-21 PROCEDURE — 94664 DEMO&/EVAL PT USE INHALER: CPT

## 2018-11-21 PROCEDURE — 88304 TISSUE EXAM BY PATHOLOGIST: CPT

## 2018-11-21 PROCEDURE — 87081 CULTURE SCREEN ONLY: CPT

## 2018-11-21 PROCEDURE — 80053 COMPREHEN METABOLIC PANEL: CPT

## 2018-11-21 PROCEDURE — 85025 COMPLETE CBC W/AUTO DIFF WBC: CPT

## 2018-11-21 NOTE — DISCHARGE INST-SIMPLE/STANDARD
Discharge Inst-Standard


Discharge Medications


New, Converted or Re-Newed RX:  RX on Chart





Patient Instructions/Follow Up


Plan of Care/Instructions/FU:  


Band-Aids off in 48 hours.  Incentive spirometry.  Resume Plavix on


Friday.  Follow-up in 3 weeks.


Activity as Tolerated:  Yes


Discharge Diet:  No Restrictions











FANG JOHNSON MD Nov 21, 2018 08:20

## 2018-11-21 NOTE — PROGRESS NOTE-PRE OPERATIVE
Pre-Operative Progress Note


H&P Reviewed


The H&P was reviewed, patient examined and no changes noted.


Date Seen by Provider:  Nov 5, 2018


Time Seen by Provider:  11:20


Date H&P Reviewed:  Nov 21, 2018


Time H&P Reviewed:  07:18


Pre-Operative Diagnosis:  Chronic acalculous cholecystitis











FANG JOHNSON MD Nov 21, 2018 07:19

## 2018-11-21 NOTE — XMS REPORT
Continuity of Care Document

 Created on: 2018



Milady Crespo

External Reference #: [id number]

: 1945

Sex: Female



Demographics







 Address  [address] [place] [address]

 

 Home Phone  [phone] x

 

 Preferred Language  Unknown

 

 Marital Status  Unknown

 

 Pentecostalism Affiliation  Unknown

 

 Race  Unknown

 

 Ethnic Group  Unknown





Author







 Author  [facility]

 

 Organization  [facility]

 

 Address  Unknown

 

 Phone  Unavailable



              



Allergies

      





 Active            Description            Code            Type            
Severity            Reaction            Onset            Reported/Identified   
         Relationship to Patient            Clinical Status        

 

 Yes            No Known Drug Allergies            [id number]                    
     N/A            N/A                                                        
    

 

 Yes            No Known Drug Allergies            [id number]            Drug 
Allergy            Unknown            N/A                         10/18/2018   
                               



                    



Medications

      



There is no data.                  



Problems

      





 Date Dx Coded            Attending            Type            Code            
Diagnosis            Diagnosed By        

 

 2017            FANG JOHNSON MD            Ot            K21.9     
       GASTRO-ESOPHAGEAL REFLUX DISEASE WITHOUT                     

 

 2017            FANG JOHNSON MD            Ot            R19.5     
       OTHER FECAL ABNORMALITIES                     

 

 2017            FANG JOHNSON MD            Ot            Z01.818   
         ENCOUNTER FOR OTHER PREPROCEDURAL EXAMIN                     

 

 2017            FANG JHONSON MD            Ot            K21.9     
       GASTRO-ESOPHAGEAL REFLUX DISEASE WITHOUT                     

 

 2017            FANG JOHNSON MD            Ot            R19.5     
       OTHER FECAL ABNORMALITIES                     

 

 2017            FANG JOHNSON MD            Ot            Z01.818   
         ENCOUNTER FOR OTHER PREPROCEDURAL EXAMIN                     

 

 2017            FANG JOHNSON MD            Ot            K21.9     
       GASTRO-ESOPHAGEAL REFLUX DISEASE WITHOUT                     

 

 2017            FANG JOHNSON MD            Ot            R19.5     
       OTHER FECAL ABNORMALITIES                     

 

 2017            FANG JOHNSON MD            Ot            Z01.818   
         ENCOUNTER FOR OTHER PREPROCEDURAL EXAMIN                     

 

 2017            FANG JOHNSON MD            Ot            K21.9     
       GASTRO-ESOPHAGEAL REFLUX DISEASE WITHOUT                     

 

 2017            FANG JOHNSON MD M            Ot            R19.5     
       OTHER FECAL ABNORMALITIES                     

 

 2017            FANG JOHNSON MD            Ot            Z01.818   
         ENCOUNTER FOR OTHER PREPROCEDURAL EXAMIN                     

 

 2017            FANG JOHNSON MD            Ot            E78.00    
        PURE HYPERCHOLESTEROLEMIA, UNSPECIFIED                     

 

 2017            ALEX BECERRA, FANG GUPTA            Ot            I10       
     ESSENTIAL (PRIMARY) HYPERTENSION                     

 

 2017            FANG JOHNSON MD            Ot            K20.9     
       ESOPHAGITIS, UNSPECIFIED                     

 

 2017            FANG JOHNSON MD            Ot            K21.9     
       GASTRO-ESOPHAGEAL REFLUX DISEASE WITHOUT                     

 

 2017            FANG JOHNSON MD            Ot            K44.9     
       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                     

 

 2017            FANG JOHNSON MD            Ot            K64.8     
       OTHER HEMORRHOIDS                     

 

 2017            FANG JOHNSON MD            Ot            R19.5     
       OTHER FECAL ABNORMALITIES                     

 

 2017            FANG JOHNSON MD            Ot            Z80.0     
       FAMILY HISTORY OF MALIGNANT NEOPLASM OF                      

 

 2017            FANG JOHNSON MD            Ot            R10.11    
        RIGHT UPPER QUADRANT PAIN                     

 

 2017            FANG JOHNSON MD            Ot            R93.2     
       ABNORMAL FINDINGS ON DX IMAGING OF LIVER                     

 

 2017            FANG JOHNSON MD            Ot            R10.11    
        RIGHT UPPER QUADRANT PAIN                     

 

 2017            AFNG JOHNSON MD            Ot            R93.2     
       ABNORMAL FINDINGS ON DX IMAGING OF LIVER                     

 

 2017            FANG JOHNSON MD            Ot            R10.11    
        RIGHT UPPER QUADRANT PAIN                     

 

 2017            FANG JOHNSON MD            Ot            R93.2     
       ABNORMAL FINDINGS ON DX IMAGING OF LIVER                     

 

 2017            FANG JOHNSON MD            Ot            R10.11    
        RIGHT UPPER QUADRANT PAIN                     

 

 2017            FANG JOHNSON MD            Ot            R93.2     
       ABNORMAL FINDINGS ON DX IMAGING OF LIVER                     

 

 2017            FANG JOHNSON MD            Ot            R10.11    
        RIGHT UPPER QUADRANT PAIN                     

 

 2017            FANG JOHNSON MD            Ot            R93.2     
       ABNORMAL FINDINGS ON DX IMAGING OF LIVER                     

 

 2017            FANG JOHNSON MD            Ot            R10.11    
        RIGHT UPPER QUADRANT PAIN                     

 

 2017            FANG JOHNSON MD            Ot            R93.2     
       ABNORMAL FINDINGS ON DX IMAGING OF LIVER                     

 

 2017            FANG JOHNSON MD            Ot            R10.11    
        RIGHT UPPER QUADRANT PAIN                     

 

 2017            FANG JOHNSON MD            Ot            R93.2     
       ABNORMAL FINDINGS ON DX IMAGING OF LIVER                     

 

 10/17/2018            FANG JOHNSON MD            Ot            Z01.818   
         ENCOUNTER FOR OTHER PREPROCEDURAL EXAMIN                     

 

 10/18/2018            FANG JOHNSON MD            Ot            Z01.818   
         ENCOUNTER FOR OTHER PREPROCEDURAL EXAMIN                     

 

 10/18/2018            FANG JOHNSON MD            Ot            Z01.818   
         ENCOUNTER FOR OTHER PREPROCEDURAL EXAMIN                     

 

 10/22/2018            FANG JOHNSON MD            Ot            R10.11    
        RIGHT UPPER QUADRANT PAIN                     

 

 10/22/2018            FANG JOHNSON MD            Ot            R93.2     
       ABNORMAL FINDINGS ON DX IMAGING OF LIVER                     

 

 10/24/2018            FANG JOHNSON MD            Ot            E78.00    
        PURE HYPERCHOLESTEROLEMIA, UNSPECIFIED                     

 

 10/24/2018            FANG JOHNSON MD            Ot            F41.9     
       ANXIETY DISORDER, UNSPECIFIED                     

 

 10/24/2018            FANG JOHNSON MD            Ot            I10       
     ESSENTIAL (PRIMARY) HYPERTENSION                     

 

 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    
        ATHSCL HEART DISEASE OF NATIVE CORONARY                      

 

 10/24/2018            FANG JOHNSON MD            Ot            K21.9     
       GASTRO-ESOPHAGEAL REFLUX DISEASE WITHOUT                     

 

 10/24/2018            FANG JOHNSON MD            Ot            K44.9     
       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                     

 

 10/24/2018            FANG JOHNSON MD            Ot            K57.30    
        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                     

 

 10/24/2018            FANG JOHNSON MD            Ot            R10.13    
        EPIGASTRIC PAIN                     

 

 10/24/2018            FANG JOHNSON MD            Ot            R11.0     
       NAUSEA                     

 

 10/24/2018            FANG JOHNSON MD            Ot            Z79.899   
         OTHER LONG TERM (CURRENT) DRUG THERAPY                     

 

 10/25/2018            FANG JOHNSON MD            Ot            R10.11    
        RIGHT UPPER QUADRANT PAIN                     

 

 10/25/2018            FANG JOHNSON MD            Ot            R11.0     
       NAUSEA                     

 

 10/26/2018            FANG JOHNSON MD            Ot            E78.00    
        PURE HYPERCHOLESTEROLEMIA, UNSPECIFIED                     

 

 10/26/2018            FANG JOHNSON MD            Ot            F41.9     
       ANXIETY DISORDER, UNSPECIFIED                     

 

 10/26/2018            FANG JOHNSON MD            Ot            I10       
     ESSENTIAL (PRIMARY) HYPERTENSION                     

 

 10/26/2018            FANG JOHNSON MD            Ot            I25.10    
        ATHSCL HEART DISEASE OF NATIVE CORONARY                      

 

 10/26/2018            FANG JOHNSON MD            Ot            K21.9     
       GASTRO-ESOPHAGEAL REFLUX DISEASE WITHOUT                     

 

 10/26/2018            FANG JOHNSON MD            Ot            K44.9     
       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                     

 

 10/26/2018            FANG JOHNSON MD            Ot            K57.30    
        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                     

 

 10/26/2018            FANG JOHNSON MD            Ot            R10.13    
        EPIGASTRIC PAIN                     

 

 10/26/2018            FANG JOHNSON MD            Ot            R11.0     
       NAUSEA                     

 

 10/26/2018            FANG JOHNSON MD, Ot            Z79.899   
         OTHER LONG TERM (CURRENT) DRUG THERAPY                     

 

 11/15/2018            FANG JOHNSON MD            Ot            Z01.818   
         ENCOUNTER FOR OTHER PREPROCEDURAL EXAMIN                     

 

 11/15/2018            FANG JOHNSON MD, Ot            Z01.818   
         ENCOUNTER FOR OTHER PREPROCEDURAL EXAMIN                     

 

 2018            FANG JOHNSON MD            Ot            R10.11    
        RIGHT UPPER QUADRANT PAIN                     

 

 2018            FANG JOHNSON MD            Ot            R93.2     
       ABNORMAL FINDINGS ON DX IMAGING OF LIVER                     

 

 2018            FANG JOHNSON MD            Ot            E78.00    
        PURE HYPERCHOLESTEROLEMIA, UNSPECIFIED                     

 

 2018            FANG JOHNSON MD            Ot            F41.9     
       ANXIETY DISORDER, UNSPECIFIED                     

 

 2018            FANG JOHNSON MD            Ot            I10       
     ESSENTIAL (PRIMARY) HYPERTENSION                     

 

 2018            FANG JOHNSON MD            Ot            I25.10    
        ATHSCL HEART DISEASE OF NATIVE CORONARY                      

 

 2018            FANG JOHNSON MD            Ot            K21.9     
       GASTRO-ESOPHAGEAL REFLUX DISEASE WITHOUT                     

 

 2018            FANG JOHNSON MD            Ot            K44.9     
       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                     

 

 2018            FANG JOHNSON MD            Ot            K57.30    
        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                     

 

 2018            FANG JOHNSON MD            Ot            R10.13    
        EPIGASTRIC PAIN                     

 

 2018            FANG JOHNSON MD            Ot            R11.0     
       NAUSEA                     

 

 2018            FANG JOHNSON MD, Ot            Z79.899   
         OTHER LONG TERM (CURRENT) DRUG THERAPY                     

 

 2018            FANG JOHNSON MD            Ot            R10.11    
        RIGHT UPPER QUADRANT PAIN                     

 

 2018            FANG JOHNSON MD            Ot            R11.0     
       NAUSEA                     



                                                                               
                                                                               
              



Procedures

      



There is no data.                  



Results

      



There is no data.              



Encounters

      





 ACCT No.            Visit Date/Time            Discharge            Status    
        Pt. Type            Provider            Facility            Loc./Unit  
          Complaint        

 

 [id number]            10/01/2018 09:47:33            10/01/2018 23:59:59          
  [facility]            Outpatient            Marvin Boyer                        
                       

 

 [id number]            2018 09:47:30            2018 23:59:59          
  [facility]            Outpatient            Marvin Boyer                        
                       

 

 [id number]            2018 09:48:42            2018 23:59:59          
  [facility]            Outpatient            Marvin Boyer                        
                       

 

 [id number]            2018 09:14:21            2018 23:59:59          
  [facility]            Outpatient            Marvin Boyer                        
                       

 

 [id number]            2018 09:29:14            2018 23:59:59          
  [facility]            Outpatient            Riddel, Doris                         
                      

 

 [id number]            2018 09:13:06            2018 23:59:59          
  [facility]            Outpatient            Riddel, Doris                         
                      

 

 [id number]            2018 12:50:55            2018 23:59:59          
  [facility]            Outpatient            Deedee Carter                     
                          

 

 [id number]            2017 09:09:30            2017 23:59:59          
  [facility]            Outpatient            Riddel, Doris                         
                      

 

 [id number]            10/18/2017 09:53:44            10/18/2017 23:59:59          
  [facility]            Outpatient            Riddel, Doris                         
                      

 

 [id number]            10/10/2017 14:41:56            10/10/2017 23:59:59          
  [facility]            Outpatient            Riddel, Doris                         
                      

 

 [id number]            2017 09:15:47            2017 23:59:59          
  [facility]            Outpatient            Riddel, Doris                         
                      

 

 [id number]            2017 09:38:32            2017 23:59:59          
  [facility]            Outpatient            Riddel, Doris                         
                      

 

 [id number]            2017 12:09:45            2017 23:59:59          
  [facility]            Outpatient            Riddel, Doris                         
                      

 

 [id number]            2017 09:14:55            2017 23:59:59          
  [facility]            Outpatient            Prince Noe                       
                        

 

 [id number]            2016 09:25:05            2016 23:59:59          
  [facility]            Outpatient            Riddel, Doris                         
                      

 

 [id number]            10/13/2016 15:12:59            10/13/2016 23:59:59          
  [facility]            Outpatient            Riddel, Doris                         
                      

 

 [id number]            2016 09:14:04            2016 23:59:59          
  [facility]            Outpatient            Riddel, Doris                         
                      

 

 [id number]            2016 09:30:33            2016 23:59:59          
  [facility]            Outpatient            Riddel, Doris                         
                      

 

 [id number]            08/10/2015 22:02:21            08/10/2015 23:59:59          
  [facility]            Outpatient            Doris Noriega                         
                      

 

 [id number]            2015 10:01:13            2015 23:59:59          
  [facility]            Outpatient            Doris Noriega                         
                      

 

 [id number]            2015 14:03:46            2015 23:59:59          
  [facility]            Outpatient            NoePrince                       
                        

 

 [id number]            2014 10:21:34            2014 23:59:59          
  [facility]            Outpatient            Javier Anyi                              
                 

 

 [id number]            10/27/2014 09:56:02            10/27/2014 23:59:59          
  [facility]            Outpatient            Doris Noriega                         
                      

 

 [id number]            10/06/2014 09:50:16            10/06/2014 23:59:59          
  [facility]            Outpatient            Doris Noriega                         
                      

 

 [id number]            10/05/2014 14:39:50            10/05/2014 23:59:59          
  [facility]            Outpatient            Javier Anyi                              
                 

 

 [id number]            10/02/2014 10:07:18            10/02/2014 23:59:59          
  [facility]            Outpatient            Phu Corrine                           
                    

 

 [id number]            2014 10:13:17            2014 23:59:59          
  [facility]            Outpatient            Kassie Franklin                  
                             

 

 [id number]            2014 09:15:33            2014 23:59:59          
  CL[facility]            Outpatient            Doris Noriega                         
                      

 

 [id number]            2014 10:42:42            2014 23:59:59          
  CLS            Outpatient            Doris Noriega                         
                      

 

 [id number]            2018 08:04:33                                      
Document Registration                                                          
  

 

 [id number]            2018 09:06:18                                      
Document Registration                                                          
  

 

 [id number]            2018 08:10:16                                      
Document Registration                                                          
  

 

 [id number]            10/31/2017 08:31:14                                      
Document Registration                                                          
  

 

 [id number]            11/15/2018 09:15:00            11/15/2018 09:58:00    
        DIS            Outpatient            FANG JOHNSON MD            
[facility]            PREOP            ROBOTIC 
CHOLECYSTECTOMY        

 

 [id number]            10/23/2018 07:19:00            10/23/2018 23:59:59    
        [facility]            Outpatient            FANG JOHNSON MD            
[facility]            CARD            RUQ PAIN AND NAUSEA
        

 

 [id number]            10/22/2018 08:14:00            10/22/2018 23:59:59    
        [facility]            Outpatient            FANG JOHNSON MD            
[facility]            ENDO            NAUSEA/GERD        

 

 [id number]            10/18/2018 13:31:00            10/18/2018 13:45:00    
        DIS            Outpatient            FANG JOHNSON MD            
[facility]            PREOP            EGD        

 

 [id number]            2017 09:12:00            2017 23:59:59    
        CLS            Outpatient            FANG JOHNSON MD            
[facility]            RAD            RUQ PAIN        

 

 [id number]            2017 08:08:00            2017 11:35:00    
        DIS            Outpatient            FANG JOHNSON MD            
[facility]            ENDO            GERD/HEMOCULT 
POSITIVE STOOLS        

 

 [id number]            07/10/2017 05:43:00            2017 10:32:00    
        DIS            Outpatient            FANG JOHNSON MD            
[facility]            PREOP            GERD/HEMOCULT 
POSITIVE STOOLS        

 

 [id number]            2018 05:53:00                         ACT       
     Outpatient            FANG JOHNSON MD            [facility]            SDC            GALLBLADDER DYSKENESIA

## 2018-11-21 NOTE — OPERATIVE REPORT
Operative Report


Date of Procedure/Surgery


Nov 21, 2018


Surgeon (s)


FANG JOHNSON MD


Assistant (s):  N/A





Post-Operative Diagnosis





same





Procedure Performed





robotic-assisted cholecystectomy





Description of Procedure


Anesthesia Type:  General


Estimated blood loss (mL):  minimal


Specimen(s) collected/removed


gallbladder


Description of the Procedure


Indication for the procedure: This lady presented with severe symptoms due to 

chronic, acalculous cholecystitis and much reduced ejection fraction of the 

gallbladder.  Therefore, she was offered cholecystectomy using minimal invasive 

technique with robotic assistance.  Informed consent was obtained after 

reviewing the operative details and complications of wound infection and bile 

leak.  





Description of procedure: She was placed supine on the operating table and 

general anesthesia induced.  A gram of Ancef and 500 mg of Flagyl were 

administered intravenously as prophylaxis against wound infection. Sequential 

compression devices were placed around her legs, to minimize the risk of venous 

thrombosis.





Abdomen was prepared and draped in the usual sterile manner.  Pneumoperitoneum 

was established using a Veress needle introduced over the sub-umbilical region.

  Abdominal pressure was maintained at 15 mmHg, using carbon dioxide 

insufflation.  A 12 mm trocar was placed and anatomy visualized using the high 

definition, 3-dimensional laparoscope associated with da Antonina system.  Under 

direct view, I placed an 8 mm trocar over each side of the abdomen, followed by 

a 5 mm trocar over the left subcostal region.  The patient was then turned into 

reverse Trendelenburg position, with the right side tilted up.  The robotic 

system was then docked in place.





The fundus of the gallbladder was retracted cephalad and the infundibulum 

grasped with Cadiere forceps.  Peritoneum overlying Calot's triangle was 

incised using the hook cautery, delineating the cystic duct and artery.  Both 

were controlled between locking clips.  Cholecystectomy was then completed 

using the hook cautery.  The gallbladder was then placed in an Endo Catch bag 

and removed via the subumbilical trocar site.  The fascia over this incision 

was closed using Vicryl in a Geo Lopez device under direct view.  Skin 

incisions were closed using 4-0 Vicryl, in a subcuticular fashion.  0.5 percent 

Marcaine with epinephrine was infiltrated along the incisions, both 

preemptively and at the conclusion of the operation.





She tolerated the procedure well, was extubated in the operating room and taken 

to the recovery room in a stable condition.


Findings of the Procedure


See op report





Allergies and Home Medications


Allergies


Coded Allergies:  


     No Known Drug Allergies (Unverified , 10/18/18)





Home Medications


Amlodipine Besylate 5 Mg Tablet, 5 MG PO DAILY, (Reported)


Atenolol 50 Mg Tablet, 50 MG PO DAILY, (Reported)


Atorvastatin Calcium 20 Mg Tablet, 20 MG PO DAILY, (Reported)


B Complex with Vitamin C 1 Each Tablet, 1 EACH PO DAILY, (Reported)


Ca Carbonate/Vitamin D3/Vit K 1 Each Tab.chew, 2 EACH PO DAILY, (Reported)


Clopidogrel Bisulfate 75 Mg Tablet, 75 MG PO DAILY, (Reported)


Escitalopram Oxalate 10 Mg Tablet, 10 MG PO DAILY, (Reported)


Fish Oil/Dha/Epa 1 Each Capsule, 2 EACH PO DAILY, (Reported)


Gluc/Manuel-MSM#2/C/D3/Pilo/Born 1 Each Tablet, 2 EACH PO DAILY, (Reported)


Levothyroxine Sodium 50 Mcg Tablet, 50 MCG PO DAILY, (Reported)


Losartan Potassium 100 Mg Tablet, 100 MG PO DAILY, (Reported)


Mirtazapine 15 Mg Tablet, 15 MG PO HS, (Reported)


Multivitamin 1 Each Capsule, 1 EACH PO DAILY, (Reported)


Pantoprazole Sodium 40 Mg Tablet.dr, 40 MG PO BID, (Reported)


Prochlorperazine Maleate 10 Mg Tablet, 10 MG PO Q8H PRN for NAUSEA/VOMITING-1ST 

LINE, (Reported)





Patient Home Medication List


Home Medication List Reviewed:  Yes











FANG JOHNSON MD Nov 21, 2018 08:19

## 2018-11-21 NOTE — ANESTHESIA-GENERAL POST-OP
General


Patient Condition


Mental Status/LOC:  Same as Preop


Cardiovascular:  Satisfactory


Nausea/Vomiting:  Absent


Respiratory:  Satisfactory


Pain:  Controlled


Complications:  Absent





Post Op Complications


Complications


None





Follow Up Care/Instructions


Patient Instructions


None needed.





Anesthesia/Patient Condition


Patient Condition


Patient is doing well, no complaints, stable vital signs, no apparent adverse 

anesthesia problems.   


No complications reported per nursing.











MIGUEL MCDANIEL CRNA Nov 21, 2018 09:03